# Patient Record
Sex: FEMALE | Race: WHITE | NOT HISPANIC OR LATINO | Employment: OTHER | URBAN - METROPOLITAN AREA
[De-identification: names, ages, dates, MRNs, and addresses within clinical notes are randomized per-mention and may not be internally consistent; named-entity substitution may affect disease eponyms.]

---

## 2017-04-04 ENCOUNTER — COMMUNICATION - HEALTHEAST (OUTPATIENT)
Dept: FAMILY MEDICINE | Facility: CLINIC | Age: 67
End: 2017-04-04

## 2017-04-04 DIAGNOSIS — G43.909 MIGRAINE: ICD-10-CM

## 2017-04-04 DIAGNOSIS — H53.9 VISION CHANGES: ICD-10-CM

## 2017-04-14 ENCOUNTER — RECORDS - HEALTHEAST (OUTPATIENT)
Dept: ADMINISTRATIVE | Facility: OTHER | Age: 67
End: 2017-04-14

## 2017-05-12 ENCOUNTER — RECORDS - HEALTHEAST (OUTPATIENT)
Dept: ADMINISTRATIVE | Facility: OTHER | Age: 67
End: 2017-05-12

## 2018-07-13 ENCOUNTER — RECORDS - HEALTHEAST (OUTPATIENT)
Dept: ADMINISTRATIVE | Facility: OTHER | Age: 68
End: 2018-07-13

## 2018-08-23 ENCOUNTER — OFFICE VISIT - HEALTHEAST (OUTPATIENT)
Dept: FAMILY MEDICINE | Facility: CLINIC | Age: 68
End: 2018-08-23

## 2018-08-23 DIAGNOSIS — Z12.4 SCREENING FOR MALIGNANT NEOPLASM OF CERVIX: ICD-10-CM

## 2018-08-23 DIAGNOSIS — Z13.1 SCREENING FOR DIABETES MELLITUS: ICD-10-CM

## 2018-08-23 DIAGNOSIS — K21.9 ESOPHAGEAL REFLUX: ICD-10-CM

## 2018-08-23 DIAGNOSIS — Z51.81 MEDICATION MONITORING ENCOUNTER: ICD-10-CM

## 2018-08-23 DIAGNOSIS — M94.9 DISORDER OF BONE AND CARTILAGE: ICD-10-CM

## 2018-08-23 DIAGNOSIS — Z00.00 ROUTINE ADULT HEALTH MAINTENANCE: ICD-10-CM

## 2018-08-23 DIAGNOSIS — N39.0 URINARY TRACT INFECTION: ICD-10-CM

## 2018-08-23 DIAGNOSIS — N89.8 VAGINAL DISCHARGE: ICD-10-CM

## 2018-08-23 DIAGNOSIS — J30.9 ALLERGIC RHINITIS: ICD-10-CM

## 2018-08-23 DIAGNOSIS — E78.2 MIXED HYPERLIPIDEMIA: ICD-10-CM

## 2018-08-23 DIAGNOSIS — M89.9 DISORDER OF BONE AND CARTILAGE: ICD-10-CM

## 2018-08-23 LAB
ALBUMIN UR-MCNC: NEGATIVE MG/DL
APPEARANCE UR: CLEAR
BILIRUB UR QL STRIP: NEGATIVE
CLUE CELLS: NORMAL
COLOR UR AUTO: YELLOW
GLUCOSE UR STRIP-MCNC: NEGATIVE MG/DL
HGB UR QL STRIP: ABNORMAL
KETONES UR STRIP-MCNC: NEGATIVE MG/DL
LEUKOCYTE ESTERASE UR QL STRIP: NEGATIVE
NITRATE UR QL: NEGATIVE
PH UR STRIP: 5.5 [PH] (ref 5–8)
SP GR UR STRIP: 1.01 (ref 1–1.03)
TRICHOMONAS, WET PREP: NORMAL
UROBILINOGEN UR STRIP-ACNC: ABNORMAL
YEAST, WET PREP: NORMAL

## 2018-08-23 ASSESSMENT — MIFFLIN-ST. JEOR: SCORE: 1263.72

## 2018-08-24 LAB
HPV SOURCE: NORMAL
HUMAN PAPILLOMA VIRUS 16 DNA: NEGATIVE
HUMAN PAPILLOMA VIRUS 18 DNA: NEGATIVE
HUMAN PAPILLOMA VIRUS FINAL DIAGNOSIS: NORMAL
HUMAN PAPILLOMA VIRUS OTHER HR: NEGATIVE
SPECIMEN DESCRIPTION: NORMAL

## 2018-09-06 ENCOUNTER — AMBULATORY - HEALTHEAST (OUTPATIENT)
Dept: LAB | Facility: CLINIC | Age: 68
End: 2018-09-06

## 2018-09-06 DIAGNOSIS — E78.2 MIXED HYPERLIPIDEMIA: ICD-10-CM

## 2018-09-06 DIAGNOSIS — Z13.1 SCREENING FOR DIABETES MELLITUS: ICD-10-CM

## 2018-09-06 DIAGNOSIS — K21.9 ESOPHAGEAL REFLUX: ICD-10-CM

## 2018-09-06 DIAGNOSIS — Z51.81 MEDICATION MONITORING ENCOUNTER: ICD-10-CM

## 2018-09-06 LAB
ALBUMIN SERPL-MCNC: 3.3 G/DL (ref 3.5–5)
ALP SERPL-CCNC: 60 U/L (ref 45–120)
ALT SERPL W P-5'-P-CCNC: 18 U/L (ref 0–45)
ANION GAP SERPL CALCULATED.3IONS-SCNC: 6 MMOL/L (ref 5–18)
AST SERPL W P-5'-P-CCNC: 13 U/L (ref 0–40)
BILIRUB SERPL-MCNC: 0.2 MG/DL (ref 0–1)
BUN SERPL-MCNC: 20 MG/DL (ref 8–22)
CALCIUM SERPL-MCNC: 9.2 MG/DL (ref 8.5–10.5)
CHLORIDE BLD-SCNC: 109 MMOL/L (ref 98–107)
CHOLEST SERPL-MCNC: 218 MG/DL
CO2 SERPL-SCNC: 23 MMOL/L (ref 22–31)
CREAT SERPL-MCNC: 0.74 MG/DL (ref 0.6–1.1)
ERYTHROCYTE [DISTWIDTH] IN BLOOD BY AUTOMATED COUNT: 13.6 % (ref 11–14.5)
FASTING STATUS PATIENT QL REPORTED: YES
GFR SERPL CREATININE-BSD FRML MDRD: >60 ML/MIN/1.73M2
GLUCOSE BLD-MCNC: 105 MG/DL (ref 70–125)
HBA1C MFR BLD: 5.3 % (ref 3.5–6)
HCT VFR BLD AUTO: 42.2 % (ref 35–47)
HDLC SERPL-MCNC: 41 MG/DL
HGB BLD-MCNC: 14.2 G/DL (ref 12–16)
LDLC SERPL CALC-MCNC: 147 MG/DL
MCH RBC QN AUTO: 32.9 PG (ref 27–34)
MCHC RBC AUTO-ENTMCNC: 33.5 G/DL (ref 32–36)
MCV RBC AUTO: 98 FL (ref 80–100)
PLATELET # BLD AUTO: 297 THOU/UL (ref 140–440)
PMV BLD AUTO: 6.3 FL (ref 7–10)
POTASSIUM BLD-SCNC: 4.5 MMOL/L (ref 3.5–5)
PROT SERPL-MCNC: 8.1 G/DL (ref 6–8)
RBC # BLD AUTO: 4.3 MILL/UL (ref 3.8–5.4)
SODIUM SERPL-SCNC: 138 MMOL/L (ref 136–145)
TRIGL SERPL-MCNC: 149 MG/DL
WBC: 7.4 THOU/UL (ref 4–11)

## 2018-09-18 ENCOUNTER — RECORDS - HEALTHEAST (OUTPATIENT)
Dept: ADMINISTRATIVE | Facility: OTHER | Age: 68
End: 2018-09-18

## 2018-09-18 ENCOUNTER — RECORDS - HEALTHEAST (OUTPATIENT)
Dept: BONE DENSITY | Facility: CLINIC | Age: 68
End: 2018-09-18

## 2018-09-18 DIAGNOSIS — M94.9 DISORDER OF CARTILAGE, UNSPECIFIED: ICD-10-CM

## 2018-09-18 DIAGNOSIS — M89.9 DISORDER OF BONE, UNSPECIFIED: ICD-10-CM

## 2018-09-24 ENCOUNTER — OFFICE VISIT - HEALTHEAST (OUTPATIENT)
Dept: FAMILY MEDICINE | Facility: CLINIC | Age: 68
End: 2018-09-24

## 2018-09-24 DIAGNOSIS — R10.13 ABDOMINAL PAIN, EPIGASTRIC: ICD-10-CM

## 2018-09-24 DIAGNOSIS — R05.9 COUGH: ICD-10-CM

## 2018-09-24 DIAGNOSIS — J01.90 ACUTE SINUS INFECTION: ICD-10-CM

## 2018-09-24 DIAGNOSIS — J18.9 PNEUMONIA: ICD-10-CM

## 2018-09-24 LAB
ALBUMIN SERPL-MCNC: 3.4 G/DL (ref 3.5–5)
ALP SERPL-CCNC: 64 U/L (ref 45–120)
ALT SERPL W P-5'-P-CCNC: 20 U/L (ref 0–45)
ANION GAP SERPL CALCULATED.3IONS-SCNC: 10 MMOL/L (ref 5–18)
AST SERPL W P-5'-P-CCNC: 24 U/L (ref 0–40)
BASOPHILS # BLD AUTO: 0 THOU/UL (ref 0–0.2)
BASOPHILS NFR BLD AUTO: 1 % (ref 0–2)
BILIRUB SERPL-MCNC: 0.5 MG/DL (ref 0–1)
BUN SERPL-MCNC: 23 MG/DL (ref 8–22)
CALCIUM SERPL-MCNC: 9.6 MG/DL (ref 8.5–10.5)
CHLORIDE BLD-SCNC: 104 MMOL/L (ref 98–107)
CO2 SERPL-SCNC: 24 MMOL/L (ref 22–31)
CREAT SERPL-MCNC: 0.79 MG/DL (ref 0.6–1.1)
EOSINOPHIL # BLD AUTO: 0.2 THOU/UL (ref 0–0.4)
EOSINOPHIL NFR BLD AUTO: 3 % (ref 0–6)
ERYTHROCYTE [DISTWIDTH] IN BLOOD BY AUTOMATED COUNT: 11.6 % (ref 11–14.5)
GFR SERPL CREATININE-BSD FRML MDRD: >60 ML/MIN/1.73M2
GLUCOSE BLD-MCNC: 115 MG/DL (ref 70–125)
HCT VFR BLD AUTO: 45.8 % (ref 35–47)
HGB BLD-MCNC: 15.5 G/DL (ref 12–16)
LYMPHOCYTES # BLD AUTO: 1.9 THOU/UL (ref 0.8–4.4)
LYMPHOCYTES NFR BLD AUTO: 28 % (ref 20–40)
MCH RBC QN AUTO: 34.2 PG (ref 27–34)
MCHC RBC AUTO-ENTMCNC: 33.9 G/DL (ref 32–36)
MCV RBC AUTO: 101 FL (ref 80–100)
MONOCYTES # BLD AUTO: 0.5 THOU/UL (ref 0–0.9)
MONOCYTES NFR BLD AUTO: 7 % (ref 2–10)
NEUTROPHILS # BLD AUTO: 4.1 THOU/UL (ref 2–7.7)
NEUTROPHILS NFR BLD AUTO: 61 % (ref 50–70)
PLATELET # BLD AUTO: 241 THOU/UL (ref 140–440)
PMV BLD AUTO: 6.5 FL (ref 7–10)
POTASSIUM BLD-SCNC: 4 MMOL/L (ref 3.5–5)
PROT SERPL-MCNC: 8.8 G/DL (ref 6–8)
RBC # BLD AUTO: 4.54 MILL/UL (ref 3.8–5.4)
SODIUM SERPL-SCNC: 138 MMOL/L (ref 136–145)
WBC: 6.6 THOU/UL (ref 4–11)

## 2018-09-24 ASSESSMENT — MIFFLIN-ST. JEOR: SCORE: 1243.03

## 2018-10-02 ENCOUNTER — HOSPITAL ENCOUNTER (OUTPATIENT)
Dept: CT IMAGING | Facility: CLINIC | Age: 68
Discharge: HOME | End: 2018-10-02
Attending: FAMILY MEDICINE

## 2018-10-02 ENCOUNTER — OFFICE VISIT - HEALTHEAST (OUTPATIENT)
Dept: FAMILY MEDICINE | Facility: CLINIC | Age: 68
End: 2018-10-02

## 2018-10-02 DIAGNOSIS — R05.9 COUGH: ICD-10-CM

## 2018-10-02 DIAGNOSIS — R10.12 ACUTE LUQ PAIN: ICD-10-CM

## 2018-10-02 DIAGNOSIS — B37.31 YEAST VAGINITIS: ICD-10-CM

## 2018-10-03 ENCOUNTER — COMMUNICATION - HEALTHEAST (OUTPATIENT)
Dept: FAMILY MEDICINE | Facility: CLINIC | Age: 68
End: 2018-10-03

## 2018-10-09 ENCOUNTER — OFFICE VISIT - HEALTHEAST (OUTPATIENT)
Dept: FAMILY MEDICINE | Facility: CLINIC | Age: 68
End: 2018-10-09

## 2018-10-09 DIAGNOSIS — M81.0 OSTEOPOROSIS: ICD-10-CM

## 2018-10-09 DIAGNOSIS — E27.9 LESION OF ADRENAL GLAND (H): ICD-10-CM

## 2018-10-09 DIAGNOSIS — T14.8XXA HEMATOMA OF SKIN: ICD-10-CM

## 2018-10-09 DIAGNOSIS — R10.12 ABDOMINAL PAIN, LEFT UPPER QUADRANT: ICD-10-CM

## 2018-10-09 ASSESSMENT — MIFFLIN-ST. JEOR: SCORE: 1270.24

## 2018-10-17 ENCOUNTER — AMBULATORY - HEALTHEAST (OUTPATIENT)
Dept: FAMILY MEDICINE | Facility: CLINIC | Age: 68
End: 2018-10-17

## 2018-10-17 DIAGNOSIS — E27.9 LESION OF ADRENAL GLAND (H): ICD-10-CM

## 2018-10-29 ENCOUNTER — AMBULATORY - HEALTHEAST (OUTPATIENT)
Dept: FAMILY MEDICINE | Facility: CLINIC | Age: 68
End: 2018-10-29

## 2018-10-29 DIAGNOSIS — D35.02 ADENOMA OF LEFT ADRENAL GLAND: ICD-10-CM

## 2018-10-30 ENCOUNTER — COMMUNICATION - HEALTHEAST (OUTPATIENT)
Dept: ADMINISTRATIVE | Facility: CLINIC | Age: 68
End: 2018-10-30

## 2019-03-01 ENCOUNTER — OFFICE VISIT - HEALTHEAST (OUTPATIENT)
Dept: FAMILY MEDICINE | Facility: CLINIC | Age: 69
End: 2019-03-01

## 2019-03-01 ENCOUNTER — HOSPITAL ENCOUNTER (OUTPATIENT)
Dept: CT IMAGING | Facility: CLINIC | Age: 69
Discharge: HOME | End: 2019-03-01
Attending: FAMILY MEDICINE

## 2019-03-01 DIAGNOSIS — J01.41 ACUTE RECURRENT PANSINUSITIS: ICD-10-CM

## 2019-03-01 DIAGNOSIS — R93.0 ABNORMAL CT SCAN, SINUS: ICD-10-CM

## 2019-03-01 DIAGNOSIS — K08.89 TOOTH PAIN: ICD-10-CM

## 2019-03-01 DIAGNOSIS — J34.89 CHRONIC NASAL DISCHARGE: ICD-10-CM

## 2019-03-01 DIAGNOSIS — Z72.0 TOBACCO USE: ICD-10-CM

## 2019-03-01 ASSESSMENT — MIFFLIN-ST. JEOR: SCORE: 1270.24

## 2019-03-03 ENCOUNTER — COMMUNICATION - HEALTHEAST (OUTPATIENT)
Dept: FAMILY MEDICINE | Facility: CLINIC | Age: 69
End: 2019-03-03

## 2019-03-28 ENCOUNTER — OFFICE VISIT - HEALTHEAST (OUTPATIENT)
Dept: OTOLARYNGOLOGY | Facility: CLINIC | Age: 69
End: 2019-03-28

## 2019-03-28 DIAGNOSIS — G50.1 ATYPICAL FACIAL PAIN: ICD-10-CM

## 2019-03-28 DIAGNOSIS — K21.9 LARYNGOPHARYNGEAL REFLUX (LPR): ICD-10-CM

## 2019-03-28 DIAGNOSIS — J34.89 OSTIOMEATAL COMPLEX OBSTRUCTION OF PARANASAL SINUS: ICD-10-CM

## 2019-03-28 DIAGNOSIS — J37.0 CHRONIC LARYNGITIS: ICD-10-CM

## 2019-03-28 DIAGNOSIS — J30.1 SEASONAL ALLERGIC RHINITIS DUE TO POLLEN: ICD-10-CM

## 2019-03-28 DIAGNOSIS — F17.200 SMOKER: ICD-10-CM

## 2019-04-12 ENCOUNTER — OFFICE VISIT - HEALTHEAST (OUTPATIENT)
Dept: FAMILY MEDICINE | Facility: CLINIC | Age: 69
End: 2019-04-12

## 2019-04-12 DIAGNOSIS — G43.909 MIGRAINE WITHOUT STATUS MIGRAINOSUS, NOT INTRACTABLE, UNSPECIFIED MIGRAINE TYPE: ICD-10-CM

## 2019-04-12 DIAGNOSIS — K21.9 GASTROESOPHAGEAL REFLUX DISEASE WITHOUT ESOPHAGITIS: ICD-10-CM

## 2019-04-12 DIAGNOSIS — H25.9 AGE-RELATED CATARACT OF BOTH EYES, UNSPECIFIED AGE-RELATED CATARACT TYPE: ICD-10-CM

## 2019-04-12 DIAGNOSIS — E78.2 MIXED HYPERLIPIDEMIA: ICD-10-CM

## 2019-04-12 DIAGNOSIS — Z72.0 TOBACCO USE: ICD-10-CM

## 2019-04-12 DIAGNOSIS — Z01.818 PREOP EXAMINATION: ICD-10-CM

## 2019-04-12 LAB
ERYTHROCYTE [DISTWIDTH] IN BLOOD BY AUTOMATED COUNT: 11.4 % (ref 11–14.5)
HCT VFR BLD AUTO: 47.9 % (ref 35–47)
HGB BLD-MCNC: 16.1 G/DL (ref 12–16)
MCH RBC QN AUTO: 34.3 PG (ref 27–34)
MCHC RBC AUTO-ENTMCNC: 33.6 G/DL (ref 32–36)
MCV RBC AUTO: 102 FL (ref 80–100)
PLATELET # BLD AUTO: 295 THOU/UL (ref 140–440)
PMV BLD AUTO: 6.8 FL (ref 7–10)
RBC # BLD AUTO: 4.7 MILL/UL (ref 3.8–5.4)
WBC: 6.1 THOU/UL (ref 4–11)

## 2019-07-30 ENCOUNTER — RECORDS - HEALTHEAST (OUTPATIENT)
Dept: ADMINISTRATIVE | Facility: OTHER | Age: 69
End: 2019-07-30

## 2020-03-03 ENCOUNTER — COMMUNICATION - HEALTHEAST (OUTPATIENT)
Dept: FAMILY MEDICINE | Facility: CLINIC | Age: 70
End: 2020-03-03

## 2020-03-06 ENCOUNTER — AMBULATORY - HEALTHEAST (OUTPATIENT)
Dept: FAMILY MEDICINE | Facility: CLINIC | Age: 70
End: 2020-03-06

## 2020-03-06 DIAGNOSIS — E27.9 LESION OF ADRENAL GLAND (H): ICD-10-CM

## 2020-03-06 DIAGNOSIS — D35.02 ADENOMA OF LEFT ADRENAL GLAND: ICD-10-CM

## 2020-03-27 ENCOUNTER — RECORDS - HEALTHEAST (OUTPATIENT)
Dept: ADMINISTRATIVE | Facility: OTHER | Age: 70
End: 2020-03-27

## 2020-06-22 ENCOUNTER — COMMUNICATION - HEALTHEAST (OUTPATIENT)
Dept: FAMILY MEDICINE | Facility: CLINIC | Age: 70
End: 2020-06-22

## 2020-07-02 ENCOUNTER — COMMUNICATION - HEALTHEAST (OUTPATIENT)
Dept: FAMILY MEDICINE | Facility: CLINIC | Age: 70
End: 2020-07-02

## 2020-07-02 ENCOUNTER — OFFICE VISIT - HEALTHEAST (OUTPATIENT)
Dept: FAMILY MEDICINE | Facility: CLINIC | Age: 70
End: 2020-07-02

## 2020-07-02 DIAGNOSIS — E78.2 MIXED HYPERLIPIDEMIA: ICD-10-CM

## 2020-07-02 DIAGNOSIS — G89.29 CHRONIC MIDLINE THORACIC BACK PAIN: ICD-10-CM

## 2020-07-02 DIAGNOSIS — Z12.31 VISIT FOR SCREENING MAMMOGRAM: ICD-10-CM

## 2020-07-02 DIAGNOSIS — G43.909 MIGRAINE WITHOUT STATUS MIGRAINOSUS, NOT INTRACTABLE, UNSPECIFIED MIGRAINE TYPE: ICD-10-CM

## 2020-07-02 DIAGNOSIS — Z01.818 PREOP EXAMINATION: ICD-10-CM

## 2020-07-02 DIAGNOSIS — H26.493 BILATERAL POSTERIOR CAPSULAR OPACIFICATION: ICD-10-CM

## 2020-07-02 DIAGNOSIS — K21.9 GASTROESOPHAGEAL REFLUX DISEASE WITHOUT ESOPHAGITIS: ICD-10-CM

## 2020-07-02 DIAGNOSIS — M54.6 CHRONIC MIDLINE THORACIC BACK PAIN: ICD-10-CM

## 2020-07-05 ENCOUNTER — COMMUNICATION - HEALTHEAST (OUTPATIENT)
Dept: FAMILY MEDICINE | Facility: CLINIC | Age: 70
End: 2020-07-05

## 2020-07-30 ENCOUNTER — RECORDS - HEALTHEAST (OUTPATIENT)
Dept: ADMINISTRATIVE | Facility: OTHER | Age: 70
End: 2020-07-30

## 2020-08-23 ENCOUNTER — COMMUNICATION - HEALTHEAST (OUTPATIENT)
Dept: FAMILY MEDICINE | Facility: CLINIC | Age: 70
End: 2020-08-23

## 2020-08-28 ENCOUNTER — OFFICE VISIT - HEALTHEAST (OUTPATIENT)
Dept: FAMILY MEDICINE | Facility: CLINIC | Age: 70
End: 2020-08-28

## 2020-08-28 DIAGNOSIS — E78.2 MIXED HYPERLIPIDEMIA: ICD-10-CM

## 2020-08-28 DIAGNOSIS — R21 RASH: ICD-10-CM

## 2020-08-28 LAB
ANION GAP SERPL CALCULATED.3IONS-SCNC: 7 MMOL/L (ref 5–18)
BASOPHILS # BLD AUTO: 0 THOU/UL (ref 0–0.2)
BASOPHILS NFR BLD AUTO: 1 % (ref 0–2)
BUN SERPL-MCNC: 14 MG/DL (ref 8–22)
CALCIUM SERPL-MCNC: 9.3 MG/DL (ref 8.5–10.5)
CHLORIDE BLD-SCNC: 108 MMOL/L (ref 98–107)
CHOLEST SERPL-MCNC: 212 MG/DL
CO2 SERPL-SCNC: 25 MMOL/L (ref 22–31)
CREAT SERPL-MCNC: 0.75 MG/DL (ref 0.6–1.1)
EOSINOPHIL # BLD AUTO: 0.2 THOU/UL (ref 0–0.4)
EOSINOPHIL NFR BLD AUTO: 2 % (ref 0–6)
ERYTHROCYTE [DISTWIDTH] IN BLOOD BY AUTOMATED COUNT: 12.1 % (ref 11–14.5)
FASTING STATUS PATIENT QL REPORTED: YES
GFR SERPL CREATININE-BSD FRML MDRD: >60 ML/MIN/1.73M2
GLUCOSE BLD-MCNC: 109 MG/DL (ref 70–125)
HCT VFR BLD AUTO: 43.6 % (ref 35–47)
HDLC SERPL-MCNC: 41 MG/DL
HGB BLD-MCNC: 14.6 G/DL (ref 12–16)
LDLC SERPL CALC-MCNC: 148 MG/DL
LYMPHOCYTES # BLD AUTO: 1.5 THOU/UL (ref 0.8–4.4)
LYMPHOCYTES NFR BLD AUTO: 24 % (ref 20–40)
MCH RBC QN AUTO: 33.3 PG (ref 27–34)
MCHC RBC AUTO-ENTMCNC: 33.5 G/DL (ref 32–36)
MCV RBC AUTO: 99 FL (ref 80–100)
MONOCYTES # BLD AUTO: 0.4 THOU/UL (ref 0–0.9)
MONOCYTES NFR BLD AUTO: 7 % (ref 2–10)
NEUTROPHILS # BLD AUTO: 4.2 THOU/UL (ref 2–7.7)
NEUTROPHILS NFR BLD AUTO: 67 % (ref 50–70)
PLATELET # BLD AUTO: 281 THOU/UL (ref 140–440)
PMV BLD AUTO: 6.9 FL (ref 7–10)
POTASSIUM BLD-SCNC: 4.4 MMOL/L (ref 3.5–5)
RBC # BLD AUTO: 4.39 MILL/UL (ref 3.8–5.4)
SODIUM SERPL-SCNC: 140 MMOL/L (ref 136–145)
TRIGL SERPL-MCNC: 114 MG/DL
WBC: 6.3 THOU/UL (ref 4–11)

## 2020-08-28 ASSESSMENT — MIFFLIN-ST. JEOR: SCORE: 1279.6

## 2020-08-31 LAB — B BURGDOR IGG+IGM SER QL: 0.02 INDEX VALUE

## 2020-09-10 ENCOUNTER — HOME CARE/HOSPICE - HEALTHEAST (OUTPATIENT)
Dept: HOME HEALTH SERVICES | Facility: HOME HEALTH | Age: 70
End: 2020-09-10

## 2020-09-11 ENCOUNTER — COMMUNICATION - HEALTHEAST (OUTPATIENT)
Dept: SCHEDULING | Facility: CLINIC | Age: 70
End: 2020-09-11

## 2020-09-17 ENCOUNTER — OFFICE VISIT - HEALTHEAST (OUTPATIENT)
Dept: FAMILY MEDICINE | Facility: CLINIC | Age: 70
End: 2020-09-17

## 2020-09-17 DIAGNOSIS — R11.0 NAUSEA: ICD-10-CM

## 2020-09-17 DIAGNOSIS — S22.060A COMPRESSION FRACTURE OF T7 VERTEBRA, INITIAL ENCOUNTER (H): ICD-10-CM

## 2020-09-17 DIAGNOSIS — M54.6 ACUTE BILATERAL THORACIC BACK PAIN: ICD-10-CM

## 2020-09-17 DIAGNOSIS — E78.2 MIXED HYPERLIPIDEMIA: ICD-10-CM

## 2020-09-17 DIAGNOSIS — I70.8 LEFT SUBCLAVIAN ARTERY OCCLUSION: ICD-10-CM

## 2020-09-17 ASSESSMENT — MIFFLIN-ST. JEOR: SCORE: 1252.1

## 2020-09-18 ENCOUNTER — COMMUNICATION - HEALTHEAST (OUTPATIENT)
Dept: FAMILY MEDICINE | Facility: CLINIC | Age: 70
End: 2020-09-18

## 2020-09-18 DIAGNOSIS — I70.8 LEFT SUBCLAVIAN ARTERY OCCLUSION: ICD-10-CM

## 2020-09-18 DIAGNOSIS — I77.4 CELIAC ARTERY STENOSIS (H): ICD-10-CM

## 2020-09-18 DIAGNOSIS — R09.02 HYPOXIA: ICD-10-CM

## 2020-09-18 DIAGNOSIS — Z72.0 TOBACCO USE: ICD-10-CM

## 2020-09-23 ENCOUNTER — COMMUNICATION - HEALTHEAST (OUTPATIENT)
Dept: FAMILY MEDICINE | Facility: CLINIC | Age: 70
End: 2020-09-23

## 2020-09-23 ENCOUNTER — RECORDS - HEALTHEAST (OUTPATIENT)
Dept: ADMINISTRATIVE | Facility: OTHER | Age: 70
End: 2020-09-23

## 2020-09-24 ENCOUNTER — COMMUNICATION - HEALTHEAST (OUTPATIENT)
Dept: FAMILY MEDICINE | Facility: CLINIC | Age: 70
End: 2020-09-24

## 2020-09-24 DIAGNOSIS — S22.060A COMPRESSION FRACTURE OF T7 VERTEBRA, INITIAL ENCOUNTER (H): ICD-10-CM

## 2020-09-25 ENCOUNTER — RECORDS - HEALTHEAST (OUTPATIENT)
Dept: ADMINISTRATIVE | Facility: OTHER | Age: 70
End: 2020-09-25

## 2020-09-25 ENCOUNTER — TRANSCRIBE ORDERS (OUTPATIENT)
Dept: OTHER | Age: 70
End: 2020-09-25

## 2020-09-25 DIAGNOSIS — I77.4 CELIAC ARTERY STENOSIS (H): Primary | ICD-10-CM

## 2020-09-25 DIAGNOSIS — I70.8 LEFT SUBCLAVIAN ARTERY OCCLUSION: ICD-10-CM

## 2020-09-29 ENCOUNTER — OFFICE VISIT - HEALTHEAST (OUTPATIENT)
Dept: PHYSICAL THERAPY | Facility: REHABILITATION | Age: 70
End: 2020-09-29

## 2020-09-29 DIAGNOSIS — S22.060A COMPRESSION FRACTURE OF T7 VERTEBRA, INITIAL ENCOUNTER (H): ICD-10-CM

## 2020-09-30 ENCOUNTER — COMMUNICATION - HEALTHEAST (OUTPATIENT)
Dept: SCHEDULING | Facility: CLINIC | Age: 70
End: 2020-09-30

## 2020-10-01 ENCOUNTER — TELEPHONE (OUTPATIENT)
Dept: RADIOLOGY | Facility: CLINIC | Age: 70
End: 2020-10-01

## 2020-10-01 DIAGNOSIS — R09.89 OTHER SPECIFIED SYMPTOMS AND SIGNS INVOLVING THE CIRCULATORY AND RESPIRATORY SYSTEMS: ICD-10-CM

## 2020-10-01 DIAGNOSIS — I77.1 SUBCLAVIAN ARTERY STENOSIS (H): Primary | ICD-10-CM

## 2020-10-01 NOTE — TELEPHONE ENCOUNTER
I called and spoke with Lizzie.  She is having back pain, she is inquiring about Cement vs Glue for vertebroplasty for known T7 fracture.  I offered her a consult to discuss the procedure with Dr Raymond.  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  Plan is to see pt in clinic with carotid ultrasound and visit with Dr Raymond.    GAIL Bentley, RN, BSN  Interventional Radiology Nurse Coordinator   Phone:  [phone]

## 2020-10-02 ENCOUNTER — RECORDS - HEALTHEAST (OUTPATIENT)
Dept: ADMINISTRATIVE | Facility: OTHER | Age: 70
End: 2020-10-02

## 2020-10-02 NOTE — TELEPHONE ENCOUNTER
I called and spoke with Lizzie.  She prefers to wait for disk to be mailed from [place], she has lots of pain riding in the car.  She is going today to get scan of her C1, as they found a hemangioma and is having imaging done at [facility].  I have requested the imaging.  Pt states she was very active this summer, does yoga.  She started feeling pain and increase in back spasms the week prior to labor day.  She had been doing Yoga and using a new yoga equipment.  Pt is now taking gabapentin and methocarbinol for pain, she gets rebound migraines with oxy and tylenol.  She is wearing a brace 24/7  Except to shower.  Hard to sleep because pain and the brace.  She has lots of pain with bumps, turning and car rides.  Says her pain is in the middle of her back and around the left side from back to upper ribs.  Plan is review outside imaging with Dr Raymond and get pt set for clinic in person consult vertebroplasty and vascular referral from Dr Walker.  GAIL Bentley, RN, BSN  Interventional Radiology Nurse Coordinator   Phone:  [phone]

## 2020-10-07 NOTE — TELEPHONE ENCOUNTER
DIAGNOSIS: subclavian artery stenosis, referred by    DATE RECEIVED: 10.8.20   NOTES STATUS DETAILS   OFFICE NOTE from referring provider CE 9.17.20  Dr. Anne Marie Walker  HE [place]   OFFICE NOTE from other specialist CE 9.9-9.12.20  Dr. Pa Grubbs  HE [facility]   OPERATIVE REPORT N/A    MEDICATION LIST CE    PERTINENT LABS CE    CTA (CT ANGIOGRAPHY) N/A    CT PACS 10.2.20  CT Cervical Spine    9.9.20  CT Thorax/Aortic Dissection    9.4.20  XR T-Spine    10.2.18  CT Abd/Pelvis   MRI PACS 9.23.20  MR Cervical Spine  MR Lumbar Spine   ULTRASOUND N/A      Action 10.7.20 JM   Action Taken Called [facility] and asked them to push imaging. They indicated that they would do that today.     Action 10.7.20 JM   Action Taken Resolved imaging to PACS.

## 2020-10-08 ENCOUNTER — PRE VISIT (OUTPATIENT)
Dept: VASCULAR SURGERY | Facility: CLINIC | Age: 70
End: 2020-10-08

## 2020-10-08 ENCOUNTER — OFFICE VISIT - HEALTHEAST (OUTPATIENT)
Dept: PHYSICAL THERAPY | Facility: REHABILITATION | Age: 70
End: 2020-10-08

## 2020-10-08 DIAGNOSIS — S22.060A COMPRESSION FRACTURE OF T7 VERTEBRA, INITIAL ENCOUNTER (H): ICD-10-CM

## 2020-10-09 ENCOUNTER — RECORDS - HEALTHEAST (OUTPATIENT)
Dept: ADMINISTRATIVE | Facility: OTHER | Age: 70
End: 2020-10-09

## 2020-10-09 ENCOUNTER — TELEPHONE (OUTPATIENT)
Dept: VASCULAR SURGERY | Facility: CLINIC | Age: 70
End: 2020-10-09

## 2020-10-09 ENCOUNTER — TRANSFERRED RECORDS (OUTPATIENT)
Dept: HEALTH INFORMATION MANAGEMENT | Facility: CLINIC | Age: 70
End: 2020-10-09

## 2020-10-09 DIAGNOSIS — S22.000A COMPRESSION FRACTURE OF THORACIC VERTEBRA (H): Primary | ICD-10-CM

## 2020-10-09 NOTE — TELEPHONE ENCOUNTER
I called and left a voicemail including my call back number.  I called to find out about her f/up with [facility], get her scheduled for thoracic mri to eval T7 compression fracture prior to consult for vertebroplasty with CIRA Bentley RN, BSN  Interventional Radiology Nurse Coordinator   Phone:  [phone]

## 2020-10-09 NOTE — TELEPHONE ENCOUNTER
I spoke with Valeria earlier.  She does have a MRI Thoracic with contrast scheduled soon at an outside institution to look at something seen on the CT scan on T1.  I have called her back to get the location of that imaging so we can get for review.  I had to call her back and got voicemail.  Plan is to get imaging and schedule for next steps.  She does have follow up at [facility] for her back, but stated she would want procedure to happen at the [facility] with a radiologist and not an ortho physician.  GAIL Bentley, RN, BSN  Interventional Radiology Nurse Coordinator   Phone:  [phone]

## 2020-10-13 ENCOUNTER — OFFICE VISIT - HEALTHEAST (OUTPATIENT)
Dept: PHYSICAL THERAPY | Facility: REHABILITATION | Age: 70
End: 2020-10-13

## 2020-10-13 DIAGNOSIS — S22.060A COMPRESSION FRACTURE OF T7 VERTEBRA, INITIAL ENCOUNTER (H): ICD-10-CM

## 2020-10-15 ENCOUNTER — OFFICE VISIT - HEALTHEAST (OUTPATIENT)
Dept: PHYSICAL THERAPY | Facility: REHABILITATION | Age: 70
End: 2020-10-15

## 2020-10-15 ENCOUNTER — OFFICE VISIT - HEALTHEAST (OUTPATIENT)
Dept: PULMONOLOGY | Facility: OTHER | Age: 70
End: 2020-10-15

## 2020-10-15 DIAGNOSIS — Z87.891 PERSONAL HISTORY OF TOBACCO USE, PRESENTING HAZARDS TO HEALTH: ICD-10-CM

## 2020-10-15 DIAGNOSIS — R09.89 PULMONARY AIR TRAPPING: ICD-10-CM

## 2020-10-15 DIAGNOSIS — S22.060A COMPRESSION FRACTURE OF T7 VERTEBRA, INITIAL ENCOUNTER (H): ICD-10-CM

## 2020-10-15 ASSESSMENT — MIFFLIN-ST. JEOR: SCORE: 1252.1

## 2020-10-19 ENCOUNTER — ANCILLARY PROCEDURE (OUTPATIENT)
Dept: ULTRASOUND IMAGING | Facility: CLINIC | Age: 70
End: 2020-10-19
Attending: RADIOLOGY
Payer: COMMERCIAL

## 2020-10-19 ENCOUNTER — ANCILLARY PROCEDURE (OUTPATIENT)
Dept: CT IMAGING | Facility: CLINIC | Age: 70
End: 2020-10-19
Attending: RADIOLOGY
Payer: COMMERCIAL

## 2020-10-19 DIAGNOSIS — I77.1 SUBCLAVIAN ARTERY STENOSIS (H): ICD-10-CM

## 2020-10-19 DIAGNOSIS — S22.000A COMPRESSION FRACTURE OF THORACIC VERTEBRA (H): ICD-10-CM

## 2020-10-19 DIAGNOSIS — R09.89 OTHER SPECIFIED SYMPTOMS AND SIGNS INVOLVING THE CIRCULATORY AND RESPIRATORY SYSTEMS: ICD-10-CM

## 2020-10-19 PROCEDURE — 93880 EXTRACRANIAL BILAT STUDY: CPT | Mod: GC

## 2020-10-19 PROCEDURE — 72128 CT CHEST SPINE W/O DYE: CPT | Mod: GC | Performed by: RADIOLOGY

## 2020-10-20 ENCOUNTER — OFFICE VISIT - HEALTHEAST (OUTPATIENT)
Dept: PHYSICAL THERAPY | Facility: REHABILITATION | Age: 70
End: 2020-10-20

## 2020-10-20 DIAGNOSIS — S22.060A COMPRESSION FRACTURE OF T7 VERTEBRA, INITIAL ENCOUNTER (H): ICD-10-CM

## 2020-10-21 ENCOUNTER — OFFICE VISIT (OUTPATIENT)
Dept: RADIOLOGY | Facility: CLINIC | Age: 70
End: 2020-10-21
Attending: RADIOLOGY
Payer: COMMERCIAL

## 2020-10-21 ENCOUNTER — RECORDS - HEALTHEAST (OUTPATIENT)
Dept: ADMINISTRATIVE | Facility: OTHER | Age: 70
End: 2020-10-21

## 2020-10-21 VITALS
OXYGEN SATURATION: 95 % | SYSTOLIC BLOOD PRESSURE: 158 MMHG | DIASTOLIC BLOOD PRESSURE: 76 MMHG | WEIGHT: 162.2 LBS | RESPIRATION RATE: 18 BRPM | BODY MASS INDEX: 28.74 KG/M2 | TEMPERATURE: 97.6 F | HEART RATE: 72 BPM | HEIGHT: 63 IN

## 2020-10-21 DIAGNOSIS — S22.000A COMPRESSION FRACTURE OF THORACIC VERTEBRA, CLOSED, INITIAL ENCOUNTER (H): Primary | ICD-10-CM

## 2020-10-21 PROCEDURE — 99202 OFFICE O/P NEW SF 15 MIN: CPT | Mod: 95 | Performed by: RADIOLOGY

## 2020-10-21 PROCEDURE — G0463 HOSPITAL OUTPT CLINIC VISIT: HCPCS

## 2020-10-21 RX ORDER — GABAPENTIN 300 MG/1
CAPSULE ORAL
COMMUNITY
Start: 2020-09-17 | End: 2021-08-31

## 2020-10-21 RX ORDER — METHOCARBAMOL 500 MG/1
TABLET, FILM COATED ORAL
COMMUNITY
Start: 2020-10-03 | End: 2021-08-31

## 2020-10-21 ASSESSMENT — PAIN SCALES - GENERAL: PAINLEVEL: MILD PAIN (3)

## 2020-10-21 ASSESSMENT — MIFFLIN-ST. JEOR: SCORE: 1234.86

## 2020-10-21 NOTE — LETTER
"    10/21/2020         RE: Lizzie Viera  [address]  [place] [address]        Dear Colleague,    Thank you for referring your patient, Lizzie Viera, to the [facility]. Please see a copy of my visit note below.        INTERVENTIONAL RADIOLOGY CONSULTATION    Name: Lizzie Viera  Age: 70 year old   Referring Physician: Self referred.  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 She initially had fairly diffuse back spasms which are improved, better since taking a muscle relaxant; however, the medication is very difficult for her to tolerate. She was diagnosed with a severe compression fracture at T7 which progressed over a month, diagnosed on MRI from 10/09/20 and has a TLSO brace. She has mild retropulsion on MR, no abnormal cord signal. She also has fairly severe neuroforaminal narrowing at that level but her pain is not primarily radicular. She does have intermittent pain across her left rib line. Her pain on medication is about 2/10, occasionally worse. The pain does get very severe with changing position, twisting or bending a the waist. She is unable to perform any household chores. Her brace is highly uncomfortable for her, \"digging into my abdomen\".     There is an incidental lesion in her T1 vertebral body which she was told needs to be biopsied. Also incidental left subclavian artery occlusion and severe celiac origin narrowing on recent CTA. She denies left arm pain or numbness. Denies postprandial pain or weight loss. She reports chronic upper abdominal discomfort " "for years, not relieved after cholecystectomy. She does report diarrhea, thinking it is a side effect from the Neurontin or methocarbamol.      ROS: Reviewed, negative except stated in HPI.  PAST MEDICAL HISTORY:  Reviewed in Epic/CareEverywhere.   History significant for tobacco use, chronic back pain, asthma, and prior fractures.  PAST SURGICAL HISTORY: Reviewed in Epic/CareEverywhere.   No past surgical history on file.  FAMILY HISTORY:   No family history on file.  SOCIAL HISTORY:    Social History     Tobacco Use     Smoking status: History of tobacco use.    Substance Use Topics     Alcohol use: Not on file     PROBLEM LIST:   There are no active problems to display for this patient.    MEDICATIONS:  Reviewed in Epic/CareEverywhere. She has pain medications including Gabapentin, methocarbamol and Oxycodone listed.  No current outpatient medications on file.     ALLERGIES:   Adhesive tape (rash)  Cefdinir (wheezing)  Latex (shortness of breath)  Ragweed     Physical Examination:   VITALS:   BP (!) 158/76   Pulse 72   Temp 97.6  F (36.4  C)   Resp 18   Ht 5' 3\"   Wt 162 lb 3.2 oz   SpO2 95%   BMI 28.73 kg/m    General: Alert, cooperative, pleasant appearing in no distress.  Resp: Unlabored respirations.  Abd: Nondistended  MSK: Severe pinpoint tenderness to palpation of the a midthoracic spinous process at approximately T7. Associated hypertonic paraspinal muscles.    Labs: Reviewed in Epic/CareEverywhere from 9/9/2020.   - Plt 299, normal CBC  - Normal CMP, Cr 0.75  - INR: none.    Diagnostic studies: Imaging reviewed by me.  MRI Thoracic spine 10/09/20 and CT 10/19/20. I personally reviewed the imaging and note the severe compression deformity of T7 with mild retropulsion, no significant mass effect on the ventral cord. T2 bright lesion at T1.  R    CT T spine shows osteoporotic bones, further compression of T7 with mild retropulsion.    9/9/2020 CTA CAP Dissection study: Reviewed. Chronically occluded " left subclavian artery and near complete celiac origin occlusion. SMA Is widely patent and provides collateral flow to GDA, heaptic artery, and retrograde flow to remainder of celiac artery branches.    Assessment: Lizzie Viera is a very pleasant 70 year old female with a severe osteoporotic T7 compression fracture with persistent activity limiting pain. She had significant difficulty tolerating the pmedications and brace. There may be an element of radicular pain because of neuroforaminal narrowing at this level, however I think her pain will respond to vertebral augmentation.     Plan:  - Discussed risks and benefits and Lizzie is interested in proceeding with T7 kyphoplasty.  - This is an osteoporosis-defining fracture. Recommend medical management of osteoporosis.    - Additional imaging findings were reviewed with Lizzie:   - Incidental T1 vertebral body lesion may represent a lipid-poor hemangioma although this is not definitve per further discussion with neuroradiology. We will request a neuroradiologist overread of her outside MRI, would prefer to avoid biopsy if possible.    - She appears well-collateralized in the setting of chronic left subclavian artery occlusion, asymptomatic. This would explain arm discrepancies in blood pressure readings, if present. I do have some concerns about the retrograde left vertebral artery flow noted on recent carotid US. Perhaps designated neuroimaging (e.g., MRA of the brain/neck) would help map out her posterior circulation, which I suspect is well-compensated by the [place] of perera.   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 No symptoms and there is adequate collateral flow from the SMA.     Leoncio Carranza, DO on 10/22/2020 at 12:34 PM    I was present for the entire clinic visit and agree with the assessment and plan documented by Dr. Carranza.    It was a pleaure to meet Ms. Viera in clinic today.  Thank you for involving the Interventional Radiology service in her care.    Christelle Raymond MD  Interventional Radiology   Pager [phone]        CC  Patient Care Team:  Anne Marie Walker as PCP - General (Family Practice)  SELF, REFERRED

## 2020-10-21 NOTE — PROGRESS NOTES
"    INTERVENTIONAL RADIOLOGY CONSULTATION    Name: Lizzie Viera  Age: 70 year old   Referring Physician: Self referred.  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 She initially had fairly diffuse back spasms which are improved, better since taking a muscle relaxant; however, the medication is very difficult for her to tolerate. She was diagnosed with a severe compression fracture at T7 which progressed over a month, diagnosed on MRI from 10/09/20 and has a TLSO brace. She has mild retropulsion on MR, no abnormal cord signal. She also has fairly severe neuroforaminal narrowing at that level but her pain is not primarily radicular. She does have intermittent pain across her left rib line. Her pain on medication is about 2/10, occasionally worse. The pain does get very severe with changing position, twisting or bending a the waist. She is unable to perform any household chores. Her brace is highly uncomfortable for her, \"digging into my abdomen\".     There is an incidental lesion in her T1 vertebral body which she was told needs to be biopsied. Also incidental left subclavian artery occlusion and severe celiac origin narrowing on recent CTA. She denies left arm pain or numbness. Denies postprandial pain or weight loss. She reports chronic upper abdominal discomfort for years, not relieved after cholecystectomy. She does report diarrhea, thinking it is a side effect from the Neurontin or methocarbamol.      ROS: Reviewed, negative except stated in HPI.  PAST MEDICAL HISTORY:  Reviewed in Epic/[employer].   History significant for " "tobacco use, chronic back pain, asthma, and prior fractures.  PAST SURGICAL HISTORY: Reviewed in Epic/[employer].   No past surgical history on file.  FAMILY HISTORY:   No family history on file.  SOCIAL HISTORY:    Social History     Tobacco Use     Smoking status: History of tobacco use.    Substance Use Topics     Alcohol use: Not on file     PROBLEM LIST:   There are no active problems to display for this patient.    MEDICATIONS:  Reviewed in Epic/CareEverywhere. She has pain medications including Gabapentin, methocarbamol and Oxycodone listed.  No current outpatient medications on file.     ALLERGIES:   Adhesive tape (rash)  Cefdinir (wheezing)  Latex (shortness of breath)  Ragweed     Physical Examination:   VITALS:   BP (!) 158/76   Pulse 72   Temp 97.6  F (36.4  C)   Resp 18   Ht 5' 3\"   Wt 162 lb 3.2 oz   SpO2 95%   BMI 28.73 kg/m    General: Alert, cooperative, pleasant appearing in no distress.  Resp: Unlabored respirations.  Abd: Nondistended  MSK: Severe pinpoint tenderness to palpation of the a midthoracic spinous process at approximately T7. Associated hypertonic paraspinal muscles.    Labs: Reviewed in Epic/CareEverywhere from 9/9/2020.   - Plt 299, normal CBC  - Normal CMP, Cr 0.75  - INR: none.    Diagnostic studies: Imaging reviewed by me.  MRI Thoracic spine 10/09/20 and CT 10/19/20. I personally reviewed the imaging and note the severe compression deformity of T7 with mild retropulsion, no significant mass effect on the ventral cord. T2 bright lesion at T1.  R    CT T spine shows osteoporotic bones, further compression of T7 with mild retropulsion.    9/9/2020 CTA CAP Dissection study: Reviewed. Chronically occluded left subclavian artery and near complete celiac origin occlusion. SMA Is widely patent and provides collateral flow to GDA, heaptic artery, and retrograde flow to remainder of celiac artery branches.    Assessment: Lizzie Viera is a very pleasant 70 year old female " with a severe osteoporotic T7 compression fracture with persistent activity limiting pain. She had significant difficulty tolerating the pmedications and brace. There may be an element of radicular pain because of neuroforaminal narrowing at this level, however I think her pain will respond to vertebral augmentation.     Plan:  - Discussed risks and benefits and Lizzie is interested in proceeding with T7 kyphoplasty.  - This is an osteoporosis-defining fracture. Recommend medical management of osteoporosis.    - Additional imaging findings were reviewed with Lizzie:   - Incidental T1 vertebral body lesion may represent a lipid-poor hemangioma although this is not definitve per further discussion with neuroradiology. We will request a neuroradiologist overread of her outside MRI, would prefer to avoid biopsy if possible.    - She appears well-collateralized in the setting of chronic left subclavian artery occlusion, asymptomatic. This would explain arm discrepancies in blood pressure readings, if present. I do have some concerns about the retrograde left vertebral artery flow noted on recent carotid US. Perhaps designated neuroimaging (e.g., MRA of the brain/neck) would help map out her posterior circulation, which I suspect is well-compensated by the [place] of perera.   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 No symptoms and there is adequate collateral flow from the SMA.     Leoncio Carranza, DO on 10/22/2020 at 12:34 PM    I was present for the entire clinic visit and agree with the assessment and plan documented by Dr. Carranza.    It was a pleaure to meet Ms. Viera in clinic today. Thank you for involving the Interventional Radiology service in her care.    Christelle Raymond MD  Interventional Radiology   Pager [phone]        CC  Patient Care Team:  Anne Marie Walker as PCP - General (Family Practice)  SELF, REFERRED

## 2020-10-21 NOTE — NURSING NOTE
"Oncology Rooming Note    October 21, 2020 12:10 PM   Lizzie Viera is a 70 year old female who presents for:    Chief Complaint   Patient presents with     New Patient      Vertebral augmentation for T7 compression fracture     Initial Vitals: BP (!) 158/76   Pulse 72   Temp 97.6  F (36.4  C)   Resp 18   Ht 1.6 m (5' 3\")   Wt 73.6 kg (162 lb 3.2 oz)   SpO2 95%   BMI 28.73 kg/m   Estimated body mass index is 28.73 kg/m  as calculated from the following:    Height as of this encounter: 1.6 m (5' 3\").    Weight as of this encounter: 73.6 kg (162 lb 3.2 oz). Body surface area is 1.81 meters squared.  Mild Pain (3) Comment: Data Unavailable   No LMP recorded. Patient is postmenopausal.  Allergies reviewed: Yes  Medications reviewed: Yes    Medications: Medication refills not needed today.  Pharmacy name entered into [employer]: [id number] [place], [place] - [address]    Clinical concerns: New patient today. Dr. Raymond was NOT notified.      Cristy Cardona MA            "

## 2020-10-22 ENCOUNTER — OFFICE VISIT - HEALTHEAST (OUTPATIENT)
Dept: PHYSICAL THERAPY | Facility: REHABILITATION | Age: 70
End: 2020-10-22

## 2020-10-22 ENCOUNTER — TELEPHONE (OUTPATIENT)
Dept: RADIOLOGY | Facility: CLINIC | Age: 70
End: 2020-10-22

## 2020-10-22 DIAGNOSIS — S22.060A COMPRESSION FRACTURE OF T7 VERTEBRA, INITIAL ENCOUNTER (H): ICD-10-CM

## 2020-10-22 DIAGNOSIS — M80.88XG OSTEOPOROTIC COMPRESSION FRACTURE OF SPINE, WITH DELAYED HEALING, SUBSEQUENT ENCOUNTER: ICD-10-CM

## 2020-10-22 DIAGNOSIS — M80.88XS OTHER OSTEOPOROSIS WITH CURRENT PATHOLOGICAL FRACTURE, VERTEBRA(E), SEQUELA: ICD-10-CM

## 2020-10-22 DIAGNOSIS — M81.0 OSTEOPOROSIS: Primary | ICD-10-CM

## 2020-10-22 DIAGNOSIS — S22.000A COMPRESSION FRACTURE OF THORACIC VERTEBRA, CLOSED, INITIAL ENCOUNTER (H): ICD-10-CM

## 2020-10-22 NOTE — TELEPHONE ENCOUNTER
I called and spoke with Valeria, about MRI overread, she has f/up appt with [facility] regarding her back tomorrow.  Pt is NOT getting a biopsy tomorrow, recommended is bone scan.   She will call me tomorrow with fax number.  Pt would like her vertebroplasty after 11/9/20 as she has PT until the 5th.  GAIL Bentley, RN, BSN  Interventional Radiology Nurse Coordinator   Phone:  [phone]

## 2020-10-23 ENCOUNTER — RECORDS - HEALTHEAST (OUTPATIENT)
Dept: ADMINISTRATIVE | Facility: OTHER | Age: 70
End: 2020-10-23

## 2020-10-27 ENCOUNTER — OFFICE VISIT - HEALTHEAST (OUTPATIENT)
Dept: PHYSICAL THERAPY | Facility: REHABILITATION | Age: 70
End: 2020-10-27

## 2020-10-27 DIAGNOSIS — S22.060A COMPRESSION FRACTURE OF T7 VERTEBRA, INITIAL ENCOUNTER (H): ICD-10-CM

## 2020-10-27 NOTE — TELEPHONE ENCOUNTER
I received a vm from Lizzie.  She gave me the fax number for Dr Herring [facility] [phone].  I have faxed the MRI outside read report.  Pt stated that Dr Herring had a discussion with her that having cement injected into her fracture could increase her risk for another fracture at level above and below.  She had a bone density test in 2018 and would like to repeat that prior to any procedure.  Pt inquiring on her subclavian finding.   I have sent a message for Dr Raymond and Dr Kothari to discuss.  GAIL Bentley, RN, BSN  Interventional Radiology Nurse Coordinator   Phone:  [phone]

## 2020-10-28 ENCOUNTER — COMMUNICATION - HEALTHEAST (OUTPATIENT)
Dept: FAMILY MEDICINE | Facility: CLINIC | Age: 70
End: 2020-10-28

## 2020-10-28 DIAGNOSIS — M81.0 AGE RELATED OSTEOPOROSIS, UNSPECIFIED PATHOLOGICAL FRACTURE PRESENCE: ICD-10-CM

## 2020-10-28 DIAGNOSIS — Z78.0 MENOPAUSE: ICD-10-CM

## 2020-10-29 ENCOUNTER — OFFICE VISIT - HEALTHEAST (OUTPATIENT)
Dept: PHYSICAL THERAPY | Facility: REHABILITATION | Age: 70
End: 2020-10-29

## 2020-10-29 DIAGNOSIS — S22.060A COMPRESSION FRACTURE OF T7 VERTEBRA, INITIAL ENCOUNTER (H): ICD-10-CM

## 2020-10-30 NOTE — TELEPHONE ENCOUNTER
I called and left a voicemail including my call back number.  I have spoken with Dr Raymond, pt already has a great risk for another fracture due to her osteoporosis.  She currently has a treatable fracture, and it is offered should she choose, the treatment would help with her current fracture and associated pain.   GAIL Bentley RN, BSN  Interventional Radiology Nurse Coordinator   Phone:  [phone]

## 2020-11-03 ENCOUNTER — OFFICE VISIT - HEALTHEAST (OUTPATIENT)
Dept: PHYSICAL THERAPY | Facility: REHABILITATION | Age: 70
End: 2020-11-03

## 2020-11-03 DIAGNOSIS — S22.060D COMPRESSION FRACTURE OF T7 VERTEBRA WITH ROUTINE HEALING, SUBSEQUENT ENCOUNTER: ICD-10-CM

## 2020-11-03 DIAGNOSIS — G89.29 CHRONIC MIDLINE THORACIC BACK PAIN: ICD-10-CM

## 2020-11-03 DIAGNOSIS — M54.6 CHRONIC MIDLINE THORACIC BACK PAIN: ICD-10-CM

## 2020-11-05 ENCOUNTER — OFFICE VISIT - HEALTHEAST (OUTPATIENT)
Dept: PHYSICAL THERAPY | Facility: REHABILITATION | Age: 70
End: 2020-11-05

## 2020-11-05 DIAGNOSIS — S22.060D COMPRESSION FRACTURE OF T7 VERTEBRA WITH ROUTINE HEALING, SUBSEQUENT ENCOUNTER: ICD-10-CM

## 2020-11-05 DIAGNOSIS — G89.29 CHRONIC MIDLINE THORACIC BACK PAIN: ICD-10-CM

## 2020-11-05 DIAGNOSIS — M54.6 CHRONIC MIDLINE THORACIC BACK PAIN: ICD-10-CM

## 2020-11-10 ENCOUNTER — COMMUNICATION - HEALTHEAST (OUTPATIENT)
Dept: FAMILY MEDICINE | Facility: CLINIC | Age: 70
End: 2020-11-10

## 2020-11-24 ENCOUNTER — OFFICE VISIT - HEALTHEAST (OUTPATIENT)
Dept: PHYSICAL THERAPY | Facility: REHABILITATION | Age: 70
End: 2020-11-24

## 2020-11-24 DIAGNOSIS — S22.060A COMPRESSION FRACTURE OF T7 VERTEBRA, INITIAL ENCOUNTER (H): ICD-10-CM

## 2020-11-24 DIAGNOSIS — M54.6 CHRONIC MIDLINE THORACIC BACK PAIN: ICD-10-CM

## 2020-11-24 DIAGNOSIS — S22.060D COMPRESSION FRACTURE OF T7 VERTEBRA WITH ROUTINE HEALING, SUBSEQUENT ENCOUNTER: ICD-10-CM

## 2020-11-24 DIAGNOSIS — G89.29 CHRONIC MIDLINE THORACIC BACK PAIN: ICD-10-CM

## 2020-11-27 ENCOUNTER — OFFICE VISIT - HEALTHEAST (OUTPATIENT)
Dept: PHYSICAL THERAPY | Facility: REHABILITATION | Age: 70
End: 2020-11-27

## 2020-11-27 ENCOUNTER — COMMUNICATION - HEALTHEAST (OUTPATIENT)
Dept: FAMILY MEDICINE | Facility: CLINIC | Age: 70
End: 2020-11-27

## 2020-11-27 DIAGNOSIS — S22.060A COMPRESSION FRACTURE OF T7 VERTEBRA, INITIAL ENCOUNTER (H): ICD-10-CM

## 2020-11-27 DIAGNOSIS — G89.29 CHRONIC MIDLINE THORACIC BACK PAIN: ICD-10-CM

## 2020-11-27 DIAGNOSIS — M54.6 CHRONIC MIDLINE THORACIC BACK PAIN: ICD-10-CM

## 2020-11-27 DIAGNOSIS — S22.060D COMPRESSION FRACTURE OF T7 VERTEBRA WITH ROUTINE HEALING, SUBSEQUENT ENCOUNTER: ICD-10-CM

## 2020-12-01 ENCOUNTER — RECORDS - HEALTHEAST (OUTPATIENT)
Dept: BONE DENSITY | Facility: CLINIC | Age: 70
End: 2020-12-01

## 2020-12-01 ENCOUNTER — OFFICE VISIT - HEALTHEAST (OUTPATIENT)
Dept: PHYSICAL THERAPY | Facility: REHABILITATION | Age: 70
End: 2020-12-01

## 2020-12-01 ENCOUNTER — RECORDS - HEALTHEAST (OUTPATIENT)
Dept: ADMINISTRATIVE | Facility: OTHER | Age: 70
End: 2020-12-01

## 2020-12-01 DIAGNOSIS — G89.29 CHRONIC MIDLINE THORACIC BACK PAIN: ICD-10-CM

## 2020-12-01 DIAGNOSIS — M81.0 AGE-RELATED OSTEOPOROSIS WITHOUT CURRENT PATHOLOGICAL FRACTURE: ICD-10-CM

## 2020-12-01 DIAGNOSIS — M54.6 CHRONIC MIDLINE THORACIC BACK PAIN: ICD-10-CM

## 2020-12-01 DIAGNOSIS — Z78.0 ASYMPTOMATIC MENOPAUSAL STATE: ICD-10-CM

## 2020-12-01 DIAGNOSIS — S22.060D COMPRESSION FRACTURE OF T7 VERTEBRA WITH ROUTINE HEALING, SUBSEQUENT ENCOUNTER: ICD-10-CM

## 2020-12-03 ENCOUNTER — OFFICE VISIT - HEALTHEAST (OUTPATIENT)
Dept: PHYSICAL THERAPY | Facility: REHABILITATION | Age: 70
End: 2020-12-03

## 2020-12-03 ENCOUNTER — COMMUNICATION - HEALTHEAST (OUTPATIENT)
Dept: FAMILY MEDICINE | Facility: CLINIC | Age: 70
End: 2020-12-03

## 2020-12-03 DIAGNOSIS — M54.6 CHRONIC MIDLINE THORACIC BACK PAIN: ICD-10-CM

## 2020-12-03 DIAGNOSIS — S22.060D COMPRESSION FRACTURE OF T7 VERTEBRA WITH ROUTINE HEALING, SUBSEQUENT ENCOUNTER: ICD-10-CM

## 2020-12-03 DIAGNOSIS — G89.29 CHRONIC MIDLINE THORACIC BACK PAIN: ICD-10-CM

## 2020-12-08 ENCOUNTER — OFFICE VISIT - HEALTHEAST (OUTPATIENT)
Dept: PHYSICAL THERAPY | Facility: REHABILITATION | Age: 70
End: 2020-12-08

## 2020-12-08 DIAGNOSIS — S22.060D COMPRESSION FRACTURE OF T7 VERTEBRA WITH ROUTINE HEALING, SUBSEQUENT ENCOUNTER: ICD-10-CM

## 2020-12-08 DIAGNOSIS — M54.6 CHRONIC MIDLINE THORACIC BACK PAIN: ICD-10-CM

## 2020-12-08 DIAGNOSIS — G89.29 CHRONIC MIDLINE THORACIC BACK PAIN: ICD-10-CM

## 2020-12-10 ENCOUNTER — OFFICE VISIT - HEALTHEAST (OUTPATIENT)
Dept: PHYSICAL THERAPY | Facility: REHABILITATION | Age: 70
End: 2020-12-10

## 2020-12-10 DIAGNOSIS — G89.29 CHRONIC MIDLINE THORACIC BACK PAIN: ICD-10-CM

## 2020-12-10 DIAGNOSIS — S22.060D COMPRESSION FRACTURE OF T7 VERTEBRA WITH ROUTINE HEALING, SUBSEQUENT ENCOUNTER: ICD-10-CM

## 2020-12-10 DIAGNOSIS — M54.6 CHRONIC MIDLINE THORACIC BACK PAIN: ICD-10-CM

## 2020-12-11 ENCOUNTER — RECORDS - HEALTHEAST (OUTPATIENT)
Dept: ADMINISTRATIVE | Facility: OTHER | Age: 70
End: 2020-12-11

## 2020-12-14 ENCOUNTER — OFFICE VISIT - HEALTHEAST (OUTPATIENT)
Dept: FAMILY MEDICINE | Facility: CLINIC | Age: 70
End: 2020-12-14

## 2020-12-14 DIAGNOSIS — M89.9 LESION OF BONE OF THORACIC SPINE: ICD-10-CM

## 2020-12-14 DIAGNOSIS — S22.060D COMPRESSION FRACTURE OF T7 VERTEBRA WITH ROUTINE HEALING, SUBSEQUENT ENCOUNTER: ICD-10-CM

## 2020-12-14 ASSESSMENT — MIFFLIN-ST. JEOR: SCORE: 1206.74

## 2020-12-17 ENCOUNTER — RECORDS - HEALTHEAST (OUTPATIENT)
Dept: ADMINISTRATIVE | Facility: OTHER | Age: 70
End: 2020-12-17

## 2020-12-21 ENCOUNTER — HOSPITAL ENCOUNTER (OUTPATIENT)
Dept: NUCLEAR MEDICINE | Facility: CLINIC | Age: 70
Discharge: HOME | End: 2020-12-21
Attending: FAMILY MEDICINE

## 2020-12-21 DIAGNOSIS — M89.9 LESION OF BONE OF THORACIC SPINE: ICD-10-CM

## 2020-12-21 DIAGNOSIS — S22.060D COMPRESSION FRACTURE OF T7 VERTEBRA WITH ROUTINE HEALING, SUBSEQUENT ENCOUNTER: ICD-10-CM

## 2020-12-31 ENCOUNTER — OFFICE VISIT - HEALTHEAST (OUTPATIENT)
Dept: FAMILY MEDICINE | Facility: CLINIC | Age: 70
End: 2020-12-31

## 2020-12-31 DIAGNOSIS — Z72.0 TOBACCO USE: ICD-10-CM

## 2020-12-31 DIAGNOSIS — K21.9 GASTROESOPHAGEAL REFLUX DISEASE WITHOUT ESOPHAGITIS: ICD-10-CM

## 2020-12-31 DIAGNOSIS — G43.909 MIGRAINE WITHOUT STATUS MIGRAINOSUS, NOT INTRACTABLE, UNSPECIFIED MIGRAINE TYPE: ICD-10-CM

## 2020-12-31 DIAGNOSIS — S22.060S COMPRESSION FRACTURE OF T7 VERTEBRA, SEQUELA: ICD-10-CM

## 2020-12-31 DIAGNOSIS — Z00.00 MEDICARE ANNUAL WELLNESS VISIT, SUBSEQUENT: ICD-10-CM

## 2020-12-31 DIAGNOSIS — M81.0 AGE RELATED OSTEOPOROSIS, UNSPECIFIED PATHOLOGICAL FRACTURE PRESENCE: ICD-10-CM

## 2020-12-31 DIAGNOSIS — E78.2 MIXED HYPERLIPIDEMIA: ICD-10-CM

## 2020-12-31 ASSESSMENT — MIFFLIN-ST. JEOR: SCORE: 1213.55

## 2021-01-07 ASSESSMENT — ANXIETY QUESTIONNAIRES
2. NOT BEING ABLE TO STOP OR CONTROL WORRYING: SEVERAL DAYS
4. TROUBLE RELAXING: MORE THAN HALF THE DAYS
IF YOU CHECKED OFF ANY PROBLEMS ON THIS QUESTIONNAIRE, HOW DIFFICULT HAVE THESE PROBLEMS MADE IT FOR YOU TO DO YOUR WORK, TAKE CARE OF THINGS AT HOME, OR GET ALONG WITH OTHER PEOPLE: NOT DIFFICULT AT ALL
6. BECOMING EASILY ANNOYED OR IRRITABLE: SEVERAL DAYS
1. FEELING NERVOUS, ANXIOUS, OR ON EDGE: NOT AT ALL
GAD7 TOTAL SCORE: 4
3. WORRYING TOO MUCH ABOUT DIFFERENT THINGS: NOT AT ALL
5. BEING SO RESTLESS THAT IT IS HARD TO SIT STILL: NOT AT ALL
7. FEELING AFRAID AS IF SOMETHING AWFUL MIGHT HAPPEN: NOT AT ALL

## 2021-01-07 ASSESSMENT — PATIENT HEALTH QUESTIONNAIRE - PHQ9: SUM OF ALL RESPONSES TO PHQ QUESTIONS 1-9: 9

## 2021-04-21 ENCOUNTER — COMMUNICATION - HEALTHEAST (OUTPATIENT)
Dept: FAMILY MEDICINE | Facility: CLINIC | Age: 71
End: 2021-04-21

## 2021-04-23 ENCOUNTER — OFFICE VISIT - HEALTHEAST (OUTPATIENT)
Dept: FAMILY MEDICINE | Facility: CLINIC | Age: 71
End: 2021-04-23

## 2021-04-23 DIAGNOSIS — M54.6 ACUTE MIDLINE THORACIC BACK PAIN: ICD-10-CM

## 2021-04-23 DIAGNOSIS — G89.29 CHRONIC MIDLINE THORACIC BACK PAIN: ICD-10-CM

## 2021-04-23 DIAGNOSIS — M54.6 CHRONIC MIDLINE THORACIC BACK PAIN: ICD-10-CM

## 2021-04-23 DIAGNOSIS — Z72.0 TOBACCO USE: ICD-10-CM

## 2021-04-23 DIAGNOSIS — K21.9 GASTROESOPHAGEAL REFLUX DISEASE WITHOUT ESOPHAGITIS: ICD-10-CM

## 2021-04-23 DIAGNOSIS — S22.060S COMPRESSION FRACTURE OF T7 VERTEBRA, SEQUELA: ICD-10-CM

## 2021-04-23 ASSESSMENT — MIFFLIN-ST. JEOR: SCORE: 1206.74

## 2021-04-26 ENCOUNTER — RECORDS - HEALTHEAST (OUTPATIENT)
Dept: ADMINISTRATIVE | Facility: OTHER | Age: 71
End: 2021-04-26

## 2021-04-26 ENCOUNTER — COMMUNICATION - HEALTHEAST (OUTPATIENT)
Dept: FAMILY MEDICINE | Facility: CLINIC | Age: 71
End: 2021-04-26

## 2021-04-26 DIAGNOSIS — M54.6 CHRONIC MIDLINE THORACIC BACK PAIN: ICD-10-CM

## 2021-04-26 DIAGNOSIS — M89.9 LESION OF BONE OF THORACIC SPINE: ICD-10-CM

## 2021-04-26 DIAGNOSIS — M89.8X8 MASS OF SPINE: ICD-10-CM

## 2021-04-26 DIAGNOSIS — G89.29 CHRONIC MIDLINE THORACIC BACK PAIN: ICD-10-CM

## 2021-04-26 DIAGNOSIS — S22.060S COMPRESSION FRACTURE OF T7 VERTEBRA, SEQUELA: ICD-10-CM

## 2021-04-28 ENCOUNTER — RECORDS - HEALTHEAST (OUTPATIENT)
Dept: RADIOLOGY | Facility: CLINIC | Age: 71
End: 2021-04-28

## 2021-04-28 ENCOUNTER — RECORDS - HEALTHEAST (OUTPATIENT)
Dept: ADMINISTRATIVE | Facility: OTHER | Age: 71
End: 2021-04-28

## 2021-04-29 ENCOUNTER — OFFICE VISIT - HEALTHEAST (OUTPATIENT)
Dept: NEUROSURGERY | Facility: CLINIC | Age: 71
End: 2021-04-29

## 2021-04-29 DIAGNOSIS — C79.51 MALIGNANT NEOPLASM METASTATIC TO THORACIC VERTEBRAL COLUMN WITH UNKNOWN PRIMARY SITE (H): ICD-10-CM

## 2021-04-29 DIAGNOSIS — C80.1 MALIGNANT NEOPLASM METASTATIC TO THORACIC VERTEBRAL COLUMN WITH UNKNOWN PRIMARY SITE (H): ICD-10-CM

## 2021-04-29 ASSESSMENT — MIFFLIN-ST. JEOR: SCORE: 1206.74

## 2021-05-04 ENCOUNTER — HOSPITAL ENCOUNTER (OUTPATIENT)
Dept: CT IMAGING | Facility: CLINIC | Age: 71
Discharge: HOME | End: 2021-05-04
Attending: PHYSICIAN ASSISTANT
Payer: COMMERCIAL

## 2021-05-04 DIAGNOSIS — C80.1 MALIGNANT NEOPLASM METASTATIC TO THORACIC VERTEBRAL COLUMN WITH UNKNOWN PRIMARY SITE (H): ICD-10-CM

## 2021-05-04 DIAGNOSIS — C79.51 MALIGNANT NEOPLASM METASTATIC TO THORACIC VERTEBRAL COLUMN WITH UNKNOWN PRIMARY SITE (H): ICD-10-CM

## 2021-05-05 ENCOUNTER — RECORDS - HEALTHEAST (OUTPATIENT)
Dept: FAMILY MEDICINE | Facility: CLINIC | Age: 71
End: 2021-05-05

## 2021-05-06 ENCOUNTER — OFFICE VISIT - HEALTHEAST (OUTPATIENT)
Dept: FAMILY MEDICINE | Facility: CLINIC | Age: 71
End: 2021-05-06

## 2021-05-06 DIAGNOSIS — Z01.818 PRE-OPERATIVE GENERAL PHYSICAL EXAMINATION: ICD-10-CM

## 2021-05-06 DIAGNOSIS — Z72.0 TOBACCO USE: ICD-10-CM

## 2021-05-06 DIAGNOSIS — M81.0 AGE RELATED OSTEOPOROSIS, UNSPECIFIED PATHOLOGICAL FRACTURE PRESENCE: ICD-10-CM

## 2021-05-06 DIAGNOSIS — E78.2 MIXED HYPERLIPIDEMIA: ICD-10-CM

## 2021-05-06 DIAGNOSIS — S22.060S COMPRESSION FRACTURE OF T7 VERTEBRA, SEQUELA: ICD-10-CM

## 2021-05-06 ASSESSMENT — MIFFLIN-ST. JEOR: SCORE: 1174.99

## 2021-05-14 ENCOUNTER — COMMUNICATION - HEALTHEAST (OUTPATIENT)
Dept: NEUROSURGERY | Facility: CLINIC | Age: 71
End: 2021-05-14

## 2021-05-14 DIAGNOSIS — C90.30 NEOPLASM OF UNCERTAIN BEHAVIOR OF PLASMA CELLS (H): ICD-10-CM

## 2021-05-17 ENCOUNTER — COMMUNICATION - HEALTHEAST (OUTPATIENT)
Dept: ONCOLOGY | Facility: CLINIC | Age: 71
End: 2021-05-17

## 2021-05-21 ENCOUNTER — RECORDS - HEALTHEAST (OUTPATIENT)
Dept: RADIOLOGY | Facility: CLINIC | Age: 71
End: 2021-05-21

## 2021-05-24 ENCOUNTER — RECORDS - HEALTHEAST (OUTPATIENT)
Dept: RADIOLOGY | Facility: CLINIC | Age: 71
End: 2021-05-24

## 2021-05-24 ENCOUNTER — RECORDS - HEALTHEAST (OUTPATIENT)
Dept: ADMINISTRATIVE | Facility: CLINIC | Age: 71
End: 2021-05-24

## 2021-05-25 ENCOUNTER — RECORDS - HEALTHEAST (OUTPATIENT)
Dept: ADMINISTRATIVE | Facility: OTHER | Age: 71
End: 2021-05-25

## 2021-05-25 ENCOUNTER — RECORDS - HEALTHEAST (OUTPATIENT)
Dept: ADMINISTRATIVE | Facility: CLINIC | Age: 71
End: 2021-05-25

## 2021-05-25 ENCOUNTER — COMMUNICATION - HEALTHEAST (OUTPATIENT)
Dept: ONCOLOGY | Facility: CLINIC | Age: 71
End: 2021-05-25

## 2021-05-26 ENCOUNTER — RECORDS - HEALTHEAST (OUTPATIENT)
Dept: ADMINISTRATIVE | Facility: CLINIC | Age: 71
End: 2021-05-26

## 2021-05-27 ENCOUNTER — RECORDS - HEALTHEAST (OUTPATIENT)
Dept: ADMINISTRATIVE | Facility: CLINIC | Age: 71
End: 2021-05-27

## 2021-05-27 ENCOUNTER — OFFICE VISIT - HEALTHEAST (OUTPATIENT)
Dept: ONCOLOGY | Facility: CLINIC | Age: 71
End: 2021-05-27

## 2021-05-27 VITALS
SYSTOLIC BLOOD PRESSURE: 130 MMHG | BODY MASS INDEX: 29.45 KG/M2 | HEART RATE: 73 BPM | HEIGHT: 61 IN | OXYGEN SATURATION: 98 % | DIASTOLIC BLOOD PRESSURE: 64 MMHG | WEIGHT: 156 LBS

## 2021-05-27 DIAGNOSIS — C90.30 NEOPLASM OF UNCERTAIN BEHAVIOR OF PLASMA CELLS (H): ICD-10-CM

## 2021-05-27 LAB
ALBUMIN SERPL-MCNC: 3.2 G/DL (ref 3.5–5)
ALP SERPL-CCNC: 56 U/L (ref 45–120)
ALT SERPL W P-5'-P-CCNC: <9 U/L (ref 0–45)
ANION GAP SERPL CALCULATED.3IONS-SCNC: 5 MMOL/L (ref 5–18)
AST SERPL W P-5'-P-CCNC: 13 U/L (ref 0–40)
BASOPHILS # BLD AUTO: 0 THOU/UL (ref 0–0.2)
BASOPHILS NFR BLD AUTO: 1 % (ref 0–2)
BILIRUB SERPL-MCNC: 0.5 MG/DL (ref 0–1)
BUN SERPL-MCNC: 14 MG/DL (ref 8–22)
CALCIUM SERPL-MCNC: 9.4 MG/DL (ref 8.5–10.5)
CHLORIDE BLD-SCNC: 107 MMOL/L (ref 98–107)
CO2 SERPL-SCNC: 27 MMOL/L (ref 22–31)
CREAT SERPL-MCNC: 0.71 MG/DL (ref 0.6–1.1)
EOSINOPHIL # BLD AUTO: 0.1 THOU/UL (ref 0–0.4)
EOSINOPHIL NFR BLD AUTO: 2 % (ref 0–6)
ERYTHROCYTE [DISTWIDTH] IN BLOOD BY AUTOMATED COUNT: 13.7 % (ref 11–14.5)
GFR SERPL CREATININE-BSD FRML MDRD: >60 ML/MIN/1.73M2
GLUCOSE BLD-MCNC: 93 MG/DL (ref 70–125)
HCT VFR BLD AUTO: 39.8 % (ref 35–47)
HGB BLD-MCNC: 13.6 G/DL (ref 12–16)
IGA SERPL-MCNC: 27 MG/DL (ref 65–400)
IGA SERPL-MCNC: 4280 MG/DL
IGM SERPL-MCNC: 13 MG/DL (ref 60–280)
IMM GRANULOCYTES # BLD: 0 THOU/UL
IMM GRANULOCYTES NFR BLD: 0 %
LDH SERPL L TO P-CCNC: 132 U/L (ref 125–220)
LYMPHOCYTES # BLD AUTO: 1.9 THOU/UL (ref 0.8–4.4)
LYMPHOCYTES NFR BLD AUTO: 30 % (ref 20–40)
MCH RBC QN AUTO: 33.1 PG (ref 27–34)
MCHC RBC AUTO-ENTMCNC: 34.2 G/DL (ref 32–36)
MCV RBC AUTO: 97 FL (ref 80–100)
MONOCYTES # BLD AUTO: 0.5 THOU/UL (ref 0–0.9)
MONOCYTES NFR BLD AUTO: 8 % (ref 2–10)
NEUTROPHILS # BLD AUTO: 3.8 THOU/UL (ref 2–7.7)
NEUTROPHILS NFR BLD AUTO: 60 % (ref 50–70)
PLATELET # BLD AUTO: 263 THOU/UL (ref 140–440)
PMV BLD AUTO: 8.7 FL (ref 8.5–12.5)
POTASSIUM BLD-SCNC: 3.8 MMOL/L (ref 3.5–5)
PROT SERPL-MCNC: 10.1 G/DL (ref 6–8)
RBC # BLD AUTO: 4.11 MILL/UL (ref 3.8–5.4)
SODIUM SERPL-SCNC: 139 MMOL/L (ref 136–145)
WBC: 6.4 THOU/UL (ref 4–11)

## 2021-05-27 ASSESSMENT — MIFFLIN-ST. JEOR: SCORE: 1162.81

## 2021-05-27 ASSESSMENT — PATIENT HEALTH QUESTIONNAIRE - PHQ9: SUM OF ALL RESPONSES TO PHQ QUESTIONS 1-9: 9

## 2021-05-27 NOTE — PROGRESS NOTES
Preoperative Exam    Scheduled Procedure: Left eye cataract surg, Right eye cataracts   Surgery Date:  4/23/2019 and 5/7/2019  Surgery Location: [facility], [place], fax [phone]    Surgeon:  Dr. Ch    Assessment/Plan:       1. Preop examination    2. Age-related cataract of both eyes, unspecified age-related cataract type    3. Mixed hyperlipidemia    4. Gastroesophageal reflux disease without esophagitis  - HM2(CBC w/o Differential)    5. Migraine without status migrainosus, not intractable, unspecified migraine type    6. Tobacco use      Surgical Procedure Risk: Low (reported cardiac risk generally < 1%)  Have you had prior anesthesia?: No  Have you or any family members had a previous anesthesia reaction:  No  Do you or any family members have a history of a clotting or bleeding disorder?: No  Cardiac Risk Assessment: no increased risk for major cardiac complications    Patient approved for surgery with general or local anesthesia.      Functional Status: Independent  Patient plans to recover at home with family.     Subjective:      Lizzie Viera is a 68 y.o. female who presents for a preoperative consultation. Halos and blurred vision, diff wearing contacts for last several mos.      She denies recent fever, chills, cough, URI symptoms, dyspnea, abdominal pain, nausea, vomiting or diarrhea. Saw ENT and instructed to stop smoking. Dx GERD and TMJ symptoms.     Had recent dental implant.     All other systems reviewed and are negative, other than those listed in the HPI.    Pertinent History  Do you have difficulty breathing or chest pain after walking up a flight of stairs: No  History of obstructive sleep apnea: No  Steroid use in the last 6 months: No  Frequent Aspirin/NSAID use: No  Prior Blood Transfusion: No  Prior Blood Transfusion Reaction: No  If for some reason prior to, during or after the procedure, if it is medically indicated, would you be willing to have a blood  transfusion?:  There is no transfusion refusal.    Current Outpatient Medications   Medication Sig Dispense Refill     loratadine (CLARITIN) 10 mg tablet Take 10 mg by mouth daily as needed for allergies.       SUMAtriptan (IMITREX) 50 MG tablet Take 1 tablet (50 mg total) by mouth daily as needed. 9 tablet 2     No current facility-administered medications for this visit.         Allergies   Allergen Reactions     Latex Shortness Of Breath     Ragweed Pollen Cough     Adhesive Tape-Silicones Rash       Patient Active Problem List   Diagnosis     Muscle Aches, Generalized (Myalgias)     Lower Back Pain     Mixed hyperlipidemia     Esophageal Reflux     Ankle Joint Pain     Osteoporosis     Closed Fracture Of Tibia With Fibula     Constipation     Migraine Headache     Tobacco use       Past Medical History:   Diagnosis Date     Cervical dysplasia      Chronic RUQ pain        Past Surgical History:   Procedure Laterality Date     MD CONIZATION CERVIX,KNIFE/LASER      Description: Cervical Conization By Laser;  Recorded: 09/20/2007;     MD DILATION/CURETTAGE,DIAGNOSTIC      Description: Dilation And Curettage;  Recorded: 09/20/2007;     MD LAP,CHOLECYSTECTOMY  12/27/2004     MD LIGATE FALLOPIAN TUBE      Description: Tubal Ligation;  Recorded: 09/20/2007;     MD REMOVE TONSILS/ADENOIDS,<13 Y/O      Description: Tonsillectomy With Adenoidectomy;  Recorded: 09/20/2007;       Social History     Socioeconomic History     Marital status:      Spouse name: Sandor     Number of children: 3     Years of education: Not on file     Highest education level: Not on file   Occupational History     Not on file   Social Needs     Financial resource strain: Not on file     Food insecurity:     Worry: Not on file     Inability: Not on file     Transportation needs:     Medical: Not on file     Non-medical: Not on file   Tobacco Use     Smoking status: Current Every Day Smoker     Packs/day: 0.50     Years: 45.00     Pack  years: 22.50     Smokeless tobacco: Never Used   Substance and Sexual Activity     Alcohol use: Yes     Drug use: Yes     Sexual activity: Yes     Partners: Male     Birth control/protection: Surgical       Patient Care Team:  Anne Marie Walker MD as PCP - General          Objective:     Vitals:    04/12/19 1111   BP: (!) 84/54   Pulse: 66   SpO2: 97%   Weight: 170 lb 5 oz (77.3 kg)         Physical Exam:  General: alert, pleasant, and no distress  Head: Normocephalic, without obvious abnormality, atraumatic  Eyes: conjunctivae and sclerae normal and pupils equal, round, reactive to   light and accomodation  Ears: normal TM's and external ear canals both ears  Nose: no discharge, no sinus tenderness  Throat: lips, mucosa, and tongue normal; teeth and gums normal  Neck: no adenopathy, supple, symmetrical, trachea midline and thyroid normal  Back: symmetric, ROM normal. No CVA tenderness.  Lungs: clear to auscultation bilaterally  Heart : regular rate and rhythm and no murmurs  Abdomen:  bowel sounds normal, no masses palpable and soft, non-tender  Extremities: extremities normal, atraumatic, no cyanosis or edema  Pulses: 2+ and symmetric  Skin: Skin color, texture, turgor normal. No rashes or lesions  Lymph nodes: Cervical, supraclavicular, and axillary nodes normal.  Neurologic: Grossly normal            Patient Instructions       Follow instructions from surgery center on oral intake        Labs:  Results for orders placed or performed in visit on 04/12/19   HM2(CBC w/o Differential)   Result Value Ref Range    WBC 6.1 4.0 - 11.0 thou/uL    RBC 4.70 3.80 - 5.40 mill/uL    Hemoglobin 16.1 (H) 12.0 - 16.0 g/dL    Hematocrit 47.9 (H) 35.0 - 47.0 %     (H) 80 - 100 fL    MCH 34.3 (H) 27.0 - 34.0 pg    MCHC 33.6 32.0 - 36.0 g/dL    RDW 11.4 11.0 - 14.5 %    Platelets 295 140 - 440 thou/uL    MPV 6.8 (L) 7.0 - 10.0 fL          Immunization History   Administered Date(s) Administered     DT (pediatric)  07/16/2002     Influenza W5n4-05, 01/11/2010     Influenza, inj, historic,unspecified 12/04/2007     Influenza, seasonal,quad inj 6-35 mos 11/08/2010     Pneumo Conj 13-V (2010&after) 08/23/2018     Td,adult,historic,unspecified 07/16/2002     Tdap 09/02/2008, 08/23/2018           Electronically signed by Anne Marie Walker MD 04/12/19 11:13 AM

## 2021-05-27 NOTE — PROGRESS NOTES
HPI: This patient is a 67yo F who presents for evaluation of the sinuses at the request of Dr. Walker. The patient reports being diagnosed with several sinus infections this year, the symptoms of which are nasal congestion and head pressure. There have not really been episodes of high fever, localized sinus pain, tooth pain, and pururlent drainage. She has known, longstanding TMJ. She has seasonal allergic rhinitis. She is a 1/2ppd smoker. She does not report any heartburn, but does report a chronic globus sensation and mucus feeling in her throat. Uses nasal saline. Antibiotics and allergy medications have not reduced her symptoms.    Past medical history, surgical history, social history, family history, medications, and allergies have been reviewed with the patient and are documented above.    Review of Systems: a 10-system review was performed. Pertinent positives are noted in the HPI and on a separate scanned document in the chart.    PHYSICAL EXAMINATION:  GEN: no acute distress, normocephalic  EYES: extraocular movements are intact, pupils are equal and round. Sclera clear.   EARS: auricles are normally formed. The external auditory canals are clear with minimal to no cerumen. Tympanic membranes are intact bilaterally with no signs of infection, effusion, retractions, or perforations.  NOSE: anterior nares are patent. There are no masses or lesions. The septum is non-obstructing but is off to the left slightly. Turbinates are not enlarged or boggy. Tissue inflammation c/w smoking. No percussive tenderness over sinuses.  OC/OP: clear, dentition is in fair repair. The tongue and palate are fully mobile and symmetric. No masses or lesions. Tissue inflammation c/w smoking. 1-2mm spot of superficial leukoplakia of the left RMT. No nodularity or ulceration.  HP/L (scope): NP, BOT, vallecula, epiglottis, pyriform sinuses are clear. B TVFs are mobile and without lesion. There is interarytenoid pachydermia. There  is diffuse hyperemia throughout the supraglottis and hypopharynx c/w smoking.  NECK: soft and supple. No lymphadenopathy or masses. Airway is midline. Bilateral TMJ tenderness.   NEURO: CN VII and XII symmetric. alert and oriented. No spontaneous nystagmus. Gait is normal.  PULM: breathing comfortably on room air, normal chest expansion with respiration  CARDS: no cyanosis or clubbing. Normal carotid pulses.  PSYCH: appropriate mood, prosaic speech    CT SINUS 3/19:  CONCLUSION:  1.  Mild to moderate mucosal thickening in the sinuses is progressed versus the prior brain MRI. This narrows/covers the maxillary and sphenoid drainage pathways.  2.  No air-fluid levels or aerated secretions.    MEDICAL DECISION-MAKING: This patient is a 69yo F with multiple chronic issues.  1) Chronic laryngitis from LPR. Discussed diet and lifestyle changes.   2) Atypical facial pressure from TMD, not acute bacterial sinus infections. Discussed soft diet, NSAIDS, and a bite guard. Can follow-up with a TMJ clinic.   3) Nasal congestion. She does have a slight deviation in her septum. But correcting this is not likely to make a substantial difference in her overall symptoms. She agrees and is not interested in fixing this. Most of the nasal congestion is secondary to her smoking and allergies. Continue nasal sprays.  4) OMC obstruction. She does exhibit some maxillary and anterior ethmoid sinus mucosal thickening. She does not have full obstruction and does not show signs of polyps or air-fluid levels. This is a set-up for recurrent sinus infections and it would benefit her to open this drainage system. However, tried to make it very clear that most of her symptoms are actually not secondary to this sinus finding and will not be expected to change following surgery. The goal is to open the system so that she is at lower risk of trapping contents/infection. Discussed risks and benefits of this and she will schedule at her  convenience.

## 2021-05-28 ENCOUNTER — RECORDS - HEALTHEAST (OUTPATIENT)
Dept: ADMINISTRATIVE | Facility: CLINIC | Age: 71
End: 2021-05-28

## 2021-05-28 ENCOUNTER — RECORDS - HEALTHEAST (OUTPATIENT)
Dept: ADMINISTRATIVE | Facility: OTHER | Age: 71
End: 2021-05-28

## 2021-05-28 ENCOUNTER — OFFICE VISIT - HEALTHEAST (OUTPATIENT)
Dept: NEUROSURGERY | Facility: CLINIC | Age: 71
End: 2021-05-28

## 2021-05-28 DIAGNOSIS — C90.30 NEOPLASM OF UNCERTAIN BEHAVIOR OF PLASMA CELLS (H): ICD-10-CM

## 2021-05-28 LAB
ALBUMIN PERCENT: 41.8 % (ref 51–67)
ALBUMIN SERPL ELPH-MCNC: 4 G/DL (ref 3.2–4.7)
ALPHA 1 PERCENT: 2.2 % (ref 2–4)
ALPHA 2 PERCENT: 10.1 % (ref 5–13)
ALPHA1 GLOB SERPL ELPH-MCNC: 0.2 G/DL (ref 0.1–0.3)
ALPHA2 GLOB SERPL ELPH-MCNC: 1 G/DL (ref 0.4–0.9)
B-GLOBULIN SERPL ELPH-MCNC: 0.9 G/DL (ref 0.7–1.2)
B2 MICROGLOB TUMOR MARKER SER-MCNC: 2.2 MG/L
BETA PERCENT: 9 % (ref 10–17)
GAMMA GLOB SERPL ELPH-MCNC: 3.5 G/DL (ref 0.6–1.4)
GAMMA GLOBULIN PERCENT: 36.9 % (ref 9–20)
KAPPA LC FREE SER-MCNC: 13.37 MG/DL (ref 0.33–1.94)
KAPPA LC FREE/LAMBDA FREE SER NEPH: 23.05 {RATIO} (ref 0.26–1.65)
LAMBDA LC FREE SERPL-MCNC: 0.58 MG/DL (ref 0.57–2.63)
M PROTEIN SERPL ELPH-MCNC: 3.1 G/DL
MONOCLONAL PEAK CALC RANDOM URINE: 3 MG/DL
PATH ICD:: ABNORMAL
PATH ICD:: NORMAL
PROT ELPH PNL UR ELPH: NORMAL
PROT PATTERN SERPL ELPH-IMP: ABNORMAL
PROT PATTERN SERPL ELPH-IMP: NORMAL
PROT PATTERN SERPL IFE-IMP: NORMAL
PROT SERPL-MCNC: 9.5 G/DL (ref 6–8)
REVIEWING PATHOLOGIST: ABNORMAL
REVIEWING PATHOLOGIST: NORMAL
TOTAL PROTEIN RANDOM URINE MG/DL: 57 MG/DL

## 2021-05-28 ASSESSMENT — ANXIETY QUESTIONNAIRES: GAD7 TOTAL SCORE: 4

## 2021-05-30 ENCOUNTER — RECORDS - HEALTHEAST (OUTPATIENT)
Dept: ADMINISTRATIVE | Facility: CLINIC | Age: 71
End: 2021-05-30

## 2021-05-31 ENCOUNTER — COMMUNICATION - HEALTHEAST (OUTPATIENT)
Dept: ONCOLOGY | Facility: CLINIC | Age: 71
End: 2021-05-31

## 2021-05-31 ENCOUNTER — RECORDS - HEALTHEAST (OUTPATIENT)
Dept: ADMINISTRATIVE | Facility: CLINIC | Age: 71
End: 2021-05-31

## 2021-05-31 DIAGNOSIS — R05.9 COUGH: ICD-10-CM

## 2021-05-31 DIAGNOSIS — G89.3 CANCER RELATED PAIN: ICD-10-CM

## 2021-05-31 DIAGNOSIS — M89.8X9 BONE PAIN: ICD-10-CM

## 2021-06-01 VITALS — HEIGHT: 63 IN | BODY MASS INDEX: 30.18 KG/M2 | WEIGHT: 170.31 LBS

## 2021-06-02 VITALS — WEIGHT: 170 LBS | HEIGHT: 63 IN | BODY MASS INDEX: 30.12 KG/M2

## 2021-06-02 VITALS — WEIGHT: 164 LBS | BODY MASS INDEX: 29.06 KG/M2 | HEIGHT: 63 IN

## 2021-06-02 VITALS — BODY MASS INDEX: 29.05 KG/M2 | WEIGHT: 164 LBS

## 2021-06-02 VITALS — WEIGHT: 170.31 LBS | BODY MASS INDEX: 30.17 KG/M2

## 2021-06-02 VITALS — WEIGHT: 170 LBS | BODY MASS INDEX: 30.12 KG/M2 | HEIGHT: 63 IN

## 2021-06-03 ENCOUNTER — AMBULATORY - HEALTHEAST (OUTPATIENT)
Dept: INFUSION THERAPY | Facility: CLINIC | Age: 71
End: 2021-06-03

## 2021-06-03 ENCOUNTER — AMBULATORY - HEALTHEAST (OUTPATIENT)
Dept: LAB | Facility: CLINIC | Age: 71
End: 2021-06-03

## 2021-06-03 DIAGNOSIS — C90.30 NEOPLASM OF UNCERTAIN BEHAVIOR OF PLASMA CELLS (H): ICD-10-CM

## 2021-06-03 LAB
BASOPHILS # BLD AUTO: 0 THOU/UL (ref 0–0.2)
BASOPHILS NFR BLD AUTO: 0 % (ref 0–2)
EOSINOPHIL # BLD AUTO: 0.1 THOU/UL (ref 0–0.4)
EOSINOPHIL NFR BLD AUTO: 1 % (ref 0–6)
ERYTHROCYTE [DISTWIDTH] IN BLOOD BY AUTOMATED COUNT: 13.9 % (ref 11–14.5)
HCT VFR BLD AUTO: 38.9 % (ref 35–47)
HGB BLD-MCNC: 13.4 G/DL (ref 12–16)
IMM GRANULOCYTES # BLD: 0 THOU/UL
IMM GRANULOCYTES NFR BLD: 0 %
LYMPHOCYTES # BLD AUTO: 1.6 THOU/UL (ref 0.8–4.4)
LYMPHOCYTES NFR BLD AUTO: 22 % (ref 20–40)
MCH RBC QN AUTO: 33.3 PG (ref 27–34)
MCHC RBC AUTO-ENTMCNC: 34.4 G/DL (ref 32–36)
MCV RBC AUTO: 97 FL (ref 80–100)
MONOCYTES # BLD AUTO: 0.6 THOU/UL (ref 0–0.9)
MONOCYTES NFR BLD AUTO: 8 % (ref 2–10)
NEUTROPHILS # BLD AUTO: 4.7 THOU/UL (ref 2–7.7)
NEUTROPHILS NFR BLD AUTO: 68 % (ref 50–70)
PLATELET # BLD AUTO: 266 THOU/UL (ref 140–440)
PMV BLD AUTO: 8.6 FL (ref 8.5–12.5)
RBC # BLD AUTO: 4.03 MILL/UL (ref 3.8–5.4)
WBC: 7 THOU/UL (ref 4–11)

## 2021-06-04 ENCOUNTER — AMBULATORY - HEALTHEAST (OUTPATIENT)
Dept: NEUROSURGERY | Facility: CLINIC | Age: 71
End: 2021-06-04

## 2021-06-04 VITALS
DIASTOLIC BLOOD PRESSURE: 70 MMHG | SYSTOLIC BLOOD PRESSURE: 132 MMHG | HEART RATE: 70 BPM | OXYGEN SATURATION: 96 % | HEIGHT: 63 IN | BODY MASS INDEX: 30.49 KG/M2 | WEIGHT: 172.06 LBS

## 2021-06-04 VITALS
BODY MASS INDEX: 30.36 KG/M2 | DIASTOLIC BLOOD PRESSURE: 60 MMHG | SYSTOLIC BLOOD PRESSURE: 130 MMHG | OXYGEN SATURATION: 97 % | HEART RATE: 60 BPM | WEIGHT: 171.38 LBS

## 2021-06-04 DIAGNOSIS — C90.30 NEOPLASM OF UNCERTAIN BEHAVIOR OF PLASMA CELLS (H): ICD-10-CM

## 2021-06-04 LAB
LAB AP CHARGES (HE HISTORICAL CONVERSION): ABNORMAL
PATH REPORT.COMMENTS IMP SPEC: ABNORMAL
PATH REPORT.FINAL DX SPEC: ABNORMAL
PATH REPORT.MICROSCOPIC SPEC OTHER STN: ABNORMAL
RESULT FLAG (HE HISTORICAL CONVERSION): ABNORMAL

## 2021-06-05 VITALS
RESPIRATION RATE: 18 BRPM | WEIGHT: 156 LBS | DIASTOLIC BLOOD PRESSURE: 62 MMHG | OXYGEN SATURATION: 98 % | HEIGHT: 63 IN | HEART RATE: 60 BPM | SYSTOLIC BLOOD PRESSURE: 100 MMHG | BODY MASS INDEX: 27.64 KG/M2

## 2021-06-05 VITALS
SYSTOLIC BLOOD PRESSURE: 138 MMHG | BODY MASS INDEX: 29.41 KG/M2 | HEART RATE: 75 BPM | HEIGHT: 63 IN | DIASTOLIC BLOOD PRESSURE: 86 MMHG | OXYGEN SATURATION: 94 % | WEIGHT: 166 LBS

## 2021-06-05 VITALS
HEIGHT: 63 IN | OXYGEN SATURATION: 98 % | DIASTOLIC BLOOD PRESSURE: 80 MMHG | BODY MASS INDEX: 27.91 KG/M2 | HEART RATE: 65 BPM | SYSTOLIC BLOOD PRESSURE: 138 MMHG | WEIGHT: 157.5 LBS

## 2021-06-05 VITALS
HEIGHT: 63 IN | WEIGHT: 166 LBS | BODY MASS INDEX: 29.41 KG/M2 | HEART RATE: 68 BPM | SYSTOLIC BLOOD PRESSURE: 132 MMHG | DIASTOLIC BLOOD PRESSURE: 82 MMHG | RESPIRATION RATE: 12 BRPM | OXYGEN SATURATION: 95 %

## 2021-06-05 VITALS
RESPIRATION RATE: 19 BRPM | BODY MASS INDEX: 27.64 KG/M2 | SYSTOLIC BLOOD PRESSURE: 110 MMHG | OXYGEN SATURATION: 98 % | HEIGHT: 63 IN | WEIGHT: 156 LBS | DIASTOLIC BLOOD PRESSURE: 60 MMHG | HEART RATE: 73 BPM

## 2021-06-05 VITALS
HEART RATE: 60 BPM | OXYGEN SATURATION: 97 % | SYSTOLIC BLOOD PRESSURE: 130 MMHG | DIASTOLIC BLOOD PRESSURE: 80 MMHG | WEIGHT: 156 LBS | HEIGHT: 63 IN | BODY MASS INDEX: 27.64 KG/M2

## 2021-06-08 ENCOUNTER — COMMUNICATION - HEALTHEAST (OUTPATIENT)
Dept: NEUROSURGERY | Facility: CLINIC | Age: 71
End: 2021-06-08

## 2021-06-08 DIAGNOSIS — C79.51 MALIGNANT NEOPLASM METASTATIC TO THORACIC VERTEBRAL COLUMN WITH UNKNOWN PRIMARY SITE (H): ICD-10-CM

## 2021-06-08 DIAGNOSIS — C80.1 MALIGNANT NEOPLASM METASTATIC TO THORACIC VERTEBRAL COLUMN WITH UNKNOWN PRIMARY SITE (H): ICD-10-CM

## 2021-06-08 DIAGNOSIS — C90.30 NEOPLASM OF UNCERTAIN BEHAVIOR OF PLASMA CELLS (H): ICD-10-CM

## 2021-06-09 LAB
BKR LAB AP CORE BIOPSY/ASPIRATE CLOT: ABNORMAL
LAB AP ASPIRATE SMEAR (HE HISTORICAL CONVERSION): ABNORMAL
LAB AP BONE MARROW DIFF (HE HISTORICAL CONVERSION): ABNORMAL
LAB AP CHARGES (HE HISTORICAL CONVERSION): ABNORMAL
LAB AP CYTOGENETICS REPORT,ADDENDUM (HE HISTORICAL CONVERSION): ABNORMAL
PATH REPORT.ADDENDUM SPEC: ABNORMAL
PATH REPORT.COMMENTS IMP SPEC: ABNORMAL
PATH REPORT.FINAL DX SPEC: ABNORMAL
PATH REPORT.MICROSCOPIC SPEC OTHER STN: ABNORMAL
PATH REPORT.RELEVANT HX SPEC: ABNORMAL
RESULT FLAG (HE HISTORICAL CONVERSION): ABNORMAL

## 2021-06-09 NOTE — PROGRESS NOTES
Preoperative Exam    Scheduled Procedure: Yag Capsulotomy both eyes  Surgery Date:   DOS: 7/14/2020left DOS: 7/21/2020 [facility]  Surgery Location: [facility] [place], [place], fax [phone]    Surgeon:  Dr. Valerio    Assessment/Plan:     1. Preop examination    2. Bilateral posterior capsular opacification    3. Mixed hyperlipidemia, mild    4. Gastroesophageal reflux disease without esophagitis    5. Migraine without status migrainosus, not intractable, unspecified migraine type    6. Chronic midline thoracic back pain  - cyclobenzaprine (FLEXERIL) 10 MG tablet; Take 1 tablet (10 mg total) by mouth at bedtime as needed for muscle spasms.  Dispense: 30 tablet; Refill: 2    7. Visit for screening mammogram  - Mammo Screening Bilateral; Future      Surgical Procedure Risk: Low (reported cardiac risk generally < 1%)  Have you had prior anesthesia?: Yes  Have you or any family members had a previous anesthesia reaction:  No  Do you or any family members have a history of a clotting or bleeding disorder?: No  Cardiac Risk Assessment: no increased risk for major cardiac complications    APPROVAL GIVEN to proceed with proposed procedure, without further diagnostic evaluation      Functional Status: Independent  Patient plans to recover at home with family.     Subjective:      Lizzie Viera is a 69 y.o. female who presents for a preoperative consultation for Yag capsulotomies. She is s/p bilateral cataract surgeries several years ago with IOL and she has posterior capsule opacifications that have developed and are causing blurring of her vision.      She denies recent fever, chills, cough, URI symptoms, dyspnea, abdominal pain, nausea, vomiting or diarrhea.      All other systems reviewed and are negative, other than those listed in the HPI.    Pertinent History  Do you have difficulty breathing or chest pain after walking up a flight of stairs: No  History of obstructive sleep apnea: No  Steroid use in the last  6 months: No  Frequent Aspirin/NSAID use: No  Prior Blood Transfusion: No  Prior Blood Transfusion Reaction: No  If for some reason prior to, during or after the procedure, if it is medically indicated, would you be willing to have a blood transfusion?:  There is no transfusion refusal.    Current Outpatient Medications   Medication Sig Dispense Refill     ascorbic acid, vitamin C, (VITAMIN C) 1000 MG tablet Take 1,000 mg by mouth daily.       cholecalciferol, vitamin D3, 50 mcg (2,000 unit) Tab Take by mouth.       loratadine (CLARITIN) 10 mg tablet Take 10 mg by mouth daily as needed for allergies.       MULTIVITAMIN ORAL Take by mouth.       SUMAtriptan (IMITREX) 50 MG tablet Take 1 tablet (50 mg total) by mouth daily as needed. 9 tablet 2     No current facility-administered medications for this visit.         Allergies   Allergen Reactions     Latex Shortness Of Breath     Ragweed Pollen Cough     Adhesive Tape-Silicones Rash       Patient Active Problem List   Diagnosis     Muscle Aches, Generalized (Myalgias)     Lower Back Pain     Mixed hyperlipidemia     Esophageal Reflux     Ankle Joint Pain     Osteoporosis     Closed Fracture Of Tibia With Fibula     Constipation     Migraine Headache     Tobacco use       Past Medical History:   Diagnosis Date     Cervical dysplasia      Chronic RUQ pain        Past Surgical History:   Procedure Laterality Date     [place] CONIZATION CERVIX,KNIFE/LASER      Description: Cervical Conization By Laser;  Recorded: 09/20/2007;     [place] DILATION/CURETTAGE,DIAGNOSTIC      Description: Dilation And Curettage;  Recorded: 09/20/2007;     [place] LAP,CHOLECYSTECTOMY  12/27/2004     [place] LIGATE FALLOPIAN TUBE      Description: Tubal Ligation;  Recorded: 09/20/2007;     [place] REMOVE TONSILS/ADENOIDS,<13 Y/O      Description: Tonsillectomy With Adenoidectomy;  Recorded: 09/20/2007;       Social History     Socioeconomic History     Marital status:      Spouse name: Sandor Cadet  children: 3     Years of education: Not on file     Highest education level: Not on file   Occupational History     Not on file   Social Needs     Financial resource strain: Not on file     Food insecurity     Worry: Not on file     Inability: Not on file     Transportation needs     Medical: Not on file     Non-medical: Not on file   Tobacco Use     Smoking status: Current Every Day Smoker     Packs/day: 0.50     Years: 45.00     Pack years: 22.50     Smokeless tobacco: Never Used   Substance and Sexual Activity     Alcohol use: Yes     Drug use: Yes     Sexual activity: Yes     Partners: Male     Birth control/protection: Surgical   Lifestyle     Physical activity     Days per week: Not on file     Minutes per session: Not on file     Stress: Not on file   Relationships     Social connections     Talks on phone: Not on file     Gets together: Not on file     Attends [employer] service: Not on file     Active member of club or organization: Not on file     Attends meetings of clubs or organizations: Not on file     Relationship status: Not on file     Intimate partner violence     Fear of current or ex partner: Not on file     Emotionally abused: Not on file     Physically abused: Not on file     Forced sexual activity: Not on file   Other Topics Concern     Not on file   Social History Narrative     Not on file       Patient Care Team:  Anne Marie Walker MD as PCP - General  Anne Marie Walker MD as Assigned PCP          Objective:     Vitals:    07/02/20 1458   BP: 130/60   Pulse: 60   SpO2: 97%   Weight: 171 lb 6 oz (77.7 kg)       Physical Exam:    General: alert, pleasant, and no distress  Head: Normocephalic, without obvious abnormality, atraumatic  Eyes: conjunctivae and sclerae normal and pupils equal, round, reactive to   light and accomodation  Ears: normal TM's and external ear canals both ears  Nose: no discharge, no sinus tenderness  Throat: lips, mucosa, and tongue normal; teeth and gums  normal  Neck: no adenopathy, supple, symmetrical, trachea midline and thyroid normal  Back: symmetric, ROM normal. No CVA tenderness.  Lungs: clear to auscultation bilaterally  Heart : regular rate and rhythm and no murmurs  Abdomen:  bowel sounds normal, no masses palpable and soft, non-tender  Extremities: extremities normal, atraumatic, no cyanosis or edema  Pulses: 2+ and symmetric  Skin: Skin color, texture, turgor normal. No rashes or lesions  Lymph nodes: Cervical, supraclavicular, and axillary nodes normal.  Neurologic: Grossly normal            Patient Instructions       Follow instructions from surgery center staff regarding oral intake       Hold any AM medications day of procedure.           Labs:  No labs were ordered during this visit          Immunization History   Administered Date(s) Administered     DT (pediatric) 07/16/2002     Influenza [id number], 01/11/2010     Influenza, inj, historic,unspecified 12/04/2007     Influenza, seasonal,quad inj 6-35 mos 11/08/2010     Pneumo Conj 13-V (2010&after) 08/23/2018     Td,adult,historic,unspecified 07/16/2002     Tdap 09/02/2008, 08/23/2018           Electronically signed by Anne Marie Walker MD 07/02/20 3:05 PM

## 2021-06-09 NOTE — TELEPHONE ENCOUNTER
Patient Returning Call  Reason for call:  Return call   Information relayed to patient:  No message to relay   Patient has additional questions:  Yes  If YES, what are your questions/concerns:  Caller would like a call back   Okay to leave a detailed message?: Yes

## 2021-06-09 NOTE — PATIENT INSTRUCTIONS - HE
Follow instructions from surgery center staff regarding oral intake       Hold any AM medications day of procedure.

## 2021-06-10 NOTE — PROGRESS NOTES
"OV   8/28/2020  Assessment & Plan:         1. Rash  HM1(CBC and Differential)    Lyme Antibody Cascade    HM1 (CBC with Diff)   2. Mixed hyperlipidemia  Lipid Profile    Basic Metabolic Panel         We reviewed the potential etiologies for her rash symptoms and we will check labs as noted above. We reviewed indications for re-evaluation and she will call or return to clinic with any additional problems or concerns.    Subjective:               Lizzie Viera is a 69 y.o. female who presents for evaluation of a rash/lesion involving the forearm. It started 2 weeks ago. Lesions are  flat, purple, and has gotten progressively larger.  Rash causes no discomfort. Associated symptoms: arthralgia and myalgia. Patient denies: decrease in energy level, fever and chills. She denies any known bites or other injury. She is worried about lyme disease as she has  2 sisters with chronic lyme. She has 2 dogs as well.        She also reports that her allergies are very active - ragweed in fall, mold in spring. She is working on smoking.     The following portions of the patient's history were reviewed and updated as appropriate: allergies, current medications, past family history, past medical history, past social history, past surgical history and problem list.    Review of Systems  A 12 point comprehensive review of systems was negative except as noted.      Objective:      /70 (Patient Position: Sitting, Cuff Size: Adult Regular)   Pulse 70   Ht 5' 3\" (1.6 m)   Wt 172 lb 1 oz (78 kg)   SpO2 96%   BMI 30.48 kg/m       GEN: Alert and oriented, NAD, well nourished  SKIN:  Normal skin turgor, no lesions/rashes   HEENT: NC/AT, moist mucous membranes, no rhinorrhea.    NECK: Normal.  No adenopathy or thyromegaly.  CV: Regular rate and rhythm, no murmurs.   LUNGS: Clear to auscultation bilaterally.    ABDOMEN: Soft, non-tender, non-distended, no masses   BACK: Normal  EXTREMITY: No edema, cyanosis  NEURO: Grossly " normal.            Results for orders placed or performed in visit on 08/28/20   Lyme Antibody Cascade   Result Value Ref Range    Lyme Antibody Cascade 0.02 <0.90 Index Value   Lipid Profile   Result Value Ref Range    Triglycerides 114 <=149 mg/dL    Cholesterol 212 (H) <=199 mg/dL    LDL Calculated 148 (H) <=129 mg/dL    HDL Cholesterol 41 (L) >=50 mg/dL    Patient Fasting > 8hrs? Yes    Basic Metabolic Panel   Result Value Ref Range    Sodium 140 136 - 145 mmol/L    Potassium 4.4 3.5 - 5.0 mmol/L    Chloride 108 (H) 98 - 107 mmol/L    CO2 25 22 - 31 mmol/L    Anion Gap, Calculation 7 5 - 18 mmol/L    Glucose 109 70 - 125 mg/dL    Calcium 9.3 8.5 - 10.5 mg/dL    BUN 14 8 - 22 mg/dL    Creatinine 0.75 0.60 - 1.10 mg/dL    GFR MDRD Af Amer >60 >60 mL/min/1.73m2    GFR MDRD Non Af Amer >60 >60 mL/min/1.73m2   HM1 (CBC with Diff)   Result Value Ref Range    WBC 6.3 4.0 - 11.0 thou/uL    RBC 4.39 3.80 - 5.40 mill/uL    Hemoglobin 14.6 12.0 - 16.0 g/dL    Hematocrit 43.6 35.0 - 47.0 %    MCV 99 80 - 100 fL    MCH 33.3 27.0 - 34.0 pg    MCHC 33.5 32.0 - 36.0 g/dL    RDW 12.1 11.0 - 14.5 %    Platelets 281 140 - 440 thou/uL    MPV 6.9 (L) 7.0 - 10.0 fL    Neutrophils % 67 50 - 70 %    Lymphocytes % 24 20 - 40 %    Monocytes % 7 2 - 10 %    Eosinophils % 2 0 - 6 %    Basophils % 1 0 - 2 %    Neutrophils Absolute 4.2 2.0 - 7.7 thou/uL    Lymphocytes Absolute 1.5 0.8 - 4.4 thou/uL    Monocytes Absolute 0.4 0.0 - 0.9 thou/uL    Eosinophils Absolute 0.2 0.0 - 0.4 thou/uL    Basophils Absolute 0.0 0.0 - 0.2 thou/uL

## 2021-06-11 NOTE — PATIENT INSTRUCTIONS - HE
Gabapentin - increase to 300 mg 2x daily for 3-4 days      May increase to 300 mg am, 600 mg HS for 3-4 days, then may increase to 300 mg in am, 300 mg midday, and 600 mg at HS.

## 2021-06-11 NOTE — TELEPHONE ENCOUNTER
Who is calling:  Valeria   Reason for Call:  Patient was seen in hospital on 9/9/20, was advised to schedule a follow up appointment for vascular in 4 to 6 weeks (shows on after care summary). However, there is no order or referral showing in the system for vascular. Valeria would prefer to go to  [facility] instead. Does a new order need to be issued for this patient? Please advise   Date of last appointment with primary care: 9/17/20  Okay to leave a detailed message: Yes

## 2021-06-11 NOTE — TELEPHONE ENCOUNTER
Who is calling:  Carine West  Reason for Call:  Calling on referral sent for the patient's rey artery stenosis.  In the providers notes it is mentioned that the patient  would also be referred for vertebral plasty for the back pain.  As this is something that this department also can see the patient for, would the provider want to add this to the referral already written   Date of last appointment with primary care: 9/17/20  Okay to leave a detailed message: Yes

## 2021-06-11 NOTE — TELEPHONE ENCOUNTER
Type of referral:    Vascular Therapy.    What provider or facility are you being referred to?    [facility].    Do you have an appointment scheduled?  No     What is your question?    Requesting referral be sent to the [facility] vascular.  Please resend to the appropriate location.    Okay to leave a detailed message: Yes     Please place referral, send demographics, Insurance info as well as any pertinent medical records. Notify the patient to schedule appointment.

## 2021-06-11 NOTE — TELEPHONE ENCOUNTER
The reson they wrote the order for pulm  Was she was in pain and the brace was so tight, fx her t7, but when she was in pain her b/p was 185/120 and o2 dropped into the 80's/ would like to be referred to [facility]

## 2021-06-11 NOTE — PROGRESS NOTES
Assessment & Plan:     1. Compression fracture of T7 vertebra, initial encounter (H)  methocarbamoL (ROBAXIN) 500 MG tablet    DISCONTINUED: oxyCODONE (ROXICODONE) 5 MG immediate release tablet   2. Acute bilateral thoracic back pain  gabapentin (NEURONTIN) 300 MG capsule   3. Nausea  ondansetron (ZOFRAN-ODT) 8 MG disintegrating tablet   4. Mixed hyperlipidemia     5. Left subclavian artery occlusion            Hospital records were personally reviewed. We reviewed the etiology(ies) for her back pain symptoms and options for treatment. We will continue oxycodone as needed and will try oral methocarbamol to see if that helps with any spasm. We reviewed activity as tolerated and use of heat and/or ice tid-qid for comfort. We also discussed dose titration on the gabapentin and reviewed potential side effects. She will call or return to clinic with any ongoing or worsening symptoms. We discussed follow up with ortho or spine care as she may benefit from a vertebroplasty. I also renewed her zofran prn. She will see vascular surgery as discussed in the hospital for subclavian and celiac stenosis. She will otherwise f/u in 1-2 mos for recheck, sooner if needed.           Subjective:             Lizzie Viera is a 69 y.o. female who presents for follow up of recent hospitalization for low back pain due to vertebral fracture. Symptoms have been present for 2 weeks and are persisting. She believes that she had a twisting injury that started things off. She was initially seen at [place] urgent [facility] and given a prednisone taper. She presented to  [facility] with persisting symptoms and was admitted after her x-ray showed a fracture. She has had some ongoing pain, spasms, numbness in legs, and difficulty with ambulation. She will be seeing ortho in follow up next week.   The patient has had recurrent self limited episodes of low back pain in the past. Onset was related to / precipitated by a twisting movement. The pain is  "located in the across the lower back. The pain is described as aching and burning and occurs all day.  Symptoms are exacerbated by flexion and twisting. Symptoms are improved by heat, ice and narcotic pain medications. 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 Her  is on dialysis.         The following portions of the patient's history were reviewed and updated as appropriate: allergies, current medications, past family history, past medical history, past social history, past surgical history and problem list.    Review of Systems  A 12 point comprehensive review of systems was negative except as noted.      Objective:      Vitals:    09/17/20 0958   BP: 138/86   Pulse: 75   SpO2: 94%   Weight: 166 lb (75.3 kg)   Height: 5' 3\" (1.6 m)     GEN: Alert and oriented, NAD, well nourished  SKIN:  Normal skin turgor, no lesions/rashes   HEENT: NC/AT, moist mucous membranes, no rhinorrhea.    NECK: Normal.  No adenopathy or thyromegaly.  CV: Regular rate and rhythm, no murmurs.   LUNGS: Clear to auscultation bilaterally.    ABDOMEN: Soft, non-tender, non-distended, no masses   BACK: Normal  EXTREMITY: No edema, cyanosis  NEURO: Grossly normal.              "

## 2021-06-11 NOTE — TELEPHONE ENCOUNTER
Controlled Substance Refill Request  Medication Name:   Requested Prescriptions     Pending Prescriptions Disp Refills     oxyCODONE (ROXICODONE) 5 MG immediate release tablet 40 tablet 0     Sig: Take 1-2 tablets (5-10 mg total) by mouth every 6 (six) hours as needed.     Date Last Fill: 09/17/20  Requested Pharmacy: [facility]  Submit electronically to pharmacy  Controlled Substance Agreement on file:   Encounter-Level CSA Scan Date:    There are no encounter-level csa scan date.        Last office visit:

## 2021-06-12 LAB
MISCELLANEOUS TEST DEPT. - HE HISTORICAL: NORMAL
PERFORMING LAB: NORMAL
SPECIMEN STATUS: NORMAL
SPECIMEN STATUS: NORMAL
TEST NAME: NORMAL

## 2021-06-12 NOTE — PROGRESS NOTES
[facility] Rehabilitation Daily Progress     Patient Name: Lizzie Viera  Date: 10/29/2020  Visit #: 8/10  Authorization dates:  9/29/20-12/3/20  Referral Diagnosis: Compression fracture of T7 vertebra, initial encounter (H)  Referring provider: Alex Bradford MD, Anne Marie Walker MD   Visit Diagnosis:     ICD-10-CM    1. Compression fracture of T7 vertebra, initial encounter (H)  S22.060A        Precautions / Restrictions : osteoporosis, h/o migrain headache, left subclavian artery occlusion, small airways disease       Assessment:     Response to Intervention: tolerated exercises and manual therapy well with decreased pain in thoracic spine reported post treatment.    Symptoms are consistent with:  Medical diagnosis  Patient is appropriate to continue with skilled physical therapy intervention, as indicated by initial plan of care.    Goal Status:  Pt. will demonstrate/verbalize independence in self-management of condition in : 6 weeks  Pt. will be independent with home exercise program in : 6 weeks  Pt. will have improved quality of sleep: with less pain;waking less times/night;in 6 weeks  Pt. will improve posture : and demonstrate posture with minimal to no cuing;in 6 weeks  Patient will reach / maintain arm movement: forward;overhead;behind;in 6 weeks    No data recorded  Other functional progress:           Plan / Patient Education:     Continue with initial plan of care.  Progress with home program as tolerated.       Subjective:     Pain Rating:  Resting 1  Activity:  1    Response to last treatment: felt really good  HEP- Frequency: 2-3x/day, Questions or difficulties:  none.    Patient reports:      Feeling good today.  Yesterday it was more sore after taking a shower.    Due to her osteoporosis and risk for fracture, she has decided not to pursue vertebroplasty.      Objective:       Manual therapy:  Neck    MFR layers 1-3 bilateral:  Thoracic paraspinals, upper trap, middle  "trap, rhomboids,     Treatment Today   Exercises below represent all exercise the patient has done in the clinic and HEP.  Please see today's exercise flow-sheet for specifics of today's exercise performance.     Exercises: none today  Exercise #1: standing hamstring stretch  cues for keeping hips square and posture  Comment #1: 30\" x 2 bilateral  Exercise #2: standing hip flexor stretch with chair  Comment #2: 30\" x 2 bilateral  Exercise #3: squats  cues for feet hip width apart  Comment #3: 10  Exercise #4: TA set  Comment #4: 5\" x 5, progressed to TA with marching x 5  Exercise #5: shoulder AROM- bench press, shoulder flexion  Comment #5: 10 bilateral supine with instruction for flexion standing.  Exercise #6: bridging low amplitude  Comment #6: 10  Exercise #7: prone alternating leg lift  Comment #7: 5 bilateral x 2  Exercise #8: prone alternating arm lift  Comment #8: 10 bilateral arms at sides            TREATMENT MINUTES COMMENTS   Evaluation     Self-care/ Home management     Manual therapy 15 See above   Neuromuscular Re-education     Therapeutic Activity     Therapeutic Exercises 11    Gait training     Modality__________________                Total 26    Blank areas are intentional and mean the treatment did not include these items.       Bertrand Mullen, PT  10/29/2020  "

## 2021-06-12 NOTE — PROGRESS NOTES
[facility] Rehabilitation Daily Progress     Patient Name: Lizzie Viera  Date: 10/27/2020  Visit #: 7/10  Authorization dates:  9/29/20-12/3/20  Referral Diagnosis: Compression fracture of T7 vertebra, initial encounter (H)  Referring provider: Anne Marie Walker,*, Anne Marie Walker MD   Visit Diagnosis:     ICD-10-CM    1. Compression fracture of T7 vertebra, initial encounter (H)  S22.060A        Precautions / Restrictions : osteoporosis, h/o migrain headache, left subclavian artery occlusion, small airways disease       Assessment:     Response to Intervention: tolerated new exercises and manual therapy well with decreased pain in thoracic spine reported post treatment.    Symptoms are consistent with:  Medical diagnosis  Patient is appropriate to continue with skilled physical therapy intervention, as indicated by initial plan of care.    Goal Status:  Pt. will demonstrate/verbalize independence in self-management of condition in : 6 weeks  Pt. will be independent with home exercise program in : 6 weeks  Pt. will have improved quality of sleep: with less pain;waking less times/night;in 6 weeks  Pt. will improve posture : and demonstrate posture with minimal to no cuing;in 6 weeks  Patient will reach / maintain arm movement: forward;overhead;behind;in 6 weeks    No data recorded  Other functional progress:           Plan / Patient Education:     Continue with initial plan of care.  Progress with home program as tolerated.       Subjective:     Pain Rating:  Resting 1  Activity:  1    Response to last treatment: felt really good  HEP- Frequency: 2-3x/day, Questions or difficulties:  none.    Patient reports:      Feeling good today.  Yesterday it was more sore after taking a shower.    Due to her osteoporosis and risk for fracture, she has decided not to pursue vertebroplasty.      Objective:       Manual therapy:  Neck    MFR layers 1-3 bilateral:  Thoracic paraspinals, upper trap,  "middle trap, rhomboids,     Treatment Today   Exercises below represent all exercise the patient has done in the clinic and HEP.  Please see today's exercise flow-sheet for specifics of today's exercise performance.     Exercises: none today  Exercise #1: standing hamstring stretch  cues for keeping hips square and posture  Comment #1: 30\" x 2 bilateral  Exercise #2: standing hip flexor stretch with chair  Comment #2: 30\" x 2 bilateral  Exercise #3: squats  cues for feet hip width apart  Comment #3: 10  Exercise #4: TA set  Comment #4: 10  Exercise #5: shoulder AROM- bench press, shoulder flexion  Comment #5: 10 bilateral supine with instruction for flexion standing.  Exercise #6: bridging low amplitude  Comment #6: verbal review  Exercise #7: prone alternating leg lift  Comment #7: 10 bilateral  Exercise #8: prone alternating arm lift  Comment #8: 10 bilateral arms at sides            TREATMENT MINUTES COMMENTS   Evaluation     Self-care/ Home management     Manual therapy 15 See above   Neuromuscular Re-education     Therapeutic Activity     Therapeutic Exercises 10    Gait training     Modality__________________                Total 25    Blank areas are intentional and mean the treatment did not include these items.       Bertrand Mullen, PT  10/27/2020  "

## 2021-06-12 NOTE — PROGRESS NOTES
[facility] Rehabilitation Daily Progress     Patient Name: Lizzie Viera  Date: 11/3/2020  Visit #: 9/10  Authorization dates:  9/29/20-12/3/20  Referral Diagnosis: Compression fracture of T7 vertebra, initial encounter (H)  Referring provider: Alex Bradford MD, Anne Marie Walker MD   Visit Diagnosis:     ICD-10-CM    1. Compression fracture of T7 vertebra with routine healing, subsequent encounter  S22.060D    2. Chronic midline thoracic back pain  M54.6     G89.29        Precautions / Restrictions : osteoporosis, h/o migrain headache, left subclavian artery occlusion, small airways disease       Assessment:     Response to Intervention: tolerated manual therapy well with decreased pain in thoracic spine reported post treatment.    Symptoms are consistent with:  Medical diagnosis  Patient is appropriate to continue with skilled physical therapy intervention, as indicated by initial plan of care.    Goal Status:  Pt. will demonstrate/verbalize independence in self-management of condition in : 6 weeks  Pt. will be independent with home exercise program in : 6 weeks  Pt. will have improved quality of sleep: with less pain;waking less times/night;in 6 weeks  Pt. will improve posture : and demonstrate posture with minimal to no cuing;in 6 weeks  Patient will reach / maintain arm movement: forward;overhead;behind;in 6 weeks    No data recorded  Other functional progress:           Plan / Patient Education:     Continue with initial plan of care.  Progress with home program as tolerated.       Subjective:     Pain Rating:  Resting 1  Activity:  1    Response to last treatment: felt really good  HEP- Frequency: 2-3x/day, Questions or difficulties:  none.    Patient reports:      Feeling good today.  Yesterday it was more sore after taking a shower.    Due to her osteoporosis and risk for fracture, she has decided not to pursue vertebroplasty.      Objective:       Manual therapy:  Neck    MFR  "layers 1-3 bilateral:  Thoracic paraspinals, upper trap, middle trap, rhomboids,     Treatment Today   Exercises below represent all exercise the patient has done in the clinic and HEP.  Please see today's exercise flow-sheet for specifics of today's exercise performance.     Exercises: none today  Exercise #1: standing hamstring stretch  cues for keeping hips square and posture  Comment #1: 30\" x 2 bilateral  Exercise #2: standing hip flexor stretch with chair  Comment #2: 30\" x 2 bilateral  Exercise #3: squats  cues for feet hip width apart  Comment #3: 10  Exercise #4: TA set  Comment #4: 5\" x 5, progressed to TA with marching x 5  Exercise #5: shoulder AROM- bench press, shoulder flexion  Comment #5: 10 bilateral supine with instruction for flexion standing.  Exercise #6: bridging low amplitude  Comment #6: 10  Exercise #7: prone alternating leg lift  Comment #7: 5 bilateral x 2  Exercise #8: prone alternating arm lift  Comment #8: 10 bilateral arms at sides     Recommended gentle Cervical AROM        TREATMENT MINUTES COMMENTS   Evaluation     Self-care/ Home management     Manual therapy 28 See above   Neuromuscular Re-education     Therapeutic Activity     Therapeutic Exercises 2    Gait training     Modality__________________                Total 30    Blank areas are intentional and mean the treatment did not include these items.       Bertrand Mullen, PT  11/3/2020  "

## 2021-06-12 NOTE — PROGRESS NOTES
[facility] Rehabilitation Daily Progress     Patient Name: Lizzie Viera  Date: 10/22/2020  Visit #: 6/10  Authorization dates:  9/29/20-12/3/20  Referral Diagnosis: Compression fracture of T7 vertebra, initial encounter (H)  Referring provider: Alex Bradford MD, Anne Marie Walker MD   Visit Diagnosis:     ICD-10-CM    1. Compression fracture of T7 vertebra, initial encounter (H)  S22.060A        Precautions / Restrictions : osteoporosis, h/o migrain headache, left subclavian artery occlusion, small airways disease       Assessment:     Response to Intervention: tolerated manual therapy well with decreased pain in thoracic spine reported post treatment.    Symptoms are consistent with:  Medical diagnosis  Patient is appropriate to continue with skilled physical therapy intervention, as indicated by initial plan of care.    Goal Status:  Pt. will demonstrate/verbalize independence in self-management of condition in : 6 weeks  Pt. will be independent with home exercise program in : 6 weeks  Pt. will have improved quality of sleep: with less pain;waking less times/night;in 6 weeks  Pt. will improve posture : and demonstrate posture with minimal to no cuing;in 6 weeks  Patient will reach / maintain arm movement: forward;overhead;behind;in 6 weeks    No data recorded  Other functional progress:           Plan / Patient Education:     Continue with initial plan of care.  Progress with home program as tolerated.       Subjective:     Pain Rating:  Resting 1  Activity:  1    Response to last treatment: felt really good  HEP- Frequency: 2-3x/day, Questions or difficulties:  none.    Patient reports:      Feeling pretty good today.    Yesterday was more sore from sitting and doctor's visit.    They plan to do a vertebroplasty in 3 weeks.      Objective:       Manual therapy:  Neck    MFR layers 1-3 bilateral:  Thoracic paraspinals, upper trap, middle trap, rhomboids,     Treatment Today   Exercises  "below represent all exercise the patient has done in the clinic and HEP.  Please see today's exercise flow-sheet for specifics of today's exercise performance.     Exercises: none today  Exercise #1: standing hamstring stretch  cues for keeping hips square and posture  Comment #1: 30\" x 2 bilateral  Exercise #2: standing hip flexor stretch with chair  Comment #2: 30\" x 2 bilateral  Exercise #3: squats  cues for feet hip width apart  Comment #3: 10  Exercise #4: TA set  Comment #4: 10  Exercise #5: shoulder AROM- bench press, shoulder flexion  Comment #5: 10 bilateral supine with instruction for flexion standing.  Exercise #6: bridging low amplitude  Comment #6: 10  Exercise #7: inferior shoulder stretch trial.---hold secondary to pain.            TREATMENT MINUTES COMMENTS   Evaluation     Self-care/ Home management     Manual therapy 25  See above   Neuromuscular Re-education     Therapeutic Activity     Therapeutic Exercises     Gait training     Modality__________________                Total 25    Blank areas are intentional and mean the treatment did not include these items.       Bertrand Mullen, PT  10/22/2020  "

## 2021-06-12 NOTE — PROGRESS NOTES
[facility] Rehabilitation Daily Progress     Patient Name: Lizzie Viera  Date: 10/15/2020  Visit #: 4/10  Authorization dates:  9/29/20-12/3/20  Referral Diagnosis: Compression fracture of T7 vertebra, initial encounter (H)  Referring provider: Alex Bradford MD, Anne Marie Walker MD   Visit Diagnosis:     ICD-10-CM    1. Compression fracture of T7 vertebra, initial encounter (H)  S22.060A        Precautions / Restrictions : osteoporosis, h/o migrain headache, left subclavian artery occlusion, small airways disease       Assessment:     Response to Intervention:  Tolerated new exercises well.  Improving mobility    Symptoms are consistent with:  Medical diagnosis  Patient is appropriate to continue with skilled physical therapy intervention, as indicated by initial plan of care.    Goal Status:  Pt. will demonstrate/verbalize independence in self-management of condition in : 6 weeks  Pt. will be independent with home exercise program in : 6 weeks  Pt. will have improved quality of sleep: with less pain;waking less times/night;in 6 weeks  Pt. will improve posture : and demonstrate posture with minimal to no cuing;in 6 weeks  Patient will reach / maintain arm movement: forward;overhead;behind;in 6 weeks    No data recorded  Other functional progress:           Plan / Patient Education:     Continue with initial plan of care.  Progress with home program as tolerated.       Subjective:     Pain Rating:  Resting 1  Activity:  1    Response to last treatment: felt really good  HEP- Frequency: 2-3x/day, Questions or difficulties:  none.    Patient reports:      Dull ache in back.    Rolled on her back and other side with some pain, but once on her other side she was able to sleep.    Exercises are going well.    Able to wash and dry her hair for the first time by herself.      Objective:        MFR layers 1-2: bilateral : thoracic paraspinals, lat dorsi, upper trap, rhomboids      Treatment Today  "  Exercises below represent all exercise the patient has done in the clinic and HEP.  Please see today's exercise flow-sheet for specifics of today's exercise performance.     Exercises: none today  Exercise #1: standing hamstring stretch  cues for keeping hips square and posture  Comment #1: 30\" x 2 bilateral  Exercise #2: standing hip flexor stretch with chair  Comment #2: 30\" x 2 bilateral  Exercise #3: squats  cues for feet hip width apart  Comment #3: 10  Exercise #4: TA set  Comment #4: 10  Exercise #5: shoulder AROM- bench press, shoulder flexion  Comment #5: 10 bilateral supine with instruction for flexion standing.  Exercise #6: bridging low amplitude  Comment #6: 10  Exercise #7: inferior shoulder stretch trial.---hold secondary to pain.            TREATMENT MINUTES COMMENTS   Evaluation     Self-care/ Home management     Manual therapy 25  See above   Neuromuscular Re-education     Therapeutic Activity     Therapeutic Exercises     Gait training     Modality__________________                Total 25    Blank areas are intentional and mean the treatment did not include these items.       Bertrand Mullen, PT  10/15/2020  "

## 2021-06-12 NOTE — PROGRESS NOTES
Pulmonary Clinic Outpatient Consultation  10/13/2020     Assessment and Plan:   70 year old smoker with the most pertinent medical history of migraines and thoracic fractures, presenting for evaluation of hypoxia noted during recent hospitalization. Agree w/ assessment of mosaic attenuation suggestive of air trapping on CTA chest. No pulmonary symptoms to suggest an underlying obstructive airway disease that would require treatment.       Patient will try albuterol PRN if she experiences any breathing issues in the future    Trial of cetirizine or fexofenadine for allergies    Trial of nicotine inhaler to assist w/ smoking cessation    Declines flu vaccination today    Patient should return to clinic in on an as needed for a follow up.    I appreciate the opportunity to participate in the care of Lizzie Viera.     Yaquelin Mendez MD  Pulmonary and Critical Care   ______________________________________________________________________________    CC: CT chest findings    HPI:    Lizzie Viera is a 70 y.o. smoker with history of migraines and thoracic fractures, presenting today for evaluation of hypoxia noted during recent hospitalization for back pain with findings of old small airway disease on CT chest.    Reports no current issues w/ breathing. She is being very mindful about her breathing. She currently experiences dyspnea only due to pain in her back or when she takes too deep of a breath when she is lying down (this is due to her back brace that digs into her lower ribs). Denies any pulmonary symptoms or issues before her lung fractures. No history of childhood asthma.     She was prescribed Combivent & albuterol. She has not opened her albuterol yet, but when she tried Combivent once it made her feel worse when it hit the back of her throat.     She is struggling to quite smoking. She gets welts w/ nicotine patches. 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 She has been smoking since age of 18. She has  cut down from 1 pdd (years ago) to 0.5 ppd & now to 0.25 ppd. She thinks anxiety has been driving her smoking.     Her sister has asthma. Her mother had to use inhalers. Her dad had to use inhalers as well, & this seems to have been due to an occupational exposure of some sort.    ROS:  Review of Systems - 10 point ROS reviewed and noted to be negative w/ exceptions as detailed in the HPI.    PMH:  Patient Active Problem List    Diagnosis Date Noted     Small airways disease 09/12/2020     Compression fracture of T7 vertebra, initial encounter (H) 09/09/2020     Compression fracture of T7 vertebra, sequela 09/09/2020     Left subclavian artery occlusion 09/09/2020     Tobacco use 03/04/2019     Constipation      Migraine Headache      Chronic midline thoracic back pain      Mixed hyperlipidemia      Esophageal Reflux      Osteoporosis      Closed Fracture Of Tibia With Fibula        PSH:  Past Surgical History:   Procedure Laterality Date     VA CONIZATION CERVIX,KNIFE/LASER      Description: Cervical Conization By Laser;  Recorded: 09/20/2007;     VA DILATION/CURETTAGE,DIAGNOSTIC      Description: Dilation And Curettage;  Recorded: 09/20/2007;     VA LAP,CHOLECYSTECTOMY  12/27/2004     VA LIGATE FALLOPIAN TUBE      Description: Tubal Ligation;  Recorded: 09/20/2007;     VA REMOVE TONSILS/ADENOIDS,<11 Y/O      Description: Tonsillectomy With Adenoidectomy;  Recorded: 09/20/2007;       Allergies:  Allergies   Allergen Reactions     Latex Shortness Of Breath     Cefdinir Wheezing     Ragweed Pollen Cough     Adhesive Tape-Silicones Rash       Family HX:  Family History   Problem Relation Age of Onset     Diabetes Mother      Arthritis Mother      Lung disease Father      Diabetes Maternal Uncle      Breast cancer Paternal Aunt      Heart failure Maternal Grandmother      Heart disease Maternal Grandmother      Heart failure Maternal Grandfather      Heart disease Maternal Grandfather      Heart failure Paternal  Grandmother      Heart disease Paternal Grandmother        Social Hx:  Social History     Socioeconomic History     Marital status:      Spouse name: Sandor     Number of children: 3     Years of education: Not on file     Highest education level: Not on file   Occupational History     Not on file   Social Needs     Financial resource strain: Not on file     Food insecurity     Worry: Not on file     Inability: Not on file     Transportation needs     Medical: Not on file     Non-medical: Not on file   Tobacco Use     Smoking status: Current Every Day Smoker     Packs/day: 0.50     Years: 45.00     Pack years: 22.50     Smokeless tobacco: Never Used   Substance and Sexual Activity     Alcohol use: Yes     Drug use: Yes     Sexual activity: Yes     Partners: Male     Birth control/protection: Surgical   Lifestyle     Physical activity     Days per week: Not on file     Minutes per session: Not on file     Stress: Not on file   Relationships     Social connections     Talks on phone: Not on file     Gets together: Not on file     Attends [employer] service: Not on file     Active member of club or organization: Not on file     Attends meetings of clubs or organizations: Not on file     Relationship status: Not on file     Intimate partner violence     Fear of current or ex partner: Not on file     Emotionally abused: Not on file     Physically abused: Not on file     Forced sexual activity: Not on file   Other Topics Concern     Not on file   Social History Narrative     Not on file       Current Meds:  Current Outpatient Medications   Medication Sig Dispense Refill     acetaminophen (TYLENOL) 500 MG tablet Take 2 tablets (1,000 mg total) by mouth 3 (three) times a day.  0     albuterol (PROAIR HFA;PROVENTIL HFA;VENTOLIN HFA) 90 mcg/actuation inhaler Inhale 2 puffs every 4 (four) hours as needed for wheezing or shortness of breath. 1 Inhaler 0     ascorbic acid, vitamin C, (VITAMIN C) 1000 MG tablet Take 1,000  "mg by mouth daily.       cholecalciferol, vitamin D3, 50 mcg (2,000 unit) Tab Take 2,000 Units by mouth daily.        gabapentin (NEURONTIN) 100 MG capsule Take 200 mg by mouth 2 (two) times a day. 60 capsule 0     gabapentin (NEURONTIN) 300 MG capsule Take 1 capsule (300 mg total) by mouth 2 (two) times a day. May increase to 300 mg am, 600 mg HS for 3-4 days, then may increase to 300 mg in am, 300 mg midday, and 600 mg at HS. 120 capsule 2     ipratropium-albuteroL (COMBIVENT RESPIMAT)  mcg/actuation Mist inhaler Inhale 1 puff 2 (two) times a day. 1 Inhaler 0     lidocaine 4 % patch Place 3 patches on the skin daily. Remove and discard patch with 12 hours or as directed by MD. 45 patch 0     loratadine (CLARITIN) 10 mg tablet Take 10 mg by mouth daily as needed for allergies.       methocarbamoL (ROBAXIN) 500 MG tablet Take 1-2 tablets (500-1,000 mg total) by mouth 4 (four) times a day as needed. 120 tablet 2     MULTIVITAMIN ORAL Take 1 tablet by mouth daily.        nicotine (NICODERM CQ) 14 mg/24 hr Place 1 patch on the skin daily. 28 patch 0     ondansetron (ZOFRAN-ODT) 8 MG disintegrating tablet Take 1 tablet (8 mg total) by mouth every 8 (eight) hours as needed for nausea. 30 tablet 1     oxyCODONE (ROXICODONE) 5 MG immediate release tablet Take 1-2 tablets (5-10 mg total) by mouth every 6 (six) hours as needed. 40 tablet 0     polyethylene glycol (MIRALAX) 17 gram packet Take 1 packet (17 g total) by mouth daily.  0     senna-docusate (PERICOLACE) 8.6-50 mg tablet Take 1 tablet by mouth 2 (two) times a day.  0     No current facility-administered medications for this visit.        Physical Exam:  /82   Pulse 68   Resp 12   Ht 5' 3\" (1.6 m)   Wt 166 lb (75.3 kg)   SpO2 95%   Breastfeeding No   BMI 29.41 kg/m    Gen:  woman in a back brace, alert, oriented, no distress  HEENT: masked, EOMI; no stridor  CV: RRR, no M/G/R  Resp: equal bilateral air entry, breath sounds clear " throughout; able to converse in full sentences w/ no respiratory distress  Abd: soft, nontender  Skin: no apparent rashes  Ext: no cyanosis, no clubbing, no BLE edema  Neuro: alert, nonfocal    Labs: personally reviewed in the EMR.    Imaging studies: personally reviewed and interpreted. Below are the Radiology interpretations.  #. CTA chest/abd/pelvis, 9/2020:  CT ANGIOGRAM CHEST, ABDOMEN, AND PELVIS: No aortic aneurysm, wall hematoma or dissection. Severe aortic mixed plaque. Marked narrowing and/or occlusion of the left subclavian artery off the aortic arch. Common origin of the brachiocephalic and left carotid arteries off the arch which are widely patent. Severe origin narrowing of the celiac axis with distal opacification. Perhaps mild narrowing at the origin of the main right renal artery; a patent accessory right renal artery is present. Widely patent left renal, superior mesenteric and inferior mesenteric arteries. Mild bilateral proximal internal iliac artery narrowing. No pulmonary embolism.  LUNGS AND PLEURA: Mild motion and atelectasis. Mosaic attenuation which can be seen with small airways disease and/or gas trapping. Mild bilateral airway thickening / bronchitis. No pleural effusion or pneumothorax.  MEDIASTINUM/AXILLAE: No adenopathy. Mild coronary calcifications. Small hiatus hernia.  HEPATOBILIARY: Cholecystectomy. Unremarkable liver.  PANCREAS: Normal.  SPLEEN: Normal.  ADRENAL GLANDS: Stable small adrenal adenomas.  KIDNEYS/BLADDER: Unremarkable.  BOWEL: No obstruction. Mild colonic diverticulosis.  LYMPH NODES: No abdominal or pelvic adenopathy.  MUSCULOSKELETAL: Interval mild to moderate T7 compression fracture since 09/04/2020 thoracic spine radiographs (previously mild). Small fat-containing umbilical hernia.    PFT's NONE

## 2021-06-12 NOTE — PATIENT INSTRUCTIONS - HE
Pulmonary concerns: small airway disease on CT scan    Plan:    You can try using your albuterol inhaler if/when you feel short of breath.     You can try Allegra (fexofenadine) or Zyrtec (cetirizine) for your allergies.     The best thing you can do for your lung and overall health is to quit smoking. I sending you a prescription for a nicotine inhaler to help you quit smoking.     I still recommend you reconsider getting a flu vaccine.    It is very important to use good technique when using inhalers. If you cannot remember the instructions provided in clinic, you can visit your pharmacist to request a demonstration, or you can review a video online. You should exercise common sense when looking for videos -- best and most informative inhaler videos come from health care organizations. [employer] has videos posted by [employer] & [facility] (hospital organization) that provide detailed demonstrations.    You are welcome to come back to our clinic if your breathing gets worse at any point in time.    If you have any questions or concerns, please, call our clinic at [phone].

## 2021-06-12 NOTE — PROGRESS NOTES
[facility] Rehabilitation Daily Progress     Patient Name: Lizzie Viera  Date: 10/20/2020  Visit #: 5/10  Authorization dates:  9/29/20-12/3/20  Referral Diagnosis: Compression fracture of T7 vertebra, initial encounter (H)  Referring provider: Anne Marie Walker,*, Anne Marie Walker MD   Visit Diagnosis:     ICD-10-CM    1. Compression fracture of T7 vertebra, initial encounter (H)  S22.060A        Precautions / Restrictions : osteoporosis, h/o migrain headache, left subclavian artery occlusion, small airways disease       Assessment:     Response to Intervention: tolerated manual therapy well with decreased pain and increased cervical AROM    Symptoms are consistent with:  Medical diagnosis  Patient is appropriate to continue with skilled physical therapy intervention, as indicated by initial plan of care.    Goal Status:  Pt. will demonstrate/verbalize independence in self-management of condition in : 6 weeks  Pt. will be independent with home exercise program in : 6 weeks  Pt. will have improved quality of sleep: with less pain;waking less times/night;in 6 weeks  Pt. will improve posture : and demonstrate posture with minimal to no cuing;in 6 weeks  Patient will reach / maintain arm movement: forward;overhead;behind;in 6 weeks    No data recorded  Other functional progress:           Plan / Patient Education:     Continue with initial plan of care.  Progress with home program as tolerated.       Subjective:     Pain Rating:  Resting 1  Activity:  1    Response to last treatment: felt really good  HEP- Frequency: 2-3x/day, Questions or difficulties:  none.    Patient reports:      Felt better until yesterday    Neck and upper back are sore starting yesterday.    Exercises are going well.      Objective:       Manual therapy:  Neck    MFR layers 1-3 bilateral:  Suboccipitals, cervical extensors, cervical paraspinal rotators, scalenes.    Manual therapy:  Cervical longitudinal  "mobilization    Rate/grade Target  Direction  Relative movement Location in range Patient position   2 Cervical vertebrae Superior Distraction of facet joints Head in neutral Supine            Treatment Today   Exercises below represent all exercise the patient has done in the clinic and HEP.  Please see today's exercise flow-sheet for specifics of today's exercise performance.     Exercises: none today  Exercise #1: standing hamstring stretch  cues for keeping hips square and posture  Comment #1: 30\" x 2 bilateral  Exercise #2: standing hip flexor stretch with chair  Comment #2: 30\" x 2 bilateral  Exercise #3: squats  cues for feet hip width apart  Comment #3: 10  Exercise #4: TA set  Comment #4: 10  Exercise #5: shoulder AROM- bench press, shoulder flexion  Comment #5: 10 bilateral supine with instruction for flexion standing.  Exercise #6: bridging low amplitude  Comment #6: 10  Exercise #7: inferior shoulder stretch trial.---hold secondary to pain.            TREATMENT MINUTES COMMENTS   Evaluation     Self-care/ Home management     Manual therapy 25  See above   Neuromuscular Re-education     Therapeutic Activity     Therapeutic Exercises     Gait training     Modality__________________                Total 25    Blank areas are intentional and mean the treatment did not include these items.       Bertrand Mullen, PT  10/20/2020  "

## 2021-06-12 NOTE — PROGRESS NOTES
[facility] Rehabilitation Daily Progress     Patient Name: Lizzie iVera  Date: 10/8/2020  Visit #: 2/10  Authorization dates:  9/29/20-12/3/20  Referral Diagnosis: Compression fracture of T7 vertebra, initial encounter (H)  Referring provider: Alex Bradford MD, Anne Marie Walker MD   Visit Diagnosis:     ICD-10-CM    1. Compression fracture of T7 vertebra, initial encounter (H)  S22.060A        Precautions / Restrictions : osteoporosis, h/o migrain headache, left subclavian artery occlusion, small airways disease       Assessment:     Response to Intervention:  Tolerated new exercises well.  Improving mobility    Symptoms are consistent with:  Medical diagnosis  Patient is appropriate to continue with skilled physical therapy intervention, as indicated by initial plan of care.    Goal Status:  Pt. will demonstrate/verbalize independence in self-management of condition in : 6 weeks  Pt. will be independent with home exercise program in : 6 weeks  Pt. will have improved quality of sleep: with less pain;waking less times/night;in 6 weeks  Pt. will improve posture : and demonstrate posture with minimal to no cuing;in 6 weeks  Patient will reach / maintain arm movement: forward;overhead;behind;in 6 weeks    No data recorded  Other functional progress:           Plan / Patient Education:     Continue with initial plan of care.  Progress with home program as tolerated.       Subjective:     Pain Rating:  Resting 1  Activity:  1    Response to last treatment: felt really good for a while  HEP- Frequency: 2-3x/day, Questions or difficulties:  none.    Patient reports:      Her back is all red and is itching    Exercises are going well    Has some wrap around pain when doing overhead activity with the arms      Objective:              Treatment Today   Exercises below represent all exercise the patient has done in the clinic and HEP.  Please see today's exercise flow-sheet for specifics of today's  "exercise performance.     Exercises:  Exercise #1: standing hamstring stretch  cues for keeping hips square and posture  Comment #1: 30\" x 2 bilateral  Exercise #2: standing hip flexor stretch with chair  Comment #2: 30\" x 2 bilateral  Exercise #3: squats  cues for feet hip width apart  Comment #3: 10  Exercise #4: TA set  Comment #4: 10  Exercise #5: shoulder AROM- bench press, shoulder flexion  Comment #5: 10 bilateral supine with instruction for flexion standing.  Exercise #6: bridging low amplitude  Comment #6: 10  Exercise #7: inferior shoulder stretch trial.---hold secondary to pain.            TREATMENT MINUTES COMMENTS   Evaluation     Self-care/ Home management     Manual therapy     Neuromuscular Re-education     Therapeutic Activity     Therapeutic Exercises 25 See exercise flow-sheet for details.    Gait training     Modality__________________                Total 25    Blank areas are intentional and mean the treatment did not include these items.       Bertrand Mullen, PT  10/8/2020      "

## 2021-06-13 NOTE — PROGRESS NOTES
[facility] Rehabilitation Discharge Summary  Patient Name: Lizzie Viera  Date: 1/18/2021  Referral Diagnosis: Compression fracture of T7 vertebra, initial encounter (H)  Referring provider: Alex Bradford MD  Visit Diagnosis:   1. Chronic midline thoracic back pain     2. Compression fracture of T7 vertebra with routine healing, subsequent encounter         Goals:  Pt. will demonstrate/verbalize independence in self-management of condition in : 6 weeks;Progressing toward  Pt. will be independent with home exercise program in : 6 weeks;Progressing toward  Pt. will have improved quality of sleep: with less pain;waking less times/night;in 6 weeks;Not Met  Pt. will improve posture : and demonstrate posture with minimal to no cuing;in 6 weeks;Progressing toward;Comment  Comment:: improving erect posture with standing.  Patient will reach / maintain arm movement: forward;overhead;behind;in 6 weeks;Met    No data recorded    Patient was seen for 16 visits physical therapy.    The patient attended therapy initially, but did not finish the therapy sessions prescribed.  Goals were not fully achieved. Explanation for goals not achieved: The patient discontinued therapy, did not return.    Therapy will be discontinued at this time.  Please see progress note dated 12/10/20 for patient status.      Thank you for your referral.  Bertrand Mullen, PT  1/18/2021  11:47 AM     [facility] Daily Progress     Patient Name: Lizzie Viera  Date: 12/10/2020  Visit #: 16/18  Authorization dates:  11/5/20-1/4/21  Referral Diagnosis: Compression fracture of T7 vertebra, initial encounter (H)  Referring provider: Alex Bradford MD, Anne Marie Walker MD   Visit Diagnosis:     ICD-10-CM    1. Chronic midline thoracic back pain  M54.6     G89.29    2. Compression fracture of T7 vertebra with routine healing, subsequent encounter  S22.060D        Precautions / Restrictions :  "osteoporosis, h/o migrain headache, left subclavian artery occlusion, small airways disease       Assessment:     Response to Intervention:  Decreased pain and headache post treatment.    Symptoms are consistent with:  Medical diagnosis  Patient is appropriate to continue with skilled physical therapy intervention, as indicated by initial plan of care.    Goal Status:  Pt. will demonstrate/verbalize independence in self-management of condition in : 6 weeks;Progressing toward  Pt. will be independent with home exercise program in : 6 weeks;Progressing toward  Pt. will have improved quality of sleep: with less pain;waking less times/night;in 6 weeks;Not Met  Pt. will improve posture : and demonstrate posture with minimal to no cuing;in 6 weeks;Progressing toward;Comment  Comment:: improving erect posture with standing.  Patient will reach / maintain arm movement: forward;overhead;behind;in 6 weeks;Met    No data recorded  Other functional progress:           Plan / Patient Education:     Continue with initial plan of care.  Progress with home program as tolerated.       Subjective:     Pain Rating:  Resting 2  Activity:  2    Response to last treatment: felt really good.  HEP- Frequency: 2-3x/day, Questions or difficulties:  none.    Patient reports:      Difficulty sleeping last night.  It's hard to find a comfortable position.    She woke up with increased neck pain and a knot, which got worse holding her jaw open at the dentist this morning.      Objective:         Treatment Today   Exercises below represent all exercise the patient has done in the clinic and HEP.  Please see today's exercise flow-sheet for specifics of today's exercise performance.     Exercises: none today  Exercise #1: standing hamstring stretch  cues for keeping hips square and posture  Comment #1: 30\" x 2 bilateral  Exercise #2: standing hip flexor stretch with chair  Comment #2: 30\" x 2 bilateral  Exercise #3: squats  cues for feet hip width " "apart  Comment #3: 10  Exercise #4: TA set  Comment #4: 5\" x 5, progressed to TA with marching x 5  Exercise #5: shoulder AROM- bench press, shoulder flexion  Comment #5: 10 bilateral supine with instruction for flexion standing.  Exercise #6: bridging low amplitude  Comment #6: 10  Exercise #7: prone alternating leg lift  Comment #7: 5 bilateral x 2  Exercise #8: prone alternating arm lift  Comment #8: 10 bilateral arms at sides  Exercise #9: cervical AROM each direction  Exercise #10: seated LAQ/longsit slump slider.  Comment #10: 10  Exercise #11: seated ball exercises: anterior/posterior pelvic tilt, lateral pelvic tilt, clockwise pelvic tilt, counterclockwise pelvic tilt, marching  Comment #11: 1-2 min each  Exercise #12: prone over ball with hand and feet contact:  alternating arm lift, alternating leg lift, opposite arm and leg lift  Exercise #13: leaning over ball on on table active thoracic extension  Comment #13: 10     Manual therapy:  Neck    MFR layers 1-3 bilateral:  Suboccipitals, cervical extensors, cervical paraspinal rotators, scalenes left upper trap.    Manual therapy:  Cervical longitudinal mobilization    Rate/grade Target  Direction  Relative movement Location in range Patient position   2 Cervical vertebrae Superior Distraction of facet joints Head in neutral Supine             TREATMENT MINUTES COMMENTS   Evaluation     Self-care/ Home management     Manual therapy 25    Neuromuscular Re-education     Therapeutic Activity     Therapeutic Exercises     Gait training     Modality__________________                Total 25    Blank areas are intentional and mean the treatment did not include these items.       Bertrand Mullen, PT  12/10/2020  "

## 2021-06-13 NOTE — PROGRESS NOTES
[facility] Rehabilitation Daily Progress     Patient Name: Lizzie Viera  Date: 12/3/2020  Visit #: 14/18  Authorization dates:  11/5/20-1/4/21  Referral Diagnosis: Compression fracture of T7 vertebra, initial encounter (H)  Referring provider: Alex Bradford MD, Anne Marie Walker MD   Visit Diagnosis:     ICD-10-CM    1. Chronic midline thoracic back pain  M54.6     G89.29    2. Compression fracture of T7 vertebra with routine healing, subsequent encounter  S22.060D        Precautions / Restrictions : osteoporosis, h/o migrain headache, left subclavian artery occlusion, small airways disease       Assessment:     Response to Intervention:upper back and neck feel better post manual therapy treatment today.    Symptoms are consistent with:  Medical diagnosis  Patient is appropriate to continue with skilled physical therapy intervention, as indicated by initial plan of care.    Goal Status:  Pt. will demonstrate/verbalize independence in self-management of condition in : 6 weeks;Progressing toward  Pt. will be independent with home exercise program in : 6 weeks;Progressing toward  Pt. will have improved quality of sleep: with less pain;waking less times/night;in 6 weeks;Not Met  Pt. will improve posture : and demonstrate posture with minimal to no cuing;in 6 weeks;Progressing toward;Comment  Comment:: improving erect posture with standing.  Patient will reach / maintain arm movement: forward;overhead;behind;in 6 weeks;Met    No data recorded  Other functional progress:           Plan / Patient Education:     Continue with initial plan of care.  Progress with home program as tolerated.       Subjective:     Pain Rating:  Resting 1  Activity:  1    Response to last treatment: a little sore.  HEP- Frequency: 2-3x/day, Questions or difficulties:  none.    Patient reports:      Ball exercises made the muscles more tight, but it was tolerable.    She is getting better at sleeping without the  "brace.      Objective:         Treatment Today   Exercises below represent all exercise the patient has done in the clinic and HEP.  Please see today's exercise flow-sheet for specifics of today's exercise performance.     Exercises: none today  Exercise #1: standing hamstring stretch  cues for keeping hips square and posture  Comment #1: 30\" x 2 bilateral  Exercise #2: standing hip flexor stretch with chair  Comment #2: 30\" x 2 bilateral  Exercise #3: squats  cues for feet hip width apart  Comment #3: 10  Exercise #4: TA set  Comment #4: 5\" x 5, progressed to TA with marching x 5  Exercise #5: shoulder AROM- bench press, shoulder flexion  Comment #5: 10 bilateral supine with instruction for flexion standing.  Exercise #6: bridging low amplitude  Comment #6: 10  Exercise #7: prone alternating leg lift  Comment #7: 5 bilateral x 2  Exercise #8: prone alternating arm lift  Comment #8: 10 bilateral arms at sides  Exercise #9: cervical AROM each direction  Exercise #10: seated LAQ/longsit slump slider.  Comment #10: 10  Exercise #11: seated ball exercises: anterior/posterior pelvic tilt, lateral pelvic tilt, clockwise pelvic tilt, counterclockwise pelvic tilt, marching  Comment #11: 1-2 min each  Exercise #12: prone over ball with hand and feet contact:  alternating arm lift, alternating leg lift, opposite arm and leg lift  Exercise #13: leaning over ball on on table active thoracic extension  Comment #13: 10     MFR layers 1-3 right: lat dorsi, thoracic paraspinals, rhomboids, middle trap.       TREATMENT MINUTES COMMENTS   Evaluation     Self-care/ Home management     Manual therapy 25    Neuromuscular Re-education     Therapeutic Activity     Therapeutic Exercises     Gait training     Modality__________________                Total 25    Blank areas are intentional and mean the treatment did not include these items.       Bertrand Mullen, PT  12/3/2020  "

## 2021-06-13 NOTE — PROGRESS NOTES
12/14/2020 OV  Assessment & Plan:     1. Compression fracture of T7 vertebra with routine healing, subsequent encounter  NM Bone Scan Whole Body   2. Lesion of bone of thoracic spine  NM Bone Scan Whole Body         We reviewed the likely/potential etiology(ies) for her thoracic fracture and the lesion at T1, and we will proceed with a bone scan to rule out metabolically active lesion since she is concerned about ruling out malignancy. We reviewed use of OTC analgesics as well as increased fluids and rest, and she will call or return to clinic with any ongoing or worsening symptoms. She will otherwise will f/u in  6-12 mos for routine med check/evaluation.    Subjective:     SONIA Viera is a 70 y.o. female who presents for follow up of her recent MRI results. She was being evaluated by ortho for a T7 vertebral body fracture and her scan showed an indeterminate lesion of the T1 vertebral body. She was diagnosed with osteoporosis by bone density scan on 12/1/2020, worse at the hips. The cause of osteoporosis is felt to be due to postmenopausal estrogen deficiency and tobacco use.   She is currently being treated with calcium and vitamin D supplementation.  She is not currently being treated with bisphosphonates.  Worries about SE of medications. She has had rib pain since wearing back brace.     Osteoporosis Risk Factors   Potentially modifiable:  Tobacco use: yes   Low body weight (<127 lbs): no  Estrogen deficiency     early menopause (age <45) or bilateral ovariectomy: no     prolonged premenopausal amenorrhea (>1 yr): no  Low calcium intake (lifelong): yes  Alcoholism: yes  Recurrent falls: no  Inadequate physical activity: no   High-risk Medication: no    Current calcium and Vit D intake:  Dietary sources:   Supplements: resveratrol and vit d          The following portions of the patient's history were reviewed and updated as appropriate: allergies, current medications, past family history,  "past medical history, past social history, past surgical history and problem list.     Review of Systems  A 12 point comprehensive review of systems was negative except as noted.         Objective:     /80   Pulse 60   Ht 5' 3\" (1.6 m)   Wt 156 lb (70.8 kg)   SpO2 97%   Breastfeeding No   BMI 27.63 kg/m    Physical Exam:  GEN: Alert and oriented, NAD, well nourished  SKIN:  Normal skin turgor, no lesions/rashes   HEENT: moist mucous membranes, no rhinorrhea.    NECK: Normal.  No adenopathy or thyromegaly.  CV: Regular rate and rhythm, no murmurs.   LUNGS: Clear to auscultation bilaterally.    ABDOMEN: Soft, non-tender, non-distended, no masses   EXTREMITY: No edema, cyanosis  NEURO: Grossly normal.            "

## 2021-06-13 NOTE — PROGRESS NOTES
[facility] Rehabilitation Daily Progress     Patient Name: Lizzie Viera  Date: 11/27/2020  Visit #: 12/18  Authorization dates:  11/5/20-1/4/21  Referral Diagnosis: Compression fracture of T7 vertebra, initial encounter (H)  Referring provider: Alex Bradford MD, Anne Marie Walker MD   Visit Diagnosis:     ICD-10-CM    1. Chronic midline thoracic back pain  M54.6     G89.29    2. Compression fracture of T7 vertebra with routine healing, subsequent encounter  S22.060D        Precautions / Restrictions : osteoporosis, h/o migrain headache, left subclavian artery occlusion, small airways disease       Assessment:     Response to Intervention: tolerated manual therapy well with decreased pain in thoracic spine reported post treatment.    Symptoms are consistent with:  Medical diagnosis  Patient is appropriate to continue with skilled physical therapy intervention, as indicated by initial plan of care.    Goal Status:  Pt. will demonstrate/verbalize independence in self-management of condition in : 6 weeks;Progressing toward  Pt. will be independent with home exercise program in : 6 weeks;Progressing toward  Pt. will have improved quality of sleep: with less pain;waking less times/night;in 6 weeks;Not Met  Pt. will improve posture : and demonstrate posture with minimal to no cuing;in 6 weeks;Progressing toward;Comment  Comment:: improving erect posture with standing.  Patient will reach / maintain arm movement: forward;overhead;behind;in 6 weeks;Met    No data recorded  Other functional progress:           Plan / Patient Education:     Continue with initial plan of care.  Progress with home program as tolerated.       Subjective:     Pain Rating:  Resting 1  Activity:  1    Response to last treatment: a little sore.  HEP- Frequency: 2-3x/day, Questions or difficulties:  none.    Patient reports:      Continues to feel better and better.      Objective:       Manual therapy:  MFR layers 1-3  "right: lat dorsi, thoracic paraspinals, rhomboids, middle trap.     ,     Treatment Today   Exercises below represent all exercise the patient has done in the clinic and HEP.  Please see today's exercise flow-sheet for specifics of today's exercise performance.     Exercises: none today  Exercise #1: standing hamstring stretch  cues for keeping hips square and posture  Comment #1: 30\" x 2 bilateral  Exercise #2: standing hip flexor stretch with chair  Comment #2: 30\" x 2 bilateral  Exercise #3: squats  cues for feet hip width apart  Comment #3: 10  Exercise #4: TA set  Comment #4: 5\" x 5, progressed to TA with marching x 5  Exercise #5: shoulder AROM- bench press, shoulder flexion  Comment #5: 10 bilateral supine with instruction for flexion standing.  Exercise #6: bridging low amplitude  Comment #6: 10  Exercise #7: prone alternating leg lift  Comment #7: 5 bilateral x 2  Exercise #8: prone alternating arm lift  Comment #8: 10 bilateral arms at sides  Exercise #9: cervical AROM each direction  Exercise #10: seated LAQ/longsit slump slider.  Comment #10: 10            TREATMENT MINUTES COMMENTS   Evaluation     Self-care/ Home management     Manual therapy 25 See above   Neuromuscular Re-education     Therapeutic Activity     Therapeutic Exercises     Gait training     Modality__________________                Total 25    Blank areas are intentional and mean the treatment did not include these items.       Bertrand Mullen, PT  11/27/2020  "

## 2021-06-13 NOTE — PROGRESS NOTES
[facility] Rehabilitation Daily Progress     Patient Name: Lizzie Viera  Date: 12/8/2020  Visit #: 15/18  Authorization dates:  11/5/20-1/4/21  Referral Diagnosis: Compression fracture of T7 vertebra, initial encounter (H)  Referring provider: Anne Marie Walker,*, Anne Marie Walker MD   Visit Diagnosis:     ICD-10-CM    1. Chronic midline thoracic back pain  M54.6     G89.29    2. Compression fracture of T7 vertebra with routine healing, subsequent encounter  S22.060D        Precautions / Restrictions : osteoporosis, h/o migrain headache, left subclavian artery occlusion, small airways disease       Assessment:     Response to Intervention:  Decreased pain and headache post treatment.    Symptoms are consistent with:  Medical diagnosis  Patient is appropriate to continue with skilled physical therapy intervention, as indicated by initial plan of care.    Goal Status:  Pt. will demonstrate/verbalize independence in self-management of condition in : 6 weeks;Progressing toward  Pt. will be independent with home exercise program in : 6 weeks;Progressing toward  Pt. will have improved quality of sleep: with less pain;waking less times/night;in 6 weeks;Not Met  Pt. will improve posture : and demonstrate posture with minimal to no cuing;in 6 weeks;Progressing toward;Comment  Comment:: improving erect posture with standing.  Patient will reach / maintain arm movement: forward;overhead;behind;in 6 weeks;Met    No data recorded  Other functional progress:           Plan / Patient Education:     Continue with initial plan of care.  Progress with home program as tolerated.       Subjective:     Pain Rating:  Resting 2  Activity:  2    Response to last treatment: a little sore.  HEP- Frequency: 2-3x/day, Questions or difficulties:  none.    Patient reports:       not using brace anymore at night.    She has been having increased headaches.      Objective:         Treatment Today   Exercises below  "represent all exercise the patient has done in the clinic and HEP.  Please see today's exercise flow-sheet for specifics of today's exercise performance.     Exercises: none today  Exercise #1: standing hamstring stretch  cues for keeping hips square and posture  Comment #1: 30\" x 2 bilateral  Exercise #2: standing hip flexor stretch with chair  Comment #2: 30\" x 2 bilateral  Exercise #3: squats  cues for feet hip width apart  Comment #3: 10  Exercise #4: TA set  Comment #4: 5\" x 5, progressed to TA with marching x 5  Exercise #5: shoulder AROM- bench press, shoulder flexion  Comment #5: 10 bilateral supine with instruction for flexion standing.  Exercise #6: bridging low amplitude  Comment #6: 10  Exercise #7: prone alternating leg lift  Comment #7: 5 bilateral x 2  Exercise #8: prone alternating arm lift  Comment #8: 10 bilateral arms at sides  Exercise #9: cervical AROM each direction  Exercise #10: seated LAQ/longsit slump slider.  Comment #10: 10  Exercise #11: seated ball exercises: anterior/posterior pelvic tilt, lateral pelvic tilt, clockwise pelvic tilt, counterclockwise pelvic tilt, marching  Comment #11: 1-2 min each  Exercise #12: prone over ball with hand and feet contact:  alternating arm lift, alternating leg lift, opposite arm and leg lift  Exercise #13: leaning over ball on on table active thoracic extension  Comment #13: 10     Manual therapy:  Neck    MFR layers 1-3 bilateral:  Suboccipitals, cervical extensors, cervical paraspinal rotators, scalenes.    Manual therapy:  Cervical longitudinal mobilization    Rate/grade Target  Direction  Relative movement Location in range Patient position   2 Cervical vertebrae Superior Distraction of facet joints Head in neutral Supine             TREATMENT MINUTES COMMENTS   Evaluation     Self-care/ Home management     Manual therapy 25    Neuromuscular Re-education     Therapeutic Activity     Therapeutic Exercises     Gait training   "   Modality__________________                Total 25    Blank areas are intentional and mean the treatment did not include these items.       Bertrand Mullen, PT  12/8/2020

## 2021-06-13 NOTE — PROGRESS NOTES
[facility] Rehabilitation Daily Progress     Patient Name: Lizzie Viera  Date: 12/1/2020  Visit #: 13/18  Authorization dates:  11/5/20-1/4/21  Referral Diagnosis: Compression fracture of T7 vertebra, initial encounter (H)  Referring provider: Alex Bradford MD, Anne Marie Walker MD   Visit Diagnosis:     ICD-10-CM    1. Chronic midline thoracic back pain  M54.6     G89.29    2. Compression fracture of T7 vertebra with routine healing, subsequent encounter  S22.060D        Precautions / Restrictions : osteoporosis, h/o migrain headache, left subclavian artery occlusion, small airways disease       Assessment:     Response to Intervention:tolerated new exercises well without increased pain.    Symptoms are consistent with:  Medical diagnosis  Patient is appropriate to continue with skilled physical therapy intervention, as indicated by initial plan of care.    Goal Status:  Pt. will demonstrate/verbalize independence in self-management of condition in : 6 weeks;Progressing toward  Pt. will be independent with home exercise program in : 6 weeks;Progressing toward  Pt. will have improved quality of sleep: with less pain;waking less times/night;in 6 weeks;Not Met  Pt. will improve posture : and demonstrate posture with minimal to no cuing;in 6 weeks;Progressing toward;Comment  Comment:: improving erect posture with standing.  Patient will reach / maintain arm movement: forward;overhead;behind;in 6 weeks;Met    No data recorded  Other functional progress:           Plan / Patient Education:     Continue with initial plan of care.  Progress with home program as tolerated.       Subjective:     Pain Rating:  Resting 1  Activity:  1    Response to last treatment: a little sore.  HEP- Frequency: 2-3x/day, Questions or difficulties:  none.    Patient reports:      A little sore from exercises, but about the same as last visit.      Objective:         Treatment Today   Exercises below represent  "all exercise the patient has done in the clinic and HEP.  Please see today's exercise flow-sheet for specifics of today's exercise performance.     Exercises: none today  Exercise #1: standing hamstring stretch  cues for keeping hips square and posture  Comment #1: 30\" x 2 bilateral  Exercise #2: standing hip flexor stretch with chair  Comment #2: 30\" x 2 bilateral  Exercise #3: squats  cues for feet hip width apart  Comment #3: 10  Exercise #4: TA set  Comment #4: 5\" x 5, progressed to TA with marching x 5  Exercise #5: shoulder AROM- bench press, shoulder flexion  Comment #5: 10 bilateral supine with instruction for flexion standing.  Exercise #6: bridging low amplitude  Comment #6: 10  Exercise #7: prone alternating leg lift  Comment #7: 5 bilateral x 2  Exercise #8: prone alternating arm lift  Comment #8: 10 bilateral arms at sides  Exercise #9: cervical AROM each direction  Exercise #10: seated LAQ/longsit slump slider.  Comment #10: 10  Exercise #11: seated ball exercises: anterior/posterior pelvic tilt, lateral pelvic tilt, clockwise pelvic tilt, counterclockwise pelvic tilt, marching  Comment #11: 1-2 min each  Exercise #12: prone over ball with hand and feet contact:  alternating arm lift, alternating leg lift, opposite arm and leg lift  Exercise #13: leaning over ball on on table active thoracic extension  Comment #13: 10            TREATMENT MINUTES COMMENTS   Evaluation     Self-care/ Home management     Manual therapy     Neuromuscular Re-education     Therapeutic Activity     Therapeutic Exercises 25    Gait training     Modality__________________                Total 25    Blank areas are intentional and mean the treatment did not include these items.       Bertrand Mullen, PT  12/1/2020  "

## 2021-06-13 NOTE — TELEPHONE ENCOUNTER
Prior Authorization Request  Who s requesting:  Pharmacy  Pharmacy Name and Location: [facility]  Medication Name: Methocarbamol 500mg tablet  Insurance Plan: Medicare  Insurance Member ID Number:  [id number]  CoverMyMeds Key:   Informed patient that prior authorizations can take up to 10 business days for response:     Okay to leave a detailed message:

## 2021-06-13 NOTE — PROGRESS NOTES
[facility] Rehabilitation Daily Progress     Patient Name: Lizzie Viera  Date: 11/24/2020  Visit #: 11/18  Authorization dates:  11/5/20-1/4/21  Referral Diagnosis: Compression fracture of T7 vertebra, initial encounter (H)  Referring provider: Alex Bradford MD, Anne Marie Walker MD   Visit Diagnosis:     ICD-10-CM    1. Chronic midline thoracic back pain  M54.6     G89.29    2. Compression fracture of T7 vertebra with routine healing, subsequent encounter  S22.060D    3. Compression fracture of T7 vertebra, initial encounter (H)  S22.060A        Precautions / Restrictions : osteoporosis, h/o migrain headache, left subclavian artery occlusion, small airways disease       Assessment:     Response to Intervention: tolerated manual therapy well with decreased pain in thoracic spine reported post treatment.    Symptoms are consistent with:  Medical diagnosis  Patient is appropriate to continue with skilled physical therapy intervention, as indicated by initial plan of care.    Goal Status:  Pt. will demonstrate/verbalize independence in self-management of condition in : 6 weeks;Progressing toward  Pt. will be independent with home exercise program in : 6 weeks;Progressing toward  Pt. will have improved quality of sleep: with less pain;waking less times/night;in 6 weeks;Not Met  Pt. will improve posture : and demonstrate posture with minimal to no cuing;in 6 weeks;Progressing toward;Comment  Comment:: improving erect posture with standing.  Patient will reach / maintain arm movement: forward;overhead;behind;in 6 weeks;Met    No data recorded  Other functional progress:           Plan / Patient Education:     Continue with initial plan of care.  Progress with home program as tolerated.       Subjective:     Pain Rating:  Resting 1  Activity:  1    Response to last treatment: felt really good  HEP- Frequency: 2-3x/day, Questions or difficulties:  none.    Patient reports:      She has been  "taking less pain medications.    She overdid it by going to several stores in a row and back pain and spasm increased.    The exercises are going really well.      Objective:       Manual therapy:  MFR layers 1-3 right: lat dorsi, thoracic paraspinals, rhomboids, middle trap.     ,     Treatment Today   Exercises below represent all exercise the patient has done in the clinic and HEP.  Please see today's exercise flow-sheet for specifics of today's exercise performance.     Exercises: none today  Exercise #1: standing hamstring stretch  cues for keeping hips square and posture  Comment #1: 30\" x 2 bilateral  Exercise #2: standing hip flexor stretch with chair  Comment #2: 30\" x 2 bilateral  Exercise #3: squats  cues for feet hip width apart  Comment #3: 10  Exercise #4: TA set  Comment #4: 5\" x 5, progressed to TA with marching x 5  Exercise #5: shoulder AROM- bench press, shoulder flexion  Comment #5: 10 bilateral supine with instruction for flexion standing.  Exercise #6: bridging low amplitude  Comment #6: 10  Exercise #7: prone alternating leg lift  Comment #7: 5 bilateral x 2  Exercise #8: prone alternating arm lift  Comment #8: 10 bilateral arms at sides            TREATMENT MINUTES COMMENTS   Evaluation     Self-care/ Home management     Manual therapy 25 See above   Neuromuscular Re-education     Therapeutic Activity     Therapeutic Exercises     Gait training     Modality__________________                Total 25    Blank areas are intentional and mean the treatment did not include these items.       Bertrand Mullen, PT  11/24/2020  "

## 2021-06-13 NOTE — TELEPHONE ENCOUNTER
Central PA team  [phone]  Pool: HE PA MED ([id number])          PA has been initiated.       PA form completed and faxed insurance via Cover My Meds     Key:  LAQUITA WHALEN (Key: [id number])     Medication:  methocarbamoL (ROBAXIN) 500 MG tablet    Insurance:  [facility]/KINJAL        Response will be received via fax and may take up to 5-10 business days depending on plan

## 2021-06-14 NOTE — PROGRESS NOTES
Assessment and Plan:     Patient has been advised of split billing requirements and indicates understanding: Yes     1. Medicare annual wellness visit, subsequent    2. Compression fracture of T7 vertebra, sequela    3. Age related osteoporosis, unspecified pathological fracture presence    4. Gastroesophageal reflux disease without esophagitis    5. Mixed hyperlipidemia     We reviewed dietary recommendations, including low salt and high fiber diet, and  recommendations for regular exercise/activity.  We discussed limiting saturated and trans fats in her diet and using more of the good fats such as olive oil, canola oil, flax seed and peanut oil, etc      6. Migraine without status migrainosus, not intractable, unspecified migraine type    7. Tobacco use        The patient's current medical problems were reviewed.    I have had an Advance Directives discussion with the patient.  The following health maintenance schedule was reviewed with the patient and provided in printed form in the after visit summary:   Health Maintenance   Topic Date Due     HEPATITIS C SCREENING  1950     ZOSTER VACCINES (1 of 2) 10/01/2002     Pneumococcal Vaccine: 65+ Years (2 of 2 - PPSV23) 08/23/2019     INFLUENZA VACCINE RULE BASED (1) 06/30/2021 (Originally 8/1/2020)     MEDICARE ANNUAL WELLNESS VISIT  12/31/2021     FALL RISK ASSESSMENT  12/31/2021     MAMMOGRAM  07/30/2022     ADVANCE CARE PLANNING  08/23/2023     COLORECTAL CANCER SCREENING  08/08/2024     LIPID  08/28/2025     TD 18+ HE  08/23/2028     DEXA SCAN  12/01/2035     Pneumococcal Vaccine: Pediatrics (0 to 5 Years) and At-Risk Patients (6 to 64 Years)  Aged Out     HEPATITIS B VACCINES  Aged Out        Subjective:     Chief Complaint: Lizzie Viera is an 70 y.o. female here for an Annual Wellness visit.   HPI:         She has a history of a thoracic compression fracture in September, and her pain has improved quite a bit. She stopped wearing her brace after  vertebral fracture - began with spasms, fracture. She can't wear a bra since the strap goes right over the landry. She continues to have some spasms around ribs for which she takes robaxin prn but tries to avoid it while driving. She stopped gabapentin as it wasn't bone pain. She reports some mood issues from the chronic pain. Lifting is restricted to 10 lb. She added collagen supplements.        She notes a poor appetite and has had some diarrhea after eating. She wonders if her medications may be contributing.         She has had more headaches recently, milder - not migraine.         Declines pneumovax.    Review of Systems:       Please see above.  The rest of the review of systems are negative for all systems.    Patient Care Team:  Anne Marie Walker MD as PCP - General  Anne Marie Walker MD as Assigned PCP  Yaquelin Mendez MD as Assigned Pulmonology Provider     Patient Active Problem List   Diagnosis     Chronic midline thoracic back pain     Mixed hyperlipidemia     Esophageal Reflux     Osteoporosis     Closed Fracture Of Tibia With Fibula     Constipation     Migraine Headache     Tobacco use     Compression fracture of T7 vertebra, initial encounter (H)     Compression fracture of T7 vertebra, sequela     Left subclavian artery occlusion     Small airways disease     Past Medical History:   Diagnosis Date     Cervical dysplasia      Chronic RUQ pain       Past Surgical History:   Procedure Laterality Date     LA CONIZATION CERVIX,KNIFE/LASER      Description: Cervical Conization By Laser;  Recorded: 09/20/2007;     LA DILATION/CURETTAGE,DIAGNOSTIC      Description: Dilation And Curettage;  Recorded: 09/20/2007;     LA LAP,CHOLECYSTECTOMY  12/27/2004     LA LIGATE FALLOPIAN TUBE      Description: Tubal Ligation;  Recorded: 09/20/2007;     LA REMOVE TONSILS/ADENOIDS,<11 Y/O      Description: Tonsillectomy With Adenoidectomy;  Recorded: 09/20/2007;      Family History   Problem  Relation Age of Onset     Diabetes Mother      Arthritis Mother      Lung disease Father      Diabetes Maternal Uncle      Breast cancer Paternal Aunt      Heart failure Maternal Grandmother      Heart disease Maternal Grandmother      Heart failure Maternal Grandfather      Heart disease Maternal Grandfather      Heart failure Paternal Grandmother      Heart disease Paternal Grandmother       Social History     Socioeconomic History     Marital status:      Spouse name: Sandor     Number of children: 3     Years of education: Not on file     Highest education level: Not on file   Occupational History     Not on file   Social Needs     Financial resource strain: Not on file     Food insecurity     Worry: Not on file     Inability: Not on file     Transportation needs     Medical: Not on file     Non-medical: Not on file   Tobacco Use     Smoking status: Current Every Day Smoker     Packs/day: 0.50     Years: 45.00     Pack years: 22.50     Smokeless tobacco: Never Used   Substance and Sexual Activity     Alcohol use: Yes     Drug use: Yes     Sexual activity: Yes     Partners: Male     Birth control/protection: Surgical   Lifestyle     Physical activity     Days per week: Not on file     Minutes per session: Not on file     Stress: Not on file   Relationships     Social connections     Talks on phone: Not on file     Gets together: Not on file     Attends Yazidi service: Not on file     Active member of club or organization: Not on file     Attends meetings of clubs or organizations: Not on file     Relationship status: Not on file     Intimate partner violence     Fear of current or ex partner: Not on file     Emotionally abused: Not on file     Physically abused: Not on file     Forced sexual activity: Not on file   Other Topics Concern     Not on file   Social History Narrative     Not on file      Current Outpatient Medications   Medication Sig Dispense Refill     ascorbic acid, vitamin C, (VITAMIN C)  "1000 MG tablet Take 1,000 mg by mouth daily.       cholecalciferol, vitamin D3, 50 mcg (2,000 unit) Tab Take 2,000 Units by mouth daily.        magnesium oxide 500 mg Tab Take by mouth.       methocarbamoL (ROBAXIN) 500 MG tablet Take 1-2 tablets (500-1,000 mg total) by mouth 4 (four) times a day as needed. 120 tablet 2     MULTIVITAMIN ORAL Take 1 tablet by mouth daily.        resveratrol-grape skin extract 100-10 mg Tab Take by mouth.       albuterol (PROAIR HFA;PROVENTIL HFA;VENTOLIN HFA) 90 mcg/actuation inhaler Inhale 2 puffs every 4 (four) hours as needed for wheezing or shortness of breath. 1 Inhaler 0     No current facility-administered medications for this visit.       Objective:   Vital Signs:   Visit Vitals  /80 (Patient Position: Sitting, Cuff Size: Adult Regular)   Pulse 65   Ht 5' 3\" (1.6 m)   Wt 157 lb 8 oz (71.4 kg)   SpO2 98%   BMI 27.90 kg/m           VisionScreening:  No exam data present     PHYSICAL EXAM  General: alert, pleasant, and no distress  Head: Normocephalic, without obvious abnormality, atraumatic  Eyes: conjunctivae and sclerae normal and pupils equal, round, reactive to   light and accomodation  Ears: normal TM's and external ear canals both ears  Nose: no discharge, no sinus tenderness  Throat: lips, mucosa, and tongue normal; teeth and gums normal  Neck: no adenopathy, supple, symmetrical, trachea midline and thyroid normal  Back: symmetric, ROM normal. No CVA tenderness.  Lungs: clear to auscultation bilaterally  Breasts: normal appearance, no masses or tenderness  Heart : regular rate and rhythm and no murmurs  Abdomen:  bowel sounds normal, no masses palpable and soft, non-tender  Pelvic: exam deferred   Extremities: extremities normal, atraumatic, no cyanosis or edema  Pulses: 2+ and symmetric  Skin: Skin color, texture, turgor normal. No rashes or lesions  Lymph nodes: Cervical, supraclavicular, and axillary nodes normal.  Neurologic: Grossly normal        "     Assessment Results 12/31/2020   Activities of Daily Living No help needed   Instrumental Activities of Daily Living 1 - Full function   Get Up and Go Score Less than 12 seconds   Mini Cog Total Score 5   Some recent data might be hidden     A Mini-Cog score of 0-2 suggests the possibility of dementia, score of 3-5 suggests no dementia    Identified Health Risks:     The patient was counseled and encouraged to consider modifying their diet and eating habits. She was provided with information on recommended healthy diet options.  The patient reports that she has difficulty with instrumental activities of daily living. She was provided with a list of local organizations that provide support services and advised to make a follow up appointment in 8-12 weeks to address this further.   The patient was provided with written information regarding signs of hearing loss.  The patient was provided with suggestions to help her develop a healthy emotional lifestyle.   The patient s PHQ-9 score is consistent with moderate depression.  She was provided with information regarding depression and was advised to schedule a follow up appointment in 8-12 weeks to further address this issue.  Patient's advanced directive was discussed and I am comfortable with the patient's wishes.

## 2021-06-16 PROBLEM — I70.8 LEFT SUBCLAVIAN ARTERY OCCLUSION: Status: ACTIVE | Noted: 2020-09-09

## 2021-06-16 PROBLEM — S22.060A COMPRESSION FRACTURE OF T7 VERTEBRA, INITIAL ENCOUNTER (H): Status: ACTIVE | Noted: 2020-09-09

## 2021-06-16 PROBLEM — S22.060S COMPRESSION FRACTURE OF T7 VERTEBRA, SEQUELA: Status: ACTIVE | Noted: 2020-09-09

## 2021-06-16 PROBLEM — J98.4 SMALL AIRWAYS DISEASE: Status: ACTIVE | Noted: 2020-09-12

## 2021-06-16 NOTE — PROGRESS NOTES
Assessment & Plan:     1. Acute midline thoracic back pain  MR Thoracic Spine Without Contrast   2. Compression fracture of T7 vertebra, sequela  MR Thoracic Spine Without Contrast   3. Chronic midline thoracic back pain  MR Thoracic Spine Without Contrast   4. Tobacco use     5. Gastroesophageal reflux disease without esophagitis           We reviewed the likely/potential etiology(ies) for her back symptoms and options for treatment. We will proceed with  and MRI of the thoracic spine to re-evaluate the fracture site. We reviewed activity as tolerated and use of heat and/or ice tid-qid for comfort. She will continue her same medications and will call or return to clinic with any ongoing or worsening symptoms. We discussed the role of ongoing tobacco use in spine pain and degenerative disorders and she was encouraged to quit smoking. She will let me know if she would like assistance with smoking cessation. She will otherwise f/u in 4-6 mos for routine evaluation, sooner if problems.    Addendum: MRI showed worsened height loss at T7 with extraosseus extension into the ventrolateral epidural space causing moderate to severe spinal canal stenosis. Fracture appears pathologic with the appearance of metastatic disease without visible primary source. I spoke with patient about the results and will refer to neurosurgery on an urgent basis to discuss further workup and treatment. She expressed understanding.          Subjective:             Lizzie Viera is a 70 y.o. female who presents for follow up of mid back pain. She has a history of T7 vertebral fracture which occurred last September. She reported a snapping sensation after bending forward. She was given a brace, and her pain had significantly improved, but has been worse again in the last few weeks. She notes a sensation of tightness and pressure over the back centrally. The pain wraps around to anterior ribs bilaterally. She gets some relief from  "methocarbamol, but it only lasts about 4 hours, a distinct increase from the amount she needed before. She had a bone scan in December that was unremarkable. She has also been taking gabapentin. She has had some recent nausea and diarrhea, and can only tolerate oatmeal recently.      The following portions of the patient's history were reviewed and updated as appropriate: allergies, current medications, past family history, past medical history, past social history, past surgical history and problem list.    Review of Systems  A 12 point comprehensive review of systems was negative except as noted.      Objective:      Vitals:    04/23/21 1440   BP: 110/60   Pulse: 73   Resp: 19   SpO2: 98%   Weight: 156 lb (70.8 kg)   Height: 5' 3\" (1.6 m)     GEN: Alert and oriented, NAD, well nourished  SKIN:  Normal skin turgor, no lesions/rashes   HEENT: NC/AT, moist mucous membranes, no rhinorrhea.    NECK: Normal.  No adenopathy or thyromegaly.  CV: Regular rate and rhythm, no murmurs.   LUNGS: Clear to auscultation bilaterally.    ABDOMEN: Soft, non-tender, non-distended, no masses   BACK: Normal  EXTREMITY: No edema, cyanosis  NEURO: Grossly normal.              "

## 2021-06-17 NOTE — PROGRESS NOTES
[facility]  [address]  [place] [address]  Dept: [phone]  Dept Fax: [phone]  Primary Provider: Anne Marie Walker MD  Pre-op Performing Provider: TACOS ALONSO    PREOPERATIVE EVALUATION:  Today's date: 5/6/2021    Lizzie Viera is a 70 y.o. female who presents for a preoperative evaluation.    Surgical Information:  Surgery/Procedure: Biopsy of T7  Surgery Location: [facility]  Surgeon: [place] Radiologist  Surgery Date: 5/7/21  Time of Surgery: 10am  Where patient plans to recover: At home with family  Fax number for surgical facility: [phone].    Type of Anesthesia Anticipated: to be determined    Assessment & Plan      The proposed surgical procedure is considered INTERMEDIATE risk.    Pre-operative general physical examination  Medically optimized for surgery.  She says that she does not need a Covid test for a biopsy to be performed at [place].    Compression fracture of T7 vertebra, sequela  Planned biopsy and follow-up with neurosurgery.    Tobacco use  Encouraged cessation    Age related osteoporosis, unspecified pathological fracture presence    Mixed hyperlipidemia         Risks and Recommendations:  The patient has the following additional risks and recommendations for perioperative complications:   - No identified additional risk factors other than previously addressed    Medication Instructions:  Hold supplements.  No NSAIDs.  She has been told it is okay to take her methocarbamol the morning of surgery if needed.  Opioids did not help her pain and only made her constipated.    RECOMMENDATION:  APPROVAL GIVEN to proceed with proposed procedure, without further diagnostic evaluation.    Reviewed external CT x2, external MRI x2, neurosurgery note    40 minutes spent on the date of the encounter doing chart review, history and exam, documentation and further activities per the note    Subjective      HPI related to upcoming procedure: Patient presents today for preoperative exam.  Pathologic fracture first diagnosed in early September 2020.  She has undergone wearing a brace.  Has had updated MRIs and CTs of the spine.  Most recently had CT of the chest abdomen and pelvis.  Concerns for metastases.  The most recent CT performed on 5/4/2021 shows an extension of enhancing tissue laterally both to the right and left of T7 resulting in moderate to severe canal narrowing.  No evidence of distal metastases.  No potential primary malignancy seen.  Some air trapping noted.  Smoker.  Plan is for biopsy and based on that they will come up with a plan.    Preop Questions 5/6/2021   Have you ever had a heart attack or stroke? No   Have you ever had surgery on your heart or blood vessels, such as a stent placement, a coronary artery bypass, or surgery on an artery in your head, neck, heart, or legs? No   Do you have chest pain with activity? No   Do you have a history of  heart failure? No   Do you currently have a cold, bronchitis or symptoms of other infection? No   Do you have a cough, shortness of breath, or wheezing? No   Do you or anyone in your family have previous history of blood clots? No   Do you or does anyone in your family have a serious bleeding problem such as prolonged bleeding following surgeries or cuts? No   Have you ever had problems with anemia or been told to take iron pills? YES - Used to when donating blood   Have you had any abnormal blood loss such as black, tarry or bloody stools, or abnormal vaginal bleeding? No   Have you ever had a blood transfusion? No   Are you willing to have a blood transfusion if it is medically needed before, during, or after your surgery? NO - As a matter of personal choice.   Have you or any of your relatives ever had problems with anesthesia? No   Do you have sleep apnea, excessive snoring or daytime drowsiness? No   Do you have any artifical heart valves or other  implanted medical devices like a pacemaker, defibrillator, or continuous glucose monitor? No   Do you have artificial joints? No   Are you allergic to latex? Yes - Started in the late 1980's     Health Care Directive:  Patient has a Health Care Directive on file.     Preoperative Review of :   Past prescriptions for oxycodone and gabapentin noted. Nothing since November 2020.    See problem list for active medical problems.  Problems all longstanding and stable, except as noted/documented.  See ROS for pertinent symptoms related to these conditions.      Review of Systems  CONSTITUTIONAL: NEGATIVE for fever, chills, change in weight  INTEGUMENTARY/SKIN: NEGATIVE for worrisome rashes, moles or lesions  EYES: NEGATIVE for vision changes or irritation  ENT/MOUTH: NEGATIVE for ear, mouth and throat problems  RESP: NEGATIVE for significant cough or SOB  BREAST: NEGATIVE for masses, tenderness or discharge  CV: NEGATIVE for chest pain, palpitations or peripheral edema  GI: NEGATIVE for nausea, abdominal pain, heartburn, or change in bowel habits  : NEGATIVE for frequency, dysuria, or hematuria  MUSCULOSKELETAL: Positive for thoracic back pain  NEURO: NEGATIVE for weakness, dizziness or paresthesias  ENDOCRINE: NEGATIVE for temperature intolerance, skin/hair changes  HEME: NEGATIVE for bleeding problems  PSYCHIATRIC: NEGATIVE for changes in mood or affect    Patient Active Problem List    Diagnosis Date Noted     Small airways disease 09/12/2020     Compression fracture of T7 vertebra, initial encounter (H) 09/09/2020     Compression fracture of T7 vertebra, sequela 09/09/2020     Left subclavian artery occlusion 09/09/2020     Tobacco use 03/04/2019     Constipation      Migraine Headache      Chronic midline thoracic back pain      Mixed hyperlipidemia      Esophageal Reflux      Osteoporosis      Closed Fracture Of Tibia With Fibula      Past Medical History:   Diagnosis Date     Cervical dysplasia      Chronic RUQ  "pain      Past Surgical History:   Procedure Laterality Date     VA CONIZATION CERVIX,KNIFE/LASER      Description: Cervical Conization By Laser;  Recorded: 09/20/2007;     VA DILATION/CURETTAGE,DIAGNOSTIC      Description: Dilation And Curettage;  Recorded: 09/20/2007;     VA LAP,CHOLECYSTECTOMY  12/27/2004     VA LIGATE FALLOPIAN TUBE      Description: Tubal Ligation;  Recorded: 09/20/2007;     VA REMOVE TONSILS/ADENOIDS,<13 Y/O      Description: Tonsillectomy With Adenoidectomy;  Recorded: 09/20/2007;     Current Outpatient Medications   Medication Sig Dispense Refill     ascorbic acid, vitamin C, (VITAMIN C) 1000 MG tablet Take 1,000 mg by mouth daily.       magnesium oxide 500 mg Tab Take by mouth.       methocarbamoL (ROBAXIN) 500 MG tablet Take 1-2 tablets (500-1,000 mg total) by mouth 4 (four) times a day as needed. 120 tablet 2     MULTIVITAMIN ORAL Take 1 tablet by mouth daily.        resveratrol-grape skin extract 100-10 mg Tab Take by mouth.       No current facility-administered medications for this visit.        Allergies   Allergen Reactions     Latex Shortness Of Breath     Cefdinir Wheezing     Ragweed Pollen Cough     Adhesive Tape-Silicones Rash       Social History     Tobacco Use     Smoking status: Current Every Day Smoker     Packs/day: 0.50     Years: 45.00     Pack years: 22.50     Smokeless tobacco: Never Used   Substance Use Topics     Alcohol use: Yes      Family History   Problem Relation Age of Onset     Diabetes Mother      Arthritis Mother      Lung disease Father      Diabetes Maternal Uncle      Breast cancer Paternal Aunt      Heart failure Maternal Grandmother      Heart disease Maternal Grandmother      Heart failure Maternal Grandfather      Heart disease Maternal Grandfather      Heart failure Paternal Grandmother      Heart disease Paternal Grandmother      Social History     Substance and Sexual Activity   Drug Use Yes        Objective     /64   Pulse 73   Ht 5' 1\" " (1.549 m)   Wt 156 lb (70.8 kg)   SpO2 98%   BMI 29.48 kg/m    Physical Exam    GENERAL APPEARANCE: healthy, alert and no distress     EYES: EOMI, PERRL     HENT: ear canals and TM's normal and nose and mouth without ulcers or lesions     NECK: no adenopathy, no asymmetry, masses, or scars and thyroid normal to palpation     RESP: lungs clear to auscultation - no rales, rhonchi or wheezes     CV: regular rates and rhythm, normal S1 S2, no S3 or S4 and no murmur, click or rub     ABDOMEN:  soft, nontender, no HSM or masses and bowel sounds normal     MS: extremities normal- no gross deformities noted, no evidence of inflammation in joints, FROM in all extremities.     SKIN: no suspicious lesions or rashes     NEURO: Normal strength and tone, sensory exam grossly normal, mentation intact and speech normal     PSYCH: mentation appears normal. and affect normal/bright     LYMPHATICS: No cervical adenopathy    Recent Labs   Lab Test 09/09/20  1745 08/28/20  0930   HGB 16.1* 14.6    281    140   K 4.0 4.4   CREATININE 0.75 0.75        PRE-OP Diagnostics:   Lab Results   Component Value Date    WBC 11.5 (H) 09/09/2020    HGB 16.1 (H) 09/09/2020    HCT 46.8 09/09/2020    MCV 97 09/09/2020     09/09/2020     Results for orders placed or performed during the hospital encounter of 09/09/20   Comprehensive Metabolic Panel   Result Value Ref Range    Sodium 136 136 - 145 mmol/L    Potassium 4.0 3.5 - 5.0 mmol/L    Chloride 103 98 - 107 mmol/L    CO2 24 22 - 31 mmol/L    Anion Gap, Calculation 9 5 - 18 mmol/L    Glucose 139 (H) 70 - 125 mg/dL    BUN 22 8 - 22 mg/dL    Creatinine 0.75 0.60 - 1.10 mg/dL    GFR MDRD Af Amer >60 >60 mL/min/1.73m2    GFR MDRD Non Af Amer >60 >60 mL/min/1.73m2    Bilirubin, Total 0.4 0.0 - 1.0 mg/dL    Calcium 9.7 8.5 - 10.5 mg/dL    Protein, Total 9.4 (H) 6.0 - 8.0 g/dL    Albumin 3.6 3.5 - 5.0 g/dL    Alkaline Phosphatase 64 45 - 120 U/L    AST 20 0 - 40 U/L    ALT 32 0 - 45  U/L       No EKG required, no history of coronary heart disease, significant arrhythmia, peripheral arterial disease or other structural heart disease.    REVISED CARDIAC RISK INDEX (RCRI)   The patient has the following serious cardiovascular risks for perioperative complications:   - No serious cardiac risks = 0 points    RCRI INTERPRETATION: 0 points: Class I (very low risk - 0.4% complication rate)       Signed Electronically by: Rayshawn Valenzuela CNP    Copy of this evaluation report is provided to requesting physician.

## 2021-06-17 NOTE — PATIENT INSTRUCTIONS - HE
I will get your paperwork faxed to your surgeon.    Follow your surgeon's directions regarding eating and drinking prior to surgery.     No advil, ibuprofen, aleve, naproxen, or aspirin a week before surgery. Tylenol is ok.    Your surgeon will manage your pain after your procedure.

## 2021-06-17 NOTE — PATIENT INSTRUCTIONS - HE
Patient discussed with Dr. Campbell and recommendations for to her complete CT chest abdomen pelvis for further evaluation and workup. She has also been referred to interventional radiology for biopsy of her T7 lesion. Follow up has been requested upon completion.

## 2021-06-17 NOTE — TELEPHONE ENCOUNTER
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 Final path should be available on 5/25. Will do a telephone visit with NP on 05/28/2021 at 11 AM.  Referral to oncology for a work up including marrow biopsy.  Sabrina King RN, CNRN

## 2021-06-17 NOTE — TELEPHONE ENCOUNTER
"New Patient Oncology Nurse Navigator Note     Referring provider: Dr. Campbell     Referring Clinic/Organization: [facility]     Referred to (specialty): Medical Oncology    Requested provider (if applicable): N/A     Date Referral Received: 5/17/2021     Evaluation for : Plasmacytoma vs Multiple Myeloma     Clinical History (per Nurse review of records provided):    -9/9/2020 Valeria had severe back pain pain and  presented to the emergency room.  CTA scan showed T7 compression fracture as well as severe narrowing of the left subclavian artery and celiac axis. She was admitted as pain was not successfully controlled.  Orthospine and pain team consultation was obtained.  Patient was placed in a TLSO brace.   -Outpatient MRI C and T spine were performed at [place] on 9/23/2020. She had consult with Dr. Herring at [facility] on 9/25/2021, In short, Valeria deferred vetebroplasty, see note for details.   -10/9/2020  Repeat T spine MRI was completed at [facility] for incidental T1  lesion, recommend workup for primary malignancy or may consider a 3 month interval re-revaluation.  -10/2/2021 CT C spine at [facility] also showed the T1 lytic lesion and combined with the T 7 fracture raises the possibility of malignancy. She had bone density scan at  on 12/1/2020.   -She  had follow ups with Dr. Herring on 10/23/2020 and  12/11/2020 and all imaging was reviewed.  Plan to follow up with PCP for osteoporosis workup and and will further discuss T1 lesion with PCP as well, f/u with Ortho, prn.  - She had follow up with PCP, Dr. Walker on 4/23 and repeat imaging performed to re-evaluate the back pain. Per her note, \"MRI showed worsened height loss at T7 with extraosseus extension into the ventrolateral epidural space causing moderate to severe spinal canal stenosis. Fracture appears pathologic with the appearance of metastatic disease without visible primary source. I spoke with patient about the results and " "will refer to neurosurgery on an urgent basis to discuss further workup and treatment.\".   -On 4/29, she met with Dr. Campbell. She recommended that she complete CT chest abdomen pelvis for further evaluation and workup and  referred to interventional radiology for biopsy of her T7 lesion. CT on 5/4/2021 showed, T7 vertebral compression deformity with growth/extension of enhancing tissue laterally both to the right/left and towards the central canal posteriorly, which results in moderate to severe narrowing of the central canal.  No findings to suggest metastatic disease elsewhere in the chest, abdomen, or pelvis. No potential primary malignancy identified.\"   -Bone Biopsy of T7 on 5/7/2021 showed plasmacytoma vs systemic myeloma. Final pathology should result by 5.25.2021. She was referred to oncology for further workup including BM biopsy.   -[facility] Follow-up with neurosurgery is scheduled  on 5/28/2021  -Follows with [facility] PCP,see full medical and surgical history in H&P note    Clinical Assessment / Barriers to Care (Per Nurse): none       Records Location (Care Everywhere, Media, etc.): [facility], , Media     Records Needed: Imaging for [facility] and [facility] was sent     Additional testing needed prior to consult: FISH studies from 5/7/2021 T7 biopsy is pending.    Called Valeria to follow up and schedule new medical oncology consult as ordered by Dr. Campbell,. Appointment with Dr. Baig  was scheduled on Thursday 5/27/2021, 10:30 a.m. at [facility]. Patient will arrive wearing a face covering and voiced understanding of the visitor restriction in place due to the pandemic. She understands that she  a may have one supportive person accompany her.. New patient letter/map with appointment details, health history form to complete and [facility]ON for medical records were all sent to patient at confirmed e-mail  address in Epic. Of note, Valeria did not know why she was being referred to " oncology. Referenced phone note for referring provider's office and read it to her. She verbalized understanding of why she was referred. 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 She also has My Chart and can reference the report. She has follow up with neurosurgery on 5/28 and path will be reviewed with her in full at that visit as well as at oncology consult.  Valeria has writer's contact information for future correspondence. We plan to review supportive resources after she is at clinic for oncology consult.

## 2021-06-17 NOTE — TELEPHONE ENCOUNTER
Records in Epic Chart Review / Radiology images in Nil.    Notes  5/6/2021 - Progress Notes / PreOp Physical, Rayshawn Valenzuela, CNP.  4/29/2021 - Progress Notes / Neurosurgery Consult, Anuja Gilbert PA-C.  4/23/2021 - Progress Notes / Fam Med., thoracic back pain, Dr. Anne Marie Walker.  12/14/2020 - Progress Notes / Fam Med, bone lesion.  Dr. Anne Marie Walker.  10/13/2020 - Progress Notes / Pulmonary Consult, Dr. Yaquelin Mendez.  9/12/2020 - Discharge Summary / Admission 9/9/2020. Severe back pain.     Labs  5/7/2021 - [facility] Malignant tissue studies. Also in Care Everywhere.    Imaging  10/2/2020 - CT Ext Imaging Spine / [facility] report in Media.  9/23/2020 - MR Ext Imaging Spine / [facility] report in Media.  5/4/2021 - CT Chest Abdomen Pelvis.  4/26/2021 - MR Ext Imaging Spine / [facility] report in Media.  10/9/2020 - MR Ext Imaging Spine / [facility] report in Media.    Media - [facility] & [facility] images in Nil.  5/11/2021 - Procedure Note Ext / [facility], 5/7/21 IR Biopsy Soft Tissue Neck.  4/28/2021 - Diagnostic Imaging / [facility] 10/9/20 MRI Thoracic.  4/28/2021 - Diagnostic Imaging / [facility] 4/26/21 MRI Thoracic.  12/11/2020 - Consultation Ext / [facility], Dr. Aura Herring.   10/23/2020 - Consultation Ext / [facility], Dr. Aura Herring.  10/9/2020 - MRI Imaging / [facility], MRI Thoracic Spine.  10/2/2020 - CT Imaging / [facility], CT Cervical Spine.  9/25/2020 - Consultation Ext / [facility], Dr. Aura Herring.  9/23/2020 - MRI Imaging / [facility] MRI Lumbar.    Care Everywhere:  [facility]  Documents  5/7/2021 - OP Visit / T-7 Vertebral Biopsy.    Lab Results  5/7/2021 - [facility] Malignant Tissue Studies.

## 2021-06-17 NOTE — TELEPHONE ENCOUNTER
Reason for Call:  Request for results:    Name of test or procedure: mri results from this am    Date of test of procedure: today    Location of the test or procedure: [facility]    OK to leave the result message on voice mail or with a family member? Yes    Phone number Patient can be reached at: Home number on file [phone] (home)    Additional comments:     Call taken on 4/26/2021 at 11:21 AM by Jocelin Gracia

## 2021-06-17 NOTE — PROGRESS NOTES
"Patient is being seen for a fracture. She states that she has pain in her back that wraps around to her ribs and rates the pain at 8/10 daily. She states that the medication helps take the edge off . She feels that there is a \"knot\" next to her spine.   Elena West,[facility],2:28 PM   "

## 2021-06-17 NOTE — TELEPHONE ENCOUNTER
Telephone Encounter by Kaye Stone at 12/4/2020  5:09 PM     Author: Kaye Stone Service: -- Author Type: Patient Access    Filed: 12/4/2020  5:16 PM Encounter Date: 12/3/2020 Status: Signed    : Kaye Stone (Patient Access)       PA APPROVED:    Approval start date: 11/04/20  Approval end date:  12/04/21    Pharmacy has been notified of approval and will contact patient when medication is ready for pickup.

## 2021-06-17 NOTE — TELEPHONE ENCOUNTER
[facility] [phone].  Scan done 8am 4/26/21. Results faxed over to clinic today. No result yet. Will have them fax to me directly and I'll give to Dr Walker.

## 2021-06-17 NOTE — PROGRESS NOTES
NEUROSURGERY FOLLOW UP EVALUATION:  The patient's attending neurosurgeon is Dr. Campbell     Assessment:   T7 pathological fracture     Plan:   Patient discussed with Dr. Campbell and recommendations for to her complete CT chest abdomen pelvis for further evaluation and workup. She has also been referred to interventional radiology for biopsy of her T7 lesion. Follow up has been requested upon completion. She has also been advised to wear her brace when out of bed.     HPI:   Ms. Viera is a pleasant 70 year old right handed female, who presents for further evaluation of her constant mid thoracic and low back pain. She states that she was seen in September 2020 and noted to have a [facility]7 compression fracture [facility] She denies any trauma or injury at the onset of her back. Presently she noted continued back pain that will radiate bilateral into her chest. She denies any radicular lower extremity pain, numbness, tingling, or weakness. She does state that her back is a constant throbbing tightness and has had slight relief with message. She notices when she is laying on her stomach that her legs will go numb but resolves once she rolls onto her back or stands up. She has no history of previous cancer and had a bone scan done in December that was negative. She denies any urinary incontinence or gait instability.        Exam:   Alert and oriented x3, speech 5  PERRL, EOMI, face symmetric, tongue midline, shoulder shrug equal   CN II-XII intact  Coordination: no pronator drift, finger to nose smooth and accurate bilaterally   Motor Strength:   Deltoids: 5/5 right, 5/5 left  Biceps: 5/5 right, 5/5 left   Triceps: 5/5 right, 5/5 left   Wrist extensors:5/5 right, 5/5 left  Finger flexion: 5/5 right, 5/5 left   Finger abduction:5/5 right, 5/5 left   Hand : 5/5 right, 5/5 left   Hip flexors: 5/5 right, 5/5 left   Quadriceps: 5/5 right, 5/5 left  Ankle dorsiflexion: 5/5 right, 5/5  left    Ankle plantar flexion:5/5 right, 5/5 left   Extensor hallicus longus: 5/5 right, 5/5 left   Bulk and tone: normal  Reflexes: biceps, triceps, brachioradialis, patellar and achilles present and symmetric bilaterally   No pathological reflexes  Gait: normal       Imaging:   The images were personally reviewed by myself and Dr. Campbell and noted worsening of T7 pathological fracture with retropulsion noted as well as enlargement of T1 vertebral body lesion     Anuja Gilbert PA-C  [facility]   O: [phone]

## 2021-06-20 NOTE — PROGRESS NOTES
OV   9/24/2018  Assessment:     1. Pneumonia  azithromycin (ZITHROMAX) 250 MG tablet   2. Acute sinus infection  azithromycin (ZITHROMAX) 250 MG tablet   3. Abdominal pain, epigastric  HM1(CBC and Differential)    XR Abdomen Flat and Upright    Comprehensive Metabolic Panel    HM1 (CBC with Diff)   4. Cough  HM1(CBC and Differential)    XR Chest 2 Views    HM1 (CBC with Diff)    azithromycin (ZITHROMAX) 250 MG tablet        Plan:      We reviewed the potential/likely etiology(ies) for her respiratory and abdominal symptoms and we will check labs, CXR and AXR as noted. Her CXR shows bibasilar infiltrates and the AXR showed no sign of obstruction or free air, but there was a fair amount of stool which may account for her pain. I suggested that she try a dulcolax suppository which would hopefully help clear that. We will cover with Zithromax as directed for acute maxillary sinusitis and pneumonia, and we reviewed use of OTC analgesics, decongestants, nasal steroids, and/or mucinex, as well as increased fluids, rest and humidity, etc. We reviewed indications for re-evaluation and she will call or return to clinic with any ongoing or worsening symptoms.          Subjective:             Lizzie Viera presents for evaluation of congestion and sinus pressure and abdominal pain. Symptoms began 4 days ago. Symptoms have gradually worsened since that time and she was in bed almost all weekend. Cough is described as productive of sputum and associate with pain in the epigastric area and across the upper abdomen. She also has significant sharp pain with any eating and has had very little to eat since Friday. Symptoms are associated with decrease in energy level, headache and ear pain, chills, shortness of breath and nausea. She denies hemoptysis and wheezing. She reports reclining position aggravates the cough.       Past history is significant for ongoing tobacco use.    The following portions of the patient's history  "were reviewed and updated as appropriate: allergies, current medications, past family history, past medical history, past social history, past surgical history and problem list.    Review of Systems  12 point ROS negative except as noted above.        Objective:        Vitals:    09/24/18 1333   BP: 120/60   Patient Site: Right Arm   Patient Position: Sitting   Cuff Size: Adult Regular   Pulse: 84   Temp: 97.8  F (36.6  C)   Weight: 164 lb (74.4 kg)   Height: 5' 3\" (1.6 m)      General     Alert, cooperative, no distress   Head:    Normocephalic, without obvious abnormality, atraumatic   Eyes:    PERRL, conjunctiva/corneas clear, EOM's intact   Ears:    Normal TM's and external ear canals, both ears   Nose:   Nasal mucosa normal, no drainage, and moderate bilateral maxillary and frontal sinus tenderness   Throat:   Oropharynx is clear with moist mucous membranes     Neck:   Supple, mild anterior cervical adenopathy and supple, symmetrical, trachea midline    Lungs:     rales bibasilar. No wheezes or rhonchi   Heart :    Regular rate and rhythm, no murmur, rub or gallop   Abdomen:     Soft, non-tender, no masses   Skin:   Skin color, texture, turgor normal, no rashes or lesions            Results for orders placed or performed in visit on 09/24/18   Comprehensive Metabolic Panel   Result Value Ref Range    Sodium 138 136 - 145 mmol/L    Potassium 4.0 3.5 - 5.0 mmol/L    Chloride 104 98 - 107 mmol/L    CO2 24 22 - 31 mmol/L    Anion Gap, Calculation 10 5 - 18 mmol/L    Glucose 115 70 - 125 mg/dL    BUN 23 (H) 8 - 22 mg/dL    Creatinine 0.79 0.60 - 1.10 mg/dL    GFR MDRD Af Amer >60 >60 mL/min/1.73m2    GFR MDRD Non Af Amer >60 >60 mL/min/1.73m2    Bilirubin, Total 0.5 0.0 - 1.0 mg/dL    Calcium 9.6 8.5 - 10.5 mg/dL    Protein, Total 8.8 (H) 6.0 - 8.0 g/dL    Albumin 3.4 (L) 3.5 - 5.0 g/dL    Alkaline Phosphatase 64 45 - 120 U/L    AST 24 0 - 40 U/L    ALT 20 0 - 45 U/L   HM1 (CBC with Diff)   Result Value Ref Range    " WBC 6.6 4.0 - 11.0 thou/uL    RBC 4.54 3.80 - 5.40 mill/uL    Hemoglobin 15.5 12.0 - 16.0 g/dL    Hematocrit 45.8 35.0 - 47.0 %     (H) 80 - 100 fL    MCH 34.2 (H) 27.0 - 34.0 pg    MCHC 33.9 32.0 - 36.0 g/dL    RDW 11.6 11.0 - 14.5 %    Platelets 241 140 - 440 thou/uL    MPV 6.5 (L) 7.0 - 10.0 fL    Neutrophils % 61 50 - 70 %    Lymphocytes % 28 20 - 40 %    Monocytes % 7 2 - 10 %    Eosinophils % 3 0 - 6 %    Basophils % 1 0 - 2 %    Neutrophils Absolute 4.1 2.0 - 7.7 thou/uL    Lymphocytes Absolute 1.9 0.8 - 4.4 thou/uL    Monocytes Absolute 0.5 0.0 - 0.9 thou/uL    Eosinophils Absolute 0.2 0.0 - 0.4 thou/uL    Basophils Absolute 0.0 0.0 - 0.2 thou/uL        Xr Chest 2 Views Result Date: 9/24/2018 XR CHEST 2 VIEWS 9/24/2018 2:17 PM INDICATION: Cough. COMPARISON: None.   FINDINGS: On the lateral view, subtle opacities project over the middle lobe or lingula raising concern for pneumonitis. Mild perihilar airway thickening. Remaining lungs clear. Normal heart size. Aortic atherosclerosis. Right upper quadrant surgical clips.     Xr Abdomen Flat And Upright Result Date: 9/24/2018 XR ABDOMEN FLAT AND UPRIGHT 9/24/2018 2:17 PM INDICATION: Epigastric pain. COMPARISON: None.   FINDINGS: Mild diffuse colonic stool. No bowel obstruction. No free air. Surgical clips over the right upper quadrant.

## 2021-06-20 NOTE — PROGRESS NOTES
OV  10/2/2018   Assessment:     1. Acute LUQ pain  CT Abdomen Pelvis With Oral With IV Contrast    CANCELED: CT Abdomen Pelvis Without Oral Without IV Contrast   2. Cough  HYDROcodone-acetaminophen 5-325 mg per tablet   3. Yeast vaginitis  fluconazole (DIFLUCAN) 150 MG tablet        Plan:      We reviewed the potential etiologies for her cough and LUQ symptoms and we will proceed with a CT of the abdomen and pelvis to rule out any significant pathology to explain her pain. We reviewed use of OTC analgesics, decongestants, and/or mucinex,  as well as increased fluids, rest and humidity, etc. I will send a new Rx for hydrocodone for her pain and the cough. She was also given a Rx for diflucan as needed for vaginal symptoms. She will call or return to clinic with any ongoing or worsening symptoms.           Subjective:          Lizzie Viera presents for evaluation of productive cough and LUQ pain. Symptoms began 10 days ago. Symptoms have been gradually improving since that time. Cough is described as productive of green/yellow sputum. Her abdominal pain is much worse with coughing and she reports a feeling of pressure or lump-like sensation in LUQ. We thought she might be constipated based on her imaging, but she denies any significant change with mag citrate and suppositories. Symptoms are associated with headaches and nasal congestion. She denies chills, fever, hemoptysis and shortness of breath. She reports reclining position aggravates the cough. Past history is significant for tobacco use.           The following portions of the patient's history were reviewed and updated as appropriate: allergies, current medications, past family history, past medical history, past social history, past surgical history and problem list.    Review of Systems  Pertinent items are noted in HPI.        Objective:      BP 92/64  Temp (!) 80  F (26.7  C)  Wt 164 lb (74.4 kg)  BMI 29.05 kg/m2    General     Alert,  cooperative, no distress   Head:    Normocephalic, without obvious abnormality, atraumatic   Eyes:    PERRL, conjunctiva/corneas clear, EOM's intact   Ears:    Normal TM's and external ear canals, both ears   Nose:   Nasal mucosa normal, no drainage, and slight bilateral maxillary sinus tenderness   Throat:   Oropharynx is clear with moist mucous membranes     Neck:   Supple, mild anterior cervical adenopathy and supple, symmetrical, trachea midline    Lungs:     clear to auscultation bilaterally   Heart:    Regular rate and rhythm, no murmur, rub or gallop   Abdomen:     Soft, non-tender, no masses   Skin:   Skin color, texture, turgor normal, no rashes or lesions

## 2021-06-20 NOTE — PROGRESS NOTES
Assessment & Plan:     1. Abdominal pain, left upper quadrant     2. Hematoma of skin     3. Lesion of adrenal gland (H)  MR Adrenal Glands With Without Contrast   4. Osteoporosis           Hospital records were personally reviewed. We reviewed the CT results and the likely etiology for her abdominal pain symptoms and we will proceed with an MRI of the adrenal glands to further evaluate the lesions. We also reviewed her recent DEXA scan results. We reinforced the importance of adequate calcium and vitamin D intake and weight-bearing exercise and she will repeat the DEXA scan in 1-2 yrs. She will continue symptomatic measures for the bruising and pain and I am reassured by her normal platelets, etc. We reviewed use of OTC analgesics as well as indications for urgent evaluation. We discussed increased fluids and rest, and she will call or return to clinic with any ongoing or worsening symptoms. She will otherwise f/u in 6-12 mos for routine med check, sooner with ongoing concerns.         Subjective:       Lizzie Viera is a 68 y.o. female who presents for  follow up of LUQ and epigastric abdominal pain. Onset was 3 weeks ago with cough and sharp pain across the upper abdomen with coughing and pain in abdomen with any eating. Her CXR showed lingular and RML densities and we began treatment for pneumonia. Her respiratory symptoms have gradually improved, but she continues to have some mild coughing. We sent her for an abd/pelvis CT which showed when her abdominal pain persisted, and she ultimately developed significant bruising in the LUQ. The LUQ and epigastric pain is described as pressure-like and sharp, and she reports that it is slowly improving. Her CT showed a fluid collection in the left anterolateral abdominal wall and adrenal lesions bilaterally that were indeterminate. She also had diverticuli without evidence of infection. Aggravating factors: eating and coughing. Pain is worse with bending and  "bearing down also. Alleviating factors: rest.  She does report feeling a tearing sensation the Sunday prior to the CT scan, then the bruising appeared the day after the scan.    Review of Systems  A 12 point comprehensive review of systems was negative except as noted.       Objective:     Vitals:    10/09/18 1356   BP: (!) 84/50   Patient Site: Left Arm   Patient Position: Sitting   Cuff Size: Adult Regular   Pulse: 60   Resp: 20   Temp: 97.4  F (36.3  C)   TempSrc: Oral   SpO2: 96%   Weight: 170 lb (77.1 kg)   Height: 5' 3\" (1.6 m)      Body mass index is 30.11 kg/(m^2).   Physical Exam:  GEN: Alert and oriented, NAD,  well nourished  SKIN:  Normal skin turgor, no lesions/rashes   HEENT: moist mucous membranes, no rhinorrhea.    NECK: Normal.  No adenopathy or thyromegaly.  CV: Regular rate and rhythm, no murmurs.   LUNGS: Clear to auscultation bilaterally.    ABDOMEN: Soft, non-distended, no masses. There is mod epigastric and LUQ tenderness with resolving ecchymosis noted in the LUQ.   EXTREMITY: No edema, cyanosis  NEURO: Grossly normal.          Xr Chest 2 Views Result Date: 9/24/2018 XR CHEST 2 VIEWS 9/24/2018 2:17 PM INDICATION: Cough. COMPARISON: None.   FINDINGS: On the lateral view, subtle opacities project over the middle lobe or lingula raising concern for pneumonitis. Mild perihilar airway thickening. Remaining lungs clear. Normal heart size. Aortic atherosclerosis. Right upper quadrant surgical clips.     Ct Abdomen Pelvis With Oral With Iv Contrast Result Date: 10/2/2018 CT ABDOMEN PELVIS W ORAL W IV CONTRAST 10/2/2018 4:03 PM       INDICATION: Epigastric and left upper quadrant abdominal pain. TECHNIQUE: CT abdomen and pelvis. Multiplanar reformation images (MPR). Dose reduction techniques were used. IV CONTRAST: 100 mL Omnipaque 350. COMPARISON: None.   FINDINGS: LUNG BASES: Fibroatelectasis. Air trapping. ABDOMEN: 3 indeterminate left adrenal masses largest 1.8 x 1.6 cm with Hounsfield units of " 48. Subcentimeter-sized indeterminate right adrenal gland mass with indeterminate Hounsfield units of 72. Bilateral probable renal cysts although some lesions are too small to characterize with kidneys otherwise unremarkable. Cholecystectomy. Liver, spleen and pancreas unremarkable. Diffuse atherosclerotic plaque without significant aneurysm. No adenopathy. PELVIS: Diverticulosis without diverticulitis. Normal appendix. Tiny fat-containing paraumbilical hernia without bowel obstruction. Pelvic organs otherwise unremarkable. MUSCULOSKELETAL: Nonspecific subcutaneous edema left anterolateral abdominal wall within subcutaneous fat. Small intramuscular lipoma left lateral abdominal wall. Degenerative disease.     CONCLUSION: 1.  Nonspecific subcutaneous edema left anterolateral abdominal wall within subcutaneous fat extending to the abdominal wall musculature. Incidental note of underlying lipoma. 2.  Indeterminate bilateral adrenal masses statistically more likely to be benign. Prior or follow-up dedicated adrenal MRI or CT could confirm. 3.  Diverticulosis without diverticulitis.

## 2021-06-20 NOTE — PROGRESS NOTES
Assessment & Plan:        1. Routine adult health maintenance    2. Screening for malignant neoplasm of cervix  - Gynecologic Cytology (PAP Smear)  - HPV High Risk DNA Cervical    3. Mixed hyperlipidemia     We reviewed dietary recommendations, including low salt and high fiber diet, and  recommendations for regular exercise/activity. We discussed limiting saturated and trans fats in her diet and using more of the good fats such as olive oil, canola oil, flax seed and peanut oil, etc.     - Lipid Cascade; Future    4. Esophageal reflux  - HM2(CBC w/o Differential); Future    5. Allergic rhinitis  - fluticasone (FLONASE) 50 mcg/actuation nasal spray; SHAKE WELL AND USE 1 TO 2 SPRAYS IN EACH NOSTRIL EVERY DAY  Dispense: 16 g; Refill: 12    6. Urinary tract infection  - Urinalysis Macroscopic    7. Osteopenia  - DXA Bone Density Scan; Future    8. Vaginal discharge  - Wet Prep, Vaginal    9. Screening for diabetes mellitus  - Glycosylated Hemoglobin A1c; Future    10. Medication monitoring encounter  - Comprehensive Metabolic Panel; Future       Patient Counseling:    --Nutrition: Stressed importance of moderation in sodium/caffeine intake, saturated fat and cholesterol, caloric balance, sufficient intake of fresh fruits, vegetables, calcium, and iron.    --Discussed the importance of vitamin D and calcium supplements/intake, and the risks and benefits of daily use of baby aspirin.   --Discussed symptomatic management of hot flushes, night sweats, HRT, etc   --Exercise: Stressed the importance of regular weight-bearing exercise    --Substance Abuse: limit alcohol use    --Injury prevention: Discussed safety belts, distracted driving, fire safety    --Dental health: Discussed importance of regular tooth brushing, flossing, and dental visits.    --Discussed risks/benefits of screening colonoscopy, mammography and the recommended intervals.    --Discussed DEXA followup.   --Discussed risks and benefits of regular  breast self exams and evaluation with any changes    --Discussed pap frequency and indications for stopping screening.   --Immunizations reviewed.    --Advance Directives were reviewed and she has one on file which she will review and update.    Discussed the patient's BMI with her.  The BMI is above average; BMI management plan is completed     I have had an Advance Directives discussion with the patient.  The following high BMI interventions were performed this visit: encouragement to exercise and lifestyle education regarding diet    Subjective:         Lizzie Viera is a 67 y.o. female who presents for annual exam.   The patient reports concerns as noted below.  UTI     Fasting today? No       Has the patient ever been transfused or tattooed?: no.      Do you have pain that bothers you in your daily life? yes       The patient reports that there is not domestic violence in her life.     Gynecologic History     LMP: No LMP recorded. Patient is postmenopausal.  Menopause at 55 years  Contraception: none  The patient is not sexually active.  Last Pap: 14. Results were: normal  Abnormal pap history? Yes, s/p leep x2  Last mammogram: 2018. Results were: normal  : 3  Para: 3003    Healthy Habits:   Regular Exercise: Yes  Sunscreen Use: Yes  Healthy Diet: Yes  Dental Visits Regularly: Yes  Seat Belt: Yes  Sexually active: No  Self Breast Exam Monthly:Yes  Colonoscopy: Yes  Lipid Profile: Yes  Glucose Screen: Yes  Prevention of Osteoporosis: Yes  Last Dexa: Yes  Guns at Home:  No      Immunization History   Administered Date(s) Administered     DT (pediatric) 2002     Influenza, inj, historic,unspecified 2007     Influenza, seasonal,quad inj 6-35 mos 2010     Td,adult,historic,unspecified 2002     Tdap 2008     Immunization status: due today.    The following portions of the patient's history were reviewed and updated as appropriate: allergies, current medications,  "past family history, past medical history, past social history, past surgical history and problem list.    Review of Systems  A 12 point comprehensive review of systems was negative except as noted.     having renal failure - starting transplant w/u  He's having trouble adjusting  Some sleep issues, pain and stiffness, occ nocturia  Silver sneakers 4x weekly, treadmill daily  Some pulling/crunching right ant chest with certain movements, neck crepitus too  Left knee renee/stiffness     Objective:        Vitals:    08/23/18 1318   BP: 96/60   Pulse: 60   Temp: 97.2  F (36.2  C)   TempSrc: Oral   Weight: 170 lb 5 oz (77.3 kg)   Height: 5' 2.5\" (1.588 m)      Body mass index is 30.65 kg/(m^2).  General: alert, pleasant, and no distress  Head: Normocephalic, without obvious abnormality, atraumatic  Eyes: conjunctivae and sclerae normal and pupils equal, round, reactive to   light and accomodation  Ears: normal TM's and external ear canals both ears  Nose: no discharge, no sinus tenderness  Throat: lips, mucosa, and tongue normal; teeth and gums normal  Neck: no adenopathy, supple, symmetrical, trachea midline and thyroid normal  Back: symmetric, ROM normal. No CVA tenderness.  Lungs: clear to auscultation bilaterally  Breasts: normal appearance, no masses or tenderness  Heart : regular rate and rhythm and no murmurs  Abdomen:  bowel sounds normal, no masses palpable and soft, non-tender  Pelvic: external genitalia normal, mild vaginal discharge, cervix normal in appearance,   no cervical motion tenderness  Extremities: extremities normal, atraumatic, no cyanosis or edema  Pulses: 2+ and symmetric  Skin: Skin color, texture, turgor normal. No rashes or lesions  Lymph nodes: Cervical, supraclavicular, and axillary nodes normal.  Neurologic: Grossly normal        Results for orders placed or performed in visit on 08/23/18   Wet Prep, Vaginal   Result Value Ref Range    Yeast Result No yeast seen No yeast seen    " Trichomonas No Trichomonas seen No Trichomonas seen    Clue Cells, Wet Prep No Clue cells seen No Clue cells seen   Urinalysis Macroscopic   Result Value Ref Range    Color, UA Yellow Colorless, Yellow, Straw, Light Yellow    Clarity, UA Clear Clear    Glucose, UA Negative Negative    Bilirubin, UA Negative Negative    Ketones, UA Negative Negative    Specific Gravity, UA 1.010 1.005 - 1.030    Blood, UA Small (!) Negative    pH, UA 5.5 5.0 - 8.0    Protein, UA Negative Negative mg/dL    Urobilinogen, UA 0.2 E.U./dL 0.2 E.U./dL, 1.0 E.U./dL    Nitrite, UA Negative Negative    Leukocytes, UA Negative Negative   Gynecologic Cytology (PAP Smear)   Result Value Ref Range    Case Report       Gynecologic Cytology Report                       Case: [id number]                                   Authorizing Provider:  Anne Marie Walker MD  Collected:           08/23/2018 1435              Ordering Location:     [facility]       Received:            08/23/2018 1435                                     Family Medicine/OB                                                           First Screen:          WILFRED Rodriguez                                                                            (ASCP)                                                                       Specimen:    SUREPATH PAP, SCREENING, Endocervical/cervical                                             Interpretation       Negative for squamous intraepithelial lesion or malignancy    Result Flag Normal Normal    Specimen Adequacy       Satisfactory for evaluation, endocervical/transformation zone component present    HPV Reflex? Yes regardless of result     HIGH RISK No     LMP/Menopause Date age 55     Abnormal Bleeding No     Pt Status n/a     Birth Control/Hormones None     Previous Normal/Date 5/2014     Prev Abn Date/Dx yes, years ago, s/p cone x 2     Cervical Appearance Normal    HPV High Risk DNA Cervical   Result Value Ref Range     HPV Source SurePath     HPV16 DNA Negative NEG    HPV18 DNA Negative NEG    Other HR HPV Negative NEG    Final Diagnosis SEE NOTES     Specimen Description Cervical Cells

## 2021-06-21 NOTE — LETTER
Letter by Yara Lugo RN at      Author: Yara Lugo RN Service: -- Author Type: --    Filed:  Encounter Date: 5/17/2021 Status: (Other)       Dear Valeria,    Thank you for choosing [facility] for your care.  We are committed to providing you with the highest quality and compassionate healthcare services.  The following information pertains to your first appointment with our clinic.    Date/Time of appointment:  Thursday, May 27,2021, check in at 10:30 a.m.    Due to the pandemic, we currently have a visitor restriction policy in place for the safety of our patients and staff members. You may bring one supportive person to your consult with you. We also ask that you wear a face covering when you enter our facility. We appreciate your understanding.      Name of your Physician: Lisset Baig MD    What to bring to your appointment:    Completed Patient History/Initial Nursing Assessment (this form was sent to you).    Scan back completed LEON form.    Your current insurance card(s).    Parking:    Please refer to the attached map to direct you to [facility]    The [facility] parking lot is west of the main hospital entrance. This is free parking and is located right next to the [facility] entrance.    Come in the [facility] entrance and check in at the .      We hope these instructions are helpful to you.  If you have any questions or concerns, please call us at [phone].  It is our pleasure to assist you.    Warm Regards,  Yara Lugo RN, OCN  Nurse Navigator  [phone]

## 2021-06-22 ENCOUNTER — RECORDS - HEALTHEAST (OUTPATIENT)
Dept: RADIOLOGY | Facility: CLINIC | Age: 71
End: 2021-06-22

## 2021-06-24 ENCOUNTER — RECORDS - HEALTHEAST (OUTPATIENT)
Dept: ADMINISTRATIVE | Facility: OTHER | Age: 71
End: 2021-06-24

## 2021-06-24 NOTE — PROGRESS NOTES
" OV   3/1/2019  Assessment:     1. Acute recurrent pansinusitis    2. Tooth pain    3. Chronic nasal discharge    4. Abnormal CT scan, sinus    5. Tobacco use           Plan:      We will cover with [employer] as directed for recurrent maxillary sinusitis. We reviewed use of OTC analgesics, decongestants, nasal steroids, and/or mucinex, as well as increased fluids, rest and humidity, etc. I would like to proceed with CT of the sinuses given the blood clots and her history of smoking. She will call or return to clinic with any ongoing or worsening symptoms.       Subjective:             Lizzie Viera presents for evaluation of foul rhinorrhea and post nasal drip and bloody nasal drainage/clots. Symptoms began 1 week ago. Symptoms have persisted since that time. Symptoms are associated with facial pain, frequent clearing of the throat, headaches, pressure around eyes, and tooth pain, decrease in energy level, and dysphagia with foreign body sensation in throat. She denies chills, fever, hemoptysis, shortness of breath and wheezing. Past history is significant for occasional episodes of bronchitis, tobacco use.        She has a history of Migraine with aura - relieved by excedrin if takes it right away - 2x monthly. If wakes up with headache needs imitrex, better since menopause    The following portions of the patient's history were reviewed and updated as appropriate: allergies, current medications, past family history, past medical history, past social history, past surgical history and problem list.    Review of Systems  12 point ROS negative except as noted above.   Cataract scheduled surgery in April.         Objective:        Vitals:    03/01/19 0857   BP: 120/70   Pulse: 70   Temp: 98  F (36.7  C)   SpO2: 94%   Weight: 170 lb (77.1 kg)   Height: 5' 3\" (1.6 m)      General     Alert, cooperative, no distress   Head:    Normocephalic, without obvious abnormality, atraumatic   Eyes:    PERRL, " conjunctiva/corneas clear, EOM's intact   Ears:    Normal TM's and external ear canals, both ears   Nose:   Nasal mucosa normal, no drainage, and moderate bilateral maxillary, frontal and ethmoid sinus tenderness   Throat:   Oropharynx with moist mucous membranes and mild oropharyngeal erythema    Neck:   Supple, mild anterior cervical adenopathy and supple, symmetrical, trachea midline    Lungs:     clear to auscultation bilaterally   Heart :    Regular rate and rhythm, no murmur, rub or gallop   Abdomen:     Soft, non-tender, no masses   Skin:   Skin color, texture, turgor normal, no rashes or lesions

## 2021-06-25 ENCOUNTER — AMBULATORY - HEALTHEAST (OUTPATIENT)
Dept: PHARMACY | Facility: HOSPITAL | Age: 71
End: 2021-06-25

## 2021-06-25 DIAGNOSIS — C90.00 MULTIPLE MYELOMA WITH FAILED REMISSION (H): Primary | ICD-10-CM

## 2021-06-25 DIAGNOSIS — M84.58XK PATHOLOGICAL FRACTURE OF VERTEBRAE IN NEOPLASTIC DISEASE WITH NONUNION: ICD-10-CM

## 2021-06-25 RX ORDER — HEPARIN SODIUM (PORCINE) LOCK FLUSH IV SOLN 100 UNIT/ML 100 UNIT/ML
5 SOLUTION INTRAVENOUS
Status: CANCELLED | OUTPATIENT
Start: 2021-07-12

## 2021-06-25 RX ORDER — HEPARIN SODIUM,PORCINE 10 UNIT/ML
5 VIAL (ML) INTRAVENOUS
Status: CANCELLED | OUTPATIENT
Start: 2021-07-12

## 2021-06-25 NOTE — PROGRESS NOTES
Addendum: Discussed imaging with Dr. Campblel who would be happy to see nakia for consultation regarding her fracture, consideration for fusion for continued back pain.      Left voicemail to offer appointment.

## 2021-06-25 NOTE — PROGRESS NOTES
[facility] Hematology and Oncology Consult Note    Patient: Lizzie Viera  MRN: [id number]  Date of Service: 05/27/2021        Reason for Visit    I was consulted by Dr. Campbell regarding plasma cell neoplasm.    Assessment/Plan    #.  Multiple myeloma    I discussed the extensive medical records and images and reviewed independently.  I also reviewed the pathology results of T7 vertebral body biopsy.  We discussed about working diagnosis of multiple myeloma.  I requested additional myeloma panel, unilateral bone marrow biopsy and aspiration, PET scan for to complete staging and evaluation   Follow-up with me about 7-10 days after completion of bone marrow biopsy.    #.  Back pain related to pathology T7 fracture secondary to multiple myeloma   She has central abdominal and she will try with the lowest dose and adjust as needed.  We also discussed about potential radiation but would like to hold off for now until further evaluation is completed and treatment plan is finalized.    #.  Osteoporosis  #.  Active smoker      ECOG Performance   ECOG Performance Status: 0  Distress Assessment  Distress Assessment Score: (P) 2(visit today)        Problem List    1. 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)  HM1(CBC and Differential)    Comprehensive Metabolic Panel    Immunoglobulin Free Light Chains, Serum    Immunoglobulins IgG, IgA, IgM    Electrophoresis, Protein, Serum    Immunofixation Electrophoresis, Serum    Beta-2-Microglobulin (Beta-2-M)    Immunofixation Electrophoresis, Urine    Electrophoresis, Protein, Random Urine Ccade    Lactate Dehydrogenase (LDH)    NM PET CT Whole Body    lidocaine (PF) 10 mg/mL (1 %) injection 10 mL (XYLOCAINE-MPF)    Bone Marrow Biopsy and Exam    Flow cytometry    HM1(CBC and Differential)    Chromosome Analysis, Bone Marrow    Bone Marrow Biopsy    Bone Marrow Aspiration    HYDROcodone-acetaminophen 5-325 mg per tablet 1 tablet    LORazepam tablet 1 mg (ATIVAN)            CC: Anne Marie Walker MD      ______________________________________________________________________________      Staging History    Cancer Staging  No matching staging information was found for the patient.    History    Ms. Lizzie Viera is a very pleasant 70-year-old female presented today accompanied by a friend, Marielena.    She developed compression fracture of the T7 in September 2020.  It was first evident by CTA of the chest abdomen and pelvis for evaluation of severe pain in the back and abdomen across.  She was evaluated by orthopedic, interventional radiology, neurosurgery.  Bone density scan on 12/1/2020 showed osteoporosis based on spine T score-1.3, left femoral neck T score-2.6, right femoral neck T score-2.5.  It was felt to be osteoporotic fracture.   MRI thoracic spine on 10/9/2020 showed acute to subacute T7 compression fracture and diffuse marrow edema.  T1 lesion is indeterminate.  MRI cervical spine on 9/23/2020 showed a 1 cm bony lesion in the anterior superior left T1 vertebral body.  No destructive changes.  Indeterminate for benign or malignant process.  Recommended 3-month repeat MRI.  She was initially treated with a brace, physical therapy then evaluated for vertebroplasty. Medical management of osteoporosis was also recommended.  Follow-up MRI of the thoracic spine on 4/26/2021 showed worsening T7 height loss likely pathologic and extraosseous extension into the ventral lateral epidural space causing moderate to severe spinal canal stenosis.  Equivocal T2 hyper intensity in the spinal canal at the level of T7-T8, increased size of T1 vertebral body lesion with likely additional metastasis/myeloma.  She was then subsequently IR guided bone biopsy on 5/7/202.  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  Myeloma FISH panel showed gains of chromosome 5, 9, and 15 suggesting hyperdiploid E.  Hyperdiploid he is a recurring abnormality and plasma cell  proliferative disorder.  She reported that pain remains across the lower chest, upper abdomen describing as spasm, tightness.  She was on methocarbamol and did not help well and caused her abdominal cramps.  She has tramadol which she has not tried.  She rated her pain after taking the pain medication is 7/10.  She sometimes feels shortness of breath.  She was seen by pulmonary.  She has lost about 10 pounds attributed to not feeling well and not wanting to eat.  No other bone pain.    She is .  They have 3 children.  She is retired.  She smoke about half pack a day since her teenage.  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  She said that she has donated about 5 gallons of blood.    Family history was significant for paternal aunt with breast cancer.       past History  Past Medical History:   Diagnosis Date     Cervical dysplasia      Chronic RUQ pain      Osteoporosis      Past Surgical History:   Procedure Laterality Date     CO CONIZATION CERVIX,KNIFE/LASER      Description: Cervical Conization By Laser;  Recorded: 09/20/2007;     CO DILATION/CURETTAGE,DIAGNOSTIC      Description: Dilation And Curettage;  Recorded: 09/20/2007;     CO LAP,CHOLECYSTECTOMY  12/27/2004     CO LIGATE FALLOPIAN TUBE      Description: Tubal Ligation;  Recorded: 09/20/2007;     CO REMOVE TONSILS/ADENOIDS,<13 Y/O      Description: Tonsillectomy With Adenoidectomy;  Recorded: 09/20/2007;     Family History   Problem Relation Age of Onset     Diabetes Mother      Arthritis Mother      Lung disease Father      Diabetes Maternal Uncle      Breast cancer Paternal Aunt      Heart failure Maternal Grandmother      Heart disease Maternal Grandmother      Heart failure Maternal Grandfather      Heart disease Maternal Grandfather      Heart failure Paternal Grandmother      Heart disease Paternal Grandmother      Social History      Socioeconomic History     Marital status:      Spouse name: Sandor     Number of children: 3     Years of education: None     Highest education level: None   Occupational History     None   Social Needs     Financial resource strain: None     Food insecurity     Worry: None     Inability: None     Transportation needs     Medical: None     Non-medical: None   Tobacco Use     Smoking status: Current Every Day Smoker     Packs/day: 0.50     Years: 45.00     Pack years: 22.50     Smokeless tobacco: Never Used   Substance and Sexual Activity     Alcohol use: Yes     Comment: 0-1 drinks per week     Drug use: Not Currently     Sexual activity: Not Currently     Partners: Male     Birth control/protection: Surgical   Lifestyle     Physical activity     Days per week: None     Minutes per session: None     Stress: None   Relationships     Social connections     Talks on phone: None     Gets together: None     Attends Alevism service: None     Active member of club or organization: None     Attends meetings of clubs or organizations: None     Relationship status: None     Intimate partner violence     Fear of current or ex partner: None     Emotionally abused: None     Physically abused: None     Forced sexual activity: None   Other Topics Concern     None   Social History Narrative     None     Allergies    Allergies   Allergen Reactions     Latex Shortness Of Breath     Cefdinir Wheezing     Ragweed Pollen Cough     Adhesive Tape-Silicones Rash       Review of Systems    General  General (WDL): Exceptions to WDL  Fatigue: Yes - Recent (Less than 3 months)  Fever: None  Generalized Muscle Weakness: Yes - Recent (Less than 3 months)  Weight Loss: Yes - Recent (Greater than 3 months)(10 lbs)  ENT  ENT (WDL): Exceptions to WDL  Vertigo (Dizziness): None  Changes in vision: None  Glasses or Contacts: None  Hearing loss: Yes - Chronic (Greater than 3 months)  Hearing Aids: None  Tinnitus: Yes - Chronic (Greater  than 3 months)  Pain/Pressure in ears: None  Sinus Congestion/Drainage: Yes - Chronic (Greater than 3 months)  Hoarseness: None  Sore Throat: None  Dental Problems: None  Dentures: None  Respiratory  Respiratory (WDL): All respiratory elements are within defined limits  Cardiovascular  Cardiovascular (WDL): Exceptions to WDL  Palpitations: None  Edema Limbs: None  Irregular Heart Beat: None  Chest Pain: None  Lightheadedness: Yes - Recent (Less than 3 months)  Endocrine  Endocrine (WDL): All endocrine elements are within defined limits  Gastrointestinal  Gastrointestinal (WDL): Exceptions to WDL  Difficulty Swallowing: Yes - Chronic (Greater than 3 months)  Heartburn: None  Constipation: None  Yellowish skin and/or eyes: None  Blood from rectum: None  Nausea and Vomiting: Yes - Chronic (Greater than 3 months)  Abdominal Pain: Yes - Chronic (Greater than 3 months)  Diarrhea: Yes - Recent (Less than 3 months)  Have had black or tan stools?: None  Hemorrhoids: Yes - Chronic (Greater than 3 months)  Poor Appetite: Yes- Recent (Less than 3 months)  Musculoskeletal  Musculoskeletal (WDL): Exceptions to WDL  Range of Motion Limitation: Yes - Recent (Less than 3 months)  Joint pain: Yes - Chronic (Greater than 3 months)  Back Pain: Yes - Recent (Less than 3 months)  Activity Assistance: Yes - Recent (Less than 3 months)  Difficulty to lie flat for more than 30 minutes: Yes - Recent (Less than 3 months)  Pain interfering with walking: Yes - Recent (Less than 3 months)  Muscle pain or stiffness: Yes - Recent (Less than 3 months)  Recent fall: None  Assistive device: None  Neurological  Neurological (WDL): Exceptions to WDL  History of LOC?: None  Headaches: Yes - Chronic (Greater than 3 months)  Difficulty walking: Yes - Recent (Less than 3 months)  Difficulty with speech: None  Difficulty with memory: None  Vertigo (Dizziness): None  Dominant Hand: Right  Seizures: None  Difficulty with Balance: None  Numbness and/or  "tingling: Yes - Recent (Less than 3 months)(legs & arms, positional)  Psychological/Emotional  Psychological/Emotional (WDL): Exceptions to WDL  Depression: Yes - Recent (Less than 3 months)  Insomnia: None  Panic attacks: None  Anxiety: None  Daytime sleepiness: None  Hallucinations: None  Psychosocial needs or not coping well: None  Hematological/Lymphatic  Hematological/Lymphatic (WDL): All hematological/lymphatic elements are within defined limits  Dermatological  Dermatologic (WDL): All dermatological elements are within defined limits  Genitourinary/Reproductive  Genitourinary/Reproductive (WDL): All genitourinary/reproductive elements are within defined limits  Reproductive (Females only)     Pain  Currently in Pain: Yes  Pain Frequency: Constant/continuous  Location: back  Pain Characteristics : (P) Spasm;Tingling;Other (Comment)(right side of spine, radiates to front of ribs)  Pain Intervention(s): (P) Home medication(pulling, digging,)  Response to Interventions: 7 after methocarbamol    Physical Exam    Recent Vitals 5/27/2021   Height 5' 2.205\"   Weight 149 lbs 2 oz   BSA (m2) 1.72 m2   /61   Pulse 66   Temp 98   Temp src 1   SpO2 95   Some recent data might be hidden       GENERAL: Alert and oriented to time place and person. Seated comfortably. In no distress.    HEAD: Atraumatic and normocephalic.    EYES: RADHA, EOMI.  No pallor.  No icterus.    Oral cavity: no mucosal lesion or tonsillar enlargement.    NECK: supple. JVP normal.  No thyroid enlargement.    LYMPH NODES: No palpable, cervical, axillary or inguinal lymphadenopathy.    CHEST: clear to auscultation bilaterally.  Resonant to percussion throughout bilaterally.  Symmetrical breath movements bilaterally.    CVS: S1 and S2 are heard. Regular rate and rhythm.  No murmur or gallop or rub heard.  No peripheral edema.    ABDOMEN: Soft. Not tender. Not distended.  No palpable hepatomegaly or splenomegaly.  No other mass palpable.  Bowel " sounds heard.    EXTREMITIES: Warm.    SKIN: no rash, or bruising or purpura.  Has a full head of hair.      Lab Results    Recent Results (from the past 168 hour(s))   Comprehensive Metabolic Panel   Result Value Ref Range    Sodium 139 136 - 145 mmol/L    Potassium 3.8 3.5 - 5.0 mmol/L    Chloride 107 98 - 107 mmol/L    CO2 27 22 - 31 mmol/L    Anion Gap, Calculation 5 5 - 18 mmol/L    Glucose 93 70 - 125 mg/dL    BUN 14 8 - 22 mg/dL    Creatinine 0.71 0.60 - 1.10 mg/dL    GFR MDRD Af Amer >60 >60 mL/min/1.73m2    GFR MDRD Non Af Amer >60 >60 mL/min/1.73m2    Bilirubin, Total 0.5 0.0 - 1.0 mg/dL    Calcium 9.4 8.5 - 10.5 mg/dL    Protein, Total 10.1 (H) 6.0 - 8.0 g/dL    Albumin 3.2 (L) 3.5 - 5.0 g/dL    Alkaline Phosphatase 56 45 - 120 U/L    AST 13 0 - 40 U/L    ALT <9 0 - 45 U/L   Lactate Dehydrogenase (LDH)   Result Value Ref Range    LD (LDH) 132 125 - 220 U/L   HM1 (CBC with Diff)   Result Value Ref Range    WBC 6.4 4.0 - 11.0 thou/uL    RBC 4.11 3.80 - 5.40 mill/uL    Hemoglobin 13.6 12.0 - 16.0 g/dL    Hematocrit 39.8 35.0 - 47.0 %    MCV 97 80 - 100 fL    MCH 33.1 27.0 - 34.0 pg    MCHC 34.2 32.0 - 36.0 g/dL    RDW 13.7 11.0 - 14.5 %    Platelets 263 140 - 440 thou/uL    MPV 8.7 8.5 - 12.5 fL    Neutrophils % 60 50 - 70 %    Lymphocytes % 30 20 - 40 %    Monocytes % 8 2 - 10 %    Eosinophils % 2 0 - 6 %    Basophils % 1 0 - 2 %    Immature Granulocyte % 0 <=0 %    Neutrophils Absolute 3.8 2.0 - 7.7 thou/uL    Lymphocytes Absolute 1.9 0.8 - 4.4 thou/uL    Monocytes Absolute 0.5 0.0 - 0.9 thou/uL    Eosinophils Absolute 0.1 0.0 - 0.4 thou/uL    Basophils Absolute 0.0 0.0 - 0.2 thou/uL    Immature Granulocyte Absolute 0.0 <=0.0 thou/uL       Imaging Results    Ct Chest Abdomen Pelvis Without Oral With Iv Contrast    Result Date: 5/4/2021  EXAM: CT CHEST ABDOMEN PELVIS WO ORAL W IV CONTRAST LOCATION: [facility] DATE/TIME: 5/4/2021 4:07 PM INDICATION: T7 pathologic fracture  COMPARISON: MRI thoracic spine 4/26/2021 and 10/9/2020 TECHNIQUE: CT scan of the chest, abdomen, and pelvis was performed following injection of IV contrast. Multiplanar reformats were obtained. Dose reduction techniques were used. CONTRAST: Iopamidol ([id number]-370) 100mL FINDINGS: LUNGS AND PLEURA: Lung parenchymal mosaicism. Band of cicatrization atelectasis in the basal right lower lobe laterally. Benign intrapulmonary lymph node posterior medial left lower lobe (series 4, image 181). No actionable nodules or lung consolidation.  Trachea and central airways are patent. No pleural fluid. MEDIASTINUM: Soft tissue thickening extends both laterally to the right/left and posteriorly towards the central canal at the T7 compression deformity resulting in considerable narrowing of the central canal which measures 5-6 mm AP and 8 mm transverse (series 4, image 134). No enlarged mediastinal or hilar lymph nodes. Esophagus is decompressed. Multinodular thyroid gland. Cardiac chambers are normal in size. No pericardial effusion. Main pulmonary artery is normal caliber. No pulmonary artery filling defects. No thoracic aortic aneurysm. Moderate mixed attenuation atheroma in the arch and descending thoracic aorta. CORONARY ARTERY CALCIFICATION: Mild-moderate HEPATOBILIARY: Focal fat infiltration of the fissure of the falciform ligament. No hepatic lesions. Prior cholecystectomy. Physiologic enlargement of the common bile duct which tapers distally. PANCREAS: Normal. SPLEEN: Normal. ADRENAL GLANDS: Several left adrenal nodules are present the larger of which is associated with the lateral limb is 15 x 19 mm and the smaller associated with the medial limb is 14 x 15 mm. Right adrenal gland is normal. KIDNEYS/BLADDER: No significant mass, stones, or hydronephrosis. There are simple or benign cysts. No follow up is needed. BOWEL: Nondistended stomach without wall thickening. Normal caliber small bowel. Normal appendix. Nondistended  colon. No focal colonic wall thickening. Mild sigmoid diverticulosis but no diverticulitis. No inflammatory stranding in the mesentery. No ascites. LYMPH NODES: Normal. VASCULATURE: Moderate patchy mixed attenuation atheroma in the abdominal aorta and common iliac arteries. No aneurysm. PELVIC ORGANS: Age concordant uterine atrophy. No mass in the adnexa. No pelvic free fluid. MUSCULOSKELETAL: T7 compression deformity as previously documented. Other thoracic and lumbar vertebra are maintained in height and shape. No lytic or sclerotic lesions elsewhere. No breast nodules or axillary adenopathy.     1.  T7 vertebral compression deformity with growth/extension of enhancing tissue laterally both to the right/left and towards the central canal posteriorly, which results in moderate to severe narrowing of the central canal. 2.  No findings to suggest metastatic disease elsewhere in the chest, abdomen, or pelvis. No potential primary malignancy identified. 3.  Mosaic lung attenuation most likely regional air trapping. The etiology of air trapping is not apparent.    TT: 70 minutes: time consisted of preparing to see the patient (eg. review of tests)-30minutes, obtaining an/or reviewing separately obtained history (30 minutes), ordering medication, test or procedure, referring or communicating with other healthcare professionals, or documenting clinical information in the electronic health record (10 minutes).      Signed by: Lisset Baig MD

## 2021-06-25 NOTE — PROGRESS NOTES
"Lizzie Viera is a 70 y.o. female who is being evaluated via a billable telephone visit.      The patient has been notified of following:     \"This telephone visit will be conducted via a call between you and your physician/provider. We have found that certain health care needs can be provided without the need for a physical exam.  This service lets us provide the care you need with a short phone conversation.  If a prescription is necessary we can send it directly to your pharmacy.  If lab work is needed we can place an order for that and you can then stop by our lab to have the test done at a later time.    Telephone visits are billed at different rates depending on your insurance coverage. During this emergency period, for some insurers they may be billed the same as an in-person visit.  Please reach out to your insurance provider with any questions.    If during the course of the call the physician/provider feels a telephone visit is not appropriate, you will not be charged for this service.\"    Patient has given verbal consent to a Telephone visit? Yes    What phone number would you like to be contacted at? Home number listed    Patient would like to receive their AVS by AVS Preference: Lianne.    Additional provider notes:      Neurosurgery Progress Note  05/31/21    A/P: Lizzie Viera is a 70 y.o. female with thoracic burst fracture, concern for malignancy followed by Dr. Campbell. This visit is to discuss pathology, final path reports from [facility] which were scanned into the chart. She has discussed pathology with her oncology team and labs/PET have already been ordered.     Her symptoms of the burst fracture are overall stable. She does get some numbness when she lays flat but this resolves with flexed posture. She does have back pain but medications have been prescribed by oncology as well.     No further follow up with neurosurgery is needed. Please call for questions or concerns. Records " from biopsy scanned into chart.     S: Still having a decent amount of back pain. She has been following her labs and doing research.     PMH: No interval change  PSH: No interval change      O: Telephone visit, no exam completed.   Lab Results   Component Value Date     05/27/2021     09/09/2020    K 3.8 05/27/2021    K 4.0 09/09/2020     05/27/2021     09/09/2020    CO2 27 05/27/2021    CO2 24 09/09/2020    BUN 14 05/27/2021    BUN 22 09/09/2020    CREATININE 0.71 05/27/2021    CREATININE 0.75 09/09/2020    GLU 93 05/27/2021     (H) 09/09/2020    CALCIUM 9.4 05/27/2021    CALCIUM 9.7 09/09/2020     No results for input(s): WBC, HGB, HCT, MCV, PLT in the last 72 hours.  No results for input(s): INR, PTT, FIBRINOGEN in the last 72 hours.      I personally visualized all imaging and discussed with patient. Reviewed MRI thoracic and pathology reports.     Tara Martines, CNP  [facility] Neurosurgery  O: [phone]

## 2021-06-25 NOTE — TELEPHONE ENCOUNTER
"Valeria sent writer and e-mail requesting that biopsy results be sent to her. She is on My Chart and received a notification but unable to access.  \"I have received multiple messages regarding new test results in [facility], however I am unable to see new results. Could you please send me a copy of the biopsy results so I can be informed prior to my appointment on Thursday. \"  Writer called Valeria, did not LM. Sent an e-mail response:   \"I just tried calling you but did not leave a message. Are you on My Chart through the [employer] system? Since [facility] ordered the test and it was resulted there, that is probably the best way to access the report.  Unfortunately, I do not have the ability to send any results to you. It does not appear that you have signed up for My Chart at [facility], from what I can see. The number to call to sign up for My Chart or to ask questions about My Chart issues, is [phone]. \"  Contact information for writer was also provided.      "

## 2021-06-25 NOTE — PROGRESS NOTES
12:05- Pat asleep when I entered the room. /[id number], 98.0 and sats 92%. I checked her dressing and it was dry and intact. I gave her and her  the written instructions and the verbal post care. I told them to call if problems and there is someone on call 24/7. They accepted the information. I assisted Valeria to a wheelchair due the sedative affects. I assisted her into the vehicle and again advised to call if they need help. CONRADO Quiroz, OCN, CBCN

## 2021-06-26 DIAGNOSIS — C90.00 MULTIPLE MYELOMA WITH FAILED REMISSION (H): Primary | ICD-10-CM

## 2021-06-26 NOTE — PROGRESS NOTES
Imaging was reviewed with Dr. Campbell - given her persistent back pain can have discussion on surgical intervention. Upright lateral XR ordered prior to appt. Valeria is out of town on Dr. Campbell's next clinic, will schedule for July 1    Tara Martines CNP  [facility] Neurosurgery  O: [phone]  P: [phone]

## 2021-06-29 ENCOUNTER — HOSPITAL ENCOUNTER (OUTPATIENT)
Dept: RADIOLOGY | Facility: CLINIC | Age: 71
Discharge: HOME | End: 2021-06-29
Payer: COMMERCIAL

## 2021-06-29 DIAGNOSIS — C90.30 NEOPLASM OF UNCERTAIN BEHAVIOR OF PLASMA CELLS (H): ICD-10-CM

## 2021-06-29 NOTE — PROGRESS NOTES
Progress Notes by Bertrand Mullen, PT at 11/5/2020 11:30 AM     Author: Bertrand Mullen PT Service: -- Author Type: Physical Therapist    Filed: 11/5/2020 12:00 PM Encounter Date: 11/5/2020 Status: Attested    : Bertrand Mullen PT (Physical Therapist) Cosigner: Anne Marie Walker MD at 11/11/2020  1:26 PM    Attestation signed by Anne Marie Walker MD at 11/11/2020  1:26 PM    Agree with plan.                [facility] Re-Certification Request    November 5, 2020      Patient: Lizzie Viera  MR Number: [id number]  YOB: 1950  Date of Visit: 11/5/2020      Dear Anne Marie Tijerina,*:    As you may recall, we have been seeing Lizzie Viera in Physical Therapy for Compression fracture of T7 vertebra, initial encounter (H).    For therapy to continue, Medicare and/or Medicaid requires periodic physician review of the treatment plan. Please review the summary of the patient's progress and our plan for continued therapy, and verify  that you agree therapy should continue by entering certification dates at the bottom of this note and co-signing this note.    Plan of Care  Authorization / Certification Start Date: 11/05/20  Authorization / Certification End Date: 01/04/21  Authorization / Certification Number of Visits: 8  Communication with: Referral Source  Patient Related Instruction: Nature of Condition;Posture;Precautions;Treatment plan and rationale;Next steps;Expected outcome;Self Care instruction;Basis of treatment;Body mechanics  Times per Week: 1  Number of Weeks: 8  Number of Visits: 8  Discharge Planning: to include HEP  Precautions / Restrictions : osteoporosis, h/o migrain headache, left subclavian artery occlusion, small airways disease  Therapeutic Exercise: ROM;Stretching;Strengthening  Neuromuscular Reeducation: posture;core  Manual Therapy: soft tissue mobilization;myofascial release;joint mobilization      Goal  Pt. will  demonstrate/verbalize independence in self-management of condition in : 6 weeks;Progressing toward  Pt. will be independent with home exercise program in : 6 weeks;Progressing toward  Pt. will have improved quality of sleep: with less pain;waking less times/night;in 6 weeks;Not Met  Pt. will improve posture : and demonstrate posture with minimal to no cuing;in 6 weeks;Progressing toward;Comment  Comment:: improving erect posture with standing.  Patient will reach / maintain arm movement: forward;overhead;behind;in 6 weeks;Met    No data recorded      If you have any questions or concerns, please don't hesitate to call.    Sincerely,      Bertrand Mullen, PT      Physician:    For Medicare/MA patients:    Physician recommendation:     ___ Follow therapist's recommendation        ___ Modify therapy      *Physician co-signature indicates they certify the need for these services furnished within this plan and while under their care.       [facility] Daily Progress     Patient Name: Lizzie Viera  Date: 11/5/2020  Visit #: 10/10  Authorization dates:  9/29/20-12/3/20  Referral Diagnosis: Compression fracture of T7 vertebra, initial encounter (H)  Referring provider: Anne Marie Walker,*, Anne Marie Walker MD   Visit Diagnosis:     ICD-10-CM    1. Chronic midline thoracic back pain  M54.6     G89.29    2. Compression fracture of T7 vertebra with routine healing, subsequent encounter  S22.060D        Precautions / Restrictions : osteoporosis, h/o migrain headache, left subclavian artery occlusion, small airways disease       Assessment:     Response to Intervention: tolerated manual therapy well with decreased pain in thoracic spine reported post treatment.    Symptoms are consistent with:  Medical diagnosis  Patient is appropriate to continue with skilled physical therapy intervention, as indicated by initial plan of care.    Goal Status:  Pt. will demonstrate/verbalize  "independence in self-management of condition in : 6 weeks;Progressing toward  Pt. will be independent with home exercise program in : 6 weeks;Progressing toward  Pt. will have improved quality of sleep: with less pain;waking less times/night;in 6 weeks;Not Met  Pt. will improve posture : and demonstrate posture with minimal to no cuing;in 6 weeks;Progressing toward;Comment  Comment:: improving erect posture with standing.  Patient will reach / maintain arm movement: forward;overhead;behind;in 6 weeks;Met    No data recorded  Other functional progress:           Plan / Patient Education:     Continue with initial plan of care.  Progress with home program as tolerated.       Subjective:     Pain Rating:  Resting 1  Activity:  1    Response to last treatment: felt really good  HEP- Frequency: 2-3x/day, Questions or difficulties:  none.    Patient reports:      Neck pain last night and twitching.     This is the last night of having to sleep with the brace on.    75-80% overall improvement since starting physical therapy.      Objective:       Manual therapy:  Neck    MFR layers 1-3 bilateral:  Suboccipitals, cervical extensors, cervical paraspinal rotators, scalenes.    Manual therapy:  Cervical longitudinal mobilization    Rate/grade Target  Direction  Relative movement Location in range Patient position   2 Cervical vertebrae Superior Distraction of facet joints Head in neutral Supine      ,     Treatment Today   Exercises below represent all exercise the patient has done in the clinic and HEP.  Please see today's exercise flow-sheet for specifics of today's exercise performance.     Exercises: none today  Exercise #1: standing hamstring stretch  cues for keeping hips square and posture  Comment #1: 30\" x 2 bilateral  Exercise #2: standing hip flexor stretch with chair  Comment #2: 30\" x 2 bilateral  Exercise #3: squats  cues for feet hip width apart  Comment #3: 10  Exercise #4: TA set  Comment #4: 5\" x 5, " progressed to TA with marching x 5  Exercise #5: shoulder AROM- bench press, shoulder flexion  Comment #5: 10 bilateral supine with instruction for flexion standing.  Exercise #6: bridging low amplitude  Comment #6: 10  Exercise #7: prone alternating leg lift  Comment #7: 5 bilateral x 2  Exercise #8: prone alternating arm lift  Comment #8: 10 bilateral arms at sides            TREATMENT MINUTES COMMENTS   Evaluation     Self-care/ Home management     Manual therapy 25 See above   Neuromuscular Re-education     Therapeutic Activity     Therapeutic Exercises     Gait training     Modality__________________                Total 25    Blank areas are intentional and mean the treatment did not include these items.       Bertrand Mullen, PT  11/5/2020

## 2021-06-29 NOTE — PROGRESS NOTES
Progress Notes by Bertrand Mullen PT at 9/29/2020 11:30 AM     Author: Bertrand Mullen PT Service: -- Author Type: Physical Therapist    Filed: 10/8/2020 12:29 PM Encounter Date: 9/29/2020 Status: Attested    : Bertrand Mullen PT (Physical Therapist)    Related Notes: Original Note by Bertrand Mullen PT (Physical Therapist) filed at 9/29/2020  3:23 PM    Cosigner: Anne Marie Walker MD at 10/8/2020  1:06 PM    Attestation signed by Anne Marie Walker MD at 10/8/2020  1:06 PM    Agree with plan.                    [facility] Certification Request    September 29, 2020      Patient: Lizzie Viera  MR Number: [id number]  YOB: 1950  Date of Visit: 9/29/2020      Dear Anne Marie Tijerina MD :    Thank you for this referral.   We are seeing Lizzie Viera in Physical Therapy for Compression fracture of T7 vertebra, initial encounter (H).    Medicare and/or Medicaid requires physician review and approval of the treatment plan. Please review the plan of care and verify that you agree with the therapy plan of care by co-signing this note.      Plan of Care  Authorization / Certification Start Date: 09/29/20  Authorization / Certification End Date: 12/03/20  Authorization / Certification Number of Visits: 10  Communication with: Referral Source  Patient Related Instruction: Nature of Condition;Posture;Precautions;Treatment plan and rationale;Next steps;Expected outcome;Self Care instruction;Basis of treatment;Body mechanics  Times per Week: 2-1  Number of Weeks: 8  Number of Visits: 10  Discharge Planning: to include HEP  Precautions / Restrictions : osteoporosis, h/o migrain headache, left subclavian artery occlusion, small airways disease  Therapeutic Exercise: ROM;Stretching;Strengthening  Neuromuscular Reeducation: posture;core  Manual Therapy: soft tissue mobilization;myofascial release;joint mobilization      Goals:  Pt. will  demonstrate/verbalize independence in self-management of condition in : 6 weeks  Pt. will be independent with home exercise program in : 6 weeks  Pt. will have improved quality of sleep: with less pain;waking less times/night;in 6 weeks  Pt. will improve posture : and demonstrate posture with minimal to no cuing;in 6 weeks  Patient will reach / maintain arm movement: forward;overhead;behind;in 6 weeks    No data recorded      If you have any questions or concerns, please don't hesitate to call.    Sincerely,      Bertrand Mullen, PT      Physician:    For Medicare/MA patients:      Physician recommendation:     ___ Follow therapist's recommendation        ___ Modify therapy      *Physician co-signature indicates they certify the need for these services furnished within this plan and while under their care.     [facility]   [place] Initial Evaluation    Patient Name: Lizzie Viera  Date of evaluation: 9/29/2020  Referral Diagnosis: Compression fracture of T7 vertebra, initial encounter (H)  Referring provider: Alex Bradford MD  Visit Diagnosis:     ICD-10-CM    1. Compression fracture of T7 vertebra, initial encounter (H)  S22.060A        Precautions / Restrictions : osteoporosis, h/o migrain headache, left subclavian artery occlusion, small airways disease       Assessment:      Impairments in  pain, posture, ROM, joint mobility, strength  Patient's signs and symptoms are consistent with T7 fracture.  Patient responded well to manual therapy and exercise.  Prognosis to achieve goals is  good   Pt. is appropriate for skilled PT intervention as outlined in the Plan of Care (POC).    Goals:  Pt. will demonstrate/verbalize independence in self-management of condition in : 6 weeks  Pt. will be independent with home exercise program in : 6 weeks  Pt. will have improved quality of sleep: with less pain;waking less times/night;in 6 weeks  Pt. will improve posture : and demonstrate posture  with minimal to no cuing;in 6 weeks  Patient will reach / maintain arm movement: forward;overhead;behind;in 6 weeks    No data recorded    Barriers to Learning or Achieving Goals:  No Barriers.    Patient's expectations/goals are realistic.    A shared decision making model was used.  The patient's values and choices were respected.  The following represents what was discussed and decided upon by the physical therapist and the patient.          Plan / Patient Instructions:        Plan of Care:   Authorization / Certification Start Date: 09/29/20  Authorization / Certification End Date: 12/03/20  Authorization / Certification Number of Visits: 10  Communication with: Referral Source  Patient Related Instruction: Nature of Condition;Posture;Precautions;Treatment plan and rationale;Next steps;Expected outcome;Self Care instruction;Basis of treatment;Body mechanics  Times per Week: 2-1  Number of Weeks: 8  Number of Visits: 10  Discharge Planning: to include HEP  Precautions / Restrictions : osteoporosis, h/o migrain headache, left subclavian artery occlusion, small airways disease  Therapeutic Exercise: ROM;Stretching;Strengthening  Neuromuscular Reeducation: posture;core  Manual Therapy: soft tissue mobilization;myofascial release;joint mobilization      POC and pathology of condition were reviewed with patient.  Pt. is in agreement with the Plan of Care  A Home Exercise Program (HEP) was initiated today.  Pt. was instructed in exercises by PT and patient was given a handout with detailed instructions.    Plan for next visit: review HEP, progress core strength, continue manual therapy     Subjective:           History of Present Illness:    Lizzie is a 69 y.o. female who presents to therapy today with complaints of thoracolumbar back pain from T7 fracture. Date of onset:  9/2020. Onset was sudden. Symptoms are constant and getting better. She denies history of similar symptoms. She describes their previous level of  function as not limited    Pain Ratin  Pain rating at best: 1  Pain rating at worst: 10  Pain description: aching, burning, dull, pain, sharp, shooting, soreness and tingling    Functional limitations are described as occurring with:   lifting  reaching overhead and behind back  sitting    sleeping  standing    transitional movements sit to stand and sit to supine  walking             Objective:      Note: Items left blank indicates the item was not performed or not indicated at the time of the evaluation.    Patient Outcome Measures :    Modified Oswestry Low Back Pain Disablity Questionnaire  in %: 52     Scores range from 0-100%, where a score of 0% represents minimal pain and maximal function. The minimal clinically important difference is a score reduction of 12%.    Examination  1. Compression fracture of T7 vertebra, initial encounter (H)       Precautions / Restrictions : osteoporosis, h/o migrain headache, left subclavian artery occlusion, small airways disease     Involved side: Left and Bilateral  Posture Observation:      Cervical:  Moderate forward head  Shoulder/Thoracic complex: Moderate bilateral scapular protraction   Moderately increased upper thoracic kyphosis      Lumbar ROM:    Date: 20      *Indicate scale AROM AROM AROM   Lumbar Flexion NT     Lumbar Extension NT      Right Left Right Left Right Left   Lumbar Sidebending NT NT       Lumbar Rotation NT NT       Thoracic Flexion NT     Thoracic Extension NT     Thoracic Sidebending NT NT       Thoracic Rotation NT NT         Lower Extremity Strength:    Date: 20      LE strength/5 Right Left Right Left Right Left   Hip Flexion (L1-3) 5 5       Hip Extension (L5-S1) 5 5       Hip Abduction (L4-5) 5 5       Hip Adduction (L2-3) 5 5       Hip External Rotation         Hip Internal Rotation         Knee Extension (L3-4) 5 5       Knee Flexion 5 5       Ankle Dorsiflexion (L4-5) 5 5       Great Toe Extension (L5)         Ankle Plantar  "flexion (S1)         Abdominals fair       Sensation            Reflex Testing:     Lumbar Dermatomes Right Left UE Reflexes Right Left   Iliac Crest and Groin (L1)   Biceps (C5-6)     Anterior Medial Thigh (L2)   Brachioradialis (C5-6)     Anterior Thigh, Medial Epicondyle Femur (L3)   Triceps (C7-8)     Lateral Thigh, Anterior Knee, Medial Leg/Malleolus (L4)   Hoffmanns test     Lateral Leg, Dorsal Foot (L5)   LE Reflexes     Lateral Foot (S1)   Patellar (L3-4)     Posterior Leg (S2)   Achilles (S1-2)     Other:   Babinski Response       Palpation:  Thoracolumbar paraspinals restricted and tender.    Flexibility:  Quad, hamstring, moderate    Lumbar Special Tests:      Lumbar Special Tests Right Left SI Tests Right  Left   Quadrant test   SI Compression     Straight leg raise - - SI Distraction     Crossover response - - POSH Test     Slump - - Sacral Thrust     Sit-up test  FADIR     Trunk extensor endurance test  Resisted Abduction     Prone instability test  Other:     Pubic shotgun - Other:       Repeated Motion Testing:  NT    Passive Mobility - Joint Integrity:  Not tested  Not indicated    LE Screen:  Hip PROM:  NT .  Hip Scour:  NT.    Treatment Today     Exercises:  Exercise #1: standing hamstring stretch  Comment #1: 30\" x 2 bilateral  Exercise #2: standing quad stretch with chair  Comment #2: 30\" x 2 bilateral  Exercise #3: squats  Comment #3: 10  Exercise #4: TA set  Comment #4: 10         Manual therapy  MFR layers 1-3 bilateral : thoracolumbar paraspinals    TREATMENT MINUTES COMMENTS   Evaluation 25    Self-care/ Home management     Manual therapy 10    Neuromuscular Re-education     Therapeutic Activity     Therapeutic Exercises 20    Gait training     Modality__________________                Total 55    Blank areas are intentional and mean the treatment did not include these items.     PT Evaluation Code: (Please list factors)  Patient History/Comorbidities:   Patient Active Problem List "   Diagnosis   ? Chronic midline thoracic back pain   ? Mixed hyperlipidemia   ? Esophageal Reflux   ? Osteoporosis   ? Closed Fracture Of Tibia With Fibula   ? Constipation   ? Migraine Headache   ? Tobacco use   ? Compression fracture of T7 vertebra, initial encounter (H)   ? Compression fracture of T7 vertebra, sequela   ? Left subclavian artery occlusion   ? Small airways disease      Past Medical History:   Diagnosis Date   ? Cervical dysplasia    ? Chronic RUQ pain       Examination: as above  Clinical Presentation: stable  Clinical Decision Making: low complexity    Patient History/  Comorbidities Examination  (body structures and functions, activity limitations, and/or participation restrictions) Clinical Presentation Clinical Decision Making (Complexity)   No documented Comorbidities or personal factors 1-2 Elements Stable and/or uncomplicated Low   1-2 documented comorbidities or personal factor 3 Elements Evolving clinical presentation with changing characteristics Moderate   3-4 documented comorbidities or personal factors 4 or more Unstable and unpredictable High               Bertrand Mullen, PT  9/29/2020  11:36 AM

## 2021-07-02 ENCOUNTER — AMBULATORY - HEALTHEAST (OUTPATIENT)
Dept: ONCOLOGY | Facility: CLINIC | Age: 71
End: 2021-07-02

## 2021-07-04 NOTE — ADDENDUM NOTE
Addendum Note by Tara Bower CNP at 5/28/2021 11:00 AM     Author: Tara Bower CNP Service: -- Author Type: Nurse Practitioner    Filed: 6/3/2021 12:11 PM Encounter Date: 5/28/2021 Status: Signed    : Tara Bower CNP (Nurse Practitioner)    Addended by: TARA BOWER on: 6/3/2021 12:11 PM        Modules accepted: Orders

## 2021-07-04 NOTE — ADDENDUM NOTE
Addendum Note by Anuja Gilbert PA-C at 4/29/2021  2:30 PM     Author: Anuja Gilbert PA-C Service: -- Author Type: Physician Assistant    Filed: 5/3/2021  3:29 PM Encounter Date: 4/29/2021 Status: Signed    : Anuja Gilbert PA-C (Physician Assistant)    Addended by: ANUJA GILBERT on: 5/3/2021 03:29 PM        Modules accepted: Orders

## 2021-07-04 NOTE — ADDENDUM NOTE
Addendum Note by Tara Bower CNP at 5/28/2021 11:00 AM     Author: Tara Bower CNP Service: -- Author Type: Nurse Practitioner    Filed: 6/20/2021 10:54 AM Encounter Date: 5/28/2021 Status: Signed    : Tara Bower CNP (Nurse Practitioner)    Addended by: TARA BOWER on: 6/20/2021 10:54 AM        Modules accepted: Orders

## 2021-07-05 PROBLEM — M84.58XK: Status: ACTIVE | Noted: 2021-06-23

## 2021-07-05 PROBLEM — C90.00 MULTIPLE MYELOMA NOT HAVING ACHIEVED REMISSION (H): Status: ACTIVE | Noted: 2021-06-23

## 2021-07-06 VITALS
SYSTOLIC BLOOD PRESSURE: 112 MMHG | BODY MASS INDEX: 27.44 KG/M2 | HEIGHT: 62 IN | WEIGHT: 149.1 LBS | TEMPERATURE: 98 F | DIASTOLIC BLOOD PRESSURE: 61 MMHG | OXYGEN SATURATION: 95 % | HEART RATE: 66 BPM

## 2021-07-06 VITALS — DIASTOLIC BLOOD PRESSURE: 64 MMHG | TEMPERATURE: 97.6 F | SYSTOLIC BLOOD PRESSURE: 134 MMHG | HEART RATE: 62 BPM

## 2021-07-22 ENCOUNTER — MEDICAL CORRESPONDENCE (OUTPATIENT)
Dept: NEUROSURGERY | Facility: CLINIC | Age: 71
End: 2021-07-22

## 2021-08-04 ENCOUNTER — MYC MEDICAL ADVICE (OUTPATIENT)
Dept: FAMILY MEDICINE | Facility: CLINIC | Age: 71
End: 2021-08-04

## 2021-08-04 DIAGNOSIS — C90.00 MULTIPLE MYELOMA WITH FAILED REMISSION (H): Primary | ICD-10-CM

## 2021-08-06 ENCOUNTER — TELEPHONE (OUTPATIENT)
Dept: FAMILY MEDICINE | Facility: CLINIC | Age: 71
End: 2021-08-06

## 2021-08-06 NOTE — PATIENT INSTRUCTIONS - HE
Patient Instructions by Anne Marie Walker MD at 12/31/2020  2:00 PM     Author: Anne Marie Walker MD Service: -- Author Type: Physician    Filed: 12/31/2020  2:08 PM Encounter Date: 12/31/2020 Status: Signed    : Anne Marie Walker MD (Physician)         Patient Education   Understanding USDA MyPlate  The USDA (US Department of Agriculture) has guidelines to help you make healthy food choices. These are called MyPlate. MyPlate shows the food groups that make up healthy meals using the image of a place setting. Before you eat, think about the healthiest choices for what to put onto your plate or into your cup or bowl. To learn more about building a healthy plate, visit www.choosemyplate.gov.       The Food Groups    Fruits: Any fruit or 100% fruit juice counts as part of the Fruit Group. Fruits may be fresh, canned, frozen, or dried, and may be whole, cut-up, or pureed. Make half your plate fruits and vegetables.    Vegetables: Any vegetable or 100% vegetable juice counts as a member of the Vegetable Group. Vegetables may be fresh, frozen, canned, or dried. They can be served raw or cooked and may be whole, cut-up, or mashed. Make half your plate fruits and vegetables.     Grains: All foods made from grains are part of the Grains Group. These include wheat, rice, oats, cornmeal, and barley such as bread, pasta, oatmeal, cereal, tortillas, and grits. Grains should be no more than a quarter of your plate. At least half of your grains should be whole grains.    Protein: This group includes meat, poultry, seafood, beans and peas, eggs, processed soy products (like tofu), nuts (including nut butters), and seeds. Make protein choices no more than a quarter of your plate. Meat and poultry choices should be lean or low fat.    Dairy: All fluid milk products and foods made from milk that contain calcium, like yogurt and cheese are part of the Dairy Group. (Foods that have little calcium, such as cream,  butter, and cream cheese, are not part of the group.) Most dairy choices should be low-fat or fat-free.    Oils: These are fats that are liquid at room temperature. They include canola, corn, olive, soybean, and sunflower oil. Foods that are mainly oil include mayonnaise, certain salad dressings, and soft margarines. You should have only 5 to 7 teaspoons of oils a day. You probably already get this much from the food you eat.  Use State of Ambitioner to Help Build Your Meals  The SuperTracker can help you plan and track your meals and activity. You can look up individual foods to see or compare their nutritional value. You can get guidelines for what and how much you should eat. You can compare your food choices. And you can assess personal physical activities and see ways you can improve. Go to www.YourPlace.gov/PawCliniccker/.    7154-2380 [employer]. [address] [place] [address]. All rights reserved. This information is not intended as a substitute for professional medical care. Always follow your healthcare professional's instructions.           Patient Education   Instrumental Activities of Daily Living  Your Health Risk Assessment indicates you have difficulties with instrumental activities of daily living which include laundry, housekeeping, banking, shopping, using the telephone, food preparation, transportation, or taking your own medications. Please make a follow up appointment for us to address this issue in more detail.    [employer] has resources available on the following website: https://www.healthAn[employer] (Group).org/caregivers.html     Also, here is a local agency that provides help with meals and other assistance:   [facility] Line: 714.677.7908     Patient Education   Signs of Hearing Loss  Hearing loss is a problem shared by many people. In fact, it is one of the most common health conditions, particularly as people age. Most people over age 65 have some hearing loss, and by age  80, almost everyone does. Because hearing loss usually occurs slowly over the years, you may not realize your hearing ability has gotten worse.       Have your hearing checked  Contact your [facility] care provider if you:    Have to strain to hear normal conversation.    Have to watch other peoples faces very carefully to follow what theyre saying.    Need to ask people to repeat what theyve said.    Often misunderstand what people are saying.    Turn the volume of the television or radio up so high that others complain.    Feel that people are mumbling when theyre talking to you.    Find that the effort to hear leaves you feeling tired and irritated.    Notice, when using the phone, that you hear better with 1 ear than the other.    0224-3907 [employer]. [address] [place] [address], [place] [address]. All rights reserved. This information is not intended as a substitute for professional medical care. Always follow your healthcare professional's instructions.         Patient Education   Your Health Risk Assessment indicates you feel you are not in good emotional health.    Recreation   Recreation is not limited to sports and team events. It includes any activity that provides relaxation, interest, enjoyment, and exercise. Recreation provides an outlet for physical, mental, and social energy. It can give a sense of worth and achievement. It can help you stay healthy.    Mental Exercise and Social Involvement  Mental and emotional health is as important as physical health. Keep in touch with friends and family. Stay as active as possible. Continue to learn and challenge yourself.   Things you can do to stay mentally active are:    Learn something new, like a foreign language or musical instrument.     Play SCRABBLE or do crossword puzzles. If you cannot find people to play these games with you at home, you can play them with others on your computer through the Internet.     Join a games club--anything from card  games to chess or checkers or lawn bowling.     Start a new hobby.     Go back to school.     Volunteer.     Read.     Keep up with world events.       Patient Education   Depression and Suicide in Older Adults  Nearly 2 million older Americans have some type of depression. Sadly, some of them even take their own lives. Yet depression among older adults is often ignored. Learn the warning signs. You may help spare a loved one needless pain. You may also save a life.       What Is Depression?  Depression is a mood disorder that affects the way you think and feel. The most common symptom is a feeling of deep sadness. People who are depressed also may seem tired and listless. And nothing seems to give them pleasure. Its normal to grieve or be sad sometimes. But sadness lessens or passes with time. Depression rarely goes away or improves on its own. Other symptoms of depression are:    Sleeping more or less than normal    Eating more or less than normal    Having headaches, stomachaches, or other pains that dont go away    Feeling nervous, empty, or worthless    Crying a great deal    Thinking or talking about suicide or death    Feeling confused or forgetful  What Causes It?  The causes of depression arent fully known. Certain chemicals in the brain play a role. Depression does run in families. And life stresses can also trigger depression in some people. That may be the case with older adults. They often face great burdens, such as the death of friends or a spouse. They may have failing health. And they are more likely to be alone, lonely, or poor.  How You Can Help  Often, depressed people may not want to ask for help. When they do, they may be ignored. Or, they may receive the wrong treatment. You can help by showing parents and older friends love and support. If they seem depressed, help them find the right treatment. Talk to your doctor. Or contact a local mental health center, social service agency, or hospital.  With modern treatment, no one has to suffer from depression.  Resources:    National Gunter of Mental Health  315.393.7164  www.nimh.nih.gov    National Huntly on Mental Illness  417.640.8940  www.jay.org    [facility]  647.737.4938  www.nmha.org    National Suicide Hotline  985.139.4195 (800-SUICIDE)      1453-9414 [employer]. [address] [place] [address], [place] [address]. All rights reserved. This information is not intended as a substitute for professional medical care. Always follow your healthcare professional's instructions.           Advance Directive  Patients advance directive was discussed and I am comfortable with the patients wishes.  Patient Education   Personalized Prevention Plan  You are due for the preventive services outlined below.  Your care team is available to assist you in scheduling these services.  If you have already completed any of these items, please share that information with your care team to update in your medical record.  Health Maintenance   Topic Date Due   ? HEPATITIS C SCREENING  1950   ? ZOSTER VACCINES (1 of 2) 10/01/2002   ? Pneumococcal Vaccine: 65+ Years (2 of 2 - PPSV23) 08/23/2019   ? INFLUENZA VACCINE RULE BASED (1) 06/30/2021 (Originally 8/1/2020)   ? MEDICARE ANNUAL WELLNESS VISIT  12/31/2021   ? FALL RISK ASSESSMENT  12/31/2021   ? MAMMOGRAM  07/30/2022   ? ADVANCE CARE PLANNING  08/23/2023   ? COLORECTAL CANCER SCREENING  08/08/2024   ? LIPID  08/28/2025   ? TD 18+ HE  08/23/2028   ? DEXA SCAN  12/01/2035   ? Pneumococcal Vaccine: Pediatrics (0 to 5 Years) and At-Risk Patients (6 to 64 Years)  Aged Out   ? HEPATITIS B VACCINES  Aged Out

## 2021-08-10 ENCOUNTER — TRANSCRIBE ORDERS (OUTPATIENT)
Dept: OTHER | Age: 71
End: 2021-08-10

## 2021-08-10 DIAGNOSIS — C90.00 MULTIPLE MYELOMA WITH FAILED REMISSION (H): Primary | ICD-10-CM

## 2021-08-23 NOTE — PROGRESS NOTES
RECORDS STATUS - ALL OTHER DIAGNOSIS    Multiple myeloma with failed remission (H)  RECORDS RECEIVED FROM: [facility]/ [facility]    DATE RECEIVED: 9/30/2021   NOTES STATUS DETAILS   OFFICE NOTE from referring provider Complete [facility]   Anne Marie Walker MD   OFFICE NOTE from medical oncologist Complete [facility]- [facility] Records     7/2/2021 Ambulatory     More in CE   DISCHARGE SUMMARY from hospital N/A    DISCHARGE REPORT from the ER [facility]    OPERATIVE REPORT N/A    MEDICATION LIST Complete [facility]   CLINICAL TRIAL TREATMENTS TO DATE     LABS     PATHOLOGY REPORTS Requested- [facility] ([facility])   Ph: [phone]  Fax: [phone]   [place] Tracking Number:  [id number]  Requested- [facility]  6/3/2021   [id number]  BONE MARROW, POSTERIOR ILIAC CREST, ASPIRATE, CLOT SECTION, AND BIOPSY:       -  HYPOCELLULAR MARROW INVOLVED BY MULTIPLE MYELOMA (APPROXIMATELY 60% INVOLVEMENT)       -  INCREASED IRON STORES     5/7/2021 [facility]   Path Tissue    ANYTHING RELATED TO DIAGNOSIS Complete Labs last updated on 6/12/2021   GENONOMIC TESTING     TYPE:     IMAGING (NEED IMAGES & REPORT)     CT SCANS     MRI     MAMMO     ULTRASOUND     PET       Action    Action Taken 8/23/2021 11:58AM [facility]   I updated [facility] images in PACS    8/23/2021 12:53pm [facility]   I called [facility] Phone: [phone]- they will push ALL imaging from this year to PACS    I called pt Lizzie's mobile phone-     8/23/2021 1:55pm [facility]   I resolved imaging from [facility] in PACS.

## 2021-08-26 ENCOUNTER — MYC MEDICAL ADVICE (OUTPATIENT)
Dept: NEUROSURGERY | Facility: CLINIC | Age: 71
End: 2021-08-26

## 2021-08-30 ENCOUNTER — MYC MEDICAL ADVICE (OUTPATIENT)
Dept: NEUROSURGERY | Facility: CLINIC | Age: 71
End: 2021-08-30

## 2021-08-30 ENCOUNTER — TRANSFERRED RECORDS (OUTPATIENT)
Dept: NEUROSURGERY | Facility: CLINIC | Age: 71
End: 2021-08-30

## 2021-08-31 ENCOUNTER — HOSPITAL ENCOUNTER (INPATIENT)
Facility: HOSPITAL | Age: 71
LOS: 3 days | Discharge: HOME | DRG: 542 | End: 2021-09-03
Attending: EMERGENCY MEDICINE | Admitting: HOSPITALIST
Payer: COMMERCIAL

## 2021-08-31 ENCOUNTER — APPOINTMENT (OUTPATIENT)
Dept: MRI IMAGING | Facility: HOSPITAL | Age: 71
DRG: 542 | End: 2021-08-31
Attending: EMERGENCY MEDICINE
Payer: COMMERCIAL

## 2021-08-31 ENCOUNTER — OFFICE VISIT (OUTPATIENT)
Dept: NEUROSURGERY | Facility: CLINIC | Age: 71
End: 2021-08-31
Payer: COMMERCIAL

## 2021-08-31 ENCOUNTER — HOSPITAL ENCOUNTER (OUTPATIENT)
Dept: RADIOLOGY | Facility: HOSPITAL | Age: 71
Discharge: HOME | DRG: 542 | End: 2021-08-31
Attending: NEUROLOGICAL SURGERY | Admitting: NEUROLOGICAL SURGERY
Payer: COMMERCIAL

## 2021-08-31 VITALS
HEART RATE: 52 BPM | DIASTOLIC BLOOD PRESSURE: 60 MMHG | HEIGHT: 62 IN | WEIGHT: 129 LBS | SYSTOLIC BLOOD PRESSURE: 118 MMHG | BODY MASS INDEX: 23.74 KG/M2

## 2021-08-31 DIAGNOSIS — S22.069G CLOSED FRACTURE OF SEVENTH THORACIC VERTEBRA WITH DELAYED HEALING, UNSPECIFIED FRACTURE MORPHOLOGY, SUBSEQUENT ENCOUNTER: Primary | ICD-10-CM

## 2021-08-31 DIAGNOSIS — M48.50XA PATHOLOGIC COMPRESSION FRACTURE OF SPINE, INITIAL ENCOUNTER (H): ICD-10-CM

## 2021-08-31 DIAGNOSIS — S22.060S COMPRESSION FRACTURE OF T7 VERTEBRA, SEQUELA: ICD-10-CM

## 2021-08-31 DIAGNOSIS — N30.01 ACUTE CYSTITIS WITH HEMATURIA: ICD-10-CM

## 2021-08-31 DIAGNOSIS — M84.58XK: ICD-10-CM

## 2021-08-31 DIAGNOSIS — S22.069A CLOSED T7 FRACTURE (H): ICD-10-CM

## 2021-08-31 DIAGNOSIS — M84.58XK PATHOLOGICAL FRACTURE OF VERTEBRAE IN NEOPLASTIC DISEASE WITH NONUNION: Primary | ICD-10-CM

## 2021-08-31 PROBLEM — E43 SEVERE MALNUTRITION (H): Status: ACTIVE | Noted: 2021-08-31

## 2021-08-31 PROBLEM — K59.1 FUNCTIONAL DIARRHEA: Status: ACTIVE | Noted: 2021-08-31

## 2021-08-31 PROBLEM — E87.6 HYPOKALEMIA: Status: ACTIVE | Noted: 2021-08-31

## 2021-08-31 LAB
ALBUMIN SERPL-MCNC: 2.9 G/DL (ref 3.5–5)
ALBUMIN UR-MCNC: 70 MG/DL
ALP SERPL-CCNC: 40 U/L (ref 45–120)
ALT SERPL W P-5'-P-CCNC: 23 U/L (ref 0–45)
ANION GAP SERPL CALCULATED.3IONS-SCNC: 12 MMOL/L (ref 5–18)
APPEARANCE UR: ABNORMAL
AST SERPL W P-5'-P-CCNC: 22 U/L (ref 0–40)
BACTERIA #/AREA URNS HPF: ABNORMAL /HPF
BILIRUB SERPL-MCNC: 0.5 MG/DL (ref 0–1)
BILIRUB UR QL STRIP: NEGATIVE
BUN SERPL-MCNC: 10 MG/DL (ref 8–22)
CALCIUM SERPL-MCNC: 9.6 MG/DL (ref 8.5–10.5)
CHLORIDE BLD-SCNC: 106 MMOL/L (ref 98–107)
CO2 SERPL-SCNC: 24 MMOL/L (ref 22–31)
COLOR UR AUTO: YELLOW
CREAT SERPL-MCNC: 0.77 MG/DL (ref 0.6–1.1)
ERYTHROCYTE [DISTWIDTH] IN BLOOD BY AUTOMATED COUNT: 15.9 % (ref 10–15)
ERYTHROCYTE [SEDIMENTATION RATE] IN BLOOD BY WESTERGREN METHOD: 111 MM/HR (ref 0–20)
GFR SERPL CREATININE-BSD FRML MDRD: 80 ML/MIN/1.73M2
GLUCOSE BLD-MCNC: 83 MG/DL (ref 70–125)
GLUCOSE UR STRIP-MCNC: NEGATIVE MG/DL
HCT VFR BLD AUTO: 29.3 % (ref 35–47)
HGB BLD-MCNC: 10.2 G/DL (ref 11.7–15.7)
HGB UR QL STRIP: ABNORMAL
HYALINE CASTS: 2 /LPF
KETONES UR STRIP-MCNC: 60 MG/DL
LEUKOCYTE ESTERASE UR QL STRIP: ABNORMAL
LIPASE SERPL-CCNC: <9 U/L (ref 0–52)
MCH RBC QN AUTO: 33.4 PG (ref 26.5–33)
MCHC RBC AUTO-ENTMCNC: 34.8 G/DL (ref 31.5–36.5)
MCV RBC AUTO: 96 FL (ref 78–100)
MUCOUS THREADS #/AREA URNS LPF: PRESENT /LPF
NITRATE UR QL: POSITIVE
PH UR STRIP: 6 [PH] (ref 5–7)
PLATELET # BLD AUTO: 355 10E3/UL (ref 150–450)
POTASSIUM BLD-SCNC: 2.1 MMOL/L (ref 3.5–5)
PROT SERPL-MCNC: 10 G/DL (ref 6–8)
RBC # BLD AUTO: 3.05 10E6/UL (ref 3.8–5.2)
RBC URINE: 10 /HPF
SODIUM SERPL-SCNC: 142 MMOL/L (ref 136–145)
SP GR UR STRIP: 1.02 (ref 1–1.03)
SQUAMOUS EPITHELIAL: 1 /HPF
UROBILINOGEN UR STRIP-MCNC: <2 MG/DL
WBC # BLD AUTO: 7.6 10E3/UL (ref 4–11)
WBC URINE: 13 /HPF

## 2021-08-31 PROCEDURE — 83690 ASSAY OF LIPASE: CPT | Performed by: HOSPITALIST

## 2021-08-31 PROCEDURE — 72070 X-RAY EXAM THORAC SPINE 2VWS: CPT

## 2021-08-31 PROCEDURE — 85652 RBC SED RATE AUTOMATED: CPT | Performed by: EMERGENCY MEDICINE

## 2021-08-31 PROCEDURE — 250N000011 HC RX IP 250 OP 636: Performed by: RADIOLOGY

## 2021-08-31 PROCEDURE — 85014 HEMATOCRIT: CPT | Performed by: EMERGENCY MEDICINE

## 2021-08-31 PROCEDURE — 99214 OFFICE O/P EST MOD 30 MIN: CPT | Performed by: NURSE PRACTITIONER

## 2021-08-31 PROCEDURE — 96366 THER/PROPH/DIAG IV INF ADDON: CPT

## 2021-08-31 PROCEDURE — 96365 THER/PROPH/DIAG IV INF INIT: CPT | Mod: 59

## 2021-08-31 PROCEDURE — 87086 URINE CULTURE/COLONY COUNT: CPT | Performed by: EMERGENCY MEDICINE

## 2021-08-31 PROCEDURE — 258N000002 HC RX IP 258 OP 250: Performed by: HOSPITALIST

## 2021-08-31 PROCEDURE — 250N000011 HC RX IP 250 OP 636: Performed by: EMERGENCY MEDICINE

## 2021-08-31 PROCEDURE — 36415 COLL VENOUS BLD VENIPUNCTURE: CPT | Performed by: EMERGENCY MEDICINE

## 2021-08-31 PROCEDURE — 250N000013 HC RX MED GY IP 250 OP 250 PS 637: Performed by: EMERGENCY MEDICINE

## 2021-08-31 PROCEDURE — 72157 MRI CHEST SPINE W/O & W/DYE: CPT

## 2021-08-31 PROCEDURE — 81001 URINALYSIS AUTO W/SCOPE: CPT | Performed by: EMERGENCY MEDICINE

## 2021-08-31 PROCEDURE — 255N000002 HC RX 255 OP 636: Performed by: EMERGENCY MEDICINE

## 2021-08-31 PROCEDURE — A9585 GADOBUTROL INJECTION: HCPCS | Performed by: EMERGENCY MEDICINE

## 2021-08-31 PROCEDURE — 250N000013 HC RX MED GY IP 250 OP 250 PS 637: Performed by: HOSPITALIST

## 2021-08-31 PROCEDURE — 82565 ASSAY OF CREATININE: CPT | Performed by: EMERGENCY MEDICINE

## 2021-08-31 PROCEDURE — 96375 TX/PRO/DX INJ NEW DRUG ADDON: CPT

## 2021-08-31 PROCEDURE — 99222 1ST HOSP IP/OBS MODERATE 55: CPT | Performed by: HOSPITALIST

## 2021-08-31 PROCEDURE — 250N000011 HC RX IP 250 OP 636: Performed by: HOSPITALIST

## 2021-08-31 PROCEDURE — 99285 EMERGENCY DEPT VISIT HI MDM: CPT | Mod: 25

## 2021-08-31 PROCEDURE — 96376 TX/PRO/DX INJ SAME DRUG ADON: CPT

## 2021-08-31 PROCEDURE — 120N000001 HC R&B MED SURG/OB

## 2021-08-31 RX ORDER — SODIUM CHLORIDE AND POTASSIUM CHLORIDE 150; 450 MG/100ML; MG/100ML
INJECTION, SOLUTION INTRAVENOUS CONTINUOUS
Status: DISCONTINUED | OUTPATIENT
Start: 2021-08-31 | End: 2021-08-31

## 2021-08-31 RX ORDER — GADOBUTROL 604.72 MG/ML
6 INJECTION INTRAVENOUS ONCE
Status: COMPLETED | OUTPATIENT
Start: 2021-08-31 | End: 2021-08-31

## 2021-08-31 RX ORDER — ONDANSETRON 4 MG/1
4 TABLET, ORALLY DISINTEGRATING ORAL EVERY 6 HOURS PRN
Status: DISCONTINUED | OUTPATIENT
Start: 2021-08-31 | End: 2021-09-03 | Stop reason: HOSPADM

## 2021-08-31 RX ORDER — AMOXICILLIN 250 MG
2 CAPSULE ORAL 2 TIMES DAILY PRN
Status: CANCELLED | OUTPATIENT
Start: 2021-08-31

## 2021-08-31 RX ORDER — POTASSIUM CHLORIDE 1.5 G/1.58G
40 POWDER, FOR SOLUTION ORAL ONCE
Status: COMPLETED | OUTPATIENT
Start: 2021-08-31 | End: 2021-08-31

## 2021-08-31 RX ORDER — LEVOFLOXACIN 5 MG/ML
750 INJECTION, SOLUTION INTRAVENOUS ONCE
Status: COMPLETED | OUTPATIENT
Start: 2021-08-31 | End: 2021-08-31

## 2021-08-31 RX ORDER — MORPHINE SULFATE 4 MG/ML
4 INJECTION, SOLUTION INTRAMUSCULAR; INTRAVENOUS ONCE
Status: COMPLETED | OUTPATIENT
Start: 2021-08-31 | End: 2021-08-31

## 2021-08-31 RX ORDER — CHLORAL HYDRATE 500 MG
2 CAPSULE ORAL DAILY
COMMUNITY

## 2021-08-31 RX ORDER — LEVOCARNITINE 330 MG/1
330 TABLET ORAL DAILY
COMMUNITY
End: 2021-09-07

## 2021-08-31 RX ORDER — METHOCARBAMOL 500 MG/1
500 TABLET, FILM COATED ORAL EVERY 6 HOURS PRN
Status: DISCONTINUED | OUTPATIENT
Start: 2021-08-31 | End: 2021-09-03 | Stop reason: HOSPADM

## 2021-08-31 RX ORDER — LIDOCAINE 40 MG/G
CREAM TOPICAL
Status: CANCELLED | OUTPATIENT
Start: 2021-08-31

## 2021-08-31 RX ORDER — AMOXICILLIN 250 MG
1 CAPSULE ORAL 2 TIMES DAILY PRN
Status: CANCELLED | OUTPATIENT
Start: 2021-08-31

## 2021-08-31 RX ORDER — ONDANSETRON 2 MG/ML
4 INJECTION INTRAMUSCULAR; INTRAVENOUS EVERY 6 HOURS PRN
Status: DISCONTINUED | OUTPATIENT
Start: 2021-08-31 | End: 2021-09-03 | Stop reason: HOSPADM

## 2021-08-31 RX ORDER — HYDROCODONE BITARTRATE AND ACETAMINOPHEN 5; 325 MG/1; MG/1
1 TABLET ORAL ONCE
Status: COMPLETED | OUTPATIENT
Start: 2021-08-31 | End: 2021-08-31

## 2021-08-31 RX ORDER — PHENOL 1.4 %
20 AEROSOL, SPRAY (ML) MUCOUS MEMBRANE AT BEDTIME
Status: ON HOLD | COMMUNITY
End: 2024-01-01

## 2021-08-31 RX ORDER — SULFAMETHOXAZOLE/TRIMETHOPRIM 800-160 MG
1 TABLET ORAL 2 TIMES DAILY
Status: DISCONTINUED | OUTPATIENT
Start: 2021-09-01 | End: 2021-09-03 | Stop reason: HOSPADM

## 2021-08-31 RX ORDER — PROCHLORPERAZINE 25 MG
12.5 SUPPOSITORY, RECTAL RECTAL EVERY 12 HOURS PRN
Status: DISCONTINUED | OUTPATIENT
Start: 2021-08-31 | End: 2021-09-03 | Stop reason: HOSPADM

## 2021-08-31 RX ORDER — PROCHLORPERAZINE MALEATE 5 MG
5 TABLET ORAL EVERY 6 HOURS PRN
Status: DISCONTINUED | OUTPATIENT
Start: 2021-08-31 | End: 2021-09-03 | Stop reason: HOSPADM

## 2021-08-31 RX ORDER — DEXAMETHASONE SODIUM PHOSPHATE 4 MG/ML
8 INJECTION, SOLUTION INTRA-ARTICULAR; INTRALESIONAL; INTRAMUSCULAR; INTRAVENOUS; SOFT TISSUE EVERY 8 HOURS
Status: DISCONTINUED | OUTPATIENT
Start: 2021-08-31 | End: 2021-09-02

## 2021-08-31 RX ADMIN — HYDROMORPHONE HYDROCHLORIDE 0.5 MG: 1 INJECTION, SOLUTION INTRAMUSCULAR; INTRAVENOUS; SUBCUTANEOUS at 23:10

## 2021-08-31 RX ADMIN — POTASSIUM CHLORIDE FOR ORAL SOLUTION 40 MEQ: 1.5 POWDER, FOR SOLUTION ORAL at 17:52

## 2021-08-31 RX ADMIN — MORPHINE SULFATE 4 MG: 4 INJECTION INTRAVENOUS at 20:48

## 2021-08-31 RX ADMIN — POTASSIUM CHLORIDE: 2 INJECTION, SOLUTION, CONCENTRATE INTRAVENOUS at 23:51

## 2021-08-31 RX ADMIN — HYDROMORPHONE HYDROCHLORIDE 0.5 MG: 1 INJECTION, SOLUTION INTRAMUSCULAR; INTRAVENOUS; SUBCUTANEOUS at 21:27

## 2021-08-31 RX ADMIN — POTASSIUM CHLORIDE FOR ORAL SOLUTION 40 MEQ: 1.5 POWDER, FOR SOLUTION ORAL at 21:57

## 2021-08-31 RX ADMIN — METHOCARBAMOL 500 MG: 500 TABLET, FILM COATED ORAL at 23:13

## 2021-08-31 RX ADMIN — LEVOFLOXACIN 750 MG: 5 INJECTION, SOLUTION INTRAVENOUS at 20:54

## 2021-08-31 RX ADMIN — HYDROCODONE BITARTRATE AND ACETAMINOPHEN 1 TABLET: 5; 325 TABLET ORAL at 17:52

## 2021-08-31 RX ADMIN — DEXAMETHASONE SODIUM PHOSPHATE 8 MG: 4 INJECTION, SOLUTION INTRAMUSCULAR; INTRAVENOUS at 20:54

## 2021-08-31 RX ADMIN — GADOBUTROL 6 ML: 604.72 INJECTION INTRAVENOUS at 17:16

## 2021-08-31 ASSESSMENT — MIFFLIN-ST. JEOR
SCORE: 1068.39
SCORE: 1071.72

## 2021-08-31 NOTE — ED PROVIDER NOTES
Expected Patient Referral to ED  3:22 PM    Referring Clinic/Provider:  Neurosurgery    Reason for referral/Clinical facts:  Patient with multiple myeloma.  Pathologic T7 fracture last year.  Initially braced with improvement.  Recent fall now with increasing back pain and neurologic findings.  Being sent from clinic.  Patient has declined chemo and radiation therapy    Recommendations provided:  Check MRI with thin cuts ordered by neurosurgery.    Informed caller that recommendations provided are recommendations based only on the facts provided and that they responsible to accept or reject the advice, or to seek a formal in person consultation as needed and that this ED will see/treat patient should they arrive.      Migue Mendez MD  Emergency Medicine  [facility] EMERGENCY DEPARTMENT  [facility] [facility] [id number] [phone]     Migue Mendez MD  08/31/21 1490

## 2021-08-31 NOTE — TELEPHONE ENCOUNTER
Action 08/31/2021   Action Taken Slides received from [facility] and sent to 5th floor to be processed  Ck

## 2021-08-31 NOTE — ED PROVIDER NOTES
EMERGENCY DEPARTMENT ENCOUNTER      NAME: Lizzie Viera  AGE: 70 year old female  YOB: 1950  MRN: [id number]  EVALUATION DATE & TIME: No admission date for patient encounter.    PCP: Anne Marie Walker    ED PROVIDER: Migue Mendez M.D.      Chief Complaint   Patient presents with     Back Pain     Abdominal Pain         FINAL IMPRESSION:  Pathologic fractures thoracic spine  Thoracic spinal cord compression secondary to malignancy  Weakness  Hypokalemia  Urinary tract infection      ED COURSE & MEDICAL DECISION MAKING:    Pertinent Labs & Imaging studies reviewed. (See chart for details)  70 year old female presents to the Emergency Department for evaluation of increasing upper back pain and increasing weakness.  Received a call from neurosurgery patient with a history of multiple myeloma for which she has declined treatment.  Patient had a pathologic fracture of the T7 approximately 1 year ago.  Initially treated with bracing.  More recently having increasing pain and weakness.  Referred to the emergency room for MRI of the spine.  Patient appears non toxic with stable vitals signs.  Patient with slight weakness in the right leg especially with plantarflexion.  Deep tendon reflexes diminished bilaterally in the patellar's.  Moderate midline back tenderness.    6:30 PM.  Received a call from radiology.  Patient with progression of her pathologic fracture and mass with significant impingement upon the thecal sac from T6-T8.  Call placed to neurosurgery.  Patient still in triage.  7:24 PM. Discussed case with neurosurgery PA once again. Recommendations are for consultation with radiation oncology if patient is agreeable.  7:35 PM.  I met with the patient for the initial interview and physical examination. Discussed plan for treatment and workup in the ED. patient is agreeable with plans for radiation therapy. Patient discussed her naturopathic treatment at length. However given the increasing  back pain and the risk of paralysis she is agreeable for radiation therapy.  8:15 PM patient discussed briefly with neurology. Primarily to establish understanding of the patient in case there is any sudden neurologic change for which they would be called.  8:45 PM Spoke with Dr. Rapp over the phone. Reviewed films with neurosurgery and planned for radiation therapy in the morning. Went to update the patient, who was informed of plan for steroids and radiation in the morning.  9:10 PM.  Patient still with considerable discomfort.  Intravenous Dilaudid 0.5 mg given.  Earlier dosage of morphine 4 mg intravenously with minimal effect.  9:36 PM.  Patient requesting intravenous potassium rather than oral potassium.  Recommendations were to sip the potassium slowly and use ice.  Awaiting call from admitting physician.  9:42 PM.  Patient discussed with Dr. Jeffers.  He is agreeable plan for admission.  Patient will be a full admit.  At the conclusion of the encounter I discussed the results of all of the tests and the disposition. The questions were answered and return precautions provided. The patient or family acknowledged understanding and was agreeable with the care plan.       Patient represents a critical care situation.  Approximately 45 minutes spent directly involved patient's care independent of any procedures.      PPE: Provider wore gloves, N95 mask, eye protection.     MEDICATIONS GIVEN IN THE EMERGENCY:  Medications   levofloxacin (LEVAQUIN) infusion 750 mg (has no administration in time range)   potassium chloride (KLOR-CON) Packet 40 mEq (has no administration in time range)   dexamethasone (DECADRON) injection 8 mg (has no administration in time range)   morphine (PF) injection 4 mg (has no administration in time range)   gadobutrol (GADAVIST) injection 6 mL (6 mLs Intravenous Given 8/31/21 1716)   potassium chloride (KLOR-CON) Packet 40 mEq (40 mEq Oral Given 8/31/21 1752)   HYDROcodone-acetaminophen  "(NORCO) 5-325 MG per tablet 1 tablet (1 tablet Oral Given 8/31/21 4661)       NEW PRESCRIPTIONS STARTED AT TODAY'S ER VISIT  New Prescriptions    No medications on file          =================================================================    HPI    Patient information was obtained from: patient     Use of Intrepreter: N/A        Lizzie Viera is a 70 year old female with a pertient medical history of compression fracture of T7 vertebra, multiple myeloma with failed remission, 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    About 4 months ago, patient experienced an onset of constant back pain that has worsened in severity over the past weekend. Additionally reports of sudden onset of leg weakness and sensation loss, since resolved, about 2 days ago. In the ED, patient presents for evaluation of worsening back pain. Says she opposes chemotherapy due to harmful effects of thalidomide on children, and recently has been pursuing naturopathic therapies. Says naturopathic therapies include taking tumeric and coumarin, believing in their ability to \"oxidize cells more and get rid of dead cells.\"    Additionally notes onset of diarrhea about 4 months ago. Denies antibiotics.      REVIEW OF SYSTEMS   Constitutional:  Denies fever, chills  Respiratory:  Denies productive cough or increased work of breathing  Cardiovascular:  Denies chest pain, palpitations  GI:  Denies abdominal pain, nausea, vomiting, or change in bladder habits. Positive for diarrhea.  Musculoskeletal:  Denies any new muscle/joint swelling. Positive for back pain.  Skin:  Denies rash   Neurologic:  Denies focal weakness. Positive for lower extremity weakness and sensation loss (resolved).  All systems negative except as marked.     PAST MEDICAL " HISTORY:  Past Medical History:   Diagnosis Date     Cervical dysplasia      Chronic RUQ pain      Osteoporosis        PAST SURGICAL HISTORY:  Past Surgical History:   Procedure Laterality Date     C LAP,CHOLECYSTECTOMY/EXPLORE  12/27/2004     C LIGATE FALLOPIAN TUBE      Description: Tubal Ligation;  Recorded: 09/20/2007;     CONIZATION CERVIX,KNIFE/LASER      Description: Cervical Conization By Laser;  Recorded: 09/20/2007;     HC DILATION/CURETTAGE DIAG/THER NON OB      Description: Dilation And Curettage;  Recorded: 09/20/2007;     HC REMOVE TONSILS/ADENOIDS,<11 Y/O      Description: Tonsillectomy With Adenoidectomy;  Recorded: 09/20/2007;         CURRENT MEDICATIONS:      Current Facility-Administered Medications:      dexamethasone (DECADRON) injection 8 mg, 8 mg, Intravenous, Q8H, Nohemi Rapp MD     levofloxacin (LEVAQUIN) infusion 750 mg, 750 mg, Intravenous, Once, Migue Mendez MD     morphine (PF) injection 4 mg, 4 mg, Intravenous, Once, Migue Mendez MD     potassium chloride (KLOR-CON) Packet 40 mEq, 40 mEq, Oral, Once, Migue Mendez MD    Current Outpatient Medications:      acetylcysteine (NAC) 600 MG CAPS capsule, Take 600 mg by mouth daily, Disp: , Rfl:      Coenzyme Q10 300 MG CAPS, Take 300 mg by mouth daily, Disp: , Rfl:      fish oil-omega-3 fatty acids 1000 MG capsule, Take 2 g by mouth daily, Disp: , Rfl:      levOCARNitine (CARNITOR) 330 MG tablet, Take 330 mg by mouth daily, Disp: , Rfl:      Melatonin 10 MG TABS tablet, Take 20 mg by mouth At Bedtime, Disp: , Rfl:      NALTREXONE HCL PO, Take 3 mg by mouth daily, Disp: , Rfl:      TURMERIC PO, Take 1 capsule by mouth daily, Disp: , Rfl:      UNABLE TO FIND, MEDICATION NAME: Nutrazyme - 2 capsules by mouth twice daily., Disp: , Rfl:      UNABLE TO FIND, MEDICATION NAME: Panacur C - 1 capsule daily, Disp: , Rfl:      UNABLE TO FIND, MEDICATION NAME: Pectasol - 1 scoop by mouth daily, Disp: , Rfl:      UNABLE TO FIND,  MEDICATION NAME: NK stim - 1 tablet daily, Disp: , Rfl:      UNABLE TO FIND, MEDICATION NAME:  mg daily, Disp: , Rfl:     ALLERGIES:  Allergies   Allergen Reactions     Cefdinir Shortness Of Breath     Latex Shortness Of Breath     Short Ragweed Pollen Ext Cough     Adhesive Tape Rash       FAMILY HISTORY:  Family History   Problem Relation Age of Onset     Diabetes Mother      Arthritis Mother      LUNG DISEASE Father      Diabetes Maternal Uncle      Breast Cancer Paternal Aunt      Heart Failure Maternal Grandmother      Heart Disease Maternal Grandmother      Heart Failure Maternal Grandfather      Heart Disease Maternal Grandfather      Heart Failure Paternal Grandmother      Heart Disease Paternal Grandmother        SOCIAL HISTORY:   Social History     Socioeconomic History     Marital status:      Spouse name: Not on file     Number of children: 3     Years of education: Not on file     Highest education level: Not on file   Occupational History     Not on file   Tobacco Use     Smoking status: Former Smoker     Packs/day: 0.50     Years: 45.00     Pack years: 22.50     Types: Cigarettes     Smokeless tobacco: Never Used   Substance and Sexual Activity     Alcohol use: Yes     Comment: Alcoholic Drinks/day: 0-1 drinks per week     Drug use: Not Currently     Sexual activity: Not Currently     Partners: Male     Birth control/protection: Surgical   Other Topics Concern     Not on file   Social History Narrative     Not on file     Social Determinants of Health     Financial Resource Strain:      Difficulty of Paying Living Expenses:    Food Insecurity:      Worried About Running Out of Food in the Last Year:      Ran Out of Food in the Last Year:    Transportation Needs:      Lack of Transportation (Medical):      Lack of Transportation (Non-Medical):    Physical Activity:      Days of Exercise per Week:      Minutes of Exercise per Session:    Stress:      Feeling of Stress :    Social  "Connections:      Frequency of Communication with Friends and Family:      Frequency of Social Gatherings with Friends and Family:      Attends Church Services:      Active Member of Clubs or Organizations:      Attends Club or Organization Meetings:      Marital Status:    Intimate Partner Violence:      Fear of Current or Ex-Partner:      Emotionally Abused:      Physically Abused:      Sexually Abused:        VITALS:  Patient Vitals for the past 24 hrs:   BP Temp Temp src Pulse Resp SpO2 Height Weight   08/31/21 2000 116/56 -- -- 58 -- 97 % -- --   08/31/21 1536 (!) 203/76 98.6  F (37  C) Temporal 53 18 97 % 1.575 m (5' 2\") 58.5 kg (129 lb)        PHYSICAL EXAM    Constitutional:  Awake, alert, in no apparent distress  HENT:  Normocephalic, Atraumatic. Bilateral external ears normal. Oropharynx moist. Nose normal. Neck- Normal range of motion with no guarding, No midline cervical tenderness, Supple, No stridor.   Eyes:  PERRL, EOMI with no signs of entrapment, Conjunctiva normal, No discharge.   Respiratory:  Normal breath sounds, No respiratory distress, No wheezing.    Cardiovascular:  Normal heart rate, Normal rhythm, No appreciable rubs or gallops.   GI:  Soft, No tenderness, No distension, No palpable masses  Musculoskeletal:  Intact distal pulses, No edema. Good range of motion in all major joints. No tenderness to palpation or major deformities noted.  Integument:  Warm, Dry, No erythema, No rash.   Neurologic:  Alert & oriented, Normal sensory function. Mild RLE weakness most notable in right plantar flexion.   Psychiatric:  Affect normal, Judgment normal, Mood normal.     LAB:  All pertinent labs reviewed and interpreted.  Results for orders placed or performed during the hospital encounter of 08/31/21   MR Thoracic Spine w/o & w Contrast    Impression    IMPRESSION:  1.  Pathologic compression fracture at T7 with progressive vertebral height loss and increasing epidural/paraspinal extraosseous " neoplasm compared to 04/26/2021.  2.  Epidural neoplasm results in high-grade narrowing of the thecal sac and impingement upon the mid thoracic cord extending from the lower T6 through upper T8 levels. Minimal associated cord edema.  3.  Epidural neoplasm results in moderate to high-grade neural foraminal narrowing at T6-T7 and T7-T8 bilaterally and moderate neural foraminal narrowing on the left at T8-T9.  4.  Additional osseous metastasis at T1 similar to the prior exam. No new osseous metastasis elsewhere.   Comprehensive metabolic panel   Result Value Ref Range    Sodium 142 136 - 145 mmol/L    Potassium 2.1 (LL) 3.5 - 5.0 mmol/L    Chloride 106 98 - 107 mmol/L    Carbon Dioxide (CO2) 24 22 - 31 mmol/L    Anion Gap 12 5 - 18 mmol/L    Urea Nitrogen 10 8 - 22 mg/dL    Creatinine 0.77 0.60 - 1.10 mg/dL    Calcium 9.6 8.5 - 10.5 mg/dL    Glucose 83 70 - 125 mg/dL    Alkaline Phosphatase 40 (L) 45 - 120 U/L    AST 22 0 - 40 U/L    ALT 23 0 - 45 U/L    Protein Total 10.0 (H) 6.0 - 8.0 g/dL    Albumin 2.9 (L) 3.5 - 5.0 g/dL    Bilirubin Total 0.5 0.0 - 1.0 mg/dL    GFR Estimate 80 >60 mL/min/1.73m2   Erythrocyte sedimentation rate auto   Result Value Ref Range    Erythrocyte Sedimentation Rate 111 (H) 0 - 20 mm/hr   CBC (+ platelets, no diff)   Result Value Ref Range    WBC Count 7.6 4.0 - 11.0 10e3/uL    RBC Count 3.05 (L) 3.80 - 5.20 10e6/uL    Hemoglobin 10.2 (L) 11.7 - 15.7 g/dL    Hematocrit 29.3 (L) 35.0 - 47.0 %    MCV 96 78 - 100 fL    MCH 33.4 (H) 26.5 - 33.0 pg    MCHC 34.8 31.5 - 36.5 g/dL    RDW 15.9 (H) 10.0 - 15.0 %    Platelet Count 355 150 - 450 10e3/uL   UA with Microscopic reflex to Culture    Specimen: Urine, Midstream   Result Value Ref Range    Color Urine Yellow Colorless, Straw, Light Yellow, Yellow    Appearance Urine Turbid (A) Clear    Glucose Urine Negative Negative mg/dL    Bilirubin Urine Negative Negative    Ketones Urine 60  (A) Negative mg/dL    Specific Gravity Urine 1.020 1.001 -  1.030    Blood Urine 0.03 mg/dL (A) Negative    pH Urine 6.0 5.0 - 7.0    Protein Albumin Urine 70  (A) Negative mg/dL    Urobilinogen Urine <2.0 <2.0 mg/dL    Nitrite Urine Positive (A) Negative    Leukocyte Esterase Urine 25 Mikal/uL (A) Negative    Bacteria Urine Many (A) None Seen /HPF    Mucus Urine Present (A) None Seen /LPF    RBC Urine 10 (H) <=2 /HPF    WBC Urine 13 (H) <=5 /HPF    Squamous Epithelials Urine 1 <=1 /HPF    Hyaline Casts Urine 2 <=2 /LPF       RADIOLOGY:  Reviewed all pertinent imaging. Please see official radiology report.  MR Thoracic Spine w/o & w Contrast   Final Result   Addendum 1 of 1   Findings were called to Dr. Mendez at 8/31/2021 6:32 PM by Dr. GREGORIA Calloway.      Final   IMPRESSION:   1.  Pathologic compression fracture at T7 with progressive vertebral height loss and increasing epidural/paraspinal extraosseous neoplasm compared to 04/26/2021.   2.  Epidural neoplasm results in high-grade narrowing of the thecal sac and impingement upon the mid thoracic cord extending from the lower T6 through upper T8 levels. Minimal associated cord edema.   3.  Epidural neoplasm results in moderate to high-grade neural foraminal narrowing at T6-T7 and T7-T8 bilaterally and moderate neural foraminal narrowing on the left at T8-T9.   4.  Additional osseous metastasis at T1 similar to the prior exam. No new osseous metastasis elsewhere.                I, Narendra Fletcher, am serving as a scribe to document services personally performed by Migue Mendez MD, based on my observation and the provider's statements to me. I, Migue Mendez MD attest that Narendra Fletcher is acting in a scribe capacity, has observed my performance of the services and has documented them in accordance with my direction.    Migue Mendez M.D.  Emergency Medicine  [facility] EMERGENCY DEPARTMENT     Migue Mendez MD  08/31/21 7350

## 2021-08-31 NOTE — PROGRESS NOTES
Neurosurgery progress note:    HPI:  Valeria Viera is a 70-year-old hx multiple myeloma with a T1 lesion as well as a T7 pathologic pressure fracture. Last seen by Dr Campbell on 7/1/21 at which time, she was neuro intact and was cleared to f/u in 6mos with new xr, otherwise prn. She has seen two hem-oncologists, has refused treatment with chemotherapy or radiation advised, and has been seeing a naturopath, taking vitamin C and tumeric only.  Since last visit, she contacted the office with complaints of worsening thoracic pain and a new fall 1 wk ago after standing up and her entire R leg going numb and then giving out. Since fall, she has c/o katherine leg weakness with activity/standing, requiring new use of a walker. Her back pain is severe and wraps around her chest bilaterally. She has had multiple episodes of bowel incontinence only, with stable chronic diarrhea since April. She denies further episodes of leg numbness. Here with her  today. She is no longer wearing her TLSO brace because she lost 50lbs and it no longer fits    Exam:  On physical exam-  She is seated, very uncomfortable d/t pain bebe with movement  She has 4/5 weakness of R dorsiflexion and R EHL, otherwise full strength throughout upper and lower extremities. She has up-going toes.   She has point tenderness to T7 area.  Sensation to light touch is intact throughout the upper and lower extremities.  Able to stand, limited ability to walk, shuffled gait d/t pain    Imaging:  Thoracic XR today shows worsening compression of T7 with seg kyphosis about T7 which appears to create listhesis of T6 over T8, difficult to eval retropulsion given quality of XR.      Assessment and plan:  Valeria a very pleasant 70-year-old hx multiple myeloma who deferred chemo/radiation with a pathologic fracture of T7 with retropulsion and compression of the spinal cord who returns with neuro changes x1wk. She had an episode of R leg numbness with subsequent fall, with  lingering symptoms including katherine leg weakness req new use of walker, bowel incontinence, and worsening back pain.    Plan:  1. Recommended ED for evaluation given neuro changes.   2. Stat MRI thoracic spine with and without contrast with thin cuts from T6-T8 given fracture/tumor.  (Outpatient thoracic MRI openings were explored but openings were not time-sensitive enough for acuity of problem)  3. Will likely need Refit TLSO brace  4. Will decide regarding any surgery  5. Pain control     Keara Chu FNP-C  [facility] Neurosurgery  O. [phone]

## 2021-08-31 NOTE — ED TRIAGE NOTES
The pt presents for evaluation of back pain with radiation toward her R upper abdomen. She has a hx of multiple myeloma, and has a known tumor on her spine. She is concerned that it is progressing. Pain is severe at this time.

## 2021-09-01 ENCOUNTER — APPOINTMENT (OUTPATIENT)
Dept: RADIOLOGY | Facility: HOSPITAL | Age: 71
DRG: 542 | End: 2021-09-01
Attending: NURSE PRACTITIONER
Payer: COMMERCIAL

## 2021-09-01 ENCOUNTER — APPOINTMENT (OUTPATIENT)
Dept: RADIATION ONCOLOGY | Facility: HOSPITAL | Age: 71
End: 2021-09-01
Attending: RADIOLOGY
Payer: COMMERCIAL

## 2021-09-01 ENCOUNTER — DOCUMENTATION ONLY (OUTPATIENT)
Dept: ORTHOPEDICS | Facility: CLINIC | Age: 71
End: 2021-09-01

## 2021-09-01 ENCOUNTER — LAB (OUTPATIENT)
Dept: LAB | Facility: CLINIC | Age: 71
End: 2021-09-01
Payer: COMMERCIAL

## 2021-09-01 DIAGNOSIS — G89.3 CANCER ASSOCIATED PAIN: Primary | ICD-10-CM

## 2021-09-01 DIAGNOSIS — C90.00 MULTIPLE MYELOMA NOT HAVING ACHIEVED REMISSION (H): Primary | ICD-10-CM

## 2021-09-01 DIAGNOSIS — C90.00 MYELOMA (H): Primary | ICD-10-CM

## 2021-09-01 LAB
ANION GAP SERPL CALCULATED.3IONS-SCNC: 10 MMOL/L (ref 5–18)
BUN SERPL-MCNC: 10 MG/DL (ref 8–22)
CALCIUM SERPL-MCNC: 9 MG/DL (ref 8.5–10.5)
CHLORIDE BLD-SCNC: 108 MMOL/L (ref 98–107)
CO2 SERPL-SCNC: 20 MMOL/L (ref 22–31)
CREAT SERPL-MCNC: 0.76 MG/DL (ref 0.6–1.1)
GFR SERPL CREATININE-BSD FRML MDRD: 81 ML/MIN/1.73M2
GLUCOSE BLD-MCNC: 126 MG/DL (ref 70–125)
GLUCOSE BLDC GLUCOMTR-MCNC: 165 MG/DL (ref 70–125)
MAGNESIUM SERPL-MCNC: 1.4 MG/DL (ref 1.8–2.6)
PHOSPHATE SERPL-MCNC: 3.7 MG/DL (ref 2.5–4.5)
POTASSIUM BLD-SCNC: 2.6 MMOL/L (ref 3.5–5)
POTASSIUM BLD-SCNC: 2.8 MMOL/L (ref 3.5–5)
PREALB SERPL IA-MCNC: 15 MG/DL (ref 19–38)
SARS-COV-2 RNA RESP QL NAA+PROBE: NEGATIVE
SODIUM SERPL-SCNC: 138 MMOL/L (ref 136–145)

## 2021-09-01 PROCEDURE — 84132 ASSAY OF SERUM POTASSIUM: CPT | Performed by: INTERNAL MEDICINE

## 2021-09-01 PROCEDURE — 250N000013 HC RX MED GY IP 250 OP 250 PS 637: Performed by: CLINICAL NURSE SPECIALIST

## 2021-09-01 PROCEDURE — 77295 3-D RADIOTHERAPY PLAN: CPT | Mod: 26 | Performed by: RADIOLOGY

## 2021-09-01 PROCEDURE — G0463 HOSPITAL OUTPT CLINIC VISIT: HCPCS | Mod: 25

## 2021-09-01 PROCEDURE — 88321 CONSLTJ&REPRT SLD PREP ELSWR: CPT | Performed by: PATHOLOGY

## 2021-09-01 PROCEDURE — 120N000001 HC R&B MED SURG/OB

## 2021-09-01 PROCEDURE — 250N000011 HC RX IP 250 OP 636: Performed by: HOSPITALIST

## 2021-09-01 PROCEDURE — 84134 ASSAY OF PREALBUMIN: CPT | Performed by: HOSPITALIST

## 2021-09-01 PROCEDURE — 96375 TX/PRO/DX INJ NEW DRUG ADDON: CPT

## 2021-09-01 PROCEDURE — 77431 RADIATION THERAPY MANAGEMENT: CPT | Performed by: RADIOLOGY

## 2021-09-01 PROCEDURE — 36415 COLL VENOUS BLD VENIPUNCTURE: CPT | Performed by: HOSPITALIST

## 2021-09-01 PROCEDURE — 84100 ASSAY OF PHOSPHORUS: CPT | Performed by: HOSPITALIST

## 2021-09-01 PROCEDURE — 99232 SBSQ HOSP IP/OBS MODERATE 35: CPT | Performed by: INTERNAL MEDICINE

## 2021-09-01 PROCEDURE — 77336 RADIATION PHYSICS CONSULT: CPT

## 2021-09-01 PROCEDURE — 250N000013 HC RX MED GY IP 250 OP 250 PS 637: Performed by: INTERNAL MEDICINE

## 2021-09-01 PROCEDURE — 77427 RADIATION TX MANAGEMENT X5: CPT | Performed by: RADIOLOGY

## 2021-09-01 PROCEDURE — 250N000013 HC RX MED GY IP 250 OP 250 PS 637: Performed by: HOSPITALIST

## 2021-09-01 PROCEDURE — 99223 1ST HOSP IP/OBS HIGH 75: CPT | Performed by: CLINICAL NURSE SPECIALIST

## 2021-09-01 PROCEDURE — 77334 RADIATION TREATMENT AID(S): CPT | Mod: 26 | Performed by: RADIOLOGY

## 2021-09-01 PROCEDURE — 83735 ASSAY OF MAGNESIUM: CPT | Performed by: INTERNAL MEDICINE

## 2021-09-01 PROCEDURE — 77412 RADIATION TX DELIVERY LVL 3: CPT

## 2021-09-01 PROCEDURE — 72080 X-RAY EXAM THORACOLMB 2/> VW: CPT

## 2021-09-01 PROCEDURE — 77295 3-D RADIOTHERAPY PLAN: CPT | Mod: 26

## 2021-09-01 PROCEDURE — 250N000011 HC RX IP 250 OP 636: Performed by: INTERNAL MEDICINE

## 2021-09-01 PROCEDURE — 80048 BASIC METABOLIC PNL TOTAL CA: CPT | Performed by: HOSPITALIST

## 2021-09-01 PROCEDURE — 36415 COLL VENOUS BLD VENIPUNCTURE: CPT | Performed by: INTERNAL MEDICINE

## 2021-09-01 PROCEDURE — 87635 SARS-COV-2 COVID-19 AMP PRB: CPT | Performed by: EMERGENCY MEDICINE

## 2021-09-01 PROCEDURE — 77263 THER RADIOLOGY TX PLNG CPLX: CPT | Performed by: RADIOLOGY

## 2021-09-01 PROCEDURE — 250N000011 HC RX IP 250 OP 636: Performed by: RADIOLOGY

## 2021-09-01 PROCEDURE — 77334 RADIATION TREATMENT AID(S): CPT | Mod: 26

## 2021-09-01 RX ORDER — HYDROMORPHONE HYDROCHLORIDE 2 MG/1
2-4 TABLET ORAL
Status: DISCONTINUED | OUTPATIENT
Start: 2021-09-01 | End: 2021-09-02

## 2021-09-01 RX ORDER — MAGNESIUM SULFATE 4 G/50ML
4 INJECTION INTRAVENOUS ONCE
Status: COMPLETED | OUTPATIENT
Start: 2021-09-01 | End: 2021-09-01

## 2021-09-01 RX ORDER — GABAPENTIN 300 MG/1
300 CAPSULE ORAL 3 TIMES DAILY
Status: DISCONTINUED | OUTPATIENT
Start: 2021-09-01 | End: 2021-09-01

## 2021-09-01 RX ORDER — POTASSIUM CHLORIDE 7.45 MG/ML
10 INJECTION INTRAVENOUS
Status: COMPLETED | OUTPATIENT
Start: 2021-09-01 | End: 2021-09-02

## 2021-09-01 RX ORDER — GABAPENTIN 300 MG/1
300 CAPSULE ORAL 3 TIMES DAILY
Status: DISCONTINUED | OUTPATIENT
Start: 2021-09-01 | End: 2021-09-03 | Stop reason: HOSPADM

## 2021-09-01 RX ORDER — POTASSIUM CHLORIDE 1.5 G/1.58G
40 POWDER, FOR SOLUTION ORAL EVERY 4 HOURS
Status: DISPENSED | OUTPATIENT
Start: 2021-09-01 | End: 2021-09-01

## 2021-09-01 RX ADMIN — MAGNESIUM SULFATE HEPTAHYDRATE 4 G: 80 INJECTION, SOLUTION INTRAVENOUS at 21:31

## 2021-09-01 RX ADMIN — GABAPENTIN 300 MG: 300 CAPSULE ORAL at 14:43

## 2021-09-01 RX ADMIN — OXYCODONE HYDROCHLORIDE 2.5 MG: 5 TABLET ORAL at 10:36

## 2021-09-01 RX ADMIN — ENOXAPARIN SODIUM 40 MG: 40 INJECTION SUBCUTANEOUS at 20:46

## 2021-09-01 RX ADMIN — GABAPENTIN 300 MG: 300 CAPSULE ORAL at 20:40

## 2021-09-01 RX ADMIN — POTASSIUM CHLORIDE FOR ORAL SOLUTION 40 MEQ: 1.5 POWDER, FOR SOLUTION ORAL at 10:28

## 2021-09-01 RX ADMIN — DEXAMETHASONE SODIUM PHOSPHATE 8 MG: 4 INJECTION, SOLUTION INTRAMUSCULAR; INTRAVENOUS at 07:08

## 2021-09-01 RX ADMIN — DEXAMETHASONE SODIUM PHOSPHATE 8 MG: 4 INJECTION, SOLUTION INTRAMUSCULAR; INTRAVENOUS at 14:21

## 2021-09-01 RX ADMIN — ONDANSETRON 4 MG: 2 INJECTION INTRAMUSCULAR; INTRAVENOUS at 04:13

## 2021-09-01 RX ADMIN — HYDROMORPHONE HYDROCHLORIDE 4 MG: 2 TABLET ORAL at 16:20

## 2021-09-01 RX ADMIN — POTASSIUM CHLORIDE 10 MEQ: 10 INJECTION, SOLUTION INTRAVENOUS at 22:04

## 2021-09-01 RX ADMIN — POTASSIUM CHLORIDE FOR ORAL SOLUTION 40 MEQ: 1.5 POWDER, FOR SOLUTION ORAL at 17:54

## 2021-09-01 RX ADMIN — HYDROMORPHONE HYDROCHLORIDE 0.5 MG: 1 INJECTION, SOLUTION INTRAMUSCULAR; INTRAVENOUS; SUBCUTANEOUS at 13:08

## 2021-09-01 RX ADMIN — DEXAMETHASONE SODIUM PHOSPHATE 8 MG: 4 INJECTION, SOLUTION INTRAMUSCULAR; INTRAVENOUS at 20:46

## 2021-09-01 RX ADMIN — POTASSIUM CHLORIDE 10 MEQ: 10 INJECTION, SOLUTION INTRAVENOUS at 23:40

## 2021-09-01 RX ADMIN — SULFAMETHOXAZOLE AND TRIMETHOPRIM 1 TABLET: 800; 160 TABLET ORAL at 08:29

## 2021-09-01 RX ADMIN — SULFAMETHOXAZOLE AND TRIMETHOPRIM 1 TABLET: 800; 160 TABLET ORAL at 20:41

## 2021-09-01 ASSESSMENT — ACTIVITIES OF DAILY LIVING (ADL): DEPENDENT_IADLS:: TRANSPORTATION

## 2021-09-01 ASSESSMENT — MIFFLIN-ST. JEOR: SCORE: 1072.93

## 2021-09-01 NOTE — PROGRESS NOTES
S: Lizzie Viera was seen at [facility], ER Room 7, for the evaluation and measurements for a custom TLSO.     Dx: T7 Pathogenetic Compression Fracture, T6-T9 Stenosis    O: A Custom TLSO is medically necessary and recommended by Neurosurgery due to severity and instability of spinal fracture.     A: Measurements were completed without incident. Custom [employer] TLSO with a Flex Foam liner and rigid thermoplastic external frame.      P: The TLSO will be fabricated and delivered, pending any unforeseen circumstances, by Thursday afternoon (9/2/2021).  If possible, the TLSO will be delivered earlier.    At the time of delivery, the TLSO will be fit to the patient and all the donning/doffing and care instructions will be provided to the patient. (Instructions are also included following this note.)    G: The TLSO increases medial-lateral, rotational and anterior-posterior stability of the spine.  The TLSO also applies compression to the patient s soft tissues.  Compression of the patient s soft tissues serves to unload the injured vertebrae which reduces pain and promotes healing.  The goal of this orthosis is to provide spinal stability, promote healing and reduce pain while the patient performs their ADLs.    Electronically signed by Farrukh Dawson CPO, LPO    How to Put on a TLSO Back Brace  For optimal fit brace should NOT be donned with patient sitting. Ideal fit is achieved by donning in either laying or standing position. Due to changes in belly tissue, it is difficult to achieve a proper fit when patient is sitting.  1. Apply a snug-fitting cotton t-shirt.  Loose shirts may cause wrinkles and skin irritation.  2. Patient should be supine. Palpate for waist (below ribs but above hips) so you know where to line up waist grooves of the brace.  3. Logroll patient and slide back section of brace under patient lining up waist groove of brace with patient's waist.  4. Roll patient onto their back  with the posterior section behind them. Recheck alignment of waist groove on brace with patient's waist. It may be necessary to logroll patient to the opposite side to get posterior section centered on patient, where it extends anteriorly equal amounts on patient's sides. Repeat logrolling side to side as necessary.  5. Once posterior section is positioned correctly, lay anterior section on patient. Again, line up waist groove of brace to patient's waist. Waist grooves of anterior and posterior section should match up and fit together. Anterior shell should go over the top of posterior shell. Velcro straps should line up with the chafes/D-rings, if they don't, either the anterior or the posterior sections are not in the proper place.  6. Fasten the middle straps first and tighten both sides evenly. Then fasten either top or bottom straps in the same manner. Brace should be snug so as to prevent brace from sliding up on patient. If it is too loose, the brace will slide up and cause discomfort to the patient in the chin and/or axilla area.  7. When properly fit, brace the inferior aspect should allow clearance so as not to cut into the tops of the thighs when sitting but needs to be as low on the pelvic area as possible.    Wearing Schedule  Always check with prescribing doctor for a precise wearing schedule. At times the TLSO is worn only when out of bed while sitting or standing. Other times, the doctor requires the TLSO to be worn at all times.    Cleaning and Maintenance  Rubbing alcohol may be used to clean the inside and outside of the TLSO. Spray TLSO with alcohol and wipe gently with a cloth. Be sure that TLSO is completely dry prior to application to ensure no skin issues will occur. Always wear a clean, dry, snug-fitting shirt under the brace.    Tips and Problem Solving  Brace migration is inevitable, especially when patient is going from laying to upright in bed. The mattress may push posterior section of  brace up, which will push the whole brace up. Migration may also occur when patient sits in a soft cushioned chair. Don't be afraid to readjust brace and pull it down and back to its proper position.    Avoid soft seat cushioned chairs and sit up straight. Soft seat cushions or leaning back into a chair will cause the brace to migrate upward and may place pressure underarms.      Instead of leaning back, try sitting on the front half of the seat of the chair and work your way back until you contact the backrest of the chair.  Sometimes using a pillow in the gap between the back of the chair and the brace will provide more support.    Do not lean forward over a table while eating. Bring the food up to the mouth. This will reduce any pressure on the thighs and chest.    If the TLSO starts to migrate upward under the throat/armpits, ensure that straps are snug. Straps that are loose will allow the brace to shift.

## 2021-09-01 NOTE — ED NOTES
Pt sleeping.  Radiation called for treatment.  Pt states pain in back 5/10 but tolerable.  Pt state has been having diarrhea x 5 months and has been using bedpan here in ED.  Pt alert.

## 2021-09-01 NOTE — CONSULTS
Care Management Initial Consult    General Information  Assessment completed with: Patient,    Type of CM/SW Visit: Initial Assessment    Primary Care Provider verified and updated as needed: Yes   Readmission within the last 30 days: no previous admission in last 30 days         Advance Care Planning: Advance Care Planning Reviewed: no concerns identified          Communication Assessment  Patient's communication style: spoken language (English or Bilingual)    Hearing Difficulty or Deaf: no   Wear Glasses or Blind: no    Cognitive  Cognitive/Neuro/Behavioral: WDL                      Living Environment:   People in home: child(jung), adult, grandchild(jung), spouse     Current living Arrangements: house      Able to return to prior arrangements: yes       Family/Social Support:  Care provided by: self  Provides care for: no one  Marital Status:   , Children          Description of Support System: Supportive, Involved         Current Resources:   Patient receiving home care services: No     Community Resources: Other (see comment) (outpatient PT)  Equipment currently used at home: walker, rolling  Supplies currently used at home: Other (back brace)    Employment/Financial:  Employment Status:          Financial Concerns:             Lifestyle & Psychosocial Needs:  Social Determinants of Health     Tobacco Use: Medium Risk     Smoking Tobacco Use: Former Smoker     Smokeless Tobacco Use: Never Used   Alcohol Use:      Frequency of Alcohol Consumption:      Average Number of Drinks:      Frequency of Binge Drinking:    Financial Resource Strain:      Difficulty of Paying Living Expenses:    Food Insecurity:      Worried About Running Out of Food in the Last Year:      Ran Out of Food in the Last Year:    Transportation Needs:      Lack of Transportation (Medical):      Lack of Transportation (Non-Medical):    Physical Activity:      Days of Exercise per Week:      Minutes of Exercise per Session:     Stress:      Feeling of Stress :    Social Connections:      Frequency of Communication with Friends and Family:      Frequency of Social Gatherings with Friends and Family:      Attends [employer] Services:      Active Member of Clubs or Organizations:      Attends Club or Organization Meetings:      Marital Status:    Intimate Partner Violence:      Fear of Current or Ex-Partner:      Emotionally Abused:      Physically Abused:      Sexually Abused:    Depression: Not at risk     PHQ-2 Score: 1   Housing Stability:      Unable to Pay for Housing in the Last Year:      Number of Places Lived in the Last Year:      Unstable Housing in the Last Year:        Functional Status:  Prior to admission patient needed assistance:   Dependent ADLs:: Ambulation-walker  Dependent IADLs:: Transportation                    Additional Information:  Met with patient for assessment. Patient states she lives independently in her house with spouse, 2 adult children and their kids. She ambulates with a rolling walker. She has a back brace. She hasn't been driving lately. She states her family is supportives. Patient has been attending [facility] outpatient PT. Daughter Collette is primary family contact. Family willing to transport.    Karin Graham RN

## 2021-09-01 NOTE — PROGRESS NOTES
Neurosurgery plan of care:    Reviewed patient's new upright thoracic XR in her home brace.   Segmental kyphosis about pt's fracture is a little worse at 32 degrees today rather than 31 degrees yesterday.    Recommend continuing bedrest, HOB <30 degrees and waiting to advance activity until custom TLSO brace arrives tomorrow.  Repeat XR upright in custom TLSO and will re-eval activity orders.    Keara GILBERT-C  [facility]  O. [phone]

## 2021-09-01 NOTE — LETTER
9/1/2021         RE: Lizzie Viera  [address]  [place] [address]        Dear Colleague,    Thank you for referring your patient, Lizzie Viera, to the [facility] RADIATION ONCOLOGY [place]. Please see a copy of my visit note below.    RADIATION ONCOLOGY WEEKLY TREATMENT VISIT NOTE    Assessment:       ICD-10-CM    1. Multiple myeloma not having achieved remission (H)  C90.00 Oncology/Hematology Adult Referral        Impression/Plan:   70 year old female with worsening thoracic pathologic fracture with thecal sac narrowing and foraminal stenosis in setting of multiple myeloma     1. Received single fraction 800 cGy to T6-9 today. No further radiation planned at this time. Follow up in 6 weeks.     2. Steroid taper per neurosurgery.    3. Patient wishes to change medical oncologist to provider within [place] rather than Dr. Ness, have provided oncology referral.     Radiation: Site: T6-9  Stereotactic Radiosurgery: No  Concurrent Therapy: No  Total Dose for Bone: 800  Today's Fraction/Total Fraction Bone: 1/1      Subjective:     HPI: Lizzie Viera is a 70 year old female with multiple myeloma who has declined cancer directed therapies to date. Sustained what was thought to be osteoporotic T7 compression fracture in September 2020.    Imaging at that time showed the T7 compression fracture diffuse marrow edema and an indeterminant T1 lesion.      Subsequent imaging on 4/26/2021 showed worsening of her T7 pathologic fracture with new extraosseous extension causing severe spinal canal stenosis.  Biopsy on 5/7/2020 showed a plasma cell neoplasm.  Seen by medical oncology and a bone marrow biopsy was obtained from her right iliac crest which showed involvement of multiple myeloma.  She declined radiation and chemotherapy at that time.    She did continue to have follow-up in the neurosurgical clinic.  8/31/2021 she was seen in the clinic and due to worsening pain leg  weakness and incontinence was admitted for further work-up.    SITE TREATED: T6-9  TOTAL DOSE: 800 cGy  NUMBER OF FRACTIONS: 1  DATES COMPLETED: 2021  CONCURRENT CHEMOTHERAPY: No  ADJUVANT THERAPY:TBD    She tolerated the treatment without unexpected side effects.         The following portions of the patient's history were reviewed and updated as appropriate: allergies, current medications, past family history, past medical history, past social history, past surgical history and problem list.    Assessment   Body Site:  Bone Information  Site: T6-9  Stereotactic Radiosurgery: No  Concurrent Therapy: No  Today's Dose: 800  Total Dose for Bone: 800  Today's Fraction/Total Fraction Bone:   Comfort Alteration  KPS: 60% Requires assistance, but can meet most meeds with assistance  Fatigue (ONS scale): 2: Mild Fatigue  Pain Location: back  Pain Intensity. Rate degree of pain ranging from 0 (no pain) to 10 (severe pain): 3 (after dilaudid IV)  Pain Description: Ache - Muscular type ache  Pain Intervention: 3: Opiods  Effectiveness of pain intervention: 2: Pain relieved 50%  Elimination Alteration  Diarrhea w/Colostomy: 0: None  Constipation: 0: None  Proctitis: 0: None  Urinary frequency and/or urgency: 0: Normal  Urinary Retention: 0 : Normal  Urinary Incontinence: 0: None  Dysuria: 0: None  Skin Alteration  Skin Sensation: 0: No problem  Skin Reaction: 0: None  CNS Alteration  Neuropathy - motor: 2: Mild objective weakness interfering with function, but not interfering with activites of daily living  Nutrition Alteration  Anorexia: 0: None  Nausea: 1: Able to eat  Vomitin: None  Elimination Alteration  Constipation: 0: None  Diarrhea w/Colostomy: 0: None  Bowel Incontinence: 0: None  Urinary Incontinence: 0: None                       Emotional Alteration       Comfort Alteration   KPS: 60% Requires assistance, but can meet most meeds with assistance  Fatigue (ONS scale): 2: Mild Fatigue  Pain Location:  back  Pain Intensity. Rate degree of pain ranging from 0 (no pain) to 10 (severe pain): 3 (after dilaudid IV)  Pain Description: Ache - Muscular type ache  Pain Intervention: 3: Opiods  Effectiveness of pain intervention: 2: Pain relieved 50%   Nutrition Alteration   Anorexia: 0: None  Nausea: 1: Able to eat  Vomitin: None  Skin Alteration   Skin Sensation: 0: No problem  Skin Reaction: 0: None      Objective:     Exam:     There were no vitals filed for this visit.    Wt Readings from Last 8 Encounters:   21 58.5 kg (129 lb)   21 58.5 kg (129 lb)   21 64.4 kg (142 lb)   21 64.4 kg (142 lb)   21 67.6 kg (149 lb 1.6 oz)   21 70.8 kg (156 lb)   21 70.8 kg (156 lb)   21 70.8 kg (156 lb)       General: Alert and oriented. Sitting comfortably.   Patient non acute appearing, asymptomatic. Mask on. No cough, no respiratory distress.   Lizzie has no erythema.    Treatment Summary to Date    Aria chart and setup information reviewed    I, Nohemi Rapp MD personally performed the services described in this documentation, as scribed by Justyn Pineda in my presence, and it is both accurate and complete.    Signed by: Nohemi Rapp MD, MPH       Pt here for one fraction of radiation to T-spine. Transferred from cart to tx table and back with staff assist of three. She got IV pain medication in ED prior to her treatment today and tolerated tx fine. She was seen by Dr. Rapp in the treatment room today. She is requested to f/u in 4-6 weeks with Dr. Rapp. F/u with neurosurgery for bracing, f/u scans, and to taper steroids as able. Pt does have our phone number if she has questions, discharge instructions given verbally and in writing.       Again, thank you for allowing me to participate in the care of your patient.        Sincerely,        Nohemi Rapp MD

## 2021-09-01 NOTE — PROGRESS NOTES
RADIATION ONCOLOGY WEEKLY TREATMENT VISIT NOTE    Assessment:       ICD-10-CM    1. Multiple myeloma not having achieved remission (H)  C90.00 Oncology/Hematology Adult Referral        Impression/Plan:   70 year old female with worsening thoracic pathologic fracture with thecal sac narrowing and foraminal stenosis in setting of multiple myeloma     1. Received single fraction 800 cGy to T6-9 today. No further radiation planned at this time. Follow up in 6 weeks.     2. Steroid taper per neurosurgery.    3. Patient wishes to change medical oncologist to provider within [place] rather than Dr. Ness, have provided oncology referral.     Radiation: Site: T6-9  Stereotactic Radiosurgery: No  Concurrent Therapy: No  Total Dose for Bone: 800  Today's Fraction/Total Fraction Bone: 1/1      Subjective:     HPI: Lizzie Viera is a 70 year old female with multiple myeloma who has declined cancer directed therapies to date. Sustained what was thought to be osteoporotic T7 compression fracture in September 2020.    Imaging at that time showed the T7 compression fracture diffuse marrow edema and an indeterminant T1 lesion.      Subsequent imaging on 4/26/2021 showed worsening of her T7 pathologic fracture with new extraosseous extension causing severe spinal canal stenosis.  Biopsy on 5/7/2020 showed a plasma cell neoplasm.  Seen by medical oncology and a bone marrow biopsy was obtained from her right iliac crest which showed involvement of multiple myeloma.  She declined radiation and chemotherapy at that time.    She did continue to have follow-up in the neurosurgical clinic.  8/31/2021 she was seen in the clinic and due to worsening pain leg weakness and incontinence was admitted for further work-up.    SITE TREATED: T6-9  TOTAL DOSE: 800 cGy  NUMBER OF FRACTIONS: 1  DATES COMPLETED: 9/1/2021  CONCURRENT CHEMOTHERAPY: No  ADJUVANT THERAPY:TBD    She tolerated the treatment without unexpected side effects.         The  following portions of the patient's history were reviewed and updated as appropriate: allergies, current medications, past family history, past medical history, past social history, past surgical history and problem list.    Assessment   Body Site:  Bone Information  Site: T6-9  Stereotactic Radiosurgery: No  Concurrent Therapy: No  Today's Dose: 800  Total Dose for Bone: 800  Today's Fraction/Total Fraction Bone:   Comfort Alteration  KPS: 60% Requires assistance, but can meet most meeds with assistance  Fatigue (ONS scale): 2: Mild Fatigue  Pain Location: back  Pain Intensity. Rate degree of pain ranging from 0 (no pain) to 10 (severe pain): 3 (after dilaudid IV)  Pain Description: Ache - Muscular type ache  Pain Intervention: 3: Opiods  Effectiveness of pain intervention: 2: Pain relieved 50%  Elimination Alteration  Diarrhea w/Colostomy: 0: None  Constipation: 0: None  Proctitis: 0: None  Urinary frequency and/or urgency: 0: Normal  Urinary Retention: 0 : Normal  Urinary Incontinence: 0: None  Dysuria: 0: None  Skin Alteration  Skin Sensation: 0: No problem  Skin Reaction: 0: None  CNS Alteration  Neuropathy - motor: 2: Mild objective weakness interfering with function, but not interfering with activites of daily living  Nutrition Alteration  Anorexia: 0: None  Nausea: 1: Able to eat  Vomitin: None  Elimination Alteration  Constipation: 0: None  Diarrhea w/Colostomy: 0: None  Bowel Incontinence: 0: None  Urinary Incontinence: 0: None                       Emotional Alteration       Comfort Alteration   KPS: 60% Requires assistance, but can meet most meeds with assistance  Fatigue (ONS scale): 2: Mild Fatigue  Pain Location: back  Pain Intensity. Rate degree of pain ranging from 0 (no pain) to 10 (severe pain): 3 (after dilaudid IV)  Pain Description: Ache - Muscular type ache  Pain Intervention: 3: Opiods  Effectiveness of pain intervention: 2: Pain relieved 50%   Nutrition Alteration   Anorexia: 0:  None  Nausea: 1: Able to eat  Vomitin: None  Skin Alteration   Skin Sensation: 0: No problem  Skin Reaction: 0: None      Objective:     Exam:     There were no vitals filed for this visit.    Wt Readings from Last 8 Encounters:   21 58.5 kg (129 lb)   21 58.5 kg (129 lb)   21 64.4 kg (142 lb)   21 64.4 kg (142 lb)   21 67.6 kg (149 lb 1.6 oz)   21 70.8 kg (156 lb)   21 70.8 kg (156 lb)   21 70.8 kg (156 lb)       General: Alert and oriented. Sitting comfortably.   Patient non acute appearing, asymptomatic. Mask on. No cough, no respiratory distress.   Lizzie has no erythema.    Treatment Summary to Date    Aria chart and setup information reviewed    INohemi MD personally performed the services described in this documentation, as scribed by Justyn Pineda in my presence, and it is both accurate and complete.    Signed by: Nohemi Rapp MD, MPH

## 2021-09-01 NOTE — ED NOTES
Pt states increased pain with moving in radiation.  Farrukh called from orthotics to come and fit Pts own brace.

## 2021-09-01 NOTE — ED NOTES
Pt denies any new or worsening pain, pt is lying comfortably at this time.  Pt denies any new numbness or tingling to her extremities.  Pt is aware of need for bedrest.  Pt denied any new needs at this time.

## 2021-09-01 NOTE — TREATMENT PLAN
Discussed images with Dr Campbell. Would recommend consult to radiation oncology to see if palliative radiation is an option. High risk of fusion failure with underlying multiple myeloma and bone quality. Patient has been resistive to oncology recommendations prior to this visit. Uncertain if patient would want radiation. Recommend continuing brace. Brace needs orthotist adjustment as patient has lost a lot of weight since last fitting.     OLIVA Flynn-CNP  [facility]  O: [phone]

## 2021-09-01 NOTE — PROGRESS NOTES
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  Underwent one high dose session of XRT today.  Might need another one in 6 weeks.  Awaiting TLSO brace to be fitted tomorrow afternoon then can likely discharge.  Dexamethasone, neurontin, dilaudid as needed.    Active Problems:      Multiple myeloma not having achieved remission - will follow up with oncology    Acute cystitis due to Escherichia coli - on bactrim - awaiting sensitivities    Hypokalemia - K = 2.6 mmol/L (Ref range: 3.5 - 5.0 mmol/L) on admission, will replace as needed      Functional diarrhea - long term, follow for now    Severe malnutrition - 50 pound weight loss this year, encouraged oral intake    Dyslipidemia - no meds    H/o left subclavian steal syndrome       Diet: Regular Diet Adult    DVT Prophylaxis: start Enoxaparin (Lovenox) SQ  Stover Catheter: Not present  Central Lines: None  Code Status: Full Code      Disposition Plan   Home tomorrow likely     The patient's care was discussed with the Bedside Nurse and Patient.    Rayshawn Allison MD  Hospitalist Service  [facility]  Securely message with the Vocera Web Console (learn more here)  Text page via [employer] Paging/Directory      Clinically Significant Risk Factors Present on Admission          ______________________________________________________________________    Interval History   Still with some pain but better with meds by palliative team.    Data reviewed today: I reviewed all medications, new labs and imaging results over the last 24 hours.  Physical Exam   Vital Signs: Temp: 98  F (36.7  C) Temp src: Oral BP: (!) 156/72 Pulse: 55   Resp: 20 SpO2: 97 % O2  Device: None (Room air)    Weight: 130 lbs 0 oz  General Appearance: Older F in mild distress secondary to pain  Respiratory: occas rhonchi  Cardiovascular: RRR S1S2  GI: +BS, soft, NT/ND  Neuro: Alert, generally nonfocal on motor and sensory testing       Data   Recent Labs   Lab 09/01/21 1917 09/01/21  0708 08/31/21  1611   WBC  --   --  7.6   HGB  --   --  10.2*   MCV  --   --  96   PLT  --   --  355   NA  --  138 142   POTASSIUM 2.8* 2.6* 2.1*   CHLORIDE  --  108* 106   CO2  --  20* 24   BUN  --  10 10   CR  --  0.76 0.77   ANIONGAP  --  10 12   SERAFIN  --  9.0 9.6   GLC  --  126* 83   ALBUMIN  --   --  2.9*   PROTTOTAL  --   --  10.0*   BILITOTAL  --   --  0.5   ALKPHOS  --   --  40*   ALT  --   --  23   AST  --   --  22   LIPASE  --   --  <9

## 2021-09-01 NOTE — TREATMENT PLAN
RADIATION ONCOLOGY TREATMENT PLAN   8/31/2021    Lizzie Viera is a 71 yo with multiple myeloma with progression of her known T7 pathologic fracture. To date she has declined cancer therapies.     She was seen in the neurosurgical clinic this afternoon and was sent to the ER due to worsening midthoracic back pain with bilateral radiculopathy, right leg weakness.     Films have been reviewed and discussed with neurosurgery. I will see patient in morning and discuss radiation.  If she refuses then will get neurosurgery involved. IN mean time steroids have been ordered.     Nohemi Rapp MD MPH   [phone]

## 2021-09-01 NOTE — CONSULTS
[facility] Radiation Oncology Inpatient Consult Note    Patient: Lizzie Viera  MRN: [id number]  Date of Service: 9/1/2021      Reason for Visit    I was asked to consult by Dr. Migue Mendez for consideration of radiation for worsening thoracic pathologic fracture with thecal sac narrowing and foraminal stenosis in setting of multiple myeloma     Assessment/Plan   70-year-old with multiple myeloma who has declined cancer directed therapies to date.  She is willing to get radiation today but is unclear of cancer therapies going forward.    As such we will treat her with a single high-dose fraction to levels T6 through T8/9.  Then if she changes her mind we would have at least given her a therapeutic dose in that 1 fraction. WIth the high dose single fraction there is a chance that will need a second fraction in about 6 weeks.     Steroids were commenced yesterday. That combined with pain meds, she is feeling much better.     The above plan was discussed Dr Mckinney this morning who concurs with plan.   Multiple myeloma is a radiosensitive disease and as such if there may be rapid regression of tumor that may affect her stability and with her (per patient) 50 weight loss her TLSO brace will be adjusted. Neurosurgery will follow as an outpatient.     She is willing to see medical oncology again and has an appointment with Dr Mata Ness 9/30/2021    She will be treated about 1:00 this afternoon please give dose of pain medication 30 minutes prior. Continue current dose of steroids and we will start taper depending on discharge date.     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  Imaging at that time showed the T7 compression fracture diffuse marrow edema and an indeterminant T1 lesion.      Subsequent imaging on 4/26/2021 showed worsening of her T7 pathologic fracture with new extraosseous extension causing severe spinal canal stenosis.  Biopsy on 5/7/2020 showed a plasma cell neoplasm.  Seen by medical oncology and a bone marrow biopsy was obtained from her right iliac crest which showed involvement of multiple myeloma.  She declined radiation and chemotherapy at that time.    She did continue to have follow-up in the neurosurgical clinic.  Yesterday she was seen in the clinic and due to worsening pain leg weakness and incontinence was admitted for further work-up.    review of Systems    Pertinent items are noted in HPI.    Past History    Past Medical History:   Diagnosis Date     Cervical dysplasia      Chronic RUQ pain      Osteoporosis       Family History   Problem Relation Age of Onset     Diabetes Mother      Arthritis Mother      LUNG DISEASE Father      Diabetes Maternal Uncle      Breast Cancer Paternal Aunt      Heart Failure Maternal Grandmother      Heart Disease Maternal Grandmother      Heart Failure Maternal Grandfather      Heart Disease Maternal Grandfather      Heart Failure Paternal Grandmother      Heart Disease Paternal Grandmother      Past Surgical History:   Procedure Laterality Date     C LAP,CHOLECYSTECTOMY/EXPLORE  12/27/2004     C LIGATE FALLOPIAN TUBE      Description: Tubal Ligation;  Recorded: 09/20/2007;     CONIZATION CERVIX,KNIFE/LASER      Description: Cervical Conization By Laser;  Recorded: 09/20/2007;     HC DILATION/CURETTAGE DIAG/THER NON OB      Description: Dilation And Curettage;  Recorded: 09/20/2007;     HC REMOVE TONSILS/ADENOIDS,<11 Y/O       Description: Tonsillectomy With Adenoidectomy;  Recorded: 09/20/2007;      Social History     Socioeconomic History     Marital status:      Spouse name: Not on file     Number of children: 3     Years of education: Not on file     Highest education level: Not on file   Occupational History     Not on file   Tobacco Use     Smoking status: Former Smoker     Packs/day: 0.50     Years: 45.00     Pack years: 22.50     Types: Cigarettes     Smokeless tobacco: Never Used   Substance and Sexual Activity     Alcohol use: Yes     Comment: Alcoholic Drinks/day: 0-1 drinks per week     Drug use: Not Currently     Sexual activity: Not Currently     Partners: Male     Birth control/protection: Surgical   Other Topics Concern     Not on file   Social History Narrative     Not on file     Social Determinants of Health     Financial Resource Strain:      Difficulty of Paying Living Expenses:    Food Insecurity:      Worried About Running Out of Food in the Last Year:      Ran Out of Food in the Last Year:    Transportation Needs:      Lack of Transportation (Medical):      Lack of Transportation (Non-Medical):    Physical Activity:      Days of Exercise per Week:      Minutes of Exercise per Session:    Stress:      Feeling of Stress :    Social Connections:      Frequency of Communication with Friends and Family:      Frequency of Social Gatherings with Friends and Family:      Attends Faith Services:      Active Member of Clubs or Organizations:      Attends Club or Organization Meetings:      Marital Status:    Intimate Partner Violence:      Fear of Current or Ex-Partner:      Emotionally Abused:      Physically Abused:      Sexually Abused:      She is .  They have 3 children.  She is retired.  She smoke about half pack a day since her teenage.  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  She said that she  "has donated about 5 gallons of blood.     Family history was significant for paternal aunt with breast cance  Allergies    Allergies   Allergen Reactions     Cefdinir Shortness Of Breath     Latex Shortness Of Breath     Short Ragweed Pollen Ext Cough     Adhesive Tape Rash        Physical Exam    Constitutional: /63   Pulse 51   Temp 98.6  F (37  C) (Temporal)   Resp 18   Ht 5' 2\" (1.575 m)   Wt 129 lb (58.5 kg)   SpO2 94%   BMI 23.59 kg/m       Physical exam shows Lizzie lying comfortably on a stretcher with her mask on.   Neuro: Grossly nonfocal.  She has a mild 4/5 change in her strength in the right distal lower extremity but strength is otherwise grossly intact.  Gait was not tested.     Lab Results     Results for orders placed or performed during the hospital encounter of 08/31/21   Comprehensive metabolic panel   Result Value Ref Range    Sodium 142 136 - 145 mmol/L    Potassium 2.1 (LL) 3.5 - 5.0 mmol/L    Chloride 106 98 - 107 mmol/L    Carbon Dioxide (CO2) 24 22 - 31 mmol/L    Anion Gap 12 5 - 18 mmol/L    Urea Nitrogen 10 8 - 22 mg/dL    Creatinine 0.77 0.60 - 1.10 mg/dL    Calcium 9.6 8.5 - 10.5 mg/dL    Glucose 83 70 - 125 mg/dL    Alkaline Phosphatase 40 (L) 45 - 120 U/L    AST 22 0 - 40 U/L    ALT 23 0 - 45 U/L    Protein Total 10.0 (H) 6.0 - 8.0 g/dL    Albumin 2.9 (L) 3.5 - 5.0 g/dL    Bilirubin Total 0.5 0.0 - 1.0 mg/dL    GFR Estimate 80 >60 mL/min/1.73m2     Low potassium noted and hospitalist addressing    Imaging Results    XR Thoracic Spine 2 Views    Result Date: 8/31/2021  EXAM: XR THORACIC SPINE 2 VIEWS LOCATION: [facility] DATE/TIME: 8/31/2021 1:37 PM INDICATION:  Closed T7 fracture (H) COMPARISON: 06/29/2021. TECHNIQUE: CR Thoracic Spine.     IMPRESSION: Severe T7 vertebral body compression deformity is similar to prior. Again localized accentuation of kyphosis at this level. There is mild dextroconvex curvature. The remaining visualized vertebral " bodies are unremarkable. Mild multilevel spondylosis is similar to prior. Pedicles appear symmetric. Visualized lung fields are well aerated.     MR Thoracic Spine w/o & w Contrast    Addendum Date: 8/31/2021    Findings were called to Dr. Mendez at 8/31/2021 6:32 PM by Dr. GREGORIA Calloway.    Result Date: 8/31/2021  EXAM: MR THORACIC SPINE W/O and W CONTRAST LOCATION: [facility] DATE/TIME: 8/31/2021 4:47 PM INDICATION: Pathologic fracture of T7. COMPARISON: MRI thoracic spine 04/26/2021 CONTRAST: 6ml Gadavist TECHNIQUE: Routine Thoracic Spine MRI without and with IV contrast. FINDINGS: 12 rib-bearing thoracic type vertebra. Marked pathologic compression fracture of T7 with progressive vertebral height loss compared to the previous MRI. There is now essentially vertebra plana at this level with progressive segmental kyphosis measuring 27 degrees from T6 through T8. Pathologic marrow involving the posterior T7 vertebral margin with increased extension into the adjacent ventral epidural space, progressive involvement of the pedicles/lamina, and new marrow replacement/enhancement involving the posterior inferior T6 and superior T8 vertebral margins with additional progressive paraspinal extraosseous enhancing soft tissue mass. There is now circumferential epidural neoplasm at the T7 level with severe spinal canal stenosis, increased impingement upon the thoracic cord, and minimal intramedullary T2 prolongation. The additional metastatic deposit at T1 mild diffuse disc desiccation throughout the thoracic spine, with moderate loss of disc height at T5-T6 and mild multilevel loss of disc height at multiple additional levels from T3 through T10. No focal disc herniations. Minimal thoracic facet arthropathy. No additional high-grade spinal canal stenosis. Neoplastic soft tissue results in moderate to severe bilateral neural foraminal stenosis at T6-T7, severe bilateral neural foraminal stenosis at T7-T8,  and mild to moderate left neural foraminal stenosis at T8-T9. Mild progression of paraspinal soft tissue mass centered at T7 as above. For example, maximum transverse dimension of enhancing soft tissue measuring 6.2 cm on axial images compared to 5.3 cm previously.     IMPRESSION: 1.  Pathologic compression fracture at T7 with progressive vertebral height loss and increasing epidural/paraspinal extraosseous neoplasm compared to 04/26/2021. 2.  Epidural neoplasm results in high-grade narrowing of the thecal sac and impingement upon the mid thoracic cord extending from the lower T6 through upper T8 levels. Minimal associated cord edema. 3.  Epidural neoplasm results in moderate to high-grade neural foraminal narrowing at T6-T7 and T7-T8 bilaterally and moderate neural foraminal narrowing on the left at T8-T9. 4.  Additional osseous metastasis at T1 similar to the prior exam. No new osseous metastasis elsewhere.      Pathology  Collected:  6/3/2021 11:00 AM Status:  Edited Result - FINAL   Visible to patient:  Yes (MyChart)   0 Result Notes   Ref Range & Units    Case Report   Bone Marrow                                       Case: [id number]                                    Authorizing Provider:  Lisset Baig MD      Collected:           06/03/2021 1100               Ordering Location:     [facility]   Received:            06/03/2021 1150                                      [facility]                                                               Pathologist:           Arnold Rosales MD                                                         Specimens:   A) - Iliac Crest, Right                                                                              B) - Iliac Crest, Right                                                                              C) - Iliac Crest, Right                                                                              D) - Peripheral Blood                                                                       Addendum     The purpose of this addendum is to report results of additional studies. Kappa and Lambda in situ hybridization studies demonstrate kappa light chain restri   Addendum electronically signed by Arnold Rosales MD on 6/9/2021 at 10:43 AM    Cytogenetics Report, Addendum     Please see [facility] cytogenetics report [id number].   Addendum electronically signed by Arnold Rosales MD on 6/9/2021 at 11:50 AM    Final Diagnosis   BONE MARROW, POSTERIOR ILIAC CREST, ASPIRATE, CLOT SECTION, AND BIOPSY:       -  HYPOCELLULAR MARROW INVOLVED BY MULTIPLE MYELOMA (APPROXIMATELY 60% INVOLVEMENT)       -  INCREASED IRON STORES     PERIPHERAL BLOOD:       -  NO SIGNIFICANT ABNORMALITIES     MCRS   Electronically signed by Arnold Rosales MD on 6/4/2021 at  2:00 PM       Comment     The clinical history has been reviewed. Pending kappa and lambda in situ hybridization studies (addendum). Morphology, flow cytometry ([id number]) and immunohistochemistry demonstrate significant involvement by multiple myeloma. Pending cytogenetic analysis.   Clinical Information  plasma cell disease, T7 lesion, diagnostic    Performed By:  Arnold Rosales MD    Peripheral Smear     Red blood cells are normal in number and overall normochromic and normocytic. Anisopoikilocytosis, polychromasia, and rouleaux formation are not prominent.     The white blood cell count and differential appear as reported on the CBC. Leukocytes are normal in number and appearance, consisting predominantly of segmented and band neutrophils with fewer numbers of lymphocytes and monocytes. No blasts or dysplastic changes are identified.     Platelets are normal in number and appearance.    Bone Marrow Diff     Neutrophils 37.5%; Erythroid cells 14.5%; Monocytes 1.5%; Eosinophils 0.5%; Basophils 0%; Plasma cells/Lymphocytes 43.5%; Blasts 2%.     M:E Ratio: 2-3:1    Aspirate Smear     Aspirate smears are  cellular and demonstrate numerous marrow particles. Trilineage hematopoiesis is decreased given the numerous malignant plasma cells. Iron stores are adequate.   Core Biopsy/Aspirate Clot     Bone marrow biopsy and clot sections are hypocellular at approximately 20%. Trilineage hematopoiesis is decreased as the marrow is partially effaced by sheets and an interstitial infiltrate of (+) plasma cells (estimated at 60%). No significant coexpression of CD20 on plasma cells. CD3 highlights background T-cells. An iron stain demonstrates increased iron stores with no increase in ring sideroblasts.     All controls stain appropriately.    Charges     CPT: 42982, 94709, 90180, 82895 ×2, 74871 ×3, 51221 ×2, 96139 ×2, 38922 ×2, 60491 ×2, 76467   ICD-10: C90.00    Result Flag Normal MalignantAbnormal     Comment: SPECIMEN PROCESSING:     All histology slide preparation and stains; and cytology slide preparation, staining, and cytotechnologist screening done at [facility] are performed at [facility], [address] [place] [address], [facility], with final interpretation, frozen section analysis, and cytology adequacy assessment at indicated laboratory.      Resulting Agency  [facility]         Specimen Collected: 06/03/21 11:00 AM Last Resulted: 06/09/21 11:50 AM        Order Details      View Encounter      Lab and Collection Details      Routing      Result History              Lab IDs    Specimen #   [id number]   Result Report    Bone marrow biopsy (Order #[id number]) on 6/3/21    Other Results from 6/3/2021     LAB RESULT - HIM SCAN  Final result     LAB RESULT - HIM SCAN  Final result     LAB RESULT - HIM SCAN  Final result     : Laboratory Miscellaneous Order  Final result 6/3/2021    Flow Cytometry Other  Final result 6/3/2021    CHROMOSOME ANALYSIS, BONE MARROW, DIAGNOSIS/RELAPSE  Final result 6/3/2021    CBC WITH PLATELETS AND DIFFERENTIAL  Final result 6/3/2021    External System: External ID:   EXTCOP CBM   EXTEAP CBM   Outbound Lab CBM   GICHLABEAP [id number]   GICHLABEAP [id number]   HE EAP LAB6   ATLASEAP LAB6   [id number] LAB6   PHLEBIOEAP BONE MARROW   Base CPT Code (Reference Only)    Code CPT [employer]   LAB6              Face to face time 60  minutes withspent on consultation, education, personal view of serial images, discussion with neurosurgery group, coordination of care, discussing with patient the risks of declining treatment, risks of ongoing natural therapies.    Signed by: Nohemi Rapp MD

## 2021-09-01 NOTE — ED NOTES
Pt presents for evaluation of upper/mid back pain which has gotten worse lately. One year ago she had a pathologic fracture to T7, secondary to multiple myeloma. This was treated with bracing. However, lately her pain has gotten worse. SKIN: WNL.   HEENT: Normocephalic, alert and oriented x 3.   CHEST: Symmetrical rise. No reported CP or SOB.  ABD: R sided abdominal cramping that is associated with the compression fracture. Positive for nausea.   EXT: MORROW x 4  Rest of exam unremarkable.

## 2021-09-01 NOTE — CONSULTS
NEUROSURGERY CONSULTATION NOTE    Lizzie Viera   [address]  [place] [address]  70 year old female  Admission Date/Time: 8/31/2021  7:10 PM  Primary Care Provider: Anne Marie Walker  [facility] Attending Physician: Rayshawn Allison MD    Neurosurgery was asked to see this patient by Rayshawn Allison MD for evaluation of pathologic fracture of T7 with extension of tumor.     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  Active Problems:    Chronic midline thoracic back pain    Compression fracture of T7 vertebra, sequela    Multiple myeloma not having achieved remission (H)    Acute cystitis with hematuria    Hypokalemia    Functional diarrhea    Severe malnutrition (H)       Neurosurgery Attending: The patient's clinical examination, laboratory data, and plan was discussed with Dr Campbell    CONSULTATION ASSESSMENT AND PLAN:    Valeria is a very pleasant 70-year-old hx multiple myeloma with a T1 lesion and a pathologic fracture of T7 with retropulsion and compression of the spinal cord who presented to [facility] ED on 8/31/21 with new neuro changes x1wk after a mechanical fall.     New thoracic MRI w wo contrast was done in ER overnight, which demonstrates worsening height loss and epidural/paraspinal extension of tumor at T7, high grade narrowing of thecal sac with impingement of the cord from T6-T8. T1 lesion stable. Discussed with Dr Campbell and oncology team. Given her osteoporosis and multiple myeloma, her risk of stabilization with multilevel thoracic instrumentation surgery for stabilization of fracture would be extremely high. We would not recommend this. Recommend palliative radiation to area to preserve neurologic function. Orthotics consult placed for new TLSO brace to wear when upright or out of bed. Please keep patient bedrest, HOB <30 degrees until brace arrives. We will review upright thoracic XR in brace and place orders  for activity/PT.    Plan:  1. Pain control  2. Palliative radiation per oncology team  3. TLSO brace, orthotics consult today  4. Upright thoracic XR in brace prior to activity advancement      HPI:    Valeria Viera is a 70-year-old hx multiple myeloma with a T1 lesion as well as a T7 pathologic pressure fracture. She was sent to ED for further evaluation and new thoracic MRI without contrast on 8/31/21 after being seen in the neurosurgery clinic with complaints of new neuro changes after a mechanical fall 1 wk prior. Last seen by Dr Campbell on 7/1/21 at which time, she was neuro intact and was cleared to f/u in 6mos with new xr, otherwise prn. She has seen two hem-oncologists for opinions, has refused treatment with chemotherapy or radiation advised, and has been seeing a naturopath, taking vitamin C and tumeric only. One week ago, she had an episode where she stood up and her R leg went numb and then gave out. Since fall, she has c/o katherine leg weakness with activity/standing, requiring new use of a walker. Her back pain is severe and wraps around her chest bilaterally. She has had multiple episodes of bowel incontinence only, with stable chronic diarrhea since April. She denies further episodes of leg numbness. She is no longer wearing her TLSO brace because she lost 50lbs and it no longer fits.     Today 9/1 she reports much improved back/abdomen pain compared to yesterday. She has some tingling in the bottoms of her feet. She has not had any incontinence episodes. She denies leg pain, weakness, or arm symptoms. She is amenable to radiation treatment per our brief discussion. She would appreciate and wear a new TLSO, anxious to be able to get out of bed.        Past Medical History:   Diagnosis Date     Cervical dysplasia      Chronic RUQ pain      Osteoporosis      Past Surgical History:   Procedure Laterality Date     C LAP,CHOLECYSTECTOMY/EXPLORE  12/27/2004     C LIGATE FALLOPIAN TUBE      Description: Tubal  Ligation;  Recorded: 09/20/2007;     CONIZATION CERVIX,KNIFE/LASER      Description: Cervical Conization By Laser;  Recorded: 09/20/2007;     HC DILATION/CURETTAGE DIAG/THER NON OB      Description: Dilation And Curettage;  Recorded: 09/20/2007;     HC REMOVE TONSILS/ADENOIDS,<11 Y/O      Description: Tonsillectomy With Adenoidectomy;  Recorded: 09/20/2007;       REVIEW OF SYSTEMS:   negative    MEDICATIONS:  Current Outpatient Medications   Medication Sig Dispense Refill     acetylcysteine (NAC) 600 MG CAPS capsule Take 600 mg by mouth daily       Coenzyme Q10 300 MG CAPS Take 300 mg by mouth daily       fish oil-omega-3 fatty acids 1000 MG capsule Take 2 g by mouth daily       levOCARNitine (CARNITOR) 330 MG tablet Take 330 mg by mouth daily       Melatonin 10 MG TABS tablet Take 20 mg by mouth At Bedtime       NALTREXONE HCL PO Take 3 mg by mouth daily       TURMERIC PO Take 1 capsule by mouth daily       UNABLE TO FIND MEDICATION NAME: Nutrazyme - 2 capsules by mouth twice daily.       UNABLE TO FIND MEDICATION NAME: Panacur C - 1 capsule daily       UNABLE TO FIND MEDICATION NAME: Pectasol - 1 scoop by mouth daily       UNABLE TO FIND MEDICATION NAME: NK stim - 1 tablet daily       UNABLE TO FIND MEDICATION NAME:  mg daily           ALLERGIES/SENSITIVITIES:     Allergies   Allergen Reactions     Cefdinir Shortness Of Breath     Latex Shortness Of Breath     Short Ragweed Pollen Ext Cough     Adhesive Tape Rash       PERTINENT SOCIAL HISTORY: per below  Social History     Socioeconomic History     Marital status:      Spouse name: Not on file     Number of children: 3     Years of education: Not on file     Highest education level: Not on file   Occupational History     Not on file   Tobacco Use     Smoking status: Former Smoker     Packs/day: 0.50     Years: 45.00     Pack years: 22.50     Types: Cigarettes     Smokeless tobacco: Never Used   Substance and Sexual Activity     Alcohol use: Yes      "Comment: Alcoholic Drinks/day: 0-1 drinks per week     Drug use: Not Currently     Sexual activity: Not Currently     Partners: Male     Birth control/protection: Surgical   Other Topics Concern     Not on file   Social History Narrative     Not on file     Social Determinants of Health     Financial Resource Strain:      Difficulty of Paying Living Expenses:    Food Insecurity:      Worried About Running Out of Food in the Last Year:      Ran Out of Food in the Last Year:    Transportation Needs:      Lack of Transportation (Medical):      Lack of Transportation (Non-Medical):    Physical Activity:      Days of Exercise per Week:      Minutes of Exercise per Session:    Stress:      Feeling of Stress :    Social Connections:      Frequency of Communication with Friends and Family:      Frequency of Social Gatherings with Friends and Family:      Attends Orthodoxy Services:      Active Member of Clubs or Organizations:      Attends Club or Organization Meetings:      Marital Status:    Intimate Partner Violence:      Fear of Current or Ex-Partner:      Emotionally Abused:      Physically Abused:      Sexually Abused:          FAMILY HISTORY:  Family History   Problem Relation Age of Onset     Diabetes Mother      Arthritis Mother      LUNG DISEASE Father      Diabetes Maternal Uncle      Breast Cancer Paternal Aunt      Heart Failure Maternal Grandmother      Heart Disease Maternal Grandmother      Heart Failure Maternal Grandfather      Heart Disease Maternal Grandfather      Heart Failure Paternal Grandmother      Heart Disease Paternal Grandmother         PHYSICAL EXAM:   Constitutional: /63   Pulse 51   Temp 98.6  F (37  C) (Temporal)   Resp 18   Ht 5' 2\" (1.575 m)   Wt 129 lb (58.5 kg)   SpO2 94%   BMI 23.59 kg/m       Mental Status: A & O in no acute distress. Appears much more comfortable today. Affect is appropriate.  Speech is fluent.  Recent and remote memory are intact.  Attention span and " concentration are normal.     Cranial Nerves: CN1: grossly intact per patient recall. CN2: No funduscopic exam performed. CN3,4 & 6: Pupillary light response, lateral and vertical gaze normal.  No nystagmus.  Visual fields are full to confrontation. CN5: Intact to touch CN7: No facial weakness, smile, facial symmetry intact. CN8: Intact to spoken voice. CN9&10: Gag reflex, uvula midline, palate rises with phonation. CN11: Shoulder shrug 5/5 intact bilaterally. CN12: Tongue midline and moves freely from side to side.     Motor: Normal bulk and tone all muscle groups of upper and lower extremities.    Strength:   5/5 throughout both upper and lower extremities throughout, other than 4/5 R dorsiflexion and EHL weakness.     Sensory: Sensation decreased to light touch soles of both feet, otherwise intact bilaterally to light touch of upper and lower extremities     Coordination; normal. Gait not tested     Reflexes; supinator, biceps, triceps, knee and ankle jerk intact. Positive babinski bilaterally. No hoffmans or clonus    IMAGING:  I personally reviewed all radiographic images   EXAM: MR THORACIC SPINE W/O and W CONTRAST  LOCATION: [facility]  DATE/TIME: 8/31/2021 4:47 PM     INDICATION: Pathologic fracture of T7.  COMPARISON: MRI thoracic spine 04/26/2021  CONTRAST: 6ml Gadavist  TECHNIQUE: Routine Thoracic Spine MRI without and with IV contrast.     FINDINGS:   12 rib-bearing thoracic type vertebra. Marked pathologic compression fracture of T7 with progressive vertebral height loss compared to the previous MRI. There is now essentially vertebra plana at this level with progressive segmental kyphosis measuring   27 degrees from T6 through T8. Pathologic marrow involving the posterior T7 vertebral margin with increased extension into the adjacent ventral epidural space, progressive involvement of the pedicles/lamina, and new marrow replacement/enhancement   involving the posterior inferior  T6 and superior T8 vertebral margins with additional progressive paraspinal extraosseous enhancing soft tissue mass. There is now circumferential epidural neoplasm at the T7 level with severe spinal canal stenosis,   increased impingement upon the thoracic cord, and minimal intramedullary T2 prolongation. The additional metastatic deposit at T1 mild diffuse disc desiccation throughout the thoracic spine, with moderate loss of disc height at T5-T6 and mild multilevel   loss of disc height at multiple additional levels from T3 through T10. No focal disc herniations. Minimal thoracic facet arthropathy. No additional high-grade spinal canal stenosis. Neoplastic soft tissue results in moderate to severe bilateral neural   foraminal stenosis at T6-T7, severe bilateral neural foraminal stenosis at T7-T8, and mild to moderate left neural foraminal stenosis at T8-T9.     Mild progression of paraspinal soft tissue mass centered at T7 as above. For example, maximum transverse dimension of enhancing soft tissue measuring 6.2 cm on axial images compared to 5.3 cm previously.                                                                      IMPRESSION:  1.  Pathologic compression fracture at T7 with progressive vertebral height loss and increasing epidural/paraspinal extraosseous neoplasm compared to 04/26/2021.  2.  Epidural neoplasm results in high-grade narrowing of the thecal sac and impingement upon the mid thoracic cord extending from the lower T6 through upper T8 levels. Minimal associated cord edema.  3.  Epidural neoplasm results in moderate to high-grade neural foraminal narrowing at T6-T7 and T7-T8 bilaterally and moderate neural foraminal narrowing on the left at T8-T9.  4.  Additional osseous metastasis at T1 similar to the prior exam. No new osseous metastasis elsewhere.          (critical care)  Total time in critical care I spent was 1 hour.  examining the pt, reviewing the scans, reviewing notes from chart,  discussing treatment options with risks and benefits and coordinating care.        Keara CLARKC  [facility] Neurosurgery  . [phone]

## 2021-09-01 NOTE — ED NOTES
Report to [facility]. Tray ordered.  Food to be sent to P4.  Pt to xray then to floor. Note filled out by Eleonora SEO RN

## 2021-09-01 NOTE — ED NOTES
"[facility] ED Handoff Report    ED Chief Complaint:  chief complaint    ED Diagnosis:  (M48.50XA) Pathologic compression fracture of spine, initial encounter (H)  Comment: multiple myeloma dx in June increased back pain and increased size of tumor.  Plan: radiation       PMH:    Past Medical History:   Diagnosis Date     Cervical dysplasia      Chronic RUQ pain      Osteoporosis         Code Status:  Full Code     Falls Risk: No    Current Living Situation/Residence:  home  Elimination Status:  Continent     Activity Level:  Independent with walker.    Patients Preferred Language:  English     Needed: No    Infection:  [unfilled]     Vital Signs:  BP (!) 147/66   Pulse 54   Temp 98.6  F (37  C) (Temporal)   Resp 18   Ht 1.575 m (5' 2\")   Wt 58.5 kg (129 lb)   SpO2 96%   BMI 23.59 kg/m       Cardiac Rhythm: n/a    Pain Score:  7/10    Is the Patient Confused:  No    Last Food or Drink: today 1200    Focused Assessment:  Pain well controled until going to radiation.  Position with hands over head increased pain.  Nausea with pain. Refused pain med with nausea.  Refused nausea med. Decadron given and with nausea relief.  Pt will take pain med and gabapentin. Held at this time.    Tests Performed:  MRI labs    Abnormal Results:    Abnormal Labs Reviewed   COMPREHENSIVE METABOLIC PANEL - Abnormal; Notable for the following components:       Result Value    Potassium 2.1 (*)     Alkaline Phosphatase 40 (*)     Protein Total 10.0 (*)     Albumin 2.9 (*)     All other components within normal limits   ERYTHROCYTE SEDIMENTATION RATE AUTO - Abnormal; Notable for the following components:    Erythrocyte Sedimentation Rate 111 (*)     All other components within normal limits   CBC WITH PLATELETS - Abnormal; Notable for the following components:    RBC Count 3.05 (*)     Hemoglobin 10.2 (*)     Hematocrit 29.3 (*)     MCH 33.4 (*)     RDW 15.9 (*)     All other components within normal limits "   ROUTINE UA WITH MICROSCOPIC REFLEX TO CULTURE - Abnormal; Notable for the following components:    Appearance Urine Turbid (*)     Ketones Urine 60  (*)     Blood Urine 0.03 mg/dL (*)     Protein Albumin Urine 70  (*)     Nitrite Urine Positive (*)     Leukocyte Esterase Urine 25 Mikal/uL (*)     Bacteria Urine Many (*)     Mucus Urine Present (*)     RBC Urine 10 (*)     WBC Urine 13 (*)     All other components within normal limits    Narrative:     Urine Culture ordered based on laboratory criteria   BASIC METABOLIC PANEL - Abnormal; Notable for the following components:    Potassium 2.6 (*)     Chloride 108 (*)     Carbon Dioxide (CO2) 20 (*)     Glucose 126 (*)     All other components within normal limits   PREALBUMIN - Abnormal; Notable for the following components:    Prealbumin 15 (*)     All other components within normal limits       MR Thoracic Spine w/o & w Contrast   Final Result   Addendum 1 of 1   Findings were called to Dr. Mendez at 8/31/2021 6:32 PM by Dr. GREGORIA Calloway.      Final   IMPRESSION:   1.  Pathologic compression fracture at T7 with progressive vertebral height loss and increasing epidural/paraspinal extraosseous neoplasm compared to 04/26/2021.   2.  Epidural neoplasm results in high-grade narrowing of the thecal sac and impingement upon the mid thoracic cord extending from the lower T6 through upper T8 levels. Minimal associated cord edema.   3.  Epidural neoplasm results in moderate to high-grade neural foraminal narrowing at T6-T7 and T7-T8 bilaterally and moderate neural foraminal narrowing on the left at T8-T9.   4.  Additional osseous metastasis at T1 similar to the prior exam. No new osseous metastasis elsewhere.      XR Thoracic Lumbar Standing 2 Views    (Results Pending)        Treatments Provided:  Radiation and pain meds.    Family Dynamics/Concerns: no    Family Updated On Visitor Policy: No    Plan of Care Communicated to Family: No        Valuable done    Swab done    ED  Medications:    Medications   dexamethasone (DECADRON) injection 8 mg (8 mg Intravenous Given 9/1/21 1421)   methocarbamol (ROBAXIN) tablet 500 mg (500 mg Oral Given 8/31/21 2313)   acetaminophen (TYLENOL) solution 650 mg (has no administration in time range)   HYDROmorphone (DILAUDID) injection 0.5 mg (0.5 mg Intravenous Given 9/1/21 1308)   melatonin tablet 1 mg (has no administration in time range)   ondansetron (ZOFRAN-ODT) ODT tab 4 mg ( Oral See Alternative 9/1/21 0413)     Or   ondansetron (ZOFRAN) injection 4 mg (4 mg Intravenous Given 9/1/21 0413)   prochlorperazine (COMPAZINE) injection 5 mg (has no administration in time range)     Or   prochlorperazine (COMPAZINE) tablet 5 mg (has no administration in time range)     Or   prochlorperazine (COMPAZINE) suppository 12.5 mg (has no administration in time range)   sulfamethoxazole-trimethoprim (BACTRIM DS) 800-160 MG per tablet 1 tablet (1 tablet Oral Given 9/1/21 0829)   NaCl 0.45 % 1,000 mL with potassium chloride 20 mEq/L infusion ( Intravenous Stopped 9/1/21 1320)   potassium chloride (KLOR-CON) Packet 40 mEq (40 mEq Oral Given 9/1/21 1028)   HYDROmorphone (DILAUDID) tablet 2-4 mg (has no administration in time range)   gabapentin (NEURONTIN) capsule 300 mg (has no administration in time range)   gadobutrol (GADAVIST) injection 6 mL (6 mLs Intravenous Given 8/31/21 1716)   potassium chloride (KLOR-CON) Packet 40 mEq (40 mEq Oral Given 8/31/21 1752)   HYDROcodone-acetaminophen (NORCO) 5-325 MG per tablet 1 tablet (1 tablet Oral Given 8/31/21 1752)   levofloxacin (LEVAQUIN) infusion 750 mg (0 mg Intravenous Stopped 8/31/21 2255)   potassium chloride (KLOR-CON) Packet 40 mEq (40 mEq Oral Given 8/31/21 2157)   morphine (PF) injection 4 mg (4 mg Intravenous Given 8/31/21 2048)   HYDROmorphone (DILAUDID) injection 0.5 mg (0.5 mg Intravenous Given 8/31/21 2127)        Additional Information: waiting for Farrukh from orthotics to return to place Pts own brace. Xray  to be done after.    @[facility]@ 9/1/2021 2:30 PM

## 2021-09-01 NOTE — PROGRESS NOTES
Pt in cart from ER to radiation clinic for RT planning pictures. Folder given with information on RT, RT planning and RT side effects. Further recommendations and orders per provider.

## 2021-09-01 NOTE — PROGRESS NOTES
Pt here for one fraction of radiation to T-spine. Transferred from cart to tx table and back with staff assist of three. She got IV pain medication in ED prior to her treatment today and tolerated tx fine. She was seen by Dr. Rapp in the treatment room today. She is requested to f/u in 4-6 weeks with Dr. Rapp. F/u with neurosurgery for bracing, f/u scans, and to taper steroids as able. Pt does have our phone number if she has questions, discharge instructions given verbally and in writing.

## 2021-09-01 NOTE — PROVIDER NOTIFICATION
Patient had K of 2.6 this morning. Received a dose in ED and will receive two more doses this evening. Did not have protocol in place or an order to obtain a K recheck this evening. Sent message to provider to clarify is this is something they would like follow up with tonight. Waiting for further orders.

## 2021-09-01 NOTE — PHARMACY-ADMISSION MEDICATION HISTORY
Pharmacy Note - Admission Medication History    Pertinent Provider Information: Pt gets low dose natrexone from a compounding pharmacy in [place].      ______________________________________________________________________    Prior To Admission (PTA) med list completed and updated in EMR.       PTA Med List   Medication Sig Note Last Dose     acetylcysteine (NAC) 600 MG CAPS capsule Take 600 mg by mouth daily  8/31/2021 at AM     Coenzyme Q10 300 MG CAPS Take 300 mg by mouth daily  8/31/2021 at AM     fish oil-omega-3 fatty acids 1000 MG capsule Take 2 g by mouth daily  8/31/2021 at AM     levOCARNitine (CARNITOR) 330 MG tablet Take 330 mg by mouth daily  8/31/2021 at AM     Melatonin 10 MG TABS tablet Take 20 mg by mouth At Bedtime  8/30/2021 at PM     NALTREXONE HCL PO Take 3 mg by mouth daily 8/31/2021: From compounding pharmacy, told pt they would need to supply.  8/31/2021 at AM     TURMERIC PO Take 1 capsule by mouth daily  8/31/2021 at AM     UNABLE TO FIND MEDICATION NAME: Nutrazyme - 2 capsules by mouth twice daily.  8/31/2021 at AM     UNABLE TO FIND MEDICATION NAME: Panacur C - 1 capsule daily  8/31/2021 at AM     UNABLE TO FIND MEDICATION NAME: Pectasol - 1 scoop by mouth daily       UNABLE TO FIND MEDICATION NAME: NK stim - 1 tablet daily       UNABLE TO FIND MEDICATION NAME:  mg daily         Information source(s): Patient  Method of interview communication: in-person    Summary of Changes to PTA Med List  New: all listed  Discontinued: N/A  Changed: N/A    Patient was asked about OTC/herbal products specifically.  PTA med list reflects this.    In the past week, patient estimated taking medication this percent of the time:  greater than 90%.    Allergies were reviewed, assessed, and updated with the patient.      Could supply naltrexone if ordered    The information provided in this note is only as accurate as the sources available at the time of the update(s).    Thank you for the  opportunity to participate in the care of this patient.    Magnus Pennington, [facility]  8/31/2021 8:30 PM

## 2021-09-01 NOTE — PROGRESS NOTES
Pain in back controlled with PRN Dilaudid. Has old back brace in place and has been adjusted by orthotics until brace is received tomorrow. Bedrest until fitted and new xray obtained tomorrow. Continues on enteric precautions until a sample can be obtained to confirm or deny CDIFF. Needing extra encouragement to finish doses of potassium. Did offer to switch solution to oral and crush with applesauce but patient declined. Call light within reach and able to make needs known.

## 2021-09-01 NOTE — CONSULTS
[facility]  Inpatient Palliative Care Consult      Lizzie Whalen,  1950, MRN [id number]    Admitting Dx: Pathologic compression fracture of spine, initial encounter (H) [M48.50XA]    Admission Date: 2021    PCP: Anne Marie Walker, [phone]   Code status:  Full Code       Extended Emergency Contact Information  Primary Emergency Contact: Sandor Whalen  Address: [address] [place] [address] [place]  Home Phone: [phone]  Mobile Phone: [phone]  Relation: Spouse  Secondary Emergency Contact: HERNANDO WHALEN  Mobile Phone: [phone]  Relation: Daughter            Principal Problem:    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   - Spiritual Care and Social Service needs: patient would be open to support.  -Patient open to following up with medical oncology [facility] at either [facility] or [facility].  Patient prefers [facility] is closer to home.  -Patient open to following up in outpatient palliative care clinic.  Referral has been sent to palliative care schedulers.    SYMPTOM ASSESSMENT  1.  Cancer related pain thoracic spine pain that radiates to bilateral lower extremities  -Gabapentin 300 mg po TID  -Dexamethasone 8 mg IV every 8 hours.  Patient has received 2 doses  -Single fraction radiation to T6 through T8/9 today.  -Dilaudid 0.5 mg IV every 3 hours as needed   -Dilaudid 2-4 mg 4 hours as needed  -Referral for leaf line/medical cannabis to be sent.  - Robaxin 500 mg po q6h prn spasms    2.   Diarrhea without abdominal cramping  -Culture stool for C. difficile.  This was ordered.    3.  Anorexia and weight loss.  Patient reports 50 pound weight loss in 6 months.  -Nutrition consulted.    Discussed with Dr. Allison and Dr. Rapp.     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 Jeffers to evaluate Lizzie Viera for goals of care discussion and symptom management.     Summary:  Lizzie Viera is a 70 year old female admitted with pathologic compression fracture of T6 through T8/9 spine based on CT scan.  Patient seen by radiation GI who recommends a therapeutic dose of 1 fraction of radiation to the levels T6 through T8/9 and treatment with IV steroids with subsequent taper.  Patient should also be consulted and seen by neurosurgery with ongoing follow-up with them.  Patient has been fitted with a TLS hold brace today.  Historically, 9/2020 patient noted to have osteoporotic T7 compression fracture with diffuse marrow edema and an indeterminate T1 lesion.  Further imaging in 4/26/2021 showed worsening of her T7 pathologic fracture with new extraosseous extension causing severe spinal canal stenosis.  5/7/2021 biopsy showed neoplasm and biopsy obtained of right iliac crest showed multiple myeloma.  Patient declined chemo and radiation at that time and sought naturapathic approach to treatment.  Patient did continue connection with neurosurgery and when she was seen in clinic yesterday with worsening leg pain and incontinence was sent to the hospital for further evaluation    Consultative Services: Neurosurgery, radiation oncology    Symptom management  Pain: Currently rating mid back and lower extremity pain 3/10 after receiving dose of IV Dilaudid.  Feels pain is much better controlled.  Dyspnea: none  Nausea: none  Anxiety: none    Recent changes in patient's condition: Worsening thoracic and lower extremity  pain, worsening diarrhea and some urinary and fecal incontinence.    Patient's goals of care: Goals are restorative.  Patient full code.  Patient agreeable to meeting with oncology again and undergoing recommended chemotherapy or immunotherapy treatments.  She is agreeable to radiation treatment at this time and future treatments as needed for symptom management and disease management.  Overall goal is to have dose of radiation therapy today, give refer to decline for medical cannabis and transition to oral medications for pain control so she can discharge successfully to home in the next day.  Patient open to following up with outpatient palliative care after discharge from hospital.    Advanced Care Plans/Health Care Directive: Patient's code status is currently full code. Patient does not have a HCD on file.    Surrogate Decisionmaker (if no documented/appointed agents): Patient needs to have discussions with family about who decision makers would be.  She initially believes it would be her  Sandor Viera (however he is undergoing some medical problems himself) daughter Collette Viera and her sister, Lali.    Spiritual History: discussion deferred    Patient demonstrates decision-making capacity; she does demonstrate understanding of relevant information about condition, the available test and treatment options, use reason to make a decision about these, and communicate choice about these. (ROHAN 306, 4, p. 420-4).       Medical History  Past Medical History:   Diagnosis Date     Cervical dysplasia      Chronic RUQ pain      Osteoporosis     Surgical History  She  has a past surgical history that includes REMOVE TONSILS/ADENOIDS,<15 Y/O; LIGATE FALLOPIAN TUBE; conization cervix,knife/laser; LAP,CHOLECYSTECTOMY/EXPLORE (12/27/2004); and DILATION/CURETTAGE DIAG/THER NON OB.   Social History  Reviewed, and she  reports that she has quit smoking. Her smoking use included cigarettes. She has a 22.50  pack-year smoking history. She has never used smokeless tobacco. She reports current alcohol use. She reports previous drug use.   Allergies  Allergies   Allergen Reactions     Cefdinir Shortness Of Breath     Latex Shortness Of Breath     Short Ragweed Pollen Ext Cough     Adhesive Tape Rash    Family History  Reviewed, and family history includes Arthritis in her mother; Breast Cancer in her paternal aunt; Diabetes in her maternal uncle and mother; Heart Disease in her maternal grandfather, maternal grandmother, and paternal grandmother; Heart Failure in her maternal grandfather, maternal grandmother, and paternal grandmother; LUNG DISEASE in her father.   Psychosocial Needs  Social History     Social History Narrative     Not on file     Additional psychosocial needs reviewed per nursing assessment.     Medications & Allergies   Medications:     Current Facility-Administered Medications:      acetaminophen (TYLENOL) solution 650 mg, 650 mg, Oral, Q4H PRN, Omkar Jeffers MD     dexamethasone (DECADRON) injection 8 mg, 8 mg, Intravenous, Q8H, Nohemi Rapp MD, 8 mg at 09/01/21 0708     gabapentin (NEURONTIN) capsule 300 mg, 300 mg, Oral, TID, Scarlett Flynn, CNS     HYDROmorphone (DILAUDID) injection 0.5 mg, 0.5 mg, Intravenous, Q3H PRN, Omkar Jeffers MD, 0.5 mg at 09/01/21 1308     HYDROmorphone (DILAUDID) tablet 2-4 mg, 2-4 mg, Oral, Q3H PRN, Scarlett Flynn CNS     melatonin tablet 1 mg, 1 mg, Oral, At Bedtime PRN, Omkar Jeffers MD     methocarbamol (ROBAXIN) tablet 500 mg, 500 mg, Oral, Q6H PRN, Omkar Jeffers MD, 500 mg at 08/31/21 2313     NaCl 0.45 % 1,000 mL with potassium chloride 20 mEq/L infusion, , Intravenous, Continuous, Omkar Jeffers MD, Stopped at 09/01/21 1320     ondansetron (ZOFRAN-ODT) ODT tab 4 mg, 4 mg, Oral, Q6H PRN **OR** ondansetron (ZOFRAN) injection 4 mg, 4 mg, Intravenous, Q6H PRN, Omkar Jeffers MD, 4 mg at 09/01/21  0413     potassium chloride (KLOR-CON) Packet 40 mEq, 40 mEq, Oral, Q4H, Rayshawn Allison MD, 40 mEq at 09/01/21 1028     prochlorperazine (COMPAZINE) injection 5 mg, 5 mg, Intravenous, Q6H PRN **OR** prochlorperazine (COMPAZINE) tablet 5 mg, 5 mg, Oral, Q6H PRN **OR** prochlorperazine (COMPAZINE) suppository 12.5 mg, 12.5 mg, Rectal, Q12H PRN, Omkar Jeffers MD     sulfamethoxazole-trimethoprim (BACTRIM DS) 800-160 MG per tablet 1 tablet, 1 tablet, Oral, BID, Omkar Jeffers MD, 1 tablet at 09/01/21 0829    Current Outpatient Medications:      acetylcysteine (NAC) 600 MG CAPS capsule, Take 600 mg by mouth daily, Disp: , Rfl:      Coenzyme Q10 300 MG CAPS, Take 300 mg by mouth daily, Disp: , Rfl:      fish oil-omega-3 fatty acids 1000 MG capsule, Take 2 g by mouth daily, Disp: , Rfl:      levOCARNitine (CARNITOR) 330 MG tablet, Take 330 mg by mouth daily, Disp: , Rfl:      Melatonin 10 MG TABS tablet, Take 20 mg by mouth At Bedtime, Disp: , Rfl:      NALTREXONE HCL PO, Take 3 mg by mouth daily, Disp: , Rfl:      TURMERIC PO, Take 1 capsule by mouth daily, Disp: , Rfl:      UNABLE TO FIND, MEDICATION NAME: Nutrazyme - 2 capsules by mouth twice daily., Disp: , Rfl:      UNABLE TO FIND, MEDICATION NAME: Panacur C - 1 capsule daily, Disp: , Rfl:      UNABLE TO FIND, MEDICATION NAME: Pectasol - 1 scoop by mouth daily, Disp: , Rfl:      UNABLE TO FIND, MEDICATION NAME: NK stim - 1 tablet daily, Disp: , Rfl:      UNABLE TO FIND, MEDICATION NAME:  mg daily, Disp: , Rfl:     Allergies/intolerances:    Allergies   Allergen Reactions     Cefdinir Shortness Of Breath     Latex Shortness Of Breath     Short Ragweed Pollen Ext Cough     Adhesive Tape Rash            Review of Systems:  History obtained from chart review and the patient  Fatigue: mild  Anorexia: moder 50 pound weight loss in 6 months.  Sometimes eats only once a day in an effort to minimize diarrhea.  Drowsiness: denies  Constipation: none,  diarrhea  Agitation: none  Depression: none    Palliative Performance Score:     50%- 1. Mainly sit/lie; 2. Unable to do any work, extensive disease; 3. Considerable assistance required; 4. Normal or reduced; 5. Full or confusion  Physical Exam:  Temp:  [98.6  F (37  C)] 98.6  F (37  C)  Pulse:  [48-64] 54  Resp:  [18] 18  BP: (116-203)/(56-78) 137/63  SpO2:  [92 %-98 %] 96 %  Wt Readings from Last 3 Encounters:   08/31/21 58.5 kg (129 lb)   08/31/21 58.5 kg (129 lb)   07/01/21 64.4 kg (142 lb)      General appearance: alert, appears stated age, cooperative and no distress  Head: Normocephalic, without obvious abnormality, atraumatic  Eyes: lids and lashes normal  Nose: no discharge  Throat: lips, mucosa, and tongue normal; teeth and gums normal  Lungs: clear to auscultation bilaterally  Heart: Regular rate and rhythm  Abdomen: soft, non-tender; bowel sounds normal; no masses,  no organomegaly  Extremities: moves all extremities. No cyanosis or edema noted.  Pulses: 2+ and symmetric  Neurologic: Grossly normal     Lab Results: personally reviewed.   Lab Results   Component Value Date     09/01/2021    CO2 20 09/01/2021    BUN 10 09/01/2021     Lab Results   Component Value Date    WBC 7.6 08/31/2021    HGB 10.2 08/31/2021    HCT 29.3 08/31/2021    MCV 96 08/31/2021     08/31/2021     AST   Date Value Ref Range Status   08/31/2021 22 0 - 40 U/L Final     ALT   Date Value Ref Range Status   08/31/2021 23 0 - 45 U/L Final     Alkaline Phosphatase   Date Value Ref Range Status   08/31/2021 40 (L) 45 - 120 U/L Final     Albumin   Date Value Ref Range Status   08/31/2021 2.9 (L) 3.5 - 5.0 g/dL Final       RADIOLOGY:  XR Thoracic Spine 2 Views    Result Date: 8/31/2021  EXAM: XR THORACIC SPINE 2 VIEWS LOCATION: [facility] DATE/TIME: 8/31/2021 1:37 PM INDICATION:  Closed T7 fracture (H) COMPARISON: 06/29/2021. TECHNIQUE: CR Thoracic Spine.     IMPRESSION: Severe T7 vertebral body  compression deformity is similar to prior. Again localized accentuation of kyphosis at this level. There is mild dextroconvex curvature. The remaining visualized vertebral bodies are unremarkable. Mild multilevel spondylosis is similar to prior. Pedicles appear symmetric. Visualized lung fields are well aerated.     MR Thoracic Spine w/o & w Contrast    Addendum Date: 8/31/2021    Findings were called to Dr. Mendez at 8/31/2021 6:32 PM by Dr. GREGORIA Calloway.    Result Date: 8/31/2021  EXAM: MR THORACIC SPINE W/O and W CONTRAST LOCATION: [facility] DATE/TIME: 8/31/2021 4:47 PM INDICATION: Pathologic fracture of T7. COMPARISON: MRI thoracic spine 04/26/2021 CONTRAST: 6ml Gadavist TECHNIQUE: Routine Thoracic Spine MRI without and with IV contrast. FINDINGS: 12 rib-bearing thoracic type vertebra. Marked pathologic compression fracture of T7 with progressive vertebral height loss compared to the previous MRI. There is now essentially vertebra plana at this level with progressive segmental kyphosis measuring 27 degrees from T6 through T8. Pathologic marrow involving the posterior T7 vertebral margin with increased extension into the adjacent ventral epidural space, progressive involvement of the pedicles/lamina, and new marrow replacement/enhancement involving the posterior inferior T6 and superior T8 vertebral margins with additional progressive paraspinal extraosseous enhancing soft tissue mass. There is now circumferential epidural neoplasm at the T7 level with severe spinal canal stenosis, increased impingement upon the thoracic cord, and minimal intramedullary T2 prolongation. The additional metastatic deposit at T1 mild diffuse disc desiccation throughout the thoracic spine, with moderate loss of disc height at T5-T6 and mild multilevel loss of disc height at multiple additional levels from T3 through T10. No focal disc herniations. Minimal thoracic facet arthropathy. No additional high-grade  spinal canal stenosis. Neoplastic soft tissue results in moderate to severe bilateral neural foraminal stenosis at T6-T7, severe bilateral neural foraminal stenosis at T7-T8, and mild to moderate left neural foraminal stenosis at T8-T9. Mild progression of paraspinal soft tissue mass centered at T7 as above. For example, maximum transverse dimension of enhancing soft tissue measuring 6.2 cm on axial images compared to 5.3 cm previously.     IMPRESSION: 1.  Pathologic compression fracture at T7 with progressive vertebral height loss and increasing epidural/paraspinal extraosseous neoplasm compared to 04/26/2021. 2.  Epidural neoplasm results in high-grade narrowing of the thecal sac and impingement upon the mid thoracic cord extending from the lower T6 through upper T8 levels. Minimal associated cord edema. 3.  Epidural neoplasm results in moderate to high-grade neural foraminal narrowing at T6-T7 and T7-T8 bilaterally and moderate neural foraminal narrowing on the left at T8-T9. 4.  Additional osseous metastasis at T1 similar to the prior exam. No new osseous metastasis elsewhere.      TTS: I have personally spent a total of 80 minutes today reviewing patient's medical record, consultation with the medical providers, and assessing patient and syptoms and providing emotional support and discussing goals of care with patient.    VILMA Flynn, APRN, CNS  Palliative Care  [phone]

## 2021-09-02 ENCOUNTER — DOCUMENTATION ONLY (OUTPATIENT)
Dept: ORTHOPEDICS | Facility: CLINIC | Age: 71
End: 2021-09-02

## 2021-09-02 ENCOUNTER — APPOINTMENT (OUTPATIENT)
Dept: RADIOLOGY | Facility: HOSPITAL | Age: 71
DRG: 542 | End: 2021-09-02
Attending: NURSE PRACTITIONER
Payer: COMMERCIAL

## 2021-09-02 ENCOUNTER — PATIENT OUTREACH (OUTPATIENT)
Dept: ONCOLOGY | Facility: CLINIC | Age: 71
End: 2021-09-02

## 2021-09-02 LAB
ANION GAP SERPL CALCULATED.3IONS-SCNC: 5 MMOL/L (ref 5–18)
BACTERIA UR CULT: ABNORMAL
BUN SERPL-MCNC: 12 MG/DL (ref 8–22)
CALCIUM SERPL-MCNC: 9.3 MG/DL (ref 8.5–10.5)
CHLORIDE BLD-SCNC: 111 MMOL/L (ref 98–107)
CO2 SERPL-SCNC: 23 MMOL/L (ref 22–31)
CREAT SERPL-MCNC: 0.84 MG/DL (ref 0.6–1.1)
GFR SERPL CREATININE-BSD FRML MDRD: 72 ML/MIN/1.73M2
GLUCOSE BLD-MCNC: 195 MG/DL (ref 70–125)
GLUCOSE BLDC GLUCOMTR-MCNC: 119 MG/DL (ref 70–125)
GLUCOSE BLDC GLUCOMTR-MCNC: 133 MG/DL (ref 70–125)
GLUCOSE BLDC GLUCOMTR-MCNC: 139 MG/DL (ref 70–125)
GLUCOSE BLDC GLUCOMTR-MCNC: 165 MG/DL (ref 70–125)
MAGNESIUM SERPL-MCNC: 2.3 MG/DL (ref 1.8–2.6)
MAGNESIUM SERPL-MCNC: 3.5 MG/DL (ref 1.8–2.6)
POTASSIUM BLD-SCNC: 3 MMOL/L (ref 3.5–5)
POTASSIUM BLD-SCNC: 3.2 MMOL/L (ref 3.5–5)
POTASSIUM BLD-SCNC: 3.6 MMOL/L (ref 3.5–5)
SODIUM SERPL-SCNC: 139 MMOL/L (ref 136–145)

## 2021-09-02 PROCEDURE — 250N000013 HC RX MED GY IP 250 OP 250 PS 637: Performed by: CLINICAL NURSE SPECIALIST

## 2021-09-02 PROCEDURE — 250N000011 HC RX IP 250 OP 636: Performed by: INTERNAL MEDICINE

## 2021-09-02 PROCEDURE — 250N000012 HC RX MED GY IP 250 OP 636 PS 637: Performed by: RADIOLOGY

## 2021-09-02 PROCEDURE — 120N000001 HC R&B MED SURG/OB

## 2021-09-02 PROCEDURE — 84132 ASSAY OF SERUM POTASSIUM: CPT | Performed by: INTERNAL MEDICINE

## 2021-09-02 PROCEDURE — 250N000013 HC RX MED GY IP 250 OP 250 PS 637: Performed by: INTERNAL MEDICINE

## 2021-09-02 PROCEDURE — 72070 X-RAY EXAM THORAC SPINE 2VWS: CPT

## 2021-09-02 PROCEDURE — 80048 BASIC METABOLIC PNL TOTAL CA: CPT | Performed by: INTERNAL MEDICINE

## 2021-09-02 PROCEDURE — 83735 ASSAY OF MAGNESIUM: CPT | Performed by: INTERNAL MEDICINE

## 2021-09-02 PROCEDURE — 99233 SBSQ HOSP IP/OBS HIGH 50: CPT | Performed by: CLINICAL NURSE SPECIALIST

## 2021-09-02 PROCEDURE — 99232 SBSQ HOSP IP/OBS MODERATE 35: CPT | Performed by: INTERNAL MEDICINE

## 2021-09-02 PROCEDURE — 250N000013 HC RX MED GY IP 250 OP 250 PS 637: Performed by: HOSPITALIST

## 2021-09-02 PROCEDURE — 250N000011 HC RX IP 250 OP 636: Performed by: RADIOLOGY

## 2021-09-02 PROCEDURE — 36415 COLL VENOUS BLD VENIPUNCTURE: CPT | Performed by: INTERNAL MEDICINE

## 2021-09-02 RX ORDER — HYDROMORPHONE HYDROCHLORIDE 4 MG/1
4 TABLET ORAL
Status: DISCONTINUED | OUTPATIENT
Start: 2021-09-02 | End: 2021-09-03 | Stop reason: HOSPADM

## 2021-09-02 RX ORDER — NALOXONE HYDROCHLORIDE 0.4 MG/ML
0.4 INJECTION, SOLUTION INTRAMUSCULAR; INTRAVENOUS; SUBCUTANEOUS
Status: DISCONTINUED | OUTPATIENT
Start: 2021-09-02 | End: 2021-09-03 | Stop reason: HOSPADM

## 2021-09-02 RX ORDER — DEXAMETHASONE 2 MG/1
8 TABLET ORAL 3 TIMES DAILY
Status: DISCONTINUED | OUTPATIENT
Start: 2021-09-02 | End: 2021-09-03 | Stop reason: HOSPADM

## 2021-09-02 RX ORDER — NALOXONE HYDROCHLORIDE 0.4 MG/ML
0.2 INJECTION, SOLUTION INTRAMUSCULAR; INTRAVENOUS; SUBCUTANEOUS
Status: DISCONTINUED | OUTPATIENT
Start: 2021-09-02 | End: 2021-09-03 | Stop reason: HOSPADM

## 2021-09-02 RX ORDER — MULTIVITAMIN,THERAPEUTIC
1 TABLET ORAL DAILY
Status: DISCONTINUED | OUTPATIENT
Start: 2021-09-02 | End: 2021-09-03 | Stop reason: HOSPADM

## 2021-09-02 RX ORDER — POTASSIUM CHLORIDE 1500 MG/1
40 TABLET, EXTENDED RELEASE ORAL ONCE
Status: COMPLETED | OUTPATIENT
Start: 2021-09-02 | End: 2021-09-02

## 2021-09-02 RX ORDER — POTASSIUM CHLORIDE 1500 MG/1
20 TABLET, EXTENDED RELEASE ORAL ONCE
Status: COMPLETED | OUTPATIENT
Start: 2021-09-02 | End: 2021-09-02

## 2021-09-02 RX ADMIN — SULFAMETHOXAZOLE AND TRIMETHOPRIM 1 TABLET: 800; 160 TABLET ORAL at 20:35

## 2021-09-02 RX ADMIN — DEXAMETHASONE 8 MG: 2 TABLET ORAL at 14:06

## 2021-09-02 RX ADMIN — ENOXAPARIN SODIUM 40 MG: 40 INJECTION SUBCUTANEOUS at 20:36

## 2021-09-02 RX ADMIN — METHOCARBAMOL 500 MG: 500 TABLET, FILM COATED ORAL at 17:16

## 2021-09-02 RX ADMIN — POTASSIUM CHLORIDE 10 MEQ: 10 INJECTION, SOLUTION INTRAVENOUS at 03:31

## 2021-09-02 RX ADMIN — THERA TABS 1 TABLET: TAB at 14:11

## 2021-09-02 RX ADMIN — POTASSIUM CHLORIDE 20 MEQ: 20 TABLET, EXTENDED RELEASE ORAL at 17:22

## 2021-09-02 RX ADMIN — HYDROMORPHONE HYDROCHLORIDE 4 MG: 2 TABLET ORAL at 02:48

## 2021-09-02 RX ADMIN — GABAPENTIN 300 MG: 300 CAPSULE ORAL at 08:15

## 2021-09-02 RX ADMIN — POTASSIUM CHLORIDE 10 MEQ: 10 INJECTION, SOLUTION INTRAVENOUS at 01:43

## 2021-09-02 RX ADMIN — SULFAMETHOXAZOLE AND TRIMETHOPRIM 1 TABLET: 800; 160 TABLET ORAL at 08:16

## 2021-09-02 RX ADMIN — HYDROMORPHONE HYDROCHLORIDE 4 MG: 2 TABLET ORAL at 11:15

## 2021-09-02 RX ADMIN — HYDROMORPHONE HYDROCHLORIDE 4 MG: 2 TABLET ORAL at 14:06

## 2021-09-02 RX ADMIN — GABAPENTIN 300 MG: 300 CAPSULE ORAL at 20:35

## 2021-09-02 RX ADMIN — POTASSIUM CHLORIDE 40 MEQ: 20 TABLET, EXTENDED RELEASE ORAL at 11:38

## 2021-09-02 RX ADMIN — GABAPENTIN 300 MG: 300 CAPSULE ORAL at 14:05

## 2021-09-02 RX ADMIN — DEXAMETHASONE SODIUM PHOSPHATE 8 MG: 4 INJECTION, SOLUTION INTRAMUSCULAR; INTRAVENOUS at 06:02

## 2021-09-02 RX ADMIN — DEXAMETHASONE 8 MG: 2 TABLET ORAL at 20:35

## 2021-09-02 ASSESSMENT — MIFFLIN-ST. JEOR: SCORE: 1097.42

## 2021-09-02 NOTE — PROGRESS NOTES
XR reviewed and compared to prior images. Spine alignment appears stable. Needs close monitoring of collapse, worsening kyphosis. Patient is asking to discharge. She has not had activity since admission. Recommend she have activity this evening and PT eval tomorrow and discharge then if no additional back pain. Needs steroid taper for discharge. We will see her back in 1-2 weeks with XR. Sooner if new symptoms develop.     ALFONSO Flynn  [facility] Neurosurgery  O: [phone]

## 2021-09-02 NOTE — PROGRESS NOTES
Referral and chart reviewed  OUTGOING CALL to pt's dtr Collette:  Introduced my role as nurse navigator with [facility] Hematology/Oncology dept and that we have recd the referral for dx of MM, needs to establish oncology care at [facility] - referral from from rad onc who saw her yesterday. Currently scheduled to see Dr Ness in [place], but wants appt at [facility] instead.  Pt's daughter confirms they are aware of the referral and ready to re-schedule, transferred pt to NPS line [phone] to schedule appt per scheduling instructions and provided my name and callback number if needed. Collette voiced understanding of above instructions and information and denied further questions  Elena Toth, RN, BSN, OCN  Hematology/Oncology Nurse Navigator  [facility]  [phone]

## 2021-09-02 NOTE — PROGRESS NOTES
[facility]  Inpatient Palliative Care      Consultation - Palliative Care  Lizzie Whalen,  1950, MRN [id number]    Admitting Dx: Pathologic compression fracture of spine, initial encounter (H) [M48.50XA]    Admission date: 2021    PCP: Anne Marie Walker, [phone]   Code status:  Full Code       Extended Emergency Contact Information  Primary Emergency Contact: Sandor Whalen  Address: [address] [place] [address] [place]  Home Phone: [phone]  Mobile Phone: [phone]  Relation: Spouse  Secondary Emergency Contact: HERNANDO WHALEN  Mobile Phone: [phone]  Relation: Daughter          Impression and Recommendations:  ADVANCED CARE PLANNING AND GOALS OF CARE DISCUSSION  - Code status was discussed, and the patient confirms FULL CODE.   - Spiritual Care and Social Service needs: patient would be open to support.  -Patient open to following up with medical oncology [facility] - patient prefers [facility].  Patient prefers [facility] is closer to home.  -Patient open to following up in outpatient palliative care clinic.      - Appointment scheduled for Tuesday, 2021 at 1240 and [facility] radiation oncology with palliative care.  -Discussed completion of honoring choices and/or POLST document with patient.  Discussed that this can be done in the palliative care clinic.  - Appointment scheduled with medical oncology, Dr. Blanco, 2021.     SYMPTOM ASSESSMENT  1.  Cancer related pain thoracic spine pain that radiates to bilateral lower extremities  -Continue gabapentin 300 mg po TID. we will reevaluate in outpatient palliative care clinic visit.  -Dexamethasone 8 mg by mouth every 8 hours.    Steroid taper per radiation oncology.  -Single fraction radiation to T6 through T8/9 2021.  -Dilaudid 0.5 mg IV every 3 hours as needed   -Dilaudid 4 mg 3 hours as needed  -Referral for leaf line/medical cannabis sent today.  - Robaxin 500  mg po q6h prn spasms  -Patient to wear TLSO brace when up.     2.  Diarrhea without abdominal cramping  -Culture stool for C. difficile.  Ordered and awaiting specimen.  -Currently in contact enteric precautions for rule out C. difficile     3.  Anorexia and weight loss.  Patient reports 50 pound weight loss in 6 months.  -Nutrition consulted.     Discussed with P1 RN, [place].  Palliative care will sign off and follow peripherally. Please call [phone] with questions or needs.     Summary:  Lizzie Viera is a 70 year old female admitted with pathologic compression fracture of T6 through T8/9 spine based on CT scan.  Patient seen by radiation GI who recommends a therapeutic dose of 1 fraction of radiation to the levels T6 through T8/9 and treatment with IV steroids with subsequent taper.  Patient should also be consulted and seen by neurosurgery with ongoing follow-up with them.  Patient has been fitted with a TLS hold brace today.  Historically, 9/2020 patient noted to have osteoporotic T7 compression fracture with diffuse marrow edema and an indeterminate T1 lesion.  Further imaging in 4/26/2021 showed worsening of her T7 pathologic fracture with new extraosseous extension causing severe spinal canal stenosis.  5/7/2021 biopsy showed neoplasm and biopsy obtained of right iliac crest showed multiple myeloma.  Patient declined chemo and radiation at that time and sought naturapathic approach to treatment.  Patient did continue connection with neurosurgery and when she was seen in clinic yesterday with worsening leg pain and incontinence was sent to the hospital for further evaluation.    Goals of care discussion: Goals are restorative.    Waiting to have x-ray to determine spine stability with brace on prior to discharging from hospital.  Hopes for better pain control.  Goal is to get in with medical oncology within the month.  Patient agreeable to following up with palliative care as an outpatient for  ongoing symptom management and goals of care discussion.  Patient interested in having treatment for her multiple myeloma.  She has an appointment scheduled with Dr. Blanco on 9/30/2021.  Patient does state that her , Sandor, with his multiple medical problems likely would not be able to make medical decisions for her as he is overwhelmed with his own health.  She states that her sister, Lali, one of her dear girlfriends and her daughter, Collette, would be decision-makers in discussion today.  She would like to work on getting this documented and we discussed doing so at our clinic visit 9/7/2021 and a honoring choices short form.  I also reviewed the role of the POLST with patient as a way to communicate her healthcare wishes as well.  Will discuss in outpatient clinic.    Review of Systems:   History obtained from chart review and the patient  Pain: Patient rating right rib pain as 5/10 and feels it is increased because she has brace on and is lying at 30 degrees.  Dyspnea: denies  Fatigue: moderate  Nausea: intermittent; does not feel Zofran helpful. Sea bands (wrist bands) - helpful.  Anorexia: moderate  Drowsiness: none  Constipation: none  Agitation: none  Anxiety: none  Depression: none    Palliative Performance Score:     30%- 1. Totally bed bound; 2. Unable to do any activity, extensive disease; 3. Total care; 4. Normal or reduced; 5. Full or drowsy +/- confusion Physical Exam:  Temp:  [97.9  F (36.6  C)-98.2  F (36.8  C)] 97.9  F (36.6  C)  Pulse:  [52-67] 67  Resp:  [18-20] 20  BP: (131-166)/(61-78) 134/65  SpO2:  [93 %-98 %] 97 %    General appearance: alert, appears stated age and cooperative  Head: Normocephalic, without obvious abnormality, atraumatic  Eyes: lids and lashes normal; sclera anicteric  Nose: no discharge  Throat: lips, mucosa, and tongue normal; teeth and gums normal  Neck: supple, symmetrical, trachea midline  Lungs: non-labored  Abdomen: soft, nontender,  nondistended  Extremities: moves all extremities equally  Neurologic: Grossly normal       Pertinent Labs  Lab Results: personally reviewed.   Lab Results   Component Value Date     09/01/2021    CO2 20 09/01/2021    BUN 10 09/01/2021     Lab Results   Component Value Date    WBC 7.6 08/31/2021    HGB 10.2 08/31/2021    HCT 29.3 08/31/2021    MCV 96 08/31/2021     08/31/2021     AST   Date Value Ref Range Status   08/31/2021 22 0 - 40 U/L Final     ALT   Date Value Ref Range Status   08/31/2021 23 0 - 45 U/L Final     Alkaline Phosphatase   Date Value Ref Range Status   08/31/2021 40 (L) 45 - 120 U/L Final     Albumin   Date Value Ref Range Status   08/31/2021 2.9 (L) 3.5 - 5.0 g/dL Final      Pertinent Radiology  XR Thoracic Lumbar Standing 2 Views    Result Date: 9/1/2021  EXAM: XR THORACIC LUMBAR STANDING 2 VW LOCATION: [facility] DATE/TIME: 9/1/2021 3:26 PM INDICATION: T7 compression fracture. Evaluate upright kyphosis COMPARISON: X-ray and MRI 8/31/2021 TECHNIQUE: CR thoracic and lumbar spine     IMPRESSION: 12 thoracic and 5 lumbar type vertebra are assumed. Marked anterior wedge compression deformity of T7 corresponding to findings on previous exams. Segmental kyphosis measures 32 degrees from T6 through T8 on the current radiographs compared to 31 degrees  on yesterday's radiographs. No significant change in alignment. No new fracture or progressive vertebral height loss identified. Mild thoracolumbar spondylosis.     XR Thoracic Spine 2 Views    Result Date: 8/31/2021  EXAM: XR THORACIC SPINE 2 VIEWS LOCATION: [facility] DATE/TIME: 8/31/2021 1:37 PM INDICATION:  Closed T7 fracture (H) COMPARISON: 06/29/2021. TECHNIQUE: CR Thoracic Spine.     IMPRESSION: Severe T7 vertebral body compression deformity is similar to prior. Again localized accentuation of kyphosis at this level. There is mild dextroconvex curvature. The remaining visualized  vertebral bodies are unremarkable. Mild multilevel spondylosis is similar to prior. Pedicles appear symmetric. Visualized lung fields are well aerated.     MR Thoracic Spine w/o & w Contrast    Addendum Date: 8/31/2021    Findings were called to Dr. Mendez at 8/31/2021 6:32 PM by Dr. GREGORIA Calloway.    Result Date: 8/31/2021  EXAM: MR THORACIC SPINE W/O and W CONTRAST LOCATION: [facility] DATE/TIME: 8/31/2021 4:47 PM INDICATION: Pathologic fracture of T7. COMPARISON: MRI thoracic spine 04/26/2021 CONTRAST: 6ml Gadavist TECHNIQUE: Routine Thoracic Spine MRI without and with IV contrast. FINDINGS: 12 rib-bearing thoracic type vertebra. Marked pathologic compression fracture of T7 with progressive vertebral height loss compared to the previous MRI. There is now essentially vertebra plana at this level with progressive segmental kyphosis measuring 27 degrees from T6 through T8. Pathologic marrow involving the posterior T7 vertebral margin with increased extension into the adjacent ventral epidural space, progressive involvement of the pedicles/lamina, and new marrow replacement/enhancement involving the posterior inferior T6 and superior T8 vertebral margins with additional progressive paraspinal extraosseous enhancing soft tissue mass. There is now circumferential epidural neoplasm at the T7 level with severe spinal canal stenosis, increased impingement upon the thoracic cord, and minimal intramedullary T2 prolongation. The additional metastatic deposit at T1 mild diffuse disc desiccation throughout the thoracic spine, with moderate loss of disc height at T5-T6 and mild multilevel loss of disc height at multiple additional levels from T3 through T10. No focal disc herniations. Minimal thoracic facet arthropathy. No additional high-grade spinal canal stenosis. Neoplastic soft tissue results in moderate to severe bilateral neural foraminal stenosis at T6-T7, severe bilateral neural foraminal stenosis  at T7-T8, and mild to moderate left neural foraminal stenosis at T8-T9. Mild progression of paraspinal soft tissue mass centered at T7 as above. For example, maximum transverse dimension of enhancing soft tissue measuring 6.2 cm on axial images compared to 5.3 cm previously.     IMPRESSION: 1.  Pathologic compression fracture at T7 with progressive vertebral height loss and increasing epidural/paraspinal extraosseous neoplasm compared to 04/26/2021. 2.  Epidural neoplasm results in high-grade narrowing of the thecal sac and impingement upon the mid thoracic cord extending from the lower T6 through upper T8 levels. Minimal associated cord edema. 3.  Epidural neoplasm results in moderate to high-grade neural foraminal narrowing at T6-T7 and T7-T8 bilaterally and moderate neural foraminal narrowing on the left at T8-T9. 4.  Additional osseous metastasis at T1 similar to the prior exam. No new osseous metastasis elsewhere.       TTS: I have personally spent a total of 35 minutes today reviewing patient's medical record, consultation with the medical providers, assessing patient and symptoms and providing emotional support with more than 50% of this time spent in counseling and care coordination with oncology and palliative care appointments.    VILMA Flynn, APRN, CNS  Palliative Care  [phone]

## 2021-09-02 NOTE — PLAN OF CARE
Writer took over care at 1900. Did complain of back pain. PRN Dilaudid given and back brace from home in place. Pt did state that she did get relief from the pain medication. No nausea noted. Neuro checks intact. Accuchecks completed. HS was 165. K protocol. Currently being replaced via IV. Mag was low and pt did receive a x1 dose as well. Isolation for possible cdiff. Need stool sample. Pt is continent and using the bedpan. Pt is alert and orientated. Orders for strict bedrest. Plan for tomorrow is to receive new back brace, repeat xray, and possible discharge home. Will continue to monitor.

## 2021-09-02 NOTE — PROGRESS NOTES
Care Management Follow Up    Length of Stay (days): 2    Expected Discharge Date: 09/04/2021     Concerns to be Addressed:       Patient plan of care discussed at interdisciplinary rounds: Yes    Anticipated Discharge Disposition:  Home with family, pending care progression     Anticipated Discharge Services:  OP f/u, pending care progression  Anticipated Discharge DME:  TLSO brace    Patient/family educated on Medicare website which has current facility and service quality ratings:    Education Provided on the Discharge Plan:    Patient/Family in Agreement with the Plan:      Referrals Placed by CM/SW:    Private pay costs discussed:     Additional Information:  Pt is currently on strict bedrest until she receives TLSO brace.  Brace fitting is planned for this afternoon.  Sticky note placed to physician requesting consult for PT & OT evals after receiving TLSO brace.     Naomi Smith RN

## 2021-09-02 NOTE — CONSULTS
"CLINICAL NUTRITION SERVICES - ASSESSMENT NOTE     Nutrition Prescription    RECOMMENDATIONS FOR MDs/PROVIDERS TO ORDER:  None    Malnutrition Status:    Severe    Recommendations already ordered by Registered Dietitian (RD):  Add mvi with minerals.   Strongly encouraged pt to increase her food intake at home and provided recommendation for whole food protein shake to supplement her intake.     Future/Additional Recommendations:  Add supplement if intake remains inadequate     REASON FOR ASSESSMENT  Lizzie Viera is a/an 70 year old female assessed by the dietitian for Provider Order - multiple myeloma, 50 lb weight loss x 6 months    Pt presents with increasing back pain, weakness of known spine fx with mass  Hx chronic back pain x 4 months, osteoporosis, HLD    NUTRITION HISTORY  Pt states she eats 1 meal/day d/t intake causing abdominal pain, needing to have BM and difficulty getting to the bathroom d/t back pain after eating and altered taste after a couple of bites.    Pt is on multiple  Vitamin supplements at home, some which cause her nausea she attributes to the gel coating and she plans to switch them to more liquid forms. (Turmeric, fish oil, vitamin D, Vitamin C among others)  Pt is avoiding \"glucose\" as it feeds CA and causes her abdominal pain    CURRENT NUTRITION ORDERS  Diet: Regular  Intake/Tolerance:Ate 75% of supper last night at 475 kcal, 18 g protein. Ordered 608 kcal, 18 g protein at breakfast today    Pt reports she is eating  Better than baseline here d/t getting sleep and feeling better.   Discussed in length her need to increase her intake at home to meet her nutrition needs. Pt did not seem concerned with her low calorie intake and was focused on the fact she is taking many vitamin supplements. Discussed low electrolytes on admit likely d/t inadequate po.o intake.   Discussed her vitamins (up to 10 at a time) may also be filling her up and  Keeping her from eating. Pt is hopefull " "switching to liquid form of vitamins may reduce this.     LABS  Labs reviewed  K+ 2.8, depleted but improving from 2.1 on admit  Mag 1.4, low  BG  mg/dl past 24 hours - increased on steroid    MEDICATIONS  Medications reviewed  Decadron; oral abx  Mag and Kcl replacement yesterday    ANTHROPOMETRICS  Height: 157.5 cm (5' 2\")  Most Recent Weight: 59 kg (130 lb)    IBW: 50 kg  BMI: Normal BMI  Weight History:   Wt Readings from Last 10 Encounters:   09/01/21 59 kg (130 lb)   08/31/21 58.5 kg (129 lb)   07/01/21 64.4 kg (142 lb)   06/23/21 64.4 kg (142 lb)   05/27/21 67.6 kg (149 lb 1.6 oz)   05/06/21 70.8 kg (156 lb)   04/29/21 70.8 kg (156 lb)   04/23/21 70.8 kg (156 lb)   12/31/20 71.4 kg (157 lb 8 oz)   12/14/20 70.8 kg (156 lb)   17% weight loss x 5 months. Pt reports weight of 176 lb at MD visit in April and eported 50 lb weight loss x 6 months which is inconsistent with above data, but still significant    Dosing Weight: 59 kg    ASSESSED NUTRITION NEEDS  Estimated Energy Needs: 7694-4728 kcals/day (35 - 40 kcals/kg)  Justification: Increased needs  Estimated Protein Needs: 59-71 grams protein/day (1 - 1.2 grams of pro/kg)  Justification: Maintenance  Estimated Fluid Needs: 7247-4434 mL/day (25 - 30 mL/kg)   Justification: Maintenance    PHYSICAL FINDINGS  See malnutrition section below.    GI  Diarrhea - cdiff being checked. No BM since admit  Chronic abdominal pain- potentially d/t volume of food  In her stomach putting pressure on her spine/mass    MALNUTRITION  % Intake: </= 50% for >/= 5 days (severe)  % Weight Loss: > 10% in 6 months (severe)  Subcutaneous Fat Loss: Facial region:  Mild  and Lower arm: moderate  Muscle Loss: Temporal:  mild, Facial & jaw region:  Mild , Thoracic region (clavicle, acromium bone, deltoid, trapezius, pectoral):  Mild to moderate, Upper arm (bicep, tricep):  moderate and Dorsal hand:  Moderate, shin - moderate  Fluid Accumulation/Edema: None noted  Malnutrition " Diagnosis: Severe malnutrition in the context of chronic illness    NUTRITION DIAGNOSIS  Malnutrition related to chronic illness as evidenced by 17% weight loss in 5 months, < 50% intake > 5 days      INTERVENTIONS  Implementation  Add mvi with minerals.   Strongly encouraged pt to increase her food intake at home and provided recommendation for whole food protein shake to supplement her intake.     Goals  Intake > 75% of estimated needs  Maintain weight  Regular bowels     Monitoring/Evaluation  Progress toward goals will be monitored and evaluated per protocol.

## 2021-09-02 NOTE — PROGRESS NOTES
S: Lizzie Viera was seen at [facility], Room P1 124-01, for the fit and delivery of a Custom [place] TLSO.  The patient s Nurse was present and assisted with log rolling the patient.    Dx: T7 Pathogenetic Compression Fracture, T6-T9 Stenosis    O:  The TLSO was fit to the patient.  The brace was a great fit.  The patient was impressed with the fit and comfort of the brace and is very pleased.    A: I explained the donning and doffing protocol to the patient.  I m including all the donning /doffing instructions at the end of this note and provided a printed copy to the patient.    P:  The TLSO should be worn according to the Physician s instructions.      G: The TLSO increases medial-lateral, rotational and anterior-posterior stability of the spine. The TLSO also applies compression to the patient s soft tissues. Compression of the patient s soft tissues serves to unload the injured vertebrae which reduces pain and promotes healing. The goal of this orthosis is to provide spinal stability, promote healing and reduce pain while the patient performs their ADLs.    Please contact the [facility] at [phone] if you have any questions.  Thank you, Farrukh    Electronically signed by Farrukh Dawson CPO, LPO    How to Put on a TLSO Back Brace  For optimal fit brace should NOT be donned with patient sitting. Ideal fit is achieved by donning in either the laying or standing position. Due to changes in belly tissue, it is difficult to achieve a proper fit when patient is sitting.  1. Apply a snug-fitting cotton t-shirt.  Loose shirts may cause wrinkles and skin irritation.  2. Patient should be supine. Palpate for waist (below ribs but above the pelvis) so you know where to line up waist grooves of the brace.  3. Logroll patient and slide back section of brace under patient lining up waist groove of brace with patient's waist.  4. Roll patient onto their back with the posterior  section behind them. Recheck alignment of waist groove on brace with patient's waist. It may be necessary to logroll patient to the opposite side to get posterior section centered on patient, where it extends anteriorly equal amounts on patient's sides. Repeat logrolling side to side as necessary.  5. Once posterior section is positioned correctly, lay anterior section on patient. Again, line up waist groove of brace to patient's waist. Waist grooves of anterior and posterior section should match up and fit together. Anterior shell should go over the top of posterior shell. Velcro straps should line up with the chafes/D-rings, if they don't, either the anterior or the posterior sections are not in the proper place.  6. Fasten the middle straps first and tighten both sides evenly. Then fasten either top or bottom straps in the same manner. Brace should be snug so as to prevent brace from sliding up on patient. If it is too loose, the brace will slide up and cause discomfort to the patient in the chin and/or axilla area.  7. When properly fit, brace the inferior aspect should allow clearance so as not to cut into the tops of the thighs when sitting but needs to be as low on the pelvic area as possible.    Wearing Schedule  Always check with prescribing doctor for a precise wearing schedule. At times the TLSO is worn only when out of bed while sitting or standing. Other times, the doctor requires the TLSO to be worn at all times.    Cleaning and Maintenance  Rubbing alcohol may be used to clean the inside and outside of the TLSO. Spray TLSO with alcohol and wipe gently with a cloth. Be sure that TLSO is completely dry prior to application to ensure no skin issues will occur. Always wear a clean, dry, snug-fitting shirt under the brace.    Tips and Problem Solving  Brace migration is inevitable, especially when patient is going from laying to upright in bed. The mattress may push posterior section of brace up, which  will push the whole brace up. Migration may also occur when patient sits in a soft cushioned chair. Don't be afraid to readjust brace and pull it down and back to its proper position.    Avoid soft seat cushioned chairs and sit up straight. Soft seat cushions or leaning back into a chair will cause the brace to migrate upward and may place pressure underarms.      Instead of leaning back, try sitting on the front half of the seat of the chair and work your way back until you contact the backrest of the chair.  Sometimes using pillows in the gap between the back of the chair and the brace will provide more support.    Do not lean forward over a table while eating. Bring the food up to the mouth. This will reduce any pressure on the thighs and chest.    If the TLSO starts to migrate upward under the throat/armpits, ensure that straps are snug. Straps that are loose will allow the brace to shift.

## 2021-09-02 NOTE — PLAN OF CARE
"  Problem: Adult Inpatient Plan of Care  Goal: Absence of Hospital-Acquired Illness or Injury  Outcome: Improving  Intervention: Prevent Skin Injury  Recent Flowsheet Documentation  Taken 9/2/2021 1115 by Angeles Tobin RN  Body Position: log-rolled  Taken 9/2/2021 0855 by Angeles Tobin RN  Body Position: log-rolled     Turn and reposition frequently.  HOB <30.        Problem: Adult Inpatient Plan of Care  Goal: Readiness for Transition of Care  Outcome: Improving     Pt hoping to discharge later today.  X-ray at 1500 and K recheck at 1600.  Still need stool spec to check for c-diff.  Had four diarrhea stools yesterday morning before she came and then hasn't had anything since.       Problem: Pain Acute  Goal: Acceptable Pain Control and Functional Ability  Outcome: Improving  Intervention: Develop Pain Management Plan  Recent Flowsheet Documentation  Taken 9/2/2021 1115 by Angeles Tobin RN  Pain Management Interventions:   medication (see MAR)   emotional support  Taken 9/2/2021 0855 by Angeles Tobin RN  Pain Management Interventions:   distraction   emotional support   declines     Pain relief with Dilaudid but stated after two consecutive doses it made her feel \"foogy\".  Spoke with Diane CNS and meds will be adjusted.   "

## 2021-09-02 NOTE — PLAN OF CARE
Pt slept well overnight. Did complain of back pain. PRN Dilaudid given with adequate relief. No nausea noted. No loose stools overnight. Isolation to rule out cdiff. Need stool sample. Neuros intact. Back brace from home applied. Accuchecks. IV steroids. K protocol. Was replaced throughout the night. Recheck in the am. Mag was low and replaced. Recheck in the am as well. IV SL. Pt is alert and orientated. Strict bedrest per neurosurgery. Plan is for pt to receive new brace tomorrow and repeat xray. Will continue to monitor.

## 2021-09-02 NOTE — PROGRESS NOTES
Chart check. Awaiting new TLSO fitting. Needs upright XR once brace here. Steroids should taper over 10 days. Call with questions.     ALFONSO Flynn  [facility] Neurosurgery  O: [phone]

## 2021-09-03 ENCOUNTER — APPOINTMENT (OUTPATIENT)
Dept: PHYSICAL THERAPY | Facility: HOSPITAL | Age: 71
DRG: 542 | End: 2021-09-03
Attending: NURSE PRACTITIONER
Payer: COMMERCIAL

## 2021-09-03 ENCOUNTER — TELEPHONE (OUTPATIENT)
Dept: NEUROSURGERY | Facility: CLINIC | Age: 71
End: 2021-09-03

## 2021-09-03 ENCOUNTER — APPOINTMENT (OUTPATIENT)
Dept: OCCUPATIONAL THERAPY | Facility: HOSPITAL | Age: 71
DRG: 542 | End: 2021-09-03
Attending: NURSE PRACTITIONER
Payer: COMMERCIAL

## 2021-09-03 VITALS
DIASTOLIC BLOOD PRESSURE: 63 MMHG | RESPIRATION RATE: 18 BRPM | OXYGEN SATURATION: 94 % | WEIGHT: 135.4 LBS | BODY MASS INDEX: 24.92 KG/M2 | HEIGHT: 62 IN | SYSTOLIC BLOOD PRESSURE: 138 MMHG | TEMPERATURE: 97.9 F | HEART RATE: 73 BPM

## 2021-09-03 DIAGNOSIS — C80.1 MALIGNANT NEOPLASM METASTATIC TO THORACIC VERTEBRAL COLUMN WITH UNKNOWN PRIMARY SITE (H): Primary | ICD-10-CM

## 2021-09-03 DIAGNOSIS — C79.51 MALIGNANT NEOPLASM METASTATIC TO THORACIC VERTEBRAL COLUMN WITH UNKNOWN PRIMARY SITE (H): Primary | ICD-10-CM

## 2021-09-03 LAB
PLATELET # BLD AUTO: 357 10E3/UL (ref 150–450)
POTASSIUM BLD-SCNC: 3.3 MMOL/L (ref 3.5–5)

## 2021-09-03 PROCEDURE — 36415 COLL VENOUS BLD VENIPUNCTURE: CPT | Performed by: INTERNAL MEDICINE

## 2021-09-03 PROCEDURE — 84132 ASSAY OF SERUM POTASSIUM: CPT | Performed by: INTERNAL MEDICINE

## 2021-09-03 PROCEDURE — 250N000013 HC RX MED GY IP 250 OP 250 PS 637: Performed by: INTERNAL MEDICINE

## 2021-09-03 PROCEDURE — 99239 HOSP IP/OBS DSCHRG MGMT >30: CPT | Performed by: INTERNAL MEDICINE

## 2021-09-03 PROCEDURE — 250N000012 HC RX MED GY IP 250 OP 636 PS 637: Performed by: RADIOLOGY

## 2021-09-03 PROCEDURE — 85049 AUTOMATED PLATELET COUNT: CPT | Performed by: INTERNAL MEDICINE

## 2021-09-03 PROCEDURE — 97162 PT EVAL MOD COMPLEX 30 MIN: CPT | Mod: GP | Performed by: PHYSICAL THERAPIST

## 2021-09-03 PROCEDURE — 250N000013 HC RX MED GY IP 250 OP 250 PS 637: Performed by: HOSPITALIST

## 2021-09-03 PROCEDURE — 97165 OT EVAL LOW COMPLEX 30 MIN: CPT | Mod: GO

## 2021-09-03 PROCEDURE — 97535 SELF CARE MNGMENT TRAINING: CPT | Mod: GO

## 2021-09-03 PROCEDURE — 97116 GAIT TRAINING THERAPY: CPT | Mod: GP | Performed by: PHYSICAL THERAPIST

## 2021-09-03 PROCEDURE — 97530 THERAPEUTIC ACTIVITIES: CPT | Mod: GP | Performed by: PHYSICAL THERAPIST

## 2021-09-03 PROCEDURE — 97530 THERAPEUTIC ACTIVITIES: CPT | Mod: GO

## 2021-09-03 PROCEDURE — 250N000013 HC RX MED GY IP 250 OP 250 PS 637: Performed by: CLINICAL NURSE SPECIALIST

## 2021-09-03 RX ORDER — HYDROMORPHONE HYDROCHLORIDE 4 MG/1
4 TABLET ORAL EVERY 6 HOURS PRN
Qty: 25 TABLET | Refills: 0 | Status: SHIPPED | OUTPATIENT
Start: 2021-09-03 | End: 2021-09-07

## 2021-09-03 RX ORDER — GABAPENTIN 300 MG/1
300 CAPSULE ORAL 3 TIMES DAILY
Qty: 90 CAPSULE | Refills: 0 | Status: SHIPPED | OUTPATIENT
Start: 2021-09-03 | End: 2021-10-05

## 2021-09-03 RX ORDER — DEXAMETHASONE 2 MG/1
TABLET ORAL
Qty: 44 TABLET | Refills: 0 | Status: SHIPPED | OUTPATIENT
Start: 2021-09-03 | End: 2021-09-23

## 2021-09-03 RX ORDER — POTASSIUM CHLORIDE 1500 MG/1
20 TABLET, EXTENDED RELEASE ORAL ONCE
Status: COMPLETED | OUTPATIENT
Start: 2021-09-03 | End: 2021-09-03

## 2021-09-03 RX ORDER — METHOCARBAMOL 500 MG/1
500 TABLET, FILM COATED ORAL EVERY 6 HOURS PRN
Qty: 30 TABLET | Refills: 0 | Status: SHIPPED | OUTPATIENT
Start: 2021-09-03 | End: 2021-10-05

## 2021-09-03 RX ORDER — SULFAMETHOXAZOLE/TRIMETHOPRIM 800-160 MG
1 TABLET ORAL 2 TIMES DAILY
Qty: 9 TABLET | Refills: 0 | Status: SHIPPED | OUTPATIENT
Start: 2021-09-03 | End: 2021-09-08

## 2021-09-03 RX ADMIN — GABAPENTIN 300 MG: 300 CAPSULE ORAL at 14:28

## 2021-09-03 RX ADMIN — THERA TABS 1 TABLET: TAB at 08:19

## 2021-09-03 RX ADMIN — DEXAMETHASONE 8 MG: 2 TABLET ORAL at 14:28

## 2021-09-03 RX ADMIN — SULFAMETHOXAZOLE AND TRIMETHOPRIM 1 TABLET: 800; 160 TABLET ORAL at 08:19

## 2021-09-03 RX ADMIN — POTASSIUM CHLORIDE 20 MEQ: 1500 TABLET, EXTENDED RELEASE ORAL at 11:59

## 2021-09-03 RX ADMIN — GABAPENTIN 300 MG: 300 CAPSULE ORAL at 08:19

## 2021-09-03 RX ADMIN — Medication 1 MG: at 00:17

## 2021-09-03 RX ADMIN — DEXAMETHASONE 8 MG: 2 TABLET ORAL at 08:19

## 2021-09-03 RX ADMIN — HYDROMORPHONE HYDROCHLORIDE 4 MG: 4 TABLET ORAL at 00:17

## 2021-09-03 RX ADMIN — HYDROMORPHONE HYDROCHLORIDE 4 MG: 4 TABLET ORAL at 10:05

## 2021-09-03 NOTE — PLAN OF CARE
Physical Therapy Discharge Summary    Reason for therapy discharge:    Discharged to home.    Progress towards therapy goal(s). See goals on Care Plan in [facility] electronic health record for goal details.  Goals partially met.  Barriers to achieving goals:   discharge from facility.    Therapy recommendation(s):    Continued therapy is recommended.  Rationale/Recommendations:  Recommend home PT.    Karen England, PT  9/3/2021

## 2021-09-03 NOTE — PLAN OF CARE
Pt ok discharge. Instructions explained. Written prescription for Robaxin given to patient. PCP to call patient to set up follow up appt.

## 2021-09-03 NOTE — PROGRESS NOTES
09/03/21 1030   Quick Adds   Type of Visit Initial PT Evaluation   Living Environment   People in home spouse;child(jung), adult;grandchild(jung)   Current Living Arrangements house   Home Accessibility stairs to enter home;stairs within home   Number of Stairs, Main Entrance 3   Stair Railings, Main Entrance railings safe and in good condition   Self-Care   Equipment Currently Used at Home walker, rolling   Disability/Function   Walking or Climbing Stairs Difficulty yes   Walking or Climbing Stairs ambulation difficulty, requires equipment   Mobility Management Uses walker at baseline.   Dressing/Bathing Difficulty no   Toileting issues no   Doing Errands Independently Difficulty (such as shopping) no   Fall history within last six months yes   Number of times patient has fallen within last six months 1   Change in Functional Status Since Onset of Current Illness/Injury yes   General Information   Onset of Illness/Injury or Date of Surgery 08/31/21   Referring Physician Keara Chu APRN CNP   Patient/Family Therapy Goals Statement (PT) Discharge home.   Pertinent History of Current Problem (include personal factors and/or comorbidities that impact the POC) Pt admitted with T7 compression fx, PMH multiple myeloma.   Existing Precautions/Restrictions fall;brace worn when out of bed  (TLSO when HOB > 30 degrees)   Strength   Manual Muscle Testing Quick Adds Deficits observed during functional mobility   Bed Mobility   Bed Mobility supine-sit;sit-supine   Supine-Sit [place] (Bed Mobility) supervision;verbal cues   Sit-Supine [place] (Bed Mobility) supervision;verbal cues   Impairments Contributing to Impaired Bed Mobility pain;impaired balance;decreased strength   Assistive Device (Bed Mobility) bed rails   Transfers   Transfers sit-stand transfer   Impairments Contributing to Impaired Transfers pain;decreased strength   Sit-Stand Transfer   Sit-Stand [place] (Transfers) supervision;verbal  cues   Assistive Device (Sit-Stand Transfers) walker, front-wheeled   Gait/Stairs (Locomotion)   United Level (Gait) minimum assist (75% patient effort)   Assistive Device (Gait) walker, front-wheeled   Distance in Feet (Required for LE Total Joints) 300'   Pattern (Gait) step-through   Deviations/Abnormal Patterns (Gait) wallace decreased;gait speed decreased   Comment (Gait/Stairs) Leans toward R side. Min assist to maintain balance.   Clinical Impression   Criteria for Skilled Therapeutic Intervention yes, treatment indicated   PT Diagnosis (PT) impaired functional mobility   Influenced by the following impairments decreased strength, impaired balance, orthopedic restrictions, pain   Functional limitations due to impairments difficulty with transfers, gait   Clinical Presentation Evolving/Changing   Clinical Presentation Rationale impaired functional mobility   Clinical Decision Making (Complexity) moderate complexity   Therapy Frequency (PT) Daily   Predicted Duration of Therapy Intervention (days/wks) 7 days   Planned Therapy Interventions (PT) balance training;bed mobility training;gait training;patient/family education;stair training;strengthening;stretching;transfer training   Risk & Benefits of therapy have been explained evaluation/treatment results reviewed;participants voiced agreement with care plan;participants included;patient   PT Discharge Planning    PT Discharge Recommendation (DC Rec) home with assist   PT Rationale for DC Rec Pt has support from family at home. May need A of 1 for ambulation as needed due to instability.   Total Evaluation Time   Total Evaluation Time (Minutes) 10     Karen England, PT  9/3/2021

## 2021-09-03 NOTE — PLAN OF CARE
Problem: Pain Acute  Goal: Acceptable Pain Control and Functional Ability  Outcome: Improving  Intervention: Develop Pain Management Plan  Recent Flowsheet Documentation  Taken 9/2/2021 1700 by Narda Summers, RN  Pain Management Interventions: medication (see MAR)   Patient tolerating diet, pain controlled with prn robaxin. TLSO brace on when HOB greater than 30 or OOB. Patient wanting to discharge home tonight, however neurosurg recommending to stay until tomorrow. Patient ok with plan, anticipating discharge home tomorrow.  Narda Summers, RN

## 2021-09-03 NOTE — TELEPHONE ENCOUNTER
Pathological Spine fracture T7.     XR and NP appt next Friday     OLIVA Flynn-CNP  [facility] Neurosurgery  O: [phone]

## 2021-09-03 NOTE — PROGRESS NOTES
Valeria likes her new brace. Feels it fits well. No additional back pain since off bedrest. Describes right flank pain described as muscle spasm. Spent time discussing importance of brace and red flag warning's to call our team with. She verbalizes understanding. Emphasized activity restriction. High risk for unstable spine. Is a very poor surgical candidate due to underlying bone quality and current multiple myeloma.     XR reviewed.     OK to discharge from spine perspective after PT eval this am. Dexamethasone taper written and discussed with patient.     Carole Wall, OLIVA-CNP  [facility] Neurosurgery  O: [phone]

## 2021-09-03 NOTE — DISCHARGE INSTRUCTIONS
Take OTC Pepcid or something similar to protect your stomach while on steroids and the week to follow.

## 2021-09-03 NOTE — PLAN OF CARE
Occupational Therapy Discharge Summary    Reason for therapy discharge:    Discharged to home.    Progress towards therapy goal(s). See goals on Care Plan in [facility] electronic health record for goal details.  Goals not met d/t limited OT     Therapy recommendation(s):    Recommend home w/24 hr supervision/assist as needed.

## 2021-09-03 NOTE — PLAN OF CARE
Problem: Adult Inpatient Plan of Care  Goal: Optimal Comfort and Wellbeing  Outcome: Improving     Problem: Pain Acute  Goal: Acceptable Pain Control and Functional Ability  9/3/2021 0547 by Scarlett Gomez, RN  Outcome: Improving  9/3/2021 0202 by Scarlett Gomez, RN  Outcome: Improving    Dilaudid one time given for pain, bladder scan 8 ml. No BM.

## 2021-09-03 NOTE — PROGRESS NOTES
Occupational Therapy     09/03/21 1110   Quick Adds   Type of Visit Initial Occupational Therapy Evaluation   Living Environment   Living Environment Comments see PT eval for other specificis; has tub/shower comb; shower chair & grab bars, RTS   Self-Care   Usual Activity Tolerance good   Current Activity Tolerance moderate   Equipment Currently Used at Home dressing device  (has reacher & sock aid-used last yr/not needing recently)   Activity/Exercise/Self-Care Comment pt reports independence with ADLs, trfs & mob & SBA w/bathing. Recently started using FWW.   Disability/Function   Dressing/Bathing Difficulty yes  (pt reports daughter provides SBA & assist as needed.)   Toileting issues no   Fall history within last six months yes   Number of times patient has fallen within last six months 1   Change in Functional Status Since Onset of Current Illness/Injury yes   General Information   Onset of Illness/Injury or Date of Surgery 08/31/21   Referring Physician Dov Lynn   Patient/Family Therapy Goal Statement (OT) to go home   Additional Occupational Profile Info/Pertinent History of Current Problem moderate complexity   Existing Precautions/Restrictions fall  (TLSO -per report don when HOB > 30 degrees)   General Observations and Info Admitted w/pathologic compression fx @ T7. PMH includes T1 & T7 lesions; mult. myeloma.   Cognitive Status Examination   Orientation Status orientation to person, place and time   Follows Commands follows two-step commands   Safety Deficit   (moves quickly & easily distracted-needs redirection @ times)   Cognitive Status Comments cues for safety/to slow down.    Pain Assessment   Patient Currently in Pain   (pt reports some pain R sided/rib area vs back-didn't rate)   Range of Motion Comprehensive   Comment, General Range of Motion demo functional UE ROM   Strength Comprehensive (MMT)   Comment, General Manual Muscle Testing (MMT) Assessment demo functional UE strength for ADLs    Coordination   Gross Motor Coordination WNL   Fine Motor Coordination WNL   Bed Mobility   Comment (Bed Mobility) SBA w/ bedrail   Transfers   Transfer Comments SBA basic trfs   Balance   Balance Comments rare slight unsteadiness-using FWW; safety cues   Clinical Impression   Criteria for Skilled Therapeutic Interventions Met (OT) yes   OT Diagnosis impaired ADLs   OT Problem List-Impairments impacting ADL activity tolerance impaired;balance   Assessment of Occupational Performance 1-3 Performance Deficits   Identified Performance Deficits functional mobility, dressing/brace   Planned Therapy Interventions (OT) ADL retraining;transfer training;progressive activity/exercise;home program guidelines   Clinical Decision Making Complexity (OT) moderate complexity   Therapy Frequency (OT) Daily   Predicted Duration of Therapy 5 days  (pt wants to DC home today so incr time spent/longer session.)   Risk & Benefits of therapy have been explained evaluation/treatment results reviewed;care plan/treatment goals reviewed;patient   OT Discharge Planning    OT Discharge Recommendation (DC Rec) Home with assist  (@ this time rec home w/24 hr supervision/assist as needed.)   OT Rationale for DC Rec slight unsteadiness/tends to move quickly-for safety rec 24 hr supervision.    Total Evaluation Time (Minutes)   Total Evaluation Time (Minutes) 10

## 2021-09-05 ENCOUNTER — HEALTH MAINTENANCE LETTER (OUTPATIENT)
Age: 71
End: 2021-09-05

## 2021-09-07 ENCOUNTER — TELEPHONE (OUTPATIENT)
Dept: FAMILY MEDICINE | Facility: CLINIC | Age: 71
End: 2021-09-07

## 2021-09-07 ENCOUNTER — OFFICE VISIT (OUTPATIENT)
Dept: RADIATION ONCOLOGY | Facility: HOSPITAL | Age: 71
End: 2021-09-07
Attending: CLINICAL NURSE SPECIALIST
Payer: COMMERCIAL

## 2021-09-07 DIAGNOSIS — F41.9 ANXIETY: ICD-10-CM

## 2021-09-07 DIAGNOSIS — F32.9 REACTIVE DEPRESSION: ICD-10-CM

## 2021-09-07 DIAGNOSIS — R63.4 WEIGHT LOSS: ICD-10-CM

## 2021-09-07 DIAGNOSIS — G89.3 CANCER ASSOCIATED PAIN: ICD-10-CM

## 2021-09-07 DIAGNOSIS — C90.00 MULTIPLE MYELOMA NOT HAVING ACHIEVED REMISSION (H): Primary | ICD-10-CM

## 2021-09-07 DIAGNOSIS — R63.0 ANOREXIA: ICD-10-CM

## 2021-09-07 DIAGNOSIS — M84.58XK: ICD-10-CM

## 2021-09-07 DIAGNOSIS — N30.01 ACUTE CYSTITIS WITH HEMATURIA: ICD-10-CM

## 2021-09-07 PROCEDURE — 99215 OFFICE O/P EST HI 40 MIN: CPT | Performed by: CLINICAL NURSE SPECIALIST

## 2021-09-07 PROCEDURE — G0463 HOSPITAL OUTPT CLINIC VISIT: HCPCS

## 2021-09-07 RX ORDER — FAMOTIDINE 20 MG
1000 TABLET ORAL DAILY
COMMUNITY
Start: 2021-06-24

## 2021-09-07 RX ORDER — SENNOSIDES A AND B 8.6 MG/1
1 TABLET, FILM COATED ORAL DAILY PRN
COMMUNITY
End: 2022-11-15

## 2021-09-07 RX ORDER — CALCIUM/MAGNESIUM/ZINC 333-133 MG
1 TABLET ORAL DAILY
COMMUNITY
Start: 2021-06-24

## 2021-09-07 RX ORDER — HYDROMORPHONE HYDROCHLORIDE 4 MG/1
4 TABLET ORAL EVERY 6 HOURS PRN
Qty: 25 TABLET | Refills: 0 | Status: SHIPPED | OUTPATIENT
Start: 2021-09-07 | End: 2021-09-17

## 2021-09-07 NOTE — TELEPHONE ENCOUNTER
Potassium and magnesium labs entered by Dr Lynn as future orders.   Pt can simply call and schedule a lab only appt. Patient notified via voicemail message

## 2021-09-07 NOTE — TELEPHONE ENCOUNTER
Reason for Call:  Other call back    Detailed comments: pt was in [facility] last tues til fri and is supposed to call and have you order labs for potassium and mag    She has massive radiation therapy    Phone Number Patient can be reached at: Cell number on file:    Telephone Information:   Mobile [phone]       Best Time: please call her when ordered.  She is avail wed and thurs for labs    Can we leave a detailed message on this number? YES    Call taken on 9/7/2021 at 9:43 AM by Jocelin Gracia

## 2021-09-07 NOTE — LETTER
2021         RE: Lizzie Whalen  [address] [place] [address]        Dear Colleague,    Thank you for referring your patient, Lizzie Whalen, to the [facility] RADIATION ONCOLOGY [place]. Please see a copy of my visit note below.    Pt here with two family members (approved by provider). Pt and her family members are not vaccinated for COVID-19. I reviewed with them the purpose of palliative care and answered their questions. Updated pt's med list to the best of my ability, she is on many supplements that are not in our pharmacy formulary.     [facility]  Palliative Care Progress Note        Start Time:  1250  End Time: 1340      Consultation - Palliative Care  Lizzie Whalen,  1950, MRN [id number]      PCP: Anne Marie Walker, [phone]          Extended Emergency Contact Information  Primary Emergency Contact: Sandor Whalen  Address: [address] [place] [address] [facility]  Home Phone: [phone]  Mobile Phone: [phone]  Relation: Spouse  Secondary Emergency Contact: HERNANDO WHALEN  Mobile Phone: [phone]  Relation: Daughter          Impression and Recommendations:  ADVANCED CARE PLANNING AND GOALS OF CARE DISCUSSION  - Code status was discussed and patient confirms FULL CODE.   -Patient open to following up with medical oncology [facility].  - Follow up in outpatient palliative care clinic in 6 weeks for ongoing pain and symptom management and goals of care discussion.  - Reviewed the role of the Honoring Choices document and POLST with patient. She plans to work on Honoring Choices document   We will review POLST at next clinic visit if patient interested in completing.     SYMPTOM ASSESSMENT  1.  Cancer related pain thoracic spine pain that radiates to bilateral lower extremities  - Continue Gabapentin 300 mg by mouth 3 times daily  -Dexamethasone taper per radiation oncology.  Patient has 6  "more days before completely being weaned off steroid.  -S/P Single fraction radiation to T6 through T8/9.  -Dilaudid 4 mg by mouth every 6 hours as needed.  Patient taking 3 times a day (total of 12 mg oral Dilaudid in 24 hours).  -Referral for leaf line/medical cannabis to be sent and patient working to complete the paperwork and obtain medical cannabis..  - Robaxin 500 mg po q6h prn spasms  -Patient taking  naltrexone 3 mg by mouth daily.  Had discussion with patient that this could reverse/minimize effect of opiate and she may feel more pain.  Taking this as a \"anti-inflammatory\" component of her natural/complementary treatments.     2.  Anorexia and weight loss.  Patient reports 50 pound weight loss in 6 months.  Most recent albumin 2.9.  -Nutrition consulted.  -Patient utilizing diet modifications to minimize sugar and to enhance body's own immune response to fight cancer.    3.  Cancer related fatigue  -9/5 patient experienced fall at home.  9/7 patient tripped and fell to her knee walking in from parking lot.  Patient declined further examination in ED or x-ray.  Feels fine at the moment.  -Patient utilizing rolling walker while up.  -Wearing thoracic brace for stabilization.  -Discussed ways to minimize falls like wearing closed toed tight up shoes, removing area rugs and other objects/obstacles in home.    4.  Anxiety and depression related to health/diagnosis  -Referral sent to [facility] for coping, support and processing diagnosis.         HPI    Chief Complaint: cancer related pain, fatigue    Lizzie Viera is a 70 year old female presents to the Outpatient Palliative Care Clinic today for ongoing symptom management and goals of care discussion.     [facility]/20[facility].    SUMMARY: Lizzie Viera is a 70 year old female with a past medical history of multiple myeloma with pathologic compression fracture of T6 through T8/9 spine based on " CT scan and received one fraction of radiation to the levels T6 through T8/9 on 9/1/2021. Patient fitted for and wear TLSO brace for spine stabilization. Patient was treated with IV dexamethasone initially with subsequent oral taper.  Patient followed by neurosurgery with follow-up scheduled 9/10/2021. Historically, 9/2020 patient noted to have osteoporotic T7 compression fracture with diffuse marrow edema and an indeterminate T1 lesion.  Further imaging in 4/26/2021 showed worsening of her T7 pathologic fracture with new extraosseous extension causing severe spinal canal stenosis.  5/7/2021 biopsy showed neoplasm and biopsy obtained of right iliac crest showed multiple myeloma.  Patient declined chemo and radiation at that time and sought naturapathic approach to treatment.      Symptom management  Pain: Currently rating mid back pain with right greater than left as a 6/10.  Patient feels pain at a 6 is tolerable to her as pain previously 8-10/10.  Patient satisfied with current pain regimen listed above.  She understands, as we discussed, that pain may increase as steroids continue to wean down.  She feels that radiation treatment received 9/1/2021 helped with pain control.  She is eager to have a trial of medical cannabis to help with pain, nausea, anxiety and appetite stimulation.  Pain does seem to rotate between right and left side of back and can radiate down into her legs.  Dyspnea: denies  Nausea: intermittent, none presently  Anxiety: yes, related to diagnosis/health     REVIEWED: yes    Goals of Care  Discussion: Goals are restorative.    Patient eager to meet with Dr. Blanco and neurosurgery for next steps.  Role of honoring choices and POLST reviewed with patient, her sister, Lali, and friend, Marielena who accompanied her for visit today.  Patient reports that her daughter, Collette, Lali and Marielena would be her primary decision maker should she not be able to make medical decisions for herself.  She  wishes to have further discussions with her family and support system as she completes the honoring choices document.  CODE STATUS currently full code.       Review of Systems:  Fatigue: Moderate  Anorexia: Moderate  Drowsiness: Denies presently  Constipation: Denies.  Takes senna 1 tablet daily.  Last BM 9/7/2021 in AM.  Agitation: None  Depression: Yes, related to diagnosis/health    Palliative Performance Score:     50%- 1. Mainly sit/lie; 2. Unable to do any work, extensive disease; 3. Considerable assistance required; 4. Normal or reduced; 5. Full or confusion  Physical Exam:  There were no vitals taken for this visit.  General appearance: alert, appears stated age, cooperative, fatigued and no distress  Head: Normocephalic, without obvious abnormality, atraumatic  Eyes: Lids and lashes normal.  Sclera anicteric.  EOMs intact.  Nose: no discharge  Lungs: Nonlabored respirations  Extremities: Trace dependent pedal edema noted  Skin: Skin color, texture, turgor normal. No rashes or lesions  Neurologic: Grossly normal       Pertinent Labs  Lab Results: personally reviewed.   Lab Results   Component Value Date     09/02/2021    CO2 23 09/02/2021    BUN 12 09/02/2021     Lab Results   Component Value Date    WBC 7.6 08/31/2021    HGB 10.2 08/31/2021    HCT 29.3 08/31/2021    MCV 96 08/31/2021     09/03/2021     AST   Date Value Ref Range Status   08/31/2021 22 0 - 40 U/L Final     ALT   Date Value Ref Range Status   08/31/2021 23 0 - 45 U/L Final     Alkaline Phosphatase   Date Value Ref Range Status   08/31/2021 40 (L) 45 - 120 U/L Final     Albumin   Date Value Ref Range Status   08/31/2021 2.9 (L) 3.5 - 5.0 g/dL Final      Pertinent Radiology  XR Thoracic Lumbar Standing 2 Views    Result Date: 9/1/2021  EXAM: XR THORACIC LUMBAR STANDING 2 VW LOCATION: [facility] DATE/TIME: 9/1/2021 3:26 PM INDICATION: T7 compression fracture. Evaluate upright kyphosis COMPARISON: X-ray and  MRI 8/31/2021 TECHNIQUE: CR thoracic and lumbar spine     IMPRESSION: 12 thoracic and 5 lumbar type vertebra are assumed. Marked anterior wedge compression deformity of T7 corresponding to findings on previous exams. Segmental kyphosis measures 32 degrees from T6 through T8 on the current radiographs compared to 31 degrees  on yesterday's radiographs. No significant change in alignment. No new fracture or progressive vertebral height loss identified. Mild thoracolumbar spondylosis.     XR Thoracic Spine 2 Views    Result Date: 9/2/2021  EXAM: XR THORACIC SPINE 2 VIEWS LOCATION: [facility] DATE/TIME: 9/2/2021 3:24 PM INDICATION: upright in CUSTOM TLSO BRACE on 9/2 once arrives. fracture/kyphosis, eval for stability upright COMPARISON: Thoracic spine radiograph 06/01/2021, thoracic spine MRI 08/31/2021 TECHNIQUE: CR Thoracic Spine.     IMPRESSION: Upright radiographs. Patient is wearing a brace. No significant change in the severe pathologic compression fracture of T7. Stable 34 degrees of segmental kyphosis from T6 to T8 when measured in a similar fashion. The rest of vertebral body heights are maintained. Mild intervertebral disc height loss and endplate spurring in the mid to lower thoracic spine. The visualized lungs are clear. Surgical clips project over the right upper quadrant.    XR Thoracic Spine 2 Views    Result Date: 8/31/2021  EXAM: XR THORACIC SPINE 2 VIEWS LOCATION: [facility] DATE/TIME: 8/31/2021 1:37 PM INDICATION:  Closed T7 fracture (H) COMPARISON: 06/29/2021. TECHNIQUE: CR Thoracic Spine.     IMPRESSION: Severe T7 vertebral body compression deformity is similar to prior. Again localized accentuation of kyphosis at this level. There is mild dextroconvex curvature. The remaining visualized vertebral bodies are unremarkable. Mild multilevel spondylosis is similar to prior. Pedicles appear symmetric. Visualized lung fields are well aerated.     MR  Thoracic Spine w/o & w Contrast    Addendum Date: 8/31/2021    Findings were called to Dr. Mendez at 8/31/2021 6:32 PM by Dr. GREGORIA Calloway.    Result Date: 8/31/2021  EXAM: MR THORACIC SPINE W/O and W CONTRAST LOCATION: [facility] DATE/TIME: 8/31/2021 4:47 PM INDICATION: Pathologic fracture of T7. COMPARISON: MRI thoracic spine 04/26/2021 CONTRAST: 6ml Gadavist TECHNIQUE: Routine Thoracic Spine MRI without and with IV contrast. FINDINGS: 12 rib-bearing thoracic type vertebra. Marked pathologic compression fracture of T7 with progressive vertebral height loss compared to the previous MRI. There is now essentially vertebra plana at this level with progressive segmental kyphosis measuring 27 degrees from T6 through T8. Pathologic marrow involving the posterior T7 vertebral margin with increased extension into the adjacent ventral epidural space, progressive involvement of the pedicles/lamina, and new marrow replacement/enhancement involving the posterior inferior T6 and superior T8 vertebral margins with additional progressive paraspinal extraosseous enhancing soft tissue mass. There is now circumferential epidural neoplasm at the T7 level with severe spinal canal stenosis, increased impingement upon the thoracic cord, and minimal intramedullary T2 prolongation. The additional metastatic deposit at T1 mild diffuse disc desiccation throughout the thoracic spine, with moderate loss of disc height at T5-T6 and mild multilevel loss of disc height at multiple additional levels from T3 through T10. No focal disc herniations. Minimal thoracic facet arthropathy. No additional high-grade spinal canal stenosis. Neoplastic soft tissue results in moderate to severe bilateral neural foraminal stenosis at T6-T7, severe bilateral neural foraminal stenosis at T7-T8, and mild to moderate left neural foraminal stenosis at T8-T9. Mild progression of paraspinal soft tissue mass centered at T7 as above. For example,  maximum transverse dimension of enhancing soft tissue measuring 6.2 cm on axial images compared to 5.3 cm previously.     IMPRESSION: 1.  Pathologic compression fracture at T7 with progressive vertebral height loss and increasing epidural/paraspinal extraosseous neoplasm compared to 04/26/2021. 2.  Epidural neoplasm results in high-grade narrowing of the thecal sac and impingement upon the mid thoracic cord extending from the lower T6 through upper T8 levels. Minimal associated cord edema. 3.  Epidural neoplasm results in moderate to high-grade neural foraminal narrowing at T6-T7 and T7-T8 bilaterally and moderate neural foraminal narrowing on the left at T8-T9. 4.  Additional osseous metastasis at T1 similar to the prior exam. No new osseous metastasis elsewhere.       TTS: I have personally spent a total of 50 minutes today reviewing patient's medical record, consultation with the medical providers, assessing patient and symptoms management and emotional support.    SINDHU Rose, CNS  Palliative Care  [phone]      Again, thank you for allowing me to participate in the care of your patient.        Sincerely,        Scarlett Flynn, CNS

## 2021-09-07 NOTE — PROGRESS NOTES
"[facility]  Palliative Care Progress Note        Start Time:  1250  End Time: 1340      Consultation - Palliative Care  Lizzie Whalen,  1950, MRN [id number]      PCP: Anne Marie Walker, [phone]          Extended Emergency Contact Information  Primary Emergency Contact: Sandor Whalen  Address: [address] [place] [address] [place]  Home Phone: [phone]  Mobile Phone: [phone]  Relation: Spouse  Secondary Emergency Contact: HERNANDO WHALEN  Mobile Phone: [phone]  Relation: Daughter          Impression and Recommendations:  ADVANCED CARE PLANNING AND GOALS OF CARE DISCUSSION  - Code status was discussed and patient confirms FULL CODE.   -Patient open to following up with medical oncology [facility].  - Follow up in outpatient palliative care clinic in 6 weeks for ongoing pain and symptom management and goals of care discussion.  - Reviewed the role of the [employer] document and POLST with patient. She plans to work on Key[employer] document   We will review POLST at next clinic visit if patient interested in completing.     SYMPTOM ASSESSMENT  1.  Cancer related pain thoracic spine pain that radiates to bilateral lower extremities  - Continue Gabapentin 300 mg by mouth 3 times daily  -Dexamethasone taper per radiation oncology.  Patient has 6 more days before completely being weaned off steroid.  -S/P Single fraction radiation to T6 through T8/9.  -Dilaudid 4 mg by mouth every 6 hours as needed.  Patient taking 3 times a day (total of 12 mg oral Dilaudid in 24 hours).  -Referral for leaf line/medical cannabis to be sent and patient working to complete the paperwork and obtain medical cannabis..  - Robaxin 500 mg po q6h prn spasms  -Patient taking  naltrexone 3 mg by mouth daily.  Had discussion with patient that this could reverse/minimize effect of opiate and she may feel more pain.  Taking this as a \"anti-inflammatory\" " component of her natural/complementary treatments.     2.  Anorexia and weight loss.  Patient reports 50 pound weight loss in 6 months.  Most recent albumin 2.9.  -Nutrition consulted.  -Patient utilizing diet modifications to minimize sugar and to enhance body's own immune response to fight cancer.    3.  Cancer related fatigue  -9/5 patient experienced fall at home.  9/7 patient tripped and fell to her knee walking in from parking lot.  Patient declined further examination in ED or x-ray.  Feels fine at the moment.  -Patient utilizing rolling walker while up.  -Wearing thoracic brace for stabilization.  -Discussed ways to minimize falls like wearing closed toed tight up shoes, removing area rugs and other objects/obstacles in home.    4.  Anxiety and depression related to health/diagnosis  -Referral sent to [facility] for coping, support and processing diagnosis.         HPI    Chief Complaint: cancer related pain, fatigue    Lizzie Viera is a 70 year old female presents to the Outpatient Palliative Care Clinic today for ongoing symptom management and goals of care discussion.     Last seen byPalliative Care during inpatient stay at [facility] 9/2/2021.    SUMMARY: Lizzie Viera is a 70 year old female with a past medical history of multiple myeloma with pathologic compression fracture of T6 through T8/9 spine based on CT scan and received one fraction of radiation to the levels T6 through T8/9 on 9/1/2021. Patient fitted for and wear TLSO brace for spine stabilization. Patient was treated with IV dexamethasone initially with subsequent oral taper.  Patient followed by neurosurgery with follow-up scheduled 9/10/2021. Historically, 9/2020 patient noted to have osteoporotic T7 compression fracture with diffuse marrow edema and an indeterminate T1 lesion.  Further imaging in 4/26/2021 showed worsening of her T7 pathologic fracture with new extraosseous extension causing severe spinal canal stenosis.   5/7/2021 biopsy showed neoplasm and biopsy obtained of right iliac crest showed multiple myeloma.  Patient declined chemo and radiation at that time and sought naturapathic approach to treatment.      Symptom management  Pain: Currently rating mid back pain with right greater than left as a 6/10.  Patient feels pain at a 6 is tolerable to her as pain previously 8-10/10.  Patient satisfied with current pain regimen listed above.  She understands, as we discussed, that pain may increase as steroids continue to wean down.  She feels that radiation treatment received 9/1/2021 helped with pain control.  She is eager to have a trial of medical cannabis to help with pain, nausea, anxiety and appetite stimulation.  Pain does seem to rotate between right and left side of back and can radiate down into her legs.  Dyspnea: denies  Nausea: intermittent, none presently  Anxiety: yes, related to diagnosis/health     REVIEWED: yes    Goals of Care  Discussion: Goals are restorative.    Patient eager to meet with Dr. Blanco and neurosurgery for next steps.  Role of [place] and POLST reviewed with patient, her sister, Lali, and friend, Marielena who accompanied her for visit today.  Patient reports that her daughter, Collette, Lali and Marielena would be her primary decision maker should she not be able to make medical decisions for herself.  She wishes to have further discussions with her family and support system as she completes the [place] document.  CODE STATUS currently full code.       Review of Systems:  Fatigue: Moderate  Anorexia: Moderate  Drowsiness: Denies presently  Constipation: Denies.  Takes senna 1 tablet daily.  Last BM 9/7/2021 in AM.  Agitation: None  Depression: Yes, related to diagnosis/health    Palliative Performance Score:     50%- 1. Mainly sit/lie; 2. Unable to do any work, extensive disease; 3. Considerable assistance required; 4. Normal or reduced; 5. Full or confusion  Physical Exam:  There  were no vitals taken for this visit.  General appearance: alert, appears stated age, cooperative, fatigued and no distress  Head: Normocephalic, without obvious abnormality, atraumatic  Eyes: Lids and lashes normal.  Sclera anicteric.  EOMs intact.  Nose: no discharge  Lungs: Nonlabored respirations  Extremities: Trace dependent pedal edema noted  Skin: Skin color, texture, turgor normal. No rashes or lesions  Neurologic: Grossly normal       Pertinent Labs  Lab Results: personally reviewed.   Lab Results   Component Value Date     09/02/2021    CO2 23 09/02/2021    BUN 12 09/02/2021     Lab Results   Component Value Date    WBC 7.6 08/31/2021    HGB 10.2 08/31/2021    HCT 29.3 08/31/2021    MCV 96 08/31/2021     09/03/2021     AST   Date Value Ref Range Status   08/31/2021 22 0 - 40 U/L Final     ALT   Date Value Ref Range Status   08/31/2021 23 0 - 45 U/L Final     Alkaline Phosphatase   Date Value Ref Range Status   08/31/2021 40 (L) 45 - 120 U/L Final     Albumin   Date Value Ref Range Status   08/31/2021 2.9 (L) 3.5 - 5.0 g/dL Final      Pertinent Radiology  XR Thoracic Lumbar Standing 2 Views    Result Date: 9/1/2021  EXAM: XR THORACIC LUMBAR STANDING 2 VW LOCATION: [facility] DATE/TIME: 9/1/2021 3:26 PM INDICATION: T7 compression fracture. Evaluate upright kyphosis COMPARISON: X-ray and MRI 8/31/2021 TECHNIQUE: CR thoracic and lumbar spine     IMPRESSION: 12 thoracic and 5 lumbar type vertebra are assumed. Marked anterior wedge compression deformity of T7 corresponding to findings on previous exams. Segmental kyphosis measures 32 degrees from T6 through T8 on the current radiographs compared to 31 degrees  on yesterday's radiographs. No significant change in alignment. No new fracture or progressive vertebral height loss identified. Mild thoracolumbar spondylosis.     XR Thoracic Spine 2 Views    Result Date: 9/2/2021  EXAM: XR THORACIC SPINE 2 VIEWS LOCATION: [facility] DATE/TIME: 9/2/2021 3:24 PM INDICATION: upright in CUSTOM TLSO BRACE on 9/2 once arrives. fracture/kyphosis, eval for stability upright COMPARISON: Thoracic spine radiograph 06/01/2021, thoracic spine MRI 08/31/2021 TECHNIQUE: CR Thoracic Spine.     IMPRESSION: Upright radiographs. Patient is wearing a brace. No significant change in the severe pathologic compression fracture of T7. Stable 34 degrees of segmental kyphosis from T6 to T8 when measured in a similar fashion. The rest of vertebral body heights are maintained. Mild intervertebral disc height loss and endplate spurring in the mid to lower thoracic spine. The visualized lungs are clear. Surgical clips project over the right upper quadrant.    XR Thoracic Spine 2 Views    Result Date: 8/31/2021  EXAM: XR THORACIC SPINE 2 VIEWS LOCATION: [facility] DATE/TIME: 8/31/2021 1:37 PM INDICATION:  Closed T7 fracture (H) COMPARISON: 06/29/2021. TECHNIQUE: CR Thoracic Spine.     IMPRESSION: Severe T7 vertebral body compression deformity is similar to prior. Again localized accentuation of kyphosis at this level. There is mild dextroconvex curvature. The remaining visualized vertebral bodies are unremarkable. Mild multilevel spondylosis is similar to prior. Pedicles appear symmetric. Visualized lung fields are well aerated.     MR Thoracic Spine w/o & w Contrast    Addendum Date: 8/31/2021    Findings were called to Dr. Mendez at 8/31/2021 6:32 PM by Dr. GREGORIA Calloway.    Result Date: 8/31/2021  EXAM: MR THORACIC SPINE W/O and W CONTRAST LOCATION: [facility] DATE/TIME: 8/31/2021 4:47 PM INDICATION: Pathologic fracture of T7. COMPARISON: MRI thoracic spine 04/26/2021 CONTRAST: 6ml Gadavist TECHNIQUE: Routine Thoracic Spine MRI without and with IV contrast. FINDINGS: 12 rib-bearing thoracic type vertebra. Marked pathologic compression fracture of T7 with progressive vertebral height loss compared to  the previous MRI. There is now essentially vertebra plana at this level with progressive segmental kyphosis measuring 27 degrees from T6 through T8. Pathologic marrow involving the posterior T7 vertebral margin with increased extension into the adjacent ventral epidural space, progressive involvement of the pedicles/lamina, and new marrow replacement/enhancement involving the posterior inferior T6 and superior T8 vertebral margins with additional progressive paraspinal extraosseous enhancing soft tissue mass. There is now circumferential epidural neoplasm at the T7 level with severe spinal canal stenosis, increased impingement upon the thoracic cord, and minimal intramedullary T2 prolongation. The additional metastatic deposit at T1 mild diffuse disc desiccation throughout the thoracic spine, with moderate loss of disc height at T5-T6 and mild multilevel loss of disc height at multiple additional levels from T3 through T10. No focal disc herniations. Minimal thoracic facet arthropathy. No additional high-grade spinal canal stenosis. Neoplastic soft tissue results in moderate to severe bilateral neural foraminal stenosis at T6-T7, severe bilateral neural foraminal stenosis at T7-T8, and mild to moderate left neural foraminal stenosis at T8-T9. Mild progression of paraspinal soft tissue mass centered at T7 as above. For example, maximum transverse dimension of enhancing soft tissue measuring 6.2 cm on axial images compared to 5.3 cm previously.     IMPRESSION: 1.  Pathologic compression fracture at T7 with progressive vertebral height loss and increasing epidural/paraspinal extraosseous neoplasm compared to 04/26/2021. 2.  Epidural neoplasm results in high-grade narrowing of the thecal sac and impingement upon the mid thoracic cord extending from the lower T6 through upper T8 levels. Minimal associated cord edema. 3.  Epidural neoplasm results in moderate to high-grade neural foraminal narrowing at T6-T7 and T7-T8  bilaterally and moderate neural foraminal narrowing on the left at T8-T9. 4.  Additional osseous metastasis at T1 similar to the prior exam. No new osseous metastasis elsewhere.       TTS: I have personally spent a total of 50 minutes today reviewing patient's medical record, consultation with the medical providers, assessing patient and symptoms management and emotional support.    VILMA Flynn, SINDHU, CNS  Palliative Care  [phone]

## 2021-09-07 NOTE — PROGRESS NOTES
Pt here with two family members (approved by provider). Pt and her family members are not vaccinated for COVID-19. I reviewed with them the purpose of palliative care and answered their questions. Updated pt's med list to the best of my ability, she is on many supplements that are not in our pharmacy formulary.

## 2021-09-08 ENCOUNTER — TELEPHONE (OUTPATIENT)
Dept: ONCOLOGY | Facility: HOSPITAL | Age: 71
End: 2021-09-08

## 2021-09-10 ENCOUNTER — OFFICE VISIT (OUTPATIENT)
Dept: NEUROSURGERY | Facility: CLINIC | Age: 71
End: 2021-09-10
Payer: COMMERCIAL

## 2021-09-10 ENCOUNTER — HOSPITAL ENCOUNTER (OUTPATIENT)
Dept: RADIOLOGY | Facility: HOSPITAL | Age: 71
Discharge: HOME | End: 2021-09-10
Attending: NURSE PRACTITIONER | Admitting: NURSE PRACTITIONER
Payer: COMMERCIAL

## 2021-09-10 VITALS
WEIGHT: 135 LBS | HEART RATE: 58 BPM | BODY MASS INDEX: 24.84 KG/M2 | HEIGHT: 62 IN | SYSTOLIC BLOOD PRESSURE: 156 MMHG | OXYGEN SATURATION: 94 % | DIASTOLIC BLOOD PRESSURE: 74 MMHG

## 2021-09-10 DIAGNOSIS — C80.1 MALIGNANT NEOPLASM METASTATIC TO THORACIC VERTEBRAL COLUMN WITH UNKNOWN PRIMARY SITE (H): Primary | ICD-10-CM

## 2021-09-10 DIAGNOSIS — C80.1 MALIGNANT NEOPLASM METASTATIC TO THORACIC VERTEBRAL COLUMN WITH UNKNOWN PRIMARY SITE (H): ICD-10-CM

## 2021-09-10 DIAGNOSIS — C79.51 MALIGNANT NEOPLASM METASTATIC TO THORACIC VERTEBRAL COLUMN WITH UNKNOWN PRIMARY SITE (H): ICD-10-CM

## 2021-09-10 DIAGNOSIS — C79.51 MALIGNANT NEOPLASM METASTATIC TO THORACIC VERTEBRAL COLUMN WITH UNKNOWN PRIMARY SITE (H): Primary | ICD-10-CM

## 2021-09-10 PROCEDURE — 72070 X-RAY EXAM THORAC SPINE 2VWS: CPT

## 2021-09-10 PROCEDURE — 99213 OFFICE O/P EST LOW 20 MIN: CPT | Performed by: NURSE PRACTITIONER

## 2021-09-10 ASSESSMENT — MIFFLIN-ST. JEOR: SCORE: 1095.61

## 2021-09-10 NOTE — Clinical Note
Valeria Howard is interested in chemo and radiation now, not sure if she needs sooner appointments but I just wanted to send you an FYI.     Donnell

## 2021-09-10 NOTE — PATIENT INSTRUCTIONS
Your fracture is stable on today's pictures. I would continue to wear the brace when out of bed or upright. We will see you back in four weeks with repeat x-rays but there is no plan for surgery for your back.     You have back pain related to your fracture, this is normal. I recommend follow up with oncology and radiation oncology, if you reach out they can be very supportive.

## 2021-09-10 NOTE — LETTER
9/10/2021         RE: Lizzie Viera  [address]  [place] [address]        Dear Colleague,    Thank you for referring your patient, Lizzie Viera, to the [facility]. Please see a copy of my visit note below.    Neurosurgery progress note:    HPI:  Valeria Viera is a 70-year-old hx multiple myeloma with a T1 lesion as well as a T7 pathologic pressure fracture. Last seen by Dr Campbell on 7/1/21 at which time, she was neuro intact and was cleared to f/u in 6mos with new xr, otherwise prn. She has seen two hem-oncologists, has refused treatment with chemotherapy or radiation advised, and has been seeing a naturopath, taking vitamin C and tumeric only.  Since last visit, she contacted the office with complaints of worsening thoracic pain and a new fall 1 wk ago after standing up and her entire R leg going numb and then giving out. Since fall, she has c/o katherine leg weakness with activity/standing, requiring new use of a walker. Her back pain is severe and wraps around her chest bilaterally. She has had multiple episodes of bowel incontinence only, with stable chronic diarrhea since April. She denies further episodes of leg numbness. Here with her  today. She is no longer wearing her TLSO brace because she lost 50lbs and it no longer fits    Subjective: Tells me she can walk with a walker, has had a few falls. Balance is maybe not great. This morning she woke with no pain and it's the absolute best she has felt in quite some time. She has been wearing her brace when out of bed as she is supposed to. Is more open to radiation appts.     Exam:  On physical exam-  She is seated, comfortable appearing.   She has 4/5 weakness of R dorsiflexion and R EHL, otherwise full strength throughout upper and lower extremities. She has up-going toes.   She has point tenderness to T7 area.  Sensation to light touch is intact throughout the upper and lower  extremities.  Gait testing deferred.     Imaging:  XR 9/10/2021  Stable upright appearance of fracture, stable segmental kyphosis.     Assessment and plan:  Valeria a very pleasant 70-year-old hx multiple myeloma who deferred chemo/radiation with a pathologic fracture of T7 with retropulsion, hospitalized at last visit and underwent high dose radiation, completing dexamethasone taper at this time. She is interested in chemo and radiation at this time - she does have appts but I will send a message to cancer care coordinator.     Plan:  1. XR 4 weeks  2. No plans for surgery  3. Continue brace when out of bed or upright     Tara Martines CNP  [facility] Neurosurgery  O: [phone]          Again, thank you for allowing me to participate in the care of your patient.        Sincerely,        SINDHU Melendez CNP

## 2021-09-10 NOTE — PROGRESS NOTES
Neurosurgery progress note:    HPI:  Valeria Viera is a 70-year-old hx multiple myeloma with a T1 lesion as well as a T7 pathologic pressure fracture. Last seen by Dr Campbell on 7/1/21 at which time, she was neuro intact and was cleared to f/u in 6mos with new xr, otherwise prn. She has seen two hem-oncologists, has refused treatment with chemotherapy or radiation advised, and has been seeing a naturopath, taking vitamin C and tumeric only.  Since last visit, she contacted the office with complaints of worsening thoracic pain and a new fall 1 wk ago after standing up and her entire R leg going numb and then giving out. Since fall, she has c/o katherine leg weakness with activity/standing, requiring new use of a walker. Her back pain is severe and wraps around her chest bilaterally. She has had multiple episodes of bowel incontinence only, with stable chronic diarrhea since April. She denies further episodes of leg numbness. Here with her  today. She is no longer wearing her TLSO brace because she lost 50lbs and it no longer fits    Subjective: Tells me she can walk with a walker, has had a few falls. Balance is maybe not great. This morning she woke with no pain and it's the absolute best she has felt in quite some time. She has been wearing her brace when out of bed as she is supposed to. Is more open to radiation appts.     Exam:  On physical exam-  She is seated, comfortable appearing.   She has 4/5 weakness of R dorsiflexion and R EHL, otherwise full strength throughout upper and lower extremities. She has up-going toes.   She has point tenderness to T7 area.  Sensation to light touch is intact throughout the upper and lower extremities.  Gait testing deferred.     Imaging:  XR 9/10/2021  Stable upright appearance of fracture, stable segmental kyphosis.     Assessment and plan:  Valeria a very pleasant 70-year-old hx multiple myeloma who deferred chemo/radiation with a pathologic fracture of T7 with  retropulsion, hospitalized at last visit and underwent high dose radiation, completing dexamethasone taper at this time. She is interested in chemo and radiation at this time - she does have appts but I will send a message to cancer care coordinator.     Plan:  1. XR 4 weeks  2. No plans for surgery  3. Continue brace when out of bed or upright     Tara Martines CNP  [facility] Neurosurgery  O: [phone]

## 2021-09-13 ENCOUNTER — OFFICE VISIT (OUTPATIENT)
Dept: FAMILY MEDICINE | Facility: CLINIC | Age: 71
End: 2021-09-13
Payer: COMMERCIAL

## 2021-09-13 ENCOUNTER — PATIENT OUTREACH (OUTPATIENT)
Dept: ONCOLOGY | Facility: CLINIC | Age: 71
End: 2021-09-13

## 2021-09-13 VITALS
DIASTOLIC BLOOD PRESSURE: 62 MMHG | WEIGHT: 130.25 LBS | SYSTOLIC BLOOD PRESSURE: 102 MMHG | OXYGEN SATURATION: 97 % | HEART RATE: 58 BPM | BODY MASS INDEX: 23.82 KG/M2

## 2021-09-13 DIAGNOSIS — C90.00 MULTIPLE MYELOMA NOT HAVING ACHIEVED REMISSION (H): Primary | ICD-10-CM

## 2021-09-13 DIAGNOSIS — E83.42 HYPOMAGNESEMIA: ICD-10-CM

## 2021-09-13 DIAGNOSIS — E87.6 HYPOKALEMIA: ICD-10-CM

## 2021-09-13 DIAGNOSIS — Z11.59 NEED FOR HEPATITIS C SCREENING TEST: ICD-10-CM

## 2021-09-13 DIAGNOSIS — S22.060S COMPRESSION FRACTURE OF T7 VERTEBRA, SEQUELA: ICD-10-CM

## 2021-09-13 DIAGNOSIS — Z20.822 EXPOSURE TO COVID-19 VIRUS: ICD-10-CM

## 2021-09-13 DIAGNOSIS — K21.9 GASTROESOPHAGEAL REFLUX DISEASE WITHOUT ESOPHAGITIS: ICD-10-CM

## 2021-09-13 LAB
ANION GAP SERPL CALCULATED.3IONS-SCNC: 7 MMOL/L (ref 5–18)
BUN SERPL-MCNC: 16 MG/DL (ref 8–22)
CALCIUM SERPL-MCNC: 9.2 MG/DL (ref 8.5–10.5)
CHLORIDE BLD-SCNC: 102 MMOL/L (ref 98–107)
CO2 SERPL-SCNC: 26 MMOL/L (ref 22–31)
CREAT SERPL-MCNC: 0.68 MG/DL (ref 0.6–1.1)
ERYTHROCYTE [DISTWIDTH] IN BLOOD BY AUTOMATED COUNT: 15.6 % (ref 10–15)
GFR SERPL CREATININE-BSD FRML MDRD: 90 ML/MIN/1.73M2
GLUCOSE BLD-MCNC: 78 MG/DL (ref 70–125)
HCT VFR BLD AUTO: 37.4 % (ref 35–47)
HGB BLD-MCNC: 12.3 G/DL (ref 11.7–15.7)
MAGNESIUM SERPL-MCNC: 1.9 MG/DL (ref 1.8–2.6)
MCH RBC QN AUTO: 34.4 PG (ref 26.5–33)
MCHC RBC AUTO-ENTMCNC: 32.9 G/DL (ref 31.5–36.5)
MCV RBC AUTO: 105 FL (ref 78–100)
PLATELET # BLD AUTO: 294 10E3/UL (ref 150–450)
POTASSIUM BLD-SCNC: 4.4 MMOL/L (ref 3.5–5)
RBC # BLD AUTO: 3.58 10E6/UL (ref 3.8–5.2)
SODIUM SERPL-SCNC: 135 MMOL/L (ref 136–145)
WBC # BLD AUTO: 9.6 10E3/UL (ref 4–11)

## 2021-09-13 PROCEDURE — 86769 SARS-COV-2 COVID-19 ANTIBODY: CPT | Mod: 90 | Performed by: FAMILY MEDICINE

## 2021-09-13 PROCEDURE — 99000 SPECIMEN HANDLING OFFICE-LAB: CPT | Performed by: FAMILY MEDICINE

## 2021-09-13 PROCEDURE — 86803 HEPATITIS C AB TEST: CPT | Performed by: FAMILY MEDICINE

## 2021-09-13 PROCEDURE — 80048 BASIC METABOLIC PNL TOTAL CA: CPT | Performed by: FAMILY MEDICINE

## 2021-09-13 PROCEDURE — 99214 OFFICE O/P EST MOD 30 MIN: CPT | Performed by: FAMILY MEDICINE

## 2021-09-13 PROCEDURE — 36415 COLL VENOUS BLD VENIPUNCTURE: CPT | Performed by: FAMILY MEDICINE

## 2021-09-13 PROCEDURE — 83735 ASSAY OF MAGNESIUM: CPT | Performed by: FAMILY MEDICINE

## 2021-09-13 PROCEDURE — 85027 COMPLETE CBC AUTOMATED: CPT | Performed by: FAMILY MEDICINE

## 2021-09-13 ASSESSMENT — ANXIETY QUESTIONNAIRES
GAD7 TOTAL SCORE: 3
IF YOU CHECKED OFF ANY PROBLEMS ON THIS QUESTIONNAIRE, HOW DIFFICULT HAVE THESE PROBLEMS MADE IT FOR YOU TO DO YOUR WORK, TAKE CARE OF THINGS AT HOME, OR GET ALONG WITH OTHER PEOPLE: NOT DIFFICULT AT ALL
2. NOT BEING ABLE TO STOP OR CONTROL WORRYING: NOT AT ALL
6. BECOMING EASILY ANNOYED OR IRRITABLE: SEVERAL DAYS
5. BEING SO RESTLESS THAT IT IS HARD TO SIT STILL: NOT AT ALL
7. FEELING AFRAID AS IF SOMETHING AWFUL MIGHT HAPPEN: NOT AT ALL
1. FEELING NERVOUS, ANXIOUS, OR ON EDGE: SEVERAL DAYS
3. WORRYING TOO MUCH ABOUT DIFFERENT THINGS: NOT AT ALL

## 2021-09-13 ASSESSMENT — PATIENT HEALTH QUESTIONNAIRE - PHQ9
SUM OF ALL RESPONSES TO PHQ QUESTIONS 1-9: 8
5. POOR APPETITE OR OVEREATING: SEVERAL DAYS

## 2021-09-13 NOTE — PROGRESS NOTES
Writer recd message from NS RHETT that pt is ready to pursue tx and would like to consider tx now.  Called pt and discussed all the location options and she strongly prefers [facility] or  due to transportation, declined option in [place]. Emphasized to pt importance of being seen asap as discussed in prior converstaion.  speaking with Valeria to offer sooner options at preferred clinic.   Elena Toth, RN, BSN, OCN  Hematology/Oncology Nurse Navigator  [facility]  [phone]

## 2021-09-13 NOTE — PROGRESS NOTES
Assessment & Plan:       ICD-10-CM    1. Multiple myeloma not having achieved remission (H)  C90.00    2. Compression fracture of T7 vertebra, sequela  S22.060S Magnesium   3. Gastroesophageal reflux disease without esophagitis  K21.9 CBC with platelets     CBC with platelets   4. Hypomagnesemia  E83.42 Magnesium   5. Hypokalemia  E87.6 Basic metabolic panel   6. Exposure to COVID-19 virus  Z20.822 COVID-19 Andreas RBD Joslyn & Titer Reflex   7. Need for hepatitis C screening test  Z11.59 Hepatitis C Screen Reflex to HCV RNA Quant and Genotype      Hospital records were personally reviewed. Non-fasting labs were drawn as noted, including COVID antibodies as she feels like she may have had it. Blood pressure is under goodcontrol. We reviewed her current medications. Medication: continue current medication regimen unchanged pending additional results. She will continue her regular follow up with Oncology and Radiation Oncology as scheduled. She will plan to follow up in 4-6 mos for repeat fasting labs and med check, sooner if any difficulties.        Subjective:     Fasting today? No        Lizzie Viera is a 70 year old female with multiple myeloma with spine mets who presents for follow up of recent hospitalization for increased back pain after a fall down stairs at home. She was noted to have an acute on chronic fracture of T7. She notes some fecal urgency, incontinence and diarrhea. She had a low potassium and magnesium. She notes a pain below the ribs on the right after eating. She notes pain has been getting worse and she has had more cramping. She has been seeing the pain clinic at [facility] and is on methocarbamol, gabapentin, dilaudid for pain.        She also was diagnosed with a UTI, and has been on bactrim until 9/7/21.       The following portions of the patient's history were reviewed and updated as appropriate: allergies, current medications, past family history, past medical history, past social  history, past surgical history and problem list.    Review of Systems  12 point ROS negative except as noted above        Objective:      Vitals:    09/13/21 1106   BP: 102/62   Patient Position: Sitting   Cuff Size: Adult Regular   Pulse: 58   SpO2: 97%   Weight: 59.1 kg (130 lb 4 oz)     GEN: Alert and oriented, NAD, well nourished  SKIN:  Normal skin turgor, no lesions/rashes   HEENT: NC/AT, moist mucous membranes, no rhinorrhea.    NECK: Normal.  No adenopathy or thyromegaly.  CV: Regular rate and rhythm, no murmurs.   LUNGS: Clear to auscultation bilaterally.    ABDOMEN: Soft, non-tender, non-distended, no masses   BACK: Normal  EXTREMITY: No edema, cyanosis  NEURO: Grossly normal.         Labs:  Results for orders placed or performed in visit on 09/13/21   Magnesium     Status: Normal   Result Value Ref Range    Magnesium 1.9 1.8 - 2.6 mg/dL   Hepatitis C Screen Reflex to HCV RNA Quant and Genotype     Status: Normal   Result Value Ref Range    Hepatitis C Antibody Nonreactive Nonreactive    Narrative    Assay performance characteristics have not been established for newborns, infants, and children.   Basic metabolic panel     Status: Abnormal   Result Value Ref Range    Sodium 135 (L) 136 - 145 mmol/L    Potassium 4.4 3.5 - 5.0 mmol/L    Chloride 102 98 - 107 mmol/L    Carbon Dioxide (CO2) 26 22 - 31 mmol/L    Anion Gap 7 5 - 18 mmol/L    Urea Nitrogen 16 8 - 22 mg/dL    Creatinine 0.68 0.60 - 1.10 mg/dL    Calcium 9.2 8.5 - 10.5 mg/dL    Glucose 78 70 - 125 mg/dL    GFR Estimate 90 >60 mL/min/1.73m2   CBC with platelets     Status: Abnormal   Result Value Ref Range    WBC Count 9.6 4.0 - 11.0 10e3/uL    RBC Count 3.58 (L) 3.80 - 5.20 10e6/uL    Hemoglobin 12.3 11.7 - 15.7 g/dL    Hematocrit 37.4 35.0 - 47.0 %     (H) 78 - 100 fL    MCH 34.4 (H) 26.5 - 33.0 pg    MCHC 32.9 31.5 - 36.5 g/dL    RDW 15.6 (H) 10.0 - 15.0 %    Platelet Count 294 150 - 450 10e3/uL   COVID-19 Andreas RBD Joslyn & Titer Reflex      Status: Abnormal   Result Value Ref Range    SARS-CoV-2 Andreas Ab, Interp, S Positive (A) Negative    SARS-CoV-2 Andreas Ab, Quant, S 13 (H) <0.80 U/mL    Patient's Race White     Patient's Ethnicity Not

## 2021-09-14 ENCOUNTER — TELEPHONE (OUTPATIENT)
Dept: NEUROSURGERY | Facility: CLINIC | Age: 71
End: 2021-09-14

## 2021-09-14 DIAGNOSIS — S22.069G CLOSED FRACTURE OF SEVENTH THORACIC VERTEBRA WITH DELAYED HEALING, UNSPECIFIED FRACTURE MORPHOLOGY, SUBSEQUENT ENCOUNTER: Primary | ICD-10-CM

## 2021-09-14 LAB
HCV AB SERPL QL IA: NONREACTIVE
PATH REPORT.COMMENTS IMP SPEC: NORMAL
PATH REPORT.FINAL DX SPEC: NORMAL
PATH REPORT.GROSS SPEC: NORMAL
PATH REPORT.MICROSCOPIC SPEC OTHER STN: NORMAL
PATH REPORT.RELEVANT HX SPEC: NORMAL
PATH REPORT.RELEVANT HX SPEC: NORMAL
PATH REPORT.SITE OF ORIGIN SPEC: NORMAL

## 2021-09-14 ASSESSMENT — ANXIETY QUESTIONNAIRES: GAD7 TOTAL SCORE: 3

## 2021-09-14 NOTE — TELEPHONE ENCOUNTER
Harvey, a physical therapist with [facility] called regarding this patient. Patient reached out to him regarding recent exacerbation of symptoms and he wanted to discuss further PT with our NP/RHETT. Please call Harvey at [phone].

## 2021-09-14 NOTE — TELEPHONE ENCOUNTER
Returned call to Harvey who is requesting updated PT orders in lieu of pt's recent fall (documented in office visit from 9/10/2021) as she is not tolerating PT very well right now due to pain.     Harvey would like specific weight/ROM/activity restrictions for ongoing PT faxed to [phone] when possible.     Cathy Singh RN

## 2021-09-15 LAB
SARS-COV-2 AB SERPL IA-ACNC: 13 U/ML
SARS-COV-2 AB SERPL QL IA: POSITIVE

## 2021-09-15 NOTE — TELEPHONE ENCOUNTER
Updated PT orders sent to [facility] after discussion with RHETT.     Cathy Singh RN

## 2021-09-15 NOTE — DISCHARGE SUMMARY
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  Might need another one in 6 weeks.  TLSO brace has been fitted  Dexamethasone (will need taper on discharge), neurontin, dilaudid as needed, robaxin.     Active Problems:       Multiple myeloma not having achieved remission - will follow up with oncology here at [facility], patient has declined therapy in the past preferring to pursue naturopathic approach but reportedly is more willing to consider therapy now    Acute cystitis due to Escherichia coli - on bactrim - sensitive on culture    Hypokalemia - K = 2.6 mmol/L (Ref range: 3.5 - 5.0 mmol/L) on admission, will replace as needed      Functional diarrhea - long term, follow for now    Severe malnutrition - 50 pound weight loss this year, encouraged oral intake    Dyslipidemia - no meds    H/o left subclavian steal syndrome             Follow-ups Needed After Discharge   Follow-up Appointments     Follow-up and recommended labs and tests       Neurosurgery will call to schedule follow up in 1-2 weeks. Please call if   new or worsening symptoms develop. [phone]    Call [phone] with the following symptoms:    *Temperature 101(fever) or greater or chills  *Redness, swelling or increased drainage from the wound  *Worsening pain not relieved by the pain prescription given  *Worsening or new onset of weakness, or numbness and tingling  *Loss or change in your ability to control bowel or bladder function  *Change in your ability to walk, talk, see or think  *If you did not have a bowel movement before leaving the hospital and your   bowels have not  moved within 48 hours of your discharge    !!!! IF YOU HAVE A SERIOUS OR THREATENING EMERGENCY, CALL 911 OR COME TO   THE EMERGENCY ROOM.  IF YOUR CONDITIONS ALLOWS COME TO THE HOSPITAL WHERE   YOUR SURGERY WAS PERFORMED.    QUESTIONS OR CONCERNS:   OUR OFFICE HOURS ARE FROM 9:00 A.M -4:30 P.M. MONDAY-FRIDAY.  AFTER OFFICE   HOURS, CALLS ARE ANSWERED BY THE ON-CALL PROVIDER. PLEASE CALL WITH   ROUTINE QUESTIONS DURING OFFICE HOURS. THE ON-CALL PROVIDER WILL NOT   REFILL PRESCRIPTIONS.         Follow-up and recommended labs and tests       Follow up with primary care provider, Anne Marie Walker, within 7 days   for hospital follow- up.  No follow up labs or test are needed.      Follow up with  Neurosurgery     As advised             Unresulted Labs Ordered in the Past 30 Days of this Admission     No orders found from 8/1/2021 to 9/1/2021.      These results will be followed up by none    Discharge Disposition   Discharged to home  Condition at discharge: Stable        Hospital Course       Patient is a 70-year-old female with history of multiple myeloma. She was admitted with acute on Chronic midline thoracic back pain  and found to have pathological fracture of T7 vertebrae in neoplastic disease with nonunion. Patient Underwent one high dose session of XRT 9/1/21.Pain control was done with robaxin, dilaudid and gabapentin. Neurosurgery team has been consulted. Recommendations were for TLSO brace which has been placed. Patient seems clinically stable at this time. She will be discharged on a tapering dose of dexamethasone, robaxin, dilaudid and gabapentin. UTI was treated with antibiotic. She will be discharged home and follow outpatient with Surgery team and Oncology team.      Consultations This Hospital Stay   PALLIATIVE CARE ADULT IP CONSULT  RADIATION ONCOLOGY IP CONSULT  ORTHOSIS SPINAL IP CONSULT  SOCIAL WORK IP CONSULT  NUTRITION SERVICES ADULT IP CONSULT  PHYSICAL THERAPY ADULT IP CONSULT  OCCUPATIONAL  THERAPY ADULT IP CONSULT  PHYSICAL THERAPY ADULT IP CONSULT  OCCUPATIONAL THERAPY ADULT IP CONSULT  NUTRITION SERVICES ADULT IP CONSULT    Code Status   Prior    Time Spent on this Encounter   I, Dov Lynn MD, personally saw the patient today and spent greater than 30 minutes discharging this patient.       Dov Lynn MD  [address] [address]  Phone: [phone]  Fax: [phone]  ______________________________________________________________________    Physical Exam   Vital Signs:                    Weight: 135 lbs 6.4 oz       GENERAL: The patient is not in any acute distressed. Awake and alert.  HEENT: Nonicteric sclerae, PERRLA, EOMI. Oropharynx clear. Moist mucous membranes. Conjunctivae appear well perfused.  HEART: Regular rate and rhythm without murmurs.  LUNGS: Clear to auscultation bilaterally. No wheezing or crackles.  ABDOMEN: Soft, positive bowel sounds, nontender.  SKIN: No rash, no excessive bruising, petechiae, or purpura.  EXTREMITIES : no rashes, no swelling in legs.  NEUROLOGIC: conscious and oriented, follows commands, no obvious focal deficits.  ROS: All other systems negative          Primary Care Physician   Anne Marie Walker    Discharge Orders      Potassium     Magnesium     Follow-up and recommended labs and tests     Neurosurgery will call to schedule follow up in 1-2 weeks. Please call if new or worsening symptoms develop. [phone]    Call [phone] with the following symptoms:    *Temperature 101(fever) or greater or chills  *Redness, swelling or increased drainage from the wound  *Worsening pain not relieved by the pain prescription given  *Worsening or new onset of weakness, or numbness and tingling  *Loss or change in your ability to control bowel or bladder function  *Change in your ability to walk, talk, see or think  *If you did not have a bowel movement before leaving the hospital and your bowels  have not moved within 48 hours of your discharge    !!!! IF YOU HAVE A SERIOUS OR THREATENING EMERGENCY, CALL 911 OR COME TO THE EMERGENCY ROOM.  IF YOUR CONDITIONS ALLOWS COME TO THE HOSPITAL WHERE YOUR SURGERY WAS PERFORMED.    QUESTIONS OR CONCERNS:   OUR OFFICE HOURS ARE FROM 9:00 A.M -4:30 P.M. MONDAY-FRIDAY.  AFTER OFFICE HOURS, CALLS ARE ANSWERED BY THE ON-CALL PROVIDER. PLEASE CALL WITH ROUTINE QUESTIONS DURING OFFICE HOURS. THE ON-CALL PROVIDER WILL NOT REFILL PRESCRIPTIONS.     Activity    *No lifting, pushing or pulling greater than 5-10 pound (this is about a gallon of milk).  *No repetitive bending, twisting, or jarring activities  *No overhead work  *No aerobic or strenuous activity  *No activities with increased risk of falls  *You may move about your home as tolerated  *You may walk up and down stairs as tolerated  *You may increase your activity slowly over the next 4-6 weeks    WALKING PROGRAM: As you can tolerate, walk daily-start with 5-10 minutes of continuous walking. This is in addition to the walking that you do as part of your daily activities. Increase the time that you walk by 5 minutes every couple of days. Do not exceed 30-45 minutes of continuous walking until seen in follow-up. Walking is the best exercise after surgery.  **Listen to your body, if you find that you are more painful or fatigued, you may need to proceed more slowly.    **Do not smoke or expose yourself to second hand smoke. Cigarette smoke can delay healing and cause complications.     DRIVING:  We recommend that you do not drive while taking medications for pain or muscle spasms. Always read and follow the advice on your prescription bottle. If you have questions, speak with your pharmacist.  We recommend that you do not drive while wearing a brace, as it could limit your range of motion.    WORK: If you plan to return to work before you 4-6 weeks appointment, call and discuss with one of the nurses in the neurosurgery  office.     USE OF ICE OR  HEAT:  *Icing can decrease post op swelling, thereby helping with post op pain control. Always protect your skin to prevent frostbite or burn.    *For the first 48 hours we recommend ice to the surgical area for 15-20 minutes at a time several times a day.      *Any longer than 15-20 minutes can cause damage to the tissues, including frostbite.     *Allow area to warm for at least 45 minutes or an hour between treatments.    *Ice as frequently as you wish, so long as the area is warm to touch and has normal sensation before repeating.    *After 48 hours, you may use heat or ice, which ever feels best to you.  Always remember to protect your skin, and to use no greater than 15-20 minutes at a time.     *You can make your own ice bags at home by mixing 3 parts water with 1 part rubbing alcohol in a Ziplock bag and placing in the freezer.  It will not freeze solid.      BOWEL MOVEMENT:  If you did not have a bowel movement (pooped) before you were discharged from the hospital, and do not have a bowel movement within 2 days of discharge. Please call our office and request to speak to a nurse.    Your insurance may not cover medications to treat or prevent  constipation.      There are many  over the counter medications for constipation including: Colace, Senakot, Dulcolax, and Miralax. In addition to medications eat a high fiber diet and drink plenty of water.    If you are taking narcotics, you should being taking medication daily for constipation.      If you develop loose or runny stools, stop taking the medications for constipation.      WEARING THE LUMBAR BRACE:    * Wear the brace when out of bed or sitting upright in bed.  * You should apply the brace before standing.  * You may shower seated without brace.   Sit down on shower chair, remove brace   Shower   Dry   Re-apply brace before standing.   Take care not to fall  *If your brace is not fitting call [facility]  [phone]  *Check  your incision and the skin under your brace at least daily.     Reason for your hospital stay    backache     Follow-up and recommended labs and tests     Follow up with primary care provider, Anne Marie Walker, within 7 days for hospital follow- up.  No follow up labs or test are needed.      Follow up with  Neurosurgery     As advised     Activity    Your activity upon discharge: activity as tolerated     Diet    Follow this diet upon discharge: Orders Placed This Encounter      Regular Diet Adult       Significant Results and Procedures   Most Recent 3 CBC's:Recent Labs   Lab Test 09/13/21  1207 09/03/21  0944 08/31/21  1611 06/03/21  1011 06/03/21  1011   WBC 9.6  --  7.6  --  7.0   HGB 12.3  --  10.2*  --  13.4   *  --  96  --  97    357 355   < > 266    < > = values in this interval not displayed.     Most Recent 3 BMP's:Recent Labs   Lab Test 09/13/21  1207 09/03/21  0944 09/02/21  2133 09/02/21  2111 09/02/21  1704 09/02/21  1557 09/02/21  1229 09/02/21  0909 09/01/21  1917 09/01/21  0708   *  --   --   --   --   --   --  139  --  138   POTASSIUM 4.4 3.3* 3.6  --   --    < >   < > 3.0*   < > 2.6*   CHLORIDE 102  --   --   --   --   --   --  111*  --  108*   CO2 26  --   --   --   --   --   --  23  --  20*   BUN 16  --   --   --   --   --   --  12  --  10   CR 0.68  --   --   --   --   --   --  0.84  --  0.76   ANIONGAP 7  --   --   --   --   --   --  5  --  10   SERAFIN 9.2  --   --   --   --   --   --  9.3  --  9.0   GLC 78  --   --  165* 119  --    < > 195*   < > 126*    < > = values in this interval not displayed.       Discharge Medications   Discharge Medication List as of 9/3/2021  2:20 PM      START taking these medications    Details   dexamethasone (DECADRON) 2 MG tablet 3 tabs three times a day X 2 days, 2 tabs three times a day X2 days, 2 tabs twice a day X 2 days, 1 tab twice a day X2 days, 1 tab daily X 2 days and stop, Disp-44 tablet, R-0, E-Prescribe       gabapentin (NEURONTIN) 300 MG capsule Take 1 capsule (300 mg) by mouth 3 times daily, Disp-90 capsule, R-0, E-Prescribe      methocarbamol (ROBAXIN) 500 MG tablet Take 1 tablet (500 mg) by mouth every 6 hours as needed for muscle spasms (back pain), Disp-30 tablet, R-0, Local Print      sulfamethoxazole-trimethoprim (BACTRIM DS) 800-160 MG tablet Take 1 tablet by mouth 2 times daily for 9 doses, Disp-9 tablet, R-0, E-PrescribeStart 8 pm today      HYDROmorphone (DILAUDID) 4 MG tablet Take 1 tablet (4 mg) by mouth every 6 hours as needed for moderate to severe pain, Disp-25 tablet, R-0, Local Print         CONTINUE these medications which have NOT CHANGED    Details   acetylcysteine (NAC) 600 MG CAPS capsule Take 600 mg by mouth daily, Historical      Coenzyme Q10 300 MG CAPS Take 300 mg by mouth daily, Historical      fish oil-omega-3 fatty acids 1000 MG capsule Take 2 g by mouth daily, Historical      Melatonin 10 MG TABS tablet Take 20 mg by mouth At Bedtime, Historical      NALTREXONE HCL PO Take 3 mg by mouth daily, Historical      TURMERIC PO Take 1 capsule by mouth daily, Historical      !! UNABLE TO FIND MEDICATION NAME: Nutrazyme - 2 capsules by mouth twice daily., Historical      !! UNABLE TO FIND MEDICATION NAME: Panacur C - 1 capsule daily, Historical      !! UNABLE TO FIND MEDICATION NAME: Pectasol - 1 scoop by mouth daily, Historical      !! UNABLE TO FIND MEDICATION NAME: NK stim - 1 tablet daily, Historical      !! UNABLE TO FIND MEDICATION NAME:  mg daily, Historical      levOCARNitine (CARNITOR) 330 MG tablet Take 330 mg by mouth daily, Historical       !! - Potential duplicate medications found. Please discuss with provider.        Allergies   Allergies   Allergen Reactions     Cefdinir Shortness Of Breath     Latex Shortness Of Breath     Short Ragweed Pollen Ext Cough     Adhesive Tape Rash

## 2021-09-16 ENCOUNTER — MYC MEDICAL ADVICE (OUTPATIENT)
Dept: RADIATION ONCOLOGY | Facility: HOSPITAL | Age: 71
End: 2021-09-16

## 2021-09-16 DIAGNOSIS — G89.3 CANCER ASSOCIATED PAIN: ICD-10-CM

## 2021-09-17 RX ORDER — HYDROMORPHONE HYDROCHLORIDE 4 MG/1
4 TABLET ORAL EVERY 6 HOURS PRN
Qty: 25 TABLET | Refills: 0 | Status: SHIPPED | OUTPATIENT
Start: 2021-09-17 | End: 2021-09-28

## 2021-09-20 ENCOUNTER — ONCOLOGY VISIT (OUTPATIENT)
Dept: ONCOLOGY | Facility: HOSPITAL | Age: 71
End: 2021-09-20
Attending: INTERNAL MEDICINE
Payer: COMMERCIAL

## 2021-09-20 ENCOUNTER — LAB (OUTPATIENT)
Dept: INFUSION THERAPY | Facility: HOSPITAL | Age: 71
End: 2021-09-20
Attending: INTERNAL MEDICINE
Payer: COMMERCIAL

## 2021-09-20 VITALS
HEART RATE: 68 BPM | OXYGEN SATURATION: 98 % | RESPIRATION RATE: 20 BRPM | BODY MASS INDEX: 23.05 KG/M2 | TEMPERATURE: 98.3 F | SYSTOLIC BLOOD PRESSURE: 150 MMHG | DIASTOLIC BLOOD PRESSURE: 71 MMHG | WEIGHT: 126 LBS

## 2021-09-20 DIAGNOSIS — M80.80XD LOCALIZED OSTEOPOROSIS WITH CURRENT PATHOLOGICAL FRACTURE WITH ROUTINE HEALING, SUBSEQUENT ENCOUNTER: Primary | ICD-10-CM

## 2021-09-20 DIAGNOSIS — M84.58XK PATHOLOGICAL FRACTURE OF VERTEBRAE IN NEOPLASTIC DISEASE WITH NONUNION: ICD-10-CM

## 2021-09-20 DIAGNOSIS — C90.00 MULTIPLE MYELOMA WITH FAILED REMISSION (H): ICD-10-CM

## 2021-09-20 DIAGNOSIS — C90.00 MULTIPLE MYELOMA NOT HAVING ACHIEVED REMISSION (H): ICD-10-CM

## 2021-09-20 LAB
IGA SERPL-MCNC: 19 MG/DL (ref 65–400)
IGG SERPL-MCNC: 4439 MG/DL (ref 700–1700)
IGM SERPL-MCNC: 9 MG/DL (ref 60–280)
TOTAL PROTEIN SERUM FOR ELP: 9.3 G/DL (ref 6–8)

## 2021-09-20 PROCEDURE — 86335 IMMUNFIX E-PHORSIS/URINE/CSF: CPT | Mod: 26 | Performed by: INTERNAL MEDICINE

## 2021-09-20 PROCEDURE — G0463 HOSPITAL OUTPT CLINIC VISIT: HCPCS

## 2021-09-20 PROCEDURE — 83883 ASSAY NEPHELOMETRY NOT SPEC: CPT | Performed by: INTERNAL MEDICINE

## 2021-09-20 PROCEDURE — 36415 COLL VENOUS BLD VENIPUNCTURE: CPT | Performed by: INTERNAL MEDICINE

## 2021-09-20 PROCEDURE — 84155 ASSAY OF PROTEIN SERUM: CPT | Performed by: INTERNAL MEDICINE

## 2021-09-20 PROCEDURE — 86335 IMMUNFIX E-PHORSIS/URINE/CSF: CPT | Mod: TC | Performed by: INTERNAL MEDICINE

## 2021-09-20 PROCEDURE — 82784 ASSAY IGA/IGD/IGG/IGM EACH: CPT | Performed by: INTERNAL MEDICINE

## 2021-09-20 PROCEDURE — 84165 PROTEIN E-PHORESIS SERUM: CPT | Mod: 26 | Performed by: INTERNAL MEDICINE

## 2021-09-20 PROCEDURE — 84165 PROTEIN E-PHORESIS SERUM: CPT | Mod: TC | Performed by: INTERNAL MEDICINE

## 2021-09-20 PROCEDURE — 99215 OFFICE O/P EST HI 40 MIN: CPT | Performed by: INTERNAL MEDICINE

## 2021-09-20 ASSESSMENT — PAIN SCALES - GENERAL: PAINLEVEL: MODERATE PAIN (4)

## 2021-09-20 NOTE — PROGRESS NOTES
"Oncology Rooming Note    September 20, 2021 11:14 AM   Lizzie Viera is a 70 year old female who presents for:    Chief Complaint   Patient presents with     Oncology Clinic Visit     Multiple myeloma with failed remission (H)     Initial Vitals: BP (!) 150/71   Pulse 68   Temp 98.3  F (36.8  C)   Resp 20   Wt 57.2 kg (126 lb)   SpO2 98%   BMI 23.05 kg/m   Estimated body mass index is 23.05 kg/m  as calculated from the following:    Height as of 9/10/21: 1.575 m (5' 2\").    Weight as of this encounter: 57.2 kg (126 lb). Body surface area is 1.58 meters squared.  Moderate Pain (4) Comment: Data Unavailable   No LMP recorded. Patient is postmenopausal.  Allergies reviewed: Yes  Medications reviewed: Yes    Medications: Medication refills not needed today.  Pharmacy name entered into [employer]: [id number] - [place], [place] - [address]    Clinical concerns: None       Nandini Sher             "

## 2021-09-20 NOTE — LETTER
"    9/20/2021         RE: Lizzie Viera  [address]  [place] [address]        Dear Colleague,    Thank you for referring your patient, Lizzie Viera, to the [facility] [place]. Please see a copy of my visit note below.    Oncology Rooming Note    September 20, 2021 11:14 AM   Lizzie Viera is a 70 year old female who presents for:    Chief Complaint   Patient presents with     Oncology Clinic Visit     Multiple myeloma with failed remission (H)     Initial Vitals: BP (!) 150/71   Pulse 68   Temp 98.3  F (36.8  C)   Resp 20   Wt 57.2 kg (126 lb)   SpO2 98%   BMI 23.05 kg/m   Estimated body mass index is 23.05 kg/m  as calculated from the following:    Height as of 9/10/21: 1.575 m (5' 2\").    Weight as of this encounter: 57.2 kg (126 lb). Body surface area is 1.58 meters squared.  Moderate Pain (4) Comment: Data Unavailable   No LMP recorded. Patient is postmenopausal.  Allergies reviewed: Yes  Medications reviewed: Yes    Medications: Medication refills not needed today.  Pharmacy name entered into [employer]: [id number] - [facility] [address]    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    Relevant Orders    XR Femur Left 2 Views    XR Femur Right 2 Views    XR Tibia & Fibula Left 2 Views    XR Tibia & Fibula Right 2 Views       Hematologic    Multiple myeloma with failed remission (H)    Relevant Orders    CT Head w/o Contrast "    CT Cervical Spine w/o Contrast    CT Chest Abdomen Pelvis w/o Contrast    CT LOWER EXTREMITY W/O CONTRAST    Immunoglobulins A G and M (Completed)    Kappa and lambda light chain (Completed)    Protein electrophoresis    Immunoglobulin Total Light Chains, Urine (Completed)    Protein immunofixation urine    Protein timed urine with Creat Ratio    XR Femur Left 2 Views    XR Femur Right 2 Views    XR Tibia & Fibula Left 2 Views    XR Tibia & Fibula Right 2 Views    Multiple myeloma not having achieved remission (H)    Relevant Orders    CT Head w/o Contrast    CT Cervical Spine w/o Contrast    CT Chest Abdomen Pelvis w/o Contrast    CT LOWER EXTREMITY W/O CONTRAST    Immunoglobulins A G and M (Completed)    Kappa and lambda light chain (Completed)    Protein electrophoresis    Immunoglobulin Total Light Chains, Urine (Completed)    Protein immunofixation urine    Protein timed urine with Creat Ratio    XR Femur Left 2 Views    XR Femur Right 2 Views    XR Tibia & Fibula Left 2 Views    XR Tibia & Fibula Right 2 Views          Assessment     1.  A very pleasant 70 year old woman with average risk multiple myeloma.  2.  Significant bone disease with osteoporosis and compression fractures.  3.  Normal hemoglobin, calcium, kidney function along with beta-2 microglobulin and albumin at the time of diagnosis.  4.  Positive Covid antibodies.  5.  Pain from compression fracture status post radiation therapy.    Plan    1.  We will start her treatment with Velcade, Revlimid and dexamethasone.  2.  Bisphosphonate therapy for her bones.  3.  She would be a transplant candidate.  4.  Repeat the bone survey but we will use plain CTs rather than x-rays.  5.  Continue good diet and exercise.  Increase calcium and vitamin D in her diet.  6.  Counseled the patient at length regarding overall plan of care.    Cancer Staging  No matching staging information was found for the patient.    ECOG Performance    2 - Ambulatory and  independent in all ADLs; cannot work; up > 50% of the time      History of Present Illness    Ms. Lizzie Viera is a very pleasant 70 year old woman who has been diagnosed with multiple myeloma IgG kappa type in May 2021.  She had initially presented with compression fracture of T7 vertebral body in September 2020.  She had a back pain which led to that evaluation.  Bone density confirmed that she had osteoporosis.  MRI showed diffuse marrow edema in October 2020.  In April 2021 the MRI of the thoracic spine showed worsening T7 vertebral body height loss because of likely pathological compression fracture with extraosseous extension.  She then had IR guided bone biopsy on 7 May 2021 and pathology confirmed the presence of kappa light chain restricted plasma cells.  Her cytogenetics confirmed the presence of hyperdiploid E with gains of chromosome 5, 9 and 15.    She was then seen by Dr. Baig and had a bone marrow biopsy which also confirmed 60% involvement of the marrow with plasma cells.  The bone marrow was done on 3 Jocelin 2021.  The bone marrow also confirmed the presence of hyperdiploid E.  She had a gain of chromosome 3, 5, 7, 9, 11, 15 as well as 19.  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  Her beta-2 microglobulin was actually normal at the time of her diagnosis as was her albumin at 4.0.  Monoclonal protein 3.1 g/dL.  Kappa free light chain levels of 13 mg/dL IgG level of 4280 with depressed levels of IgM and IgA.  Urine was also positive for kappa light chains as well as immunofixation of the urine was positive for IgG kappa.  Normal kidney function.  Normal hemoglobin.    As mentioned above she had bone lesions in the T7 vertebral body, T1, skull area.  Her main pain is coming from her T7 vertebral body compression fracture.    Due to the pain she has been seen by radiation oncology and has received radiation therapy.  She also got a dose of steroids for pain control.    Currently her pain is controlled with  combination of Dilaudid as well as some Tylenol.  She is wearing a brace.  She did notice that when she was on dexamethasone her pain was significantly better.    She was initially thinking of doing some natural therapies but once her symptoms got worse, she has now come back and wants treatment.    Review of systems.  No fever or night sweats.  No loss of weight.  No lumps or bumps anywhere.  No unusual headaches or eyesight issues.  No dizziness.  No bleeding from the nose.  No sores in the mouth. No problems with swallowing.  No chest pain. No shortness of breath. No cough.  No abdominal pain. No nausea or vomiting.  No diarrhea or constipation.  No blood in stool or black colored stools.  No problems passing urine.  No numbness or tingling in hands or feet.  No skin rashes.  A 14 point review of systems is otherwise negative.        Past History    Past Medical History:   Diagnosis Date     Cervical dysplasia      Chronic RUQ pain      Osteoporosis        Past Surgical History:   Procedure Laterality Date     C LAP,CHOLECYSTECTOMY/EXPLORE  12/27/2004     C LIGATE FALLOPIAN TUBE      Description: Tubal Ligation;  Recorded: 09/20/2007;     CONIZATION CERVIX,KNIFE/LASER      Description: Cervical Conization By Laser;  Recorded: 09/20/2007;     HC DILATION/CURETTAGE DIAG/THER NON OB      Description: Dilation And Curettage;  Recorded: 09/20/2007;     HC REMOVE TONSILS/ADENOIDS,<11 Y/O      Description: Tonsillectomy With Adenoidectomy;  Recorded: 09/20/2007;         Physical Exam    BP (!) 150/71   Pulse 68   Temp 98.3  F (36.8  C)   Resp 20   Wt 57.2 kg (126 lb)   SpO2 98%   BMI 23.05 kg/m          GENERAL: Alert and oriented to time place and person. Seated comfortably. In no distress.    HEAD: Atraumatic and normocephalic.    EYES: RADHA, EOMI. No pallor. No icterus.    Oral cavity: no mucosal lesion or tonsillar enlargement.    NECK: supple. JVP normal.No thyroid enlargement.    LYMPH NODES: No palpable,  cervical, axillary or inguinal lymphadenopathy.    CHEST: clear to auscultation bilaterally. Symmetrical breath movements bilaterally.  She is in a TLSO brace.    CVS: S1 and S2 are Regular rate and rhythm.  Soft systolic murmur heard. No peripheral edema.    ABDOMEN: Soft. Not tender. Not distended. No palpable hepatomegaly or splenomegaly. No other mass palpable. Bowel sounds heard.    EXTREMITIES: Warm.    SKIN: no rash, or bruising or purpura.      Lab Results    Recent Results (from the past 168 hour(s))   Immunoglobulins A G and M   Result Value Ref Range    Immunoglobulin G 4,439 (H) 700-1,700 mg/dL    Immunoglobulin A 19 (L) 65 - 400 mg/dL    Immunoglobulin M 9 (L) 60 - 280 mg/dL   Total Protein, Serum   Result Value Ref Range    Total Protein Serum for ELP 9.3 (H) 6.0 - 8.0 g/dL   Kappa and lambda light chain   Result Value Ref Range    Kappa Free Light Chains 12.57 (H) 0.33 - 1.94 mg/dL    Lambda Free Light Chains 0.34 (L) 0.57 - 2.63 mg/dL    Kappa /Lambda Ratio 36.97 (H) 0.26 - 1.65   Immunoglobulin Total Light Chains, Urine   Result Value Ref Range    Kappa Total Light Chain, U 7.11 (H) <0.9000 mg/dL    Lambda Total Light Chain, U <0.7000 <0.7000 mg/dL    Kappa/Lambda TLC Ratio, U >10.2 (H) 0.7000 - 6.20       Imaging    XR Thoracic Lumbar Standing 2 Views    Result Date: 9/1/2021  EXAM: XR THORACIC LUMBAR STANDING 2 VW LOCATION: [facility] DATE/TIME: 9/1/2021 3:26 PM INDICATION: T7 compression fracture. Evaluate upright kyphosis COMPARISON: X-ray and MRI 8/31/2021 TECHNIQUE: CR thoracic and lumbar spine     IMPRESSION: 12 thoracic and 5 lumbar type vertebra are assumed. Marked anterior wedge compression deformity of T7 corresponding to findings on previous exams. Segmental kyphosis measures 32 degrees from T6 through T8 on the current radiographs compared to 31 degrees  on yesterday's radiographs. No significant change in alignment. No new fracture or progressive vertebral  height loss identified. Mild thoracolumbar spondylosis.     XR Thoracic Spine 2 Views    Result Date: 9/10/2021  EXAM: XR THORACIC SPINE 2 VIEWS LOCATION: [facility] DATE/TIME: 9/10/2021 2:05 PM INDICATION: AP/LAT upright in brace; T7 fracture; eval kyphosis and fracture stability COMPARISON: 09/02/2021 radiographs. 08/31/2021 thoracic spine MRI.. TECHNIQUE: CR Thoracic Spine.     IMPRESSION: 12 rib-bearing thoracic vertebrae. Unchanged T7 vertebral plana. Unchanged resultant focal kyphosis measuring 27 degrees. No significant change in 3 mm dorsal subluxation of the T8 relative to T6. No additional compression fracture. No acute extraspinal abnormality.    XR Thoracic Spine 2 Views    Result Date: 9/2/2021  EXAM: XR THORACIC SPINE 2 VIEWS LOCATION: [facility] DATE/TIME: 9/2/2021 3:24 PM INDICATION: upright in CUSTOM TLSO BRACE on 9/2 once arrives. fracture/kyphosis, eval for stability upright COMPARISON: Thoracic spine radiograph 06/01/2021, thoracic spine MRI 08/31/2021 TECHNIQUE: CR Thoracic Spine.     IMPRESSION: Upright radiographs. Patient is wearing a brace. No significant change in the severe pathologic compression fracture of T7. Stable 34 degrees of segmental kyphosis from T6 to T8 when measured in a similar fashion. The rest of vertebral body heights are maintained. Mild intervertebral disc height loss and endplate spurring in the mid to lower thoracic spine. The visualized lungs are clear. Surgical clips project over the right upper quadrant.    XR Thoracic Spine 2 Views    Result Date: 8/31/2021  EXAM: XR THORACIC SPINE 2 VIEWS LOCATION: [facility] DATE/TIME: 8/31/2021 1:37 PM INDICATION:  Closed T7 fracture (H) COMPARISON: 06/29/2021. TECHNIQUE: CR Thoracic Spine.     IMPRESSION: Severe T7 vertebral body compression deformity is similar to prior. Again localized accentuation of kyphosis at this level. There is mild dextroconvex  curvature. The remaining visualized vertebral bodies are unremarkable. Mild multilevel spondylosis is similar to prior. Pedicles appear symmetric. Visualized lung fields are well aerated.     MR Thoracic Spine w/o & w Contrast    Addendum Date: 8/31/2021    Findings were called to Dr. Mendez at 8/31/2021 6:32 PM by Dr. GREGORIA Calloway.    Result Date: 8/31/2021  EXAM: MR THORACIC SPINE W/O and W CONTRAST LOCATION: [facility] DATE/TIME: 8/31/2021 4:47 PM INDICATION: Pathologic fracture of T7. COMPARISON: MRI thoracic spine 04/26/2021 CONTRAST: 6ml Gadavist TECHNIQUE: Routine Thoracic Spine MRI without and with IV contrast. FINDINGS: 12 rib-bearing thoracic type vertebra. Marked pathologic compression fracture of T7 with progressive vertebral height loss compared to the previous MRI. There is now essentially vertebra plana at this level with progressive segmental kyphosis measuring 27 degrees from T6 through T8. Pathologic marrow involving the posterior T7 vertebral margin with increased extension into the adjacent ventral epidural space, progressive involvement of the pedicles/lamina, and new marrow replacement/enhancement involving the posterior inferior T6 and superior T8 vertebral margins with additional progressive paraspinal extraosseous enhancing soft tissue mass. There is now circumferential epidural neoplasm at the T7 level with severe spinal canal stenosis, increased impingement upon the thoracic cord, and minimal intramedullary T2 prolongation. The additional metastatic deposit at T1 mild diffuse disc desiccation throughout the thoracic spine, with moderate loss of disc height at T5-T6 and mild multilevel loss of disc height at multiple additional levels from T3 through T10. No focal disc herniations. Minimal thoracic facet arthropathy. No additional high-grade spinal canal stenosis. Neoplastic soft tissue results in moderate to severe bilateral neural foraminal stenosis at T6-T7, severe  bilateral neural foraminal stenosis at T7-T8, and mild to moderate left neural foraminal stenosis at T8-T9. Mild progression of paraspinal soft tissue mass centered at T7 as above. For example, maximum transverse dimension of enhancing soft tissue measuring 6.2 cm on axial images compared to 5.3 cm previously.     IMPRESSION: 1.  Pathologic compression fracture at T7 with progressive vertebral height loss and increasing epidural/paraspinal extraosseous neoplasm compared to 04/26/2021. 2.  Epidural neoplasm results in high-grade narrowing of the thecal sac and impingement upon the mid thoracic cord extending from the lower T6 through upper T8 levels. Minimal associated cord edema. 3.  Epidural neoplasm results in moderate to high-grade neural foraminal narrowing at T6-T7 and T7-T8 bilaterally and moderate neural foraminal narrowing on the left at T8-T9. 4.  Additional osseous metastasis at T1 similar to the prior exam. No new osseous metastasis elsewhere.        Signed by: Loco Blanco MD,       This note has been dictated using voice recognition software. Any grammatical or context distortions are unintentional and inherent to the software        Again, thank you for allowing me to participate in the care of your patient.        Sincerely,        Loco Blanco MD, MD

## 2021-09-21 LAB
KAPPA LC FREE SER-MCNC: 12.57 MG/DL (ref 0.33–1.94)
KAPPA LC FREE/LAMBDA FREE SER NEPH: 36.97 {RATIO} (ref 0.26–1.65)
KAPPA LC UR-MCNC: 7.11 MG/DL
KAPPA LC/LAMBDA UR: >10.2 {RATIO} (ref 0.7–6.2)
LAMBDA LC FREE SERPL-MCNC: 0.34 MG/DL (ref 0.57–2.63)
LAMBDA LC UR-MCNC: <0.7 MG/DL

## 2021-09-21 RX ORDER — ALBUTEROL SULFATE 0.83 MG/ML
2.5 SOLUTION RESPIRATORY (INHALATION)
Status: CANCELLED | OUTPATIENT
Start: 2021-10-05

## 2021-09-21 RX ORDER — EPINEPHRINE 1 MG/ML
0.3 INJECTION, SOLUTION INTRAMUSCULAR; SUBCUTANEOUS EVERY 5 MIN PRN
Status: CANCELLED | OUTPATIENT
Start: 2021-10-26

## 2021-09-21 RX ORDER — LORAZEPAM 2 MG/ML
0.5 INJECTION INTRAMUSCULAR EVERY 4 HOURS PRN
Status: CANCELLED | OUTPATIENT
Start: 2021-10-19

## 2021-09-21 RX ORDER — MEPERIDINE HYDROCHLORIDE 25 MG/ML
25 INJECTION INTRAMUSCULAR; INTRAVENOUS; SUBCUTANEOUS EVERY 30 MIN PRN
Status: CANCELLED | OUTPATIENT
Start: 2021-10-19

## 2021-09-21 RX ORDER — ALBUTEROL SULFATE 90 UG/1
1-2 AEROSOL, METERED RESPIRATORY (INHALATION)
Status: CANCELLED
Start: 2021-11-02

## 2021-09-21 RX ORDER — METHYLPREDNISOLONE SODIUM SUCCINATE 125 MG/2ML
125 INJECTION, POWDER, LYOPHILIZED, FOR SOLUTION INTRAMUSCULAR; INTRAVENOUS
Status: CANCELLED
Start: 2021-10-05

## 2021-09-21 RX ORDER — DIPHENHYDRAMINE HYDROCHLORIDE 50 MG/ML
50 INJECTION INTRAMUSCULAR; INTRAVENOUS
Status: CANCELLED
Start: 2021-11-02

## 2021-09-21 RX ORDER — ALBUTEROL SULFATE 90 UG/1
1-2 AEROSOL, METERED RESPIRATORY (INHALATION)
Status: CANCELLED
Start: 2021-10-19

## 2021-09-21 RX ORDER — MEPERIDINE HYDROCHLORIDE 25 MG/ML
25 INJECTION INTRAMUSCULAR; INTRAVENOUS; SUBCUTANEOUS EVERY 30 MIN PRN
Status: CANCELLED | OUTPATIENT
Start: 2021-10-12

## 2021-09-21 RX ORDER — ALBUTEROL SULFATE 0.83 MG/ML
2.5 SOLUTION RESPIRATORY (INHALATION)
Status: CANCELLED | OUTPATIENT
Start: 2021-10-19

## 2021-09-21 RX ORDER — ALBUTEROL SULFATE 90 UG/1
1-2 AEROSOL, METERED RESPIRATORY (INHALATION)
Status: CANCELLED
Start: 2021-10-05

## 2021-09-21 RX ORDER — DIPHENHYDRAMINE HYDROCHLORIDE 50 MG/ML
50 INJECTION INTRAMUSCULAR; INTRAVENOUS
Status: CANCELLED
Start: 2021-09-28

## 2021-09-21 RX ORDER — NALOXONE HYDROCHLORIDE 0.4 MG/ML
0.2 INJECTION, SOLUTION INTRAMUSCULAR; INTRAVENOUS; SUBCUTANEOUS
Status: CANCELLED | OUTPATIENT
Start: 2021-10-12

## 2021-09-21 RX ORDER — ACYCLOVIR 400 MG/1
400 TABLET ORAL 2 TIMES DAILY
Qty: 60 TABLET | Refills: 4 | Status: SHIPPED | OUTPATIENT
Start: 2021-09-27 | End: 2022-02-14

## 2021-09-21 RX ORDER — ALBUTEROL SULFATE 90 UG/1
1-2 AEROSOL, METERED RESPIRATORY (INHALATION)
Status: CANCELLED
Start: 2021-10-26

## 2021-09-21 RX ORDER — EPINEPHRINE 1 MG/ML
0.3 INJECTION, SOLUTION INTRAMUSCULAR; SUBCUTANEOUS EVERY 5 MIN PRN
Status: CANCELLED | OUTPATIENT
Start: 2021-09-28

## 2021-09-21 RX ORDER — NALOXONE HYDROCHLORIDE 0.4 MG/ML
0.2 INJECTION, SOLUTION INTRAMUSCULAR; INTRAVENOUS; SUBCUTANEOUS
Status: CANCELLED | OUTPATIENT
Start: 2021-10-26

## 2021-09-21 RX ORDER — DIPHENHYDRAMINE HYDROCHLORIDE 50 MG/ML
50 INJECTION INTRAMUSCULAR; INTRAVENOUS
Status: CANCELLED
Start: 2021-10-05

## 2021-09-21 RX ORDER — EPINEPHRINE 1 MG/ML
0.3 INJECTION, SOLUTION INTRAMUSCULAR; SUBCUTANEOUS EVERY 5 MIN PRN
Status: CANCELLED | OUTPATIENT
Start: 2021-10-12

## 2021-09-21 RX ORDER — METHYLPREDNISOLONE SODIUM SUCCINATE 125 MG/2ML
125 INJECTION, POWDER, LYOPHILIZED, FOR SOLUTION INTRAMUSCULAR; INTRAVENOUS
Status: CANCELLED
Start: 2021-10-26

## 2021-09-21 RX ORDER — METHYLPREDNISOLONE SODIUM SUCCINATE 125 MG/2ML
125 INJECTION, POWDER, LYOPHILIZED, FOR SOLUTION INTRAMUSCULAR; INTRAVENOUS
Status: CANCELLED
Start: 2021-10-19

## 2021-09-21 RX ORDER — MEPERIDINE HYDROCHLORIDE 25 MG/ML
25 INJECTION INTRAMUSCULAR; INTRAVENOUS; SUBCUTANEOUS EVERY 30 MIN PRN
Status: CANCELLED | OUTPATIENT
Start: 2021-10-26

## 2021-09-21 RX ORDER — METHYLPREDNISOLONE SODIUM SUCCINATE 125 MG/2ML
125 INJECTION, POWDER, LYOPHILIZED, FOR SOLUTION INTRAMUSCULAR; INTRAVENOUS
Status: CANCELLED
Start: 2021-11-02

## 2021-09-21 RX ORDER — LORAZEPAM 2 MG/ML
0.5 INJECTION INTRAMUSCULAR EVERY 4 HOURS PRN
Status: CANCELLED | OUTPATIENT
Start: 2021-10-05

## 2021-09-21 RX ORDER — DIPHENHYDRAMINE HYDROCHLORIDE 50 MG/ML
50 INJECTION INTRAMUSCULAR; INTRAVENOUS
Status: CANCELLED
Start: 2021-10-26

## 2021-09-21 RX ORDER — NALOXONE HYDROCHLORIDE 0.4 MG/ML
0.2 INJECTION, SOLUTION INTRAMUSCULAR; INTRAVENOUS; SUBCUTANEOUS
Status: CANCELLED | OUTPATIENT
Start: 2021-10-19

## 2021-09-21 RX ORDER — NALOXONE HYDROCHLORIDE 0.4 MG/ML
0.2 INJECTION, SOLUTION INTRAMUSCULAR; INTRAVENOUS; SUBCUTANEOUS
Status: CANCELLED | OUTPATIENT
Start: 2021-11-02

## 2021-09-21 RX ORDER — ALBUTEROL SULFATE 0.83 MG/ML
2.5 SOLUTION RESPIRATORY (INHALATION)
Status: CANCELLED | OUTPATIENT
Start: 2021-10-12

## 2021-09-21 RX ORDER — MEPERIDINE HYDROCHLORIDE 25 MG/ML
25 INJECTION INTRAMUSCULAR; INTRAVENOUS; SUBCUTANEOUS EVERY 30 MIN PRN
Status: CANCELLED | OUTPATIENT
Start: 2021-11-02

## 2021-09-21 RX ORDER — METHYLPREDNISOLONE SODIUM SUCCINATE 125 MG/2ML
125 INJECTION, POWDER, LYOPHILIZED, FOR SOLUTION INTRAMUSCULAR; INTRAVENOUS
Status: CANCELLED
Start: 2021-09-28

## 2021-09-21 RX ORDER — EPINEPHRINE 1 MG/ML
0.3 INJECTION, SOLUTION INTRAMUSCULAR; SUBCUTANEOUS EVERY 5 MIN PRN
Status: CANCELLED | OUTPATIENT
Start: 2021-10-05

## 2021-09-21 RX ORDER — NALOXONE HYDROCHLORIDE 0.4 MG/ML
0.2 INJECTION, SOLUTION INTRAMUSCULAR; INTRAVENOUS; SUBCUTANEOUS
Status: CANCELLED | OUTPATIENT
Start: 2021-10-05

## 2021-09-21 RX ORDER — ALBUTEROL SULFATE 0.83 MG/ML
2.5 SOLUTION RESPIRATORY (INHALATION)
Status: CANCELLED | OUTPATIENT
Start: 2021-10-26

## 2021-09-21 RX ORDER — MEPERIDINE HYDROCHLORIDE 25 MG/ML
25 INJECTION INTRAMUSCULAR; INTRAVENOUS; SUBCUTANEOUS EVERY 30 MIN PRN
Status: CANCELLED | OUTPATIENT
Start: 2021-09-28

## 2021-09-21 RX ORDER — DIPHENHYDRAMINE HYDROCHLORIDE 50 MG/ML
50 INJECTION INTRAMUSCULAR; INTRAVENOUS
Status: CANCELLED
Start: 2021-10-12

## 2021-09-21 RX ORDER — EPINEPHRINE 1 MG/ML
0.3 INJECTION, SOLUTION INTRAMUSCULAR; SUBCUTANEOUS EVERY 5 MIN PRN
Status: CANCELLED | OUTPATIENT
Start: 2021-10-19

## 2021-09-21 RX ORDER — LORAZEPAM 2 MG/ML
0.5 INJECTION INTRAMUSCULAR EVERY 4 HOURS PRN
Status: CANCELLED | OUTPATIENT
Start: 2021-10-12

## 2021-09-21 RX ORDER — MEPERIDINE HYDROCHLORIDE 25 MG/ML
25 INJECTION INTRAMUSCULAR; INTRAVENOUS; SUBCUTANEOUS EVERY 30 MIN PRN
Status: CANCELLED | OUTPATIENT
Start: 2021-10-05

## 2021-09-21 RX ORDER — METHYLPREDNISOLONE SODIUM SUCCINATE 125 MG/2ML
125 INJECTION, POWDER, LYOPHILIZED, FOR SOLUTION INTRAMUSCULAR; INTRAVENOUS
Status: CANCELLED
Start: 2021-10-12

## 2021-09-21 RX ORDER — LORAZEPAM 2 MG/ML
0.5 INJECTION INTRAMUSCULAR EVERY 4 HOURS PRN
Status: CANCELLED | OUTPATIENT
Start: 2021-11-02

## 2021-09-21 RX ORDER — ALBUTEROL SULFATE 0.83 MG/ML
2.5 SOLUTION RESPIRATORY (INHALATION)
Status: CANCELLED | OUTPATIENT
Start: 2021-09-28

## 2021-09-21 RX ORDER — ALBUTEROL SULFATE 90 UG/1
1-2 AEROSOL, METERED RESPIRATORY (INHALATION)
Status: CANCELLED
Start: 2021-09-28

## 2021-09-21 RX ORDER — EPINEPHRINE 1 MG/ML
0.3 INJECTION, SOLUTION INTRAMUSCULAR; SUBCUTANEOUS EVERY 5 MIN PRN
Status: CANCELLED | OUTPATIENT
Start: 2021-11-02

## 2021-09-21 RX ORDER — DIPHENHYDRAMINE HYDROCHLORIDE 50 MG/ML
50 INJECTION INTRAMUSCULAR; INTRAVENOUS
Status: CANCELLED
Start: 2021-10-19

## 2021-09-21 RX ORDER — NALOXONE HYDROCHLORIDE 0.4 MG/ML
0.2 INJECTION, SOLUTION INTRAMUSCULAR; INTRAVENOUS; SUBCUTANEOUS
Status: CANCELLED | OUTPATIENT
Start: 2021-09-28

## 2021-09-21 RX ORDER — ALBUTEROL SULFATE 0.83 MG/ML
2.5 SOLUTION RESPIRATORY (INHALATION)
Status: CANCELLED | OUTPATIENT
Start: 2021-11-02

## 2021-09-21 RX ORDER — LORAZEPAM 2 MG/ML
0.5 INJECTION INTRAMUSCULAR EVERY 4 HOURS PRN
Status: CANCELLED | OUTPATIENT
Start: 2021-10-26

## 2021-09-21 RX ORDER — LORAZEPAM 2 MG/ML
0.5 INJECTION INTRAMUSCULAR EVERY 4 HOURS PRN
Status: CANCELLED | OUTPATIENT
Start: 2021-09-28

## 2021-09-21 RX ORDER — ALBUTEROL SULFATE 90 UG/1
1-2 AEROSOL, METERED RESPIRATORY (INHALATION)
Status: CANCELLED
Start: 2021-10-12

## 2021-09-21 NOTE — PROGRESS NOTES
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    Relevant Orders    XR Femur Left 2 Views    XR Femur Right 2 Views    XR Tibia & Fibula Left 2 Views    XR Tibia & Fibula Right 2 Views       Hematologic    Multiple myeloma with failed remission (H)    Relevant Orders    CT Head w/o Contrast    CT Cervical Spine w/o Contrast    CT Chest Abdomen Pelvis w/o Contrast    CT LOWER EXTREMITY W/O CONTRAST    Immunoglobulins A G and M (Completed)    Kappa and lambda light chain (Completed)    Protein electrophoresis    Immunoglobulin Total Light Chains, Urine (Completed)    Protein immunofixation urine    Protein timed urine with Creat Ratio    XR Femur Left 2 Views    XR Femur Right 2 Views    XR Tibia & Fibula Left 2 Views    XR Tibia & Fibula Right 2 Views    Multiple myeloma not having achieved remission (H)    Relevant Orders    CT Head w/o Contrast    CT Cervical Spine w/o Contrast    CT Chest Abdomen Pelvis w/o Contrast    CT LOWER EXTREMITY W/O CONTRAST    Immunoglobulins A G and M (Completed)    Kappa and lambda light chain (Completed)    Protein electrophoresis    Immunoglobulin Total Light Chains, Urine (Completed)    Protein immunofixation urine    Protein timed urine with Creat Ratio    XR Femur Left 2 Views    XR Femur Right 2 Views    XR Tibia & Fibula Left 2 Views    XR Tibia & Fibula Right 2 Views          Assessment     1.  A very pleasant 70 year old woman with average risk multiple myeloma.  2.  Significant bone disease with osteoporosis and compression fractures.  3.   Normal hemoglobin, calcium, kidney function along with beta-2 microglobulin and albumin at the time of diagnosis.  4.  Positive Covid antibodies.  5.  Pain from compression fracture status post radiation therapy.    Plan    1.  We will start her treatment with Velcade, Revlimid and dexamethasone.  2.  Bisphosphonate therapy for her bones.  3.  She would be a transplant candidate.  4.  Repeat the bone survey but we will use plain CTs rather than x-rays.  5.  Continue good diet and exercise.  Increase calcium and vitamin D in her diet.  6.  Counseled the patient at length regarding overall plan of care.    Cancer Staging  No matching staging information was found for the patient.    ECOG Performance    2 - Ambulatory and independent in all ADLs; cannot work; up > 50% of the time      History of Present Illness    Ms. Lizzie Viera is a very pleasant 70 year old woman who has been diagnosed with multiple myeloma IgG kappa type in May 2021.  She had initially presented with compression fracture of T7 vertebral body in September 2020.  She had a back pain which led to that evaluation.  Bone density confirmed that she had osteoporosis.  MRI showed diffuse marrow edema in October 2020.  In April 2021 the MRI of the thoracic spine showed worsening T7 vertebral body height loss because of likely pathological compression fracture with extraosseous extension.  She then had IR guided bone biopsy on 7 May 2021 and pathology confirmed the presence of kappa light chain restricted plasma cells.  Her cytogenetics confirmed the presence of hyperdiploid E with gains of chromosome 5, 9 and 15.    She was then seen by Dr. Baig and had a bone marrow biopsy which also confirmed 60% involvement of the marrow with plasma cells.  The bone marrow was done on 3 Jocelin 2021.  The bone marrow also confirmed the presence of hyperdiploid E.  She had a gain of chromosome 3, 5, 7, 9, 11, 15 as well as 19.  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  Her beta-2  microglobulin was actually normal at the time of her diagnosis as was her albumin at 4.0.  Monoclonal protein 3.1 g/dL.  Kappa free light chain levels of 13 mg/dL IgG level of 4280 with depressed levels of IgM and IgA.  Urine was also positive for kappa light chains as well as immunofixation of the urine was positive for IgG kappa.  Normal kidney function.  Normal hemoglobin.    As mentioned above she had bone lesions in the T7 vertebral body, T1, skull area.  Her main pain is coming from her T7 vertebral body compression fracture.    Due to the pain she has been seen by radiation oncology and has received radiation therapy.  She also got a dose of steroids for pain control.    Currently her pain is controlled with combination of Dilaudid as well as some Tylenol.  She is wearing a brace.  She did notice that when she was on dexamethasone her pain was significantly better.    She was initially thinking of doing some natural therapies but once her symptoms got worse, she has now come back and wants treatment.    Review of systems.  No fever or night sweats.  No loss of weight.  No lumps or bumps anywhere.  No unusual headaches or eyesight issues.  No dizziness.  No bleeding from the nose.  No sores in the mouth. No problems with swallowing.  No chest pain. No shortness of breath. No cough.  No abdominal pain. No nausea or vomiting.  No diarrhea or constipation.  No blood in stool or black colored stools.  No problems passing urine.  No numbness or tingling in hands or feet.  No skin rashes.  A 14 point review of systems is otherwise negative.        Past History    Past Medical History:   Diagnosis Date     Cervical dysplasia      Chronic RUQ pain      Osteoporosis        Past Surgical History:   Procedure Laterality Date     C LAP,CHOLECYSTECTOMY/EXPLORE  12/27/2004     C LIGATE FALLOPIAN TUBE      Description: Tubal Ligation;  Recorded: 09/20/2007;     CONIZATION CERVIX,KNIFE/LASER      Description: Cervical  Conization By Laser;  Recorded: 09/20/2007;     HC DILATION/CURETTAGE DIAG/THER NON OB      Description: Dilation And Curettage;  Recorded: 09/20/2007;     HC REMOVE TONSILS/ADENOIDS,<11 Y/O      Description: Tonsillectomy With Adenoidectomy;  Recorded: 09/20/2007;         Physical Exam    BP (!) 150/71   Pulse 68   Temp 98.3  F (36.8  C)   Resp 20   Wt 57.2 kg (126 lb)   SpO2 98%   BMI 23.05 kg/m          GENERAL: Alert and oriented to time place and person. Seated comfortably. In no distress.    HEAD: Atraumatic and normocephalic.    EYES: RADHA, EOMI. No pallor. No icterus.    Oral cavity: no mucosal lesion or tonsillar enlargement.    NECK: supple. JVP normal.No thyroid enlargement.    LYMPH NODES: No palpable, cervical, axillary or inguinal lymphadenopathy.    CHEST: clear to auscultation bilaterally. Symmetrical breath movements bilaterally.  She is in a TLSO brace.    CVS: S1 and S2 are Regular rate and rhythm.  Soft systolic murmur heard. No peripheral edema.    ABDOMEN: Soft. Not tender. Not distended. No palpable hepatomegaly or splenomegaly. No other mass palpable. Bowel sounds heard.    EXTREMITIES: Warm.    SKIN: no rash, or bruising or purpura.      Lab Results    Recent Results (from the past 168 hour(s))   Immunoglobulins A G and M   Result Value Ref Range    Immunoglobulin G 4,439 (H) 700-1,700 mg/dL    Immunoglobulin A 19 (L) 65 - 400 mg/dL    Immunoglobulin M 9 (L) 60 - 280 mg/dL   Total Protein, Serum   Result Value Ref Range    Total Protein Serum for ELP 9.3 (H) 6.0 - 8.0 g/dL   Kappa and lambda light chain   Result Value Ref Range    Kappa Free Light Chains 12.57 (H) 0.33 - 1.94 mg/dL    Lambda Free Light Chains 0.34 (L) 0.57 - 2.63 mg/dL    Kappa /Lambda Ratio 36.97 (H) 0.26 - 1.65   Immunoglobulin Total Light Chains, Urine   Result Value Ref Range    Kappa Total Light Chain, U 7.11 (H) <0.9000 mg/dL    Lambda Total Light Chain, U <0.7000 <0.7000 mg/dL    Kappa/Lambda TLC Ratio, U >10.2  (H) 0.7000 - 6.20       Imaging    XR Thoracic Lumbar Standing 2 Views    Result Date: 9/1/2021  EXAM: XR THORACIC LUMBAR STANDING 2 VW LOCATION: [facility] DATE/TIME: 9/1/2021 3:26 PM INDICATION: T7 compression fracture. Evaluate upright kyphosis COMPARISON: X-ray and MRI 8/31/2021 TECHNIQUE: CR thoracic and lumbar spine     IMPRESSION: 12 thoracic and 5 lumbar type vertebra are assumed. Marked anterior wedge compression deformity of T7 corresponding to findings on previous exams. Segmental kyphosis measures 32 degrees from T6 through T8 on the current radiographs compared to 31 degrees  on yesterday's radiographs. No significant change in alignment. No new fracture or progressive vertebral height loss identified. Mild thoracolumbar spondylosis.     XR Thoracic Spine 2 Views    Result Date: 9/10/2021  EXAM: XR THORACIC SPINE 2 VIEWS LOCATION: [facility] DATE/TIME: 9/10/2021 2:05 PM INDICATION: AP/LAT upright in brace; T7 fracture; eval kyphosis and fracture stability COMPARISON: 09/02/2021 radiographs. 08/31/2021 thoracic spine MRI.. TECHNIQUE: CR Thoracic Spine.     IMPRESSION: 12 rib-bearing thoracic vertebrae. Unchanged T7 vertebral plana. Unchanged resultant focal kyphosis measuring 27 degrees. No significant change in 3 mm dorsal subluxation of the T8 relative to T6. No additional compression fracture. No acute extraspinal abnormality.    XR Thoracic Spine 2 Views    Result Date: 9/2/2021  EXAM: XR THORACIC SPINE 2 VIEWS LOCATION: [facility] DATE/TIME: 9/2/2021 3:24 PM INDICATION: upright in CUSTOM TLSO BRACE on 9/2 once arrives. fracture/kyphosis, eval for stability upright COMPARISON: Thoracic spine radiograph 06/01/2021, thoracic spine MRI 08/31/2021 TECHNIQUE: CR Thoracic Spine.     IMPRESSION: Upright radiographs. Patient is wearing a brace. No significant change in the severe pathologic compression fracture of T7. Stable 34  degrees of segmental kyphosis from T6 to T8 when measured in a similar fashion. The rest of vertebral body heights are maintained. Mild intervertebral disc height loss and endplate spurring in the mid to lower thoracic spine. The visualized lungs are clear. Surgical clips project over the right upper quadrant.    XR Thoracic Spine 2 Views    Result Date: 8/31/2021  EXAM: XR THORACIC SPINE 2 VIEWS LOCATION: [facility] DATE/TIME: 8/31/2021 1:37 PM INDICATION:  Closed T7 fracture (H) COMPARISON: 06/29/2021. TECHNIQUE: CR Thoracic Spine.     IMPRESSION: Severe T7 vertebral body compression deformity is similar to prior. Again localized accentuation of kyphosis at this level. There is mild dextroconvex curvature. The remaining visualized vertebral bodies are unremarkable. Mild multilevel spondylosis is similar to prior. Pedicles appear symmetric. Visualized lung fields are well aerated.     MR Thoracic Spine w/o & w Contrast    Addendum Date: 8/31/2021    Findings were called to Dr. Mendez at 8/31/2021 6:32 PM by Dr. GREGORIA Calloway.    Result Date: 8/31/2021  EXAM: MR THORACIC SPINE W/O and W CONTRAST LOCATION: [facility] DATE/TIME: 8/31/2021 4:47 PM INDICATION: Pathologic fracture of T7. COMPARISON: MRI thoracic spine 04/26/2021 CONTRAST: 6ml Gadavist TECHNIQUE: Routine Thoracic Spine MRI without and with IV contrast. FINDINGS: 12 rib-bearing thoracic type vertebra. Marked pathologic compression fracture of T7 with progressive vertebral height loss compared to the previous MRI. There is now essentially vertebra plana at this level with progressive segmental kyphosis measuring 27 degrees from T6 through T8. Pathologic marrow involving the posterior T7 vertebral margin with increased extension into the adjacent ventral epidural space, progressive involvement of the pedicles/lamina, and new marrow replacement/enhancement involving the posterior inferior T6 and superior T8  vertebral margins with additional progressive paraspinal extraosseous enhancing soft tissue mass. There is now circumferential epidural neoplasm at the T7 level with severe spinal canal stenosis, increased impingement upon the thoracic cord, and minimal intramedullary T2 prolongation. The additional metastatic deposit at T1 mild diffuse disc desiccation throughout the thoracic spine, with moderate loss of disc height at T5-T6 and mild multilevel loss of disc height at multiple additional levels from T3 through T10. No focal disc herniations. Minimal thoracic facet arthropathy. No additional high-grade spinal canal stenosis. Neoplastic soft tissue results in moderate to severe bilateral neural foraminal stenosis at T6-T7, severe bilateral neural foraminal stenosis at T7-T8, and mild to moderate left neural foraminal stenosis at T8-T9. Mild progression of paraspinal soft tissue mass centered at T7 as above. For example, maximum transverse dimension of enhancing soft tissue measuring 6.2 cm on axial images compared to 5.3 cm previously.     IMPRESSION: 1.  Pathologic compression fracture at T7 with progressive vertebral height loss and increasing epidural/paraspinal extraosseous neoplasm compared to 04/26/2021. 2.  Epidural neoplasm results in high-grade narrowing of the thecal sac and impingement upon the mid thoracic cord extending from the lower T6 through upper T8 levels. Minimal associated cord edema. 3.  Epidural neoplasm results in moderate to high-grade neural foraminal narrowing at T6-T7 and T7-T8 bilaterally and moderate neural foraminal narrowing on the left at T8-T9. 4.  Additional osseous metastasis at T1 similar to the prior exam. No new osseous metastasis elsewhere.        Signed by: Loco Blanco MD,       This note has been dictated using voice recognition software. Any grammatical or context distortions are unintentional and inherent to the software

## 2021-09-22 ENCOUNTER — TRANSFERRED RECORDS (OUTPATIENT)
Dept: NEUROSURGERY | Facility: CLINIC | Age: 71
End: 2021-09-22

## 2021-09-22 ENCOUNTER — TELEPHONE (OUTPATIENT)
Dept: ONCOLOGY | Facility: HOSPITAL | Age: 71
End: 2021-09-22

## 2021-09-22 NOTE — TELEPHONE ENCOUNTER
Shailesh/ Assistance Approved    Medication Revlimid  Amount/ $ 11,000  [facility]  Phone # [phone]  Fax # [phone]  Effective Dates 08/23/21 through 08/22/22  Additional Information n/a  Patient notified? yes

## 2021-09-22 NOTE — TELEPHONE ENCOUNTER
Prior Authorization Approval     Authorization Effective Date: 08/23/21  Authorization Expiration Date: 09/21/24  Medication: Revlimid  Approved Dose/Quantity: 14 for 21 days  Reference #:   n/a  Insurance Company: [employer] scripts  Expected CoPay:     2735.42  CoPay Card Available:   n/a  Foundation Assistance Needed:  possibly  Which Pharmacy is filling the prescription (Not needed for infusion/clinic administered):  n/a  Pharmacy Notified:  n/a  Patient Notified:yes

## 2021-09-23 ENCOUNTER — TELEPHONE (OUTPATIENT)
Dept: ONCOLOGY | Facility: HOSPITAL | Age: 71
End: 2021-09-23

## 2021-09-23 DIAGNOSIS — C90.00 MULTIPLE MYELOMA WITH FAILED REMISSION (H): Primary | ICD-10-CM

## 2021-09-23 RX ORDER — POTASSIUM CITRATE 10 MEQ/1
5 TABLET, EXTENDED RELEASE ORAL
COMMUNITY
End: 2021-10-06

## 2021-09-23 RX ORDER — DEXAMETHASONE 4 MG/1
TABLET ORAL
Qty: 100 TABLET | Refills: 3 | Status: SHIPPED | OUTPATIENT
Start: 2021-09-23 | End: 2022-01-05

## 2021-09-23 RX ORDER — LEVOCARNITINE 330 MG/1
330 TABLET ORAL 2 TIMES DAILY
COMMUNITY
End: 2022-11-15

## 2021-09-23 RX ORDER — PHENAZOPYRIDINE HYDROCHLORIDE 95 MG/1
190 TABLET ORAL 3 TIMES DAILY
COMMUNITY
End: 2024-01-01

## 2021-09-23 RX ORDER — LENALIDOMIDE 25 MG/1
25 CAPSULE ORAL DAILY
Qty: 14 CAPSULE | Refills: 11 | Status: SHIPPED | OUTPATIENT
Start: 2021-09-23 | End: 2021-10-13

## 2021-09-23 NOTE — ORAL ONC MGMT
"Oral Chemotherapy Monitoring Program    Lab Monitoring Plan  Per treatment plan  Labs drawn outside of [place]: No  Subjective/Objective:  Lizzie Viera is a 70 year old female contacted by phone for an initial visit for oral chemotherapy education.  I talked with her and her sister about her multiple myeloma and her new treatment plan. She will be starting lenalidomide 25 mg daily along with Velcade and dexamethasone. Valeria was very pleasant and seemed positive about starting therapy. She was very thankful of our liaisons for helping her get a samanta to cover her Revlimid.     ORAL CHEMOTHERAPY 9/23/2021   Assessment Type New Teach;Initial Work up   Diagnosis Code Multiple Myeloma   Providers [facility]   Clinic Name/Location [facility]   Drug Name Revlimid (lenalidomide)   Dose 25 mg   Current Schedule Daily   Cycle Details 2 weeks on, 1 week off     Vitals:  BP:   BP Readings from Last 1 Encounters:   09/20/21 (!) 150/71     Wt Readings from Last 1 Encounters:   09/20/21 57.2 kg (126 lb)     Estimated body surface area is 1.58 meters squared as calculated from the following:    Height as of 9/10/21: 1.575 m (5' 2\").    Weight as of 9/20/21: 57.2 kg (126 lb).    Labs:  Result Component Current Result Ref Range   Sodium 135 (L) (9/13/2021) 136 - 145 mmol/L     Result Component Current Result Ref Range   Potassium 4.4 (9/13/2021) 3.5 - 5.0 mmol/L     Result Component Current Result Ref Range   Calcium 9.2 (9/13/2021) 8.5 - 10.5 mg/dL     Result Component Current Result Ref Range   Magnesium 1.9 (9/13/2021) 1.8 - 2.6 mg/dL     Result Component Current Result Ref Range   Phosphorus 3.7 (9/1/2021) 2.5 - 4.5 mg/dL     Result Component Current Result Ref Range   Albumin 2.9 (L) (8/31/2021) 3.5 - 5.0 g/dL     Result Component Current Result Ref Range   Urea Nitrogen 16 (9/13/2021) 8 - 22 mg/dL     Result Component Current Result Ref Range   Creatinine 0.68 (9/13/2021) 0.60 - 1.10 mg/dL     Result Component Current " Result Ref Range   AST 22 (8/31/2021) 0 - 40 U/L     Result Component Current Result Ref Range   ALT 23 (8/31/2021) 0 - 45 U/L     Result Component Current Result Ref Range   Bilirubin Total 0.5 (8/31/2021) 0.0 - 1.0 mg/dL     Result Component Current Result Ref Range   WBC Count 9.6 (9/13/2021) 4.0 - 11.0 10e3/uL     Result Component Current Result Ref Range   Hemoglobin 12.3 (9/13/2021) 11.7 - 15.7 g/dL     Result Component Current Result Ref Range   Platelet Count 294 (9/13/2021) 150 - 450 10e3/uL     No results found for ANC within last 30 days.     Medication Reconciliation:  Potential drug-drug interactions with many of her supplements    Assessment:  Patient is appropriate to start therapy.     Plan:  Basic chemotherapy teaching was reviewed with the patient including indication, start date of therapy, dose, administration, adverse effects, missed doses, food and drug interactions, monitoring, side effect management, office contact information, and safe handling. Written materials were provided and all questions answered.    I will send in her prescription [facility] to [facility]. She was instructed to not start her Revlimid until Dr. Blanco tells her to. We also discussed starting aspirin 81mg once daily to help prevent blood clots while on Revlimid.     We also briefly discussed the large amount of supplements she takes. I explained that since supplements are not regulated, it is difficult for us to have data on drug-drug interactions. In general, though, we recommend holding them while on chemotherapy. Specifically, any antioxidants may interact. She took in the information but did not say she would discontinue any at this time.    Information regarding lenalidomide was sent via her portal as well as mailed per request by patient.    Follow-Up:  9/25 - appt     Jeison Burns, Jocelyn  Oral Chemotherapy Pharmacist  [phone]

## 2021-09-24 LAB
ALBUMIN PERCENT: 35.8 % (ref 51–67)
ALBUMIN SERPL ELPH-MCNC: 3.3 G/DL (ref 3.2–4.7)
ALPHA 1 PERCENT: 3.2 % (ref 2–4)
ALPHA 2 PERCENT: 10 % (ref 5–13)
ALPHA1 GLOB SERPL ELPH-MCNC: 0.3 G/DL (ref 0.1–0.3)
ALPHA2 GLOB SERPL ELPH-MCNC: 0.9 G/DL (ref 0.4–0.9)
B-GLOBULIN SERPL ELPH-MCNC: 0.9 G/DL (ref 0.7–1.2)
BETA PERCENT: 9.4 % (ref 10–17)
GAMMA GLOB SERPL ELPH-MCNC: 3.9 G/DL (ref 0.6–1.4)
GAMMA GLOBULIN PERCENT: 41.6 % (ref 9–20)
MONOCLONAL PEAK: 3.3 G/DL
PATH ICD:: ABNORMAL
PATH ICD:: NORMAL
PROT ELPH PNL UR ELPH: NORMAL
PROT PATTERN SERPL ELPH-IMP: ABNORMAL
REVIEWING PATHOLOGIST: ABNORMAL
REVIEWING PATHOLOGIST: NORMAL
TOTAL PROTEIN SERUM FOR ELP (SYNCED VALUE): 9.3 G/DL

## 2021-09-26 ENCOUNTER — HOSPITAL ENCOUNTER (OUTPATIENT)
Dept: CT IMAGING | Facility: HOSPITAL | Age: 71
End: 2021-09-26
Attending: INTERNAL MEDICINE
Payer: COMMERCIAL

## 2021-09-26 ENCOUNTER — HOSPITAL ENCOUNTER (OUTPATIENT)
Dept: RADIOLOGY | Facility: HOSPITAL | Age: 71
End: 2021-09-26
Attending: INTERNAL MEDICINE
Payer: COMMERCIAL

## 2021-09-26 ENCOUNTER — MYC MEDICAL ADVICE (OUTPATIENT)
Dept: RADIATION ONCOLOGY | Facility: HOSPITAL | Age: 71
End: 2021-09-26

## 2021-09-26 DIAGNOSIS — C90.00 MULTIPLE MYELOMA WITH FAILED REMISSION (H): ICD-10-CM

## 2021-09-26 DIAGNOSIS — C90.00 MULTIPLE MYELOMA NOT HAVING ACHIEVED REMISSION (H): ICD-10-CM

## 2021-09-26 DIAGNOSIS — M84.58XK PATHOLOGICAL FRACTURE OF VERTEBRAE IN NEOPLASTIC DISEASE WITH NONUNION: ICD-10-CM

## 2021-09-26 DIAGNOSIS — M80.80XD LOCALIZED OSTEOPOROSIS WITH CURRENT PATHOLOGICAL FRACTURE WITH ROUTINE HEALING, SUBSEQUENT ENCOUNTER: ICD-10-CM

## 2021-09-26 DIAGNOSIS — G89.3 CANCER ASSOCIATED PAIN: ICD-10-CM

## 2021-09-26 PROCEDURE — 73552 X-RAY EXAM OF FEMUR 2/>: CPT | Mod: 50

## 2021-09-26 PROCEDURE — 71250 CT THORAX DX C-: CPT

## 2021-09-26 PROCEDURE — 70450 CT HEAD/BRAIN W/O DYE: CPT

## 2021-09-26 PROCEDURE — 73590 X-RAY EXAM OF LOWER LEG: CPT | Mod: LT

## 2021-09-26 PROCEDURE — 72125 CT NECK SPINE W/O DYE: CPT

## 2021-09-27 ENCOUNTER — TELEPHONE (OUTPATIENT)
Dept: ONCOLOGY | Facility: HOSPITAL | Age: 71
End: 2021-09-27

## 2021-09-27 ENCOUNTER — APPOINTMENT (OUTPATIENT)
Dept: RADIATION ONCOLOGY | Facility: HOSPITAL | Age: 71
End: 2021-09-27
Attending: RADIOLOGY
Payer: COMMERCIAL

## 2021-09-27 PROCEDURE — 77470 SPECIAL RADIATION TREATMENT: CPT | Mod: 26 | Performed by: RADIOLOGY

## 2021-09-27 PROCEDURE — 77263 THER RADIOLOGY TX PLNG CPLX: CPT | Performed by: RADIOLOGY

## 2021-09-27 NOTE — TELEPHONE ENCOUNTER
Patient called per Dr. John to check in and see how she is doing.  Per Dr. Blanco check to see if the patient is having any increase weakness in her legs.  Patient states she is not having any increase in weakness.  She is scheduled with radiation MD tomorrow and Dr. Blanco on Wednesday.  Patient will call if she notices any increase in weakness.

## 2021-09-28 ENCOUNTER — APPOINTMENT (OUTPATIENT)
Dept: RADIATION ONCOLOGY | Facility: HOSPITAL | Age: 71
End: 2021-09-28
Attending: RADIOLOGY
Payer: COMMERCIAL

## 2021-09-28 DIAGNOSIS — C90.00 MULTIPLE MYELOMA NOT HAVING ACHIEVED REMISSION (H): Primary | ICD-10-CM

## 2021-09-28 PROCEDURE — 99215 OFFICE O/P EST HI 40 MIN: CPT | Mod: 25 | Performed by: RADIOLOGY

## 2021-09-28 PROCEDURE — 77334 RADIATION TREATMENT AID(S): CPT | Mod: 26 | Performed by: RADIOLOGY

## 2021-09-28 PROCEDURE — 77290 THER RAD SIMULAJ FIELD CPLX: CPT | Performed by: RADIOLOGY

## 2021-09-28 PROCEDURE — G0463 HOSPITAL OUTPT CLINIC VISIT: HCPCS

## 2021-09-28 PROCEDURE — 77290 THER RAD SIMULAJ FIELD CPLX: CPT | Mod: 26 | Performed by: RADIOLOGY

## 2021-09-28 RX ORDER — HYDROMORPHONE HYDROCHLORIDE 4 MG/1
4 TABLET ORAL EVERY 6 HOURS PRN
Qty: 60 TABLET | Refills: 0 | Status: SHIPPED | OUTPATIENT
Start: 2021-09-28 | End: 2021-10-12

## 2021-09-28 NOTE — LETTER
9/28/2021         RE: Lizzie Viera  [address]  [place] [address]        Dear Colleague,    Thank you for referring your patient, Lizzie Viera, to the [facility] [place]. Please see a copy of my visit note below.            [facility] Radiation [facility]cology Follow Up Note    Patient: Lizzie Viera  MRN: [id number]  Date of Service: 09/28/2021    Assessment:       ICD-10-CM    1. Multiple myeloma not having achieved remission (H)  C90.00          Impression/Plan:   70 year old female with previously treated thoracic pathologic fracture with thecal sac narrowing and foraminal stenosis in setting of multiple myeloma.     Persistent pain at upper mid back as well as headaches corresponding to lesion at T1 and large lesion at left parietal skull.    1. This is a 70 year old female who received a single fraction to her T6-T9 several weeks ago for thoracic pathologic fracture. She continues to have pain at her mid back which corresponds to a known lesion within T1. Additionally, she has a large mass at her left parietal skull measuring 4.5 cm and has noticed headaches. Recommending concurrent chemoradiation therapy, 2000 cGy over 5 fractions to both T1.    2. Patient currently declining skull treatment due to not wanting to lose her hair.      3. Consider retreatment of T6-9 if pain persists. Would do 2000 x5 if chose to treat.       2.  Three common misconceptions of radiation were addressed:              1) there is no transfer of heat, so no burns in traditional sense. Skin erythema from irritation.              2) there is no radioactivity at anytime during or after completion of each treatment.Treatment is just energy passing through target.               3) generally radiation does not cause immunosuppression.     3. Common side effects to expect are fatigue and hair loss. Symptoms typically worsen throughout treatment, peak 1-2 weeks after  completion of radiation, and then typically resolve over the next several weeks. The patient voiced understanding of the information discussed and wishes to proceed forward with radiation therapy. She is scheduled to undergo CT simulation to begin radiation therapy planning     4. Covid test prior to start of radiation therapy.     5. Pain managed on Oxycodone 5mg TID and THC oil through [facility].     Intent of Therapy: Palliative  Side effects that may occur during or within weeks after Radiation Therapy      Fatigue and general weakness    Loss of hair on the face and neck    Pain in the irradiated limb    Darkening, irritation, itchiness, redness, dryness,and peeling, scabbing and ulceration of the skin on the neck and face    Mouth and throat dryness, irritation and swallowing difficulties and infection    Thickening of the saliva    Change in or loss of taste sensation    Decrease in appetite    Hoarseness and change in voice    Side effects that may occur months or years after Radiation Therapy      Development of another tumor or cancer    Thickening, telangiectasias (development of spider like blood vessels in the skin) and ulceration of the skin of the face and neck    Fibrosis (scar tissue), decreased flexibility and swelling of the neck    Brain inflammation or necrosis that may cause various neurologic symptoms    Decrease in memory and thinking abilities    Poor healing after a trauma or surgery in the irradiated area    Facial numbness, pain and weakness    Nerve damage resulting in loss of strength and sensation    Tooth and jaw decay and poor healing after dental procedures    The risks, benefits and alternatives to radiation therapy were outlined with the patient. All questions were answered and a consent was signed.      Subjective:     HPI:  Lizzie Viera is a 70 year old female with multiple myeloma who has declined cancer directed therapies to date. Sustained what was  thought to be osteoporotic T7 compression fracture in September 2020.     Imaging at that time showed the T7 compression fracture diffuse marrow edema and an indeterminant T1 lesion.       Subsequent imaging on 4/26/2021 showed worsening of her T7 pathologic fracture with new extraosseous extension causing severe spinal canal stenosis.  Biopsy on 5/7/2020 showed a plasma cell neoplasm.  Seen by medical oncology and a bone marrow biopsy was obtained from her right iliac crest which showed involvement of multiple myeloma.  She declined radiation and chemotherapy at that time.     She did continue to have follow-up in the neurosurgical clinic.  8/31/2021 she was seen in the clinic and due to worsening pain leg weakness and incontinence and was admitted for further work-up.     SITE TREATED: T6-9  TOTAL DOSE: 800 cGy  NUMBER OF FRACTIONS: 1  DATES COMPLETED: 9/1/2021  CONCURRENT CHEMOTHERAPY: No  ADJUVANT THERAPY:TBD    The patient presents for routine follow up. For her midback pain she is taking Dilaudid 4mg TID and THC oil which controls pain well. She has had a mild headache.     Past Medical History:   Diagnosis Date     Cervical dysplasia      Chronic RUQ pain      Osteoporosis      Past Surgical History:   Procedure Laterality Date     C LAP,CHOLECYSTECTOMY/EXPLORE  12/27/2004     C LIGATE FALLOPIAN TUBE      Description: Tubal Ligation;  Recorded: 09/20/2007;     CONIZATION CERVIX,KNIFE/LASER      Description: Cervical Conization By Laser;  Recorded: 09/20/2007;     HC DILATION/CURETTAGE DIAG/THER NON OB      Description: Dilation And Curettage;  Recorded: 09/20/2007;     HC REMOVE TONSILS/ADENOIDS,<11 Y/O      Description: Tonsillectomy With Adenoidectomy;  Recorded: 09/20/2007;     Current Outpatient Medications   Medication     acetylcysteine (NAC) 600 MG CAPS capsule     acyclovir (ZOVIRAX) 400 MG tablet     alpha-lipoic acid 100 MG capsule     Coenzyme Q10 300 MG CAPS     dexamethasone (DECADRON) 4 MG  tablet     fish oil-omega-3 fatty acids 1000 MG capsule     gabapentin (NEURONTIN) 300 MG capsule     HYDROmorphone (DILAUDID) 4 MG tablet     LENalidomide (REVLIMID) 25 MG CAPS capsule     levOCARNitine (CARNITOR) 330 MG tablet     Melatonin 10 MG TABS tablet     methocarbamol (ROBAXIN) 500 MG tablet     Milk Thistle-Dand-Fennel-Licor (MILK THISTLE XTRA) CAPS capsule     NALTREXONE HCL PO     phenazopyridine (AZO URINARY PAIN RELIEF) 95 MG tablet     potassium citrate (UROCIT-K) 10 MEQ (1080 MG) CR tablet     senna (SENOKOT) 8.6 MG tablet     TURMERIC PO     UNABLE TO FIND     UNABLE TO FIND     UNABLE TO FIND     UNABLE TO FIND     UNABLE TO FIND     UNABLE TO FIND     Vitamin D, Cholecalciferol, 25 MCG (1000 UT) CAPS     Vitamin Mixture (VITAMIN C) LIQD     No current facility-administered medications for this visit.     Cefdinir, Latex, Short ragweed pollen ext, and Adhesive tape  Social History     Socioeconomic History     Marital status:      Spouse name: Not on file     Number of children: 3     Years of education: Not on file     Highest education level: Not on file   Occupational History     Not on file   Tobacco Use     Smoking status: Former Smoker     Packs/day: 0.50     Years: 45.00     Pack years: 22.50     Types: Cigarettes     Smokeless tobacco: Never Used   Substance and Sexual Activity     Alcohol use: Yes     Comment: Alcoholic Drinks/day: 0-1 drinks per week     Drug use: Not Currently     Sexual activity: Not Currently     Partners: Male     Birth control/protection: Surgical   Other Topics Concern     Not on file   Social History Narrative     Not on file     Social Determinants of Health     Financial Resource Strain:      Difficulty of Paying Living Expenses:    Food Insecurity:      Worried About Running Out of Food in the Last Year:      Ran Out of Food in the Last Year:    Transportation Needs:      Lack of Transportation (Medical):      Lack of Transportation (Non-Medical):     Physical Activity:      Days of Exercise per Week:      Minutes of Exercise per Session:    Stress:      Feeling of Stress :    Social Connections:      Frequency of Communication with Friends and Family:      Frequency of Social Gatherings with Friends and Family:      Attends [employer]ologist Services:      Active Member of Clubs or Organizations:      Attends Club or Organization Meetings:      Marital Status:    Intimate Partner Violence:      Fear of Current or Ex-Partner:      Emotionally Abused:      Physically Abused:      Sexually Abused:        ROS:  Reviewed with Lizzie Viera today.      General  Constitutional  Constitutional (WDL): Exceptions to WDL  Fatigue: Fatigue not relieved by rest OR limiting instrumental ADL  EENT  Eye Disorders  Eye Disorder (WDL): All eye disorder elements are within defined limits  Ear Disorders  Ear Disorder (WDL): All ear disorder elements are within defined limits  Respiratory    Respiratory  Respiratory (WDL): All respiratory elements are within defined limits  Cardiovascular  Cardiovascular  Cardiovascular (WDL): All cardiovascular elements are within defined limits  Gastrointestinal  Gastrointestinal  Gastrointestinal (WDL): All gastrointestinal elements are within defined limits  Musculoskeletal  Musculoskeletal and Connective Tissue Disorders  Musculoskeletal & Connective (WDL): Exceptions to WDL  Arthralgia: Moderate pain OR limiting instrumental ADL  Bone Pain: Moderate pain OR limiting instrumental ADL  Generalized Muscle Weakness: Symptomatic OR perceived by patient but not evident on physical exam  Integumentary              Integumentary  Integumentary (WDL): All integumentary elements are within defined limits  Neurological  Neurosensory  Neurosensory (WDL): Exceptions to WDL  Peripheral Motor Neuropathy: Asymptomatic OR clinical or diagnostic observations only  Ataxia: Asymptomatic OR clinical or diagnostic observations only OR intervention not  indicated  Genitourinary/Reproductive  Genitourinary  Genitourinary (WDL): All genitourinary elements are within defined limits  Lymphatic   Lymph System Disorders  Lymph (WDL): All lymph elements are within defined limits  Patient Coping  Patient Coping: Accepting;Open/discussion  Pain   Pain Score: Moderate Pain (4)   AUA Assessment                                                                         Accompanied by  Accompanied By: family    Objective:        Exam:    Vitals:    09/28/21 1132   PainSc: Moderate Pain (4)   PainLoc: Mid Back       GENERAL: No acute distress. Cooperative in conversation. Mask on.   Head: Edematous area of left superior parietal skull.   RESP: Normal respiratory effort.   ABD: Soft, nontender.  NEURO: Non focal. Alert and oriented x3.   PSYCH: Within normal limits. No depression or anxiety.  SKIN: Warm dry intact.       Recent Labs: No results found for this or any previous visit (from the past 168 hour(s)).    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     IMPRESSION: No acute fracture or malalignment. Mild degenerative changes. Osteopenia. No suspicious intraosseous lytic lesion.    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     IMPRESSION: No acute fracture or malalignment. Mild degenerative changes. Osteopenia. No suspicious intraosseous lytic lesion.    XR Thoracic Lumbar Standing 2 Views    Result Date: 9/1/2021  EXAM: XR THORACIC LUMBAR STANDING 2 VW LOCATION: [facility] DATE/TIME: 9/1/2021 3:26 PM INDICATION: T7 compression fracture. Evaluate upright kyphosis COMPARISON: X-ray and MRI 8/31/2021 TECHNIQUE: CR thoracic and lumbar spine     IMPRESSION: 12 thoracic and 5 lumbar type vertebra are assumed. Marked anterior wedge compression deformity of T7 corresponding to findings on previous exams. Segmental kyphosis measures 32 degrees from T6 through T8 on the current radiographs compared to 31 degrees  on yesterday's radiographs. No significant change in alignment. No new fracture or progressive vertebral height loss identified. Mild thoracolumbar spondylosis.     XR Thoracic Spine 2 Views    Result Date: 9/10/2021  EXAM: XR THORACIC SPINE 2 VIEWS LOCATION: [facility] DATE/TIME: 9/10/2021 2:05 PM INDICATION: AP/LAT upright in brace; T7 fracture; eval kyphosis and fracture stability COMPARISON: 09/02/2021 radiographs. 08/31/2021 thoracic spine MRI.. TECHNIQUE: CR Thoracic Spine.     IMPRESSION: 12 rib-bearing thoracic vertebrae. Unchanged T7 vertebral plana. Unchanged resultant focal kyphosis measuring 27 degrees. No significant change in 3 mm dorsal subluxation of the T8 relative to T6. No additional compression fracture. No acute extraspinal abnormality.    XR Thoracic Spine 2 Views    Result Date: 9/2/2021  EXAM: XR THORACIC SPINE 2 VIEWS LOCATION: [facility] DATE/TIME: 9/2/2021 3:24 PM INDICATION: upright in CUSTOM TLSO BRACE on 9/2 once arrives. fracture/kyphosis, eval for stability upright COMPARISON: Thoracic spine radiograph 06/01/2021, thoracic spine MRI  08/31/2021 TECHNIQUE: CR Thoracic Spine.     IMPRESSION: Upright radiographs. Patient is wearing a brace. No significant change in the severe pathologic compression fracture of T7. Stable 34 degrees of segmental kyphosis from T6 to T8 when measured in a similar fashion. The rest of vertebral body heights are maintained. Mild intervertebral disc height loss and endplate spurring in the mid to lower thoracic spine. The visualized lungs are clear. Surgical clips project over the right upper quadrant.    XR Thoracic Spine 2 Views    Result Date: 8/31/2021  EXAM: XR THORACIC SPINE 2 VIEWS LOCATION: [facility] DATE/TIME: 8/31/2021 1:37 PM INDICATION:  Closed T7 fracture (H) COMPARISON: 06/29/2021. TECHNIQUE: CR Thoracic Spine.     IMPRESSION: Severe T7 vertebral body compression deformity is similar to prior. Again localized accentuation of kyphosis at this level. There is mild dextroconvex curvature. The remaining visualized vertebral bodies are unremarkable. Mild multilevel spondylosis is similar to prior. Pedicles appear symmetric. Visualized lung fields are well aerated.     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     IMPRESSION: No acute fracture or malalignment. No significant degenerative changes. Osteopenia. No suspicious intraosseous lytic lesion.    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     IMPRESSION: No acute fracture or malalignment. Minimal degenerative changes. Osteopenia. No suspicious intraosseous lytic lesion.    MR Thoracic Spine w/o & w Contrast    Addendum Date: 8/31/2021    Findings were called to Dr. Mendez at 8/31/2021 6:32 PM by Dr. GREGORIA Calloway.    Result Date: 8/31/2021  EXAM: MR THORACIC SPINE W/O and W CONTRAST LOCATION: [facility] DATE/TIME: 8/31/2021 4:47 PM INDICATION: Pathologic fracture of T7. COMPARISON: MRI thoracic spine 04/26/2021 CONTRAST: 6ml Gadavist TECHNIQUE: Routine Thoracic Spine MRI without and with IV contrast. FINDINGS: 12 rib-bearing thoracic type vertebra. Marked pathologic compression fracture of T7 with progressive vertebral height loss compared to the previous MRI. There is now essentially vertebra plana at this level with progressive segmental kyphosis measuring 27 degrees from T6 through T8. Pathologic marrow involving the posterior T7 vertebral margin with increased extension into the adjacent ventral epidural space, progressive involvement of the pedicles/lamina, and new marrow replacement/enhancement involving the posterior inferior T6 and superior T8 vertebral margins with additional progressive paraspinal extraosseous enhancing soft tissue mass. There is now circumferential epidural neoplasm at the T7 level with severe spinal canal stenosis, increased impingement upon the thoracic cord, and minimal intramedullary T2 prolongation. The additional metastatic deposit at T1 mild diffuse disc desiccation throughout the thoracic spine, with moderate loss of disc height at T5-T6 and mild multilevel loss of disc height at multiple additional levels from T3 through T10. No focal disc  herniations. Minimal thoracic facet arthropathy. No additional high-grade spinal canal stenosis. Neoplastic soft tissue results in moderate to severe bilateral neural foraminal stenosis at T6-T7, severe bilateral neural foraminal stenosis at T7-T8, and mild to moderate left neural foraminal stenosis at T8-T9. Mild progression of paraspinal soft tissue mass centered at T7 as above. For example, maximum transverse dimension of enhancing soft tissue measuring 6.2 cm on axial images compared to 5.3 cm previously.     IMPRESSION: 1.  Pathologic compression fracture at T7 with progressive vertebral height loss and increasing epidural/paraspinal extraosseous neoplasm compared to 04/26/2021. 2.  Epidural neoplasm results in high-grade narrowing of the thecal sac and impingement upon the mid thoracic cord extending from the lower T6 through upper T8 levels. Minimal associated cord edema. 3.  Epidural neoplasm results in moderate to high-grade neural foraminal narrowing at T6-T7 and T7-T8 bilaterally and moderate neural foraminal narrowing on the left at T8-T9. 4.  Additional osseous metastasis at T1 similar to the prior exam. No new osseous metastasis elsewhere.    CT Cervical Spine w/o Contrast    Result Date: 9/27/2021  EXAM: CT CERVICAL SPINE W/O CONTRAST LOCATION: [facility] DATE/TIME: 9/26/2021 10:08 AM INDICATION:  Multiple myeloma with failed remission (H), Multiple myeloma not having achieved remission (H) COMPARISON: Cervical spine MR 9/23/2020. TECHNIQUE: Routine CT Cervical Spine without IV contrast. Multiplanar reformats. Dose reduction techniques were used. FINDINGS: VERTEBRA: There has been an interval increase in size of the destructive bony lesion in the T1 vertebral body with an associated pathologic compression fracture of the T1 vertebral body. Vertebral body heights of the cervical spine remain normal. There are small (2-3 mm) hypodense foci in the right lateral mass of C2 and in  the right posterolateral aspect of the C3 vertebral body that are nonspecific. No other suspicious focal lucencies identified in any of the cervical vertebrae. Alignment of the cervical vertebrae remains normal. No fractures of the cervical spine. CANAL/FORAMINA: No canal or neural foraminal stenosis. PARASPINAL: No extraspinal abnormality.     IMPRESSION: 1.  Interval enlargement of the destructive bony lesion of the T1 vertebral body with associated mild compression fracture deformity. 2.  Focal 2-3 mm nonspecific lucent foci in the right C2 lateral mass and in the C3 vertebral body. No other suspicious bony lesions. 3.  Normal alignment of the cervical vertebrae. No spinal canal or neural foraminal stenosis. No significant degenerative changes.    CT Chest Abdomen Pelvis w/o Contrast    Result Date: 9/26/2021  EXAM: CT CHEST ABDOMEN PELVIS W/O CONTRAST LOCATION: [facility] DATE/TIME: 9/26/2021 10:09 AM INDICATION:  Multiple myeloma with failed remission. Compression fractures T7 with epidural tumor. COMPARISON: 05/04/2021 and older studies, PET/CT 05/28/2021, thoracic MR 08/31/2021, thoracic plain films 09/10/2021 TECHNIQUE: CT scan of the chest, abdomen, and pelvis was performed without IV contrast. Multiplanar reformats were obtained. Dose reduction techniques were used. CONTRAST: None. FINDINGS: LUNGS AND PLEURA: There are a few, bilateral tiny scattered pulmonary nodules which are stable. No effusions. Atelectasis in the right lateral costophrenic angle has cleared. MEDIASTINUM/AXILLAE: No adenopathy. CORONARY ARTERY CALCIFICATION: Mild. HEPATOBILIARY: Normal. PANCREAS: Normal. SPLEEN: Normal. ADRENAL GLANDS: Multiple low dense left adrenal adenomas are noted largest laterally measuring 1.7 cm. KIDNEYS/BLADDER: No change in the 2.8 cm left renal cyst. This needs no follow-up. BOWEL: Redundant colon. No mass or ascites. LYMPH NODES: Normal. VASCULATURE: Moderate arterial  calcifications. No aneurysm. PELVIC ORGANS: Normal. MUSCULOSKELETAL: Progression of disease in the spine. There is a new, 1.8 cm deposit anteriorly along the left side of T1 with some minimal collapse of the superior endplate. Lytic destruction of the T6 and T8 vertebral bodies has developed with involvement of posterior elements. These surround the vertebral plana of T7 which has progressed since the May exam. There is bulky paravertebral and epidural tumor encircling the thoracic cord at this level which is narrowed to at least a centimeter.     IMPRESSION: 1.  Progression of spinal involvement most notably at T6 and T8 as noted above with paravertebral and epidural tumor surrounding the thecal sac which is narrowed to a centimeter. Thoracic MRI can evaluate extent of cord compression/signal changes. 2.  Progression of the vertebral plana at T7. 3.  New lesion at T1 with minimal compression of the superior endplate. 4.  Critical findings discussed by the undersigned with Dr. John at 1346. He is to contact the patient today. 5.  Other noncritical findings as noted above. NOTE: ABNORMAL REPORT THE DICTATION ABOVE DESCRIBES AN ABNORMALITY FOR WHICH FOLLOW-UP IS NEEDED.     CT Head w/o Contrast    Result Date: 9/27/2021  EXAM: CT HEAD W/O CONTRAST LOCATION: [facility] DATE/TIME: 9/26/2021 10:08 AM INDICATION: Evaluate for any myeloma lesions in the bones. COMPARISON: Brain MR 5/22/2017. TECHNIQUE: Routine CT Head without IV contrast. Multiplanar reformats. Dose reduction techniques were used. FINDINGS: INTRACRANIAL CONTENTS: No intracranial hemorrhage, extraaxial collection, or mass effect.  No CT evidence of acute infarct. Mild presumed chronic small vessel ischemic changes. Mild generalized volume loss. No hydrocephalus. VISUALIZED ORBITS/SINUSES/MASTOIDS: No intraorbital abnormality. No paranasal sinus mucosal disease. No middle ear or mastoid effusion. BONES/SOFT TISSUES: There is a new  large destructive bony lesion in the high posterior aspect of the left parietal bone measuring 4.5 cm in diameter and the thickness of the calvaria with destruction of both the inner and outer tables of the skull. There  are multiple additional tiny focal lucent bony lesions in the calvaria that may represent additional myelomatous involvement.     IMPRESSION: 1.  Large destructive bony lesion in the high posterior left parietal bone measuring 4.5 cm in diameter with destruction of both the inner and outer tables of the skull. 2.  Multiple additional tiny lucent calvarial lesions that may represent additional myelomatous lesions. 3.  No CT evidence for acute intracranial process. 4.  Brain atrophy and presumed chronic microvascular ischemic changes as above.             Pathology:   Results for orders placed or performed in visit on 06/03/21 (from the past 8760 hour(s))   Bone marrow biopsy   Result Value    Case Report      Bone Marrow                                       Case: [id number]                                   Authorizing Provider:  Lisset Baig MD      Collected:           06/03/2021 1100              Ordering Location:     [facility]   Received:            06/03/2021 1150                                     [facility]                                                              Pathologist:           Arnold Rosales MD                                                        Specimens:   A) - Iliac Crest, Right                                                                             B) - Iliac Crest, Right                                                                             C) - Iliac Crest, Right                                                                             D) - Peripheral Blood                                                                      Addendum      The purpose of this addendum is to report results of additional studies. Kappa and Lambda in  situ hybridization studies demonstrate kappa light chain restri  Addendum electronically signed by Arnold Rosales MD on 6/9/2021 at 10:43 AM      Cytogenetics Report, Addendum      Please see [facility] cytogenetics report [id number].  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 Pending kappa and lambda in situ hybridization studies (addendum). Morphology, flow cytometry ([id number]) and immunohistochemistry demonstrate significant involvement by multiple myeloma. Pending cytogenetic analysis.    Clinical Information plasma cell disease, T7 lesion, diagnostic    Performed By: Arnold Rosales MD    Peripheral Smear      Red blood cells are normal in number and overall normochromic and normocytic. Anisopoikilocytosis, polychromasia, and rouleaux formation are not prominent.    The white blood cell count and differential appear as reported on the CBC. Leukocytes are normal in number and appearance, consisting predominantly of segmented and band neutrophils with fewer numbers of lymphocytes and monocytes. No blasts or dysplastic changes are identified.    Platelets are normal in number and appearance.      Bone Marrow Diff      Neutrophils 37.5%; Erythroid cells 14.5%; Monocytes 1.5%; Eosinophils 0.5%; Basophils 0%; Plasma cells/Lymphocytes 43.5%; Blasts 2%.    M:E Ratio: 2-3:1      Aspirate Smear      Aspirate smears are cellular and demonstrate numerous marrow particles. Trilineage hematopoiesis is decreased given the numerous malignant plasma cells. Iron stores are adequate.     Core Biopsy/Aspirate Clot      Bone marrow biopsy and clot sections are hypocellular at approximately 20%. Trilineage hematopoiesis is decreased as the marrow is partially effaced by sheets and an interstitial infiltrate of (+) plasma cells (estimated at 60%). No significant coexpression of CD20 on plasma cells. CD3 highlights background T-cells. An iron stain demonstrates increased iron stores with no increase in ring sideroblasts.    All controls stain appropriately.      Charges      CPT: 28489, 88267, 84976, 03982 ×2, 37041 ×3, 63011 ×2, 90583 ×2, 10341 ×2, 09667 ×2, 41287  ICD-10: C90.00      Result Flag Malignant (A)     Comment: SPECIMEN PROCESSING:    All histology slide preparation and stains; and cytology slide preparation, staining, and cytotechnologist screening done at [facility] are performed at [facility], [address] [place] [address], [facility], with final interpretation, frozen section analysis, and cytology adequacy assessment at indicated laboratory.               40 minutes spent on the date of the encounter doing chart review, review of test results, interpretation of tests, patient visit, documentation.      I, Nohemi Rapp MD personally performed the services described in this documentation, as scribed by Justyn Pineda in my presence, and it is both accurate and complete.    Signed by: Nohemi Rapp MD, MPH      Pt here with her sister for f/u with Dr. Rapp and possible simulation. She c/o moderate to severe mid-back pain (Palliative Care refilled Dilaudid today).       Again, thank you for allowing me to participate in the care of your patient.        Sincerely,        Nohemi Rapp MD

## 2021-09-28 NOTE — PROGRESS NOTES
[facility] Radiation Oncology Follow Up Note    Patient: Lizzie Viera  MRN: [id number]  Date of Service: 09/28/2021    Assessment:       ICD-10-CM    1. Multiple myeloma not having achieved remission (H)  C90.00          Impression/Plan:   70 year old female with previously treated thoracic pathologic fracture with thecal sac narrowing and foraminal stenosis in setting of multiple myeloma.     Persistent pain at upper mid back as well as headaches corresponding to lesion at T1 and large lesion at left parietal skull.    1. This is a 70 year old female who received a single fraction to her T6-T9 several weeks ago for thoracic pathologic fracture. She continues to have pain at her mid back which corresponds to a known lesion within T1. Additionally, she has a large mass at her left parietal skull measuring 4.5 cm and has noticed headaches. Recommending concurrent chemoradiation therapy, 2000 cGy over 5 fractions to both T1.    2. Patient currently declining skull treatment due to not wanting to lose her hair.      3. Consider retreatment of T6-9 if pain persists. Would do 2000 x5 if chose to treat.       2.  Three common misconceptions of radiation were addressed:              1) there is no transfer of heat, so no burns in traditional sense. Skin erythema from irritation.              2) there is no radioactivity at anytime during or after completion of each treatment.Treatment is just energy passing through target.               3) generally radiation does not cause immunosuppression.     3. Common side effects to expect are fatigue and hair loss. Symptoms typically worsen throughout treatment, peak 1-2 weeks after completion of radiation, and then typically resolve over the next several weeks. The patient voiced understanding of the information discussed and wishes to proceed forward with radiation therapy. She is scheduled to undergo CT simulation to begin radiation therapy planning     4.  Covid test prior to start of radiation therapy.     5. Pain managed on Oxycodone 5mg TID and THC oil through [facility].     Intent of Therapy: Palliative  Side effects that may occur during or within weeks after Radiation Therapy      Fatigue and general weakness    Loss of hair on the face and neck    Pain in the irradiated limb    Darkening, irritation, itchiness, redness, dryness,and peeling, scabbing and ulceration of the skin on the neck and face    Mouth and throat dryness, irritation and swallowing difficulties and infection    Thickening of the saliva    Change in or loss of taste sensation    Decrease in appetite    Hoarseness and change in voice    Side effects that may occur months or years after Radiation Therapy      Development of another tumor or cancer    Thickening, telangiectasias (development of spider like blood vessels in the skin) and ulceration of the skin of the face and neck    Fibrosis (scar tissue), decreased flexibility and swelling of the neck    Brain inflammation or necrosis that may cause various neurologic symptoms    Decrease in memory and thinking abilities    Poor healing after a trauma or surgery in the irradiated area    Facial numbness, pain and weakness    Nerve damage resulting in loss of strength and sensation    Tooth and jaw decay and poor healing after dental procedures    The risks, benefits and alternatives to radiation therapy were outlined with the patient. All questions were answered and a consent was signed.      Subjective:     HPI:  Lizzie Viera is a 70 year old female with multiple myeloma who has declined cancer directed therapies to date. Sustained what was thought to be osteoporotic T7 compression fracture in September 2020.     Imaging at that time showed the T7 compression fracture diffuse marrow edema and an indeterminant T1 lesion.       Subsequent imaging on 4/26/2021 showed worsening of her T7 pathologic fracture with new  extraosseous extension causing severe spinal canal stenosis.  Biopsy on 5/7/2020 showed a plasma cell neoplasm.  Seen by medical oncology and a bone marrow biopsy was obtained from her right iliac crest which showed involvement of multiple myeloma.  She declined radiation and chemotherapy at that time.     She did continue to have follow-up in the neurosurgical clinic.  8/31/2021 she was seen in the clinic and due to worsening pain leg weakness and incontinence and was admitted for further work-up.     SITE TREATED: T6-9  TOTAL DOSE: 800 cGy  NUMBER OF FRACTIONS: 1  DATES COMPLETED: 9/1/2021  CONCURRENT CHEMOTHERAPY: No  ADJUVANT THERAPY:TBD    The patient presents for routine follow up. For her midback pain she is taking Dilaudid 4mg TID and THC oil which controls pain well. She has had a mild headache.     Past Medical History:   Diagnosis Date     Cervical dysplasia      Chronic RUQ pain      Osteoporosis      Past Surgical History:   Procedure Laterality Date     C LAP,CHOLECYSTECTOMY/EXPLORE  12/27/2004     C LIGATE FALLOPIAN TUBE      Description: Tubal Ligation;  Recorded: 09/20/2007;     CONIZATION CERVIX,KNIFE/LASER      Description: Cervical Conization By Laser;  Recorded: 09/20/2007;     HC DILATION/CURETTAGE DIAG/THER NON OB      Description: Dilation And Curettage;  Recorded: 09/20/2007;     HC REMOVE TONSILS/ADENOIDS,<11 Y/O      Description: Tonsillectomy With Adenoidectomy;  Recorded: 09/20/2007;     Current Outpatient Medications   Medication     acetylcysteine (NAC) 600 MG CAPS capsule     acyclovir (ZOVIRAX) 400 MG tablet     alpha-lipoic acid 100 MG capsule     Coenzyme Q10 300 MG CAPS     dexamethasone (DECADRON) 4 MG tablet     fish oil-omega-3 fatty acids 1000 MG capsule     gabapentin (NEURONTIN) 300 MG capsule     HYDROmorphone (DILAUDID) 4 MG tablet     LENalidomide (REVLIMID) 25 MG CAPS capsule     levOCARNitine (CARNITOR) 330 MG tablet     Melatonin 10 MG TABS tablet     methocarbamol  (ROBAXIN) 500 MG tablet     Milk Thistle-Dand-Fennel-Licor (MILK THISTLE XTRA) CAPS capsule     NALTREXONE HCL PO     phenazopyridine (AZO URINARY PAIN RELIEF) 95 MG tablet     potassium citrate (UROCIT-K) 10 MEQ (1080 MG) CR tablet     senna (SENOKOT) 8.6 MG tablet     TURMERIC PO     UNABLE TO FIND     UNABLE TO FIND     UNABLE TO FIND     UNABLE TO FIND     UNABLE TO FIND     UNABLE TO FIND     Vitamin D, Cholecalciferol, 25 MCG (1000 UT) CAPS     Vitamin Mixture (VITAMIN C) LIQD     No current facility-administered medications for this visit.     Cefdinir, Latex, Short ragweed pollen ext, and Adhesive tape  Social History     Socioeconomic History     Marital status:      Spouse name: Not on file     Number of children: 3     Years of education: Not on file     Highest education level: Not on file   Occupational History     Not on file   Tobacco Use     Smoking status: Former Smoker     Packs/day: 0.50     Years: 45.00     Pack years: 22.50     Types: Cigarettes     Smokeless tobacco: Never Used   Substance and Sexual Activity     Alcohol use: Yes     Comment: Alcoholic Drinks/day: 0-1 drinks per week     Drug use: Not Currently     Sexual activity: Not Currently     Partners: Male     Birth control/protection: Surgical   Other Topics Concern     Not on file   Social History Narrative     Not on file     Social Determinants of Health     Financial Resource Strain:      Difficulty of Paying Living Expenses:    Food Insecurity:      Worried About Running Out of Food in the Last Year:      Ran Out of Food in the Last Year:    Transportation Needs:      Lack of Transportation (Medical):      Lack of Transportation (Non-Medical):    Physical Activity:      Days of Exercise per Week:      Minutes of Exercise per Session:    Stress:      Feeling of Stress :    Social Connections:      Frequency of Communication with Friends and Family:      Frequency of Social Gatherings with Friends and Family:      Attends  [employer] Services:      Active Member of Clubs or Organizations:      Attends Club or Organization Meetings:      Marital Status:    Intimate Partner Violence:      Fear of Current or Ex-Partner:      Emotionally Abused:      Physically Abused:      Sexually Abused:        ROS:  Reviewed with Lizzie Viera today.      General  Constitutional  Constitutional (WDL): Exceptions to WDL  Fatigue: Fatigue not relieved by rest OR limiting instrumental ADL  EENT  Eye Disorders  Eye Disorder (WDL): All eye disorder elements are within defined limits  Ear Disorders  Ear Disorder (WDL): All ear disorder elements are within defined limits  Respiratory    Respiratory  Respiratory (WDL): All respiratory elements are within defined limits  Cardiovascular  Cardiovascular  Cardiovascular (WDL): All cardiovascular elements are within defined limits  Gastrointestinal  Gastrointestinal  Gastrointestinal (WDL): All gastrointestinal elements are within defined limits  Musculoskeletal  Musculoskeletal and Connective Tissue Disorders  Musculoskeletal & Connective (WDL): Exceptions to WDL  Arthralgia: Moderate pain OR limiting instrumental ADL  Bone Pain: Moderate pain OR limiting instrumental ADL  Generalized Muscle Weakness: Symptomatic OR perceived by patient but not evident on physical exam  Integumentary              Integumentary  Integumentary (WDL): All integumentary elements are within defined limits  Neurological  Neurosensory  Neurosensory (WDL): Exceptions to WDL  Peripheral Motor Neuropathy: Asymptomatic OR clinical or diagnostic observations only  Ataxia: Asymptomatic OR clinical or diagnostic observations only OR intervention not indicated  Genitourinary/Reproductive  Genitourinary  Genitourinary (WDL): All genitourinary elements are within defined limits  Lymphatic   Lymph System Disorders  Lymph (WDL): All lymph elements are within defined limits  Patient Coping  Patient Coping:  Accepting;Open/discussion  Pain   Pain Score: Moderate Pain (4)   AUA Assessment                                                                         Accompanied by  Accompanied By: family    Objective:        Exam:    Vitals:    09/28/21 1132   PainSc: Moderate Pain (4)   PainLoc: Mid Back       GENERAL: No acute distress. Cooperative in conversation. Mask on.   Head: Edematous area of left superior parietal skull.   RESP: Normal respiratory effort.   ABD: Soft, nontender.  NEURO: Non focal. Alert and oriented x3.   PSYCH: Within normal limits. No depression or anxiety.  SKIN: Warm dry intact.       Recent Labs: No results found for this or any previous visit (from the past 168 hour(s)).    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     IMPRESSION: No acute fracture or malalignment. Mild degenerative changes. Osteopenia. No suspicious intraosseous lytic lesion.    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     IMPRESSION: No acute fracture or malalignment. Mild degenerative changes. Osteopenia. No suspicious intraosseous lytic lesion.    XR Thoracic Lumbar Standing 2  Views    Result Date: 9/1/2021  EXAM: XR THORACIC LUMBAR STANDING 2 VW LOCATION: [facility] DATE/TIME: 9/1/2021 3:26 PM INDICATION: T7 compression fracture. Evaluate upright kyphosis COMPARISON: X-ray and MRI 8/31/2021 TECHNIQUE: CR thoracic and lumbar spine     IMPRESSION: 12 thoracic and 5 lumbar type vertebra are assumed. Marked anterior wedge compression deformity of T7 corresponding to findings on previous exams. Segmental kyphosis measures 32 degrees from T6 through T8 on the current radiographs compared to 31 degrees  on yesterday's radiographs. No significant change in alignment. No new fracture or progressive vertebral height loss identified. Mild thoracolumbar spondylosis.     XR Thoracic Spine 2 Views    Result Date: 9/10/2021  EXAM: XR THORACIC SPINE 2 VIEWS LOCATION: [facility] DATE/TIME: 9/10/2021 2:05 PM INDICATION: AP/LAT upright in brace; T7 fracture; eval kyphosis and fracture stability COMPARISON: 09/02/2021 radiographs. 08/31/2021 thoracic spine MRI.. TECHNIQUE: CR Thoracic Spine.     IMPRESSION: 12 rib-bearing thoracic vertebrae. Unchanged T7 vertebral plana. Unchanged resultant focal kyphosis measuring 27 degrees. No significant change in 3 mm dorsal subluxation of the T8 relative to T6. No additional compression fracture. No acute extraspinal abnormality.    XR Thoracic Spine 2 Views    Result Date: 9/2/2021  EXAM: XR THORACIC SPINE 2 VIEWS LOCATION: [facility] DATE/TIME: 9/2/2021 3:24 PM INDICATION: upright in CUSTOM TLSO BRACE on 9/2 once arrives. fracture/kyphosis, eval for stability upright COMPARISON: Thoracic spine radiograph 06/01/2021, thoracic spine MRI 08/31/2021 TECHNIQUE: CR Thoracic Spine.     IMPRESSION: Upright radiographs. Patient is wearing a brace. No significant change in the severe pathologic compression fracture of T7. Stable 34 degrees of segmental kyphosis from T6 to T8 when measured in a similar  fashion. The rest of vertebral body heights are maintained. Mild intervertebral disc height loss and endplate spurring in the mid to lower thoracic spine. The visualized lungs are clear. Surgical clips project over the right upper quadrant.    XR Thoracic Spine 2 Views    Result Date: 8/31/2021  EXAM: XR THORACIC SPINE 2 VIEWS LOCATION: [facility] DATE/TIME: 8/31/2021 1:37 PM INDICATION:  Closed T7 fracture (H) COMPARISON: 06/29/2021. TECHNIQUE: CR Thoracic Spine.     IMPRESSION: Severe T7 vertebral body compression deformity is similar to prior. Again localized accentuation of kyphosis at this level. There is mild dextroconvex curvature. The remaining visualized vertebral bodies are unremarkable. Mild multilevel spondylosis is similar to prior. Pedicles appear symmetric. Visualized lung fields are well aerated.     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     IMPRESSION: No acute fracture or malalignment. No significant degenerative changes. Osteopenia. No suspicious intraosseous lytic lesion.    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     IMPRESSION:  No acute fracture or malalignment. Minimal degenerative changes. Osteopenia. No suspicious intraosseous lytic lesion.    MR Thoracic Spine w/o & w Contrast    Addendum Date: 8/31/2021    Findings were called to Dr. Mendez at 8/31/2021 6:32 PM by Dr. GREGORIA Calloway.    Result Date: 8/31/2021  EXAM: MR THORACIC SPINE W/O and W CONTRAST LOCATION: [facility] DATE/TIME: 8/31/2021 4:47 PM INDICATION: Pathologic fracture of T7. COMPARISON: MRI thoracic spine 04/26/2021 CONTRAST: 6ml Gadavist TECHNIQUE: Routine Thoracic Spine MRI without and with IV contrast. FINDINGS: 12 rib-bearing thoracic type vertebra. Marked pathologic compression fracture of T7 with progressive vertebral height loss compared to the previous MRI. There is now essentially vertebra plana at this level with progressive segmental kyphosis measuring 27 degrees from T6 through T8. Pathologic marrow involving the posterior T7 vertebral margin with increased extension into the adjacent ventral epidural space, progressive involvement of the pedicles/lamina, and new marrow replacement/enhancement involving the posterior inferior T6 and superior T8 vertebral margins with additional progressive paraspinal extraosseous enhancing soft tissue mass. There is now circumferential epidural neoplasm at the T7 level with severe spinal canal stenosis, increased impingement upon the thoracic cord, and minimal intramedullary T2 prolongation. The additional metastatic deposit at T1 mild diffuse disc desiccation throughout the thoracic spine, with moderate loss of disc height at T5-T6 and mild multilevel loss of disc height at multiple additional levels from T3 through T10. No focal disc herniations. Minimal thoracic facet arthropathy. No additional high-grade spinal canal stenosis. Neoplastic soft tissue results in moderate to severe bilateral neural foraminal stenosis at T6-T7, severe bilateral neural foraminal stenosis at T7-T8, and mild to moderate left  neural foraminal stenosis at T8-T9. Mild progression of paraspinal soft tissue mass centered at T7 as above. For example, maximum transverse dimension of enhancing soft tissue measuring 6.2 cm on axial images compared to 5.3 cm previously.     IMPRESSION: 1.  Pathologic compression fracture at T7 with progressive vertebral height loss and increasing epidural/paraspinal extraosseous neoplasm compared to 04/26/2021. 2.  Epidural neoplasm results in high-grade narrowing of the thecal sac and impingement upon the mid thoracic cord extending from the lower T6 through upper T8 levels. Minimal associated cord edema. 3.  Epidural neoplasm results in moderate to high-grade neural foraminal narrowing at T6-T7 and T7-T8 bilaterally and moderate neural foraminal narrowing on the left at T8-T9. 4.  Additional osseous metastasis at T1 similar to the prior exam. No new osseous metastasis elsewhere.    CT Cervical Spine w/o Contrast    Result Date: 9/27/2021  EXAM: CT CERVICAL SPINE W/O CONTRAST LOCATION: [facility] DATE/TIME: 9/26/2021 10:08 AM INDICATION:  Multiple myeloma with failed remission (H), Multiple myeloma not having achieved remission (H) COMPARISON: Cervical spine MR 9/23/2020. TECHNIQUE: Routine CT Cervical Spine without IV contrast. Multiplanar reformats. Dose reduction techniques were used. FINDINGS: VERTEBRA: There has been an interval increase in size of the destructive bony lesion in the T1 vertebral body with an associated pathologic compression fracture of the T1 vertebral body. Vertebral body heights of the cervical spine remain normal. There are small (2-3 mm) hypodense foci in the right lateral mass of C2 and in the right posterolateral aspect of the C3 vertebral body that are nonspecific. No other suspicious focal lucencies identified in any of the cervical vertebrae. Alignment of the cervical vertebrae remains normal. No fractures of the cervical spine. CANAL/FORAMINA: No canal or  neural foraminal stenosis. PARASPINAL: No extraspinal abnormality.     IMPRESSION: 1.  Interval enlargement of the destructive bony lesion of the T1 vertebral body with associated mild compression fracture deformity. 2.  Focal 2-3 mm nonspecific lucent foci in the right C2 lateral mass and in the C3 vertebral body. No other suspicious bony lesions. 3.  Normal alignment of the cervical vertebrae. No spinal canal or neural foraminal stenosis. No significant degenerative changes.    CT Chest Abdomen Pelvis w/o Contrast    Result Date: 9/26/2021  EXAM: CT CHEST ABDOMEN PELVIS W/O CONTRAST LOCATION: [facility] DATE/TIME: 9/26/2021 10:09 AM INDICATION:  Multiple myeloma with failed remission. Compression fractures T7 with epidural tumor. COMPARISON: 05/04/2021 and older studies, PET/CT 05/28/2021, thoracic MR 08/31/2021, thoracic plain films 09/10/2021 TECHNIQUE: CT scan of the chest, abdomen, and pelvis was performed without IV contrast. Multiplanar reformats were obtained. Dose reduction techniques were used. CONTRAST: None. FINDINGS: LUNGS AND PLEURA: There are a few, bilateral tiny scattered pulmonary nodules which are stable. No effusions. Atelectasis in the right lateral costophrenic angle has cleared. MEDIASTINUM/AXILLAE: No adenopathy. CORONARY ARTERY CALCIFICATION: Mild. HEPATOBILIARY: Normal. PANCREAS: Normal. SPLEEN: Normal. ADRENAL GLANDS: Multiple low dense left adrenal adenomas are noted largest laterally measuring 1.7 cm. KIDNEYS/BLADDER: No change in the 2.8 cm left renal cyst. This needs no follow-up. BOWEL: Redundant colon. No mass or ascites. LYMPH NODES: Normal. VASCULATURE: Moderate arterial calcifications. No aneurysm. PELVIC ORGANS: Normal. MUSCULOSKELETAL: Progression of disease in the spine. There is a new, 1.8 cm deposit anteriorly along the left side of T1 with some minimal collapse of the superior endplate. Lytic destruction of the T6 and T8 vertebral bodies has  developed with involvement of posterior elements. These surround the vertebral plana of T7 which has progressed since the May exam. There is bulky paravertebral and epidural tumor encircling the thoracic cord at this level which is narrowed to at least a centimeter.     IMPRESSION: 1.  Progression of spinal involvement most notably at T6 and T8 as noted above with paravertebral and epidural tumor surrounding the thecal sac which is narrowed to a centimeter. Thoracic MRI can evaluate extent of cord compression/signal changes. 2.  Progression of the vertebral plana at T7. 3.  New lesion at T1 with minimal compression of the superior endplate. 4.  Critical findings discussed by the undersigned with Dr. John at 1346. He is to contact the patient today. 5.  Other noncritical findings as noted above. NOTE: ABNORMAL REPORT THE DICTATION ABOVE DESCRIBES AN ABNORMALITY FOR WHICH FOLLOW-UP IS NEEDED.     CT Head w/o Contrast    Result Date: 9/27/2021  EXAM: CT HEAD W/O CONTRAST LOCATION: [facility] DATE/TIME: 9/26/2021 10:08 AM INDICATION: Evaluate for any myeloma lesions in the bones. COMPARISON: Brain MR 5/22/2017. TECHNIQUE: Routine CT Head without IV contrast. Multiplanar reformats. Dose reduction techniques were used. FINDINGS: INTRACRANIAL CONTENTS: No intracranial hemorrhage, extraaxial collection, or mass effect.  No CT evidence of acute infarct. Mild presumed chronic small vessel ischemic changes. Mild generalized volume loss. No hydrocephalus. VISUALIZED ORBITS/SINUSES/MASTOIDS: No intraorbital abnormality. No paranasal sinus mucosal disease. No middle ear or mastoid effusion. BONES/SOFT TISSUES: There is a new large destructive bony lesion in the high posterior aspect of the left parietal bone measuring 4.5 cm in diameter and the thickness of the calvaria with destruction of both the inner and outer tables of the skull. There  are multiple additional tiny focal lucent bony lesions in the  calvaria that may represent additional myelomatous involvement.     IMPRESSION: 1.  Large destructive bony lesion in the high posterior left parietal bone measuring 4.5 cm in diameter with destruction of both the inner and outer tables of the skull. 2.  Multiple additional tiny lucent calvarial lesions that may represent additional myelomatous lesions. 3.  No CT evidence for acute intracranial process. 4.  Brain atrophy and presumed chronic microvascular ischemic changes as above.             Pathology:   Results for orders placed or performed in visit on 06/03/21 (from the past 8760 hour(s))   Bone marrow biopsy   Result Value    Case Report      Bone Marrow                                       Case: [id number]                                   Authorizing Provider:  Lisset Baig MD      Collected:           06/03/2021 1100              Ordering Location:     [facility]   Received:            06/03/2021 1150                                     [facility]                                                              Pathologist:           Arnold Rosales MD                                                        Specimens:   A) - Iliac Crest, Right                                                                             B) - Iliac Crest, Right                                                                             C) - Iliac Crest, Right                                                                             D) - Peripheral Blood                                                                      Addendum      The purpose of this addendum is to report results of additional studies. Kappa and Lambda in situ hybridization studies demonstrate kappa light chain restri  Addendum electronically signed by Arnold Rosales MD on 6/9/2021 at 10:43 AM      Cytogenetics Report, Addendum      Please see [facility] cytogenetics report [id number].  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 Pending kappa and lambda in situ hybridization studies (addendum). Morphology, flow cytometry ([id number]) and immunohistochemistry demonstrate significant involvement by multiple myeloma. Pending cytogenetic analysis.    Clinical Information plasma cell disease, T7 lesion, diagnostic    Performed By: Arnold Rosales MD    Peripheral Smear      Red blood cells are normal in number and overall normochromic and normocytic. Anisopoikilocytosis, polychromasia, and rouleaux formation are not prominent.    The white blood cell count and differential appear as reported on the CBC. Leukocytes are normal in number and appearance, consisting predominantly of segmented and band neutrophils with fewer numbers of lymphocytes and monocytes. No blasts or dysplastic changes are identified.    Platelets are normal in number and appearance.      Bone Marrow Diff      Neutrophils 37.5%; Erythroid cells 14.5%; Monocytes 1.5%; Eosinophils 0.5%; Basophils 0%; Plasma cells/Lymphocytes 43.5%; Blasts 2%.    M:E Ratio: 2-3:1      Aspirate Smear      Aspirate smears are cellular and demonstrate numerous marrow particles. Trilineage hematopoiesis is decreased given the numerous malignant plasma cells. Iron stores are adequate.    Core Biopsy/Aspirate Clot      Bone marrow biopsy and clot sections are hypocellular at approximately 20%. Trilineage hematopoiesis is decreased as the marrow is partially effaced by sheets and an interstitial infiltrate of (+) plasma cells (estimated at 60%). No  significant coexpression of CD20 on plasma cells. CD3 highlights background T-cells. An iron stain demonstrates increased iron stores with no increase in ring sideroblasts.    All controls stain appropriately.      Charges      CPT: 52520, 61299, 75028, 05812 ×2, 71180 ×3, 69735 ×2, 10440 ×2, 99338 ×2, 17387 ×2, 55226  ICD-10: C90.00      Result Flag Malignant (A)     Comment: SPECIMEN PROCESSING:    All histology slide preparation and stains; and cytology slide preparation, staining, and cytotechnologist screening done at [facility] are performed at [facility], [address] [place] [address], [address], with final interpretation, frozen section analysis, and cytology adequacy assessment at indicated laboratory.               40 minutes spent on the date of the encounter doing chart review, review of test results, interpretation of tests, patient visit, documentation.      I, Nohemi Rapp MD personally performed the services described in this documentation, as scribed by Justyn Pineda in my presence, and it is both accurate and complete.    Signed by: Nohemi Rapp MD, MPH

## 2021-09-28 NOTE — PROGRESS NOTES
Pt here with her sister for f/u with Dr. Rapp and possible simulation. She c/o moderate to severe mid-back pain (Palliative Care refilled Dilaudid today).

## 2021-09-29 ENCOUNTER — ONCOLOGY VISIT (OUTPATIENT)
Dept: ONCOLOGY | Facility: HOSPITAL | Age: 71
End: 2021-09-29
Attending: INTERNAL MEDICINE
Payer: COMMERCIAL

## 2021-09-29 ENCOUNTER — LAB (OUTPATIENT)
Dept: INFUSION THERAPY | Facility: HOSPITAL | Age: 71
End: 2021-09-29
Attending: INTERNAL MEDICINE
Payer: COMMERCIAL

## 2021-09-29 VITALS
WEIGHT: 132 LBS | RESPIRATION RATE: 16 BRPM | TEMPERATURE: 98.3 F | BODY MASS INDEX: 24.14 KG/M2 | OXYGEN SATURATION: 97 % | SYSTOLIC BLOOD PRESSURE: 109 MMHG | HEART RATE: 75 BPM | DIASTOLIC BLOOD PRESSURE: 62 MMHG

## 2021-09-29 DIAGNOSIS — M84.58XK PATHOLOGICAL FRACTURE OF VERTEBRAE IN NEOPLASTIC DISEASE WITH NONUNION: ICD-10-CM

## 2021-09-29 DIAGNOSIS — C90.00 MULTIPLE MYELOMA WITH FAILED REMISSION (H): Primary | ICD-10-CM

## 2021-09-29 LAB
ALBUMIN SERPL-MCNC: 2.6 G/DL (ref 3.5–5)
ALP SERPL-CCNC: 54 U/L (ref 45–120)
ALT SERPL W P-5'-P-CCNC: 16 U/L (ref 0–45)
ANION GAP SERPL CALCULATED.3IONS-SCNC: 3 MMOL/L (ref 5–18)
AST SERPL W P-5'-P-CCNC: 16 U/L (ref 0–40)
BASOPHILS # BLD AUTO: 0 10E3/UL (ref 0–0.2)
BASOPHILS NFR BLD AUTO: 0 %
BILIRUB SERPL-MCNC: 0.3 MG/DL (ref 0–1)
BUN SERPL-MCNC: 10 MG/DL (ref 8–22)
CALCIUM SERPL-MCNC: 9.3 MG/DL (ref 8.5–10.5)
CHLORIDE BLD-SCNC: 108 MMOL/L (ref 98–107)
CO2 SERPL-SCNC: 27 MMOL/L (ref 22–31)
CREAT SERPL-MCNC: 0.65 MG/DL (ref 0.6–1.1)
EOSINOPHIL # BLD AUTO: 0.1 10E3/UL (ref 0–0.7)
EOSINOPHIL NFR BLD AUTO: 4 %
ERYTHROCYTE [DISTWIDTH] IN BLOOD BY AUTOMATED COUNT: 15.4 % (ref 10–15)
GFR SERPL CREATININE-BSD FRML MDRD: >90 ML/MIN/1.73M2
GLUCOSE BLD-MCNC: 87 MG/DL (ref 70–125)
HCT VFR BLD AUTO: 30.9 % (ref 35–47)
HGB BLD-MCNC: 9.8 G/DL (ref 11.7–15.7)
IMM GRANULOCYTES # BLD: 0 10E3/UL
IMM GRANULOCYTES NFR BLD: 1 %
LYMPHOCYTES # BLD AUTO: 1.3 10E3/UL (ref 0.8–5.3)
LYMPHOCYTES NFR BLD AUTO: 33 %
MCH RBC QN AUTO: 33.7 PG (ref 26.5–33)
MCHC RBC AUTO-ENTMCNC: 31.7 G/DL (ref 31.5–36.5)
MCV RBC AUTO: 106 FL (ref 78–100)
MONOCYTES # BLD AUTO: 0.4 10E3/UL (ref 0–1.3)
MONOCYTES NFR BLD AUTO: 11 %
NEUTROPHILS # BLD AUTO: 2 10E3/UL (ref 1.6–8.3)
NEUTROPHILS NFR BLD AUTO: 51 %
NRBC # BLD AUTO: 0 10E3/UL
NRBC BLD AUTO-RTO: 0 /100
PLATELET # BLD AUTO: 418 10E3/UL (ref 150–450)
POTASSIUM BLD-SCNC: 3.9 MMOL/L (ref 3.5–5)
PROT SERPL-MCNC: 8.8 G/DL (ref 6–8)
RBC # BLD AUTO: 2.91 10E6/UL (ref 3.8–5.2)
SODIUM SERPL-SCNC: 138 MMOL/L (ref 136–145)
WBC # BLD AUTO: 3.9 10E3/UL (ref 4–11)

## 2021-09-29 PROCEDURE — 250N000011 HC RX IP 250 OP 636: Performed by: INTERNAL MEDICINE

## 2021-09-29 PROCEDURE — G0463 HOSPITAL OUTPT CLINIC VISIT: HCPCS | Mod: 25

## 2021-09-29 PROCEDURE — 96401 CHEMO ANTI-NEOPL SQ/IM: CPT

## 2021-09-29 PROCEDURE — 99214 OFFICE O/P EST MOD 30 MIN: CPT | Performed by: INTERNAL MEDICINE

## 2021-09-29 PROCEDURE — 85025 COMPLETE CBC W/AUTO DIFF WBC: CPT | Performed by: INTERNAL MEDICINE

## 2021-09-29 PROCEDURE — 82040 ASSAY OF SERUM ALBUMIN: CPT | Performed by: INTERNAL MEDICINE

## 2021-09-29 PROCEDURE — 36415 COLL VENOUS BLD VENIPUNCTURE: CPT | Performed by: INTERNAL MEDICINE

## 2021-09-29 RX ORDER — METHYLPREDNISOLONE SODIUM SUCCINATE 125 MG/2ML
125 INJECTION, POWDER, LYOPHILIZED, FOR SOLUTION INTRAMUSCULAR; INTRAVENOUS
Status: DISCONTINUED | OUTPATIENT
Start: 2021-09-29 | End: 2021-09-29 | Stop reason: HOSPADM

## 2021-09-29 RX ORDER — HEPARIN SODIUM (PORCINE) LOCK FLUSH IV SOLN 100 UNIT/ML 100 UNIT/ML
5 SOLUTION INTRAVENOUS
Status: CANCELLED | OUTPATIENT
Start: 2021-11-03

## 2021-09-29 RX ORDER — ALBUTEROL SULFATE 90 UG/1
1-2 AEROSOL, METERED RESPIRATORY (INHALATION)
Status: DISCONTINUED | OUTPATIENT
Start: 2021-09-29 | End: 2021-09-29 | Stop reason: HOSPADM

## 2021-09-29 RX ORDER — MEPERIDINE HYDROCHLORIDE 25 MG/ML
25 INJECTION INTRAMUSCULAR; INTRAVENOUS; SUBCUTANEOUS EVERY 30 MIN PRN
Status: DISCONTINUED | OUTPATIENT
Start: 2021-09-29 | End: 2021-09-29 | Stop reason: HOSPADM

## 2021-09-29 RX ORDER — HEPARIN SODIUM,PORCINE 10 UNIT/ML
5 VIAL (ML) INTRAVENOUS
Status: CANCELLED | OUTPATIENT
Start: 2021-11-03

## 2021-09-29 RX ORDER — ALBUTEROL SULFATE 0.83 MG/ML
2.5 SOLUTION RESPIRATORY (INHALATION)
Status: DISCONTINUED | OUTPATIENT
Start: 2021-09-29 | End: 2021-09-29 | Stop reason: HOSPADM

## 2021-09-29 RX ORDER — HEPARIN SODIUM (PORCINE) LOCK FLUSH IV SOLN 100 UNIT/ML 100 UNIT/ML
5 SOLUTION INTRAVENOUS
Status: CANCELLED | OUTPATIENT
Start: 2021-09-29

## 2021-09-29 RX ORDER — NALOXONE HYDROCHLORIDE 0.4 MG/ML
0.2 INJECTION, SOLUTION INTRAMUSCULAR; INTRAVENOUS; SUBCUTANEOUS
Status: DISCONTINUED | OUTPATIENT
Start: 2021-09-29 | End: 2021-09-29 | Stop reason: HOSPADM

## 2021-09-29 RX ORDER — DIPHENHYDRAMINE HYDROCHLORIDE 50 MG/ML
50 INJECTION INTRAMUSCULAR; INTRAVENOUS
Status: DISCONTINUED | OUTPATIENT
Start: 2021-09-29 | End: 2021-09-29 | Stop reason: HOSPADM

## 2021-09-29 RX ORDER — EPINEPHRINE 1 MG/ML
0.3 INJECTION, SOLUTION INTRAMUSCULAR; SUBCUTANEOUS EVERY 5 MIN PRN
Status: DISCONTINUED | OUTPATIENT
Start: 2021-09-29 | End: 2021-09-29 | Stop reason: HOSPADM

## 2021-09-29 RX ORDER — HEPARIN SODIUM,PORCINE 10 UNIT/ML
5 VIAL (ML) INTRAVENOUS
Status: CANCELLED | OUTPATIENT
Start: 2021-09-29

## 2021-09-29 RX ADMIN — BORTEZOMIB 2.1 MG: 3.5 INJECTION, POWDER, LYOPHILIZED, FOR SOLUTION INTRAVENOUS; SUBCUTANEOUS at 15:11

## 2021-09-29 ASSESSMENT — PAIN SCALES - GENERAL: PAINLEVEL: MODERATE PAIN (5)

## 2021-09-29 NOTE — PROGRESS NOTES
[facility] Progress Note    Patient: Lizzie Viera  MRN: [id number]  Date of Service: Sep 29, 2021         Reason for Visit    Problem List Items Addressed This Visit        Hematologic    Multiple myeloma with failed remission (H)          Assessment     1.  A very pleasant 70 year old woman with average risk multiple myeloma is on the cytogenetics.  However clinically this is behaving somewhat more aggressively with bone lesions etc. her immunoglobulin levels have not changed significantly in the last 4 months.  2.  Significant bone disease with osteoporosis and compression fractures.  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  3.  Normal hemoglobin, calcium, kidney function along with beta-2 microglobulin and albumin at the time of diagnosis.  4.  Positive Covid antibodies.  5.  Pain from compression fracture status post radiation therapy.    Plan    1.  We will start her treatment with Velcade, Revlimid and dexamethasone.  2.  Bisphosphonate therapy for her bones.  We will start with Zometa monthly.  3.  She would be a transplant candidate.  4.  Her scalp lesion will need to be followed up.  We will discuss with Dr. Rapp to see if we can hold off on radiation for now.  If it does not heal then we may have to start some radiation therapy.  5.  Continue good diet and exercise.  Increase calcium and vitamin D in her diet.  6.  Counseled the patient at length regarding overall plan of care.    Cancer Staging  No matching staging information was found for the patient.    ECOG Performance    2 - Ambulatory and independent in all ADLs; cannot work; up > 50% of the time      History of Present Illness    Ms. Lizzie Viera is a very pleasant 70 year old woman who has been diagnosed with multiple myeloma IgG kappa type in May 2021.  She had initially presented with compression fracture of T7 vertebral body in September 2020.  She had a back pain which led to that  evaluation.  Bone density confirmed that she had osteoporosis.  MRI showed diffuse marrow edema in October 2020.  In April 2021 the MRI of the thoracic spine showed worsening T7 vertebral body height loss because of likely pathological compression fracture with extraosseous extension.  She then had IR guided bone biopsy on 7 May 2021 and pathology confirmed the presence of kappa light chain restricted plasma cells.  Her cytogenetics confirmed the presence of hyperdiploid E with gains of chromosome 5, 9 and 15.    She was then seen by Dr. Baig and had a bone marrow biopsy which also confirmed 60% involvement of the marrow with plasma cells.  The bone marrow was done on 3 Jocelin 2021.  The bone marrow also confirmed the presence of hyperdiploid E.  She had a gain of chromosome 3, 5, 7, 9, 11, 15 as well as 19.  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  Her beta-2 microglobulin was actually normal at the time of her diagnosis as was her albumin at 4.0.  Monoclonal protein 3.1 g/dL.  Kappa free light chain levels of 13 mg/dL IgG level of 4280 with depressed levels of IgM and IgA.  Urine was also positive for kappa light chains as well as immunofixation of the urine was positive for IgG kappa.  Normal kidney function.  Normal hemoglobin.    As mentioned above she had bone lesions in the T7 vertebral body, T1, skull area.  Her main pain is coming from her T7 vertebral body compression fracture.    Due to the pain she has been seen by radiation oncology and has received radiation therapy.  She also got a dose of steroids for pain control.    Currently her pain is controlled with combination of Dilaudid as well as some Tylenol.  She is wearing a brace.  She did notice that when she was on dexamethasone her pain was significantly better.    She was initially thinking of doing some natural therapies but once her symptoms got worse, she came back in was counseled about treatment.  She has been given information about Velcade Revlimid and  dexamethasone.    Review of systems.  No fever or night sweats.  No loss of weight.  No lumps or bumps anywhere.  No unusual headaches or eyesight issues.  No dizziness.  No bleeding from the nose.  No sores in the mouth. No problems with swallowing.  No chest pain. No shortness of breath. No cough.  No abdominal pain. No nausea or vomiting.  No diarrhea or constipation.  No blood in stool or black colored stools.  No problems passing urine.  No numbness or tingling in hands or feet.  No skin rashes.  A 14 point review of systems is otherwise negative.        Past History    Past Medical History:   Diagnosis Date     Cervical dysplasia      Chronic RUQ pain      Osteoporosis        Past Surgical History:   Procedure Laterality Date     C LAP,CHOLECYSTECTOMY/EXPLORE  12/27/2004     C LIGATE FALLOPIAN TUBE      Description: Tubal Ligation;  Recorded: 09/20/2007;     CONIZATION CERVIX,KNIFE/LASER      Description: Cervical Conization By Laser;  Recorded: 09/20/2007;     HC DILATION/CURETTAGE DIAG/THER NON OB      Description: Dilation And Curettage;  Recorded: 09/20/2007;     HC REMOVE TONSILS/ADENOIDS,<11 Y/O      Description: Tonsillectomy With Adenoidectomy;  Recorded: 09/20/2007;         Physical Exam    /62   Pulse 75   Temp 98.3  F (36.8  C)   Resp 16   Wt 59.9 kg (132 lb)   SpO2 97%   BMI 24.14 kg/m          GENERAL: Alert and oriented to time place and person. Seated comfortably. In no distress.    HEAD: Atraumatic and normocephalic.    EYES: RADHA, EOMI. No pallor. No icterus.    Oral cavity: no mucosal lesion or tonsillar enlargement.    NECK: supple. JVP normal.No thyroid enlargement.    LYMPH NODES: No palpable, cervical, axillary or inguinal lymphadenopathy.    CHEST: clear to auscultation bilaterally. Symmetrical breath movements bilaterally.  She is in a TLSO brace.    CVS: S1 and S2 are Regular rate and rhythm.  Soft systolic murmur heard. No peripheral edema.    ABDOMEN: Soft. Not tender.  Not distended. No palpable hepatomegaly or splenomegaly. No other mass palpable. Bowel sounds heard.    EXTREMITIES: Warm.    SKIN: no rash, or bruising or purpura.      Lab Results    Recent Results (from the past 168 hour(s))   CBC with platelets and differential   Result Value Ref Range    WBC Count 3.9 (L) 4.0 - 11.0 10e3/uL    RBC Count 2.91 (L) 3.80 - 5.20 10e6/uL    Hemoglobin 9.8 (L) 11.7 - 15.7 g/dL    Hematocrit 30.9 (L) 35.0 - 47.0 %     (H) 78 - 100 fL    MCH 33.7 (H) 26.5 - 33.0 pg    MCHC 31.7 31.5 - 36.5 g/dL    RDW 15.4 (H) 10.0 - 15.0 %    Platelet Count 418 150 - 450 10e3/uL    % Neutrophils 51 %    % Lymphocytes 33 %    % Monocytes 11 %    % Eosinophils 4 %    % Basophils 0 %    % Immature Granulocytes 1 %    NRBCs per 100 WBC 0 <1 /100    Absolute Neutrophils 2.0 1.6 - 8.3 10e3/uL    Absolute Lymphocytes 1.3 0.8 - 5.3 10e3/uL    Absolute Monocytes 0.4 0.0 - 1.3 10e3/uL    Absolute Eosinophils 0.1 0.0 - 0.7 10e3/uL    Absolute Basophils 0.0 0.0 - 0.2 10e3/uL    Absolute Immature Granulocytes 0.0 <=0.0 10e3/uL    Absolute NRBCs 0.0 10e3/uL       Imaging    XR Femur Left 2 Views    Result Date: 9/26/2021  EXAM: XR FEMUR LEFT 2 VIEW LOCATION: [facility] DATE/TIME: 9/26/2021 10:38 AM INDICATION:  Multiple myeloma with failed remission (H), Multiple myeloma not having achieved remission (H), Localized osteoporosis with current pathological fracture with routine healing, subsequent encounter, 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     IMPRESSION: No acute fracture or malalignment. Mild degenerative changes. Osteopenia. No suspicious intraosseous lytic lesion.    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     IMPRESSION: No acute fracture or malalignment. Mild degenerative changes. Osteopenia. No suspicious intraosseous lytic lesion.    XR Thoracic Lumbar Standing 2 Views    Result Date: 9/1/2021  EXAM: XR THORACIC LUMBAR STANDING 2 VW LOCATION: [facility] DATE/TIME: 9/1/2021 3:26 PM INDICATION: T7 compression fracture. Evaluate upright kyphosis COMPARISON: X-ray and MRI 8/31/2021 TECHNIQUE: CR thoracic and lumbar spine     IMPRESSION: 12 thoracic and 5 lumbar type vertebra are assumed. Marked anterior wedge compression deformity of T7 corresponding to findings on previous exams. Segmental kyphosis measures 32 degrees from T6 through T8 on the current radiographs compared to 31 degrees  on yesterday's radiographs. No significant change in alignment. No new fracture or progressive vertebral height loss identified. Mild thoracolumbar spondylosis.     XR Thoracic Spine 2 Views    Result Date: 9/10/2021  EXAM: XR THORACIC SPINE 2 VIEWS LOCATION: [facility] DATE/TIME: 9/10/2021 2:05 PM INDICATION: AP/LAT upright in brace; T7 fracture; eval kyphosis and fracture stability COMPARISON: 09/02/2021 radiographs. 08/31/2021 thoracic spine MRI.. TECHNIQUE: CR Thoracic Spine.     IMPRESSION: 12 rib-bearing thoracic vertebrae. Unchanged T7 vertebral plana. Unchanged resultant focal kyphosis measuring 27 degrees. No significant change in 3 mm dorsal subluxation of the T8 relative to T6. No additional compression fracture. No acute extraspinal abnormality.    XR Thoracic Spine 2 Views    Result Date: 9/2/2021  EXAM: XR THORACIC SPINE 2 VIEWS LOCATION: [facility] DATE/TIME: 9/2/2021 3:24 PM INDICATION: upright in CUSTOM TLSO BRACE on 9/2 once arrives. fracture/kyphosis,  eval for stability upright COMPARISON: Thoracic spine radiograph 06/01/2021, thoracic spine MRI 08/31/2021 TECHNIQUE: CR Thoracic Spine.     IMPRESSION: Upright radiographs. Patient is wearing a brace. No significant change in the severe pathologic compression fracture of T7. Stable 34 degrees of segmental kyphosis from T6 to T8 when measured in a similar fashion. The rest of vertebral body heights are maintained. Mild intervertebral disc height loss and endplate spurring in the mid to lower thoracic spine. The visualized lungs are clear. Surgical clips project over the right upper quadrant.    XR Thoracic Spine 2 Views    Result Date: 8/31/2021  EXAM: XR THORACIC SPINE 2 VIEWS LOCATION: [facility] DATE/TIME: 8/31/2021 1:37 PM INDICATION:  Closed T7 fracture (H) COMPARISON: 06/29/2021. TECHNIQUE: CR Thoracic Spine.     IMPRESSION: Severe T7 vertebral body compression deformity is similar to prior. Again localized accentuation of kyphosis at this level. There is mild dextroconvex curvature. The remaining visualized vertebral bodies are unremarkable. Mild multilevel spondylosis is similar to prior. Pedicles appear symmetric. Visualized lung fields are well aerated.     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     IMPRESSION: No acute fracture or malalignment. No significant degenerative changes. Osteopenia. No suspicious intraosseous lytic lesion.    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     IMPRESSION: No acute fracture or malalignment. Minimal degenerative changes. Osteopenia. No suspicious intraosseous lytic lesion.    MR Thoracic Spine w/o & w Contrast    Addendum Date: 8/31/2021    Findings were called to Dr. Mendez at 8/31/2021 6:32 PM by Dr. GREGORIA Calloway.    Result Date: 8/31/2021  EXAM: MR THORACIC SPINE W/O and W CONTRAST LOCATION: [facility] DATE/TIME: 8/31/2021 4:47 PM INDICATION: Pathologic fracture of T7. COMPARISON: MRI thoracic spine 04/26/2021 CONTRAST: 6ml Gadavist TECHNIQUE: Routine Thoracic Spine MRI without and with IV contrast. FINDINGS: 12 rib-bearing thoracic type vertebra. Marked pathologic compression fracture of T7 with progressive vertebral height loss compared to the previous MRI. There is now essentially vertebra plana at this level with progressive segmental kyphosis measuring 27 degrees from T6 through T8. Pathologic marrow involving the posterior T7 vertebral margin with increased extension into the adjacent ventral epidural space, progressive involvement of the pedicles/lamina, and new marrow replacement/enhancement involving the posterior inferior T6 and superior T8 vertebral margins with additional progressive paraspinal extraosseous enhancing soft tissue mass. There is now circumferential epidural neoplasm at the T7 level with severe spinal canal stenosis, increased impingement upon the thoracic cord, and minimal intramedullary T2 prolongation. The additional metastatic deposit at T1 mild diffuse disc desiccation throughout the thoracic spine, with moderate loss of disc height at T5-T6 and mild  multilevel loss of disc height at multiple additional levels from T3 through T10. No focal disc herniations. Minimal thoracic facet arthropathy. No additional high-grade spinal canal stenosis. Neoplastic soft tissue results in moderate to severe bilateral neural foraminal stenosis at T6-T7, severe bilateral neural foraminal stenosis at T7-T8, and mild to moderate left neural foraminal stenosis at T8-T9. Mild progression of paraspinal soft tissue mass centered at T7 as above. For example, maximum transverse dimension of enhancing soft tissue measuring 6.2 cm on axial images compared to 5.3 cm previously.     IMPRESSION: 1.  Pathologic compression fracture at T7 with progressive vertebral height loss and increasing epidural/paraspinal extraosseous neoplasm compared to 04/26/2021. 2.  Epidural neoplasm results in high-grade narrowing of the thecal sac and impingement upon the mid thoracic cord extending from the lower T6 through upper T8 levels. Minimal associated cord edema. 3.  Epidural neoplasm results in moderate to high-grade neural foraminal narrowing at T6-T7 and T7-T8 bilaterally and moderate neural foraminal narrowing on the left at T8-T9. 4.  Additional osseous metastasis at T1 similar to the prior exam. No new osseous metastasis elsewhere.    CT Cervical Spine w/o Contrast    Result Date: 9/27/2021  EXAM: CT CERVICAL SPINE W/O CONTRAST LOCATION: [facility] DATE/TIME: 9/26/2021 10:08 AM INDICATION:  Multiple myeloma with failed remission (H), Multiple myeloma not having achieved remission (H) COMPARISON: Cervical spine MR 9/23/2020. TECHNIQUE: Routine CT Cervical Spine without IV contrast. Multiplanar reformats. Dose reduction techniques were used. FINDINGS: VERTEBRA: There has been an interval increase in size of the destructive bony lesion in the T1 vertebral body with an associated pathologic compression fracture of the T1 vertebral body. Vertebral body heights of the cervical  spine remain normal. There are small (2-3 mm) hypodense foci in the right lateral mass of C2 and in the right posterolateral aspect of the C3 vertebral body that are nonspecific. No other suspicious focal lucencies identified in any of the cervical vertebrae. Alignment of the cervical vertebrae remains normal. No fractures of the cervical spine. CANAL/FORAMINA: No canal or neural foraminal stenosis. PARASPINAL: No extraspinal abnormality.     IMPRESSION: 1.  Interval enlargement of the destructive bony lesion of the T1 vertebral body with associated mild compression fracture deformity. 2.  Focal 2-3 mm nonspecific lucent foci in the right C2 lateral mass and in the C3 vertebral body. No other suspicious bony lesions. 3.  Normal alignment of the cervical vertebrae. No spinal canal or neural foraminal stenosis. No significant degenerative changes.    CT Chest Abdomen Pelvis w/o Contrast    Result Date: 9/26/2021  EXAM: CT CHEST ABDOMEN PELVIS W/O CONTRAST LOCATION: [facility] DATE/TIME: 9/26/2021 10:09 AM INDICATION:  Multiple myeloma with failed remission. Compression fractures T7 with epidural tumor. COMPARISON: 05/04/2021 and older studies, PET/CT 05/28/2021, thoracic MR 08/31/2021, thoracic plain films 09/10/2021 TECHNIQUE: CT scan of the chest, abdomen, and pelvis was performed without IV contrast. Multiplanar reformats were obtained. Dose reduction techniques were used. CONTRAST: None. FINDINGS: LUNGS AND PLEURA: There are a few, bilateral tiny scattered pulmonary nodules which are stable. No effusions. Atelectasis in the right lateral costophrenic angle has cleared. MEDIASTINUM/AXILLAE: No adenopathy. CORONARY ARTERY CALCIFICATION: Mild. HEPATOBILIARY: Normal. PANCREAS: Normal. SPLEEN: Normal. ADRENAL GLANDS: Multiple low dense left adrenal adenomas are noted largest laterally measuring 1.7 cm. KIDNEYS/BLADDER: No change in the 2.8 cm left renal cyst. This needs no follow-up. BOWEL:  Redundant colon. No mass or ascites. LYMPH NODES: Normal. VASCULATURE: Moderate arterial calcifications. No aneurysm. PELVIC ORGANS: Normal. MUSCULOSKELETAL: Progression of disease in the spine. There is a new, 1.8 cm deposit anteriorly along the left side of T1 with some minimal collapse of the superior endplate. Lytic destruction of the T6 and T8 vertebral bodies has developed with involvement of posterior elements. These surround the vertebral plana of T7 which has progressed since the May exam. There is bulky paravertebral and epidural tumor encircling the thoracic cord at this level which is narrowed to at least a centimeter.     IMPRESSION: 1.  Progression of spinal involvement most notably at T6 and T8 as noted above with paravertebral and epidural tumor surrounding the thecal sac which is narrowed to a centimeter. Thoracic MRI can evaluate extent of cord compression/signal changes. 2.  Progression of the vertebral plana at T7. 3.  New lesion at T1 with minimal compression of the superior endplate. 4.  Critical findings discussed by the undersigned with Dr. John at 1346. He is to contact the patient today. 5.  Other noncritical findings as noted above. NOTE: ABNORMAL REPORT THE DICTATION ABOVE DESCRIBES AN ABNORMALITY FOR WHICH FOLLOW-UP IS NEEDED.     CT Head w/o Contrast    Result Date: 9/27/2021  EXAM: CT HEAD W/O CONTRAST LOCATION: [facility] DATE/TIME: 9/26/2021 10:08 AM INDICATION: Evaluate for any myeloma lesions in the bones. COMPARISON: Brain MR 5/22/2017. TECHNIQUE: Routine CT Head without IV contrast. Multiplanar reformats. Dose reduction techniques were used. FINDINGS: INTRACRANIAL CONTENTS: No intracranial hemorrhage, extraaxial collection, or mass effect.  No CT evidence of acute infarct. Mild presumed chronic small vessel ischemic changes. Mild generalized volume loss. No hydrocephalus. VISUALIZED ORBITS/SINUSES/MASTOIDS: No intraorbital abnormality. No paranasal  sinus mucosal disease. No middle ear or mastoid effusion. BONES/SOFT TISSUES: There is a new large destructive bony lesion in the high posterior aspect of the left parietal bone measuring 4.5 cm in diameter and the thickness of the calvaria with destruction of both the inner and outer tables of the skull. There  are multiple additional tiny focal lucent bony lesions in the calvaria that may represent additional myelomatous involvement.     IMPRESSION: 1.  Large destructive bony lesion in the high posterior left parietal bone measuring 4.5 cm in diameter with destruction of both the inner and outer tables of the skull. 2.  Multiple additional tiny lucent calvarial lesions that may represent additional myelomatous lesions. 3.  No CT evidence for acute intracranial process. 4.  Brain atrophy and presumed chronic microvascular ischemic changes as above.             Signed by: Loco Blanco MD,

## 2021-09-29 NOTE — LETTER
"    9/29/2021         RE: Lizzie Viera  [address]  [place] [address]        Dear Colleague,    Thank you for referring your patient, Lizzie Viera, to the [facility] [place]. Please see a copy of my visit note below.    Oncology Rooming Note    September 29, 2021 1:32 PM   Lizzie Viera is a 70 year old female who presents for:    Chief Complaint   Patient presents with     Oncology Clinic Visit     Initial Vitals: /62   Pulse 75   Temp 98.3  F (36.8  C)   Resp 16   Wt 59.9 kg (132 lb)   SpO2 97%   BMI 24.14 kg/m   Estimated body mass index is 24.14 kg/m  as calculated from the following:    Height as of 9/10/21: 1.575 m (5' 2\").    Weight as of this encounter: 59.9 kg (132 lb). Body surface area is 1.62 meters squared.  Moderate Pain (5) Comment: Data Unavailable   No LMP recorded. Patient is postmenopausal.  Allergies reviewed: Yes  Medications reviewed: Yes    Medications: Medication refills not needed today.  Pharmacy name entered into [employer]: [id number] [place], [place] - [address] [facility] [address].        Narda Kingston RN              [facility] Hematology and Oncology Progress Note    Patient: Lizzie Viera  MRN: [id number]  Date of Service: Sep 29, 2021         Reason for Visit    Problem List Items Addressed This Visit        Hematologic    Multiple myeloma with failed remission (H)          Assessment     1.  A very pleasant 70 year old woman with average risk multiple myeloma is on the cytogenetics.  However clinically this is behaving somewhat more aggressively with bone lesions etc. her immunoglobulin levels have not changed significantly in the last 4 months.  2.  Significant bone disease with osteoporosis and compression fractures.  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  3.  Normal hemoglobin, calcium, kidney function along with beta-2 microglobulin and albumin at the time of diagnosis.  4.  Positive " Covid antibodies.  5.  Pain from compression fracture status post radiation therapy.    Plan    1.  We will start her treatment with Velcade, Revlimid and dexamethasone.  2.  Bisphosphonate therapy for her bones.  We will start with Zometa monthly.  3.  She would be a transplant candidate.  4.  Her scalp lesion will need to be followed up.  We will discuss with Dr. Rapp to see if we can hold off on radiation for now.  If it does not heal then we may have to start some radiation therapy.  5.  Continue good diet and exercise.  Increase calcium and vitamin D in her diet.  6.  Counseled the patient at length regarding overall plan of care.    Cancer Staging  No matching staging information was found for the patient.    ECOG Performance    2 - Ambulatory and independent in all ADLs; cannot work; up > 50% of the time      History of Present Illness    Ms. Lizzie Viera is a very pleasant 70 year old woman who has been diagnosed with multiple myeloma IgG kappa type in May 2021.  She had initially presented with compression fracture of T7 vertebral body in September 2020.  She had a back pain which led to that evaluation.  Bone density confirmed that she had osteoporosis.  MRI showed diffuse marrow edema in October 2020.  In April 2021 the MRI of the thoracic spine showed worsening T7 vertebral body height loss because of likely pathological compression fracture with extraosseous extension.  She then had IR guided bone biopsy on 7 May 2021 and pathology confirmed the presence of kappa light chain restricted plasma cells.  Her cytogenetics confirmed the presence of hyperdiploid E with gains of chromosome 5, 9 and 15.    She was then seen by Dr. Baig and had a bone marrow biopsy which also confirmed 60% involvement of the marrow with plasma cells.  The bone marrow was done on 3 Jocelin 2021.  The bone marrow also confirmed the presence of hyperdiploid E.  She had a gain of chromosome 3, 5, 7, 9, 11, 15 as well as 19.   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  Her beta-2 microglobulin was actually normal at the time of her diagnosis as was her albumin at 4.0.  Monoclonal protein 3.1 g/dL.  Kappa free light chain levels of 13 mg/dL IgG level of 4280 with depressed levels of IgM and IgA.  Urine was also positive for kappa light chains as well as immunofixation of the urine was positive for IgG kappa.  Normal kidney function.  Normal hemoglobin.    As mentioned above she had bone lesions in the T7 vertebral body, T1, skull area.  Her main pain is coming from her T7 vertebral body compression fracture.    Due to the pain she has been seen by radiation oncology and has received radiation therapy.  She also got a dose of steroids for pain control.    Currently her pain is controlled with combination of Dilaudid as well as some Tylenol.  She is wearing a brace.  She did notice that when she was on dexamethasone her pain was significantly better.    She was initially thinking of doing some natural therapies but once her symptoms got worse, she came back in was counseled about treatment.  She has been given information about Velcade Revlimid and dexamethasone.    Review of systems.  No fever or night sweats.  No loss of weight.  No lumps or bumps anywhere.  No unusual headaches or eyesight issues.  No dizziness.  No bleeding from the nose.  No sores in the mouth. No problems with swallowing.  No chest pain. No shortness of breath. No cough.  No abdominal pain. No nausea or vomiting.  No diarrhea or constipation.  No blood in stool or black colored stools.  No problems passing urine.  No numbness or tingling in hands or feet.  No skin rashes.  A 14 point review of systems is otherwise negative.        Past History    Past Medical History:   Diagnosis Date     Cervical dysplasia      Chronic RUQ pain      Osteoporosis        Past Surgical History:   Procedure Laterality Date     C LAP,CHOLECYSTECTOMY/EXPLORE  12/27/2004     C LIGATE FALLOPIAN TUBE       Description: Tubal Ligation;  Recorded: 09/20/2007;     CONIZATION CERVIX,KNIFE/LASER      Description: Cervical Conization By Laser;  Recorded: 09/20/2007;     HC DILATION/CURETTAGE DIAG/THER NON OB      Description: Dilation And Curettage;  Recorded: 09/20/2007;     HC REMOVE TONSILS/ADENOIDS,<13 Y/O      Description: Tonsillectomy With Adenoidectomy;  Recorded: 09/20/2007;         Physical Exam    /62   Pulse 75   Temp 98.3  F (36.8  C)   Resp 16   Wt 59.9 kg (132 lb)   SpO2 97%   BMI 24.14 kg/m          GENERAL: Alert and oriented to time place and person. Seated comfortably. In no distress.    HEAD: Atraumatic and normocephalic.    EYES: RADHA, EOMI. No pallor. No icterus.    Oral cavity: no mucosal lesion or tonsillar enlargement.    NECK: supple. JVP normal.No thyroid enlargement.    LYMPH NODES: No palpable, cervical, axillary or inguinal lymphadenopathy.    CHEST: clear to auscultation bilaterally. Symmetrical breath movements bilaterally.  She is in a TLSO brace.    CVS: S1 and S2 are Regular rate and rhythm.  Soft systolic murmur heard. No peripheral edema.    ABDOMEN: Soft. Not tender. Not distended. No palpable hepatomegaly or splenomegaly. No other mass palpable. Bowel sounds heard.    EXTREMITIES: Warm.    SKIN: no rash, or bruising or purpura.      Lab Results    Recent Results (from the past 168 hour(s))   CBC with platelets and differential   Result Value Ref Range    WBC Count 3.9 (L) 4.0 - 11.0 10e3/uL    RBC Count 2.91 (L) 3.80 - 5.20 10e6/uL    Hemoglobin 9.8 (L) 11.7 - 15.7 g/dL    Hematocrit 30.9 (L) 35.0 - 47.0 %     (H) 78 - 100 fL    MCH 33.7 (H) 26.5 - 33.0 pg    MCHC 31.7 31.5 - 36.5 g/dL    RDW 15.4 (H) 10.0 - 15.0 %    Platelet Count 418 150 - 450 10e3/uL    % Neutrophils 51 %    % Lymphocytes 33 %    % Monocytes 11 %    % Eosinophils 4 %    % Basophils 0 %    % Immature Granulocytes 1 %    NRBCs per 100 WBC 0 <1 /100    Absolute Neutrophils 2.0 1.6 - 8.3 10e3/uL     Absolute Lymphocytes 1.3 0.8 - 5.3 10e3/uL    Absolute Monocytes 0.4 0.0 - 1.3 10e3/uL    Absolute Eosinophils 0.1 0.0 - 0.7 10e3/uL    Absolute Basophils 0.0 0.0 - 0.2 10e3/uL    Absolute Immature Granulocytes 0.0 <=0.0 10e3/uL    Absolute NRBCs 0.0 10e3/uL       Imaging    XR Femur Left 2 Views    Result Date: 9/26/2021  EXAM: XR FEMUR LEFT 2 VIEW LOCATION: [facility] DATE/TIME: 9/26/2021 10:38 AM INDICATION:  Multiple myeloma with failed remission (H), Multiple myeloma not having achieved remission (H), Localized osteoporosis with current pathological fracture with routine healing, subsequent encounter, 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     IMPRESSION: No acute fracture or malalignment. Mild degenerative changes. Osteopenia. No suspicious intraosseous lytic lesion.    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     IMPRESSION: No acute fracture or malalignment. Mild degenerative changes. Osteopenia. No suspicious intraosseous lytic lesion.    XR Thoracic Lumbar Standing 2 Views    Result Date: 9/1/2021  EXAM: XR THORACIC LUMBAR STANDING 2 VW LOCATION: [facility] DATE/TIME: 9/1/2021 3:26 PM INDICATION: T7 compression fracture. Evaluate upright kyphosis COMPARISON: X-ray and MRI 8/31/2021 TECHNIQUE: CR thoracic and lumbar spine     IMPRESSION: 12 thoracic and 5 lumbar type vertebra are assumed. Marked anterior wedge compression deformity of T7 corresponding to findings on previous exams. Segmental kyphosis  measures 32 degrees from T6 through T8 on the current radiographs compared to 31 degrees  on yesterday's radiographs. No significant change in alignment. No new fracture or progressive vertebral height loss identified. Mild thoracolumbar spondylosis.     XR Thoracic Spine 2 Views    Result Date: 9/10/2021  EXAM: XR THORACIC SPINE 2 VIEWS LOCATION: [facility] DATE/TIME: 9/10/2021 2:05 PM INDICATION: AP/LAT upright in brace; T7 fracture; eval kyphosis and fracture stability COMPARISON: 09/02/2021 radiographs. 08/31/2021 thoracic spine MRI.. TECHNIQUE: CR Thoracic Spine.     IMPRESSION: 12 rib-bearing thoracic vertebrae. Unchanged T7 vertebral plana. Unchanged resultant focal kyphosis measuring 27 degrees. No significant change in 3 mm dorsal subluxation of the T8 relative to T6. No additional compression fracture. No acute extraspinal abnormality.    XR Thoracic Spine 2 Views    Result Date: 9/2/2021  EXAM: XR THORACIC SPINE 2 VIEWS LOCATION: [facility] DATE/TIME: 9/2/2021 3:24 PM INDICATION: upright in CUSTOM TLSO BRACE on 9/2 once arrives. fracture/kyphosis, eval for stability upright COMPARISON: Thoracic spine radiograph 06/01/2021, thoracic spine MRI 08/31/2021 TECHNIQUE: CR Thoracic Spine.     IMPRESSION: Upright radiographs. Patient is wearing a brace. No significant change in the severe pathologic compression fracture of T7. Stable 34 degrees of segmental kyphosis from T6 to T8 when measured in a similar fashion. The rest of vertebral body heights are maintained. Mild intervertebral disc height loss and endplate spurring in the mid to lower thoracic spine. The visualized lungs are clear. Surgical clips project over the right upper quadrant.    XR Thoracic Spine 2 Views    Result Date: 8/31/2021  EXAM: XR THORACIC SPINE 2 VIEWS LOCATION: [facility] DATE/TIME: 8/31/2021 1:37 PM INDICATION:  Closed T7 fracture (H) COMPARISON: 06/29/2021.  TECHNIQUE: CR Thoracic Spine.     IMPRESSION: Severe T7 vertebral body compression deformity is similar to prior. Again localized accentuation of kyphosis at this level. There is mild dextroconvex curvature. The remaining visualized vertebral bodies are unremarkable. Mild multilevel spondylosis is similar to prior. Pedicles appear symmetric. Visualized lung fields are well aerated.     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     IMPRESSION: No acute fracture or malalignment. No significant degenerative changes. Osteopenia. No suspicious intraosseous lytic lesion.    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     IMPRESSION: No acute fracture or malalignment. Minimal degenerative changes. Osteopenia. No suspicious intraosseous lytic lesion.    MR Thoracic Spine w/o & w Contrast    Addendum Date: 8/31/2021    Findings were called to Dr. Mendez at 8/31/2021 6:32 PM by Dr. GREGORIA Calloway.    Result Date: 8/31/2021  EXAM: MR THORACIC SPINE W/O and W CONTRAST LOCATION: [facility] DATE/TIME: 8/31/2021 4:47 PM INDICATION: Pathologic fracture of T7. COMPARISON: MRI  thoracic spine 04/26/2021 CONTRAST: 6ml Gadavist TECHNIQUE: Routine Thoracic Spine MRI without and with IV contrast. FINDINGS: 12 rib-bearing thoracic type vertebra. Marked pathologic compression fracture of T7 with progressive vertebral height loss compared to the previous MRI. There is now essentially vertebra plana at this level with progressive segmental kyphosis measuring 27 degrees from T6 through T8. Pathologic marrow involving the posterior T7 vertebral margin with increased extension into the adjacent ventral epidural space, progressive involvement of the pedicles/lamina, and new marrow replacement/enhancement involving the posterior inferior T6 and superior T8 vertebral margins with additional progressive paraspinal extraosseous enhancing soft tissue mass. There is now circumferential epidural neoplasm at the T7 level with severe spinal canal stenosis, increased impingement upon the thoracic cord, and minimal intramedullary T2 prolongation. The additional metastatic deposit at T1 mild diffuse disc desiccation throughout the thoracic spine, with moderate loss of disc height at T5-T6 and mild multilevel loss of disc height at multiple additional levels from T3 through T10. No focal disc herniations. Minimal thoracic facet arthropathy. No additional high-grade spinal canal stenosis. Neoplastic soft tissue results in moderate to severe bilateral neural foraminal stenosis at T6-T7, severe bilateral neural foraminal stenosis at T7-T8, and mild to moderate left neural foraminal stenosis at T8-T9. Mild progression of paraspinal soft tissue mass centered at T7 as above. For example, maximum transverse dimension of enhancing soft tissue measuring 6.2 cm on axial images compared to 5.3 cm previously.     IMPRESSION: 1.  Pathologic compression fracture at T7 with progressive vertebral height loss and increasing epidural/paraspinal extraosseous neoplasm compared to 04/26/2021. 2.  Epidural neoplasm results in  high-grade narrowing of the thecal sac and impingement upon the mid thoracic cord extending from the lower T6 through upper T8 levels. Minimal associated cord edema. 3.  Epidural neoplasm results in moderate to high-grade neural foraminal narrowing at T6-T7 and T7-T8 bilaterally and moderate neural foraminal narrowing on the left at T8-T9. 4.  Additional osseous metastasis at T1 similar to the prior exam. No new osseous metastasis elsewhere.    CT Cervical Spine w/o Contrast    Result Date: 9/27/2021  EXAM: CT CERVICAL SPINE W/O CONTRAST LOCATION: [facility] DATE/TIME: 9/26/2021 10:08 AM INDICATION:  Multiple myeloma with failed remission (H), Multiple myeloma not having achieved remission (H) COMPARISON: Cervical spine MR 9/23/2020. TECHNIQUE: Routine CT Cervical Spine without IV contrast. Multiplanar reformats. Dose reduction techniques were used. FINDINGS: VERTEBRA: There has been an interval increase in size of the destructive bony lesion in the T1 vertebral body with an associated pathologic compression fracture of the T1 vertebral body. Vertebral body heights of the cervical spine remain normal. There are small (2-3 mm) hypodense foci in the right lateral mass of C2 and in the right posterolateral aspect of the C3 vertebral body that are nonspecific. No other suspicious focal lucencies identified in any of the cervical vertebrae. Alignment of the cervical vertebrae remains normal. No fractures of the cervical spine. CANAL/FORAMINA: No canal or neural foraminal stenosis. PARASPINAL: No extraspinal abnormality.     IMPRESSION: 1.  Interval enlargement of the destructive bony lesion of the T1 vertebral body with associated mild compression fracture deformity. 2.  Focal 2-3 mm nonspecific lucent foci in the right C2 lateral mass and in the C3 vertebral body. No other suspicious bony lesions. 3.  Normal alignment of the cervical vertebrae. No spinal canal or neural foraminal stenosis. No  significant degenerative changes.    CT Chest Abdomen Pelvis w/o Contrast    Result Date: 9/26/2021  EXAM: CT CHEST ABDOMEN PELVIS W/O CONTRAST LOCATION: [facility] DATE/TIME: 9/26/2021 10:09 AM INDICATION:  Multiple myeloma with failed remission. Compression fractures T7 with epidural tumor. COMPARISON: 05/04/2021 and older studies, PET/CT 05/28/2021, thoracic MR 08/31/2021, thoracic plain films 09/10/2021 TECHNIQUE: CT scan of the chest, abdomen, and pelvis was performed without IV contrast. Multiplanar reformats were obtained. Dose reduction techniques were used. CONTRAST: None. FINDINGS: LUNGS AND PLEURA: There are a few, bilateral tiny scattered pulmonary nodules which are stable. No effusions. Atelectasis in the right lateral costophrenic angle has cleared. MEDIASTINUM/AXILLAE: No adenopathy. CORONARY ARTERY CALCIFICATION: Mild. HEPATOBILIARY: Normal. PANCREAS: Normal. SPLEEN: Normal. ADRENAL GLANDS: Multiple low dense left adrenal adenomas are noted largest laterally measuring 1.7 cm. KIDNEYS/BLADDER: No change in the 2.8 cm left renal cyst. This needs no follow-up. BOWEL: Redundant colon. No mass or ascites. LYMPH NODES: Normal. VASCULATURE: Moderate arterial calcifications. No aneurysm. PELVIC ORGANS: Normal. MUSCULOSKELETAL: Progression of disease in the spine. There is a new, 1.8 cm deposit anteriorly along the left side of T1 with some minimal collapse of the superior endplate. Lytic destruction of the T6 and T8 vertebral bodies has developed with involvement of posterior elements. These surround the vertebral plana of T7 which has progressed since the May exam. There is bulky paravertebral and epidural tumor encircling the thoracic cord at this level which is narrowed to at least a centimeter.     IMPRESSION: 1.  Progression of spinal involvement most notably at T6 and T8 as noted above with paravertebral and epidural tumor surrounding the thecal sac which is narrowed to a  centimeter. Thoracic MRI can evaluate extent of cord compression/signal changes. 2.  Progression of the vertebral plana at T7. 3.  New lesion at T1 with minimal compression of the superior endplate. 4.  Critical findings discussed by the undersigned with Dr. John at 1346. He is to contact the patient today. 5.  Other noncritical findings as noted above. NOTE: ABNORMAL REPORT THE DICTATION ABOVE DESCRIBES AN ABNORMALITY FOR WHICH FOLLOW-UP IS NEEDED.     CT Head w/o Contrast    Result Date: 9/27/2021  EXAM: CT HEAD W/O CONTRAST LOCATION: [facility] DATE/TIME: 9/26/2021 10:08 AM INDICATION: Evaluate for any myeloma lesions in the bones. COMPARISON: Brain MR 5/22/2017. TECHNIQUE: Routine CT Head without IV contrast. Multiplanar reformats. Dose reduction techniques were used. FINDINGS: INTRACRANIAL CONTENTS: No intracranial hemorrhage, extraaxial collection, or mass effect.  No CT evidence of acute infarct. Mild presumed chronic small vessel ischemic changes. Mild generalized volume loss. No hydrocephalus. VISUALIZED ORBITS/SINUSES/MASTOIDS: No intraorbital abnormality. No paranasal sinus mucosal disease. No middle ear or mastoid effusion. BONES/SOFT TISSUES: There is a new large destructive bony lesion in the high posterior aspect of the left parietal bone measuring 4.5 cm in diameter and the thickness of the calvaria with destruction of both the inner and outer tables of the skull. There  are multiple additional tiny focal lucent bony lesions in the calvaria that may represent additional myelomatous involvement.     IMPRESSION: 1.  Large destructive bony lesion in the high posterior left parietal bone measuring 4.5 cm in diameter with destruction of both the inner and outer tables of the skull. 2.  Multiple additional tiny lucent calvarial lesions that may represent additional myelomatous lesions. 3.  No CT evidence for acute intracranial process. 4.  Brain atrophy and presumed chronic  microvascular ischemic changes as above.             Signed by: Loco Blanco MD,         Again, thank you for allowing me to participate in the care of your patient.        Sincerely,        Loco Blanco MD, MD

## 2021-09-29 NOTE — PROGRESS NOTES
"Oncology Rooming Note    September 29, 2021 1:32 PM   Lizzie Viera is a 70 year old female who presents for:    Chief Complaint   Patient presents with     Oncology Clinic Visit     Initial Vitals: /62   Pulse 75   Temp 98.3  F (36.8  C)   Resp 16   Wt 59.9 kg (132 lb)   SpO2 97%   BMI 24.14 kg/m   Estimated body mass index is 24.14 kg/m  as calculated from the following:    Height as of 9/10/21: 1.575 m (5' 2\").    Weight as of this encounter: 59.9 kg (132 lb). Body surface area is 1.62 meters squared.  Moderate Pain (5) Comment: Data Unavailable   No LMP recorded. Patient is postmenopausal.  Allergies reviewed: Yes  Medications reviewed: Yes    Medications: Medication refills not needed today.  Pharmacy name entered into [facility]: [id number] - [facility] [address]        Narda Kingston RN            "

## 2021-09-29 NOTE — PROGRESS NOTES
Infusion Nursing Note:  Lizzie CHERELLE Viera presents today for Velcade injection.    Patient seen by provider today: Yes: Dr. Blanco   present during visit today: Not Applicable.    Note: Velcade administered subcutaneous in RLQ abdomen.  Verified patient began dexamethasone and Revlimid today as prescribed.  Zometa not approved by insurance for administration today. Patient has been scheduled for zometa on 10/6.      Intravenous Access:  No Intravenous access this visit.    Treatment Conditions:  Results reviewed, labs MET treatment parameters, ok to proceed with treatment.      Post Infusion Assessment:  Patient tolerated injection without incident.       Discharge Plan:   Patient discharged in stable condition accompanied by: friend.  Departure Mode: Ambulatory.        Sharri Friedman RN

## 2021-09-30 ENCOUNTER — APPOINTMENT (OUTPATIENT)
Dept: RADIATION ONCOLOGY | Facility: HOSPITAL | Age: 71
End: 2021-09-30
Attending: RADIOLOGY
Payer: COMMERCIAL

## 2021-09-30 ENCOUNTER — PRE VISIT (OUTPATIENT)
Dept: ONCOLOGY | Facility: CLINIC | Age: 71
End: 2021-09-30

## 2021-09-30 ENCOUNTER — TELEPHONE (OUTPATIENT)
Dept: RADIATION ONCOLOGY | Facility: HOSPITAL | Age: 71
End: 2021-09-30

## 2021-09-30 ENCOUNTER — TELEPHONE (OUTPATIENT)
Dept: ONCOLOGY | Facility: HOSPITAL | Age: 71
End: 2021-09-30

## 2021-09-30 ENCOUNTER — TELEPHONE (OUTPATIENT)
Dept: RADIATION ONCOLOGY | Facility: HOSPITAL | Age: 71
End: 2021-09-30
Payer: COMMERCIAL

## 2021-09-30 PROCEDURE — 77300 RADIATION THERAPY DOSE PLAN: CPT | Mod: 26 | Performed by: RADIOLOGY

## 2021-09-30 PROCEDURE — 77295 3-D RADIOTHERAPY PLAN: CPT | Performed by: RADIOLOGY

## 2021-09-30 PROCEDURE — 77334 RADIATION TREATMENT AID(S): CPT | Mod: 26 | Performed by: RADIOLOGY

## 2021-09-30 PROCEDURE — 77295 3-D RADIOTHERAPY PLAN: CPT | Mod: 26 | Performed by: RADIOLOGY

## 2021-09-30 NOTE — TELEPHONE ENCOUNTER
Patient calls in today after having an appointment with Dr Blanco yesterday.  She left a message on the nurse triage line stating that she has many questions about medications, treatment plan and cycle length questions.  She said that she was told some things but her schedule does not seem to reflect that.  She asked that someone give her a call back to help her with this.    I am passing this along to the collaborative team in Dr Blanco's office as she does not yet have an RN care coordinator.    Aliza Sheehan RN

## 2021-09-30 NOTE — TELEPHONE ENCOUNTER
Left message. Called Valeria and acknowledged her conversation with Dr Blanco about not wanting to treat her skull at this time because she doesn't want to lose her hair.  Told her to be careful of contact with that portion of the skull as there is no protection of the brain in that area.     ALso since not treating the skull, we will reduce the treatment to T1 to 5 treatments rather than 10.     If her pain in T6-9 still a problem, consider retreatment

## 2021-09-30 NOTE — TELEPHONE ENCOUNTER
Patient called and left a voicemail message that she received Dr. Rapp's voicemail and she wanted to clarify that it was Dr. Blanco that wanted to wait on the skull treatment she thought to see how she responded to systemic treatment.  Per Dr. Rapp's request called patient back and left her a message.  Told patient we would move forward on the spine treatment for now and reevaluate treatment to the skull after the spine treatment has completed.    Left call back number for patient to call if further questions or concerns.

## 2021-09-30 NOTE — TELEPHONE ENCOUNTER
Patient is calling to verify how she is supposed to take her Revlimid.  Per Dr. Blanco, patient is to take the Revlimid, two weeks on and one week off.  Patient verbalized understanding and had no further questions or concerns.

## 2021-09-30 NOTE — CONFIDENTIAL NOTE
Distress screening follow-up:    I contacted Valeria as a follow-up from her appointment yesterday, 9/29/2021 with Dr. Monreal.  On her distress screening, she indicated that she wanted me to contact her.  Valeria is a multiple myeloma patient.  I discussed her case with our , Michelle Greer.    I left Valeria a voicemail message explaining my role and that I would be available for ongoing support and assistance as needed.    Mary Ellen Chan MA, LP, LICSW

## 2021-10-01 ENCOUNTER — TELEPHONE (OUTPATIENT)
Dept: RADIATION ONCOLOGY | Facility: HOSPITAL | Age: 71
End: 2021-10-01

## 2021-10-01 NOTE — TELEPHONE ENCOUNTER
Called Valeria to let hr know I spoke to the neurosurgery and we will proceed with treatment to the T1. We expect that she will be uncomfortable from her pathologic fracture for 8-12 weeks and will re-eval for  second dose of radiation in 4-6 weeks.     With regard to the skull lesion, they will see her next week and for now will hold on radiation and re-eval with neurosurgery.     Valeria expressed gratitude for the call and coordination of her specialists.

## 2021-10-02 ENCOUNTER — LAB (OUTPATIENT)
Dept: FAMILY MEDICINE | Facility: CLINIC | Age: 71
End: 2021-10-02
Attending: RADIOLOGY
Payer: COMMERCIAL

## 2021-10-02 DIAGNOSIS — C90.00 MULTIPLE MYELOMA NOT HAVING ACHIEVED REMISSION (H): ICD-10-CM

## 2021-10-02 PROCEDURE — U0003 INFECTIOUS AGENT DETECTION BY NUCLEIC ACID (DNA OR RNA); SEVERE ACUTE RESPIRATORY SYNDROME CORONAVIRUS 2 (SARS-COV-2) (CORONAVIRUS DISEASE [COVID-19]), AMPLIFIED PROBE TECHNIQUE, MAKING USE OF HIGH THROUGHPUT TECHNOLOGIES AS DESCRIBED BY CMS-2020-01-R: HCPCS

## 2021-10-02 PROCEDURE — U0005 INFEC AGEN DETEC AMPLI PROBE: HCPCS

## 2021-10-03 LAB — SARS-COV-2 RNA RESP QL NAA+PROBE: NEGATIVE

## 2021-10-05 ENCOUNTER — VIRTUAL VISIT (OUTPATIENT)
Dept: ONCOLOGY | Facility: HOSPITAL | Age: 71
End: 2021-10-05
Attending: SOCIAL WORKER
Payer: COMMERCIAL

## 2021-10-05 ENCOUNTER — APPOINTMENT (OUTPATIENT)
Dept: RADIATION ONCOLOGY | Facility: HOSPITAL | Age: 71
End: 2021-10-05
Attending: RADIOLOGY
Payer: COMMERCIAL

## 2021-10-05 DIAGNOSIS — F43.23 ADJUSTMENT DISORDER WITH MIXED ANXIETY AND DEPRESSED MOOD: Primary | ICD-10-CM

## 2021-10-05 DIAGNOSIS — C90.00 MULTIPLE MYELOMA WITH FAILED REMISSION (H): ICD-10-CM

## 2021-10-05 PROCEDURE — 77370 RADIATION PHYSICS CONSULT: CPT | Performed by: RADIOLOGY

## 2021-10-05 PROCEDURE — 90837 PSYTX W PT 60 MINUTES: CPT | Mod: TEL | Performed by: SOCIAL WORKER

## 2021-10-05 NOTE — CONFIDENTIAL NOTE
Psychology Psychotherapy  Note-virtual telephone visit    Name:  Lizzie Viera  :  1950  MRN:  [id number]      Date of Service: 10/5/2021  Duration: 60 minutes (11:00-12:00)    The patient has been notified of following:      This telephone visit will be conducted via a call between you and your provider. We have found that certain health care needs can be provided without a face to face meeting.  This service lets us provide the care you need with a short phone conversation.      Telephone visits are billed at different rates depending on your insurance coverage. During this emergency period, for some insurers they may be billed the same as an in-person visit.  Please reach out to your insurance provider with any questions.     If during the course of the call the if provider feels a telephone visit is not appropriate, you will not be charged for this service.     Patient has given verbal consent to a Telephone visit? Yes    Target Symptoms:    The patient was seen in light of concerns regarding symptoms of depression and anxiety as evidenced by patient and staff report.    Participation:  The patient was able to participate and benefit from treatment as evidenced by her verbal expression of ideas and initiation of topics discussed.    Mental Status:    Mood/Affect:  normal affect  Suicidal Ideation:  absent  Homicidal Ideation:  absent  Thought process:  normal  Thought content:  Clear  Fund of Knowledge:  Sufficient  Attention/Concentration:  Normal  Language ability:  intact  Speech: normal  Memory:  recent and remote memory intact  Insight and Judgement:  good  Orientation:  self, place and time  Appearance: N/A-telephone visit  Eye Contact: N/A-telephone visit  Estimated IQ:  Average      Intervention:    Valeria was referred to me by JOSH Lopez from the palliative care clinic.  She is also followed by Dr. Blanco and Dr. Rapp.  Valeria has a history of multiple myeloma.  She states  that in September 2020, she had a fracture of T-7 and was hospitalized at [facility].  She did not have a good experience in transferring her care over to [facility] and the cancer clinic at [facility].  In April 2021, she continued to have pain in her spine and had biopsy that confirmed her diagnosis of multiple myeloma in May 2021.  She was hospitalized in September at [facility] and received radiation therapy during her hospitalization.  Her last visit with Dr. Blanco was on 9/29/2021.  He indicates that clinically her multiple myeloma is behaving more aggressively with bone lesions.  She has significant bone disease with osteoporosis and compression fractures.  Dr. Blanco started her on Velcade, Revlimid and dexamethasone.  She had her first treatment on 9/29/2021.  She also will be receiving monthly Zometa shots and has her next shot tomorrow, 10/6/2021.  She also met with Dr. Rapp on 9/28/2021.  She will start with additional radiation treatment tomorrow, 10/6/2021.  They will proceed with treatment to T1.  She also has a skull lesion that they will hold off on radiation until she is reevaluated with neurosurgery.  Her main physical complaints are pain and fatigue.  At her 9/7/2021 palliative care clinic visit, they discussed advanced directives.  She has completed her health care directive and plans to get it notarized today.  She decided to have her daughter be the primary proxy and her sister and girlfriend to be the alternates.    Valeria indicates that she has done a lot of research and has decided not to be vaccinated for COVID-19.  Her daughter had COVID-19 in May and her son and  also tested positive at that time.  She had the antibody test and found out that she also had COVID-19 in the past.  She was asymptomatic and did not realize she even had it.  She is not interested in getting the vaccine.  She is interested in doing alternative/complementary medicine to help combat her  "cancer.  She has been taking anti-inflammatory medication and has been reading books and articles about supplements that help \"kill cancer\".  She currently is on a variety of supplements and has discussed her supplements with Dr. Blanco.  Valeria indicates that she finds her medical background useful in understanding her health condition.    Valeria processed her thoughts and feelings about her cancer and her cancer treatment.  She is experiencing symptoms of anxiety and depression related to coping with her cancer and other life stressors.  She does not clinically meet diagnostic criteria for a mood disorder or an anxiety disorder.  She meets diagnostic criteria for adjustment disorder with mixed anxiety and depressed mood.  She denies suicidal ideation or thoughts of harming herself or others.    Valeria grew up in the [facility].  She is the second oldest in a family of 8 children.  She has 3 sisters and 4 brothers.  Her older brother is exactly 1 year and a day older than her.  She states that she has a very close relationship with her siblings.  Most of them live in the area.  Her sisters have been extremely helpful during this difficult time.  She has 2 of her sisters at her house today cleaning.    Valeria lives in her [place] home with her , Ramos; daughter Collette; and her 2 sons ages 4 and 8.  Valeria and Ramos have been  for 42 years and that on a blind date.  She states that Ramos has end-stage renal disease and is receiving dialysis 3 times a week.  He also had trouble aortic aneurysm repair and he has type 2 diabetes.  He retired at age 69 from being a .  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  When he had his annual DOT physical, he did not want to share his medical history with the DOT physician and decided to quit his job, on the spot that day.  This created some issues as he was the carrier of the insurance for the family.  Valeria indicates that Ramos is extremely angry and " tends to take it out on the family members.  His father  when he was 11 years old.  He was a  and  on an experimental aircraft in [place].  2 weeks ago, he became so angry and agitated towards their daughter, that she gave him an ultimatum that he needs to start seeing a therapist.  He also became extremely angry and agitated yesterday with their son and her sister, to the point that they needed to leave the house due to his verbal abuse.  Valeria has been a buffer for the family regarding his anger.  She plans to follow-up with him this week to make sure he is scheduled an appointment to see a therapist.  The staff at [facility] have already talked with him about seeing a therapist and the plan is for him to follow-up on a referral from their recommendation.    Valeria and Ramos have 3 children.  The oldest is their daughter, Sharifa, age 38.  She lives near [place] and has 2 children.  She works as a teacher.  Their second oldest is their daughter, Collette, who currently lives with them along with her for an 8-year-old sons.  She is on Social Security disability due to her depression and past suicidal ideation.  She has received a great deal of therapy including DBT.  She has been extremely helpful to Valeria and wants to be her caretaker.  Their youngest is their son, Trell peters just moved out of the home 3 weeks ago to [facility], in order to be closer to his work in the area.    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  She has worked in medical research.  She spent 18 years working at the [employer].  She also has worked at [employer] in nuclear medicine and for [place] [employer] and nuclear medicine.  She spent several years working for [employer] and [employer].  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    Valeria is interested in individual psychotherapy to help her work through the  emotional aspects of her cancer and cancer treatments.  She is experiencing symptoms of anxiety and depression related to her cancer and other life stresses.  She would like to initially meet on a weekly basis.  I have left a message for our information coordinators to get her scheduled in for a series of appointments.    Psychoterapeutic Techniques:  Cognitive-behavioral therapy, motivational interviewing and supportive psychotherapy strategies were utilized.    Necessity:    The session was necessary for the care of the patient to address symptoms of depression and anxiety related to the patient's medical condition.    Progress/ Plan:    Valeria is interested in individual psychotherapy every 1 to 2 weeks to help her cope with the emotional aspects of her cancer and cancer treatment.  I will provide her with some information about utilizing cognitive behavioral therapy strategies to help manage symptoms of pain and anxiety & depression.    Diagnosis:    1.  Adjustment disorder with mixed anxiety and depressed mood  2.  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  Grammatical and typing errors are not intentional.    Provider: Mary Ellen Chan MA, LP, LICSW    Date:  10/5/2021  Time:  3:05 PM

## 2021-10-06 ENCOUNTER — LAB (OUTPATIENT)
Dept: INFUSION THERAPY | Facility: HOSPITAL | Age: 71
End: 2021-10-06
Attending: INTERNAL MEDICINE
Payer: COMMERCIAL

## 2021-10-06 ENCOUNTER — APPOINTMENT (OUTPATIENT)
Dept: RADIATION ONCOLOGY | Facility: HOSPITAL | Age: 71
End: 2021-10-06
Attending: RADIOLOGY
Payer: COMMERCIAL

## 2021-10-06 VITALS
BODY MASS INDEX: 23.87 KG/M2 | WEIGHT: 130.5 LBS | HEART RATE: 76 BPM | OXYGEN SATURATION: 97 % | RESPIRATION RATE: 16 BRPM | SYSTOLIC BLOOD PRESSURE: 110 MMHG | DIASTOLIC BLOOD PRESSURE: 62 MMHG

## 2021-10-06 DIAGNOSIS — C90.00 MULTIPLE MYELOMA WITH FAILED REMISSION (H): Primary | ICD-10-CM

## 2021-10-06 DIAGNOSIS — M84.58XK PATHOLOGICAL FRACTURE OF VERTEBRAE IN NEOPLASTIC DISEASE WITH NONUNION: ICD-10-CM

## 2021-10-06 DIAGNOSIS — C90.00 MULTIPLE MYELOMA NOT HAVING ACHIEVED REMISSION (H): Primary | ICD-10-CM

## 2021-10-06 LAB
ALBUMIN SERPL-MCNC: 3 G/DL (ref 3.5–5)
BASOPHILS # BLD AUTO: 0 10E3/UL (ref 0–0.2)
BASOPHILS NFR BLD AUTO: 1 %
CALCIUM SERPL-MCNC: 9.6 MG/DL (ref 8.5–10.5)
CREAT SERPL-MCNC: 0.69 MG/DL (ref 0.6–1.1)
EOSINOPHIL # BLD AUTO: 0 10E3/UL (ref 0–0.7)
EOSINOPHIL NFR BLD AUTO: 0 %
ERYTHROCYTE [DISTWIDTH] IN BLOOD BY AUTOMATED COUNT: 14.6 % (ref 10–15)
GFR SERPL CREATININE-BSD FRML MDRD: 89 ML/MIN/1.73M2
HCT VFR BLD AUTO: 32.2 % (ref 35–47)
HGB BLD-MCNC: 10.5 G/DL (ref 11.7–15.7)
IMM GRANULOCYTES # BLD: 0.1 10E3/UL
IMM GRANULOCYTES NFR BLD: 1 %
LYMPHOCYTES # BLD AUTO: 0.9 10E3/UL (ref 0.8–5.3)
LYMPHOCYTES NFR BLD AUTO: 18 %
MCH RBC QN AUTO: 34.1 PG (ref 26.5–33)
MCHC RBC AUTO-ENTMCNC: 32.6 G/DL (ref 31.5–36.5)
MCV RBC AUTO: 105 FL (ref 78–100)
MONOCYTES # BLD AUTO: 0.1 10E3/UL (ref 0–1.3)
MONOCYTES NFR BLD AUTO: 2 %
NEUTROPHILS # BLD AUTO: 4.1 10E3/UL (ref 1.6–8.3)
NEUTROPHILS NFR BLD AUTO: 78 %
NRBC # BLD AUTO: 0 10E3/UL
NRBC BLD AUTO-RTO: 0 /100
PLAT MORPH BLD: NORMAL
PLATELET # BLD AUTO: 265 10E3/UL (ref 150–450)
RBC # BLD AUTO: 3.08 10E6/UL (ref 3.8–5.2)
RBC MORPH BLD: NORMAL
WBC # BLD AUTO: 5.1 10E3/UL (ref 4–11)

## 2021-10-06 PROCEDURE — 82040 ASSAY OF SERUM ALBUMIN: CPT | Performed by: INTERNAL MEDICINE

## 2021-10-06 PROCEDURE — 250N000011 HC RX IP 250 OP 636: Performed by: INTERNAL MEDICINE

## 2021-10-06 PROCEDURE — G6017 INTRAFRACTION TRACK MOTION: HCPCS | Performed by: STUDENT IN AN ORGANIZED HEALTH CARE EDUCATION/TRAINING PROGRAM

## 2021-10-06 PROCEDURE — 82310 ASSAY OF CALCIUM: CPT | Performed by: INTERNAL MEDICINE

## 2021-10-06 PROCEDURE — 36415 COLL VENOUS BLD VENIPUNCTURE: CPT | Performed by: INTERNAL MEDICINE

## 2021-10-06 PROCEDURE — 85025 COMPLETE CBC W/AUTO DIFF WBC: CPT | Performed by: INTERNAL MEDICINE

## 2021-10-06 PROCEDURE — 82565 ASSAY OF CREATININE: CPT | Performed by: INTERNAL MEDICINE

## 2021-10-06 PROCEDURE — 77280 THER RAD SIMULAJ FIELD SMPL: CPT | Mod: 26 | Performed by: STUDENT IN AN ORGANIZED HEALTH CARE EDUCATION/TRAINING PROGRAM

## 2021-10-06 PROCEDURE — 77280 THER RAD SIMULAJ FIELD SMPL: CPT | Performed by: STUDENT IN AN ORGANIZED HEALTH CARE EDUCATION/TRAINING PROGRAM

## 2021-10-06 PROCEDURE — 258N000003 HC RX IP 258 OP 636: Performed by: INTERNAL MEDICINE

## 2021-10-06 PROCEDURE — 77387 GUIDANCE FOR RADJ TX DLVR: CPT | Performed by: STUDENT IN AN ORGANIZED HEALTH CARE EDUCATION/TRAINING PROGRAM

## 2021-10-06 PROCEDURE — 96401 CHEMO ANTI-NEOPL SQ/IM: CPT

## 2021-10-06 RX ORDER — ASPIRIN 81 MG/1
81 TABLET, CHEWABLE ORAL DAILY
COMMUNITY
Start: 2021-09-10

## 2021-10-06 RX ADMIN — ZOLEDRONIC ACID 4 MG: 4 INJECTION INTRAVENOUS at 14:52

## 2021-10-06 RX ADMIN — BORTEZOMIB 2.1 MG: 3.5 INJECTION, POWDER, LYOPHILIZED, FOR SOLUTION INTRAVENOUS; SUBCUTANEOUS at 14:42

## 2021-10-06 NOTE — PROGRESS NOTES
RADIATION ONCOLOGY WEEKLY TREATMENT VISIT NOTE      Assessment / Impression     Multiple myeloma not having achieved remission (H) [C90.00]    Cancer Staging  No matching staging information was found for the patient.     Tolerating radiation therapy well.  All questions and concerns addressed.    Numbness left thumb and second digit noted in the morning, improves with time    Plan:     Continue radiation treatment as prescribed.  Radiation:   Site: T1  Stereotactic Radiosurgery: No  Total Dose for Bone: 2000  Today's Fraction/Total Fraction Bone: 1/5 and Stereotactic Radiosurgery: No    Suspect numbness is related to pressure from TLSO brace on her the left arm at night when sleeping, she will try to place a towel for further cushioning of her arm while sleeping.    Discussed skin care and she would like radiaplex in case she needs it.    Subjective:      HPI: Lizzie Viera is a 71 year old female with  Multiple myeloma not having achieved remission (H) [C90.00]    First fraction of radiation therapy to T1 performed today.  She is doing well.  She has noted numbness in the left first and second digits in the mornings which improves during the day.  She sleeps with her TLSO brace on and lies on her right side with her left arm resting on top the TLSO brace.      Spoke with Dr. Rapp on Friday regarding radiation to her skull she has appointment with neurology on Monday to discuss further prior to proceeding.    The following portions of the patient's history were reviewed and updated as appropriate: allergies, current medications, past family history, past medical history, past social history, past surgical history and problem list.    Assessment                  Body Site:  Bone Information  Site: T1  Stereotactic Radiosurgery: No  Today's Dose: 400  Total Dose for Bone: 2000  Today's Fraction/Total Fraction Bone: 1/5  Comfort Alteration  KPS: 80% Can perform normal activity with effort, some signs of  disease  Fatigue (ONS scale): 2: Mild Fatigue  Pain Location: denies  Elimination Alteration  Diarrhea W/O Colostomy: 1: Increase of less than 4 stools/day over pretreatment  Constipation: 0: None  Proctitis: 0: None  Urinary frequency and/or urgency: 0: Normal  Urinary Retention: 0 : Normal  Urinary Incontinence: 0: None  Dysuria: 0: None  Urine Color Change: 0: Normal  Fall Risk  Have you fallen since last visit?: no  Are you unsteady?: no (uses wheeled walker)  Skin Alteration  Skin Sensation: 0: No problem  Skin Reaction: 0: None  CNS Alteration  Neuropathy - motor: 2: Mild objective weakness interfering with function, but not interfering with activites of daily living  Nutrition Alteration  Anorexia: 0: None  Nausea: 0: None  Vomitin: None  Pharynx and Esphogaus: 0: No change over baseline  Elimination Alteration  Constipation: 0: None  Diarrhea W/O Colostomy: 1: Increase of less than 4 stools/day over pretreatment  Bowel Incontinence: 0: None  Urinary Incontinence: 0: None                                     Sexuality Alteration                    Emotional Alteration       Comfort Alteration   KPS: 80% Can perform normal activity with effort, some signs of disease  Fatigue (ONS scale): 2: Mild Fatigue  Pain Location: denies   Nutrition Alteration   Anorexia: 0: None  Nausea: 0: None  Vomitin: None  Weight: 59.2 kg (130 lb 8 oz)  Pharynx and Esphogaus: 0: No change over baseline  Skin Alteration   Skin Sensation: 0: No problem  Skin Reaction: 0: None  AUA Assessment                                           Accompanied by       Objective:     Exam:     Vitals:    10/06/21 1417   BP: 110/62   Pulse: 76   Resp: 16   SpO2: 97%   Weight: 59.2 kg (130 lb 8 oz)       Wt Readings from Last 8 Encounters:   10/06/21 59.2 kg (130 lb 8 oz)   21 59.9 kg (132 lb)   21 57.2 kg (126 lb)   21 59.1 kg (130 lb 4 oz)   09/10/21 61.2 kg (135 lb)   21 61.4 kg (135 lb 6.4 oz)   21  58.5 kg (129 lb)   07/01/21 64.4 kg (142 lb)       General: Alert and oriented, in no acute distress  Lizzie has no Erythema.    Treatment Summary to Date    Aria chart and setup information reviewed    Nohemi Juarez MD

## 2021-10-06 NOTE — LETTER
10/6/2021         RE: Lizzie Viera  [address]  [place] [address]        Dear Colleague,    Thank you for referring your patient, Lizzie Viera, to the [facility] RADIATION [facility]COL[facility] [place]. Please see a copy of my visit note below.      RADIATION ONCOLOGY WEEKLY TREATMENT VISIT NOTE      Assessment / Impression     Multiple myeloma not having achieved remission (H) [C90.00]    Cancer Staging  No matching staging information was found for the patient.     Tolerating radiation therapy well.  All questions and concerns addressed.    Numbness left thumb and second digit noted in the morning, improves with time    Plan:     Continue radiation treatment as prescribed.  Radiation:   Site: T1  Stereotactic Radiosurgery: No  Total Dose for Bone: 2000  Today's Fraction/Total Fraction Bone: 1/5 and Stereotactic Radiosurgery: No    Suspect numbness is related to pressure from TLSO brace on her the left arm at night when sleeping, she will try to place a towel for further cushioning of her arm while sleeping.    Discussed skin care and she would like radiaplex in case she needs it.    Subjective:      HPI: Lizzie Viera is a 71 year old female with  Multiple myeloma not having achieved remission (H) [C90.00]    First fraction of radiation therapy to T1 performed today.  She is doing well.  She has noted numbness in the left first and second digits in the mornings which improves during the day.  She sleeps with her TLSO brace on and lies on her right side with her left arm resting on top the TLSO brace.      Spoke with Dr. Rapp on Friday regarding radiation to her skull she has appointment with neurology on Monday to discuss further prior to proceeding.    The following portions of the patient's history were reviewed and updated as appropriate: allergies, current medications, past family history, past medical history, past social history, past surgical history and problem  list.    Assessment                  Body Site:  Bone Information  Site: T1  Stereotactic Radiosurgery: No  Today's Dose: 400  Total Dose for Bone: 2000  Today's Fraction/Total Fraction Bone: 1/  Comfort Alteration  KPS: 80% Can perform normal activity with effort, some signs of disease  Fatigue (ONS scale): 2: Mild Fatigue  Pain Location: denies  Elimination Alteration  Diarrhea W/O Colostomy: 1: Increase of less than 4 stools/day over pretreatment  Constipation: 0: None  Proctitis: 0: None  Urinary frequency and/or urgency: 0: Normal  Urinary Retention: 0 : Normal  Urinary Incontinence: 0: None  Dysuria: 0: None  Urine Color Change: 0: Normal  Fall Risk  Have you fallen since last visit?: no  Are you unsteady?: no (uses wheeled walker)  Skin Alteration  Skin Sensation: 0: No problem  Skin Reaction: 0: None  CNS Alteration  Neuropathy - motor: 2: Mild objective weakness interfering with function, but not interfering with activites of daily living  Nutrition Alteration  Anorexia: 0: None  Nausea: 0: None  Vomitin: None  Pharynx and Esphogaus: 0: No change over baseline  Elimination Alteration  Constipation: 0: None  Diarrhea W/O Colostomy: 1: Increase of less than 4 stools/day over pretreatment  Bowel Incontinence: 0: None  Urinary Incontinence: 0: None                                     Sexuality Alteration                    Emotional Alteration       Comfort Alteration   KPS: 80% Can perform normal activity with effort, some signs of disease  Fatigue (ONS scale): 2: Mild Fatigue  Pain Location: denies   Nutrition Alteration   Anorexia: 0: None  Nausea: 0: None  Vomitin: None  Weight: 59.2 kg (130 lb 8 oz)  Pharynx and Esphogaus: 0: No change over baseline  Skin Alteration   Skin Sensation: 0: No problem  Skin Reaction: 0: None  AUA Assessment                                           Accompanied by       Objective:     Exam:     Vitals:    10/06/21 1417   BP: 110/62   Pulse: 76   Resp: 16    SpO2: 97%   Weight: 59.2 kg (130 lb 8 oz)       Wt Readings from Last 8 Encounters:   10/06/21 59.2 kg (130 lb 8 oz)   09/29/21 59.9 kg (132 lb)   09/20/21 57.2 kg (126 lb)   09/13/21 59.1 kg (130 lb 4 oz)   09/10/21 61.2 kg (135 lb)   09/02/21 61.4 kg (135 lb 6.4 oz)   08/31/21 58.5 kg (129 lb)   07/01/21 64.4 kg (142 lb)       General: Alert and oriented, in no acute distress  Lizzie has no Erythema.    Treatment Summary to Date    Aria chart and setup information reviewed    Nohemi Juarez MD      Again, thank you for allowing me to participate in the care of your patient.        Sincerely,        Nohemi Juarez MD

## 2021-10-07 ENCOUNTER — TELEPHONE (OUTPATIENT)
Dept: ONCOLOGY | Facility: HOSPITAL | Age: 71
End: 2021-10-07

## 2021-10-07 ENCOUNTER — APPOINTMENT (OUTPATIENT)
Dept: RADIATION ONCOLOGY | Facility: HOSPITAL | Age: 71
End: 2021-10-07
Attending: RADIOLOGY
Payer: COMMERCIAL

## 2021-10-07 PROCEDURE — 77387 GUIDANCE FOR RADJ TX DLVR: CPT | Performed by: RADIOLOGY

## 2021-10-07 PROCEDURE — 77412 RADIATION TX DELIVERY LVL 3: CPT | Performed by: RADIOLOGY

## 2021-10-07 PROCEDURE — G6017 INTRAFRACTION TRACK MOTION: HCPCS | Performed by: RADIOLOGY

## 2021-10-07 NOTE — ORAL ONC MGMT
"Oral Chemotherapy Monitoring Program    Subjective/Objective:  Lizzie Viera is a 71 year old female with multiple myeloma contacted by phone for a follow-up visit for oral chemotherapy, Revlimid + dexamethasone. Valeria reports starting Cycle 1 on 9/29/2021 and has not missed any doses since starting. She confirmed appropriate dosing and schedule.    The only side effect Valeria has noticed is diarrhea. About 3 days ago, she started having 3-5 loose stools per day. She has not changed her diet or tried anything to help with the diarrhea. She is drinking about 40-50 oz of water per day.    Valeria received her first dose of Zometa yesterday. Her joints were sore overnight, but she feels better today.    ORAL CHEMOTHERAPY 9/23/2021 10/7/2021   Assessment Type New Teach;Initial Work up Initial Follow up   Diagnosis Code Multiple Myeloma Multiple Myeloma   Providers Bella Bella   Clinic Name/Location [facility]   Drug Name Revlimid (lenalidomide) Revlimid (lenalidomide)   Dose 25 mg 25 mg   Current Schedule Daily Daily   Cycle Details 2 weeks on, 1 week off 2 weeks on, 1 week off   Start Date of Last Cycle - 9/29/2021   Planned next cycle start date - 10/20/2021   Doses missed in last 2 weeks - 0   Adherence Assessment - Adherent   Adverse Effects - Diarrhea   Diarrhea - Grade 1   Pharmacist Intervention(diarrhea) - Yes   Intervention(s) - OTC recommendation   Any new drug interactions? - No   Is the dose as ordered appropriate for the patient? - Yes     Vitals:  BP:   BP Readings from Last 1 Encounters:   10/06/21 110/62     Wt Readings from Last 1 Encounters:   10/06/21 59.2 kg (130 lb 8 oz)     Estimated body surface area is 1.61 meters squared as calculated from the following:    Height as of 9/10/21: 1.575 m (5' 2\").    Weight as of 10/6/21: 59.2 kg (130 lb 8 oz).    Labs:  Result Component Current Result Ref Range   Sodium 138 (9/29/2021) 136 - 145 mmol/L     Result Component Current Result Ref Range "   Potassium 3.9 (9/29/2021) 3.5 - 5.0 mmol/L     Result Component Current Result Ref Range   Calcium 9.6 (10/6/2021) 8.5 - 10.5 mg/dL     Result Component Current Result Ref Range   Magnesium 1.9 (9/13/2021) 1.8 - 2.6 mg/dL     No results found for Phos within last 30 days.     Result Component Current Result Ref Range   Albumin 3.0 (L) (10/6/2021) 3.5 - 5.0 g/dL     Result Component Current Result Ref Range   Urea Nitrogen 10 (9/29/2021) 8 - 22 mg/dL     Result Component Current Result Ref Range   Creatinine 0.69 (10/6/2021) 0.60 - 1.10 mg/dL     Result Component Current Result Ref Range   AST 16 (9/29/2021) 0 - 40 U/L     Result Component Current Result Ref Range   ALT 16 (9/29/2021) 0 - 45 U/L     Result Component Current Result Ref Range   Bilirubin Total 0.3 (9/29/2021) 0.0 - 1.0 mg/dL     Result Component Current Result Ref Range   WBC Count 5.1 (10/6/2021) 4.0 - 11.0 10e3/uL     Result Component Current Result Ref Range   Hemoglobin 10.5 (L) (10/6/2021) 11.7 - 15.7 g/dL     Result Component Current Result Ref Range   Platelet Count 265 (10/6/2021) 150 - 450 10e3/uL     No results found for ANC within last 30 days.     Medication Reconciliation:   No changes    Assessment/Plan:  Valeria is tolerating therapy well. I recommended she try OTC loperamide to help with her diarrhea. We reviewed the dosing and administration instructions. I also recommended she increases her water intake to at least 64 oz per day. We discussed how important it is to stay hydrated especially when she has diarrhea. She agreed to trying loperamide and increasing water intake. I also reviewed the potential side effects of Zometa including joint pain. Suggested Tylenol to help symptoms going forward. Continue with current treatment plan.    Follow-Up:  10/13 - labs  10/19 - labs and appt    We will call Valeria again early November for her next monthly follow-up.    Refill Due:  10/14/21    Jeison Burns, Jocelyn  Oral Chemotherapy  Pharmacist  [phone]

## 2021-10-08 ENCOUNTER — HOSPITAL ENCOUNTER (OUTPATIENT)
Dept: RADIOLOGY | Facility: HOSPITAL | Age: 71
Discharge: HOME | End: 2021-10-08
Attending: NURSE PRACTITIONER | Admitting: NURSE PRACTITIONER
Payer: COMMERCIAL

## 2021-10-08 ENCOUNTER — APPOINTMENT (OUTPATIENT)
Dept: RADIATION ONCOLOGY | Facility: HOSPITAL | Age: 71
End: 2021-10-08
Attending: INTERNAL MEDICINE
Payer: COMMERCIAL

## 2021-10-08 DIAGNOSIS — C79.51 MALIGNANT NEOPLASM METASTATIC TO THORACIC VERTEBRAL COLUMN WITH UNKNOWN PRIMARY SITE (H): ICD-10-CM

## 2021-10-08 DIAGNOSIS — C80.1 MALIGNANT NEOPLASM METASTATIC TO THORACIC VERTEBRAL COLUMN WITH UNKNOWN PRIMARY SITE (H): ICD-10-CM

## 2021-10-08 PROCEDURE — 77412 RADIATION TX DELIVERY LVL 3: CPT | Performed by: RADIOLOGY

## 2021-10-08 PROCEDURE — 77387 GUIDANCE FOR RADJ TX DLVR: CPT | Performed by: RADIOLOGY

## 2021-10-08 PROCEDURE — G6017 INTRAFRACTION TRACK MOTION: HCPCS | Performed by: RADIOLOGY

## 2021-10-08 PROCEDURE — 72072 X-RAY EXAM THORAC SPINE 3VWS: CPT

## 2021-10-11 ENCOUNTER — APPOINTMENT (OUTPATIENT)
Dept: RADIATION ONCOLOGY | Facility: HOSPITAL | Age: 71
End: 2021-10-11
Attending: RADIOLOGY
Payer: COMMERCIAL

## 2021-10-11 ENCOUNTER — VIRTUAL VISIT (OUTPATIENT)
Dept: ONCOLOGY | Facility: CLINIC | Age: 71
End: 2021-10-11
Attending: SOCIAL WORKER
Payer: COMMERCIAL

## 2021-10-11 ENCOUNTER — OFFICE VISIT (OUTPATIENT)
Dept: NEUROSURGERY | Facility: CLINIC | Age: 71
End: 2021-10-11
Payer: COMMERCIAL

## 2021-10-11 VITALS
WEIGHT: 130 LBS | OXYGEN SATURATION: 97 % | SYSTOLIC BLOOD PRESSURE: 106 MMHG | DIASTOLIC BLOOD PRESSURE: 58 MMHG | HEART RATE: 56 BPM | BODY MASS INDEX: 23.92 KG/M2 | HEIGHT: 62 IN

## 2021-10-11 DIAGNOSIS — C80.1 MALIGNANT NEOPLASM METASTATIC TO THORACIC VERTEBRAL COLUMN WITH UNKNOWN PRIMARY SITE (H): ICD-10-CM

## 2021-10-11 DIAGNOSIS — F43.23 ADJUSTMENT DISORDER WITH MIXED ANXIETY AND DEPRESSED MOOD: Primary | ICD-10-CM

## 2021-10-11 DIAGNOSIS — C90.00 MULTIPLE MYELOMA WITH FAILED REMISSION (H): ICD-10-CM

## 2021-10-11 DIAGNOSIS — S22.069G CLOSED FRACTURE OF SEVENTH THORACIC VERTEBRA WITH DELAYED HEALING, UNSPECIFIED FRACTURE MORPHOLOGY, SUBSEQUENT ENCOUNTER: Primary | ICD-10-CM

## 2021-10-11 DIAGNOSIS — C79.51 MALIGNANT NEOPLASM METASTATIC TO THORACIC VERTEBRAL COLUMN WITH UNKNOWN PRIMARY SITE (H): ICD-10-CM

## 2021-10-11 PROCEDURE — 90837 PSYTX W PT 60 MINUTES: CPT | Mod: TEL | Performed by: SOCIAL WORKER

## 2021-10-11 PROCEDURE — 77387 GUIDANCE FOR RADJ TX DLVR: CPT | Performed by: RADIOLOGY

## 2021-10-11 PROCEDURE — G6017 INTRAFRACTION TRACK MOTION: HCPCS | Performed by: RADIOLOGY

## 2021-10-11 PROCEDURE — 99213 OFFICE O/P EST LOW 20 MIN: CPT | Performed by: NURSE PRACTITIONER

## 2021-10-11 PROCEDURE — 77412 RADIATION TX DELIVERY LVL 3: CPT | Performed by: RADIOLOGY

## 2021-10-11 ASSESSMENT — MIFFLIN-ST. JEOR: SCORE: 1067.93

## 2021-10-11 NOTE — CONFIDENTIAL NOTE
Psychology Psychotherapy  Note-virtual telephone visit    Name:  Lizzie Viera  :  1950  MRN:  [id number]      Date of Service: 10/11/2021  Duration: 60 minutes (1:00-2:00)    The patient has been notified of following:      This telephone visit will be conducted via a call between you and your provider. We have found that certain health care needs can be provided without a face to face meeting.  This service lets us provide the care you need with a short phone conversation.      Telephone visits are billed at different rates depending on your insurance coverage. During this emergency period, for some insurers they may be billed the same as an in-person visit.  Please reach out to your insurance provider with any questions.     If during the course of the call the if provider feels a telephone visit is not appropriate, you will not be charged for this service.     Patient has given verbal consent to a Telephone visit? Yes    Target Symptoms:    The patient was seen in light of concerns regarding symptoms of depression and anxiety as evidenced by patient and staff report.    Participation:  The patient was able to participate and benefit from treatment as evidenced by her verbal expression of ideas and initiation of topics discussed.    Mental Status:    Mood/Affect:  normal affect  Suicidal Ideation:  absent  Homicidal Ideation:  absent  Thought process:  normal  Thought content:  Clear  Fund of Knowledge:  Sufficient  Attention/Concentration:  Normal  Language ability:  intact  Speech: normal  Memory:  recent and remote memory intact  Insight and Judgement:  good  Orientation:  self, place and time  Appearance: N/A-telephone visit  Eye Contact: N/A-telephone visit  Estimated IQ:  Average      Intervention:    Valeria was interested in a follow-up psychotherapy session today. She agreed to meet virtually. I was able to meet her in person when she was in the clinic last week. Valeria was referred to me by  JOSH Lopez from the palliative care clinic.  She is also followed by Dr. Blanco and Dr. Rapp.  Valeria has a history of multiple myeloma.  She states that in September 2020, she had a fracture of T-7 and was hospitalized at [facility].  She did not have a good experience in transferring her care over to [facility] and the cancer clinic at [facility].  In April 2021, she continued to have pain in her spine and had biopsy that confirmed her diagnosis of multiple myeloma in May 2021.  She was hospitalized in September at [facility] and received radiation therapy during her hospitalization.  Her last visit with Dr. Blanco was on 9/29/2021.  He indicates that clinically her multiple myeloma is behaving more aggressively with bone lesions.  She has significant bone disease with osteoporosis and compression fractures.  Dr. Blanco started her on Velcade, Revlimid and dexamethasone.  She had her first treatment on 9/29/2021.  She also will be receiving monthly Zometa shots and had her most recent one on 10/6/2021.  She also met with Dr. Rapp on 9/28/2021.  She will start with additional radiation treatment on 10/6/2021 and will complete treatment on 10/12/2021..  They will proceed with treatment to T1.  She also has a skull lesion that they will hold off on radiation at this time. She met with the neurosurgeon earlier this morning and he felt that T6-7 and 8 are stable. She will meet again with Dr. Rapp in 3 weeks to decide the next steps for radiation therapy.   Her main physical complaints are pain and fatigue. She also is experiencing temperature changes and changes to her taste, which she believes is due to her chemo pill. At her 9/7/2021 palliative care clinic visit, they discussed advanced directives.  She has completed her health care directive and plans to get it notarized today.  She decided to have her daughter be the primary proxy and her sister and girlfriend to be the  "alternates.    Valeria indicates that she has done a lot of research and has decided not to be vaccinated for COVID-19.  Her daughter had COVID-19 in May and her son and  also tested positive at that time.  She had the antibody test and found out that she also had COVID-19 in the past.  She was asymptomatic and did not realize she even had it.  She is not interested in getting the vaccine.  She is interested in doing alternative/complementary medicine to help combat her cancer.  She has been taking anti-inflammatory medication and has been reading books and articles about supplements that help \"kill cancer\".  She currently is on a variety of supplements and has discussed her supplements with Dr. Blanco.  Valeria indicates that she finds her medical background useful in understanding her health condition.    Valeria processed her thoughts and feelings about her cancer and her cancer treatment.  She is experiencing symptoms of anxiety and depression related to coping with her cancer and other life stressors.  She does not clinically meet diagnostic criteria for a mood disorder or an anxiety disorder.  She meets diagnostic criteria for adjustment disorder with mixed anxiety and depressed mood.  She denies suicidal ideation or thoughts of harming herself or others.    Valeria grew up in the [facility].  She is the second oldest in a family of 8 children.  She has 3 sisters and 4 brothers.  Her older brother is exactly 1 year and a day older than her.  She states that she has a very close relationship with her siblings.  Most of them live in the area.  Her sisters have been extremely helpful during this difficult time.  She has 2 of her sisters at her house cleaning during her last visit..    Valeria lives in her [place] home with her , Ramos; daughter Collette; and her 2 sons ages 4 and 8.  Valeria and Ramos have been  for 42 years and that on a blind date.  She states that Ramos has end-stage renal disease and " is receiving dialysis 3 times a week.  He also had trouble aortic aneurysm repair and he has type 2 diabetes. He is scheduled to have a cardiac procedure at the [facility] this Friday, 10/15/2021. She most likely will be in the hospital overnight. He will be evaluated in 3 to 6 months for a kidney transplant. He has a friend who is willing to donate his kidney. Ramos retired at age 69 from being a .  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  When he had his annual DOT physical, he did not want to share his medical history with the DOT physician and decided to quit his job, on the spot that day.  This created some issues as he was the carrier of the insurance for the family.  Valeria indicates that Ramos is extremely angry and tends to take it out on the family members.  His father  when he was 11 years old.  He was a  and  on an experimental aircraft in [place].  2 weeks ago, he became so angry and agitated towards their daughter, that she gave him an ultimatum that he needs to start seeing a therapist.  He also became extremely angry and agitated yesterday with their son and her sister, to the point that they needed to leave the house due to his verbal abuse.  Valeria has been a buffer for the family regarding his anger.  She plans to follow-up with him this week to make sure he is scheduled an appointment to see a therapist.  The staff at [facility] have already talked with him about seeing a therapist and the plan is for him to follow-up on a referral from their recommendation.    Celso have 3 children.  The oldest is their daughter, Sharifa, age 38.  She lives near [place] and has 2 children.  She works as a teacher.  Their second oldest is their daughter, Collette, age 34, who currently lives with them along with her for an 8-year-old sons.  She is on Social Security disability due to her depression and past suicidal ideation.  She has received a great deal of therapy including DBT.  She  has been extremely helpful to Valeria and wants to be her caretaker.  Their youngest is their son, Trell, age 30, sumi just moved out of the home 3 weeks ago to [facility] [place], in order to be closer to his work in the area. He works 12-hour shifts starting at 4 AM in a warehouse that provides food for gas stations and service stations.    Vaelria has a medical technician degree with the subspecialties and nuclear medicine.  She has worked in medical research.  She spent 18 years working at the [employer].  She also has worked at [employer] in nuclear medicine and for [place] cardiology and nuclear medicine.  She spent several years working for [employer] and [employer].  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    Valeria was raised [employer] and went to a [employer] grade school, [employer] in [place]. She also went to [employer]. She currently describes herself as being a spiritual person and believes that mother nature is the guiding spirit of the planet. She has some  and Irish spirituality beliefs. We talked about ways for her to incorporate her spirituality into her healing process.    Valeria is interested in individual psychotherapy to help her work through the emotional aspects of her cancer and cancer treatments.  She is experiencing symptoms of anxiety and depression related to her cancer and other life stresses.  She would like to initially meet on a weekly basis.  I have left a message for our information coordinators to get her scheduled in for a series of appointments.    Psychoterapeutic Techniques:  Cognitive-behavioral therapy, motivational interviewing and supportive psychotherapy strategies were utilized.    Necessity:    The session was necessary for the care of the patient to address symptoms of depression and anxiety related to the patient's medical condition.    Progress/ Plan:    Valeria is  interested in individual psychotherapy every 1 to 2 weeks to help her cope with the emotional aspects of her cancer and cancer treatment.  I will provide her with some information about utilizing cognitive behavioral therapy strategies to help manage symptoms of pain and anxiety & depression.    Diagnosis:    1.  Adjustment disorder with mixed anxiety and depressed mood  2.  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  Other information was consistent from the note that was made on 10/5/2021 .      This note was created with the help of Dragon dictation system.  Grammatical and typing errors are not intentional.     Review of long-term goals: Treatment plan was reviewed and updated.       Provider: Mary Ellen Chan MA, LP, [facility]    Date:  10/5/2021  Time:  3:05 PM

## 2021-10-11 NOTE — PROGRESS NOTES
Neurosurgery progress note:    HPI:  Valeria Viera is a 70-year-old F hx multiple myeloma with a T1 lesion as well as a severe T7 pathologic fracture. Initially cleared of TLSO brace in July. Returned with worsening leg symptoms after a new fall. New TLSO brace fitted (previous no longer fit) and urgent radiation ordered as fracture was worse with epidural extension of tumor at T7 with cord compression. Dr Campbell patient.    Valeria has continued full treatment of her multiple myeloma with radiation and chemo. Her back pain is stable, under good control. She has stable chronic R foot weakness, and sensitivity and tingling of her R foot/shin area. She has been wearing her TLSO brace, sleeps in it as well. Notices that she has tingling in her left 4/5th digits when the brace puts pressure on her left axilla at night. Gets better within an hour of having it off. She endorses some swelling of her left parietal scalp and some headaches. She is neurologically stable on exam today. Her thoracic xrays are stable. Discussed head CT with Dr Campbell. Recommend radiation to skull lesion. No surgery. Will plan to see Valeria back in another 4-6wks with new upright thoracic xr. We will need to weigh the ongoing risk/benefit of following her fracture as we have no plan to offer surgery. Given that she is having another course of radiation to the thoracic area however, we will continue to follow it with at least one more appt. Continue maximal treatment to improve bone quality to prevent future fractures.     Plan:  1. Recommend radiation to skull lesion.  2. F/u 4-6wks with NP with upright thoracic Xrays  3. Continue TLSO brace whenever upright or OOB  4. Call sooner if symptoms change      Exam:  General: seated, comfortable-appearing, wearing TLSO brace which appears to fit well. Gait is stable, coordinated.    She has 4/5 weakness of R dorsiflexion and R EHL  Full strength throughout upper and lower extremities  otherwise.  Negative clonus  Sensation is decreased to light touch right shin/foot.  Sensation otherwise intact to light touch is intact throughout the upper and lower extremities.    Gait testing deferred.     left parietal scalp examined, no swelling or skin breakdown    Imaging:  Thoracic XR 10/8/21 personally reviewed  Stable upright appearance of severe T7 fracture, stable segmental kyphosis.     Head CT 9/26/21 personally reviewed  4.5cm left parietal destructive bony lesion with destruction of the inner and outer tables of the skull, with multiple additional tiny lucent calvarial lesions  No intracranial process    Keara Chu FNP-C  [facility] Neurosurgery  O. [phone]

## 2021-10-12 ENCOUNTER — APPOINTMENT (OUTPATIENT)
Dept: RADIATION ONCOLOGY | Facility: HOSPITAL | Age: 71
End: 2021-10-12
Attending: RADIOLOGY
Payer: COMMERCIAL

## 2021-10-12 ENCOUNTER — OFFICE VISIT (OUTPATIENT)
Dept: RADIATION ONCOLOGY | Facility: HOSPITAL | Age: 71
End: 2021-10-12
Attending: CLINICAL NURSE SPECIALIST
Payer: COMMERCIAL

## 2021-10-12 VITALS
SYSTOLIC BLOOD PRESSURE: 122 MMHG | DIASTOLIC BLOOD PRESSURE: 65 MMHG | OXYGEN SATURATION: 98 % | RESPIRATION RATE: 16 BRPM | HEART RATE: 56 BPM

## 2021-10-12 DIAGNOSIS — M84.58XK: ICD-10-CM

## 2021-10-12 DIAGNOSIS — G89.29 CHRONIC MIDLINE THORACIC BACK PAIN: ICD-10-CM

## 2021-10-12 DIAGNOSIS — Z51.5 ENCOUNTER FOR PALLIATIVE CARE: ICD-10-CM

## 2021-10-12 DIAGNOSIS — M48.50XA PATHOLOGIC COMPRESSION FRACTURE OF SPINE, INITIAL ENCOUNTER (H): Primary | ICD-10-CM

## 2021-10-12 DIAGNOSIS — M54.6 CHRONIC MIDLINE THORACIC BACK PAIN: ICD-10-CM

## 2021-10-12 DIAGNOSIS — G89.3 CANCER ASSOCIATED PAIN: ICD-10-CM

## 2021-10-12 DIAGNOSIS — R53.0 NEOPLASTIC MALIGNANT RELATED FATIGUE: ICD-10-CM

## 2021-10-12 PROCEDURE — 77387 GUIDANCE FOR RADJ TX DLVR: CPT | Performed by: STUDENT IN AN ORGANIZED HEALTH CARE EDUCATION/TRAINING PROGRAM

## 2021-10-12 PROCEDURE — G0463 HOSPITAL OUTPT CLINIC VISIT: HCPCS | Mod: 25

## 2021-10-12 PROCEDURE — 99215 OFFICE O/P EST HI 40 MIN: CPT | Performed by: CLINICAL NURSE SPECIALIST

## 2021-10-12 PROCEDURE — 77336 RADIATION PHYSICS CONSULT: CPT | Performed by: RADIOLOGY

## 2021-10-12 PROCEDURE — 77427 RADIATION TX MANAGEMENT X5: CPT | Performed by: RADIOLOGY

## 2021-10-12 PROCEDURE — 77412 RADIATION TX DELIVERY LVL 3: CPT | Performed by: STUDENT IN AN ORGANIZED HEALTH CARE EDUCATION/TRAINING PROGRAM

## 2021-10-12 PROCEDURE — G6017 INTRAFRACTION TRACK MOTION: HCPCS | Performed by: STUDENT IN AN ORGANIZED HEALTH CARE EDUCATION/TRAINING PROGRAM

## 2021-10-12 RX ORDER — METHOCARBAMOL 500 MG/1
500 TABLET, FILM COATED ORAL EVERY 6 HOURS PRN
Qty: 30 TABLET | Refills: 5 | Status: SHIPPED | OUTPATIENT
Start: 2021-10-12 | End: 2021-12-23 | Stop reason: ALTCHOICE

## 2021-10-12 RX ORDER — HYDROMORPHONE HYDROCHLORIDE 4 MG/1
4 TABLET ORAL EVERY 6 HOURS PRN
Qty: 90 TABLET | Refills: 0 | Status: SHIPPED | OUTPATIENT
Start: 2021-10-12 | End: 2022-03-15

## 2021-10-12 ASSESSMENT — PAIN SCALES - GENERAL: PAINLEVEL: MODERATE PAIN (5)

## 2021-10-12 NOTE — PROGRESS NOTES
"[facility]  Palliative Care Daily Progress Note       Recommendations & Counseling     ADVANCED CARE PLANNING AND GOALS OF CARE DISCUSSION  - Code status was discussed and patient confirms wishes to change code status to DNR/I. Order entered into computer.   -Patient open to following up with medical oncology [facility].  - Follow up in outpatient palliative care clinic in 8 weeks for ongoing pain and symptom management and goals of care discussion.  - Patient completed and notarized [employer] document. Copy being sent to HIM.  - POLST completed and signed today with patient.      SYMPTOM ASSESSMENT  1.  Cancer related pain thoracic spine pain that radiates to bilateral lower extremities  - Continue Gabapentin 300 mg by mouth 3 times daily. Patient would be interested in weaning down on Gabapentin now that she is on treatment, tumor burden reduces and also now has medical cannabis.  -Dexamethasone per chemotherapy regimen - assists with pain, nausea and appetite stimulation.  -S/P Single fraction radiation to T6 through T8/9.  - S/P T1 radiation 10/6/2021-10/12/2021.    -Dilaudid 4 mg by mouth every 6 hours as needed.  Patient taking 2 times a day (total 8 mg oral Dilaudid in 24 hours).  - Patient has met with leaf line pharmacist and started medical cannabis - pills and oil. Feels she is having good relief.  - Continue Robaxin 500 mg po q6h prn spasms - taking 3 times a day.  -Patient was taking  naltrexone 3 mg by mouth daily.  Had discussion with patient that this could reverse/minimize effect of opiate and she may feel more pain.  Taking this as a \"anti-inflammatory\" component of her natural/complementary treatments.     2.  Anorexia and weight loss.  Patient reports 50 pound weight loss in 6 months.  Most recent albumin 3.0 on 10/6/2021.  -Nutrition previously consulted.  -Patient utilizing diet modifications to minimize sugar and to enhance body's own immune response to fight " cancer.     3.  Cancer related fatigue - feels fatigue worse day 10-14 of treatment  -Patient utilizing rolling walker while up.  -Wearing thoracic brace for stabilization.  -Discussed ways to minimize falls like wearing closed toed tight up shoes, removing area rugs and other objects/obstacles in home.     4.  Anxiety and depression related to health/diagnosis  -Patient seeing Mary Ellen Rocio for coping, support and processing diagnosis.    Opioid Safety Discussion: We discussed opioid safety with them, including side effects and potential harms of opioids, taking opioids exactly as prescribed, watching for mood-altering effects of opioids and letting a provider know if they notice any, driving safety, safe opioid storage and disposal.   Comments: .       Assessments          SUMMARY: Lizzie Viera is a 70 year old female with a past medical history of multiple myeloma with pathologic compression fracture of T6 through T8/9 spine based on CT scan and received one fraction of radiation to the levels T6 through T8/9 on 9/1/2021 and T1 radiation from 10/6/2021 - 10/12/2021. Patient fitted for and wear TLSO brace for spine stabilization.   Historically, 9/2020 patient noted to have osteoporotic T7 compression fracture with diffuse marrow edema and an indeterminate T1 lesion.  Further imaging in 4/26/2021 showed worsening of her T7 pathologic fracture with new extraosseous extension causing severe spinal canal stenosis.  5/7/2021 biopsy showed neoplasm and biopsy obtained of right iliac crest showed multiple myeloma.  Patient initially declined chemo and radiation at that time and sought naturapathic approach to treatment.  Patient is currently being treated with Velcade, Revlimid and Dexamethasone.     Symptom management  Pain: Currently rating mid back pain with right greater than left as a 5-6/10.    Patient satisfied with current pain regimen listed above. Feels medical cannabis helping substantialing with  pain, nausea, anxiety and appetite stimulation. Dyspnea: denies  Nausea: intermittent, none presently  Anxiety: yes, related to diagnosis/health. Improved. Appreciates visit/support from Mary Ellen Chan.      REVIEWED: yes    Today, the patient was seen for:  Cancer related pain and fatigue, goals of care dicussion     Prognosis, Goals, or Advance Care Planning was addressed today with: No.  Mood, coping, and/or meaning in the context of serious illness were addressed today: Yes.  Summary/Comments: Patient feels appt with Mary Ellen Chan has been a wonderful compliment in her care.            Interval History:     Per patient or family/caregivers today:  Patient has reviewed her Honoring choices with family.    Confirms wishes for DO NOT RESUSCITATE DO NOT INTUBATE and wishes to have this order placed to reflect change in CODE STATUS in her electronic medical record.  Patient reports that family is one of the most important things to her.           Review of Systems:     Besides above, an additional 12 system ROS was reviewed and is unremarkable          Medications:   Medication list was reviewed.       Physical Exam:   Vital Signs: not currently breastfeeding.   GENERAL: Well groomed, calm, alert  SKIN: Warm and dry   HEENT: Normocephalic, anicteric sclera, moist mucous membranes  LUNGS: non-labored   ABDOMINAL: non distended, non tender  MUSKL: no gross joint deformities   EXTREMITIES: No edema or cyanosis, pulses 2+ and symmetrical  NEUROLOGIC: Alert and oriented to time place person and situation.  Jovial and conversant.  Moves all extremities equally.  PSYCH: Calm and appropriate             Data Reviewed:     Reviewed recent pertinent imaging:   EXAM: XR THORACIC SPINE 3 VIEWS  LOCATION: [facility]  DATE/TIME: 10/8/2021 10:37 AM     INDICATION: History of T7 fracture.  COMPARISON: Multiple prior comparison examinations, most recently radiography thoracic spine 09/10/2021.  TECHNIQUE: CR  Thoracic Spine.                                                                      IMPRESSION: No significant interval change since the comparison examination. Severe pathologic compression fracture T7 vertebral body with 34 degrees of segmental kyphosis measured from the T6 superior endplate to the T8 inferior endplate. No evidence of   additional fracture. Vertebral body heights and alignment otherwise maintained. Mild thoracic spondylosis. Normal facets.     Surgical clips right upper quadrant. Atherosclerotic vascular calcifications. Extraspinal structures otherwise unremarkable.    Reviewed recent labs:   10/6/2021 creatinine 0.69, GFR 89, calcium 9.6, WBC 5.1, Hgb 10.5 and platelets 265   Total time spent in this encounter today 45 minutes with greater than 50% of this time face-to-face with patient and her dear friend discussing symptoms and goals of care including CODE STATUS and remainder of time spent conducting chart review.

## 2021-10-12 NOTE — LETTER
"    10/12/2021         RE: Lizzie Viera  [address]  [place] [address]        Dear Colleague,    Thank you for referring your patient, Lizzie Viera, to the [facility] RADIATION ONCOLOGY [place]. Please see a copy of my visit note below.    [facility]  Palliative Care Daily Progress Note       Recommendations & Counseling     ADVANCED CARE PLANNING AND GOALS OF CARE DISCUSSION  - Code status was discussed and patient confirms wishes to change code status to DNR/I. Order entered into computer.   -Patient open to following up with medical oncology [facility].  - Follow up in outpatient palliative care clinic in 8 weeks for ongoing pain and symptom management and goals of care discussion.  - Patient completed and notarized [employer] document. Copy being sent to HIM.  - POLST completed and signed today with patient.      SYMPTOM ASSESSMENT  1.  Cancer related pain thoracic spine pain that radiates to bilateral lower extremities  - Continue Gabapentin 300 mg by mouth 3 times daily. Patient would be interested in weaning down on Gabapentin now that she is on treatment, tumor burden reduces and also now has medical cannabis.  -Dexamethasone per chemotherapy regimen - assists with pain, nausea and appetite stimulation.  -S/P Single fraction radiation to T6 through T8/9.  - S/P T1 radiation 10/6/2021-10/12/2021.    -Dilaudid 4 mg by mouth every 6 hours as needed.  Patient taking 2 times a day (total 8 mg oral Dilaudid in 24 hours).  - Patient has met with leaf line pharmacist and started medical cannabis - pills and oil. Feels she is having good relief.  - Continue Robaxin 500 mg po q6h prn spasms - taking 3 times a day.  -Patient was taking  naltrexone 3 mg by mouth daily.  Had discussion with patient that this could reverse/minimize effect of opiate and she may feel more pain.  Taking this as a \"anti-inflammatory\" component of her natural/complementary " treatments.     2.  Anorexia and weight loss.  Patient reports 50 pound weight loss in 6 months.  Most recent albumin 3.0 on 10/6/2021.  -Nutrition previously consulted.  -Patient utilizing diet modifications to minimize sugar and to enhance body's own immune response to fight cancer.     3.  Cancer related fatigue - feels fatigue worse day 10-14 of treatment  -Patient utilizing rolling walker while up.  -Wearing thoracic brace for stabilization.  -Discussed ways to minimize falls like wearing closed toed tight up shoes, removing area rugs and other objects/obstacles in home.     4.  Anxiety and depression related to health/diagnosis  -Patient seeing Mary Ellen Chan for coping, support and processing diagnosis.    Opioid Safety Discussion: We discussed opioid safety with them, including side effects and potential harms of opioids, taking opioids exactly as prescribed, watching for mood-altering effects of opioids and letting a provider know if they notice any, driving safety, safe opioid storage and disposal.   Comments: .       Assessments          SUMMARY: Lizzie Viera is a 70 year old female with a past medical history of multiple myeloma with pathologic compression fracture of T6 through T8/9 spine based on CT scan and received one fraction of radiation to the levels T6 through T8/9 on 9/1/2021 and T1 radiation from 10/6/2021 - 10/12/2021. Patient fitted for and wear TLSO brace for spine stabilization.   Historically, 9/2020 patient noted to have osteoporotic T7 compression fracture with diffuse marrow edema and an indeterminate T1 lesion.  Further imaging in 4/26/2021 showed worsening of her T7 pathologic fracture with new extraosseous extension causing severe spinal canal stenosis.  5/7/2021 biopsy showed neoplasm and biopsy obtained of right iliac crest showed multiple myeloma.  Patient initially declined chemo and radiation at that time and sought naturapathic approach to treatment.  Patient is  currently being treated with Velcade, Revlimid and Dexamethasone.     Symptom management  Pain: Currently rating mid back pain with right greater than left as a 5-6/10.    Patient satisfied with current pain regimen listed above. Feels medical cannabis helping substantialing with pain, nausea, anxiety and appetite stimulation. Dyspnea: denies  Nausea: intermittent, none presently  Anxiety: yes, related to diagnosis/health. Improved. Appreciates visit/support from Mary Ellen Chan.      REVIEWED: yes    Today, the patient was seen for:  Cancer related pain and fatigue, goals of care dicussion     Prognosis, Goals, or Advance Care Planning was addressed today with: No.  Mood, coping, and/or meaning in the context of serious illness were addressed today: Yes.  Summary/Comments: Patient feels appt with Mary Ellen Chan has been a wonderful compliment in her care.            Interval History:     Per patient or family/caregivers today:  Patient has reviewed her Honoring choices with family.    Confirms wishes for DO NOT RESUSCITATE DO NOT INTUBATE and wishes to have this order placed to reflect change in CODE STATUS in her electronic medical record.  Patient reports that family is one of the most important things to her.           Review of Systems:     Besides above, an additional 12 system ROS was reviewed and is unremarkable          Medications:   Medication list was reviewed.       Physical Exam:   Vital Signs: not currently breastfeeding.   GENERAL: Well groomed, calm, alert  SKIN: Warm and dry   HEENT: Normocephalic, anicteric sclera, moist mucous membranes  LUNGS: non-labored   ABDOMINAL: non distended, non tender  MUSKL: no gross joint deformities   EXTREMITIES: No edema or cyanosis, pulses 2+ and symmetrical  NEUROLOGIC: Alert and oriented to time place person and situation.  Jovial and conversant.  Moves all extremities equally.  PSYCH: Calm and appropriate             Data Reviewed:     Reviewed recent pertinent  imaging:   EXAM: XR THORACIC SPINE 3 VIEWS  LOCATION: [facility]  DATE/TIME: 10/8/2021 10:37 AM     INDICATION: History of T7 fracture.  COMPARISON: Multiple prior comparison examinations, most recently radiography thoracic spine 09/10/2021.  TECHNIQUE: CR Thoracic Spine.                                                                      IMPRESSION: No significant interval change since the comparison examination. Severe pathologic compression fracture T7 vertebral body with 34 degrees of segmental kyphosis measured from the T6 superior endplate to the T8 inferior endplate. No evidence of   additional fracture. Vertebral body heights and alignment otherwise maintained. Mild thoracic spondylosis. Normal facets.     Surgical clips right upper quadrant. Atherosclerotic vascular calcifications. Extraspinal structures otherwise unremarkable.    Reviewed recent labs:   10/6/2021 creatinine 0.69, GFR 89, calcium 9.6, WBC 5.1, Hgb 10.5 and platelets 265   Total time spent in this encounter today 45 minutes with greater than 50% of this time face-to-face with patient and her dear friend discussing symptoms and goals of care including CODE STATUS and remainder of time spent conducting chart review.    Pt ambulatory with walker and friend to palliative care for follow up. Further recommendations and orders per palliative care provider.      Again, thank you for allowing me to participate in the care of your patient.        Sincerely,        Scarlett Flynn, CNS

## 2021-10-12 NOTE — PROGRESS NOTES
Pt ambulatory with walker to radiation clinic for last tx. Discharge instructions given. Informed to call with any questions or concerns. Follow up appointment made prior.

## 2021-10-12 NOTE — PROGRESS NOTES
Pt ambulatory with walker and friend to palliative care for follow up. Further recommendations and orders per palliative care provider.

## 2021-10-13 ENCOUNTER — LAB (OUTPATIENT)
Dept: INFUSION THERAPY | Facility: HOSPITAL | Age: 71
End: 2021-10-13
Attending: INTERNAL MEDICINE
Payer: COMMERCIAL

## 2021-10-13 VITALS
SYSTOLIC BLOOD PRESSURE: 110 MMHG | HEART RATE: 53 BPM | DIASTOLIC BLOOD PRESSURE: 62 MMHG | OXYGEN SATURATION: 99 % | RESPIRATION RATE: 16 BRPM | TEMPERATURE: 98 F

## 2021-10-13 DIAGNOSIS — C90.00 MULTIPLE MYELOMA WITH FAILED REMISSION (H): Primary | ICD-10-CM

## 2021-10-13 LAB
BASOPHILS # BLD AUTO: 0 10E3/UL (ref 0–0.2)
BASOPHILS NFR BLD AUTO: 0 %
EOSINOPHIL # BLD AUTO: 0.1 10E3/UL (ref 0–0.7)
EOSINOPHIL NFR BLD AUTO: 2 %
ERYTHROCYTE [DISTWIDTH] IN BLOOD BY AUTOMATED COUNT: 15.1 % (ref 10–15)
HCT VFR BLD AUTO: 30.7 % (ref 35–47)
HGB BLD-MCNC: 10.2 G/DL (ref 11.7–15.7)
IGA SERPL-MCNC: 20 MG/DL (ref 65–400)
IGG SERPL-MCNC: 2736 MG/DL (ref 700–1700)
IGM SERPL-MCNC: 10 MG/DL (ref 60–280)
IMM GRANULOCYTES # BLD: 0 10E3/UL
IMM GRANULOCYTES NFR BLD: 1 %
LYMPHOCYTES # BLD AUTO: 0.7 10E3/UL (ref 0.8–5.3)
LYMPHOCYTES NFR BLD AUTO: 12 %
MCH RBC QN AUTO: 34.2 PG (ref 26.5–33)
MCHC RBC AUTO-ENTMCNC: 33.2 G/DL (ref 31.5–36.5)
MCV RBC AUTO: 103 FL (ref 78–100)
MONOCYTES # BLD AUTO: 0.3 10E3/UL (ref 0–1.3)
MONOCYTES NFR BLD AUTO: 6 %
NEUTROPHILS # BLD AUTO: 4.4 10E3/UL (ref 1.6–8.3)
NEUTROPHILS NFR BLD AUTO: 79 %
NRBC # BLD AUTO: 0 10E3/UL
NRBC BLD AUTO-RTO: 0 /100
PLATELET # BLD AUTO: 174 10E3/UL (ref 150–450)
RBC # BLD AUTO: 2.98 10E6/UL (ref 3.8–5.2)
TOTAL PROTEIN SERUM FOR ELP: 7.5 G/DL (ref 6–8)
WBC # BLD AUTO: 5.5 10E3/UL (ref 4–11)

## 2021-10-13 PROCEDURE — 96401 CHEMO ANTI-NEOPL SQ/IM: CPT

## 2021-10-13 PROCEDURE — 96360 HYDRATION IV INFUSION INIT: CPT

## 2021-10-13 PROCEDURE — 84155 ASSAY OF PROTEIN SERUM: CPT

## 2021-10-13 PROCEDURE — 85025 COMPLETE CBC W/AUTO DIFF WBC: CPT | Performed by: INTERNAL MEDICINE

## 2021-10-13 PROCEDURE — 36415 COLL VENOUS BLD VENIPUNCTURE: CPT | Performed by: INTERNAL MEDICINE

## 2021-10-13 PROCEDURE — 84165 PROTEIN E-PHORESIS SERUM: CPT | Mod: TC

## 2021-10-13 PROCEDURE — 258N000003 HC RX IP 258 OP 636: Performed by: INTERNAL MEDICINE

## 2021-10-13 PROCEDURE — 84165 PROTEIN E-PHORESIS SERUM: CPT | Mod: 26

## 2021-10-13 PROCEDURE — 83883 ASSAY NEPHELOMETRY NOT SPEC: CPT

## 2021-10-13 PROCEDURE — 250N000011 HC RX IP 250 OP 636: Performed by: INTERNAL MEDICINE

## 2021-10-13 PROCEDURE — 82784 ASSAY IGA/IGD/IGG/IGM EACH: CPT

## 2021-10-13 RX ORDER — MEPERIDINE HYDROCHLORIDE 25 MG/ML
25 INJECTION INTRAMUSCULAR; INTRAVENOUS; SUBCUTANEOUS EVERY 30 MIN PRN
Status: DISCONTINUED | OUTPATIENT
Start: 2021-10-13 | End: 2021-10-13 | Stop reason: HOSPADM

## 2021-10-13 RX ORDER — METHYLPREDNISOLONE SODIUM SUCCINATE 125 MG/2ML
125 INJECTION, POWDER, LYOPHILIZED, FOR SOLUTION INTRAMUSCULAR; INTRAVENOUS
Status: DISCONTINUED | OUTPATIENT
Start: 2021-10-13 | End: 2021-10-13 | Stop reason: HOSPADM

## 2021-10-13 RX ORDER — LENALIDOMIDE 25 MG/1
25 CAPSULE ORAL DAILY
Qty: 14 CAPSULE | Refills: 0 | Status: SHIPPED | OUTPATIENT
Start: 2021-10-13 | End: 2021-11-04

## 2021-10-13 RX ORDER — NALOXONE HYDROCHLORIDE 0.4 MG/ML
0.2 INJECTION, SOLUTION INTRAMUSCULAR; INTRAVENOUS; SUBCUTANEOUS
Status: DISCONTINUED | OUTPATIENT
Start: 2021-10-13 | End: 2021-10-13 | Stop reason: HOSPADM

## 2021-10-13 RX ORDER — DIPHENHYDRAMINE HYDROCHLORIDE 50 MG/ML
50 INJECTION INTRAMUSCULAR; INTRAVENOUS
Status: DISCONTINUED | OUTPATIENT
Start: 2021-10-13 | End: 2021-10-13 | Stop reason: HOSPADM

## 2021-10-13 RX ORDER — EPINEPHRINE 1 MG/ML
0.3 INJECTION, SOLUTION INTRAMUSCULAR; SUBCUTANEOUS EVERY 5 MIN PRN
Status: DISCONTINUED | OUTPATIENT
Start: 2021-10-13 | End: 2021-10-13 | Stop reason: HOSPADM

## 2021-10-13 RX ORDER — ALBUTEROL SULFATE 0.83 MG/ML
2.5 SOLUTION RESPIRATORY (INHALATION)
Status: DISCONTINUED | OUTPATIENT
Start: 2021-10-13 | End: 2021-10-13 | Stop reason: HOSPADM

## 2021-10-13 RX ORDER — ALBUTEROL SULFATE 90 UG/1
1-2 AEROSOL, METERED RESPIRATORY (INHALATION)
Status: DISCONTINUED | OUTPATIENT
Start: 2021-10-13 | End: 2021-10-13 | Stop reason: HOSPADM

## 2021-10-13 RX ADMIN — BORTEZOMIB 2.1 MG: 3.5 INJECTION, POWDER, LYOPHILIZED, FOR SOLUTION INTRAVENOUS; SUBCUTANEOUS at 12:10

## 2021-10-13 RX ADMIN — SODIUM CHLORIDE 500 ML: 9 INJECTION, SOLUTION INTRAVENOUS at 12:07

## 2021-10-13 NOTE — PROGRESS NOTES
Infusion Nursing Note:  Lizzie Viera presents today for Velcade.    Patient seen by provider today: No   present during visit today: Not Applicable.    Note: Patient arrived ambulatory accompanied by her friend, Marielena, for Velcade injection. She reports dizziness as she walked to the infusion. VS obtained and BP running low. IV started per CONRADO Hyde, and 500 ml NS given over 30 minutes per prn treatment plan orders. Valeria reports feeling better after fluids. VS obtained and BP improved. Encouraged Valeria to push fluids and take her antiemetic at home as needed. She reports she has ondansetron available but has not tried taking it. She also reports intermittent diarrhea that began in April. Advised to try immodium at home and to call the clinic if having more than 2 loose stools/day for further guidance.      Intravenous Access:  Peripheral IV placed.    Treatment Conditions:  Results reviewed, labs MET treatment parameters, ok to proceed with treatment.      Post Infusion Assessment:  Patient tolerated injection without incident.       Discharge Plan:   Patient discharged in stable condition accompanied by: RN.  Departure Mode: Ambulatory.      Sharri Friedman RN

## 2021-10-14 LAB
KAPPA LC FREE SER-MCNC: 4.3 MG/DL (ref 0.33–1.94)
KAPPA LC FREE/LAMBDA FREE SER NEPH: 5.51 {RATIO} (ref 0.26–1.65)
LAMBDA LC FREE SERPL-MCNC: 0.78 MG/DL (ref 0.57–2.63)

## 2021-10-19 ENCOUNTER — INFUSION THERAPY VISIT (OUTPATIENT)
Dept: INFUSION THERAPY | Facility: HOSPITAL | Age: 71
End: 2021-10-19
Attending: INTERNAL MEDICINE
Payer: COMMERCIAL

## 2021-10-19 ENCOUNTER — ONCOLOGY VISIT (OUTPATIENT)
Dept: ONCOLOGY | Facility: HOSPITAL | Age: 71
End: 2021-10-19
Attending: INTERNAL MEDICINE
Payer: COMMERCIAL

## 2021-10-19 ENCOUNTER — DOCUMENTATION ONLY (OUTPATIENT)
Dept: OTHER | Facility: CLINIC | Age: 71
End: 2021-10-19

## 2021-10-19 VITALS
HEIGHT: 62 IN | WEIGHT: 134.1 LBS | RESPIRATION RATE: 20 BRPM | SYSTOLIC BLOOD PRESSURE: 109 MMHG | OXYGEN SATURATION: 98 % | DIASTOLIC BLOOD PRESSURE: 53 MMHG | BODY MASS INDEX: 24.68 KG/M2 | TEMPERATURE: 97.9 F | HEART RATE: 55 BPM

## 2021-10-19 DIAGNOSIS — C90.00 MULTIPLE MYELOMA WITH FAILED REMISSION (H): Primary | ICD-10-CM

## 2021-10-19 LAB
ALBUMIN PERCENT: 46.7 % (ref 51–67)
ALBUMIN SERPL ELPH-MCNC: 3.5 G/DL (ref 3.2–4.7)
ALBUMIN SERPL-MCNC: 2.7 G/DL (ref 3.5–5)
ALP SERPL-CCNC: 40 U/L (ref 45–120)
ALPHA 1 PERCENT: 3.7 % (ref 2–4)
ALPHA 2 PERCENT: 11.7 % (ref 5–13)
ALPHA1 GLOB SERPL ELPH-MCNC: 0.3 G/DL (ref 0.1–0.3)
ALPHA2 GLOB SERPL ELPH-MCNC: 0.9 G/DL (ref 0.4–0.9)
ALT SERPL W P-5'-P-CCNC: 16 U/L (ref 0–45)
ANION GAP SERPL CALCULATED.3IONS-SCNC: 5 MMOL/L (ref 5–18)
AST SERPL W P-5'-P-CCNC: 13 U/L (ref 0–40)
B-GLOBULIN SERPL ELPH-MCNC: 0.9 G/DL (ref 0.7–1.2)
BASOPHILS # BLD AUTO: 0 10E3/UL (ref 0–0.2)
BASOPHILS NFR BLD AUTO: 1 %
BETA PERCENT: 11.7 % (ref 10–17)
BILIRUB SERPL-MCNC: 0.4 MG/DL (ref 0–1)
BUN SERPL-MCNC: 13 MG/DL (ref 8–22)
CALCIUM SERPL-MCNC: 8.1 MG/DL (ref 8.5–10.5)
CHLORIDE BLD-SCNC: 109 MMOL/L (ref 98–107)
CO2 SERPL-SCNC: 28 MMOL/L (ref 22–31)
CREAT SERPL-MCNC: 0.62 MG/DL (ref 0.6–1.1)
EOSINOPHIL # BLD AUTO: 0.2 10E3/UL (ref 0–0.7)
EOSINOPHIL NFR BLD AUTO: 5 %
ERYTHROCYTE [DISTWIDTH] IN BLOOD BY AUTOMATED COUNT: 15.9 % (ref 10–15)
GAMMA GLOB SERPL ELPH-MCNC: 2 G/DL (ref 0.6–1.4)
GAMMA GLOBULIN PERCENT: 26.2 % (ref 9–20)
GFR SERPL CREATININE-BSD FRML MDRD: >90 ML/MIN/1.73M2
GLUCOSE BLD-MCNC: 92 MG/DL (ref 70–125)
HCT VFR BLD AUTO: 27.5 % (ref 35–47)
HGB BLD-MCNC: 8.9 G/DL (ref 11.7–15.7)
IMM GRANULOCYTES # BLD: 0 10E3/UL
IMM GRANULOCYTES NFR BLD: 0 %
LYMPHOCYTES # BLD AUTO: 1 10E3/UL (ref 0.8–5.3)
LYMPHOCYTES NFR BLD AUTO: 21 %
MCH RBC QN AUTO: 34.1 PG (ref 26.5–33)
MCHC RBC AUTO-ENTMCNC: 32.4 G/DL (ref 31.5–36.5)
MCV RBC AUTO: 105 FL (ref 78–100)
MONOCLONAL PEAK: 1.7 G/DL
MONOCYTES # BLD AUTO: 0.9 10E3/UL (ref 0–1.3)
MONOCYTES NFR BLD AUTO: 18 %
NEUTROPHILS # BLD AUTO: 2.7 10E3/UL (ref 1.6–8.3)
NEUTROPHILS NFR BLD AUTO: 55 %
NRBC # BLD AUTO: 0 10E3/UL
NRBC BLD AUTO-RTO: 0 /100
PATH ICD:: ABNORMAL
PLATELET # BLD AUTO: 233 10E3/UL (ref 150–450)
POTASSIUM BLD-SCNC: 3.1 MMOL/L (ref 3.5–5)
PROT PATTERN SERPL ELPH-IMP: ABNORMAL
PROT SERPL-MCNC: 6.6 G/DL (ref 6–8)
RBC # BLD AUTO: 2.61 10E6/UL (ref 3.8–5.2)
REVIEWING PATHOLOGIST: ABNORMAL
SODIUM SERPL-SCNC: 142 MMOL/L (ref 136–145)
TOTAL PROTEIN SERUM FOR ELP (SYNCED VALUE): 7.5 G/DL
WBC # BLD AUTO: 4.9 10E3/UL (ref 4–11)

## 2021-10-19 PROCEDURE — 99214 OFFICE O/P EST MOD 30 MIN: CPT | Performed by: NURSE PRACTITIONER

## 2021-10-19 PROCEDURE — 250N000011 HC RX IP 250 OP 636: Performed by: INTERNAL MEDICINE

## 2021-10-19 PROCEDURE — 96401 CHEMO ANTI-NEOPL SQ/IM: CPT

## 2021-10-19 PROCEDURE — G0463 HOSPITAL OUTPT CLINIC VISIT: HCPCS | Mod: 25

## 2021-10-19 PROCEDURE — 36415 COLL VENOUS BLD VENIPUNCTURE: CPT | Performed by: INTERNAL MEDICINE

## 2021-10-19 PROCEDURE — 82040 ASSAY OF SERUM ALBUMIN: CPT | Performed by: INTERNAL MEDICINE

## 2021-10-19 PROCEDURE — 85025 COMPLETE CBC W/AUTO DIFF WBC: CPT | Performed by: INTERNAL MEDICINE

## 2021-10-19 RX ORDER — METHYLPREDNISOLONE SODIUM SUCCINATE 125 MG/2ML
125 INJECTION, POWDER, LYOPHILIZED, FOR SOLUTION INTRAMUSCULAR; INTRAVENOUS
Status: CANCELLED
Start: 2021-11-23

## 2021-10-19 RX ORDER — METHYLPREDNISOLONE SODIUM SUCCINATE 125 MG/2ML
125 INJECTION, POWDER, LYOPHILIZED, FOR SOLUTION INTRAMUSCULAR; INTRAVENOUS
Status: CANCELLED
Start: 2021-11-16

## 2021-10-19 RX ORDER — MEPERIDINE HYDROCHLORIDE 25 MG/ML
25 INJECTION INTRAMUSCULAR; INTRAVENOUS; SUBCUTANEOUS EVERY 30 MIN PRN
Status: CANCELLED | OUTPATIENT
Start: 2021-11-23

## 2021-10-19 RX ORDER — NALOXONE HYDROCHLORIDE 0.4 MG/ML
0.2 INJECTION, SOLUTION INTRAMUSCULAR; INTRAVENOUS; SUBCUTANEOUS
Status: CANCELLED | OUTPATIENT
Start: 2021-11-16

## 2021-10-19 RX ORDER — NALOXONE HYDROCHLORIDE 0.4 MG/ML
0.2 INJECTION, SOLUTION INTRAMUSCULAR; INTRAVENOUS; SUBCUTANEOUS
Status: CANCELLED | OUTPATIENT
Start: 2021-11-09

## 2021-10-19 RX ORDER — DIPHENHYDRAMINE HYDROCHLORIDE 50 MG/ML
50 INJECTION INTRAMUSCULAR; INTRAVENOUS
Status: CANCELLED
Start: 2021-11-23

## 2021-10-19 RX ORDER — ALBUTEROL SULFATE 0.83 MG/ML
2.5 SOLUTION RESPIRATORY (INHALATION)
Status: CANCELLED | OUTPATIENT
Start: 2021-11-23

## 2021-10-19 RX ORDER — LORAZEPAM 2 MG/ML
0.5 INJECTION INTRAMUSCULAR EVERY 4 HOURS PRN
Status: CANCELLED | OUTPATIENT
Start: 2021-11-16

## 2021-10-19 RX ORDER — ALBUTEROL SULFATE 90 UG/1
1-2 AEROSOL, METERED RESPIRATORY (INHALATION)
Status: CANCELLED
Start: 2021-11-09

## 2021-10-19 RX ORDER — LORAZEPAM 2 MG/ML
0.5 INJECTION INTRAMUSCULAR EVERY 4 HOURS PRN
Status: CANCELLED | OUTPATIENT
Start: 2021-11-23

## 2021-10-19 RX ORDER — DIPHENHYDRAMINE HYDROCHLORIDE 50 MG/ML
50 INJECTION INTRAMUSCULAR; INTRAVENOUS
Status: CANCELLED
Start: 2021-11-16

## 2021-10-19 RX ORDER — LORAZEPAM 2 MG/ML
0.5 INJECTION INTRAMUSCULAR EVERY 4 HOURS PRN
Status: CANCELLED | OUTPATIENT
Start: 2021-11-09

## 2021-10-19 RX ORDER — ALBUTEROL SULFATE 90 UG/1
1-2 AEROSOL, METERED RESPIRATORY (INHALATION)
Status: CANCELLED
Start: 2021-11-16

## 2021-10-19 RX ORDER — ALBUTEROL SULFATE 90 UG/1
1-2 AEROSOL, METERED RESPIRATORY (INHALATION)
Status: CANCELLED
Start: 2021-11-23

## 2021-10-19 RX ORDER — ALBUTEROL SULFATE 0.83 MG/ML
2.5 SOLUTION RESPIRATORY (INHALATION)
Status: CANCELLED | OUTPATIENT
Start: 2021-11-09

## 2021-10-19 RX ORDER — MEPERIDINE HYDROCHLORIDE 25 MG/ML
25 INJECTION INTRAMUSCULAR; INTRAVENOUS; SUBCUTANEOUS EVERY 30 MIN PRN
Status: CANCELLED | OUTPATIENT
Start: 2021-11-16

## 2021-10-19 RX ORDER — ALBUTEROL SULFATE 0.83 MG/ML
2.5 SOLUTION RESPIRATORY (INHALATION)
Status: CANCELLED | OUTPATIENT
Start: 2021-11-16

## 2021-10-19 RX ORDER — EPINEPHRINE 1 MG/ML
0.3 INJECTION, SOLUTION INTRAMUSCULAR; SUBCUTANEOUS EVERY 5 MIN PRN
Status: CANCELLED | OUTPATIENT
Start: 2021-11-09

## 2021-10-19 RX ORDER — EPINEPHRINE 1 MG/ML
0.3 INJECTION, SOLUTION INTRAMUSCULAR; SUBCUTANEOUS EVERY 5 MIN PRN
Status: CANCELLED | OUTPATIENT
Start: 2021-11-23

## 2021-10-19 RX ORDER — NALOXONE HYDROCHLORIDE 0.4 MG/ML
0.2 INJECTION, SOLUTION INTRAMUSCULAR; INTRAVENOUS; SUBCUTANEOUS
Status: CANCELLED | OUTPATIENT
Start: 2021-11-23

## 2021-10-19 RX ORDER — EPINEPHRINE 1 MG/ML
0.3 INJECTION, SOLUTION INTRAMUSCULAR; SUBCUTANEOUS EVERY 5 MIN PRN
Status: CANCELLED | OUTPATIENT
Start: 2021-11-16

## 2021-10-19 RX ORDER — MEPERIDINE HYDROCHLORIDE 25 MG/ML
25 INJECTION INTRAMUSCULAR; INTRAVENOUS; SUBCUTANEOUS EVERY 30 MIN PRN
Status: CANCELLED | OUTPATIENT
Start: 2021-11-09

## 2021-10-19 RX ORDER — DIPHENHYDRAMINE HYDROCHLORIDE 50 MG/ML
50 INJECTION INTRAMUSCULAR; INTRAVENOUS
Status: CANCELLED
Start: 2021-11-09

## 2021-10-19 RX ORDER — METHYLPREDNISOLONE SODIUM SUCCINATE 125 MG/2ML
125 INJECTION, POWDER, LYOPHILIZED, FOR SOLUTION INTRAMUSCULAR; INTRAVENOUS
Status: CANCELLED
Start: 2021-11-09

## 2021-10-19 RX ADMIN — BORTEZOMIB 2.1 MG: 3.5 INJECTION, POWDER, LYOPHILIZED, FOR SOLUTION INTRAVENOUS; SUBCUTANEOUS at 14:00

## 2021-10-19 ASSESSMENT — PAIN SCALES - GENERAL: PAINLEVEL: MODERATE PAIN (5)

## 2021-10-19 ASSESSMENT — MIFFLIN-ST. JEOR: SCORE: 1086.65

## 2021-10-19 NOTE — PROGRESS NOTES
Infusion Nursing Note:  Lizzie Viera presents today for Velcade.    Patient seen by provider today: Yes: LANE Shahid.   present during visit today: Not Applicable.    Note: Velcade administered in RLQ abdomen as ordered.      Intravenous Access:  No Intravenous access at this visit.    Treatment Conditions:  Results reviewed, labs MET treatment parameters, ok to proceed with treatment.      Post Infusion Assessment:  Patient tolerated injection without incident.       Discharge Plan:   Patient discharged in stable condition accompanied by: Marielena daniels.  Departure Mode: Ambulatory.      Sharri Friedman RN

## 2021-10-19 NOTE — LETTER
10/19/2021         RE: Lizzie Viera  [address]  [place] [address]        Dear Colleague,    Thank you for referring your patient, Lizzie Viera, to the [facility] [place]. Please see a copy of my visit note below.    [facility] Progress Note    Patient: Lizzie Viera  MRN: [id number]  Date of Service: 10/19/2021        Reason for Visit    Chief Complaint   Patient presents with     Oncology Clinic Visit     3 week return Multiple Myeloma       Assessment and Plan    Cancer Staging  No matching staging information was found for the patient.    1. Myeloma: has started on treatment with RVd. Today is cycle 2. Her light chains have responded well have gone from 12.5-4.3. Monoclonal peak has gone from 3.3 down to 1.7. She is tolerating treatment well. She will continue her treatment and will be seen for cycle 3. I did tell her we will likely refer her to transplant after 4-6 cycles depending on her response to treatment.    2. Bone lesions: has started on zometa. Will continue monthly for now. She is questioning whether this medication may make her bones more brittle and is not sure she wants to continue. She will continue for now and discussed with Dr. Monreal at her next appointment. Did tell her we generally like to give it regularly for the first 2 years and then we can back off.    3. Some mild dysphagia and Odynophagia: This is likely from her radiation that completed last week. I told her it would likely be there for a couple of weeks and that should improve. Encouraged her to drink lots of fluids with her pills and put them in applesauce or yogurt if she is having more problems with swallowing. It does not improve after couple weeks we may do a swallow study    4. Hypokalemia: Dates she does have a potassium supplement at home. I did encourage her to start taking 1 a day.    ECOG Performance    0 - Independent    Distress  Screening (within last 30 days)    1. How concerned are you about your ability to eat? : 0  2. How concerned are you about unintended weight loss or your current weight? : 0  3. How concerned are you about feeling depressed or very sad? : 0  4. How concerned are you about feeling anxious or very scared? : 0  5. Do you struggle with the loss of meaning and rambo in your life? : Not at all  6. How concerned are you about work and home life issues that may be affected by your cancer? : 2  7. How concerned are you about knowing what resources are available to help you? : 0  8. Do you currently have what you would describe as Orthodox or spiritual struggles?            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 Lizzie Viera is a very pleasant 70 year old woman who has been diagnosed with multiple myeloma IgG kappa type in May 2021.  She had initially presented with compression fracture of T7 vertebral body in  September 2020.  She had a back pain which led to that evaluation.  Bone density confirmed that she had osteoporosis.  MRI showed diffuse marrow edema in October 2020.  In April 2021 the MRI of the thoracic spine showed worsening T7 vertebral body height loss because of likely pathological compression fracture with extraosseous extension.  She then had IR guided bone biopsy on 7 May 2021 and pathology confirmed the presence of kappa light chain restricted plasma cells.  Her cytogenetics confirmed the presence of hyperdiploid E with gains of chromosome 5, 9 and 15.     She was then seen by Dr. Baig and had a bone marrow biopsy which also confirmed 60% involvement of the marrow with plasma cells.  The bone marrow was done on 3 Jocelin 2021.  The bone marrow also confirmed the presence of hyperdiploid E.  She had a gain of chromosome 3, 5, 7, 9, 11, 15 as well as 19.  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  Her beta-2 microglobulin was actually normal at the time of her diagnosis as was her albumin at 4.0.  Monoclonal protein 3.1 g/dL.  Kappa free light chain levels of 13 mg/dL IgG level of 4280 with depressed levels of IgM and IgA.  Urine was also positive for kappa light chains as well as immunofixation of the urine was positive for IgG kappa.  Normal kidney function.  Normal hemoglobin.     As mentioned above she had bone lesions in the T7 vertebral body, T1, skull area.  Her main pain is coming from her T7 vertebral body compression fracture.     Due to the pain she has been seen by radiation oncology and has received radiation therapy.  She also got a dose of steroids for pain control.     Currently her pain is controlled with combination of Dilaudid as well as some Tylenol.  She is wearing a brace.  She did notice that when she was on dexamethasone her pain was significantly better.     She was initially thinking of doing some natural therapies but once her symptoms got worse, she came back in was counseled about treatment.   "She has been given information about Velcade Revlimid and dexamethasone. He has started that treatment and has had 1 cycle. She states she is doing well with it. She is here today to do cycle 2. She did complete radiation to her T1 vertebrae last week. She says she is noticing some pain with swallowing and also feels that some of her pills are getting stuck because it is dry.    Review of Systems    Pertinent items are noted in HPI.    Past History    Past Medical History:   Diagnosis Date     Cervical dysplasia      Chronic RUQ pain      Osteoporosis        PHYSICAL EXAM  /53 (BP Location: Left arm, Patient Position: Sitting, Cuff Size: Adult Regular)   Pulse 55   Temp 97.9  F (36.6  C) (Oral)   Resp 20   Ht 1.575 m (5' 2.01\")   Wt 60.8 kg (134 lb 1.6 oz)   SpO2 98%   BMI 24.52 kg/m      GENERAL: no acute distress. Cooperative in conversation. Here with family. Mask on  RESP: Regular respiratory rate. No expiratory wheezes   MUSCULOSKELETAL: no bilateral leg swelling  NEURO: non focal. Alert and oriented x3.   PSYCH: within normal limits. No depression or anxiety.  SKIN: exposed skin is dry intact.     Lab Results    Recent Results (from the past 168 hour(s))   Immunoglobulins A G and M   Result Value Ref Range    Immunoglobulin G 2,736 (H) 700-1,700 mg/dL    Immunoglobulin A 20 (L) 65 - 400 mg/dL    Immunoglobulin M 10 (L) 60 - 280 mg/dL   Kappa and lambda light chain   Result Value Ref Range    Kappa Free Light Chains 4.30 (H) 0.33 - 1.94 mg/dL    Lambda Free Light Chains 0.78 0.57 - 2.63 mg/dL    Kappa /Lambda Ratio 5.51 (H) 0.26 - 1.65   Total Protein, Serum   Result Value Ref Range    Total Protein Serum for ELP 7.5 6.0 - 8.0 g/dL   Protein Electrophoresis, Serum   Result Value Ref Range    Albumin % 46.7 (L) 51.0 - 67.0 %    Albumin 3.5 3.2 - 4.7 g/dL    Alpha 1 % 3.7 2.0 - 4.0 %    Alpha 1 0.3 0.1 - 0.3 g/dL    Alpha 2 % 11.7 5.0 - 13.0 %    Alpha 2 0.9 0.4 - 0.9 g/dL    Beta % 11.7 10.0 - 17.0 " %    Beta Globulin 0.9 0.7 - 1.2 g/dL    Gamma Globulin % 26.2 (H) 9.0 - 20.0 %    Gamma Globulin 2.0 (H) 0.6 - 1.4 g/dL    Monoclonal Peak 1.7 g/dL    ELP Interpretation       A symmetric peak is present in the gamma globulin region of the tracing, and a clonal band is present in the corresponding region of the gel strip, indicating a monoclonal globulin. Serum immunofixation can further identify the abnormal protein.     Path ICD: C90.00     Reviewing Pathologist Malachi Gomes MD      Total Protein Serum for ELP 7.5 g/dL   CBC with platelets and differential   Result Value Ref Range    WBC Count 5.5 4.0 - 11.0 10e3/uL    RBC Count 2.98 (L) 3.80 - 5.20 10e6/uL    Hemoglobin 10.2 (L) 11.7 - 15.7 g/dL    Hematocrit 30.7 (L) 35.0 - 47.0 %     (H) 78 - 100 fL    MCH 34.2 (H) 26.5 - 33.0 pg    MCHC 33.2 31.5 - 36.5 g/dL    RDW 15.1 (H) 10.0 - 15.0 %    Platelet Count 174 150 - 450 10e3/uL    % Neutrophils 79 %    % Lymphocytes 12 %    % Monocytes 6 %    % Eosinophils 2 %    % Basophils 0 %    % Immature Granulocytes 1 %    NRBCs per 100 WBC 0 <1 /100    Absolute Neutrophils 4.4 1.6 - 8.3 10e3/uL    Absolute Lymphocytes 0.7 (L) 0.8 - 5.3 10e3/uL    Absolute Monocytes 0.3 0.0 - 1.3 10e3/uL    Absolute Eosinophils 0.1 0.0 - 0.7 10e3/uL    Absolute Basophils 0.0 0.0 - 0.2 10e3/uL    Absolute Immature Granulocytes 0.0 <=0.0 10e3/uL    Absolute NRBCs 0.0 10e3/uL   Comprehensive metabolic panel   Result Value Ref Range    Sodium 142 136 - 145 mmol/L    Potassium 3.1 (L) 3.5 - 5.0 mmol/L    Chloride 109 (H) 98 - 107 mmol/L    Carbon Dioxide (CO2) 28 22 - 31 mmol/L    Anion Gap 5 5 - 18 mmol/L    Urea Nitrogen 13 8 - 22 mg/dL    Creatinine 0.62 0.60 - 1.10 mg/dL    Calcium 8.1 (L) 8.5 - 10.5 mg/dL    Glucose 92 70 - 125 mg/dL    Alkaline Phosphatase 40 (L) 45 - 120 U/L    AST 13 0 - 40 U/L    ALT 16 0 - 45 U/L    Protein Total 6.6 6.0 - 8.0 g/dL    Albumin 2.7 (L) 3.5 - 5.0 g/dL    Bilirubin Total 0.4 0.0 - 1.0  mg/dL    GFR Estimate >90 >60 mL/min/1.73m2   CBC with platelets and differential   Result Value Ref Range    WBC Count 4.9 4.0 - 11.0 10e3/uL    RBC Count 2.61 (L) 3.80 - 5.20 10e6/uL    Hemoglobin 8.9 (L) 11.7 - 15.7 g/dL    Hematocrit 27.5 (L) 35.0 - 47.0 %     (H) 78 - 100 fL    MCH 34.1 (H) 26.5 - 33.0 pg    MCHC 32.4 31.5 - 36.5 g/dL    RDW 15.9 (H) 10.0 - 15.0 %    Platelet Count 233 150 - 450 10e3/uL    % Neutrophils 55 %    % Lymphocytes 21 %    % Monocytes 18 %    % Eosinophils 5 %    % Basophils 1 %    % Immature Granulocytes 0 %    NRBCs per 100 WBC 0 <1 /100    Absolute Neutrophils 2.7 1.6 - 8.3 10e3/uL    Absolute Lymphocytes 1.0 0.8 - 5.3 10e3/uL    Absolute Monocytes 0.9 0.0 - 1.3 10e3/uL    Absolute Eosinophils 0.2 0.0 - 0.7 10e3/uL    Absolute Basophils 0.0 0.0 - 0.2 10e3/uL    Absolute Immature Granulocytes 0.0 <=0.0 10e3/uL    Absolute NRBCs 0.0 10e3/uL   IgG  Lab Results   Component Value Date    IGG 2,736 (H) 10/13/2021    IGG 4,439 (H) 09/20/2021   kappa light chains:  Lab Results   Component Value Date    KFLCA 4.30 (H) 10/13/2021    KFLCA 12.57 (H) 09/20/2021    KFLCA 13.37 (H) 05/27/2021   Monoclonal peak  Lab Results   Component Value Date    ELPM 1.7 10/13/2021    ELPM 3.3 09/20/2021    ELPM 3.1 05/27/2021   ]  Imaging    XR Femur Left 2 Views    Result Date: 9/26/2021  EXAM: XR FEMUR LEFT 2 VIEW LOCATION: [facility] DATE/TIME: 9/26/2021 10:38 AM INDICATION:  Multiple myeloma with failed remission (H), Multiple myeloma not having achieved remission (H), Localized osteoporosis with current pathological fracture with routine healing, subsequent encounter, 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     IMPRESSION: No acute fracture or malalignment. Mild degenerative changes. Osteopenia. No suspicious intraosseous lytic lesion.    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     IMPRESSION: No acute fracture or malalignment. Mild degenerative changes. Osteopenia. No suspicious intraosseous lytic lesion.    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     IMPRESSION: No acute fracture or malalignment. No significant degenerative changes. Osteopenia. No suspicious intraosseous lytic lesion.    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     IMPRESSION: No acute fracture or malalignment. Minimal degenerative changes. Osteopenia. No suspicious intraosseous lytic lesion.    XR Thoracic Spine 3 Views    Result Date: 10/8/2021  EXAM: XR THORACIC SPINE 3 VIEWS LOCATION: [facility] DATE/TIME: 10/8/2021 10:37 AM INDICATION: History of T7 fracture. COMPARISON: Multiple  prior comparison examinations, most recently radiography thoracic spine 09/10/2021. TECHNIQUE: CR Thoracic Spine.     IMPRESSION: No significant interval change since the comparison examination. Severe pathologic compression fracture T7 vertebral body with 34 degrees of segmental kyphosis measured from the T6 superior endplate to the T8 inferior endplate. No evidence of  additional fracture. Vertebral body heights and alignment otherwise maintained. Mild thoracic spondylosis. Normal facets. Surgical clips right upper quadrant. Atherosclerotic vascular calcifications. Extraspinal structures otherwise unremarkable.    CT Cervical Spine w/o Contrast    Result Date: 9/27/2021  EXAM: CT CERVICAL SPINE W/O CONTRAST LOCATION: [facility] DATE/TIME: 9/26/2021 10:08 AM INDICATION:  Multiple myeloma with failed remission (H), Multiple myeloma not having achieved remission (H) COMPARISON: Cervical spine MR 9/23/2020. TECHNIQUE: Routine CT Cervical Spine without IV contrast. Multiplanar reformats. Dose reduction techniques were used. FINDINGS: VERTEBRA: There has been an interval increase in size of the destructive bony lesion in the T1 vertebral body with an associated pathologic compression fracture of the T1 vertebral body. Vertebral body heights of the cervical spine remain normal. There are small (2-3 mm) hypodense foci in the right lateral mass of C2 and in the right posterolateral aspect of the C3 vertebral body that are nonspecific. No other suspicious focal lucencies identified in any of the cervical vertebrae. Alignment of the cervical vertebrae remains normal. No fractures of the cervical spine. CANAL/FORAMINA: No canal or neural foraminal stenosis. PARASPINAL: No extraspinal abnormality.     IMPRESSION: 1.  Interval enlargement of the destructive bony lesion of the T1 vertebral body with associated mild compression fracture deformity. 2.  Focal 2-3 mm nonspecific lucent foci in the right C2  lateral mass and in the C3 vertebral body. No other suspicious bony lesions. 3.  Normal alignment of the cervical vertebrae. No spinal canal or neural foraminal stenosis. No significant degenerative changes.    CT Chest Abdomen Pelvis w/o Contrast    Result Date: 9/26/2021  EXAM: CT CHEST ABDOMEN PELVIS W/O CONTRAST LOCATION: [facility] DATE/TIME: 9/26/2021 10:09 AM INDICATION:  Multiple myeloma with failed remission. Compression fractures T7 with epidural tumor. COMPARISON: 05/04/2021 and older studies, PET/CT 05/28/2021, thoracic MR 08/31/2021, thoracic plain films 09/10/2021 TECHNIQUE: CT scan of the chest, abdomen, and pelvis was performed without IV contrast. Multiplanar reformats were obtained. Dose reduction techniques were used. CONTRAST: None. FINDINGS: LUNGS AND PLEURA: There are a few, bilateral tiny scattered pulmonary nodules which are stable. No effusions. Atelectasis in the right lateral costophrenic angle has cleared. MEDIASTINUM/AXILLAE: No adenopathy. CORONARY ARTERY CALCIFICATION: Mild. HEPATOBILIARY: Normal. PANCREAS: Normal. SPLEEN: Normal. ADRENAL GLANDS: Multiple low dense left adrenal adenomas are noted largest laterally measuring 1.7 cm. KIDNEYS/BLADDER: No change in the 2.8 cm left renal cyst. This needs no follow-up. BOWEL: Redundant colon. No mass or ascites. LYMPH NODES: Normal. VASCULATURE: Moderate arterial calcifications. No aneurysm. PELVIC ORGANS: Normal. MUSCULOSKELETAL: Progression of disease in the spine. There is a new, 1.8 cm deposit anteriorly along the left side of T1 with some minimal collapse of the superior endplate. Lytic destruction of the T6 and T8 vertebral bodies has developed with involvement of posterior elements. These surround the vertebral plana of T7 which has progressed since the May exam. There is bulky paravertebral and epidural tumor encircling the thoracic cord at this level which is narrowed to at least a centimeter.      IMPRESSION: 1.  Progression of spinal involvement most notably at T6 and T8 as noted above with paravertebral and epidural tumor surrounding the thecal sac which is narrowed to a centimeter. Thoracic MRI can evaluate extent of cord compression/signal changes. 2.  Progression of the vertebral plana at T7. 3.  New lesion at T1 with minimal compression of the superior endplate. 4.  Critical findings discussed by the undersigned with Dr. John at 1346. He is to contact the patient today. 5.  Other noncritical findings as noted above. NOTE: ABNORMAL REPORT THE DICTATION ABOVE DESCRIBES AN ABNORMALITY FOR WHICH FOLLOW-UP IS NEEDED.     CT Head w/o Contrast    Result Date: 9/27/2021  EXAM: CT HEAD W/O CONTRAST LOCATION: [facility] DATE/TIME: 9/26/2021 10:08 AM INDICATION: Evaluate for any myeloma lesions in the bones. COMPARISON: Brain MR 5/22/2017. TECHNIQUE: Routine CT Head without IV contrast. Multiplanar reformats. Dose reduction techniques were used. FINDINGS: INTRACRANIAL CONTENTS: No intracranial hemorrhage, extraaxial collection, or mass effect.  No CT evidence of acute infarct. Mild presumed chronic small vessel ischemic changes. Mild generalized volume loss. No hydrocephalus. VISUALIZED ORBITS/SINUSES/MASTOIDS: No intraorbital abnormality. No paranasal sinus mucosal disease. No middle ear or mastoid effusion. BONES/SOFT TISSUES: There is a new large destructive bony lesion in the high posterior aspect of the left parietal bone measuring 4.5 cm in diameter and the thickness of the calvaria with destruction of both the inner and outer tables of the skull. There  are multiple additional tiny focal lucent bony lesions in the calvaria that may represent additional myelomatous involvement.     IMPRESSION: 1.  Large destructive bony lesion in the high posterior left parietal bone measuring 4.5 cm in diameter with destruction of both the inner and outer tables of the skull. 2.  Multiple  "additional tiny lucent calvarial lesions that may represent additional myelomatous lesions. 3.  No CT evidence for acute intracranial process. 4.  Brain atrophy and presumed chronic microvascular ischemic changes as above.               Signed by: SINDHU Lundberg CNP    Oncology Rooming Note    October 19, 2021 12:54 PM   Lizzie Viera is a 71 year old female who presents for:    Chief Complaint   Patient presents with     Oncology Clinic Visit     3 week return Multiple Myeloma     Initial Vitals: /53 (BP Location: Left arm, Patient Position: Sitting, Cuff Size: Adult Regular)   Pulse 55   Temp 97.9  F (36.6  C) (Oral)   Resp 20   Ht 1.575 m (5' 2.01\")   Wt 60.8 kg (134 lb 1.6 oz)   SpO2 98%   BMI 24.52 kg/m   Estimated body mass index is 24.52 kg/m  as calculated from the following:    Height as of this encounter: 1.575 m (5' 2.01\").    Weight as of this encounter: 60.8 kg (134 lb 1.6 oz). Body surface area is 1.63 meters squared.  Moderate Pain (5) Comment: Data Unavailable   No LMP recorded. Patient is postmenopausal.  Allergies reviewed: Yes  Medications reviewed: Yes    Medications: Medication refills not needed today.  Pharmacy name entered into [employer]: [id number] [address]    Clinical concerns: 3 week return Multiple Myeloma. Patient reports Sore throat x 3 days & pills getting stuck in throat. Also has questions regarding Lab and Zometa.       Marina Wills [facility]                  Again, thank you for allowing me to participate in the care of your patient.        Sincerely,        SINDHU Lundberg CNP    "

## 2021-10-19 NOTE — PROGRESS NOTES
[facility] Hematology and Oncology Progress Note    Patient: Lizzie Viera  MRN: [id number]  Date of Service: 10/19/2021        Reason for Visit    Chief Complaint   Patient presents with     Oncology Clinic Visit     3 week return Multiple Myeloma       Assessment and Plan    Cancer Staging  No matching staging information was found for the patient.    1. Myeloma: has started on treatment with RVd. Today is cycle 2. Her light chains have responded well have gone from 12.5-4.3. Monoclonal peak has gone from 3.3 down to 1.7. She is tolerating treatment well. She will continue her treatment and will be seen for cycle 3. I did tell her we will likely refer her to transplant after 4-6 cycles depending on her response to treatment.    2. Bone lesions: has started on zometa. Will continue monthly for now. She is questioning whether this medication may make her bones more brittle and is not sure she wants to continue. She will continue for now and discussed with Dr. Monreal at her next appointment. Did tell her we generally like to give it regularly for the first 2 years and then we can back off.    3. Some mild dysphagia and Odynophagia: This is likely from her radiation that completed last week. I told her it would likely be there for a couple of weeks and that should improve. Encouraged her to drink lots of fluids with her pills and put them in applesauce or yogurt if she is having more problems with swallowing. It does not improve after couple weeks we may do a swallow study    4. Hypokalemia: Dates she does have a potassium supplement at home. I did encourage her to start taking 1 a day.    ECOG Performance    0 - Independent    Distress Screening (within last 30 days)    1. How concerned are you about your ability to eat? : 0  2. How concerned are you about unintended weight loss or your current weight? : 0  3. How concerned are you about feeling depressed or very sad? : 0  4. How concerned are you about  feeling anxious or very scared? : 0  5. Do you struggle with the loss of meaning and rambo in your life? : Not at all  6. How concerned are you about work and home life issues that may be affected by your cancer? : 2  7. How concerned are you about knowing what resources are available to help you? : 0  8. Do you currently have what you would describe as Mandaeism or spiritual struggles?            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 Lizzie Viera is a very pleasant 70 year old woman who has been diagnosed with multiple myeloma IgG kappa type in May 2021.  She had initially presented with compression fracture of T7 vertebral body in September 2020.  She had a back pain which led to that evaluation.  Bone density confirmed that she had osteoporosis.  MRI showed diffuse marrow edema in October 2020.  In April 2021 the MRI of the thoracic spine showed worsening T7 vertebral body height loss because of likely  pathological compression fracture with extraosseous extension.  She then had IR guided bone biopsy on 7 May 2021 and pathology confirmed the presence of kappa light chain restricted plasma cells.  Her cytogenetics confirmed the presence of hyperdiploid E with gains of chromosome 5, 9 and 15.     She was then seen by Dr. Baig and had a bone marrow biopsy which also confirmed 60% involvement of the marrow with plasma cells.  The bone marrow was done on 3 Jocelin 2021.  The bone marrow also confirmed the presence of hyperdiploid E.  She had a gain of chromosome 3, 5, 7, 9, 11, 15 as well as 19.  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  Her beta-2 microglobulin was actually normal at the time of her diagnosis as was her albumin at 4.0.  Monoclonal protein 3.1 g/dL.  Kappa free light chain levels of 13 mg/dL IgG level of 4280 with depressed levels of IgM and IgA.  Urine was also positive for kappa light chains as well as immunofixation of the urine was positive for IgG kappa.  Normal kidney function.  Normal hemoglobin.     As mentioned above she had bone lesions in the T7 vertebral body, T1, skull area.  Her main pain is coming from her T7 vertebral body compression fracture.     Due to the pain she has been seen by radiation oncology and has received radiation therapy.  She also got a dose of steroids for pain control.     Currently her pain is controlled with combination of Dilaudid as well as some Tylenol.  She is wearing a brace.  She did notice that when she was on dexamethasone her pain was significantly better.     She was initially thinking of doing some natural therapies but once her symptoms got worse, she came back in was counseled about treatment.  She has been given information about Velcade Revlimid and dexamethasone. He has started that treatment and has had 1 cycle. She states she is doing well with it. She is here today to do cycle 2. She did complete radiation to her T1 vertebrae last week. She says she is noticing  "some pain with swallowing and also feels that some of her pills are getting stuck because it is dry.    Review of Systems    Pertinent items are noted in HPI.    Past History    Past Medical History:   Diagnosis Date     Cervical dysplasia      Chronic RUQ pain      Osteoporosis        PHYSICAL EXAM  /53 (BP Location: Left arm, Patient Position: Sitting, Cuff Size: Adult Regular)   Pulse 55   Temp 97.9  F (36.6  C) (Oral)   Resp 20   Ht 1.575 m (5' 2.01\")   Wt 60.8 kg (134 lb 1.6 oz)   SpO2 98%   BMI 24.52 kg/m      GENERAL: no acute distress. Cooperative in conversation. Here with family. Mask on  RESP: Regular respiratory rate. No expiratory wheezes   MUSCULOSKELETAL: no bilateral leg swelling  NEURO: non focal. Alert and oriented x3.   PSYCH: within normal limits. No depression or anxiety.  SKIN: exposed skin is dry intact.     Lab Results    Recent Results (from the past 168 hour(s))   Immunoglobulins A G and M   Result Value Ref Range    Immunoglobulin G 2,736 (H) 700-1,700 mg/dL    Immunoglobulin A 20 (L) 65 - 400 mg/dL    Immunoglobulin M 10 (L) 60 - 280 mg/dL   Kappa and lambda light chain   Result Value Ref Range    Kappa Free Light Chains 4.30 (H) 0.33 - 1.94 mg/dL    Lambda Free Light Chains 0.78 0.57 - 2.63 mg/dL    Kappa /Lambda Ratio 5.51 (H) 0.26 - 1.65   Total Protein, Serum   Result Value Ref Range    Total Protein Serum for ELP 7.5 6.0 - 8.0 g/dL   Protein Electrophoresis, Serum   Result Value Ref Range    Albumin % 46.7 (L) 51.0 - 67.0 %    Albumin 3.5 3.2 - 4.7 g/dL    Alpha 1 % 3.7 2.0 - 4.0 %    Alpha 1 0.3 0.1 - 0.3 g/dL    Alpha 2 % 11.7 5.0 - 13.0 %    Alpha 2 0.9 0.4 - 0.9 g/dL    Beta % 11.7 10.0 - 17.0 %    Beta Globulin 0.9 0.7 - 1.2 g/dL    Gamma Globulin % 26.2 (H) 9.0 - 20.0 %    Gamma Globulin 2.0 (H) 0.6 - 1.4 g/dL    Monoclonal Peak 1.7 g/dL    ELP Interpretation       A symmetric peak is present in the gamma globulin region of the tracing, and a clonal band is present " in the corresponding region of the gel strip, indicating a monoclonal globulin. Serum immunofixation can further identify the abnormal protein.     Path ICD: C90.00     Reviewing Pathologist Malachi Gomes MD      Total Protein Serum for ELP 7.5 g/dL   CBC with platelets and differential   Result Value Ref Range    WBC Count 5.5 4.0 - 11.0 10e3/uL    RBC Count 2.98 (L) 3.80 - 5.20 10e6/uL    Hemoglobin 10.2 (L) 11.7 - 15.7 g/dL    Hematocrit 30.7 (L) 35.0 - 47.0 %     (H) 78 - 100 fL    MCH 34.2 (H) 26.5 - 33.0 pg    MCHC 33.2 31.5 - 36.5 g/dL    RDW 15.1 (H) 10.0 - 15.0 %    Platelet Count 174 150 - 450 10e3/uL    % Neutrophils 79 %    % Lymphocytes 12 %    % Monocytes 6 %    % Eosinophils 2 %    % Basophils 0 %    % Immature Granulocytes 1 %    NRBCs per 100 WBC 0 <1 /100    Absolute Neutrophils 4.4 1.6 - 8.3 10e3/uL    Absolute Lymphocytes 0.7 (L) 0.8 - 5.3 10e3/uL    Absolute Monocytes 0.3 0.0 - 1.3 10e3/uL    Absolute Eosinophils 0.1 0.0 - 0.7 10e3/uL    Absolute Basophils 0.0 0.0 - 0.2 10e3/uL    Absolute Immature Granulocytes 0.0 <=0.0 10e3/uL    Absolute NRBCs 0.0 10e3/uL   Comprehensive metabolic panel   Result Value Ref Range    Sodium 142 136 - 145 mmol/L    Potassium 3.1 (L) 3.5 - 5.0 mmol/L    Chloride 109 (H) 98 - 107 mmol/L    Carbon Dioxide (CO2) 28 22 - 31 mmol/L    Anion Gap 5 5 - 18 mmol/L    Urea Nitrogen 13 8 - 22 mg/dL    Creatinine 0.62 0.60 - 1.10 mg/dL    Calcium 8.1 (L) 8.5 - 10.5 mg/dL    Glucose 92 70 - 125 mg/dL    Alkaline Phosphatase 40 (L) 45 - 120 U/L    AST 13 0 - 40 U/L    ALT 16 0 - 45 U/L    Protein Total 6.6 6.0 - 8.0 g/dL    Albumin 2.7 (L) 3.5 - 5.0 g/dL    Bilirubin Total 0.4 0.0 - 1.0 mg/dL    GFR Estimate >90 >60 mL/min/1.73m2   CBC with platelets and differential   Result Value Ref Range    WBC Count 4.9 4.0 - 11.0 10e3/uL    RBC Count 2.61 (L) 3.80 - 5.20 10e6/uL    Hemoglobin 8.9 (L) 11.7 - 15.7 g/dL    Hematocrit 27.5 (L) 35.0 - 47.0 %     (H) 78 -  100 fL    MCH 34.1 (H) 26.5 - 33.0 pg    MCHC 32.4 31.5 - 36.5 g/dL    RDW 15.9 (H) 10.0 - 15.0 %    Platelet Count 233 150 - 450 10e3/uL    % Neutrophils 55 %    % Lymphocytes 21 %    % Monocytes 18 %    % Eosinophils 5 %    % Basophils 1 %    % Immature Granulocytes 0 %    NRBCs per 100 WBC 0 <1 /100    Absolute Neutrophils 2.7 1.6 - 8.3 10e3/uL    Absolute Lymphocytes 1.0 0.8 - 5.3 10e3/uL    Absolute Monocytes 0.9 0.0 - 1.3 10e3/uL    Absolute Eosinophils 0.2 0.0 - 0.7 10e3/uL    Absolute Basophils 0.0 0.0 - 0.2 10e3/uL    Absolute Immature Granulocytes 0.0 <=0.0 10e3/uL    Absolute NRBCs 0.0 10e3/uL   IgG  Lab Results   Component Value Date    IGG 2,736 (H) 10/13/2021    IGG 4,439 (H) 09/20/2021   kappa light chains:  Lab Results   Component Value Date    KFLCA 4.30 (H) 10/13/2021    KFLCA 12.57 (H) 09/20/2021    KFLCA 13.37 (H) 05/27/2021   Monoclonal peak  Lab Results   Component Value Date    ELPM 1.7 10/13/2021    ELPM 3.3 09/20/2021    ELPM 3.1 05/27/2021   ]  Imaging    XR Femur Left 2 Views    Result Date: 9/26/2021  EXAM: XR FEMUR LEFT 2 VIEW LOCATION: 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     IMPRESSION: No acute fracture or malalignment. Mild degenerative changes. Osteopenia. No suspicious intraosseous lytic lesion.    XR Femur Right 2 Views    Result Date: 9/26/2021  EXAM: XR FEMUR RIGHT 2 VIEW LOCATION: 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     IMPRESSION: No acute fracture or malalignment. Mild degenerative changes. Osteopenia. No suspicious intraosseous lytic lesion.    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     IMPRESSION: No acute fracture or malalignment. No significant degenerative changes. Osteopenia. No suspicious intraosseous lytic lesion.    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     IMPRESSION: No acute fracture or malalignment. Minimal degenerative changes. Osteopenia. No suspicious intraosseous lytic lesion.    XR Thoracic Spine 3 Views    Result Date: 10/8/2021  EXAM: XR THORACIC SPINE 3 VIEWS LOCATION: [facility] DATE/TIME: 10/8/2021 10:37 AM INDICATION: History of T7 fracture. COMPARISON: Multiple prior comparison examinations, most recently radiography thoracic spine 09/10/2021. TECHNIQUE: CR Thoracic Spine.     IMPRESSION: No significant interval change since the comparison examination. Severe pathologic compression fracture T7 vertebral body with 34 degrees of segmental kyphosis  measured from the T6 superior endplate to the T8 inferior endplate. No evidence of  additional fracture. Vertebral body heights and alignment otherwise maintained. Mild thoracic spondylosis. Normal facets. Surgical clips right upper quadrant. Atherosclerotic vascular calcifications. Extraspinal structures otherwise unremarkable.    CT Cervical Spine w/o Contrast    Result Date: 9/27/2021  EXAM: CT CERVICAL SPINE W/O CONTRAST LOCATION: [facility] DATE/TIME: 9/26/2021 10:08 AM INDICATION:  Multiple myeloma with failed remission (H), Multiple myeloma not having achieved remission (H) COMPARISON: Cervical spine MR 9/23/2020. TECHNIQUE: Routine CT Cervical Spine without IV contrast. Multiplanar reformats. Dose reduction techniques were used. FINDINGS: VERTEBRA: There has been an interval increase in size of the destructive bony lesion in the T1 vertebral body with an associated pathologic compression fracture of the T1 vertebral body. Vertebral body heights of the cervical spine remain normal. There are small (2-3 mm) hypodense foci in the right lateral mass of C2 and in the right posterolateral aspect of the C3 vertebral body that are nonspecific. No other suspicious focal lucencies identified in any of the cervical vertebrae. Alignment of the cervical vertebrae remains normal. No fractures of the cervical spine. CANAL/FORAMINA: No canal or neural foraminal stenosis. PARASPINAL: No extraspinal abnormality.     IMPRESSION: 1.  Interval enlargement of the destructive bony lesion of the T1 vertebral body with associated mild compression fracture deformity. 2.  Focal 2-3 mm nonspecific lucent foci in the right C2 lateral mass and in the C3 vertebral body. No other suspicious bony lesions. 3.  Normal alignment of the cervical vertebrae. No spinal canal or neural foraminal stenosis. No significant degenerative changes.    CT Chest Abdomen Pelvis w/o Contrast    Result Date: 9/26/2021  EXAM: CT CHEST  ABDOMEN PELVIS W/O CONTRAST LOCATION: [facility] DATE/TIME: 9/26/2021 10:09 AM INDICATION:  Multiple myeloma with failed remission. Compression fractures T7 with epidural tumor. COMPARISON: 05/04/2021 and older studies, PET/CT 05/28/2021, thoracic MR 08/31/2021, thoracic plain films 09/10/2021 TECHNIQUE: CT scan of the chest, abdomen, and pelvis was performed without IV contrast. Multiplanar reformats were obtained. Dose reduction techniques were used. CONTRAST: None. FINDINGS: LUNGS AND PLEURA: There are a few, bilateral tiny scattered pulmonary nodules which are stable. No effusions. Atelectasis in the right lateral costophrenic angle has cleared. MEDIASTINUM/AXILLAE: No adenopathy. CORONARY ARTERY CALCIFICATION: Mild. HEPATOBILIARY: Normal. PANCREAS: Normal. SPLEEN: Normal. ADRENAL GLANDS: Multiple low dense left adrenal adenomas are noted largest laterally measuring 1.7 cm. KIDNEYS/BLADDER: No change in the 2.8 cm left renal cyst. This needs no follow-up. BOWEL: Redundant colon. No mass or ascites. LYMPH NODES: Normal. VASCULATURE: Moderate arterial calcifications. No aneurysm. PELVIC ORGANS: Normal. MUSCULOSKELETAL: Progression of disease in the spine. There is a new, 1.8 cm deposit anteriorly along the left side of T1 with some minimal collapse of the superior endplate. Lytic destruction of the T6 and T8 vertebral bodies has developed with involvement of posterior elements. These surround the vertebral plana of T7 which has progressed since the May exam. There is bulky paravertebral and epidural tumor encircling the thoracic cord at this level which is narrowed to at least a centimeter.     IMPRESSION: 1.  Progression of spinal involvement most notably at T6 and T8 as noted above with paravertebral and epidural tumor surrounding the thecal sac which is narrowed to a centimeter. Thoracic MRI can evaluate extent of cord compression/signal changes. 2.  Progression of the vertebral plana  at T7. 3.  New lesion at T1 with minimal compression of the superior endplate. 4.  Critical findings discussed by the undersigned with Dr. John at 1346. He is to contact the patient today. 5.  Other noncritical findings as noted above. NOTE: ABNORMAL REPORT THE DICTATION ABOVE DESCRIBES AN ABNORMALITY FOR WHICH FOLLOW-UP IS NEEDED.     CT Head w/o Contrast    Result Date: 9/27/2021  EXAM: CT HEAD W/O CONTRAST LOCATION: [facility] DATE/TIME: 9/26/2021 10:08 AM INDICATION: Evaluate for any myeloma lesions in the bones. COMPARISON: Brain MR 5/22/2017. TECHNIQUE: Routine CT Head without IV contrast. Multiplanar reformats. Dose reduction techniques were used. FINDINGS: INTRACRANIAL CONTENTS: No intracranial hemorrhage, extraaxial collection, or mass effect.  No CT evidence of acute infarct. Mild presumed chronic small vessel ischemic changes. Mild generalized volume loss. No hydrocephalus. VISUALIZED ORBITS/SINUSES/MASTOIDS: No intraorbital abnormality. No paranasal sinus mucosal disease. No middle ear or mastoid effusion. BONES/SOFT TISSUES: There is a new large destructive bony lesion in the high posterior aspect of the left parietal bone measuring 4.5 cm in diameter and the thickness of the calvaria with destruction of both the inner and outer tables of the skull. There  are multiple additional tiny focal lucent bony lesions in the calvaria that may represent additional myelomatous involvement.     IMPRESSION: 1.  Large destructive bony lesion in the high posterior left parietal bone measuring 4.5 cm in diameter with destruction of both the inner and outer tables of the skull. 2.  Multiple additional tiny lucent calvarial lesions that may represent additional myelomatous lesions. 3.  No CT evidence for acute intracranial process. 4.  Brain atrophy and presumed chronic microvascular ischemic changes as above.               Signed by: SINDHU Lundberg CNP

## 2021-10-19 NOTE — PROGRESS NOTES
"Oncology Rooming Note    October 19, 2021 12:54 PM   Lizzie Viera is a 71 year old female who presents for:    Chief Complaint   Patient presents with     Oncology Clinic Visit     3 week return Multiple Myeloma     Initial Vitals: /53 (BP Location: Left arm, Patient Position: Sitting, Cuff Size: Adult Regular)   Pulse 55   Temp 97.9  F (36.6  C) (Oral)   Resp 20   Ht 1.575 m (5' 2.01\")   Wt 60.8 kg (134 lb 1.6 oz)   SpO2 98%   BMI 24.52 kg/m   Estimated body mass index is 24.52 kg/m  as calculated from the following:    Height as of this encounter: 1.575 m (5' 2.01\").    Weight as of this encounter: 60.8 kg (134 lb 1.6 oz). Body surface area is 1.63 meters squared.  Moderate Pain (5) Comment: Data Unavailable   No LMP recorded. Patient is postmenopausal.  Allergies reviewed: Yes  Medications reviewed: Yes    Medications: Medication refills not needed today.  Pharmacy name entered into [facility]: [id number] - [place], [place] - [address]    Clinical concerns: 3 week return Multiple Myeloma. Patient reports Sore throat x 3 days & pills getting stuck in throat. Also has questions regarding Lab and Zometa.       Marina Wills [facility]              "

## 2021-10-21 ENCOUNTER — DOCUMENTATION ONLY (OUTPATIENT)
Dept: OTHER | Facility: CLINIC | Age: 71
End: 2021-10-21

## 2021-10-25 ENCOUNTER — VIRTUAL VISIT (OUTPATIENT)
Dept: ONCOLOGY | Facility: CLINIC | Age: 71
End: 2021-10-25
Attending: SOCIAL WORKER
Payer: COMMERCIAL

## 2021-10-25 DIAGNOSIS — C90.00 MULTIPLE MYELOMA WITH FAILED REMISSION (H): ICD-10-CM

## 2021-10-25 DIAGNOSIS — F43.23 ADJUSTMENT DISORDER WITH MIXED ANXIETY AND DEPRESSED MOOD: Primary | ICD-10-CM

## 2021-10-25 PROCEDURE — 90837 PSYTX W PT 60 MINUTES: CPT | Mod: TEL | Performed by: SOCIAL WORKER

## 2021-10-25 NOTE — CONFIDENTIAL NOTE
Psychology Psychotherapy  Note-virtual telephone visit    Name:  Lizzie Viera  :  1950  MRN:  [id number]      Date of Service: 10/25/2021  Duration: 60 minutes (10:00 to 11:00 AM)    The patient has been notified of following:      This telephone visit will be conducted via a call between you and your provider. We have found that certain health care needs can be provided without a face to face meeting.  This service lets us provide the care you need with a short phone conversation.      Telephone visits are billed at different rates depending on your insurance coverage. During this emergency period, for some insurers they may be billed the same as an in-person visit.  Please reach out to your insurance provider with any questions.     If during the course of the call the if provider feels a telephone visit is not appropriate, you will not be charged for this service.     Patient has given verbal consent to a Telephone visit? Yes    Target Symptoms:    The patient was seen in light of concerns regarding symptoms of depression and anxiety as evidenced by patient and staff report.    Participation:  The patient was able to participate and benefit from treatment as evidenced by her verbal expression of ideas and initiation of topics discussed.    Mental Status:    Mood/Affect:  normal affect  Suicidal Ideation:  absent  Homicidal Ideation:  absent  Thought process:  normal  Thought content:  Clear  Fund of Knowledge:  Sufficient  Attention/Concentration:  Normal  Language ability:  intact  Speech: normal  Memory:  recent and remote memory intact  Insight and Judgement:  good  Orientation:  self, place and time  Appearance: N/A-telephone visit  Eye Contact: N/A-telephone visit  Estimated IQ:  Average      Intervention:    Valeria was interested in a follow-up psychotherapy session today. She agreed to meet virtually. I was able to meet her in person when she was in the clinic last week. Valeria was referred to  me by JOSH Lopez from the palliative care clinic.  She is also followed by Dr. Blanco and Dr. Rapp.  Valeria has a history of multiple myeloma.  She states that in September 2020, she had a fracture of T-7 and was hospitalized at [facility].  She did not have a good experience in transferring her care over to [facility] and the cancer clinic at [facility].  In April 2021, she continued to have pain in her spine and had biopsy that confirmed her diagnosis of multiple myeloma in May 2021.  She was hospitalized in September at [facility] and received radiation therapy during her hospitalization.  Her last visit with Dr. Blanco on 9/29/2021,  he indicates that clinically her multiple myeloma is behaving more aggressively with bone lesions.  She has significant bone disease with osteoporosis and compression fractures.  Dr. Blanco started her on Velcade, Revlimid and dexamethasone.  She had her first treatment on 9/29/2021.  She also will be receiving monthly Zometa shots and had her most recent one on 10/6/2021.  She also met with Dr. Rapp on 9/28/2021.  She  had additional radiation treatment on 10/6/2021 through 10/12/2021 to T1.  She also has a skull lesion that they will hold off on radiation at this time. She met with the neurosurgeon on 10/11/2021 and he felt that T6-7 and 8 are stable. She will meet again with Dr. Rapp on 11/10/2021 to decide the next steps for radiation therapy.   Her main physical complaints are pain and fatigue. She also is experiencing temperature changes and changes to her taste, which she believes is due to her chemo pill. At her 9/7/2021 palliative care clinic visit, they discussed advanced directives.  She has completed her health care directive and plans to get it notarized today.  She decided to have her daughter be the primary proxy and her sister and girlfriend to be the alternates.  At her most recent clinic visit on 10/19/2021 with Zehra Pablo,  "CNP, she shared with Valeria that she will likely be referred to the transplant team after 4-6 cycles, depending on her response to treatment.    Valeria indicates that she has done a lot of research and has decided not to be vaccinated for COVID-19.  Her daughter had COVID-19 in May and her son and  also tested positive at that time.  She had the antibody test and found out that she also had COVID-19 in the past.  She was asymptomatic and did not realize she even had it.  She is not interested in getting the vaccine.  She is interested in doing alternative/complementary medicine to help combat her cancer.  She has been taking anti-inflammatory medication and has been reading books and articles about supplements that help \"kill cancer\".  She currently is on a variety of supplements and has discussed her supplements with Dr. Blanco.  Valeria indicates that she finds her medical background useful in understanding her health condition.    Valeria processed her thoughts and feelings about her cancer and her cancer treatment.  She is experiencing symptoms of anxiety and depression related to coping with her cancer and other life stressors.  She does not clinically meet diagnostic criteria for a mood disorder or an anxiety disorder.  She meets diagnostic criteria for adjustment disorder with mixed anxiety and depressed mood.  She denies suicidal ideation or thoughts of harming herself or others.    Valeria grew up in the [facility].  She is the second oldest in a family of 8 children.  She has 3 sisters and 4 brothers.  Her older brother is exactly 1 year and a day older than her.  She states that she has a very close relationship with her siblings.  Most of them live in the area.  Her sisters have been extremely helpful during this difficult time.  She has 2 of her sisters at her house cleaning during her last visit..    Valeria lives in her [place] home with her , Ramos; daughter Collette; and her 2 sons ages 4 and " 8.  Celso have been  for 42 years and that on a blind date.  She states that Ramos has end-stage renal disease and is receiving dialysis 3 times a week.  He also had trouble aortic aneurysm repair and he has type 2 diabetes. He had a cardiac procedure at the [facility] on 10/15/2021, which went well. . He will be evaluated in 3 to 6 months for a kidney transplant. He has a friend who is willing to donate his kidney. Ramos retired at age 69 from being a .  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  When he had his annual DOT physical, he did not want to share his medical history with the DOT physician and decided to quit his job, on the spot that day.  This created some issues as he was the carrier of the insurance for the family.  Valeria indicates that Ramos is extremely angry and tends to take it out on the family members.  His father  when he was 11 years old.  He was a  and  on an experimental aircraft in [place].  2 weeks ago, he became so angry and agitated towards their daughter, that she gave him an ultimatum that he needs to start seeing a therapist.  He also became extremely angry and agitated yesterday with their son and her sister, to the point that they needed to leave the house due to his verbal abuse.  Valeria has been a buffer for the family regarding his anger.  She plans to follow-up with him this week to make sure he is scheduled an appointment to see a therapist.  The staff at [facility] have already talked with him about seeing a therapist and the plan is for him to follow-up on a referral from their recommendation.    Celso have 3 children.  The oldest is their daughter, Sharifa, age 38.  She lives near [place] and has 2 children.  She works as a teacher.  Their second oldest is their daughter, Collette, age 34, who currently lives with them along with her for an 8-year-old sons.  She is on Social Security disability due to her depression and past suicidal  ideation.  She has received a great deal of therapy including DBT.  She has been extremely helpful to Valeria and wants to be her caretaker.  Their youngest is their son, Trell, age 30, sumi just moved out of the home 3 weeks ago to [facility], in order to be closer to his work in the area. He works 12-hour shifts starting at 4 AM in a warehouse that provides food for gas stations and service stations.    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  She has worked in medical research.  She spent 18 years working at the [employer].  She also has worked at [employer] in nuclear medicine and for [place] and [employer].  She spent several years working for [employer] and [employer].  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    Valeria was raised Moravian and went to a [employer] grade school, [employer] in [place]. She also went to [employer]. She currently describes herself as being a spiritual person and believes that mother nature is the guiding spirit of the planet. She has some  and [place] spirituality beliefs. We talked about ways for her to incorporate her spirituality into her healing process.    Valeria is interested in individual psychotherapy to help her work through the emotional aspects of her cancer and cancer treatments.  She is experiencing symptoms of anxiety and depression related to her cancer and other life stresses.  She would like to initially meet on a weekly basis.  I have left a message for our information coordinators to get her scheduled in for a series of appointments.    Psychoterapeutic Techniques:  Cognitive-behavioral therapy, motivational interviewing and supportive psychotherapy strategies were utilized.    Necessity:    The session was necessary for the care of the patient to address symptoms of depression and anxiety related  to the patient's medical condition.    Progress/ Plan:    Valeria is interested in individual psychotherapy every 1 to 2 weeks to help her cope with the emotional aspects of her cancer and cancer treatment.  I will provide her with some information about utilizing cognitive behavioral therapy strategies to help manage symptoms of pain and anxiety & depression.  She has made good progress in going through the written material that I have given her.  She has started doing guided imagery exercises.  She plans to do some painting to illustrate her image for her guided imagery.  In addition, she has been doing some reading about cognitive behavioral therapy and has started to implement some of the strategies that we discussed.  She has read up on the side effects from her treatment and read that anxiety and irritability tend to be some of the side effects.  She is making a conscious effort to utilize some of the skills that we discussed to work on these issues.    We also talked about issues with her  and some strategies to help set boundaries and limits and also encouraged him to get help he needs.  She also talked about how her 8-year-old grandson was feeling anxious and worried as he is a teacher is pregnant and decided to go on leave early.  He started with a new teacher and is having a lot of anxiety with the change.  He had some thoughts of hurting himself and his mom took him to the emergency room at [facility].  His mom, Collette has a history of having suicide attempts 3-4 times in 1 year, prior to her children being born.  Valeria is concerned that some of Collette's depressive tendencies are wearing off on her grandson.  We discussed strategies that might be helpful in this area.    Valeria is interested in meeting on a regular basis.  We have future appointments scheduled for 11/1/2021 and 11/10/2021 for virtual visits.    Diagnosis:    1.  Adjustment disorder with mixed anxiety and depressed mood  2.   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  Other information was consistent from the note that was made on 10/11/2021 .      This note was created with the help of Dragon dictation system.  Grammatical and typing errors are not intentional.     Review of long-term goals: Treatment plan was reviewed and updated.       Provider: Mary Ellen Chan MA, LP, LICSW    Date:  10/5/2021  Time:  3:05 PM

## 2021-10-27 ENCOUNTER — LAB (OUTPATIENT)
Dept: INFUSION THERAPY | Facility: HOSPITAL | Age: 71
End: 2021-10-27
Attending: INTERNAL MEDICINE
Payer: COMMERCIAL

## 2021-10-27 VITALS
DIASTOLIC BLOOD PRESSURE: 54 MMHG | TEMPERATURE: 98.1 F | SYSTOLIC BLOOD PRESSURE: 97 MMHG | OXYGEN SATURATION: 98 % | HEART RATE: 58 BPM | RESPIRATION RATE: 16 BRPM

## 2021-10-27 DIAGNOSIS — C90.00 MULTIPLE MYELOMA WITH FAILED REMISSION (H): Primary | ICD-10-CM

## 2021-10-27 DIAGNOSIS — M84.58XK PATHOLOGICAL FRACTURE OF VERTEBRAE IN NEOPLASTIC DISEASE WITH NONUNION: ICD-10-CM

## 2021-10-27 LAB
BASOPHILS # BLD MANUAL: 0 10E3/UL (ref 0–0.2)
BASOPHILS NFR BLD MANUAL: 0 %
EOSINOPHIL # BLD MANUAL: 0.1 10E3/UL (ref 0–0.7)
EOSINOPHIL NFR BLD MANUAL: 2 %
ERYTHROCYTE [DISTWIDTH] IN BLOOD BY AUTOMATED COUNT: 14.9 % (ref 10–15)
HCT VFR BLD AUTO: 32 % (ref 35–47)
HGB BLD-MCNC: 9.7 G/DL (ref 11.7–15.7)
LYMPHOCYTES # BLD MANUAL: 0.7 10E3/UL (ref 0.8–5.3)
LYMPHOCYTES NFR BLD MANUAL: 14 %
MCH RBC QN AUTO: 33.1 PG (ref 26.5–33)
MCHC RBC AUTO-ENTMCNC: 30.3 G/DL (ref 31.5–36.5)
MCV RBC AUTO: 109 FL (ref 78–100)
MONOCYTES # BLD MANUAL: 0.1 10E3/UL (ref 0–1.3)
MONOCYTES NFR BLD MANUAL: 1 %
NEUTROPHILS # BLD MANUAL: 4.3 10E3/UL (ref 1.6–8.3)
NEUTROPHILS NFR BLD MANUAL: 83 %
PLAT MORPH BLD: ABNORMAL
PLATELET # BLD AUTO: 217 10E3/UL (ref 150–450)
RBC # BLD AUTO: 2.93 10E6/UL (ref 3.8–5.2)
RBC MORPH BLD: ABNORMAL
WBC # BLD AUTO: 5.2 10E3/UL (ref 4–11)

## 2021-10-27 PROCEDURE — 96401 CHEMO ANTI-NEOPL SQ/IM: CPT

## 2021-10-27 PROCEDURE — 85027 COMPLETE CBC AUTOMATED: CPT | Performed by: INTERNAL MEDICINE

## 2021-10-27 PROCEDURE — 250N000011 HC RX IP 250 OP 636: Performed by: INTERNAL MEDICINE

## 2021-10-27 PROCEDURE — 36415 COLL VENOUS BLD VENIPUNCTURE: CPT | Performed by: INTERNAL MEDICINE

## 2021-10-27 RX ADMIN — BORTEZOMIB 2.1 MG: 3.5 INJECTION, POWDER, LYOPHILIZED, FOR SOLUTION INTRAVENOUS; SUBCUTANEOUS at 14:07

## 2021-10-27 ASSESSMENT — PAIN SCALES - GENERAL: PAINLEVEL: MODERATE PAIN (4)

## 2021-10-27 NOTE — PROGRESS NOTES
Valeria came to chemo infusion this afternoon for her next dose of Velcade.  VSS.  Pt assessed.  Lab results noted and she met provision for treatment today. Valeria received her velcade as subcutaneous into her LLQ of her abdomen.  She tolerated the injection well and site was covered with a bandaid.  Valeria left clinic ambulatory.

## 2021-11-01 ENCOUNTER — VIRTUAL VISIT (OUTPATIENT)
Dept: ONCOLOGY | Facility: CLINIC | Age: 71
End: 2021-11-01
Attending: SOCIAL WORKER
Payer: COMMERCIAL

## 2021-11-01 DIAGNOSIS — C90.00 MULTIPLE MYELOMA WITH FAILED REMISSION (H): ICD-10-CM

## 2021-11-01 DIAGNOSIS — F43.23 ADJUSTMENT DISORDER WITH MIXED ANXIETY AND DEPRESSED MOOD: Primary | ICD-10-CM

## 2021-11-01 PROCEDURE — 90834 PSYTX W PT 45 MINUTES: CPT | Mod: TEL | Performed by: SOCIAL WORKER

## 2021-11-01 NOTE — CONFIDENTIAL NOTE
Psychology Psychotherapy  Note-virtual telephone visit    Name:  Lizzie Viera  :  1950  MRN:  [id number]      Date of Service: 2021  Duration: 50 minutes (11:00 to 11:50 AM)    The patient has been notified of following:      This telephone visit will be conducted via a call between you and your provider. We have found that certain health care needs can be provided without a face to face meeting.  This service lets us provide the care you need with a short phone conversation.      Telephone visits are billed at different rates depending on your insurance coverage. During this emergency period, for some insurers they may be billed the same as an in-person visit.  Please reach out to your insurance provider with any questions.     If during the course of the call the if provider feels a telephone visit is not appropriate, you will not be charged for this service.     Patient has given verbal consent to a Telephone visit? Yes    Target Symptoms:    The patient was seen in light of concerns regarding symptoms of depression and anxiety as evidenced by patient and staff report.    Participation:  The patient was able to participate and benefit from treatment as evidenced by her verbal expression of ideas and initiation of topics discussed.    Mental Status:    Mood/Affect:  normal affect  Suicidal Ideation:  absent  Homicidal Ideation:  absent  Thought process:  normal  Thought content:  Clear  Fund of Knowledge:  Sufficient  Attention/Concentration:  Normal  Language ability:  intact  Speech: normal  Memory:  recent and remote memory intact  Insight and Judgement:  good  Orientation:  self, place and time  Appearance: N/A-telephone visit  Eye Contact: N/A-telephone visit  Estimated IQ:  Average      Intervention:    Valeria was interested in a follow-up psychotherapy session today. She agreed to meet virtually. I was able to meet her in person when she was in the clinic last week. Valeria was referred to  me by JOSH Lopez from the palliative care clinic.  She is also followed by Dr. Blanco and Dr. Rapp.  Valeria has a history of multiple myeloma.  She states that in September 2020, she had a fracture of T-7 and was hospitalized at [facility].  She did not have a good experience in transferring her care over to [facility] and the cancer clinic at [facility].  In April 2021, she continued to have pain in her spine and had biopsy that confirmed her diagnosis of multiple myeloma in May 2021.  She was hospitalized in September at [facility] and received radiation therapy during her hospitalization.  Her last visit with Dr. Blanco on 9/29/2021,  he indicates that clinically her multiple myeloma is behaving more aggressively with bone lesions.  She has significant bone disease with osteoporosis and compression fractures.  Dr. Blanco started her on Velcade, Revlimid and dexamethasone.  She had her first treatment on 9/29/2021.  She also will be receiving monthly Zometa shots and had her most recent one on 10/6/2021.  She also met with Dr. Rapp on 9/28/2021.  She  had additional radiation treatment on 10/6/2021 through 10/12/2021 to T1.  She also has a skull lesion that they will hold off on radiation at this time. She met with the neurosurgeon on 10/11/2021 and he felt that T6-7 and 8 are stable. She will meet again with Dr. Rapp on 11/10/2021 to decide the next steps for radiation therapy.   Her main physical complaints are pain and fatigue. She also is experiencing temperature changes and changes to her taste, which she believes is due to her chemo pill. At her 9/7/2021 palliative care clinic visit, they discussed advanced directives.  She has completed her health care directive and plans to get it notarized today.  She decided to have her daughter be the primary proxy and her sister and girlfriend to be the alternates.  At her most recent clinic visit on 10/19/2021 with Zehra Pablo,  "CNP, she shared with Valeria that she will likely be referred to the transplant team after 4-6 cycles, depending on her response to treatment.  Valeria has upcoming visits next Wednesday where she needs with Dr. Blanco, Dr. Rapp and myself on 11/10/2021.    Valeria indicates that she has done a lot of research and has decided not to be vaccinated for COVID-19.  Her daughter had COVID-19 in May and her son and  also tested positive at that time.  She had the antibody test and found out that she also had COVID-19 in the past.  She was asymptomatic and did not realize she even had it.  She is not interested in getting the vaccine.  She is interested in doing alternative/complementary medicine to help combat her cancer.  She has been taking anti-inflammatory medication and has been reading books and articles about supplements that help \"kill cancer\".  She currently is on a variety of supplements and has discussed her supplements with Dr. Blanco.  Valeria indicates that she finds her medical background useful in understanding her health condition.    Valeria processed her thoughts and feelings about her cancer and her cancer treatment.  She is experiencing symptoms of anxiety and depression related to coping with her cancer and other life stressors.  She does not clinically meet diagnostic criteria for a mood disorder or an anxiety disorder.  She meets diagnostic criteria for adjustment disorder with mixed anxiety and depressed mood.  She denies suicidal ideation or thoughts of harming herself or others.    Valeria grew up in the [facility].  She is the second oldest in a family of 8 children.  She has 3 sisters and 4 brothers.  Her older brother is exactly 1 year and a day older than her.  She states that she has a very close relationship with her siblings.  Most of them live in the area.  Her sisters have been extremely helpful during this difficult time.  She has 2 of her sisters at her house cleaning during her last " visit..    Valeria lives in her [place] home with her , Ramos; daughter Collette; and her 2 sons ages 4 and 8.  Valeria and Ramos have been  for 42 years and that on a blind date.  She states that Ramos has end-stage renal disease and is receiving dialysis 3 times a week.  He also had trouble aortic aneurysm repair and he has type 2 diabetes. He had a cardiac procedure at the [facility] on 10/15/2021, which went well. . He will be evaluated in 3 to 6 months for a kidney transplant. He has a friend who is willing to donate his kidney. Ramos retired at age 69 from being a .  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  When he had his annual DOT physical, he did not want to share his medical history with the DOT physician and decided to quit his job, on the spot that day.  This created some issues as he was the carrier of the insurance for the family.  Valeria indicates that Ramos is extremely angry and tends to take it out on the family members.  His father  when he was 11 years old.  He was a  and  on an experimental aircraft in [place].  Several weeks ago, he became so angry and agitated towards their daughter, that she gave him an ultimatum that he needs to start seeing a therapist.  Recently, he became extremely angry and agitated  with their son and her sister, to the point that they needed to leave the house due to his verbal abuse.  Valeria has been a buffer for the family regarding his anger.  She plans to follow-up with him this week to make sure he is scheduled an appointment to see a therapist.  The staff at [facility] have already talked with him about seeing a therapist and the plan is for him to follow-up on a referral from their recommendation.  He has been refusing to make an appoint with a therapist, but agrees to meet for family therapy with his daughter.  Valeria plans to talk further about this with her daughter and see if they could have Ramos join her at one of her upcoming  therapy appointments.    Valeria and Ramos have 3 children.  The oldest is their daughter, Sharifa, age 38.  She lives near [place] and has 2 children.  She works as a teacher.  Their second oldest is their daughter, Collette, age 34, who currently lives with them along with her for an 8-year-old sons.  She is on Social Security disability due to her depression and past suicidal ideation.  She has received a great deal of therapy including DBT.  She has been extremely helpful to Valeria and wants to be her caretaker.  Their youngest is their son, Trell, age 30, l just moved out of the home 3 weeks ago to [facility], in order to be closer to his work in the area. He works 12-hour shifts starting at 4 AM in a warehouse that provides food for gas stations and service stations.    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  She has worked in medical research.  She spent 18 years working at the [employer].  She also has worked at [employer] in nuclear medicine and for [place] [employer] and nuclear medicine.  She spent several years working for [employer] and [employer].  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    Valeria was raised Gnosticism and went to a Gnosticism grade school, [employer] in [place]. She also went to [employer]. She currently describes herself as being a spiritual person and believes that mother nature is the guiding spirit of the planet. She has some  and Dutch spirituality beliefs. We talked about ways for her to incorporate her spirituality into her healing process.    Valeria is interested in individual psychotherapy to help her work through the emotional aspects of her cancer and cancer treatments.  She is experiencing symptoms of anxiety and depression related to her cancer and other life stresses.  She would like to initially meet on a weekly  basis.  I have left a message for our information coordinators to get her scheduled in for a series of appointments.    Psychoterapeutic Techniques:  Cognitive-behavioral therapy, motivational interviewing and supportive psychotherapy strategies were utilized.    Necessity:    The session was necessary for the care of the patient to address symptoms of depression and anxiety related to the patient's medical condition.    Progress/ Plan:    Valeria is interested in individual psychotherapy every 1 to 2 weeks to help her cope with the emotional aspects of her cancer and cancer treatment.  I will provide her with some information about utilizing cognitive behavioral therapy strategies to help manage symptoms of pain and anxiety & depression.  She has made good progress in going through the written material that I have given her.  She has started doing guided imagery exercises.  She plans to do some painting to illustrate her image for her guided imagery.  In addition, she has been doing some reading about cognitive behavioral therapy and has started to implement some of the strategies that we discussed.  She has read up on the side effects from her treatment and read that anxiety and irritability tend to be some of the side effects.  She is making a conscious effort to utilize some of the skills that we discussed to work on these issues.    Valeria indicates that she has been working at getting exercise over this past week.  She has been walking on the treadmill 1 mile, 3 days a week.  Her sister picks her up and takes her to her house to use the treadmill.  This is been helpful as it creates some accountability for Valeria and also gives her the opportunity to spend some quality time with her sister.  She also is working on having good nutrition, which has been difficult in these past few weeks, due to the extra sweets from the  holiday.    Valeria had a girlfriend that  last Tuesday from metastatic breast cancer.   She processed her thoughts and feelings about this loss and we discussed strategies to help her release her grief.    We also talked about issues with her  and some strategies to help set boundaries and limits and also encouraged him to get help he needs.  She also talked about how her 8-year-old grandson was feeling anxious and worried as he is a teacher is pregnant and decided to go on leave early.  He started with a new teacher and is having a lot of anxiety with the change.  He had some thoughts of hurting himself and his mom took him to the emergency room [facility].  He is doing much better this week.  His mom, Collette has a history of having suicide attempts 3-4 times in 1 year, prior to her children being born.  Valeria is concerned that some of Collette's depressive tendencies are wearing off on her grandson.  We discussed strategies that might be helpful in this area.    Valeria is interested in meeting on a regular basis.  We have future appointments scheduled for 11/1/2021 for a virtual psychotherapy visit and 11/10/2021 for an in person psychotherapy visit.    Diagnosis:    1.  Adjustment disorder with mixed anxiety and depressed mood  2.  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  Other information was consistent from the note that was made on 10/25/2021.      This note was created with the help of Dragon dictation system.  Grammatical and typing errors are not intentional.     Review of long-term goals: Treatment plan was reviewed and updated.       Provider: Mary Ellen Chan MA, LP, [facility]    Date:  10/5/2021  Time:  3:05 PM

## 2021-11-03 ENCOUNTER — INFUSION THERAPY VISIT (OUTPATIENT)
Dept: INFUSION THERAPY | Facility: HOSPITAL | Age: 71
End: 2021-11-03
Attending: INTERNAL MEDICINE
Payer: COMMERCIAL

## 2021-11-03 VITALS
HEART RATE: 56 BPM | RESPIRATION RATE: 16 BRPM | DIASTOLIC BLOOD PRESSURE: 60 MMHG | TEMPERATURE: 98.1 F | OXYGEN SATURATION: 95 % | SYSTOLIC BLOOD PRESSURE: 90 MMHG

## 2021-11-03 DIAGNOSIS — C90.00 MULTIPLE MYELOMA WITH FAILED REMISSION (H): Primary | ICD-10-CM

## 2021-11-03 DIAGNOSIS — M84.58XK PATHOLOGICAL FRACTURE OF VERTEBRAE IN NEOPLASTIC DISEASE WITH NONUNION: ICD-10-CM

## 2021-11-03 LAB
ALBUMIN SERPL-MCNC: 3.1 G/DL (ref 3.5–5)
BASOPHILS # BLD AUTO: 0 10E3/UL (ref 0–0.2)
BASOPHILS NFR BLD AUTO: 1 %
CALCIUM SERPL-MCNC: 9.2 MG/DL (ref 8.5–10.5)
CREAT SERPL-MCNC: 0.71 MG/DL (ref 0.6–1.1)
EOSINOPHIL # BLD AUTO: 0.1 10E3/UL (ref 0–0.7)
EOSINOPHIL NFR BLD AUTO: 1 %
ERYTHROCYTE [DISTWIDTH] IN BLOOD BY AUTOMATED COUNT: 14.3 % (ref 10–15)
GFR SERPL CREATININE-BSD FRML MDRD: 87 ML/MIN/1.73M2
HCT VFR BLD AUTO: 31.6 % (ref 35–47)
HGB BLD-MCNC: 10 G/DL (ref 11.7–15.7)
IGA SERPL-MCNC: 18 MG/DL (ref 65–400)
IGG SERPL-MCNC: 1609 MG/DL (ref 700–1700)
IGM SERPL-MCNC: 9 MG/DL (ref 60–280)
IMM GRANULOCYTES # BLD: 0.1 10E3/UL
IMM GRANULOCYTES NFR BLD: 1 %
LYMPHOCYTES # BLD AUTO: 0.6 10E3/UL (ref 0.8–5.3)
LYMPHOCYTES NFR BLD AUTO: 9 %
MCH RBC QN AUTO: 34 PG (ref 26.5–33)
MCHC RBC AUTO-ENTMCNC: 31.6 G/DL (ref 31.5–36.5)
MCV RBC AUTO: 108 FL (ref 78–100)
MONOCYTES # BLD AUTO: 0.1 10E3/UL (ref 0–1.3)
MONOCYTES NFR BLD AUTO: 2 %
NEUTROPHILS # BLD AUTO: 5.5 10E3/UL (ref 1.6–8.3)
NEUTROPHILS NFR BLD AUTO: 86 %
NRBC # BLD AUTO: 0 10E3/UL
NRBC BLD AUTO-RTO: 0 /100
PLATELET # BLD AUTO: 190 10E3/UL (ref 150–450)
RBC # BLD AUTO: 2.94 10E6/UL (ref 3.8–5.2)
TOTAL PROTEIN SERUM FOR ELP: 6.8 G/DL (ref 6–8)
WBC # BLD AUTO: 6.3 10E3/UL (ref 4–11)

## 2021-11-03 PROCEDURE — 82310 ASSAY OF CALCIUM: CPT | Performed by: INTERNAL MEDICINE

## 2021-11-03 PROCEDURE — 82040 ASSAY OF SERUM ALBUMIN: CPT | Performed by: INTERNAL MEDICINE

## 2021-11-03 PROCEDURE — 258N000003 HC RX IP 258 OP 636: Performed by: INTERNAL MEDICINE

## 2021-11-03 PROCEDURE — 83883 ASSAY NEPHELOMETRY NOT SPEC: CPT

## 2021-11-03 PROCEDURE — 36415 COLL VENOUS BLD VENIPUNCTURE: CPT | Performed by: INTERNAL MEDICINE

## 2021-11-03 PROCEDURE — 82565 ASSAY OF CREATININE: CPT | Performed by: INTERNAL MEDICINE

## 2021-11-03 PROCEDURE — 96365 THER/PROPH/DIAG IV INF INIT: CPT

## 2021-11-03 PROCEDURE — 82784 ASSAY IGA/IGD/IGG/IGM EACH: CPT

## 2021-11-03 PROCEDURE — 85025 COMPLETE CBC W/AUTO DIFF WBC: CPT | Performed by: INTERNAL MEDICINE

## 2021-11-03 PROCEDURE — 84165 PROTEIN E-PHORESIS SERUM: CPT | Mod: TC

## 2021-11-03 PROCEDURE — 250N000011 HC RX IP 250 OP 636: Performed by: INTERNAL MEDICINE

## 2021-11-03 PROCEDURE — 84155 ASSAY OF PROTEIN SERUM: CPT

## 2021-11-03 PROCEDURE — 96401 CHEMO ANTI-NEOPL SQ/IM: CPT

## 2021-11-03 RX ADMIN — BORTEZOMIB 2.1 MG: 3.5 INJECTION, POWDER, LYOPHILIZED, FOR SOLUTION INTRAVENOUS; SUBCUTANEOUS at 14:14

## 2021-11-03 RX ADMIN — ZOLEDRONIC ACID 4 MG: 4 INJECTION INTRAVENOUS at 14:17

## 2021-11-03 NOTE — PROGRESS NOTES
Valeria came to chemo infusion this afternoon for her next dose of Velcade as well as Zometa.  VSS.  Pt assessed.  Lab results noted and she met provision for treatment today. Peripheral IV inserted with good blood return.  Zometa infused as ordered and was well tolerated.  IV was flushed with ns then removed and site covered.  Valeria received her velcade as subcutaneous into her RLQ of her abdomen.  She tolerated the injection well and site was covered with a bandaid.  Valeria left clinic ambulatory pushing her walker.

## 2021-11-04 DIAGNOSIS — C90.00 MULTIPLE MYELOMA WITH FAILED REMISSION (H): Primary | ICD-10-CM

## 2021-11-04 RX ORDER — LENALIDOMIDE 25 MG/1
25 CAPSULE ORAL DAILY
Qty: 14 CAPSULE | Refills: 0 | Status: SHIPPED | OUTPATIENT
Start: 2021-11-04 | End: 2021-11-24

## 2021-11-05 ENCOUNTER — MEDICAL CORRESPONDENCE (OUTPATIENT)
Dept: NEUROSURGERY | Facility: CLINIC | Age: 71
End: 2021-11-05

## 2021-11-05 ENCOUNTER — TELEPHONE (OUTPATIENT)
Dept: ONCOLOGY | Facility: HOSPITAL | Age: 71
End: 2021-11-05

## 2021-11-05 LAB
KAPPA LC FREE SER-MCNC: 2.03 MG/DL (ref 0.33–1.94)
KAPPA LC FREE/LAMBDA FREE SER NEPH: 2.05 {RATIO} (ref 0.26–1.65)
LAMBDA LC FREE SERPL-MCNC: 0.99 MG/DL (ref 0.57–2.63)

## 2021-11-05 NOTE — ORAL ONC MGMT
Oral Chemotherapy Monitoring Program   Left Voicemail    Attempted to contact patient today for follow up regarding oral chemotherapy, lenalidomide, for monthly follow-up. No answer. Left voicemail for patient to call us back at [phone] when able. No medication name was left.    Jeison Burns, PharmD  Oral Chemotherapy Pharmacist  [phone]

## 2021-11-08 LAB
ALBUMIN PERCENT: 52.1 % (ref 51–67)
ALBUMIN SERPL ELPH-MCNC: 3.5 G/DL (ref 3.2–4.7)
ALPHA 1 PERCENT: 4.2 % (ref 2–4)
ALPHA 2 PERCENT: 14.1 % (ref 5–13)
ALPHA1 GLOB SERPL ELPH-MCNC: 0.3 G/DL (ref 0.1–0.3)
ALPHA2 GLOB SERPL ELPH-MCNC: 1 G/DL (ref 0.4–0.9)
B-GLOBULIN SERPL ELPH-MCNC: 0.8 G/DL (ref 0.7–1.2)
BETA PERCENT: 11.1 % (ref 10–17)
GAMMA GLOB SERPL ELPH-MCNC: 1.3 G/DL (ref 0.6–1.4)
GAMMA GLOBULIN PERCENT: 18.5 % (ref 9–20)
MONOCLONAL PEAK: 1.1 G/DL
PATH ICD:: ABNORMAL
PROT PATTERN SERPL ELPH-IMP: ABNORMAL
REVIEWING PATHOLOGIST: ABNORMAL
TOTAL PROTEIN SERUM FOR ELP (SYNCED VALUE): 6.8 G/DL

## 2021-11-08 PROCEDURE — 84165 PROTEIN E-PHORESIS SERUM: CPT | Mod: 26 | Performed by: PATHOLOGY

## 2021-11-10 ENCOUNTER — INFUSION THERAPY VISIT (OUTPATIENT)
Dept: INFUSION THERAPY | Facility: HOSPITAL | Age: 71
End: 2021-11-10
Attending: INTERNAL MEDICINE
Payer: COMMERCIAL

## 2021-11-10 ENCOUNTER — ONCOLOGY VISIT (OUTPATIENT)
Dept: ONCOLOGY | Facility: HOSPITAL | Age: 71
End: 2021-11-10
Attending: INTERNAL MEDICINE
Payer: COMMERCIAL

## 2021-11-10 ENCOUNTER — VIRTUAL VISIT (OUTPATIENT)
Dept: ONCOLOGY | Facility: HOSPITAL | Age: 71
End: 2021-11-10
Attending: SOCIAL WORKER
Payer: COMMERCIAL

## 2021-11-10 ENCOUNTER — OFFICE VISIT (OUTPATIENT)
Dept: RADIATION ONCOLOGY | Facility: HOSPITAL | Age: 71
End: 2021-11-10
Attending: RADIOLOGY
Payer: COMMERCIAL

## 2021-11-10 VITALS
OXYGEN SATURATION: 94 % | HEART RATE: 58 BPM | TEMPERATURE: 97.6 F | BODY MASS INDEX: 24.14 KG/M2 | SYSTOLIC BLOOD PRESSURE: 118 MMHG | WEIGHT: 132 LBS | DIASTOLIC BLOOD PRESSURE: 56 MMHG | RESPIRATION RATE: 12 BRPM

## 2021-11-10 DIAGNOSIS — C90.00 MULTIPLE MYELOMA WITH FAILED REMISSION (H): ICD-10-CM

## 2021-11-10 DIAGNOSIS — F43.23 ADJUSTMENT DISORDER WITH MIXED ANXIETY AND DEPRESSED MOOD: Primary | ICD-10-CM

## 2021-11-10 DIAGNOSIS — C90.00 MULTIPLE MYELOMA WITH FAILED REMISSION (H): Primary | ICD-10-CM

## 2021-11-10 DIAGNOSIS — C90.00 MULTIPLE MYELOMA NOT HAVING ACHIEVED REMISSION (H): Primary | ICD-10-CM

## 2021-11-10 LAB
ALBUMIN SERPL-MCNC: 3.3 G/DL (ref 3.5–5)
ALP SERPL-CCNC: 49 U/L (ref 45–120)
ALT SERPL W P-5'-P-CCNC: 27 U/L (ref 0–45)
ANION GAP SERPL CALCULATED.3IONS-SCNC: 6 MMOL/L (ref 5–18)
AST SERPL W P-5'-P-CCNC: 18 U/L (ref 0–40)
BASOPHILS # BLD AUTO: 0.1 10E3/UL (ref 0–0.2)
BASOPHILS NFR BLD AUTO: 1 %
BILIRUB SERPL-MCNC: 0.3 MG/DL (ref 0–1)
BUN SERPL-MCNC: 16 MG/DL (ref 8–22)
CALCIUM SERPL-MCNC: 9 MG/DL (ref 8.5–10.5)
CHLORIDE BLD-SCNC: 108 MMOL/L (ref 98–107)
CO2 SERPL-SCNC: 24 MMOL/L (ref 22–31)
CREAT SERPL-MCNC: 0.62 MG/DL (ref 0.6–1.1)
EOSINOPHIL # BLD AUTO: 0.4 10E3/UL (ref 0–0.7)
EOSINOPHIL NFR BLD AUTO: 8 %
ERYTHROCYTE [DISTWIDTH] IN BLOOD BY AUTOMATED COUNT: 13.9 % (ref 10–15)
GFR SERPL CREATININE-BSD FRML MDRD: >90 ML/MIN/1.73M2
GLUCOSE BLD-MCNC: 100 MG/DL (ref 70–125)
HCT VFR BLD AUTO: 33.4 % (ref 35–47)
HGB BLD-MCNC: 10.9 G/DL (ref 11.7–15.7)
IMM GRANULOCYTES # BLD: 0 10E3/UL
IMM GRANULOCYTES NFR BLD: 0 %
LYMPHOCYTES # BLD AUTO: 0.5 10E3/UL (ref 0.8–5.3)
LYMPHOCYTES NFR BLD AUTO: 11 %
MCH RBC QN AUTO: 34.4 PG (ref 26.5–33)
MCHC RBC AUTO-ENTMCNC: 32.6 G/DL (ref 31.5–36.5)
MCV RBC AUTO: 105 FL (ref 78–100)
MONOCYTES # BLD AUTO: 0.3 10E3/UL (ref 0–1.3)
MONOCYTES NFR BLD AUTO: 6 %
NEUTROPHILS # BLD AUTO: 3.4 10E3/UL (ref 1.6–8.3)
NEUTROPHILS NFR BLD AUTO: 74 %
NRBC # BLD AUTO: 0 10E3/UL
NRBC BLD AUTO-RTO: 0 /100
PLATELET # BLD AUTO: 261 10E3/UL (ref 150–450)
POTASSIUM BLD-SCNC: 4.4 MMOL/L (ref 3.5–5)
PROT SERPL-MCNC: 6.9 G/DL (ref 6–8)
RBC # BLD AUTO: 3.17 10E6/UL (ref 3.8–5.2)
SODIUM SERPL-SCNC: 138 MMOL/L (ref 136–145)
WBC # BLD AUTO: 4.6 10E3/UL (ref 4–11)

## 2021-11-10 PROCEDURE — 80053 COMPREHEN METABOLIC PANEL: CPT | Performed by: NURSE PRACTITIONER

## 2021-11-10 PROCEDURE — G0463 HOSPITAL OUTPT CLINIC VISIT: HCPCS | Mod: 25,27

## 2021-11-10 PROCEDURE — 90834 PSYTX W PT 45 MINUTES: CPT | Mod: TEL | Performed by: SOCIAL WORKER

## 2021-11-10 PROCEDURE — 36415 COLL VENOUS BLD VENIPUNCTURE: CPT | Performed by: NURSE PRACTITIONER

## 2021-11-10 PROCEDURE — 85025 COMPLETE CBC W/AUTO DIFF WBC: CPT | Performed by: NURSE PRACTITIONER

## 2021-11-10 PROCEDURE — 96401 CHEMO ANTI-NEOPL SQ/IM: CPT

## 2021-11-10 PROCEDURE — 99215 OFFICE O/P EST HI 40 MIN: CPT | Performed by: INTERNAL MEDICINE

## 2021-11-10 PROCEDURE — 250N000011 HC RX IP 250 OP 636: Performed by: NURSE PRACTITIONER

## 2021-11-10 PROCEDURE — G0463 HOSPITAL OUTPT CLINIC VISIT: HCPCS | Mod: 25

## 2021-11-10 RX ORDER — EPINEPHRINE 1 MG/ML
0.3 INJECTION, SOLUTION INTRAMUSCULAR; SUBCUTANEOUS EVERY 5 MIN PRN
Status: CANCELLED | OUTPATIENT
Start: 2021-12-14

## 2021-11-10 RX ORDER — LORAZEPAM 2 MG/ML
0.5 INJECTION INTRAMUSCULAR EVERY 4 HOURS PRN
Status: CANCELLED | OUTPATIENT
Start: 2021-11-30

## 2021-11-10 RX ORDER — NALOXONE HYDROCHLORIDE 0.4 MG/ML
0.2 INJECTION, SOLUTION INTRAMUSCULAR; INTRAVENOUS; SUBCUTANEOUS
Status: CANCELLED | OUTPATIENT
Start: 2021-11-30

## 2021-11-10 RX ORDER — ALBUTEROL SULFATE 90 UG/1
1-2 AEROSOL, METERED RESPIRATORY (INHALATION)
Status: CANCELLED
Start: 2021-12-07

## 2021-11-10 RX ORDER — ALBUTEROL SULFATE 90 UG/1
1-2 AEROSOL, METERED RESPIRATORY (INHALATION)
Status: CANCELLED
Start: 2021-12-14

## 2021-11-10 RX ORDER — EPINEPHRINE 1 MG/ML
0.3 INJECTION, SOLUTION INTRAMUSCULAR; SUBCUTANEOUS EVERY 5 MIN PRN
Status: CANCELLED | OUTPATIENT
Start: 2021-11-30

## 2021-11-10 RX ORDER — LORAZEPAM 2 MG/ML
0.5 INJECTION INTRAMUSCULAR EVERY 4 HOURS PRN
Status: CANCELLED | OUTPATIENT
Start: 2021-12-07

## 2021-11-10 RX ORDER — METHYLPREDNISOLONE SODIUM SUCCINATE 125 MG/2ML
125 INJECTION, POWDER, LYOPHILIZED, FOR SOLUTION INTRAMUSCULAR; INTRAVENOUS
Status: CANCELLED
Start: 2021-12-07

## 2021-11-10 RX ORDER — METHYLPREDNISOLONE SODIUM SUCCINATE 125 MG/2ML
125 INJECTION, POWDER, LYOPHILIZED, FOR SOLUTION INTRAMUSCULAR; INTRAVENOUS
Status: CANCELLED
Start: 2021-11-30

## 2021-11-10 RX ORDER — ALBUTEROL SULFATE 0.83 MG/ML
2.5 SOLUTION RESPIRATORY (INHALATION)
Status: CANCELLED | OUTPATIENT
Start: 2021-12-07

## 2021-11-10 RX ORDER — NALOXONE HYDROCHLORIDE 0.4 MG/ML
0.2 INJECTION, SOLUTION INTRAMUSCULAR; INTRAVENOUS; SUBCUTANEOUS
Status: CANCELLED | OUTPATIENT
Start: 2021-12-14

## 2021-11-10 RX ORDER — METHYLPREDNISOLONE SODIUM SUCCINATE 125 MG/2ML
125 INJECTION, POWDER, LYOPHILIZED, FOR SOLUTION INTRAMUSCULAR; INTRAVENOUS
Status: CANCELLED
Start: 2021-12-14

## 2021-11-10 RX ORDER — DIPHENHYDRAMINE HYDROCHLORIDE 50 MG/ML
50 INJECTION INTRAMUSCULAR; INTRAVENOUS
Status: CANCELLED
Start: 2021-11-30

## 2021-11-10 RX ORDER — LORAZEPAM 2 MG/ML
0.5 INJECTION INTRAMUSCULAR EVERY 4 HOURS PRN
Status: CANCELLED | OUTPATIENT
Start: 2021-12-14

## 2021-11-10 RX ORDER — DIPHENHYDRAMINE HYDROCHLORIDE 50 MG/ML
50 INJECTION INTRAMUSCULAR; INTRAVENOUS
Status: CANCELLED
Start: 2021-12-14

## 2021-11-10 RX ORDER — MEPERIDINE HYDROCHLORIDE 25 MG/ML
25 INJECTION INTRAMUSCULAR; INTRAVENOUS; SUBCUTANEOUS EVERY 30 MIN PRN
Status: CANCELLED | OUTPATIENT
Start: 2021-12-07

## 2021-11-10 RX ORDER — MEPERIDINE HYDROCHLORIDE 25 MG/ML
25 INJECTION INTRAMUSCULAR; INTRAVENOUS; SUBCUTANEOUS EVERY 30 MIN PRN
Status: CANCELLED | OUTPATIENT
Start: 2021-11-30

## 2021-11-10 RX ORDER — DEXAMETHASONE 4 MG/1
TABLET ORAL
Qty: 100 TABLET | Refills: 1 | Status: SHIPPED | OUTPATIENT
Start: 2021-11-10 | End: 2022-01-05

## 2021-11-10 RX ORDER — ALBUTEROL SULFATE 0.83 MG/ML
2.5 SOLUTION RESPIRATORY (INHALATION)
Status: CANCELLED | OUTPATIENT
Start: 2021-11-30

## 2021-11-10 RX ORDER — DIPHENHYDRAMINE HYDROCHLORIDE 50 MG/ML
50 INJECTION INTRAMUSCULAR; INTRAVENOUS
Status: CANCELLED
Start: 2021-12-07

## 2021-11-10 RX ORDER — ALBUTEROL SULFATE 90 UG/1
1-2 AEROSOL, METERED RESPIRATORY (INHALATION)
Status: CANCELLED
Start: 2021-11-30

## 2021-11-10 RX ORDER — EPINEPHRINE 1 MG/ML
0.3 INJECTION, SOLUTION INTRAMUSCULAR; SUBCUTANEOUS EVERY 5 MIN PRN
Status: CANCELLED | OUTPATIENT
Start: 2021-12-07

## 2021-11-10 RX ORDER — NALOXONE HYDROCHLORIDE 0.4 MG/ML
0.2 INJECTION, SOLUTION INTRAMUSCULAR; INTRAVENOUS; SUBCUTANEOUS
Status: CANCELLED | OUTPATIENT
Start: 2021-12-07

## 2021-11-10 RX ORDER — MEPERIDINE HYDROCHLORIDE 25 MG/ML
25 INJECTION INTRAMUSCULAR; INTRAVENOUS; SUBCUTANEOUS EVERY 30 MIN PRN
Status: CANCELLED | OUTPATIENT
Start: 2021-12-14

## 2021-11-10 RX ORDER — ALBUTEROL SULFATE 0.83 MG/ML
2.5 SOLUTION RESPIRATORY (INHALATION)
Status: CANCELLED | OUTPATIENT
Start: 2021-12-14

## 2021-11-10 RX ADMIN — BORTEZOMIB 2.1 MG: 3.5 INJECTION, POWDER, LYOPHILIZED, FOR SOLUTION INTRAVENOUS; SUBCUTANEOUS at 15:03

## 2021-11-10 ASSESSMENT — PAIN SCALES - GENERAL: PAINLEVEL: SEVERE PAIN (7)

## 2021-11-10 NOTE — CONFIDENTIAL NOTE
Psychology Psychotherapy  Note      Name:  Lizzie Viera  :  1950  MRN:  [id number]      Date of Service: 11/10/2021  Duration: 45 minutes (1:00 to 1:45 PM)      Target Symptoms:    The patient was seen in light of concerns regarding symptoms of depression and anxiety as evidenced by patient and staff report.    Participation:  The patient was able to participate and benefit from treatment as evidenced by her verbal expression of ideas and initiation of topics discussed.    Mental Status:    Mood/Affect:  normal affect  Suicidal Ideation:  absent  Homicidal Ideation:  absent  Thought process:  normal  Thought content:  Clear  Fund of Knowledge:  Sufficient  Attention/Concentration:  Normal  Language ability:  intact  Speech: normal  Memory:  recent and remote memory intact  Insight and Judgement:  good  Orientation:  self, place and time  Appearance: N/A-telephone visit  Eye Contact: N/A-telephone visit  Estimated IQ:  Average      Intervention:    Valeria was interested in a follow-up psychotherapy session today.  We were able to meet for an in person clinic visit today.  Valeria was referred to me by JOSH Lopez from the palliative care clinic.  She is also followed by Dr. Blanco and Dr. Rapp.      Valeria updated me on her health issues.  She had a very busy clinic visit day today.  She met with Dr. Blanco at 10: 15 today.  Our meeting was at 1:00 today followed by a 2:00 appointment with Dr. Rapp.  She also needs to get an injection before she leaves today.  She processed her thoughts and feelings about her health issues and we discussed strategies to help her cope.    Valeria has a history of multiple myeloma.  She states that in 2020, she had a fracture of T-7 and was hospitalized at [facility].  She did not have a good experience in transferring her care over to [facility] and the cancer clinic at [facility].  In 2021, she continued to have pain in her  spine and had biopsy that confirmed her diagnosis of multiple myeloma in May 2021.  She was hospitalized in September at [facility] and received radiation therapy during her hospitalization.  Her last visit with Dr. Blanco on 9/29/2021,  he indicates that clinically her multiple myeloma is behaving more aggressively with bone lesions.  She has significant bone disease with osteoporosis and compression fractures.  Dr. Blanco started her on Velcade, Revlimid and dexamethasone.  She had her first treatment on 9/29/2021.  She also will be receiving monthly Zometa shots.  She had additional radiation treatment on 10/6/2021 through 10/12/2021 to T1.  She also has a skull lesion that they will hold off on radiation at this time. She met with the neurosurgeon on 10/11/2021 and he felt that T6-7 and 8 are stable.   Her main physical complaints are pain and fatigue. She also is experiencing temperature changes and changes to her taste, which she believes is due to her chemo pill. At her 9/7/2021 palliative care clinic visit, they discussed advanced directives.  She has completed her health care directive.  She expressed that she wants to be a DNR/DNI and has expressed this to her care providers.  She decided to have her daughter be the primary proxy and her sister and girlfriend to be the alternates.  At her recent clinic visit on 10/19/2021 with Zehra Pablo CNP, she shared with Valeria that she will likely be referred to the transplant team after 4-6 cycles, depending on her response to treatment.      Valeria indicates that she has done a lot of research and has decided not to be vaccinated for COVID-19.  Her daughter had COVID-19 in May and her son and  also tested positive at that time.  She had the antibody test and found out that she also had COVID-19 in the past.  She was asymptomatic and did not realize she even had it.  She is not interested in getting the vaccine.  She is interested in doing  "alternative/complementary medicine to help combat her cancer.  She has been taking anti-inflammatory medication and has been reading books and articles about supplements that help \"kill cancer\".  She currently is on a variety of supplements and has discussed her supplements with Dr. Blanco.  Valeria indicates that she finds her medical background useful in understanding her health condition.    Valeria processed her thoughts and feelings about her cancer and her cancer treatment.  She is experiencing symptoms of anxiety and depression related to coping with her cancer and other life stressors.  She does not clinically meet diagnostic criteria for a mood disorder or an anxiety disorder.  She meets diagnostic criteria for adjustment disorder with mixed anxiety and depressed mood.  She denies suicidal ideation or thoughts of harming herself or others.    Valeria grew up in the [facility].  She is the second oldest in a family of 8 children.  She has 3 sisters and 4 brothers.  Her older brother is exactly 1 year and a day older than her.  She states that she has a very close relationship with her siblings.  Most of them live in the area.  Her sisters have been extremely helpful during this difficult time.  She has 2 of her sisters at her house cleaning during her last visit..    Valeria lives in her [place] home with her , Ramos; daughter Collette; and her 2 sons ages 4 and 8.  Valeria and Ramos have been  for 42 years and that on a blind date.  She states that Ramos has end-stage renal disease and is receiving dialysis 3 times a week.  He also had trouble aortic aneurysm repair and he has type 2 diabetes. He had a cardiac procedure at the [facility] on 10/15/2021, which went well. . He will be evaluated in 3 to 6 months for a kidney transplant. He has a friend who is willing to donate his kidney. Ramos retired at age 69 from being a .  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  When he had his annual " DOT physical, he did not want to share his medical history with the [facility] physician and decided to quit his job, on the spot that day.  This created some issues as he was the carrier of the insurance for the family.  Valeria indicates that Ramos is extremely angry and tends to take it out on the family members.  His father  when he was 11 years old.  He was a  and  on an experimental aircraft in [place].  Several weeks ago, he became so angry and agitated towards their daughter, that she gave him an ultimatum that he needs to start seeing a therapist.  Recently, he became extremely angry and agitated  with their son and her sister, to the point that they needed to leave the house due to his verbal abuse.  Valeria has been a buffer for the family regarding his anger.  She plans to follow-up with him this week to make sure he is scheduled an appointment to see a therapist.  The staff at [facility] have already talked with him about seeing a therapist and the plan is for him to follow-up on a referral from their recommendation.  He has been refusing to make an appoint with a therapist, but agrees to meet for family therapy with his daughter.  Valeria plans to talk further about this with her daughter and see if they could have Ramos join her at one of her upcoming therapy appointments.    Valeria and Ramos have 3 children.  The oldest is their daughter, Sharifa, age 38.  She lives near [place] and has 2 children.  She works as a teacher.  Their second oldest is their daughter, Collette, age 34, who currently lives with them along with her for an 8-year-old sons.  She is on Social Security disability due to her depression and past suicidal ideation.  She has received a great deal of therapy including DBT.  She has been extremely helpful to Valeria and wants to be her caretaker.  Their youngest is their son, Trell, age 30, l just moved out of the home 3 weeks ago to [facility], in order to be closer to his work in the  area. He works 12-hour shifts starting at 4 AM in a warehouse that provides food for gas stations and service stations.    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  She has worked in medical research.  She spent 18 years working at the [employer].  She also has worked at [employer] in nuclear medicine and for [place] cardiology and nuclear medicine.  She spent several years working for [employer].  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    Valeria was raised Nondenominational and went to a [employer] school, [employer] in [place]. She also went to [employer]. She currently describes herself as being a spiritual person and believes that mother nature is the guiding spirit of the planet. She has some  and Indonesian spirituality beliefs. We talked about ways for her to incorporate her spirituality into her healing process.    Valeria is interested in individual psychotherapy to help her work through the emotional aspects of her cancer and cancer treatments.  She is experiencing symptoms of anxiety and depression related to her cancer and other life stresses.  She would like to initially meet on a weekly basis.  I have left a message for our information coordinators to get her scheduled in for a series of appointments.    Psychoterapeutic Techniques:  Cognitive-behavioral therapy, motivational interviewing and supportive psychotherapy strategies were utilized.    Necessity:    The session was necessary for the care of the patient to address symptoms of depression and anxiety related to the patient's medical condition.    Progress/ Plan:    Valeria is interested in individual psychotherapy every 1 to 2 weeks to help her cope with the emotional aspects of her cancer and cancer treatment.  I will provide her with some information about utilizing cognitive behavioral therapy  strategies to help manage symptoms of pain and anxiety & depression.  She has made good progress in going through the written material that I have given her.  She has started doing guided imagery exercises.  She plans to do some painting to illustrate her image for her guided imagery.  In addition, she has been doing some reading about cognitive behavioral therapy and has started to implement some of the strategies that we discussed.  She has read up on the side effects from her treatment and read that anxiety and irritability tend to be some of the side effects.  She is making a conscious effort to utilize some of the skills that we discussed to work on these issues.  I was able to provide her with 2 Omkar packs from the [employer] today for her to grand sons that live with them.  I also provided her friend, who was with her all day for the appointments with support group resources from [employer].  They have an online support group for families and friends supporting individuals with cancer.    Valeria had several difficulties over the past few weeks.  They had their pipes leak at their house and found out that they were corroded.  They are getting minimal help from their insurance company, but her brother is helping to fix this issue.  They also found out that her   Ramos's mother, who lives in [place] and is 97 years old is very sick and may be coming to the end of her life.  She processed her thoughts and feelings about both of these events and issues.    Valeria indicates that she has been working at getting exercise over this past week.  She has been walking on the treadmill 1 mile, 3 days a week.  Her sister picks her up and takes her to her house to use the treadmill.  This is been helpful as it creates some accountability for Valeria and also gives her the opportunity to spend some quality time with her sister.  She also is working on having good nutrition.    Valeria had a girlfriend that   recently from metastatic breast cancer.  She processed her thoughts and feelings about this loss and we discussed strategies to help her release her grief.    We also talked about issues with her  and some strategies to help set boundaries and limits and also encouraged him to get help he needs.  She also talked about how her 8-year-old grandson was feeling anxious and worried as he is a teacher is pregnant and decided to go on leave early.  He started with a new teacher and is having a lot of anxiety with the change.  He had some thoughts of hurting himself and his mom took him to the emergency room at [facility].  He is doing much better this week.  His mom, Collette has a history of having suicide attempts 3-4 times in 1 year, prior to her children being born.  Valeria is concerned that some of Collette's depressive tendencies are wearing off on her grandson.  We discussed strategies that might be helpful in this area.    Valeria is interested in meeting on a regular basis.  She was able to schedule a follow-up visit before leaving the clinic today.    Diagnosis:    1.  Adjustment disorder with mixed anxiety and depressed mood  2.  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  Other information was consistent from the note that was made on 11/1/2021.      This note was created with the help of Dragon dictation system.  Grammatical and typing errors are not intentional.     Review of long-term goals: Treatment plan was reviewed and updated.       Provider: Mary Ellen hCan MA, LP, LICSW    Date:  10/5/2021  Time:  3:05 PM

## 2021-11-10 NOTE — PROGRESS NOTES
Pt layed in bed and injection given subcutaneous L abdomen without incident. Pt lado.c using rolling walker to lobby to meet her ride.

## 2021-11-10 NOTE — PROGRESS NOTES
"Oncology Rooming Note    November 10, 2021 10:53 AM   Lizzie Viera is a 71 year old female who presents for:    Chief Complaint   Patient presents with     Oncology Clinic Visit     Multiple myeloma with failed remission (H)     Initial Vitals: /56 (BP Location: Left arm, Patient Position: Sitting, Cuff Size: Adult Regular)   Pulse 58   Temp 97.6  F (36.4  C) (Oral)   Resp 12   Wt 59.9 kg (132 lb)   SpO2 94%   BMI 24.14 kg/m   Estimated body mass index is 24.14 kg/m  as calculated from the following:    Height as of 10/19/21: 1.575 m (5' 2.01\").    Weight as of this encounter: 59.9 kg (132 lb). Body surface area is 1.62 meters squared.  Severe Pain (7) Comment: Data Unavailable   No LMP recorded. Patient is postmenopausal.  Allergies reviewed: Yes  Medications reviewed: Yes    Medications: Medication refills not needed today.  Pharmacy name entered into [facility]: [id number] - [place], [place] - [address]    Clinical concerns:  Multiple myeloma with failed remission      Paradise Harris CMA            "

## 2021-11-10 NOTE — LETTER
"    11/10/2021         RE: Lizzie Viera  [address]  [place] [address]        Dear Colleague,    Thank you for referring your patient, Lizzie Viera, to the [facility] [place]. Please see a copy of my visit note below.    Oncology Rooming Note    November 10, 2021 10:53 AM   Lizzie Viera is a 71 year old female who presents for:    Chief Complaint   Patient presents with     Oncology Clinic Visit     Multiple myeloma with failed remission (H)     Initial Vitals: /56 (BP Location: Left arm, Patient Position: Sitting, Cuff Size: Adult Regular)   Pulse 58   Temp 97.6  F (36.4  C) (Oral)   Resp 12   Wt 59.9 kg (132 lb)   SpO2 94%   BMI 24.14 kg/m   Estimated body mass index is 24.14 kg/m  as calculated from the following:    Height as of 10/19/21: 1.575 m (5' 2.01\").    Weight as of this encounter: 59.9 kg (132 lb). Body surface area is 1.62 meters squared.  Severe Pain (7) Comment: Data Unavailable   No LMP recorded. Patient is postmenopausal.  Allergies reviewed: Yes  Medications reviewed: Yes    Medications: Medication refills not needed today.  Pharmacy name entered into [facility]: [id number] [address]    Clinical concerns:  Multiple myeloma with failed remission      Paradise Harris, [facility] Hematology and Oncology Progress Note    Patient: Lizzie Viera  MRN: [id number]  Date of Service: Nov 10, 2021         Reason for Visit    Multiple Myeloma    Assessment     1.  A very pleasant 71 year old woman with average risk multiple myeloma is on the cytogenetics.  However clinically this is behaving somewhat more aggressively with bone lesions etc. her immunoglobulin levels have not changed significantly in the last 4 months.  2.  Significant bone disease with osteoporosis and compression fractures.  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 This appears to be " smaller.  3.  Normal hemoglobin, calcium, kidney function along with beta-2 microglobulin and albumin at the time of diagnosis.  4.  Positive Covid antibodies from infection. Unvaccinated.  5.  Pain from compression fracture status post radiation therapy.    Plan    1.  Continue treatment with Velcade, Revlimid and dexamethasone. Check labs periodically.  2.  Bisphosphonate therapy for her bones.  On Zometa monthly.  3.  She would be a transplant candidate.  4.  Her scalp lesion will need to be followed up.  Discussed with Dr. Rapp and we held off on radiation for now.  If it does not heal then we may have to start some radiation therapy.  5.  Continue good diet and exercise.  Increase calcium and vitamin D in her diet.  6.  Counseled the patient at length regarding overall plan of care.    Cancer Staging  No matching staging information was found for the patient.    ECOG Performance    2 - Ambulatory and independent in all ADLs; cannot work; up > 50% of the time      History of Present Illness    Ms. Lizzie Viera is a very pleasant 71 year old woman who has been diagnosed with multiple myeloma IgG kappa type in May 2021.  She had initially presented with compression fracture of T7 vertebral body in September 2020.  She had a back pain which led to that evaluation.  Bone density confirmed that she had osteoporosis.  MRI showed diffuse marrow edema in October 2020.  In April 2021 the MRI of the thoracic spine showed worsening T7 vertebral body height loss because of likely pathological compression fracture with extraosseous extension.  She then had IR guided bone biopsy on 7 May 2021 and pathology confirmed the presence of kappa light chain restricted plasma cells.  Her cytogenetics confirmed the presence of hyperdiploid E with gains of chromosome 5, 9 and 15.    She was then seen by Dr. Baig and had a bone marrow biopsy which also confirmed 60% involvement of the marrow with plasma cells.  The bone marrow  was done on 3 Jocelin 2021.  The bone marrow also confirmed the presence of hyperdiploid E.  She had a gain of chromosome 3, 5, 7, 9, 11, 15 as well as 19.  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  Her beta-2 microglobulin was actually normal at the time of her diagnosis as was her albumin at 4.0.  Monoclonal protein 3.1 g/dL.  Kappa free light chain levels of 13 mg/dL IgG level of 4280 with depressed levels of IgM and IgA.  Urine was also positive for kappa light chains as well as immunofixation of the urine was positive for IgG kappa.  Normal kidney function.  Normal hemoglobin.    As mentioned above she had bone lesions in the T7 vertebral body, T1, skull area.  Her main pain is coming from her T7 vertebral body compression fracture.    Due to the pain she has been seen by radiation oncology and has received radiation therapy.  She also got a dose of steroids for pain control.    Currently her pain is controlled with combination of Dilaudid as well as some Tylenol.  She is wearing a brace.  She did notice that when she was on dexamethasone her pain was significantly better.    She was initially thinking of doing some natural therapies but once her symptoms got worse, she came back in was counseled about treatment.      She has started on Velcade Revlimid and dexamethasone since September 2021. Tolerating it well. Responding nicely. Back pain is still an issue.    Review of systems.  No fever or night sweats.  No loss of weight.  No lumps or bumps anywhere.  No unusual headaches or eyesight issues.  No dizziness.  No bleeding from the nose.  No sores in the mouth. No problems with swallowing.  No chest pain. No shortness of breath. No cough.  Right sided abdominal pain. Some nausea no vomiting. GERD symptoms.  No diarrhea or constipation.  No blood in stool or black colored stools.  No problems passing urine.  No numbness or tingling in hands or feet.  No skin rashes.  A 14 point review of systems is otherwise  negative.        Past History    Past Medical History:   Diagnosis Date     Cervical dysplasia      Chronic RUQ pain      Osteoporosis        Past Surgical History:   Procedure Laterality Date     C LAP,CHOLECYSTECTOMY/EXPLORE  12/27/2004     C LIGATE FALLOPIAN TUBE      Description: Tubal Ligation;  Recorded: 09/20/2007;     CONIZATION CERVIX,KNIFE/LASER      Description: Cervical Conization By Laser;  Recorded: 09/20/2007;     HC DILATION/CURETTAGE DIAG/THER NON OB      Description: Dilation And Curettage;  Recorded: 09/20/2007;     HC REMOVE TONSILS/ADENOIDS,<11 Y/O      Description: Tonsillectomy With Adenoidectomy;  Recorded: 09/20/2007;         Physical Exam    /56 (BP Location: Left arm, Patient Position: Sitting, Cuff Size: Adult Regular)   Pulse 58   Temp 97.6  F (36.4  C) (Oral)   Resp 12   Wt 59.9 kg (132 lb)   SpO2 94%   BMI 24.14 kg/m          GENERAL: Alert and oriented to time place and person. Seated comfortably. In no distress.    HEAD: Atraumatic and normocephalic.    EYES: RADHA, EOMI. No pallor. No icterus.    Oral cavity: no mucosal lesion or tonsillar enlargement.    NECK: supple. JVP normal.No thyroid enlargement.    LYMPH NODES: No palpable, cervical, axillary or inguinal lymphadenopathy.    CHEST: clear to auscultation bilaterally. Symmetrical breath movements bilaterally.  She is in a TLSO brace.    CVS: S1 and S2 are Regular rate and rhythm.  Soft systolic murmur heard. No peripheral edema.    ABDOMEN: Soft. Not tender. Not distended. No palpable hepatomegaly or splenomegaly. No other mass palpable. Bowel sounds heard.    EXTREMITIES: Warm.    SKIN: no rash, or bruising or purpura.      Lab Results    Recent Results (from the past 168 hour(s))   Calcium   Result Value Ref Range    Calcium 9.2 8.5 - 10.5 mg/dL   Creatinine   Result Value Ref Range    Creatinine 0.71 0.60 - 1.10 mg/dL    GFR Estimate 87 >60 mL/min/1.73m2   Albumin level   Result Value Ref Range    Albumin 3.1 (L)  3.5 - 5.0 g/dL   Immunoglobulins A G and M   Result Value Ref Range    Immunoglobulin G 1,609 700-1,700 mg/dL    Immunoglobulin A 18 (L) 65 - 400 mg/dL    Immunoglobulin M 9 (L) 60 - 280 mg/dL   Kappa and lambda light chain   Result Value Ref Range    Kappa Free Light Chains 2.03 (H) 0.33 - 1.94 mg/dL    Lambda Free Light Chains 0.99 0.57 - 2.63 mg/dL    Kappa /Lambda Ratio 2.05 (H) 0.26 - 1.65   Total Protein, Serum for ELP   Result Value Ref Range    Total Protein Serum for ELP 6.8 6.0 - 8.0 g/dL   Protein Electrophoresis, Serum   Result Value Ref Range    Albumin % 52.1 51.0 - 67.0 %    Albumin 3.5 3.2 - 4.7 g/dL    Alpha 1 % 4.2 (H) 2.0 - 4.0 %    Alpha 1 0.3 0.1 - 0.3 g/dL    Alpha 2 % 14.1 (H) 5.0 - 13.0 %    Alpha 2 1.0 (H) 0.4 - 0.9 g/dL    Beta % 11.1 10.0 - 17.0 %    Beta Globulin 0.8 0.7 - 1.2 g/dL    Gamma Globulin % 18.5 9.0 - 20.0 %    Gamma Globulin 1.3 0.6 - 1.4 g/dL    Monoclonal Peak 1.1 g/dL    ELP Interpretation       A symmetric peak is present in the gamma globulin region of the tracing, and a clonal band is present in the corresponding region of the gel strip, indicating a monoclonal globulin. Serum immunofixation can further identify the abnormal protein.     The alpha 2 globulin fraction is increased. Isolated increases in alpha 2 globulin are associated with a variety of chronic inflammatory conditions, particularly connective tissue inflammatory disorders, or collagen-vascular diseases.     Path ICD: C90.00     Reviewing Pathologist Malachi Gomes MD      Total Protein Serum for ELP 6.8 g/dL   CBC with platelets and differential   Result Value Ref Range    WBC Count 6.3 4.0 - 11.0 10e3/uL    RBC Count 2.94 (L) 3.80 - 5.20 10e6/uL    Hemoglobin 10.0 (L) 11.7 - 15.7 g/dL    Hematocrit 31.6 (L) 35.0 - 47.0 %     (H) 78 - 100 fL    MCH 34.0 (H) 26.5 - 33.0 pg    MCHC 31.6 31.5 - 36.5 g/dL    RDW 14.3 10.0 - 15.0 %    Platelet Count 190 150 - 450 10e3/uL    % Neutrophils 86 %    %  Lymphocytes 9 %    % Monocytes 2 %    % Eosinophils 1 %    % Basophils 1 %    % Immature Granulocytes 1 %    NRBCs per 100 WBC 0 <1 /100    Absolute Neutrophils 5.5 1.6 - 8.3 10e3/uL    Absolute Lymphocytes 0.6 (L) 0.8 - 5.3 10e3/uL    Absolute Monocytes 0.1 0.0 - 1.3 10e3/uL    Absolute Eosinophils 0.1 0.0 - 0.7 10e3/uL    Absolute Basophils 0.0 0.0 - 0.2 10e3/uL    Absolute Immature Granulocytes 0.1 (H) <=0.0 10e3/uL    Absolute NRBCs 0.0 10e3/uL   CBC with platelets and differential   Result Value Ref Range    WBC Count 4.6 4.0 - 11.0 10e3/uL    RBC Count 3.17 (L) 3.80 - 5.20 10e6/uL    Hemoglobin 10.9 (L) 11.7 - 15.7 g/dL    Hematocrit 33.4 (L) 35.0 - 47.0 %     (H) 78 - 100 fL    MCH 34.4 (H) 26.5 - 33.0 pg    MCHC 32.6 31.5 - 36.5 g/dL    RDW 13.9 10.0 - 15.0 %    Platelet Count 261 150 - 450 10e3/uL    % Neutrophils 74 %    % Lymphocytes 11 %    % Monocytes 6 %    % Eosinophils 8 %    % Basophils 1 %    % Immature Granulocytes 0 %    NRBCs per 100 WBC 0 <1 /100    Absolute Neutrophils 3.4 1.6 - 8.3 10e3/uL    Absolute Lymphocytes 0.5 (L) 0.8 - 5.3 10e3/uL    Absolute Monocytes 0.3 0.0 - 1.3 10e3/uL    Absolute Eosinophils 0.4 0.0 - 0.7 10e3/uL    Absolute Basophils 0.1 0.0 - 0.2 10e3/uL    Absolute Immature Granulocytes 0.0 <=0.0 10e3/uL    Absolute NRBCs 0.0 10e3/uL   Comprehensive metabolic panel   Result Value Ref Range    Sodium 138 136 - 145 mmol/L    Potassium 4.4 3.5 - 5.0 mmol/L    Chloride 108 (H) 98 - 107 mmol/L    Carbon Dioxide (CO2) 24 22 - 31 mmol/L    Anion Gap 6 5 - 18 mmol/L    Urea Nitrogen 16 8 - 22 mg/dL    Creatinine 0.62 0.60 - 1.10 mg/dL    Calcium 9.0 8.5 - 10.5 mg/dL    Glucose 100 70 - 125 mg/dL    Alkaline Phosphatase 49 45 - 120 U/L    AST 18 0 - 40 U/L    ALT 27 0 - 45 U/L    Protein Total 6.9 6.0 - 8.0 g/dL    Albumin 3.3 (L) 3.5 - 5.0 g/dL    Bilirubin Total 0.3 0.0 - 1.0 mg/dL    GFR Estimate >90 >60 mL/min/1.73m2       Imaging    No results found.    Signed by: Loco BRICENO  MD Bella,         Again, thank you for allowing me to participate in the care of your patient.        Sincerely,        Loco Blanco MD, MD

## 2021-11-10 NOTE — PROGRESS NOTES
Pt here with her friend for f/u with Dr. Rapp. Saw Dr. Blanco today as well as Mary Ellen, our Oncology Psychotherapist. Pt still having moderate to severe back pain on Gabapentin, Dilaudid, and cannabis.

## 2021-11-10 NOTE — PROGRESS NOTES
[facility] Hematology and Oncology Progress Note    Patient: Lizzie Viera  MRN: [id number]  Date of Service: Nov 10, 2021         Reason for Visit    Multiple Myeloma    Assessment     1.  A very pleasant 71 year old woman with average risk multiple myeloma is on the cytogenetics.  However clinically this is behaving somewhat more aggressively with bone lesions etc. her immunoglobulin levels have not changed significantly in the last 4 months.  2.  Significant bone disease with osteoporosis and compression fractures.  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 This appears to be smaller.  3.  Normal hemoglobin, calcium, kidney function along with beta-2 microglobulin and albumin at the time of diagnosis.  4.  Positive Covid antibodies from infection. Unvaccinated.  5.  Pain from compression fracture status post radiation therapy.    Plan    1.  Continue treatment with Velcade, Revlimid and dexamethasone. Check labs periodically.  2.  Bisphosphonate therapy for her bones.  On Zometa monthly.  3.  She would be a transplant candidate.  4.  Her scalp lesion will need to be followed up.  Discussed with Dr. Rapp and we held off on radiation for now.  If it does not heal then we may have to start some radiation therapy.  5.  Continue good diet and exercise.  Increase calcium and vitamin D in her diet.  6.  Counseled the patient at length regarding overall plan of care.    Cancer Staging  No matching staging information was found for the patient.    ECOG Performance    2 - Ambulatory and independent in all ADLs; cannot work; up > 50% of the time      History of Present Illness    Ms. Lizzie Viera is a very pleasant 71 year old woman who has been diagnosed with multiple myeloma IgG kappa type in May 2021.  She had initially presented with compression fracture of T7 vertebral body in September 2020.  She had a back pain which led to that evaluation.  Bone density confirmed that she had  osteoporosis.  MRI showed diffuse marrow edema in October 2020.  In April 2021 the MRI of the thoracic spine showed worsening T7 vertebral body height loss because of likely pathological compression fracture with extraosseous extension.  She then had IR guided bone biopsy on 7 May 2021 and pathology confirmed the presence of kappa light chain restricted plasma cells.  Her cytogenetics confirmed the presence of hyperdiploid E with gains of chromosome 5, 9 and 15.    She was then seen by Dr. Baig and had a bone marrow biopsy which also confirmed 60% involvement of the marrow with plasma cells.  The bone marrow was done on 3 Jocelin 2021.  The bone marrow also confirmed the presence of hyperdiploid E.  She had a gain of chromosome 3, 5, 7, 9, 11, 15 as well as 19.  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  Her beta-2 microglobulin was actually normal at the time of her diagnosis as was her albumin at 4.0.  Monoclonal protein 3.1 g/dL.  Kappa free light chain levels of 13 mg/dL IgG level of 4280 with depressed levels of IgM and IgA.  Urine was also positive for kappa light chains as well as immunofixation of the urine was positive for IgG kappa.  Normal kidney function.  Normal hemoglobin.    As mentioned above she had bone lesions in the T7 vertebral body, T1, skull area.  Her main pain is coming from her T7 vertebral body compression fracture.    Due to the pain she has been seen by radiation oncology and has received radiation therapy.  She also got a dose of steroids for pain control.    Currently her pain is controlled with combination of Dilaudid as well as some Tylenol.  She is wearing a brace.  She did notice that when she was on dexamethasone her pain was significantly better.    She was initially thinking of doing some natural therapies but once her symptoms got worse, she came back in was counseled about treatment.      She has started on Velcade Revlimid and dexamethasone since September 2021. Tolerating it well. Responding  nicely. Back pain is still an issue.    Review of systems.  No fever or night sweats.  No loss of weight.  No lumps or bumps anywhere.  No unusual headaches or eyesight issues.  No dizziness.  No bleeding from the nose.  No sores in the mouth. No problems with swallowing.  No chest pain. No shortness of breath. No cough.  Right sided abdominal pain. Some nausea no vomiting. GERD symptoms.  No diarrhea or constipation.  No blood in stool or black colored stools.  No problems passing urine.  No numbness or tingling in hands or feet.  No skin rashes.  A 14 point review of systems is otherwise negative.        Past History    Past Medical History:   Diagnosis Date     Cervical dysplasia      Chronic RUQ pain      Osteoporosis        Past Surgical History:   Procedure Laterality Date     C LAP,CHOLECYSTECTOMY/EXPLORE  12/27/2004     C LIGATE FALLOPIAN TUBE      Description: Tubal Ligation;  Recorded: 09/20/2007;     CONIZATION CERVIX,KNIFE/LASER      Description: Cervical Conization By Laser;  Recorded: 09/20/2007;     HC DILATION/CURETTAGE DIAG/THER NON OB      Description: Dilation And Curettage;  Recorded: 09/20/2007;     HC REMOVE TONSILS/ADENOIDS,<11 Y/O      Description: Tonsillectomy With Adenoidectomy;  Recorded: 09/20/2007;         Physical Exam    /56 (BP Location: Left arm, Patient Position: Sitting, Cuff Size: Adult Regular)   Pulse 58   Temp 97.6  F (36.4  C) (Oral)   Resp 12   Wt 59.9 kg (132 lb)   SpO2 94%   BMI 24.14 kg/m          GENERAL: Alert and oriented to time place and person. Seated comfortably. In no distress.    HEAD: Atraumatic and normocephalic.    EYES: RADHA, EOMI. No pallor. No icterus.    Oral cavity: no mucosal lesion or tonsillar enlargement.    NECK: supple. JVP normal.No thyroid enlargement.    LYMPH NODES: No palpable, cervical, axillary or inguinal lymphadenopathy.    CHEST: clear to auscultation bilaterally. Symmetrical breath movements bilaterally.  She is in a TLSO  brace.    CVS: S1 and S2 are Regular rate and rhythm.  Soft systolic murmur heard. No peripheral edema.    ABDOMEN: Soft. Not tender. Not distended. No palpable hepatomegaly or splenomegaly. No other mass palpable. Bowel sounds heard.    EXTREMITIES: Warm.    SKIN: no rash, or bruising or purpura.      Lab Results    Recent Results (from the past 168 hour(s))   Calcium   Result Value Ref Range    Calcium 9.2 8.5 - 10.5 mg/dL   Creatinine   Result Value Ref Range    Creatinine 0.71 0.60 - 1.10 mg/dL    GFR Estimate 87 >60 mL/min/1.73m2   Albumin level   Result Value Ref Range    Albumin 3.1 (L) 3.5 - 5.0 g/dL   Immunoglobulins A G and M   Result Value Ref Range    Immunoglobulin G 1,609 700-1,700 mg/dL    Immunoglobulin A 18 (L) 65 - 400 mg/dL    Immunoglobulin M 9 (L) 60 - 280 mg/dL   Kappa and lambda light chain   Result Value Ref Range    Kappa Free Light Chains 2.03 (H) 0.33 - 1.94 mg/dL    Lambda Free Light Chains 0.99 0.57 - 2.63 mg/dL    Kappa /Lambda Ratio 2.05 (H) 0.26 - 1.65   Total Protein, Serum for ELP   Result Value Ref Range    Total Protein Serum for ELP 6.8 6.0 - 8.0 g/dL   Protein Electrophoresis, Serum   Result Value Ref Range    Albumin % 52.1 51.0 - 67.0 %    Albumin 3.5 3.2 - 4.7 g/dL    Alpha 1 % 4.2 (H) 2.0 - 4.0 %    Alpha 1 0.3 0.1 - 0.3 g/dL    Alpha 2 % 14.1 (H) 5.0 - 13.0 %    Alpha 2 1.0 (H) 0.4 - 0.9 g/dL    Beta % 11.1 10.0 - 17.0 %    Beta Globulin 0.8 0.7 - 1.2 g/dL    Gamma Globulin % 18.5 9.0 - 20.0 %    Gamma Globulin 1.3 0.6 - 1.4 g/dL    Monoclonal Peak 1.1 g/dL    ELP Interpretation       A symmetric peak is present in the gamma globulin region of the tracing, and a clonal band is present in the corresponding region of the gel strip, indicating a monoclonal globulin. Serum immunofixation can further identify the abnormal protein.     The alpha 2 globulin fraction is increased. Isolated increases in alpha 2 globulin are associated with a variety of chronic inflammatory  conditions, particularly connective tissue inflammatory disorders, or collagen-vascular diseases.     Path ICD: C90.00     Reviewing Pathologist Malachi Gomes MD      Total Protein Serum for ELP 6.8 g/dL   CBC with platelets and differential   Result Value Ref Range    WBC Count 6.3 4.0 - 11.0 10e3/uL    RBC Count 2.94 (L) 3.80 - 5.20 10e6/uL    Hemoglobin 10.0 (L) 11.7 - 15.7 g/dL    Hematocrit 31.6 (L) 35.0 - 47.0 %     (H) 78 - 100 fL    MCH 34.0 (H) 26.5 - 33.0 pg    MCHC 31.6 31.5 - 36.5 g/dL    RDW 14.3 10.0 - 15.0 %    Platelet Count 190 150 - 450 10e3/uL    % Neutrophils 86 %    % Lymphocytes 9 %    % Monocytes 2 %    % Eosinophils 1 %    % Basophils 1 %    % Immature Granulocytes 1 %    NRBCs per 100 WBC 0 <1 /100    Absolute Neutrophils 5.5 1.6 - 8.3 10e3/uL    Absolute Lymphocytes 0.6 (L) 0.8 - 5.3 10e3/uL    Absolute Monocytes 0.1 0.0 - 1.3 10e3/uL    Absolute Eosinophils 0.1 0.0 - 0.7 10e3/uL    Absolute Basophils 0.0 0.0 - 0.2 10e3/uL    Absolute Immature Granulocytes 0.1 (H) <=0.0 10e3/uL    Absolute NRBCs 0.0 10e3/uL   CBC with platelets and differential   Result Value Ref Range    WBC Count 4.6 4.0 - 11.0 10e3/uL    RBC Count 3.17 (L) 3.80 - 5.20 10e6/uL    Hemoglobin 10.9 (L) 11.7 - 15.7 g/dL    Hematocrit 33.4 (L) 35.0 - 47.0 %     (H) 78 - 100 fL    MCH 34.4 (H) 26.5 - 33.0 pg    MCHC 32.6 31.5 - 36.5 g/dL    RDW 13.9 10.0 - 15.0 %    Platelet Count 261 150 - 450 10e3/uL    % Neutrophils 74 %    % Lymphocytes 11 %    % Monocytes 6 %    % Eosinophils 8 %    % Basophils 1 %    % Immature Granulocytes 0 %    NRBCs per 100 WBC 0 <1 /100    Absolute Neutrophils 3.4 1.6 - 8.3 10e3/uL    Absolute Lymphocytes 0.5 (L) 0.8 - 5.3 10e3/uL    Absolute Monocytes 0.3 0.0 - 1.3 10e3/uL    Absolute Eosinophils 0.4 0.0 - 0.7 10e3/uL    Absolute Basophils 0.1 0.0 - 0.2 10e3/uL    Absolute Immature Granulocytes 0.0 <=0.0 10e3/uL    Absolute NRBCs 0.0 10e3/uL   Comprehensive metabolic panel    Result Value Ref Range    Sodium 138 136 - 145 mmol/L    Potassium 4.4 3.5 - 5.0 mmol/L    Chloride 108 (H) 98 - 107 mmol/L    Carbon Dioxide (CO2) 24 22 - 31 mmol/L    Anion Gap 6 5 - 18 mmol/L    Urea Nitrogen 16 8 - 22 mg/dL    Creatinine 0.62 0.60 - 1.10 mg/dL    Calcium 9.0 8.5 - 10.5 mg/dL    Glucose 100 70 - 125 mg/dL    Alkaline Phosphatase 49 45 - 120 U/L    AST 18 0 - 40 U/L    ALT 27 0 - 45 U/L    Protein Total 6.9 6.0 - 8.0 g/dL    Albumin 3.3 (L) 3.5 - 5.0 g/dL    Bilirubin Total 0.3 0.0 - 1.0 mg/dL    GFR Estimate >90 >60 mL/min/1.73m2       Imaging    No results found.    Signed by: Loco Blanco MD,

## 2021-11-10 NOTE — LETTER
"    11/10/2021         RE: Lizzie Viera  [address]  [place] [address]        Dear Colleague,    Thank you for referring your patient, Lizzie Viera, to the [facility] [place]. Please see a copy of my visit note below.            [facility] Follow Up Note    Patient: Lizzie Viera  MRN: [id number]  Date of Service: 11/10/2021    Assessment:       ICD-10-CM    1. Multiple myeloma not having achieved remission (H)  C90.00         Impression/Plan:   71 year old female with previously treated thoracic pathologic fracture with thecal sac narrowing and foraminal stenosis in setting of multiple myeloma.      Persistent pain at upper mid back as well as headaches corresponding to lesion at T7 compression fracture and large lesion at left parietal skull. Plain films done on 10/8/2021 show no significant change in T7 compression fracture with 34 degrees of angulation.  Let her know that this degree of angulation remains severe and needs to continue with TLSO brace and f/u with neurosurgery.      Regarding pain control  taking Naltrexone BID with dilaudid as part of \"natural therapy\" prescribed by naturopathic doctor.  Educated patient that  naltrexone antagonizes the dilaudid so won't derive  benefit from dilaudid.  SHe does report  upper GI discomfort could be gastroparesis and that metoclopramide would help or nausea and abdominal discomfort are side effects of naltrexone which we discussed.  Advised strongly to stop it, however, also let her know that receptor antagonism lasts for awhile so may not feel dilaudid kick in but should not escalate and cautioned for so as naltrexone wears off she doesn't get oversedated.     F/u prn               Subjective:     HPI:  Lizzie Viera is a 71 year old female with multiple myeloma who has declined cancer directed therapies to date. Sustained what was thought to be osteoporotic T7 " compression fracture in September 2020.     Imaging at that time showed the T7 compression fracture diffuse marrow edema and an indeterminant T1 lesion.       Subsequent imaging on 4/26/2021 showed worsening of her T7 pathologic fracture with new extraosseous extension causing severe spinal canal stenosis.  Biopsy on 5/7/2020 showed a plasma cell neoplasm.  Seen by medical oncology and a bone marrow biopsy was obtained from her right iliac crest which showed involvement of multiple myeloma.  She declined radiation and chemotherapy at that time.     She did continue to have follow-up in the neurosurgical clinic.  8/31/2021 she was seen in the clinic and due to worsening pain leg weakness and incontinence and was admitted for further work-up.     SITE TREATED: T6-9  TOTAL DOSE: 800 cGy  NUMBER OF FRACTIONS: 1  DATES COMPLETED: 9/1/2021    SITE TREATED: T1  TOTAL DOSE: 2000 cGy  NUMBER OF FRACTIONS: 5  DATES COMPLETED: 10/6/2021 - 10/12/2021    The patient presents today for routine office visit, still having pain     Past Medical History:   Diagnosis Date     Cervical dysplasia      Chronic RUQ pain      Osteoporosis      Past Surgical History:   Procedure Laterality Date     C LAP,CHOLECYSTECTOMY/EXPLORE  12/27/2004     C LIGATE FALLOPIAN TUBE      Description: Tubal Ligation;  Recorded: 09/20/2007;     CONIZATION CERVIX,KNIFE/LASER      Description: Cervical Conization By Laser;  Recorded: 09/20/2007;     HC DILATION/CURETTAGE DIAG/THER NON OB      Description: Dilation And Curettage;  Recorded: 09/20/2007;     HC REMOVE TONSILS/ADENOIDS,<11 Y/O      Description: Tonsillectomy With Adenoidectomy;  Recorded: 09/20/2007;     Current Outpatient Medications   Medication     acetylcysteine (NAC) 600 MG CAPS capsule     acyclovir (ZOVIRAX) 400 MG tablet     alpha-lipoic acid 100 MG capsule     aspirin (ASA) 81 MG chewable tablet     Coenzyme Q10 300 MG CAPS     dexamethasone (DECADRON) 4 MG tablet     fish oil-omega-3  fatty acids 1000 MG capsule     gabapentin (NEURONTIN) 300 MG capsule     HYDROmorphone (DILAUDID) 4 MG tablet     LENalidomide (REVLIMID) 25 MG CAPS capsule     levOCARNitine (CARNITOR) 330 MG tablet     medical cannabis (Patient's own supply)     Melatonin 10 MG TABS tablet     methocarbamol (ROBAXIN) 500 MG tablet     Milk Thistle-Dand-Fennel-Licor (MILK THISTLE XTRA) CAPS capsule     NALTREXONE HCL PO     phenazopyridine (AZO URINARY PAIN RELIEF) 95 MG tablet     senna (SENOKOT) 8.6 MG tablet     TURMERIC PO     UNABLE TO FIND     UNABLE TO FIND     UNABLE TO FIND     UNABLE TO FIND     UNABLE TO FIND     UNABLE TO FIND     Vitamin D, Cholecalciferol, 25 MCG (1000 UT) CAPS     Vitamin Mixture (VITAMIN C) LIQD     No current facility-administered medications for this visit.     Cefdinir, Latex, Short ragweed pollen ext, and Adhesive tape  Social History     Socioeconomic History     Marital status:      Spouse name: Not on file     Number of children: 3     Years of education: Not on file     Highest education level: Not on file   Occupational History     Not on file   Tobacco Use     Smoking status: Former Smoker     Packs/day: 0.50     Years: 45.00     Pack years: 22.50     Types: Cigarettes     Smokeless tobacco: Never Used   Substance and Sexual Activity     Alcohol use: Yes     Comment: Alcoholic Drinks/day: 0-1 drinks per week     Drug use: Not Currently     Sexual activity: Not Currently     Partners: Male     Birth control/protection: Surgical   Other Topics Concern     Not on file   Social History Narrative     Not on file     Social Determinants of Health     Financial Resource Strain: Not on file   Food Insecurity: Not on file   Transportation Needs: Not on file   Physical Activity: Not on file   Stress: Not on file   Social Connections: Not on file   Intimate Partner Violence: Not on file   Housing Stability: Not on file            Objective:        Exam:  There were no vitals filed for this  visit.    GENERAL: No acute distress. Cooperative in conversation. Mask on.   RESP: Normal respiratory effort.   ABD: Soft, nontender.  NEURO: Non focal. Alert and oriented x3.   PSYCH: Within normal limits. No depression or anxiety.  SKIN: Warm dry intact.       Recent Labs:   Recent Results (from the past 168 hour(s))   Calcium   Result Value Ref Range    Calcium 9.2 8.5 - 10.5 mg/dL   Creatinine   Result Value Ref Range    Creatinine 0.71 0.60 - 1.10 mg/dL    GFR Estimate 87 >60 mL/min/1.73m2   Albumin level   Result Value Ref Range    Albumin 3.1 (L) 3.5 - 5.0 g/dL   Immunoglobulins A G and M   Result Value Ref Range    Immunoglobulin G 1,609 700-1,700 mg/dL    Immunoglobulin A 18 (L) 65 - 400 mg/dL    Immunoglobulin M 9 (L) 60 - 280 mg/dL   Kappa and lambda light chain   Result Value Ref Range    Kappa Free Light Chains 2.03 (H) 0.33 - 1.94 mg/dL    Lambda Free Light Chains 0.99 0.57 - 2.63 mg/dL    Kappa /Lambda Ratio 2.05 (H) 0.26 - 1.65   Total Protein, Serum for ELP   Result Value Ref Range    Total Protein Serum for ELP 6.8 6.0 - 8.0 g/dL   Protein Electrophoresis, Serum   Result Value Ref Range    Albumin % 52.1 51.0 - 67.0 %    Albumin 3.5 3.2 - 4.7 g/dL    Alpha 1 % 4.2 (H) 2.0 - 4.0 %    Alpha 1 0.3 0.1 - 0.3 g/dL    Alpha 2 % 14.1 (H) 5.0 - 13.0 %    Alpha 2 1.0 (H) 0.4 - 0.9 g/dL    Beta % 11.1 10.0 - 17.0 %    Beta Globulin 0.8 0.7 - 1.2 g/dL    Gamma Globulin % 18.5 9.0 - 20.0 %    Gamma Globulin 1.3 0.6 - 1.4 g/dL    Monoclonal Peak 1.1 g/dL    ELP Interpretation       A symmetric peak is present in the gamma globulin region of the tracing, and a clonal band is present in the corresponding region of the gel strip, indicating a monoclonal globulin. Serum immunofixation can further identify the abnormal protein.     The alpha 2 globulin fraction is increased. Isolated increases in alpha 2 globulin are associated with a variety of chronic inflammatory conditions, particularly connective tissue  inflammatory disorders, or collagen-vascular diseases.     Path ICD: C90.00     Reviewing Pathologist Malachi Gomes MD      Total Protein Serum for ELP 6.8 g/dL   CBC with platelets and differential   Result Value Ref Range    WBC Count 6.3 4.0 - 11.0 10e3/uL    RBC Count 2.94 (L) 3.80 - 5.20 10e6/uL    Hemoglobin 10.0 (L) 11.7 - 15.7 g/dL    Hematocrit 31.6 (L) 35.0 - 47.0 %     (H) 78 - 100 fL    MCH 34.0 (H) 26.5 - 33.0 pg    MCHC 31.6 31.5 - 36.5 g/dL    RDW 14.3 10.0 - 15.0 %    Platelet Count 190 150 - 450 10e3/uL    % Neutrophils 86 %    % Lymphocytes 9 %    % Monocytes 2 %    % Eosinophils 1 %    % Basophils 1 %    % Immature Granulocytes 1 %    NRBCs per 100 WBC 0 <1 /100    Absolute Neutrophils 5.5 1.6 - 8.3 10e3/uL    Absolute Lymphocytes 0.6 (L) 0.8 - 5.3 10e3/uL    Absolute Monocytes 0.1 0.0 - 1.3 10e3/uL    Absolute Eosinophils 0.1 0.0 - 0.7 10e3/uL    Absolute Basophils 0.0 0.0 - 0.2 10e3/uL    Absolute Immature Granulocytes 0.1 (H) <=0.0 10e3/uL    Absolute NRBCs 0.0 10e3/uL       Pathology:   Results for orders placed or performed in visit on 06/03/21 (from the past 8760 hour(s))   Bone marrow biopsy   Result Value    Case Report      Bone Marrow                                       Case: [id number]                                   Authorizing Provider:  Lisset Baig MD      Collected:           06/03/2021 1100              Ordering Location:     [facility]   Received:            06/03/2021 1150                                     [facility]                                                              Pathologist:           Arnold Rosales MD                                                        Specimens:   A) - Iliac Crest, Right                                                                             B) - Iliac Crest, Right                                                                             C) - Iliac Crest, Right                                                                              D) - Peripheral Blood                                                                      Addendum      The purpose of this addendum is to report results of additional studies. Kappa and Lambda in situ hybridization studies demonstrate kappa light chain restri  Addendum electronically signed by Arnold Rosales MD on 6/9/2021 at 10:43 AM      Cytogenetics Report, Addendum      Please see [facility] cytogenetics report [id number].  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 Pending kappa and lambda in situ hybridization studies (addendum). Morphology, flow cytometry ([id number]) and immunohistochemistry demonstrate significant involvement by multiple myeloma. Pending cytogenetic analysis.    Clinical Information plasma cell disease, T7 lesion, diagnostic    Performed By: Arnold Rosales MD    Peripheral Smear      Red blood cells are normal in number and overall normochromic and normocytic. Anisopoikilocytosis, polychromasia, and rouleaux formation are not prominent.    The white blood cell count and differential appear as reported on the CBC. Leukocytes are normal in number and appearance, consisting predominantly of segmented and band neutrophils with fewer numbers of lymphocytes and monocytes. No blasts or dysplastic changes are identified.    Platelets are normal in number and appearance.      Bone Marrow Diff      Neutrophils 37.5%; Erythroid cells 14.5%; Monocytes 1.5%; Eosinophils 0.5%; Basophils 0%; Plasma  cells/Lymphocytes 43.5%; Blasts 2%.    M:E Ratio: 2-3:1      Aspirate Smear      Aspirate smears are cellular and demonstrate numerous marrow particles. Trilineage hematopoiesis is decreased given the numerous malignant plasma cells. Iron stores are adequate.    Core Biopsy/Aspirate Clot      Bone marrow biopsy and clot sections are hypocellular at approximately 20%. Trilineage hematopoiesis is decreased as the marrow is partially effaced by sheets and an interstitial infiltrate of (+) plasma cells (estimated at 60%). No significant coexpression of CD20 on plasma cells. CD3 highlights background T-cells. An iron stain demonstrates increased iron stores with no increase in ring sideroblasts.    All controls stain appropriately.      Charges      CPT: 46292, 62525, 83781, 28662 ×2, 65031 ×3, 44656 ×2, 99630 ×2, 56966 ×2, 40047 ×2, 11363  ICD-10: C90.00      Result Flag Malignant (A)     Comment: SPECIMEN PROCESSING:    All histology slide preparation and stains; and cytology slide preparation, staining, and cytotechnologist screening done at [facility] are performed at [facility], [address] [place] [address], [id number], with final interpretation, frozen section analysis, and cytology adequacy assessment at indicated laboratory.                 I, Nohemi Rapp MD personally performed the services described in this documentation, as scribed by Justyn Pineda in my presence, and it is both accurate and complete.    Signed by: Nohemi Rapp MD, MPH      Pt here with her friend for f/u with Dr. Rapp. Saw Dr. Blanco today as well as Mary Ellen, our Oncology Psychotherapist. Pt still having moderate to severe back pain on Gabapentin, Dilaudid, and cannabis.       Again, thank you for allowing me to participate in the care of your patient.        Sincerely,        Nohemi Rapp MD

## 2021-11-10 NOTE — PROGRESS NOTES
"[facility] Radiation Oncology Follow Up Note    Patient: Lizzie Viera  MRN: [id number]  Date of Service: 11/10/2021    Assessment:       ICD-10-CM    1. Multiple myeloma not having achieved remission (H)  C90.00         Impression/Plan:   71 year old female with previously treated thoracic pathologic fracture with thecal sac narrowing and foraminal stenosis in setting of multiple myeloma.      Persistent pain at upper mid back as well as headaches corresponding to lesion at T7 compression fracture and large lesion at left parietal skull. Plain films done on 10/8/2021 show no significant change in T7 compression fracture with 34 degrees of angulation.  Let her know that this degree of angulation remains severe and needs to continue with TLSO brace and f/u with neurosurgery.      Regarding pain control  taking Naltrexone BID with dilaudid as part of \"natural therapy\" prescribed by naturopathic doctor.  Educated patient that  naltrexone antagonizes the dilaudid so won't derive  benefit from dilaudid.  SHe does report  upper GI discomfort could be gastroparesis and that metoclopramide would help or nausea and abdominal discomfort are side effects of naltrexone which we discussed.  Advised strongly to stop it, however, also let her know that receptor antagonism lasts for awhile so may not feel dilaudid kick in but should not escalate and cautioned for so as naltrexone wears off she doesn't get oversedated.     F/u prn               Subjective:     HPI:  Lizzie Viera is a 71 year old female with multiple myeloma who has declined cancer directed therapies to date. Sustained what was thought to be osteoporotic T7 compression fracture in September 2020.     Imaging at that time showed the T7 compression fracture diffuse marrow edema and an indeterminant T1 lesion.       Subsequent imaging on 4/26/2021 showed worsening of her T7 pathologic fracture with new extraosseous extension causing severe " spinal canal stenosis.  Biopsy on 5/7/2020 showed a plasma cell neoplasm.  Seen by medical oncology and a bone marrow biopsy was obtained from her right iliac crest which showed involvement of multiple myeloma.  She declined radiation and chemotherapy at that time.     She did continue to have follow-up in the neurosurgical clinic.  8/31/2021 she was seen in the clinic and due to worsening pain leg weakness and incontinence and was admitted for further work-up.     SITE TREATED: T6-9  TOTAL DOSE: 800 cGy  NUMBER OF FRACTIONS: 1  DATES COMPLETED: 9/1/2021    SITE TREATED: T1  TOTAL DOSE: 2000 cGy  NUMBER OF FRACTIONS: 5  DATES COMPLETED: 10/6/2021 - 10/12/2021    The patient presents today for routine office visit, still having pain     Past Medical History:   Diagnosis Date     Cervical dysplasia      Chronic RUQ pain      Osteoporosis      Past Surgical History:   Procedure Laterality Date     C LAP,CHOLECYSTECTOMY/EXPLORE  12/27/2004     C LIGATE FALLOPIAN TUBE      Description: Tubal Ligation;  Recorded: 09/20/2007;     CONIZATION CERVIX,KNIFE/LASER      Description: Cervical Conization By Laser;  Recorded: 09/20/2007;     HC DILATION/CURETTAGE DIAG/THER NON OB      Description: Dilation And Curettage;  Recorded: 09/20/2007;     HC REMOVE TONSILS/ADENOIDS,<11 Y/O      Description: Tonsillectomy With Adenoidectomy;  Recorded: 09/20/2007;     Current Outpatient Medications   Medication     acetylcysteine (NAC) 600 MG CAPS capsule     acyclovir (ZOVIRAX) 400 MG tablet     alpha-lipoic acid 100 MG capsule     aspirin (ASA) 81 MG chewable tablet     Coenzyme Q10 300 MG CAPS     dexamethasone (DECADRON) 4 MG tablet     fish oil-omega-3 fatty acids 1000 MG capsule     gabapentin (NEURONTIN) 300 MG capsule     HYDROmorphone (DILAUDID) 4 MG tablet     LENalidomide (REVLIMID) 25 MG CAPS capsule     levOCARNitine (CARNITOR) 330 MG tablet     medical cannabis (Patient's own supply)     Melatonin 10 MG TABS tablet      methocarbamol (ROBAXIN) 500 MG tablet     Milk Thistle-Dand-Fennel-Licor (MILK THISTLE XTRA) CAPS capsule     NALTREXONE HCL PO     phenazopyridine (AZO URINARY PAIN RELIEF) 95 MG tablet     senna (SENOKOT) 8.6 MG tablet     TURMERIC PO     UNABLE TO FIND     UNABLE TO FIND     UNABLE TO FIND     UNABLE TO FIND     UNABLE TO FIND     UNABLE TO FIND     Vitamin D, Cholecalciferol, 25 MCG (1000 UT) CAPS     Vitamin Mixture (VITAMIN C) LIQD     No current facility-administered medications for this visit.     Cefdinir, Latex, Short ragweed pollen ext, and Adhesive tape  Social History     Socioeconomic History     Marital status:      Spouse name: Not on file     Number of children: 3     Years of education: Not on file     Highest education level: Not on file   Occupational History     Not on file   Tobacco Use     Smoking status: Former Smoker     Packs/day: 0.50     Years: 45.00     Pack years: 22.50     Types: Cigarettes     Smokeless tobacco: Never Used   Substance and Sexual Activity     Alcohol use: Yes     Comment: Alcoholic Drinks/day: 0-1 drinks per week     Drug use: Not Currently     Sexual activity: Not Currently     Partners: Male     Birth control/protection: Surgical   Other Topics Concern     Not on file   Social History Narrative     Not on file     Social Determinants of Health     Financial Resource Strain: Not on file   Food Insecurity: Not on file   Transportation Needs: Not on file   Physical Activity: Not on file   Stress: Not on file   Social Connections: Not on file   Intimate Partner Violence: Not on file   Housing Stability: Not on file            Objective:        Exam:  There were no vitals filed for this visit.    GENERAL: No acute distress. Cooperative in conversation. Mask on.   RESP: Normal respiratory effort.   ABD: Soft, nontender.  NEURO: Non focal. Alert and oriented x3.   PSYCH: Within normal limits. No depression or anxiety.  SKIN: Warm dry intact.       Recent Labs:    Recent Results (from the past 168 hour(s))   Calcium   Result Value Ref Range    Calcium 9.2 8.5 - 10.5 mg/dL   Creatinine   Result Value Ref Range    Creatinine 0.71 0.60 - 1.10 mg/dL    GFR Estimate 87 >60 mL/min/1.73m2   Albumin level   Result Value Ref Range    Albumin 3.1 (L) 3.5 - 5.0 g/dL   Immunoglobulins A G and M   Result Value Ref Range    Immunoglobulin G 1,609 700-1,700 mg/dL    Immunoglobulin A 18 (L) 65 - 400 mg/dL    Immunoglobulin M 9 (L) 60 - 280 mg/dL   Kappa and lambda light chain   Result Value Ref Range    Kappa Free Light Chains 2.03 (H) 0.33 - 1.94 mg/dL    Lambda Free Light Chains 0.99 0.57 - 2.63 mg/dL    Kappa /Lambda Ratio 2.05 (H) 0.26 - 1.65   Total Protein, Serum for ELP   Result Value Ref Range    Total Protein Serum for ELP 6.8 6.0 - 8.0 g/dL   Protein Electrophoresis, Serum   Result Value Ref Range    Albumin % 52.1 51.0 - 67.0 %    Albumin 3.5 3.2 - 4.7 g/dL    Alpha 1 % 4.2 (H) 2.0 - 4.0 %    Alpha 1 0.3 0.1 - 0.3 g/dL    Alpha 2 % 14.1 (H) 5.0 - 13.0 %    Alpha 2 1.0 (H) 0.4 - 0.9 g/dL    Beta % 11.1 10.0 - 17.0 %    Beta Globulin 0.8 0.7 - 1.2 g/dL    Gamma Globulin % 18.5 9.0 - 20.0 %    Gamma Globulin 1.3 0.6 - 1.4 g/dL    Monoclonal Peak 1.1 g/dL    ELP Interpretation       A symmetric peak is present in the gamma globulin region of the tracing, and a clonal band is present in the corresponding region of the gel strip, indicating a monoclonal globulin. Serum immunofixation can further identify the abnormal protein.     The alpha 2 globulin fraction is increased. Isolated increases in alpha 2 globulin are associated with a variety of chronic inflammatory conditions, particularly connective tissue inflammatory disorders, or collagen-vascular diseases.     Path ICD: C90.00     Reviewing Pathologist Malachi Gomes MD      Total Protein Serum for ELP 6.8 g/dL   CBC with platelets and differential   Result Value Ref Range    WBC Count 6.3 4.0 - 11.0 10e3/uL    RBC Count 2.94  (L) 3.80 - 5.20 10e6/uL    Hemoglobin 10.0 (L) 11.7 - 15.7 g/dL    Hematocrit 31.6 (L) 35.0 - 47.0 %     (H) 78 - 100 fL    MCH 34.0 (H) 26.5 - 33.0 pg    MCHC 31.6 31.5 - 36.5 g/dL    RDW 14.3 10.0 - 15.0 %    Platelet Count 190 150 - 450 10e3/uL    % Neutrophils 86 %    % Lymphocytes 9 %    % Monocytes 2 %    % Eosinophils 1 %    % Basophils 1 %    % Immature Granulocytes 1 %    NRBCs per 100 WBC 0 <1 /100    Absolute Neutrophils 5.5 1.6 - 8.3 10e3/uL    Absolute Lymphocytes 0.6 (L) 0.8 - 5.3 10e3/uL    Absolute Monocytes 0.1 0.0 - 1.3 10e3/uL    Absolute Eosinophils 0.1 0.0 - 0.7 10e3/uL    Absolute Basophils 0.0 0.0 - 0.2 10e3/uL    Absolute Immature Granulocytes 0.1 (H) <=0.0 10e3/uL    Absolute NRBCs 0.0 10e3/uL       Pathology:   Results for orders placed or performed in visit on 06/03/21 (from the past 8760 hour(s))   Bone marrow biopsy   Result Value    Case Report      Bone Marrow                                       Case: [id number]                                   Authorizing Provider:  Lisset Baig MD      Collected:           06/03/2021 1100              [facility]   Received:            06/03/2021 1150                                     [facility]                                                              Pathologist:           Arnold Rosales MD                                                        Specimens:   A) - Iliac Crest, Right                                                                             B) - Iliac Crest, Right                                                                             C) - Iliac Crest, Right                                                                             D) - Peripheral Blood                                                                      Addendum      The purpose of this addendum is to report results of additional studies. Kappa and Lambda in situ hybridization studies demonstrate  kappa light chain restri  Addendum electronically signed by Arnold Rosales MD on 6/9/2021 at 10:43 AM      Cytogenetics Report, Addendum      Please see [facility] cytogenetics report [id number].  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 Pending kappa and lambda in situ hybridization studies (addendum). Morphology, flow cytometry ([id number]) and immunohistochemistry demonstrate significant involvement by multiple myeloma. Pending cytogenetic analysis.    Clinical Information plasma cell disease, T7 lesion, diagnostic    Performed By: Arnold Rosales MD    Peripheral Smear      Red blood cells are normal in number and overall normochromic and normocytic. Anisopoikilocytosis, polychromasia, and rouleaux formation are not prominent.    The white blood cell count and differential appear as reported on the CBC. Leukocytes are normal in number and appearance, consisting predominantly of segmented and band neutrophils with fewer numbers of lymphocytes and monocytes. No blasts or dysplastic changes are identified.    Platelets are normal in number and appearance.      Bone Marrow Diff      Neutrophils 37.5%; Erythroid cells 14.5%; Monocytes 1.5%; Eosinophils 0.5%; Basophils 0%; Plasma cells/Lymphocytes 43.5%; Blasts 2%.    M:E Ratio: 2-3:1      Aspirate Smear      Aspirate smears are cellular and demonstrate numerous marrow particles. Trilineage hematopoiesis is decreased given the numerous malignant plasma cells. Iron stores are adequate.    Core Biopsy/Aspirate Clot      Bone  marrow biopsy and clot sections are hypocellular at approximately 20%. Trilineage hematopoiesis is decreased as the marrow is partially effaced by sheets and an interstitial infiltrate of (+) plasma cells (estimated at 60%). No significant coexpression of CD20 on plasma cells. CD3 highlights background T-cells. An iron stain demonstrates increased iron stores with no increase in ring sideroblasts.    All controls stain appropriately.      Charges      CPT: 99519, 34067, 25190, 13692 ×2, 13759 ×3, 01086 ×2, 14132 ×2, 28577 ×2, 15911 ×2, 18710  ICD-10: C90.00      Result Flag Malignant (A)     Comment: SPECIMEN PROCESSING:    All histology slide preparation and stains; and cytology slide preparation, staining, and cytotechnologist screening done at [facility] are performed at [facility], [address] [place] [address], [id number], with final interpretation, frozen section analysis, and cytology adequacy assessment at indicated laboratory.                 I, Nohemi Rapp MD personally performed the services described in this documentation, as scribed by Justyn Pineda in my presence, and it is both accurate and complete.    Signed by: Nohemi Rapp MD, MPH

## 2021-11-17 ENCOUNTER — HOSPITAL ENCOUNTER (OUTPATIENT)
Dept: RADIOLOGY | Facility: HOSPITAL | Age: 71
End: 2021-11-17
Attending: NURSE PRACTITIONER
Payer: COMMERCIAL

## 2021-11-17 ENCOUNTER — INFUSION THERAPY VISIT (OUTPATIENT)
Dept: INFUSION THERAPY | Facility: HOSPITAL | Age: 71
End: 2021-11-17
Attending: INTERNAL MEDICINE
Payer: COMMERCIAL

## 2021-11-17 VITALS
OXYGEN SATURATION: 98 % | DIASTOLIC BLOOD PRESSURE: 53 MMHG | RESPIRATION RATE: 18 BRPM | TEMPERATURE: 97.5 F | HEART RATE: 61 BPM | SYSTOLIC BLOOD PRESSURE: 89 MMHG

## 2021-11-17 DIAGNOSIS — C79.51 MALIGNANT NEOPLASM METASTATIC TO THORACIC VERTEBRAL COLUMN WITH UNKNOWN PRIMARY SITE (H): ICD-10-CM

## 2021-11-17 DIAGNOSIS — C90.00 MULTIPLE MYELOMA WITH FAILED REMISSION (H): Primary | ICD-10-CM

## 2021-11-17 DIAGNOSIS — S22.069G CLOSED FRACTURE OF SEVENTH THORACIC VERTEBRA WITH DELAYED HEALING, UNSPECIFIED FRACTURE MORPHOLOGY, SUBSEQUENT ENCOUNTER: ICD-10-CM

## 2021-11-17 DIAGNOSIS — C80.1 MALIGNANT NEOPLASM METASTATIC TO THORACIC VERTEBRAL COLUMN WITH UNKNOWN PRIMARY SITE (H): ICD-10-CM

## 2021-11-17 LAB
BASOPHILS # BLD AUTO: 0.1 10E3/UL (ref 0–0.2)
BASOPHILS NFR BLD AUTO: 1 %
EOSINOPHIL # BLD AUTO: 0.5 10E3/UL (ref 0–0.7)
EOSINOPHIL NFR BLD AUTO: 9 %
ERYTHROCYTE [DISTWIDTH] IN BLOOD BY AUTOMATED COUNT: 14.1 % (ref 10–15)
HCT VFR BLD AUTO: 36.5 % (ref 35–47)
HGB BLD-MCNC: 11.6 G/DL (ref 11.7–15.7)
IMM GRANULOCYTES # BLD: 0.1 10E3/UL
IMM GRANULOCYTES NFR BLD: 1 %
LYMPHOCYTES # BLD AUTO: 0.6 10E3/UL (ref 0.8–5.3)
LYMPHOCYTES NFR BLD AUTO: 11 %
MCH RBC QN AUTO: 33.9 PG (ref 26.5–33)
MCHC RBC AUTO-ENTMCNC: 31.8 G/DL (ref 31.5–36.5)
MCV RBC AUTO: 107 FL (ref 78–100)
MONOCYTES # BLD AUTO: 0.2 10E3/UL (ref 0–1.3)
MONOCYTES NFR BLD AUTO: 3 %
NEUTROPHILS # BLD AUTO: 3.8 10E3/UL (ref 1.6–8.3)
NEUTROPHILS NFR BLD AUTO: 75 %
NRBC # BLD AUTO: 0 10E3/UL
NRBC BLD AUTO-RTO: 0 /100
PLATELET # BLD AUTO: 259 10E3/UL (ref 150–450)
RBC # BLD AUTO: 3.42 10E6/UL (ref 3.8–5.2)
WBC # BLD AUTO: 5.1 10E3/UL (ref 4–11)

## 2021-11-17 PROCEDURE — 85025 COMPLETE CBC W/AUTO DIFF WBC: CPT | Performed by: NURSE PRACTITIONER

## 2021-11-17 PROCEDURE — 250N000011 HC RX IP 250 OP 636: Performed by: NURSE PRACTITIONER

## 2021-11-17 PROCEDURE — 72072 X-RAY EXAM THORAC SPINE 3VWS: CPT

## 2021-11-17 PROCEDURE — 96401 CHEMO ANTI-NEOPL SQ/IM: CPT

## 2021-11-17 PROCEDURE — 36415 COLL VENOUS BLD VENIPUNCTURE: CPT | Performed by: NURSE PRACTITIONER

## 2021-11-17 RX ORDER — EPINEPHRINE 1 MG/ML
0.3 INJECTION, SOLUTION INTRAMUSCULAR; SUBCUTANEOUS EVERY 5 MIN PRN
Status: DISCONTINUED | OUTPATIENT
Start: 2021-11-17 | End: 2021-11-17 | Stop reason: HOSPADM

## 2021-11-17 RX ORDER — ALBUTEROL SULFATE 90 UG/1
1-2 AEROSOL, METERED RESPIRATORY (INHALATION)
Status: DISCONTINUED | OUTPATIENT
Start: 2021-11-17 | End: 2021-11-17 | Stop reason: HOSPADM

## 2021-11-17 RX ORDER — DIPHENHYDRAMINE HYDROCHLORIDE 50 MG/ML
50 INJECTION INTRAMUSCULAR; INTRAVENOUS
Status: DISCONTINUED | OUTPATIENT
Start: 2021-11-17 | End: 2021-11-17 | Stop reason: HOSPADM

## 2021-11-17 RX ORDER — METHYLPREDNISOLONE SODIUM SUCCINATE 125 MG/2ML
125 INJECTION, POWDER, LYOPHILIZED, FOR SOLUTION INTRAMUSCULAR; INTRAVENOUS
Status: DISCONTINUED | OUTPATIENT
Start: 2021-11-17 | End: 2021-11-17 | Stop reason: HOSPADM

## 2021-11-17 RX ORDER — NALOXONE HYDROCHLORIDE 0.4 MG/ML
0.2 INJECTION, SOLUTION INTRAMUSCULAR; INTRAVENOUS; SUBCUTANEOUS
Status: DISCONTINUED | OUTPATIENT
Start: 2021-11-17 | End: 2021-11-17 | Stop reason: HOSPADM

## 2021-11-17 RX ORDER — MEPERIDINE HYDROCHLORIDE 25 MG/ML
25 INJECTION INTRAMUSCULAR; INTRAVENOUS; SUBCUTANEOUS EVERY 30 MIN PRN
Status: DISCONTINUED | OUTPATIENT
Start: 2021-11-17 | End: 2021-11-17 | Stop reason: HOSPADM

## 2021-11-17 RX ORDER — ALBUTEROL SULFATE 0.83 MG/ML
2.5 SOLUTION RESPIRATORY (INHALATION)
Status: DISCONTINUED | OUTPATIENT
Start: 2021-11-17 | End: 2021-11-17 | Stop reason: HOSPADM

## 2021-11-17 RX ADMIN — BORTEZOMIB 2.1 MG: 3.5 INJECTION, POWDER, LYOPHILIZED, FOR SOLUTION INTRAVENOUS; SUBCUTANEOUS at 14:17

## 2021-11-17 ASSESSMENT — PAIN SCALES - GENERAL: PAINLEVEL: MODERATE PAIN (5)

## 2021-11-17 NOTE — PROGRESS NOTES
Lizzie Viera, 71 year old, female arrived ambulatory with walker to clinic at 1352 for Cycle #3 Day #8 of her chemotherapy regimen. Labs reviewed. Patient assessed and vital signs stable Administered Velcade subcutaneous (right lower abdomen) per provider order. She tolerated injection well and site covered with band-aid. Lizzie Viera verbalized understanding of plan of care and return to clinic. Discharged to [facility] with walker at 1424 alert and stable.

## 2021-11-18 ENCOUNTER — OFFICE VISIT (OUTPATIENT)
Dept: NEUROSURGERY | Facility: CLINIC | Age: 71
End: 2021-11-18
Payer: COMMERCIAL

## 2021-11-18 ENCOUNTER — TELEPHONE (OUTPATIENT)
Dept: NUTRITION | Facility: HOSPITAL | Age: 71
End: 2021-11-18

## 2021-11-18 VITALS
DIASTOLIC BLOOD PRESSURE: 43 MMHG | SYSTOLIC BLOOD PRESSURE: 89 MMHG | OXYGEN SATURATION: 97 % | RESPIRATION RATE: 16 BRPM | HEART RATE: 54 BPM

## 2021-11-18 DIAGNOSIS — C79.51 MALIGNANT NEOPLASM METASTATIC TO THORACIC VERTEBRAL COLUMN WITH UNKNOWN PRIMARY SITE (H): Primary | ICD-10-CM

## 2021-11-18 DIAGNOSIS — C80.1 MALIGNANT NEOPLASM METASTATIC TO THORACIC VERTEBRAL COLUMN WITH UNKNOWN PRIMARY SITE (H): Primary | ICD-10-CM

## 2021-11-18 PROCEDURE — 99213 OFFICE O/P EST LOW 20 MIN: CPT | Performed by: NURSE PRACTITIONER

## 2021-11-18 NOTE — PROGRESS NOTES
Oncology Distress Screening    Called Valeria in regards to positive oncology nutrition distress screen:    1. How concerned are you about your ability to eat? : 6    Valeria reports she has experienced taste changes with her chemo medication. She notes that she takes her chemo medication 14 days, then takes a break for 7 days, then repeats. She experiences taste changes during the second week that she takes the medicine. She notes weight has been stable since August.    Discussed tips to cope with taste changes and nutrition handouts sent to Valeria via email.    Carine Estevez, MS, RD, LD

## 2021-11-18 NOTE — PROGRESS NOTES
Neurosurgery progress note:    HPI:  Valeria Viera is a 70-year-old F hx multiple myeloma with a T1 lesion as well as a severe T7 pathologic fracture. Initially cleared of TLSO brace in July. Returned with worsening leg symptoms after a new fall. New TLSO brace fitted (previous no longer fit) and urgent radiation ordered as fracture was worse with epidural extension of tumor at T7 with cord compression. Dr Campbell patient.    Back pain stable, she has been wearing a brace at all times. Neuro stable, imaging stable. Discharge from clinic. Valeria can trial not wearing the brace and see how her pain is, she can wear the brace as needed for comfort.     Imaging and plan to discontinue brace discussed with Dr. Campbell who is in agreement.       Exam:  General: seated, comfortable-appearing, wearing TLSO brace which appears to fit well. Gait is stable.    She has 4/5 weakness of R dorsiflexion and R EHL  Full strength throughout upper and lower extremities otherwise.  No clonus  Sensation is decreased to light touch right shin/foot.  Sensation otherwise intact to light touch is intact throughout the upper and lower extremities.      Imaging:  Thoracic XR 11/18/2021 personally reviewed  Stable upright appearance of severe T7 fracture, stable segmental kyphosis.       Tara Martines CNP  [facility] Neurosurgery  O: [phone]  P: [phone]

## 2021-11-18 NOTE — PATIENT INSTRUCTIONS
I think you can wear your brace only as needed for pain control/support. No further follow up for neurosurgery is needed. If you develop worsening pain, numbness, tingling, weakness you can let us known.

## 2021-11-24 ENCOUNTER — LAB (OUTPATIENT)
Dept: INFUSION THERAPY | Facility: HOSPITAL | Age: 71
End: 2021-11-24
Attending: INTERNAL MEDICINE
Payer: COMMERCIAL

## 2021-11-24 VITALS
RESPIRATION RATE: 16 BRPM | TEMPERATURE: 97.9 F | HEART RATE: 55 BPM | SYSTOLIC BLOOD PRESSURE: 85 MMHG | OXYGEN SATURATION: 95 % | DIASTOLIC BLOOD PRESSURE: 46 MMHG

## 2021-11-24 DIAGNOSIS — C90.00 MULTIPLE MYELOMA WITH FAILED REMISSION (H): Primary | ICD-10-CM

## 2021-11-24 LAB
BASOPHILS # BLD AUTO: 0 10E3/UL (ref 0–0.2)
BASOPHILS NFR BLD AUTO: 1 %
EOSINOPHIL # BLD AUTO: 0.1 10E3/UL (ref 0–0.7)
EOSINOPHIL NFR BLD AUTO: 1 %
ERYTHROCYTE [DISTWIDTH] IN BLOOD BY AUTOMATED COUNT: 14.1 % (ref 10–15)
HCT VFR BLD AUTO: 36 % (ref 35–47)
HGB BLD-MCNC: 11.5 G/DL (ref 11.7–15.7)
IGA SERPL-MCNC: 16 MG/DL (ref 65–400)
IGG SERPL-MCNC: 1032 MG/DL (ref 700–1700)
IGM SERPL-MCNC: 9 MG/DL (ref 60–280)
IMM GRANULOCYTES # BLD: 0 10E3/UL
IMM GRANULOCYTES NFR BLD: 1 %
LYMPHOCYTES # BLD AUTO: 0.4 10E3/UL (ref 0.8–5.3)
LYMPHOCYTES NFR BLD AUTO: 9 %
MCH RBC QN AUTO: 33.1 PG (ref 26.5–33)
MCHC RBC AUTO-ENTMCNC: 31.9 G/DL (ref 31.5–36.5)
MCV RBC AUTO: 104 FL (ref 78–100)
MONOCYTES # BLD AUTO: 0.1 10E3/UL (ref 0–1.3)
MONOCYTES NFR BLD AUTO: 3 %
NEUTROPHILS # BLD AUTO: 3.8 10E3/UL (ref 1.6–8.3)
NEUTROPHILS NFR BLD AUTO: 85 %
NRBC # BLD AUTO: 0 10E3/UL
NRBC BLD AUTO-RTO: 0 /100
PLATELET # BLD AUTO: 187 10E3/UL (ref 150–450)
RBC # BLD AUTO: 3.47 10E6/UL (ref 3.8–5.2)
TOTAL PROTEIN SERUM FOR ELP: 6 G/DL (ref 6–8)
WBC # BLD AUTO: 4.4 10E3/UL (ref 4–11)

## 2021-11-24 PROCEDURE — 84155 ASSAY OF PROTEIN SERUM: CPT

## 2021-11-24 PROCEDURE — 96401 CHEMO ANTI-NEOPL SQ/IM: CPT

## 2021-11-24 PROCEDURE — 36415 COLL VENOUS BLD VENIPUNCTURE: CPT | Performed by: NURSE PRACTITIONER

## 2021-11-24 PROCEDURE — 83883 ASSAY NEPHELOMETRY NOT SPEC: CPT

## 2021-11-24 PROCEDURE — 82784 ASSAY IGA/IGD/IGG/IGM EACH: CPT

## 2021-11-24 PROCEDURE — 85025 COMPLETE CBC W/AUTO DIFF WBC: CPT | Performed by: NURSE PRACTITIONER

## 2021-11-24 PROCEDURE — 84165 PROTEIN E-PHORESIS SERUM: CPT | Mod: 26 | Performed by: PATHOLOGY

## 2021-11-24 PROCEDURE — 250N000011 HC RX IP 250 OP 636: Mod: JW | Performed by: NURSE PRACTITIONER

## 2021-11-24 PROCEDURE — 84165 PROTEIN E-PHORESIS SERUM: CPT | Mod: TC

## 2021-11-24 RX ORDER — METHYLPREDNISOLONE SODIUM SUCCINATE 125 MG/2ML
125 INJECTION, POWDER, LYOPHILIZED, FOR SOLUTION INTRAMUSCULAR; INTRAVENOUS
Status: DISCONTINUED | OUTPATIENT
Start: 2021-11-24 | End: 2021-11-24 | Stop reason: HOSPADM

## 2021-11-24 RX ORDER — NALOXONE HYDROCHLORIDE 0.4 MG/ML
0.2 INJECTION, SOLUTION INTRAMUSCULAR; INTRAVENOUS; SUBCUTANEOUS
Status: DISCONTINUED | OUTPATIENT
Start: 2021-11-24 | End: 2021-11-24 | Stop reason: HOSPADM

## 2021-11-24 RX ORDER — DIPHENHYDRAMINE HYDROCHLORIDE 50 MG/ML
50 INJECTION INTRAMUSCULAR; INTRAVENOUS
Status: DISCONTINUED | OUTPATIENT
Start: 2021-11-24 | End: 2021-11-24 | Stop reason: HOSPADM

## 2021-11-24 RX ORDER — MEPERIDINE HYDROCHLORIDE 25 MG/ML
25 INJECTION INTRAMUSCULAR; INTRAVENOUS; SUBCUTANEOUS EVERY 30 MIN PRN
Status: DISCONTINUED | OUTPATIENT
Start: 2021-11-24 | End: 2021-11-24 | Stop reason: HOSPADM

## 2021-11-24 RX ORDER — ALBUTEROL SULFATE 0.83 MG/ML
2.5 SOLUTION RESPIRATORY (INHALATION)
Status: DISCONTINUED | OUTPATIENT
Start: 2021-11-24 | End: 2021-11-24 | Stop reason: HOSPADM

## 2021-11-24 RX ORDER — EPINEPHRINE 1 MG/ML
0.3 INJECTION, SOLUTION INTRAMUSCULAR; SUBCUTANEOUS EVERY 5 MIN PRN
Status: DISCONTINUED | OUTPATIENT
Start: 2021-11-24 | End: 2021-11-24 | Stop reason: HOSPADM

## 2021-11-24 RX ORDER — LENALIDOMIDE 25 MG/1
25 CAPSULE ORAL DAILY
Qty: 14 CAPSULE | Refills: 0 | Status: SHIPPED | OUTPATIENT
Start: 2021-11-24 | End: 2022-01-05

## 2021-11-24 RX ORDER — ALBUTEROL SULFATE 90 UG/1
1-2 AEROSOL, METERED RESPIRATORY (INHALATION)
Status: DISCONTINUED | OUTPATIENT
Start: 2021-11-24 | End: 2021-11-24 | Stop reason: HOSPADM

## 2021-11-24 RX ADMIN — BORTEZOMIB 2.1 MG: 3.5 INJECTION, POWDER, LYOPHILIZED, FOR SOLUTION INTRAVENOUS; SUBCUTANEOUS at 14:41

## 2021-11-24 NOTE — PROGRESS NOTES
"Pt ambulates to infusion center w/walker for labs and treatment.  Lab results noted.  Pt c/o a \"rash\" across her chest, abdomen and back that is \"itchy\".  No rash was appreciated on exam today with pt stating that she has \"CBD oil\" on right now.  Pt advised to try PO diphenhydramine at night to help with itching and continue CBD oil, if that has been helping.  Pt informed writer that she will be flying next week.  SARAHI Pablo CNP notified of this.  Risks of flying during treatment (blood clots, infection due to low immunity, COVID-19 as pt is unvaccinated) discussed with pt who verbalized understanding.  Velcade injection given as ordered without difficulty. Pt left clinic stable to [facility] w/walker.  Plan RTC as scheduled.  "

## 2021-11-26 ENCOUNTER — TELEPHONE (OUTPATIENT)
Dept: ONCOLOGY | Facility: HOSPITAL | Age: 71
End: 2021-11-26
Payer: COMMERCIAL

## 2021-11-26 NOTE — ORAL ONC MGMT
Oral Chemotherapy Monitoring Program    Subjective/Objective:  Lizzie Viera is a 71 year old female contacted by phone for a follow-up visit for oral chemotherapy.  Valeria was on her 7 day off period of lenalidomide. She continues to take 25 mg daily and hasn't missed any doses. She tends to have cyclical side effects, with most side effects occurring at the end of her 2 weeks of lenalidomide. These usually improve over 1-2 days while she is in her off week. She has kept a journal and has noticed side effects tend to be the same with every cycle and predictable.    Swelling in feet: elevating her feet reduces the swelling to normal, no signs or symptoms of blood clot. Has been going on since starting treatment.  Headaches: used to get migraines previously, helps for her to go into a dark room which 'decreases the throb' but they can linger on and off. No tylenol use. This happens at the end of each cycle but improves on the off days. She has experienced ringing in her ears prior to treatment but seems 'louder' at the end of the cycle, and may be related to headaches? She stated this does dissipate after about 24 hours following last lenalidomide dose.  Nausea: mild nausea, goes away once no longer taking lenalidomide as well. Does drink sudeep tea which helps. Has not needed antinausea meds to control.  Taste changes: States everything tastes horrible. She did speak with a nutritionist and one of the recommendations, baking soda and water and swish before eating, has helped her improve the taste of food for the first few bites.   Chest and back peeling: likely due to her brace that she wears 18 to 20 hours a day. She received clearance to start wearing less at night. States CBD oil/wax seems to help. Still some dry spots but otherwise stays moist. Did recommend alternate emollient to help with this.     Valeria has started walking on treadmill for about a mile over the last 4-5 weeks which I commended her for-  "she states this has helped improve some of her side effects as well. She also relays that neurosurg cleared her to go to PT to increase 5 lb restriction.     ORAL CHEMOTHERAPY 9/23/2021 10/7/2021 10/13/2021 11/4/2021 11/24/2021 11/26/2021   Assessment Type New Teach;Initial Work up Initial Follow up Refill Refill Refill Monthly Follow up   Diagnosis Code Multiple Myeloma Multiple Myeloma Multiple Myeloma Multiple Myeloma Multiple Myeloma Multiple Myeloma   Providers Bella Bella Bella Bella Bella Bella   Clinic Name/Location [facility]   Drug Name Revlimid (lenalidomide) Revlimid (lenalidomide) Revlimid (lenalidomide) Revlimid (lenalidomide) Revlimid (lenalidomide) Revlimid (lenalidomide)   Dose 25 mg 25 mg 25 mg 25 mg 25 mg 25 mg   Current Schedule Daily Daily Daily Daily Daily Daily   Cycle Details 2 weeks on, 1 week off 2 weeks on, 1 week off 2 weeks on, 1 week off 2 weeks on, 1 week off 2 weeks on, 1 week off 2 weeks on, 1 week off   Start Date of Last Cycle - 9/29/2021 - - - 11/10/2021   Planned next cycle start date - 10/20/2021 - - - 12/1/2021   Doses missed in last 2 weeks - 0 - - - 0   Adherence Assessment - Adherent - - - Adherent   Adverse Effects - Diarrhea - - - Other (See Note for Details)   Diarrhea - Grade 1 - - - -   Pharmacist Intervention(diarrhea) - Yes - - - -   Intervention(s) - OTC recommendation - - - -   Any new drug interactions? - No - - - No   Is the dose as ordered appropriate for the patient? - Yes - - - Yes       Last PHQ-2 Score on record: No flowsheet data found.    Vitals:  BP:   BP Readings from Last 1 Encounters:   11/24/21 (!) 85/46     Wt Readings from Last 1 Encounters:   11/10/21 59.9 kg (132 lb)     Estimated body surface area is 1.62 meters squared as calculated from the following:    Height as of 10/19/21: 1.575 m (5' 2.01\").    Weight as of 11/10/21: 59.9 kg (132 lb).    Labs:  _  Result Component Current " Result Ref Range   Sodium 138 (11/10/2021) 136 - 145 mmol/L     _  Result Component Current Result Ref Range   Potassium 4.4 (11/10/2021) 3.5 - 5.0 mmol/L     _  Result Component Current Result Ref Range   Calcium 9.0 (11/10/2021) 8.5 - 10.5 mg/dL     No results found for Mag within last 30 days.     No results found for Phos within last 30 days.     _  Result Component Current Result Ref Range   Albumin 3.3 (L) (11/10/2021) 3.5 - 5.0 g/dL     _  Result Component Current Result Ref Range   Urea Nitrogen 16 (11/10/2021) 8 - 22 mg/dL     _  Result Component Current Result Ref Range   Creatinine 0.62 (11/10/2021) 0.60 - 1.10 mg/dL     _  Result Component Current Result Ref Range   AST 18 (11/10/2021) 0 - 40 U/L     _  Result Component Current Result Ref Range   ALT 27 (11/10/2021) 0 - 45 U/L     _  Result Component Current Result Ref Range   Bilirubin Total 0.3 (11/10/2021) 0.0 - 1.0 mg/dL     _  Result Component Current Result Ref Range   WBC Count 4.4 (11/24/2021) 4.0 - 11.0 10e3/uL     _  Result Component Current Result Ref Range   Hemoglobin 11.5 (L) (11/24/2021) 11.7 - 15.7 g/dL     _  Result Component Current Result Ref Range   Platelet Count 187 (11/24/2021) 150 - 450 10e3/uL     _  Result Component Current Result Ref Range   Absolute Neutrophils 4.3 (10/27/2021) 1.6 - 8.3 10e3/uL     Assessment/Plan:  Valeria is tolerating lenalidomide/dexamethasone but with some side effects, especially affecting her at the end of her 2 weeks while on lenalidomide. She is making improvements with her activity level which has helped. She will continue as planned.    Follow-Up:  Will review 12/1 appt and plan to call during next cycle as well. Valeria was okay with this plan.    Refill Due:  Script sent in already    Minnie Agrawal, PharmD, BCOP  Oral Chemotherapy Pharmacist  [phone]

## 2021-11-29 DIAGNOSIS — M84.58XK: ICD-10-CM

## 2021-11-29 LAB
ALBUMIN PERCENT: 60.4 % (ref 51–67)
ALBUMIN SERPL ELPH-MCNC: 3.6 G/DL (ref 3.2–4.7)
ALPHA 1 PERCENT: 3.2 % (ref 2–4)
ALPHA 2 PERCENT: 12 % (ref 5–13)
ALPHA1 GLOB SERPL ELPH-MCNC: 0.2 G/DL (ref 0.1–0.3)
ALPHA2 GLOB SERPL ELPH-MCNC: 0.7 G/DL (ref 0.4–0.9)
B-GLOBULIN SERPL ELPH-MCNC: 0.7 G/DL (ref 0.7–1.2)
BETA PERCENT: 11.2 % (ref 10–17)
GAMMA GLOB SERPL ELPH-MCNC: 0.8 G/DL (ref 0.6–1.4)
GAMMA GLOBULIN PERCENT: 13.2 % (ref 9–20)
KAPPA LC FREE SER-MCNC: 1.55 MG/DL (ref 0.33–1.94)
KAPPA LC FREE/LAMBDA FREE SER NEPH: 1.87 {RATIO} (ref 0.26–1.65)
LAMBDA LC FREE SERPL-MCNC: 0.83 MG/DL (ref 0.57–2.63)
MONOCLONAL PEAK: 0.6 G/DL
PATH ICD:: NORMAL
PROT PATTERN SERPL ELPH-IMP: NORMAL
REVIEWING PATHOLOGIST: NORMAL
TOTAL PROTEIN SERUM FOR ELP (SYNCED VALUE): 6 G/DL

## 2021-11-30 RX ORDER — GABAPENTIN 300 MG/1
CAPSULE ORAL
Qty: 90 CAPSULE | Refills: 0 | Status: SHIPPED | OUTPATIENT
Start: 2021-11-30 | End: 2022-01-04

## 2021-11-30 NOTE — TELEPHONE ENCOUNTER
Routing refill request to provider for review/approval because:  Drug not on the [facility] refill protocol     Last Written Prescription Date:  10/05/21  Last Fill Quantity: 90,  # refills: 1   Last office visit provider:  09/13/21     Requested Prescriptions   Pending Prescriptions Disp Refills     gabapentin (NEURONTIN) 300 MG capsule [Pharmacy Med Name: Gabapentin Oral Capsule 300 MG] 90 capsule 0     Sig: TAKE ONE CAPSULE BY MOUTH THREE TIMES DAILY       There is no refill protocol information for this order          Rosie Wilhelm RN 11/30/21 3:25 PM

## 2021-12-01 ENCOUNTER — LAB (OUTPATIENT)
Dept: INFUSION THERAPY | Facility: HOSPITAL | Age: 71
End: 2021-12-01
Attending: INTERNAL MEDICINE
Payer: COMMERCIAL

## 2021-12-01 ENCOUNTER — ONCOLOGY VISIT (OUTPATIENT)
Dept: ONCOLOGY | Facility: HOSPITAL | Age: 71
End: 2021-12-01
Attending: INTERNAL MEDICINE
Payer: COMMERCIAL

## 2021-12-01 VITALS
WEIGHT: 136 LBS | SYSTOLIC BLOOD PRESSURE: 166 MMHG | BODY MASS INDEX: 24.87 KG/M2 | RESPIRATION RATE: 16 BRPM | HEART RATE: 58 BPM | OXYGEN SATURATION: 94 % | DIASTOLIC BLOOD PRESSURE: 68 MMHG | TEMPERATURE: 98.4 F

## 2021-12-01 DIAGNOSIS — C90.00 MULTIPLE MYELOMA WITH FAILED REMISSION (H): Primary | ICD-10-CM

## 2021-12-01 LAB
ALBUMIN SERPL-MCNC: 3.5 G/DL (ref 3.5–5)
ALP SERPL-CCNC: 43 U/L (ref 45–120)
ALT SERPL W P-5'-P-CCNC: 20 U/L (ref 0–45)
ANION GAP SERPL CALCULATED.3IONS-SCNC: 10 MMOL/L (ref 5–18)
AST SERPL W P-5'-P-CCNC: 15 U/L (ref 0–40)
BASOPHILS # BLD AUTO: 0.1 10E3/UL (ref 0–0.2)
BASOPHILS NFR BLD AUTO: 1 %
BILIRUB SERPL-MCNC: 0.3 MG/DL (ref 0–1)
BUN SERPL-MCNC: 13 MG/DL (ref 8–22)
CALCIUM SERPL-MCNC: 9.2 MG/DL (ref 8.5–10.5)
CHLORIDE BLD-SCNC: 107 MMOL/L (ref 98–107)
CO2 SERPL-SCNC: 25 MMOL/L (ref 22–31)
CREAT SERPL-MCNC: 0.68 MG/DL (ref 0.6–1.1)
EOSINOPHIL # BLD AUTO: 0 10E3/UL (ref 0–0.7)
EOSINOPHIL NFR BLD AUTO: 1 %
ERYTHROCYTE [DISTWIDTH] IN BLOOD BY AUTOMATED COUNT: 14.6 % (ref 10–15)
GFR SERPL CREATININE-BSD FRML MDRD: 90 ML/MIN/1.73M2
GLUCOSE BLD-MCNC: 104 MG/DL (ref 70–125)
HCT VFR BLD AUTO: 37.7 % (ref 35–47)
HGB BLD-MCNC: 12.1 G/DL (ref 11.7–15.7)
IMM GRANULOCYTES # BLD: 0 10E3/UL
IMM GRANULOCYTES NFR BLD: 1 %
LYMPHOCYTES # BLD AUTO: 0.6 10E3/UL (ref 0.8–5.3)
LYMPHOCYTES NFR BLD AUTO: 10 %
MCH RBC QN AUTO: 33.2 PG (ref 26.5–33)
MCHC RBC AUTO-ENTMCNC: 32.1 G/DL (ref 31.5–36.5)
MCV RBC AUTO: 104 FL (ref 78–100)
MONOCYTES # BLD AUTO: 0.1 10E3/UL (ref 0–1.3)
MONOCYTES NFR BLD AUTO: 2 %
NEUTROPHILS # BLD AUTO: 5.1 10E3/UL (ref 1.6–8.3)
NEUTROPHILS NFR BLD AUTO: 85 %
NRBC # BLD AUTO: 0 10E3/UL
NRBC BLD AUTO-RTO: 0 /100
PLATELET # BLD AUTO: 261 10E3/UL (ref 150–450)
POTASSIUM BLD-SCNC: 4.3 MMOL/L (ref 3.5–5)
PROT SERPL-MCNC: 6.8 G/DL (ref 6–8)
RBC # BLD AUTO: 3.64 10E6/UL (ref 3.8–5.2)
SODIUM SERPL-SCNC: 142 MMOL/L (ref 136–145)
WBC # BLD AUTO: 5.8 10E3/UL (ref 4–11)

## 2021-12-01 PROCEDURE — 96401 CHEMO ANTI-NEOPL SQ/IM: CPT

## 2021-12-01 PROCEDURE — 250N000011 HC RX IP 250 OP 636: Mod: JW | Performed by: INTERNAL MEDICINE

## 2021-12-01 PROCEDURE — 36415 COLL VENOUS BLD VENIPUNCTURE: CPT | Performed by: INTERNAL MEDICINE

## 2021-12-01 PROCEDURE — 99214 OFFICE O/P EST MOD 30 MIN: CPT | Performed by: NURSE PRACTITIONER

## 2021-12-01 PROCEDURE — 82040 ASSAY OF SERUM ALBUMIN: CPT | Performed by: INTERNAL MEDICINE

## 2021-12-01 PROCEDURE — G0463 HOSPITAL OUTPT CLINIC VISIT: HCPCS | Mod: 25

## 2021-12-01 PROCEDURE — 85025 COMPLETE CBC W/AUTO DIFF WBC: CPT | Performed by: INTERNAL MEDICINE

## 2021-12-01 RX ORDER — NALOXONE HYDROCHLORIDE 0.4 MG/ML
0.2 INJECTION, SOLUTION INTRAMUSCULAR; INTRAVENOUS; SUBCUTANEOUS
Status: CANCELLED | OUTPATIENT
Start: 2022-01-04

## 2021-12-01 RX ORDER — MEPERIDINE HYDROCHLORIDE 25 MG/ML
25 INJECTION INTRAMUSCULAR; INTRAVENOUS; SUBCUTANEOUS EVERY 30 MIN PRN
Status: CANCELLED | OUTPATIENT
Start: 2022-01-04

## 2021-12-01 RX ORDER — ALBUTEROL SULFATE 90 UG/1
1-2 AEROSOL, METERED RESPIRATORY (INHALATION)
Status: CANCELLED
Start: 2021-12-21

## 2021-12-01 RX ORDER — EPINEPHRINE 1 MG/ML
0.3 INJECTION, SOLUTION INTRAMUSCULAR; SUBCUTANEOUS EVERY 5 MIN PRN
Status: CANCELLED | OUTPATIENT
Start: 2021-12-28

## 2021-12-01 RX ORDER — DIPHENHYDRAMINE HYDROCHLORIDE 50 MG/ML
50 INJECTION INTRAMUSCULAR; INTRAVENOUS
Status: CANCELLED
Start: 2021-12-28

## 2021-12-01 RX ORDER — NALOXONE HYDROCHLORIDE 0.4 MG/ML
0.2 INJECTION, SOLUTION INTRAMUSCULAR; INTRAVENOUS; SUBCUTANEOUS
Status: CANCELLED | OUTPATIENT
Start: 2021-12-21

## 2021-12-01 RX ORDER — ALBUTEROL SULFATE 0.83 MG/ML
2.5 SOLUTION RESPIRATORY (INHALATION)
Status: CANCELLED | OUTPATIENT
Start: 2022-01-04

## 2021-12-01 RX ORDER — NALOXONE HYDROCHLORIDE 0.4 MG/ML
0.2 INJECTION, SOLUTION INTRAMUSCULAR; INTRAVENOUS; SUBCUTANEOUS
Status: CANCELLED | OUTPATIENT
Start: 2021-12-28

## 2021-12-01 RX ORDER — DIPHENHYDRAMINE HYDROCHLORIDE 50 MG/ML
50 INJECTION INTRAMUSCULAR; INTRAVENOUS
Status: CANCELLED
Start: 2021-12-21

## 2021-12-01 RX ORDER — METHYLPREDNISOLONE SODIUM SUCCINATE 125 MG/2ML
125 INJECTION, POWDER, LYOPHILIZED, FOR SOLUTION INTRAMUSCULAR; INTRAVENOUS
Status: CANCELLED
Start: 2021-12-28

## 2021-12-01 RX ORDER — EPINEPHRINE 1 MG/ML
0.3 INJECTION, SOLUTION INTRAMUSCULAR; SUBCUTANEOUS EVERY 5 MIN PRN
Status: CANCELLED | OUTPATIENT
Start: 2021-12-21

## 2021-12-01 RX ORDER — METHYLPREDNISOLONE SODIUM SUCCINATE 125 MG/2ML
125 INJECTION, POWDER, LYOPHILIZED, FOR SOLUTION INTRAMUSCULAR; INTRAVENOUS
Status: CANCELLED
Start: 2021-12-21

## 2021-12-01 RX ORDER — LORAZEPAM 2 MG/ML
0.5 INJECTION INTRAMUSCULAR EVERY 4 HOURS PRN
Status: CANCELLED | OUTPATIENT
Start: 2021-12-28

## 2021-12-01 RX ORDER — ALBUTEROL SULFATE 0.83 MG/ML
2.5 SOLUTION RESPIRATORY (INHALATION)
Status: CANCELLED | OUTPATIENT
Start: 2021-12-28

## 2021-12-01 RX ORDER — EPINEPHRINE 1 MG/ML
0.3 INJECTION, SOLUTION INTRAMUSCULAR; SUBCUTANEOUS EVERY 5 MIN PRN
Status: CANCELLED | OUTPATIENT
Start: 2022-01-04

## 2021-12-01 RX ORDER — MEPERIDINE HYDROCHLORIDE 25 MG/ML
25 INJECTION INTRAMUSCULAR; INTRAVENOUS; SUBCUTANEOUS EVERY 30 MIN PRN
Status: CANCELLED | OUTPATIENT
Start: 2021-12-28

## 2021-12-01 RX ORDER — MEPERIDINE HYDROCHLORIDE 25 MG/ML
25 INJECTION INTRAMUSCULAR; INTRAVENOUS; SUBCUTANEOUS EVERY 30 MIN PRN
Status: CANCELLED | OUTPATIENT
Start: 2021-12-21

## 2021-12-01 RX ORDER — ALBUTEROL SULFATE 90 UG/1
1-2 AEROSOL, METERED RESPIRATORY (INHALATION)
Status: CANCELLED
Start: 2022-01-04

## 2021-12-01 RX ORDER — ALBUTEROL SULFATE 90 UG/1
1-2 AEROSOL, METERED RESPIRATORY (INHALATION)
Status: CANCELLED
Start: 2021-12-28

## 2021-12-01 RX ORDER — LORAZEPAM 2 MG/ML
0.5 INJECTION INTRAMUSCULAR EVERY 4 HOURS PRN
Status: CANCELLED | OUTPATIENT
Start: 2022-01-04

## 2021-12-01 RX ORDER — LORAZEPAM 2 MG/ML
0.5 INJECTION INTRAMUSCULAR EVERY 4 HOURS PRN
Status: CANCELLED | OUTPATIENT
Start: 2021-12-21

## 2021-12-01 RX ORDER — METHYLPREDNISOLONE SODIUM SUCCINATE 125 MG/2ML
125 INJECTION, POWDER, LYOPHILIZED, FOR SOLUTION INTRAMUSCULAR; INTRAVENOUS
Status: CANCELLED
Start: 2022-01-04

## 2021-12-01 RX ORDER — DIPHENHYDRAMINE HYDROCHLORIDE 50 MG/ML
50 INJECTION INTRAMUSCULAR; INTRAVENOUS
Status: CANCELLED
Start: 2022-01-04

## 2021-12-01 RX ORDER — ALBUTEROL SULFATE 0.83 MG/ML
2.5 SOLUTION RESPIRATORY (INHALATION)
Status: CANCELLED | OUTPATIENT
Start: 2021-12-21

## 2021-12-01 RX ADMIN — BORTEZOMIB 2.1 MG: 3.5 INJECTION, POWDER, LYOPHILIZED, FOR SOLUTION INTRAVENOUS; SUBCUTANEOUS at 15:37

## 2021-12-01 ASSESSMENT — PAIN SCALES - GENERAL: PAINLEVEL: MODERATE PAIN (4)

## 2021-12-01 NOTE — LETTER
"    12/1/2021         RE: Lizzie Viera  [address]  [place] [address]        Dear Colleague,    Thank you for referring your patient, Lizzie Viera, to the [facility] [place]. Please see a copy of my visit note below.    Oncology Rooming Note    December 1, 2021 2:39 PM   Lizzie Viera is a 71 year old female who presents for:    Chief Complaint   Patient presents with     Oncology Clinic Visit     Multiple myeloma with failed remission (H)     Initial Vitals: BP (!) 166/68   Pulse 58   Temp 98.4  F (36.9  C)   Resp 16   Wt 61.7 kg (136 lb)   SpO2 94%   BMI 24.87 kg/m   Estimated body mass index is 24.87 kg/m  as calculated from the following:    Height as of 10/19/21: 1.575 m (5' 2.01\").    Weight as of this encounter: 61.7 kg (136 lb). Body surface area is 1.64 meters squared.  Moderate Pain (4) Comment: Data Unavailable   No LMP recorded. Patient is postmenopausal.  Allergies reviewed: Yes  Medications reviewed: Yes    Medications: Medication refills not needed today.  Pharmacy name entered into [employer]: [id number] [address] [facility] [address]    Clinical concerns: None       Nandini Sher MA              [facility] Hematology and Oncology Progress Note    Patient: Lizzie Viera  MRN: [id number]  Date of Service: Dec 1, 2021          Reason for Visit    Chief Complaint   Patient presents with     Oncology Clinic Visit     Multiple myeloma with failed remission (H)       Assessment and Plan    Cancer Staging  No matching staging information was found for the patient.    1. Myeloma: has started on treatment with RVd.  She is getting Velcade weekly, Revlimid 2 weeks on, 1 week off and Dex weekly.  Today is cycle 4.  Her light chains and IgG levels have now normalized.  Her hemoglobin has normalized.  Her monoclonal peak has gone from 3.3 down to 0.6.   She is tolerating treatment fairly well. She will " continue her treatment and will be seen for cycle 5.  Since she is having such a great response, I will refer her to BMT to be evaluated.    2. Bone lesions: has started on zometa. Will continue monthly for now. She is questioning whether this medication may make her bones more brittle and is not sure she wants to continue. She will continue for now and discussed with Dr. Monreal at her next appointment. Did tell her we generally like to give it regularly for the first 2 years and then we can back off.    3.  Scalp lesion: Patient states that this has continued to improve.  We have held off on radiation for now.  Continue to monitor.    ECOG Performance    0 - Independent    Distress Screening (within last 30 days)    1. How concerned are you about your ability to eat? : 0  2. How concerned are you about unintended weight loss or your current weight? : 0  3. How concerned are you about feeling depressed or very sad? : 0  4. How concerned are you about feeling anxious or very scared? : 0  5. Do you struggle with the loss of meaning and rambo in your life? : Not at all  6. How concerned are you about work and home life issues that may be affected by your cancer? : 2  7. How concerned are you about knowing what resources are available to help you? : 0  8. Do you currently have what you would describe as Confucianist or spiritual struggles?            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 Lizzie Viera is a very pleasant 70 year old woman who has been diagnosed with multiple myeloma IgG kappa type in May 2021.  She had initially presented with compression fracture of T7 vertebral body in September 2020.  She had a back pain which led to that evaluation.  Bone density confirmed that she had osteoporosis.  MRI showed diffuse marrow edema in October 2020.  In April 2021 the MRI of the thoracic spine showed worsening T7 vertebral body height loss because of likely pathological compression fracture with extraosseous extension.  She then had IR guided bone biopsy on 7 May 2021 and pathology confirmed the presence of kappa light chain restricted plasma cells.  Her cytogenetics confirmed the presence of hyperdiploid E with gains of chromosome 5, 9 and 15.     She was then seen by Dr. Baig and had a bone marrow biopsy which also confirmed 60% involvement of the marrow with plasma cells.  The bone marrow was done on 3 Jocelin 2021.  The bone marrow also confirmed the presence of hyperdiploid E.  She had a gain of chromosome 3, 5, 7, 9, 11, 15 as well as 19.  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  Her beta-2 microglobulin was actually normal at the time of her diagnosis as was her albumin at 4.0.  Monoclonal protein 3.1 g/dL.  Kappa free light chain levels of 13 mg/dL IgG level of 4280 with depressed levels of IgM and IgA.  Urine was also positive for kappa light chains as well as immunofixation of the urine was positive for IgG kappa.  Normal kidney  function.  Normal hemoglobin.     As mentioned above she had bone lesions in the T7 vertebral body, T1, skull area.  Her main pain is coming from her T7 vertebral body compression fracture.     Due to the pain she has been seen by radiation oncology and has received radiation therapy.  She also got a dose of steroids for pain control.     Currently her pain is controlled with combination of Dilaudid as well as some Tylenol.  She is wearing a brace.  She did notice that when she was on dexamethasone her pain was significantly better.     She was initially thinking of doing some natural therapies but once her symptoms got worse, she came back in was counseled about treatment.  She has been given information about Velcade Revlimid and dexamethasone. He has started that treatment and has had 3 cycles.  She states she is doing okay.  She says she is noticing more side effects especially fatigue and mild nausea.  She states she will be happy when she does not have to be on the treatment anymore.  She denies any new bone or back pain.  She feels that the scalp lesion continues to improve.    Review of Systems    Pertinent items are noted in HPI.    Past History    Past Medical History:   Diagnosis Date     Cervical dysplasia      Chronic RUQ pain      Osteoporosis        PHYSICAL EXAM  BP (!) 166/68   Pulse 58   Temp 98.4  F (36.9  C)   Resp 16   Wt 61.7 kg (136 lb)   SpO2 94%   BMI 24.87 kg/m      GENERAL: no acute distress. Cooperative in conversation. Here with family. Mask on  RESP: Regular respiratory rate. No expiratory wheezes   MUSCULOSKELETAL: no bilateral leg swelling  NEURO: non focal. Alert and oriented x3.   PSYCH: within normal limits. No depression or anxiety.  SKIN: exposed skin is dry intact.     Lab Results    Recent Results (from the past 168 hour(s))   Comprehensive metabolic panel   Result Value Ref Range    Sodium 142 136 - 145 mmol/L    Potassium 4.3 3.5 - 5.0 mmol/L    Chloride 107 98 - 107  mmol/L    Carbon Dioxide (CO2) 25 22 - 31 mmol/L    Anion Gap 10 5 - 18 mmol/L    Urea Nitrogen 13 8 - 22 mg/dL    Creatinine 0.68 0.60 - 1.10 mg/dL    Calcium 9.2 8.5 - 10.5 mg/dL    Glucose 104 70 - 125 mg/dL    Alkaline Phosphatase 43 (L) 45 - 120 U/L    AST 15 0 - 40 U/L    ALT 20 0 - 45 U/L    Protein Total 6.8 6.0 - 8.0 g/dL    Albumin 3.5 3.5 - 5.0 g/dL    Bilirubin Total 0.3 0.0 - 1.0 mg/dL    GFR Estimate 90 >60 mL/min/1.73m2   CBC with platelets and differential   Result Value Ref Range    WBC Count 5.8 4.0 - 11.0 10e3/uL    RBC Count 3.64 (L) 3.80 - 5.20 10e6/uL    Hemoglobin 12.1 11.7 - 15.7 g/dL    Hematocrit 37.7 35.0 - 47.0 %     (H) 78 - 100 fL    MCH 33.2 (H) 26.5 - 33.0 pg    MCHC 32.1 31.5 - 36.5 g/dL    RDW 14.6 10.0 - 15.0 %    Platelet Count 261 150 - 450 10e3/uL    % Neutrophils 85 %    % Lymphocytes 10 %    % Monocytes 2 %    % Eosinophils 1 %    % Basophils 1 %    % Immature Granulocytes 1 %    NRBCs per 100 WBC 0 <1 /100    Absolute Neutrophils 5.1 1.6 - 8.3 10e3/uL    Absolute Lymphocytes 0.6 (L) 0.8 - 5.3 10e3/uL    Absolute Monocytes 0.1 0.0 - 1.3 10e3/uL    Absolute Eosinophils 0.0 0.0 - 0.7 10e3/uL    Absolute Basophils 0.1 0.0 - 0.2 10e3/uL    Absolute Immature Granulocytes 0.0 <=0.4 10e3/uL    Absolute NRBCs 0.0 10e3/uL   IgG  Lab Results   Component Value Date    IGG 1,032 11/24/2021    IGG 1,609 11/03/2021    IGG 2,736 (H) 10/13/2021    IGG 4,439 (H) 09/20/2021   kappa light chains:  Lab Results   Component Value Date    KFLCA 1.55 11/24/2021    KFLCA 2.03 (H) 11/03/2021    KFLCA 4.30 (H) 10/13/2021    KFLCA 12.57 (H) 09/20/2021    KFLCA 13.37 (H) 05/27/2021   Monoclonal peak  Lab Results   Component Value Date    ELPM 0.6 11/24/2021    ELPM 1.1 11/03/2021    ELPM 1.7 10/13/2021    ELPM 3.3 09/20/2021    ELPM 3.1 05/27/2021   ]  Imaging    XR Thoracic Spine 3 Views    Result Date: 11/17/2021  EXAM: XR THORACIC SPINE 3 VIEWS LOCATION: [facility]  DATE/TIME: 11/17/2021 11:30 AM INDICATION: Follow-up thoracic compression fracture COMPARISON: X-ray 10/08/2021 TECHNIQUE: CR Thoracic Spine.     IMPRESSION: Some overpenetration on the frontal view degrades evaluation of the mid-upper thoracic spine. Chronic marked compression deformity at T7 and mild superior endplate depression at T8 without significant change compared to previous radiographs. 29 degrees segmental kyphosis centered at this level similar to the previous exam. No pathologic marrow replacement at this level corresponds to subtle lucency of the remaining T7 and posterior T8 vertebral levels and is better visualized on previous MRI  exams. No definite new acute fracture. Mild thoracic spondylosis.        Signed by: SINDHU Lundberg CNP      Again, thank you for allowing me to participate in the care of your patient.        Sincerely,        SINDHU Lundberg CNP

## 2021-12-01 NOTE — PROGRESS NOTES
[facility] Hematology and Oncology Progress Note    Patient: Lizzie Viera  MRN: [id number]  Date of Service: Dec 1, 2021          Reason for Visit    Chief Complaint   Patient presents with     Oncology Clinic Visit     Multiple myeloma with failed remission (H)       Assessment and Plan    Cancer Staging  No matching staging information was found for the patient.    1. Myeloma: has started on treatment with RVd.  She is getting Velcade weekly, Revlimid 2 weeks on, 1 week off and Dex weekly.  Today is cycle 4.  Her light chains and IgG levels have now normalized.  Her hemoglobin has normalized.  Her monoclonal peak has gone from 3.3 down to 0.6.   She is tolerating treatment fairly well. She will continue her treatment and will be seen for cycle 5.  Since she is having such a great response, I will refer her to BMT to be evaluated.    2. Bone lesions: has started on zometa. Will continue monthly for now. She is questioning whether this medication may make her bones more brittle and is not sure she wants to continue. She will continue for now and discussed with Dr. Monreal at her next appointment. Did tell her we generally like to give it regularly for the first 2 years and then we can back off.    3.  Scalp lesion: Patient states that this has continued to improve.  We have held off on radiation for now.  Continue to monitor.    ECOG Performance    0 - Independent    Distress Screening (within last 30 days)    1. How concerned are you about your ability to eat? : 0  2. How concerned are you about unintended weight loss or your current weight? : 0  3. How concerned are you about feeling depressed or very sad? : 0  4. How concerned are you about feeling anxious or very scared? : 0  5. Do you struggle with the loss of meaning and rambo in your life? : Not at all  6. How concerned are you about work and home life issues that may be affected by your cancer? : 2  7. How concerned are you about knowing what  resources are available to help you? : 0  8. Do you currently have what you would describe as Confucianism or spiritual struggles?            : Not at all       Pain  Pain Score: Moderate Pain (4)  Pain Loc: Low Back    Problem List    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 Lizzie Viera is a very pleasant 70 year old woman who has been diagnosed with multiple myeloma IgG kappa type in May 2021.  She had initially presented with compression fracture of T7 vertebral body in September 2020.  She had a back pain which led to that evaluation.  Bone density confirmed that she had osteoporosis.  MRI showed diffuse marrow edema in October 2020.  In April 2021 the MRI of the thoracic spine showed worsening T7 vertebral body height loss because of likely pathological compression fracture with extraosseous extension.  She then had IR guided bone biopsy on 7 May 2021 and pathology confirmed the presence of kappa light chain restricted plasma cells.  Her cytogenetics confirmed the presence of hyperdiploid E with gains  of chromosome 5, 9 and 15.     She was then seen by Dr. Baig and had a bone marrow biopsy which also confirmed 60% involvement of the marrow with plasma cells.  The bone marrow was done on 3 Jocelin 2021.  The bone marrow also confirmed the presence of hyperdiploid E.  She had a gain of chromosome 3, 5, 7, 9, 11, 15 as well as 19.  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  Her beta-2 microglobulin was actually normal at the time of her diagnosis as was her albumin at 4.0.  Monoclonal protein 3.1 g/dL.  Kappa free light chain levels of 13 mg/dL IgG level of 4280 with depressed levels of IgM and IgA.  Urine was also positive for kappa light chains as well as immunofixation of the urine was positive for IgG kappa.  Normal kidney function.  Normal hemoglobin.     As mentioned above she had bone lesions in the T7 vertebral body, T1, skull area.  Her main pain is coming from her T7 vertebral body compression fracture.     Due to the pain she has been seen by radiation oncology and has received radiation therapy.  She also got a dose of steroids for pain control.     Currently her pain is controlled with combination of Dilaudid as well as some Tylenol.  She is wearing a brace.  She did notice that when she was on dexamethasone her pain was significantly better.     She was initially thinking of doing some natural therapies but once her symptoms got worse, she came back in was counseled about treatment.  She has been given information about Velcade Revlimid and dexamethasone. He has started that treatment and has had 3 cycles.  She states she is doing okay.  She says she is noticing more side effects especially fatigue and mild nausea.  She states she will be happy when she does not have to be on the treatment anymore.  She denies any new bone or back pain.  She feels that the scalp lesion continues to improve.    Review of Systems    Pertinent items are noted in HPI.    Past History    Past Medical History:   Diagnosis Date     Cervical  dysplasia      Chronic RUQ pain      Osteoporosis        PHYSICAL EXAM  BP (!) 166/68   Pulse 58   Temp 98.4  F (36.9  C)   Resp 16   Wt 61.7 kg (136 lb)   SpO2 94%   BMI 24.87 kg/m      GENERAL: no acute distress. Cooperative in conversation. Here with family. Mask on  RESP: Regular respiratory rate. No expiratory wheezes   MUSCULOSKELETAL: no bilateral leg swelling  NEURO: non focal. Alert and oriented x3.   PSYCH: within normal limits. No depression or anxiety.  SKIN: exposed skin is dry intact.     Lab Results    Recent Results (from the past 168 hour(s))   Comprehensive metabolic panel   Result Value Ref Range    Sodium 142 136 - 145 mmol/L    Potassium 4.3 3.5 - 5.0 mmol/L    Chloride 107 98 - 107 mmol/L    Carbon Dioxide (CO2) 25 22 - 31 mmol/L    Anion Gap 10 5 - 18 mmol/L    Urea Nitrogen 13 8 - 22 mg/dL    Creatinine 0.68 0.60 - 1.10 mg/dL    Calcium 9.2 8.5 - 10.5 mg/dL    Glucose 104 70 - 125 mg/dL    Alkaline Phosphatase 43 (L) 45 - 120 U/L    AST 15 0 - 40 U/L    ALT 20 0 - 45 U/L    Protein Total 6.8 6.0 - 8.0 g/dL    Albumin 3.5 3.5 - 5.0 g/dL    Bilirubin Total 0.3 0.0 - 1.0 mg/dL    GFR Estimate 90 >60 mL/min/1.73m2   CBC with platelets and differential   Result Value Ref Range    WBC Count 5.8 4.0 - 11.0 10e3/uL    RBC Count 3.64 (L) 3.80 - 5.20 10e6/uL    Hemoglobin 12.1 11.7 - 15.7 g/dL    Hematocrit 37.7 35.0 - 47.0 %     (H) 78 - 100 fL    MCH 33.2 (H) 26.5 - 33.0 pg    MCHC 32.1 31.5 - 36.5 g/dL    RDW 14.6 10.0 - 15.0 %    Platelet Count 261 150 - 450 10e3/uL    % Neutrophils 85 %    % Lymphocytes 10 %    % Monocytes 2 %    % Eosinophils 1 %    % Basophils 1 %    % Immature Granulocytes 1 %    NRBCs per 100 WBC 0 <1 /100    Absolute Neutrophils 5.1 1.6 - 8.3 10e3/uL    Absolute Lymphocytes 0.6 (L) 0.8 - 5.3 10e3/uL    Absolute Monocytes 0.1 0.0 - 1.3 10e3/uL    Absolute Eosinophils 0.0 0.0 - 0.7 10e3/uL    Absolute Basophils 0.1 0.0 - 0.2 10e3/uL    Absolute Immature  Granulocytes 0.0 <=0.4 10e3/uL    Absolute NRBCs 0.0 10e3/uL   IgG  Lab Results   Component Value Date    IGG 1,032 11/24/2021    IGG 1,609 11/03/2021    IGG 2,736 (H) 10/13/2021    IGG 4,439 (H) 09/20/2021   kappa light chains:  Lab Results   Component Value Date    KFLCA 1.55 11/24/2021    KFLCA 2.03 (H) 11/03/2021    KFLCA 4.30 (H) 10/13/2021    KFLCA 12.57 (H) 09/20/2021    KFLCA 13.37 (H) 05/27/2021   Monoclonal peak  Lab Results   Component Value Date    ELPM 0.6 11/24/2021    ELPM 1.1 11/03/2021    ELPM 1.7 10/13/2021    ELPM 3.3 09/20/2021    ELPM 3.1 05/27/2021   ]  Imaging    XR Thoracic Spine 3 Views    Result Date: 11/17/2021  EXAM: XR THORACIC SPINE 3 VIEWS LOCATION: [facility] DATE/TIME: 11/17/2021 11:30 AM INDICATION: Follow-up thoracic compression fracture COMPARISON: X-ray 10/08/2021 TECHNIQUE: CR Thoracic Spine.     IMPRESSION: Some overpenetration on the frontal view degrades evaluation of the mid-upper thoracic spine. Chronic marked compression deformity at T7 and mild superior endplate depression at T8 without significant change compared to previous radiographs. 29 degrees segmental kyphosis centered at this level similar to the previous exam. No pathologic marrow replacement at this level corresponds to subtle lucency of the remaining T7 and posterior T8 vertebral levels and is better visualized on previous MRI  exams. No definite new acute fracture. Mild thoracic spondylosis.        Signed by: SINDHU Lundberg CNP

## 2021-12-01 NOTE — PROGRESS NOTES
"Oncology Rooming Note    December 1, 2021 2:39 PM   Lizzie Viera is a 71 year old female who presents for:    Chief Complaint   Patient presents with     Oncology Clinic Visit     Multiple myeloma with failed remission (H)     Initial Vitals: BP (!) 166/68   Pulse 58   Temp 98.4  F (36.9  C)   Resp 16   Wt 61.7 kg (136 lb)   SpO2 94%   BMI 24.87 kg/m   Estimated body mass index is 24.87 kg/m  as calculated from the following:    Height as of 10/19/21: 1.575 m (5' 2.01\").    Weight as of this encounter: 61.7 kg (136 lb). Body surface area is 1.64 meters squared.  Moderate Pain (4) Comment: Data Unavailable   No LMP recorded. Patient is postmenopausal.  Allergies reviewed: Yes  Medications reviewed: Yes    Medications: Medication refills not needed today.  Pharmacy name entered into [facility]: [id number] - [place], [place] - [address]    Clinical concerns: None       Nandini Sher MA            "

## 2021-12-02 ENCOUNTER — TELEPHONE (OUTPATIENT)
Dept: TRANSPLANT | Facility: CLINIC | Age: 71
End: 2021-12-02
Payer: COMMERCIAL

## 2021-12-03 DIAGNOSIS — C90.00 MULTIPLE MYELOMA NOT HAVING ACHIEVED REMISSION (H): Primary | ICD-10-CM

## 2021-12-08 ENCOUNTER — ONCOLOGY VISIT (OUTPATIENT)
Dept: ONCOLOGY | Facility: HOSPITAL | Age: 71
End: 2021-12-08
Attending: SOCIAL WORKER
Payer: COMMERCIAL

## 2021-12-08 ENCOUNTER — LAB (OUTPATIENT)
Dept: INFUSION THERAPY | Facility: HOSPITAL | Age: 71
End: 2021-12-08
Attending: INTERNAL MEDICINE
Payer: COMMERCIAL

## 2021-12-08 VITALS
DIASTOLIC BLOOD PRESSURE: 60 MMHG | RESPIRATION RATE: 16 BRPM | OXYGEN SATURATION: 94 % | HEART RATE: 56 BPM | SYSTOLIC BLOOD PRESSURE: 133 MMHG

## 2021-12-08 DIAGNOSIS — C90.00 MULTIPLE MYELOMA WITH FAILED REMISSION (H): ICD-10-CM

## 2021-12-08 DIAGNOSIS — C90.00 MULTIPLE MYELOMA WITH FAILED REMISSION (H): Primary | ICD-10-CM

## 2021-12-08 DIAGNOSIS — F43.23 ADJUSTMENT DISORDER WITH MIXED ANXIETY AND DEPRESSED MOOD: Primary | ICD-10-CM

## 2021-12-08 DIAGNOSIS — M84.58XK PATHOLOGICAL FRACTURE OF VERTEBRAE IN NEOPLASTIC DISEASE WITH NONUNION: ICD-10-CM

## 2021-12-08 LAB
BASOPHILS # BLD AUTO: 0.1 10E3/UL (ref 0–0.2)
BASOPHILS NFR BLD AUTO: 1 %
EOSINOPHIL # BLD AUTO: 0.3 10E3/UL (ref 0–0.7)
EOSINOPHIL NFR BLD AUTO: 6 %
ERYTHROCYTE [DISTWIDTH] IN BLOOD BY AUTOMATED COUNT: 15.3 % (ref 10–15)
HCT VFR BLD AUTO: 38 % (ref 35–47)
HGB BLD-MCNC: 11.8 G/DL (ref 11.7–15.7)
IMM GRANULOCYTES # BLD: 0 10E3/UL
IMM GRANULOCYTES NFR BLD: 1 %
LYMPHOCYTES # BLD AUTO: 0.6 10E3/UL (ref 0.8–5.3)
LYMPHOCYTES NFR BLD AUTO: 12 %
MCH RBC QN AUTO: 32.1 PG (ref 26.5–33)
MCHC RBC AUTO-ENTMCNC: 31.1 G/DL (ref 31.5–36.5)
MCV RBC AUTO: 103 FL (ref 78–100)
MONOCYTES # BLD AUTO: 0.1 10E3/UL (ref 0–1.3)
MONOCYTES NFR BLD AUTO: 2 %
NEUTROPHILS # BLD AUTO: 3.6 10E3/UL (ref 1.6–8.3)
NEUTROPHILS NFR BLD AUTO: 78 %
NRBC # BLD AUTO: 0 10E3/UL
NRBC BLD AUTO-RTO: 0 /100
PLATELET # BLD AUTO: 272 10E3/UL (ref 150–450)
RBC # BLD AUTO: 3.68 10E6/UL (ref 3.8–5.2)
WBC # BLD AUTO: 4.6 10E3/UL (ref 4–11)

## 2021-12-08 PROCEDURE — 96401 CHEMO ANTI-NEOPL SQ/IM: CPT

## 2021-12-08 PROCEDURE — 96365 THER/PROPH/DIAG IV INF INIT: CPT

## 2021-12-08 PROCEDURE — 85025 COMPLETE CBC W/AUTO DIFF WBC: CPT | Performed by: INTERNAL MEDICINE

## 2021-12-08 PROCEDURE — 258N000003 HC RX IP 258 OP 636: Performed by: INTERNAL MEDICINE

## 2021-12-08 PROCEDURE — 36415 COLL VENOUS BLD VENIPUNCTURE: CPT | Performed by: INTERNAL MEDICINE

## 2021-12-08 PROCEDURE — 96367 TX/PROPH/DG ADDL SEQ IV INF: CPT

## 2021-12-08 PROCEDURE — 90837 PSYTX W PT 60 MINUTES: CPT | Performed by: SOCIAL WORKER

## 2021-12-08 PROCEDURE — 250N000011 HC RX IP 250 OP 636: Performed by: INTERNAL MEDICINE

## 2021-12-08 RX ORDER — METHYLPREDNISOLONE SODIUM SUCCINATE 125 MG/2ML
125 INJECTION, POWDER, LYOPHILIZED, FOR SOLUTION INTRAMUSCULAR; INTRAVENOUS
Status: DISCONTINUED | OUTPATIENT
Start: 2021-12-08 | End: 2021-12-08 | Stop reason: HOSPADM

## 2021-12-08 RX ORDER — ALBUTEROL SULFATE 90 UG/1
1-2 AEROSOL, METERED RESPIRATORY (INHALATION)
Status: DISCONTINUED | OUTPATIENT
Start: 2021-12-08 | End: 2021-12-08 | Stop reason: HOSPADM

## 2021-12-08 RX ORDER — DIPHENHYDRAMINE HYDROCHLORIDE 50 MG/ML
50 INJECTION INTRAMUSCULAR; INTRAVENOUS
Status: DISCONTINUED | OUTPATIENT
Start: 2021-12-08 | End: 2021-12-08 | Stop reason: HOSPADM

## 2021-12-08 RX ORDER — MEPERIDINE HYDROCHLORIDE 25 MG/ML
25 INJECTION INTRAMUSCULAR; INTRAVENOUS; SUBCUTANEOUS EVERY 30 MIN PRN
Status: DISCONTINUED | OUTPATIENT
Start: 2021-12-08 | End: 2021-12-08 | Stop reason: HOSPADM

## 2021-12-08 RX ORDER — ALBUTEROL SULFATE 0.83 MG/ML
2.5 SOLUTION RESPIRATORY (INHALATION)
Status: DISCONTINUED | OUTPATIENT
Start: 2021-12-08 | End: 2021-12-08 | Stop reason: HOSPADM

## 2021-12-08 RX ORDER — NALOXONE HYDROCHLORIDE 0.4 MG/ML
0.2 INJECTION, SOLUTION INTRAMUSCULAR; INTRAVENOUS; SUBCUTANEOUS
Status: DISCONTINUED | OUTPATIENT
Start: 2021-12-08 | End: 2021-12-08 | Stop reason: HOSPADM

## 2021-12-08 RX ORDER — EPINEPHRINE 1 MG/ML
0.3 INJECTION, SOLUTION INTRAMUSCULAR; SUBCUTANEOUS EVERY 5 MIN PRN
Status: DISCONTINUED | OUTPATIENT
Start: 2021-12-08 | End: 2021-12-08 | Stop reason: HOSPADM

## 2021-12-08 RX ADMIN — ZOLEDRONIC ACID 4 MG: 4 INJECTION INTRAVENOUS at 14:26

## 2021-12-08 RX ADMIN — BORTEZOMIB 2.1 MG: 3.5 INJECTION, POWDER, LYOPHILIZED, FOR SOLUTION INTRAVENOUS; SUBCUTANEOUS at 14:27

## 2021-12-08 NOTE — CONFIDENTIAL NOTE
Psychology Psychotherapy  Note      Name:  Lizzie Viera  :  1950  MRN:  [id number]      Date of Service: 2021  Duration: 60 minutes (1:00 to 2:00 PM)      Target Symptoms:    The patient was seen in light of concerns regarding symptoms of depression and anxiety as evidenced by patient and staff report.    Participation:  The patient was able to participate and benefit from treatment as evidenced by her verbal expression of ideas and initiation of topics discussed.    Mental Status:    Mood/Affect:  normal affect  Suicidal Ideation:  absent  Homicidal Ideation:  absent  Thought process:  normal  Thought content:  Clear  Fund of Knowledge:  Sufficient  Attention/Concentration:  Normal  Language ability:  intact  Speech: normal  Memory:  recent and remote memory intact  Insight and Judgement:  good  Orientation:  self, place and time  Appearance: N/A-telephone visit  Eye Contact: N/A-telephone visit  Estimated IQ:  Average      Intervention:    Valeria was interested in a follow-up psychotherapy session today.  We were able to meet for an in person clinic visit today.  Valeria was referred to me by JOSH Lopez from the palliative care clinic.  She is also followed by Dr. Blanco and Dr. Rapp.      Valeria updated me on her health issues.  She had a clinic visit with Zehra Pablo CNP on 2021.  Since she is having a great response to her treatment, she has been referred to BMT for an evaluation.  She also is scheduled for chemotherapy following our visit today.  Valeria processed her thoughts and feelings about her health issues and we discussed strategies to help her cope.    Valeria has a history of multiple myeloma.  She states that in 2020, she had a fracture of T-7 and was hospitalized at [facility].  She did not have a good experience in transferring her care over to [facility] and the cancer clinic at [facility].  In 2021, she continued to have pain in  her spine and had biopsy that confirmed her diagnosis of multiple myeloma in May 2021.  She was hospitalized in September at [facility] and received radiation therapy during her hospitalization.  Her last visit with Dr. Blanco on 9/29/2021,  he indicates that clinically her multiple myeloma is behaving more aggressively with bone lesions.  She has significant bone disease with osteoporosis and compression fractures.  Dr. Blanco started her on Velcade, Revlimid and dexamethasone.  She had her first treatment on 9/29/2021.  She also will be receiving monthly Zometa shots.  She had additional radiation treatment on 10/6/2021 through 10/12/2021 to T1.  She also has a skull lesion that they will hold off on radiation at this time. She met with the neurosurgeon on 10/11/2021 and he felt that T6-7 and 8 are stable.   Her main physical complaints are pain and fatigue. She also is experiencing temperature changes and changes to her taste, which she believes is due to her chemo pill. At her 9/7/2021 palliative care clinic visit, they discussed advanced directives.  She has completed her health care directive.  She expressed that she wants to be a DNR/DNI and has expressed this to her care providers.  She decided to have her daughter be the primary proxy and her sister and girlfriend to be the alternates.  At her recent clinic visit on 10/19/2021 with Zehra Pablo CNP, she shared with Valeria that she will likely be referred to the transplant team after 4-6 cycles, depending on her response to treatment.      Valeria indicates that she has done a lot of research and has decided not to be vaccinated for COVID-19.  Her daughter had COVID-19 in May and her son and  also tested positive at that time.  She had the antibody test and found out that she also had COVID-19 in the past.  She was asymptomatic and did not realize she even had it.  She is not interested in getting the vaccine.  She is interested in doing  "alternative/complementary medicine to help combat her cancer.  She has been taking anti-inflammatory medication and has been reading books and articles about supplements that help \"kill cancer\".  She currently is on a variety of supplements and has discussed her supplements with Dr. Blanco.  Valeria indicates that she finds her medical background useful in understanding her health condition.    Valeria processed her thoughts and feelings about her cancer and her cancer treatment.  She is experiencing symptoms of anxiety and depression related to coping with her cancer and other life stressors.  She does not clinically meet diagnostic criteria for a mood disorder or an anxiety disorder.  She meets diagnostic criteria for adjustment disorder with mixed anxiety and depressed mood.  She denies suicidal ideation or thoughts of harming herself or others.    Valeria grew up in the [place].  She is the second oldest in a family of 8 children.  She has 3 sisters and 4 brothers.  Her older brother is exactly 1 year and a day older than her.  She states that she has a very close relationship with her siblings.  Most of them live in the area.  Her sisters have been extremely helpful during this difficult time.  She has 2 of her sisters at her house cleaning during her last visit..    Valeria lives in her [place] home with her , Ramos; daughter Collette; and her 2 sons ages 4 and 8.  Valeria and Ramos have been  for 42 years and that on a blind date.  She states that Ramos has end-stage renal disease and is receiving dialysis 3 times a week.  He also had trouble aortic aneurysm repair and he has type 2 diabetes. He had a cardiac procedure at the [facility] on 10/15/2021, which went well. . He will be evaluated in 3 to 6 months for a kidney transplant. He has a friend who is willing to donate his kidney. Ramos retired at age 69 from being a .  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  When he had his annual " DOT physical, he did not want to share his medical history with the [facility] physician and decided to quit his job, on the spot that day.  This created some issues as he was the carrier of the insurance for the family.  Valeria indicates that Ramos is extremely angry and tends to take it out on the family members.  His father  when he was 11 years old.  He was a  and  on an experimental aircraft in [place].  Several weeks ago, he became so angry and agitated towards their daughter, that she gave him an ultimatum that he needs to start seeing a therapist.  Recently, he became extremely angry and agitated  with their son and her sister, to the point that they needed to leave the house due to his verbal abuse.  Valeria has been a buffer for the family regarding his anger.  She plans to follow-up with him this week to make sure he is scheduled an appointment to see a therapist.  The staff at [facility] have already talked with him about seeing a therapist and the plan is for him to follow-up on a referral from their recommendation.  He has been refusing to make an appoint with a therapist, but agrees to meet for family therapy with his daughter.  Valeria plans to talk further about this with her daughter and see if they could have Ramos join her at one of her upcoming therapy appointments.    Valeria and Ramos have 3 children.  The oldest is their daughter, Sharifa, age 38.  She lives near [place] and has 2 children.  She works as a teacher.  Their second oldest is their daughter, Collette, age 34, who currently lives with them along with her for an 8-year-old sons.  She is on Social Security disability due to her depression and past suicidal ideation.  She has received a great deal of therapy including DBT.  She has been extremely helpful to Valeria and wants to be her caretaker.  Their youngest is their son, Trell, age 30, l just moved out of the home 3 weeks ago to [facility], in order to be closer to his work in the  area. He works 12-hour shifts starting at 4 AM in a warehouse that provides food for gas stations and service stations.    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  She has worked in medical research.  She spent 18 years working at the [employer].  She also has worked at [employer] in nuclear medicine and for [place] cardiology and nuclear medicine.  She spent several years working for [email or web address] and [employer].  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    Valeria was raised [employer] and went to a [employer] school, [employer] in [place]. She also went to [employer]. She currently describes herself as being a spiritual person and believes that mother nature is the guiding spirit of the planet. She has some  and Chinese spirituality beliefs. We talked about ways for her to incorporate her spirituality into her healing process.    Valeria is interested in individual psychotherapy to help her work through the emotional aspects of her cancer and cancer treatments.  She is experiencing symptoms of anxiety and depression related to her cancer and other life stresses.  She would like to initially meet on a weekly basis.  I have left a message for our information coordinators to get her scheduled in for a series of appointments.    Psychoterapeutic Techniques:  Cognitive-behavioral therapy, motivational interviewing and supportive psychotherapy strategies were utilized.    Necessity:    The session was necessary for the care of the patient to address symptoms of depression and anxiety related to the patient's medical condition.    Progress/ Plan:    Valeria indicated that her mother-in-law recently .  She was 92 years old and had been living independently.  She went with her  to [place] from 12/3/2021 until yesterday, 2021.  Her  was very resistant about  going to the  and they needed to make arrangements for him to have dialysis while he was there.  It ended up to be very healing time for him and it was important for them to be there.    Valeria spent a great deal of our session discussing issues with her  in the relationship.  She is struggling with his issues of anger that is projected towards her.  We discussed this dynamic at length and talked about strategies for setting up boundaries for her own self protection.    Valeria is interested in individual psychotherapy every 1 to 2 weeks to help her cope with the emotional aspects of her cancer and cancer treatment.  I will provide her with some information about utilizing cognitive behavioral therapy strategies to help manage symptoms of pain and anxiety & depression.  She has made good progress in going through the written material that I have given her.  She has started doing guided imagery exercises.  She plans to do some painting to illustrate her image for her guided imagery.  In addition, she has been doing some reading about cognitive behavioral therapy and has started to implement some of the strategies that we discussed.  She has read up on the side effects from her treatment and read that anxiety and irritability tend to be some of the side effects.  She is making a conscious effort to utilize some of the skills that we discussed to work on these issues.  I was able to provide her with 2 Omkar packs from the [employer] today for her to grand sons that live with them.  I also provided her friend, who was with her all day for the appointments with support group resources from [employer].  They have an online support group for families and friends supporting individuals with cancer.    Valeria had several difficulties over the past month.  They had their pipes leak at their house and found out that they were corroded.  They are getting minimal help from their insurance company, but her brother  is helping to fix this issue.      Valeria indicates that she has been working at getting exercise over this past week.  She has been walking on the treadmill 1 mile, 3 days a week.  Her sister picks her up and takes her to her house to use the treadmill.  This is been helpful as it creates some accountability for Valeria and also gives her the opportunity to spend some quality time with her sister.  She also is working on having good nutrition.    Valeria had a girlfriend that  recently from metastatic breast cancer.  She processed her thoughts and feelings about this loss and we discussed strategies to help her release her grief.    We also talked about issues with her  and some strategies to help set boundaries and limits and also encouraged him to get help he needs.  She also talked about how her 8-year-old grandson was feeling anxious and worried as he is a teacher is pregnant and decided to go on leave early.  He started with a new teacher and is having a lot of anxiety with the change.  He had some thoughts of hurting himself and his mom took him [facility] emergency room [facility].  He is doing much better this week.  His mom, Collette has a history of having suicide attempts 3-4 times in 1 year, prior to her children being born.  Valeria is concerned that some of Collette's depressive tendencies are wearing off on her grandson.  We discussed strategies that might be helpful in this area.    Valeria is interested in meeting on a regular basis.  She was able to schedule a follow-up visit before leaving the clinic today.    Diagnosis:    1.  Adjustment disorder with mixed anxiety and depressed mood  2.  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  Other information was consistent from the note that was made on 11/10/2021.      This note was created with the help of Dragon dictation system.  Grammatical and typing errors are not intentional.     Review of long-term goals: Treatment plan was reviewed and updated.       Provider: Mary Ellen Chan MA, LP, LICSW    Date:  10/5/2021  Time:  3:05 PM

## 2021-12-08 NOTE — LETTER
12/8/2021         RE: Lizzie Viera  [address]  [place] [address]        Dear Colleague,    Thank you for referring your patient, Lizzie Viera, to the [facility]. Please see a copy of my visit note below.    No notes on file    Again, thank you for allowing me to participate in the care of your patient.        Sincerely,        JAI AMADO LP, LICSW

## 2021-12-08 NOTE — PROGRESS NOTES
Pt arrives to infusion center using walker.  Pt voices no new concerns today.  Lab results noted.  Peripheral IV started w/excellent blood return noted.  Zometa infused as ordered without difficulty.  Peripheral IV flushed w/NS et dc'd.  Velcade injection given as ordered.  Pt left clinic stable to keenan.  Plan RTC as scheduled.

## 2021-12-15 ENCOUNTER — INFUSION THERAPY VISIT (OUTPATIENT)
Dept: INFUSION THERAPY | Facility: HOSPITAL | Age: 71
End: 2021-12-15
Attending: INTERNAL MEDICINE
Payer: COMMERCIAL

## 2021-12-15 VITALS
SYSTOLIC BLOOD PRESSURE: 121 MMHG | OXYGEN SATURATION: 95 % | HEART RATE: 55 BPM | TEMPERATURE: 97.7 F | RESPIRATION RATE: 16 BRPM | DIASTOLIC BLOOD PRESSURE: 48 MMHG

## 2021-12-15 DIAGNOSIS — C90.00 MULTIPLE MYELOMA WITH FAILED REMISSION (H): Primary | ICD-10-CM

## 2021-12-15 LAB
BASOPHILS # BLD AUTO: 0 10E3/UL (ref 0–0.2)
BASOPHILS NFR BLD AUTO: 1 %
EOSINOPHIL # BLD AUTO: 0.1 10E3/UL (ref 0–0.7)
EOSINOPHIL NFR BLD AUTO: 1 %
ERYTHROCYTE [DISTWIDTH] IN BLOOD BY AUTOMATED COUNT: 14.7 % (ref 10–15)
HCT VFR BLD AUTO: 41.2 % (ref 35–47)
HGB BLD-MCNC: 13 G/DL (ref 11.7–15.7)
IGA SERPL-MCNC: 16 MG/DL (ref 65–400)
IGG SERPL-MCNC: 797 MG/DL (ref 700–1700)
IGM SERPL-MCNC: 10 MG/DL (ref 60–280)
IMM GRANULOCYTES # BLD: 0 10E3/UL
IMM GRANULOCYTES NFR BLD: 1 %
LYMPHOCYTES # BLD AUTO: 0.5 10E3/UL (ref 0.8–5.3)
LYMPHOCYTES NFR BLD AUTO: 7 %
MCH RBC QN AUTO: 32.6 PG (ref 26.5–33)
MCHC RBC AUTO-ENTMCNC: 31.6 G/DL (ref 31.5–36.5)
MCV RBC AUTO: 103 FL (ref 78–100)
MONOCYTES # BLD AUTO: 0.1 10E3/UL (ref 0–1.3)
MONOCYTES NFR BLD AUTO: 2 %
NEUTROPHILS # BLD AUTO: 5.6 10E3/UL (ref 1.6–8.3)
NEUTROPHILS NFR BLD AUTO: 88 %
NRBC # BLD AUTO: 0 10E3/UL
NRBC BLD AUTO-RTO: 0 /100
PLATELET # BLD AUTO: 162 10E3/UL (ref 150–450)
RBC # BLD AUTO: 3.99 10E6/UL (ref 3.8–5.2)
TOTAL PROTEIN SERUM FOR ELP: 6.2 G/DL (ref 6–8)
WBC # BLD AUTO: 6.2 10E3/UL (ref 4–11)

## 2021-12-15 PROCEDURE — 84165 PROTEIN E-PHORESIS SERUM: CPT | Mod: TC

## 2021-12-15 PROCEDURE — 84165 PROTEIN E-PHORESIS SERUM: CPT | Mod: 26 | Performed by: PATHOLOGY

## 2021-12-15 PROCEDURE — 36415 COLL VENOUS BLD VENIPUNCTURE: CPT

## 2021-12-15 PROCEDURE — 85025 COMPLETE CBC W/AUTO DIFF WBC: CPT | Performed by: INTERNAL MEDICINE

## 2021-12-15 PROCEDURE — 83883 ASSAY NEPHELOMETRY NOT SPEC: CPT

## 2021-12-15 PROCEDURE — 250N000011 HC RX IP 250 OP 636: Performed by: INTERNAL MEDICINE

## 2021-12-15 PROCEDURE — 84155 ASSAY OF PROTEIN SERUM: CPT

## 2021-12-15 PROCEDURE — 82784 ASSAY IGA/IGD/IGG/IGM EACH: CPT

## 2021-12-15 PROCEDURE — 96401 CHEMO ANTI-NEOPL SQ/IM: CPT

## 2021-12-15 RX ORDER — MEPERIDINE HYDROCHLORIDE 25 MG/ML
25 INJECTION INTRAMUSCULAR; INTRAVENOUS; SUBCUTANEOUS EVERY 30 MIN PRN
Status: DISCONTINUED | OUTPATIENT
Start: 2021-12-15 | End: 2021-12-15 | Stop reason: HOSPADM

## 2021-12-15 RX ORDER — DIPHENHYDRAMINE HYDROCHLORIDE 50 MG/ML
50 INJECTION INTRAMUSCULAR; INTRAVENOUS
Status: DISCONTINUED | OUTPATIENT
Start: 2021-12-15 | End: 2021-12-15 | Stop reason: HOSPADM

## 2021-12-15 RX ORDER — ALBUTEROL SULFATE 90 UG/1
1-2 AEROSOL, METERED RESPIRATORY (INHALATION)
Status: DISCONTINUED | OUTPATIENT
Start: 2021-12-15 | End: 2021-12-15 | Stop reason: HOSPADM

## 2021-12-15 RX ORDER — ALBUTEROL SULFATE 0.83 MG/ML
2.5 SOLUTION RESPIRATORY (INHALATION)
Status: DISCONTINUED | OUTPATIENT
Start: 2021-12-15 | End: 2021-12-15 | Stop reason: HOSPADM

## 2021-12-15 RX ORDER — NALOXONE HYDROCHLORIDE 0.4 MG/ML
0.2 INJECTION, SOLUTION INTRAMUSCULAR; INTRAVENOUS; SUBCUTANEOUS
Status: DISCONTINUED | OUTPATIENT
Start: 2021-12-15 | End: 2021-12-15 | Stop reason: HOSPADM

## 2021-12-15 RX ORDER — METHYLPREDNISOLONE SODIUM SUCCINATE 125 MG/2ML
125 INJECTION, POWDER, LYOPHILIZED, FOR SOLUTION INTRAMUSCULAR; INTRAVENOUS
Status: DISCONTINUED | OUTPATIENT
Start: 2021-12-15 | End: 2021-12-15 | Stop reason: HOSPADM

## 2021-12-15 RX ORDER — EPINEPHRINE 1 MG/ML
0.3 INJECTION, SOLUTION INTRAMUSCULAR; SUBCUTANEOUS EVERY 5 MIN PRN
Status: DISCONTINUED | OUTPATIENT
Start: 2021-12-15 | End: 2021-12-15 | Stop reason: HOSPADM

## 2021-12-15 RX ADMIN — BORTEZOMIB 2.1 MG: 3.5 INJECTION, POWDER, LYOPHILIZED, FOR SOLUTION INTRAVENOUS; SUBCUTANEOUS at 15:01

## 2021-12-15 NOTE — PROGRESS NOTES
"Pt arrives to infusion center using her walker for labs and treatment.  Available lab results noted. Pt states that she took her last Revlamid yesterday.  Pt c/o having intermittent swelling of her LE while on the Revlimid, but is \"better\" today.  She also c/o fatigue, poor taste, nausea while on Revlimid, all of which improve as she is on her \"off\" week.  Pt will bring up these concerns with Dr. Blanco at her appointment next week before starting her day 1 Revlimid.  Velcade injection given LLQ without difficulty.  Pt left clinic stable to keenan.  Plan RTC as scheduled.  "

## 2021-12-16 ENCOUNTER — TELEPHONE (OUTPATIENT)
Dept: ONCOLOGY | Facility: HOSPITAL | Age: 71
End: 2021-12-16
Payer: COMMERCIAL

## 2021-12-16 DIAGNOSIS — C90.00 MULTIPLE MYELOMA WITH FAILED REMISSION (H): Primary | ICD-10-CM

## 2021-12-16 RX ORDER — LENALIDOMIDE 25 MG/1
25 CAPSULE ORAL DAILY
Qty: 14 CAPSULE | Refills: 0 | Status: SHIPPED | OUTPATIENT
Start: 2021-12-16 | End: 2022-01-05

## 2021-12-16 NOTE — ORAL ONC MGMT
Oral Chemotherapy Monitoring Program   Left Voicemail    Attempted to contact patient today for follow up regarding oral chemotherapy, lenalidomide, for monthly followup. No answer. Left voicemail for patient to call us back at [phone] when able. No medication name was left.    Minnie Agrawal, PharmD, [facility]  Oral Chemotherapy Pharmacist  [phone]

## 2021-12-17 ENCOUNTER — TELEPHONE (OUTPATIENT)
Dept: ONCOLOGY | Facility: HOSPITAL | Age: 71
End: 2021-12-17
Payer: COMMERCIAL

## 2021-12-17 LAB
ALBUMIN PERCENT: 62.3 % (ref 51–67)
ALBUMIN SERPL ELPH-MCNC: 3.9 G/DL (ref 3.2–4.7)
ALPHA 1 PERCENT: 3.4 % (ref 2–4)
ALPHA 2 PERCENT: 12.3 % (ref 5–13)
ALPHA1 GLOB SERPL ELPH-MCNC: 0.2 G/DL (ref 0.1–0.3)
ALPHA2 GLOB SERPL ELPH-MCNC: 0.8 G/DL (ref 0.4–0.9)
B-GLOBULIN SERPL ELPH-MCNC: 0.7 G/DL (ref 0.7–1.2)
BETA PERCENT: 11.6 % (ref 10–17)
GAMMA GLOB SERPL ELPH-MCNC: 0.6 G/DL (ref 0.6–1.4)
GAMMA GLOBULIN PERCENT: 10.4 % (ref 9–20)
KAPPA LC FREE SER-MCNC: 1.26 MG/DL (ref 0.33–1.94)
KAPPA LC FREE/LAMBDA FREE SER NEPH: 1.33 {RATIO} (ref 0.26–1.65)
LAMBDA LC FREE SERPL-MCNC: 0.95 MG/DL (ref 0.57–2.63)
MONOCLONAL PEAK: 0.4 G/DL
PATH ICD:: NORMAL
PROT PATTERN SERPL ELPH-IMP: NORMAL
REVIEWING PATHOLOGIST: NORMAL
TOTAL PROTEIN SERUM FOR ELP (SYNCED VALUE): 6.2 G/DL

## 2021-12-17 NOTE — ORAL ONC MGMT
Oral Chemotherapy Monitoring Program    Subjective/Objective:  Lizzie Viera is a 71 year old female contacted by phone for a follow-up visit for oral chemotherapy.  Valeria states she is feeling more side effects with each cycle, but is able to tolerate and happy to hear her cancer is responding. Most notably she has the following side effects:  Lower leg edema- present even when she wakes up and does wear support hose  Dysgeusia- metallic/salty taste of food, is able to eat enough, just doesn't enjoy it. Primarily eats bland food.  Nausea- mild, varies day by day, uses sudeep tea which helps control    ORAL CHEMOTHERAPY 10/13/2021 11/4/2021 11/24/2021 11/26/2021 12/16/2021 12/16/2021 12/17/2021   Assessment Type Refill Refill Refill Monthly Follow up Refill Monthly Follow up Monthly Follow up   Diagnosis Code Multiple Myeloma Multiple Myeloma Multiple Myeloma Multiple Myeloma Multiple Myeloma Multiple Myeloma Multiple Myeloma   Providers Bella Bella Bella Bella Bella Bella Bella   Clinic Name/Location [facility]   Drug Name Revlimid (lenalidomide) Revlimid (lenalidomide) Revlimid (lenalidomide) Revlimid (lenalidomide) Revlimid (lenalidomide) Revlimid (lenalidomide) Revlimid (lenalidomide)   Dose 25 mg 25 mg 25 mg 25 mg 25 mg 25 mg 25 mg   Current Schedule Daily Daily Daily Daily Daily Daily Daily   Cycle Details 2 weeks on, 1 week off 2 weeks on, 1 week off 2 weeks on, 1 week off 2 weeks on, 1 week off 2 weeks on, 1 week off 2 weeks on, 1 week off 2 weeks on, 1 week off   Start Date of Last Cycle - - - 11/10/2021 - 12/1/2021 12/1/2021   Planned next cycle start date - - - 12/1/2021 - - 12/22/2021   Doses missed in last 2 weeks - - - 0 - - 0   Adherence Assessment - - - Adherent - - Adherent   Adverse Effects - - - Other (See Note for Details) - - Other (See Note for Details)   Diarrhea - - - - - - -   Pharmacist  "Intervention(diarrhea) - - - - - - -   Intervention(s) - - - - - - -   Any new drug interactions? - - - No - - No   Is the dose as ordered appropriate for the patient? - - - Yes - - Yes       Last PHQ-2 Score on record: No flowsheet data found.    Vitals:  BP:   BP Readings from Last 1 Encounters:   12/15/21 121/48     Wt Readings from Last 1 Encounters:   12/01/21 61.7 kg (136 lb)     Estimated body surface area is 1.64 meters squared as calculated from the following:    Height as of 10/19/21: 1.575 m (5' 2.01\").    Weight as of 12/1/21: 61.7 kg (136 lb).    Labs:  _  Result Component Current Result Ref Range   Sodium 142 (12/1/2021) 136 - 145 mmol/L     _  Result Component Current Result Ref Range   Potassium 4.3 (12/1/2021) 3.5 - 5.0 mmol/L     _  Result Component Current Result Ref Range   Calcium 9.2 (12/1/2021) 8.5 - 10.5 mg/dL     No results found for Mag within last 30 days.     No results found for Phos within last 30 days.     _  Result Component Current Result Ref Range   Albumin 3.5 (12/1/2021) 3.5 - 5.0 g/dL     _  Result Component Current Result Ref Range   Urea Nitrogen 13 (12/1/2021) 8 - 22 mg/dL     _  Result Component Current Result Ref Range   Creatinine 0.68 (12/1/2021) 0.60 - 1.10 mg/dL     _  Result Component Current Result Ref Range   AST 15 (12/1/2021) 0 - 40 U/L     _  Result Component Current Result Ref Range   ALT 20 (12/1/2021) 0 - 45 U/L     _  Result Component Current Result Ref Range   Bilirubin Total 0.3 (12/1/2021) 0.0 - 1.0 mg/dL     _  Result Component Current Result Ref Range   WBC Count 6.2 (12/15/2021) 4.0 - 11.0 10e3/uL     _  Result Component Current Result Ref Range   Hemoglobin 13.0 (12/15/2021) 11.7 - 15.7 g/dL     _  Result Component Current Result Ref Range   Platelet Count 162 (12/15/2021) 150 - 450 10e3/uL     No results found for ANC within last 30 days.         Assessment/Plan:  Valeria is tolerating therapy with manageable side effects. Continue use of compression socks " and elevating legs when at rest and continue non-pharmacologic management of nausea. She does note she is feeling better now that she's been off the lenalidomide for 3 days.  Continue as prescribed.    Follow-Up:  Will plan to review her appt with Dr. Blanco on 12/22 and then plan to call again a couple of weeks later. Valeria agrees to the call and wishes us Happy Holidays.    Bertrand Tom, PharmD, [facility]  Clinical Oncology Pharmacist  December 17, 2021

## 2021-12-22 ENCOUNTER — MYC MEDICAL ADVICE (OUTPATIENT)
Dept: NEUROSURGERY | Facility: CLINIC | Age: 71
End: 2021-12-22

## 2021-12-22 ENCOUNTER — LAB (OUTPATIENT)
Dept: INFUSION THERAPY | Facility: HOSPITAL | Age: 71
End: 2021-12-22
Attending: INTERNAL MEDICINE
Payer: COMMERCIAL

## 2021-12-22 ENCOUNTER — ONCOLOGY VISIT (OUTPATIENT)
Dept: ONCOLOGY | Facility: HOSPITAL | Age: 71
End: 2021-12-22
Attending: INTERNAL MEDICINE
Payer: COMMERCIAL

## 2021-12-22 VITALS
HEART RATE: 62 BPM | OXYGEN SATURATION: 97 % | TEMPERATURE: 98.1 F | WEIGHT: 139.2 LBS | SYSTOLIC BLOOD PRESSURE: 142 MMHG | BODY MASS INDEX: 25.45 KG/M2 | RESPIRATION RATE: 18 BRPM | DIASTOLIC BLOOD PRESSURE: 64 MMHG

## 2021-12-22 DIAGNOSIS — M54.6 CHRONIC MIDLINE THORACIC BACK PAIN: ICD-10-CM

## 2021-12-22 DIAGNOSIS — C90.00 MULTIPLE MYELOMA WITH FAILED REMISSION (H): Primary | ICD-10-CM

## 2021-12-22 DIAGNOSIS — S22.069A CLOSED T7 FRACTURE (H): Primary | ICD-10-CM

## 2021-12-22 DIAGNOSIS — G89.29 CHRONIC MIDLINE THORACIC BACK PAIN: ICD-10-CM

## 2021-12-22 LAB
ALBUMIN SERPL-MCNC: 3.7 G/DL (ref 3.5–5)
ALP SERPL-CCNC: 47 U/L (ref 45–120)
ALT SERPL W P-5'-P-CCNC: 62 U/L (ref 0–45)
ANION GAP SERPL CALCULATED.3IONS-SCNC: 11 MMOL/L (ref 5–18)
AST SERPL W P-5'-P-CCNC: 61 U/L (ref 0–40)
BASOPHILS # BLD AUTO: 0.1 10E3/UL (ref 0–0.2)
BASOPHILS NFR BLD AUTO: 1 %
BILIRUB SERPL-MCNC: 0.3 MG/DL (ref 0–1)
BUN SERPL-MCNC: 17 MG/DL (ref 8–22)
CALCIUM SERPL-MCNC: 9.3 MG/DL (ref 8.5–10.5)
CHLORIDE BLD-SCNC: 105 MMOL/L (ref 98–107)
CO2 SERPL-SCNC: 24 MMOL/L (ref 22–31)
CREAT SERPL-MCNC: 0.74 MG/DL (ref 0.6–1.1)
EOSINOPHIL # BLD AUTO: 0 10E3/UL (ref 0–0.7)
EOSINOPHIL NFR BLD AUTO: 1 %
ERYTHROCYTE [DISTWIDTH] IN BLOOD BY AUTOMATED COUNT: 15.3 % (ref 10–15)
GFR SERPL CREATININE-BSD FRML MDRD: 87 ML/MIN/1.73M2
GLUCOSE BLD-MCNC: 111 MG/DL (ref 70–125)
HCT VFR BLD AUTO: 41.5 % (ref 35–47)
HGB BLD-MCNC: 13.2 G/DL (ref 11.7–15.7)
IMM GRANULOCYTES # BLD: 0 10E3/UL
IMM GRANULOCYTES NFR BLD: 0 %
LYMPHOCYTES # BLD AUTO: 0.5 10E3/UL (ref 0.8–5.3)
LYMPHOCYTES NFR BLD AUTO: 9 %
MCH RBC QN AUTO: 33.2 PG (ref 26.5–33)
MCHC RBC AUTO-ENTMCNC: 31.8 G/DL (ref 31.5–36.5)
MCV RBC AUTO: 104 FL (ref 78–100)
MONOCYTES # BLD AUTO: 0.1 10E3/UL (ref 0–1.3)
MONOCYTES NFR BLD AUTO: 1 %
NEUTROPHILS # BLD AUTO: 4.4 10E3/UL (ref 1.6–8.3)
NEUTROPHILS NFR BLD AUTO: 88 %
NRBC # BLD AUTO: 0 10E3/UL
NRBC BLD AUTO-RTO: 0 /100
PLATELET # BLD AUTO: 225 10E3/UL (ref 150–450)
POTASSIUM BLD-SCNC: 3.9 MMOL/L (ref 3.5–5)
PROT SERPL-MCNC: 6.9 G/DL (ref 6–8)
RBC # BLD AUTO: 3.98 10E6/UL (ref 3.8–5.2)
SODIUM SERPL-SCNC: 140 MMOL/L (ref 136–145)
WBC # BLD AUTO: 5 10E3/UL (ref 4–11)

## 2021-12-22 PROCEDURE — 82040 ASSAY OF SERUM ALBUMIN: CPT | Performed by: NURSE PRACTITIONER

## 2021-12-22 PROCEDURE — 36415 COLL VENOUS BLD VENIPUNCTURE: CPT | Performed by: NURSE PRACTITIONER

## 2021-12-22 PROCEDURE — G0463 HOSPITAL OUTPT CLINIC VISIT: HCPCS | Mod: 25

## 2021-12-22 PROCEDURE — 250N000011 HC RX IP 250 OP 636: Performed by: NURSE PRACTITIONER

## 2021-12-22 PROCEDURE — 96401 CHEMO ANTI-NEOPL SQ/IM: CPT

## 2021-12-22 PROCEDURE — 99215 OFFICE O/P EST HI 40 MIN: CPT | Performed by: INTERNAL MEDICINE

## 2021-12-22 PROCEDURE — 85025 COMPLETE CBC W/AUTO DIFF WBC: CPT | Performed by: NURSE PRACTITIONER

## 2021-12-22 PROCEDURE — 82374 ASSAY BLOOD CARBON DIOXIDE: CPT | Performed by: NURSE PRACTITIONER

## 2021-12-22 RX ORDER — DIPHENHYDRAMINE HYDROCHLORIDE 50 MG/ML
50 INJECTION INTRAMUSCULAR; INTRAVENOUS
Status: CANCELLED
Start: 2022-01-25

## 2021-12-22 RX ORDER — METHYLPREDNISOLONE SODIUM SUCCINATE 125 MG/2ML
125 INJECTION, POWDER, LYOPHILIZED, FOR SOLUTION INTRAMUSCULAR; INTRAVENOUS
Status: CANCELLED
Start: 2022-01-18

## 2021-12-22 RX ORDER — DEXAMETHASONE 4 MG/1
TABLET ORAL
Qty: 100 TABLET | Refills: 1 | Status: SHIPPED | OUTPATIENT
Start: 2021-12-22 | End: 2022-04-29

## 2021-12-22 RX ORDER — DIPHENHYDRAMINE HYDROCHLORIDE 50 MG/ML
50 INJECTION INTRAMUSCULAR; INTRAVENOUS
Status: DISCONTINUED | OUTPATIENT
Start: 2021-12-22 | End: 2021-12-22 | Stop reason: HOSPADM

## 2021-12-22 RX ORDER — METHYLPREDNISOLONE SODIUM SUCCINATE 125 MG/2ML
125 INJECTION, POWDER, LYOPHILIZED, FOR SOLUTION INTRAMUSCULAR; INTRAVENOUS
Status: CANCELLED
Start: 2022-01-11

## 2021-12-22 RX ORDER — LORAZEPAM 2 MG/ML
0.5 INJECTION INTRAMUSCULAR EVERY 4 HOURS PRN
Status: CANCELLED | OUTPATIENT
Start: 2022-01-18

## 2021-12-22 RX ORDER — MEPERIDINE HYDROCHLORIDE 25 MG/ML
25 INJECTION INTRAMUSCULAR; INTRAVENOUS; SUBCUTANEOUS EVERY 30 MIN PRN
Status: CANCELLED | OUTPATIENT
Start: 2022-01-18

## 2021-12-22 RX ORDER — EPINEPHRINE 1 MG/ML
0.3 INJECTION, SOLUTION INTRAMUSCULAR; SUBCUTANEOUS EVERY 5 MIN PRN
Status: CANCELLED | OUTPATIENT
Start: 2022-01-18

## 2021-12-22 RX ORDER — ALBUTEROL SULFATE 90 UG/1
1-2 AEROSOL, METERED RESPIRATORY (INHALATION)
Status: CANCELLED
Start: 2022-01-25

## 2021-12-22 RX ORDER — NALOXONE HYDROCHLORIDE 0.4 MG/ML
0.2 INJECTION, SOLUTION INTRAMUSCULAR; INTRAVENOUS; SUBCUTANEOUS
Status: CANCELLED | OUTPATIENT
Start: 2022-01-11

## 2021-12-22 RX ORDER — EPINEPHRINE 1 MG/ML
0.3 INJECTION, SOLUTION INTRAMUSCULAR; SUBCUTANEOUS EVERY 5 MIN PRN
Status: DISCONTINUED | OUTPATIENT
Start: 2021-12-22 | End: 2021-12-22 | Stop reason: HOSPADM

## 2021-12-22 RX ORDER — LORAZEPAM 2 MG/ML
0.5 INJECTION INTRAMUSCULAR EVERY 4 HOURS PRN
Status: CANCELLED | OUTPATIENT
Start: 2022-01-11

## 2021-12-22 RX ORDER — LORAZEPAM 2 MG/ML
0.5 INJECTION INTRAMUSCULAR EVERY 4 HOURS PRN
Status: CANCELLED | OUTPATIENT
Start: 2022-01-25

## 2021-12-22 RX ORDER — ALBUTEROL SULFATE 90 UG/1
1-2 AEROSOL, METERED RESPIRATORY (INHALATION)
Status: CANCELLED
Start: 2022-01-18

## 2021-12-22 RX ORDER — NALOXONE HYDROCHLORIDE 0.4 MG/ML
0.2 INJECTION, SOLUTION INTRAMUSCULAR; INTRAVENOUS; SUBCUTANEOUS
Status: CANCELLED | OUTPATIENT
Start: 2022-01-25

## 2021-12-22 RX ORDER — DIPHENHYDRAMINE HYDROCHLORIDE 50 MG/ML
50 INJECTION INTRAMUSCULAR; INTRAVENOUS
Status: CANCELLED
Start: 2022-01-11

## 2021-12-22 RX ORDER — ALBUTEROL SULFATE 90 UG/1
1-2 AEROSOL, METERED RESPIRATORY (INHALATION)
Status: CANCELLED
Start: 2022-01-11

## 2021-12-22 RX ORDER — EPINEPHRINE 1 MG/ML
0.3 INJECTION, SOLUTION INTRAMUSCULAR; SUBCUTANEOUS EVERY 5 MIN PRN
Status: CANCELLED | OUTPATIENT
Start: 2022-01-25

## 2021-12-22 RX ORDER — MEPERIDINE HYDROCHLORIDE 25 MG/ML
25 INJECTION INTRAMUSCULAR; INTRAVENOUS; SUBCUTANEOUS EVERY 30 MIN PRN
Status: DISCONTINUED | OUTPATIENT
Start: 2021-12-22 | End: 2021-12-22 | Stop reason: HOSPADM

## 2021-12-22 RX ORDER — METHYLPREDNISOLONE SODIUM SUCCINATE 125 MG/2ML
125 INJECTION, POWDER, LYOPHILIZED, FOR SOLUTION INTRAMUSCULAR; INTRAVENOUS
Status: CANCELLED
Start: 2022-01-25

## 2021-12-22 RX ORDER — ALBUTEROL SULFATE 90 UG/1
1-2 AEROSOL, METERED RESPIRATORY (INHALATION)
Status: DISCONTINUED | OUTPATIENT
Start: 2021-12-22 | End: 2021-12-22 | Stop reason: HOSPADM

## 2021-12-22 RX ORDER — EPINEPHRINE 1 MG/ML
0.3 INJECTION, SOLUTION INTRAMUSCULAR; SUBCUTANEOUS EVERY 5 MIN PRN
Status: CANCELLED | OUTPATIENT
Start: 2022-01-11

## 2021-12-22 RX ORDER — ALBUTEROL SULFATE 0.83 MG/ML
2.5 SOLUTION RESPIRATORY (INHALATION)
Status: DISCONTINUED | OUTPATIENT
Start: 2021-12-22 | End: 2021-12-22 | Stop reason: HOSPADM

## 2021-12-22 RX ORDER — MEPERIDINE HYDROCHLORIDE 25 MG/ML
25 INJECTION INTRAMUSCULAR; INTRAVENOUS; SUBCUTANEOUS EVERY 30 MIN PRN
Status: CANCELLED | OUTPATIENT
Start: 2022-01-11

## 2021-12-22 RX ORDER — ALBUTEROL SULFATE 0.83 MG/ML
2.5 SOLUTION RESPIRATORY (INHALATION)
Status: CANCELLED | OUTPATIENT
Start: 2022-01-11

## 2021-12-22 RX ORDER — METHYLPREDNISOLONE SODIUM SUCCINATE 125 MG/2ML
125 INJECTION, POWDER, LYOPHILIZED, FOR SOLUTION INTRAMUSCULAR; INTRAVENOUS
Status: DISCONTINUED | OUTPATIENT
Start: 2021-12-22 | End: 2021-12-22 | Stop reason: HOSPADM

## 2021-12-22 RX ORDER — MEPERIDINE HYDROCHLORIDE 25 MG/ML
25 INJECTION INTRAMUSCULAR; INTRAVENOUS; SUBCUTANEOUS EVERY 30 MIN PRN
Status: CANCELLED | OUTPATIENT
Start: 2022-01-25

## 2021-12-22 RX ORDER — ALBUTEROL SULFATE 0.83 MG/ML
2.5 SOLUTION RESPIRATORY (INHALATION)
Status: CANCELLED | OUTPATIENT
Start: 2022-01-18

## 2021-12-22 RX ORDER — NALOXONE HYDROCHLORIDE 0.4 MG/ML
0.2 INJECTION, SOLUTION INTRAMUSCULAR; INTRAVENOUS; SUBCUTANEOUS
Status: CANCELLED | OUTPATIENT
Start: 2022-01-18

## 2021-12-22 RX ORDER — DIPHENHYDRAMINE HYDROCHLORIDE 50 MG/ML
50 INJECTION INTRAMUSCULAR; INTRAVENOUS
Status: CANCELLED
Start: 2022-01-18

## 2021-12-22 RX ORDER — ALBUTEROL SULFATE 0.83 MG/ML
2.5 SOLUTION RESPIRATORY (INHALATION)
Status: CANCELLED | OUTPATIENT
Start: 2022-01-25

## 2021-12-22 RX ORDER — NALOXONE HYDROCHLORIDE 0.4 MG/ML
0.2 INJECTION, SOLUTION INTRAMUSCULAR; INTRAVENOUS; SUBCUTANEOUS
Status: DISCONTINUED | OUTPATIENT
Start: 2021-12-22 | End: 2021-12-22 | Stop reason: HOSPADM

## 2021-12-22 RX ADMIN — BORTEZOMIB 2.1 MG: 3.5 INJECTION, POWDER, LYOPHILIZED, FOR SOLUTION INTRAVENOUS; SUBCUTANEOUS at 15:15

## 2021-12-22 ASSESSMENT — PAIN SCALES - GENERAL: PAINLEVEL: MODERATE PAIN (4)

## 2021-12-22 NOTE — PROGRESS NOTES
"Oncology Rooming Note    December 22, 2021 2:18 PM   Lizzie Viera is a 71 year old female who presents for:    Chief Complaint   Patient presents with     Oncology Clinic Visit     Initial Vitals: BP (!) 142/64   Pulse 62   Temp 98.1  F (36.7  C)   Resp 18   Wt 63.1 kg (139 lb 3.2 oz)   SpO2 97%   BMI 25.45 kg/m   Estimated body mass index is 25.45 kg/m  as calculated from the following:    Height as of 10/19/21: 1.575 m (5' 2.01\").    Weight as of this encounter: 63.1 kg (139 lb 3.2 oz). Body surface area is 1.66 meters squared.  Moderate Pain (4) Comment: Data Unavailable   No LMP recorded. Patient is postmenopausal.  Allergies reviewed: Yes  Medications reviewed: Yes    Medications: Medication refills not needed today.  Pharmacy name entered into [facility]: [id number] - [place], [place] - [address]    Clinical concerns: None       Viviane Chu LPN            "

## 2021-12-22 NOTE — PROGRESS NOTES
Lizzie Viera, 71 year old, female arrived ambulatory with walker to clinic after lab and provider visit for Cycle #5 Day #1 of her chemotherapy regimen. Labs reviewed. Administered Velcade subcutaneous (right lower abdomen) per provider order. She tolerated injection well and site covered with band-aid. Lizzie Viera verbalized understanding of plan of care and return to clinic. Discharged to [facility] with walker at 1518 alert and stable.

## 2021-12-22 NOTE — LETTER
"    12/22/2021         RE: Lizzie Viera  [address]  [place] [address]        Dear Colleague,    Thank you for referring your patient, Lizzie Viera, to the [facility] [place]. Please see a copy of my visit note below.    Oncology Rooming Note    December 22, 2021 2:18 PM   Lizzie Viera is a 71 year old female who presents for:    Chief Complaint   Patient presents with     Oncology Clinic Visit     Initial Vitals: BP (!) 142/64   Pulse 62   Temp 98.1  F (36.7  C)   Resp 18   Wt 63.1 kg (139 lb 3.2 oz)   SpO2 97%   BMI 25.45 kg/m   Estimated body mass index is 25.45 kg/m  as calculated from the following:    Height as of 10/19/21: 1.575 m (5' 2.01\").    Weight as of this encounter: 63.1 kg (139 lb 3.2 oz). Body surface area is 1.66 meters squared.  Moderate Pain (4) Comment: Data Unavailable   No LMP recorded. Patient is postmenopausal.  Allergies reviewed: Yes  Medications reviewed: Yes    Medications: Medication refills not needed today.  Pharmacy name entered into [employer]: [id number] [address] [facility] [address]    Clinical concerns: None       Viviane Chu LPN              [facility] Hematology and Oncology Progress Note    Patient: Lizzie Viera  MRN: [id number]  Date of Service: Dec 22, 2021         Reason for Visit    Multiple Myeloma    Assessment     1.  A very pleasant 71 year old woman with average risk multiple myeloma is on the cytogenetics.  However clinically was behaving somewhat more aggressively.  Started on VRD treatment.  Responding quite well.  2.  Significant bone disease with osteoporosis and compression fractures.  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 This appears to be smaller.  3.  Normal hemoglobin, calcium, kidney function along with beta-2 microglobulin and albumin at the time of diagnosis.  4.  Positive Covid antibodies from infection. Unvaccinated.  5.  Pain from " compression fracture status post radiation therapy.  Slowly improving.    Plan    1.  Continue treatment with Velcade, Revlimid and dexamethasone. Check labs periodically.  2.  Bisphosphonate therapy for her bones.  On Zometa monthly.  3.  She is a transplant candidate. Visiting with the transplant clinic next month.  4.  Her scalp lesion will need to be followed up.  We will do a scan to look at it.  5.  Continue good diet and exercise.  Increase calcium and vitamin D in her diet.  6.  Counseled the patient at length regarding overall plan of care.    Cancer Staging  No matching staging information was found for the patient.    ECOG Performance    2 - Ambulatory and independent in all ADLs; cannot work; up > 50% of the time      History of Present Illness    Ms. Lizzie Viera is a very pleasant 71 year old woman who has been diagnosed with multiple myeloma IgG kappa type in May 2021.  She had initially presented with compression fracture of T7 vertebral body in September 2020.  She had a back pain which led to that evaluation.  Bone density confirmed that she had osteoporosis.  MRI showed diffuse marrow edema in October 2020.  In April 2021 the MRI of the thoracic spine showed worsening T7 vertebral body height loss because of likely pathological compression fracture with extraosseous extension.  She then had IR guided bone biopsy on 7 May 2021 and pathology confirmed the presence of kappa light chain restricted plasma cells.  Her cytogenetics confirmed the presence of hyperdiploid E with gains of chromosome 5, 9 and 15.    She was then seen by Dr. Baig and had a bone marrow biopsy which also confirmed 60% involvement of the marrow with plasma cells.  The bone marrow was done on 3 Jocelin 2021.  The bone marrow also confirmed the presence of hyperdiploid E.  She had a gain of chromosome 3, 5, 7, 9, 11, 15 as well as 19.  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  Her beta-2 microglobulin was actually normal at the time of her  diagnosis as was her albumin at 4.0.  Monoclonal protein 3.1 g/dL.  Kappa free light chain levels of 13 mg/dL IgG level of 4280 with depressed levels of IgM and IgA.  Urine was also positive for kappa light chains as well as immunofixation of the urine was positive for IgG kappa.  Normal kidney function.  Normal hemoglobin.    As mentioned above she had bone lesions in the T7 vertebral body, T1, skull area.  Her main pain is coming from her T7 vertebral body compression fracture.    Due to the pain she has been seen by radiation oncology and has received radiation therapy.  She also got a dose of steroids for pain control.    Currently her pain is controlled with combination of Dilaudid as well as some Tylenol.  She is wearing a brace.  She did notice that when she was on dexamethasone her pain was significantly better.    She was initially thinking of doing some natural therapies but once her symptoms got worse, she came back in was counseled about treatment.      She has started on Velcade Revlimid and dexamethasone since September 2021. Tolerating it well. Responding nicely.  Immunoglobulins have almost normalized completely.  Back pain is still an issue getting better.    Review of systems.  Still occasional abdominal discomfort.  Some degree of mild anxiety.  Back pain as mentioned above  A 14 point review of systems is otherwise negative.        Past History    Past Medical History:   Diagnosis Date     Cervical dysplasia      Chronic RUQ pain      Osteoporosis        Past Surgical History:   Procedure Laterality Date     C LAP,CHOLECYSTECTOMY/EXPLORE  12/27/2004     C LIGATE FALLOPIAN TUBE      Description: Tubal Ligation;  Recorded: 09/20/2007;     CONIZATION CERVIX,KNIFE/LASER      Description: Cervical Conization By Laser;  Recorded: 09/20/2007;     HC DILATION/CURETTAGE DIAG/THER NON OB      Description: Dilation And Curettage;  Recorded: 09/20/2007;     HC REMOVE TONSILS/ADENOIDS,<11 Y/O       Description: Tonsillectomy With Adenoidectomy;  Recorded: 09/20/2007;         Physical Exam    BP (!) 142/64   Pulse 62   Temp 98.1  F (36.7  C)   Resp 18   Wt 63.1 kg (139 lb 3.2 oz)   SpO2 97%   BMI 25.45 kg/m          GENERAL: Alert and oriented to time place and person. Seated comfortably. In no distress.    HEAD: Atraumatic and normocephalic. No palpable lesion on the scalp.    EYES: RADHA, EOMI. No pallor. No icterus.    Oral cavity: no mucosal lesion or tonsillar enlargement.    NECK: supple. JVP normal.No thyroid enlargement.    LYMPH NODES: No palpable, cervical, axillary or inguinal lymphadenopathy.    CHEST: clear to auscultation bilaterally. Symmetrical breath movements bilaterally.  She is in a TLSO brace.    CVS: S1 and S2 are Regular rate and rhythm.  Soft systolic murmur heard. No peripheral edema.    ABDOMEN: Soft. Not tender. Not distended. No palpable hepatomegaly or splenomegaly. No other mass palpable. Bowel sounds heard.    EXTREMITIES: Warm.    SKIN: no rash, or bruising or purpura.      Lab Results    No results found for this or any previous visit (from the past 168 hour(s)).    Imaging    No results found.    Signed by: Loco Blanco MD,         Again, thank you for allowing me to participate in the care of your patient.        Sincerely,        Loco Blanco MD, MD

## 2021-12-22 NOTE — PROGRESS NOTES
[facility] Hematology and Oncology Progress Note    Patient: Lizzie Viera  MRN: [id number]  Date of Service: Dec 22, 2021         Reason for Visit    Multiple Myeloma    Assessment     1.  A very pleasant 71 year old woman with average risk multiple myeloma is on the cytogenetics.  However clinically was behaving somewhat more aggressively.  Started on VRD treatment.  Responding quite well.  2.  Significant bone disease with osteoporosis and compression fractures.  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 This appears to be smaller.  3.  Normal hemoglobin, calcium, kidney function along with beta-2 microglobulin and albumin at the time of diagnosis.  4.  Positive Covid antibodies from infection. Unvaccinated.  5.  Pain from compression fracture status post radiation therapy.  Slowly improving.    Plan    1.  Continue treatment with Velcade, Revlimid and dexamethasone. Check labs periodically.  2.  Bisphosphonate therapy for her bones.  On Zometa monthly.  3.  She is a transplant candidate. Visiting with the transplant clinic next month.  4.  Her scalp lesion will need to be followed up.  We will do a scan to look at it.  5.  Continue good diet and exercise.  Increase calcium and vitamin D in her diet.  6.  Counseled the patient at length regarding overall plan of care.    Cancer Staging  No matching staging information was found for the patient.    ECOG Performance    2 - Ambulatory and independent in all ADLs; cannot work; up > 50% of the time      History of Present Illness    Ms. Lizzie Viera is a very pleasant 71 year old woman who has been diagnosed with multiple myeloma IgG kappa type in May 2021.  She had initially presented with compression fracture of T7 vertebral body in September 2020.  She had a back pain which led to that evaluation.  Bone density confirmed that she had osteoporosis.  MRI showed diffuse marrow edema in October 2020.  In April 2021 the MRI of the thoracic  spine showed worsening T7 vertebral body height loss because of likely pathological compression fracture with extraosseous extension.  She then had IR guided bone biopsy on 7 May 2021 and pathology confirmed the presence of kappa light chain restricted plasma cells.  Her cytogenetics confirmed the presence of hyperdiploid E with gains of chromosome 5, 9 and 15.    She was then seen by Dr. Baig and had a bone marrow biopsy which also confirmed 60% involvement of the marrow with plasma cells.  The bone marrow was done on 3 Jocelin 2021.  The bone marrow also confirmed the presence of hyperdiploid E.  She had a gain of chromosome 3, 5, 7, 9, 11, 15 as well as 19.  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  Her beta-2 microglobulin was actually normal at the time of her diagnosis as was her albumin at 4.0.  Monoclonal protein 3.1 g/dL.  Kappa free light chain levels of 13 mg/dL IgG level of 4280 with depressed levels of IgM and IgA.  Urine was also positive for kappa light chains as well as immunofixation of the urine was positive for IgG kappa.  Normal kidney function.  Normal hemoglobin.    As mentioned above she had bone lesions in the T7 vertebral body, T1, skull area.  Her main pain is coming from her T7 vertebral body compression fracture.    Due to the pain she has been seen by radiation oncology and has received radiation therapy.  She also got a dose of steroids for pain control.    Currently her pain is controlled with combination of Dilaudid as well as some Tylenol.  She is wearing a brace.  She did notice that when she was on dexamethasone her pain was significantly better.    She was initially thinking of doing some natural therapies but once her symptoms got worse, she came back in was counseled about treatment.      She has started on Velcade Revlimid and dexamethasone since September 2021. Tolerating it well. Responding nicely.  Immunoglobulins have almost normalized completely.  Back pain is still an issue getting  better.    Review of systems.  Still occasional abdominal discomfort.  Some degree of mild anxiety.  Back pain as mentioned above  A 14 point review of systems is otherwise negative.        Past History    Past Medical History:   Diagnosis Date     Cervical dysplasia      Chronic RUQ pain      Osteoporosis        Past Surgical History:   Procedure Laterality Date     C LAP,CHOLECYSTECTOMY/EXPLORE  12/27/2004     C LIGATE FALLOPIAN TUBE      Description: Tubal Ligation;  Recorded: 09/20/2007;     CONIZATION CERVIX,KNIFE/LASER      Description: Cervical Conization By Laser;  Recorded: 09/20/2007;     HC DILATION/CURETTAGE DIAG/THER NON OB      Description: Dilation And Curettage;  Recorded: 09/20/2007;     HC REMOVE TONSILS/ADENOIDS,<13 Y/O      Description: Tonsillectomy With Adenoidectomy;  Recorded: 09/20/2007;         Physical Exam    BP (!) 142/64   Pulse 62   Temp 98.1  F (36.7  C)   Resp 18   Wt 63.1 kg (139 lb 3.2 oz)   SpO2 97%   BMI 25.45 kg/m          GENERAL: Alert and oriented to time place and person. Seated comfortably. In no distress.    HEAD: Atraumatic and normocephalic. No palpable lesion on the scalp.    EYES: RADHA, EOMI. No pallor. No icterus.    Oral cavity: no mucosal lesion or tonsillar enlargement.    NECK: supple. JVP normal.No thyroid enlargement.    LYMPH NODES: No palpable, cervical, axillary or inguinal lymphadenopathy.    CHEST: clear to auscultation bilaterally. Symmetrical breath movements bilaterally.  She is in a TLSO brace.    CVS: S1 and S2 are Regular rate and rhythm.  Soft systolic murmur heard. No peripheral edema.    ABDOMEN: Soft. Not tender. Not distended. No palpable hepatomegaly or splenomegaly. No other mass palpable. Bowel sounds heard.    EXTREMITIES: Warm.    SKIN: no rash, or bruising or purpura.      Lab Results    No results found for this or any previous visit (from the past 168 hour(s)).    Imaging    No results found.    Signed by: Loco Blanco MD,

## 2021-12-23 DIAGNOSIS — M48.50XA PATHOLOGIC COMPRESSION FRACTURE OF SPINE, INITIAL ENCOUNTER (H): ICD-10-CM

## 2021-12-23 DIAGNOSIS — G89.3 CANCER ASSOCIATED PAIN: ICD-10-CM

## 2021-12-23 DIAGNOSIS — C90.00 MULTIPLE MYELOMA NOT HAVING ACHIEVED REMISSION (H): Primary | ICD-10-CM

## 2021-12-23 RX ORDER — TIZANIDINE HYDROCHLORIDE 4 MG/1
4 CAPSULE, GELATIN COATED ORAL 3 TIMES DAILY PRN
Qty: 60 CAPSULE | Refills: 0 | Status: SHIPPED | OUTPATIENT
Start: 2021-12-23 | End: 2022-02-23 | Stop reason: ALTCHOICE

## 2021-12-29 ENCOUNTER — INFUSION THERAPY VISIT (OUTPATIENT)
Dept: INFUSION THERAPY | Facility: HOSPITAL | Age: 71
End: 2021-12-29
Attending: INTERNAL MEDICINE
Payer: COMMERCIAL

## 2021-12-29 ENCOUNTER — HOSPITAL ENCOUNTER (OUTPATIENT)
Dept: CT IMAGING | Facility: HOSPITAL | Age: 71
End: 2021-12-29
Attending: INTERNAL MEDICINE
Payer: COMMERCIAL

## 2021-12-29 VITALS
SYSTOLIC BLOOD PRESSURE: 80 MMHG | DIASTOLIC BLOOD PRESSURE: 53 MMHG | TEMPERATURE: 98.6 F | OXYGEN SATURATION: 94 % | RESPIRATION RATE: 16 BRPM | HEART RATE: 60 BPM

## 2021-12-29 DIAGNOSIS — C90.00 MULTIPLE MYELOMA WITH FAILED REMISSION (H): ICD-10-CM

## 2021-12-29 DIAGNOSIS — C90.00 MULTIPLE MYELOMA WITH FAILED REMISSION (H): Primary | ICD-10-CM

## 2021-12-29 DIAGNOSIS — M84.58XK PATHOLOGICAL FRACTURE OF VERTEBRAE IN NEOPLASTIC DISEASE WITH NONUNION: ICD-10-CM

## 2021-12-29 LAB
ALBUMIN SERPL-MCNC: 3.5 G/DL (ref 3.5–5)
BASOPHILS # BLD AUTO: 0.1 10E3/UL (ref 0–0.2)
BASOPHILS NFR BLD AUTO: 1 %
CALCIUM SERPL-MCNC: 9.1 MG/DL (ref 8.5–10.5)
CREAT SERPL-MCNC: 0.7 MG/DL (ref 0.6–1.1)
EOSINOPHIL # BLD AUTO: 0.1 10E3/UL (ref 0–0.7)
EOSINOPHIL NFR BLD AUTO: 2 %
ERYTHROCYTE [DISTWIDTH] IN BLOOD BY AUTOMATED COUNT: 16 % (ref 10–15)
GFR SERPL CREATININE-BSD FRML MDRD: >90 ML/MIN/1.73M2
HCT VFR BLD AUTO: 41.5 % (ref 35–47)
HGB BLD-MCNC: 13.4 G/DL (ref 11.7–15.7)
HOLD SPECIMEN: NORMAL
IGA SERPL-MCNC: 15 MG/DL (ref 65–400)
IGG SERPL-MCNC: 782 MG/DL (ref 700–1700)
IGM SERPL-MCNC: 8 MG/DL (ref 60–280)
IMM GRANULOCYTES # BLD: 0 10E3/UL
IMM GRANULOCYTES NFR BLD: 1 %
LYMPHOCYTES # BLD AUTO: 0.6 10E3/UL (ref 0.8–5.3)
LYMPHOCYTES NFR BLD AUTO: 10 %
MCH RBC QN AUTO: 33.3 PG (ref 26.5–33)
MCHC RBC AUTO-ENTMCNC: 32.3 G/DL (ref 31.5–36.5)
MCV RBC AUTO: 103 FL (ref 78–100)
MONOCYTES # BLD AUTO: 0.1 10E3/UL (ref 0–1.3)
MONOCYTES NFR BLD AUTO: 1 %
NEUTROPHILS # BLD AUTO: 4.9 10E3/UL (ref 1.6–8.3)
NEUTROPHILS NFR BLD AUTO: 85 %
NRBC # BLD AUTO: 0 10E3/UL
NRBC BLD AUTO-RTO: 0 /100
PLATELET # BLD AUTO: 268 10E3/UL (ref 150–450)
RBC # BLD AUTO: 4.03 10E6/UL (ref 3.8–5.2)
TOTAL PROTEIN SERUM FOR ELP: 6.3 G/DL (ref 6–8)
WBC # BLD AUTO: 5.7 10E3/UL (ref 4–11)

## 2021-12-29 PROCEDURE — 85004 AUTOMATED DIFF WBC COUNT: CPT | Performed by: NURSE PRACTITIONER

## 2021-12-29 PROCEDURE — 36415 COLL VENOUS BLD VENIPUNCTURE: CPT | Performed by: INTERNAL MEDICINE

## 2021-12-29 PROCEDURE — 36415 COLL VENOUS BLD VENIPUNCTURE: CPT

## 2021-12-29 PROCEDURE — 70450 CT HEAD/BRAIN W/O DYE: CPT

## 2021-12-29 PROCEDURE — 84165 PROTEIN E-PHORESIS SERUM: CPT | Mod: TC

## 2021-12-29 PROCEDURE — 82310 ASSAY OF CALCIUM: CPT | Performed by: INTERNAL MEDICINE

## 2021-12-29 PROCEDURE — 82565 ASSAY OF CREATININE: CPT | Performed by: INTERNAL MEDICINE

## 2021-12-29 PROCEDURE — 84165 PROTEIN E-PHORESIS SERUM: CPT | Mod: 26 | Performed by: PATHOLOGY

## 2021-12-29 PROCEDURE — 82040 ASSAY OF SERUM ALBUMIN: CPT | Performed by: INTERNAL MEDICINE

## 2021-12-29 PROCEDURE — 83883 ASSAY NEPHELOMETRY NOT SPEC: CPT

## 2021-12-29 PROCEDURE — 258N000003 HC RX IP 258 OP 636: Performed by: INTERNAL MEDICINE

## 2021-12-29 PROCEDURE — 86334 IMMUNOFIX E-PHORESIS SERUM: CPT | Mod: 26 | Performed by: PATHOLOGY

## 2021-12-29 PROCEDURE — 86334 IMMUNOFIX E-PHORESIS SERUM: CPT

## 2021-12-29 PROCEDURE — 250N000011 HC RX IP 250 OP 636: Performed by: NURSE PRACTITIONER

## 2021-12-29 PROCEDURE — 84155 ASSAY OF PROTEIN SERUM: CPT

## 2021-12-29 PROCEDURE — 96401 CHEMO ANTI-NEOPL SQ/IM: CPT

## 2021-12-29 PROCEDURE — 82784 ASSAY IGA/IGD/IGG/IGM EACH: CPT

## 2021-12-29 RX ORDER — HEPARIN SODIUM (PORCINE) LOCK FLUSH IV SOLN 100 UNIT/ML 100 UNIT/ML
5 SOLUTION INTRAVENOUS
Status: DISCONTINUED | OUTPATIENT
Start: 2021-12-29 | End: 2021-12-29 | Stop reason: HOSPADM

## 2021-12-29 RX ORDER — HEPARIN SODIUM,PORCINE 10 UNIT/ML
5 VIAL (ML) INTRAVENOUS
Status: CANCELLED | OUTPATIENT
Start: 2022-04-27

## 2021-12-29 RX ORDER — HEPARIN SODIUM,PORCINE 10 UNIT/ML
5 VIAL (ML) INTRAVENOUS
Status: DISCONTINUED | OUTPATIENT
Start: 2021-12-29 | End: 2021-12-29 | Stop reason: HOSPADM

## 2021-12-29 RX ORDER — HEPARIN SODIUM (PORCINE) LOCK FLUSH IV SOLN 100 UNIT/ML 100 UNIT/ML
5 SOLUTION INTRAVENOUS
Status: CANCELLED | OUTPATIENT
Start: 2022-03-30

## 2021-12-29 RX ORDER — HEPARIN SODIUM (PORCINE) LOCK FLUSH IV SOLN 100 UNIT/ML 100 UNIT/ML
5 SOLUTION INTRAVENOUS
Status: CANCELLED | OUTPATIENT
Start: 2022-04-27

## 2021-12-29 RX ORDER — HEPARIN SODIUM (PORCINE) LOCK FLUSH IV SOLN 100 UNIT/ML 100 UNIT/ML
5 SOLUTION INTRAVENOUS
Status: CANCELLED | OUTPATIENT
Start: 2022-06-22

## 2021-12-29 RX ORDER — HEPARIN SODIUM,PORCINE 10 UNIT/ML
5 VIAL (ML) INTRAVENOUS
Status: CANCELLED | OUTPATIENT
Start: 2022-06-22

## 2021-12-29 RX ORDER — HEPARIN SODIUM,PORCINE 10 UNIT/ML
5 VIAL (ML) INTRAVENOUS
Status: CANCELLED | OUTPATIENT
Start: 2022-03-30

## 2021-12-29 RX ORDER — HEPARIN SODIUM,PORCINE 10 UNIT/ML
5 VIAL (ML) INTRAVENOUS
Status: CANCELLED | OUTPATIENT
Start: 2022-05-25

## 2021-12-29 RX ORDER — HEPARIN SODIUM (PORCINE) LOCK FLUSH IV SOLN 100 UNIT/ML 100 UNIT/ML
5 SOLUTION INTRAVENOUS
Status: CANCELLED | OUTPATIENT
Start: 2022-05-25

## 2021-12-29 RX ADMIN — BORTEZOMIB 2.1 MG: 3.5 INJECTION, POWDER, LYOPHILIZED, FOR SOLUTION INTRAVENOUS; SUBCUTANEOUS at 14:52

## 2021-12-29 RX ADMIN — SODIUM CHLORIDE 250 ML: 9 INJECTION, SOLUTION INTRAVENOUS at 14:31

## 2021-12-29 ASSESSMENT — PAIN SCALES - GENERAL: PAINLEVEL: MILD PAIN (3)

## 2021-12-29 NOTE — PROGRESS NOTES
Infusion Nursing Note:  Lizzie Viera presents today for Velcade.    Patient seen by provider today: No   present during visit today: Not Applicable.    Note: Patient arrives to injection area via ambulatory with wheeled walker.  Patient is alert and oriented x 3. Patient VSS.       Intravenous Access:  Peripheral IV placed.    Treatment Conditions:  Results reviewed, labs MET treatment parameters, ok to proceed with treatment.      Post Infusion Assessment:  Patient tolerated injection without incident.  Site patent and intact, free from redness, edema or discomfort.  No evidence of extravasations.  Access discontinued per protocol.         Discharge Plan:   Discharge instructions reviewed with: Patient.  Patient and/or family verbalized understanding of discharge instructions and all questions answered.  AVS to patient via [employer].  Patient will return 1-5-22 for next appointment.   Patient discharged in stable condition accompanied by: self.  Departure Mode: Ambulatory with wheeled walker.      RALF ACOSTA RN

## 2021-12-30 LAB
KAPPA LC FREE SER-MCNC: 1.06 MG/DL (ref 0.33–1.94)
KAPPA LC FREE/LAMBDA FREE SER NEPH: 1.25 {RATIO} (ref 0.26–1.65)
LAMBDA LC FREE SERPL-MCNC: 0.85 MG/DL (ref 0.57–2.63)

## 2021-12-31 ENCOUNTER — TELEPHONE (OUTPATIENT)
Dept: TRANSPLANT | Facility: CLINIC | Age: 71
End: 2021-12-31
Payer: COMMERCIAL

## 2021-12-31 NOTE — TELEPHONE ENCOUNTER
Called pt. to confirm new appointment. Patient was unavailable. Provided our office number in case the patient is hospitalized or needs to reschedule, or has any questions or concerns.

## 2022-01-02 NOTE — PROGRESS NOTES
[facility]MT [facility]     Referring Provider: Loco Blanco   CC: MM for auto  HPI: 70 yo F, initially p/w comprerssion Fx of T7 in 9/2020, after several months Dx was established with the following details:    IgG kappa MM, M spike 3.1, kappa 13.37, lambda 0.58, IgG not measured at Dx, Dx: 5/7/21 T7: plasma cell myeloma. PET 5/28/21: thoracic spine involvement, calvarial lesion, Bx 6/3/21 (marrow 60% involved; gain of chromosome 3, 5, 7, 9, 11, 15 as well as 19; deletion of 20). B2MG 5/27: 2.2, Alb 3.2. Urine + kappa and IFX of urine pos for IgG kappa. ISS: Stage II.  8/2021: progression of T6-T8 dz and cord compression  9/1/21 T7 vertebral body biopsy: myeloma, gains of chromosomes 5, 9 and 15.     RT x1 dose.   VRD and Zometa since 9/2021. She is at the end of week 2 her cycle 5.   12/29/21: kappa 1.06, lambda 0.85, M spike (12/15) 0.4, IgG 782.   PMH: covid, OP, cervical dysplasia, TL, cholecystectomy   FHx: DM in mother  SHx: ex-smoker, social EtoH  Allergies: Cefdinir (wheezing)  ROS: Pos for back pain, otherwise negative on a 10-system review.     Current Outpatient Medications   Medication     acetylcysteine (NAC) 600 MG CAPS capsule     acyclovir (ZOVIRAX) 400 MG tablet     alpha-lipoic acid 100 MG capsule     aspirin (ASA) 81 MG chewable tablet     Coenzyme Q10 300 MG CAPS     dexamethasone (DECADRON) 4 MG tablet     fish oil-omega-3 fatty acids 1000 MG capsule     gabapentin (NEURONTIN) 300 MG capsule     LENalidomide (REVLIMID) 25 MG CAPS capsule     levOCARNitine (CARNITOR) 330 MG tablet     medical cannabis (Patient's own supply)     Melatonin 10 MG TABS tablet     Milk Thistle-Dand-Fennel-Licor (MILK THISTLE XTRA) CAPS capsule     NALTREXONE HCL PO     phenazopyridine (AZO URINARY PAIN RELIEF) 95 MG tablet     tiZANidine (ZANAFLEX) 4 MG capsule     TURMERIC PO     UNABLE TO FIND     UNABLE TO FIND     UNABLE TO FIND     UNABLE TO FIND     UNABLE TO FIND     UNABLE TO FIND     Vitamin D,  Cholecalciferol, 25 MCG (1000 UT) CAPS     Vitamin Mixture (VITAMIN C) LIQD     dexamethasone (DECADRON) 4 MG tablet     dexamethasone (DECADRON) 4 MG tablet     HYDROmorphone (DILAUDID) 4 MG tablet     LENalidomide (REVLIMID) 25 MG CAPS capsule     senna (SENOKOT) 8.6 MG tablet     No current facility-administered medications for this visit.     Ph/E:   Vitals: BP (!) 147/62 (BP Location: Right arm, Patient Position: Sitting)   Pulse 55   Temp 97.6  F (36.4  C) (Oral)   Resp 16   Wt 61.6 kg (135 lb 14.4 oz)   SpO2 95%   BMI 24.85 kg/m    BMI= Body mass index is 24.85 kg/m .  ECOG PS: 1  General: NAD; H&N: no jaundice or mucosal lesions; Lungs clear; Heart RRR; Abdomen; Soft, No organomegaly; Extremities: No edema; Skin: No rash; Neuro: Nonfocal; Mood/Affect: appropriate; Lymph: no LAD    Assessment and Plan:  1. MM: Responding well to chemo. S/p 5 cycles and responding. Time to re-assess marrow and if AZ/CR, proceed to auto Jennifer. She wants a sedation marrow and she wants it done here. We can do this in work up. Goal will be to prolong remission but not cure. SEs include hair loss, n/v/d, infection, bleeding, organ toxicity, and death. Collection of stem cells will be using G-CSF or chemotherapy depending on the depth of response. M spike down from 3.1 to 0.4 but not checked 12/29. >50% but <90% reduction. Kappa has normalized. She believes her oncologist recommended that we only collect the cells now and do the transplant later when the risk of inpatient covid is smaller. I will confirm this with the oncologist and she will discuss this with our coordinator tomorrow.

## 2022-01-03 ENCOUNTER — OFFICE VISIT (OUTPATIENT)
Dept: TRANSPLANT | Facility: CLINIC | Age: 72
End: 2022-01-03
Attending: INTERNAL MEDICINE
Payer: COMMERCIAL

## 2022-01-03 VITALS
TEMPERATURE: 97.6 F | WEIGHT: 135.9 LBS | RESPIRATION RATE: 16 BRPM | BODY MASS INDEX: 24.85 KG/M2 | OXYGEN SATURATION: 95 % | DIASTOLIC BLOOD PRESSURE: 62 MMHG | SYSTOLIC BLOOD PRESSURE: 147 MMHG | HEART RATE: 55 BPM

## 2022-01-03 DIAGNOSIS — C90.00 MULTIPLE MYELOMA NOT HAVING ACHIEVED REMISSION (H): Primary | ICD-10-CM

## 2022-01-03 PROCEDURE — G0463 HOSPITAL OUTPT CLINIC VISIT: HCPCS

## 2022-01-03 PROCEDURE — 99205 OFFICE O/P NEW HI 60 MIN: CPT

## 2022-01-03 ASSESSMENT — PAIN SCALES - GENERAL: PAINLEVEL: MILD PAIN (2)

## 2022-01-03 NOTE — LETTER
1/3/2022         RE: Lizzie Viera  [address]  [place] [address]        Dear Colleague,    Thank you for referring your patient, Lizzie Viera, to the [facility]OD [facility] MARROW [facility]ANS[facility] [place]. Please see a copy of my visit note below.    [facility]     Referring Provider: Loco Blanco   CC: MM for auto  HPI: 70 yo F, initially p/w comprerssion Fx of T7 in 9/2020, after several months Dx was established with the following details:    IgG kappa MM, M spike 3.1, kappa 13.37, lambda 0.58, IgG not measured at Dx, Dx: 5/7/21 T7: plasma cell myeloma. PET 5/28/21: thoracic spine involvement, calvarial lesion, Bx 6/3/21 (marrow 60% involved; gain of chromosome 3, 5, 7, 9, 11, 15 as well as 19; deletion of 20). B2MG 5/27: 2.2, Alb 3.2. Urine + kappa and IFX of urine pos for IgG kappa. ISS: Stage II.  8/2021: progression of T6-T8 dz and cord compression  9/1/21 T7 vertebral body biopsy: myeloma, gains of chromosomes 5, 9 and 15.     RT x1 dose.   VRD and Zometa since 9/2021. She is at the end of week 2 her cycle 5.   12/29/21: kappa 1.06, lambda 0.85, M spike (12/15) 0.4, IgG 782.   PMH: covid, OP, cervical dysplasia, TL, cholecystectomy   FHx: DM in mother  SHx: ex-smoker, social EtoH  Allergies: Cefdinir (wheezing)  ROS: Pos for back pain, otherwise negative on a 10-system review.     Current Outpatient Medications   Medication     acetylcysteine (NAC) 600 MG CAPS capsule     acyclovir (ZOVIRAX) 400 MG tablet     alpha-lipoic acid 100 MG capsule     aspirin (ASA) 81 MG chewable tablet     Coenzyme Q10 300 MG CAPS     dexamethasone (DECADRON) 4 MG tablet     fish oil-omega-3 fatty acids 1000 MG capsule     gabapentin (NEURONTIN) 300 MG capsule     LENalidomide (REVLIMID) 25 MG CAPS capsule     levOCARNitine (CARNITOR) 330 MG tablet     medical cannabis (Patient's own supply)     Melatonin 10 MG TABS tablet     Milk Thistle-Dand-Fennel-Licor  (MILK THISTLE XTRA) CAPS capsule     NALTREXONE HCL PO     phenazopyridine (AZO URINARY PAIN RELIEF) 95 MG tablet     tiZANidine (ZANAFLEX) 4 MG capsule     TURMERIC PO     UNABLE TO FIND     UNABLE TO FIND     UNABLE TO FIND     UNABLE TO FIND     UNABLE TO FIND     UNABLE TO FIND     Vitamin D, Cholecalciferol, 25 MCG (1000 UT) CAPS     Vitamin Mixture (VITAMIN C) LIQD     dexamethasone (DECADRON) 4 MG tablet     dexamethasone (DECADRON) 4 MG tablet     HYDROmorphone (DILAUDID) 4 MG tablet     LENalidomide (REVLIMID) 25 MG CAPS capsule     senna (SENOKOT) 8.6 MG tablet     No current facility-administered medications for this visit.     Ph/E:   Vitals: BP (!) 147/62 (BP Location: Right arm, Patient Position: Sitting)   Pulse 55   Temp 97.6  F (36.4  C) (Oral)   Resp 16   Wt 61.6 kg (135 lb 14.4 oz)   SpO2 95%   BMI 24.85 kg/m    BMI= Body mass index is 24.85 kg/m .  ECOG PS: 1  General: NAD; H&N: no jaundice or mucosal lesions; Lungs clear; Heart RRR; Abdomen; Soft, No organomegaly; Extremities: No edema; Skin: No rash; Neuro: Nonfocal; Mood/Affect: appropriate; Lymph: no LAD    Assessment and Plan:  1. MM: Responding well to chemo. S/p 5 cycles and responding. Time to re-assess marrow and if CO/CR, proceed to auto Jennifer. She wants a sedation marrow and she wants it done here. We can do this in work up. Goal will be to prolong remission but not cure. SEs include hair loss, n/v/d, infection, bleeding, organ toxicity, and death. Collection of stem cells will be using G-CSF or chemotherapy depending on the depth of response. M spike down from 3.1 to 0.4 but not checked 12/29. >50% but <90% reduction. Kappa has normalized. She believes her oncologist recommended that we only collect the cells now and do the transplant later when the risk of inpatient covid is smaller. I will confirm this with the oncologist and she will discuss this with our coordinator tomorrow.       BMT DOM

## 2022-01-03 NOTE — NURSING NOTE
"Oncology Rooming Note    January 3, 2022 12:16 PM   Lizzie Viera is a 71 year old female who presents for:    Chief Complaint   Patient presents with     RECHECK     provider visit r/t MM     Initial Vitals: BP (!) 147/62 (BP Location: Right arm, Patient Position: Sitting)   Pulse 55   Temp 97.6  F (36.4  C) (Oral)   Resp 16   Wt 61.6 kg (135 lb 14.4 oz)   SpO2 95%   BMI 24.85 kg/m   Estimated body mass index is 24.85 kg/m  as calculated from the following:    Height as of 10/19/21: 1.575 m (5' 2.01\").    Weight as of this encounter: 61.6 kg (135 lb 14.4 oz). Body surface area is 1.64 meters squared.  Mild Pain (2) Comment: Data Unavailable   No LMP recorded. Patient is postmenopausal.  Allergies reviewed: Yes  Medications reviewed: Yes    Medications: MEDICATION REFILLS NEEDED TODAY. Provider was notified.   Gabapentin.     Pharmacy name entered into [facility]: [id number] [address] [address]    Clinical concerns: VSS. No clinical concerns.        Laurie Brenner RN              "

## 2022-01-04 ENCOUNTER — VIRTUAL VISIT (OUTPATIENT)
Dept: TRANSPLANT | Facility: CLINIC | Age: 72
End: 2022-01-04
Attending: INTERNAL MEDICINE
Payer: COMMERCIAL

## 2022-01-04 ENCOUNTER — TELEPHONE (OUTPATIENT)
Dept: ONCOLOGY | Facility: HOSPITAL | Age: 72
End: 2022-01-04
Payer: COMMERCIAL

## 2022-01-04 DIAGNOSIS — C90.00 MULTIPLE MYELOMA NOT HAVING ACHIEVED REMISSION (H): Primary | ICD-10-CM

## 2022-01-04 DIAGNOSIS — Z71.9 VISIT FOR COUNSELING: Primary | ICD-10-CM

## 2022-01-04 NOTE — LETTER
Date:January 7, 2022      Provider requested that no letter be sent. Do not send.       [facility]

## 2022-01-04 NOTE — LETTER
"    2022         RE: Lizzie Viera  [address]  [place] [address]        Dear Colleague,    Thank you for referring your patient, Lizzie Viera, to the [facility] BLOOD AND MARROW TRANSPLANT PROGRAM [place]. Please see a copy of my visit note below.    BMT Clinical Social Work New Transplant Evaluation    Information for this evaluation on 2022 was collected via Pt report, consultation with medical team, and medical chart review.     Present:  Patient: Lizzie \"Valeria\" Aylin   Clinical  (CSW): NOEMI Albrecht, [facility]    Medical Team:   Nurse Coordinator: Olga Owens RN  BMT Physician: Luis Wilson MD    Diagnosis: Multiple Myeloma (MM)  Diagnosis Date: 2021      Presenting Information:   Pt is a 71 year old female diagnosed with MM who presents to [facility] for consultation regarding an autologous stem cell transplant. Pt was alone for today's telephone visit.      Contact Information:  Pt Phone: [phone]  Pt Email: [email or web address]  Pt's daughter Collette Phone: [phone]    Special Needs:  No needs identified at this time. Valeria lives in [place] and will not need to relocate.    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  Ramos is also retired as a .    Finances/Insurance:  Valeria shared she is currently getting a samanta for 1 drug although she is not sure who supplies the samanta. She did note no additional financial or insurance concerns identified at this time.     CSW provided information regarding the insurance " authorization process and the role of the BMT financial case-mangers. CSW provided contact info for the BMT financial case-managers and referred pt to them for future insurance questions.     Caregiver:  CSW discussed with Pt the caregiver role and expectation at length. Pt is agreeable to having a full time caregiver for a minimum of 30 days until cleared by the BMT physician. Pt's identified caregivers are her  and daughter Collette. Caregiver education and information provided. No caregiver concerns identified.     Healthcare Directive:  Yes. Pt has completed HCD scanned into the EMR.     Resources Provided:  BMT Information Book   BMT Resources Packet:   Healthcare Directive:   Tour of Unit NO   Transplant unit description and information provided.     Identified Concerns:   No concerns identified at this time.     Summary:   Pt presents to [facility] for consultation regarding an autologous stem cell transplant. Pt/family asked good questions regarding psychosocial factors related to BMT; all of which were answered. Pt presented as friendly and open.      Plan:   CSW provided contact information and encouraged Pt to contact CSW with questions, concerns, resources and for support. CSW will continue to follow Pt to provide supportive counseling and assistance with resources as needed.      NOEMI Albrecht, CARMEN  [facility]  Phone [phone]  Pager [phone]            Again, thank you for allowing me to participate in the care of your patient.        Sincerely,        CARMEN Austin

## 2022-01-04 NOTE — LETTER
1/4/2022         RE: Lizzie Viera  [address]  [place] [address]        Dear Colleague,    Thank you for referring your patient, Lizzie Viera, to the [facility] BLOOD AND MARROW TRANSPLANT PROGRAM [place]. Please see a copy of my visit note below.    Spoke with Valeria following new transplant visit with Dr. Wilson. Reviewed plan of care per NT conversation for Stem Cell Transplant. Explained role of the Nurse Coordinator throughout the BMT process as well as general time line and expectations for transplant. Discussed necessity of caregiver and program's proximity requirements. All questions were answered.     Plan: Autologous Transplant, pending completion of present chemo cycle and agreement from RMD    Contact information provided for :  yes    HLA typing drawn: no    PRA typing drawn:  no    CMV-IgG and ABO-Rh drawn or in record:  no    Contact information provided for :  no    Financial Release for URD search obtained:  no    2820 Consent Signed: no    EOC Reason updated: yes              Again, thank you for allowing me to participate in the care of your patient.      Sincerely,    BMT Nurse Coordinator

## 2022-01-04 NOTE — TELEPHONE ENCOUNTER
Call placed to patient today to discuss an update that we have for her.  Patient had seen Dr Blanco in the past who told her to hold off on transplant as she is not vaccinated against the COVID-19 virus.  He thought that this put her at more risk of getting Covid being that there were so many patients in the hospital who have the COVID-19 virus.  Dr. Wilson from the bone marrow transplant team called and spoke with Zehra Pablo CNP in the absence of Dr Blanco.  Zehra Pablo CNP then spoke with Dr Blanco who states that if the transplant team is comfortable with her coming into their facility to get a transplant then Dr Blanco is on board with this.  Call placed to patient to convey this information to her.  She verbalized understanding and was appreciative.  This note will be routed over to Dr. Wilson so he is aware that we spoke with the patient.  Patient states that she is meeting with the nurses and social workers and actually has already done that today via telephone.  They are sending her out some information and then she said they will go from there.  She will call us if she needs anything further.    Patient is scheduled with our office on 1/12/2022 for her next appointment treatment.  We will ask the bone marrow transplant team to cancel any further appointments with us when she no longer needs them.    Aliza Sheehan RN

## 2022-01-04 NOTE — PROGRESS NOTES
"BMT Clinical Social Work New Transplant Evaluation    Information for this evaluation on 2022 was collected via Pt report, consultation with medical team, and medical chart review.     Present:  Patient: Lizzie \"Valeria\" Viera   Clinical  (CSW): NOEMI Albrecht, [facility]    Medical Team:   Nurse Coordinator: Olga Owens RN  BMT Physician: Luis Wilson MD    Diagnosis: Multiple Myeloma (MM)  Diagnosis Date: 2021      Presenting Information:   Pt is a 71 year old female diagnosed with MM who presents to [facility] for consultation regarding an autologous stem cell transplant. Pt was alone for today's telephone visit.      Contact Information:  Pt Phone: [phone]  Pt Email: [email or web address]  Pt's daughter Collette Phone: [phone]    Special Needs:  No needs identified at this time. Valeria lives in [place] and will not need to relocate.    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  Ramos is also retired as a .    Finances/Insurance:  Valeria shared she is currently getting a samanta for 1 drug although she is not sure who supplies the samanta. She did note no additional financial or insurance concerns identified at this time.     CSW provided information regarding the insurance authorization process and the role of the BMT financial case-mangers. CSW provided contact info for the BMT financial case-managers and referred pt to them for future insurance questions.     Caregiver:  CSW discussed with Pt the caregiver role and expectation at length. Pt is agreeable to having a " full time caregiver for a minimum of 30 days until cleared by the BMT physician. Pt's identified caregivers are her  and daughter Collette. Caregiver education and information provided. No caregiver concerns identified.     Healthcare Directive:  Yes. Pt has completed HCD scanned into the EMR.     Resources Provided:  BMT Information Book   BMT Resources Packet:   Healthcare Directive:   Tour of Unit NO   Transplant unit description and information provided.     Identified Concerns:   No concerns identified at this time.     Summary:   Pt presents to [facility] for consultation regarding an autologous stem cell transplant. Pt/family asked good questions regarding psychosocial factors related to BMT; all of which were answered. Pt presented as friendly and open.      Plan:   CSW provided contact information and encouraged Pt to contact CSW with questions, concerns, resources and for support. CSW will continue to follow Pt to provide supportive counseling and assistance with resources as needed.      NOEMI Albrecht, [facility]  Phone [phone]  Pager [phone]

## 2022-01-05 ENCOUNTER — LAB (OUTPATIENT)
Dept: INFUSION THERAPY | Facility: HOSPITAL | Age: 72
End: 2022-01-05
Attending: INTERNAL MEDICINE
Payer: COMMERCIAL

## 2022-01-05 ENCOUNTER — VIRTUAL VISIT (OUTPATIENT)
Dept: ONCOLOGY | Facility: HOSPITAL | Age: 72
End: 2022-01-05
Attending: SOCIAL WORKER
Payer: COMMERCIAL

## 2022-01-05 VITALS
RESPIRATION RATE: 18 BRPM | DIASTOLIC BLOOD PRESSURE: 55 MMHG | HEART RATE: 55 BPM | OXYGEN SATURATION: 95 % | SYSTOLIC BLOOD PRESSURE: 83 MMHG | TEMPERATURE: 97.6 F

## 2022-01-05 DIAGNOSIS — C90.00 MULTIPLE MYELOMA WITH FAILED REMISSION (H): Primary | ICD-10-CM

## 2022-01-05 DIAGNOSIS — C90.00 MULTIPLE MYELOMA WITH FAILED REMISSION (H): ICD-10-CM

## 2022-01-05 DIAGNOSIS — F43.23 ADJUSTMENT DISORDER WITH MIXED ANXIETY AND DEPRESSED MOOD: Primary | ICD-10-CM

## 2022-01-05 DIAGNOSIS — M84.58XK PATHOLOGICAL FRACTURE OF VERTEBRAE IN NEOPLASTIC DISEASE WITH NONUNION: ICD-10-CM

## 2022-01-05 LAB
BASOPHILS # BLD AUTO: 0 10E3/UL (ref 0–0.2)
BASOPHILS NFR BLD AUTO: 1 %
EOSINOPHIL # BLD AUTO: 0 10E3/UL (ref 0–0.7)
EOSINOPHIL NFR BLD AUTO: 0 %
ERYTHROCYTE [DISTWIDTH] IN BLOOD BY AUTOMATED COUNT: 15.7 % (ref 10–15)
HCT VFR BLD AUTO: 43.6 % (ref 35–47)
HGB BLD-MCNC: 14.1 G/DL (ref 11.7–15.7)
IMM GRANULOCYTES # BLD: 0 10E3/UL
IMM GRANULOCYTES NFR BLD: 1 %
LYMPHOCYTES # BLD AUTO: 0.7 10E3/UL (ref 0.8–5.3)
LYMPHOCYTES NFR BLD AUTO: 12 %
MCH RBC QN AUTO: 32.9 PG (ref 26.5–33)
MCHC RBC AUTO-ENTMCNC: 32.3 G/DL (ref 31.5–36.5)
MCV RBC AUTO: 102 FL (ref 78–100)
MONOCYTES # BLD AUTO: 0.2 10E3/UL (ref 0–1.3)
MONOCYTES NFR BLD AUTO: 3 %
NEUTROPHILS # BLD AUTO: 4.9 10E3/UL (ref 1.6–8.3)
NEUTROPHILS NFR BLD AUTO: 83 %
NRBC # BLD AUTO: 0 10E3/UL
NRBC BLD AUTO-RTO: 0 /100
PLATELET # BLD AUTO: 192 10E3/UL (ref 150–450)
RBC # BLD AUTO: 4.28 10E6/UL (ref 3.8–5.2)
WBC # BLD AUTO: 5.8 10E3/UL (ref 4–11)

## 2022-01-05 PROCEDURE — 258N000003 HC RX IP 258 OP 636: Performed by: INTERNAL MEDICINE

## 2022-01-05 PROCEDURE — 85025 COMPLETE CBC W/AUTO DIFF WBC: CPT | Performed by: NURSE PRACTITIONER

## 2022-01-05 PROCEDURE — 96365 THER/PROPH/DIAG IV INF INIT: CPT

## 2022-01-05 PROCEDURE — 90837 PSYTX W PT 60 MINUTES: CPT | Mod: TEL | Performed by: SOCIAL WORKER

## 2022-01-05 PROCEDURE — 250N000011 HC RX IP 250 OP 636: Performed by: INTERNAL MEDICINE

## 2022-01-05 PROCEDURE — 96401 CHEMO ANTI-NEOPL SQ/IM: CPT

## 2022-01-05 PROCEDURE — 36415 COLL VENOUS BLD VENIPUNCTURE: CPT | Performed by: NURSE PRACTITIONER

## 2022-01-05 PROCEDURE — 250N000011 HC RX IP 250 OP 636: Performed by: NURSE PRACTITIONER

## 2022-01-05 RX ORDER — LENALIDOMIDE 25 MG/1
25 CAPSULE ORAL DAILY
Qty: 14 CAPSULE | Refills: 0 | Status: SHIPPED | OUTPATIENT
Start: 2022-01-12 | End: 2022-01-27

## 2022-01-05 RX ADMIN — SODIUM CHLORIDE 250 ML: 9 INJECTION, SOLUTION INTRAVENOUS at 14:41

## 2022-01-05 RX ADMIN — ZOLEDRONIC ACID 4 MG: 4 INJECTION INTRAVENOUS at 14:42

## 2022-01-05 RX ADMIN — BORTEZOMIB 2.1 MG: 3.5 INJECTION, POWDER, LYOPHILIZED, FOR SOLUTION INTRAVENOUS; SUBCUTANEOUS at 14:44

## 2022-01-05 NOTE — PROGRESS NOTES
Spoke with Valeria following new transplant visit with Dr. Wilson. Reviewed plan of care per NT conversation for Stem Cell Transplant. Explained role of the Nurse Coordinator throughout the BMT process as well as general time line and expectations for transplant. Discussed necessity of caregiver and program's proximity requirements. All questions were answered.     Plan: Autologous Transplant, pending completion of present chemo cycle and agreement from RMD    Contact information provided for :  yes    HLA typing drawn: no    PRA typing drawn:  no    CMV-IgG and ABO-Rh drawn or in record:  no    Contact information provided for :  no    Financial Release for URD search obtained:  no    [id number] Consent Signed: no    EOC Reason updated: yes

## 2022-01-05 NOTE — CONFIDENTIAL NOTE
Controlled Substance Refill Request  Medication:   Requested Prescriptions     Pending Prescriptions Disp Refills     diclofenac (CATAFLAM) 50 MG tablet 60 tablet 0     Sig: Take 1 tablet (50 mg total) by mouth 3 (three) times a day.     oxyCODONE (ROXICODONE) 10 mg immediate release tablet 120 tablet 0     Sig: Take 1 tablet by mouth every 4 hours as needed for chronic pain. Max of 4 per day     Date Last Fill: 9/30/19  Pharmacy: [facility]   Submit electronically to pharmacy  Controlled Substance Agreement on File:   Encounter-Level CSA Scan Date - 04/21/2017:    Scan on 4/28/2017  8:34 AM by Martha Ordaz: [facility] NARCOTIC AGREEMENT       Last office visit: Last office visit pertaining to requested medication was 8/1/19.      Provider Refill Request  Medication:  diclofenac  RN can NOT refill this medication per RN refill protocol because med is not covered by policy/route to provider   Psychology Psychotherapy  Note      Name:  Lizzie Viera  :  1950  MRN:  [id number]      Date of Service: 2022  Duration: 60 minutes (9:00 to 10:00 AM)      Target Symptoms:    The patient was seen in light of concerns regarding symptoms of depression and anxiety as evidenced by patient and staff report.    Participation:  The patient was able to participate and benefit from treatment as evidenced by her verbal expression of ideas and initiation of topics discussed.    Mental Status:    Mood/Affect: Worried and concerned  Suicidal Ideation:  absent  Homicidal Ideation:  absent  Thought process:  normal  Thought content:  Clear  Fund of Knowledge:  Sufficient  Attention/Concentration:  Normal  Language ability:  intact  Speech: normal  Memory:  recent and remote memory intact  Insight and Judgement:  good  Orientation:  self, place and time  Appearance: N/A-telephone visit  Eye Contact: N/A-telephone visit  Estimated IQ:  Average      Intervention:    Valeria was interested in a follow-up psychotherapy session today.  We were able to meet for an in person clinic visit today.  Valeria was referred to me by JOSH Lopez from the palliative care clinic.  She is also followed by Dr. Blanco and Dr. Rapp.      Valeria updated me on her health issues.  She had her last clinic visit with Dr. Blanco on 2021.  She also met with the bone marrow transplant team yesterday, 2022.  The recommendation is for her to have a bone marrow transplant as soon as it can be arranged.  She found out that she will still need to continue chemotherapy after her transplant.  She has been giving the bone marrow transplant some thoughtful consideration and wants to further weigh out this option.  She also would like to meet with the integrative health physician and will try to schedule an appointment as soon as possible.  Throughout our session, the main focus was for her to process her thoughts and feelings about  getting a bone marrow transplant and weighing out the options that best fit for her in her life.  She also is looking forward to getting the written information from the bone marrow transplant team in the mail so she can further study and learned about this treatment.  Valeria processed her thoughts and feelings about her health issues and we discussed strategies to help her cope.    Valeria has a history of multiple myeloma.  She states that in September 2020, she had a fracture of T-7 and was hospitalized at [facility].  She did not have a good experience in transferring her care over to [facility] and the cancer clinic at [facility].  In April 2021, she continued to have pain in her spine and had biopsy that confirmed her diagnosis of multiple myeloma in May 2021.  She was hospitalized in September at [facility] and received radiation therapy during her hospitalization.  Her last visit with Dr. Blanco on 9/29/2021,  he indicates that clinically her multiple myeloma is behaving more aggressively with bone lesions.  She has significant bone disease with osteoporosis and compression fractures.  Dr. Blanco started her on Velcade, Revlimid and dexamethasone.  She had her first treatment on 9/29/2021.  She also has been receiving monthly Zometa shots.  She had additional radiation treatment on 10/6/2021 through 10/12/2021 to T1.  She also has a skull lesion that they will hold off on radiation at this time. She met with the neurosurgeon on 10/11/2021 and he felt that T6-7 and 8 are stable.   Her main physical complaints are pain and fatigue. She also is experiencing temperature changes and changes to her taste, which she believes is due to her chemo pill. At her 9/7/2021 palliative care clinic visit, they discussed advanced directives.  She has completed her health care directive.  She expressed that she wants to be a DNR/DNI and has expressed this to her care providers.  She decided to have her daughter be  "the primary proxy and her sister and girlfriend to be the alternates.      Valeria indicates that she has done a lot of research and has decided not to be vaccinated for COVID-19.  Her daughter had COVID-19 in May and her son and  also tested positive at that time.  She had the antibody test and found out that she also had COVID-19 in the past.  She was asymptomatic and did not realize she even had it.  She is not interested in getting the vaccine.  She is interested in doing alternative/complementary medicine to help combat her cancer.  She has been taking anti-inflammatory medication and has been reading books and articles about supplements that help \"kill cancer\".  She currently is on a variety of supplements and has discussed her supplements with Dr. Blanco.  Valeria indicates that she finds her medical background useful in understanding her health condition.    Valeria processed her thoughts and feelings about her cancer and her cancer treatment.  She is experiencing symptoms of anxiety and depression related to coping with her cancer and other life stressors.  She does not clinically meet diagnostic criteria for a mood disorder or an anxiety disorder.  She meets diagnostic criteria for adjustment disorder with mixed anxiety and depressed mood.  She denies suicidal ideation or thoughts of harming herself or others.    Valeria grew up in the [place].  She is the second oldest in a family of 8 children.  She has 3 sisters and 4 brothers.  Her older brother is exactly 1 year and a day older than her.  She states that she has a very close relationship with her siblings.  Most of them live in the area.  Her sisters have been extremely helpful during this difficult time.  She has 2 of her sisters at her house cleaning during her last visit..    Valeria lives in her [place] home with her , Ramos; daughter Collette; and her 2 sons ages 4 and 8.  Valeria and Ramos have been  for 42 years and that on a blind " date.  She states that Ramos has end-stage renal disease and is receiving dialysis 3 times a week.  He also had trouble aortic aneurysm repair and he has type 2 diabetes. He had a cardiac procedure at the [facility] on 10/15/2021, which went well. . He will be evaluated in 3 to 6 months for a kidney transplant. He has a friend who is willing to donate his kidney. Ramos retired at age 69 from being a .  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  When he had his annual DOT physical, he did not want to share his medical history with the DOT physician and decided to quit his job, on the spot that day.  This created some issues as he was the carrier of the insurance for the family.  Valeria indicates that Ramos is extremely angry and tends to take it out on the family members.  His father  when he was 11 years old.  He was a  and  on an experimental aircraft in [place].  He recently became so angry and agitated towards their daughter, that she gave him an ultimatum that he needs to start seeing a therapist.  Recently, he became extremely angry and agitated  with their son and her sister, to the point that they needed to leave the house due to his verbal abuse.  Valeria has been a buffer for the family regarding his anger.  She has been strongly encouraging him to make an appointment with a therapist.  The staff at [facility] also have talked with him about seeing a therapist and had a referral recommendation for him.  He has been refusing to make an appointment with a therapist.  At one point, he agreed to meet for family therapy with his daughter.  He currently refuses to receive any psychological help and his behavior continues to be extremely angry and agitated.  Due to his behavior, their daughter has decided that she needs to start looking for another housing situation.  She works with a therapist who is helping her get on public housing.  This may take a number of months.  We talked about how his behavior  affects Valeria emotionally.  We also talked about a plan for respite if she needs a break from the home situation.  She has come to the conclusion that he does not have insight about his behavior and how it affects others.  She is starting to do some recommended psychoeducational reading that may be helpful for her in this area.    Valeria and Ramos have 3 children.  The oldest is their daughter, Sharifa, age 38.  She lives near [place] and has 2 children.  She works as a teacher.  Their second oldest is their daughter, Collette, age 34, who currently lives with them along with her for an 8-year-old sons.  She is on Social Security disability due to her depression and past suicidal ideation.  She has received a great deal of therapy including DBT.  She has been extremely helpful to Valeria and wants to be her caretaker.  Their youngest is their son, Trell, age 30, l just moved out of the home 3 weeks ago to [facility], in order to be closer to his work in the area. He works 12-hour shifts starting at 4 AM in a warehouse that provides food for gas stations and service stations.    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  She has worked in medical research.  She spent 18 years working at the [employer].  She also has worked at [employer] in nuclear medicine and for [place] [employer] and nuclear medicine.  She spent several years working for [employer] and [employer].  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    Valeria was raised Judaism and went to a Judaism grade school, [employer] in [place]. She also went to [employer]. She currently describes herself as being a spiritual person and believes that mother nature is the guiding spirit of the planet. She has some  and [place] spirituality beliefs. We talked about ways for her to incorporate her spirituality into  her healing process.    Valeria is interested in individual psychotherapy to help her work through the emotional aspects of her cancer and cancer treatments.  She is experiencing symptoms of anxiety and depression related to her cancer and other life stresses.  She would like to initially meet on a weekly basis.  I have left a message for our information coordinators to get her scheduled in for a series of appointments.    Psychoterapeutic Techniques:  Cognitive-behavioral therapy, motivational interviewing and supportive psychotherapy strategies were utilized.    Necessity:    The session was necessary for the care of the patient to address symptoms of depression and anxiety related to the patient's medical condition.    Progress/ Plan:    Valeria processed her thoughts and feelings about her recent clinic visits including her visit with the bone marrow transplant team.  She is considering this is an option and knows that they would like to proceed as soon as possible.  She was discouraged to learn that she will still need to have chemotherapy after the bone marrow transplant.  She processed her thoughts and feelings about this at length.  We discussed problem-solving strategies that might be helpful for her to utilize in making her decision moving forward.  She also plans to meet with the [facility] health physician in the near future.    Valeria indicated that her mother-in-law recently .  She was 92 years old and had been living independently.  She went with her  to [place] from 12/3/2021 until  2021.  Her  was very resistant about going to the  and they needed to make arrangements for him to have dialysis while he was there.  It ended up to be very healing time for him and it was important for them to be there.    Valeria continue to discuss issues with her  and their relationship.  She is struggling with his issues of anger that is projected towards her.  We discussed this dynamic at  length and talked about strategies for setting up boundaries for her own self protection.    Valeria is interested in individual psychotherapy every 1 to 2 weeks to help her cope with the emotional aspects of her cancer and cancer treatment.  I will provide her with some information about utilizing cognitive behavioral therapy strategies to help manage symptoms of pain and anxiety & depression.  She has made good progress in going through the written material that I have given her.  She has started doing guided imagery exercises.  She plans to do some painting to illustrate her image for her guided imagery.  In addition, she has been doing some reading about cognitive behavioral therapy and has started to implement some of the strategies that we discussed.  She has read up on the side effects from her treatment and read that anxiety and irritability tend to be some of the side effects.  She is making a conscious effort to utilize some of the skills that we discussed to work on these issues.  I was able to provide her with 2 Omkar packs from the [employer] today for her to grand sons that live with them.  I also provided her friend, who was with her all day for the appointments with support group resources from [employer].  They have an online support group for families and friends supporting individuals with cancer.    Valeria had several difficulties over the past few months.  They had their pipes leak at their house and found out that they were corroded.  They are getting minimal help from their insurance company, but her brother is helping to fix this issue.      Valeria indicates that she has been working on a plan to get exercise. She has been walking on the treadmill 1 mile, 3 days a week.  Her sister picks her up and takes her to her house to use the treadmill.  This is been helpful as it creates some accountability for Valeria and also gives her the opportunity to spend some quality time with her sister.  She  also is considering getting a treadmill for their home as her current treadmill is no longer working.  She also is working on having good nutrition.    Valeria had a girlfriend that  recently from metastatic breast cancer.  She processed her thoughts and feelings about this loss and we discussed strategies to help her release her grief.    We also talked about issues with her  and some strategies to help set boundaries and limits and also encouraged him to get help he needs.  She also talked about how her 8-year-old grandson was feeling anxious and worried as he is a teacher is pregnant and decided to go on leave early.  He started with a new teacher and is having a lot of anxiety with the change.  He had some thoughts of hurting himself and his mom took him to the emergency room at [facility].  He is doing much better this week.  His mom, Collette has a history of having suicide attempts 3-4 times in 1 year, prior to her children being born.  Valeria is concerned that some of Collette's depressive tendencies are wearing off on her grandson.  We discussed strategies that might be helpful in this area.    Valeria is interested in meeting on a regular basis.  She would like to meet again for a virtual appointment on 2022.  I will ask our information coordinators to help schedule this appointment.    Diagnosis:    1.  Adjustment disorder with mixed anxiety and depressed mood  2.  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  Other information was consistent from the note that was made on  12/8/2021.      This note was created with the help of Dragon dictation system.  Grammatical and typing errors are not intentional.     Review of long-term goals: Treatment plan was reviewed and updated.       Provider: Mary Ellen Chan MA, LP, LICSW    Date:  10/5/2021  Time:  3:05 PM

## 2022-01-06 DIAGNOSIS — Z01.818 EXAMINATION PRIOR TO CHEMOTHERAPY: ICD-10-CM

## 2022-01-06 DIAGNOSIS — D70.4 CYCLIC NEUTROPENIA (H): ICD-10-CM

## 2022-01-06 DIAGNOSIS — Z86.2 PERSONAL HISTORY OF DISEASES OF BLOOD AND BLOOD-FORMING ORGANS: ICD-10-CM

## 2022-01-06 DIAGNOSIS — Z11.59 ENCOUNTER FOR SCREENING FOR OTHER VIRAL DISEASES: ICD-10-CM

## 2022-01-06 DIAGNOSIS — C90.01 MULTIPLE MYELOMA IN REMISSION (H): ICD-10-CM

## 2022-01-11 ENCOUNTER — VIRTUAL VISIT (OUTPATIENT)
Dept: ONCOLOGY | Facility: HOSPITAL | Age: 72
End: 2022-01-11
Attending: SOCIAL WORKER
Payer: COMMERCIAL

## 2022-01-11 DIAGNOSIS — C90.00 MULTIPLE MYELOMA WITH FAILED REMISSION (H): ICD-10-CM

## 2022-01-11 DIAGNOSIS — F43.23 ADJUSTMENT DISORDER WITH MIXED ANXIETY AND DEPRESSED MOOD: Primary | ICD-10-CM

## 2022-01-11 PROCEDURE — 90837 PSYTX W PT 60 MINUTES: CPT | Mod: TEL | Performed by: SOCIAL WORKER

## 2022-01-11 NOTE — CONFIDENTIAL NOTE
Psychology Psychotherapy  Note-virtual visit      Name:  Lizzie Viera  :  1950  MRN:  [id number]      Date of Service: 2022  Duration: 60 minutes (11:00 to 12:00)    The patient has been notified of following:      This telephone visit will be conducted via a call between you and your provider. We have found that certain health care needs can be provided without a face to face meeting.  This service lets us provide the care you need with a short phone conversation.      Telephone visits are billed at different rates depending on your insurance coverage. During this emergency period, for some insurers they may be billed the same as an in-person visit.  Please reach out to your insurance provider with any questions.     If during the course of the call the if provider feels a telephone visit is not appropriate, you will not be charged for this service.     Patient has given verbal consent to a Telephone visit? Yes        Target Symptoms:    The patient was seen in light of concerns regarding symptoms of depression and anxiety as evidenced by patient and staff report.    Participation:  The patient was able to participate and benefit from treatment as evidenced by her verbal expression of ideas and initiation of topics discussed.    Mental Status:    Mood/Affect: Worried and concerned  Suicidal Ideation:  absent  Homicidal Ideation:  absent  Thought process:  normal  Thought content:  Clear  Fund of Knowledge:  Sufficient  Attention/Concentration:  Normal  Language ability:  intact  Speech: normal  Memory:  recent and remote memory intact  Insight and Judgement:  good  Orientation:  self, place and time  Appearance: N/A-telephone visit  Eye Contact: N/A-telephone visit  Estimated IQ:  Average      Intervention:    Valeria was interested in a follow-up psychotherapy session today.  We were able to meet for an in person clinic visit today.  Valeria was referred to me by JOSH Lopez from the  palliative care clinic.  She is also followed by Dr. Blanco and Dr. Rapp.      Valeria updated me on her health issues.  She had her last clinic visit with Dr. Blanco on 12/22/2021.  She also met with the bone marrow transplant team on 1/4/2022.  The recommendation is for her to have a bone marrow transplant as soon as it can be arranged.  She found out that she will still need to continue chemotherapy after her transplant.  She has been giving the bone marrow transplant some thoughtful consideration and wants to further weigh out this option.  She has done some research and feels that it would be better if she has the transplant when there is minimal residual disease.  She also would like to meet with the [facility] physician and has not been appointment with a physician in [place] in 2-20 22.   Throughout our session, the main focus was for her to process her thoughts and feelings about getting a bone marrow transplant and weighing out the options that best fit for her in her life.  She has been reviewing the written information that was sent to her this week.  She also is doing some further research on her own. Valeria processed her thoughts and feelings about her health issues and we discussed strategies to help her cope.    Valeria has a history of multiple myeloma.  She states that in September 2020, she had a fracture of T-7 and was hospitalized at [facility].  She did not have a good experience in transferring her care over to [facility] and the cancer clinic at [facility].  In April 2021, she continued to have pain in her spine and had biopsy that confirmed her diagnosis of multiple myeloma in May 2021.  She was hospitalized in September at [facility] and received radiation therapy during her hospitalization.  Her last visit with Dr. Blanco on 9/29/2021,  he indicates that clinically her multiple myeloma is behaving more aggressively with bone lesions.  She has significant bone disease  "with osteoporosis and compression fractures.  Dr. Blanco started her on Velcade, Revlimid and dexamethasone.  She had her first treatment on 9/29/2021.  She also has been receiving monthly Zometa shots.  She had additional radiation treatment on 10/6/2021 through 10/12/2021 to T1.  She also has a skull lesion that they will hold off on radiation at this time. She met with the neurosurgeon on 10/11/2021 and he felt that T6-7 and 8 are stable.   Her main physical complaints are pain and fatigue. She also is experiencing temperature changes and changes to her taste, which she believes is due to her chemo pill. At her 9/7/2021 palliative care clinic visit, they discussed advanced directives.  She has completed her health care directive.  She expressed that she wants to be a DNR/DNI and has expressed this to her care providers.  She decided to have her daughter be the primary proxy and her sister and girlfriend to be the alternates.      Valeria indicates that she has done a lot of research and has decided not to be vaccinated for COVID-19.  Her daughter had COVID-19 in May and her son and  also tested positive at that time.  She had the antibody test and found out that she also had COVID-19 in the past.  She was asymptomatic and did not realize she even had it.  She is not interested in getting the vaccine.  She is interested in doing alternative/complementary medicine to help combat her cancer.  She has been taking anti-inflammatory medication and has been reading books and articles about supplements that help \"kill cancer\".  She currently is on a variety of supplements and has discussed her supplements with Dr. Blanco.  Valeria indicates that she finds her medical background useful in understanding her health condition.    Valeria processed her thoughts and feelings about her cancer and her cancer treatment.  She is experiencing symptoms of anxiety and depression related to coping with her cancer and other life " stressors.  She does not clinically meet diagnostic criteria for a mood disorder or an anxiety disorder.  She meets diagnostic criteria for adjustment disorder with mixed anxiety and depressed mood.  She denies suicidal ideation or thoughts of harming herself or others.    Valeria grew up in the [facility].  She is the second oldest in a family of 8 children.  She has 3 sisters and 4 brothers.  Her older brother is exactly 1 year and a day older than her.  She states that she has a very close relationship with her siblings.  Most of them live in the area.  Her sisters have been extremely helpful during this difficult time.  She has 2 of her sisters at her house cleaning during her last visit..    Valeria lives in her [place] home with her , Ramos; daughter Collette; and her 2 sons ages 4 and 8.  Valeria and Ramos have been  for 42 years and that on a blind date.  She states that Ramos has end-stage renal disease and is receiving dialysis 3 times a week.  He also had trouble aortic aneurysm repair and he has type 2 diabetes. He had a cardiac procedure at the [facility] on 10/15/2021, which went well. . He will be evaluated in 3 to 6 months for a kidney transplant. He has a friend who is willing to donate his kidney. Ramos retired at age 69 from being a .  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  When he had his annual DOT physical, he did not want to share his medical history with the DOT physician and decided to quit his job, on the spot that day.  This created some issues as he was the carrier of the insurance for the family.  Valeria indicates that Ramos is extremely angry and tends to take it out on the family members.  His father  when he was 11 years old.  He was a  and  on an experimental aircraft in [place].  He recently became so angry and agitated towards their daughter, that she gave him an ultimatum that he needs to start seeing a therapist.  Recently, he became extremely  angry and agitated  with their son and her sister, to the point that they needed to leave the house due to his verbal abuse.  Valeria has been a buffer for the family regarding his anger.  She has been strongly encouraging him to make an appointment with a therapist.  The staff at [facility] also have talked with him about seeing a therapist and had a referral recommendation for him.  He has been refusing to make an appointment with a therapist.  At one point, he agreed to meet for family therapy with his daughter.  He currently refuses to receive any psychological help and his behavior continues to be extremely angry and agitated.  Due to his behavior, their daughter has decided that she needs to start looking for another housing situation.  She works with a therapist who is helping her get on public housing.  This may take a number of months.  We talked about how his behavior affects Valeria emotionally.  We also talked about a plan for respite if she needs a break from the home situation.  She has come to the conclusion that he does not have insight about his behavior and how it affects others.  She is starting to do some recommended psychoeducational reading that may be helpful for her in this area.    Valeria and Ramos have 3 children.  The oldest is their daughter, Sharifa, age 38.  She lives near [place] and has 2 children.  She works as a teacher.  Their second oldest is their daughter, Collette, age 34, who currently lives with them along with her for an 8-year-old sons.  She is on Social Security disability due to her depression and past suicidal ideation.  She has received a great deal of therapy including DBT.  She has been extremely helpful to Valeria and wants to be her caretaker.  Their youngest is their son, Trell, age 30, l just moved out of the home 3 weeks ago to [facility], in order to be closer to his work in the area. He works 12-hour shifts starting at 4 AM in a warehouse that provides food for  gas stations and service stations.    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  She has worked in medical research.  She spent 18 years working at the [employer].  She also has worked at [employer] in nuclear medicine and for [place] cardiology and nuclear medicine.  She spent several years working for [employer] and [employer].  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    Valeria was raised Protestant and went to a Protestant grade school, [employer] in [place]. She also went to [employer]. She currently describes herself as being a spiritual person and believes that mother nature is the guiding spirit of the planet. She has some  and Haitian spirituality beliefs. We talked about ways for her to incorporate her spirituality into her healing process.    Valeria is interested in individual psychotherapy to help her work through the emotional aspects of her cancer and cancer treatments.  She is experiencing symptoms of anxiety and depression related to her cancer and other life stresses.  She would like to initially meet on a weekly basis.  I have left a message for our information coordinators to get her scheduled in for a series of appointments.    Psychoterapeutic Techniques:  Cognitive-behavioral therapy, motivational interviewing and supportive psychotherapy strategies were utilized.    Necessity:    The session was necessary for the care of the patient to address symptoms of depression and anxiety related to the patient's medical condition.    Progress/ Plan:    Valeria continue to  process her thoughts and feelings about her recent clinic visits including her visit with the bone marrow transplant team.  She is considering this is an option and knows that they would like to proceed as soon as possible.  She was discouraged to learn that she will still need to have chemotherapy  after the bone marrow transplant.  She is concerned about the timing of getting this done now, versus waiting until there is minimal residual disease.  She plans to meet with an integrative medicine physician on 2022.  She also plans to discuss this further at her oncology visit tomorrow and her primary care physician visit on Thursday, 2022.    We discussed some psychoeducational reading material that may be helpful for Valeria.  She started reading a book that I recommended to help her cope and deal with the issues she is having with her .  In addition, she is interested in some of the books that I recommended regarding spirituality and complementary medicine.    Valeria indicated that her mother-in-law recently .  She was 92 years old and had been living independently.  She went with her  to [place] from 12/3/2021 until  2021.  Her  was very resistant about going to the  and they needed to make arrangements for him to have dialysis while he was there.  It ended up to be very healing time for him and it was important for them to be there.    Valeria continue to discuss issues with her  and their relationship.  She is struggling with his issues of anger that is projected towards her.  We discussed this dynamic at length and talked about strategies for setting up boundaries for her own self protection.    Valeria is interested in individual psychotherapy every 1 to 2 weeks to help her cope with the emotional aspects of her cancer and cancer treatment.  I will provide her with some information about utilizing cognitive behavioral therapy strategies to help manage symptoms of pain and anxiety & depression.  She has made good progress in going through the written material that I have given her.  She has started doing guided imagery exercises.  She plans to do some painting to illustrate her image for her guided imagery.  In addition, she has been doing some reading about  cognitive behavioral therapy and has started to implement some of the strategies that we discussed.  She has read up on the side effects from her treatment and read that anxiety and irritability tend to be some of the side effects.  She is making a conscious effort to utilize some of the skills that we discussed to work on these issues.  I was able to provide her with 2 Omkar packs from the [employer] today for her to grand sons that live with them.  I also provided her friend, who was with her all day for the appointments with support group resources from [employer].  They have an online support group for families and friends supporting individuals with cancer.    Valeria had several difficulties over the past few months.  They had their pipes leak at their house and found out that they were corroded.  They are getting minimal help from their insurance company, but her brother is helping to fix this issue.      Valeria indicates that she has been working on a plan to get exercise. She has been walking on the treadmill 1 mile, 3 days a week.  Her sister picks her up and takes her to her house to use the treadmill.  This is been helpful as it creates some accountability for Valeria and also gives her the opportunity to spend some quality time with her sister.  She also is considering getting a treadmill for their home as her current treadmill is no longer working.  She also is working on having good nutrition.    Valeria had a girlfriend that  recently from metastatic breast cancer.  She processed her thoughts and feelings about this loss and we discussed strategies to help her release her grief.    We also talked about issues with her  and some strategies to help set boundaries and limits and also encouraged him to get help he needs.  She also talked about how her 8-year-old grandson was feeling anxious and worried as he is a teacher is pregnant and decided to go on leave early.  He started with a new teacher  and is having a lot of anxiety with the change.  He had some thoughts of hurting himself and his mom took him to the emergency room at [facility].  He is doing much better this week.  His mom, Collette has a history of having suicide attempts 3-4 times in 1 year, prior to her children being born.  Valeria is concerned that some of Collette's depressive tendencies are wearing off on her grandson.  We discussed strategies that might be helpful in this area.    Valeria is interested in meeting on a regular basis.  She would like to meet again for a virtual appointment in 1 to 2 weeks.  I will ask our information coordinators to help schedule this appointment.    Diagnosis:    1.  Adjustment disorder with mixed anxiety and depressed mood  2.  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  Other information was consistent from the note that was made on 1/5/2022.      This note was created with the help of Dragon dictation system.  Grammatical  and typing errors are not intentional.     Review of long-term goals: Treatment plan was reviewed and updated.       Provider: Mary Ellen Chan MA, LP, LICSW    Date:  10/5/2021  Time:  3:05 PM

## 2022-01-12 ENCOUNTER — INFUSION THERAPY VISIT (OUTPATIENT)
Dept: INFUSION THERAPY | Facility: HOSPITAL | Age: 72
End: 2022-01-12
Attending: INTERNAL MEDICINE
Payer: COMMERCIAL

## 2022-01-12 ENCOUNTER — ONCOLOGY VISIT (OUTPATIENT)
Dept: ONCOLOGY | Facility: HOSPITAL | Age: 72
End: 2022-01-12
Attending: INTERNAL MEDICINE
Payer: COMMERCIAL

## 2022-01-12 VITALS
DIASTOLIC BLOOD PRESSURE: 72 MMHG | HEART RATE: 60 BPM | RESPIRATION RATE: 18 BRPM | TEMPERATURE: 97.7 F | OXYGEN SATURATION: 96 % | BODY MASS INDEX: 24.26 KG/M2 | WEIGHT: 132.7 LBS | SYSTOLIC BLOOD PRESSURE: 159 MMHG

## 2022-01-12 DIAGNOSIS — C90.00 MULTIPLE MYELOMA WITH FAILED REMISSION (H): Primary | ICD-10-CM

## 2022-01-12 DIAGNOSIS — M84.58XK PATHOLOGICAL FRACTURE OF VERTEBRAE IN NEOPLASTIC DISEASE WITH NONUNION: ICD-10-CM

## 2022-01-12 LAB
ALBUMIN SERPL-MCNC: 3.6 G/DL (ref 3.5–5)
ALP SERPL-CCNC: 43 U/L (ref 45–120)
ALT SERPL W P-5'-P-CCNC: 34 U/L (ref 0–45)
ANION GAP SERPL CALCULATED.3IONS-SCNC: 12 MMOL/L (ref 5–18)
AST SERPL W P-5'-P-CCNC: 27 U/L (ref 0–40)
BASOPHILS # BLD AUTO: 0 10E3/UL (ref 0–0.2)
BASOPHILS NFR BLD AUTO: 1 %
BILIRUB SERPL-MCNC: 0.2 MG/DL (ref 0–1)
BUN SERPL-MCNC: 15 MG/DL (ref 8–22)
CALCIUM SERPL-MCNC: 9.4 MG/DL (ref 8.5–10.5)
CHLORIDE BLD-SCNC: 102 MMOL/L (ref 98–107)
CO2 SERPL-SCNC: 23 MMOL/L (ref 22–31)
CREAT SERPL-MCNC: 0.79 MG/DL (ref 0.6–1.1)
EOSINOPHIL # BLD AUTO: 0 10E3/UL (ref 0–0.7)
EOSINOPHIL NFR BLD AUTO: 0 %
ERYTHROCYTE [DISTWIDTH] IN BLOOD BY AUTOMATED COUNT: 15.7 % (ref 10–15)
GFR SERPL CREATININE-BSD FRML MDRD: 81 ML/MIN/1.73M2
GLUCOSE BLD-MCNC: 152 MG/DL (ref 70–125)
HCT VFR BLD AUTO: 43.7 % (ref 35–47)
HGB BLD-MCNC: 14.1 G/DL (ref 11.7–15.7)
IMM GRANULOCYTES # BLD: 0 10E3/UL
IMM GRANULOCYTES NFR BLD: 0 %
LYMPHOCYTES # BLD AUTO: 0.4 10E3/UL (ref 0.8–5.3)
LYMPHOCYTES NFR BLD AUTO: 18 %
MCH RBC QN AUTO: 32.6 PG (ref 26.5–33)
MCHC RBC AUTO-ENTMCNC: 32.3 G/DL (ref 31.5–36.5)
MCV RBC AUTO: 101 FL (ref 78–100)
MONOCYTES # BLD AUTO: 0.2 10E3/UL (ref 0–1.3)
MONOCYTES NFR BLD AUTO: 8 %
NEUTROPHILS # BLD AUTO: 1.7 10E3/UL (ref 1.6–8.3)
NEUTROPHILS NFR BLD AUTO: 73 %
NRBC # BLD AUTO: 0 10E3/UL
NRBC BLD AUTO-RTO: 0 /100
PLATELET # BLD AUTO: 188 10E3/UL (ref 150–450)
POTASSIUM BLD-SCNC: 3.6 MMOL/L (ref 3.5–5)
PROT SERPL-MCNC: 7 G/DL (ref 6–8)
RBC # BLD AUTO: 4.33 10E6/UL (ref 3.8–5.2)
SODIUM SERPL-SCNC: 137 MMOL/L (ref 136–145)
WBC # BLD AUTO: 2.4 10E3/UL (ref 4–11)

## 2022-01-12 PROCEDURE — G0463 HOSPITAL OUTPT CLINIC VISIT: HCPCS | Mod: 25

## 2022-01-12 PROCEDURE — 80053 COMPREHEN METABOLIC PANEL: CPT | Performed by: INTERNAL MEDICINE

## 2022-01-12 PROCEDURE — 36415 COLL VENOUS BLD VENIPUNCTURE: CPT | Performed by: INTERNAL MEDICINE

## 2022-01-12 PROCEDURE — 250N000011 HC RX IP 250 OP 636: Performed by: INTERNAL MEDICINE

## 2022-01-12 PROCEDURE — 96401 CHEMO ANTI-NEOPL SQ/IM: CPT

## 2022-01-12 PROCEDURE — 85025 COMPLETE CBC W/AUTO DIFF WBC: CPT | Performed by: INTERNAL MEDICINE

## 2022-01-12 PROCEDURE — 99214 OFFICE O/P EST MOD 30 MIN: CPT | Performed by: NURSE PRACTITIONER

## 2022-01-12 RX ORDER — DIPHENHYDRAMINE HYDROCHLORIDE 50 MG/ML
50 INJECTION INTRAMUSCULAR; INTRAVENOUS
Status: CANCELLED
Start: 2022-02-15

## 2022-01-12 RX ORDER — METHYLPREDNISOLONE SODIUM SUCCINATE 125 MG/2ML
125 INJECTION, POWDER, LYOPHILIZED, FOR SOLUTION INTRAMUSCULAR; INTRAVENOUS
Status: CANCELLED
Start: 2022-02-15

## 2022-01-12 RX ORDER — ALBUTEROL SULFATE 0.83 MG/ML
2.5 SOLUTION RESPIRATORY (INHALATION)
Status: CANCELLED | OUTPATIENT
Start: 2022-02-08

## 2022-01-12 RX ORDER — ALBUTEROL SULFATE 90 UG/1
1-2 AEROSOL, METERED RESPIRATORY (INHALATION)
Status: CANCELLED
Start: 2022-02-08

## 2022-01-12 RX ORDER — LORAZEPAM 2 MG/ML
0.5 INJECTION INTRAMUSCULAR EVERY 4 HOURS PRN
Status: CANCELLED | OUTPATIENT
Start: 2022-02-08

## 2022-01-12 RX ORDER — EPINEPHRINE 1 MG/ML
0.3 INJECTION, SOLUTION INTRAMUSCULAR; SUBCUTANEOUS EVERY 5 MIN PRN
Status: CANCELLED | OUTPATIENT
Start: 2022-02-01

## 2022-01-12 RX ORDER — MEPERIDINE HYDROCHLORIDE 25 MG/ML
25 INJECTION INTRAMUSCULAR; INTRAVENOUS; SUBCUTANEOUS EVERY 30 MIN PRN
Status: CANCELLED | OUTPATIENT
Start: 2022-02-01

## 2022-01-12 RX ORDER — HEPARIN SODIUM,PORCINE 10 UNIT/ML
5 VIAL (ML) INTRAVENOUS
Status: CANCELLED | OUTPATIENT
Start: 2022-02-02

## 2022-01-12 RX ORDER — LORAZEPAM 2 MG/ML
0.5 INJECTION INTRAMUSCULAR EVERY 4 HOURS PRN
Status: CANCELLED | OUTPATIENT
Start: 2022-02-01

## 2022-01-12 RX ORDER — METHYLPREDNISOLONE SODIUM SUCCINATE 125 MG/2ML
125 INJECTION, POWDER, LYOPHILIZED, FOR SOLUTION INTRAMUSCULAR; INTRAVENOUS
Status: CANCELLED
Start: 2022-02-01

## 2022-01-12 RX ORDER — METHYLPREDNISOLONE SODIUM SUCCINATE 125 MG/2ML
125 INJECTION, POWDER, LYOPHILIZED, FOR SOLUTION INTRAMUSCULAR; INTRAVENOUS
Status: CANCELLED
Start: 2022-02-08

## 2022-01-12 RX ORDER — MEPERIDINE HYDROCHLORIDE 25 MG/ML
25 INJECTION INTRAMUSCULAR; INTRAVENOUS; SUBCUTANEOUS EVERY 30 MIN PRN
Status: CANCELLED | OUTPATIENT
Start: 2022-02-15

## 2022-01-12 RX ORDER — NALOXONE HYDROCHLORIDE 0.4 MG/ML
0.2 INJECTION, SOLUTION INTRAMUSCULAR; INTRAVENOUS; SUBCUTANEOUS
Status: CANCELLED | OUTPATIENT
Start: 2022-02-15

## 2022-01-12 RX ORDER — DIPHENHYDRAMINE HYDROCHLORIDE 50 MG/ML
50 INJECTION INTRAMUSCULAR; INTRAVENOUS
Status: CANCELLED
Start: 2022-02-08

## 2022-01-12 RX ORDER — EPINEPHRINE 1 MG/ML
0.3 INJECTION, SOLUTION INTRAMUSCULAR; SUBCUTANEOUS EVERY 5 MIN PRN
Status: CANCELLED | OUTPATIENT
Start: 2022-02-15

## 2022-01-12 RX ORDER — MEPERIDINE HYDROCHLORIDE 25 MG/ML
25 INJECTION INTRAMUSCULAR; INTRAVENOUS; SUBCUTANEOUS EVERY 30 MIN PRN
Status: CANCELLED | OUTPATIENT
Start: 2022-02-08

## 2022-01-12 RX ORDER — ALBUTEROL SULFATE 90 UG/1
1-2 AEROSOL, METERED RESPIRATORY (INHALATION)
Status: CANCELLED
Start: 2022-02-15

## 2022-01-12 RX ORDER — NALOXONE HYDROCHLORIDE 0.4 MG/ML
0.2 INJECTION, SOLUTION INTRAMUSCULAR; INTRAVENOUS; SUBCUTANEOUS
Status: CANCELLED | OUTPATIENT
Start: 2022-02-01

## 2022-01-12 RX ORDER — HEPARIN SODIUM (PORCINE) LOCK FLUSH IV SOLN 100 UNIT/ML 100 UNIT/ML
5 SOLUTION INTRAVENOUS
Status: CANCELLED | OUTPATIENT
Start: 2022-02-02

## 2022-01-12 RX ORDER — ALBUTEROL SULFATE 0.83 MG/ML
2.5 SOLUTION RESPIRATORY (INHALATION)
Status: CANCELLED | OUTPATIENT
Start: 2022-02-01

## 2022-01-12 RX ORDER — NALOXONE HYDROCHLORIDE 0.4 MG/ML
0.2 INJECTION, SOLUTION INTRAMUSCULAR; INTRAVENOUS; SUBCUTANEOUS
Status: CANCELLED | OUTPATIENT
Start: 2022-02-08

## 2022-01-12 RX ORDER — DIPHENHYDRAMINE HYDROCHLORIDE 50 MG/ML
50 INJECTION INTRAMUSCULAR; INTRAVENOUS
Status: CANCELLED
Start: 2022-02-01

## 2022-01-12 RX ORDER — ALBUTEROL SULFATE 0.83 MG/ML
2.5 SOLUTION RESPIRATORY (INHALATION)
Status: CANCELLED | OUTPATIENT
Start: 2022-02-15

## 2022-01-12 RX ORDER — ALBUTEROL SULFATE 90 UG/1
1-2 AEROSOL, METERED RESPIRATORY (INHALATION)
Status: CANCELLED
Start: 2022-02-01

## 2022-01-12 RX ORDER — EPINEPHRINE 1 MG/ML
0.3 INJECTION, SOLUTION INTRAMUSCULAR; SUBCUTANEOUS EVERY 5 MIN PRN
Status: CANCELLED | OUTPATIENT
Start: 2022-02-08

## 2022-01-12 RX ORDER — LORAZEPAM 2 MG/ML
0.5 INJECTION INTRAMUSCULAR EVERY 4 HOURS PRN
Status: CANCELLED | OUTPATIENT
Start: 2022-02-15

## 2022-01-12 RX ADMIN — BORTEZOMIB 2.1 MG: 3.5 INJECTION, POWDER, LYOPHILIZED, FOR SOLUTION INTRAVENOUS; SUBCUTANEOUS at 14:42

## 2022-01-12 ASSESSMENT — PAIN SCALES - GENERAL: PAINLEVEL: MODERATE PAIN (4)

## 2022-01-12 NOTE — PROGRESS NOTES
"Oncology Rooming Note    January 12, 2022 1:15 PM   Lizzie Viera is a 71 year old female who presents for:    Chief Complaint   Patient presents with     Oncology Clinic Visit     Initial Vitals: BP (!) 159/72   Pulse 60   Temp 97.7  F (36.5  C)   Resp 18   Wt 60.2 kg (132 lb 11.2 oz)   SpO2 96%   BMI 24.26 kg/m   Estimated body mass index is 24.26 kg/m  as calculated from the following:    Height as of 10/19/21: 1.575 m (5' 2.01\").    Weight as of this encounter: 60.2 kg (132 lb 11.2 oz). Body surface area is 1.62 meters squared.  Moderate Pain (4) Comment: Data Unavailable   No LMP recorded. Patient is postmenopausal.  Allergies reviewed: Yes  Medications reviewed: Yes    Medications: Medication refills not needed today.  Pharmacy name entered into [facility]: [id number] - [place], [place] - [address]    Clinical concerns: None       Viviane Chu LPN            "

## 2022-01-12 NOTE — PROGRESS NOTES
Pt here for injection after seeing KE. Injection given L abdomen without incident; pt jorge ambulatory to lobby to meet her friend and is aware of treatment plan.

## 2022-01-12 NOTE — PROGRESS NOTES
[facility] Hematology and Oncology Progress Note    Patient: Lizzie Viera  MRN: [id number]  Date of Service: Jan 12, 2022          Reason for Visit    Chief Complaint   Patient presents with     Oncology Clinic Visit       Assessment and Plan    Cancer Staging  No matching staging information was found for the patient.    1. Myeloma: has started on treatment with RVd.  She is getting Velcade weekly, Revlimid 2 weeks on, 1 week off and Dex weekly.  Today is cycle 6.  Her light chains and IgG levels have now normalized.  Her hemoglobin has normalized.  Her monoclonal peak has gone from 3.3 down to 0.4.   She is tolerating treatment fairly well.  She did meet with the transplant team.  She has thought about it and she is a little reluctant to do that at this time.  She prefers her monoclonal peak to be at 0 before she does it so we will continue her current regimen.  We will refer her back to transplant team when she feels ready.  We will see Dr. Blanco in 3 weeks.    2. Bone lesions: has started on zometa. Will continue monthly for now.     3.  Scalp lesion: Patient states that this has continued to improve.  We have held off on radiation for now.  Continue to monitor.    ECOG Performance    0 - Independent    Distress Screening (within last 30 days)    1. How concerned are you about your ability to eat? : 0  2. How concerned are you about unintended weight loss or your current weight? : 0  3. How concerned are you about feeling depressed or very sad? : 0  4. How concerned are you about feeling anxious or very scared? : 0  5. Do you struggle with the loss of meaning and rambo in your life? : Not at all  6. How concerned are you about work and home life issues that may be affected by your cancer? : 2  7. How concerned are you about knowing what resources are available to help you? : 0  8. Do you currently have what you would describe as Caodaism or spiritual struggles?            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 Lizzie Viera is a very pleasant 70 year old woman who has been diagnosed with multiple myeloma IgG kappa type in May 2021.  She had initially presented with compression fracture of T7 vertebral body in September 2020.  She had a back pain which led to that evaluation.  Bone density confirmed that she had osteoporosis.  MRI showed diffuse marrow edema in October 2020.  In April 2021 the MRI of the thoracic spine showed worsening T7 vertebral body height loss because of likely pathological compression fracture with extraosseous extension.  She then had IR guided bone biopsy on 7 May 2021 and pathology confirmed the presence of kappa light chain restricted plasma cells.  Her cytogenetics confirmed the presence of hyperdiploid E with gains of chromosome 5, 9 and 15.     She was then seen by Dr. Baig and had a bone marrow biopsy which also confirmed 60% involvement of the marrow with plasma cells.  The bone marrow  was done on 3 Jocelin 2021.  The bone marrow also confirmed the presence of hyperdiploid E.  She had a gain of chromosome 3, 5, 7, 9, 11, 15 as well as 19.  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  Her beta-2 microglobulin was actually normal at the time of her diagnosis as was her albumin at 4.0.  Monoclonal protein 3.1 g/dL.  Kappa free light chain levels of 13 mg/dL IgG level of 4280 with depressed levels of IgM and IgA.  Urine was also positive for kappa light chains as well as immunofixation of the urine was positive for IgG kappa.  Normal kidney function.  Normal hemoglobin.     As mentioned above she had bone lesions in the T7 vertebral body, T1, skull area.  Her main pain is coming from her T7 vertebral body compression fracture.     Due to the pain she has been seen by radiation oncology and has received radiation therapy.  She also got a dose of steroids for pain control.     Currently her pain is controlled with combination of Dilaudid as well as some Tylenol.  She is wearing a brace.  She did notice that when she was on dexamethasone her pain was significantly better.     She was initially thinking of doing some natural therapies but once her symptoms got worse, she came back in was counseled about treatment.  She has been given information about Velcade Revlimid and dexamethasone.  has started that treatment.  She states that she is tolerating this quite well.  Her myeloma has significantly improved.  She did meet with the transplant doctor last week and she is a little reluctant to do that.  She states she would prefer to be in complete remission before she does something like that.  She is also worried about being in the hospital with COVID being so prevalent at this time.  For right now she like to continue the current regimen.  She is tolerating it well.  Mild fatigue.  No new bone or back pain.  She does continues her back brace.    Review of Systems    Pertinent items are noted in HPI.    Past History    Past  Medical History:   Diagnosis Date     Cervical dysplasia      Chronic RUQ pain      Osteoporosis        PHYSICAL EXAM  BP (!) 159/72   Pulse 60   Temp 97.7  F (36.5  C)   Resp 18   Wt 60.2 kg (132 lb 11.2 oz)   SpO2 96%   BMI 24.26 kg/m      GENERAL: no acute distress. Cooperative in conversation. Here with family. Mask on  RESP: Regular respiratory rate. No expiratory wheezes   MUSCULOSKELETAL: no bilateral leg swelling  NEURO: non focal. Alert and oriented x3.   PSYCH: within normal limits. No depression or anxiety.  SKIN: exposed skin is dry intact.     Lab Results    Recent Results (from the past 168 hour(s))   Comprehensive metabolic panel   Result Value Ref Range    Sodium 137 136 - 145 mmol/L    Potassium 3.6 3.5 - 5.0 mmol/L    Chloride 102 98 - 107 mmol/L    Carbon Dioxide (CO2) 23 22 - 31 mmol/L    Anion Gap 12 5 - 18 mmol/L    Urea Nitrogen 15 8 - 22 mg/dL    Creatinine 0.79 0.60 - 1.10 mg/dL    Calcium 9.4 8.5 - 10.5 mg/dL    Glucose 152 (H) 70 - 125 mg/dL    Alkaline Phosphatase 43 (L) 45 - 120 U/L    AST 27 0 - 40 U/L    ALT 34 0 - 45 U/L    Protein Total 7.0 6.0 - 8.0 g/dL    Albumin 3.6 3.5 - 5.0 g/dL    Bilirubin Total 0.2 0.0 - 1.0 mg/dL    GFR Estimate 81 >60 mL/min/1.73m2   CBC with platelets and differential   Result Value Ref Range    WBC Count 2.4 (L) 4.0 - 11.0 10e3/uL    RBC Count 4.33 3.80 - 5.20 10e6/uL    Hemoglobin 14.1 11.7 - 15.7 g/dL    Hematocrit 43.7 35.0 - 47.0 %     (H) 78 - 100 fL    MCH 32.6 26.5 - 33.0 pg    MCHC 32.3 31.5 - 36.5 g/dL    RDW 15.7 (H) 10.0 - 15.0 %    Platelet Count 188 150 - 450 10e3/uL    % Neutrophils 73 %    % Lymphocytes 18 %    % Monocytes 8 %    % Eosinophils 0 %    % Basophils 1 %    % Immature Granulocytes 0 %    NRBCs per 100 WBC 0 <1 /100    Absolute Neutrophils 1.7 1.6 - 8.3 10e3/uL    Absolute Lymphocytes 0.4 (L) 0.8 - 5.3 10e3/uL    Absolute Monocytes 0.2 0.0 - 1.3 10e3/uL    Absolute Eosinophils 0.0 0.0 - 0.7 10e3/uL    Absolute  Basophils 0.0 0.0 - 0.2 10e3/uL    Absolute Immature Granulocytes 0.0 <=0.4 10e3/uL    Absolute NRBCs 0.0 10e3/uL   IgG  Lab Results   Component Value Date     12/29/2021     12/15/2021    IGG 1,032 11/24/2021    IGG 1,609 11/03/2021    IGG 2,736 (H) 10/13/2021    IGG 4,439 (H) 09/20/2021   kappa light chains:  Lab Results   Component Value Date    KFLCA 1.06 12/29/2021    KFLCA 1.26 12/15/2021    KFLCA 1.55 11/24/2021    KFLCA 2.03 (H) 11/03/2021    KFLCA 4.30 (H) 10/13/2021    KFLCA 12.57 (H) 09/20/2021    KFLCA 13.37 (H) 05/27/2021   Monoclonal peak  Lab Results   Component Value Date    ELPM 0.4 12/29/2021    ELPM 0.4 12/15/2021    ELPM 0.6 11/24/2021    ELPM 1.1 11/03/2021    ELPM 1.7 10/13/2021    ELPM 3.3 09/20/2021    ELPM 3.1 05/27/2021   ]  Imaging    CT Head w/o Contrast    Result Date: 12/30/2021  EXAM: CT HEAD W/O CONTRAST LOCATION: [facility] DATE/TIME: 12/29/2021 3:14 PM INDICATION:  Multiple myeloma with failed remission (H) COMPARISON: CT head 09/26/2021 TECHNIQUE: Routine CT Head without IV contrast. Multiplanar reformats. Dose reduction techniques were used. FINDINGS: INTRACRANIAL CONTENTS: No intracranial hemorrhage, extraaxial collection, or mass effect.  No CT evidence of acute infarct. Mild presumed chronic small vessel ischemic changes. Mild generalized volume loss. No hydrocephalus. VISUALIZED ORBITS/SINUSES/MASTOIDS: Prior bilateral cataract surgery. Visualized portions of the orbits are otherwise unremarkable. No paranasal sinus mucosal disease. No middle ear or mastoid effusion. BONES/SOFT TISSUES: Dominant lucent lesion within the left parietal bone superiorly measuring 4.5-5 cm in maximum diameter is similar to the prior exam. Some evidence for new bone formation at the periphery of this lesion compared to the prior exam suggesting healing changes. There is persistent involvement of both inner and outer tables. Some heterogeneous marrow attenuation  elsewhere without new or progressive osseous destruction.     IMPRESSION: 1.  Dominant lucent lesion within the left parietal calvarium is similar in overall size compared to 09/26/2021, but demonstrates some evidence of new bone formation at the periphery, potentially some interval healing change. 2.  No new or progressive osseous lesions identified. 3.  No evidence for acute intracranial process. Mild senescent changes as seen previously.        Signed by: SINDHU Lundberg CNP

## 2022-01-12 NOTE — LETTER
"    1/12/2022         RE: Lizzie Viera  [address]  [place] [address]        Dear Colleague,    Thank you for referring your patient, Lizzie Viera, to the [facility] [place]. Please see a copy of my visit note below.    Oncology Rooming Note    January 12, 2022 1:15 PM   Lizzie Viera is a 71 year old female who presents for:    Chief Complaint   Patient presents with     Oncology Clinic Visit     Initial Vitals: BP (!) 159/72   Pulse 60   Temp 97.7  F (36.5  C)   Resp 18   Wt 60.2 kg (132 lb 11.2 oz)   SpO2 96%   BMI 24.26 kg/m   Estimated body mass index is 24.26 kg/m  as calculated from the following:    Height as of 10/19/21: 1.575 m (5' 2.01\").    Weight as of this encounter: 60.2 kg (132 lb 11.2 oz). Body surface area is 1.62 meters squared.  Moderate Pain (4) Comment: Data Unavailable   No LMP recorded. Patient is postmenopausal.  Allergies reviewed: Yes  Medications reviewed: Yes    Medications: Medication refills not needed today.  Pharmacy name entered into [employer]: [id number] [address].    Clinical concerns: None       Viviane Chu LPN              [facility] Hematology and Oncology Progress Note    Patient: Lizzie Viera  MRN: [id number]  Date of Service: Jan 12, 2022          Reason for Visit    Chief Complaint   Patient presents with     Oncology Clinic Visit       Assessment and Plan    Cancer Staging  No matching staging information was found for the patient.    1. Myeloma: has started on treatment with RVd.  She is getting Velcade weekly, Revlimid 2 weeks on, 1 week off and Dex weekly.  Today is cycle 6.  Her light chains and IgG levels have now normalized.  Her hemoglobin has normalized.  Her monoclonal peak has gone from 3.3 down to 0.4.   She is tolerating treatment fairly well.  She did meet with the transplant team.  She has thought about it and she is a little " reluctant to do that at this time.  She prefers her monoclonal peak to be at 0 before she does it so we will continue her current regimen.  We will refer her back to transplant team when she feels ready.  We will see Dr. Blanco in 3 weeks.    2. Bone lesions: has started on zometa. Will continue monthly for now.     3.  Scalp lesion: Patient states that this has continued to improve.  We have held off on radiation for now.  Continue to monitor.    ECOG Performance    0 - Independent    Distress Screening (within last 30 days)    1. How concerned are you about your ability to eat? : 0  2. How concerned are you about unintended weight loss or your current weight? : 0  3. How concerned are you about feeling depressed or very sad? : 0  4. How concerned are you about feeling anxious or very scared? : 0  5. Do you struggle with the loss of meaning and rambo in your life? : Not at all  6. How concerned are you about work and home life issues that may be affected by your cancer? : 2  7. How concerned are you about knowing what resources are available to help you? : 0  8. Do you currently have what you would describe as Orthodox or spiritual struggles?            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 Lizzie Viera is a very pleasant 70 year old woman who has been diagnosed with multiple myeloma IgG kappa type in May 2021.  She had initially presented with compression fracture of T7 vertebral body in September 2020.  She had a back pain which led to that evaluation.  Bone density confirmed that she had osteoporosis.  MRI showed diffuse marrow edema in October 2020.  In April 2021 the MRI of the thoracic spine showed worsening T7 vertebral body height loss because of likely pathological compression fracture with extraosseous extension.  She then had IR guided bone biopsy on 7 May 2021 and pathology confirmed the presence of kappa light chain restricted plasma cells.  Her cytogenetics confirmed the presence of hyperdiploid E with gains of chromosome 5, 9 and 15.     She was then seen by Dr. Baig and had a bone marrow biopsy which also confirmed 60% involvement of the marrow with plasma cells.  The bone marrow was done on 3 Jocelin 2021.  The bone marrow also confirmed the presence of hyperdiploid E.  She had a gain of chromosome 3, 5, 7, 9, 11, 15 as well as 19.  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  Her beta-2 microglobulin was actually normal at the time of her diagnosis as was her albumin at 4.0.  Monoclonal protein 3.1 g/dL.  Kappa free light chain levels of 13 mg/dL IgG level of 4280 with depressed levels of IgM and IgA.  Urine was also positive for kappa light chains as well as immunofixation of the urine was positive for IgG kappa.  Normal kidney function.  Normal hemoglobin.     As mentioned above she had bone lesions in the T7 vertebral body, T1, skull area.  Her main pain is coming from her T7 vertebral body compression fracture.     Due to the pain  she has been seen by radiation oncology and has received radiation therapy.  She also got a dose of steroids for pain control.     Currently her pain is controlled with combination of Dilaudid as well as some Tylenol.  She is wearing a brace.  She did notice that when she was on dexamethasone her pain was significantly better.     She was initially thinking of doing some natural therapies but once her symptoms got worse, she came back in was counseled about treatment.  She has been given information about Velcade Revlimid and dexamethasone.  has started that treatment.  She states that she is tolerating this quite well.  Her myeloma has significantly improved.  She did meet with the transplant doctor last week and she is a little reluctant to do that.  She states she would prefer to be in complete remission before she does something like that.  She is also worried about being in the hospital with COVID being so prevalent at this time.  For right now she like to continue the current regimen.  She is tolerating it well.  Mild fatigue.  No new bone or back pain.  She does continues her back brace.    Review of Systems    Pertinent items are noted in HPI.    Past History    Past Medical History:   Diagnosis Date     Cervical dysplasia      Chronic RUQ pain      Osteoporosis        PHYSICAL EXAM  BP (!) 159/72   Pulse 60   Temp 97.7  F (36.5  C)   Resp 18   Wt 60.2 kg (132 lb 11.2 oz)   SpO2 96%   BMI 24.26 kg/m      GENERAL: no acute distress. Cooperative in conversation. Here with family. Mask on  RESP: Regular respiratory rate. No expiratory wheezes   MUSCULOSKELETAL: no bilateral leg swelling  NEURO: non focal. Alert and oriented x3.   PSYCH: within normal limits. No depression or anxiety.  SKIN: exposed skin is dry intact.     Lab Results    Recent Results (from the past 168 hour(s))   Comprehensive metabolic panel   Result Value Ref Range    Sodium 137 136 - 145 mmol/L    Potassium 3.6 3.5 - 5.0 mmol/L     Chloride 102 98 - 107 mmol/L    Carbon Dioxide (CO2) 23 22 - 31 mmol/L    Anion Gap 12 5 - 18 mmol/L    Urea Nitrogen 15 8 - 22 mg/dL    Creatinine 0.79 0.60 - 1.10 mg/dL    Calcium 9.4 8.5 - 10.5 mg/dL    Glucose 152 (H) 70 - 125 mg/dL    Alkaline Phosphatase 43 (L) 45 - 120 U/L    AST 27 0 - 40 U/L    ALT 34 0 - 45 U/L    Protein Total 7.0 6.0 - 8.0 g/dL    Albumin 3.6 3.5 - 5.0 g/dL    Bilirubin Total 0.2 0.0 - 1.0 mg/dL    GFR Estimate 81 >60 mL/min/1.73m2   CBC with platelets and differential   Result Value Ref Range    WBC Count 2.4 (L) 4.0 - 11.0 10e3/uL    RBC Count 4.33 3.80 - 5.20 10e6/uL    Hemoglobin 14.1 11.7 - 15.7 g/dL    Hematocrit 43.7 35.0 - 47.0 %     (H) 78 - 100 fL    MCH 32.6 26.5 - 33.0 pg    MCHC 32.3 31.5 - 36.5 g/dL    RDW 15.7 (H) 10.0 - 15.0 %    Platelet Count 188 150 - 450 10e3/uL    % Neutrophils 73 %    % Lymphocytes 18 %    % Monocytes 8 %    % Eosinophils 0 %    % Basophils 1 %    % Immature Granulocytes 0 %    NRBCs per 100 WBC 0 <1 /100    Absolute Neutrophils 1.7 1.6 - 8.3 10e3/uL    Absolute Lymphocytes 0.4 (L) 0.8 - 5.3 10e3/uL    Absolute Monocytes 0.2 0.0 - 1.3 10e3/uL    Absolute Eosinophils 0.0 0.0 - 0.7 10e3/uL    Absolute Basophils 0.0 0.0 - 0.2 10e3/uL    Absolute Immature Granulocytes 0.0 <=0.4 10e3/uL    Absolute NRBCs 0.0 10e3/uL   IgG  Lab Results   Component Value Date     12/29/2021     12/15/2021    IGG 1,032 11/24/2021    IGG 1,609 11/03/2021    IGG 2,736 (H) 10/13/2021    IGG 4,439 (H) 09/20/2021   kappa light chains:  Lab Results   Component Value Date    KFLCA 1.06 12/29/2021    KFLCA 1.26 12/15/2021    KFLCA 1.55 11/24/2021    KFLCA 2.03 (H) 11/03/2021    KFLCA 4.30 (H) 10/13/2021    KFLCA 12.57 (H) 09/20/2021    KFLCA 13.37 (H) 05/27/2021   Monoclonal peak  Lab Results   Component Value Date    ELPM 0.4 12/29/2021    ELPM 0.4 12/15/2021    ELPM 0.6 11/24/2021    ELPM 1.1 11/03/2021    ELPM 1.7 10/13/2021    ELPM 3.3 09/20/2021    ELPM  3.1 05/27/2021   ]  Imaging    CT Head w/o Contrast    Result Date: 12/30/2021  EXAM: CT HEAD W/O CONTRAST LOCATION: [facility] DATE/TIME: 12/29/2021 3:14 PM INDICATION:  Multiple myeloma with failed remission (H) COMPARISON: CT head 09/26/2021 TECHNIQUE: Routine CT Head without IV contrast. Multiplanar reformats. Dose reduction techniques were used. FINDINGS: INTRACRANIAL CONTENTS: No intracranial hemorrhage, extraaxial collection, or mass effect.  No CT evidence of acute infarct. Mild presumed chronic small vessel ischemic changes. Mild generalized volume loss. No hydrocephalus. VISUALIZED ORBITS/SINUSES/MASTOIDS: Prior bilateral cataract surgery. Visualized portions of the orbits are otherwise unremarkable. No paranasal sinus mucosal disease. No middle ear or mastoid effusion. BONES/SOFT TISSUES: Dominant lucent lesion within the left parietal bone superiorly measuring 4.5-5 cm in maximum diameter is similar to the prior exam. Some evidence for new bone formation at the periphery of this lesion compared to the prior exam suggesting healing changes. There is persistent involvement of both inner and outer tables. Some heterogeneous marrow attenuation elsewhere without new or progressive osseous destruction.     IMPRESSION: 1.  Dominant lucent lesion within the left parietal calvarium is similar in overall size compared to 09/26/2021, but demonstrates some evidence of new bone formation at the periphery, potentially some interval healing change. 2.  No new or progressive osseous lesions identified. 3.  No evidence for acute intracranial process. Mild senescent changes as seen previously.        Signed by: SINDHU Lundberg CNP      Again, thank you for allowing me to participate in the care of your patient.        Sincerely,        SINDHU Lundberg CNP

## 2022-01-13 ENCOUNTER — OFFICE VISIT (OUTPATIENT)
Dept: PHYSICAL MEDICINE AND REHAB | Facility: CLINIC | Age: 72
End: 2022-01-13
Attending: NURSE PRACTITIONER
Payer: COMMERCIAL

## 2022-01-13 VITALS
BODY MASS INDEX: 23.77 KG/M2 | WEIGHT: 130 LBS | HEART RATE: 62 BPM | DIASTOLIC BLOOD PRESSURE: 63 MMHG | SYSTOLIC BLOOD PRESSURE: 140 MMHG

## 2022-01-13 DIAGNOSIS — M25.562 CHRONIC PAIN OF LEFT KNEE: ICD-10-CM

## 2022-01-13 DIAGNOSIS — C90.00 MULTIPLE MYELOMA, REMISSION STATUS UNSPECIFIED (H): ICD-10-CM

## 2022-01-13 DIAGNOSIS — G89.29 CHRONIC MIDLINE THORACIC BACK PAIN: Primary | ICD-10-CM

## 2022-01-13 DIAGNOSIS — G89.29 CHRONIC PAIN OF LEFT KNEE: ICD-10-CM

## 2022-01-13 DIAGNOSIS — R29.898 LEG WEAKNESS, BILATERAL: ICD-10-CM

## 2022-01-13 DIAGNOSIS — M54.50 LUMBAR SPINE PAIN: ICD-10-CM

## 2022-01-13 DIAGNOSIS — M54.6 CHRONIC MIDLINE THORACIC BACK PAIN: Primary | ICD-10-CM

## 2022-01-13 DIAGNOSIS — M48.04 THORACIC SPINAL STENOSIS: ICD-10-CM

## 2022-01-13 PROCEDURE — 99204 OFFICE O/P NEW MOD 45 MIN: CPT | Performed by: PHYSICAL MEDICINE & REHABILITATION

## 2022-01-13 ASSESSMENT — PAIN SCALES - GENERAL: PAINLEVEL: MILD PAIN (3)

## 2022-01-13 NOTE — PROGRESS NOTES
Assessment/Plan:      Lizzie was seen today for back pain.    Diagnoses and all orders for this visit:    Chronic midline thoracic back pain  -     Spine Referral  -     Physical Therapy Referral; Future    Thoracic spinal stenosis  -     Physical Therapy Referral; Future    Lumbar spine pain  -     Physical Therapy Referral; Future  -     MR Lumbar Spine w/o Contrast; Future    Leg weakness, bilateral  -     Physical Therapy Referral; Future  -     MR Lumbar Spine w/o Contrast; Future    Chronic pain of left knee  -     Physical Therapy Referral; Future    Multiple myeloma, remission status unspecified (H)  -     Physical Therapy Referral; Future         Assessment: Pleasant 71 year old female with a history of hyperlipidemia, anxiety, depression, irritable bowel, neuropathy, osteoporosis and multiple myeloma with a T7 fracture and lesion  resulting in spinal stenosis:    1.  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  No signs of thoracic myelopathy today/neurologically stable.    2.  Chronic lumbar spine pain intermittently.  She also has accompanying bilateral extensor houses longus weakness.  The weakness in her legs may be related to a neuropathy or may be related to L5 radiculopathy.    3.  Chronic left knee pain with Baker's cyst.  Suspect osteoarthritis.    4.  Bilateral foot paresthesias consistent with neuropathy.      Discussion:    1.  We discussed the diagnosis and treatment options.  She has significant balance issues and pain related to the T7 fracture and some chronic low back pain.  We discussed the options of therapy.  She wants to avoid medications if possible once some help with pain.  She has a TLSO that she wears for comfort as well.    2.  Recommend physical therapy for lumbar strengthening stabilization along with balance and general core strengthening.  She would like this done at  [facility] and an order is placed.    3.  We will obtain MRI of the lumbar spine to evaluate for any spinal stenosis or foraminal stenosis affecting the L5 nerves which would  affect the strength in the great toes.    4.  Continue to wear TLSO as needed for comfort.    5.  I recommend considering a trial of duloxetine 20 mg daily.  She is not interested in any medications today.    6.  Follow-up with me in 1 month.    It was our pleasure caring for your patient today, if there any questions or concerns please do not hesitate to contact us.      Subjective:   Patient ID: Lizzie Viera is a 71 year old female.    History of Present Illness:Patient presents at the request of Tara Martines and Dr. Connor Campbell for an evaluation of back pain in the mid thoracic spine along with bilateral leg paresthesias.  Patient's story begins in 2020.  She began having severe pain presented to the emergency department and subsequently found to have a T7 fracture placed in a TLSO and eventually after having further issues in February 2021 was found to have multiple myeloma and has been treated and oncology.  Had radiation as well as chemotherapy.    Over time she has had persistent pain in the thoracic region in the mid thoracic spine at the bra line better with wearing a bra and TLSO.  She has some radiation around the ribs at times.  Pain is worse with activity as well as activity without wearing the brace.  The brace is quite helpful along with THC.  She rates her pain a 10/10 at worst 3/10 today 0/10 at best.    She was found to have spinal stenosis in the thoracic spine at the site of the fracture related to tumor and had radiation and chemo.  Since the chemotherapy she also has had numbness and tingling in the feet to the ankles a little bit worse on the right up to the mid tibia.  She has not had any recent physical therapy or injections. She also has had some low back pain as well.    Imaging: Plain films of the  thoracic spine along with prior MRI of the thoracic spine August 31, 2021 was reviewed.  Pathologic compression fracture T7 with vertebral neoplasm epidural space compressing the spinal cord T6-T8.  No other spinal stenosis.  There is a lesion at T1 as well.    Thoracic x-rays from November 17 were personally reviewed as well showing a chronic compression deformity at T7 superior endplate deformity T8 without change from prior imaging.       Review of Systems: Complains of weight loss, headache, hoarseness, ringing in the ears, difficulty swallowing, change in vision, feet swelling, abdominal pain, diarrhea, constipation, nausea, vomiting, reflux, joint pain, muscle pain, muscle fatigue, sciatica, skin itching, poor balance, dizziness, easy bruising, insomnia excessive tiredness and anxiety.  Denies fever, eye pain, chest pain, shortness of breath, bowel or bladder incontinence.Remainder of 12 point review systems negative unless listed above.    Past Medical History:   Diagnosis Date     Cervical dysplasia      Chronic RUQ pain      Depressive disorder      Multiple myeloma (H)      Osteoporosis        Family History   Problem Relation Age of Onset     Diabetes Mother      Arthritis Mother      LUNG DISEASE Father      Diabetes Maternal Uncle      Breast Cancer Paternal Aunt      Heart Failure Maternal Grandmother      Heart Disease Maternal Grandmother      Heart Failure Maternal Grandfather      Heart Disease Maternal Grandfather      Heart Failure Paternal Grandmother      Heart Disease Paternal Grandmother          Social History     Socioeconomic History     Marital status:      Spouse name: None     Number of children: 3     Years of education: None     Highest education level: None   Occupational History     None   Tobacco Use     Smoking status: Former Smoker     Packs/day: 0.50     Years: 45.00     Pack years: 22.50     Types: Cigarettes     Smokeless tobacco: Never Used   Substance and Sexual  Activity     Alcohol use: Yes     Comment: Alcoholic Drinks/day: 0-1 drinks per week     Drug use: Not Currently     Sexual activity: Not Currently     Partners: Male     Birth control/protection: Surgical   Other Topics Concern     Parent/sibling w/ CABG, MI or angioplasty before 65F 55M? Not Asked   Social History Narrative     None     Social Determinants of Health     Financial Resource Strain: Not on file   Food Insecurity: Not on file   Transportation Needs: Not on file   Physical Activity: Not on file   Stress: Not on file   Social Connections: Not on file   Intimate Partner Violence: Not on file   Housing Stability: Not on file     Social history: Retired.  No tobacco.  Does drink alcohol.    The following portions of the patient's history were reviewed and updated as appropriate: allergies, current medications, past family history, past medical history, past social history, past surgical history and problem list.    Oswestry (FREDDIE) Questionnaire    OSWESTRY DISABILITY INDEX 1/13/2022   Count 10   Sum 19   Oswestry Score (%) 38   Some recent data might be hidden       Neck Disability Index:  No flowsheet data found.       WHO 5: 10              Objective:   Physical Exam:    BP (!) 140/63 (BP Location: Right arm, Patient Position: Sitting, Cuff Size: Adult Regular)   Pulse 62   Wt 130 lb (59 kg)   BMI 23.77 kg/m    Body mass index is 23.77 kg/m .      General:  Well-appearing female in no acute distress.  Pleasant, cooperative, and interactive throughout the examination and interview.  CV: No lower extremity edema on inspection or paltation.  Lymphatics: No cervical lymphadenopathy palpated. Eyes: sclera clear. Skin: No rashes or lesions seen over the head/neck, hairline, arms, legs, trunk.  Respirations unlabored.  MSK: Gait is nonantalgic.  Able to heel-toe walk without difficulty.  Negative Romberg.  Spine: normal AP curves of the C, T, and L spine.  General reduced range of motion of the thoracic and  lumbar spine flexion extension .  Palpation: Tenderness to palpation over T6-8 spinous process.  Extremities: Full range of motion of the elbows, and wrists with no effusions or tenderness to palpation.   Full range of motion of the hips, knees, and ankles with no effusions.  There is tenderness behind the left knee with Baker's cyst..    No hypermobility of the upper or lower extremities.  Neurologic exam: Mental status: Patient is alert and oriented with normal affect.  Attention, knowledge, memory, and language are intact.  Normal coordination throughout the examination.  Reflexes are 1+ and symmetric biceps, triceps, brachioradialis, 1+patellar, and 0 Achilles with down-going toes and Negative Mellisa's.  Sensation is  slightly decreased/tingly to light touch in the lower extremities to the ankles.  intact to light touch throughout the remainder of upper and lower extremities bilaterally.  Manual muscle testing reveals 5 out of 5 in the hip flexors, knee flexors/extensors, ankle plantar flexors, ankle  dorsiflexors, and 5 -/5 bilateral EHL.  Upper extremities: Grossly normal strength . Normal muscle bulk and tone in the arms and legs.    Negative seated  straight leg raise bilaterally.

## 2022-01-13 NOTE — PATIENT INSTRUCTIONS
1. A physical therapy order was provided for you today.   It will be very important for you to do your physical therapy exercises on a regular basis to decrease your pain and prevent future pain flares.    2. An MRI was ordered for you today.  You will be contacted by scheduling within 3 days.    If you are not contacted, please call Radiology at [phone].       3. Consider Duloxetine to help with your nerve pain

## 2022-01-13 NOTE — LETTER
1/13/2022         RE: Lizzie Viera  [address]  [place] [address]        Dear Colleague,    Thank you for referring your patient, Lizzie Viera, to the [facility] [place]. Please see a copy of my visit note below.    Assessment/Plan:      Lizzie was seen today for back pain.    Diagnoses and all orders for this visit:    Chronic midline thoracic back pain  -     Spine Referral  -     Physical Therapy Referral; Future    Thoracic spinal stenosis  -     Physical Therapy Referral; Future    Lumbar spine pain  -     Physical Therapy Referral; Future  -     MR Lumbar Spine w/o Contrast; Future    Leg weakness, bilateral  -     Physical Therapy Referral; Future  -     MR Lumbar Spine w/o Contrast; Future    Chronic pain of left knee  -     Physical Therapy Referral; Future    Multiple myeloma, remission status unspecified (H)  -     Physical Therapy Referral; Future         Assessment: Hossein 71 year old female with a history of hyperlipidemia, anxiety, depression, irritable bowel, neuropathy, osteoporosis and multiple myeloma with a T7 fracture and lesion  resulting in spinal stenosis:    1.  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  No signs of thoracic myelopathy today/neurologically stable.    2.  Chronic lumbar spine pain intermittently.  She also has accompanying bilateral extensor houses longus weakness.  The weakness in her legs may be related to a neuropathy or may be related to L5 radiculopathy.    3.  Chronic left knee pain with Baker's cyst.  Suspect osteoarthritis.    4.  Bilateral foot paresthesias consistent with neuropathy.      Discussion:    1.  We discussed the diagnosis and treatment options.  She has significant balance issues and pain related to the T7 fracture and some chronic low back pain.  We discussed the options of therapy.  She wants  to avoid medications if possible once some help with pain.  She has a TLSO that she wears for comfort as well.    2.  Recommend physical therapy for lumbar strengthening stabilization along with balance and general core strengthening.  She would like this done at [facility] and an order is placed.    3.  We will obtain MRI of the lumbar spine to evaluate for any spinal stenosis or foraminal stenosis affecting the L5 nerves which would  affect the strength in the great toes.    4.  Continue to wear TLSO as needed for comfort.    5.  I recommend considering a trial of duloxetine 20 mg daily.  She is not interested in any medications today.    6.  Follow-up with me in 1 month.    It was our pleasure caring for your patient today, if there any questions or concerns please do not hesitate to contact us.      Subjective:   Patient ID: Lizzie Viera is a 71 year old female.    History of Present Illness:Patient presents at the request of Tara Martines and Dr. Connor Campbell for an evaluation of back pain in the mid thoracic spine along with bilateral leg paresthesias.  Patient's story begins in 2020.  She began having severe pain presented to the emergency department and subsequently found to have a T7 fracture placed in a TLSO and eventually after having further issues in February 2021 was found to have multiple myeloma and has been treated and oncology.  Had radiation as well as chemotherapy.    Over time she has had persistent pain in the thoracic region in the mid thoracic spine at the bra line better with wearing a bra and TLSO.  She has some radiation around the ribs at times.  Pain is worse with activity as well as activity without wearing the brace.  The brace is quite helpful along with THC.  She rates her pain a 10/10 at worst 3/10 today 0/10 at best.    She was found to have spinal stenosis in the thoracic spine at the site of the fracture related to tumor and had radiation and chemo.  Since the  chemotherapy she also has had numbness and tingling in the feet to the ankles a little bit worse on the right up to the mid tibia.  She has not had any recent physical therapy or injections. She also has had some low back pain as well.    Imaging: Plain films of the thoracic spine along with prior MRI of the thoracic spine August 31, 2021 was reviewed.  Pathologic compression fracture T7 with vertebral neoplasm epidural space compressing the spinal cord T6-T8.  No other spinal stenosis.  There is a lesion at T1 as well.    Thoracic x-rays from November 17 were personally reviewed as well showing a chronic compression deformity at T7 superior endplate deformity T8 without change from prior imaging.       Review of Systems: Complains of weight loss, headache, hoarseness, ringing in the ears, difficulty swallowing, change in vision, feet swelling, abdominal pain, diarrhea, constipation, nausea, vomiting, reflux, joint pain, muscle pain, muscle fatigue, sciatica, skin itching, poor balance, dizziness, easy bruising, insomnia excessive tiredness and anxiety.  Denies fever, eye pain, chest pain, shortness of breath, bowel or bladder incontinence.Remainder of 12 point review systems negative unless listed above.    Past Medical History:   Diagnosis Date     Cervical dysplasia      Chronic RUQ pain      Depressive disorder      Multiple myeloma (H)      Osteoporosis        Family History   Problem Relation Age of Onset     Diabetes Mother      Arthritis Mother      LUNG DISEASE Father      Diabetes Maternal Uncle      Breast Cancer Paternal Aunt      Heart Failure Maternal Grandmother      Heart Disease Maternal Grandmother      Heart Failure Maternal Grandfather      Heart Disease Maternal Grandfather      Heart Failure Paternal Grandmother      Heart Disease Paternal Grandmother          Social History     Socioeconomic History     Marital status:      Spouse name: None     Number of children: 3     Years of  education: None     Highest education level: None   Occupational History     None   Tobacco Use     Smoking status: Former Smoker     Packs/day: 0.50     Years: 45.00     Pack years: 22.50     Types: Cigarettes     Smokeless tobacco: Never Used   Substance and Sexual Activity     Alcohol use: Yes     Comment: Alcoholic Drinks/day: 0-1 drinks per week     Drug use: Not Currently     Sexual activity: Not Currently     Partners: Male     Birth control/protection: Surgical   Other Topics Concern     Parent/sibling w/ CABG, MI or angioplasty before 65F 55M? Not Asked   Social History Narrative     None     Social Determinants of Health     Financial Resource Strain: Not on file   Food Insecurity: Not on file   Transportation Needs: Not on file   Physical Activity: Not on file   Stress: Not on file   Social Connections: Not on file   Intimate Partner Violence: Not on file   Housing Stability: Not on file     Social history: Retired.  No tobacco.  Does drink alcohol.    The following portions of the patient's history were reviewed and updated as appropriate: allergies, current medications, past family history, past medical history, past social history, past surgical history and problem list.    Oswestry (FREDDIE) Questionnaire    OSWESTRY DISABILITY INDEX 1/13/2022   Count 10   Sum 19   Oswestry Score (%) 38   Some recent data might be hidden       Neck Disability Index:  No flowsheet data found.       WHO 5: 10              Objective:   Physical Exam:    BP (!) 140/63 (BP Location: Right arm, Patient Position: Sitting, Cuff Size: Adult Regular)   Pulse 62   Wt 130 lb (59 kg)   BMI 23.77 kg/m    Body mass index is 23.77 kg/m .      General:  Well-appearing female in no acute distress.  Pleasant, cooperative, and interactive throughout the examination and interview.  CV: No lower extremity edema on inspection or paltation.  Lymphatics: No cervical lymphadenopathy palpated. Eyes: sclera clear. Skin: No rashes or lesions seen  over the head/neck, hairline, arms, legs, trunk.  Respirations unlabored.  MSK: Gait is nonantalgic.  Able to heel-toe walk without difficulty.  Negative Romberg.  Spine: normal AP curves of the C, T, and L spine.  General reduced range of motion of the thoracic and lumbar spine flexion extension .  Palpation: Tenderness to palpation over T6-8 spinous process.  Extremities: Full range of motion of the elbows, and wrists with no effusions or tenderness to palpation.   Full range of motion of the hips, knees, and ankles with no effusions.  There is tenderness behind the left knee with Baker's cyst..    No hypermobility of the upper or lower extremities.  Neurologic exam: Mental status: Patient is alert and oriented with normal affect.  Attention, knowledge, memory, and language are intact.  Normal coordination throughout the examination.  Reflexes are 1+ and symmetric biceps, triceps, brachioradialis, 1+patellar, and 0 Achilles with down-going toes and Negative Mellisa's.  Sensation is  slightly decreased/tingly to light touch in the lower extremities to the ankles.  intact to light touch throughout the remainder of upper and lower extremities bilaterally.  Manual muscle testing reveals 5 out of 5 in the hip flexors, knee flexors/extensors, ankle plantar flexors, ankle  dorsiflexors, and 5 -/5 bilateral EHL.  Upper extremities: Grossly normal strength . Normal muscle bulk and tone in the arms and legs.    Negative seated  straight leg raise bilaterally.            Again, thank you for allowing me to participate in the care of your patient.        Sincerely,        Wyatt Pascual, DO

## 2022-01-17 ENCOUNTER — VIRTUAL VISIT (OUTPATIENT)
Dept: ONCOLOGY | Facility: CLINIC | Age: 72
End: 2022-01-17
Attending: INTERNAL MEDICINE
Payer: COMMERCIAL

## 2022-01-17 DIAGNOSIS — C90.00 MULTIPLE MYELOMA WITH FAILED REMISSION (H): ICD-10-CM

## 2022-01-17 DIAGNOSIS — F43.23 ADJUSTMENT DISORDER WITH MIXED ANXIETY AND DEPRESSED MOOD: Primary | ICD-10-CM

## 2022-01-17 PROCEDURE — 90837 PSYTX W PT 60 MINUTES: CPT | Mod: TEL | Performed by: SOCIAL WORKER

## 2022-01-18 NOTE — CONFIDENTIAL NOTE
Psychology Psychotherapy  Note-virtual visit      Name:  Lizzie Viera  :  1950  MRN:  [id number]      Date of Service: 2022  Duration: 60 minutes (2:00 to 3:00)    The patient has been notified of following:      This telephone visit will be conducted via a call between you and your provider. We have found that certain health care needs can be provided without a face to face meeting.  This service lets us provide the care you need with a short phone conversation.      Telephone visits are billed at different rates depending on your insurance coverage. During this emergency period, for some insurers they may be billed the same as an in-person visit.  Please reach out to your insurance provider with any questions.     If during the course of the call the if provider feels a telephone visit is not appropriate, you will not be charged for this service.     Patient has given verbal consent to a Telephone visit? Yes        Target Symptoms:    The patient was seen in light of concerns regarding symptoms of depression and anxiety as evidenced by patient and staff report.    Participation:  The patient was able to participate and benefit from treatment as evidenced by her verbal expression of ideas and initiation of topics discussed.    Mental Status:    Mood/Affect: Worried and concerned  Suicidal Ideation:  absent  Homicidal Ideation:  absent  Thought process:  normal  Thought content:  Clear  Fund of Knowledge:  Sufficient  Attention/Concentration:  Normal  Language ability:  intact  Speech: normal  Memory:  recent and remote memory intact  Insight and Judgement:  good  Orientation:  self, place and time  Appearance: N/A-telephone visit  Eye Contact: N/A-telephone visit  Estimated IQ:  Average      Intervention:    Valeria was interested in a follow-up psychotherapy session today.  We had a virtual appointment today.  Valeria was referred to me by JOSH Lopez from the palliative care clinic.   She is also followed by Dr. Blanco and Dr. Rapp.      Valeria updated me on her health issues.  She had her last clinic visit with Zehra Pablo CNP on 1/12/2022.  She shared her thoughts about being reluctant to proceed with the transplant at this time.  She prefers her monoclonal clinic to be at 0 before she proceeds and plans to continue her current treatment regiment.  She will be referred back to the transplant team when she feels ready.  Valeria also met with Dr. Pascual, from physical medicine rehab.  He is recommending physical therapy.  He also offered some medication, which she wants to wait on at this time.  She is interested in learning exercises that she can do at home.  Valeria indicated that she felt sick this weekend.  She had flulike symptoms including feeling lightheaded and weak.  She states that her  also had this for few days prior to her episode.  She is feeling better today.    Valeria met with the bone marrow transplant team on 1/4/2022.  The recommendation is for her to have a bone marrow transplant as soon as it can be arranged.  She found out that she will still need to continue chemotherapy after her transplant.  She has done some research and feels that it would be better if she has the transplant when there is minimal residual disease.  She also would like to meet with the [facility] physician and has not been appointment with a physician in [place] in 2-20- 22.  She has done extensive thinking and reviewing the written material from the transplant team and feels very comfortable with her decision to wait to proceed with transplant at this time. Valeria processed her thoughts and feelings about her health issues and we discussed strategies to help her cope.    Valeria has a history of multiple myeloma.  She states that in September 2020, she had a fracture of T-7 and was hospitalized at [facility].  She did not have a good experience in transferring her care over to [facility]  [facility] and the cancer clinic at [facility].  In April 2021, she continued to have pain in her spine and had biopsy that confirmed her diagnosis of multiple myeloma in May 2021.  She was hospitalized in September at [facility] and received radiation therapy during her hospitalization.  Her last visit with Dr. Blanco on 9/29/2021,  he indicates that clinically her multiple myeloma is behaving more aggressively with bone lesions.  She has significant bone disease with osteoporosis and compression fractures.  Dr. Blanco started her on Velcade, Revlimid and dexamethasone.  She had her first treatment on 9/29/2021.  She also has been receiving monthly Zometa shots.  She had additional radiation treatment on 10/6/2021 through 10/12/2021 to T1.  She also has a skull lesion that they will hold off on radiation at this time. She met with the neurosurgeon on 10/11/2021 and he felt that T6-7 and 8 are stable.   Her main physical complaints are pain and fatigue. She also is experiencing temperature changes and changes to her taste, which she believes is due to her chemo pill. At her 9/7/2021 palliative care clinic visit, they discussed advanced directives.  She has completed her health care directive.  She expressed that she wants to be a DNR/DNI and has expressed this to her care providers.  She decided to have her daughter be the primary proxy and her sister and girlfriend to be the alternates.      Valeria indicates that she has done a lot of research and has decided not to be vaccinated for COVID-19.  Her daughter had COVID-19 in May and her son and  also tested positive at that time.  She had the antibody test and found out that she also had COVID-19 in the past.  She was asymptomatic and did not realize she even had it.  She is not interested in getting the vaccine.  She is interested in doing alternative/complementary medicine to help combat her cancer.  She has been taking anti-inflammatory medication and  "has been reading books and articles about supplements that help \"kill cancer\".  She currently is on a variety of supplements and has discussed her supplements with Dr. Blanco.  Valeria indicates that she finds her medical background useful in understanding her health condition.    Valeria processed her thoughts and feelings about her cancer and her cancer treatment.  She is experiencing symptoms of anxiety and depression related to coping with her cancer and other life stressors.  She does not clinically meet diagnostic criteria for a mood disorder or an anxiety disorder.  She meets diagnostic criteria for adjustment disorder with mixed anxiety and depressed mood.  She denies suicidal ideation or thoughts of harming herself or others.    Valeria grew up in the [place].  She is the second oldest in a family of 8 children.  She has 3 sisters and 4 brothers.  Her older brother is exactly 1 year and a day older than her.  She states that she has a very close relationship with her siblings.  Most of them live in the area.  Her sisters have been extremely helpful during this difficult time.  She has 2 of her sisters at her house cleaning during her last visit..    Valeria lives in her [place] home with her , Ramos; daughter Collette; and her 2 sons ages 4 and 8.  Valeria and Ramos have been  for 42 years and that on a blind date.  She states that Ramos has end-stage renal disease and is receiving dialysis 3 times a week.  He also had trouble aortic aneurysm repair and he has type 2 diabetes. He had a cardiac procedure at the [facility] on 10/15/2021, which went well. . He will be evaluated in 3 to 6 months for a kidney transplant. He has a friend who is willing to donate his kidney. Ramos retired at age 69 from being a .  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  When he had his annual DOT physical, he did not want to share his medical history with the DOT physician and decided to quit his job, on the " spot that day.  This created some issues as he was the carrier of the insurance for the family.  Valeria indicates that Ramos is extremely angry and tends to take it out on the family members.  His father  when he was 11 years old.  He was a  and  on an experimental aircraft in [place].  He recently became so angry and agitated towards their daughter, that she gave him an ultimatum that he needs to start seeing a therapist.  Recently, he became extremely angry and agitated  with their son and her sister, to the point that they needed to leave the house due to his verbal abuse.  Valeria has been a buffer for the family regarding his anger.  She has been strongly encouraging him to make an appointment with a therapist.  The staff at [facility] also have talked with him about seeing a therapist and had a referral recommendation for him.  He has been refusing to make an appointment with a therapist.  At one point, he agreed to meet for family therapy with his daughter.  He currently refuses to receive any psychological help and his behavior continues to be extremely angry and agitated.  Due to his behavior, their daughter has decided that she needs to start looking for another housing situation.  She works with a therapist who is helping her get on public housing.  This may take a number of months.  We talked about how his behavior affects Valeria emotionally.  We also talked about a plan for respite if she needs a break from the home situation.  She has come to the conclusion that he does not have insight about his behavior and how it affects others.  She is starting to do some recommended psychoeducational reading that may be helpful for her in this area.    Celso have 3 children.  The oldest is their daughter, Sharifa, age 38.  She lives near [place] and has 2 children.  She works as a teacher.  Their second oldest is their daughter, Collette, age 34, who currently lives with them along with her for an  8-year-old sons.  She is on Social Security disability due to her depression and past suicidal ideation.  She has received a great deal of therapy including DBT.  She has been extremely helpful to Valeria and wants to be her caretaker.  Their youngest is their son, Trell, age 30, l just moved out of the home 3 weeks ago to [facility], in order to be closer to his work in the area. He works 12-hour shifts starting at 4 AM in a warehouse that provides food for gas stations and service stations.    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  She has worked in medical research.  She spent 18 years working at the [employer].  She also has worked at [employer] in nuclear medicine and for [place] [employer] and nuclear medicine.  She spent several years working for [employer].  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    Valeria was raised Christian and went to a Christian grade school, [employer] in [place]. She also went to [employer]. She currently describes herself as being a spiritual person and believes that mother nature is the guiding spirit of the planet. She has some  and Malaysian spirituality beliefs. We talked about ways for her to incorporate her spirituality into her healing process.    Valeria is interested in individual psychotherapy to help her work through the emotional aspects of her cancer and cancer treatments.  She is experiencing symptoms of anxiety and depression related to her cancer and other life stresses.  She would like to initially meet on a weekly basis.  I have left a message for our information coordinators to get her scheduled in for a series of appointments.    Psychoterapeutic Techniques:  Cognitive-behavioral therapy, motivational interviewing and supportive psychotherapy strategies were utilized.    Necessity:    The session  was necessary for the care of the patient to address symptoms of depression and anxiety related to the patient's medical condition.    Progress/ Plan:    Valeria continue to  process her thoughts and feelings about her recent clinic visits.  She has made the decision that she wants to hold off on proceeding with the transplant at this time.  She was discouraged to learn that she will still need to have chemotherapy after the bone marrow transplant.  She is concerned about the timing of getting this done now, versus waiting until there is minimal residual disease.  She plans to meet with an integrative medicine physician on 2022.     We discussed some psychoeducational reading material that may be helpful for Valeria.  She started reading a book that I recommended to help her cope and deal with the issues she is having with her .  In addition, she is interested in some of the books that I recommended regarding spirituality and complementary medicine.    Valeria indicated that her mother-in-law recently .  She was 92 years old and had been living independently.  She went with her  to [place] from 12/3/2021 until  2021.  Her  was very resistant about going to the  and they needed to make arrangements for him to have dialysis while he was there.  It ended up to be very healing time for him and it was important for them to be there.    Valeria continue to discuss issues with her  and their relationship.  She is struggling with his issues of anger that is projected towards her.  We discussed this dynamic at length and talked about strategies for setting up boundaries for her own self protection.    Valeria is interested in individual psychotherapy every 1 to 2 weeks to help her cope with the emotional aspects of her cancer and cancer treatment.  I will provide her with some information about utilizing cognitive behavioral therapy strategies to help manage symptoms of pain and anxiety &  depression.  She has made good progress in going through the written material that I have given her.  She has started doing guided imagery exercises.  She plans to do some painting to illustrate her image for her guided imagery.  In addition, she has been doing some reading about cognitive behavioral therapy and has started to implement some of the strategies that we discussed.  She has read up on the side effects from her treatment and read that anxiety and irritability tend to be some of the side effects.  She is making a conscious effort to utilize some of the skills that we discussed to work on these issues.  I was able to provide her with 2 Omkar packs from the [employer] today for her to grand sons that live with them.  I also provided her friend, who was with her all day for the appointments with support group resources from [employer].  They have an online support group for families and friends supporting individuals with cancer.    Valeria had several difficulties over the past few months.  They had their pipes leak at their house and found out that they were corroded.  They are getting minimal help from their insurance company, but her brother is helping to fix this issue.      Valeria indicates that she has been working on a plan to get exercise. She has been walking on the treadmill 1 mile, 3 days a week.  Her sister picks her up and takes her to her house to use the treadmill.  This is been helpful as it creates some accountability for Valeria and also gives her the opportunity to spend some quality time with her sister.  She also is considering getting a treadmill for their home as her current treadmill is no longer working.  She also is working on having good nutrition.    Valeria had a girlfriend that  recently from metastatic breast cancer.  She processed her thoughts and feelings about this loss and we discussed strategies to help her release her grief.    We also talked about issues with her   and some strategies to help set boundaries and limits and also encouraged him to get help he needs.  She also talked about how her 8-year-old grandson was feeling anxious and worried as he is a teacher is pregnant and decided to go on leave early.  He started with a new teacher and is having a lot of anxiety with the change.  He had some thoughts of hurting himself and his mom took him to the emergency room at [facility].  He is doing much better this week.  His mom, Collette has a history of having suicide attempts 3-4 times in 1 year, prior to her children being born.  Valeria is concerned that some of Collette's depressive tendencies are wearing off on her grandson.  We discussed strategies that might be helpful in this area.    Valeria is interested in meeting on a regular basis.  She would like to meet again for a virtual appointment in 1 to 2 weeks.  She is scheduled for appointments through 2/28/2022.    Diagnosis:    1.  Adjustment disorder with mixed anxiety and depressed mood  2.  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  Other information was consistent from the note that was made on 1/11/2022.      This note was created with the help of Dragon dictation system.  Grammatical and typing errors are not intentional.     Review of long-term goals: Treatment plan was reviewed and updated.       Provider: Mary Ellen Chan MA, LP, LICSW    Date:  10/5/2021  Time:  3:05 PM

## 2022-01-19 ENCOUNTER — INFUSION THERAPY VISIT (OUTPATIENT)
Dept: INFUSION THERAPY | Facility: HOSPITAL | Age: 72
End: 2022-01-19
Attending: INTERNAL MEDICINE
Payer: COMMERCIAL

## 2022-01-19 ENCOUNTER — TELEPHONE (OUTPATIENT)
Dept: ONCOLOGY | Facility: HOSPITAL | Age: 72
End: 2022-01-19
Payer: COMMERCIAL

## 2022-01-19 VITALS
DIASTOLIC BLOOD PRESSURE: 54 MMHG | OXYGEN SATURATION: 95 % | HEART RATE: 64 BPM | RESPIRATION RATE: 16 BRPM | TEMPERATURE: 98.3 F | SYSTOLIC BLOOD PRESSURE: 80 MMHG

## 2022-01-19 DIAGNOSIS — C90.00 MULTIPLE MYELOMA WITH FAILED REMISSION (H): Primary | ICD-10-CM

## 2022-01-19 LAB
BASOPHILS # BLD AUTO: 0 10E3/UL (ref 0–0.2)
BASOPHILS NFR BLD AUTO: 0 %
EOSINOPHIL # BLD AUTO: 0 10E3/UL (ref 0–0.7)
EOSINOPHIL NFR BLD AUTO: 1 %
ERYTHROCYTE [DISTWIDTH] IN BLOOD BY AUTOMATED COUNT: 15.5 % (ref 10–15)
HCT VFR BLD AUTO: 44.5 % (ref 35–47)
HGB BLD-MCNC: 14.7 G/DL (ref 11.7–15.7)
IMM GRANULOCYTES # BLD: 0 10E3/UL
IMM GRANULOCYTES NFR BLD: 1 %
LYMPHOCYTES # BLD AUTO: 0.7 10E3/UL (ref 0.8–5.3)
LYMPHOCYTES NFR BLD AUTO: 14 %
MCH RBC QN AUTO: 32.4 PG (ref 26.5–33)
MCHC RBC AUTO-ENTMCNC: 33 G/DL (ref 31.5–36.5)
MCV RBC AUTO: 98 FL (ref 78–100)
MONOCYTES # BLD AUTO: 0 10E3/UL (ref 0–1.3)
MONOCYTES NFR BLD AUTO: 1 %
NEUTROPHILS # BLD AUTO: 4 10E3/UL (ref 1.6–8.3)
NEUTROPHILS NFR BLD AUTO: 83 %
NRBC # BLD AUTO: 0 10E3/UL
NRBC BLD AUTO-RTO: 0 /100
PLATELET # BLD AUTO: 232 10E3/UL (ref 150–450)
RBC # BLD AUTO: 4.54 10E6/UL (ref 3.8–5.2)
WBC # BLD AUTO: 4.7 10E3/UL (ref 4–11)

## 2022-01-19 PROCEDURE — 36415 COLL VENOUS BLD VENIPUNCTURE: CPT | Performed by: INTERNAL MEDICINE

## 2022-01-19 PROCEDURE — 85025 COMPLETE CBC W/AUTO DIFF WBC: CPT | Performed by: INTERNAL MEDICINE

## 2022-01-19 PROCEDURE — 250N000011 HC RX IP 250 OP 636: Performed by: INTERNAL MEDICINE

## 2022-01-19 PROCEDURE — 96401 CHEMO ANTI-NEOPL SQ/IM: CPT

## 2022-01-19 RX ADMIN — BORTEZOMIB 2.1 MG: 3.5 INJECTION, POWDER, LYOPHILIZED, FOR SOLUTION INTRAVENOUS; SUBCUTANEOUS at 14:52

## 2022-01-19 ASSESSMENT — PAIN SCALES - GENERAL: PAINLEVEL: EXTREME PAIN (8)

## 2022-01-19 NOTE — PROGRESS NOTES
Infusion Nursing Note:  Lizzie Viera presents today for cycle 6 day 8 treatment with Velcade.    Patient seen by provider today: No   present during visit today: Not Applicable.    Note: Valeria received velcade as ordered.  BP low today but pt states she is often hypotensive.  IV fluids offered but pt refuses.  Pt encouraged to increase oral hydration.       Intravenous Access:  No Intravenous access at this visit.    Treatment Conditions:  Results reviewed, labs MET treatment parameters, ok to proceed with treatment.      Post Infusion Assessment:  Patient tolerated injection without incident into her left lower abdomen.       Discharge Plan:   Patient discharged in stable condition accompanied by: self.      Diane Mckeon RN

## 2022-01-19 NOTE — ORAL ONC MGMT
Oral Chemotherapy Monitoring Program    Subjective/Objective:  Lizzie Viera is a 71 year old female with multiple myeloma contacted by phone for a monthly follow-up visit for oral chemotherapy. Valeria continues to take Revlimid 1 capsule daily for 2 weeks on, 1 week off with no missed doses.     Valeria states that she feels worse with every cycle. She continues to have more side effects present but still finds them tolerable at this time.  Lower leg edema- present even when she wakes up, wears support hose, elevates her legs at rest   Dysgeusia and decreased appetite- metallic/salty taste of food, no desire to eat, is eating less than she used to   Nausea- mild, varies day by day, uses sudeep tea which helps control. Does not want to use medications.  Fatigue - tired all the time. Does not require naps but needs to rest frequently.  Back spasm - followed by a pain provider, had to switch from methocarbamol to tizanidine due to insurance coverage and they have gotten more frequent and more painful    ORAL CHEMOTHERAPY 11/24/2021 11/26/2021 12/16/2021 12/16/2021 12/17/2021 1/5/2022 1/19/2022   Assessment Type Refill Monthly Follow up Refill Monthly Follow up Monthly Follow up Refill Monthly Follow up   Diagnosis Code Multiple Myeloma Multiple Myeloma Multiple Myeloma Multiple Myeloma Multiple Myeloma Multiple Myeloma Multiple Myeloma   Providers Bella Bella Bella Bella Bella Bella Bella   Clinic Name/Location [facility]   Drug Name Revlimid (lenalidomide) Revlimid (lenalidomide) Revlimid (lenalidomide) Revlimid (lenalidomide) Revlimid (lenalidomide) Revlimid (lenalidomide) Revlimid (lenalidomide)   Dose 25 mg 25 mg 25 mg 25 mg 25 mg 25 mg 25 mg   Current Schedule Daily Daily Daily Daily Daily Daily Daily   Cycle Details 2 weeks on, 1 week off 2 weeks on, 1 week off 2 weeks on, 1 week off 2 weeks on, 1 week off 2 weeks on, 1 week off 2  "weeks on, 1 week off 2 weeks on, 1 week off   Start Date of Last Cycle - 11/10/2021 - 12/1/2021 12/1/2021 - 1/12/2022   Planned next cycle start date - 12/1/2021 - - 12/22/2021 - 2/2/2022   Doses missed in last 2 weeks - 0 - - 0 - 0   Adherence Assessment - Adherent - - Adherent - Adherent   Adverse Effects - Other (See Note for Details) - - Other (See Note for Details) - Edema;Decreased Appetite/Weight Loss   Diarrhea - - - - - - -   Pharmacist Intervention(diarrhea) - - - - - - -   Intervention(s) - - - - - - -   Decrease Appetite/Weight Loss - - - - - - Grade 1   Pharmacist Intervention(decreased appetite/weight loss) - - - - - - Yes   Intervention(s) - - - - - - OTC recommendation   Any new drug interactions? - No - - No - No   Is the dose as ordered appropriate for the patient? - Yes - - Yes - Yes     Vitals:  BP:   BP Readings from Last 1 Encounters:   01/13/22 (!) 140/63     Wt Readings from Last 1 Encounters:   01/13/22 59 kg (130 lb)     Estimated body surface area is 1.61 meters squared as calculated from the following:    Height as of 10/19/21: 1.575 m (5' 2.01\").    Weight as of 1/13/22: 59 kg (130 lb).    Labs:  _  Result Component Current Result Ref Range   Sodium 137 (1/12/2022) 136 - 145 mmol/L     _  Result Component Current Result Ref Range   Potassium 3.6 (1/12/2022) 3.5 - 5.0 mmol/L     _  Result Component Current Result Ref Range   Calcium 9.4 (1/12/2022) 8.5 - 10.5 mg/dL     No results found for Mag within last 30 days.     No results found for Phos within last 30 days.     _  Result Component Current Result Ref Range   Albumin 3.6 (1/12/2022) 3.5 - 5.0 g/dL     _  Result Component Current Result Ref Range   Urea Nitrogen 15 (1/12/2022) 8 - 22 mg/dL     _  Result Component Current Result Ref Range   Creatinine 0.79 (1/12/2022) 0.60 - 1.10 mg/dL     _  Result Component Current Result Ref Range   AST 27 (1/12/2022) 0 - 40 U/L     _  Result Component Current Result Ref Range   ALT 34 (1/12/2022) 0 " - 45 U/L     _  Result Component Current Result Ref Range   Bilirubin Total 0.2 (1/12/2022) 0.0 - 1.0 mg/dL     _  Result Component Current Result Ref Range   WBC Count 2.4 (L) (1/12/2022) 4.0 - 11.0 10e3/uL     _  Result Component Current Result Ref Range   Hemoglobin 14.1 (1/12/2022) 11.7 - 15.7 g/dL     _  Result Component Current Result Ref Range   Platelet Count 188 (1/12/2022) 150 - 450 10e3/uL     No results found for ANC within last 30 days.     Medication Reconciliation:  Deletions: methocarbamol  Additions: tizanidine    There were no significant drug interactions identified upon review of medication list with chemotherapy agents.    Assessment/Plan:  Lower leg edema- continue to elevate as much as possible  Dysgeusia and decreased appetite- supplement with Ensure protein shakes to ensure adequate calories and protein  Nausea- reviewed non-pharmacologic strategies  Fatigue - rest as needed, try to maintain adequate nutrition and fluid intake to help  Back spasm - contact pain provider     Continue with current treatment plan. She knows to contact us or the clinic if any of her side effects get worse.     Follow-Up:  1/26 - labs  2/2 - labs and appt    We will call Valeria about 2 weeks after her appt for her next monthly follow-up.    Refill Due:  1/26/22    Jeison Burns, PharmD  Oral Chemotherapy Pharmacist  [phone]

## 2022-01-21 ENCOUNTER — MYC MEDICAL ADVICE (OUTPATIENT)
Dept: PHYSICAL MEDICINE AND REHAB | Facility: CLINIC | Age: 72
End: 2022-01-21
Payer: COMMERCIAL

## 2022-01-24 ENCOUNTER — MEDICAL CORRESPONDENCE (OUTPATIENT)
Dept: TRANSPLANT | Facility: CLINIC | Age: 72
End: 2022-01-24
Payer: COMMERCIAL

## 2022-01-25 DIAGNOSIS — G89.3 CANCER ASSOCIATED PAIN: Primary | ICD-10-CM

## 2022-01-25 RX ORDER — METHOCARBAMOL 500 MG/1
500 TABLET, FILM COATED ORAL 4 TIMES DAILY PRN
Qty: 90 TABLET | Refills: 0 | Status: SHIPPED | OUTPATIENT
Start: 2022-01-25 | End: 2022-03-28

## 2022-01-25 NOTE — TELEPHONE ENCOUNTER
Patient contacted palliative care indicating that methocarbamol no longer covered on formulary.  Formulary alternative, tizanidine 4 mg by mouth three times a day prescribed and tried.  Patient had profound increased pain while on tizanidine and suboptimal pain control.  Pain went from 3-4/10 up to an 8/10 transitioning from methocarbamol to tizanidine.  Tizanidine is an indication of treatment failure for this patient as an effective in controlling pain.  Prescription for methocarbamol ordered and electronically sent to pharmacy.  Have contacted palliative care RN to assist with any prior authorization with patient's insurance that may need to be filed.

## 2022-01-26 ENCOUNTER — LAB (OUTPATIENT)
Dept: INFUSION THERAPY | Facility: HOSPITAL | Age: 72
End: 2022-01-26
Attending: INTERNAL MEDICINE
Payer: COMMERCIAL

## 2022-01-26 ENCOUNTER — TELEPHONE (OUTPATIENT)
Dept: ONCOLOGY | Facility: HOSPITAL | Age: 72
End: 2022-01-26
Payer: COMMERCIAL

## 2022-01-26 VITALS
SYSTOLIC BLOOD PRESSURE: 83 MMHG | DIASTOLIC BLOOD PRESSURE: 51 MMHG | TEMPERATURE: 97.6 F | RESPIRATION RATE: 16 BRPM | OXYGEN SATURATION: 95 % | HEART RATE: 56 BPM

## 2022-01-26 DIAGNOSIS — C90.00 MULTIPLE MYELOMA WITH FAILED REMISSION (H): Primary | ICD-10-CM

## 2022-01-26 LAB
BASOPHILS # BLD AUTO: 0 10E3/UL (ref 0–0.2)
BASOPHILS NFR BLD AUTO: 1 %
EOSINOPHIL # BLD AUTO: 0.1 10E3/UL (ref 0–0.7)
EOSINOPHIL NFR BLD AUTO: 1 %
ERYTHROCYTE [DISTWIDTH] IN BLOOD BY AUTOMATED COUNT: 15.5 % (ref 10–15)
HCT VFR BLD AUTO: 39.8 % (ref 35–47)
HGB BLD-MCNC: 13.2 G/DL (ref 11.7–15.7)
IGA SERPL-MCNC: 41 MG/DL (ref 65–400)
IGG SERPL-MCNC: 624 MG/DL (ref 700–1700)
IGM SERPL-MCNC: 16 MG/DL (ref 60–280)
IMM GRANULOCYTES # BLD: 0 10E3/UL
IMM GRANULOCYTES NFR BLD: 1 %
LYMPHOCYTES # BLD AUTO: 0.6 10E3/UL (ref 0.8–5.3)
LYMPHOCYTES NFR BLD AUTO: 11 %
MCH RBC QN AUTO: 32.5 PG (ref 26.5–33)
MCHC RBC AUTO-ENTMCNC: 33.2 G/DL (ref 31.5–36.5)
MCV RBC AUTO: 98 FL (ref 78–100)
MONOCYTES # BLD AUTO: 0.3 10E3/UL (ref 0–1.3)
MONOCYTES NFR BLD AUTO: 5 %
NEUTROPHILS # BLD AUTO: 4.8 10E3/UL (ref 1.6–8.3)
NEUTROPHILS NFR BLD AUTO: 81 %
NRBC # BLD AUTO: 0 10E3/UL
NRBC BLD AUTO-RTO: 0 /100
PLATELET # BLD AUTO: 151 10E3/UL (ref 150–450)
RBC # BLD AUTO: 4.06 10E6/UL (ref 3.8–5.2)
TOTAL PROTEIN SERUM FOR ELP: 5.7 G/DL (ref 6–8)
WBC # BLD AUTO: 5.8 10E3/UL (ref 4–11)

## 2022-01-26 PROCEDURE — 96401 CHEMO ANTI-NEOPL SQ/IM: CPT

## 2022-01-26 PROCEDURE — 84155 ASSAY OF PROTEIN SERUM: CPT

## 2022-01-26 PROCEDURE — 36415 COLL VENOUS BLD VENIPUNCTURE: CPT

## 2022-01-26 PROCEDURE — 85025 COMPLETE CBC W/AUTO DIFF WBC: CPT | Performed by: INTERNAL MEDICINE

## 2022-01-26 PROCEDURE — 84165 PROTEIN E-PHORESIS SERUM: CPT | Mod: TC

## 2022-01-26 PROCEDURE — 250N000011 HC RX IP 250 OP 636: Performed by: INTERNAL MEDICINE

## 2022-01-26 PROCEDURE — 82784 ASSAY IGA/IGD/IGG/IGM EACH: CPT

## 2022-01-26 PROCEDURE — 83521 IG LIGHT CHAINS FREE EACH: CPT | Mod: 59

## 2022-01-26 PROCEDURE — 84165 PROTEIN E-PHORESIS SERUM: CPT | Mod: 26 | Performed by: PATHOLOGY

## 2022-01-26 RX ADMIN — BORTEZOMIB 2.1 MG: 3.5 INJECTION, POWDER, LYOPHILIZED, FOR SOLUTION INTRAVENOUS; SUBCUTANEOUS at 15:08

## 2022-01-26 NOTE — PROGRESS NOTES
Pt here for injection which was given in abdomen R side without incident. Pt is holding off on zometa until she has a root canal. Pt d.c ambulatory to lobby alone and is aware of treatment plan.

## 2022-01-26 NOTE — TELEPHONE ENCOUNTER
Patient calls in today stating that her daughter has tested positive for Covid.  She does live with her daughter but is trying to stay as far away as she can.  Patient tells me that she had some minor symptoms last week but those lasted a short time and she has not had anything since.  She has not taken a Covid test.  She has an appointment for lab and injection today.  She is wondering if she can still come in.  I did let her know that this is fine for her to come in since she is not symptomatic.  She should wear a mask per our policy and I have let the infusion nurse know.  Patient verbalized understanding.    Aliza Sheehan RN

## 2022-01-27 ENCOUNTER — TRANSFERRED RECORDS (OUTPATIENT)
Dept: HEALTH INFORMATION MANAGEMENT | Facility: CLINIC | Age: 72
End: 2022-01-27
Payer: COMMERCIAL

## 2022-01-27 DIAGNOSIS — C90.00 MULTIPLE MYELOMA WITH FAILED REMISSION (H): Primary | ICD-10-CM

## 2022-01-27 LAB
ALBUMIN PERCENT: 62.3 % (ref 51–67)
ALBUMIN SERPL ELPH-MCNC: 3.6 G/DL (ref 3.2–4.7)
ALPHA 1 PERCENT: 4 % (ref 2–4)
ALPHA 2 PERCENT: 14.1 % (ref 5–13)
ALPHA1 GLOB SERPL ELPH-MCNC: 0.2 G/DL (ref 0.1–0.3)
ALPHA2 GLOB SERPL ELPH-MCNC: 0.8 G/DL (ref 0.4–0.9)
B-GLOBULIN SERPL ELPH-MCNC: 0.6 G/DL (ref 0.7–1.2)
BETA PERCENT: 11.4 % (ref 10–17)
GAMMA GLOB SERPL ELPH-MCNC: 0.5 G/DL (ref 0.6–1.4)
GAMMA GLOBULIN PERCENT: 8.2 % (ref 9–20)
KAPPA LC FREE SER-MCNC: 1.31 MG/DL (ref 0.33–1.94)
KAPPA LC FREE/LAMBDA FREE SER NEPH: 1.14 {RATIO} (ref 0.26–1.65)
LAMBDA LC FREE SERPL-MCNC: 1.15 MG/DL (ref 0.57–2.63)
MONOCLONAL PEAK: 0.2 G/DL
PATH ICD:: ABNORMAL
PROT PATTERN SERPL ELPH-IMP: ABNORMAL
REVIEWING PATHOLOGIST: ABNORMAL
TOTAL PROTEIN SERUM FOR ELP (SYNCED VALUE): 5.7 G/DL

## 2022-01-27 RX ORDER — LENALIDOMIDE 25 MG/1
25 CAPSULE ORAL DAILY
Qty: 14 CAPSULE | Refills: 0 | Status: SHIPPED | OUTPATIENT
Start: 2022-02-02 | End: 2022-02-17

## 2022-02-01 ENCOUNTER — TELEPHONE (OUTPATIENT)
Dept: ONCOLOGY | Facility: HOSPITAL | Age: 72
End: 2022-02-01
Payer: COMMERCIAL

## 2022-02-01 NOTE — TELEPHONE ENCOUNTER
----- Message from Mary Ellen Chan LP, [facility] sent at 1/31/2022  4:48 PM CST -----  Valeria to schedule him with me for next Monday, 2/7/2021.  It states that it is an in person session.  Would you please contact her and make sure she knows that this is a telephone visit.  Thank you!    Mary Ellen

## 2022-02-02 ENCOUNTER — HOSPITAL ENCOUNTER (OUTPATIENT)
Dept: MRI IMAGING | Facility: HOSPITAL | Age: 72
End: 2022-02-02
Attending: PHYSICAL MEDICINE & REHABILITATION
Payer: COMMERCIAL

## 2022-02-02 ENCOUNTER — ONCOLOGY VISIT (OUTPATIENT)
Dept: ONCOLOGY | Facility: HOSPITAL | Age: 72
End: 2022-02-02
Attending: INTERNAL MEDICINE
Payer: COMMERCIAL

## 2022-02-02 ENCOUNTER — INFUSION THERAPY VISIT (OUTPATIENT)
Dept: INFUSION THERAPY | Facility: HOSPITAL | Age: 72
End: 2022-02-02
Attending: INTERNAL MEDICINE
Payer: COMMERCIAL

## 2022-02-02 VITALS
SYSTOLIC BLOOD PRESSURE: 108 MMHG | WEIGHT: 130.4 LBS | BODY MASS INDEX: 23.84 KG/M2 | OXYGEN SATURATION: 96 % | HEART RATE: 70 BPM | DIASTOLIC BLOOD PRESSURE: 65 MMHG | TEMPERATURE: 97.8 F | RESPIRATION RATE: 16 BRPM

## 2022-02-02 DIAGNOSIS — C90.00 MULTIPLE MYELOMA NOT HAVING ACHIEVED REMISSION (H): Primary | ICD-10-CM

## 2022-02-02 DIAGNOSIS — R29.898 LEG WEAKNESS, BILATERAL: ICD-10-CM

## 2022-02-02 DIAGNOSIS — M54.50 LUMBAR SPINE PAIN: ICD-10-CM

## 2022-02-02 DIAGNOSIS — C90.00 MULTIPLE MYELOMA WITH FAILED REMISSION (H): Primary | ICD-10-CM

## 2022-02-02 DIAGNOSIS — M84.58XK PATHOLOGICAL FRACTURE OF VERTEBRAE IN NEOPLASTIC DISEASE WITH NONUNION: ICD-10-CM

## 2022-02-02 LAB
ALBUMIN SERPL-MCNC: 3.2 G/DL (ref 3.5–5)
ALP SERPL-CCNC: 68 U/L (ref 45–120)
ALT SERPL W P-5'-P-CCNC: 57 U/L (ref 0–45)
ANION GAP SERPL CALCULATED.3IONS-SCNC: 8 MMOL/L (ref 5–18)
AST SERPL W P-5'-P-CCNC: 44 U/L (ref 0–40)
BASOPHILS # BLD AUTO: 0 10E3/UL (ref 0–0.2)
BASOPHILS NFR BLD AUTO: 1 %
BILIRUB SERPL-MCNC: 0.2 MG/DL (ref 0–1)
BUN SERPL-MCNC: 16 MG/DL (ref 8–22)
CALCIUM SERPL-MCNC: 9.2 MG/DL (ref 8.5–10.5)
CHLORIDE BLD-SCNC: 107 MMOL/L (ref 98–107)
CO2 SERPL-SCNC: 24 MMOL/L (ref 22–31)
CREAT SERPL-MCNC: 0.7 MG/DL (ref 0.6–1.1)
EOSINOPHIL # BLD AUTO: 0 10E3/UL (ref 0–0.7)
EOSINOPHIL NFR BLD AUTO: 0 %
ERYTHROCYTE [DISTWIDTH] IN BLOOD BY AUTOMATED COUNT: 16.3 % (ref 10–15)
GFR SERPL CREATININE-BSD FRML MDRD: >90 ML/MIN/1.73M2
GLUCOSE BLD-MCNC: 147 MG/DL (ref 70–125)
HCT VFR BLD AUTO: 39.9 % (ref 35–47)
HGB BLD-MCNC: 12.9 G/DL (ref 11.7–15.7)
IMM GRANULOCYTES # BLD: 0 10E3/UL
IMM GRANULOCYTES NFR BLD: 1 %
LYMPHOCYTES # BLD AUTO: 0.7 10E3/UL (ref 0.8–5.3)
LYMPHOCYTES NFR BLD AUTO: 12 %
MCH RBC QN AUTO: 32.4 PG (ref 26.5–33)
MCHC RBC AUTO-ENTMCNC: 32.3 G/DL (ref 31.5–36.5)
MCV RBC AUTO: 100 FL (ref 78–100)
MONOCYTES # BLD AUTO: 0.1 10E3/UL (ref 0–1.3)
MONOCYTES NFR BLD AUTO: 1 %
NEUTROPHILS # BLD AUTO: 4.9 10E3/UL (ref 1.6–8.3)
NEUTROPHILS NFR BLD AUTO: 85 %
NRBC # BLD AUTO: 0 10E3/UL
NRBC BLD AUTO-RTO: 0 /100
PLATELET # BLD AUTO: 213 10E3/UL (ref 150–450)
POTASSIUM BLD-SCNC: 4.1 MMOL/L (ref 3.5–5)
PROT SERPL-MCNC: 6.2 G/DL (ref 6–8)
RBC # BLD AUTO: 3.98 10E6/UL (ref 3.8–5.2)
SODIUM SERPL-SCNC: 139 MMOL/L (ref 136–145)
WBC # BLD AUTO: 5.7 10E3/UL (ref 4–11)

## 2022-02-02 PROCEDURE — 99214 OFFICE O/P EST MOD 30 MIN: CPT | Performed by: NURSE PRACTITIONER

## 2022-02-02 PROCEDURE — 96401 CHEMO ANTI-NEOPL SQ/IM: CPT

## 2022-02-02 PROCEDURE — 36415 COLL VENOUS BLD VENIPUNCTURE: CPT | Performed by: NURSE PRACTITIONER

## 2022-02-02 PROCEDURE — 72148 MRI LUMBAR SPINE W/O DYE: CPT

## 2022-02-02 PROCEDURE — 250N000011 HC RX IP 250 OP 636: Performed by: NURSE PRACTITIONER

## 2022-02-02 PROCEDURE — 80053 COMPREHEN METABOLIC PANEL: CPT | Performed by: NURSE PRACTITIONER

## 2022-02-02 PROCEDURE — 85025 COMPLETE CBC W/AUTO DIFF WBC: CPT | Performed by: NURSE PRACTITIONER

## 2022-02-02 PROCEDURE — G0463 HOSPITAL OUTPT CLINIC VISIT: HCPCS | Mod: 25

## 2022-02-02 RX ADMIN — BORTEZOMIB 2.1 MG: 3.5 INJECTION, POWDER, LYOPHILIZED, FOR SOLUTION INTRAVENOUS; SUBCUTANEOUS at 14:51

## 2022-02-02 ASSESSMENT — PAIN SCALES - GENERAL: PAINLEVEL: MODERATE PAIN (4)

## 2022-02-02 NOTE — PROGRESS NOTES
[facility] Hematology and Oncology Progress Note    Patient: Lizzie Viera  MRN: [id number]  Date of Service: Feb 2, 2022          Reason for Visit    Chief Complaint   Patient presents with     Oncology Clinic Visit       Assessment and Plan    Cancer Staging  No matching staging information was found for the patient.    1. Myeloma: has started on treatment with RVd.  She is getting Velcade weekly, Revlimid 2 weeks on, 1 week off and Dex weekly.  Today is cycle 7.  Her light chains and IgG levels have now normalized.  Her hemoglobin has normalized.  Her monoclonal peak has gone from 3.3 down to 0.2.   She is tolerating treatment fairly well.  She did meet with the transplant team.  She has thought about it and she is a little reluctant to do that at this time.  She prefers her monoclonal peak to be at 0 before she does it so we will continue her current regimen.  She also doesn't want to do it when the weather is bad. We will refer her back to transplant team when she feels ready.  We will see Dr. Blanco in 3 weeks.    2. Bone lesions: has started on zometa.  Is been getting monthly.  Her last dose was January 5.  We are going to hold that for now.  She states she needs to get some dental work done.  Urged her to let us know when her dental work is done    3.  Scalp lesion: Patient states that this has continued to improve.  We have held off on radiation for now.  Continue to monitor.    Offered Evusheld to patient.  She declines.  She says she thinks she was probably exposed to Covid a couple of weeks ago so think she has some natural immunity and states that she does not want it at this time.  I did tell her she is at high risk of getting Covid and complications due to being on treatment and being unvaccinated.    ECOG Performance    0 - Independent    Distress Screening (within last 30 days)    1. How concerned are you about your ability to eat? : 0  2. How concerned are you about unintended weight  loss or your current weight? : 0  3. How concerned are you about feeling depressed or very sad? : 0  4. How concerned are you about feeling anxious or very scared? : 0  5. Do you struggle with the loss of meaning and rambo in your life? : Not at all  6. How concerned are you about work and home life issues that may be affected by your cancer? : 2  7. How concerned are you about knowing what resources are available to help you? : 0  8. Do you currently have what you would describe as Shinto or spiritual struggles?            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 Lizzie Viera is a very pleasant 70 year old woman who has been diagnosed with multiple myeloma IgG kappa type in May 2021.  She had initially presented with compression fracture of T7 vertebral body in September 2020.  She had a back pain which led to that evaluation.  Bone density confirmed that she had osteoporosis.  MRI showed diffuse marrow edema in October 2020.  In  April 2021 the MRI of the thoracic spine showed worsening T7 vertebral body height loss because of likely pathological compression fracture with extraosseous extension.  She then had IR guided bone biopsy on 7 May 2021 and pathology confirmed the presence of kappa light chain restricted plasma cells.  Her cytogenetics confirmed the presence of hyperdiploid E with gains of chromosome 5, 9 and 15.     She was then seen by Dr. Baig and had a bone marrow biopsy which also confirmed 60% involvement of the marrow with plasma cells.  The bone marrow was done on 3 Jocelin 2021.  The bone marrow also confirmed the presence of hyperdiploid E.  She had a gain of chromosome 3, 5, 7, 9, 11, 15 as well as 19.  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  Her beta-2 microglobulin was actually normal at the time of her diagnosis as was her albumin at 4.0.  Monoclonal protein 3.1 g/dL.  Kappa free light chain levels of 13 mg/dL IgG level of 4280 with depressed levels of IgM and IgA.  Urine was also positive for kappa light chains as well as immunofixation of the urine was positive for IgG kappa.  Normal kidney function.  Normal hemoglobin.     As mentioned above she had bone lesions in the T7 vertebral body, T1, skull area.  Her main pain is coming from her T7 vertebral body compression fracture.     Due to the pain she has been seen by radiation oncology and has received radiation therapy.  She also got a dose of steroids for pain control.     Currently her pain is controlled with combination of Dilaudid as well as some Tylenol.  She is wearing a brace.  She did notice that when she was on dexamethasone her pain was significantly better.     She was initially thinking of doing some natural therapies but once her symptoms got worse, she came back in was counseled about treatment.  She has been given information about Velcade Revlimid and dexamethasone.  has started that treatment.  She states that she is tolerating this quite well.  Her myeloma has  significantly improved.  She did meet with the transplant doctor and she is a little reluctant to do that.  She states she would prefer to be in complete remission before she does something like that.  She is also worried about being in the hospital with COVID being so prevalent at this time.  For right now she like to continue the current regimen.  She is tolerating it well.  Mild fatigue.  No new bone or back pain.  She does continues her back brace.  She is meeting with physical therapy and is back on the muscle relaxant that seem to work the best and is happy about that.  No other new issues. she thinks she was exposed to Covid a couple of weeks ago.  She said she really had minimal side effects but never got tested.    Review of Systems    Pertinent items are noted in HPI.    Past History    Past Medical History:   Diagnosis Date     Cervical dysplasia      Chronic RUQ pain      Depressive disorder      Multiple myeloma (H)      Osteoporosis        PHYSICAL EXAM  /65   Pulse 70   Temp 97.8  F (36.6  C)   Resp 16   Wt 59.1 kg (130 lb 6.4 oz)   SpO2 96%   BMI 23.84 kg/m      GENERAL: no acute distress. Cooperative in conversation. Here with family. Mask on  RESP: Regular respiratory rate. No expiratory wheezes   MUSCULOSKELETAL: no bilateral leg swelling  NEURO: non focal. Alert and oriented x3.   PSYCH: within normal limits. No depression or anxiety.  SKIN: exposed skin is dry intact.     Lab Results    Recent Results (from the past 168 hour(s))   CBC with platelets and differential   Result Value Ref Range    WBC Count 5.7 4.0 - 11.0 10e3/uL    RBC Count 3.98 3.80 - 5.20 10e6/uL    Hemoglobin 12.9 11.7 - 15.7 g/dL    Hematocrit 39.9 35.0 - 47.0 %     78 - 100 fL    MCH 32.4 26.5 - 33.0 pg    MCHC 32.3 31.5 - 36.5 g/dL    RDW 16.3 (H) 10.0 - 15.0 %    Platelet Count 213 150 - 450 10e3/uL    % Neutrophils 85 %    % Lymphocytes 12 %    % Monocytes 1 %    % Eosinophils 0 %    % Basophils 1 %    %  Immature Granulocytes 1 %    NRBCs per 100 WBC 0 <1 /100    Absolute Neutrophils 4.9 1.6 - 8.3 10e3/uL    Absolute Lymphocytes 0.7 (L) 0.8 - 5.3 10e3/uL    Absolute Monocytes 0.1 0.0 - 1.3 10e3/uL    Absolute Eosinophils 0.0 0.0 - 0.7 10e3/uL    Absolute Basophils 0.0 0.0 - 0.2 10e3/uL    Absolute Immature Granulocytes 0.0 <=0.4 10e3/uL    Absolute NRBCs 0.0 10e3/uL   IgG  Lab Results   Component Value Date     (L) 01/26/2022     12/29/2021     12/15/2021    IGG 1,032 11/24/2021    IGG 1,609 11/03/2021    IGG 2,736 (H) 10/13/2021    IGG 4,439 (H) 09/20/2021   kappa light chains:  Lab Results   Component Value Date    KFLCA 1.31 01/26/2022    KFLCA 1.06 12/29/2021    KFLCA 1.26 12/15/2021    KFLCA 1.55 11/24/2021    KFLCA 2.03 (H) 11/03/2021    KFLCA 4.30 (H) 10/13/2021    KFLCA 12.57 (H) 09/20/2021    KFLCA 13.37 (H) 05/27/2021   Monoclonal peak  Lab Results   Component Value Date    ELPM 0.2 01/26/2022    ELPM 0.4 12/29/2021    ELPM 0.4 12/15/2021    ELPM 0.6 11/24/2021    ELPM 1.1 11/03/2021    ELPM 1.7 10/13/2021    ELPM 3.3 09/20/2021    ELPM 3.1 05/27/2021   ]  Imaging    No results found.      Signed by: SINDHU Lundberg CNP

## 2022-02-02 NOTE — PROGRESS NOTES
Pt arrived ambulatory to clinic for Cycle # 7 Day # 1 of her chemotherapy regimen.  Administered subcutaneous Velcade into LLQ of ABD per MD order.  Pt tolerated procedure well, no s/s of bleeding or bruising at site.  Pt verbalized understanding of plan of care and return to clinic.

## 2022-02-02 NOTE — PROGRESS NOTES
"Oncology Rooming Note    February 2, 2022 1:54 PM   Lizzie Viera is a 71 year old female who presents for:    Chief Complaint   Patient presents with     Oncology Clinic Visit     Initial Vitals: /65   Pulse 70   Temp 97.8  F (36.6  C)   Resp 16   Wt 59.1 kg (130 lb 6.4 oz)   SpO2 96%   BMI 23.84 kg/m   Estimated body mass index is 23.84 kg/m  as calculated from the following:    Height as of 10/19/21: 1.575 m (5' 2.01\").    Weight as of this encounter: 59.1 kg (130 lb 6.4 oz). Body surface area is 1.61 meters squared.  Moderate Pain (4) Comment: Data Unavailable   No LMP recorded. Patient is postmenopausal.  Allergies reviewed: Yes  Medications reviewed: Yes    Medications: Medication refills not needed today.  Pharmacy name entered into [employer]: [id number] - [place], [place] - [address]    Clinical concerns: None       Viviane Chu LPN            "

## 2022-02-02 NOTE — LETTER
"    2/2/2022         RE: Lizzie Viera  [address]  [place] [address]        Dear Colleague,    Thank you for referring your patient, Lizzie Viera, to the [facility] [place]. Please see a copy of my visit note below.    Oncology Rooming Note    February 2, 2022 1:54 PM   Lizzie Virea is a 71 year old female who presents for:    Chief Complaint   Patient presents with     Oncology Clinic Visit     Initial Vitals: /65   Pulse 70   Temp 97.8  F (36.6  C)   Resp 16   Wt 59.1 kg (130 lb 6.4 oz)   SpO2 96%   BMI 23.84 kg/m   Estimated body mass index is 23.84 kg/m  as calculated from the following:    Height as of 10/19/21: 1.575 m (5' 2.01\").    Weight as of this encounter: 59.1 kg (130 lb 6.4 oz). Body surface area is 1.61 meters squared.  Moderate Pain (4) Comment: Data Unavailable   No LMP recorded. Patient is postmenopausal.  Allergies reviewed: Yes  Medications reviewed: Yes    Medications: Medication refills not needed today.  Pharmacy name entered into [employer]: [id number] - [facility] [place] [address].    Clinical concerns: None       Viviane Chu LPN              [facility] Hematology and Oncology Progress Note    Patient: Lizzie Viera  MRN: [id number]  Date of Service: Feb 2, 2022          Reason for Visit    Chief Complaint   Patient presents with     Oncology Clinic Visit       Assessment and Plan    Cancer Staging  No matching staging information was found for the patient.    1. Myeloma: has started on treatment with RVd.  She is getting Velcade weekly, Revlimid 2 weeks on, 1 week off and Dex weekly.  Today is cycle 7.  Her light chains and IgG levels have now normalized.  Her hemoglobin has normalized.  Her monoclonal peak has gone from 3.3 down to 0.2.   She is tolerating treatment fairly well.  She did meet with the transplant team.  She has thought about it and she is a little reluctant to do " that at this time.  She prefers her monoclonal peak to be at 0 before she does it so we will continue her current regimen.  She also doesn't want to do it when the weather is bad. We will refer her back to transplant team when she feels ready.  We will see Dr. Blanco in 3 weeks.    2. Bone lesions: has started on zometa.  Is been getting monthly.  Her last dose was January 5.  We are going to hold that for now.  She states she needs to get some dental work done.  Urged her to let us know when her dental work is done    3.  Scalp lesion: Patient states that this has continued to improve.  We have held off on radiation for now.  Continue to monitor.    Offered Evusheld to patient.  She declines.  She says she thinks she was probably exposed to Covid a couple of weeks ago so think she has some natural immunity and states that she does not want it at this time.  I did tell her she is at high risk of getting Covid and complications due to being on treatment and being unvaccinated.    ECOG Performance    0 - Independent    Distress Screening (within last 30 days)    1. How concerned are you about your ability to eat? : 0  2. How concerned are you about unintended weight loss or your current weight? : 0  3. How concerned are you about feeling depressed or very sad? : 0  4. How concerned are you about feeling anxious or very scared? : 0  5. Do you struggle with the loss of meaning and rambo in your life? : Not at all  6. How concerned are you about work and home life issues that may be affected by your cancer? : 2  7. How concerned are you about knowing what resources are available to help you? : 0  8. Do you currently have what you would describe as Restorationist or spiritual struggles?            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 Lizzie Viera is a very pleasant 70 year old woman who has been diagnosed with multiple myeloma IgG kappa type in May 2021.  She had initially presented with compression fracture of T7 vertebral body in September 2020.  She had a back pain which led to that evaluation.  Bone density confirmed that she had osteoporosis.  MRI showed diffuse marrow edema in October 2020.  In April 2021 the MRI of the thoracic spine showed worsening T7 vertebral body height loss because of likely pathological compression fracture with extraosseous extension.  She then had IR guided bone biopsy on 7 May 2021 and pathology confirmed the presence of kappa light chain restricted plasma cells.  Her cytogenetics confirmed the presence of hyperdiploid E with gains of chromosome 5, 9 and 15.     She was then seen by Dr. Baig and had a bone marrow biopsy which also confirmed 60% involvement of the marrow with plasma cells.  The bone marrow was done on 3 Jocelin 2021.  The bone marrow also confirmed the presence of hyperdiploid E.  She had a gain of chromosome 3, 5, 7, 9, 11, 15 as well as 19.  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  Her beta-2 microglobulin was  actually normal at the time of her diagnosis as was her albumin at 4.0.  Monoclonal protein 3.1 g/dL.  Kappa free light chain levels of 13 mg/dL IgG level of 4280 with depressed levels of IgM and IgA.  Urine was also positive for kappa light chains as well as immunofixation of the urine was positive for IgG kappa.  Normal kidney function.  Normal hemoglobin.     As mentioned above she had bone lesions in the T7 vertebral body, T1, skull area.  Her main pain is coming from her T7 vertebral body compression fracture.     Due to the pain she has been seen by radiation oncology and has received radiation therapy.  She also got a dose of steroids for pain control.     Currently her pain is controlled with combination of Dilaudid as well as some Tylenol.  She is wearing a brace.  She did notice that when she was on dexamethasone her pain was significantly better.     She was initially thinking of doing some natural therapies but once her symptoms got worse, she came back in was counseled about treatment.  She has been given information about Velcade Revlimid and dexamethasone.  has started that treatment.  She states that she is tolerating this quite well.  Her myeloma has significantly improved.  She did meet with the transplant doctor and she is a little reluctant to do that.  She states she would prefer to be in complete remission before she does something like that.  She is also worried about being in the hospital with COVID being so prevalent at this time.  For right now she like to continue the current regimen.  She is tolerating it well.  Mild fatigue.  No new bone or back pain.  She does continues her back brace.  She is meeting with physical therapy and is back on the muscle relaxant that seem to work the best and is happy about that.  No other new issues. she thinks she was exposed to Covid a couple of weeks ago.  She said she really had minimal side effects but never got tested.    Review of  Systems    Pertinent items are noted in HPI.    Past History    Past Medical History:   Diagnosis Date     Cervical dysplasia      Chronic RUQ pain      Depressive disorder      Multiple myeloma (H)      Osteoporosis        PHYSICAL EXAM  /65   Pulse 70   Temp 97.8  F (36.6  C)   Resp 16   Wt 59.1 kg (130 lb 6.4 oz)   SpO2 96%   BMI 23.84 kg/m      GENERAL: no acute distress. Cooperative in conversation. Here with family. Mask on  RESP: Regular respiratory rate. No expiratory wheezes   MUSCULOSKELETAL: no bilateral leg swelling  NEURO: non focal. Alert and oriented x3.   PSYCH: within normal limits. No depression or anxiety.  SKIN: exposed skin is dry intact.     Lab Results    Recent Results (from the past 168 hour(s))   CBC with platelets and differential   Result Value Ref Range    WBC Count 5.7 4.0 - 11.0 10e3/uL    RBC Count 3.98 3.80 - 5.20 10e6/uL    Hemoglobin 12.9 11.7 - 15.7 g/dL    Hematocrit 39.9 35.0 - 47.0 %     78 - 100 fL    MCH 32.4 26.5 - 33.0 pg    MCHC 32.3 31.5 - 36.5 g/dL    RDW 16.3 (H) 10.0 - 15.0 %    Platelet Count 213 150 - 450 10e3/uL    % Neutrophils 85 %    % Lymphocytes 12 %    % Monocytes 1 %    % Eosinophils 0 %    % Basophils 1 %    % Immature Granulocytes 1 %    NRBCs per 100 WBC 0 <1 /100    Absolute Neutrophils 4.9 1.6 - 8.3 10e3/uL    Absolute Lymphocytes 0.7 (L) 0.8 - 5.3 10e3/uL    Absolute Monocytes 0.1 0.0 - 1.3 10e3/uL    Absolute Eosinophils 0.0 0.0 - 0.7 10e3/uL    Absolute Basophils 0.0 0.0 - 0.2 10e3/uL    Absolute Immature Granulocytes 0.0 <=0.4 10e3/uL    Absolute NRBCs 0.0 10e3/uL   IgG  Lab Results   Component Value Date     (L) 01/26/2022     12/29/2021     12/15/2021    IGG 1,032 11/24/2021    IGG 1,609 11/03/2021    IGG 2,736 (H) 10/13/2021    IGG 4,439 (H) 09/20/2021   kappa light chains:  Lab Results   Component Value Date    KFLCA 1.31 01/26/2022    KFLCA 1.06 12/29/2021    KFLCA 1.26 12/15/2021    KFLCA 1.55 11/24/2021     KFLCA 2.03 (H) 11/03/2021    KFLCA 4.30 (H) 10/13/2021    KFLCA 12.57 (H) 09/20/2021    KFLCA 13.37 (H) 05/27/2021   Monoclonal peak  Lab Results   Component Value Date    ELPM 0.2 01/26/2022    ELPM 0.4 12/29/2021    ELPM 0.4 12/15/2021    ELPM 0.6 11/24/2021    ELPM 1.1 11/03/2021    ELPM 1.7 10/13/2021    ELPM 3.3 09/20/2021    ELPM 3.1 05/27/2021   ]  Imaging    No results found.      Signed by: SINDHU Lundberg CNP      Again, thank you for allowing me to participate in the care of your patient.        Sincerely,        SINDHU Lundberg CNP

## 2022-02-07 ENCOUNTER — VIRTUAL VISIT (OUTPATIENT)
Dept: ONCOLOGY | Facility: CLINIC | Age: 72
End: 2022-02-07
Attending: INTERNAL MEDICINE
Payer: COMMERCIAL

## 2022-02-07 DIAGNOSIS — C90.00 MULTIPLE MYELOMA NOT HAVING ACHIEVED REMISSION (H): ICD-10-CM

## 2022-02-07 DIAGNOSIS — F43.23 ADJUSTMENT DISORDER WITH MIXED ANXIETY AND DEPRESSED MOOD: Primary | ICD-10-CM

## 2022-02-07 PROCEDURE — 90834 PSYTX W PT 45 MINUTES: CPT | Mod: TEL | Performed by: SOCIAL WORKER

## 2022-02-07 NOTE — CONFIDENTIAL NOTE
Psychology Psychotherapy  Note-virtual visit      Name:  Lizzie Viera  :  1950  MRN:  [id number]      Date of Service: 2022  Duration: 50 minutes (11:00 to 11:50 AM)    The patient has been notified of following:      This telephone visit will be conducted via a call between you and your provider. We have found that certain health care needs can be provided without a face to face meeting.  This service lets us provide the care you need with a short phone conversation.      Telephone visits are billed at different rates depending on your insurance coverage. During this emergency period, for some insurers they may be billed the same as an in-person visit.  Please reach out to your insurance provider with any questions.     If during the course of the call the if provider feels a telephone visit is not appropriate, you will not be charged for this service.     Patient has given verbal consent to a Telephone visit? Yes        Target Symptoms:    The patient was seen in light of concerns regarding symptoms of depression and anxiety as evidenced by patient and staff report.    Participation:  The patient was able to participate and benefit from treatment as evidenced by her verbal expression of ideas and initiation of topics discussed.    Mental Status:    Mood/Affect: Worried and concerned  Suicidal Ideation:  absent  Homicidal Ideation:  absent  Thought process:  normal  Thought content:  Clear  Fund of Knowledge:  Sufficient  Attention/Concentration:  Normal  Language ability:  intact  Speech: normal  Memory:  recent and remote memory intact  Insight and Judgement:  good  Orientation:  self, place and time  Appearance: N/A-telephone visit  Eye Contact: N/A-telephone visit  Estimated IQ:  Average      Intervention:    Valeria was interested in a follow-up psychotherapy session today.  We had a virtual appointment today.  Valeria was referred to me by JOSH Lopez from the palliative care clinic.   She is also followed by Dr. Blanco and Dr. Rapp.      Valeria updated me on her health issues.  She had her last clinic visit with Zehra Pablo CNP on 2/2/2022.  She shared her thoughts about being reluctant to proceed with the transplant at this time.  She prefers her monoclonal clinic to be at 0 before she proceeds and plans to continue her current treatment regiment.  She will be referred back to the transplant team when she feels ready.  Valeria also met with Dr. Pascual, from physical medicine rehab.  He is recommending physical therapy.  He also offered some medication, which she wants to wait on at this time.  She is interested in learning exercises that she can do at home.  Valeria indicated that she felt sick this weekend.  She had flulike symptoms including feeling lightheaded and weak.  She states that her  also had this for few days prior to her episode.  She is feeling better today.    Valeria met with the bone marrow transplant team on 1/4/2022.  The recommendation is for her to have a bone marrow transplant as soon as it can be arranged.  She found out that she will still need to continue chemotherapy after her transplant.  She has done some research and feels that it would be better if she has the transplant when there is minimal residual disease.  She also would like to meet with the [facility] physician and has an appointment with a physician in [place] tomorrow, 2/8/2022..  She has done extensive thinking and reviewing the written material from the transplant team and feels very comfortable with her decision to wait to proceed with transplant at this time. Valeria processed her thoughts and feelings about her health issues and we discussed strategies to help her cope.    Valeria has a history of multiple myeloma.  She states that in September 2020, she had a fracture of T-7 and was hospitalized at [facility].  She did not have a good experience in transferring her care over to [facility]  [facility] and the cancer clinic at [facility].  In April 2021, she continued to have pain in her spine and had biopsy that confirmed her diagnosis of multiple myeloma in May 2021.  She was hospitalized in September at [facility] and received radiation therapy during her hospitalization.  Her last visit with Dr. Blanco on 9/29/2021,  he indicates that clinically her multiple myeloma is behaving more aggressively with bone lesions.  She has significant bone disease with osteoporosis and compression fractures.  Dr. Blanco started her on Velcade, Revlimid and dexamethasone.  She had her first treatment on 9/29/2021.  She also has been receiving monthly Zometa shots.  She had additional radiation treatment on 10/6/2021 through 10/12/2021 to T1.  She also has a skull lesion that they will hold off on radiation at this time. She met with the neurosurgeon on 10/11/2021 and he felt that T6-7 and 8 are stable.   Her main physical complaints are pain and fatigue. She also is experiencing temperature changes and changes to her taste, which she believes is due to her chemo pill. At her 9/7/2021 palliative care clinic visit, they discussed advanced directives.  She has completed her health care directive.  She expressed that she wants to be a DNR/DNI and has expressed this to her care providers.  She decided to have her daughter be the primary proxy and her sister and girlfriend to be the alternates.      Valeria indicates that she has done a lot of research and has decided not to be vaccinated for COVID-19.  The daughter that lives with them tested positive for COVID-19 last week.  Valeria indicated that she had minimal symptoms, but is feeling fine now.  She never took a test to see if she actually had COVID-19.  Her other daughter had COVID-19 in May and her son and  also tested positive at that time.  She had the antibody test and found out that she also had COVID-19 in the past.  She was asymptomatic and did not  "realize she even had it.  She is not interested in getting the vaccine.  She is interested in doing alternative/complementary medicine to help combat her cancer.  She has been taking anti-inflammatory medication and has been reading books and articles about supplements that help \"kill cancer\".  She currently is on a variety of supplements and has discussed her supplements with Dr. Blanco.  Valeria indicates that she finds her medical background useful in understanding her health condition.    Valeria processed her thoughts and feelings about her cancer and her cancer treatment.  She is experiencing symptoms of anxiety and depression related to coping with her cancer and other life stressors.  She does not clinically meet diagnostic criteria for a mood disorder or an anxiety disorder.  She meets diagnostic criteria for adjustment disorder with mixed anxiety and depressed mood.  She denies suicidal ideation or thoughts of harming herself or others.    Valeria grew up in the [facility].  She is the second oldest in a family of 8 children.  She has 3 sisters and 4 brothers.  Her older brother is exactly 1 year and a day older than her.  She states that she has a very close relationship with her siblings.  Most of them live in the area.  Her sisters have been extremely helpful during this difficult time.  She has 2 of her sisters at her house cleaning during her last visit..    Valeria lives in her [place] home with her , Ramos; daughter Collette; and her 2 sons ages 4 and 8.  Valeria and Ramos have been  for 42 years and that on a blind date.  She states that Ramos has end-stage renal disease and is receiving dialysis 3 times a week.  He also had trouble aortic aneurysm repair and he has type 2 diabetes. He had a cardiac procedure at the [facility] on 10/15/2021, which went well. . He will be evaluated in 3 to 6 months for a kidney transplant. He has a friend who is willing to donate his kidney. Ramos retired at age 69 " from being a .  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  When he had his annual DOT physical, he did not want to share his medical history with the DOT physician and decided to quit his job, on the spot that day.  This created some issues as he was the carrier of the insurance for the family.  Valeria indicates that Ramos is extremely angry and tends to take it out on the family members.  His father  when he was 11 years old.  He was a  and  on an experimental aircraft in [place].  He recently became so angry and agitated towards their daughter, that she gave him an ultimatum that he needs to start seeing a therapist.  Recently, he became extremely angry and agitated  with their son and her sister, to the point that they needed to leave the house due to his verbal abuse.  Valeria has been a buffer for the family regarding his anger.  She has been strongly encouraging him to make an appointment with a therapist.  The staff at [facility] also have talked with him about seeing a therapist and had a referral recommendation for him.  He has been refusing to make an appointment with a therapist.  At one point, he agreed to meet for family therapy with his daughter.  He currently refuses to receive any psychological help and his behavior continues to be extremely angry and agitated.  Due to his behavior, their daughter has decided that she needs to start looking for another housing situation.  She works with a therapist who is helping her get on public housing.  This may take a number of months.  We talked about how his behavior affects Valeria emotionally.  We also talked about a plan for respite if she needs a break from the home situation.  She has come to the conclusion that he does not have insight about his behavior and how it affects others.  She is starting to do some recommended psychoeducational reading that may be helpful for her in this area.    Celso have 3 children.  The oldest is their  daughter, Sharifa, age 38.  She lives near [place] and has 2 children.  She works as a teacher.  Their second oldest is their daughter, Collette, age 34, who currently lives with them along with her for an 8-year-old sons.  She is on Social Security disability due to her depression and past suicidal ideation.  She has received a great deal of therapy including DBT.  She has been extremely helpful to Valeria and wants to be her caretaker.  Their youngest is their son, Trell, age 30, l just moved out of the home 3 weeks ago to [facility], in order to be closer to his work in the area. He works 12-hour shifts starting at 4 AM in a warehouse that provides food for gas stations and service stations.    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  She has worked in medical research.  She spent 18 years working at the [employer].  She also has worked at [employer] in nuclear medicine and for [place] [facility] and nuclear medicine.  She spent several years working for [employer] and [employer].  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    Valeria was raised Synagogue and went to a Synagogue grade school, [employer] in [place]. She also went to [employer]. She currently describes herself as being a spiritual person and believes that mother nature is the guiding spirit of the planet. She has some  and [place]ian spirituality beliefs. We talked about ways for her to incorporate her spirituality into her healing process.    Valeria is interested in individual psychotherapy to help her work through the emotional aspects of her cancer and cancer treatments.  She is experiencing symptoms of anxiety and depression related to her cancer and other life stresses.  She would like to initially meet on a weekly basis.  I have left a message for our information coordinators to get her  scheduled in for a series of appointments.    Psychoterapeutic Techniques:  Cognitive-behavioral therapy, motivational interviewing and supportive psychotherapy strategies were utilized.    Necessity:    The session was necessary for the care of the patient to address symptoms of depression and anxiety related to the patient's medical condition.    Progress/ Plan:    Valeria continue to  process her thoughts and feelings about her recent clinic visits.  She has made the decision that she wants to hold off on proceeding with the transplant at this time.  She was discouraged to learn that she will still need to have chemotherapy after the bone marrow transplant.  She is concerned about the timing of getting this done now, versus waiting until there is minimal residual disease.  She plans to meet with an integrative medicine physician on 2022.     We discussed some psychoeducational reading material that may be helpful for Valeria.  She started reading a book that I recommended to help her cope and deal with the issues she is having with her .  In addition, she is interested in some of the books that I recommended regarding spirituality and complementary medicine.    Valeria indicated that her mother-in-law recently .  She was 92 years old and had been living independently.  She went with her  to [place] from 12/3/2021 until  2021.  Her  was very resistant about going to the  and they needed to make arrangements for him to have dialysis while he was there.  It ended up to be very healing time for him and it was important for them to be there.    Valeria continue to discuss issues with her  and their relationship.  She is struggling with his issues of anger that is projected towards her.  We discussed this dynamic at length and talked about strategies for setting up boundaries for her own self protection.  She has been implementing some strategies that have been helpful in this area  and has helped alleviate some of the stress for her about the situation.    Valeria is interested in individual psychotherapy every 1 to 2 weeks to help her cope with the emotional aspects of her cancer and cancer treatment.  I will provide her with some information about utilizing cognitive behavioral therapy strategies to help manage symptoms of pain and anxiety & depression.  She has made good progress in going through the written material that I have given her.  She has started doing guided imagery exercises.  She plans to do some painting to illustrate her image for her guided imagery.  In addition, she has been doing some reading about cognitive behavioral therapy and has started to implement some of the strategies that we discussed.  She has read up on the side effects from her treatment and read that anxiety and irritability tend to be some of the side effects.  She is making a conscious effort to utilize some of the skills that we discussed to work on these issues.  I was able to provide her with 2 Omkar packs from the [employer] today for her to grand sons that live with them.  I also provided her friend, who was with her all day for the appointments with support group resources from [employer].  They have an online support group for families and friends supporting individuals with cancer.    Valeria had several difficulties over the past few months.  They had their pipes leak at their house and found out that they were corroded.  They are getting minimal help from their insurance company, but her brother is helping to fix this issue.      Valeria indicates that she has been working on a plan to get exercise. She has been walking on the treadmill 1 mile, 3 days a week.  Her sister picks her up and takes her to her house to use the treadmill.  This is been helpful as it creates some accountability for Valeria and also gives her the opportunity to spend some quality time with her sister.  She also is considering  getting a treadmill for their home as her current treadmill is no longer working.  She also is working on having good nutrition.    Valeria had a girlfriend that  recently from metastatic breast cancer.  She processed her thoughts and feelings about this loss and we discussed strategies to help her release her grief.    We also talked about issues with her  and some strategies to help set boundaries and limits and also encouraged him to get help he needs.  She also talked about how her 8-year-old grandson was feeling anxious and worried as he is a teacher is pregnant and decided to go on leave early.  He started with a new teacher and is having a lot of anxiety with the change.  He had some thoughts of hurting himself and his mom took him to the emergency room at [facility].  He is doing much better this week.  His mom, Collette has a history of having suicide attempts 3-4 times in 1 year, prior to her children being born.  Valeria is concerned that some of Collette's depressive tendencies are wearing off on her grandson.  We discussed strategies that might be helpful in this area.    Valeria is interested in meeting on a regular basis.  She would like to meet again for a virtual appointment in 1 to 2 weeks.  She is scheduled for appointments through 2022..    Diagnosis:    1.  Adjustment disorder with mixed anxiety and depressed mood  2.  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  Other information was consistent from the note that was made on 1/17/2022.      This note was created with the help of Dragon dictation system.  Grammatical and typing errors are not intentional.     Review of long-term goals: Treatment plan was reviewed and updated.       Provider: Mary Ellen Chan MA, LP, LICSW    Date:  10/5/2021  Time:  3:05 PM

## 2022-02-09 ENCOUNTER — INFUSION THERAPY VISIT (OUTPATIENT)
Dept: INFUSION THERAPY | Facility: HOSPITAL | Age: 72
End: 2022-02-09
Attending: INTERNAL MEDICINE
Payer: COMMERCIAL

## 2022-02-09 VITALS
HEART RATE: 58 BPM | TEMPERATURE: 97.6 F | DIASTOLIC BLOOD PRESSURE: 53 MMHG | SYSTOLIC BLOOD PRESSURE: 118 MMHG | OXYGEN SATURATION: 96 % | RESPIRATION RATE: 18 BRPM

## 2022-02-09 DIAGNOSIS — C90.00 MULTIPLE MYELOMA WITH FAILED REMISSION (H): Primary | ICD-10-CM

## 2022-02-09 LAB
BASOPHILS # BLD AUTO: 0 10E3/UL (ref 0–0.2)
BASOPHILS NFR BLD AUTO: 1 %
EOSINOPHIL # BLD AUTO: 0.1 10E3/UL (ref 0–0.7)
EOSINOPHIL NFR BLD AUTO: 2 %
ERYTHROCYTE [DISTWIDTH] IN BLOOD BY AUTOMATED COUNT: 17 % (ref 10–15)
HCT VFR BLD AUTO: 42.1 % (ref 35–47)
HGB BLD-MCNC: 13.8 G/DL (ref 11.7–15.7)
HOLD SPECIMEN: NORMAL
IMM GRANULOCYTES # BLD: 0.1 10E3/UL
IMM GRANULOCYTES NFR BLD: 1 %
LYMPHOCYTES # BLD AUTO: 0.6 10E3/UL (ref 0.8–5.3)
LYMPHOCYTES NFR BLD AUTO: 12 %
MCH RBC QN AUTO: 32.9 PG (ref 26.5–33)
MCHC RBC AUTO-ENTMCNC: 32.8 G/DL (ref 31.5–36.5)
MCV RBC AUTO: 100 FL (ref 78–100)
MONOCYTES # BLD AUTO: 0 10E3/UL (ref 0–1.3)
MONOCYTES NFR BLD AUTO: 1 %
NEUTROPHILS # BLD AUTO: 4.3 10E3/UL (ref 1.6–8.3)
NEUTROPHILS NFR BLD AUTO: 83 %
NRBC # BLD AUTO: 0 10E3/UL
NRBC BLD AUTO-RTO: 0 /100
PLATELET # BLD AUTO: 241 10E3/UL (ref 150–450)
RBC # BLD AUTO: 4.2 10E6/UL (ref 3.8–5.2)
WBC # BLD AUTO: 5.1 10E3/UL (ref 4–11)

## 2022-02-09 PROCEDURE — 250N000011 HC RX IP 250 OP 636: Performed by: NURSE PRACTITIONER

## 2022-02-09 PROCEDURE — 36415 COLL VENOUS BLD VENIPUNCTURE: CPT | Performed by: NURSE PRACTITIONER

## 2022-02-09 PROCEDURE — 96401 CHEMO ANTI-NEOPL SQ/IM: CPT

## 2022-02-09 PROCEDURE — 85025 COMPLETE CBC W/AUTO DIFF WBC: CPT | Performed by: NURSE PRACTITIONER

## 2022-02-09 RX ORDER — NALOXONE HYDROCHLORIDE 0.4 MG/ML
0.2 INJECTION, SOLUTION INTRAMUSCULAR; INTRAVENOUS; SUBCUTANEOUS
Status: DISCONTINUED | OUTPATIENT
Start: 2022-02-09 | End: 2022-02-09 | Stop reason: HOSPADM

## 2022-02-09 RX ORDER — DIPHENHYDRAMINE HYDROCHLORIDE 50 MG/ML
50 INJECTION INTRAMUSCULAR; INTRAVENOUS
Status: DISCONTINUED | OUTPATIENT
Start: 2022-02-09 | End: 2022-02-09 | Stop reason: HOSPADM

## 2022-02-09 RX ORDER — EPINEPHRINE 1 MG/ML
0.3 INJECTION, SOLUTION INTRAMUSCULAR; SUBCUTANEOUS EVERY 5 MIN PRN
Status: DISCONTINUED | OUTPATIENT
Start: 2022-02-09 | End: 2022-02-09 | Stop reason: HOSPADM

## 2022-02-09 RX ORDER — MEPERIDINE HYDROCHLORIDE 25 MG/ML
25 INJECTION INTRAMUSCULAR; INTRAVENOUS; SUBCUTANEOUS EVERY 30 MIN PRN
Status: DISCONTINUED | OUTPATIENT
Start: 2022-02-09 | End: 2022-02-09 | Stop reason: HOSPADM

## 2022-02-09 RX ORDER — METHYLPREDNISOLONE SODIUM SUCCINATE 125 MG/2ML
125 INJECTION, POWDER, LYOPHILIZED, FOR SOLUTION INTRAMUSCULAR; INTRAVENOUS
Status: DISCONTINUED | OUTPATIENT
Start: 2022-02-09 | End: 2022-02-09 | Stop reason: HOSPADM

## 2022-02-09 RX ORDER — ALBUTEROL SULFATE 0.83 MG/ML
2.5 SOLUTION RESPIRATORY (INHALATION)
Status: DISCONTINUED | OUTPATIENT
Start: 2022-02-09 | End: 2022-02-09 | Stop reason: HOSPADM

## 2022-02-09 RX ORDER — ALBUTEROL SULFATE 90 UG/1
1-2 AEROSOL, METERED RESPIRATORY (INHALATION)
Status: DISCONTINUED | OUTPATIENT
Start: 2022-02-09 | End: 2022-02-09 | Stop reason: HOSPADM

## 2022-02-09 RX ADMIN — BORTEZOMIB 2.1 MG: 3.5 INJECTION, POWDER, LYOPHILIZED, FOR SOLUTION INTRAVENOUS; SUBCUTANEOUS at 14:46

## 2022-02-09 ASSESSMENT — PAIN SCALES - GENERAL: PAINLEVEL: MODERATE PAIN (5)

## 2022-02-09 NOTE — PROGRESS NOTES
Infusion Nursing Note:  Lizzie Viera presents today for C#7 D#8 for treatment regimen.    Patient seen by provider today: No   present during visit today: Not Applicable.    Note: Patient assessed and vital signs stable. Administered Velcade subcutaneous (right lower abdomen) per provider order.      Intravenous Access:  Labs drawn peripherally.    Treatment Conditions:  Lab Results   Component Value Date    HGB 13.8 02/09/2022    WBC 5.1 02/09/2022    ANEU 4.3 10/27/2021    ANEUTAUTO 4.3 02/09/2022     02/09/2022      Results reviewed, labs MET treatment parameters, ok to proceed with treatment.      Post Infusion Assessment:  Patient tolerated injection without incident.       Discharge Plan:   Patient discharged in stable condition accompanied by: self.  Departure Mode: Ambulatory.      JOS SWAIN RN

## 2022-02-10 ENCOUNTER — DOCUMENTATION ONLY (OUTPATIENT)
Dept: ONCOLOGY | Facility: CLINIC | Age: 72
End: 2022-02-10

## 2022-02-10 ENCOUNTER — OFFICE VISIT (OUTPATIENT)
Dept: PHYSICAL MEDICINE AND REHAB | Facility: CLINIC | Age: 72
End: 2022-02-10
Payer: COMMERCIAL

## 2022-02-10 VITALS — OXYGEN SATURATION: 97 % | HEART RATE: 60 BPM | SYSTOLIC BLOOD PRESSURE: 157 MMHG | DIASTOLIC BLOOD PRESSURE: 65 MMHG

## 2022-02-10 DIAGNOSIS — M54.50 LUMBAR SPINE PAIN: ICD-10-CM

## 2022-02-10 DIAGNOSIS — M79.18 MYOFASCIAL PAIN: ICD-10-CM

## 2022-02-10 DIAGNOSIS — C90.00 MULTIPLE MYELOMA, REMISSION STATUS UNSPECIFIED (H): ICD-10-CM

## 2022-02-10 DIAGNOSIS — M48.04 THORACIC SPINAL STENOSIS: Primary | ICD-10-CM

## 2022-02-10 DIAGNOSIS — R29.898 LEG WEAKNESS, BILATERAL: ICD-10-CM

## 2022-02-10 PROCEDURE — 99214 OFFICE O/P EST MOD 30 MIN: CPT | Performed by: PHYSICAL MEDICINE & REHABILITATION

## 2022-02-10 RX ORDER — GABAPENTIN 300 MG/1
600 CAPSULE ORAL 3 TIMES DAILY
Qty: 180 CAPSULE | Refills: 3 | Status: SHIPPED | OUTPATIENT
Start: 2022-02-10 | End: 2022-07-01

## 2022-02-10 ASSESSMENT — PAIN SCALES - GENERAL: PAINLEVEL: SEVERE PAIN (6)

## 2022-02-10 NOTE — LETTER
2/10/2022         RE: Lizzie Viera  [address]  [place] [address]        Dear Colleague,    Thank you for referring your patient, Lizzie Viera, to the [facility] [place]. Please see a copy of my visit note below.    Assessment/Plan:      Lizzie was seen today for referral and neck pain.    Diagnoses and all orders for this visit:    Thoracic spinal stenosis  -     MR Thoracic Spine w/o Contrast; Future  -     gabapentin (NEURONTIN) 300 MG capsule; Take 2 capsules (600 mg) by mouth 3 times daily    Multiple myeloma, remission status unspecified (H)  -     MR Thoracic Spine w/o Contrast; Future    Leg weakness, bilateral    Lumbar spine pain    Myofascial pain  -     gabapentin (NEURONTIN) 300 MG capsule; Take 2 capsules (600 mg) by mouth 3 times daily         Assessment: Hossein 71 year old female  with a history of hyperlipidemia, anxiety, depression, irritable bowel, neuropathy, osteoporosis and multiple myeloma with a T7 fracture and lesion  resulting in spinal stenosis:     1.  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  No signs of thoracic myelopathy neurologically stable.  She does have some weakness in the lower extremities and the extensor houses longus/great toe extension which appears stable.  Symptoms are stable and paresthesias may be slightly improving with physical therapy.     2.  Chronic lumbar spine pain intermittently.    Symptoms are stable with physical therapy.  Only some mild degenerative changes on MRI.     3.  Bilateral foot paresthesias consistent with neuropathy.  Stable with PT.    4.  She has cervical spine and upper thoracic spine myofascial pain.    Discussion:    1.  We discussed the diagnosis and treatment options were discussed monitoring the thoracic myelopathy with serial MRIs along with her course of physical therapy.  I  discussed the MRI lumbar spine shows no compressive lesions in the central canal or foramen only some mild degenerative change.  We discussed potential for continuing therapy, monitoring her myelopathy with MRIs, medication changes.    2. Continue plan of physical therapy and with home exercises.    3.  Recommend MRI of her lumbar spine to be done in approximately 6weeks to monitor the thoracic myelopathy given the lower extremity weakness.    4.  Increase gabapentin slowly to 600 mg 3 times daily for neuropathy symptoms pain and myofascial pain.  New prescription provided,  appropriate only been prescribed gabapentin from PCP.    5.  Follow-up with me in 2 months.    30 minutes were spent on the date of the encounter performing chart review, patient visit and documentation in addition to any procedure.     It was our pleasure caring for your patient today, if there any questions or concerns please do not hesitate to contact us.      Subjective:   Patient ID: Lizzie Viera is a 71 year old female.    History of Present Illness: Patient presents for follow-up of thoracic spine pain lumbar spine pain lower extremity paresthesias cervical spine pain.  Her symptoms are generally improved overall her back pain is improved with physical therapy but still has thoracic pain and back pain with twisting or being up and around.  Has a brace that she wears for thoracic spine but this causes issues with her low back when she wears it for too long and really has not worn it that much.  She does get some muscle spasms with doing her physical therapy exercises throughout her back is better with rest.  Her pain is an 8/10 at worst 6/10 today 3/10 at best.  She also has numbness and tingling in both feet and poor balance all of this is improving with some physical therapy balance exercises.  She is on gabapentin 300 mg 3 times daily no side effects and no improvement of symptoms.  She does have benefit with massage from  physical therapy or some manual techniques and wondering if it is okay for her to do that.    Imaging: MRI lumbar spine shows mild facet arthropathy L3-4 mild disc height loss L4-5 with mild facet arthropathy normal disc height with normal facets L5-S1 with no significant central stenosis or foraminal stenosis at any level.  Imaging report personally reviewed.  I reviewed MRI thoracic spine from August 31    As well as a CT scan of the thoracic spine from September.  Shows a T7-8 spinal stenosis related to multiple myeloma lesion.    Review of Systems: Pertinent positives: Numbness and tingling in the feet.  She gets headaches.  Pain worse at night difficulty swallowing.  Denies bowel or bladder incontinence, falls, fevers or unintentional weight loss         Past Medical History:   Diagnosis Date     Cervical dysplasia      Chronic RUQ pain      Depressive disorder      Multiple myeloma (H)      Osteoporosis        The following portions of the patient's history were reviewed and updated as appropriate: allergies, current medications, past family history, past medical history, past social history, past surgical history and problem list.           Objective:   Physical Exam:    BP (!) 157/65 (BP Location: Right arm, Patient Position: Sitting)   Pulse 60   SpO2 97%   There is no height or weight on file to calculate BMI.      General: Alert and oriented with normal affect. Attention, knowledge, memory, and language are intact. No acute distress.   Eyes: Sclerae are clear.  Respirations: Unlabored. CV: No lower extremity edema.      Gait:  Nonantalgic    Sensation is intact to light touch throughout the  lower extremities.  Reflexes are  1+ patellar and 0 Achilles with downgoing toes.    Manual muscle testing reveals:  Right /Left out of 5     5/5 knee flexors  5/5 knee extensors  5/5 ankle plantar flexors  5/5 ankle dorsiflexors  4+ /5-   [facility]        Again, thank you for allowing me to participate in the care of  your patient.        Sincerely,        Wyatt Pascual, DO

## 2022-02-10 NOTE — PROGRESS NOTES
Assessment/Plan:      Lizzie was seen today for referral and neck pain.    Diagnoses and all orders for this visit:    Thoracic spinal stenosis  -     MR Thoracic Spine w/o Contrast; Future  -     gabapentin (NEURONTIN) 300 MG capsule; Take 2 capsules (600 mg) by mouth 3 times daily    Multiple myeloma, remission status unspecified (H)  -     MR Thoracic Spine w/o Contrast; Future    Leg weakness, bilateral    Lumbar spine pain    Myofascial pain  -     gabapentin (NEURONTIN) 300 MG capsule; Take 2 capsules (600 mg) by mouth 3 times daily         Assessment: Pleasant 71 year old female  with a history of hyperlipidemia, anxiety, depression, irritable bowel, neuropathy, osteoporosis and multiple myeloma with a T7 fracture and lesion  resulting in spinal stenosis:     1.  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  No signs of thoracic myelopathy neurologically stable.  She does have some weakness in the lower extremities and the extensor houses longus/great toe extension which appears stable.  Symptoms are stable and paresthesias may be slightly improving with physical therapy.     2.  Chronic lumbar spine pain intermittently.    Symptoms are stable with physical therapy.  Only some mild degenerative changes on MRI.     3.  Bilateral foot paresthesias consistent with neuropathy.  Stable with PT.    4.  She has cervical spine and upper thoracic spine myofascial pain.    Discussion:    1.  We discussed the diagnosis and treatment options were discussed monitoring the thoracic myelopathy with serial MRIs along with her course of physical therapy.  I discussed the MRI lumbar spine shows no compressive lesions in the central canal or foramen only some mild degenerative change.  We discussed potential for continuing therapy, monitoring her myelopathy with MRIs, medication changes.    2. Continue plan of physical therapy  and with home exercises.    3.  Recommend MRI of her lumbar spine to be done in approximately 6weeks to monitor the thoracic myelopathy given the lower extremity weakness.    4.  Increase gabapentin slowly to 600 mg 3 times daily for neuropathy symptoms pain and myofascial pain.  New prescription provided,  appropriate only been prescribed gabapentin from PCP.    5.  Follow-up with me in 2 months.    30 minutes were spent on the date of the encounter performing chart review, patient visit and documentation in addition to any procedure.     It was our pleasure caring for your patient today, if there any questions or concerns please do not hesitate to contact us.      Subjective:   Patient ID: Lizzie Viera is a 71 year old female.    History of Present Illness: Patient presents for follow-up of thoracic spine pain lumbar spine pain lower extremity paresthesias cervical spine pain.  Her symptoms are generally improved overall her back pain is improved with physical therapy but still has thoracic pain and back pain with twisting or being up and around.  Has a brace that she wears for thoracic spine but this causes issues with her low back when she wears it for too long and really has not worn it that much.  She does get some muscle spasms with doing her physical therapy exercises throughout her back is better with rest.  Her pain is an 8/10 at worst 6/10 today 3/10 at best.  She also has numbness and tingling in both feet and poor balance all of this is improving with some physical therapy balance exercises.  She is on gabapentin 300 mg 3 times daily no side effects and no improvement of symptoms.  She does have benefit with massage from physical therapy or some manual techniques and wondering if it is okay for her to do that.    Imaging: MRI lumbar spine shows mild facet arthropathy L3-4 mild disc height loss L4-5 with mild facet arthropathy normal disc height with normal facets L5-S1 with no significant  central stenosis or foraminal stenosis at any level.  Imaging report personally reviewed.  I reviewed MRI thoracic spine from August 31    As well as a CT scan of the thoracic spine from September.  Shows a T7-8 spinal stenosis related to multiple myeloma lesion.    Review of Systems: Pertinent positives: Numbness and tingling in the feet.  She gets headaches.  Pain worse at night difficulty swallowing.  Denies bowel or bladder incontinence, falls, fevers or unintentional weight loss         Past Medical History:   Diagnosis Date     Cervical dysplasia      Chronic RUQ pain      Depressive disorder      Multiple myeloma (H)      Osteoporosis        The following portions of the patient's history were reviewed and updated as appropriate: allergies, current medications, past family history, past medical history, past social history, past surgical history and problem list.           Objective:   Physical Exam:    BP (!) 157/65 (BP Location: Right arm, Patient Position: Sitting)   Pulse 60   SpO2 97%   There is no height or weight on file to calculate BMI.      General: Alert and oriented with normal affect. Attention, knowledge, memory, and language are intact. No acute distress.   Eyes: Sclerae are clear.  Respirations: Unlabored. CV: No lower extremity edema.      Gait:  Nonantalgic    Sensation is intact to light touch throughout the  lower extremities.  Reflexes are  1+ patellar and 0 Achilles with downgoing toes.    Manual muscle testing reveals:  Right /Left out of 5     5/5 knee flexors  5/5 knee extensors  5/5 ankle plantar flexors  5/5 ankle dorsiflexors  4+ /5-   [facility]

## 2022-02-10 NOTE — PATIENT INSTRUCTIONS
1. An MRI was ordered for you today.  You will be contacted by scheduling within 3 days.    If you are not contacted, please call Radiology at [phone].   Please schedule this for mid-march to mid April.    2. Increase gabapentin to 600mg three times daily    Prescribed Gabapentin today, 300 mg tablets, to be titrated up to 3 tablets 3 times a day as tolerated for your nerve pain. Please follow Gabapentin dosing chart below. START AT DAY 10.    Gabapentin 300mg Dosing Chart    DATE  MORNING AFTERNOON BEDTIME    Day 1 0 0 1    Day 2 0 0 1    Day 3 0 0 1    Day 4 1 0 1    Day 5 1 0 1    Day 6 1 0 1    Day 7 1 1 1    Day 8 1 1 1    Day 9 1 1 1    Day 10 1 1 2    Day 11 1 1 2    Day 12 1 1 2    Day 13 2 1 2    Day 14 2 1 2    Day 15 2 1 2    Day 16 2 2 2    Day 17 2 2 2    Day 18 2 2 2    Day 19        Day 20       Day 21       Day 22       Day 23       Day 24       Day 25       Day 26       Day 27        Continue medication, taking 2 capsules three times daily    Please call if you have any questions regarding how to take your medication  Clinic Phone # [phone]      3. Continue with physical therapy

## 2022-02-10 NOTE — NURSING NOTE
Performed chart review to determine [facility] availability.       Patient declines to participate.     See RHETT note from 2.2.22.    Mervat Joiner RN

## 2022-02-14 ENCOUNTER — VIRTUAL VISIT (OUTPATIENT)
Dept: ONCOLOGY | Facility: CLINIC | Age: 72
End: 2022-02-14
Attending: INTERNAL MEDICINE
Payer: COMMERCIAL

## 2022-02-14 DIAGNOSIS — F43.23 ADJUSTMENT DISORDER WITH MIXED ANXIETY AND DEPRESSED MOOD: Primary | ICD-10-CM

## 2022-02-14 DIAGNOSIS — C90.00 MULTIPLE MYELOMA NOT HAVING ACHIEVED REMISSION (H): ICD-10-CM

## 2022-02-14 PROCEDURE — 90837 PSYTX W PT 60 MINUTES: CPT | Mod: TEL | Performed by: SOCIAL WORKER

## 2022-02-14 NOTE — CONFIDENTIAL NOTE
Psychology Psychotherapy  Note-virtual visit      Name:  Lizzie Viera  :  1950  MRN:  [id number]      Date of Service: 2022  Duration: 60 minutes (11:00 to 12:00)    The patient has been notified of following:      This telephone visit will be conducted via a call between you and your provider. We have found that certain health care needs can be provided without a face to face meeting.  This service lets us provide the care you need with a short phone conversation.      Telephone visits are billed at different rates depending on your insurance coverage. During this emergency period, for some insurers they may be billed the same as an in-person visit.  Please reach out to your insurance provider with any questions.     If during the course of the call the if provider feels a telephone visit is not appropriate, you will not be charged for this service.     Patient has given verbal consent to a Telephone visit? Yes        Target Symptoms:    The patient was seen in light of concerns regarding symptoms of depression and anxiety as evidenced by patient and staff report.    Participation:  The patient was able to participate and benefit from treatment as evidenced by her verbal expression of ideas and initiation of topics discussed.    Mental Status:    Mood/Affect: Worried and concerned  Suicidal Ideation:  absent  Homicidal Ideation:  absent  Thought process:  normal  Thought content:  Clear  Fund of Knowledge:  Sufficient  Attention/Concentration:  Normal  Language ability:  intact  Speech: normal  Memory:  recent and remote memory intact  Insight and Judgement:  good  Orientation:  self, place and time  Appearance: N/A-telephone visit  Eye Contact: N/A-telephone visit  Estimated IQ:  Average      Intervention:    Valeria was interested in a follow-up psychotherapy session today.  We had a virtual appointment today.  Valeria was referred to me by JOSH Lopez from the palliative care clinic.   She is also followed by Dr. Blanco and Dr. Rapp.      Valeria updated me on her health issues.  She had her recent clinic visit with Zehra Pablo CNP on 2/2/2022.  She shared her thoughts about being reluctant to proceed with the transplant at this time.  She prefers her monoclonal clinic to be at 0 before she proceeds and plans to continue her current treatment regiment.  She will be referred back to the transplant team when she feels ready.  Valeria also met with Dr. Pascual, from physical medicine rehab.  He is recommending physical therapy.  He also offered some medication, which she wants to wait on at this time.  She is interested in learning exercises that she can do at home.  Valeria indicated that she felt sick this weekend.  She had flulike symptoms including feeling lightheaded and weak.  She states that her  also had this for few days prior to her episode.  She is feeling better today.    Valeria met with the bone marrow transplant team on 1/4/2022.  The recommendation is for her to have a bone marrow transplant as soon as it can be arranged.  She found out that she will still need to continue chemotherapy after her transplant.  She has done some research and feels that it would be better if she has the transplant when there is minimal residual disease.  She met with the [facility] physiciain in [place] on 2/8/2022..  She has done extensive thinking and reviewing the written material from the transplant team and feels very comfortable with her decision to wait to proceed with transplant at this time.  She also was able to get some good advice from her appointment with the integrative [facility] physician.  He is doing some further testing and recommending some supplements.  Valeria also indicated that her gabapentin dose has increased.  She now is taking gabapentin 3 times a day, 600 mg.  Valeria processed her thoughts and feelings about her health issues and we discussed strategies to help her  nydia Shaw has a history of multiple myeloma.  She states that in September 2020, she had a fracture of T-7 and was hospitalized at [facility].  She did not have a good experience in transferring her care over to [facility] and the cancer clinic at [facility].  In April 2021, she continued to have pain in her spine and had biopsy that confirmed her diagnosis of multiple myeloma in May 2021.  She was hospitalized in September at [facility] and received radiation therapy during her hospitalization.  Her last visit with Dr. Blanco on 9/29/2021,  he indicates that clinically her multiple myeloma is behaving more aggressively with bone lesions.  She has significant bone disease with osteoporosis and compression fractures.  Dr. Blanco started her on Velcade, Revlimid and dexamethasone.  She had her first treatment on 9/29/2021.  She also has been receiving monthly Zometa shots.  She had additional radiation treatment on 10/6/2021 through 10/12/2021 to T1.  She also has a skull lesion that they will hold off on radiation at this time. She met with the neurosurgeon on 10/11/2021 and he felt that T6-7 and 8 are stable.   Her main physical complaints are pain and fatigue. She also is experiencing temperature changes and changes to her taste, which she believes is due to her chemo pill. At her 9/7/2021 palliative care clinic visit, they discussed advanced directives.  She has completed her health care directive.  She expressed that she wants to be a DNR/DNI and has expressed this to her care providers.  She decided to have her daughter be the primary proxy and her sister and girlfriend to be the alternates.      Valeria indicates that she has done a lot of research and has decided not to be vaccinated for COVID-19.  The daughter that lives with them tested positive for COVID-19 last week.  Valeria indicated that she had minimal symptoms, but is feeling fine now.  She never took a test to see if she actually had  "COVID-19.  Her other daughter had COVID-19 in May and her son and  also tested positive at that time.  She had the antibody test and found out that she also had COVID-19 in the past.  She was asymptomatic and did not realize she even had it.  She is not interested in getting the vaccine.  She is interested in doing alternative/complementary medicine to help combat her cancer.  She has been taking anti-inflammatory medication and has been reading books and articles about supplements that help \"kill cancer\".  She currently is on a variety of supplements and has discussed her supplements with Dr. Blanco.  Valeria indicates that she finds her medical background useful in understanding her health condition.    Valeria processed her thoughts and feelings about her cancer and her cancer treatment.  She is experiencing symptoms of anxiety and depression related to coping with her cancer and other life stressors.  She does not clinically meet diagnostic criteria for a mood disorder or an anxiety disorder.  She meets diagnostic criteria for adjustment disorder with mixed anxiety and depressed mood.  She denies suicidal ideation or thoughts of harming herself or others.    Valeria grew up in the [place].  She is the second oldest in a family of 8 children.  She has 3 sisters and 4 brothers.  Her older brother is exactly 1 year and a day older than her.  She states that she has a very close relationship with her siblings.  Most of them live in the area.  Her sisters have been extremely helpful during this difficult time.  She has 2 of her sisters at her house cleaning during her last visit..    Valeria lives in her [place] home with her , Ramos; daughter Collette; and her 2 sons ages 4 and 8.  Valeria and Ramos have been  for 42 years and that on a blind date.  She states that Ramos has end-stage renal disease and is receiving dialysis 3 times a week.  He also had trouble aortic aneurysm repair and he has type 2 " diabetes. He had a cardiac procedure at the [facility] on 10/15/2021, which went well.  He was contacted by the [facility] on Friday, 2022 about being on the transplant list.  He has a friend who is willing to donate his kidney. Ramos retired at age 69 from being a .  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  When he had his annual DOT physical, he did not want to share his medical history with the DOT physician and decided to quit his job, on the spot that day.  This created some issues as he was the carrier of the insurance for the family.  Valeria indicates that Ramos is extremely angry and tends to take it out on the family members.  His father  when he was 11 years old.  He was a  and  on an experimental aircraft in [place].  He recently became so angry and agitated towards their daughter, that she gave him an ultimatum that he needs to start seeing a therapist.  Recently, he became extremely angry and agitated  with their son and her sister, to the point that they needed to leave the house due to his verbal abuse.  Valeria has been a buffer for the family regarding his anger.  She has been strongly encouraging him to make an appointment with a therapist.  The staff at [facility] also have talked with him about seeing a therapist and had a referral recommendation for him.  He has been refusing to make an appointment with a therapist.  At one point, he agreed to meet for family therapy with his daughter.  He currently refuses to receive any psychological help and his behavior continues to be extremely angry and agitated.  Due to his behavior, their daughter has decided that she needs to start looking for another housing situation.  She works with a therapist who is helping her get on public housing.  This may take a number of months.  We talked about how his behavior affects Valeria emotionally.  We also talked about a plan for respite if she needs a break from the home situation.  She has come to the  conclusion that he does not have insight about his behavior and how it affects others.  She is starting to do some recommended psychoeducational reading that may be helpful for her in this area.    Valeria and Ramos have 3 children.  The oldest is their daughter, Sharifa, age 38.  She lives near [place] and has 2 children.  She works as a teacher.  Their second oldest is their daughter, Collette, age 34, who currently lives with them along with her for an 8-year-old sons.  She is on Social Security disability due to her depression and past suicidal ideation.  She has received a great deal of therapy including DBT.  She has been extremely helpful to Valeria and wants to be her caretaker.  Their youngest is their son, Trell, age 30, l just moved out of the home 3 weeks ago to [facility], in order to be closer to his work in the area. He works 12-hour shifts starting at 4 AM in a warehouse that provides food for gas stations and service stations.    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  She has worked in medical research.  She spent 18 years working at the [employer].  She also has worked at [employer] in nuclear medicine and for [place] cardiology and nuclear medicine.  She spent several years working for [employer].  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    Valeria was raised Christian and went to a Christian grade school, [employer]'s in [place]. She also went to [employer]. She currently describes herself as being a spiritual person and believes that mother nature is the guiding spirit of the planet. She has some  and [place] spirituality beliefs. We talked about ways for her to incorporate her spirituality into her healing process.    Valeria is interested in individual psychotherapy to help her work through the emotional aspects of her cancer and  cancer treatments.  She is experiencing symptoms of anxiety and depression related to her cancer and other life stresses.  She would like to initially meet on a weekly basis.  I have left a message for our information coordinators to get her scheduled in for a series of appointments.    Psychoterapeutic Techniques:  Cognitive-behavioral therapy, motivational interviewing and supportive psychotherapy strategies were utilized.    Necessity:    The session was necessary for the care of the patient to address symptoms of depression and anxiety related to the patient's medical condition.    Progress/ Plan:    Valeria continue to  process her thoughts and feelings about her recent clinic visits.  She has made the decision that she wants to hold off on proceeding with the transplant at this time.  She was discouraged to learn that she will still need to have chemotherapy after the bone marrow transplant.  She is concerned about the timing of getting this done now, versus waiting until there is minimal residual disease.  She plans to meet with an integrative medicine physician on 2022.     We discussed some psychoeducational reading material that may be helpful for Valeria.  She started reading a book that I recommended to help her cope and deal with the issues she is having with her .  In addition, she is interested in some of the books that I recommended regarding spirituality and complementary medicine.    Valeria indicated that her mother-in-law recently .  She was 92 years old and had been living independently.  She went with her  to [place] from 12/3/2021 until  2021.  Her  was very resistant about going to the  and they needed to make arrangements for him to have dialysis while he was there.  It ended up to be very healing time for him and it was important for them to be there.    Valeria continue to discuss issues with her  and their relationship.  She is struggling with his  issues of anger that is projected towards her.  We discussed this dynamic at length and talked about strategies for setting up boundaries for her own self protection.  She has been implementing some strategies that have been helpful in this area and has helped alleviate some of the stress for her about the situation.    Valeria is interested in individual psychotherapy every 1 to 2 weeks to help her cope with the emotional aspects of her cancer and cancer treatment.  I will provide her with some information about utilizing cognitive behavioral therapy strategies to help manage symptoms of pain and anxiety & depression.  She has made good progress in going through the written material that I have given her.  She has started doing guided imagery exercises.  She plans to do some painting to illustrate her image for her guided imagery.  In addition, she has been doing some reading about cognitive behavioral therapy and has started to implement some of the strategies that we discussed.  She has read up on the side effects from her treatment and read that anxiety and irritability tend to be some of the side effects.  She is making a conscious effort to utilize some of the skills that we discussed to work on these issues.  I was able to provide her with 2 Omkar packs from the [employer] today for her to grand sons that live with them.  I also provided her friend, who was with her all day for the appointments with support group resources from [employer].  They have an online support group for families and friends supporting individuals with cancer.    Valeria had several difficulties over the past few months.  They had their pipes leak at their house and found out that they were corroded.  They are getting minimal help from their insurance company, but her brother is helping to fix this issue.      Valeria indicates that she has been working on a plan to get exercise. She has been walking on the treadmill 1 mile, 3 days a  week.  Her sister picks her up and takes her to her house to use the treadmill.  This is been helpful as it creates some accountability for Valeria and also gives her the opportunity to spend some quality time with her sister.  She also is considering getting a treadmill for their home as her current treadmill is no longer working.  She also is working on having good nutrition.    Valeria had a girlfriend that  recently from metastatic breast cancer.  She processed her thoughts and feelings about this loss and we discussed strategies to help her release her grief.    We also talked about issues with her  and some strategies to help set boundaries and limits and also encouraged him to get help he needs.  She also talked about how her 8-year-old grandson was feeling anxious and worried as he is a teacher is pregnant and decided to go on leave early.  He started with a new teacher and is having a lot of anxiety with the change.  He had some thoughts of hurting himself and his mom took him to the emergency room at [facility].  He is doing much better this week.  His mom, Collette has a history of having suicide attempts 3-4 times in 1 year, prior to her children being born.  Valeria is concerned that some of Collette's depressive tendencies are wearing off on her grandson.  We discussed strategies that might be helpful in this area.    Valeria is interested in meeting on a regular basis.  She would like to meet again for a virtual appointment in 1 to 2 weeks.  She is scheduled for appointments through 2022..    Diagnosis:    1.  Adjustment disorder with mixed anxiety and depressed mood  2.  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  Other information was consistent from the note that was made on 2/7/2022.      This note was created with the help of Dragon dictation system.  Grammatical and typing errors are not intentional.     Review of long-term goals: Treatment plan was reviewed and updated.       Provider: Mary Ellen Chan MA, LP, LICSW    Date:  10/5/2021  Time:  3:05 PM

## 2022-02-15 DIAGNOSIS — C90.00 MULTIPLE MYELOMA WITH FAILED REMISSION (H): Primary | ICD-10-CM

## 2022-02-15 RX ORDER — MEPERIDINE HYDROCHLORIDE 25 MG/ML
25 INJECTION INTRAMUSCULAR; INTRAVENOUS; SUBCUTANEOUS EVERY 30 MIN PRN
Status: CANCELLED | OUTPATIENT
Start: 2022-03-08

## 2022-02-15 RX ORDER — LORAZEPAM 2 MG/ML
0.5 INJECTION INTRAMUSCULAR EVERY 4 HOURS PRN
Status: CANCELLED | OUTPATIENT
Start: 2022-03-01

## 2022-02-15 RX ORDER — MEPERIDINE HYDROCHLORIDE 25 MG/ML
25 INJECTION INTRAMUSCULAR; INTRAVENOUS; SUBCUTANEOUS EVERY 30 MIN PRN
Status: CANCELLED | OUTPATIENT
Start: 2022-03-01

## 2022-02-15 RX ORDER — METHYLPREDNISOLONE SODIUM SUCCINATE 125 MG/2ML
125 INJECTION, POWDER, LYOPHILIZED, FOR SOLUTION INTRAMUSCULAR; INTRAVENOUS
Status: CANCELLED
Start: 2022-03-08

## 2022-02-15 RX ORDER — DIPHENHYDRAMINE HYDROCHLORIDE 50 MG/ML
50 INJECTION INTRAMUSCULAR; INTRAVENOUS
Status: CANCELLED
Start: 2022-03-08

## 2022-02-15 RX ORDER — NALOXONE HYDROCHLORIDE 0.4 MG/ML
0.2 INJECTION, SOLUTION INTRAMUSCULAR; INTRAVENOUS; SUBCUTANEOUS
Status: CANCELLED | OUTPATIENT
Start: 2022-02-22

## 2022-02-15 RX ORDER — EPINEPHRINE 1 MG/ML
0.3 INJECTION, SOLUTION INTRAMUSCULAR; SUBCUTANEOUS EVERY 5 MIN PRN
Status: CANCELLED | OUTPATIENT
Start: 2022-03-08

## 2022-02-15 RX ORDER — ALBUTEROL SULFATE 90 UG/1
1-2 AEROSOL, METERED RESPIRATORY (INHALATION)
Status: CANCELLED
Start: 2022-02-22

## 2022-02-15 RX ORDER — METHYLPREDNISOLONE SODIUM SUCCINATE 125 MG/2ML
125 INJECTION, POWDER, LYOPHILIZED, FOR SOLUTION INTRAMUSCULAR; INTRAVENOUS
Status: CANCELLED
Start: 2022-02-22

## 2022-02-15 RX ORDER — MEPERIDINE HYDROCHLORIDE 25 MG/ML
25 INJECTION INTRAMUSCULAR; INTRAVENOUS; SUBCUTANEOUS EVERY 30 MIN PRN
Status: CANCELLED | OUTPATIENT
Start: 2022-02-22

## 2022-02-15 RX ORDER — EPINEPHRINE 1 MG/ML
0.3 INJECTION, SOLUTION INTRAMUSCULAR; SUBCUTANEOUS EVERY 5 MIN PRN
Status: CANCELLED | OUTPATIENT
Start: 2022-02-22

## 2022-02-15 RX ORDER — DIPHENHYDRAMINE HYDROCHLORIDE 50 MG/ML
50 INJECTION INTRAMUSCULAR; INTRAVENOUS
Status: CANCELLED
Start: 2022-02-22

## 2022-02-15 RX ORDER — ALBUTEROL SULFATE 0.83 MG/ML
2.5 SOLUTION RESPIRATORY (INHALATION)
Status: CANCELLED | OUTPATIENT
Start: 2022-03-01

## 2022-02-15 RX ORDER — LORAZEPAM 2 MG/ML
0.5 INJECTION INTRAMUSCULAR EVERY 4 HOURS PRN
Status: CANCELLED | OUTPATIENT
Start: 2022-02-22

## 2022-02-15 RX ORDER — NALOXONE HYDROCHLORIDE 0.4 MG/ML
0.2 INJECTION, SOLUTION INTRAMUSCULAR; INTRAVENOUS; SUBCUTANEOUS
Status: CANCELLED | OUTPATIENT
Start: 2022-03-01

## 2022-02-15 RX ORDER — ALBUTEROL SULFATE 90 UG/1
1-2 AEROSOL, METERED RESPIRATORY (INHALATION)
Status: CANCELLED
Start: 2022-03-01

## 2022-02-15 RX ORDER — LORAZEPAM 2 MG/ML
0.5 INJECTION INTRAMUSCULAR EVERY 4 HOURS PRN
Status: CANCELLED | OUTPATIENT
Start: 2022-03-08

## 2022-02-15 RX ORDER — NALOXONE HYDROCHLORIDE 0.4 MG/ML
0.2 INJECTION, SOLUTION INTRAMUSCULAR; INTRAVENOUS; SUBCUTANEOUS
Status: CANCELLED | OUTPATIENT
Start: 2022-03-08

## 2022-02-15 RX ORDER — METHYLPREDNISOLONE SODIUM SUCCINATE 125 MG/2ML
125 INJECTION, POWDER, LYOPHILIZED, FOR SOLUTION INTRAMUSCULAR; INTRAVENOUS
Status: CANCELLED
Start: 2022-03-01

## 2022-02-15 RX ORDER — ALBUTEROL SULFATE 0.83 MG/ML
2.5 SOLUTION RESPIRATORY (INHALATION)
Status: CANCELLED | OUTPATIENT
Start: 2022-03-08

## 2022-02-15 RX ORDER — ALBUTEROL SULFATE 90 UG/1
1-2 AEROSOL, METERED RESPIRATORY (INHALATION)
Status: CANCELLED
Start: 2022-03-08

## 2022-02-15 RX ORDER — EPINEPHRINE 1 MG/ML
0.3 INJECTION, SOLUTION INTRAMUSCULAR; SUBCUTANEOUS EVERY 5 MIN PRN
Status: CANCELLED | OUTPATIENT
Start: 2022-03-01

## 2022-02-15 RX ORDER — ALBUTEROL SULFATE 0.83 MG/ML
2.5 SOLUTION RESPIRATORY (INHALATION)
Status: CANCELLED | OUTPATIENT
Start: 2022-02-22

## 2022-02-15 RX ORDER — DIPHENHYDRAMINE HYDROCHLORIDE 50 MG/ML
50 INJECTION INTRAMUSCULAR; INTRAVENOUS
Status: CANCELLED
Start: 2022-03-01

## 2022-02-16 ENCOUNTER — LAB (OUTPATIENT)
Dept: INFUSION THERAPY | Facility: HOSPITAL | Age: 72
End: 2022-02-16
Attending: INTERNAL MEDICINE
Payer: COMMERCIAL

## 2022-02-16 VITALS
HEART RATE: 61 BPM | OXYGEN SATURATION: 97 % | DIASTOLIC BLOOD PRESSURE: 55 MMHG | TEMPERATURE: 98.1 F | RESPIRATION RATE: 16 BRPM | SYSTOLIC BLOOD PRESSURE: 117 MMHG

## 2022-02-16 DIAGNOSIS — C90.00 MULTIPLE MYELOMA WITH FAILED REMISSION (H): Primary | ICD-10-CM

## 2022-02-16 LAB
BASOPHILS # BLD AUTO: 0.1 10E3/UL (ref 0–0.2)
BASOPHILS NFR BLD AUTO: 1 %
EOSINOPHIL # BLD AUTO: 0.4 10E3/UL (ref 0–0.7)
EOSINOPHIL NFR BLD AUTO: 7 %
ERYTHROCYTE [DISTWIDTH] IN BLOOD BY AUTOMATED COUNT: 17.2 % (ref 10–15)
HCT VFR BLD AUTO: 42.1 % (ref 35–47)
HGB BLD-MCNC: 13.4 G/DL (ref 11.7–15.7)
IMM GRANULOCYTES # BLD: 0 10E3/UL
IMM GRANULOCYTES NFR BLD: 1 %
LYMPHOCYTES # BLD AUTO: 1.1 10E3/UL (ref 0.8–5.3)
LYMPHOCYTES NFR BLD AUTO: 19 %
MCH RBC QN AUTO: 32.4 PG (ref 26.5–33)
MCHC RBC AUTO-ENTMCNC: 31.8 G/DL (ref 31.5–36.5)
MCV RBC AUTO: 102 FL (ref 78–100)
MONOCYTES # BLD AUTO: 0.5 10E3/UL (ref 0–1.3)
MONOCYTES NFR BLD AUTO: 9 %
NEUTROPHILS # BLD AUTO: 3.6 10E3/UL (ref 1.6–8.3)
NEUTROPHILS NFR BLD AUTO: 63 %
NRBC # BLD AUTO: 0 10E3/UL
NRBC BLD AUTO-RTO: 0 /100
PLATELET # BLD AUTO: 152 10E3/UL (ref 150–450)
RBC # BLD AUTO: 4.14 10E6/UL (ref 3.8–5.2)
TOTAL PROTEIN SERUM FOR ELP: 5.8 G/DL (ref 6–8)
WBC # BLD AUTO: 5.6 10E3/UL (ref 4–11)

## 2022-02-16 PROCEDURE — 84165 PROTEIN E-PHORESIS SERUM: CPT | Mod: TC

## 2022-02-16 PROCEDURE — 84155 ASSAY OF PROTEIN SERUM: CPT

## 2022-02-16 PROCEDURE — 83521 IG LIGHT CHAINS FREE EACH: CPT | Mod: 59

## 2022-02-16 PROCEDURE — 85025 COMPLETE CBC W/AUTO DIFF WBC: CPT | Performed by: NURSE PRACTITIONER

## 2022-02-16 PROCEDURE — 82784 ASSAY IGA/IGD/IGG/IGM EACH: CPT

## 2022-02-16 PROCEDURE — 84165 PROTEIN E-PHORESIS SERUM: CPT | Mod: 26 | Performed by: PATHOLOGY

## 2022-02-16 PROCEDURE — 250N000011 HC RX IP 250 OP 636: Performed by: NURSE PRACTITIONER

## 2022-02-16 PROCEDURE — 96401 CHEMO ANTI-NEOPL SQ/IM: CPT

## 2022-02-16 PROCEDURE — 36415 COLL VENOUS BLD VENIPUNCTURE: CPT | Performed by: NURSE PRACTITIONER

## 2022-02-16 RX ADMIN — BORTEZOMIB 2.1 MG: 3.5 INJECTION, POWDER, LYOPHILIZED, FOR SOLUTION INTRAVENOUS; SUBCUTANEOUS at 15:34

## 2022-02-16 NOTE — PROGRESS NOTES
Pt arrived ambulatory to clinic for Cycle # 7 Day # 15 of her chemotherapy regimen.  Labs were reviewed, pt met parameters for treatment.  Administered subcutaneous Velcade into LLQ of ABD per MD order.  Pt tolerated procedure well, no s/s of bleeding or bruising at site.  Pt verbalized understanding of plan of care and return to clinic.

## 2022-02-17 ENCOUNTER — TELEPHONE (OUTPATIENT)
Dept: ONCOLOGY | Facility: HOSPITAL | Age: 72
End: 2022-02-17
Payer: COMMERCIAL

## 2022-02-17 DIAGNOSIS — C90.00 MULTIPLE MYELOMA WITH FAILED REMISSION (H): Primary | ICD-10-CM

## 2022-02-17 LAB
IGA SERPL-MCNC: 31 MG/DL (ref 65–400)
IGG SERPL-MCNC: 539 MG/DL (ref 700–1700)
IGM SERPL-MCNC: 12 MG/DL (ref 60–280)
KAPPA LC FREE SER-MCNC: 1.03 MG/DL (ref 0.33–1.94)
KAPPA LC FREE/LAMBDA FREE SER NEPH: 1.16 {RATIO} (ref 0.26–1.65)
LAMBDA LC FREE SERPL-MCNC: 0.89 MG/DL (ref 0.57–2.63)

## 2022-02-17 RX ORDER — LENALIDOMIDE 25 MG/1
25 CAPSULE ORAL DAILY
Qty: 14 CAPSULE | Refills: 0 | Status: SHIPPED | OUTPATIENT
Start: 2022-02-23 | End: 2022-03-10

## 2022-02-17 NOTE — ORAL ONC MGMT
Oral Chemotherapy Monitoring Program   Left Voicemail    Attempted to contact patient today for follow up regarding oral chemotherapy, lenalidomide, for monthly follow-up. No answer. Left voicemail for patient to call us back at [phone] when able. No medication name was left.    Minnie Agrawal, PharmD, [facility]  Oral Chemotherapy Pharmacist  [phone]

## 2022-02-18 LAB
ALBUMIN PERCENT: 65.5 % (ref 51–67)
ALBUMIN SERPL ELPH-MCNC: 3.8 G/DL (ref 3.2–4.7)
ALPHA 1 PERCENT: 3.5 % (ref 2–4)
ALPHA 2 PERCENT: 12.5 % (ref 5–13)
ALPHA1 GLOB SERPL ELPH-MCNC: 0.2 G/DL (ref 0.1–0.3)
ALPHA2 GLOB SERPL ELPH-MCNC: 0.7 G/DL (ref 0.4–0.9)
B-GLOBULIN SERPL ELPH-MCNC: 0.6 G/DL (ref 0.7–1.2)
BETA PERCENT: 10.5 % (ref 10–17)
GAMMA GLOB SERPL ELPH-MCNC: 0.5 G/DL (ref 0.6–1.4)
GAMMA GLOBULIN PERCENT: 8 % (ref 9–20)
MONOCLONAL PEAK: 0.3 G/DL
PATH ICD:: ABNORMAL
PROT PATTERN SERPL ELPH-IMP: ABNORMAL
REVIEWING PATHOLOGIST: ABNORMAL
TOTAL PROTEIN SERUM FOR ELP (SYNCED VALUE): 5.8 G/DL

## 2022-02-20 ENCOUNTER — HEALTH MAINTENANCE LETTER (OUTPATIENT)
Age: 72
End: 2022-02-20

## 2022-02-21 ENCOUNTER — VIRTUAL VISIT (OUTPATIENT)
Dept: ONCOLOGY | Facility: CLINIC | Age: 72
End: 2022-02-21
Attending: INTERNAL MEDICINE
Payer: COMMERCIAL

## 2022-02-21 DIAGNOSIS — F43.23 ADJUSTMENT DISORDER WITH MIXED ANXIETY AND DEPRESSED MOOD: Primary | ICD-10-CM

## 2022-02-21 DIAGNOSIS — C90.00 MULTIPLE MYELOMA WITH FAILED REMISSION (H): ICD-10-CM

## 2022-02-21 PROCEDURE — 90834 PSYTX W PT 45 MINUTES: CPT | Mod: TEL | Performed by: SOCIAL WORKER

## 2022-02-21 NOTE — CONFIDENTIAL NOTE
Psychology Psychotherapy  Note-virtual visit      Name:  Lizzie Viera  :  1950  MRN:  [id number]      Date of Service: 2022  Duration: 50 minutes (11:00 to 11:50 AM)    The patient has been notified of following:      This telephone visit will be conducted via a call between you and your provider. We have found that certain health care needs can be provided without a face to face meeting.  This service lets us provide the care you need with a short phone conversation.      Telephone visits are billed at different rates depending on your insurance coverage. During this emergency period, for some insurers they may be billed the same as an in-person visit.  Please reach out to your insurance provider with any questions.     If during the course of the call the if provider feels a telephone visit is not appropriate, you will not be charged for this service.     Patient has given verbal consent to a Telephone visit? Yes        Target Symptoms:    The patient was seen in light of concerns regarding symptoms of depression and anxiety as evidenced by patient and staff report.    Participation:  The patient was able to participate and benefit from treatment as evidenced by her verbal expression of ideas and initiation of topics discussed.    Mental Status:    Mood/Affect: Worried and concerned  Suicidal Ideation:  absent  Homicidal Ideation:  absent  Thought process:  normal  Thought content:  Clear  Fund of Knowledge:  Sufficient  Attention/Concentration:  Normal  Language ability:  intact  Speech: normal  Memory:  recent and remote memory intact  Insight and Judgement:  good  Orientation:  self, place and time  Appearance: N/A-telephone visit  Eye Contact: N/A-telephone visit  Estimated IQ:  Average      Intervention:    Valeria was interested in a follow-up psychotherapy session today.  We had a virtual appointment today.  Valeria was referred to me by JOSH Lopez from the palliative care  clinic.  She is also followed by Dr. Blanco and Dr. Rapp.      Valeria updated me on her health issues.  She states that she has been sick all weekend with a cold, that she believes that she caught from her .  She had some problems with breathing, but they are better today.  She canceled her physical therapy appointment for today.  She has a follow-up with Dr. Blanco on Wednesday, 02-.  She then will have physical therapy on 2/24/2022.  She is concerned that her myeloma protein lab work is going up.  She plans to discuss this further after her appointment on Wednesday. She had her recent clinic visit with Zehra Pablo CNP on 2/2/2022.  She shared her thoughts about being reluctant to proceed with the transplant at this time.  She prefers her monoclonal clinic to be at 0 before she proceeds and plans to continue her current treatment regiment.  She will be referred back to the transplant team when she feels ready.  Valeria also met with Dr. Pascual, from physical medicine rehab.  He is recommending physical therapy.  He also offered some medication, which she wants to wait on at this time.  She is interested in learning exercises that she can do at home.  She ordered a new treadmill that will be delivered on 2/22/2022.      Valeria met with the bone marrow transplant team on 1/4/2022.  The recommendation is for her to have a bone marrow transplant as soon as it can be arranged.  She found out that she will still need to continue chemotherapy after her transplant.  She has done some research and feels that it would be better if she has the transplant when there is minimal residual disease.  She met with the [employer] physiciain in [place] on 2/8/2022..  She has done extensive thinking and reviewing the written material from the transplant team and feels very comfortable with her decision to wait to proceed with transplant at this time.  She also was able to get some good advice from her  appointment with the integrative health physician.  He is doing some further testing and recommending some supplements.  Valeria also indicated that her gabapentin dose has increased.  She now is taking gabapentin 3 times a day, 600 mg.  Valeria processed her thoughts and feelings about her health issues and we discussed strategies to help her cope.    Valeria has a history of multiple myeloma.  She states that in September 2020, she had a fracture of T-7 and was hospitalized at [facility].  She did not have a good experience in transferring her care over to [facility] and the cancer clinic at [facility].  In April 2021, she continued to have pain in her spine and had biopsy that confirmed her diagnosis of multiple myeloma in May 2021.  She was hospitalized in September at [facility] and received radiation therapy during her hospitalization.  Her last visit with Dr. Blanco on 9/29/2021,  he indicates that clinically her multiple myeloma is behaving more aggressively with bone lesions.  She has significant bone disease with osteoporosis and compression fractures.  Dr. Blanco started her on Velcade, Revlimid and dexamethasone.  She had her first treatment on 9/29/2021.  She also has been receiving monthly Zometa shots.  She had additional radiation treatment on 10/6/2021 through 10/12/2021 to T1.  She also has a skull lesion that they will hold off on radiation at this time. She met with the neurosurgeon on 10/11/2021 and he felt that T6-7 and 8 are stable.   Her main physical complaints are pain and fatigue. She also is experiencing temperature changes and changes to her taste, which she believes is due to her chemo pill. At her 9/7/2021 palliative care clinic visit, they discussed advanced directives.  She has completed her health care directive.  She expressed that she wants to be a DNR/DNI and has expressed this to her care providers.  She decided to have her daughter be the primary proxy and her sister  "and girlfriend to be the alternates.      Valeria indicates that she has done a lot of research and has decided not to be vaccinated for COVID-19.  The daughter that lives with them tested positive for COVID-19 last week.  Valeria indicated that she had minimal symptoms, but is feeling fine now.  She never took a test to see if she actually had COVID-19.  Her other daughter had COVID-19 in May and her son and  also tested positive at that time.  She had the antibody test and found out that she also had COVID-19 in the past.  She was asymptomatic and did not realize she even had it.  She is not interested in getting the vaccine.  She is interested in doing alternative/complementary medicine to help combat her cancer.  She has been taking anti-inflammatory medication and has been reading books and articles about supplements that help \"kill cancer\".  She currently is on a variety of supplements and has discussed her supplements with Dr. Blanco.  Valeria indicates that she finds her medical background useful in understanding her health condition.    Valeria processed her thoughts and feelings about her cancer and her cancer treatment.  She is experiencing symptoms of anxiety and depression related to coping with her cancer and other life stressors.  She does not clinically meet diagnostic criteria for a mood disorder or an anxiety disorder.  She meets diagnostic criteria for adjustment disorder with mixed anxiety and depressed mood.  She denies suicidal ideation or thoughts of harming herself or others.    Valeria [place] [facility].  She is the second oldest in a family of 8 children.  She has 3 sisters and 4 brothers.  Her older brother is exactly 1 year and a day older than her.  She states that she has a very close relationship with her siblings.  Most of them live in the area.  Her sisters have been extremely helpful during this difficult time.  She has 2 of her sisters at her house cleaning during her last " visit..    Valeria lives in her [place] home with her , Ramos; daughter Collette; and her 2 sons ages 4 and 8.  Valeria and Ramos have been  for 42 years and that on a blind date.  She states that Ramos has end-stage renal disease and is receiving dialysis 3 times a week.  He also had trouble aortic aneurysm repair and he has type 2 diabetes. He had a cardiac procedure at the [facility] on 10/15/2021, which went well.  He was contacted by the [facility] on 2022 about being on the transplant list.  He has a friend who is willing to donate his kidney. Ramos retired at age 69 from being a .  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  When he had his annual DOT physical, he did not want to share his medical history with the DOT physician and decided to quit his job, on the spot that day.  This created some issues as he was the carrier of the insurance for the family.  Valeria indicates that Ramos is extremely angry and tends to take it out on the family members.  His father  when he was 11 years old.  He was a  and  on an experimental aircraft in [place].  He recently became so angry and agitated towards their daughter, that she gave him an ultimatum that he needs to start seeing a therapist.  Recently, he became extremely angry and agitated  with their son and her sister, to the point that they needed to leave the house due to his verbal abuse.  Valeria has been a buffer for the family regarding his anger.  She has been strongly encouraging him to make an appointment with a therapist.  The staff at [facility] also have talked with him about seeing a therapist and had a referral recommendation for him.  He has been refusing to make an appointment with a therapist.  At one point, he agreed to meet for family therapy with his daughter.  He currently refuses to receive any psychological help and his behavior continues to be extremely angry and agitated.  Due to his behavior, their daughter has decided  that she needs to start looking for another housing situation.  She works with a therapist who is helping her get on public housing.  This may take a number of months.  We talked about how his behavior affects Valeria emotionally.  We also talked about a plan for respite if she needs a break from the home situation.  She has come to the conclusion that he does not have insight about his behavior and how it affects others.  She is starting to do some recommended psychoeducational reading that may be helpful for her in this area.    Valeria and Ramos have 3 children.  The oldest is their daughter, Sharifa, age 38.  She lives near [place] and has 2 children.  She works as a teacher.  Their second oldest is their daughter, Collette, age 34, who currently lives with them along with her for an 8-year-old sons.  She is on Social Security disability due to her depression and past suicidal ideation.  She has received a great deal of therapy including DBT.  She has been extremely helpful to Valeria and wants to be her caretaker.  Their youngest is their son, Trell, age 30, l just moved out of the home 3 weeks ago to [facility], in order to be closer to his work in the area. He works 12-hour shifts starting at 4 AM in a warehouse that provides food for gas stations and service stations.    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  She has worked in medical research.  She spent 18 years working at the [employer].  She also has worked at [employer] in nuclear medicine and for [place] cardiology and nuclear medicine.  She spent several years working for [employer] and [employer].  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    Valeria was raised Baptist and went to a Baptist grade school, [employer] in [place]. She also went to [employer]. She currently describes herself as being a spiritual  person and believes that mother nature is the guiding spirit of the planet. She has some  and Indian spirituality beliefs. We talked about ways for her to incorporate her spirituality into her healing process.    Valeria is interested in individual psychotherapy to help her work through the emotional aspects of her cancer and cancer treatments.  She is experiencing symptoms of anxiety and depression related to her cancer and other life stresses.  She would like to initially meet on a weekly basis.  I have left a message for our information coordinators to get her scheduled in for a series of appointments.    Psychoterapeutic Techniques:  Cognitive-behavioral therapy, motivational interviewing and supportive psychotherapy strategies were utilized.    Necessity:    The session was necessary for the care of the patient to address symptoms of depression and anxiety related to the patient's medical condition.    Progress/ Plan:    Valeria continue to  process her thoughts and feelings about her recent clinic visits.  She has made the decision that she wants to hold off on proceeding with the transplant at this time.  She was discouraged to learn that she will still need to have chemotherapy after the bone marrow transplant.  She is concerned about the timing of getting this done now, versus waiting until there is minimal residual disease.  She met with an integrative medicine physician on 2022.     We discussed some psychoeducational reading material that may be helpful for Valeria.  She started reading a book that I recommended to help her cope and deal with the issues she is having with her .  In addition, she is interested in some of the books that I recommended regarding spirituality and complementary medicine.    Valeria indicated that her mother-in-law recently .  She was 92 years old and had been living independently.  She went with her  to [place] from 12/3/2021 until  2021.  Her   was very resistant about going to the  and they needed to make arrangements for him to have dialysis while he was there.  It ended up to be very healing time for him and it was important for them to be there.    Valeria continue to discuss issues with her  and their relationship.  She is struggling with his issues of anger that is projected towards her.  We discussed this dynamic at length and talked about strategies for setting up boundaries for her own self protection.  She has been implementing some strategies that have been helpful in this area and has helped alleviate some of the stress for her about the situation.    Valeria is interested in individual psychotherapy every 1 to 2 weeks to help her cope with the emotional aspects of her cancer and cancer treatment.  I will provide her with some information about utilizing cognitive behavioral therapy strategies to help manage symptoms of pain and anxiety & depression.  She has made good progress in going through the written material that I have given her.  She has started doing guided imagery exercises.  She plans to do some painting to illustrate her image for her guided imagery.  In addition, she has been doing some reading about cognitive behavioral therapy and has started to implement some of the strategies that we discussed.  She has read up on the side effects from her treatment and read that anxiety and irritability tend to be some of the side effects.  She is making a conscious effort to utilize some of the skills that we discussed to work on these issues.  I was able to provide her with 2 [employer] packs from the [employer] today for her to grand sons that live with them.  I also provided her friend, who was with her all day for the appointments with support group resources from [employer].  They have an online support group for families and friends supporting individuals with cancer.    Valeria had several difficulties over the past  few months.  They had their pipes leak at their house and found out that they were corroded.  They are getting minimal help from their insurance company, but her brother is helping to fix this issue.      Valeria indicates that she has been working on a plan to get exercise. She has been walking on the treadmill 1 mile, 3 days a week.  Her sister picks her up and takes her to her house to use the treadmill.  This is been helpful as it creates some accountability for Valeria and also gives her the opportunity to spend some quality time with her sister.  She also is considering getting a treadmill for their home as her current treadmill is no longer working.  She also is working on having good nutrition.    Valeria had a girlfriend that  recently from metastatic breast cancer.  She processed her thoughts and feelings about this loss and we discussed strategies to help her release her grief.    We also talked about issues with her  and some strategies to help set boundaries and limits and also encouraged him to get help he needs.  She also talked about how her 8-year-old grandson was feeling anxious and worried as he is a teacher is pregnant and decided to go on leave early.  He started with a new teacher and is having a lot of anxiety with the change.  He had some thoughts of hurting himself and his mom took him to [facility] emergency room at [facility].  He is doing much better this week.  His mom, Collette has a history of having suicide attempts 3-4 times in 1 year, prior to her children being born.  Valeria is concerned that some of Collette's depressive tendencies are wearing off on her grandson.  We discussed strategies that might be helpful in this area.    Valeria is interested in meeting on a regular basis.  She would like to meet again for a virtual appointment in 1 to 2 weeks.  Our next psychotherapy session is scheduled for 2022.    Treatment plan:  Valeria would benefit from individual interactive  psychotherapy for 50 to 60-minute sessions every 1 to 2 weeks.  Our treatment plan will be evaluated by 5/21/2022.    Target symptoms/issue: Dealing with the emotional aspects of living with cancer.    Goal: To understand and accept the role of the emotional, psychological and behavioral factors in dealing with cancer and focusing on strategies to deal with these issues.    Treatment strategies:  1.  Identify and express feelings connected to  her medical condition of multiple myeloma.    2.  Learn strategies to deal with symptoms of depression, anxiety and fear related to her multiple myeloma.    Diagnosis:    1.  Adjustment disorder with mixed anxiety and depressed mood  2.  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  Other information was consistent from the note that was made on 02-.      This note was created with the help of Dragon dictation system.  Grammatical and typing errors are not intentional.     Review of long-term  goals: Treatment plan was reviewed and updated.       Provider: Mary Ellen Chan MA, LP, LICSW    Date:  10/5/2021  Time:  3:05 PM

## 2022-02-23 ENCOUNTER — LAB (OUTPATIENT)
Dept: INFUSION THERAPY | Facility: HOSPITAL | Age: 72
End: 2022-02-23
Attending: INTERNAL MEDICINE
Payer: COMMERCIAL

## 2022-02-23 ENCOUNTER — ONCOLOGY VISIT (OUTPATIENT)
Dept: ONCOLOGY | Facility: HOSPITAL | Age: 72
End: 2022-02-23
Attending: INTERNAL MEDICINE
Payer: COMMERCIAL

## 2022-02-23 VITALS
SYSTOLIC BLOOD PRESSURE: 141 MMHG | WEIGHT: 128 LBS | HEART RATE: 67 BPM | TEMPERATURE: 98.1 F | DIASTOLIC BLOOD PRESSURE: 65 MMHG | BODY MASS INDEX: 23.41 KG/M2 | RESPIRATION RATE: 18 BRPM | OXYGEN SATURATION: 92 %

## 2022-02-23 DIAGNOSIS — C90.00 MULTIPLE MYELOMA WITH FAILED REMISSION (H): Primary | ICD-10-CM

## 2022-02-23 DIAGNOSIS — M84.58XK PATHOLOGICAL FRACTURE OF VERTEBRAE IN NEOPLASTIC DISEASE WITH NONUNION: ICD-10-CM

## 2022-02-23 LAB
ALBUMIN SERPL-MCNC: 3.4 G/DL (ref 3.5–5)
ALP SERPL-CCNC: 47 U/L (ref 45–120)
ALT SERPL W P-5'-P-CCNC: 33 U/L (ref 0–45)
ANION GAP SERPL CALCULATED.3IONS-SCNC: 11 MMOL/L (ref 5–18)
AST SERPL W P-5'-P-CCNC: 19 U/L (ref 0–40)
BASOPHILS # BLD AUTO: 0 10E3/UL (ref 0–0.2)
BASOPHILS NFR BLD AUTO: 1 %
BILIRUB SERPL-MCNC: 0.3 MG/DL (ref 0–1)
BUN SERPL-MCNC: 22 MG/DL (ref 8–22)
CALCIUM SERPL-MCNC: 8.9 MG/DL (ref 8.5–10.5)
CHLORIDE BLD-SCNC: 106 MMOL/L (ref 98–107)
CO2 SERPL-SCNC: 24 MMOL/L (ref 22–31)
CREAT SERPL-MCNC: 0.7 MG/DL (ref 0.6–1.1)
EOSINOPHIL # BLD AUTO: 0 10E3/UL (ref 0–0.7)
EOSINOPHIL NFR BLD AUTO: 0 %
ERYTHROCYTE [DISTWIDTH] IN BLOOD BY AUTOMATED COUNT: 17.2 % (ref 10–15)
GFR SERPL CREATININE-BSD FRML MDRD: >90 ML/MIN/1.73M2
GLUCOSE BLD-MCNC: 119 MG/DL (ref 70–125)
HCT VFR BLD AUTO: 42.6 % (ref 35–47)
HGB BLD-MCNC: 13.8 G/DL (ref 11.7–15.7)
IMM GRANULOCYTES # BLD: 0 10E3/UL
IMM GRANULOCYTES NFR BLD: 0 %
LYMPHOCYTES # BLD AUTO: 0.6 10E3/UL (ref 0.8–5.3)
LYMPHOCYTES NFR BLD AUTO: 13 %
MCH RBC QN AUTO: 32.4 PG (ref 26.5–33)
MCHC RBC AUTO-ENTMCNC: 32.4 G/DL (ref 31.5–36.5)
MCV RBC AUTO: 100 FL (ref 78–100)
MONOCYTES # BLD AUTO: 0.1 10E3/UL (ref 0–1.3)
MONOCYTES NFR BLD AUTO: 2 %
NEUTROPHILS # BLD AUTO: 3.8 10E3/UL (ref 1.6–8.3)
NEUTROPHILS NFR BLD AUTO: 84 %
NRBC # BLD AUTO: 0 10E3/UL
NRBC BLD AUTO-RTO: 0 /100
PLATELET # BLD AUTO: 189 10E3/UL (ref 150–450)
POTASSIUM BLD-SCNC: 4.3 MMOL/L (ref 3.5–5)
PROT SERPL-MCNC: 6.4 G/DL (ref 6–8)
RBC # BLD AUTO: 4.26 10E6/UL (ref 3.8–5.2)
SODIUM SERPL-SCNC: 141 MMOL/L (ref 136–145)
WBC # BLD AUTO: 4.5 10E3/UL (ref 4–11)

## 2022-02-23 PROCEDURE — 80053 COMPREHEN METABOLIC PANEL: CPT | Performed by: INTERNAL MEDICINE

## 2022-02-23 PROCEDURE — 99215 OFFICE O/P EST HI 40 MIN: CPT | Performed by: INTERNAL MEDICINE

## 2022-02-23 PROCEDURE — G0463 HOSPITAL OUTPT CLINIC VISIT: HCPCS | Mod: 25

## 2022-02-23 PROCEDURE — 96401 CHEMO ANTI-NEOPL SQ/IM: CPT

## 2022-02-23 PROCEDURE — 250N000011 HC RX IP 250 OP 636: Performed by: INTERNAL MEDICINE

## 2022-02-23 PROCEDURE — 82040 ASSAY OF SERUM ALBUMIN: CPT | Performed by: INTERNAL MEDICINE

## 2022-02-23 PROCEDURE — 36415 COLL VENOUS BLD VENIPUNCTURE: CPT | Performed by: INTERNAL MEDICINE

## 2022-02-23 PROCEDURE — 85014 HEMATOCRIT: CPT | Performed by: INTERNAL MEDICINE

## 2022-02-23 RX ORDER — METHYLPREDNISOLONE SODIUM SUCCINATE 125 MG/2ML
125 INJECTION, POWDER, LYOPHILIZED, FOR SOLUTION INTRAMUSCULAR; INTRAVENOUS
Status: DISCONTINUED | OUTPATIENT
Start: 2022-02-23 | End: 2022-02-23 | Stop reason: HOSPADM

## 2022-02-23 RX ORDER — NALOXONE HYDROCHLORIDE 0.4 MG/ML
0.2 INJECTION, SOLUTION INTRAMUSCULAR; INTRAVENOUS; SUBCUTANEOUS
Status: DISCONTINUED | OUTPATIENT
Start: 2022-02-23 | End: 2022-02-23 | Stop reason: HOSPADM

## 2022-02-23 RX ORDER — EPINEPHRINE 1 MG/ML
0.3 INJECTION, SOLUTION INTRAMUSCULAR; SUBCUTANEOUS EVERY 5 MIN PRN
Status: DISCONTINUED | OUTPATIENT
Start: 2022-02-23 | End: 2022-02-23 | Stop reason: HOSPADM

## 2022-02-23 RX ORDER — EPINEPHRINE 1 MG/ML
0.3 INJECTION, SOLUTION INTRAMUSCULAR; SUBCUTANEOUS EVERY 5 MIN PRN
Status: CANCELLED | OUTPATIENT
Start: 2022-03-15

## 2022-02-23 RX ORDER — MEPERIDINE HYDROCHLORIDE 25 MG/ML
25 INJECTION INTRAMUSCULAR; INTRAVENOUS; SUBCUTANEOUS EVERY 30 MIN PRN
Status: CANCELLED | OUTPATIENT
Start: 2022-03-15

## 2022-02-23 RX ORDER — ALBUTEROL SULFATE 0.83 MG/ML
2.5 SOLUTION RESPIRATORY (INHALATION)
Status: DISCONTINUED | OUTPATIENT
Start: 2022-02-23 | End: 2022-02-23 | Stop reason: HOSPADM

## 2022-02-23 RX ORDER — LORAZEPAM 2 MG/ML
0.5 INJECTION INTRAMUSCULAR EVERY 4 HOURS PRN
Status: CANCELLED | OUTPATIENT
Start: 2022-03-15

## 2022-02-23 RX ORDER — DIPHENHYDRAMINE HYDROCHLORIDE 50 MG/ML
50 INJECTION INTRAMUSCULAR; INTRAVENOUS
Status: DISCONTINUED | OUTPATIENT
Start: 2022-02-23 | End: 2022-02-23 | Stop reason: HOSPADM

## 2022-02-23 RX ORDER — ALBUTEROL SULFATE 0.83 MG/ML
2.5 SOLUTION RESPIRATORY (INHALATION)
Status: CANCELLED | OUTPATIENT
Start: 2022-03-15

## 2022-02-23 RX ORDER — METHYLPREDNISOLONE SODIUM SUCCINATE 125 MG/2ML
125 INJECTION, POWDER, LYOPHILIZED, FOR SOLUTION INTRAMUSCULAR; INTRAVENOUS
Status: CANCELLED
Start: 2022-03-15

## 2022-02-23 RX ORDER — ALBUTEROL SULFATE 90 UG/1
1-2 AEROSOL, METERED RESPIRATORY (INHALATION)
Status: CANCELLED
Start: 2022-03-15

## 2022-02-23 RX ORDER — ALBUTEROL SULFATE 90 UG/1
1-2 AEROSOL, METERED RESPIRATORY (INHALATION)
Status: DISCONTINUED | OUTPATIENT
Start: 2022-02-23 | End: 2022-02-23 | Stop reason: HOSPADM

## 2022-02-23 RX ORDER — MEPERIDINE HYDROCHLORIDE 25 MG/ML
25 INJECTION INTRAMUSCULAR; INTRAVENOUS; SUBCUTANEOUS EVERY 30 MIN PRN
Status: DISCONTINUED | OUTPATIENT
Start: 2022-02-23 | End: 2022-02-23 | Stop reason: HOSPADM

## 2022-02-23 RX ORDER — DIPHENHYDRAMINE HYDROCHLORIDE 50 MG/ML
50 INJECTION INTRAMUSCULAR; INTRAVENOUS
Status: CANCELLED
Start: 2022-03-15

## 2022-02-23 RX ORDER — NALOXONE HYDROCHLORIDE 0.4 MG/ML
0.2 INJECTION, SOLUTION INTRAMUSCULAR; INTRAVENOUS; SUBCUTANEOUS
Status: CANCELLED | OUTPATIENT
Start: 2022-03-15

## 2022-02-23 RX ADMIN — BORTEZOMIB 2.1 MG: 3.5 INJECTION, POWDER, LYOPHILIZED, FOR SOLUTION INTRAVENOUS; SUBCUTANEOUS at 15:35

## 2022-02-23 ASSESSMENT — PAIN SCALES - GENERAL: PAINLEVEL: MILD PAIN (3)

## 2022-02-23 NOTE — LETTER
"    2/23/2022         RE: Lizzie Viera  [address]  [place] [address]        Dear Colleague,    Thank you for referring your patient, Lizzie Viera, to the [facility]. Please see a copy of my visit note below.    Oncology Rooming Note    February 23, 2022 2:39 PM   Lizzie Viera is a 71 year old female who presents for:    Chief Complaint   Patient presents with     Oncology Clinic Visit     Initial Vitals: BP (!) 141/65   Pulse 67   Temp 98.1  F (36.7  C)   Resp 18   Wt 58.1 kg (128 lb)   SpO2 92%   BMI 23.41 kg/m   Estimated body mass index is 23.41 kg/m  as calculated from the following:    Height as of 10/19/21: 1.575 m (5' 2.01\").    Weight as of this encounter: 58.1 kg (128 lb). Body surface area is 1.59 meters squared.  Mild Pain (3) Comment: Data Unavailable   No LMP recorded. Patient is postmenopausal.  Allergies reviewed: Yes  Medications reviewed: Yes    Medications: Medication refills not needed today.  Pharmacy name entered into [employer]: [id number] [address] [facility] [address].    Clinical concerns: Lab results, Side effects, SOB    Narda Kingston RN              [facility] Hematology and Oncology Progress Note    Patient: Lizzie Viera  MRN: [id number]  Date of Service: Feb 23, 2022         Reason for Visit    Multiple Myeloma    Assessment     1.  A very pleasant 71 year old woman with average risk multiple myeloma is on the cytogenetics.  However clinically was behaving somewhat more aggressively.  Started on VRD treatment.  Responding quite well.  2.  Significant bone disease with osteoporosis and compression fractures.  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 This appears to be smaller.  3.  Normal hemoglobin, calcium, kidney function along with beta-2 microglobulin and albumin at the time of diagnosis.  4.  Positive Covid antibodies from infection. Unvaccinated. Declined " Joy.  5.  Pain from compression fracture status post radiation therapy.  Slowly improving.    Plan    1.  Continue treatment with Velcade, Revlimid and dexamethasone. Check labs periodically.  We are going to do immunofixation to confirm the presence of any monoclonal proteins.  2.  Bisphosphonate therapy for her bones.  On Zometa monthly.  We are going to check a scalp radiograph to check that.  3.  She is a transplant candidate.  Has been seen by the transplant clinic.  Currently wants to be on hold.  4.  Her scalp lesion will need to be followed up.  We will do a scan to look at it.  5.  Continue good diet and exercise.  Increase calcium and vitamin D in her diet.  6.  Counseled the patient at length regarding overall plan of care.    Cancer Staging  No matching staging information was found for the patient.    ECOG Performance    2 - Ambulatory and independent in all ADLs; cannot work; up > 50% of the time      History of Present Illness    Ms. Lizzie Viera is a very pleasant 71 year old woman who has been diagnosed with multiple myeloma IgG kappa type in May 2021.  She had initially presented with compression fracture of T7 vertebral body in September 2020.  She had a back pain which led to that evaluation.  Bone density confirmed that she had osteoporosis.  MRI showed diffuse marrow edema in October 2020.  In April 2021 the MRI of the thoracic spine showed worsening T7 vertebral body height loss because of likely pathological compression fracture with extraosseous extension.  She then had IR guided bone biopsy on 7 May 2021 and pathology confirmed the presence of kappa light chain restricted plasma cells.  Her cytogenetics confirmed the presence of hyperdiploid E with gains of chromosome 5, 9 and 15.    She was then seen by Dr. Baig and had a bone marrow biopsy which also confirmed 60% involvement of the marrow with plasma cells.  The bone marrow was done on 3 Jocelin 2021.  The bone marrow also  confirmed the presence of hyperdiploid E.  She had a gain of chromosome 3, 5, 7, 9, 11, 15 as well as 19.  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  Her beta-2 microglobulin was actually normal at the time of her diagnosis as was her albumin at 4.0.  Monoclonal protein 3.1 g/dL.  Kappa free light chain levels of 13 mg/dL IgG level of 4280 with depressed levels of IgM and IgA.  Urine was also positive for kappa light chains as well as immunofixation of the urine was positive for IgG kappa.  Normal kidney function.  Normal hemoglobin.    As mentioned above she had bone lesions in the T7 vertebral body, T1, skull area.  Her main pain is coming from her T7 vertebral body compression fracture.    Due to the pain she has been seen by radiation oncology and has received radiation therapy.  She also got a dose of steroids for pain control.    She is wearing a soft brace.  She did notice that when she was on dexamethasone her pain was significantly better.    She was initially thinking of doing some natural therapies but once her symptoms got worse, she came back in was counseled about treatment.      She has started on Velcade Revlimid and dexamethasone since September 2021. Tolerating it well. Responding nicely.  Immunoglobulins have  normalized completely.  Back pain is still an issue getting better.  The back pain gets some spasms.  Not taking any more Dilaudid.    Review of systems.  Still occasional abdominal discomfort.  Some degree of mild anxiety.  Back pain as mentioned above  A 14 point review of systems is otherwise negative.        Past History    Past Medical History:   Diagnosis Date     Cervical dysplasia      Chronic RUQ pain      Depressive disorder      Multiple myeloma (H)      Osteoporosis        Past Surgical History:   Procedure Laterality Date     CONIZATION CERVIX,KNIFE/LASER      Description: Cervical Conization By Laser;  Recorded: 09/20/2007;      DILATION/CURETTAGE DIAG/THER NON OB      Description: Dilation  And Curettage;  Recorded: 09/20/2007;      REMOVE TONSILS/ADENOIDS,<11 Y/O      Description: Tonsillectomy With Adenoidectomy;  Recorded: 09/20/2007;     [facility] LAP,CHOLECYSTECTOMY/EXPLORE  12/27/2004     [facility] LIGATE FALLOPIAN TUBE      Description: Tubal Ligation;  Recorded: 09/20/2007;         Physical Exam    BP (!) 141/65   Pulse 67   Temp 98.1  F (36.7  C)   Resp 18   Wt 58.1 kg (128 lb)   SpO2 92%   BMI 23.41 kg/m      GENERAL: No acute distress. Cooperative in conversation.   HEENT:  Pupils are equal, round and reactive. Oral mucosa is clean and intact. No ulcerations or mucositis noted. No bleeding noted.  RESP:Chest symmetric lungs are clear bilaterally per auscultation. Regular respiratory rate. No wheezes or rhonchi.  CV: Normal S1 S2 Regular, rate and rhythm. No murmurs.    ABD: Nondistended, soft, nontender. Positive bowel sounds. No organomegaly.   EXTREMITIES: No lower extremity edema.   NEURO: Non- focal. Alert and oriented x3.  Cranial nerves appear intact.  PSYCH: Within normal limits. No depression or anxiety.  SKIN: Warm dry intact.      Lab Results    Recent Results (from the past 168 hour(s))   Comprehensive metabolic panel   Result Value Ref Range    Sodium 141 136 - 145 mmol/L    Potassium 4.3 3.5 - 5.0 mmol/L    Chloride 106 98 - 107 mmol/L    Carbon Dioxide (CO2) 24 22 - 31 mmol/L    Anion Gap 11 5 - 18 mmol/L    Urea Nitrogen 22 8 - 22 mg/dL    Creatinine 0.70 0.60 - 1.10 mg/dL    Calcium 8.9 8.5 - 10.5 mg/dL    Glucose 119 70 - 125 mg/dL    Alkaline Phosphatase 47 45 - 120 U/L    AST 19 0 - 40 U/L    ALT 33 0 - 45 U/L    Protein Total 6.4 6.0 - 8.0 g/dL    Albumin 3.4 (L) 3.5 - 5.0 g/dL    Bilirubin Total 0.3 0.0 - 1.0 mg/dL    GFR Estimate >90 >60 mL/min/1.73m2   CBC with platelets and differential   Result Value Ref Range    WBC Count 4.5 4.0 - 11.0 10e3/uL    RBC Count 4.26 3.80 - 5.20 10e6/uL    Hemoglobin 13.8 11.7 - 15.7 g/dL    Hematocrit 42.6 35.0 - 47.0 %     78 - 100  fL    MCH 32.4 26.5 - 33.0 pg    MCHC 32.4 31.5 - 36.5 g/dL    RDW 17.2 (H) 10.0 - 15.0 %    Platelet Count 189 150 - 450 10e3/uL    % Neutrophils 84 %    % Lymphocytes 13 %    % Monocytes 2 %    % Eosinophils 0 %    % Basophils 1 %    % Immature Granulocytes 0 %    NRBCs per 100 WBC 0 <1 /100    Absolute Neutrophils 3.8 1.6 - 8.3 10e3/uL    Absolute Lymphocytes 0.6 (L) 0.8 - 5.3 10e3/uL    Absolute Monocytes 0.1 0.0 - 1.3 10e3/uL    Absolute Eosinophils 0.0 0.0 - 0.7 10e3/uL    Absolute Basophils 0.0 0.0 - 0.2 10e3/uL    Absolute Immature Granulocytes 0.0 <=0.4 10e3/uL    Absolute NRBCs 0.0 10e3/uL       Imaging    MR Lumbar Spine w/o Contrast    Result Date: 2/2/2022  EXAM: MR LUMBAR SPINE W/O CONTRAST LOCATION: [facility] DATE/TIME: 2/2/2022 11:35 AM INDICATION: Low back pain, > 6 wks COMPARISON: None. TECHNIQUE: Routine Lumbar Spine MRI without IV contrast. FINDINGS: Nomenclature is based on 5 lumbar type vertebral bodies. Normal vertebral body heights, alignment and marrow signal. Normal distal spinal cord and cauda equina with conus medullaris at mid L1. No extraspinal abnormality. Nodular left adrenal gland, indeterminate. Cysts both kidneys. T12-L1: Normal disc height and signal. No herniation. Normal facets. No spinal canal or neural foraminal stenosis. L1-L2: Normal disc height and signal. No herniation. Normal facets. No spinal canal or neural foraminal stenosis. L2-L3: Disc dehydration. Normal disc height. Mild facet arthropathy. No significant canal or foraminal narrowing. L3-L4: Indication. Mild facet arthropathy. No significant canal or foraminal narrowing. L4-L5: Disc dehydration. Normal disc height. Mild diffuse disc bulge. Mild facet arthropathy. Mild left lateral recess narrowing. No significant canal narrowing no significant foraminal narrowing. L5-S1: Normal disc height and signal. No herniation. Normal facets. No spinal canal or neural foraminal stenosis.      IMPRESSION: 1.  Normal distal cord. 2.  Mild degenerative changes without significant canal or foraminal narrowing at any level.      Signed by: Loco Blanco MD,         Again, thank you for allowing me to participate in the care of your patient.        Sincerely,        Loco Blanco MD, MD

## 2022-02-23 NOTE — PROGRESS NOTES
Pt ambulates to infusion center for treatment following MD visit.  Pt was seen by Dr. Blanco today and orders were approved.  Velcade injection given as ordered without difficulty.  Pt left clinic stable to lobby.  Plan RTC as scheduled.

## 2022-02-23 NOTE — PROGRESS NOTES
[facility] Hematology and Oncology Progress Note    Patient: Lizzie Viera  MRN: [id number]  Date of Service: Feb 23, 2022         Reason for Visit    Multiple Myeloma    Assessment     1.  A very pleasant 71 year old woman with average risk multiple myeloma is on the cytogenetics.  However clinically was behaving somewhat more aggressively.  Started on VRD treatment.  Responding quite well.  2.  Significant bone disease with osteoporosis and compression fractures.  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 This appears to be smaller.  3.  Normal hemoglobin, calcium, kidney function along with beta-2 microglobulin and albumin at the time of diagnosis.  4.  Positive Covid antibodies from infection. Unvaccinated. Declined Evus[place].  5.  Pain from compression fracture status post radiation therapy.  Slowly improving.    Plan    1.  Continue treatment with Velcade, Revlimid and dexamethasone. Check labs periodically.  We are going to do immunofixation to confirm the presence of any monoclonal proteins.  2.  Bisphosphonate therapy for her bones.  On Zometa monthly.  We are going to check a scalp radiograph to check that.  3.  She is a transplant candidate.  Has been seen by the transplant clinic.  Currently wants to be on hold.  4.  Her scalp lesion will need to be followed up.  We will do a scan to look at it.  5.  Continue good diet and exercise.  Increase calcium and vitamin D in her diet.  6.  Counseled the patient at length regarding overall plan of care.    Cancer Staging  No matching staging information was found for the patient.    ECOG Performance    2 - Ambulatory and independent in all ADLs; cannot work; up > 50% of the time      History of Present Illness    Ms. Lizzie Viera is a very pleasant 71 year old woman who has been diagnosed with multiple myeloma IgG kappa type in May 2021.  She had initially presented with compression fracture of T7 vertebral body in September 2020.   She had a back pain which led to that evaluation.  Bone density confirmed that she had osteoporosis.  MRI showed diffuse marrow edema in October 2020.  In April 2021 the MRI of the thoracic spine showed worsening T7 vertebral body height loss because of likely pathological compression fracture with extraosseous extension.  She then had IR guided bone biopsy on 7 May 2021 and pathology confirmed the presence of kappa light chain restricted plasma cells.  Her cytogenetics confirmed the presence of hyperdiploid E with gains of chromosome 5, 9 and 15.    She was then seen by Dr. Baig and had a bone marrow biopsy which also confirmed 60% involvement of the marrow with plasma cells.  The bone marrow was done on 3 Jocelin 2021.  The bone marrow also confirmed the presence of hyperdiploid E.  She had a gain of chromosome 3, 5, 7, 9, 11, 15 as well as 19.  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  Her beta-2 microglobulin was actually normal at the time of her diagnosis as was her albumin at 4.0.  Monoclonal protein 3.1 g/dL.  Kappa free light chain levels of 13 mg/dL IgG level of 4280 with depressed levels of IgM and IgA.  Urine was also positive for kappa light chains as well as immunofixation of the urine was positive for IgG kappa.  Normal kidney function.  Normal hemoglobin.    As mentioned above she had bone lesions in the T7 vertebral body, T1, skull area.  Her main pain is coming from her T7 vertebral body compression fracture.    Due to the pain she has been seen by radiation oncology and has received radiation therapy.  She also got a dose of steroids for pain control.    She is wearing a soft brace.  She did notice that when she was on dexamethasone her pain was significantly better.    She was initially thinking of doing some natural therapies but once her symptoms got worse, she came back in was counseled about treatment.      She has started on Velcade Revlimid and dexamethasone since September 2021. Tolerating it well.  Responding nicely.  Immunoglobulins have  normalized completely.  Back pain is still an issue getting better.  The back pain gets some spasms.  Not taking any more Dilaudid.    Review of systems.  Still occasional abdominal discomfort.  Some degree of mild anxiety.  Back pain as mentioned above  A 14 point review of systems is otherwise negative.        Past History    Past Medical History:   Diagnosis Date     Cervical dysplasia      Chronic RUQ pain      Depressive disorder      Multiple myeloma (H)      Osteoporosis        Past Surgical History:   Procedure Laterality Date     CONIZATION CERVIX,KNIFE/LASER      Description: Cervical Conization By Laser;  Recorded: 09/20/2007;     [facility] DILATION/CURETTAGE DIAG/THER NON OB      Description: Dilation And Curettage;  Recorded: 09/20/2007;     [facility] REMOVE TONSILS/ADENOIDS,<11 Y/O      Description: Tonsillectomy With Adenoidectomy;  Recorded: 09/20/2007;     [facility] LAP,CHOLECYSTECTOMY/EXPLORE  12/27/2004     [facility] LIGATE FALLOPIAN TUBE      Description: Tubal Ligation;  Recorded: 09/20/2007;         Physical Exam    BP (!) 141/65   Pulse 67   Temp 98.1  F (36.7  C)   Resp 18   Wt 58.1 kg (128 lb)   SpO2 92%   BMI 23.41 kg/m      GENERAL: No acute distress. Cooperative in conversation.   HEENT:  Pupils are equal, round and reactive. Oral mucosa is clean and intact. No ulcerations or mucositis noted. No bleeding noted.  RESP:Chest symmetric lungs are clear bilaterally per auscultation. Regular respiratory rate. No wheezes or rhonchi.  CV: Normal S1 S2 Regular, rate and rhythm. No murmurs.    ABD: Nondistended, soft, nontender. Positive bowel sounds. No organomegaly.   EXTREMITIES: No lower extremity edema.   NEURO: Non- focal. Alert and oriented x3.  Cranial nerves appear intact.  PSYCH: Within normal limits. No depression or anxiety.  SKIN: Warm dry intact.      Lab Results    Recent Results (from the past 168 hour(s))   Comprehensive metabolic panel   Result Value Ref  Range    Sodium 141 136 - 145 mmol/L    Potassium 4.3 3.5 - 5.0 mmol/L    Chloride 106 98 - 107 mmol/L    Carbon Dioxide (CO2) 24 22 - 31 mmol/L    Anion Gap 11 5 - 18 mmol/L    Urea Nitrogen 22 8 - 22 mg/dL    Creatinine 0.70 0.60 - 1.10 mg/dL    Calcium 8.9 8.5 - 10.5 mg/dL    Glucose 119 70 - 125 mg/dL    Alkaline Phosphatase 47 45 - 120 U/L    AST 19 0 - 40 U/L    ALT 33 0 - 45 U/L    Protein Total 6.4 6.0 - 8.0 g/dL    Albumin 3.4 (L) 3.5 - 5.0 g/dL    Bilirubin Total 0.3 0.0 - 1.0 mg/dL    GFR Estimate >90 >60 mL/min/1.73m2   CBC with platelets and differential   Result Value Ref Range    WBC Count 4.5 4.0 - 11.0 10e3/uL    RBC Count 4.26 3.80 - 5.20 10e6/uL    Hemoglobin 13.8 11.7 - 15.7 g/dL    Hematocrit 42.6 35.0 - 47.0 %     78 - 100 fL    MCH 32.4 26.5 - 33.0 pg    MCHC 32.4 31.5 - 36.5 g/dL    RDW 17.2 (H) 10.0 - 15.0 %    Platelet Count 189 150 - 450 10e3/uL    % Neutrophils 84 %    % Lymphocytes 13 %    % Monocytes 2 %    % Eosinophils 0 %    % Basophils 1 %    % Immature Granulocytes 0 %    NRBCs per 100 WBC 0 <1 /100    Absolute Neutrophils 3.8 1.6 - 8.3 10e3/uL    Absolute Lymphocytes 0.6 (L) 0.8 - 5.3 10e3/uL    Absolute Monocytes 0.1 0.0 - 1.3 10e3/uL    Absolute Eosinophils 0.0 0.0 - 0.7 10e3/uL    Absolute Basophils 0.0 0.0 - 0.2 10e3/uL    Absolute Immature Granulocytes 0.0 <=0.4 10e3/uL    Absolute NRBCs 0.0 10e3/uL       Imaging    MR Lumbar Spine w/o Contrast    Result Date: 2/2/2022  EXAM: MR LUMBAR SPINE W/O CONTRAST LOCATION: [facility] DATE/TIME: 2/2/2022 11:35 AM INDICATION: Low back pain, > 6 wks COMPARISON: None. TECHNIQUE: Routine Lumbar Spine MRI without IV contrast. FINDINGS: Nomenclature is based on 5 lumbar type vertebral bodies. Normal vertebral body heights, alignment and marrow signal. Normal distal spinal cord and cauda equina with conus medullaris at mid L1. No extraspinal abnormality. Nodular left adrenal gland, indeterminate. Cysts both  kidneys. T12-L1: Normal disc height and signal. No herniation. Normal facets. No spinal canal or neural foraminal stenosis. L1-L2: Normal disc height and signal. No herniation. Normal facets. No spinal canal or neural foraminal stenosis. L2-L3: Disc dehydration. Normal disc height. Mild facet arthropathy. No significant canal or foraminal narrowing. L3-L4: Indication. Mild facet arthropathy. No significant canal or foraminal narrowing. L4-L5: Disc dehydration. Normal disc height. Mild diffuse disc bulge. Mild facet arthropathy. Mild left lateral recess narrowing. No significant canal narrowing no significant foraminal narrowing. L5-S1: Normal disc height and signal. No herniation. Normal facets. No spinal canal or neural foraminal stenosis.     IMPRESSION: 1.  Normal distal cord. 2.  Mild degenerative changes without significant canal or foraminal narrowing at any level.      Signed by: Loco Blanco MD,

## 2022-02-23 NOTE — PROGRESS NOTES
"Oncology Rooming Note    February 23, 2022 2:39 PM   Lizzie Viera is a 71 year old female who presents for:    Chief Complaint   Patient presents with     Oncology Clinic Visit     Initial Vitals: BP (!) 141/65   Pulse 67   Temp 98.1  F (36.7  C)   Resp 18   Wt 58.1 kg (128 lb)   SpO2 92%   BMI 23.41 kg/m   Estimated body mass index is 23.41 kg/m  as calculated from the following:    Height as of 10/19/21: 1.575 m (5' 2.01\").    Weight as of this encounter: 58.1 kg (128 lb). Body surface area is 1.59 meters squared.  Mild Pain (3) Comment: Data Unavailable   No LMP recorded. Patient is postmenopausal.  Allergies reviewed: Yes  Medications reviewed: Yes    Medications: Medication refills not needed today.  Pharmacy name entered into [facility]: [id number] - [facility] [address] [facility] [address]    Clinical concerns: Lab results, Side effects, SOB    Narda Kingston RN            "

## 2022-02-28 ENCOUNTER — VIRTUAL VISIT (OUTPATIENT)
Dept: ONCOLOGY | Facility: CLINIC | Age: 72
End: 2022-02-28
Attending: INTERNAL MEDICINE
Payer: COMMERCIAL

## 2022-02-28 ENCOUNTER — TRANSFERRED RECORDS (OUTPATIENT)
Dept: HEALTH INFORMATION MANAGEMENT | Facility: CLINIC | Age: 72
End: 2022-02-28

## 2022-02-28 DIAGNOSIS — C90.00 MULTIPLE MYELOMA WITH FAILED REMISSION (H): ICD-10-CM

## 2022-02-28 DIAGNOSIS — F43.23 ADJUSTMENT DISORDER WITH MIXED ANXIETY AND DEPRESSED MOOD: Primary | ICD-10-CM

## 2022-02-28 PROCEDURE — 90834 PSYTX W PT 45 MINUTES: CPT | Mod: TEL | Performed by: SOCIAL WORKER

## 2022-02-28 NOTE — CONFIDENTIAL NOTE
"    [facility] Oncology- Psychotherapy                                    Progress Note    Patient Name: Lizzie Viera  Date: 2/28/2022.         Service Type: Individual      Session Start Time: 11:00  session End Time: 11:50 AM     Session Length: 50    The patient has been notified of following:      This telephone visit will be conducted via a call between you and your provider. We have found that certain health care needs can be provided without a face to face meeting.  This service lets us provide the care you need with a short phone conversation.      Telephone visits are billed at different rates depending on your insurance coverage. During this emergency period, for some insurers they may be billed the same as an in-person visit.  Please reach out to your insurance provider with any questions.     If during the course of the call the if provider feels a telephone visit is not appropriate, you will not be charged for this service.     Patient has given verbal consent to a Telephone visit? Yes      Attendees: Client    Service Modality:  Phone Visit:      Provider verified identity through the following two step process.  Patient provided:  Patient is known previously to provider    The patient has been notified of the following:      \"We have found that certain health care needs can be provided without the need for a face to face visit.  This service lets us provide the care you need with a phone conversation.       I will have full access to your [facility] medical record during this entire phone call.   I will be taking notes for your medical record.      Since this is like an office visit, we will bill your insurance company for this service.       There are potential benefits and risks of telephone visits (e.g. limits to patient confidentiality) that differ from in-person visits.?Confidentiality still applies for telephone services, and nobody will record the visit.  It is important " "to be in a quiet, private space that is free of distractions (including cell phone or other devices) during the visit.??      If during the course of the call I believe a telephone visit is not appropriate, you will not be charged for this service\"     Consent has been obtained for this service by care team member: Yes     DATA  Interactive Complexity: No  Crisis: No  Valeria was interested in a follow-up psychotherapy session today.  We had a virtual appointment today.  Valeria was referred to me by JOSH Lopez from the palliative care clinic.  She is also followed by Dr. Blanco and Dr. Rapp.       Valeria updated me on her health issues.  She states that she has been sick for over a week with a cold, that she believes that she caught from her .  She had some problems with breathing, but they are better today. She had a follow-up with Dr. Blanco on Wednesday, 02-.  She is continuing with physical therapy.  She is concerned that her myeloma protein lab work is going up.   She shared that she has made a decision that she does not want to proceed with a bone marrow transplant at this time.  She had been considering this as an option, but after doing her own research and talking with her physicians, she has decided not to follow through on this plan.  She has been following through with physical therapy and just got a new treadmill for their home and has been working out on a daily basis.     Valeria met with the bone marrow transplant team on 1/4/2022.  The recommendation is for her to have a bone marrow transplant as soon as it can be arranged.  She found out that she will still need to continue chemotherapy after her transplant.  She initially did some research and feels that it would be better if she has the transplant when there is minimal residual disease.  She met with the [facility] physiciain in [place] on 2/8/2022..  She has done extensive thinking and reviewing the written material " from the transplant team and feels very comfortable with her decision to wait to proceed with transplant at this time.  She also was able to get some good advice from her appointment with the integrative health physician.  He is doing some further testing and recommending some supplements.  Valeria also indicated that her gabapentin dose has increased.  She now is taking gabapentin 3 times a day, 600 mg.  Valeria processed her thoughts and feelings about her health issues and we discussed strategies to help her cope.     Valeria has a history of multiple myeloma.  She states that in September 2020, she had a fracture of T-7 and was hospitalized at [facility].  She did not have a good experience in transferring her care over to [facility] and the cancer clinic at [facility].  In April 2021, she continued to have pain in her spine and had biopsy that confirmed her diagnosis of multiple myeloma in May 2021.  She was hospitalized in September at [facility] and received radiation therapy during her hospitalization.  Her last visit with Dr. Blanco on 9/29/2021,  he indicates that clinically her multiple myeloma is behaving more aggressively with bone lesions.  She has significant bone disease with osteoporosis and compression fractures.  Dr. Blanco started her on Velcade, Revlimid and dexamethasone.  She had her first treatment on 9/29/2021.  She also has been receiving monthly Zometa shots.  She had additional radiation treatment on 10/6/2021 through 10/12/2021 to T1.  She also has a skull lesion that they will hold off on radiation at this time. She met with the neurosurgeon on 10/11/2021 and he felt that T6-7 and 8 are stable.   Her main physical complaints are pain and fatigue. She also is experiencing temperature changes and changes to her taste, which she believes is due to her chemo pill. At her 9/7/2021 palliative care clinic visit, they discussed advanced directives.  She has completed her health care  "directive.  She expressed that she wants to be a DNR/DNI and has expressed this to her care providers.  She decided to have her daughter be the primary proxy and her sister and girlfriend to be the alternates.       Valeria indicates that she has done a lot of research and has decided not to be vaccinated for COVID-19.  The daughter that lives with them tested positive for COVID-19 last week.  Valeria indicated that she had minimal symptoms, but is feeling fine now.  She never took a test to see if she actually had COVID-19.  Her other daughter had COVID-19 in May and her son and  also tested positive at that time.  She had the antibody test and found out that she also had COVID-19 in the past.  She was asymptomatic and did not realize she even had it.  She is not interested in getting the vaccine.  She is interested in doing alternative/complementary medicine to help combat her cancer.  She has been taking anti-inflammatory medication and has been reading books and articles about supplements that help \"kill cancer\".  She currently is on a variety of supplements and has discussed her supplements with Dr. Blanco.  Valeria indicates that she finds her medical background useful in understanding her health condition.     Valeria processed her thoughts and feelings about her cancer and her cancer treatment.  She is experiencing symptoms of anxiety and depression related to coping with her cancer and other life stressors.  She does not clinically meet diagnostic criteria for a mood disorder or an anxiety disorder.  She meets diagnostic criteria for adjustment disorder with mixed anxiety and depressed mood.  She denies suicidal ideation or thoughts of harming herself or others.     Valeria grew up in the [place].  She is the second oldest in a family of 8 children.  She has 3 sisters and 4 brothers.  Her older brother is exactly 1 year and a day older than her.  She states that she has a very close relationship with her " siblings.  Most of them live in the area.  Her sisters have been extremely helpful during this difficult time.  She has 2 of her sisters at her house cleaning during her last visit..     Valeria lives in her [place] home with her , Ramos; daughter Collette; and her 2 sons ages 4 and 8.  Valeria and Ramos have been  for 42 years and that on a blind date.  She states that Ramos has end-stage renal disease and is receiving dialysis 3 times a week.  He also had trouble aortic aneurysm repair and he has type 2 diabetes. He had a cardiac procedure at the [facility] on 10/15/2021, which went well.  He was contacted by the [facility] on 2022 about being on the transplant list.  He has a friend who is willing to donate his kidney. Ramos retired at age 69 from being a .  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  When he had his annual DOT physical, he did not want to share his medical history with the DOT physician and decided to quit his job, on the spot that day.  This created some issues as he was the carrier of the insurance for the family.  Valeria indicates that Ramos is extremely angry and tends to take it out on the family members.  His father  when he was 11 years old.  He was a  and  on an experimental aircraft in [place].  He recently became so angry and agitated towards their daughter, that she gave him an ultimatum that he needs to start seeing a therapist.  Recently, he became extremely angry and agitated  with their son and her sister, to the point that they needed to leave the house due to his verbal abuse.  Valeria has been a buffer for the family regarding his anger.  She has been strongly encouraging him to make an appointment with a therapist.  The staff at [facility] also have talked with him about seeing a therapist and had a referral recommendation for him.  He has been refusing to make an appointment with a therapist.  At one point, he agreed to meet for family therapy with his  daughter.  He currently refuses to receive any psychological help and his behavior continues to be extremely angry and agitated.  Due to his behavior, their daughter has decided that she needs to start looking for another housing situation.  She works with a therapist who is helping her get on public housing.  This may take a number of months.  We talked about how his behavior affects Valeria emotionally.  We also talked about a plan for respite if she needs a break from the home situation.  She has come to the conclusion that he does not have insight about his behavior and how it affects others.  She is starting to do some recommended psychoeducational reading that may be helpful for her in this area.     Valeira and Ramos have 3 children.  The oldest is their daughter, Sharifa, age 38.  She lives near [place] and has 2 children.  She works as a teacher.  Their second oldest is their daughter, Collette, age 34, who currently lives with them along with her for an 8-year-old sons.  She is on Social Security disability due to her depression and past suicidal ideation.  She has received a great deal of therapy including DBT.  She has been extremely helpful to Valeria and wants to be her caretaker.  Their youngest is their son, Trell, age 30, l just moved out of the home 3 weeks ago to [facility], in order to be closer to his work in the area. He works 12-hour shifts starting at 4 AM in a warehouse that provides food for gas stations and service stations.     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  She has worked in medical research.  She spent 18 years working at the [employer].  She also has worked at [employer] in nuclear medicine and for [place] [facility] and nuclear medicine.  She spent several years working for [employer] and [employer]  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     Valeria was  raised Mormonism and went to a Mormonism grade school, [employer] in [place]. She also went to [employer]. She currently describes herself as being a spiritual person and believes that mother nature is the guiding spirit of the planet. She has some  and Norwegian spirituality beliefs. We talked about ways for her to incorporate her spirituality into her healing process.    We discussed some psychoeducational reading material that may be helpful for Valeria.  She started reading a book that I recommended to help her cope and deal with the issues she is having with her .  In addition, she is interested in some of the books that I recommended regarding spirituality and complementary medicine.    Valeria continue to discuss issues with her  and their relationship.  She is struggling with his issues of anger that is projected towards her.  We discussed this dynamic at length and talked about strategies for setting up boundaries for her own self protection.  She has been implementing some strategies that have been helpful in this area and has helped alleviate some of the stress for her about the situation.     Valeria is interested in individual psychotherapy to help her work through the emotional aspects of her cancer and cancer treatments.  She is experiencing symptoms of anxiety and depression related to her cancer and other life stresses.  She would like to initially meet on a weekly basis.  I have left a message for our information coordinators to get her scheduled in for a series of appointments.      Progress Since Last Session (Related to Symptoms / Goals / Homework):   Symptoms: Improving    Homework: Partially completed      Episode of Care Goals: Satisfactory progress - ACTION (Actively working towards change); Intervened by reinforcing change plan / affirming steps taken     Current / Ongoing Stressors and Concerns:        Treatment Objective(s) Addressed in This  Session:   To deal with the emotional aspects of dealing with multiple myeloma.     Intervention:   CBT: To learn strategies to deal with symptoms of anxiety and depression related to her illness.          ASSESSMENT: Current Emotional / Mental Status (status of significant symptoms):   Risk status (Self / Other harm or suicidal ideation)   Patient denies current fears or concerns for personal safety.   Patient denies current or recent suicidal ideation or behaviors.   Patient denies current or recent homicidal ideation or behaviors.   Patient denies current or recent self injurious behavior or ideation.   Patient denies other safety concerns.   Patient reports there has been no change in risk factors since their last session.     Patient reports there has been no change in protective factors since their last session.     Recommended that patient call 911 or go to the local ED should there be a change in any of these risk factors.     Appearance:   N/A-telephone visit   Eye Contact:   N/A-telephone visit   Psychomotor Behavior: N/A-telephone visit   Attitude:   Cooperative  Interested Friendly Pleasant   Orientation:   All   Speech    Rate / Production: Normal/ Responsive Normal     Volume:  Normal    Mood:    Normal   Affect:    Appropriate    Thought Content:  Clear    Thought Form:  Coherent  Logical    Insight:    Good      Medication Review:   No current psychiatric medications prescribed     Medication Compliance:   Valeria is compliant with her medications as prescribed by her primary care physician and oncologist.     Changes in Health Issues:   None reported     Chemical Use Review:   Substance Use: Chemical use reviewed, no active concerns identified      Tobacco Use: No current tobacco use.      Diagnosis:  1.  Adjustment disorder with mixed anxiety and depressed mood  2.  Multiple myeloma not having achieved remission    Collateral Reports Completed:   Not Applicable    PLAN: (Patient Tasks / Therapist  Tasks / Other)  1.  Valeria would benefit from individual psychotherapy for 50-minute sessions every 1 to 2 weeks to help her deal with the emotional aspects of her multiple myeloma.  She is working at implementing cognitive behavioral therapy strategies to help her better manage her symptoms of anxiety and depression related to coping with her illness.    2.  Valeria is working at incorporating body mind and spirit techniques to help her with relaxation and better manage her symptoms of anxiety and depression related to her illness.    3.  Araceli is working on communication and conflict resolution strategies with her .      JAI AMADO LP, Northern Maine Medical CenterSW                                                         ______________________________________________________________________    Individual Treatment Plan    Patient's Name: Lizzie Viera  YOB: 1950    Date of Creation: 2/28/2022  Date Treatment Plan Last Reviewed/Revised: 2/21/2022    DSM5 Diagnoses: Adjustment disorder with mixed anxiety and depressed mood  Psychosocial / Contextual Factors: Treatment for multiple myeloma and coping with the emotional aspects of dealing with her illness.    Referral / Collaboration:  Referral to another professional/service is not indicated at this time..    Anticipated number of session for this episode of care: 10  Anticipation frequency of session: Every 1 to 2 weeks  Anticipated Duration of each session: 38-52 minutes  Treatment plan will be reviewed in 90 days or when goals have been changed.       MeasurableTreatment Goal(s) related to diagnosis / functional impairment(s)  Goal 1: Patient will work on dealing with the emotional aspects of coping with her multiple myeloma     Objective #A (Patient Action)    Patient will identify stressors that contribute to feelings of anxiety and depression related to her illness..  Status: Continued - Date(s): 2/28/2022    Intervention(s)  Therapist will teach emotional  recognition/identification. Of feelings of anxiety and depression related to her multiple myeloma..    Objective #B  Patient will participate in Progress of relaxation strategies and mindfulness strategies activities to improve mood.  Status: Continued - Date(s): 2/28/2022    Intervention(s)  Therapist will teach about cognitive behavioral strategies to better manage symptoms of anxiety and depression.  Therapist will also teach about mindfulness strategies and provided recommended reading material..    Objective #C  Patient will participate in Communication and boundary setting activities to improve mood.  Status: Continued - Date(s): 2/28/2022    Intervention(s)  Therapist will provide psychoeducation on communication and conflict resolution strategies.  Psychoeducational reading material recommended.      Patient has reviewed and agreed to the above plan.    Note: I have reevaluated the above information with Valeria and appropriate changes were made and additional information was added.  Other information was consistent from the note that was made on 02-.      This note was created with the help of Dragon dictation system.  Grammatical and typing errors are not intentional.     Review of long-term goals: Treatment plan was reviewed and updated.     JAI AMADO LP, LICSW  February 28, 2022

## 2022-03-02 ENCOUNTER — LAB (OUTPATIENT)
Dept: INFUSION THERAPY | Facility: HOSPITAL | Age: 72
End: 2022-03-02
Attending: INTERNAL MEDICINE
Payer: COMMERCIAL

## 2022-03-02 VITALS
RESPIRATION RATE: 16 BRPM | SYSTOLIC BLOOD PRESSURE: 113 MMHG | DIASTOLIC BLOOD PRESSURE: 51 MMHG | OXYGEN SATURATION: 94 % | TEMPERATURE: 97.7 F | HEART RATE: 62 BPM

## 2022-03-02 DIAGNOSIS — C90.00 MULTIPLE MYELOMA WITH FAILED REMISSION (H): Primary | ICD-10-CM

## 2022-03-02 LAB
BASOPHILS # BLD MANUAL: 0.1 10E3/UL (ref 0–0.2)
BASOPHILS NFR BLD MANUAL: 1 %
ELLIPTOCYTES BLD QL SMEAR: SLIGHT
EOSINOPHIL # BLD MANUAL: 0.1 10E3/UL (ref 0–0.7)
EOSINOPHIL NFR BLD MANUAL: 1 %
ERYTHROCYTE [DISTWIDTH] IN BLOOD BY AUTOMATED COUNT: 17.3 % (ref 10–15)
HCT VFR BLD AUTO: 43.3 % (ref 35–47)
HGB BLD-MCNC: 14.2 G/DL (ref 11.7–15.7)
LYMPHOCYTES # BLD MANUAL: 0.6 10E3/UL (ref 0.8–5.3)
LYMPHOCYTES NFR BLD MANUAL: 10 %
MCH RBC QN AUTO: 33.3 PG (ref 26.5–33)
MCHC RBC AUTO-ENTMCNC: 32.8 G/DL (ref 31.5–36.5)
MCV RBC AUTO: 101 FL (ref 78–100)
MONOCYTES # BLD MANUAL: 0.1 10E3/UL (ref 0–1.3)
MONOCYTES NFR BLD MANUAL: 1 %
NEUTROPHILS # BLD MANUAL: 5 10E3/UL (ref 1.6–8.3)
NEUTROPHILS NFR BLD MANUAL: 87 %
PLAT MORPH BLD: ABNORMAL
PLATELET # BLD AUTO: 257 10E3/UL (ref 150–450)
RBC # BLD AUTO: 4.27 10E6/UL (ref 3.8–5.2)
RBC MORPH BLD: ABNORMAL
VARIANT LYMPHS BLD QL SMEAR: PRESENT
WBC # BLD AUTO: 5.7 10E3/UL (ref 4–11)

## 2022-03-02 PROCEDURE — 36415 COLL VENOUS BLD VENIPUNCTURE: CPT | Performed by: INTERNAL MEDICINE

## 2022-03-02 PROCEDURE — 85027 COMPLETE CBC AUTOMATED: CPT | Performed by: INTERNAL MEDICINE

## 2022-03-02 PROCEDURE — 250N000011 HC RX IP 250 OP 636: Performed by: INTERNAL MEDICINE

## 2022-03-02 PROCEDURE — 96401 CHEMO ANTI-NEOPL SQ/IM: CPT

## 2022-03-02 RX ADMIN — BORTEZOMIB 2.1 MG: 3.5 INJECTION, POWDER, LYOPHILIZED, FOR SOLUTION INTRAVENOUS; SUBCUTANEOUS at 15:01

## 2022-03-02 NOTE — PROGRESS NOTES
Infusion Nursing Note:  Lizzie Viera presents today for cycle 8 day 8 treatment with Velcade.     Patient seen by provider today: No   present during visit today: Not Applicable.    Note: VSS  Pt assessed.  She is well educated on her plan of care and on the drug.      Intravenous Access:  No IV access needed.    Treatment Conditions:  Results reviewed, labs MET treatment parameters, ok to proceed with treatment.      Post Infusion Assessment:  Patient tolerated injection without incident.       Discharge Plan:   Patient discharged in stable condition accompanied by: self.      Diane Mckeon RN

## 2022-03-05 ENCOUNTER — MYC MEDICAL ADVICE (OUTPATIENT)
Dept: PHYSICAL MEDICINE AND REHAB | Facility: CLINIC | Age: 72
End: 2022-03-05
Payer: COMMERCIAL

## 2022-03-09 ENCOUNTER — HOSPITAL ENCOUNTER (OUTPATIENT)
Dept: CT IMAGING | Facility: HOSPITAL | Age: 72
End: 2022-03-09
Attending: INTERNAL MEDICINE
Payer: COMMERCIAL

## 2022-03-09 ENCOUNTER — HOSPITAL ENCOUNTER (OUTPATIENT)
Dept: MRI IMAGING | Facility: HOSPITAL | Age: 72
End: 2022-03-09
Attending: PHYSICAL MEDICINE & REHABILITATION
Payer: COMMERCIAL

## 2022-03-09 ENCOUNTER — INFUSION THERAPY VISIT (OUTPATIENT)
Dept: INFUSION THERAPY | Facility: HOSPITAL | Age: 72
End: 2022-03-09
Attending: INTERNAL MEDICINE
Payer: COMMERCIAL

## 2022-03-09 DIAGNOSIS — M84.58XK PATHOLOGICAL FRACTURE OF VERTEBRAE IN NEOPLASTIC DISEASE WITH NONUNION: ICD-10-CM

## 2022-03-09 DIAGNOSIS — C90.00 MULTIPLE MYELOMA, REMISSION STATUS UNSPECIFIED (H): ICD-10-CM

## 2022-03-09 DIAGNOSIS — C90.00 MULTIPLE MYELOMA WITH FAILED REMISSION (H): Primary | ICD-10-CM

## 2022-03-09 DIAGNOSIS — M48.04 THORACIC SPINAL STENOSIS: ICD-10-CM

## 2022-03-09 DIAGNOSIS — C90.00 MULTIPLE MYELOMA WITH FAILED REMISSION (H): ICD-10-CM

## 2022-03-09 LAB
BASOPHILS # BLD AUTO: 0 10E3/UL (ref 0–0.2)
BASOPHILS NFR BLD AUTO: 1 %
EOSINOPHIL # BLD AUTO: 0 10E3/UL (ref 0–0.7)
EOSINOPHIL NFR BLD AUTO: 0 %
ERYTHROCYTE [DISTWIDTH] IN BLOOD BY AUTOMATED COUNT: 16.6 % (ref 10–15)
HCT VFR BLD AUTO: 40.3 % (ref 35–47)
HGB BLD-MCNC: 13.2 G/DL (ref 11.7–15.7)
IMM GRANULOCYTES # BLD: 0 10E3/UL
IMM GRANULOCYTES NFR BLD: 1 %
LYMPHOCYTES # BLD AUTO: 0.5 10E3/UL (ref 0.8–5.3)
LYMPHOCYTES NFR BLD AUTO: 11 %
MCH RBC QN AUTO: 32.8 PG (ref 26.5–33)
MCHC RBC AUTO-ENTMCNC: 32.8 G/DL (ref 31.5–36.5)
MCV RBC AUTO: 100 FL (ref 78–100)
MONOCYTES # BLD AUTO: 0.1 10E3/UL (ref 0–1.3)
MONOCYTES NFR BLD AUTO: 1 %
NEUTROPHILS # BLD AUTO: 4.1 10E3/UL (ref 1.6–8.3)
NEUTROPHILS NFR BLD AUTO: 86 %
NRBC # BLD AUTO: 0 10E3/UL
NRBC BLD AUTO-RTO: 0 /100
PLATELET # BLD AUTO: 141 10E3/UL (ref 150–450)
RBC # BLD AUTO: 4.03 10E6/UL (ref 3.8–5.2)
WBC # BLD AUTO: 4.7 10E3/UL (ref 4–11)

## 2022-03-09 PROCEDURE — 255N000002 HC RX 255 OP 636: Performed by: PHYSICAL MEDICINE & REHABILITATION

## 2022-03-09 PROCEDURE — A9585 GADOBUTROL INJECTION: HCPCS | Performed by: PHYSICAL MEDICINE & REHABILITATION

## 2022-03-09 PROCEDURE — 36415 COLL VENOUS BLD VENIPUNCTURE: CPT

## 2022-03-09 PROCEDURE — 72157 MRI CHEST SPINE W/O & W/DYE: CPT

## 2022-03-09 PROCEDURE — 250N000011 HC RX IP 250 OP 636: Performed by: INTERNAL MEDICINE

## 2022-03-09 PROCEDURE — 85004 AUTOMATED DIFF WBC COUNT: CPT | Performed by: INTERNAL MEDICINE

## 2022-03-09 PROCEDURE — 96401 CHEMO ANTI-NEOPL SQ/IM: CPT

## 2022-03-09 PROCEDURE — 86334 IMMUNOFIX E-PHORESIS SERUM: CPT | Mod: 26 | Performed by: PATHOLOGY

## 2022-03-09 PROCEDURE — 86334 IMMUNOFIX E-PHORESIS SERUM: CPT

## 2022-03-09 PROCEDURE — 70450 CT HEAD/BRAIN W/O DYE: CPT

## 2022-03-09 RX ORDER — GADOBUTROL 604.72 MG/ML
6 INJECTION INTRAVENOUS ONCE
Status: COMPLETED | OUTPATIENT
Start: 2022-03-09 | End: 2022-03-09

## 2022-03-09 RX ADMIN — BORTEZOMIB 2.1 MG: 3.5 INJECTION, POWDER, LYOPHILIZED, FOR SOLUTION INTRAVENOUS; SUBCUTANEOUS at 14:55

## 2022-03-09 RX ADMIN — GADOBUTROL 6 ML: 604.72 INJECTION INTRAVENOUS at 12:57

## 2022-03-09 NOTE — PROGRESS NOTES
Pt here for injection which was given R abdomen without incident. Pt denies new complaint and will see NP next week. Pt jorge ambulatory to lobby to meet her friend.

## 2022-03-10 DIAGNOSIS — C90.00 MULTIPLE MYELOMA WITH FAILED REMISSION (H): Primary | ICD-10-CM

## 2022-03-10 RX ORDER — LENALIDOMIDE 25 MG/1
25 CAPSULE ORAL DAILY
Qty: 14 CAPSULE | Refills: 0 | Status: SHIPPED | OUTPATIENT
Start: 2022-03-16 | End: 2022-03-31

## 2022-03-11 LAB
PATH ICD:: NORMAL
PROT PATTERN SERPL IFE-IMP: NORMAL
REVIEWING PATHOLOGIST: NORMAL

## 2022-03-15 ENCOUNTER — OFFICE VISIT (OUTPATIENT)
Dept: RADIATION ONCOLOGY | Facility: HOSPITAL | Age: 72
End: 2022-03-15
Attending: CLINICAL NURSE SPECIALIST
Payer: COMMERCIAL

## 2022-03-15 VITALS
SYSTOLIC BLOOD PRESSURE: 146 MMHG | OXYGEN SATURATION: 97 % | BODY MASS INDEX: 23.08 KG/M2 | RESPIRATION RATE: 16 BRPM | DIASTOLIC BLOOD PRESSURE: 69 MMHG | WEIGHT: 126.2 LBS | TEMPERATURE: 97.9 F | HEART RATE: 64 BPM

## 2022-03-15 DIAGNOSIS — G89.3 CANCER ASSOCIATED PAIN: ICD-10-CM

## 2022-03-15 DIAGNOSIS — Z51.5 ENCOUNTER FOR PALLIATIVE CARE: ICD-10-CM

## 2022-03-15 DIAGNOSIS — F41.9 ANXIETY: ICD-10-CM

## 2022-03-15 DIAGNOSIS — C90.00 MULTIPLE MYELOMA NOT HAVING ACHIEVED REMISSION (H): Primary | ICD-10-CM

## 2022-03-15 PROCEDURE — 99215 OFFICE O/P EST HI 40 MIN: CPT | Performed by: CLINICAL NURSE SPECIALIST

## 2022-03-15 PROCEDURE — G0463 HOSPITAL OUTPT CLINIC VISIT: HCPCS

## 2022-03-15 RX ORDER — HYDROMORPHONE HYDROCHLORIDE 4 MG/1
2-4 TABLET ORAL EVERY 4 HOURS PRN
Qty: 60 TABLET | Refills: 0 | Status: SHIPPED | OUTPATIENT
Start: 2022-03-15 | End: 2022-04-28

## 2022-03-15 NOTE — NURSING NOTE
"[facility]  Palliative Care Daily Progress Note      Code status: DNR/DNI     Impressions, Recommendations & Counseling     SYMPTOM ASSESSMENT:  1.  Cancer related pain thoracic spine pain that radiates to bilateral lower extremities  - Continue Gabapentin 600 mg by mouth 3 times daily. This was recently increased by Dr. Pascual. Tolerating well.   - Dexamethasone per chemotherapy regimen - assists with pain, nausea and appetite stimulation.  - S/P Single fraction radiation to T6 through T8/9.  - S/P T1 radiation 10/6/2021-10/12/2021.    - Changed Dilaudid to 2-4 mg by mouth every 4 hours as needed.  Patient not taking often, though reports that pain has increased due to recent stressors.  - Patient has met with leaf line pharmacist and started medical cannabis - pills and oil. Feels she is having good relief. Continue.  - Continue Robaxin 500 mg po q6h prn spasms - taking 3 times a day.  - Continue working with PT. Having good pain results and she is feeling stronger.  - Patient was taking  naltrexone 3 mg by mouth daily.  Had discussion with patient that this could reverse/minimize effect of opiate and she may feel more pain.  Taking this as a \"anti-inflammatory\" component of her natural/complementary treatments.    2. Cancer related fatigue - feels fatigue worse day 10-14 of treatment  - Currently working with PT for pain. Valeria states she is physically feeling much stronger with the exercises they are providing her with.  - Patient utilizing rolling walker while up.  - Discussed ways to minimize falls like wearing closed toed tight up shoes, removing area rugs and other objects/obstacles in home    3. Anxiety and depression related to health/diagnosis and current social stressors  - Patient seeing Mary Ellen Chan for coping, support and processing diagnosis.  - Patient seeing an energy healer    ADVANCED CARE PLANNING/GOALS OF CARE DISCUSSION:  - Code status: DNR/DNI  - Honoring Choices and POLST " "in chart.  - Follow-up in palliative care clinic in 3 months for ongoing pain management and decisional support.       HPI          SUMMARY: Lizzie Viera is a 71 year old female with a past medical history of multiple myeloma with pathologic compression fracture of T6 through T8/9 spine based on CT scan and received one fraction of radiation to the levels T6 through T8/9 on 9/1/2021 and T1 radiation from 10/6/2021 - 10/12/2021. Patient fitted for and wear TLSO brace for spine stabilization.   Historically, 9/2020 patient noted to have osteoporotic T7 compression fracture with diffuse marrow edema and an indeterminate T1 lesion.  Further imaging in 4/26/2021 showed worsening of her T7 pathologic fracture with new extraosseous extension causing severe spinal canal stenosis.  5/7/2021 biopsy showed neoplasm and biopsy obtained of right iliac crest showed multiple myeloma.  Patient initially declined chemo and radiation at that time and sought naturapathic approach to treatment.  Patient is currently being treated with Velcade, Revlimid and Dexamethasone.    Today, the patient was seen for:  Continued support and pain management    Prognosis, Goals, or Advance Care Planning was addressed today with: Yes.  Mood, coping, and/or meaning in the context of serious illness were addressed today: Yes.  Summary/Comments: Valeria states that she has had increased stress this week as her  is [facility]. She is currently caring for her grandchildren and maintaining all of her appointments with oncology and therapies. She is working with Mary Ellen Chan and an  and feels these strategies help her, \"maintain a positive attitude.\" She has good support from her friend, Marielena, and participates in a Multiple Myeloma support group.     Discussed current treatments and decision to hold off on a bone marrow transplant at this time. Valeria states that she wants to wait until her blood " proteins decrease and worries that she will not be as well supported as she would like through the bone marrow transplant process, as it would be difficult for her to get to daily appointments at [facility] after the transplant. She is open to discussing this as an option in the future should her health status change.             Interval History:     Chart review/discussion with unit or clinical team members:   Patient's friend, Marielena, was with her at this appointment.     Key Palliative Symptoms:  # Pain severity the last 12 hours: moderate  # Dyspnea severity the last 12 hours: none  # Nausea severity the last 12 hours: none  # Anxiety severity the last 12 hours: moderate           Review of Systems:     Besides above, an additional system ROS was reviewed and is unremarkable          Medications:     Noted meds: MAR and PDMP reviewed           Physical Exam:   Temp:  [97.9  F (36.6  C)] 97.9  F (36.6  C)  Pulse:  [64] 64  Resp:  [16] 16  BP: (146)/(69) 146/69  SpO2:  [97 %] 97 %    BP (!) 146/69   Pulse 64   Temp 97.9  F (36.6  C)   Resp 16   Wt 57.2 kg (126 lb 3.2 oz)   SpO2 97%   BMI 23.08 kg/m    General appearance: alert, appears stated age and cooperative  Head: Normocephalic, without obvious abnormality, atraumatic  Eyes: Normal lids and lashes, no scleral icteris  Lungs: clear to auscultation bilaterally and Breathing non-labored. No wheeze or cough.  Skin: Skin color, texture, turgor normal. No rashes or lesions  Neurologic: Grossly normal             Data Reviewed:     Pertinent Labs  Lab Results: personally reviewed.   not applicable  AST   Date Value Ref Range Status   02/23/2022 19 0 - 40 U/L Final     ALT   Date Value Ref Range Status   02/23/2022 33 0 - 45 U/L Final     Alkaline Phosphatase   Date Value Ref Range Status   02/23/2022 47 45 - 120 U/L Final     Albumin   Date Value Ref Range Status   02/23/2022 3.4 (L) 3.5 - 5.0 g/dL Final         Radiology Results  MR Thoracic Spine w/o & w  Contrast    Result Date: 3/10/2022  EXAM: MR THORACIC SPINE W/O and W CONTRAST LOCATION: [facility] DATE/TIME: 3/9/2022 12:16 PM INDICATION: 71 year old?woman who has been diagnosed with multiple myeloma IgG kappa type. COMPARISON: Thoracic spine MRI 08/31/2021. CONTRAST: Gadavist 6 mL. TECHNIQUE: Routine Thoracic Spine MRI without and with IV contrast. FINDINGS: Mild dextrocurvature of the thoracic spine. Normal sagittal alignment. Compared to the prior thoracic spine MRI, there has been interval positive response to treatment. Interval significant decrease in the previously noted abnormal marrow signal and enhancement involving the T6-T7 vertebral bodies and bilateral pedicles. The associated prevertebral soft tissue mass is almost completely resolved. Similarly the associated epidural soft tissue lesions involving both the ventral and dorsal epidural space at T6-T7 and previously causing high-grade spinal stenosis have resolved. The spinal canal is widely patent at that level on today's exam. There is a new T1 hypointense and enhancing lesion at T1 compared to the previous thoracic spine MRI. There are suggestion of subtle enhancement involving T2 and T3 vertebral bodies on the postcontrast images although no convincing abnormal signal noted on the sagittal T1-weighted images. Small Schmorl's nodes from T10 to L1. No convincing abnormal cord signal on today's images. There is suggestion of subtle increased T2 signal within the cord at T6-T7 on the sagittal T2-weighted images but this is not confirmed on the other sequences and  is likely artifactual. Spinal canal is widely patent throughout the entire thoracic spine. Moderate bilateral neural foraminal narrowing at T6-T7 and T7-T8, actually improved compared to the prior thoracic spine MRI. Mild symmetric atrophy of the posterior paraspinal musculature. Normal diameter of the descending aorta.     IMPRESSION: 1.  Compared to previous  thoracic spine MRI 08/31/2021, interval positive response to treatment. Interval significant decrease in the previously noted abnormal marrow signal and enhancement involving the T6-T7 vertebral bodies and bilateral pedicles. The  associated prevertebral soft tissue mass is almost completely resolved. Similarly the associated epidural soft tissue lesions involving both the ventral and dorsal epidural space at T6-T7 and previously causing high-grade spinal stenosis have resolved. 2.  There is a new T1 hypointense and enhancing lesion at T1 compared to the previous thoracic spine MRI. This is suspicious for new infiltrative marrow lesion. 3.  There are suggestion of subtle enhancement involving T2 and T3 vertebral bodies on the postcontrast images although no convincing abnormal signal noted on the sagittal T1-weighted images. These are favored to be artifactual. 4.  No high-grade spinal canal narrowing. 5.  Moderate bilateral neural foraminal narrowing at T6-T7 and T7-T8, actually improved compared to the prior thoracic spine MRI.    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 Multiplanar reformats. Dose reduction techniques were used. FINDINGS: INTRACRANIAL CONTENTS: No intracranial hemorrhage, extraaxial collection, or mass effect.  No CT evidence of acute infarct. Unchanged mild presumed chronic small vessel ischemic changes. Unchanged mild generalized volume loss. No hydrocephalus. VISUALIZED ORBITS/SINUSES/MASTOIDS: No intraorbital abnormality. No paranasal sinus mucosal disease. No middle ear or mastoid effusion. BONES/SOFT TISSUES: Unchanged size of the dominant left parietal bone lesion. However, there  is increased mineralization/ossification along the margins of the lesion.     IMPRESSION: 1.  When compared to 12/29/2021, there has been interval increased mineralization/ossification along the margins of the dominant left parietal bone lesion suggestive of ongoing healing change. 2.  No acute intracranial abnormality.     TTS: I have personally spent a total of 40 minutes today in above encounter, reviewing patient's medical record, assessing patient and symptoms and providing emotional support with more than 50% of this time spent face to face with patient.    VILMA Flynn, SINDHU, CNS  Palliative Care  [phone]

## 2022-03-15 NOTE — PATIENT INSTRUCTIONS
Pain control:  - Continue methocarbamol, gabapentin and medical cannabis as prescribed.  - Change Dilaudid 2-4 mg (half to one tab) every 4 hours as needed or pain.

## 2022-03-15 NOTE — LETTER
"    3/15/2022         RE: Lizzie Viera  [address]  [place] [address]        Dear Colleague,    Thank you for referring your patient, Lizzie Viera, to the [facility] RADIATION ONCOLOGY [place]. Please see a copy of my visit note below.    Oncology Rooming Note: Palliative Care follow up    March 15, 2022 11:01 AM   Lizzie Viera is a 71 year old female who presents for:    Chief Complaint   Patient presents with     Oncology Clinic Visit     Palliative care f/u     Initial Vitals: BP (!) 146/69   Pulse 64   Temp 97.9  F (36.6  C)   Resp 16   Wt 57.2 kg (126 lb 3.2 oz)   SpO2 97%   BMI 23.08 kg/m   Estimated body mass index is 23.08 kg/m  as calculated from the following:    Height as of 10/19/21: 1.575 m (5' 2.01\").    Weight as of this encounter: 57.2 kg (126 lb 3.2 oz). Body surface area is 1.58 meters squared.  Mild Pain (3) Comment: Data Unavailable   No LMP recorded. Patient is postmenopausal.  Allergies reviewed: Yes  Medications reviewed: Yes    Medications: Medication refills not needed today.  Pharmacy name entered into [facility]: [id number] [address]    Clinical concerns: Pt c/o pain mid back and up, sometimes in her neck, and in her left knee. Pain is tolerable at current regimen, but does wake up in morning with a lot of pain. Has dilaudid but not taking currently. Bowels okay.  Belle MORENO was notified.      Narda Singh RN                  Again, thank you for allowing me to participate in the care of your patient.        Sincerely,        JOSH Aguirre    "

## 2022-03-15 NOTE — PROGRESS NOTES
"Oncology Rooming Note: Palliative Care follow up    March 15, 2022 11:01 AM   Lizzie Viera is a 71 year old female who presents for:    Chief Complaint   Patient presents with     Oncology Clinic Visit     Palliative care f/u     Initial Vitals: BP (!) 146/69   Pulse 64   Temp 97.9  F (36.6  C)   Resp 16   Wt 57.2 kg (126 lb 3.2 oz)   SpO2 97%   BMI 23.08 kg/m   Estimated body mass index is 23.08 kg/m  as calculated from the following:    Height as of 10/19/21: 1.575 m (5' 2.01\").    Weight as of this encounter: 57.2 kg (126 lb 3.2 oz). Body surface area is 1.58 meters squared.  Mild Pain (3) Comment: Data Unavailable   No LMP recorded. Patient is postmenopausal.  Allergies reviewed: Yes  Medications reviewed: Yes    Medications: Medication refills not needed today.  Pharmacy name entered into [employer]: [id number] [address]    Clinical concerns: Pt c/o pain mid back and up, sometimes in her neck, and in her left knee. Pain is tolerable at current regimen, but does wake up in morning with a lot of pain. Has dilaudid but not taking currently. Bowels okay.  Blele MORENO was notified.      Narda Singh RN              "

## 2022-03-16 ENCOUNTER — LAB (OUTPATIENT)
Dept: INFUSION THERAPY | Facility: HOSPITAL | Age: 72
End: 2022-03-16
Attending: INTERNAL MEDICINE
Payer: COMMERCIAL

## 2022-03-16 DIAGNOSIS — C90.00 MULTIPLE MYELOMA WITH FAILED REMISSION (H): Primary | ICD-10-CM

## 2022-03-16 LAB
ALBUMIN SERPL-MCNC: 3.3 G/DL (ref 3.5–5)
ALP SERPL-CCNC: 39 U/L (ref 45–120)
ALT SERPL W P-5'-P-CCNC: 42 U/L (ref 0–45)
ANION GAP SERPL CALCULATED.3IONS-SCNC: 8 MMOL/L (ref 5–18)
AST SERPL W P-5'-P-CCNC: 44 U/L (ref 0–40)
BASOPHILS # BLD AUTO: 0 10E3/UL (ref 0–0.2)
BASOPHILS NFR BLD AUTO: 1 %
BILIRUB SERPL-MCNC: 0.2 MG/DL (ref 0–1)
BUN SERPL-MCNC: 14 MG/DL (ref 8–22)
CALCIUM SERPL-MCNC: 8.8 MG/DL (ref 8.5–10.5)
CHLORIDE BLD-SCNC: 107 MMOL/L (ref 98–107)
CO2 SERPL-SCNC: 26 MMOL/L (ref 22–31)
CREAT SERPL-MCNC: 0.7 MG/DL (ref 0.6–1.1)
EOSINOPHIL # BLD AUTO: 0 10E3/UL (ref 0–0.7)
EOSINOPHIL NFR BLD AUTO: 0 %
ERYTHROCYTE [DISTWIDTH] IN BLOOD BY AUTOMATED COUNT: 17.2 % (ref 10–15)
GFR SERPL CREATININE-BSD FRML MDRD: >90 ML/MIN/1.73M2
GLUCOSE BLD-MCNC: 106 MG/DL (ref 70–125)
HCT VFR BLD AUTO: 41.9 % (ref 35–47)
HGB BLD-MCNC: 13.6 G/DL (ref 11.7–15.7)
IMM GRANULOCYTES # BLD: 0 10E3/UL
IMM GRANULOCYTES NFR BLD: 1 %
LYMPHOCYTES # BLD AUTO: 0.5 10E3/UL (ref 0.8–5.3)
LYMPHOCYTES NFR BLD AUTO: 14 %
MCH RBC QN AUTO: 33.5 PG (ref 26.5–33)
MCHC RBC AUTO-ENTMCNC: 32.5 G/DL (ref 31.5–36.5)
MCV RBC AUTO: 103 FL (ref 78–100)
MONOCYTES # BLD AUTO: 0.1 10E3/UL (ref 0–1.3)
MONOCYTES NFR BLD AUTO: 2 %
NEUTROPHILS # BLD AUTO: 3.3 10E3/UL (ref 1.6–8.3)
NEUTROPHILS NFR BLD AUTO: 82 %
NRBC # BLD AUTO: 0 10E3/UL
NRBC BLD AUTO-RTO: 0 /100
PLATELET # BLD AUTO: 215 10E3/UL (ref 150–450)
POTASSIUM BLD-SCNC: 4.3 MMOL/L (ref 3.5–5)
PROT SERPL-MCNC: 6 G/DL (ref 6–8)
RBC # BLD AUTO: 4.06 10E6/UL (ref 3.8–5.2)
SODIUM SERPL-SCNC: 141 MMOL/L (ref 136–145)
WBC # BLD AUTO: 3.9 10E3/UL (ref 4–11)

## 2022-03-16 PROCEDURE — 82040 ASSAY OF SERUM ALBUMIN: CPT | Performed by: INTERNAL MEDICINE

## 2022-03-16 PROCEDURE — 96401 CHEMO ANTI-NEOPL SQ/IM: CPT

## 2022-03-16 PROCEDURE — 250N000011 HC RX IP 250 OP 636: Performed by: INTERNAL MEDICINE

## 2022-03-16 PROCEDURE — 85025 COMPLETE CBC W/AUTO DIFF WBC: CPT | Performed by: INTERNAL MEDICINE

## 2022-03-16 PROCEDURE — 80053 COMPREHEN METABOLIC PANEL: CPT | Performed by: INTERNAL MEDICINE

## 2022-03-16 PROCEDURE — 36415 COLL VENOUS BLD VENIPUNCTURE: CPT | Performed by: INTERNAL MEDICINE

## 2022-03-16 RX ADMIN — BORTEZOMIB 2.1 MG: 3.5 INJECTION, POWDER, LYOPHILIZED, FOR SOLUTION INTRAVENOUS; SUBCUTANEOUS at 14:41

## 2022-03-16 NOTE — PROGRESS NOTES
Pt here for injection after lab draw. No problem with injection in L abdomen. Zometa on hold until after her dental work, which has not been scheduled. Pt denies new complaint and is aware of treatment plan.

## 2022-03-21 ENCOUNTER — VIRTUAL VISIT (OUTPATIENT)
Dept: ONCOLOGY | Facility: CLINIC | Age: 72
End: 2022-03-21
Attending: SOCIAL WORKER
Payer: COMMERCIAL

## 2022-03-21 DIAGNOSIS — F43.23 ADJUSTMENT DISORDER WITH MIXED ANXIETY AND DEPRESSED MOOD: Primary | ICD-10-CM

## 2022-03-21 DIAGNOSIS — C90.00 MULTIPLE MYELOMA WITH FAILED REMISSION (H): ICD-10-CM

## 2022-03-21 PROCEDURE — 90791 PSYCH DIAGNOSTIC EVALUATION: CPT | Mod: TEL | Performed by: SOCIAL WORKER
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
## 2022-03-23 ENCOUNTER — LAB (OUTPATIENT)
Dept: INFUSION THERAPY | Facility: HOSPITAL | Age: 72
End: 2022-03-23
Attending: INTERNAL MEDICINE
Payer: COMMERCIAL

## 2022-03-23 ENCOUNTER — ONCOLOGY VISIT (OUTPATIENT)
Dept: ONCOLOGY | Facility: HOSPITAL | Age: 72
End: 2022-03-23
Attending: INTERNAL MEDICINE
Payer: COMMERCIAL

## 2022-03-23 VITALS
DIASTOLIC BLOOD PRESSURE: 55 MMHG | TEMPERATURE: 98.3 F | SYSTOLIC BLOOD PRESSURE: 124 MMHG | WEIGHT: 128 LBS | OXYGEN SATURATION: 96 % | BODY MASS INDEX: 23.41 KG/M2 | RESPIRATION RATE: 16 BRPM | HEART RATE: 62 BPM

## 2022-03-23 DIAGNOSIS — C90.00 MULTIPLE MYELOMA WITH FAILED REMISSION (H): Primary | ICD-10-CM

## 2022-03-23 LAB
BASOPHILS # BLD AUTO: 0 10E3/UL (ref 0–0.2)
BASOPHILS NFR BLD AUTO: 1 %
EOSINOPHIL # BLD AUTO: 0.1 10E3/UL (ref 0–0.7)
EOSINOPHIL NFR BLD AUTO: 1 %
ERYTHROCYTE [DISTWIDTH] IN BLOOD BY AUTOMATED COUNT: 17.2 % (ref 10–15)
HCT VFR BLD AUTO: 40.5 % (ref 35–47)
HGB BLD-MCNC: 13 G/DL (ref 11.7–15.7)
IMM GRANULOCYTES # BLD: 0.1 10E3/UL
IMM GRANULOCYTES NFR BLD: 1 %
LYMPHOCYTES # BLD AUTO: 0.6 10E3/UL (ref 0.8–5.3)
LYMPHOCYTES NFR BLD AUTO: 11 %
MCH RBC QN AUTO: 32.9 PG (ref 26.5–33)
MCHC RBC AUTO-ENTMCNC: 32.1 G/DL (ref 31.5–36.5)
MCV RBC AUTO: 103 FL (ref 78–100)
MONOCYTES # BLD AUTO: 0.1 10E3/UL (ref 0–1.3)
MONOCYTES NFR BLD AUTO: 2 %
NEUTROPHILS # BLD AUTO: 4.3 10E3/UL (ref 1.6–8.3)
NEUTROPHILS NFR BLD AUTO: 84 %
NRBC # BLD AUTO: 0 10E3/UL
NRBC BLD AUTO-RTO: 0 /100
PLATELET # BLD AUTO: 227 10E3/UL (ref 150–450)
RBC # BLD AUTO: 3.95 10E6/UL (ref 3.8–5.2)
WBC # BLD AUTO: 5 10E3/UL (ref 4–11)

## 2022-03-23 PROCEDURE — 99214 OFFICE O/P EST MOD 30 MIN: CPT | Performed by: NURSE PRACTITIONER

## 2022-03-23 PROCEDURE — 85025 COMPLETE CBC W/AUTO DIFF WBC: CPT | Performed by: INTERNAL MEDICINE

## 2022-03-23 PROCEDURE — 250N000011 HC RX IP 250 OP 636: Performed by: NURSE PRACTITIONER

## 2022-03-23 PROCEDURE — G0463 HOSPITAL OUTPT CLINIC VISIT: HCPCS | Mod: 25

## 2022-03-23 PROCEDURE — 96401 CHEMO ANTI-NEOPL SQ/IM: CPT

## 2022-03-23 PROCEDURE — 36415 COLL VENOUS BLD VENIPUNCTURE: CPT | Performed by: INTERNAL MEDICINE

## 2022-03-23 RX ORDER — ALBUTEROL SULFATE 90 UG/1
1-2 AEROSOL, METERED RESPIRATORY (INHALATION)
Status: CANCELLED
Start: 2022-04-05

## 2022-03-23 RX ORDER — MEPERIDINE HYDROCHLORIDE 25 MG/ML
25 INJECTION INTRAMUSCULAR; INTRAVENOUS; SUBCUTANEOUS EVERY 30 MIN PRN
Status: CANCELLED | OUTPATIENT
Start: 2022-03-29

## 2022-03-23 RX ORDER — DIPHENHYDRAMINE HYDROCHLORIDE 50 MG/ML
50 INJECTION INTRAMUSCULAR; INTRAVENOUS
Status: CANCELLED
Start: 2022-03-23

## 2022-03-23 RX ORDER — LORAZEPAM 2 MG/ML
0.5 INJECTION INTRAMUSCULAR EVERY 4 HOURS PRN
Status: CANCELLED | OUTPATIENT
Start: 2022-04-12

## 2022-03-23 RX ORDER — DIPHENHYDRAMINE HYDROCHLORIDE 50 MG/ML
50 INJECTION INTRAMUSCULAR; INTRAVENOUS
Status: CANCELLED
Start: 2022-04-12

## 2022-03-23 RX ORDER — NALOXONE HYDROCHLORIDE 0.4 MG/ML
0.2 INJECTION, SOLUTION INTRAMUSCULAR; INTRAVENOUS; SUBCUTANEOUS
Status: CANCELLED | OUTPATIENT
Start: 2022-04-19

## 2022-03-23 RX ORDER — ALBUTEROL SULFATE 90 UG/1
1-2 AEROSOL, METERED RESPIRATORY (INHALATION)
Status: CANCELLED
Start: 2022-04-19

## 2022-03-23 RX ORDER — ALBUTEROL SULFATE 90 UG/1
1-2 AEROSOL, METERED RESPIRATORY (INHALATION)
Status: CANCELLED
Start: 2022-04-12

## 2022-03-23 RX ORDER — EPINEPHRINE 1 MG/ML
0.3 INJECTION, SOLUTION INTRAMUSCULAR; SUBCUTANEOUS EVERY 5 MIN PRN
Status: CANCELLED | OUTPATIENT
Start: 2022-04-12

## 2022-03-23 RX ORDER — MEPERIDINE HYDROCHLORIDE 25 MG/ML
25 INJECTION INTRAMUSCULAR; INTRAVENOUS; SUBCUTANEOUS EVERY 30 MIN PRN
Status: CANCELLED | OUTPATIENT
Start: 2022-03-23

## 2022-03-23 RX ORDER — ALBUTEROL SULFATE 0.83 MG/ML
2.5 SOLUTION RESPIRATORY (INHALATION)
Status: CANCELLED | OUTPATIENT
Start: 2022-04-19

## 2022-03-23 RX ORDER — METHYLPREDNISOLONE SODIUM SUCCINATE 125 MG/2ML
125 INJECTION, POWDER, LYOPHILIZED, FOR SOLUTION INTRAMUSCULAR; INTRAVENOUS
Status: CANCELLED
Start: 2022-04-12

## 2022-03-23 RX ORDER — EPINEPHRINE 1 MG/ML
0.3 INJECTION, SOLUTION INTRAMUSCULAR; SUBCUTANEOUS EVERY 5 MIN PRN
Status: CANCELLED | OUTPATIENT
Start: 2022-04-05

## 2022-03-23 RX ORDER — ALBUTEROL SULFATE 0.83 MG/ML
2.5 SOLUTION RESPIRATORY (INHALATION)
Status: CANCELLED | OUTPATIENT
Start: 2022-03-29

## 2022-03-23 RX ORDER — DIPHENHYDRAMINE HYDROCHLORIDE 50 MG/ML
50 INJECTION INTRAMUSCULAR; INTRAVENOUS
Status: CANCELLED
Start: 2022-04-19

## 2022-03-23 RX ORDER — NALOXONE HYDROCHLORIDE 0.4 MG/ML
0.2 INJECTION, SOLUTION INTRAMUSCULAR; INTRAVENOUS; SUBCUTANEOUS
Status: CANCELLED | OUTPATIENT
Start: 2022-03-23

## 2022-03-23 RX ORDER — NALOXONE HYDROCHLORIDE 0.4 MG/ML
0.2 INJECTION, SOLUTION INTRAMUSCULAR; INTRAVENOUS; SUBCUTANEOUS
Status: CANCELLED | OUTPATIENT
Start: 2022-04-12

## 2022-03-23 RX ORDER — LORAZEPAM 2 MG/ML
0.5 INJECTION INTRAMUSCULAR EVERY 4 HOURS PRN
Status: CANCELLED | OUTPATIENT
Start: 2022-04-05

## 2022-03-23 RX ORDER — DIPHENHYDRAMINE HYDROCHLORIDE 50 MG/ML
50 INJECTION INTRAMUSCULAR; INTRAVENOUS
Status: CANCELLED
Start: 2022-04-05

## 2022-03-23 RX ORDER — EPINEPHRINE 1 MG/ML
0.3 INJECTION, SOLUTION INTRAMUSCULAR; SUBCUTANEOUS EVERY 5 MIN PRN
Status: CANCELLED | OUTPATIENT
Start: 2022-03-23

## 2022-03-23 RX ORDER — MEPERIDINE HYDROCHLORIDE 25 MG/ML
25 INJECTION INTRAMUSCULAR; INTRAVENOUS; SUBCUTANEOUS EVERY 30 MIN PRN
Status: CANCELLED | OUTPATIENT
Start: 2022-04-05

## 2022-03-23 RX ORDER — LORAZEPAM 2 MG/ML
0.5 INJECTION INTRAMUSCULAR EVERY 4 HOURS PRN
Status: CANCELLED | OUTPATIENT
Start: 2022-03-29

## 2022-03-23 RX ORDER — METHYLPREDNISOLONE SODIUM SUCCINATE 125 MG/2ML
125 INJECTION, POWDER, LYOPHILIZED, FOR SOLUTION INTRAMUSCULAR; INTRAVENOUS
Status: CANCELLED
Start: 2022-03-23

## 2022-03-23 RX ORDER — ALBUTEROL SULFATE 90 UG/1
1-2 AEROSOL, METERED RESPIRATORY (INHALATION)
Status: CANCELLED
Start: 2022-03-29

## 2022-03-23 RX ORDER — LORAZEPAM 2 MG/ML
0.5 INJECTION INTRAMUSCULAR EVERY 4 HOURS PRN
Status: CANCELLED | OUTPATIENT
Start: 2022-04-19

## 2022-03-23 RX ORDER — METHYLPREDNISOLONE SODIUM SUCCINATE 125 MG/2ML
125 INJECTION, POWDER, LYOPHILIZED, FOR SOLUTION INTRAMUSCULAR; INTRAVENOUS
Status: CANCELLED
Start: 2022-04-05

## 2022-03-23 RX ORDER — MEPERIDINE HYDROCHLORIDE 25 MG/ML
25 INJECTION INTRAMUSCULAR; INTRAVENOUS; SUBCUTANEOUS EVERY 30 MIN PRN
Status: CANCELLED | OUTPATIENT
Start: 2022-04-19

## 2022-03-23 RX ORDER — MEPERIDINE HYDROCHLORIDE 25 MG/ML
25 INJECTION INTRAMUSCULAR; INTRAVENOUS; SUBCUTANEOUS EVERY 30 MIN PRN
Status: CANCELLED | OUTPATIENT
Start: 2022-04-12

## 2022-03-23 RX ORDER — ALBUTEROL SULFATE 0.83 MG/ML
2.5 SOLUTION RESPIRATORY (INHALATION)
Status: CANCELLED | OUTPATIENT
Start: 2022-04-05

## 2022-03-23 RX ORDER — DIPHENHYDRAMINE HYDROCHLORIDE 50 MG/ML
50 INJECTION INTRAMUSCULAR; INTRAVENOUS
Status: CANCELLED
Start: 2022-03-29

## 2022-03-23 RX ORDER — METHYLPREDNISOLONE SODIUM SUCCINATE 125 MG/2ML
125 INJECTION, POWDER, LYOPHILIZED, FOR SOLUTION INTRAMUSCULAR; INTRAVENOUS
Status: CANCELLED
Start: 2022-04-19

## 2022-03-23 RX ORDER — NALOXONE HYDROCHLORIDE 0.4 MG/ML
0.2 INJECTION, SOLUTION INTRAMUSCULAR; INTRAVENOUS; SUBCUTANEOUS
Status: CANCELLED | OUTPATIENT
Start: 2022-03-29

## 2022-03-23 RX ORDER — EPINEPHRINE 1 MG/ML
0.3 INJECTION, SOLUTION INTRAMUSCULAR; SUBCUTANEOUS EVERY 5 MIN PRN
Status: CANCELLED | OUTPATIENT
Start: 2022-04-19

## 2022-03-23 RX ORDER — ALBUTEROL SULFATE 90 UG/1
1-2 AEROSOL, METERED RESPIRATORY (INHALATION)
Status: CANCELLED
Start: 2022-03-23

## 2022-03-23 RX ORDER — ALBUTEROL SULFATE 0.83 MG/ML
2.5 SOLUTION RESPIRATORY (INHALATION)
Status: CANCELLED | OUTPATIENT
Start: 2022-04-12

## 2022-03-23 RX ORDER — METHYLPREDNISOLONE SODIUM SUCCINATE 125 MG/2ML
125 INJECTION, POWDER, LYOPHILIZED, FOR SOLUTION INTRAMUSCULAR; INTRAVENOUS
Status: CANCELLED
Start: 2022-03-29

## 2022-03-23 RX ORDER — ALBUTEROL SULFATE 0.83 MG/ML
2.5 SOLUTION RESPIRATORY (INHALATION)
Status: CANCELLED | OUTPATIENT
Start: 2022-03-23

## 2022-03-23 RX ORDER — NALOXONE HYDROCHLORIDE 0.4 MG/ML
0.2 INJECTION, SOLUTION INTRAMUSCULAR; INTRAVENOUS; SUBCUTANEOUS
Status: CANCELLED | OUTPATIENT
Start: 2022-04-05

## 2022-03-23 RX ORDER — LORAZEPAM 2 MG/ML
0.5 INJECTION INTRAMUSCULAR EVERY 4 HOURS PRN
Status: CANCELLED | OUTPATIENT
Start: 2022-03-23

## 2022-03-23 RX ORDER — EPINEPHRINE 1 MG/ML
0.3 INJECTION, SOLUTION INTRAMUSCULAR; SUBCUTANEOUS EVERY 5 MIN PRN
Status: CANCELLED | OUTPATIENT
Start: 2022-03-29

## 2022-03-23 RX ADMIN — BORTEZOMIB 2.1 MG: 3.5 INJECTION, POWDER, LYOPHILIZED, FOR SOLUTION INTRAVENOUS; SUBCUTANEOUS at 14:57

## 2022-03-23 ASSESSMENT — PAIN SCALES - GENERAL: PAINLEVEL: MODERATE PAIN (4)

## 2022-03-23 NOTE — PROGRESS NOTES
[facility] Hematology and Oncology Progress Note    Patient: Lizzie Viera  MRN: [id number]  Date of Service: Mar 23, 2022          Reason for Visit    Chief Complaint   Patient presents with     Oncology Clinic Visit       Assessment and Plan    Cancer Staging  No matching staging information was found for the patient.    1. Myeloma: has started on treatment with RVd.  She is getting Velcade weekly, Revlimid 2 weeks on, 1 week off and Dex weekly.  Today is cycle 9.  Her light chains and IgG levels have now normalized.  Her hemoglobin has normalized.  Her monoclonal peak has gone from 3.3 down to 0.2-0.3.   She is tolerating treatment fairly well.  She did meet with the transplant team.  She has thought about it and she is a little reluctant to do that at this time.  She prefers her monoclonal peak to be at 0 before she does it so we will continue her current regimen.  She also doesn't want to do it when the weather is bad. We will refer her back to transplant team when she feels ready.  We will see Dr. Blanco in 3-4 weeks.    2. Bone lesions: has started on zometa.  Is been getting monthly.  Her last dose was January 5.  We are going to hold that for now.  She states she needs to get some dental work done.  Urged her to let us know when her dental work is done    3.  Scalp lesion: Patient states that this has continued to improve.  We have held off on radiation for now.  Continue to monitor.  Her CT scan shows improved disease     4.  Back pain: She says is probably related to try to do more in physical therapy and pushing herself.  She also was with her  in the hospital for couple of days and was not comfortable in a chair.  She is meeting with the spine center after having an MRI.  The MRI show some improvement in disease but potentially may be some new disease.  It would be very odd for her to have new lesions since her numbers have looked significantly better since her diagnosis.  She will  follow with the spine center    ECOG Performance    0 - Independent    Distress Screening (within last 30 days)    1. How concerned are you about your ability to eat? : 0  2. How concerned are you about unintended weight loss or your current weight? : 0  3. How concerned are you about feeling depressed or very sad? : 0  4. How concerned are you about feeling anxious or very scared? : 0  5. Do you struggle with the loss of meaning and rambo in your life? : Not at all  6. How concerned are you about work and home life issues that may be affected by your cancer? : 2  7. How concerned are you about knowing what resources are available to help you? : 0  8. Do you currently have what you would describe as Presybeterian or spiritual struggles?            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 Lizzie Viera is a very pleasant 70 year old woman who has been diagnosed with multiple myeloma IgG kappa type in May 2021.  She had initially presented  with compression fracture of T7 vertebral body in September 2020.  She had a back pain which led to that evaluation.  Bone density confirmed that she had osteoporosis.  MRI showed diffuse marrow edema in October 2020.  In April 2021 the MRI of the thoracic spine showed worsening T7 vertebral body height loss because of likely pathological compression fracture with extraosseous extension.  She then had IR guided bone biopsy on 7 May 2021 and pathology confirmed the presence of kappa light chain restricted plasma cells.  Her cytogenetics confirmed the presence of hyperdiploid E with gains of chromosome 5, 9 and 15.     She was then seen by Dr. Baig and had a bone marrow biopsy which also confirmed 60% involvement of the marrow with plasma cells.  The bone marrow was done on 3 Jocelin 2021.  The bone marrow also confirmed the presence of hyperdiploid E.  She had a gain of chromosome 3, 5, 7, 9, 11, 15 as well as 19.  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  Her beta-2 microglobulin was actually normal at the time of her diagnosis as was her albumin at 4.0.  Monoclonal protein 3.1 g/dL.  Kappa free light chain levels of 13 mg/dL IgG level of 4280 with depressed levels of IgM and IgA.  Urine was also positive for kappa light chains as well as immunofixation of the urine was positive for IgG kappa.  Normal kidney function.  Normal hemoglobin.     As mentioned above she had bone lesions in the T7 vertebral body, T1, skull area.  Her main pain is coming from her T7 vertebral body compression fracture.     Due to the pain she has been seen by radiation oncology and has received radiation therapy.  She also got a dose of steroids for pain control.     Currently her pain is controlled with combination of Dilaudid as well as some Tylenol.  She is wearing a brace.  She did notice that when she was on dexamethasone her pain was significantly better.     She was initially thinking of doing some natural therapies but once her symptoms got worse,  she came back in was counseled about treatment.  She has been given information about Velcade Revlimid and dexamethasone.  has started that treatment.  She states that she is tolerating this quite well.  Her myeloma has significantly improved.  She did meet with the transplant doctor and she is a little reluctant to do that.  She states she would prefer to be in complete remission before she does something like that.  She was also worried about being in the hospital with COVID being so prevalent at that time.  For right now she like to continue the current regimen.  She is tolerating it well.  Mild fatigue.  She does feel that her back pain is gotten a little bit worse.  She is meeting with the pain team as well as the spine center.  No new issues besides that.  No fevers or infectious complaints.    Review of Systems    Pertinent items are noted in HPI.    Past History    Past Medical History:   Diagnosis Date     Cervical dysplasia      Chronic RUQ pain      Depressive disorder      Multiple myeloma (H)      Osteoporosis        PHYSICAL EXAM  /55   Pulse 62   Temp 98.3  F (36.8  C)   Resp 16   Wt 58.1 kg (128 lb)   SpO2 96%   BMI 23.41 kg/m      GENERAL: no acute distress. Cooperative in conversation. Here with sister. Mask on  RESP: Regular respiratory rate. No expiratory wheezes   MUSCULOSKELETAL: no bilateral leg swelling  NEURO: non focal. Alert and oriented x3.   PSYCH: within normal limits. No depression or anxiety.  SKIN: exposed skin is dry intact.     Lab Results    Recent Results (from the past 168 hour(s))   CBC with platelets and differential   Result Value Ref Range    WBC Count 5.0 4.0 - 11.0 10e3/uL    RBC Count 3.95 3.80 - 5.20 10e6/uL    Hemoglobin 13.0 11.7 - 15.7 g/dL    Hematocrit 40.5 35.0 - 47.0 %     (H) 78 - 100 fL    MCH 32.9 26.5 - 33.0 pg    MCHC 32.1 31.5 - 36.5 g/dL    RDW 17.2 (H) 10.0 - 15.0 %    Platelet Count 227 150 - 450 10e3/uL    % Neutrophils 84 %    %  Lymphocytes 11 %    % Monocytes 2 %    % Eosinophils 1 %    % Basophils 1 %    % Immature Granulocytes 1 %    NRBCs per 100 WBC 0 <1 /100    Absolute Neutrophils 4.3 1.6 - 8.3 10e3/uL    Absolute Lymphocytes 0.6 (L) 0.8 - 5.3 10e3/uL    Absolute Monocytes 0.1 0.0 - 1.3 10e3/uL    Absolute Eosinophils 0.1 0.0 - 0.7 10e3/uL    Absolute Basophils 0.0 0.0 - 0.2 10e3/uL    Absolute Immature Granulocytes 0.1 <=0.4 10e3/uL    Absolute NRBCs 0.0 10e3/uL   IgG  Lab Results   Component Value Date     (L) 02/16/2022     (L) 01/26/2022     12/29/2021     12/15/2021    IGG 1,032 11/24/2021    IGG 1,609 11/03/2021    IGG 2,736 (H) 10/13/2021    IGG 4,439 (H) 09/20/2021   kappa light chains:  Lab Results   Component Value Date    KFLCA 1.03 02/16/2022    KFLCA 1.31 01/26/2022    KFLCA 1.06 12/29/2021    KFLCA 1.26 12/15/2021    KFLCA 1.55 11/24/2021    KFLCA 2.03 (H) 11/03/2021    KFLCA 4.30 (H) 10/13/2021    KFLCA 12.57 (H) 09/20/2021    KFLCA 13.37 (H) 05/27/2021   Monoclonal peak  Lab Results   Component Value Date    ELPM 0.3 02/16/2022    ELPM 0.2 01/26/2022    ELPM 0.4 12/29/2021    ELPM 0.4 12/15/2021    ELPM 0.6 11/24/2021    ELPM 1.1 11/03/2021    ELPM 1.7 10/13/2021    ELPM 3.3 09/20/2021    ELPM 3.1 05/27/2021   ]  Imaging    MR Thoracic Spine w/o & w Contrast    Result Date: 3/10/2022  EXAM: MR THORACIC SPINE W/O and W CONTRAST LOCATION: [facility] DATE/TIME: 3/9/2022 12:16 PM INDICATION: 71 year old?woman who has been diagnosed with multiple myeloma IgG kappa type. COMPARISON: Thoracic spine MRI 08/31/2021. CONTRAST: Gadavist 6 mL. TECHNIQUE: Routine Thoracic Spine MRI without and with IV contrast. FINDINGS: Mild dextrocurvature of the thoracic spine. Normal sagittal alignment. Compared to the prior thoracic spine MRI, there has been interval positive response to treatment. Interval significant decrease in the previously noted abnormal marrow signal and enhancement  involving the T6-T7 vertebral bodies and bilateral pedicles. The associated prevertebral soft tissue mass is almost completely resolved. Similarly the associated epidural soft tissue lesions involving both the ventral and dorsal epidural space at T6-T7 and previously causing high-grade spinal stenosis have resolved. The spinal canal is widely patent at that level on today's exam. There is a new T1 hypointense and enhancing lesion at T1 compared to the previous thoracic spine MRI. There are suggestion of subtle enhancement involving T2 and T3 vertebral bodies on the postcontrast images although no convincing abnormal signal noted on the sagittal T1-weighted images. Small Schmorl's nodes from T10 to L1. No convincing abnormal cord signal on today's images. There is suggestion of subtle increased T2 signal within the cord at T6-T7 on the sagittal T2-weighted images but this is not confirmed on the other sequences and  is likely artifactual. Spinal canal is widely patent throughout the entire thoracic spine. Moderate bilateral neural foraminal narrowing at T6-T7 and T7-T8, actually improved compared to the prior thoracic spine MRI. Mild symmetric atrophy of the posterior paraspinal musculature. Normal diameter of the descending aorta.     IMPRESSION: 1.  Compared to previous thoracic spine MRI 08/31/2021, interval positive response to treatment. Interval significant decrease in the previously noted abnormal marrow signal and enhancement involving the T6-T7 vertebral bodies and bilateral pedicles. The  associated prevertebral soft tissue mass is almost completely resolved. Similarly the associated epidural soft tissue lesions involving both the ventral and dorsal epidural space at T6-T7 and previously causing high-grade spinal stenosis have resolved. 2.  There is a new T1 hypointense and enhancing lesion at T1 compared to the previous thoracic spine MRI. This is suspicious for new infiltrative marrow lesion. 3.  There  are suggestion of subtle enhancement involving T2 and T3 vertebral bodies on the postcontrast images although no convincing abnormal signal noted on the sagittal T1-weighted images. These are favored to be artifactual. 4.  No high-grade spinal canal narrowing. 5.  Moderate bilateral neural foraminal narrowing at T6-T7 and T7-T8, actually improved compared to the prior thoracic spine MRI.    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 Multiplanar reformats. Dose reduction techniques were used. FINDINGS: INTRACRANIAL CONTENTS: No intracranial hemorrhage, extraaxial collection, or mass effect.  No CT evidence of acute infarct. Unchanged mild presumed chronic small vessel ischemic changes. Unchanged mild generalized volume loss. No hydrocephalus. VISUALIZED ORBITS/SINUSES/MASTOIDS: No intraorbital abnormality. No paranasal sinus mucosal disease. No middle ear or mastoid effusion. BONES/SOFT TISSUES: Unchanged size of the dominant left parietal bone lesion. However, there is increased mineralization/ossification along the margins of the lesion.     IMPRESSION: 1.  When compared to 12/29/2021, there has been interval increased mineralization/ossification along the margins of the dominant left parietal bone lesion suggestive of ongoing healing change. 2.  No acute intracranial abnormality.        Signed by: SINDHU Lundberg CNP

## 2022-03-23 NOTE — PROGRESS NOTES
Pt here for injection after seeing NP. velcade given subcutaneous  R abdomen without incident. Pt orvillec ambulatory to lobby to meet her ride. Pt aware of treatment plan.

## 2022-03-23 NOTE — PROGRESS NOTES
"Oncology Rooming Note    March 23, 2022 2:14 PM   Lizzie Viera is a 71 year old female who presents for:    Chief Complaint   Patient presents with     Oncology Clinic Visit     Initial Vitals: /55   Pulse 62   Temp 98.3  F (36.8  C)   Resp 16   Wt 58.1 kg (128 lb)   SpO2 96%   BMI 23.41 kg/m   Estimated body mass index is 23.41 kg/m  as calculated from the following:    Height as of 10/19/21: 1.575 m (5' 2.01\").    Weight as of this encounter: 58.1 kg (128 lb). Body surface area is 1.59 meters squared.  Moderate Pain (4) Comment: Data Unavailable   No LMP recorded. Patient is postmenopausal.  Allergies reviewed: Yes  Medications reviewed: Yes    Medications: Medication refills not needed today.  Pharmacy name entered into [facility]: [id number] - [place], [place] - [address]    Clinical concerns: Lab works      Narda Kingston RN            "

## 2022-03-24 ENCOUNTER — DOCUMENTATION ONLY (OUTPATIENT)
Dept: ONCOLOGY | Facility: HOSPITAL | Age: 72
End: 2022-03-24
Payer: COMMERCIAL
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
## 2022-03-28 ENCOUNTER — PATIENT OUTREACH (OUTPATIENT)
Dept: PALLIATIVE CARE | Facility: CLINIC | Age: 72
End: 2022-03-28
Payer: COMMERCIAL

## 2022-03-28 DIAGNOSIS — G89.3 CANCER ASSOCIATED PAIN: ICD-10-CM

## 2022-03-28 NOTE — PROGRESS NOTES
Received request from patient requesting refill of robaxin.     Last refill: 1/25/2022  Last office visit: 3/15/2022    Will route request to MD for review.

## 2022-03-29 ENCOUNTER — TRANSFERRED RECORDS (OUTPATIENT)
Dept: HEALTH INFORMATION MANAGEMENT | Facility: CLINIC | Age: 72
End: 2022-03-29
Payer: COMMERCIAL

## 2022-03-29 RX ORDER — METHOCARBAMOL 500 MG/1
500 TABLET, FILM COATED ORAL 4 TIMES DAILY PRN
Qty: 90 TABLET | Refills: 0 | Status: SHIPPED | OUTPATIENT
Start: 2022-03-29 | End: 2022-04-28

## 2022-03-30 ENCOUNTER — INFUSION THERAPY VISIT (OUTPATIENT)
Dept: INFUSION THERAPY | Facility: HOSPITAL | Age: 72
End: 2022-03-30
Attending: INTERNAL MEDICINE
Payer: COMMERCIAL

## 2022-03-30 VITALS
SYSTOLIC BLOOD PRESSURE: 108 MMHG | TEMPERATURE: 98 F | RESPIRATION RATE: 18 BRPM | DIASTOLIC BLOOD PRESSURE: 53 MMHG | HEART RATE: 60 BPM | OXYGEN SATURATION: 94 %

## 2022-03-30 DIAGNOSIS — C90.00 MULTIPLE MYELOMA WITH FAILED REMISSION (H): Primary | ICD-10-CM

## 2022-03-30 DIAGNOSIS — M84.58XK PATHOLOGICAL FRACTURE OF VERTEBRAE IN NEOPLASTIC DISEASE WITH NONUNION: ICD-10-CM

## 2022-03-30 LAB
BASOPHILS # BLD MANUAL: 0 10E3/UL (ref 0–0.2)
BASOPHILS NFR BLD MANUAL: 0 %
EOSINOPHIL # BLD MANUAL: 0.1 10E3/UL (ref 0–0.7)
EOSINOPHIL NFR BLD MANUAL: 1 %
ERYTHROCYTE [DISTWIDTH] IN BLOOD BY AUTOMATED COUNT: 16.8 % (ref 10–15)
HCT VFR BLD AUTO: 42.7 % (ref 35–47)
HGB BLD-MCNC: 13.9 G/DL (ref 11.7–15.7)
IGA SERPL-MCNC: 30 MG/DL (ref 65–400)
IGG SERPL-MCNC: 452 MG/DL (ref 700–1700)
IGM SERPL-MCNC: 9 MG/DL (ref 60–280)
LYMPHOCYTES # BLD MANUAL: 0.5 10E3/UL (ref 0.8–5.3)
LYMPHOCYTES NFR BLD MANUAL: 8 %
MCH RBC QN AUTO: 33.5 PG (ref 26.5–33)
MCHC RBC AUTO-ENTMCNC: 32.6 G/DL (ref 31.5–36.5)
MCV RBC AUTO: 103 FL (ref 78–100)
MONOCYTES # BLD MANUAL: 0 10E3/UL (ref 0–1.3)
MONOCYTES NFR BLD MANUAL: 0 %
NEUTROPHILS # BLD MANUAL: 6.1 10E3/UL (ref 1.6–8.3)
NEUTROPHILS NFR BLD MANUAL: 91 %
PLAT MORPH BLD: ABNORMAL
PLATELET # BLD AUTO: 163 10E3/UL (ref 150–450)
RBC # BLD AUTO: 4.15 10E6/UL (ref 3.8–5.2)
RBC MORPH BLD: ABNORMAL
TOTAL PROTEIN SERUM FOR ELP: 6 G/DL (ref 6–8)
WBC # BLD AUTO: 6.7 10E3/UL (ref 4–11)

## 2022-03-30 PROCEDURE — 86334 IMMUNOFIX E-PHORESIS SERUM: CPT

## 2022-03-30 PROCEDURE — 83521 IG LIGHT CHAINS FREE EACH: CPT

## 2022-03-30 PROCEDURE — 84165 PROTEIN E-PHORESIS SERUM: CPT | Mod: TC

## 2022-03-30 PROCEDURE — 36415 COLL VENOUS BLD VENIPUNCTURE: CPT

## 2022-03-30 PROCEDURE — 85027 COMPLETE CBC AUTOMATED: CPT | Performed by: NURSE PRACTITIONER

## 2022-03-30 PROCEDURE — 84155 ASSAY OF PROTEIN SERUM: CPT

## 2022-03-30 PROCEDURE — 86334 IMMUNOFIX E-PHORESIS SERUM: CPT | Mod: 26 | Performed by: PATHOLOGY

## 2022-03-30 PROCEDURE — 250N000011 HC RX IP 250 OP 636: Performed by: NURSE PRACTITIONER

## 2022-03-30 PROCEDURE — 96401 CHEMO ANTI-NEOPL SQ/IM: CPT

## 2022-03-30 PROCEDURE — 84165 PROTEIN E-PHORESIS SERUM: CPT | Mod: 26 | Performed by: PATHOLOGY

## 2022-03-30 PROCEDURE — 82784 ASSAY IGA/IGD/IGG/IGM EACH: CPT

## 2022-03-30 RX ORDER — ALBUTEROL SULFATE 90 UG/1
1-2 AEROSOL, METERED RESPIRATORY (INHALATION)
Status: DISCONTINUED | OUTPATIENT
Start: 2022-03-30 | End: 2022-03-30 | Stop reason: HOSPADM

## 2022-03-30 RX ORDER — ALBUTEROL SULFATE 0.83 MG/ML
2.5 SOLUTION RESPIRATORY (INHALATION)
Status: DISCONTINUED | OUTPATIENT
Start: 2022-03-30 | End: 2022-03-30 | Stop reason: HOSPADM

## 2022-03-30 RX ORDER — DIPHENHYDRAMINE HYDROCHLORIDE 50 MG/ML
50 INJECTION INTRAMUSCULAR; INTRAVENOUS
Status: DISCONTINUED | OUTPATIENT
Start: 2022-03-30 | End: 2022-03-30 | Stop reason: HOSPADM

## 2022-03-30 RX ORDER — METHYLPREDNISOLONE SODIUM SUCCINATE 125 MG/2ML
125 INJECTION, POWDER, LYOPHILIZED, FOR SOLUTION INTRAMUSCULAR; INTRAVENOUS
Status: DISCONTINUED | OUTPATIENT
Start: 2022-03-30 | End: 2022-03-30 | Stop reason: HOSPADM

## 2022-03-30 RX ORDER — MEPERIDINE HYDROCHLORIDE 25 MG/ML
25 INJECTION INTRAMUSCULAR; INTRAVENOUS; SUBCUTANEOUS EVERY 30 MIN PRN
Status: DISCONTINUED | OUTPATIENT
Start: 2022-03-30 | End: 2022-03-30 | Stop reason: HOSPADM

## 2022-03-30 RX ORDER — NALOXONE HYDROCHLORIDE 0.4 MG/ML
0.2 INJECTION, SOLUTION INTRAMUSCULAR; INTRAVENOUS; SUBCUTANEOUS
Status: DISCONTINUED | OUTPATIENT
Start: 2022-03-30 | End: 2022-03-30 | Stop reason: HOSPADM

## 2022-03-30 RX ORDER — EPINEPHRINE 1 MG/ML
0.3 INJECTION, SOLUTION INTRAMUSCULAR; SUBCUTANEOUS EVERY 5 MIN PRN
Status: DISCONTINUED | OUTPATIENT
Start: 2022-03-30 | End: 2022-03-30 | Stop reason: HOSPADM

## 2022-03-30 RX ADMIN — BORTEZOMIB 2.1 MG: 3.5 INJECTION, POWDER, LYOPHILIZED, FOR SOLUTION INTRAVENOUS; SUBCUTANEOUS at 13:57

## 2022-03-30 ASSESSMENT — PAIN SCALES - GENERAL: PAINLEVEL: MILD PAIN (3)

## 2022-03-30 NOTE — PROGRESS NOTES
Infusion Nursing Note:  Lizzie Viera presents today for C#9 D#15 of Velcade.    Patient seen by provider today: No   present during visit today: Not Applicable.    Note: Patient assessed and vital signs stable. Administered Velcade subcutaneous (right lower abdomen) per provider order.       Intravenous Access:  Labs drawn peripherally.    Treatment Conditions:  Lab Results   Component Value Date    HGB 13.9 03/30/2022    WBC 6.7 03/30/2022    ANEU 6.1 03/30/2022    ANEUTAUTO 4.3 03/23/2022     03/30/2022      Results reviewed, labs MET treatment parameters, ok to proceed with treatment.      Post Infusion Assessment:  Patient tolerated injection without incident.       Discharge Plan:   Patient discharged in stable condition accompanied by: self.  Departure Mode: Ambulatory.      JOS SWAIN RN

## 2022-03-31 ENCOUNTER — TELEPHONE (OUTPATIENT)
Dept: ONCOLOGY | Facility: OTHER | Age: 72
End: 2022-03-31

## 2022-03-31 DIAGNOSIS — C90.00 MULTIPLE MYELOMA WITH FAILED REMISSION (H): Primary | ICD-10-CM

## 2022-03-31 LAB
KAPPA LC FREE SER-MCNC: 0.98 MG/DL (ref 0.33–1.94)
KAPPA LC FREE/LAMBDA FREE SER NEPH: 0.96 {RATIO} (ref 0.26–1.65)
LAMBDA LC FREE SERPL-MCNC: 1.02 MG/DL (ref 0.57–2.63)

## 2022-03-31 RX ORDER — LENALIDOMIDE 25 MG/1
25 CAPSULE ORAL DAILY
Qty: 14 CAPSULE | Refills: 0 | Status: SHIPPED | OUTPATIENT
Start: 2022-04-06 | End: 2022-05-12

## 2022-04-04 LAB
ALBUMIN PERCENT: 64.9 % (ref 51–67)
ALBUMIN SERPL ELPH-MCNC: 3.9 G/DL (ref 3.2–4.7)
ALPHA 1 PERCENT: 3.7 % (ref 2–4)
ALPHA 2 PERCENT: 13.3 % (ref 5–13)
ALPHA1 GLOB SERPL ELPH-MCNC: 0.2 G/DL (ref 0.1–0.3)
ALPHA2 GLOB SERPL ELPH-MCNC: 0.8 G/DL (ref 0.4–0.9)
B-GLOBULIN SERPL ELPH-MCNC: 0.7 G/DL (ref 0.7–1.2)
BETA PERCENT: 10.9 % (ref 10–17)
GAMMA GLOB SERPL ELPH-MCNC: 0.4 G/DL (ref 0.6–1.4)
GAMMA GLOBULIN PERCENT: 7.2 % (ref 9–20)
MONOCLONAL PEAK: 0.2 G/DL
PATH ICD:: ABNORMAL
PATH ICD:: NORMAL
PROT PATTERN SERPL ELPH-IMP: ABNORMAL
PROT PATTERN SERPL IFE-IMP: NORMAL
REVIEWING PATHOLOGIST: ABNORMAL
REVIEWING PATHOLOGIST: NORMAL
TOTAL PROTEIN SERUM FOR ELP (SYNCED VALUE): 6 G/DL

## 2022-04-05 NOTE — CONFIDENTIAL NOTE
[facility] Oncology Psychotherapy, [facility]                                   Progress Note    Patient Name: Lizzie Viera  Date: 4/6/2022         Service Type: Individual      Session Start Time: 10:00  session End Time: 10:50 AM     Session Length: 50    Session #: 14    Attendees: Client    Service Modality:  In-person    DATA  Interactive Complexity: No  Crisis: No        Progress Since Last Session (Related to Symptoms / Goals / Homework):   Symptoms: Improving Some of her stress related to her , his health and his mood have improved.  This has helped alleviate additional stress in her life.  She also met with her integrative health physician yesterday and feels hopeful as she is having further testing .  She is feeling much more encouraged about her situation.    Homework: Partially completed      Episode of Care Goals: Satisfactory progress - ACTION (Actively working towards change); Intervened by reinforcing change plan / affirming steps taken     Current / Ongoing Stressors and Concerns:   Valeria is currently going through treatment for multiple myeloma.  Her  is receiving dialysis 3 times a week and is on the transplant list for a new kidney.  He has had a hospitalization and an ER visit recently due to having issues with bleeding.  They still have not found out the source of his bleeding.  At our last session, he had just gotten back from the hospital and besides being sick and requiring more assistance, he was exceptionally angry and agitated.  She feels that his behavior has greatly improved since our last session.  This has greatly helped alleviate some of the stress.     Treatment Objective(s) Addressed in This Session:   To cope with the emotional aspects of dealing with her multiple myeloma and current cancer treatment.  Also to help her cope with her current life stressors     Intervention:   CBT: To learn strategies to deal with symptoms of anxiety and depression  related to her illness    Assessments completed prior to visit:  The following assessments were completed by patient for this visit:  PHQ9: 5  PHQ-9 SCORE 1/7/2021 9/13/2021   PHQ-9 Total Score 9 8     GAD7: 7  BORA-7 SCORE 1/7/2021 9/13/2021   Total Score 4 3     PROMIS 10-Global Health: 33   ASSESSMENT: Current Emotional / Mental Status (status of significant symptoms):   Risk status (Self / Other harm or suicidal ideation)   Patient denies current fears or concerns for personal safety.   Patient denies current or recent suicidal ideation or behaviors.   Patient denies current or recent homicidal ideation or behaviors.   Patient denies current or recent self injurious behavior or ideation.   Patient denies other safety concerns.   Patient reports there has been no change in risk factors since their last session.     Patient reports there has been no change in protective factors since their last session.     Recommended that patient call 911 or go to the local ED should there be a change in any of these risk factors.     Appearance:   Appropriate    Eye Contact:   Good    Psychomotor Behavior: Normal    Attitude:   Cooperative  Interested Pleasant   Orientation:   Person Place Time Situation   Speech    Rate / Production: Normal/ Responsive Normal     Volume:  Normal    Mood:    Normal   Affect:    Appropriate    Thought Content:  Clear    Thought Form:  Coherent  Logical    Insight:    Good      Medication Review:   No current psychiatric medications prescribed     Medication Compliance:   Yes     Changes in Health Issues:   None reported     Chemical Use Review:   Substance Use: Chemical use reviewed, no active concerns identified      Tobacco Use: No change in amount of tobacco use since last session.  No current change interventions at this time    Diagnosis:  1.  Adjustment disorder with mixed anxiety and depressed mood  2.  Multiple myeloma not having achieved remission    Collateral Reports Completed:   Not  Applicable    PLAN: (Patient Tasks / Therapist Tasks / Other)    1.  Valeria would benefit from individual psychotherapy for 50-minute sessions every 1 to 2 weeks to help her deal with the emotional aspects of her multiple myeloma.  She is working at implementing cognitive behavioral therapy strategies to help her better manage her symptoms of anxiety and depression related to coping with her illness.     2.  Valeria is working at incorporating body mind and spirit techniques to help her with relaxation and better manage her symptoms of anxiety and depression related to her illness.     3.  Valeria is working on communication and conflict resolution strategies with her .      JAI AMADO LP, LICSW                                                         ______________________________________________________________________       Individual Treatment Plan     Patient's Name: Lizzie Viera                   YOB: 1950     Date of Creation: 2/28/2022  Date Treatment Plan Last Reviewed/Revised: 2/21/2022     DSM5 Diagnoses: Adjustment disorder with mixed anxiety and depressed mood  Psychosocial / Contextual Factors: Treatment for multiple myeloma and coping with the emotional aspects of dealing with her illness.     Referral / Collaboration:  Referral to another professional/service is not indicated at this time..     Anticipated number of session for this episode of care: 10  Anticipation frequency of session: Every 1 to 2 weeks  Anticipated Duration of each session: 38-52 minutes  Treatment plan will be reviewed in 90 days or when goals have been changed.         MeasurableTreatment Goal(s) related to diagnosis / functional impairment(s)  Goal 1: Patient will work on dealing with the emotional aspects of coping with her multiple myeloma     Objective #A (Patient Action)                          Patient will identify stressors that contribute to feelings of anxiety and depression related to her  illness..  Status: Continued - Date(s): 2/28/2022     Intervention(s)  Therapist will teach emotional recognition/identification. Of feelings of anxiety and depression related to her multiple myeloma..     Objective #B  Patient will participate in Progress of relaxation strategies and mindfulness strategies activities to improve mood.  Status: Continued - Date(s): 2/28/2022     Intervention(s)  Therapist will teach about cognitive behavioral strategies to better manage symptoms of anxiety and depression.  Therapist will also teach about mindfulness strategies and provided recommended reading material..     Objective #C  Patient will participate in Communication and boundary setting activities to improve mood.  Status: Continued - Date(s): 2/28/2022     Intervention(s)  Therapist will provide psychoeducation on communication and conflict resolution strategies.  Psychoeducational reading material recommended.        Patient has  agreed to the above plan.      JAI AMADO LP, LICSW  April 6, 2022

## 2022-04-06 ENCOUNTER — ONCOLOGY VISIT (OUTPATIENT)
Dept: ONCOLOGY | Facility: HOSPITAL | Age: 72
End: 2022-04-06
Attending: SOCIAL WORKER
Payer: COMMERCIAL

## 2022-04-06 ENCOUNTER — INFUSION THERAPY VISIT (OUTPATIENT)
Dept: INFUSION THERAPY | Facility: HOSPITAL | Age: 72
End: 2022-04-06
Attending: INTERNAL MEDICINE
Payer: COMMERCIAL

## 2022-04-06 VITALS
OXYGEN SATURATION: 96 % | HEART RATE: 64 BPM | DIASTOLIC BLOOD PRESSURE: 59 MMHG | SYSTOLIC BLOOD PRESSURE: 125 MMHG | RESPIRATION RATE: 16 BRPM | TEMPERATURE: 98 F

## 2022-04-06 DIAGNOSIS — F43.23 ADJUSTMENT DISORDER WITH MIXED ANXIETY AND DEPRESSED MOOD: Primary | ICD-10-CM

## 2022-04-06 DIAGNOSIS — C90.00 MULTIPLE MYELOMA WITH FAILED REMISSION (H): Primary | ICD-10-CM

## 2022-04-06 DIAGNOSIS — C90.00 MULTIPLE MYELOMA WITH FAILED REMISSION (H): ICD-10-CM

## 2022-04-06 LAB
ALBUMIN SERPL-MCNC: 3.5 G/DL (ref 3.5–5)
ALP SERPL-CCNC: 39 U/L (ref 45–120)
ALT SERPL W P-5'-P-CCNC: 45 U/L (ref 0–45)
ANION GAP SERPL CALCULATED.3IONS-SCNC: 10 MMOL/L (ref 5–18)
AST SERPL W P-5'-P-CCNC: 30 U/L (ref 0–40)
BASOPHILS # BLD AUTO: 0.1 10E3/UL (ref 0–0.2)
BASOPHILS NFR BLD AUTO: 1 %
BILIRUB SERPL-MCNC: 0.4 MG/DL (ref 0–1)
BUN SERPL-MCNC: 16 MG/DL (ref 8–22)
CALCIUM SERPL-MCNC: 9.3 MG/DL (ref 8.5–10.5)
CHLORIDE BLD-SCNC: 107 MMOL/L (ref 98–107)
CO2 SERPL-SCNC: 24 MMOL/L (ref 22–31)
CREAT SERPL-MCNC: 0.67 MG/DL (ref 0.6–1.1)
EOSINOPHIL # BLD AUTO: 0.1 10E3/UL (ref 0–0.7)
EOSINOPHIL NFR BLD AUTO: 2 %
ERYTHROCYTE [DISTWIDTH] IN BLOOD BY AUTOMATED COUNT: 16.8 % (ref 10–15)
GFR SERPL CREATININE-BSD FRML MDRD: >90 ML/MIN/1.73M2
GLUCOSE BLD-MCNC: 94 MG/DL (ref 70–125)
HCT VFR BLD AUTO: 43.6 % (ref 35–47)
HGB BLD-MCNC: 14.2 G/DL (ref 11.7–15.7)
IMM GRANULOCYTES # BLD: 0 10E3/UL
IMM GRANULOCYTES NFR BLD: 1 %
LYMPHOCYTES # BLD AUTO: 0.5 10E3/UL (ref 0.8–5.3)
LYMPHOCYTES NFR BLD AUTO: 10 %
MCH RBC QN AUTO: 33.5 PG (ref 26.5–33)
MCHC RBC AUTO-ENTMCNC: 32.6 G/DL (ref 31.5–36.5)
MCV RBC AUTO: 103 FL (ref 78–100)
MONOCYTES # BLD AUTO: 0.3 10E3/UL (ref 0–1.3)
MONOCYTES NFR BLD AUTO: 5 %
NEUTROPHILS # BLD AUTO: 4.2 10E3/UL (ref 1.6–8.3)
NEUTROPHILS NFR BLD AUTO: 81 %
NRBC # BLD AUTO: 0 10E3/UL
NRBC BLD AUTO-RTO: 0 /100
PLATELET # BLD AUTO: 209 10E3/UL (ref 150–450)
POTASSIUM BLD-SCNC: 4.3 MMOL/L (ref 3.5–5)
PROT SERPL-MCNC: 6.5 G/DL (ref 6–8)
RBC # BLD AUTO: 4.24 10E6/UL (ref 3.8–5.2)
SODIUM SERPL-SCNC: 141 MMOL/L (ref 136–145)
WBC # BLD AUTO: 5.2 10E3/UL (ref 4–11)

## 2022-04-06 PROCEDURE — 90834 PSYTX W PT 45 MINUTES: CPT | Performed by: SOCIAL WORKER

## 2022-04-06 PROCEDURE — 250N000011 HC RX IP 250 OP 636: Performed by: NURSE PRACTITIONER

## 2022-04-06 PROCEDURE — 36415 COLL VENOUS BLD VENIPUNCTURE: CPT | Performed by: NURSE PRACTITIONER

## 2022-04-06 PROCEDURE — 85025 COMPLETE CBC W/AUTO DIFF WBC: CPT | Performed by: NURSE PRACTITIONER

## 2022-04-06 PROCEDURE — 96401 CHEMO ANTI-NEOPL SQ/IM: CPT

## 2022-04-06 PROCEDURE — 80053 COMPREHEN METABOLIC PANEL: CPT | Performed by: NURSE PRACTITIONER

## 2022-04-06 RX ORDER — ALBUTEROL SULFATE 90 UG/1
1-2 AEROSOL, METERED RESPIRATORY (INHALATION)
Status: DISCONTINUED | OUTPATIENT
Start: 2022-04-06 | End: 2022-04-06 | Stop reason: HOSPADM

## 2022-04-06 RX ORDER — METHYLPREDNISOLONE SODIUM SUCCINATE 125 MG/2ML
125 INJECTION, POWDER, LYOPHILIZED, FOR SOLUTION INTRAMUSCULAR; INTRAVENOUS
Status: DISCONTINUED | OUTPATIENT
Start: 2022-04-06 | End: 2022-04-06 | Stop reason: HOSPADM

## 2022-04-06 RX ORDER — MEPERIDINE HYDROCHLORIDE 25 MG/ML
25 INJECTION INTRAMUSCULAR; INTRAVENOUS; SUBCUTANEOUS EVERY 30 MIN PRN
Status: DISCONTINUED | OUTPATIENT
Start: 2022-04-06 | End: 2022-04-06 | Stop reason: HOSPADM

## 2022-04-06 RX ORDER — EPINEPHRINE 1 MG/ML
0.3 INJECTION, SOLUTION INTRAMUSCULAR; SUBCUTANEOUS EVERY 5 MIN PRN
Status: DISCONTINUED | OUTPATIENT
Start: 2022-04-06 | End: 2022-04-06 | Stop reason: HOSPADM

## 2022-04-06 RX ORDER — DIPHENHYDRAMINE HYDROCHLORIDE 50 MG/ML
50 INJECTION INTRAMUSCULAR; INTRAVENOUS
Status: DISCONTINUED | OUTPATIENT
Start: 2022-04-06 | End: 2022-04-06 | Stop reason: HOSPADM

## 2022-04-06 RX ORDER — NALOXONE HYDROCHLORIDE 0.4 MG/ML
0.2 INJECTION, SOLUTION INTRAMUSCULAR; INTRAVENOUS; SUBCUTANEOUS
Status: DISCONTINUED | OUTPATIENT
Start: 2022-04-06 | End: 2022-04-06 | Stop reason: HOSPADM

## 2022-04-06 RX ORDER — ALBUTEROL SULFATE 0.83 MG/ML
2.5 SOLUTION RESPIRATORY (INHALATION)
Status: DISCONTINUED | OUTPATIENT
Start: 2022-04-06 | End: 2022-04-06 | Stop reason: HOSPADM

## 2022-04-06 RX ADMIN — BORTEZOMIB 2.1 MG: 3.5 INJECTION, POWDER, LYOPHILIZED, FOR SOLUTION INTRAVENOUS; SUBCUTANEOUS at 11:57

## 2022-04-06 NOTE — PROGRESS NOTES
Infusion Nursing Note:  Lizzie Viera presents today for treatment.    Patient seen by provider today: No   present during visit today: Not Applicable.    Note: Velcade injection given subcutaneous as ordered in left abdomen.  Pt took dexamethasone at home prior to appointment.      Intravenous Access:  Labs drawn without difficulty.    Treatment Conditions:  Results reviewed, labs MET treatment parameters, ok to proceed with treatment.      Post Infusion Assessment:  Patient tolerated injection without incident.       Discharge Plan:   Patient discharged in stable condition accompanied by: self.  Departure Mode: Ambulatory.      Belle Muñoz RN

## 2022-04-06 NOTE — LETTER
4/6/2022         RE: Lizzie Viera  [address]  [place] [address]        Dear Colleague,    Thank you for referring your patient, Lizzie Viera, to the [facility]. Please see a copy of my visit note below.        .      Again, thank you for allowing me to participate in the care of your patient.        Sincerely,        JAI AMADO LP, LICSW

## 2022-04-12 ENCOUNTER — HOSPITAL ENCOUNTER (INPATIENT)
Facility: HOSPITAL | Age: 72
LOS: 8 days | Discharge: HOME | DRG: 193 | End: 2022-04-20
Attending: EMERGENCY MEDICINE | Admitting: INTERNAL MEDICINE
Payer: COMMERCIAL

## 2022-04-12 ENCOUNTER — APPOINTMENT (OUTPATIENT)
Dept: CT IMAGING | Facility: HOSPITAL | Age: 72
DRG: 193 | End: 2022-04-12
Attending: EMERGENCY MEDICINE
Payer: COMMERCIAL

## 2022-04-12 ENCOUNTER — TELEPHONE (OUTPATIENT)
Dept: ONCOLOGY | Facility: HOSPITAL | Age: 72
End: 2022-04-12
Payer: COMMERCIAL

## 2022-04-12 DIAGNOSIS — J18.9 PNEUMONIA OF RIGHT LOWER LOBE DUE TO INFECTIOUS ORGANISM: ICD-10-CM

## 2022-04-12 DIAGNOSIS — I10 ESSENTIAL HYPERTENSION, BENIGN: ICD-10-CM

## 2022-04-12 DIAGNOSIS — R09.02 HYPOXIA: ICD-10-CM

## 2022-04-12 DIAGNOSIS — J44.1 COPD EXACERBATION (H): Primary | ICD-10-CM

## 2022-04-12 LAB
ANION GAP SERPL CALCULATED.3IONS-SCNC: 13 MMOL/L (ref 5–18)
APTT PPP: 30 SECONDS (ref 22–38)
ATRIAL RATE - MUSE: 82 BPM
BASE EXCESS BLDV CALC-SCNC: 2.8 MMOL/L
BASOPHILS # BLD AUTO: 0 10E3/UL (ref 0–0.2)
BASOPHILS NFR BLD AUTO: 1 %
BNP SERPL-MCNC: 112 PG/ML (ref 0–117)
BUN SERPL-MCNC: 21 MG/DL (ref 8–22)
C REACTIVE PROTEIN LHE: 14.9 MG/DL (ref 0–0.8)
CALCIUM SERPL-MCNC: 9.2 MG/DL (ref 8.5–10.5)
CHLORIDE BLD-SCNC: 99 MMOL/L (ref 98–107)
CO2 SERPL-SCNC: 24 MMOL/L (ref 22–31)
CREAT SERPL-MCNC: 0.76 MG/DL (ref 0.6–1.1)
DIASTOLIC BLOOD PRESSURE - MUSE: NORMAL MMHG
EOSINOPHIL # BLD AUTO: 0 10E3/UL (ref 0–0.7)
EOSINOPHIL NFR BLD AUTO: 0 %
ERYTHROCYTE [DISTWIDTH] IN BLOOD BY AUTOMATED COUNT: 16.1 % (ref 10–15)
FLUAV RNA SPEC QL NAA+PROBE: NEGATIVE
FLUBV RNA RESP QL NAA+PROBE: NEGATIVE
GFR SERPL CREATININE-BSD FRML MDRD: 84 ML/MIN/1.73M2
GLUCOSE BLD-MCNC: 120 MG/DL (ref 70–125)
HCO3 BLDV-SCNC: 25 MMOL/L (ref 24–30)
HCT VFR BLD AUTO: 43.7 % (ref 35–47)
HGB BLD-MCNC: 14.8 G/DL (ref 11.7–15.7)
IMM GRANULOCYTES # BLD: 0 10E3/UL
IMM GRANULOCYTES NFR BLD: 0 %
INR PPP: 1.11 (ref 0.85–1.15)
INTERPRETATION ECG - MUSE: NORMAL
LACTATE SERPL-SCNC: 1.6 MMOL/L (ref 0.7–2)
LYMPHOCYTES # BLD AUTO: 0.4 10E3/UL (ref 0.8–5.3)
LYMPHOCYTES NFR BLD AUTO: 5 %
MAGNESIUM SERPL-MCNC: 1.9 MG/DL (ref 1.8–2.6)
MCH RBC QN AUTO: 33.7 PG (ref 26.5–33)
MCHC RBC AUTO-ENTMCNC: 33.9 G/DL (ref 31.5–36.5)
MCV RBC AUTO: 100 FL (ref 78–100)
MONOCYTES # BLD AUTO: 0.6 10E3/UL (ref 0–1.3)
MONOCYTES NFR BLD AUTO: 7 %
NEUTROPHILS # BLD AUTO: 6.8 10E3/UL (ref 1.6–8.3)
NEUTROPHILS NFR BLD AUTO: 87 %
NRBC # BLD AUTO: 0 10E3/UL
NRBC BLD AUTO-RTO: 0 /100
OXYHGB MFR BLDV: 39.2 % (ref 70–75)
P AXIS - MUSE: 78 DEGREES
PCO2 BLDV: 50 MM HG (ref 35–50)
PH BLDV: 7.36 [PH] (ref 7.35–7.45)
PLATELET # BLD AUTO: 184 10E3/UL (ref 150–450)
PO2 BLDV: 24 MM HG (ref 25–47)
POTASSIUM BLD-SCNC: 3.8 MMOL/L (ref 3.5–5)
PR INTERVAL - MUSE: 176 MS
QRS DURATION - MUSE: 98 MS
QT - MUSE: 430 MS
QTC - MUSE: 502 MS
R AXIS - MUSE: 84 DEGREES
RBC # BLD AUTO: 4.39 10E6/UL (ref 3.8–5.2)
SAO2 % BLDV: 40.2 % (ref 70–75)
SARS-COV-2 RNA RESP QL NAA+PROBE: NEGATIVE
SODIUM SERPL-SCNC: 136 MMOL/L (ref 136–145)
SYSTOLIC BLOOD PRESSURE - MUSE: NORMAL MMHG
T AXIS - MUSE: 49 DEGREES
TROPONIN I SERPL-MCNC: 0.07 NG/ML (ref 0–0.29)
VENTRICULAR RATE- MUSE: 82 BPM
WBC # BLD AUTO: 7.7 10E3/UL (ref 4–11)

## 2022-04-12 PROCEDURE — 86140 C-REACTIVE PROTEIN: CPT | Performed by: EMERGENCY MEDICINE

## 2022-04-12 PROCEDURE — 96361 HYDRATE IV INFUSION ADD-ON: CPT

## 2022-04-12 PROCEDURE — 85610 PROTHROMBIN TIME: CPT | Performed by: EMERGENCY MEDICINE

## 2022-04-12 PROCEDURE — 250N000013 HC RX MED GY IP 250 OP 250 PS 637: Performed by: EMERGENCY MEDICINE

## 2022-04-12 PROCEDURE — 250N000011 HC RX IP 250 OP 636: Performed by: EMERGENCY MEDICINE

## 2022-04-12 PROCEDURE — 85730 THROMBOPLASTIN TIME PARTIAL: CPT | Performed by: EMERGENCY MEDICINE

## 2022-04-12 PROCEDURE — 87040 BLOOD CULTURE FOR BACTERIA: CPT | Performed by: EMERGENCY MEDICINE

## 2022-04-12 PROCEDURE — 82805 BLOOD GASES W/O2 SATURATION: CPT | Performed by: EMERGENCY MEDICINE

## 2022-04-12 PROCEDURE — 87636 SARSCOV2 & INF A&B AMP PRB: CPT | Performed by: EMERGENCY MEDICINE

## 2022-04-12 PROCEDURE — 94640 AIRWAY INHALATION TREATMENT: CPT

## 2022-04-12 PROCEDURE — 120N000001 HC R&B MED SURG/OB

## 2022-04-12 PROCEDURE — C9803 HOPD COVID-19 SPEC COLLECT: HCPCS

## 2022-04-12 PROCEDURE — 96367 TX/PROPH/DG ADDL SEQ IV INF: CPT

## 2022-04-12 PROCEDURE — 99223 1ST HOSP IP/OBS HIGH 75: CPT | Performed by: INTERNAL MEDICINE

## 2022-04-12 PROCEDURE — 84484 ASSAY OF TROPONIN QUANT: CPT | Performed by: EMERGENCY MEDICINE

## 2022-04-12 PROCEDURE — 250N000009 HC RX 250: Performed by: INTERNAL MEDICINE

## 2022-04-12 PROCEDURE — 258N000003 HC RX IP 258 OP 636: Performed by: EMERGENCY MEDICINE

## 2022-04-12 PROCEDURE — 85025 COMPLETE CBC W/AUTO DIFF WBC: CPT | Performed by: EMERGENCY MEDICINE

## 2022-04-12 PROCEDURE — 83605 ASSAY OF LACTIC ACID: CPT | Performed by: EMERGENCY MEDICINE

## 2022-04-12 PROCEDURE — 96365 THER/PROPH/DIAG IV INF INIT: CPT

## 2022-04-12 PROCEDURE — 83735 ASSAY OF MAGNESIUM: CPT | Performed by: EMERGENCY MEDICINE

## 2022-04-12 PROCEDURE — 999N000157 HC STATISTIC RCP TIME EA 10 MIN

## 2022-04-12 PROCEDURE — 250N000009 HC RX 250: Performed by: EMERGENCY MEDICINE

## 2022-04-12 PROCEDURE — 71275 CT ANGIOGRAPHY CHEST: CPT

## 2022-04-12 PROCEDURE — 80048 BASIC METABOLIC PNL TOTAL CA: CPT | Performed by: EMERGENCY MEDICINE

## 2022-04-12 PROCEDURE — 93005 ELECTROCARDIOGRAM TRACING: CPT | Performed by: EMERGENCY MEDICINE

## 2022-04-12 PROCEDURE — 250N000011 HC RX IP 250 OP 636: Performed by: INTERNAL MEDICINE

## 2022-04-12 PROCEDURE — 36415 COLL VENOUS BLD VENIPUNCTURE: CPT | Performed by: EMERGENCY MEDICINE

## 2022-04-12 PROCEDURE — 83880 ASSAY OF NATRIURETIC PEPTIDE: CPT | Performed by: EMERGENCY MEDICINE

## 2022-04-12 PROCEDURE — 250N000013 HC RX MED GY IP 250 OP 250 PS 637: Performed by: INTERNAL MEDICINE

## 2022-04-12 PROCEDURE — 99285 EMERGENCY DEPT VISIT HI MDM: CPT | Mod: 25

## 2022-04-12 RX ORDER — ONDANSETRON 4 MG/1
4 TABLET, ORALLY DISINTEGRATING ORAL EVERY 6 HOURS PRN
Status: DISCONTINUED | OUTPATIENT
Start: 2022-04-12 | End: 2022-04-20 | Stop reason: HOSPADM

## 2022-04-12 RX ORDER — ALBUTEROL SULFATE 0.83 MG/ML
2.5 SOLUTION RESPIRATORY (INHALATION)
Status: DISCONTINUED | OUTPATIENT
Start: 2022-04-12 | End: 2022-04-12

## 2022-04-12 RX ORDER — PIPERACILLIN SODIUM, TAZOBACTAM SODIUM 3; .375 G/15ML; G/15ML
3.38 INJECTION, POWDER, LYOPHILIZED, FOR SOLUTION INTRAVENOUS EVERY 8 HOURS
Status: DISCONTINUED | OUTPATIENT
Start: 2022-04-13 | End: 2022-04-20 | Stop reason: HOSPADM

## 2022-04-12 RX ORDER — GABAPENTIN 300 MG/1
600 CAPSULE ORAL 3 TIMES DAILY
Status: DISCONTINUED | OUTPATIENT
Start: 2022-04-12 | End: 2022-04-20 | Stop reason: HOSPADM

## 2022-04-12 RX ORDER — HYDROMORPHONE HYDROCHLORIDE 2 MG/1
2-4 TABLET ORAL EVERY 4 HOURS PRN
Status: DISCONTINUED | OUTPATIENT
Start: 2022-04-12 | End: 2022-04-20 | Stop reason: HOSPADM

## 2022-04-12 RX ORDER — ACETAMINOPHEN 325 MG/1
650 TABLET ORAL EVERY 6 HOURS PRN
Status: DISCONTINUED | OUTPATIENT
Start: 2022-04-12 | End: 2022-04-20 | Stop reason: HOSPADM

## 2022-04-12 RX ORDER — ASCORBIC ACID 250 MG
125 TABLET ORAL DAILY
Status: DISCONTINUED | OUTPATIENT
Start: 2022-04-13 | End: 2022-04-18

## 2022-04-12 RX ORDER — ALBUTEROL SULFATE 90 UG/1
2 AEROSOL, METERED RESPIRATORY (INHALATION) EVERY 6 HOURS PRN
Status: DISCONTINUED | OUTPATIENT
Start: 2022-04-12 | End: 2022-04-20 | Stop reason: HOSPADM

## 2022-04-12 RX ORDER — ONDANSETRON 2 MG/ML
4 INJECTION INTRAMUSCULAR; INTRAVENOUS EVERY 6 HOURS PRN
Status: DISCONTINUED | OUTPATIENT
Start: 2022-04-12 | End: 2022-04-20 | Stop reason: HOSPADM

## 2022-04-12 RX ORDER — ASPIRIN 81 MG/1
81 TABLET, CHEWABLE ORAL DAILY
Status: DISCONTINUED | OUTPATIENT
Start: 2022-04-12 | End: 2022-04-20 | Stop reason: HOSPADM

## 2022-04-12 RX ORDER — ALBUTEROL SULFATE 90 UG/1
2 AEROSOL, METERED RESPIRATORY (INHALATION) EVERY 6 HOURS PRN
Status: DISCONTINUED | OUTPATIENT
Start: 2022-04-12 | End: 2022-04-12

## 2022-04-12 RX ORDER — ACETAMINOPHEN 650 MG/1
650 SUPPOSITORY RECTAL EVERY 6 HOURS PRN
Status: DISCONTINUED | OUTPATIENT
Start: 2022-04-12 | End: 2022-04-20 | Stop reason: HOSPADM

## 2022-04-12 RX ORDER — LIDOCAINE 40 MG/G
CREAM TOPICAL
Status: DISCONTINUED | OUTPATIENT
Start: 2022-04-12 | End: 2022-04-20 | Stop reason: HOSPADM

## 2022-04-12 RX ORDER — PIPERACILLIN SODIUM, TAZOBACTAM SODIUM 3; .375 G/15ML; G/15ML
3.38 INJECTION, POWDER, LYOPHILIZED, FOR SOLUTION INTRAVENOUS ONCE
Status: COMPLETED | OUTPATIENT
Start: 2022-04-12 | End: 2022-04-12

## 2022-04-12 RX ORDER — SODIUM CHLORIDE 9 MG/ML
INJECTION, SOLUTION INTRAVENOUS CONTINUOUS
Status: DISCONTINUED | OUTPATIENT
Start: 2022-04-12 | End: 2022-04-14

## 2022-04-12 RX ORDER — AMOXICILLIN 250 MG
1 CAPSULE ORAL 2 TIMES DAILY
Status: DISCONTINUED | OUTPATIENT
Start: 2022-04-12 | End: 2022-04-19

## 2022-04-12 RX ORDER — ASCORBIC ACID 100 MG
1 TABLET,CHEWABLE ORAL DAILY
COMMUNITY
End: 2024-01-01

## 2022-04-12 RX ORDER — VITAMIN A 10000 UNIT
1 TABLET ORAL DAILY
COMMUNITY
End: 2024-01-01

## 2022-04-12 RX ORDER — LEVOCARNITINE 330 MG/1
330 TABLET ORAL 2 TIMES DAILY
Status: DISCONTINUED | OUTPATIENT
Start: 2022-04-12 | End: 2022-04-20 | Stop reason: HOSPADM

## 2022-04-12 RX ORDER — IPRATROPIUM BROMIDE AND ALBUTEROL SULFATE 2.5; .5 MG/3ML; MG/3ML
3 SOLUTION RESPIRATORY (INHALATION)
Status: DISCONTINUED | OUTPATIENT
Start: 2022-04-12 | End: 2022-04-18

## 2022-04-12 RX ORDER — PHENAZOPYRIDINE HYDROCHLORIDE 100 MG/1
190 TABLET, FILM COATED ORAL 3 TIMES DAILY
Status: DISCONTINUED | OUTPATIENT
Start: 2022-04-12 | End: 2022-04-18

## 2022-04-12 RX ORDER — AMOXICILLIN 250 MG
2 CAPSULE ORAL 2 TIMES DAILY
Status: DISCONTINUED | OUTPATIENT
Start: 2022-04-12 | End: 2022-04-19

## 2022-04-12 RX ORDER — VANCOMYCIN HYDROCHLORIDE 1 G/200ML
1000 INJECTION, SOLUTION INTRAVENOUS ONCE
Status: COMPLETED | OUTPATIENT
Start: 2022-04-12 | End: 2022-04-12

## 2022-04-12 RX ORDER — METHOCARBAMOL 500 MG/1
500 TABLET, FILM COATED ORAL 4 TIMES DAILY PRN
Status: DISCONTINUED | OUTPATIENT
Start: 2022-04-12 | End: 2022-04-20 | Stop reason: HOSPADM

## 2022-04-12 RX ORDER — IOPAMIDOL 755 MG/ML
100 INJECTION, SOLUTION INTRAVASCULAR ONCE
Status: COMPLETED | OUTPATIENT
Start: 2022-04-12 | End: 2022-04-12

## 2022-04-12 RX ORDER — ACYCLOVIR 400 MG/1
400 TABLET ORAL 2 TIMES DAILY
Status: DISCONTINUED | OUTPATIENT
Start: 2022-04-12 | End: 2022-04-20 | Stop reason: HOSPADM

## 2022-04-12 RX ADMIN — Medication 20 MG: at 23:11

## 2022-04-12 RX ADMIN — ALBUTEROL SULFATE 2.5 MG: 2.5 SOLUTION RESPIRATORY (INHALATION) at 15:48

## 2022-04-12 RX ADMIN — ACYCLOVIR 400 MG: 400 TABLET ORAL at 23:08

## 2022-04-12 RX ADMIN — ENOXAPARIN SODIUM 40 MG: 40 INJECTION SUBCUTANEOUS at 23:08

## 2022-04-12 RX ADMIN — PIPERACILLIN AND TAZOBACTAM 3.38 G: 3; .375 INJECTION, POWDER, LYOPHILIZED, FOR SOLUTION INTRAVENOUS at 18:57

## 2022-04-12 RX ADMIN — GABAPENTIN 600 MG: 300 CAPSULE ORAL at 23:19

## 2022-04-12 RX ADMIN — IPRATROPIUM BROMIDE AND ALBUTEROL SULFATE 3 ML: 2.5; .5 SOLUTION RESPIRATORY (INHALATION) at 22:59

## 2022-04-12 RX ADMIN — SODIUM CHLORIDE: 9 INJECTION, SOLUTION INTRAVENOUS at 18:06

## 2022-04-12 RX ADMIN — IOPAMIDOL 100 ML: 755 INJECTION, SOLUTION INTRAVENOUS at 17:55

## 2022-04-12 RX ADMIN — VANCOMYCIN HYDROCHLORIDE 1000 MG: 1 INJECTION, SOLUTION INTRAVENOUS at 19:26

## 2022-04-12 RX ADMIN — ASPIRIN 81 MG CHEWABLE TABLET 81 MG: 81 TABLET CHEWABLE at 23:08

## 2022-04-12 RX ADMIN — ALBUTEROL SULFATE 2 PUFF: 90 AEROSOL, METERED RESPIRATORY (INHALATION) at 15:19

## 2022-04-12 RX ADMIN — SODIUM CHLORIDE 1000 ML: 9 INJECTION, SOLUTION INTRAVENOUS at 16:39

## 2022-04-12 ASSESSMENT — ACTIVITIES OF DAILY LIVING (ADL)
ADLS_ACUITY_SCORE: 12

## 2022-04-12 NOTE — PHARMACY-ADMISSION MEDICATION HISTORY
Pharmacy Note - Admission Medication History    Pertinent Provider Information: None     ______________________________________________________________________    Prior To Admission (PTA) med list completed and updated in EMR.       PTA Med List   Medication Sig Note Last Dose     acyclovir (ZOVIRAX) 400 MG tablet Take 1 tablet (400 mg) by mouth 2 times daily Viral Prophylaxis.  4/11/2022 at Unknown time     alpha-lipoic acid 100 MG capsule Take 300 mg by mouth daily  4/11/2022 at Unknown time     Ascorbic Acid (VITAMIN C) 100 MG CHEW Take 1 tablet by mouth daily  4/11/2022 at Unknown time     aspirin (ASA) 81 MG chewable tablet Take 81 mg by mouth daily  4/11/2022 at Unknown time     Coenzyme Q10 300 MG CAPS Take 300 mg by mouth daily  4/11/2022 at Unknown time     dexamethasone (DECADRON) 4 MG tablet Take 10 tablets (40 mg) by mouth on Days 1, 8, and 15.       fish oil-omega-3 fatty acids 1000 MG capsule Take 2 g by mouth daily  4/11/2022 at Unknown time     gabapentin (NEURONTIN) 300 MG capsule Take 2 capsules (600 mg) by mouth 3 times daily  4/11/2022 at Unknown time     HYDROmorphone (DILAUDID) 4 MG tablet Take 0.5-1 tablets (2-4 mg) by mouth every 4 hours as needed for moderate to severe pain       LENalidomide (REVLIMID) 25 MG CAPS capsule Take 1 capsule (25 mg) by mouth daily Take on Days 1 through 14, then off for 7 days. 4/12/2022: Today is day 7 of 14 days on therapy. 4/11/2022 at Unknown time     levOCARNitine (CARNITOR) 330 MG tablet Take 330 mg by mouth 2 times daily  4/11/2022 at Unknown time     medical cannabis (Patient's own supply) See Admin Instructions (The purpose of this order is to document that the patient reports taking medical cannabis.  This is not a prescription, and is not used to certify that the patient has a qualifying medical condition.)     Oil formulation  Past Month at Unknown time     Melatonin 10 MG TABS tablet Take 20 mg by mouth At Bedtime  4/11/2022 at Unknown time      methocarbamol (ROBAXIN) 500 MG tablet Take 1 tablet (500 mg) by mouth 4 times daily as needed for muscle spasms (Patient taking differently: Take 500 mg by mouth 3 times daily)  4/11/2022 at Unknown time     Milk Thistle-Dand-Fennel-Licor (MILK THISTLE XTRA) CAPS capsule Take 1 capsule by mouth daily  4/11/2022 at Unknown time     NALTREXONE HCL PO Take 3 mg by mouth daily  4/11/2022 at Unknown time     phenazopyridine (AZO URINARY PAIN RELIEF) 95 MG tablet Take 190 mg by mouth 3 times daily  4/11/2022 at Unknown time     senna (SENOKOT) 8.6 MG tablet Take 1 tablet by mouth daily as needed       TURMERIC PO Take 1 capsule by mouth daily  4/11/2022 at Unknown time     UNABLE TO FIND 1 capsule daily MEDICATION NAME: Serrapeptase  4/11/2022 at Unknown time     UNABLE TO FIND 1 capsule daily MEDICATION NAME: Lireina García Mushroom  4/11/2022 at Unknown time     UNABLE TO FIND 1 capsule daily MEDICATION NAME: DIM (diinolylmethane)  4/11/2022 at Unknown time     UNABLE TO FIND MEDICATION NAME: Panacur C - 1 capsule daily  4/11/2022 at Unknown time     Vitamin A 3 MG (68762 UT) TABS Take 1 tablet by mouth daily  4/11/2022 at Unknown time     Vitamin D, Cholecalciferol, 25 MCG (1000 UT) CAPS Take 1,000 Units by mouth daily Liquid, not capsule  4/11/2022 at Unknown time       Information source(s): [facility]  Method of interview communication: phone    Summary of Changes to PTA Med List  New: Vitamin A  Discontinued: None  Changed: Methocarbamol PRN to TID    Patient was asked about OTC/herbal products specifically.  PTA med list reflects this.    In the past week, patient estimated taking medication this percent of the time:  greater than 90%.    Allergies were reviewed, assessed, and updated with the patient.      Patient does not use any multi-dose medications prior to admission.    The information provided in this note is only as accurate as the sources available at the time of the  update(s).    Thank you for the opportunity to participate in the care of this patient.    Julisa Li, [facility]  4/12/2022 4:46 PM

## 2022-04-12 NOTE — ED PROVIDER NOTES
EMERGENCY DEPARTMENT ENCOUNTER      NAME: Lizzie Viera  AGE: 71 year old female  YOB: 1950  MRN: [id number]  EVALUATION DATE & TIME: 4/12/2022  3:12 PM    PCP: Anne Marie Walker    ED PROVIDER: Linus Shaw M.D.      Chief Complaint   Patient presents with     Shortness of Breath         FINAL IMPRESSION:  No diagnosis found.      ED COURSE & MEDICAL DECISION MAKING:    Pertinent Labs & Imaging studies reviewed. (See chart for details)  71 year old female presents to the Emergency Department for evaluation of shortness of breath. Patient appears ill, she is hypoxic on room air, low-grade fever of 100.1  F, no tachycardia or hypotension.  Coarse, wet crackles bilaterally on auscultation of the lungs.  Patient denies any new chest pain, pleurisy, no ripping or tearing chest discomfort back or shoulders.  Concern for pneumonia, Covid, CHF, considered less likely ACS or PE, nothing to suggest dissection or esophageal rupture, GI bleed.  Concern for sepsis.  We will obtain screening labs, CT imaging studies of the chest.  Patient was placed on supplemental oxygen and given albuterol nebulizer treatment.    Reassessment: Labs showed no acute concerning findings, did note elevated CRP.  No elevated white blood cell count and venous pH was 7.36.  No elevated lactic acid, tachycardia, no elevated white blood cell count which was seen nothing to suggest SIRS, though I do appreciate the patient is on chemotherapy.  Covid and influenza screen negative.  CT imaging reported no pulmonary embolus, did report severe bronchitis with right lower lobe pneumonia.  Patient does have allergies to cefdinir.  Feel the patient would benefit from admission for IV antibiotics and I did initiate Zosyn and vancomycin.  Blood cultures pending at this time though again no significant tachycardia or hypoxia, no hypotension.  No signs of endorgan dysfunction, normal lactic acid.  Discussed findings and need for admission  with the patient who is in agreement.  Discussed care plan with admitting service.    At the conclusion of the encounter I discussed the results of all of the tests and the disposition. The questions were answered and return precautions provided. The patient or family acknowledged understanding and was agreeable with the care plan.     3:32 PM I met with the patient, obtained history, performed an initial exam, and discussed options and plan for diagnostics and treatment here in the ED.   6:40 PM Upon repeat exam, patient has improved. Discussed findings and plan for admission.   6:53 PM Spoke with the hospitalist, Dr. Riojas.      MEDICATIONS GIVEN IN THE EMERGENCY:  Medications   albuterol (PROVENTIL HFA/VENTOLIN HFA) inhaler (2 puffs Inhalation Given 4/12/22 3779)       NEW PRESCRIPTIONS STARTED AT TODAY'S ER VISIT  New Prescriptions    No medications on file            =================================================================    HPI    Patient information was obtained from: patient     Use of Intrepreter: N/A       Lizzie Viera is a 71 year old female who presents for shortness of breath.     Yesterday, patient started feeling a cough, sinus congestion, ear congestion, rhinorrhea, and right lung pain. Today, she started feeling congestion in her lungs and shortness of breath with a subjective fever. She denies being on O2 at home and is able to ambulate normally at baseline.     Patient endorses a history of multiple myeloma and is undergoing chemotherapy treatment. Last chemotherapy treatment was 4/6/2022 (6 days ago). Denies history of lung disorders. Has not gotten vaccinated against COVID-19. Denies COVID-19 contacts. Patient endorses social history of being a former smoker as of 7/2021. Denies alcohol use. Does report medical marijuana use. Patient denies chest pain, bloody or black stool, dysuria, abdominal pain, vomiting, or any other complaints at this time.       REVIEW OF SYSTEMS    Constitutional:  Positive for fever. Denies chills  HENT: Positive for sinus congestion, ear congestion, and rhinorrhea.   Respiratory:  Positive for cough, shortness of breath, right lung pain, and lung congestion.   Cardiovascular:  Denies chest pain, palpitations  GI:  Denies abdominal pain, nausea, vomiting, or change in bowel or bladder habits   Musculoskeletal:  Denies any new muscle/joint swelling  Skin:  Denies rash   Neurologic:  Denies focal weakness  All systems negative except as marked.     PAST MEDICAL HISTORY:  Past Medical History:   Diagnosis Date     Cervical dysplasia      Chronic RUQ pain      Depressive disorder      Multiple myeloma (H)      Osteoporosis        PAST SURGICAL HISTORY:  Past Surgical History:   Procedure Laterality Date     CONIZATION CERVIX,KNIFE/LASER      Description: Cervical Conization By Laser;  Recorded: 09/20/2007;     HC DILATION/CURETTAGE DIAG/THER NON OB      Description: Dilation And Curettage;  Recorded: 09/20/2007;     HC REMOVE TONSILS/ADENOIDS,<13 Y/O      Description: Tonsillectomy With Adenoidectomy;  Recorded: 09/20/2007;     C LAP,CHOLECYSTECTOMY/EXPLORE  12/27/2004     [facility] LIGATE FALLOPIAN TUBE      Description: Tubal Ligation;  Recorded: 09/20/2007;         CURRENT MEDICATIONS:    Prior to Admission medications    Medication Sig Start Date End Date Taking? Authorizing Provider   acetylcysteine (NAC) 600 MG CAPS capsule Take 600 mg by mouth daily    Unknown, Entered By History   acyclovir (ZOVIRAX) 400 MG tablet Take 1 tablet (400 mg) by mouth 2 times daily Viral Prophylaxis. 2/14/22   Zehra Pablo APRN CNP   alpha-lipoic acid 100 MG capsule Take 300 mg by mouth daily 6/24/21   Reported, Patient   aspirin (ASA) 81 MG chewable tablet  9/10/21   Reported, Patient   Coenzyme Q10 300 MG CAPS Take 300 mg by mouth daily    Unknown, Entered By History   dexamethasone (DECADRON) 4 MG tablet Take 10 tablets (40 mg) by mouth on Days 1, 8, and 15. 12/22/21    Zehra Pablo APRN CNP   fish oil-omega-3 fatty acids 1000 MG capsule Take 2 g by mouth daily    Unknown, Entered By History   gabapentin (NEURONTIN) 300 MG capsule Take 2 capsules (600 mg) by mouth 3 times daily 2/10/22   Wyatt Pascual DO   HYDROmorphone (DILAUDID) 4 MG tablet Take 0.5-1 tablets (2-4 mg) by mouth every 4 hours as needed for moderate to severe pain 3/15/22   Scarlett Flynn CNS   LENalidomide (REVLIMID) 25 MG CAPS capsule Take 1 capsule (25 mg) by mouth daily Take on Days 1 through 14, then off for 7 days. 4/6/22   Zehra Pablo APRN CNP   levOCARNitine (CARNITOR) 330 MG tablet Take 330 mg by mouth 2 times daily    Unknown, Entered By History   medical cannabis (Patient's own supply) See Admin Instructions (The purpose of this order is to document that the patient reports taking medical cannabis.  This is not a prescription, and is not used to certify that the patient has a qualifying medical condition.)    Reported, Patient   Melatonin 10 MG TABS tablet Take 20 mg by mouth At Bedtime    Unknown, Entered By History   methocarbamol (ROBAXIN) 500 MG tablet Take 1 tablet (500 mg) by mouth 4 times daily as needed for muscle spasms 3/29/22   Scarlett Flynn, CNS   Milk Thistle-Dand-Fennel-Licor (MILK THISTLE XTRA) CAPS capsule Take 1 capsule by mouth daily 6/24/21   Reported, Patient   NALTREXONE HCL PO Take 3 mg by mouth daily    Unknown, Entered By History   phenazopyridine (AZO URINARY PAIN RELIEF) 95 MG tablet Take 190 mg by mouth 3 times daily    Unknown, Entered By History   senna (SENOKOT) 8.6 MG tablet Take 1 tablet by mouth daily   Patient not taking: Reported on 3/15/2022    Reported, Patient   TURMERIC PO Take 1 capsule by mouth daily    Unknown, Entered By History   UNABLE TO FIND 1 capsule daily MEDICATION NAME: Mucosagen    Unknown, Entered By History   UNABLE TO FIND 1 capsule daily MEDICATION NAME: Serrapeptase    Unknown, Entered By History   UNABLE TO  FIND 1 capsule daily MEDICATION NAME: Lions García Mushroom    Unknown, Entered By History   UNABLE TO FIND 1 capsule daily MEDICATION NAME: DIM (diinolylmethane)    Unknown, Entered By History   UNABLE TO FIND MEDICATION NAME: Panacur C - 1 capsule daily    Unknown, Entered By History   UNABLE TO FIND MEDICATION NAME:  mg daily    Unknown, Entered By History   Vitamin D, Cholecalciferol, 25 MCG (1000 UT) CAPS Take 1,000 Units by mouth daily Liquid, not capsule 6/24/21   Reported, Patient   Vitamin Mixture (VITAMIN C) LIQD     Reported, Patient        ALLERGIES:  Allergies   Allergen Reactions     Cefdinir Shortness Of Breath     Latex Shortness Of Breath     Short Ragweed Pollen Ext Cough     Adhesive Tape Rash       FAMILY HISTORY:  Family History   Problem Relation Age of Onset     Diabetes Mother      Arthritis Mother      LUNG DISEASE Father      Diabetes Maternal Uncle      Breast Cancer Paternal Aunt      Heart Failure Maternal Grandmother      Heart Disease Maternal Grandmother      Heart Failure Maternal Grandfather      Heart Disease Maternal Grandfather      Heart Failure Paternal Grandmother      Heart Disease Paternal Grandmother        SOCIAL HISTORY:   Social History     Socioeconomic History     Marital status:      Number of children: 3   Tobacco Use     Smoking status: Former Smoker     Packs/day: 0.50     Years: 45.00     Pack years: 22.50     Types: Cigarettes     Smokeless tobacco: Never Used   Substance and Sexual Activity     Alcohol use: Yes     Comment: Alcoholic Drinks/day: 0-1 drinks per week     Drug use: Not Currently     Sexual activity: Not Currently     Partners: Male     Birth control/protection: Surgical       VITALS:  Patient Vitals for the past 24 hrs:   BP Temp Temp src Pulse Resp SpO2   04/12/22 1453 -- 100.1  F (37.8  C) Tympanic -- -- --   04/12/22 1451 99/65 -- -- 80 22 (!) 75 %        PHYSICAL EXAM    Constitutional:  Awake, alert, in no apparent distress  HENT:   Normocephalic, Atraumatic. Bilateral external ears normal. Oropharynx moist. Nose normal. Neck- Normal range of motion with no guarding, No midline cervical tenderness, Supple, No stridor.   Eyes:  PERRL, EOMI with no signs of entrapment, Conjunctiva normal, No discharge.   Respiratory:  Coarse, wet crackles bilaterally. No wheezing.    Cardiovascular:  Normal heart rate, Normal rhythm, No appreciable rubs or gallops.   GI:  Soft, No tenderness, No distension, No palpable masses  Musculoskeletal:  Intact distal pulses, No edema. Good range of motion in all major joints. No tenderness to palpation or major deformities noted.  Integument:  Warm, Dry, No erythema, No rash.   Neurologic:  Alert & oriented, Normal motor function, Normal sensory function, No focal deficits noted.   Psychiatric:  Affect normal, Judgment normal, Mood normal.     LAB:  All pertinent labs reviewed and interpreted.       RADIOLOGY:  Chest XR,  PA & LAT    (Results Pending)          EKG:    Sinus rhythm, no specific ST acute ischemic changes, did note prolonged QTC, no other concerning dysrhythmias, when compared to ECG of September 9, 2020, no specific ST acute ischemic changes.  I have independently reviewed and interpreted the EKG(s) documented above.      I, Clara Lizama, am serving as a scribe to document services personally performed by Linus Shaw MD, based on my observation and the provider's statements to me. I, Linus Shaw MD attest that Clara Lizama is acting in a scribe capacity, has observed my performance of the services and has documented them in accordance with my direction.    Linus Shaw M.D.  Emergency Medicine  [facility] EMERGENCY DEPARTMENT  [address] [place] [id number]  [phone]  Dept: [phone]     Linus Shaw MD  04/12/22 1277

## 2022-04-12 NOTE — PHARMACY-VANCOMYCIN DOSING SERVICE
Pharmacy Vancomycin Initial Note  Date of Service 2022  Patient's  1950  71 year old, female    Indication: [facility] Pneumonia    Current estimated CrCl = Estimated Creatinine Clearance: 63.1 mL/min (based on SCr of 0.76 mg/dL).    Creatinine for last 3 days  2022:  3:35 PM Creatinine 0.76 mg/dL    Recent Vancomycin Level(s) for last 3 days  No results found for requested labs within last 72 hours.      Vancomycin IV Administrations (past 72 hours)      No vancomycin orders with administrations in past 72 hours.                Nephrotoxins and other renal medications (From now, onward)    Start     Dose/Rate Route Frequency Ordered Stop    22 1900  piperacillin-tazobactam (ZOSYN) 3.375 g vial to attach to  mL bag         3.375 g  over 30 Minutes Intravenous ONCE 22 1840      22 1900  vancomycin (VANCOCIN) 1000 mg in dextrose 5% 200 mL PREMIX         1,000 mg  200 mL/hr over 1 Hours Intravenous ONCE 22 1844            Contrast Orders - past 72 hours (72h ago, onward)            None              Plan:  1. Start vancomycin  1000 mg IV once.   2. Please consult pharmacy if further inpatient dosing is needed    Sapphire Tinajero, [facility]

## 2022-04-12 NOTE — TELEPHONE ENCOUNTER
Patient sister, Tanesha, calls in today with concerns about Valeria.  Bruises listed in patient's chart as an emergency contact however she is not authorized to give information to.  I did let her know this so when I called her back I was able to speak with Araceli very briefly and get verbal consent that I talk with her sister.  Patient could hardly speak as she had a very wet, productive cough.  Patient's daughter is also in the home at this time.  I asked how long this has been going on and she states that it started yesterday morning.  It very rapidly got progressively worse.  She has a cough, a rattle in her lungs and is struggling to breathe.  She states that she gave her a nebulizer but that does not really seem to have helped.  I asked about any fevers and they have not taken her temperature but states that she has been feeling very hot and then very cold.  They assume she has had a temperature.  Due to the drastic change in patient's condition, I did recommend that they bring her into the emergency room to be evaluated.  Patient is due for treatment tomorrow however I let them know that I was concerned about waiting a full 24 hours before she is checked out.  She is not scheduled to see a provider tomorrow.  I did let them know that if she has an infection, it would be better to get her on antibiotics sooner rather than later.  They state they will be bringing her into the emergency room.    Aliza Sheehan RN

## 2022-04-12 NOTE — ED PROVIDER NOTES
ED Provider In Triage Note  [facility]  Encounter Date: Apr 12, 2022    Chief Complaint   Patient presents with     Shortness of Breath       Brief HPI:   Lizzie Viera is a 71 year old female, history of multiple myeloma (sees Dr. Blanco), presenting to the Emergency Department with a chief complaint of SOB since yesterday. O2 sats 75% on RA on presentation. Reports some substernal chest pain with breathing. Has had cough with fever since yesterday; she has not been vaccinated for COVID.     Brief Physical Exam:  BP (!) 86/58   Pulse 85   Temp 100.1  F (37.8  C) (Tympanic)   Resp 26   Wt 57.2 kg (126 lb)   SpO2 90%   BMI 23.04 kg/m    General: Ill-appearing  HEENT: Atraumatic  Resp: Coarse breath sounds on the right, somewhat diminished breath sounds on the left; faint, scattered expiratory wheezes  Cardiac: RRR  Abdomen: soft, non-tender  Neuro: Alert, oriented, answers questions appropriately; cranial nerves grossly intact, no focal motor deficits  Psych: Behavior appropriate      Plan Initiated in Triage:  Orders Placed This Encounter     CT Chest Pulmonary Embolism w Contrast     Basic metabolic panel     Troponin I (now)     B-Type Natriuretic Peptide ([facility] Only)     INR     PTT     Symptomatic; Yes; 4/10/2022 Influenza A/B & SARS-CoV2 (COVID-19) Virus PCR Multiplex     Lactic acid whole blood     CRP inflammation     Blood gas venous     CBC with platelets and differential     Magnesium     DISCONTD: albuterol (PROVENTIL HFA/VENTOLIN HFA) inhaler     albuterol (PROVENTIL) neb solution 2.5 mg       PIT Dispo:   Room asap    Keara Edgar MD on 4/12/2022 at 4:30 PM    Patient was evaluated by the Physician in Triage due to a limitation of available rooms in the Emergency Department. A plan of care was discussed based on the information obtained on the initial evaluation and patient was consuled to return back to the Emergency Department lobby after this initial  evalutaiton until results were obtained or a room became available in the Emergency Department. Patient was counseled not to leave prior to receiving the results of their workup.        Keara Edgar MD  04/12/22 1160

## 2022-04-13 ENCOUNTER — APPOINTMENT (OUTPATIENT)
Dept: CARDIOLOGY | Facility: HOSPITAL | Age: 72
DRG: 193 | End: 2022-04-13
Attending: INTERNAL MEDICINE
Payer: COMMERCIAL

## 2022-04-13 LAB
ALBUMIN SERPL-MCNC: 2.5 G/DL (ref 3.5–5)
ALP SERPL-CCNC: 30 U/L (ref 45–120)
ALT SERPL W P-5'-P-CCNC: 34 U/L (ref 0–45)
ANION GAP SERPL CALCULATED.3IONS-SCNC: 7 MMOL/L (ref 5–18)
AST SERPL W P-5'-P-CCNC: 51 U/L (ref 0–40)
BASE EXCESS BLDA CALC-SCNC: -0.1 MMOL/L
BILIRUB SERPL-MCNC: 0.5 MG/DL (ref 0–1)
BUN SERPL-MCNC: 15 MG/DL (ref 8–22)
CALCIUM SERPL-MCNC: 7.8 MG/DL (ref 8.5–10.5)
CHLORIDE BLD-SCNC: 107 MMOL/L (ref 98–107)
CO2 SERPL-SCNC: 24 MMOL/L (ref 22–31)
COHGB MFR BLD: 95.7 % (ref 95–96)
CREAT SERPL-MCNC: 0.64 MG/DL (ref 0.6–1.1)
ERYTHROCYTE [DISTWIDTH] IN BLOOD BY AUTOMATED COUNT: 16 % (ref 10–15)
FLOW: 40 LPM
GFR SERPL CREATININE-BSD FRML MDRD: >90 ML/MIN/1.73M2
GLUCOSE BLD-MCNC: 117 MG/DL (ref 70–125)
HCO3 BLD-SCNC: 24 MMOL/L (ref 23–29)
HCT VFR BLD AUTO: 36.3 % (ref 35–47)
HGB BLD-MCNC: 12 G/DL (ref 11.7–15.7)
HOLD SPECIMEN: NORMAL
LACTATE SERPL-SCNC: 0.9 MMOL/L (ref 0.7–2)
LVEF ECHO: NORMAL
MCH RBC QN AUTO: 33.3 PG (ref 26.5–33)
MCHC RBC AUTO-ENTMCNC: 33.1 G/DL (ref 31.5–36.5)
MCV RBC AUTO: 101 FL (ref 78–100)
O2/TOTAL GAS SETTING VFR VENT: 70 %
OXYHGB MFR BLD: 95.6 % (ref 95–96)
PCO2 BLD: 52 MM HG (ref 35–45)
PH BLD: 7.31 [PH] (ref 7.37–7.44)
PLATELET # BLD AUTO: 143 10E3/UL (ref 150–450)
PO2 BLD: 110 MM HG (ref 75–85)
POTASSIUM BLD-SCNC: 3.4 MMOL/L (ref 3.5–5)
POTASSIUM BLD-SCNC: 3.6 MMOL/L (ref 3.5–5)
POTASSIUM BLD-SCNC: 3.9 MMOL/L (ref 3.5–5)
PROCALCITONIN SERPL-MCNC: 0.36 NG/ML (ref 0–0.49)
PROT SERPL-MCNC: 5.2 G/DL (ref 6–8)
RBC # BLD AUTO: 3.6 10E6/UL (ref 3.8–5.2)
SODIUM SERPL-SCNC: 138 MMOL/L (ref 136–145)
TEMPERATURE: 37 DEGREES C
VENTILATION MODE: ABNORMAL
WBC # BLD AUTO: 5.7 10E3/UL (ref 4–11)

## 2022-04-13 PROCEDURE — 250N000013 HC RX MED GY IP 250 OP 250 PS 637: Performed by: INTERNAL MEDICINE

## 2022-04-13 PROCEDURE — 250N000011 HC RX IP 250 OP 636: Performed by: INTERNAL MEDICINE

## 2022-04-13 PROCEDURE — 999N000215 HC STATISTIC HFNC ADULT NON-CPAP

## 2022-04-13 PROCEDURE — 82805 BLOOD GASES W/O2 SATURATION: CPT | Performed by: INTERNAL MEDICINE

## 2022-04-13 PROCEDURE — 99232 SBSQ HOSP IP/OBS MODERATE 35: CPT | Performed by: INTERNAL MEDICINE

## 2022-04-13 PROCEDURE — 272N000270 HC CIRCUIT, METANEB

## 2022-04-13 PROCEDURE — 258N000003 HC RX IP 258 OP 636: Performed by: INTERNAL MEDICINE

## 2022-04-13 PROCEDURE — 36415 COLL VENOUS BLD VENIPUNCTURE: CPT | Performed by: INTERNAL MEDICINE

## 2022-04-13 PROCEDURE — 999N000078 HC STATISTIC INTRAPULMONARY PERCUSSIVE VENT

## 2022-04-13 PROCEDURE — 258N000003 HC RX IP 258 OP 636: Performed by: EMERGENCY MEDICINE

## 2022-04-13 PROCEDURE — 93306 TTE W/DOPPLER COMPLETE: CPT | Mod: 26 | Performed by: INTERNAL MEDICINE

## 2022-04-13 PROCEDURE — 80053 COMPREHEN METABOLIC PANEL: CPT | Performed by: INTERNAL MEDICINE

## 2022-04-13 PROCEDURE — 85027 COMPLETE CBC AUTOMATED: CPT | Performed by: INTERNAL MEDICINE

## 2022-04-13 PROCEDURE — 250N000009 HC RX 250: Performed by: INTERNAL MEDICINE

## 2022-04-13 PROCEDURE — 999N000157 HC STATISTIC RCP TIME EA 10 MIN

## 2022-04-13 PROCEDURE — 36600 WITHDRAWAL OF ARTERIAL BLOOD: CPT

## 2022-04-13 PROCEDURE — 84145 PROCALCITONIN (PCT): CPT | Performed by: INTERNAL MEDICINE

## 2022-04-13 PROCEDURE — 120N000001 HC R&B MED SURG/OB

## 2022-04-13 PROCEDURE — 93306 TTE W/DOPPLER COMPLETE: CPT

## 2022-04-13 PROCEDURE — 84132 ASSAY OF SERUM POTASSIUM: CPT | Performed by: INTERNAL MEDICINE

## 2022-04-13 PROCEDURE — 94640 AIRWAY INHALATION TREATMENT: CPT | Mod: 76

## 2022-04-13 PROCEDURE — 99221 1ST HOSP IP/OBS SF/LOW 40: CPT | Performed by: INTERNAL MEDICINE

## 2022-04-13 PROCEDURE — 99222 1ST HOSP IP/OBS MODERATE 55: CPT | Performed by: NURSE PRACTITIONER

## 2022-04-13 PROCEDURE — 83605 ASSAY OF LACTIC ACID: CPT | Performed by: INTERNAL MEDICINE

## 2022-04-13 RX ORDER — ALBUTEROL SULFATE 0.83 MG/ML
2.5 SOLUTION RESPIRATORY (INHALATION) EVERY 4 HOURS PRN
Status: DISCONTINUED | OUTPATIENT
Start: 2022-04-13 | End: 2022-04-20 | Stop reason: HOSPADM

## 2022-04-13 RX ORDER — POTASSIUM CHLORIDE 1500 MG/1
20 TABLET, EXTENDED RELEASE ORAL ONCE
Status: COMPLETED | OUTPATIENT
Start: 2022-04-13 | End: 2022-04-13

## 2022-04-13 RX ADMIN — POTASSIUM CHLORIDE 20 MEQ: 1500 TABLET, EXTENDED RELEASE ORAL at 09:19

## 2022-04-13 RX ADMIN — PHENAZOPYRIDINE HYDROCHLORIDE 200 MG: 100 TABLET ORAL at 00:35

## 2022-04-13 RX ADMIN — IPRATROPIUM BROMIDE AND ALBUTEROL SULFATE 3 ML: 2.5; .5 SOLUTION RESPIRATORY (INHALATION) at 21:07

## 2022-04-13 RX ADMIN — ACYCLOVIR 400 MG: 400 TABLET ORAL at 09:19

## 2022-04-13 RX ADMIN — IPRATROPIUM BROMIDE AND ALBUTEROL SULFATE 3 ML: 2.5; .5 SOLUTION RESPIRATORY (INHALATION) at 07:18

## 2022-04-13 RX ADMIN — PIPERACILLIN AND TAZOBACTAM 3.38 G: 3; .375 INJECTION, POWDER, LYOPHILIZED, FOR SOLUTION INTRAVENOUS at 18:30

## 2022-04-13 RX ADMIN — IPRATROPIUM BROMIDE AND ALBUTEROL SULFATE 3 ML: 2.5; .5 SOLUTION RESPIRATORY (INHALATION) at 16:00

## 2022-04-13 RX ADMIN — HYDROMORPHONE HYDROCHLORIDE 4 MG: 2 TABLET ORAL at 21:17

## 2022-04-13 RX ADMIN — AZITHROMYCIN MONOHYDRATE 500 MG: 500 INJECTION, POWDER, LYOPHILIZED, FOR SOLUTION INTRAVENOUS at 11:48

## 2022-04-13 RX ADMIN — IPRATROPIUM BROMIDE AND ALBUTEROL SULFATE 3 ML: 2.5; .5 SOLUTION RESPIRATORY (INHALATION) at 11:45

## 2022-04-13 RX ADMIN — VANCOMYCIN HYDROCHLORIDE 750 MG: 1 INJECTION, POWDER, LYOPHILIZED, FOR SOLUTION INTRAVENOUS at 08:41

## 2022-04-13 RX ADMIN — GABAPENTIN 600 MG: 300 CAPSULE ORAL at 09:19

## 2022-04-13 RX ADMIN — Medication 20 MG: at 21:01

## 2022-04-13 RX ADMIN — ACYCLOVIR 400 MG: 400 TABLET ORAL at 21:00

## 2022-04-13 RX ADMIN — GABAPENTIN 600 MG: 300 CAPSULE ORAL at 13:52

## 2022-04-13 RX ADMIN — SODIUM CHLORIDE: 9 INJECTION, SOLUTION INTRAVENOUS at 00:42

## 2022-04-13 RX ADMIN — PIPERACILLIN AND TAZOBACTAM 3.38 G: 3; .375 INJECTION, POWDER, LYOPHILIZED, FOR SOLUTION INTRAVENOUS at 08:41

## 2022-04-13 RX ADMIN — PHENAZOPYRIDINE HYDROCHLORIDE 200 MG: 100 TABLET ORAL at 09:19

## 2022-04-13 RX ADMIN — SODIUM CHLORIDE: 9 INJECTION, SOLUTION INTRAVENOUS at 21:30

## 2022-04-13 RX ADMIN — VANCOMYCIN HYDROCHLORIDE 750 MG: 1 INJECTION, POWDER, LYOPHILIZED, FOR SOLUTION INTRAVENOUS at 21:16

## 2022-04-13 RX ADMIN — Medication 125 MG: at 09:20

## 2022-04-13 RX ADMIN — GABAPENTIN 600 MG: 300 CAPSULE ORAL at 20:55

## 2022-04-13 RX ADMIN — PHENAZOPYRIDINE HYDROCHLORIDE 200 MG: 100 TABLET ORAL at 20:58

## 2022-04-13 RX ADMIN — ENOXAPARIN SODIUM 40 MG: 40 INJECTION SUBCUTANEOUS at 21:01

## 2022-04-13 RX ADMIN — PIPERACILLIN AND TAZOBACTAM 3.38 G: 3; .375 INJECTION, POWDER, LYOPHILIZED, FOR SOLUTION INTRAVENOUS at 00:42

## 2022-04-13 ASSESSMENT — ACTIVITIES OF DAILY LIVING (ADL)
ADLS_ACUITY_SCORE: 8
ADLS_ACUITY_SCORE: 8
ADLS_ACUITY_SCORE: 6
ADLS_ACUITY_SCORE: 6
ADLS_ACUITY_SCORE: 8
ADLS_ACUITY_SCORE: 6
ADLS_ACUITY_SCORE: 8
ADLS_ACUITY_SCORE: 10
ADLS_ACUITY_SCORE: 8
ADLS_ACUITY_SCORE: 10
ADLS_ACUITY_SCORE: 6
ADLS_ACUITY_SCORE: 8
ADLS_ACUITY_SCORE: 10
ADLS_ACUITY_SCORE: 10
ADLS_ACUITY_SCORE: 6
ADLS_ACUITY_SCORE: 8
ADLS_ACUITY_SCORE: 10
ADLS_ACUITY_SCORE: 10
ADLS_ACUITY_SCORE: 6
ADLS_ACUITY_SCORE: 10

## 2022-04-13 NOTE — PROVIDER NOTIFICATION
"Paged house officer at this time for hypotension and increased O2 needs. O2 sats currently 89-91% on 15L oxymask. RT asked to come to bedside and likely initiate high flow. Patient does not feel short of breath, lightheaded, or dizzy. States she feels \"sleepy\"; appears drowsy.  "

## 2022-04-13 NOTE — PLAN OF CARE
Problem: Gas Exchange Impaired  Goal: Optimal Gas Exchange  Outcome: Ongoing, Progressing  Intervention: Optimize Oxygenation and Ventilation  Recent Flowsheet Documentation  Taken 4/13/2022 0519 by Demetra Castanon RN  Head of Bed (HOB) Positioning: HOB at 30-45 degrees  Taken 4/13/2022 0030 by Demetra Castanon RN  Head of Bed (HOB) Positioning: HOB at 30-45 degrees   Problem: Pain Acute  Goal: Acceptable Pain Control and Functional Ability  Intervention: Prevent or Manage Pain  Recent Flowsheet Documentation  Taken 4/13/2022 0519 by Demetra Castanon RN  Medication Review/Management: medications reviewed  Taken 4/13/2022 0030 by Demetra Castanon RN  Medication Review/Management: medications reviewed     Goal Outcome Evaluation: Pt is on HFNC at 50L, FIO2 55. Attempting to tritiate FIO2 down, but pt become dyspneic. Need sputum sample still. BP improved, however left arm BP are lower value than left leg and right arm (consistently). Notified house officer of these finding. No new orders at this time. Denies pain. Sleeping between cares. [facility]. Sepsis trigger. Lactic acid 0.9.

## 2022-04-13 NOTE — PROGRESS NOTES
RESPIRATORY CARE NOTE     Patient Name: Lizzie Viera  Today's Date: 4/13/2022       Pt is currently on 40L/50%HFNC and stable respiratory wise. Pt did complain about flow of being so high and had hard time of breathing against it. Hence, flow had been re-adjusted with pt's comfort. Blood gas has been drawn per order and results indicated : 7.31, 52, 110, 24, 96% on 40L/70%, hence FIO2 has been adjusted down to 50%. RT will continue to monitor closely and assess as needed.     [place] RRT

## 2022-04-13 NOTE — PROGRESS NOTES
Increased work of breathing and desaturations are not noted; supplemental oxygen titrated to 60%. Pt is remains on high flow 50 lpm/ 60% FIO2; sats mid 90s. RT monitoring.    Vasquez Lazo, RT

## 2022-04-13 NOTE — ED NOTES
[facility] ED Handoff Report    ED Chief Complaint: SOB    ED Diagnosis:  (J18.9) Pneumonia of right lower lobe due to infectious organism  Comment: .  Plan: IV ABX    (R09.02) Hypoxia  Comment: 75% O2 sat on RA during triage  Plan: supplemental oxygen       PMH:    Past Medical History:   Diagnosis Date     Cervical dysplasia      Chronic RUQ pain      Depressive disorder      Multiple myeloma (H)      Osteoporosis         Code Status:  Full Code     Falls Risk: Yes Band: Applied    Current Living Situation/Residence: lives in a house     Elimination Status: Continent: Yes     Activity Level: SBA w/ walker    Patients Preferred Language:  English     Needed: No    Vital Signs:  BP 97/55   Pulse 80   Temp 100.1  F (37.8  C) (Tympanic)   Resp 25   Wt 57.2 kg (126 lb)   SpO2 90%   BMI 23.04 kg/m       Cardiac Rhythm: NSR    Pain Score: 0/10    Is the Patient Confused:  No    Last Food or Drink: 04/12/22    Focused Assessment:  AAOx4, 91% on 10 - 15L oxymask, dyspnea on exertion, anxiety when SOB, standby assist with walker, bedside commode, cough, left lung coarse breath sounds, PIV right AC,  ml/hr    Tests Performed: Done: Labs and Imaging    Treatments Provided:  .    Family Dynamics/Concerns: No    Family Updated On Visitor Policy: Yes    Plan of Care Communicated to Family: Yes    Who Was Updated about Plan of Care: daughter    Belongings Checklist Done and Signed by Patient: Yes    Medications sent with patient: yes    Covid: symptomatic, negative    Additional Information: .    RN: Veronique Bond  . 4/12/2022 9:54 PM

## 2022-04-13 NOTE — H&P
[facility]    History and Physical - Hospitalist Service      Assessment and Plan  Active Problems:    Hypoxia    Pneumonia of right lower lobe due to infectious organism    71 year old female with a past medical history of multiple myeloma and is undergoing chemotherapy treatment who presented to the ER for the evaluation of shortness of breath with fever.  She was admitted with,    Acute respiratory failure with hypoxia  - Probably secondary to pneumonia  - CT chest is negative for PE but shows severe bronchitis/bronchiolitis/and mild right lower lobe pneumonia.  - VBG is unremarkable  - Improving with oxygen, currently requires 10 L  - Continue duo nebs  - Continue to monitor on telemetry    Community-acquired pneumonia  - Positive sick contact as patient has been has pneumonia and on antibiotic  - Negative Covid test  - Unremarkable lactic acid  - Patient is immunocompromised because of multiple myeloma and chemotherapy  -Broad-spectrum antibiotic with vancomycin and Zosyn  - Follow-up on blood culture  - Check sputum culture    History of multiple myeloma  - S/P chemotherapy, last chemo was 4/6/2022  - Hold chemotherapy because of pneumonia  - Oncology consult appreciate input    Hypotension  - Probably secondary to volume depletion versus sepsis  - Continue IV fluid support  - Broad-spectrum antibiotics  - Check echo    Chronic pain syndrome  - Secondary to cancer  - Resume home medications    Weakness and deconditioning  - PT/OT evaluation when medically stable      COVID STATUS: Negative date: 4/12/22    VTE prophylaxis:  Enoxaparin (Lovenox) SQ  DIET: None      Disposition/Barriers to discharge: IV antibiotics, hypoxia  Code Status:Full Code    HPI:   Lizzie Viera is a 71 year old female with a past medical history of multiple myeloma and is undergoing chemotherapy treatment who presented to the ER for the evaluation of shortness of breath with fever.   Basically yesterday  patient started feeling a cough, sinus congestion, ear congestion, rhinorrhea, and right lung pain. Today, she started feeling congestion in her lungs and shortness of breath with a subjective fever. She denies being on O2 at home and is able to ambulate normally at baseline.  Of note that the patient last chemotherapy treatment was 4/6/2022 (6 days ago).  She had Covid infection in the past x2 but has not gotten vaccinated against COVID-19.  Patient denies COVID-19 contacts. Patient has social history of being a former smoker as of 7/2021.  Positive sick contact her  has pneumonia and now is currently on antibiotic.  Patient denies chest pain, bloody or black stool, dysuria, abdominal pain, vomiting, or any other complaints at this time.        Past Medical History:   Diagnosis Date     Cervical dysplasia      Chronic RUQ pain      Depressive disorder      Multiple myeloma (H)      Osteoporosis      Past Surgical History:   Procedure Laterality Date     CONIZATION CERVIX,KNIFE/LASER      Description: Cervical Conization By Laser;  Recorded: 09/20/2007;     HC DILATION/CURETTAGE DIAG/THER NON OB      Description: Dilation And Curettage;  Recorded: 09/20/2007;     HC REMOVE TONSILS/ADENOIDS,<11 Y/O      Description: Tonsillectomy With Adenoidectomy;  Recorded: 09/20/2007;     ZC LAP,CHOLECYSTECTOMY/EXPLORE  12/27/2004     [facility] LIGATE FALLOPIAN TUBE      Description: Tubal Ligation;  Recorded: 09/20/2007;     Family History   Problem Relation Age of Onset     Diabetes Mother      Arthritis Mother      LUNG DISEASE Father      Diabetes Maternal Uncle      Breast Cancer Paternal Aunt      Heart Failure Maternal Grandmother      Heart Disease Maternal Grandmother      Heart Failure Maternal Grandfather      Heart Disease Maternal Grandfather      Heart Failure Paternal Grandmother      Heart Disease Paternal Grandmother      Social History     Socioeconomic History     Marital status:      Spouse name: Not  on file     Number of children: 3     Years of education: Not on file     Highest education level: Not on file   Occupational History     Not on file   Tobacco Use     Smoking status: Former Smoker     Packs/day: 0.50     Years: 45.00     Pack years: 22.50     Types: Cigarettes     Smokeless tobacco: Never Used   Substance and Sexual Activity     Alcohol use: Yes     Comment: Alcoholic Drinks/day: 0-1 drinks per week     Drug use: Not Currently     Sexual activity: Not Currently     Partners: Male     Birth control/protection: Surgical   Other Topics Concern     Parent/sibling w/ CABG, MI or angioplasty before 65F 55M? Not Asked   Social History Narrative     Not on file     Social Determinants of Health     Financial Resource Strain: Not on file   Food Insecurity: Not on file   Transportation Needs: Not on file   Physical Activity: Not on file   Stress: Not on file   Social Connections: Not on file   Intimate Partner Violence: Not on file   Housing Stability: Not on file     Allergies   Allergen Reactions     Cefdinir Shortness Of Breath     Latex Shortness Of Breath     Short Ragweed Pollen Ext Cough     Adhesive Tape Rash       PRIOR TO ADMISSION MEDICATIONS   (Not in a hospital admission)       REVIEW OF SYSTEMS:  12 point reviewed pertinent negatives and positives in HPI all others negative     PHYSICAL EXAM  B/P:94/53 T:100.1 P:79 R: 36     Head:    Normocephalic, without obvious abnormality, atraumatic   Eyes:    PERRL, conjunctiva/corneas clear, EOM's intact,both eyes    Ears:    Normal external ear canals no drainage or erythema bilat.   Nose:   Nares normal by gross inspection,  mucosa normal, no drainage or sinus tenderness   Throat:   Lips, mucosa, and tongue normal; teeth and gums normal   Neck:   Supple, symmetrical, trachea midline, no adenopathy;        thyroid:  No enlargement/tenderness/nodules   Back:     Symmetric, no curvature, ROM normal, no CVA tenderness   Lungs:    Decreased breath sounds on  lung base with scattered rhonchi.  Expiratory wheeze but no rales.   Chest wall:    No tenderness or deformity   Heart:    Regular rate and rhythm, S1 and S2 normal, I/VI systolic murmur, no rubs, no JVD, no edema   Abdomen:     Soft, non-tender, bowel sounds active all four quadrants,     no masses, no hepatosplenomegaly   Musculoskeletal:   Extremities are warm and non-tender, atraumatic, no joint swelling or tenderness   Pulses:   2+ and symmetric all extremities   Skin:   Skin color, texture, turgor normal, no rashes or lesions on exposed areas, please see nursing assessment for full skin assessment   Neurologic:  Alert, awake and oriented x3.  No focal deficit.         PERTINENT LABS and RADIOLOGY   Recent Labs   Lab 04/12/22  1535 04/06/22  1115   WBC 7.7 5.2   HGB 14.8 14.2    103*    209   INR 1.11  --     141   POTASSIUM 3.8 4.3   CHLORIDE 99 107   CO2 24 24   BUN 21 16   CR 0.76 0.67   ANIONGAP 13 10   SERAFIN 9.2 9.3    94   ALBUMIN  --  3.5   PROTTOTAL  --  6.5   BILITOTAL  --  0.4   ALKPHOS  --  39*   ALT  --  45   AST  --  30     Recent Results (from the past 24 hour(s))   CT Chest Pulmonary Embolism w Contrast    Narrative    EXAM: CT CHEST PULMONARY EMBOLISM W CONTRAST  LOCATION: [facility]  DATE/TIME: 4/12/2022 5:46 PM    INDICATION: Shortness of breath, hypoxia  COMPARISON: 09/26/2021  TECHNIQUE: CT chest pulmonary angiogram during arterial phase injection of IV contrast. Multiplanar reformats and MIP reconstructions were performed. Dose reduction techniques were used.   CONTRAST: isovue 370  100ml    FINDINGS:  ANGIOGRAM CHEST: No evidence for pulmonary embolism. Pulmonary arteries normal in caliber. Thoracic aorta normal in caliber. No aortic dissection or other acute abnormality.    HEART: Cardiac chambers within normal limits. No pericardial effusion. Mild coronary artery calcification.    MEDIASTINUM: No adenopathy or mass.    LUNGS AND PLEURA:  Moderate bronchial wall thickening, mucous plugging, and scattered tree-in-bud densities peripherally. There are areas of developing atelectasis within the lingula and right middle lobe. Small focus of consolidation within the right   lower lobe laterally. No pleural effusion or pneumothorax.    LIMITED UPPER ABDOMEN: Stable left adrenal nodules. Right adrenal gland unremarkable.    MUSCULOSKELETAL: Numerous tiny lytic bone lesions within multiple vertebral bodies compatible with multiple myeloma. Stable severe vertebral compression deformity at T7, and moderate at T8. Sclerosis has developed along the lesions in the adjacent T6 and   T8 vertebral bodies. Stable lesion within the T1 vertebral body with mild associated loss of vertebral body height.        Impression    IMPRESSION:  1.  No evidence for pulmonary embolism.  2.  Severe bronchitis/bronchiolitis and mild right lower lobe pneumonia.     EKG: Sinus rhythm nonspecific T wave changes.    Discussed with patient, family and nursing staff     Claude Riojas MD  [facility] Internal Medicine Hospitalist  [phone]

## 2022-04-13 NOTE — CONSULTS
Care Management Initial Consult    General Information  Assessment completed with: Patient, Other (sister), pt and sister Cat  Type of CM/SW Visit: Initial Assessment    Primary Care Provider verified and updated as needed: Yes   Readmission within the last 30 days: no previous admission in last 30 days   Return Category: Progression of disease  Reason for Consult: discharge planning  Advance Care Planning: Advance Care Planning Reviewed: no concerns identified     General Information Comments: lives w/spouse and dtr, independent and would like to go home    Communication Assessment  Patient's communication style: spoken language (English or Bilingual)             Cognitive  Cognitive/Neuro/Behavioral:                        Living Environment:   People in home: spouse, child(jung), adult, grandchild(jnug)     Current living Arrangements: house      Able to return to prior arrangements: yes       Family/Social Support:  Care provided by: self, spouse/significant other, friend, child(jung), other (see comments) (sister)  Provides care for: no one  Marital Status:   , Children, Sibling(s)          Description of Support System: Supportive, Involved    Support Assessment: Adequate social supports, Adequate family and caregiver support    Current Resources:   Patient receiving home care services: No     Community Resources: DME  Equipment currently used at home: walker, rolling  Supplies currently used at home: None    Employment/Financial:  Employment Status:          Financial Concerns: No concerns identified   Referral to Financial Worker: No       Lifestyle & Psychosocial Needs:  Social Determinants of Health     Tobacco Use: Medium Risk     Smoking Tobacco Use: Former Smoker     Smokeless Tobacco Use: Never Used   Alcohol Use: Not on file   Financial Resource Strain: Not on file   Food Insecurity: Not on file   Transportation Needs: Not on file   Physical Activity: Not on file   Stress: Not on file    Social Connections: Not on file   Intimate Partner Violence: Not on file   Depression: Not at risk     PHQ-2 Score: 2   Housing Stability: Not on file       Functional Status:  Prior to admission patient needed assistance:   Dependent ADLs:: Ambulation-walker  Dependent IADLs:: Transportation, Shopping, Cleaning, Cooking, Laundry  Assesssment of Functional Status: Not at baseline with ADL Functioning, Not at baseline with mobility, Not at  functional baseline    Mental Health Status:  Mental Health Status: No Current Concerns       Chemical Dependency Status:                Values/Beliefs:  Spiritual, Cultural Beliefs, Mosque Practices, Values that affect care:                 Additional Information:  TRISTAN assessed, lives w/ and independent at baseline, uses walker. Currently getting Chemo Tx, appt is Wednesday. Sister Cat is present and will transport at discharge. Good family support at home.      Primitivo Ashley RN

## 2022-04-13 NOTE — CONSULTS
[facility] Pulmonary/Critical Care Consult Team Note    Lizzie Viera,  1950, MRN [id number]  Admitting Dx: Hypoxia [R09.02]  Pneumonia of right lower lobe due to infectious organism [J18.9]  Date / Time of Admission:  2022  3:12 PM    Recent Events:  Intake/Output last 3 shifts:  I/O last 3 completed shifts:  In: 842 [I.V.:842]  Out: -   Called to see pt due to increased FiO2 needs  She is comfortable laying flat in bed  She has a productive cough  She is feeling a little better then earlier today, the nebulizer helped    Assessment/Plan: Lizzie Viera is a 71 year old female with PMHx of Multiple myeloma: Has been on treatment with Revlimid Velcade and dexamethasone, heavy Hx of tobacco use, chronic pain due to CA, who presents with shortness of breath and cough with PNA and likely COPD exacerbation causing acute resp failure with hypoxia.    PULM/ID: Severe bronchitis/bronchiolitis and mild right lower lobe pneumonia causing acute resp failure with hypoxia  - FiO2 (%): 50 %  Resp: 22  - agree with Abx, Azithromycin, Vanc and Zosyn  - metaneb for pulmonary hygiene     Acute COPD exacerbation  - nebs q6hrs  - start prednisone 40mg x 3 days    Hx of tobacco use and likely COPD  - should at some point have PFTs and might benefit from inhalers    Critical Care Time excluding procedures and family discussions greater than: 45 Minutes    Risk Factors Present on Admission:  Clinically Significant Risk Factors Present on Admission             # Hypoalbuminemia: Albumin = 2.5 g/dL (Ref range: 3.5 - 5.0 g/dL) on admission, will monitor as appropriate    # Platelet Defect: home medication list includes an antiplatelet medication          Princess Carr DO  Pulmonary and Critical Care Attending  pgr [phone]    Allergies   Allergen Reactions     Cefdinir Shortness Of Breath     Latex Shortness Of Breath     Short Ragweed Pollen Ext Cough     Adhesive Tape Rash       Meds: See MAR    Physical  "Exam:  /63 (BP Location: Left leg, Patient Position: Supine)   Pulse 79   Temp 98.7  F (37.1  C) (Oral)   Resp 22   Ht 1.575 m (5' 2\")   Wt 54.8 kg (120 lb 12.8 oz)   SpO2 90%   BMI 22.09 kg/m    Intake/Output this shift:  No intake/output data recorded.  GEN: laying in bed, NAD  HEENT: HFNC in place  CVS: regular rhythm, no murmurs  RESP: Coarse rhonchi  ABD: Soft, No abdominal pain with palpation, no guarding, no rigidity  EXT: Warm, well perfused, no edema  NEURO:  Moving all extremities, nonfocal  PSYCH: pleasant     Pertinent Labs: Latest lab results in EHR personally reviewed.   CMP  Recent Labs   Lab 04/13/22  0827 04/13/22  0604 04/12/22  1535   NA  --  138 136   POTASSIUM 3.6 3.4* 3.8   CHLORIDE  --  107 99   CO2  --  24 24   ANIONGAP  --  7 13   GLC  --  117 120   BUN  --  15 21   CR  --  0.64 0.76   GFRESTIMATED  --  >90 84   SERAFIN  --  7.8* 9.2   MAG  --   --  1.9   PROTTOTAL  --  5.2*  --    ALBUMIN  --  2.5*  --    BILITOTAL  --  0.5  --    ALKPHOS  --  30*  --    AST  --  51*  --    ALT  --  34  --      CBC  Recent Labs   Lab 04/13/22  0604 04/12/22  1535   WBC 5.7 7.7   RBC 3.60* 4.39   HGB 12.0 14.8   HCT 36.3 43.7   * 100   MCH 33.3* 33.7*   MCHC 33.1 33.9   RDW 16.0* 16.1*   * 184     INR  Recent Labs   Lab 04/12/22  1535   INR 1.11     Arterial Blood Gas  Recent Labs   Lab 04/13/22  1019   PH 7.31*   PCO2 52*   PO2 110*   HCO3 24   O2PER 70       Cultures: personally reviewed.     Imaging: personally reviewed.   Results for orders placed or performed during the hospital encounter of 04/12/22   CT Chest Pulmonary Embolism w Contrast    Narrative    EXAM: CT CHEST PULMONARY EMBOLISM W CONTRAST  LOCATION: [facility]  DATE/TIME: 4/12/2022 5:46 PM    INDICATION: Shortness of breath, hypoxia  COMPARISON: 09/26/2021  TECHNIQUE: CT chest pulmonary angiogram during arterial phase injection of IV contrast. Multiplanar reformats and MIP reconstructions were " performed. Dose reduction techniques were used.   CONTRAST: isovue 370  100ml    FINDINGS:  ANGIOGRAM CHEST: No evidence for pulmonary embolism. Pulmonary arteries normal in caliber. Thoracic aorta normal in caliber. No aortic dissection or other acute abnormality.    HEART: Cardiac chambers within normal limits. No pericardial effusion. Mild coronary artery calcification.    MEDIASTINUM: No adenopathy or mass.    LUNGS AND PLEURA: Moderate bronchial wall thickening, mucous plugging, and scattered tree-in-bud densities peripherally. There are areas of developing atelectasis within the lingula and right middle lobe. Small focus of consolidation within the right   lower lobe laterally. No pleural effusion or pneumothorax.    LIMITED UPPER ABDOMEN: Stable left adrenal nodules. Right adrenal gland unremarkable.    MUSCULOSKELETAL: Numerous tiny lytic bone lesions within multiple vertebral bodies compatible with multiple myeloma. Stable severe vertebral compression deformity at T7, and moderate at T8. Sclerosis has developed along the lesions in the adjacent T6 and   T8 vertebral bodies. Stable lesion within the T1 vertebral body with mild associated loss of vertebral body height.        Impression    IMPRESSION:  1.  No evidence for pulmonary embolism.  2.  Severe bronchitis/bronchiolitis and mild right lower lobe pneumonia.   Echocardiogram Complete     Value    LVEF  60-65%    [facility]    [id number]  [id number]  [id number]  [id number]^[place] [address]     Name: LAQUITA WHALEN  MRN: [id number]  : 1950  Study Date: 2022 08:02 AM  Age: 71 yrs  Gender: Female  Patient Location: [facility]  Reason For Study: Dyspnea  Ordering Physician: GIUSEPPE MCLAIN  Performed By: AVERY     BSA: 1.5 m2  Height: 62 in  Weight: 120 lb  HR: 59  ______________________________________________________________________________  Procedure  Complete Portable Echo  Adult.  ______________________________________________________________________________  Interpretation Summary     The left ventricle is normal in size.  There is borderline concentric left ventricular hypertrophy.  Left ventricular systolic function is normal.  The visual ejection fraction is 60-65%.  No regional wall motion abnormalities noted.  No significant valve abnormality  ______________________________________________________________________________  I      WMSI = 1.00     % Normal = 100     X - Cannot   0 -                      (2) - Mildly 2 -          Segments  Size  Interpret    Hyperkinetic 1 - Normal  Hypokinetic  Hypokinetic  1-2     small                                                     7 -          3-5      moderate  3 - Akinetic 4 -          5 -         6 - Akinetic Dyskinetic   6-14    large               Dyskinetic   Aneurysmal  w/scar       w/scar       15-16   diffuse     Left Ventricle  The left ventricle is normal in size. There is borderline concentric left  ventricular hypertrophy. Left ventricular systolic function is normal.  Diastolic Doppler findings (E/E' ratio and/or other parameters) suggest left  ventricular filling pressures are indeterminate. Grade I or early diastolic  dysfunction. The visual ejection fraction is 60-65%. No regional wall motion  abnormalities noted.     Right Ventricle  Normal right ventricle size and systolic function. TAPSE is normal, which is  consistent with normal right ventricular systolic function.     Atria  Normal left atrial size. Right atrial size is normal.     Mitral Valve  Mitral valve leaflets appear normal. There is no evidence of mitral stenosis  or clinically significant mitral regurgitation. There is trace to mild mitral  regurgitation.     Tricuspid Valve  There is trace to mild tricuspid regurgitation.     Aortic Valve  The aortic valve is not well visualized. No hemodynamically significant  valvular aortic stenosis.     Pulmonic  Valve  The pulmonic valve is not well visualized.     Vessels  The aorta root is normal. IVC diameter and respiratory changes fall into an  intermediate range suggesting an RA pressure of 8 mmHg.     Pericardium  No significant pericardial effusion.     ______________________________________________________________________________  MMode/2D Measurements & Calculations  IVSd: 1.2 cm  LVIDd: 3.7 cm  LVIDs: 2.7 cm  LVPWd: 1.0 cm  FS: 28.8 %  LV mass(C)d: 131.1 grams  LV mass(C)dI: 85.2 grams/m2  Ao root diam: 2.9 cm  LA dimension: 2.8 cm     asc Aorta Diam: 2.5 cm  LA/Ao: 0.96  LVOT diam: 1.9 cm  LVOT area: 2.7 cm2  LA Volume Indexed (AL/bp): 25.5 ml/m2  RWT: 0.56     Doppler Measurements & Calculations  MV E max sohan: 88.6 cm/sec  MV A max sohan: 88.6 cm/sec  MV E/A: 1.00  MV dec slope: 473.2 cm/sec2  MV dec time: 0.19 sec  Ao V2 max: 154.7 cm/sec  Ao max PG: 10.0 mmHg  Ao V2 mean: 107.9 cm/sec  Ao mean P.2 mmHg  Ao V2 VTI: 33.1 cm  SAÚL(I,D): 2.3 cm2  SAÚL(V,D): 2.2 cm2  LV V1 max P.5 mmHg  LV V1 max: 127.9 cm/sec  LV V1 VTI: 28.5 cm     SV(LVOT): 77.6 ml  SI(LVOT): 50.4 ml/m2  PA acc time: 0.11 sec  AV Sohan Ratio (DI): 0.83  SAÚL Index (cm2/m2): 1.5  E/E' av.5  Lateral E/e': 13.4  Medial E/e': 13.6     ______________________________________________________________________________  Report approved by: Natalia Anne 2022 09:11 AM             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

## 2022-04-13 NOTE — PROVIDER NOTIFICATION
PROVIDER NOTIFICATION    Reason for communication: status update  Team member name: Sergei  Team member role: attending/consult  Method of Communication: page  Response: Dr. Braswell called to report current vital signs, decreased O2 needs, and to question need for ICU monitoring. He asked that I speak with intensivist.   Response time: 10 min      Dr. Carr assessed at bedside- pt will stay on m/s tele floor at this time. Nebs/antibiotics adjusted. Pt verbalizes understanding of plan of care.

## 2022-04-13 NOTE — PROGRESS NOTES
"PT is currently on HFNC 40L 50% with an SpO2 of 96%.  Breathing pattern slightly increased, dyspnea on exertion Breath sounds coarse Cough type congested , non-productive Sputum Type unknown  Duoneb nebulizer given as scheduled. MetaNeb x1 given over 10 minutes via mouthpiece. PT tolerated treatments well.  RT will continue to wean patient and follow.      BP (!) 144/65 (BP Location: Left leg)   Pulse 70   Temp 98.7  F (37.1  C) (Oral)   Resp 18   Ht 1.575 m (5' 2\")   Wt 54.8 kg (120 lb 12.8 oz)   SpO2 96%   BMI 22.09 kg/m      Juan Bey, RT  4/13/2022      "

## 2022-04-13 NOTE — PROGRESS NOTES
[facility] Inpatient Progress Note    Patient: Lizzie Viera  MRN: [id number]  Date of Service: April 13, 2022         Reason for Visit    Patient with a history of multiple myeloma, currently on treatment with Velcade dexamethasone and Revlimid  Admitted with hypoxia shortness of breath and pneumonia    Assessment and Plan    Cancer Staging  No matching staging information was found for the patient.    1.  Multiple myeloma: Has been on treatment with Revlimid Velcade and dexamethasone.  She is been getting the Velcade weekly, dexamethasone weekly and Revlimid 2 weeks on 1 week off.  She was due for her treatment today.  She is in the middle of her ninth cycle of this.  Her myeloma has responded very nicely and is currently almost in remission.  She is in the hospital we will hold all of her treatment.  We will initiate when she is out of the hospital and feeling better.    2.  Pneumonia with hypoxia and shortness of breath: This is being managed by the hospitalist and respiratory team.  She is getting nebulizers, antibiotics and oxygen.  She states that clinically she feels much better than she did yesterday.  She still needing a lot of oxygen.      ECOG Performance Status: 1      ______________________________________________________________________________    History of Present Illness    Ms. Lizzie Viera is a pleasant 71-year-old woman with multiple myeloma.  She has been coming to our clinic since September 2021.  She was started on treatment with Velcade, Revlimid and dexamethasone at that time.  She is been on treatment since then and has been tolerating it quite well.  Her multiple myeloma has responded quite nicely and she is in near remission.  He has been seen by the transplant team and that is currently on hold per patient's request.  Patient called our clinic yesterday with symptoms of worsening breathing issues and coughing.  She says her  was sick  "for about 3 weeks with it about a month ago.  She says it rapidly got worse so she came to the ER yesterday.  Patient was hypoxic, had low-grade fever of 100.1, she had coarse wet crackles bilaterally.  Was concern for sepsis.  Her labs did not show any acute findings.  CT scan did not show any PE but it did report severe bronchitis with lower pneumonia.  She was felt to be too sick to go home so she was admitted for IV antibiotics.  Patient states that she does feel better today than yesterday.  She still feels very tired and short of breath.  She is still on oxygen but she is starting to be on less oxygen than yesterday.  She denies any fevers and chills today.  She does feel warm.  She denies any new bone or back pain.  She does have chronic back pain from her myeloma.  She denies any nausea or vomiting.  She feels weak.    Review of Systems    ROS: As above in the history, otherwise the remainder of the 10point ROS is negative and not contributory to current reason for visit.       PHYSICAL EXAM:  /63 (BP Location: Left leg, Patient Position: Supine)   Pulse 79   Temp 98.7  F (37.1  C) (Oral)   Resp 22   Ht 1.575 m (5' 2\")   Wt 54.8 kg (120 lb 12.8 oz)   SpO2 90%   BMI 22.09 kg/m    GENERAL: no acute distress. Cooperative in conversation.  Resting in her hospital bed.  Respiratory therapist present.  On oxygen  HEENT: pupils are equal, round and reactive. Oromucosa is clean and intact. No ulcerations or mucositis noted. No bleeding noted.  RESP: lungs have crackles bilaterally per auscultation. Regular respiratory rate is fast  CV: Regular, rate and rhythm.   ABD: soft, nontender.   MUSCULOSKELETAL: No lower extremity swelling.   NEURO: non focal. Alert and oriented x3.   PSYCH: within normal limits. No depression or anxiety.  SKIN: warm dry intact   LYMPH: no cervical, supraclavicular lymphadenopathy          Lab Results    Admission on 04/12/2022   Component Date Value Ref Range Status     " Ventricular Rate 04/12/2022 82  BPM Final     Atrial Rate 04/12/2022 82  BPM Final     FL Interval 04/12/2022 176  ms Final     QRS Duration 04/12/2022 98  ms Final     QT 04/12/2022 430  ms Final     QTc 04/12/2022 502  ms Final     P Axis 04/12/2022 78  degrees Final     R AXIS 04/12/2022 84  degrees Final     T Axis 04/12/2022 49  degrees Final     Interpretation ECG 04/12/2022    Final                    Value: Poor data quality, interpretation may be adversely affected  Sinus rhythm with Premature atrial complexes  Possible Anterior infarct , age undetermined  Abnormal ECG  When compared with ECG of 09-SEP-2020 17:45,  Premature atrial complexes are now Present  Vent. rate has increased BY  27 BPM  QT has lengthened  Confirmed by SEE ED PROVIDER NOTE FOR, ECG INTERPRETATION ([id number]),  EMMA JHAVERI ([id number]) on 4/12/2022 10:28:53 PM       Sodium 04/12/2022 136  136 - 145 mmol/L Final     Potassium 04/12/2022 3.8  3.5 - 5.0 mmol/L Final     Chloride 04/12/2022 99  98 - 107 mmol/L Final     Carbon Dioxide (CO2) 04/12/2022 24  22 - 31 mmol/L Final     Anion Gap 04/12/2022 13  5 - 18 mmol/L Final     Urea Nitrogen 04/12/2022 21  8 - 22 mg/dL Final     Creatinine 04/12/2022 0.76  0.60 - 1.10 mg/dL Final     Calcium 04/12/2022 9.2  8.5 - 10.5 mg/dL Final     Glucose 04/12/2022 120  70 - 125 mg/dL Final     GFR Estimate 04/12/2022 84  >60 mL/min/1.73m2 Final    Effective December 21, 2021 eGFRcr in adults is calculated using the 2021 CKD-EPI creatinine equation which includes age and gender (Michele et al., NEJM, DOI: 10.1056/HOJNka2358054)     Troponin I 04/12/2022 0.07  0.00 - 0.29 ng/mL Final     BNP 04/12/2022 112  0 - 117 pg/mL Final     INR 04/12/2022 1.11  0.85 - 1.15 Final     aPTT 04/12/2022 30  22 - 38 Seconds Final     Influenza A PCR 04/12/2022 Negative  Negative Final     Influenza B PCR 04/12/2022 Negative  Negative Final     SARS CoV2 PCR 04/12/2022 Negative  Negative Final    NEGATIVE: SARS-CoV-2  (COVID-19) RNA not detected, presumed negative.     Lactic Acid 04/12/2022 1.6  0.7 - 2.0 mmol/L Final     Culture 04/12/2022 No growth after 12 hours   Preliminary     Culture 04/12/2022 No growth after 12 hours   Preliminary     CRP 04/12/2022 14.9 (A) 0.0 - 0.8 mg/dL Final     pH Venous 04/12/2022 7.36  7.35 - 7.45 Final     pCO2 Venous 04/12/2022 50  35 - 50 mm Hg Final     pO2 Venous 04/12/2022 24 (A) 25 - 47 mm Hg Final     Bicarbonate Venous 04/12/2022 25  24 - 30 mmol/L Final     Base Excess/Deficit (+/-) 04/12/2022 2.8    mmol/L Final     Oxyhemoglobin Venous 04/12/2022 39.2 (A) 70.0 - 75.0 % Final     O2 Sat, Venous 04/12/2022 40.2 (A) 70.0 - 75.0 % Final     WBC Count 04/12/2022 7.7  4.0 - 11.0 10e3/uL Final     RBC Count 04/12/2022 4.39  3.80 - 5.20 10e6/uL Final     Hemoglobin 04/12/2022 14.8  11.7 - 15.7 g/dL Final     Hematocrit 04/12/2022 43.7  35.0 - 47.0 % Final     MCV 04/12/2022 100  78 - 100 fL Final     MCH 04/12/2022 33.7 (A) 26.5 - 33.0 pg Final     MCHC 04/12/2022 33.9  31.5 - 36.5 g/dL Final     RDW 04/12/2022 16.1 (A) 10.0 - 15.0 % Final     Platelet Count 04/12/2022 184  150 - 450 10e3/uL Final     % Neutrophils 04/12/2022 87  % Final     % Lymphocytes 04/12/2022 5  % Final     % Monocytes 04/12/2022 7  % Final     % Eosinophils 04/12/2022 0  % Final     % Basophils 04/12/2022 1  % Final     % Immature Granulocytes 04/12/2022 0  % Final     NRBCs per 100 WBC 04/12/2022 0  <1 /100 Final     Absolute Neutrophils 04/12/2022 6.8  1.6 - 8.3 10e3/uL Final     Absolute Lymphocytes 04/12/2022 0.4 (A) 0.8 - 5.3 10e3/uL Final     Absolute Monocytes 04/12/2022 0.6  0.0 - 1.3 10e3/uL Final     Absolute Eosinophils 04/12/2022 0.0  0.0 - 0.7 10e3/uL Final     Absolute Basophils 04/12/2022 0.0  0.0 - 0.2 10e3/uL Final     Absolute Immature Granulocytes 04/12/2022 0.0  <=0.4 10e3/uL Final     Absolute NRBCs 04/12/2022 0.0  10e3/uL Final     Magnesium 04/12/2022 1.9  1.8 - 2.6 mg/dL Final     LVEF   04/13/2022 60-65%   Final     Sodium 04/13/2022 138  136 - 145 mmol/L Final     Potassium 04/13/2022 3.4 (A) 3.5 - 5.0 mmol/L Final     Chloride 04/13/2022 107  98 - 107 mmol/L Final     Carbon Dioxide (CO2) 04/13/2022 24  22 - 31 mmol/L Final     Anion Gap 04/13/2022 7  5 - 18 mmol/L Final     Urea Nitrogen 04/13/2022 15  8 - 22 mg/dL Final     Creatinine 04/13/2022 0.64  0.60 - 1.10 mg/dL Final     Calcium 04/13/2022 7.8 (A) 8.5 - 10.5 mg/dL Final     Glucose 04/13/2022 117  70 - 125 mg/dL Final     Alkaline Phosphatase 04/13/2022 30 (A) 45 - 120 U/L Final     AST 04/13/2022 51 (A) 0 - 40 U/L Final     ALT 04/13/2022 34  0 - 45 U/L Final     Protein Total 04/13/2022 5.2 (A) 6.0 - 8.0 g/dL Final     Albumin 04/13/2022 2.5 (A) 3.5 - 5.0 g/dL Final     Bilirubin Total 04/13/2022 0.5  0.0 - 1.0 mg/dL Final     GFR Estimate 04/13/2022 >90  >60 mL/min/1.73m2 Final    Effective December 21, 2021 eGFRcr in adults is calculated using the 2021 CKD-EPI creatinine equation which includes age and gender (Michele et al., [facility], DOI: 10.1056/YBKKcq5629330)     WBC Count 04/13/2022 5.7  4.0 - 11.0 10e3/uL Final     RBC Count 04/13/2022 3.60 (A) 3.80 - 5.20 10e6/uL Final     Hemoglobin 04/13/2022 12.0  11.7 - 15.7 g/dL Final     Hematocrit 04/13/2022 36.3  35.0 - 47.0 % Final     MCV 04/13/2022 101 (A) 78 - 100 fL Final     MCH 04/13/2022 33.3 (A) 26.5 - 33.0 pg Final     MCHC 04/13/2022 33.1  31.5 - 36.5 g/dL Final     RDW 04/13/2022 16.0 (A) 10.0 - 15.0 % Final     Platelet Count 04/13/2022 143 (A) 150 - 450 10e3/uL Final     Procalcitonin 04/13/2022 0.36  0.00 - 0.49 ng/mL Final    Comment: Normal Procalcitonin in healthy populations: <=0.07  ng/ml     Procalcitonin Interpretation Guidelines:   Diagnosis of systemic bacterial infection/sepsis/septic shock:       <0.5 ng/mL:           Low risk of sepsis; localized bacterial infection possible       >=0.5 to <=2.0 ng/mL: Sepsis possible. Interpret in context of the specific clinical  background and condition of the patient. Retest PCL within 6-24 hours.       >2.0 ng.mL:           High risk of sepsis and/or septic shock.         Antibiotic therapy may be discontinued if the current PCL is <=0.5 ng/mL or the Change in PCL (using highest PCL this admission and current PCL) is >80%. Do PCL testing on patients being treated with antibiotics for sepsis every 1-2 days.     Diagnosis of lower respiratory tract infection(LRTI) and decision making on antibiotic therapy:      <0.1 ng/ml:            Bacterial infection very unlikely. Antibiotic therapy strongly discouraged.      0.1 to 0.25 ng/mL :    Bacterial infection unlikely.                            Antibiotic therapy discouraged.      0.26 to 0.5 ng/ml:     Bacterial infection likely. Antibiotic therapy encouraged.      >0.5 ng/mL:            Bacterial infection very likely. Antibiotic therapy strongly encouraged.                   If antibiotics are not started for suspected LRTI, repeat PCL within 6-24 hours if symptoms persist or worsen.       Antibiotic therapy for LRTI may be discontinued if the PCL is <=0.25 ng/ml or the Change in PCL is >80%       Do PCL testing on patients being treated with antibiotics for LRTI every 1-2 days.     Decisions regarding antibiotic therapy should not be based solely on PCL concentrations. PCL results should be used in conjunction with the laboratory findings and clinical signs and be interpreted in the context of the patient's clinical situation.     Lactic Acid 04/13/2022 0.9  0.7 - 2.0 mmol/L Final     Potassium 04/13/2022 3.6  3.5 - 5.0 mmol/L Final     Hold Specimen 04/13/2022 [facility]   Final     pH Arterial 04/13/2022 7.31 (A) 7.37 - 7.44 Final     pCO2 Arterial 04/13/2022 52 (A) 35 - 45 mm Hg Final     pO2 Arterial 04/13/2022 110 (A) 75 - 85 mm Hg Final     Bicarbonate Arterial 04/13/2022 24  23 - 29 mmol/L Final     O2 Sat, Arterial 04/13/2022 95.7  95.0 - 96.0 % Final     Oxyhemoglobin 04/13/2022 95.6   95.0 - 96.0 % Final     Base Excess/Deficit (+/-) 2022 -0.1    mmol/L Final     Ventilation Mode 2022 High Flow Nasal Cannula   Final     FIO2 2022 70   Final     Flow 2022 40.0  LPM Final     Sample Stabilized Temperature 2022 37.0  degrees C Final     ]    Imaging    Echocardiogram Complete    Result Date: 2022  [id number]^CHEMO^GIUSEPPE^[place] [address]  Name: LAQUITA WHALEN MRN: [id number] : 1950 Study Date: 2022 08:02 AM Age: 71 yrs Gender: Female Patient Location: [facility] Reason For Study: Dyspnea Ordering Physician: GIUSEPPE MCLAIN Performed By: AVERY  BSA: 1.5 m2 Height: 62 in Weight: 120 lb HR: 59 ______________________________________________________________________________ Procedure Complete Portable Echo Adult. ______________________________________________________________________________ Interpretation Summary  The left ventricle is normal in size. There is borderline concentric left ventricular hypertrophy. Left ventricular systolic function is normal. The visual ejection fraction is 60-65%. No regional wall motion abnormalities noted. No significant valve abnormality ______________________________________________________________________________ I      WMSI = 1.00     % Normal = 100  X - Cannot   0 -                      (2) - Mildly 2 -          Segments  Size Interpret    Hyperkinetic 1 - Normal  Hypokinetic  Hypokinetic  1-2     small                                                    7 -          3-5    moderate 3 - Akinetic 4 -          5 -         6 - Akinetic Dyskinetic   6-14    large              Dyskinetic   Aneurysmal  w/scar       w/scar       15-16   diffuse  Left Ventricle The left ventricle is normal in size. There is borderline concentric left ventricular hypertrophy. Left ventricular systolic function is normal. Diastolic Doppler findings (E/E' ratio and/or other  parameters) suggest left ventricular filling pressures are indeterminate. Grade I or early diastolic dysfunction. The visual ejection fraction is 60-65%. No regional wall motion abnormalities noted.  Right Ventricle Normal right ventricle size and systolic function. TAPSE is normal, which is consistent with normal right ventricular systolic function.  Atria Normal left atrial size. Right atrial size is normal.  Mitral Valve Mitral valve leaflets appear normal. There is no evidence of mitral stenosis or clinically significant mitral regurgitation. There is trace to mild mitral regurgitation.  Tricuspid Valve There is trace to mild tricuspid regurgitation.  Aortic Valve The aortic valve is not well visualized. No hemodynamically significant valvular aortic stenosis.  Pulmonic Valve The pulmonic valve is not well visualized.  Vessels The aorta root is normal. IVC diameter and respiratory changes fall into an intermediate range suggesting an RA pressure of 8 mmHg.  Pericardium No significant pericardial effusion.  ______________________________________________________________________________ MMode/2D Measurements & Calculations IVSd: 1.2 cm LVIDd: 3.7 cm LVIDs: 2.7 cm LVPWd: 1.0 cm FS: 28.8 % LV mass(C)d: 131.1 grams LV mass(C)dI: 85.2 grams/m2 Ao root diam: 2.9 cm LA dimension: 2.8 cm  asc Aorta Diam: 2.5 cm LA/Ao: 0.96 LVOT diam: 1.9 cm LVOT area: 2.7 cm2 LA Volume Indexed (AL/bp): 25.5 ml/m2 RWT: 0.56  Doppler Measurements & Calculations MV E max sohan: 88.6 cm/sec MV A max sohan: 88.6 cm/sec MV E/A: 1.00 MV dec slope: 473.2 cm/sec2 MV dec time: 0.19 sec Ao V2 max: 154.7 cm/sec Ao max PG: 10.0 mmHg Ao V2 mean: 107.9 cm/sec Ao mean P.2 mmHg Ao V2 VTI: 33.1 cm SAÚL(I,D): 2.3 cm2 SAÚL(V,D): 2.2 cm2 LV V1 max P.5 mmHg LV V1 max: 127.9 cm/sec LV V1 VTI: 28.5 cm  SV(LVOT): 77.6 ml SI(LVOT): 50.4 ml/m2 PA acc time: 0.11 sec AV Sohan Ratio (DI): 0.83 SAÚL Index (cm2/m2): 1.5 E/E' av.5 Lateral E/e': 13.4 Medial E/e': 13.6   ______________________________________________________________________________ Report approved by: Natalia Anne 04/13/2022 09:11 AM       CT Chest Pulmonary Embolism w Contrast    Result Date: 4/12/2022  EXAM: CT CHEST PULMONARY EMBOLISM W CONTRAST LOCATION: [facility] DATE/TIME: 4/12/2022 5:46 PM INDICATION: Shortness of breath, hypoxia COMPARISON: 09/26/2021 TECHNIQUE: CT chest pulmonary angiogram during arterial phase injection of IV contrast. Multiplanar reformats and MIP reconstructions were performed. Dose reduction techniques were used. CONTRAST: isovue 370  100ml FINDINGS: ANGIOGRAM CHEST: No evidence for pulmonary embolism. Pulmonary arteries normal in caliber. Thoracic aorta normal in caliber. No aortic dissection or other acute abnormality. HEART: Cardiac chambers within normal limits. No pericardial effusion. Mild coronary artery calcification. MEDIASTINUM: No adenopathy or mass. LUNGS AND PLEURA: Moderate bronchial wall thickening, mucous plugging, and scattered tree-in-bud densities peripherally. There are areas of developing atelectasis within the lingula and right middle lobe. Small focus of consolidation within the right lower lobe laterally. No pleural effusion or pneumothorax. LIMITED UPPER ABDOMEN: Stable left adrenal nodules. Right adrenal gland unremarkable. MUSCULOSKELETAL: Numerous tiny lytic bone lesions within multiple vertebral bodies compatible with multiple myeloma. Stable severe vertebral compression deformity at T7, and moderate at T8. Sclerosis has developed along the lesions in the adjacent T6 and  T8 vertebral bodies. Stable lesion within the T1 vertebral body with mild associated loss of vertebral body height.     IMPRESSION: 1.  No evidence for pulmonary embolism. 2.  Severe bronchitis/bronchiolitis and mild right lower lobe pneumonia.       Signed by: SINDHU Lundberg CNP

## 2022-04-13 NOTE — PLAN OF CARE
Goal Outcome Evaluation:    Plan of Care Reviewed With: patient     Overall Patient Progress: improving    Outcome Evaluation: see prev note for respitatory status update. Currently on 40L/50 % maintaining saturations >90%.

## 2022-04-13 NOTE — PROGRESS NOTES
Progress Note        Assessment/Plan     71 year old female with a past medical history of multiple myeloma and is undergoing chemotherapy treatment who presented to the ER for the evaluation of shortness of breath with fever.  She was admitted with,     Acute respiratory failure with hypoxia  - secondary to pneumonia  - CT chest is negative for PE but shows severe bronchitis/bronchiolitis/and mild right lower lobe pneumonia.  Worsening o2 needs this am - needing 50 L/min   Intensivist consulted, started on steroids/nebs  for copd exacerbation,  Monitor closely on floors  - icu transfer if worsening       CAP   - Negative Covid test  - Unremarkable lactic acid  - Patient is immunocompromised because of multiple myeloma and chemotherapy  -Broad-spectrum antibiotic with vancomycin and Zosyn/zithromax  - Follow-up on blood culture  - Check sputum culture     History of multiple myeloma  - last chemo was 4/6/2022  - Hold chemotherapy because of pneumonia  - Oncology consulted     Hypotension - better   - secondary to volume depletion versus sepsis  - Continue IV fluid support  - Broad-spectrum antibiotics , blood cx ngtd   - echo with lvef 60-65%      Chronic pain syndrome  - Secondary to cancer  - Resume home medications     Weakness and deconditioning  - PT/OT evaluation when medically stable     COVID STATUS: Negative date: 4/12/22     VTE prophylaxis:  Enoxaparin (Lovenox) SQ      Subjective  Has increasing sob this am , also with chills but no fever , no cp     Objective  Vital signs in last 24 hours  Temp:  [98.3  F (36.8  C)-100.1  F (37.8  C)] 98.7  F (37.1  C)  Pulse:  [63-91] 79  Resp:  [18-39] 22  BP: ()/(49-67) 138/63  FiO2 (%):  [50 %-80 %] 50 %  SpO2:  [75 %-97 %] 90 %    Input and Output in 24 hrs     Intake/Output Summary (Last 24 hours) at 4/13/2022 1422  Last data filed at 4/13/2022 0645  Gross per 24 hour   Intake 842 ml   Output --   Net 842 ml       GEN: Alert and oriented. Not in acute  distress  HEENT: Atraumatic    Pupils- round and reactive to light bilaterally   Neck- supple, no JVP elevation, no lymphadenopathy or thyromegaly   Sclera- anicteric   Mucous membrane- moist and pink  CHEST: reduced at the base   HEART: S1S2 regular. No murmurs, rubs or gallops  ABDOMEN: Soft. Non-tender, non-distended. No organomegaly. No guarding or rigidity. Bowel sounds- active  Extremities: No pedal oedema  CNS: No focal neurological deficit. No involuntary movements  SKIN: No skin rash, no cyanosis or clubbing      Pertinent Labs   Reviewed         EKG Results reviewed       Advanced Care Planning      Amrit Braswell MD MFedericoD

## 2022-04-13 NOTE — PHARMACY-VANCOMYCIN DOSING SERVICE
"Pharmacy Vancomycin Initial Note  Date of Service 2022  Patient's  1950  71 year old, female    Indication: Sepsis    Current estimated CrCl = Estimated Creatinine Clearance: 63.1 mL/min (based on SCr of 0.76 mg/dL).    Creatinine for last 3 days  2022:  3:35 PM Creatinine 0.76 mg/dL    Recent Vancomycin Level(s) for last 3 days  No results found for requested labs within last 72 hours.      Vancomycin IV Administrations (past 72 hours)                   vancomycin (VANCOCIN) 1000 mg in dextrose 5% 200 mL PREMIX (mg) 1,000 mg New Bag 22 1926                Nephrotoxins and other renal medications (From now, onward)    Start     Dose/Rate Route Frequency Ordered Stop    22 0800  vancomycin (VANCOCIN) 750 mg in sodium chloride 0.9 % 250 mL intermittent infusion         750 mg  over 60-90 Minutes Intravenous EVERY 12 HOURS 22 0100  piperacillin-tazobactam (ZOSYN) 3.375 g vial to attach to  mL bag        Note to Pharmacy: For [facility], [facility] and [facility]: For Zosyn-naive patients, use the \"Zosyn initial dose + extended infusion\" order panel.    3.375 g  over 240 Minutes Intravenous EVERY 8 HOURS 22 220  acyclovir (ZOVIRAX) tablet 400 mg         400 mg Oral 2 TIMES DAILY 22            Contrast Orders - past 72 hours (72h ago, onward)            None          [employer] Prediction of Planned Initial Vancomycin Regimen  Loading dose: 1000 mg at 19:26 2022.  Regimen: 750 mg IV every 12 hours.  Start time: 08:00 on 2022  Exposure target: AUC24 (range)400-600 mg/L.hr   AUC24,ss: 541 mg/L.hr  Probability of AUC24 > 400: 81 %  Ctrough,ss: 17.6 mg/L  Probability of Ctrough,ss > 20: 38 %  Probability of nephrotoxicity (Lodise PRAVEEN ): 13 %        Plan:  1. Start vancomycin  750 mg IV q12h.   2. Vancomycin monitoring method: AUC  3. Vancomycin therapeutic monitoring goal: 400-600 mg*h/L  4. Pharmacy will check vancomycin levels " as appropriate in 1-3 Days.      Sapphire Tinajero, [facility]

## 2022-04-13 NOTE — SIGNIFICANT EVENT
Full note to follow     Significant Event Note    Time of event: 10:06 AM April 13, 2022    Description of event:  Increasing o2 needs with hypotension  Case d/w intensivist , formal consult placed   They will see and provide recs     Plan:  above    Discussed with: icu team    Amrit Braswell MD

## 2022-04-13 NOTE — PROGRESS NOTES
Respiratory Care Note     Placed on oygen high flow nasal canula 40 lpm and 80% per provider plan. Patient tolerating well. Will continue to monitor.

## 2022-04-14 ENCOUNTER — APPOINTMENT (OUTPATIENT)
Dept: OCCUPATIONAL THERAPY | Facility: HOSPITAL | Age: 72
DRG: 193 | End: 2022-04-14
Attending: INTERNAL MEDICINE
Payer: COMMERCIAL

## 2022-04-14 LAB
ANION GAP SERPL CALCULATED.3IONS-SCNC: 12 MMOL/L (ref 5–18)
BASOPHILS # BLD MANUAL: 0.1 10E3/UL (ref 0–0.2)
BASOPHILS NFR BLD MANUAL: 1 %
BUN SERPL-MCNC: 16 MG/DL (ref 8–22)
BURR CELLS BLD QL SMEAR: SLIGHT
CALCIUM SERPL-MCNC: 7.4 MG/DL (ref 8.5–10.5)
CHLORIDE BLD-SCNC: 112 MMOL/L (ref 98–107)
CO2 SERPL-SCNC: 17 MMOL/L (ref 22–31)
CREAT SERPL-MCNC: 0.55 MG/DL (ref 0.6–1.1)
EOSINOPHIL # BLD MANUAL: 0.1 10E3/UL (ref 0–0.7)
EOSINOPHIL NFR BLD MANUAL: 1 %
ERYTHROCYTE [DISTWIDTH] IN BLOOD BY AUTOMATED COUNT: 16.3 % (ref 10–15)
GFR SERPL CREATININE-BSD FRML MDRD: >90 ML/MIN/1.73M2
GLUCOSE BLD-MCNC: 50 MG/DL (ref 70–125)
GLUCOSE BLDC GLUCOMTR-MCNC: 169 MG/DL (ref 70–99)
GLUCOSE BLDC GLUCOMTR-MCNC: 182 MG/DL (ref 70–99)
GLUCOSE BLDC GLUCOMTR-MCNC: 46 MG/DL (ref 70–99)
GLUCOSE BLDC GLUCOMTR-MCNC: 47 MG/DL (ref 70–99)
GLUCOSE BLDC GLUCOMTR-MCNC: 53 MG/DL (ref 70–99)
GLUCOSE BLDC GLUCOMTR-MCNC: 77 MG/DL (ref 70–99)
GLUCOSE BLDC GLUCOMTR-MCNC: 81 MG/DL (ref 70–99)
HCT VFR BLD AUTO: 35.3 % (ref 35–47)
HGB BLD-MCNC: 11.1 G/DL (ref 11.7–15.7)
LYMPHOCYTES # BLD MANUAL: 0.5 10E3/UL (ref 0.8–5.3)
LYMPHOCYTES NFR BLD MANUAL: 9 %
MCH RBC QN AUTO: 33.2 PG (ref 26.5–33)
MCHC RBC AUTO-ENTMCNC: 31.4 G/DL (ref 31.5–36.5)
MCV RBC AUTO: 106 FL (ref 78–100)
MONOCYTES # BLD MANUAL: 0.4 10E3/UL (ref 0–1.3)
MONOCYTES NFR BLD MANUAL: 7 %
NEUTROPHILS # BLD MANUAL: 4.6 10E3/UL (ref 1.6–8.3)
NEUTROPHILS NFR BLD MANUAL: 82 %
PLAT MORPH BLD: ABNORMAL
PLATELET # BLD AUTO: 150 10E3/UL (ref 150–450)
POTASSIUM BLD-SCNC: 3.8 MMOL/L (ref 3.5–5)
RBC # BLD AUTO: 3.34 10E6/UL (ref 3.8–5.2)
RBC MORPH BLD: ABNORMAL
SODIUM SERPL-SCNC: 141 MMOL/L (ref 136–145)
VANCOMYCIN SERPL-MCNC: 9.6 MG/L
WBC # BLD AUTO: 5.6 10E3/UL (ref 4–11)

## 2022-04-14 PROCEDURE — 250N000011 HC RX IP 250 OP 636: Performed by: INTERNAL MEDICINE

## 2022-04-14 PROCEDURE — 250N000009 HC RX 250: Performed by: INTERNAL MEDICINE

## 2022-04-14 PROCEDURE — 99232 SBSQ HOSP IP/OBS MODERATE 35: CPT | Performed by: INTERNAL MEDICINE

## 2022-04-14 PROCEDURE — 999N000215 HC STATISTIC HFNC ADULT NON-CPAP

## 2022-04-14 PROCEDURE — 36415 COLL VENOUS BLD VENIPUNCTURE: CPT | Performed by: INTERNAL MEDICINE

## 2022-04-14 PROCEDURE — 94660 CPAP INITIATION&MGMT: CPT

## 2022-04-14 PROCEDURE — 94799 UNLISTED PULMONARY SVC/PX: CPT

## 2022-04-14 PROCEDURE — 999N000157 HC STATISTIC RCP TIME EA 10 MIN

## 2022-04-14 PROCEDURE — 258N000003 HC RX IP 258 OP 636: Performed by: INTERNAL MEDICINE

## 2022-04-14 PROCEDURE — 80202 ASSAY OF VANCOMYCIN: CPT | Performed by: INTERNAL MEDICINE

## 2022-04-14 PROCEDURE — 258N000001 HC RX 258: Performed by: INTERNAL MEDICINE

## 2022-04-14 PROCEDURE — 258N000003 HC RX IP 258 OP 636: Performed by: EMERGENCY MEDICINE

## 2022-04-14 PROCEDURE — 94640 AIRWAY INHALATION TREATMENT: CPT | Mod: 76

## 2022-04-14 PROCEDURE — 999N000078 HC STATISTIC INTRAPULMONARY PERCUSSIVE VENT

## 2022-04-14 PROCEDURE — 120N000001 HC R&B MED SURG/OB

## 2022-04-14 PROCEDURE — 97166 OT EVAL MOD COMPLEX 45 MIN: CPT | Mod: GO

## 2022-04-14 PROCEDURE — 80048 BASIC METABOLIC PNL TOTAL CA: CPT | Performed by: INTERNAL MEDICINE

## 2022-04-14 PROCEDURE — 250N000013 HC RX MED GY IP 250 OP 250 PS 637: Performed by: INTERNAL MEDICINE

## 2022-04-14 PROCEDURE — 99233 SBSQ HOSP IP/OBS HIGH 50: CPT | Performed by: NURSE PRACTITIONER

## 2022-04-14 PROCEDURE — 97535 SELF CARE MNGMENT TRAINING: CPT | Mod: GO

## 2022-04-14 PROCEDURE — 250N000012 HC RX MED GY IP 250 OP 636 PS 637: Performed by: NURSE PRACTITIONER

## 2022-04-14 PROCEDURE — 85027 COMPLETE CBC AUTOMATED: CPT | Performed by: INTERNAL MEDICINE

## 2022-04-14 RX ORDER — PREDNISONE 20 MG/1
40 TABLET ORAL DAILY
Status: COMPLETED | OUTPATIENT
Start: 2022-04-14 | End: 2022-04-18

## 2022-04-14 RX ORDER — PREDNISONE 20 MG/1
40 TABLET ORAL DAILY
Status: DISCONTINUED | OUTPATIENT
Start: 2022-04-14 | End: 2022-04-14

## 2022-04-14 RX ADMIN — ACYCLOVIR 400 MG: 400 TABLET ORAL at 20:43

## 2022-04-14 RX ADMIN — VASOPRESSIN: 20 INJECTION, SOLUTION INTRAVENOUS at 10:35

## 2022-04-14 RX ADMIN — VANCOMYCIN HYDROCHLORIDE 750 MG: 1 INJECTION, POWDER, LYOPHILIZED, FOR SOLUTION INTRAVENOUS at 07:42

## 2022-04-14 RX ADMIN — GABAPENTIN 600 MG: 300 CAPSULE ORAL at 07:42

## 2022-04-14 RX ADMIN — IPRATROPIUM BROMIDE AND ALBUTEROL SULFATE 3 ML: 2.5; .5 SOLUTION RESPIRATORY (INHALATION) at 20:08

## 2022-04-14 RX ADMIN — ASPIRIN 81 MG CHEWABLE TABLET 81 MG: 81 TABLET CHEWABLE at 07:42

## 2022-04-14 RX ADMIN — VANCOMYCIN HYDROCHLORIDE 750 MG: 1 INJECTION, POWDER, LYOPHILIZED, FOR SOLUTION INTRAVENOUS at 21:03

## 2022-04-14 RX ADMIN — METHOCARBAMOL 500 MG: 500 TABLET, FILM COATED ORAL at 11:14

## 2022-04-14 RX ADMIN — HYDROMORPHONE HYDROCHLORIDE 2 MG: 2 TABLET ORAL at 15:25

## 2022-04-14 RX ADMIN — Medication 20 MG: at 21:00

## 2022-04-14 RX ADMIN — ENOXAPARIN SODIUM 40 MG: 40 INJECTION SUBCUTANEOUS at 21:02

## 2022-04-14 RX ADMIN — SODIUM CHLORIDE: 9 INJECTION, SOLUTION INTRAVENOUS at 06:53

## 2022-04-14 RX ADMIN — PIPERACILLIN AND TAZOBACTAM 3.38 G: 3; .375 INJECTION, POWDER, LYOPHILIZED, FOR SOLUTION INTRAVENOUS at 17:43

## 2022-04-14 RX ADMIN — GABAPENTIN 600 MG: 300 CAPSULE ORAL at 20:58

## 2022-04-14 RX ADMIN — PHENAZOPYRIDINE HYDROCHLORIDE 200 MG: 100 TABLET ORAL at 07:42

## 2022-04-14 RX ADMIN — PHENAZOPYRIDINE HYDROCHLORIDE 200 MG: 100 TABLET ORAL at 20:41

## 2022-04-14 RX ADMIN — AZITHROMYCIN MONOHYDRATE 500 MG: 500 INJECTION, POWDER, LYOPHILIZED, FOR SOLUTION INTRAVENOUS at 11:14

## 2022-04-14 RX ADMIN — GABAPENTIN 600 MG: 300 CAPSULE ORAL at 14:37

## 2022-04-14 RX ADMIN — IPRATROPIUM BROMIDE AND ALBUTEROL SULFATE 3 ML: 2.5; .5 SOLUTION RESPIRATORY (INHALATION) at 11:28

## 2022-04-14 RX ADMIN — PIPERACILLIN AND TAZOBACTAM 3.38 G: 3; .375 INJECTION, POWDER, LYOPHILIZED, FOR SOLUTION INTRAVENOUS at 08:20

## 2022-04-14 RX ADMIN — PIPERACILLIN AND TAZOBACTAM 3.38 G: 3; .375 INJECTION, POWDER, LYOPHILIZED, FOR SOLUTION INTRAVENOUS at 00:58

## 2022-04-14 RX ADMIN — PHENAZOPYRIDINE HYDROCHLORIDE 200 MG: 100 TABLET ORAL at 14:37

## 2022-04-14 RX ADMIN — ALBUTEROL SULFATE 2.5 MG: 2.5 SOLUTION RESPIRATORY (INHALATION) at 05:27

## 2022-04-14 RX ADMIN — PREDNISONE 40 MG: 20 TABLET ORAL at 12:22

## 2022-04-14 RX ADMIN — IPRATROPIUM BROMIDE AND ALBUTEROL SULFATE 3 ML: 2.5; .5 SOLUTION RESPIRATORY (INHALATION) at 07:01

## 2022-04-14 RX ADMIN — METHOCARBAMOL 500 MG: 500 TABLET, FILM COATED ORAL at 20:59

## 2022-04-14 RX ADMIN — IPRATROPIUM BROMIDE AND ALBUTEROL SULFATE 3 ML: 2.5; .5 SOLUTION RESPIRATORY (INHALATION) at 16:33

## 2022-04-14 RX ADMIN — ACYCLOVIR 400 MG: 400 TABLET ORAL at 07:42

## 2022-04-14 ASSESSMENT — ACTIVITIES OF DAILY LIVING (ADL)
ADLS_ACUITY_SCORE: 10
PREVIOUS_RESPONSIBILITIES: MEAL PREP;HOUSEKEEPING;LAUNDRY;MEDICATION MANAGEMENT;FINANCES
ADLS_ACUITY_SCORE: 10

## 2022-04-14 NOTE — CONFIDENTIAL NOTE
Valeria was scheduled to meet with me today for an individual psychotherapy visit.  She currently is hospitalized in room 229 at [facility].  We were able to meet on the phone.  She states that she is doing much better and currently is off her oxygen.  She hopes to be discharged home by Wednesday, 4/20/2022.    Valeria expressed some concerns that her DNR/DNI status are not properly documented on her chart and she has completed the paperwork for advanced directive, which has been scanned in her chart.  She also requested to meet with JOSH Lopez from palliative care.  I sent a message out to Belle.    Valeria will continue to meet with me every 2 weeks and our next visit is scheduled for 5/2/2022.    Mary Ellen Chan MA, LP, LICSW

## 2022-04-14 NOTE — PLAN OF CARE
Problem: Pain Acute  Goal: Acceptable Pain Control and Functional Ability  Outcome: Ongoing, Progressing  Intervention: Prevent or Manage Pain  Recent Flowsheet Documentation  Taken 4/14/2022 0508 by Demetra Castanon, CONRADO  Medication Review/Management: medications reviewed  Taken 4/14/2022 0043 by Demetra Castanon, CONRADO  Medication Review/Management: medications reviewed  Taken 4/13/2022 2117 by Demetra Castanon, CONRADO  Medication Review/Management: medications reviewed    Problem: Gas Exchange Impaired  Goal: Optimal Gas Exchange  4/14/2022 0513 by Demetra Castanon, CONRADO  Outcome: Ongoing, Progressing  4/14/2022 0046 by Demetra Castanon RN  Outcome: Ongoing, Progressing    Goal Outcome Evaluation: Pt woke up around 500 feeling SOB. On high flow nasal canula. FiO2 turned up to 58 with 40L. RT notified and Neb was given. Stating 95%. Pt c/o pain on lower back. Rate 4/10. Refused any medication at the moment. Voided 400ml. Sputum Culture pending.

## 2022-04-14 NOTE — PHARMACY-VANCOMYCIN DOSING SERVICE
"Pharmacy Vancomycin Note  Date of Service 2022  Patient's  1950   71 year old, female    Indication: Healthcare-Associated Pneumonia  Day of Therapy: 3  Current vancomycin regimen:  1000 mg IV q12h  Current vancomycin monitoring method: AUC  Current vancomycin therapeutic monitoring goal: 400-600 mg*h/L    InsightRX Prediction of Current Vancomycin Regimen  Loading dose: N/A  Regimen: 1000 mg IV every 12 hours.  Start time: 09:16 on 2022  Exposure target: AUC24 (range)400-600 mg/L.hr   AUC24,ss: 498 mg/L.hr  Probability of AUC24 > 400: 85 %  Ctrough,ss: 14.0 mg/L  Probability of Ctrough,ss > 20: 14 %  Probability of nephrotoxicity (Lodise PRAVEEN ): 9 %    Current estimated CrCl = Estimated Creatinine Clearance: 71.8 mL/min (based on SCr of 0.64 mg/dL).    Creatinine for last 3 days  2022:  3:35 PM Creatinine 0.76 mg/dL  2022:  6:04 AM Creatinine 0.64 mg/dL    Recent Vancomycin Levels (past 3 days)  2022:  6:40 AM Vancomycin 9.6 mg/L    Vancomycin IV Administrations (past 72 hours)                   vancomycin (VANCOCIN) 750 mg in sodium chloride 0.9 % 250 mL intermittent infusion (mg) 750 mg New Bag 22     750 mg New Bag  0841    vancomycin (VANCOCIN) 1000 mg in dextrose 5% 200 mL PREMIX (mg) 1,000 mg New Bag 22 1926                Nephrotoxins and other renal medications (From now, onward)    Start     Dose/Rate Route Frequency Ordered Stop    22 0800  vancomycin (VANCOCIN) 750 mg in sodium chloride 0.9 % 250 mL intermittent infusion         750 mg  over 60-90 Minutes Intravenous EVERY 12 HOURS 22 0100  piperacillin-tazobactam (ZOSYN) 3.375 g vial to attach to  mL bag        Note to Pharmacy: For [facility], [facility] and [facility]: For Zosyn-naive patients, use the \"Zosyn initial dose + extended infusion\" order panel.    3.375 g  over 240 Minutes Intravenous EVERY 8 HOURS 22 2200  acyclovir (ZOVIRAX) tablet 400 mg   "       400 mg Oral 2 TIMES DAILY 04/12/22 1930               Contrast Orders - past 72 hours (72h ago, onward)            None          Interpretation of levels and current regimen:  Vancomycin level is reflective of -600    Has serum creatinine changed greater than 50% in last 72 hours: No    Urine output:  unable to determine    Renal Function: Stable    InsightRX Prediction of Planned New Vancomycin Regimen  Loading dose: N/A  Regimen: 1000 mg IV every 12 hours.  Start time: 09:16 on 04/14/2022  Exposure target: AUC24 (range)400-600 mg/L.hr   AUC24,ss: 498 mg/L.hr  Probability of AUC24 > 400: 85 %  Ctrough,ss: 14.0 mg/L  Probability of Ctrough,ss > 20: 14 %  Probability of nephrotoxicity (Lodise PRAVEEN 2009): 9 %    Plan:  1. Continue Current Dose  2. Vancomycin monitoring method: AUC  3. Vancomycin therapeutic monitoring goal: 400-600 mg*h/L  4. Pharmacy will check vancomycin levels as appropriate in 3-5 Days.  5. Serum creatinine levels will be ordered a minimum of twice weekly.    Roberta Jeffries, [facility]

## 2022-04-14 NOTE — PROGRESS NOTES
Pt. Seen for scheduled Duonebs and MetaNeb therapies. Pt. Performs and tolerates well. Pt. Remains on High Flow Nasal Cannula via AirVo Unit. Settings: 40 LPM. FiO2 of 30% Lung sounds have coarse crackles through out. Cough is strong loose and non-productive. RT to follow.

## 2022-04-14 NOTE — PROGRESS NOTES
Patient is seen for a neb treatment and bronchial hygiene. Shallow breathing and productive noted. Duoneb given. BS coarse pre/post treatment. Pt tolerated Metaneb. Deep breath and coughing encouraged. RT following.    Vasquez Lazo, RT

## 2022-04-14 NOTE — PROGRESS NOTES
Occupational therapy     04/14/22 1050   Quick Adds   Type of Visit Initial Occupational Therapy Evaluation   Living Environment   People in Home spouse;child(jung), adult;grandchild(jung)   Current Living Arrangements house   Home Accessibility stairs to enter home   Number of Stairs, Main Entrance 3;4   Stair Railings, Main Entrance railings safe and in good condition   Transportation Anticipated family or friend will provide  (Pt does drive normally but hasnt since she started chemo this past fall)   Living Environment Comments Pt has W/I shower w/ shower chair/GB   Self-Care   Regular Exercise No   Equipment Currently Used at Home walker, rolling  (4ww)   Fall history within last six months yes   Number of times patient has fallen within last six months 1   Activity/Exercise/Self-Care Comment Pt normally is Ind. w/ all self care tasks   Instrumental Activities of Daily Living (IADL)   Previous Responsibilities meal prep;housekeeping;laundry;medication management;finances  (Daughter lives w/ pt and can assist)   General Information   Onset of Illness/Injury or Date of Surgery 04/12/22   Referring Physician Amrit Braswell MD   Patient/Family Therapy Goal Statement (OT) goal is to get home once medically stable pt open to TCU  pending progress   Additional Occupational Profile Info/Pertinent History of Current Problem Pt presenting w/ Pneumonia of R. lower Lobe Pmhx multiple melanoma- chemo  4/6 Presenting w/ Shortness of breath/fever- possible COPD exacerbation w/ pneumonia- acute respiratory failure w/ hypoxia.   Existing Precautions/Restrictions oxygen therapy device and L/min   Cognitive Status Examination   Orientation Status orientation to person, place and time   Range of Motion Comprehensive   General Range of Motion no range of motion deficits identified   Strength Comprehensive (MMT)   General Manual Muscle Testing (MMT) Assessment no strength deficits identified   Transfers   Transfers sit-stand  transfer;toilet transfer   Sit-Stand Transfer   Sit-Stand [place] (Transfers) contact guard;verbal cues;supervision   Assistive Device (Sit-Stand Transfers) walker, standard   Toilet Transfer   Type (Toilet Transfer) stand-sit;sit-stand   [place] Level (Toilet Transfer) contact guard;verbal cues;supervision   Assistive Device (Toilet Transfer) commode chair;walker, standard   Clinical Impression   Criteria for Skilled Therapeutic Interventions Met (OT) Yes, treatment indicated   OT Diagnosis Decreased endurance/act. tolerance   OT Problem List-Impairments impacting ADL problems related to;activity tolerance impaired;balance;strength   Assessment of Occupational Performance 3-5 Performance Deficits   Identified Performance Deficits endurance/act. tolerance, dressing/toileting, endurance   Planned Therapy Interventions (OT) ADL retraining;IADL retraining;balance training;transfer training   Clinical Decision Making Complexity (OT) moderate complexity   Risk & Benefits of therapy have been explained evaluation/treatment results reviewed;patient   OT Discharge Planning   OT Discharge Recommendation (DC Rec) Transitional Care Facility;home with assist   OT Rationale for DC Rec Pt SBA-CGA assistx1 trnf/mobility/self cares- pt is on HFNC 40L 30FiO2%- pt not on O2 at home- pending progress w/ mobility/endurance pt may benefit from TCU to work on improving overall strength/endurance/ADL ind.   Total Evaluation Time (Minutes)   Total Evaluation Time (Minutes) 4   OT Goals   Therapy Frequency (OT) Daily   OT Predicted Duration/Target Date for Goal Attainment 04/19/22   OT Goals Hygiene/Grooming;Transfers;Toilet Transfer/Toileting;Aerobic Activity   OT: Hygiene/Grooming modified independent;while standing   OT: Transfer Modified independent;with assistive device   OT: Toilet Transfer/Toileting Modified independent;using adaptive equipment   OT: Perform aerobic activity with stable cardiovascular response continuous  activity;10 minutes

## 2022-04-14 NOTE — PROGRESS NOTES
Progress Note        Assessment/Plan     71 year old female with a past medical history of multiple myeloma and is undergoing chemotherapy treatment who presented to the ER for the evaluation of shortness of breath with fever.  She was admitted with,     Acute respiratory failure with hypoxia  - secondary to pneumonia  - CT chest is negative for PE but shows severe bronchitis/bronchiolitis/and mild right lower lobe pneumonia.  improving o2 needs this am - remains on high flow o2  Intensivist consulted, started on steroids/nebs  for copd exacerbation,  Monitor closely on floors  - icu transfer if worsening       CAP   - Negative Covid test  - Unremarkable lactic acid  - Patient is immunocompromised because of multiple myeloma and chemotherapy  -Broad-spectrum antibiotic with vancomycin and Zosyn/zithromax  - Follow-up on blood culture  - fup sputum culture     History of multiple myeloma  - last chemo was 4/6/2022  - Hold chemotherapy because of pneumonia  - Oncology consulted     Hypotension - better   - secondary to volume depletion versus sepsis  - Continue IV fluid support  - Broad-spectrum antibiotics , blood cx ngtd   - echo with lvef 60-65%      Chronic pain syndrome  - Secondary to cancer  - Resume home medications     Weakness and deconditioning  - PT/OT evaluation when medically stable     COVID STATUS: Negative date: 4/12/22     VTE prophylaxis:  Enoxaparin (Lovenox) SQ      Subjective  Less so today and no new issues overnight, no cp     Objective  Vital signs in last 24 hours  Temp:  [98  F (36.7  C)-98.8  F (37.1  C)] 98  F (36.7  C)  Pulse:  [64-79] 65  Resp:  [16-22] 20  BP: (139-189)/(64-91) 189/91  FiO2 (%):  [40 %-55 %] 40 %  SpO2:  [85 %-97 %] 93 %    Input and Output in 24 hrs     Intake/Output Summary (Last 24 hours) at 4/13/2022 1422  Last data filed at 4/13/2022 0645  Gross per 24 hour   Intake 842 ml   Output --   Net 842 ml       GEN: Alert and oriented. Not in acute distress  HEENT:  Atraumatic    Pupils- round and reactive to light bilaterally   Neck- supple, no JVP elevation, no lymphadenopathy or thyromegaly   Sclera- anicteric   Mucous membrane- moist and pink  CHEST: reduced at the base   HEART: S1S2 regular. No murmurs, rubs or gallops  ABDOMEN: Soft. Non-tender, non-distended. No organomegaly. No guarding or rigidity. Bowel sounds- active  Extremities: No pedal oedema  CNS: No focal neurological deficit. No involuntary movements  SKIN: No skin rash, no cyanosis or clubbing      Pertinent Labs   Reviewed         EKG Results reviewed       Advanced Care Planning      Amrit Braswell MD MFedericoD

## 2022-04-14 NOTE — PLAN OF CARE
Problem: Plan of Care - These are the overarching goals to be used throughout the patient stay.    Goal: Optimal Comfort and Wellbeing  Intervention: Monitor Pain and Promote Comfort  Recent Flowsheet Documentation  Taken 4/13/2022 2117 by Demetra Castanon RN  Pain Management Interventions:    medication (see MAR)    rest   Problem: Pain Acute  Goal: Acceptable Pain Control and Functional Ability  Outcome: Ongoing, Progressing  Intervention: Develop Pain Management Plan  Recent Flowsheet Documentation  Taken 4/13/2022 2117 by Demetra Castanon, CONRADO  Pain Management Interventions:    medication (see MAR)    rest  Intervention: Prevent or Manage Pain  Recent Flowsheet Documentation  Taken 4/14/2022 0043 by Demetra Castanon, CONRADO  Medication Review/Management: medications reviewed  Taken 4/13/2022 2117 by Demetra Castanon RN  Medication Review/Management: medications reviewed     Goal Outcome Evaluation: Pt rate pain 8/10. Pain was manage with IV dilaudid and tolerated. On high flow nasal canula, 40L, 50 FiO2. Stating 94%.

## 2022-04-14 NOTE — PLAN OF CARE
Problem: Gas Exchange Impaired  Goal: Optimal Gas Exchange  Intervention: Optimize Oxygenation and Ventilation  Flowsheets  Taken 4/14/2022 1450  Airway/Ventilation Management:   calming measures promoted   pulmonary hygiene promoted  Head of Bed (HOB) Positioning: HOB at 30-45 degrees  Taken 4/14/2022 1245  Head of Bed (HOB) Positioning: HOB at 30-45 degrees  Taken 4/14/2022 0845  Head of Bed (HOB) Positioning: HOB at 30-45 degrees   Goal Outcome Evaluation:    Plan of Care Reviewed With: patient, sibling     Overall Patient Progress: improving    Outcome Evaluation: weaning O2 as able- still on HFNC 40L/30%. maintaining >90%. Pt defered PT today- was able to sit in recliner for a couple hours and now prefers to use BSC. continues on IV abx as ordered.

## 2022-04-14 NOTE — PROGRESS NOTES
Pulmonary Progress Note:    Lizzie Viera is a 71 year old female with PMHx of Multiple myeloma: Has been on treatment with Revlimid, Velcade, and dexamethasone, heavy Hx of tobacco use, chronic pain due to cancer, who presented with shortness of breath and cough with PNA and likely COPD exacerbation with pneumonia, causing acute resp failure with hypoxia.    Plan/Assessment:  Pulmonary: Severe bronchitis and R lower lobe pneumonia    Agree with continuation of Azithromycin, Vancomycin, and Zosyn until we have culture results given her immunosuppression on chemotherapy.      Would encourage patient or RT to assist in getting sputum cultures    Continue scheduled duonebs and prn albuterol nebs. Patient has extensive history of smoking, likely COPD, but no PFTs for confirmed diagnosis    Continue aggressive pulmonary toilet, IS    PT and OT as tolerated    Prednisone 40 mg daily for 5 days-starting today    Recommend follow up with pulmonary for formal PFTs    Chemotherapy on hold    Would consider holding IVF when patient tolerating PO and having stable blood sugars       Physical Exam:  General: Alert and oriented and up in chair.  Lungs:  Coarse throughout, diminished in bases.   Cardiac:  S1 and S2.  Apical Pulse Regular.  GI:  Hypoactive bowel sounds.  Abdomen soft and non-tender.  Extremities: No edema noted.  Pulses:  BUE, +2.  Neuro:  Grossly intact.         CBC RESULTS: Recent Labs   Lab Test 04/14/22  0640   WBC 5.6   RBC 3.34*   HGB 11.1*   HCT 35.3   *   MCH 33.2*   MCHC 31.4*   RDW 16.3*        Last Comprehensive Metabolic Panel:  Sodium   Date Value Ref Range Status   04/14/2022 141 136 - 145 mmol/L Final     Potassium   Date Value Ref Range Status   04/14/2022 3.8 3.5 - 5.0 mmol/L Final     Chloride   Date Value Ref Range Status   04/14/2022 112 (H) 98 - 107 mmol/L Final     Carbon Dioxide (CO2)   Date Value Ref Range Status   04/14/2022 17 (L) 22 - 31 mmol/L Final     Anion Gap    Date Value Ref Range Status   04/14/2022 12 5 - 18 mmol/L Final     Glucose   Date Value Ref Range Status   04/14/2022 50 (LL) 70 - 125 mg/dL Final     GLUCOSE BY METER POCT   Date Value Ref Range Status   04/14/2022 81 70 - 99 mg/dL Final     Urea Nitrogen   Date Value Ref Range Status   04/14/2022 16 8 - 22 mg/dL Final     Creatinine   Date Value Ref Range Status   04/14/2022 0.55 (L) 0.60 - 1.10 mg/dL Final     GFR Estimate   Date Value Ref Range Status   04/14/2022 >90 >60 mL/min/1.73m2 Final     Comment:     Effective December 21, 2021 eGFRcr in adults is calculated using the 2021 CKD-EPI creatinine equation which includes age and gender (Michele et al., NE, DOI: 10.1056/THMOkf3742372)   05/27/2021 >60 >60 mL/min/1.73m2 Final     Calcium   Date Value Ref Range Status   04/14/2022 7.4 (L) 8.5 - 10.5 mg/dL Final     Imaging:    EXAM: CT CHEST PULMONARY EMBOLISM W CONTRAST  LOCATION: [facility]  DATE/TIME: 4/12/2022 5:46 PM     INDICATION: Shortness of breath, hypoxia  COMPARISON: 09/26/2021  TECHNIQUE: CT chest pulmonary angiogram during arterial phase injection of IV contrast. Multiplanar reformats and MIP reconstructions were performed. Dose reduction techniques were used.   CONTRAST: isovue 370  100ml     FINDINGS:  ANGIOGRAM CHEST: No evidence for pulmonary embolism. Pulmonary arteries normal in caliber. Thoracic aorta normal in caliber. No aortic dissection or other acute abnormality.     HEART: Cardiac chambers within normal limits. No pericardial effusion. Mild coronary artery calcification.     MEDIASTINUM: No adenopathy or mass.     LUNGS AND PLEURA: Moderate bronchial wall thickening, mucous plugging, and scattered tree-in-bud densities peripherally. There are areas of developing atelectasis within the lingula and right middle lobe. Small focus of consolidation within the right   lower lobe laterally. No pleural effusion or pneumothorax.     LIMITED UPPER ABDOMEN: Stable left  adrenal nodules. Right adrenal gland unremarkable.     MUSCULOSKELETAL: Numerous tiny lytic bone lesions within multiple vertebral bodies compatible with multiple myeloma. Stable severe vertebral compression deformity at T7, and moderate at T8. Sclerosis has developed along the lesions in the adjacent T6 and   T8 vertebral bodies. Stable lesion within the T1 vertebral body with mild associated loss of vertebral body height.                                                                         IMPRESSION:  1.  No evidence for pulmonary embolism.  2.  Severe bronchitis/bronchiolitis and mild right lower lobe pneumonia.        SINDHU Luna, CNP  Pulmonary Critical Care  Pager: [phone]

## 2022-04-15 ENCOUNTER — APPOINTMENT (OUTPATIENT)
Dept: PHYSICAL THERAPY | Facility: HOSPITAL | Age: 72
DRG: 193 | End: 2022-04-15
Payer: COMMERCIAL

## 2022-04-15 LAB
ANION GAP SERPL CALCULATED.3IONS-SCNC: 7 MMOL/L (ref 5–18)
BASOPHILS # BLD AUTO: 0 10E3/UL (ref 0–0.2)
BASOPHILS NFR BLD AUTO: 0 %
BUN SERPL-MCNC: 9 MG/DL (ref 8–22)
CALCIUM SERPL-MCNC: 7.3 MG/DL (ref 8.5–10.5)
CHLORIDE BLD-SCNC: 110 MMOL/L (ref 98–107)
CO2 SERPL-SCNC: 24 MMOL/L (ref 22–31)
CREAT SERPL-MCNC: 0.57 MG/DL (ref 0.6–1.1)
EOSINOPHIL # BLD AUTO: 0 10E3/UL (ref 0–0.7)
EOSINOPHIL NFR BLD AUTO: 0 %
ERYTHROCYTE [DISTWIDTH] IN BLOOD BY AUTOMATED COUNT: 15.7 % (ref 10–15)
GFR SERPL CREATININE-BSD FRML MDRD: >90 ML/MIN/1.73M2
GLUCOSE BLD-MCNC: 174 MG/DL (ref 70–125)
GLUCOSE BLDC GLUCOMTR-MCNC: 200 MG/DL (ref 70–99)
GLUCOSE BLDC GLUCOMTR-MCNC: 206 MG/DL (ref 70–99)
HCT VFR BLD AUTO: 31.8 % (ref 35–47)
HGB BLD-MCNC: 10.6 G/DL (ref 11.7–15.7)
IMM GRANULOCYTES # BLD: 0 10E3/UL
IMM GRANULOCYTES NFR BLD: 0 %
LYMPHOCYTES # BLD AUTO: 1.1 10E3/UL (ref 0.8–5.3)
LYMPHOCYTES NFR BLD AUTO: 16 %
MCH RBC QN AUTO: 32.5 PG (ref 26.5–33)
MCHC RBC AUTO-ENTMCNC: 33.3 G/DL (ref 31.5–36.5)
MCV RBC AUTO: 98 FL (ref 78–100)
MONOCYTES # BLD AUTO: 0.6 10E3/UL (ref 0–1.3)
MONOCYTES NFR BLD AUTO: 10 %
NEUTROPHILS # BLD AUTO: 5 10E3/UL (ref 1.6–8.3)
NEUTROPHILS NFR BLD AUTO: 74 %
NRBC # BLD AUTO: 0 10E3/UL
NRBC BLD AUTO-RTO: 0 /100
PLATELET # BLD AUTO: 182 10E3/UL (ref 150–450)
POTASSIUM BLD-SCNC: 2.9 MMOL/L (ref 3.5–5)
POTASSIUM BLD-SCNC: 3.3 MMOL/L (ref 3.5–5)
POTASSIUM BLD-SCNC: 3.7 MMOL/L (ref 3.5–5)
RBC # BLD AUTO: 3.26 10E6/UL (ref 3.8–5.2)
SODIUM SERPL-SCNC: 141 MMOL/L (ref 136–145)
WBC # BLD AUTO: 6.8 10E3/UL (ref 4–11)

## 2022-04-15 PROCEDURE — 250N000009 HC RX 250: Performed by: INTERNAL MEDICINE

## 2022-04-15 PROCEDURE — 97110 THERAPEUTIC EXERCISES: CPT | Mod: GP

## 2022-04-15 PROCEDURE — 85025 COMPLETE CBC W/AUTO DIFF WBC: CPT | Performed by: INTERNAL MEDICINE

## 2022-04-15 PROCEDURE — 120N000001 HC R&B MED SURG/OB

## 2022-04-15 PROCEDURE — 97162 PT EVAL MOD COMPLEX 30 MIN: CPT | Mod: GP

## 2022-04-15 PROCEDURE — 94640 AIRWAY INHALATION TREATMENT: CPT | Mod: 76

## 2022-04-15 PROCEDURE — 94640 AIRWAY INHALATION TREATMENT: CPT

## 2022-04-15 PROCEDURE — 99232 SBSQ HOSP IP/OBS MODERATE 35: CPT | Performed by: NURSE PRACTITIONER

## 2022-04-15 PROCEDURE — 999N000215 HC STATISTIC HFNC ADULT NON-CPAP

## 2022-04-15 PROCEDURE — 250N000013 HC RX MED GY IP 250 OP 250 PS 637: Performed by: INTERNAL MEDICINE

## 2022-04-15 PROCEDURE — 80048 BASIC METABOLIC PNL TOTAL CA: CPT | Performed by: INTERNAL MEDICINE

## 2022-04-15 PROCEDURE — 999N000157 HC STATISTIC RCP TIME EA 10 MIN

## 2022-04-15 PROCEDURE — 250N000011 HC RX IP 250 OP 636: Performed by: INTERNAL MEDICINE

## 2022-04-15 PROCEDURE — 999N000078 HC STATISTIC INTRAPULMONARY PERCUSSIVE VENT

## 2022-04-15 PROCEDURE — 97530 THERAPEUTIC ACTIVITIES: CPT | Mod: GP

## 2022-04-15 PROCEDURE — 36415 COLL VENOUS BLD VENIPUNCTURE: CPT | Performed by: INTERNAL MEDICINE

## 2022-04-15 PROCEDURE — 258N000003 HC RX IP 258 OP 636: Performed by: INTERNAL MEDICINE

## 2022-04-15 PROCEDURE — 94799 UNLISTED PULMONARY SVC/PX: CPT

## 2022-04-15 PROCEDURE — 99232 SBSQ HOSP IP/OBS MODERATE 35: CPT | Performed by: INTERNAL MEDICINE

## 2022-04-15 PROCEDURE — 250N000012 HC RX MED GY IP 250 OP 636 PS 637: Performed by: NURSE PRACTITIONER

## 2022-04-15 PROCEDURE — 84132 ASSAY OF SERUM POTASSIUM: CPT | Performed by: INTERNAL MEDICINE

## 2022-04-15 RX ORDER — HYDRALAZINE HYDROCHLORIDE 20 MG/ML
10 INJECTION INTRAMUSCULAR; INTRAVENOUS EVERY 6 HOURS PRN
Status: DISCONTINUED | OUTPATIENT
Start: 2022-04-15 | End: 2022-04-20 | Stop reason: HOSPADM

## 2022-04-15 RX ORDER — POTASSIUM CHLORIDE 7.45 MG/ML
10 INJECTION INTRAVENOUS
Status: COMPLETED | OUTPATIENT
Start: 2022-04-15 | End: 2022-04-15

## 2022-04-15 RX ORDER — POTASSIUM CHLORIDE 1500 MG/1
40 TABLET, EXTENDED RELEASE ORAL ONCE
Status: COMPLETED | OUTPATIENT
Start: 2022-04-15 | End: 2022-04-15

## 2022-04-15 RX ADMIN — IPRATROPIUM BROMIDE AND ALBUTEROL SULFATE 3 ML: 2.5; .5 SOLUTION RESPIRATORY (INHALATION) at 12:10

## 2022-04-15 RX ADMIN — IPRATROPIUM BROMIDE AND ALBUTEROL SULFATE 3 ML: 2.5; .5 SOLUTION RESPIRATORY (INHALATION) at 08:14

## 2022-04-15 RX ADMIN — ACYCLOVIR 400 MG: 400 TABLET ORAL at 08:05

## 2022-04-15 RX ADMIN — PHENAZOPYRIDINE HYDROCHLORIDE 200 MG: 100 TABLET ORAL at 08:05

## 2022-04-15 RX ADMIN — VANCOMYCIN HYDROCHLORIDE 750 MG: 1 INJECTION, POWDER, LYOPHILIZED, FOR SOLUTION INTRAVENOUS at 08:12

## 2022-04-15 RX ADMIN — IPRATROPIUM BROMIDE AND ALBUTEROL SULFATE 3 ML: 2.5; .5 SOLUTION RESPIRATORY (INHALATION) at 20:13

## 2022-04-15 RX ADMIN — Medication 125 MG: at 08:05

## 2022-04-15 RX ADMIN — Medication 20 MG: at 22:55

## 2022-04-15 RX ADMIN — HYDROMORPHONE HYDROCHLORIDE 2 MG: 2 TABLET ORAL at 22:56

## 2022-04-15 RX ADMIN — ENOXAPARIN SODIUM 40 MG: 40 INJECTION SUBCUTANEOUS at 22:57

## 2022-04-15 RX ADMIN — AZITHROMYCIN MONOHYDRATE 500 MG: 500 INJECTION, POWDER, LYOPHILIZED, FOR SOLUTION INTRAVENOUS at 12:52

## 2022-04-15 RX ADMIN — VANCOMYCIN HYDROCHLORIDE 750 MG: 1 INJECTION, POWDER, LYOPHILIZED, FOR SOLUTION INTRAVENOUS at 20:07

## 2022-04-15 RX ADMIN — ASPIRIN 81 MG CHEWABLE TABLET 81 MG: 81 TABLET CHEWABLE at 08:05

## 2022-04-15 RX ADMIN — PIPERACILLIN AND TAZOBACTAM 3.38 G: 3; .375 INJECTION, POWDER, LYOPHILIZED, FOR SOLUTION INTRAVENOUS at 17:45

## 2022-04-15 RX ADMIN — HYDRALAZINE HYDROCHLORIDE 10 MG: 20 INJECTION INTRAMUSCULAR; INTRAVENOUS at 17:51

## 2022-04-15 RX ADMIN — METHOCARBAMOL 500 MG: 500 TABLET, FILM COATED ORAL at 08:12

## 2022-04-15 RX ADMIN — PREDNISONE 40 MG: 20 TABLET ORAL at 08:05

## 2022-04-15 RX ADMIN — GABAPENTIN 600 MG: 300 CAPSULE ORAL at 08:05

## 2022-04-15 RX ADMIN — PIPERACILLIN AND TAZOBACTAM 3.38 G: 3; .375 INJECTION, POWDER, LYOPHILIZED, FOR SOLUTION INTRAVENOUS at 00:36

## 2022-04-15 RX ADMIN — HYDROMORPHONE HYDROCHLORIDE 2 MG: 2 TABLET ORAL at 05:18

## 2022-04-15 RX ADMIN — ACYCLOVIR 400 MG: 400 TABLET ORAL at 20:07

## 2022-04-15 RX ADMIN — METHOCARBAMOL 500 MG: 500 TABLET, FILM COATED ORAL at 20:29

## 2022-04-15 RX ADMIN — PIPERACILLIN AND TAZOBACTAM 3.38 G: 3; .375 INJECTION, POWDER, LYOPHILIZED, FOR SOLUTION INTRAVENOUS at 08:05

## 2022-04-15 RX ADMIN — POTASSIUM CHLORIDE 40 MEQ: 1500 TABLET, EXTENDED RELEASE ORAL at 08:05

## 2022-04-15 RX ADMIN — GABAPENTIN 600 MG: 300 CAPSULE ORAL at 14:46

## 2022-04-15 RX ADMIN — POTASSIUM CHLORIDE 10 MEQ: 7.46 INJECTION, SOLUTION INTRAVENOUS at 15:56

## 2022-04-15 RX ADMIN — GABAPENTIN 600 MG: 300 CAPSULE ORAL at 20:07

## 2022-04-15 RX ADMIN — POTASSIUM CHLORIDE 10 MEQ: 7.46 INJECTION, SOLUTION INTRAVENOUS at 14:46

## 2022-04-15 RX ADMIN — IPRATROPIUM BROMIDE AND ALBUTEROL SULFATE 3 ML: 2.5; .5 SOLUTION RESPIRATORY (INHALATION) at 16:11

## 2022-04-15 ASSESSMENT — ACTIVITIES OF DAILY LIVING (ADL)
ADLS_ACUITY_SCORE: 10
ADLS_ACUITY_SCORE: 10
ADLS_ACUITY_SCORE: 12
ADLS_ACUITY_SCORE: 10
ADLS_ACUITY_SCORE: 12
ADLS_ACUITY_SCORE: 10
ADLS_ACUITY_SCORE: 12
ADLS_ACUITY_SCORE: 10
ADLS_ACUITY_SCORE: 12
ADLS_ACUITY_SCORE: 10

## 2022-04-15 NOTE — PROGRESS NOTES
Pulmonary Progress Note:     Lizzie Viera is a 71 year old female with PMHx of Multiple myeloma: Has been on treatment with Revlimid, Velcade, and dexamethasone, heavy Hx of tobacco use, chronic pain due to cancer, who presented with shortness of breath and cough with PNA and likely COPD exacerbation with pneumonia, causing acute resp failure with hypoxia.     Plan/Assessment:  Pulmonary: Severe bronchitis and R lower lobe pneumonia    Agree with continuation of Azithromycin, Vancomycin, and Zosyn until we have culture results given her immunosuppression on chemotherapy.      Would encourage patient or RT to assist in getting sputum cultures    Continue scheduled duonebs and prn albuterol nebs. Patient has extensive history of smoking, likely COPD, but no PFTs for confirmed diagnosis    Continue aggressive pulmonary toilet, IS    PT and OT as tolerated    Prednisone 40 mg daily for 5 days-starting today    Recommend follow up with pulmonary for formal PFTs    Chemotherapy on hold    IVF was stopped, blood sugars have stabilized.     Looks to have been sating in the mid 90's on 30 LPM, 30% oxygen.  I would trial her on nasal cannula or a simple face mask today.         Physical Exam:  General: Alert and oriented and up in chair.  Lungs:  Coarse throughout, diminished in bases.   Cardiac:  S1 and S2.  Apical Pulse Regular.  GI:  Hypoactive bowel sounds.  Abdomen soft and non-tender.  Extremities: No edema noted.  Pulses:  BUE, +2.  Neuro:  Grossly intact.     Lab Results   Component Value Date    WBC 6.8 04/15/2022     Lab Results   Component Value Date    RBC 3.26 04/15/2022     Lab Results   Component Value Date    HGB 10.6 04/15/2022     Lab Results   Component Value Date    HCT 31.8 04/15/2022     No components found for: MCT  Lab Results   Component Value Date    MCV 98 04/15/2022     Lab Results   Component Value Date    MCH 32.5 04/15/2022     Lab Results   Component Value Date    MCHC 33.3 04/15/2022      Lab Results   Component Value Date    RDW 15.7 04/15/2022     Lab Results   Component Value Date     04/15/2022     Last Comprehensive Metabolic Panel:  Sodium   Date Value Ref Range Status   04/15/2022 141 136 - 145 mmol/L Final     Potassium   Date Value Ref Range Status   04/15/2022 2.9 (L) 3.5 - 5.0 mmol/L Final     Chloride   Date Value Ref Range Status   04/15/2022 110 (H) 98 - 107 mmol/L Final     Carbon Dioxide (CO2)   Date Value Ref Range Status   04/15/2022 24 22 - 31 mmol/L Final     Anion Gap   Date Value Ref Range Status   04/15/2022 7 5 - 18 mmol/L Final     Glucose   Date Value Ref Range Status   04/15/2022 174 (H) 70 - 125 mg/dL Final     GLUCOSE BY METER POCT   Date Value Ref Range Status   04/15/2022 200 (H) 70 - 99 mg/dL Final     Urea Nitrogen   Date Value Ref Range Status   04/15/2022 9 8 - 22 mg/dL Final     Creatinine   Date Value Ref Range Status   04/15/2022 0.57 (L) 0.60 - 1.10 mg/dL Final     GFR Estimate   Date Value Ref Range Status   04/15/2022 >90 >60 mL/min/1.73m2 Final     Comment:     Effective December 21, 2021 eGFRcr in adults is calculated using the 2021 CKD-EPI creatinine equation which includes age and gender (Michele et al., [place], DOI: 10.1056/TUZHft7274108)   05/27/2021 >60 >60 mL/min/1.73m2 Final     Calcium   Date Value Ref Range Status   04/15/2022 7.3 (L) 8.5 - 10.5 mg/dL Final     Imaging:     EXAM: CT CHEST PULMONARY EMBOLISM W CONTRAST  LOCATION: [facility]  DATE/TIME: 4/12/2022 5:46 PM     INDICATION: Shortness of breath, hypoxia  COMPARISON: 09/26/2021  TECHNIQUE: CT chest pulmonary angiogram during arterial phase injection of IV contrast. Multiplanar reformats and MIP reconstructions were performed. Dose reduction techniques were used.   CONTRAST: isovue 370  100ml     FINDINGS:  ANGIOGRAM CHEST: No evidence for pulmonary embolism. Pulmonary arteries normal in caliber. Thoracic aorta normal in caliber. No aortic dissection or other  acute abnormality.     HEART: Cardiac chambers within normal limits. No pericardial effusion. Mild coronary artery calcification.     MEDIASTINUM: No adenopathy or mass.     LUNGS AND PLEURA: Moderate bronchial wall thickening, mucous plugging, and scattered tree-in-bud densities peripherally. There are areas of developing atelectasis within the lingula and right middle lobe. Small focus of consolidation within the right   lower lobe laterally. No pleural effusion or pneumothorax.     LIMITED UPPER ABDOMEN: Stable left adrenal nodules. Right adrenal gland unremarkable.     MUSCULOSKELETAL: Numerous tiny lytic bone lesions within multiple vertebral bodies compatible with multiple myeloma. Stable severe vertebral compression deformity at T7, and moderate at T8. Sclerosis has developed along the lesions in the adjacent T6 and   T8 vertebral bodies. Stable lesion within the T1 vertebral body with mild associated loss of vertebral body height.                                                                         IMPRESSION:  1.  No evidence for pulmonary embolism.  2.  Severe bronchitis/bronchiolitis and mild right lower lobe pneumonia.      At this point we will sign off on Ms. Viera.  Suggestions as above.  Please contact our team if there is anything else we can do to help care for Ms. Viera.  Thank you for the opportunity to participate in her care.      SINDHU Luna, CNP  Pulmonary Critical Care  Pager: [phone]

## 2022-04-15 NOTE — PLAN OF CARE
Problem: Gas Exchange Impaired  Goal: Optimal Gas Exchange  Outcome: Ongoing, Progressing  Intervention: Optimize Oxygenation and Ventilation  Recent Flowsheet Documentation  Taken 4/15/2022 0000 by Melva Menon, RN  Head of Bed (HOB) Positioning: HOB at 20-30 degrees   Goal Outcome Evaluation:    Sats mid 90's  Encouraged cough

## 2022-04-15 NOTE — PROGRESS NOTES
04/15/22 1000   Quick Adds   Type of Visit Initial PT Evaluation   Living Environment   People in Home spouse;child(jung), adult  (adult daughter and kids.)   Current Living Arrangements house  (2 level home.  Pt can stay on one level.)   Home Accessibility stairs to enter home   Number of Stairs, Main Entrance 4   Stair Railings, Main Entrance railings on both sides of stairs   Transportation Anticipated family or friend will provide   Self-Care   Usual Activity Tolerance good   Current Activity Tolerance moderate   Equipment Currently Used at Home walker, rolling  (has a 4WW)   Fall history within last six months yes   Number of times patient has fallen within last six months 1   Activity/Exercise/Self-Care Comment Pt is indep with mobility and able to get out of bed and transfers.  Was going to outpatient PT 2x//week for T7 fx does get chemo.   General Information   Onset of Illness/Injury or Date of Surgery 04/12/22   Referring Physician Dr Amrit Braswell   Patient/Family Therapy Goals Statement (PT) to go home.   Pertinent History of Current Problem (include personal factors and/or comorbidities that impact the POC) Pt was admitted with hypoxia and pneumonia. Hx of t7 fx.  No longer needs TLSO per the pt.   Existing Precautions/Restrictions   (PT did review spine body mechanics with the pt.)   Weight-Bearing Status - LLE weight-bearing as tolerated   Weight-Bearing Status - RLE weight-bearing as tolerated   Cognition   Orientation Status (Cognition) oriented x 4   Pain Assessment   Patient Currently in Pain   (back pain 3//10.)   Range of Motion (ROM)   Range of Motion ROM is WNL  (LEs)   Strength (Manual Muscle Testing)   Strength (Manual Muscle Testing) strength is WNL  (LEs)   Bed Mobility   Comment, (Bed Mobility) Supine <>sit indep.  Pt sat at the EOB for a few minutes pre standing. O2 SATs 93% with HR63  (Pt is on 30 L O2)   Transfers   Comment, (Transfers) Sit<>stand with 4WW with SBA.  Sit<>stzand  without AD   Gait/Stairs (Locomotion)   [place] Level (Gait) minimum assist (75% patient effort)   Assistive Device (Gait) walker, 4-wheeled   Distance in Feet (Required for LE Total Joints) 3' forward and backward x 3 reps with her 4WW. A with IV and O2.   Pattern (Gait) step-through;swing-through   Deviations/Abnormal Patterns (Gait) gait speed decreased   Balance   Balance Comments CGA with 4WW.   Sensory Examination   Sensory Perception Comments Pt could feel llighttouch but does have neuropathy in her feet.   Clinical Impression   Criteria for Skilled Therapeutic Intervention Yes, treatment indicated   PT Diagnosis (PT) impaired functional mobility   Influenced by the following impairments transfers, gait, steps   Functional limitations due to impairments SOB, dec activity adore.   Clinical Presentation (PT Evaluation Complexity) Evolving/Changing   Clinical Presentation Rationale Pt presents mecially dignosed.   Clinical Decision Making (Complexity) moderate complexity   Planned Therapy Interventions (PT) gait training;home exercise program;stair training;strengthening;transfer training   Anticipated Equipment Needs at Discharge (PT) walker, rolling  (Pt has her 4WW here.)   Risk & Benefits of therapy have been explained evaluation/treatment results reviewed;care plan/treatment goals reviewed;risks/benefits reviewed;patient;participants voiced agreement with care plan   PT Discharge Planning   PT Discharge Recommendation (DC Rec) Transitional Care Facility;home with home care physical therapy   PT Rationale for DC Rec At this point, she does get SOB mobilitiy and ex.  Pt wants to go home. She might need TCU before that pending progress with O2 and mobility   PT Brief overview of current status O2 30L with O2 SATs 93 to 95% and HR 63 to 69 with activity   Plan of Care Review   Plan of Care Reviewed With patient   Total Evaluation Time   Total Evaluation Time (Minutes) 15   Physical Therapy Goals   PT  Frequency Daily   PT Predicted Duration/Target Date for Goal Attainment 04/22/22   PT Goals Transfers;Gait;Stairs   PT: Transfers Modified independent;Sit to/from stand;Bed to/from chair;Assistive device   PT: Gait Rolling walker;150 feet  (4WW with O2 SATS stable.)   PT: Stairs Supervision/stand-by assist;4 stairs  (with bilat rails.)

## 2022-04-15 NOTE — PLAN OF CARE
A&Ox4. Pain ranging from mild to severe.  Received Dilaudid 2mg PO at 1525 - effective.  Robaxin 500 mg PO at 2059 - effective.  Dyspnea and exhaustion with exertion and after nebulizer, but after each neb treatment reports feeling better.  BP trending high - 1534 177/75 and 2001 169/72.    Problem: Pain Acute  Goal: Acceptable Pain Control and Functional Ability  Outcome: Ongoing, Progressing  Intervention: Develop Pain Management Plan  Recent Flowsheet Documentation  Taken 4/14/2022 2222 by Gillian Shannon RN  Pain Management Interventions: emotional support  Taken 4/14/2022 2055 by Gillian Shannon RN  Pain Management Interventions: medication (see MAR)  Taken 4/14/2022 1651 by Gillian Shannon RN  Pain Management Interventions: emotional support  Taken 4/14/2022 1525 by Gillian Shannon RN  Pain Management Interventions:   medication (see MAR)   emotional support   relaxation techniques promoted   repositioned  Intervention: Prevent or Manage Pain  Recent Flowsheet Documentation  Taken 4/14/2022 1655 by Gillian Shannon RN  Medication Review/Management: medications reviewed     Problem: Gas Exchange Impaired  Goal: Optimal Gas Exchange  Outcome: Ongoing, Progressing   Goal Outcome Evaluation:    Plan of Care Reviewed With: patient

## 2022-04-15 NOTE — PLAN OF CARE
Problem: Gas Exchange Impaired  Goal: Optimal Gas Exchange  Outcome: Ongoing, Progressing   Pt remains on HFNC, tolerating well, RT weaning pt off. Pt has a congested, nonproductive cough. Still need a sputum sample, pt has been unable to produce anything for sample so far.     Problem: Plan of Care - These are the overarching goals to be used throughout the patient stay.    Goal: Plan of Care Review/Shift Note  Outcome: Ongoing, Progressing   Updated pt on plan of care. Answered any questions/concerns pt has.     Pt's sister brought her in lunch, pt ate well, states appetite is improving.     MERLYN ALVAREZ RN

## 2022-04-15 NOTE — PROGRESS NOTES
Progress Note        Assessment/Plan     71 year old female with a past medical history of multiple myeloma and is undergoing chemotherapy treatment who presented to the ER for the evaluation of shortness of breath with fever.  She was admitted with,     Acute respiratory failure with hypoxia  - secondary to pneumonia/aecopd   - CT chest is negative for PE but shows severe bronchitis/bronchiolitis/and mild right lower lobe pneumonia.  improving o2 needs this am - o2 flow down to 30 L [ from 50]   Intensivist consulted, started on steroids/nebs  for copd exacerbation,  Monitor closely on floors  - icu transfer if worsening     resp acidosis   Likely due to copd   Now on nebs/steroids   pulm following       CAP   - Negative Covid test  - Unremarkable lactic acid  - Patient is immunocompromised because of multiple myeloma and chemotherapy  -Broad-spectrum antibiotic with vancomycin and Zosyn/zithromax   blood culture ngtd   - fup sputum culture     History of multiple myeloma  - last chemo was 4/6/2022  - Hold chemotherapy because of pneumonia  - Oncology consulted     Hypotension - resolved   - secondary to volume depletion versus sepsis  - Continue IV fluid support  - Broad-spectrum antibiotics , blood cx ngtd   - echo with lvef 60-65%      Chronic pain syndrome  - Secondary to cancer  - Resume home medications     Weakness and deconditioning  - PT/OT evaluation when medically stable    On K replacement protocol      COVID STATUS: Negative date: 4/12/22     VTE prophylaxis:  Enoxaparin (Lovenox) SQ      Subjective  Feels better with no new issues overnight,   No fever or chest pain   Less sob     Objective  Vital signs in last 24 hours  Temp:  [97.8  F (36.6  C)-98.7  F (37.1  C)] 98.6  F (37  C)  Pulse:  [56-68] 56  Resp:  [18-22] 22  BP: (158-177)/(72-75) 158/72  FiO2 (%):  [30 %] 30 %  SpO2:  [92 %-96 %] 96 %    Input and Output in 24 hrs     Intake/Output Summary (Last 24 hours) at 4/13/2022 1422  Last data filed  at 4/13/2022 0645  Gross per 24 hour   Intake 842 ml   Output --   Net 842 ml       GEN: Alert and oriented. Not in acute distress  HEENT: Atraumatic    Pupils- round and reactive to light bilaterally   Neck- supple, no JVP elevation, no lymphadenopathy or thyromegaly   Sclera- anicteric   Mucous membrane- moist and pink  CHEST: better breath sounds   HEART: S1S2 regular. No murmurs, rubs or gallops  ABDOMEN: Soft. Non-tender, non-distended. No organomegaly. No guarding or rigidity. Bowel sounds- active  Extremities: No pedal oedema  CNS: No focal neurological deficit. No involuntary movements  SKIN: No skin rash, no cyanosis or clubbing      Pertinent Labs   Reviewed  EKG Results reviewed       Advanced Care Planning      Amrit Braswell MD M.D

## 2022-04-15 NOTE — PROGRESS NOTES
RESPIRATORY CARE NOTE   Patient is on HFNC at 30L/30%, tried to wean but sats decreased. BS decreased with coarse crackles, gave Duoned with Metaneb treatment x3, BS post treatment slight improveent, patient perceives moderate improvement, patient tolerated treatment well.     Mai Escobar, RT

## 2022-04-15 NOTE — PROGRESS NOTES
"Nebs given with Metaneb as scheduled. Pt remains on HFNC - was able to wean flow to 30lpm and Fio2 is currently 30%. Pt states she feels better, coughing secretions up .    Continue to monitor and assess     BP (!) 171/72 (BP Location: Right leg)   Pulse 62   Temp 98.4  F (36.9  C) (Oral)   Resp 21   Ht 1.575 m (5' 2\")   Wt 54.8 kg (120 lb 12.8 oz)   SpO2 95%   BMI 22.09 kg/m      "

## 2022-04-16 ENCOUNTER — APPOINTMENT (OUTPATIENT)
Dept: OCCUPATIONAL THERAPY | Facility: HOSPITAL | Age: 72
DRG: 193 | End: 2022-04-16
Payer: COMMERCIAL

## 2022-04-16 LAB
ANION GAP SERPL CALCULATED.3IONS-SCNC: 5 MMOL/L (ref 5–18)
BASOPHILS # BLD AUTO: 0 10E3/UL (ref 0–0.2)
BASOPHILS NFR BLD AUTO: 1 %
BUN SERPL-MCNC: 6 MG/DL (ref 8–22)
CALCIUM SERPL-MCNC: 7.5 MG/DL (ref 8.5–10.5)
CHLORIDE BLD-SCNC: 113 MMOL/L (ref 98–107)
CO2 SERPL-SCNC: 25 MMOL/L (ref 22–31)
CREAT SERPL-MCNC: 0.54 MG/DL (ref 0.6–1.1)
EOSINOPHIL # BLD AUTO: 0.2 10E3/UL (ref 0–0.7)
EOSINOPHIL NFR BLD AUTO: 3 %
ERYTHROCYTE [DISTWIDTH] IN BLOOD BY AUTOMATED COUNT: 15.7 % (ref 10–15)
GFR SERPL CREATININE-BSD FRML MDRD: >90 ML/MIN/1.73M2
GLUCOSE BLD-MCNC: 95 MG/DL (ref 70–125)
HCT VFR BLD AUTO: 31.6 % (ref 35–47)
HGB BLD-MCNC: 10.8 G/DL (ref 11.7–15.7)
IMM GRANULOCYTES # BLD: 0 10E3/UL
IMM GRANULOCYTES NFR BLD: 0 %
LYMPHOCYTES # BLD AUTO: 1.5 10E3/UL (ref 0.8–5.3)
LYMPHOCYTES NFR BLD AUTO: 22 %
MCH RBC QN AUTO: 33.2 PG (ref 26.5–33)
MCHC RBC AUTO-ENTMCNC: 34.2 G/DL (ref 31.5–36.5)
MCV RBC AUTO: 97 FL (ref 78–100)
MONOCYTES # BLD AUTO: 0.8 10E3/UL (ref 0–1.3)
MONOCYTES NFR BLD AUTO: 11 %
NEUTROPHILS # BLD AUTO: 4.3 10E3/UL (ref 1.6–8.3)
NEUTROPHILS NFR BLD AUTO: 63 %
NRBC # BLD AUTO: 0 10E3/UL
NRBC BLD AUTO-RTO: 0 /100
PLAT MORPH BLD: NORMAL
PLATELET # BLD AUTO: 191 10E3/UL (ref 150–450)
POTASSIUM BLD-SCNC: 3.2 MMOL/L (ref 3.5–5)
POTASSIUM BLD-SCNC: 3.8 MMOL/L (ref 3.5–5)
RBC # BLD AUTO: 3.25 10E6/UL (ref 3.8–5.2)
RBC MORPH BLD: NORMAL
SODIUM SERPL-SCNC: 143 MMOL/L (ref 136–145)
WBC # BLD AUTO: 6.9 10E3/UL (ref 4–11)

## 2022-04-16 PROCEDURE — 250N000011 HC RX IP 250 OP 636: Performed by: INTERNAL MEDICINE

## 2022-04-16 PROCEDURE — 999N000215 HC STATISTIC HFNC ADULT NON-CPAP

## 2022-04-16 PROCEDURE — 250N000013 HC RX MED GY IP 250 OP 250 PS 637: Performed by: INTERNAL MEDICINE

## 2022-04-16 PROCEDURE — 94660 CPAP INITIATION&MGMT: CPT

## 2022-04-16 PROCEDURE — 97110 THERAPEUTIC EXERCISES: CPT | Mod: GO

## 2022-04-16 PROCEDURE — 94799 UNLISTED PULMONARY SVC/PX: CPT

## 2022-04-16 PROCEDURE — 94640 AIRWAY INHALATION TREATMENT: CPT | Mod: 76

## 2022-04-16 PROCEDURE — 258N000003 HC RX IP 258 OP 636: Performed by: INTERNAL MEDICINE

## 2022-04-16 PROCEDURE — 250N000009 HC RX 250: Performed by: INTERNAL MEDICINE

## 2022-04-16 PROCEDURE — 999N000157 HC STATISTIC RCP TIME EA 10 MIN

## 2022-04-16 PROCEDURE — 99232 SBSQ HOSP IP/OBS MODERATE 35: CPT | Performed by: INTERNAL MEDICINE

## 2022-04-16 PROCEDURE — 85025 COMPLETE CBC W/AUTO DIFF WBC: CPT | Performed by: INTERNAL MEDICINE

## 2022-04-16 PROCEDURE — 250N000012 HC RX MED GY IP 250 OP 636 PS 637: Performed by: NURSE PRACTITIONER

## 2022-04-16 PROCEDURE — 120N000001 HC R&B MED SURG/OB

## 2022-04-16 PROCEDURE — 80048 BASIC METABOLIC PNL TOTAL CA: CPT | Performed by: INTERNAL MEDICINE

## 2022-04-16 PROCEDURE — 84132 ASSAY OF SERUM POTASSIUM: CPT | Performed by: INTERNAL MEDICINE

## 2022-04-16 PROCEDURE — 272N000202 HC AEROBIKA WITH MANOMETER

## 2022-04-16 PROCEDURE — 36415 COLL VENOUS BLD VENIPUNCTURE: CPT | Performed by: INTERNAL MEDICINE

## 2022-04-16 PROCEDURE — 999N000078 HC STATISTIC INTRAPULMONARY PERCUSSIVE VENT

## 2022-04-16 PROCEDURE — 94640 AIRWAY INHALATION TREATMENT: CPT

## 2022-04-16 RX ORDER — POTASSIUM CHLORIDE 1500 MG/1
20 TABLET, EXTENDED RELEASE ORAL ONCE
Status: COMPLETED | OUTPATIENT
Start: 2022-04-16 | End: 2022-04-16

## 2022-04-16 RX ORDER — VANCOMYCIN HYDROCHLORIDE 1 G/200ML
1000 INJECTION, SOLUTION INTRAVENOUS EVERY 12 HOURS
Status: DISCONTINUED | OUTPATIENT
Start: 2022-04-16 | End: 2022-04-18

## 2022-04-16 RX ADMIN — POTASSIUM CHLORIDE 20 MEQ: 1500 TABLET, EXTENDED RELEASE ORAL at 12:52

## 2022-04-16 RX ADMIN — GABAPENTIN 600 MG: 300 CAPSULE ORAL at 14:54

## 2022-04-16 RX ADMIN — HYDRALAZINE HYDROCHLORIDE 10 MG: 20 INJECTION INTRAMUSCULAR; INTRAVENOUS at 16:04

## 2022-04-16 RX ADMIN — ENOXAPARIN SODIUM 40 MG: 40 INJECTION SUBCUTANEOUS at 21:36

## 2022-04-16 RX ADMIN — VANCOMYCIN HYDROCHLORIDE 750 MG: 1 INJECTION, POWDER, LYOPHILIZED, FOR SOLUTION INTRAVENOUS at 09:42

## 2022-04-16 RX ADMIN — PREDNISONE 40 MG: 20 TABLET ORAL at 09:37

## 2022-04-16 RX ADMIN — GABAPENTIN 600 MG: 300 CAPSULE ORAL at 21:35

## 2022-04-16 RX ADMIN — IPRATROPIUM BROMIDE AND ALBUTEROL SULFATE 3 ML: 2.5; .5 SOLUTION RESPIRATORY (INHALATION) at 08:13

## 2022-04-16 RX ADMIN — METHOCARBAMOL 500 MG: 500 TABLET, FILM COATED ORAL at 10:01

## 2022-04-16 RX ADMIN — PIPERACILLIN AND TAZOBACTAM 3.38 G: 3; .375 INJECTION, POWDER, LYOPHILIZED, FOR SOLUTION INTRAVENOUS at 00:15

## 2022-04-16 RX ADMIN — IPRATROPIUM BROMIDE AND ALBUTEROL SULFATE 3 ML: 2.5; .5 SOLUTION RESPIRATORY (INHALATION) at 20:09

## 2022-04-16 RX ADMIN — ASPIRIN 81 MG CHEWABLE TABLET 81 MG: 81 TABLET CHEWABLE at 09:41

## 2022-04-16 RX ADMIN — SALINE NASAL SPRAY 1 SPRAY: 1.5 SOLUTION NASAL at 09:34

## 2022-04-16 RX ADMIN — METHOCARBAMOL 500 MG: 500 TABLET, FILM COATED ORAL at 21:46

## 2022-04-16 RX ADMIN — IPRATROPIUM BROMIDE AND ALBUTEROL SULFATE 3 ML: 2.5; .5 SOLUTION RESPIRATORY (INHALATION) at 16:04

## 2022-04-16 RX ADMIN — HYDROMORPHONE HYDROCHLORIDE 4 MG: 2 TABLET ORAL at 12:53

## 2022-04-16 RX ADMIN — ACYCLOVIR 400 MG: 400 TABLET ORAL at 09:36

## 2022-04-16 RX ADMIN — VANCOMYCIN HYDROCHLORIDE 1000 MG: 1 INJECTION, SOLUTION INTRAVENOUS at 21:34

## 2022-04-16 RX ADMIN — GABAPENTIN 600 MG: 300 CAPSULE ORAL at 09:41

## 2022-04-16 RX ADMIN — AZITHROMYCIN MONOHYDRATE 500 MG: 500 INJECTION, POWDER, LYOPHILIZED, FOR SOLUTION INTRAVENOUS at 14:54

## 2022-04-16 RX ADMIN — ACYCLOVIR 400 MG: 400 TABLET ORAL at 21:34

## 2022-04-16 RX ADMIN — Medication 125 MG: at 09:37

## 2022-04-16 RX ADMIN — IPRATROPIUM BROMIDE AND ALBUTEROL SULFATE 3 ML: 2.5; .5 SOLUTION RESPIRATORY (INHALATION) at 11:41

## 2022-04-16 RX ADMIN — PIPERACILLIN AND TAZOBACTAM 3.38 G: 3; .375 INJECTION, POWDER, LYOPHILIZED, FOR SOLUTION INTRAVENOUS at 10:31

## 2022-04-16 RX ADMIN — PIPERACILLIN AND TAZOBACTAM 3.38 G: 3; .375 INJECTION, POWDER, LYOPHILIZED, FOR SOLUTION INTRAVENOUS at 18:07

## 2022-04-16 ASSESSMENT — ACTIVITIES OF DAILY LIVING (ADL)
ADLS_ACUITY_SCORE: 12
ADLS_ACUITY_SCORE: 13
ADLS_ACUITY_SCORE: 12
ADLS_ACUITY_SCORE: 12
ADLS_ACUITY_SCORE: 13
ADLS_ACUITY_SCORE: 11
ADLS_ACUITY_SCORE: 12
ADLS_ACUITY_SCORE: 13
ADLS_ACUITY_SCORE: 12
ADLS_ACUITY_SCORE: 11
ADLS_ACUITY_SCORE: 12
ADLS_ACUITY_SCORE: 13
ADLS_ACUITY_SCORE: 12
ADLS_ACUITY_SCORE: 13
ADLS_ACUITY_SCORE: 12
ADLS_ACUITY_SCORE: 13
ADLS_ACUITY_SCORE: 13

## 2022-04-16 NOTE — PROGRESS NOTES
[facility]    Medicine Progress Note - Hospitalist Service    Date of Admission:  4/12/2022    Assessment & Plan          71 year old female with a medical history of multiple myeloma on chemotherapy who presented to the ER for shortness of breath with fever.       Acute respiratory failure with hypoxia: due to community acquired pneumonia and COPD exacerbation. CT chest is negative for PE but shows severe bronchitis/bronchiolitis/and mild right lower lobe pneumonia.  - Hypoxia improved. Currently on 30L HFNC. Continue current support and taper as tolerated.   - Nebs prn     Community acquired pneumonia, COPD exacerbation: Negative Covid test. Patient is immunocompromised because of multiple myeloma and chemotherapy. Blood culture has no growth.  - Continue vancomycin and Zosyn/zithromax   - Follow up sputum culture result  - Continue prednisone 40 mg daily for 5 days     Multiple myeloma: Has been on treatment with Revlimid Velcade and dexamethasone. Last chemo was 4/6/2022. Per Oncology, her myeloma has responded very nicely and is currently almost in remission.   - Oncology consulted and input appreciated: Hold chemotherapy while inpatient. Initiate treatment after patient is discharged.      Hypotension: secondary to volume depletion versus sepsis. Resolved after IVF.      Chronic pain syndrome: Secondary to cancer. Resume home medications       Diet: Regular Diet Adult  Room Service    DVT Prophylaxis: Enoxaparin (Lovenox) SQ  Stover Catheter: Not present  Central Lines: None  Cardiac Monitoring: ACTIVE order. Indication: QTc prolonging medication (48 hours)  Code Status: Full Code      Disposition Plan   Expected Discharge: 04/20/2022     Anticipated discharge location:  Awaiting care coordination huddle  Delays: hypoxia       The patient's care was discussed with the Bedside Nurse, Care Coordinator/ and Patient.    Mireille Elliott MD  Hospitalist Service  [facility]  [facility]  Securely message with the [employer] Web Console (learn more here)  Text page via [facility] Paging/Directory       ______________________________________________________________________    Interval History   Patient reports that she feels better overall. Her SOB improved. She has some productive cough. No chest pain.     Data reviewed today: I reviewed all medications, new labs and imaging results over the last 24 hours.    Physical Exam   Vital Signs: Temp: 98.1  F (36.7  C) Temp src: Oral BP: (!) 171/80 (RN notified) Pulse: 66   Resp: 16 SpO2: 100 % O2 Device: High Flow Nasal Cannula (HFNC) Oxygen Delivery: 30 LPM  Weight: 120 lbs 12.8 oz    General appearance: not in acute distress  HEENT: PERRL, EOMI  Lungs: Coarse breath sounds in bilateral lung fields  Cardiovascular: Regular rate and rhythm, normal S1-S2  Abdomen: Soft, non tender, no distension  Musculoskeletal: No joint swelling  Skin: No rash and no edema  Neurology: AAO ×3.  Cranial nerves II - XII normal.  Normal muscle strength in all four extremities.    Data   Recent Labs   Lab 04/16/22  0655 04/15/22  1807 04/15/22  1212 04/15/22  0641 04/15/22  0407 04/14/22  0836 04/14/22  0640 04/13/22  0827 04/13/22  0604 04/12/22  1535   WBC 6.9  --   --  6.8  --   --  5.6  --  5.7 7.7   HGB 10.8*  --   --  10.6*  --   --  11.1*  --  12.0 14.8   MCV 97  --   --  98  --   --  106*  --  101* 100     --   --  182  --   --  150  --  143* 184   INR  --   --   --   --   --   --   --   --   --  1.11     --   --  141  --   --  141  --  138 136   POTASSIUM 3.2* 3.7 3.3* 2.9*  --   --  3.8   < > 3.4* 3.8   CHLORIDE 113*  --   --  110*  --   --  112*  --  107 99   CO2 25  --   --  24  --   --  17*  --  24 24   BUN 6*  --   --  9  --   --  16  --  15 21   CR 0.54*  --   --  0.57*  --   --  0.55*  --  0.64 0.76   ANIONGAP 5  --   --  7  --   --  12  --  7 13   SERAFIN 7.5*  --   --  7.3*  --   --  7.4*  --  7.8* 9.2   GLC 95  --   --  174* 200*   < > 50*   --  117 120   ALBUMIN  --   --   --   --   --   --   --   --  2.5*  --    PROTTOTAL  --   --   --   --   --   --   --   --  5.2*  --    BILITOTAL  --   --   --   --   --   --   --   --  0.5  --    ALKPHOS  --   --   --   --   --   --   --   --  30*  --    ALT  --   --   --   --   --   --   --   --  34  --    AST  --   --   --   --   --   --   --   --  51*  --     < > = values in this interval not displayed.

## 2022-04-16 NOTE — PLAN OF CARE
Problem: Fall Injury Risk  Goal: Absence of Fall and Fall-Related Injury  Outcome: Ongoing, Progressing   Goal Outcome Evaluation:      Encouraged use of call light  Problem: Gas Exchange Impaired  Goal: Optimal Gas Exchange  Intervention: Optimize Oxygenation and Ventilation  Recent Flowsheet Documentation  Taken 4/16/2022 0030 by Melva Menon, RN  Head of Bed (HOB) Positioning: HOB at 20 degrees     Pt. Comfortable without signs of SOB  Problem: Plan of Care - These are the overarching goals to be used throughout the patient stay.    Goal: Optimal Comfort and Wellbeing  Outcome: Ongoing, Progressing      Pt. Denies pain/SOB

## 2022-04-16 NOTE — PLAN OF CARE
Problem: Adjustment to Illness COPD (Chronic Obstructive Pulmonary Disease)  Goal: Optimal Chronic Illness Coping  Outcome: Ongoing, Progressing     Problem: Functional Ability Impaired COPD (Chronic Obstructive Pulmonary Disease)  Goal: Optimal Level of Functional [place]  Outcome: Ongoing, Progressing     Problem: Respiratory Compromise COPD (Chronic Obstructive Pulmonary Disease)  Goal: Effective Oxygenation and Ventilation  Outcome: Ongoing, Progressing   Goal Outcome Evaluation:

## 2022-04-16 NOTE — PLAN OF CARE
Goal Outcome Evaluation:  Complains of mid back pain. Took robaxin at 2030 but wants to hold off on taking dilaudid for now. Rating pain 6/10. States pain is worse with coughing spells.  Appetite fair.  On IV antibiotics-vancomycin, zosyn and azithromycin.  K replacement recheck was 3.7. No further replacement needed tonight. Next recheck in am.  Frequent congested cough but nonproductive so unable to obtain sputum sample.  Elevated Bps 189/84-hydralazine given at 1755. NA reported even higher pressure at bedtime of 194/85. Nurse rechecked at 2215 and now is 160/72. Will continue to monitor. PRN hydralazine prn every 6hrs if SBP >160.  O2 sats remain 93-95% on 30L at 30% via High flow NC. Patient does report SOB with exertion and even some with eating this shift.  Up to the BSC with assist of 1.

## 2022-04-16 NOTE — PROGRESS NOTES
Patient received Duoneb with Metaneb x 3. BS diminished throughout before and after with increased aeration. Patient tolerated well with no adverse reaction. Patient also instructed on Aerobika Flutter Valve. Patient is independent on IS and Flutter Valve. Continue therapy as ordered. Patient remains on HFNC 30lpm/30% FiO2. Attempt to wean as tolerated.

## 2022-04-16 NOTE — PROGRESS NOTES
Pt remains on HFNC 30 lpm 30%, sats 95% RR 20 HR 69. Breath sounds diminished coarse. Neb given x 1 with meta neb. Rt continue to monitor.

## 2022-04-16 NOTE — PHARMACY-VANCOMYCIN DOSING SERVICE
"Pharmacy Vancomycin Note  Date of Service 2022  Patient's  1950   71 year old, female    Indication: Sepsis  Day of Therapy: 4  Current vancomycin regimen:  750 mg IV q12h  Current vancomycin monitoring method: AUC  Current vancomycin therapeutic monitoring goal: 400-600 mg*h/L    InsightRX Prediction of Current Vancomycin Regimen  Loading dose: 1000 mg  Regimen: 750 mg IV every 12 hours.  Exposure target: AUC24 (range)400-600 mg/L.hr   AUC24,ss: 369 mg/L.hr  Probability of AUC24 > 400: 36 %  Ctrough,ss: 10.6 mg/L  Probability of Ctrough,ss > 20: 3 %  Probability of nephrotoxicity (Lodise PRAVEEN ): 6 %      Current estimated CrCl = Estimated Creatinine Clearance: 85.1 mL/min (A) (based on SCr of 0.54 mg/dL (L)).    Creatinine for last 3 days  2022:  6:40 AM Creatinine 0.55 mg/dL  4/15/2022:  6:41 AM Creatinine 0.57 mg/dL  2022:  6:55 AM Creatinine 0.54 mg/dL    Recent Vancomycin Levels (past 3 days)  2022:  6:40 AM Vancomycin 9.6 mg/L    Vancomycin IV Administrations (past 72 hours)                   vancomycin (VANCOCIN) 750 mg in sodium chloride 0.9 % 250 mL intermittent infusion (mg) 750 mg New Bag 22 0942     750 mg New Bag 04/15/22 2007     750 mg New Bag  0812     750 mg New Bag 22     750 mg New Bag  0742     750 mg New Bag 22 2116                Nephrotoxins and other renal medications (From now, onward)    Start     Dose/Rate Route Frequency Ordered Stop    22  vancomycin (VANCOCIN) 1000 mg in dextrose 5% 200 mL PREMIX         1,000 mg  200 mL/hr over 1 Hours Intravenous EVERY 12 HOURS 22 1228      22 0100  piperacillin-tazobactam (ZOSYN) 3.375 g vial to attach to  mL bag        Note to Pharmacy: For [facility], [facility] and [facility]: For Zosyn-naive patients, use the \"Zosyn initial dose + extended infusion\" order panel.    3.375 g  over 240 Minutes Intravenous EVERY 8 HOURS 22 1933      22 2200  acyclovir (ZOVIRAX) tablet 400 " mg         400 mg Oral 2 TIMES DAILY 04/12/22 1930               Contrast Orders - past 72 hours (72h ago, onward)    None          Interpretation of levels and current regimen:  Vancomycin level is reflective of -600    Has serum creatinine changed greater than 50% in last 72 hours: No    Urine output:  good urine output    Renal Function: Stable    InsightRX Prediction of Planned New Vancomycin Regimen  Loading dose: N/A  Regimen: 1000 mg IV every 12 hours.  Start time: 21:42 on 04/16/2022  Exposure target: AUC24 (range)400-600 mg/L.hr   AUC24,ss: 489 mg/L.hr  Probability of AUC24 > 400: 82 %  Ctrough,ss: 14.2 mg/L  Probability of Ctrough,ss > 20: 15 %  Probability of nephrotoxicity (Lodise PRAVEEN 2009): 9 %      Note: Dose has been 750 mg q 12hr for past few days. Insights information was entered as 1000 mg q12 hr. Based on this information, will increase dose to 1000 mg q12 hr and check level 4/17.    Plan:  1. Change dose to 1000 mg q12hr (see statement above).  2. Vancomycin monitoring method: AUC  3. Vancomycin therapeutic monitoring goal: 400-600 mg*h/L  4. Pharmacy will check vancomycin levels as appropriate in 1-3 Days.  5. Serum creatinine levels will be ordered a minimum of twice weekly.    Harvey Anguiano RPH

## 2022-04-17 LAB
BACTERIA BLD CULT: NO GROWTH
BACTERIA BLD CULT: NO GROWTH
MRSA DNA SPEC QL NAA+PROBE: NEGATIVE
POTASSIUM BLD-SCNC: 3.3 MMOL/L (ref 3.5–5)
POTASSIUM BLD-SCNC: 3.6 MMOL/L (ref 3.5–5)
SA TARGET DNA: NEGATIVE
VANCOMYCIN SERPL-MCNC: 16.8 MG/L

## 2022-04-17 PROCEDURE — 999N000157 HC STATISTIC RCP TIME EA 10 MIN

## 2022-04-17 PROCEDURE — 250N000012 HC RX MED GY IP 250 OP 636 PS 637: Performed by: NURSE PRACTITIONER

## 2022-04-17 PROCEDURE — 94640 AIRWAY INHALATION TREATMENT: CPT | Mod: 76

## 2022-04-17 PROCEDURE — 250N000011 HC RX IP 250 OP 636: Performed by: INTERNAL MEDICINE

## 2022-04-17 PROCEDURE — 250N000013 HC RX MED GY IP 250 OP 250 PS 637: Performed by: INTERNAL MEDICINE

## 2022-04-17 PROCEDURE — 87641 MR-STAPH DNA AMP PROBE: CPT | Performed by: INTERNAL MEDICINE

## 2022-04-17 PROCEDURE — 99232 SBSQ HOSP IP/OBS MODERATE 35: CPT | Performed by: INTERNAL MEDICINE

## 2022-04-17 PROCEDURE — 94660 CPAP INITIATION&MGMT: CPT

## 2022-04-17 PROCEDURE — 94640 AIRWAY INHALATION TREATMENT: CPT

## 2022-04-17 PROCEDURE — 84132 ASSAY OF SERUM POTASSIUM: CPT | Performed by: INTERNAL MEDICINE

## 2022-04-17 PROCEDURE — 250N000009 HC RX 250: Performed by: INTERNAL MEDICINE

## 2022-04-17 PROCEDURE — 94799 UNLISTED PULMONARY SVC/PX: CPT

## 2022-04-17 PROCEDURE — 36415 COLL VENOUS BLD VENIPUNCTURE: CPT | Performed by: INTERNAL MEDICINE

## 2022-04-17 PROCEDURE — 80202 ASSAY OF VANCOMYCIN: CPT | Performed by: INTERNAL MEDICINE

## 2022-04-17 PROCEDURE — 258N000003 HC RX IP 258 OP 636: Performed by: INTERNAL MEDICINE

## 2022-04-17 PROCEDURE — 999N000215 HC STATISTIC HFNC ADULT NON-CPAP

## 2022-04-17 PROCEDURE — 120N000001 HC R&B MED SURG/OB

## 2022-04-17 RX ORDER — AMLODIPINE BESYLATE 5 MG/1
5 TABLET ORAL DAILY
Status: DISCONTINUED | OUTPATIENT
Start: 2022-04-17 | End: 2022-04-19

## 2022-04-17 RX ORDER — IBUPROFEN 400 MG/1
400 TABLET, FILM COATED ORAL EVERY 6 HOURS PRN
Status: DISCONTINUED | OUTPATIENT
Start: 2022-04-17 | End: 2022-04-20 | Stop reason: HOSPADM

## 2022-04-17 RX ORDER — POTASSIUM CHLORIDE 1500 MG/1
20 TABLET, EXTENDED RELEASE ORAL ONCE
Status: COMPLETED | OUTPATIENT
Start: 2022-04-17 | End: 2022-04-17

## 2022-04-17 RX ADMIN — Medication 125 MG: at 09:19

## 2022-04-17 RX ADMIN — IPRATROPIUM BROMIDE AND ALBUTEROL SULFATE 3 ML: 2.5; .5 SOLUTION RESPIRATORY (INHALATION) at 20:15

## 2022-04-17 RX ADMIN — IBUPROFEN 400 MG: 400 TABLET, FILM COATED ORAL at 10:18

## 2022-04-17 RX ADMIN — AMLODIPINE BESYLATE 5 MG: 5 TABLET ORAL at 16:42

## 2022-04-17 RX ADMIN — HYDROMORPHONE HYDROCHLORIDE 4 MG: 2 TABLET ORAL at 00:54

## 2022-04-17 RX ADMIN — METHOCARBAMOL 500 MG: 500 TABLET, FILM COATED ORAL at 18:19

## 2022-04-17 RX ADMIN — PREDNISONE 40 MG: 20 TABLET ORAL at 09:18

## 2022-04-17 RX ADMIN — Medication 20 MG: at 22:05

## 2022-04-17 RX ADMIN — PIPERACILLIN AND TAZOBACTAM 3.38 G: 3; .375 INJECTION, POWDER, LYOPHILIZED, FOR SOLUTION INTRAVENOUS at 00:49

## 2022-04-17 RX ADMIN — AZITHROMYCIN MONOHYDRATE 500 MG: 500 INJECTION, POWDER, LYOPHILIZED, FOR SOLUTION INTRAVENOUS at 14:25

## 2022-04-17 RX ADMIN — IPRATROPIUM BROMIDE AND ALBUTEROL SULFATE 3 ML: 2.5; .5 SOLUTION RESPIRATORY (INHALATION) at 08:09

## 2022-04-17 RX ADMIN — HYDRALAZINE HYDROCHLORIDE 10 MG: 20 INJECTION INTRAMUSCULAR; INTRAVENOUS at 19:47

## 2022-04-17 RX ADMIN — ACYCLOVIR 400 MG: 400 TABLET ORAL at 09:22

## 2022-04-17 RX ADMIN — HYDRALAZINE HYDROCHLORIDE 10 MG: 20 INJECTION INTRAMUSCULAR; INTRAVENOUS at 04:18

## 2022-04-17 RX ADMIN — ASPIRIN 81 MG CHEWABLE TABLET 81 MG: 81 TABLET CHEWABLE at 09:17

## 2022-04-17 RX ADMIN — PIPERACILLIN AND TAZOBACTAM 3.38 G: 3; .375 INJECTION, POWDER, LYOPHILIZED, FOR SOLUTION INTRAVENOUS at 10:18

## 2022-04-17 RX ADMIN — POTASSIUM CHLORIDE 20 MEQ: 1500 TABLET, EXTENDED RELEASE ORAL at 11:06

## 2022-04-17 RX ADMIN — ACETAMINOPHEN 650 MG: 325 TABLET ORAL at 09:16

## 2022-04-17 RX ADMIN — Medication 20 MG: at 00:54

## 2022-04-17 RX ADMIN — GABAPENTIN 600 MG: 300 CAPSULE ORAL at 09:17

## 2022-04-17 RX ADMIN — GABAPENTIN 600 MG: 300 CAPSULE ORAL at 14:25

## 2022-04-17 RX ADMIN — IPRATROPIUM BROMIDE AND ALBUTEROL SULFATE 3 ML: 2.5; .5 SOLUTION RESPIRATORY (INHALATION) at 11:26

## 2022-04-17 RX ADMIN — GABAPENTIN 600 MG: 300 CAPSULE ORAL at 20:01

## 2022-04-17 RX ADMIN — PIPERACILLIN AND TAZOBACTAM 3.38 G: 3; .375 INJECTION, POWDER, LYOPHILIZED, FOR SOLUTION INTRAVENOUS at 18:14

## 2022-04-17 RX ADMIN — VANCOMYCIN HYDROCHLORIDE 1000 MG: 1 INJECTION, SOLUTION INTRAVENOUS at 20:00

## 2022-04-17 RX ADMIN — ACYCLOVIR 400 MG: 400 TABLET ORAL at 20:00

## 2022-04-17 RX ADMIN — PHENAZOPYRIDINE HYDROCHLORIDE 200 MG: 100 TABLET ORAL at 20:01

## 2022-04-17 RX ADMIN — VANCOMYCIN HYDROCHLORIDE 1000 MG: 1 INJECTION, SOLUTION INTRAVENOUS at 09:27

## 2022-04-17 RX ADMIN — HYDROMORPHONE HYDROCHLORIDE 4 MG: 2 TABLET ORAL at 22:04

## 2022-04-17 RX ADMIN — ENOXAPARIN SODIUM 40 MG: 40 INJECTION SUBCUTANEOUS at 22:05

## 2022-04-17 RX ADMIN — IPRATROPIUM BROMIDE AND ALBUTEROL SULFATE 3 ML: 2.5; .5 SOLUTION RESPIRATORY (INHALATION) at 15:43

## 2022-04-17 RX ADMIN — LEVOCARNITINE 330 MG: 330 TABLET ORAL at 20:01

## 2022-04-17 RX ADMIN — HYDRALAZINE HYDROCHLORIDE 10 MG: 20 INJECTION INTRAMUSCULAR; INTRAVENOUS at 13:24

## 2022-04-17 ASSESSMENT — ACTIVITIES OF DAILY LIVING (ADL)
ADLS_ACUITY_SCORE: 10
ADLS_ACUITY_SCORE: 11
ADLS_ACUITY_SCORE: 10
ADLS_ACUITY_SCORE: 11
ADLS_ACUITY_SCORE: 10
ADLS_ACUITY_SCORE: 10
ADLS_ACUITY_SCORE: 11
ADLS_ACUITY_SCORE: 10
ADLS_ACUITY_SCORE: 11
ADLS_ACUITY_SCORE: 10
ADLS_ACUITY_SCORE: 10
ADLS_ACUITY_SCORE: 11
ADLS_ACUITY_SCORE: 10
ADLS_ACUITY_SCORE: 11

## 2022-04-17 NOTE — PHARMACY-VANCOMYCIN DOSING SERVICE
"Pharmacy Vancomycin Note  Date of Service 2022  Patient's  1950   71 year old, female    Indication: Sepsis  Day of Therapy: 6  Current vancomycin regimen:  1000 mg IV q12h  Current vancomycin monitoring method: AUC  Current vancomycin therapeutic monitoring goal: 400-600 mg*h/L    InsightRX Prediction of Current Vancomycin Regimen  Loading dose: N/A  Regimen: 1000 mg IV every 12 hours.  Start time: 09:34 on 2022  Exposure target: AUC24 (range)400-600 mg/L.hr   AUC24,ss: 535 mg/L.hr  Probability of AUC24 > 400: 97 %  Ctrough,ss: 16.0 mg/L  Probability of Ctrough,ss > 20: 15 %  Probability of nephrotoxicity (Lodise PRAVEEN ): 11 %      Current estimated CrCl = Estimated Creatinine Clearance: 85.1 mL/min (A) (based on SCr of 0.54 mg/dL (L)).    Creatinine for last 3 days  4/15/2022:  6:41 AM Creatinine 0.57 mg/dL  2022:  6:55 AM Creatinine 0.54 mg/dL    Recent Vancomycin Levels (past 3 days)  2022:  6:18 AM Vancomycin 16.8 mg/L    Vancomycin IV Administrations (past 72 hours)                   vancomycin (VANCOCIN) 1000 mg in dextrose 5% 200 mL PREMIX (mg) 1,000 mg New Bag 22    vancomycin (VANCOCIN) 750 mg in sodium chloride 0.9 % 250 mL intermittent infusion (mg) 750 mg New Bag 22 0942     750 mg New Bag 04/15/22 2007     750 mg New Bag  08     750 mg New Bag 22                Nephrotoxins and other renal medications (From now, onward)    Start     Dose/Rate Route Frequency Ordered Stop    22  vancomycin (VANCOCIN) 1000 mg in dextrose 5% 200 mL PREMIX         1,000 mg  200 mL/hr over 1 Hours Intravenous EVERY 12 HOURS 22 1228      22 0100  piperacillin-tazobactam (ZOSYN) 3.375 g vial to attach to  mL bag        Note to Pharmacy: For [facility], [facility] and [facility]: For Zosyn-naive patients, use the \"Zosyn initial dose + extended infusion\" order panel.    3.375 g  over 240 Minutes Intravenous EVERY 8 HOURS 22 1933      22  " acyclovir (ZOVIRAX) tablet 400 mg         400 mg Oral 2 TIMES DAILY 04/12/22 1930               Contrast Orders - past 72 hours (72h ago, onward)    None          Interpretation of levels and current regimen:  Vancomycin level is reflective of -600    Has serum creatinine changed greater than 50% in last 72 hours: No    Urine output:  good urine output    Renal Function: Stable    Plan:  1. Continue Current Dose  2. Vancomycin monitoring method: AUC  3. Vancomycin therapeutic monitoring goal: 400-600 mg*h/L  4. Pharmacy will check vancomycin levels as appropriate in 3-5 Days.  5. Serum creatinine levels will be ordered daily for the first week of therapy and at least twice weekly for subsequent weeks.    Taylor Rangel, [facility]

## 2022-04-17 NOTE — PLAN OF CARE
Goal Outcome Evaluation:    Problem: Plan of Care - These are the overarching goals to be used throughout the patient stay.    Goal: Optimal Comfort and Wellbeing  Outcome: Ongoing, Progressing  Intervention: Monitor Pain and Promote Comfort  Patient c/o chronic back pain. PRN Methocarbamol and Dilaudid effective for pain relief. Patient is up with SBA in room. Minimal SOB noted with activity.

## 2022-04-17 NOTE — PLAN OF CARE
Goal Outcome Evaluation:           Pt switched to nasal cannula today, walked canela with minimal shortness of breath.  Pt reports her neuropathy improves with ambulation.  Pt reported headache, ibuprofen and tylenol given, pain down to a 4.

## 2022-04-17 NOTE — PROGRESS NOTES
RESPIRATORY CARE NOTE     Patient Name: Lizzie Viera  Today's Date: 4/16/2022       Pt continues to receive QID Duo with Metaneb QID. BS are course with insp/exp wheezes before, improved post neb slightly course. Pt is on 30L/30% oxygen via HFNC, SpO2 is 93%. Pt independent on flutter valve and IS.  Pt also perceives improvement.  RT encouraged deep breathing and coughing techniques .  RT will continue to monitor and assess.

## 2022-04-17 NOTE — PROGRESS NOTES
[facility]    Medicine Progress Note - Hospitalist Service    Date of Admission:  4/12/2022    Assessment & Plan          71 year old female with a medical history of multiple myeloma on chemotherapy who presented to the ER for shortness of breath with fever.       Acute respiratory failure with hypoxia: due to community acquired pneumonia and COPD exacerbation. CT chest is negative for PE but shows severe bronchitis/bronchiolitis/and mild right lower lobe pneumonia.  - Hypoxia improved. Tapered down from 30L HFNC to 2 LPM nasal cannula oxygen today. Continue current support and taper as tolerated.   - Nebs prn     Community acquired pneumonia right lower lobe, COPD exacerbation: Negative Covid test. Patient is immunocompromised because of multiple myeloma and chemotherapy. Blood culture has no growth.  - Continue vancomycin and Zosyn. Follow up MRSA screening result to deescalate antibiotics. Completed zithromax for 5 days (4/13-4/17).  - Continue prednisone 40 mg daily for 5 days     Multiple myeloma: Has been on treatment with Revlimid Velcade and dexamethasone. Last chemo was 4/6/2022. Per Oncology, her myeloma has responded very nicely and is currently almost in remission.   - Oncology consulted and input appreciated: Hold chemotherapy while inpatient. Initiate treatment after patient is discharged.   - Continue PTA acyclovir     Essential hypertension:  no prior history of hypertension. She had hypotension on admission secondary to volume depletion and sepsis. Now BP has become elevated for the past several days.  - Start amlodipine 5 mg daily. Hydralazine prn.     Chronic pain syndrome: Secondary to cancer. Resume home medications       Diet: Regular Diet Adult  Room Service    DVT Prophylaxis: Enoxaparin (Lovenox) SQ  Stover Catheter: Not present  Central Lines: None  Cardiac Monitoring: ACTIVE order. Indication: QTc prolonging medication (48 hours)  Code Status: Full Code       Disposition Plan   Expected Discharge: 04/20/2022     Anticipated discharge location:  Awaiting care coordination huddle  Delays: hypoxia       The patient's care was discussed with the Bedside Nurse, Care Coordinator/ and Patient.    Mireille Elliott MD  Hospitalist Service  [facility]  Securely message with the Vocera Web Console (learn more here)  Text page via [employer] Paging/Directory       ______________________________________________________________________    Interval History   Patient got tapered down from high flow oxygen to nasal cannula oxygen today. She reports that she feels a lot better today. She feels that her breathing has significant improvement and her chest is not as tight. Her cough has become less productive. She is excited that she will walk with PT outside her room in the hallway this afternoon.     Data reviewed today: I reviewed all medications, new labs and imaging results over the last 24 hours.    Physical Exam   Vital Signs: Temp: 97.8  F (36.6  C) Temp src: Oral BP: (!) 173/74 Pulse: 66   Resp: 18 SpO2: 96 % O2 Device: Nasal cannula Oxygen Delivery: 2 LPM  Weight: 120 lbs 12.8 oz    General appearance: not in acute distress  HEENT: PERRL, EOMI  Lungs: Coarse breath sounds in bilateral lung fields  Cardiovascular: Regular rate and rhythm, normal S1-S2  Abdomen: Soft, non tender, no distension  Musculoskeletal: No joint swelling  Skin: No rash and no edema  Neurology: AAO ×3.  Cranial nerves II - XII normal.  Normal muscle strength in all four extremities.    Data   Recent Labs   Lab 04/17/22  0618 04/16/22  1700 04/16/22  0655 04/15/22  1212 04/15/22  0641 04/15/22  0407 04/14/22  0836 04/14/22  0640 04/13/22  0827 04/13/22  0604 04/12/22  1535   WBC  --   --  6.9  --  6.8  --   --  5.6  --  5.7 7.7   HGB  --   --  10.8*  --  10.6*  --   --  11.1*  --  12.0 14.8   MCV  --   --  97  --  98  --   --  106*  --  101* 100   PLT  --   --  191  --  182  --    --  150  --  143* 184   INR  --   --   --   --   --   --   --   --   --   --  1.11   NA  --   --  143  --  141  --   --  141  --  138 136   POTASSIUM 3.3* 3.8 3.2*   < > 2.9*  --   --  3.8   < > 3.4* 3.8   CHLORIDE  --   --  113*  --  110*  --   --  112*  --  107 99   CO2  --   --  25  --  24  --   --  17*  --  24 24   BUN  --   --  6*  --  9  --   --  16  --  15 21   CR  --   --  0.54*  --  0.57*  --   --  0.55*  --  0.64 0.76   ANIONGAP  --   --  5  --  7  --   --  12  --  7 13   SERAFIN  --   --  7.5*  --  7.3*  --   --  7.4*  --  7.8* 9.2   GLC  --   --  95  --  174* 200*   < > 50*  --  117 120   ALBUMIN  --   --   --   --   --   --   --   --   --  2.5*  --    PROTTOTAL  --   --   --   --   --   --   --   --   --  5.2*  --    BILITOTAL  --   --   --   --   --   --   --   --   --  0.5  --    ALKPHOS  --   --   --   --   --   --   --   --   --  30*  --    ALT  --   --   --   --   --   --   --   --   --  34  --    AST  --   --   --   --   --   --   --   --   --  51*  --     < > = values in this interval not displayed.

## 2022-04-17 NOTE — PROGRESS NOTES
"RESPIRATORY CARE NOTE  Patient is on room air, BS diminished; rhonchi, gave Duoneb treatment x3 with metaneb, BS post treatment no change, patient perceives moderate improvement, patient tolerated treatment well.    HFNC on standby with setting of 30L 30%.   Encouraged to work on flutter valve and IS. Cough is strong and non-productive. Skin free of redness.         BP (!) 173/74 (BP Location: Right arm)   Pulse 66   Temp 97.8  F (36.6  C) (Oral)   Resp 18   Ht 1.575 m (5' 2\")   Wt 54.8 kg (120 lb 12.8 oz)   SpO2 97%   BMI 22.09 kg/m           Facundo Almazan RT    "

## 2022-04-18 ENCOUNTER — VIRTUAL VISIT (OUTPATIENT)
Dept: ONCOLOGY | Facility: CLINIC | Age: 72
End: 2022-04-18
Attending: SOCIAL WORKER
Payer: COMMERCIAL

## 2022-04-18 ENCOUNTER — APPOINTMENT (OUTPATIENT)
Dept: OCCUPATIONAL THERAPY | Facility: HOSPITAL | Age: 72
DRG: 193 | End: 2022-04-18
Payer: COMMERCIAL

## 2022-04-18 ENCOUNTER — APPOINTMENT (OUTPATIENT)
Dept: PHYSICAL THERAPY | Facility: HOSPITAL | Age: 72
DRG: 193 | End: 2022-04-18
Payer: COMMERCIAL

## 2022-04-18 ENCOUNTER — HOME INFUSION (PRE-WILLOW HOME INFUSION) (OUTPATIENT)
Dept: PHARMACY | Facility: CLINIC | Age: 72
End: 2022-04-18
Payer: COMMERCIAL

## 2022-04-18 DIAGNOSIS — F43.23 ADJUSTMENT DISORDER WITH MIXED ANXIETY AND DEPRESSED MOOD: Primary | ICD-10-CM

## 2022-04-18 DIAGNOSIS — C90.00 MULTIPLE MYELOMA WITH FAILED REMISSION (H): ICD-10-CM

## 2022-04-18 LAB
CREAT SERPL-MCNC: 0.85 MG/DL (ref 0.6–1.1)
GFR SERPL CREATININE-BSD FRML MDRD: 74 ML/MIN/1.73M2
LACTATE SERPL-SCNC: 1.8 MMOL/L (ref 0.7–2)
MAGNESIUM SERPL-MCNC: 1.7 MG/DL (ref 1.8–2.6)
PLATELET # BLD AUTO: 256 10E3/UL (ref 150–450)
POTASSIUM BLD-SCNC: 3.2 MMOL/L (ref 3.5–5)
POTASSIUM BLD-SCNC: 3.3 MMOL/L (ref 3.5–5)
POTASSIUM BLD-SCNC: 3.8 MMOL/L (ref 3.5–5)

## 2022-04-18 PROCEDURE — 250N000011 HC RX IP 250 OP 636: Performed by: INTERNAL MEDICINE

## 2022-04-18 PROCEDURE — 85049 AUTOMATED PLATELET COUNT: CPT | Performed by: INTERNAL MEDICINE

## 2022-04-18 PROCEDURE — 94640 AIRWAY INHALATION TREATMENT: CPT | Mod: 76

## 2022-04-18 PROCEDURE — 999N000157 HC STATISTIC RCP TIME EA 10 MIN

## 2022-04-18 PROCEDURE — 82565 ASSAY OF CREATININE: CPT | Performed by: INTERNAL MEDICINE

## 2022-04-18 PROCEDURE — 99233 SBSQ HOSP IP/OBS HIGH 50: CPT | Performed by: INTERNAL MEDICINE

## 2022-04-18 PROCEDURE — 36415 COLL VENOUS BLD VENIPUNCTURE: CPT | Performed by: INTERNAL MEDICINE

## 2022-04-18 PROCEDURE — 84132 ASSAY OF SERUM POTASSIUM: CPT | Performed by: INTERNAL MEDICINE

## 2022-04-18 PROCEDURE — 250N000013 HC RX MED GY IP 250 OP 250 PS 637: Performed by: INTERNAL MEDICINE

## 2022-04-18 PROCEDURE — 250N000012 HC RX MED GY IP 250 OP 636 PS 637: Performed by: NURSE PRACTITIONER

## 2022-04-18 PROCEDURE — 83605 ASSAY OF LACTIC ACID: CPT | Performed by: INTERNAL MEDICINE

## 2022-04-18 PROCEDURE — 83735 ASSAY OF MAGNESIUM: CPT | Performed by: INTERNAL MEDICINE

## 2022-04-18 PROCEDURE — 250N000009 HC RX 250: Performed by: INTERNAL MEDICINE

## 2022-04-18 PROCEDURE — 94640 AIRWAY INHALATION TREATMENT: CPT

## 2022-04-18 PROCEDURE — 97116 GAIT TRAINING THERAPY: CPT | Mod: GP

## 2022-04-18 PROCEDURE — 94799 UNLISTED PULMONARY SVC/PX: CPT

## 2022-04-18 PROCEDURE — 120N000001 HC R&B MED SURG/OB

## 2022-04-18 RX ORDER — IPRATROPIUM BROMIDE AND ALBUTEROL SULFATE 2.5; .5 MG/3ML; MG/3ML
3 SOLUTION RESPIRATORY (INHALATION) 3 TIMES DAILY
Status: DISCONTINUED | OUTPATIENT
Start: 2022-04-18 | End: 2022-04-20 | Stop reason: HOSPADM

## 2022-04-18 RX ORDER — IPRATROPIUM BROMIDE AND ALBUTEROL SULFATE 2.5; .5 MG/3ML; MG/3ML
3 SOLUTION RESPIRATORY (INHALATION) 3 TIMES DAILY
Status: DISCONTINUED | OUTPATIENT
Start: 2022-04-18 | End: 2022-04-18

## 2022-04-18 RX ORDER — POTASSIUM CHLORIDE 1500 MG/1
20 TABLET, EXTENDED RELEASE ORAL ONCE
Status: COMPLETED | OUTPATIENT
Start: 2022-04-18 | End: 2022-04-18

## 2022-04-18 RX ORDER — NALOXONE HYDROCHLORIDE 0.4 MG/ML
0.2 INJECTION, SOLUTION INTRAMUSCULAR; INTRAVENOUS; SUBCUTANEOUS
Status: DISCONTINUED | OUTPATIENT
Start: 2022-04-18 | End: 2022-04-20 | Stop reason: HOSPADM

## 2022-04-18 RX ORDER — NALOXONE HYDROCHLORIDE 0.4 MG/ML
0.4 INJECTION, SOLUTION INTRAMUSCULAR; INTRAVENOUS; SUBCUTANEOUS
Status: DISCONTINUED | OUTPATIENT
Start: 2022-04-18 | End: 2022-04-20 | Stop reason: HOSPADM

## 2022-04-18 RX ORDER — MULTIVIT WITH MINERALS/LUTEIN
250 TABLET ORAL DAILY
Status: DISCONTINUED | OUTPATIENT
Start: 2022-04-19 | End: 2022-04-20 | Stop reason: HOSPADM

## 2022-04-18 RX ORDER — MAGNESIUM SULFATE HEPTAHYDRATE 40 MG/ML
2 INJECTION, SOLUTION INTRAVENOUS ONCE
Status: COMPLETED | OUTPATIENT
Start: 2022-04-18 | End: 2022-04-18

## 2022-04-18 RX ORDER — POTASSIUM CHLORIDE 750 MG/1
10 TABLET, EXTENDED RELEASE ORAL ONCE
Status: COMPLETED | OUTPATIENT
Start: 2022-04-18 | End: 2022-04-18

## 2022-04-18 RX ORDER — FUROSEMIDE 10 MG/ML
20 INJECTION INTRAMUSCULAR; INTRAVENOUS ONCE
Status: COMPLETED | OUTPATIENT
Start: 2022-04-18 | End: 2022-04-18

## 2022-04-18 RX ORDER — POTASSIUM CHLORIDE 1.5 G/1.58G
20 POWDER, FOR SOLUTION ORAL ONCE
Status: DISCONTINUED | OUTPATIENT
Start: 2022-04-18 | End: 2022-04-19

## 2022-04-18 RX ADMIN — HYDRALAZINE HYDROCHLORIDE 10 MG: 20 INJECTION INTRAMUSCULAR; INTRAVENOUS at 02:00

## 2022-04-18 RX ADMIN — POTASSIUM CHLORIDE 20 MEQ: 1500 TABLET, EXTENDED RELEASE ORAL at 14:26

## 2022-04-18 RX ADMIN — HYDRALAZINE HYDROCHLORIDE 10 MG: 20 INJECTION INTRAMUSCULAR; INTRAVENOUS at 12:11

## 2022-04-18 RX ADMIN — PIPERACILLIN AND TAZOBACTAM 3.38 G: 3; .375 INJECTION, POWDER, LYOPHILIZED, FOR SOLUTION INTRAVENOUS at 10:15

## 2022-04-18 RX ADMIN — ACYCLOVIR 400 MG: 400 TABLET ORAL at 08:26

## 2022-04-18 RX ADMIN — HYDROMORPHONE HYDROCHLORIDE 4 MG: 2 TABLET ORAL at 01:54

## 2022-04-18 RX ADMIN — AMLODIPINE BESYLATE 5 MG: 5 TABLET ORAL at 08:27

## 2022-04-18 RX ADMIN — POTASSIUM CHLORIDE 20 MEQ: 1500 TABLET, EXTENDED RELEASE ORAL at 08:54

## 2022-04-18 RX ADMIN — ENOXAPARIN SODIUM 40 MG: 40 INJECTION SUBCUTANEOUS at 22:18

## 2022-04-18 RX ADMIN — GABAPENTIN 600 MG: 300 CAPSULE ORAL at 14:12

## 2022-04-18 RX ADMIN — ACETAMINOPHEN 650 MG: 325 TABLET ORAL at 01:54

## 2022-04-18 RX ADMIN — POTASSIUM CHLORIDE 20 MEQ: 1500 TABLET, EXTENDED RELEASE ORAL at 16:56

## 2022-04-18 RX ADMIN — Medication 20 MG: at 22:18

## 2022-04-18 RX ADMIN — ACYCLOVIR 400 MG: 400 TABLET ORAL at 20:31

## 2022-04-18 RX ADMIN — MAGNESIUM SULFATE IN WATER 2 G: 40 INJECTION, SOLUTION INTRAVENOUS at 14:12

## 2022-04-18 RX ADMIN — ASPIRIN 81 MG CHEWABLE TABLET 81 MG: 81 TABLET CHEWABLE at 08:26

## 2022-04-18 RX ADMIN — VANCOMYCIN HYDROCHLORIDE 1000 MG: 1 INJECTION, SOLUTION INTRAVENOUS at 08:33

## 2022-04-18 RX ADMIN — PIPERACILLIN AND TAZOBACTAM 3.38 G: 3; .375 INJECTION, POWDER, LYOPHILIZED, FOR SOLUTION INTRAVENOUS at 16:56

## 2022-04-18 RX ADMIN — Medication 125 MG: at 08:27

## 2022-04-18 RX ADMIN — PHENAZOPYRIDINE HYDROCHLORIDE 200 MG: 100 TABLET ORAL at 08:27

## 2022-04-18 RX ADMIN — FUROSEMIDE 20 MG: 10 INJECTION, SOLUTION INTRAMUSCULAR; INTRAVENOUS at 14:12

## 2022-04-18 RX ADMIN — METHOCARBAMOL 500 MG: 500 TABLET, FILM COATED ORAL at 08:54

## 2022-04-18 RX ADMIN — PIPERACILLIN AND TAZOBACTAM 3.38 G: 3; .375 INJECTION, POWDER, LYOPHILIZED, FOR SOLUTION INTRAVENOUS at 01:54

## 2022-04-18 RX ADMIN — IPRATROPIUM BROMIDE AND ALBUTEROL SULFATE 3 ML: 2.5; .5 SOLUTION RESPIRATORY (INHALATION) at 07:55

## 2022-04-18 RX ADMIN — LEVOCARNITINE 330 MG: 330 TABLET ORAL at 20:32

## 2022-04-18 RX ADMIN — GABAPENTIN 600 MG: 300 CAPSULE ORAL at 08:26

## 2022-04-18 RX ADMIN — PREDNISONE 40 MG: 20 TABLET ORAL at 08:26

## 2022-04-18 RX ADMIN — IPRATROPIUM BROMIDE AND ALBUTEROL SULFATE 3 ML: 2.5; .5 SOLUTION RESPIRATORY (INHALATION) at 12:17

## 2022-04-18 RX ADMIN — METHOCARBAMOL 500 MG: 500 TABLET, FILM COATED ORAL at 20:31

## 2022-04-18 RX ADMIN — GABAPENTIN 600 MG: 300 CAPSULE ORAL at 20:30

## 2022-04-18 RX ADMIN — IPRATROPIUM BROMIDE AND ALBUTEROL SULFATE 3 ML: .5; 3 SOLUTION RESPIRATORY (INHALATION) at 20:12

## 2022-04-18 RX ADMIN — POTASSIUM CHLORIDE 10 MEQ: 750 TABLET, EXTENDED RELEASE ORAL at 22:19

## 2022-04-18 RX ADMIN — HYDRALAZINE HYDROCHLORIDE 10 MG: 20 INJECTION INTRAMUSCULAR; INTRAVENOUS at 20:28

## 2022-04-18 ASSESSMENT — ACTIVITIES OF DAILY LIVING (ADL)
ADLS_ACUITY_SCORE: 8

## 2022-04-18 NOTE — PROGRESS NOTES
Therapy: IV ABX   Insurance:[employer] Medicare     Pt has a Medicare replacement plan (which follows Medicare guidelines), which does not cover IV ABX in the home. (Pt would have coverage for short term TCU or IC). Below is what pt would be responsible for if pt wanted to go w [facility]      Pt would have to self-pay for the drug dispensed & per-mo (daily)  If not homebound, nursing would also be self-pay ($90.00 per visit)    Cost for Zosyn 3.375GM Q8 & Vancomycin 1GM Q12 is $32.33 per day     Please contact Intake with any questions, [phone] or In Basket pool, [facility] ([phone]).

## 2022-04-18 NOTE — PROGRESS NOTES
RESPIRATORY CARE NOTE     Patient Name: Lizzie Viera  Today's Date: 4/18/2022       Pt continues to receive QID Duo with QID metaneb. . BS are decreased clear. Pt is on room air, SpO2 is 93-95%.  HFNC remains on stand-by in room.   Post treatment there is increased aeration. Pt also perceives improvement.  RT encouraged deep breathing and coughing techniques .  RT will continue to monitor and assess.

## 2022-04-18 NOTE — PLAN OF CARE
Goal Outcome Evaluation:    Problem: Pain Acute  Goal: Acceptable Pain Control and Functional Ability  Outcome: Ongoing, Progressing  Intervention: Prevent or Manage Pain  Recent Flowsheet Documentation  Taken 4/18/2022 0848 by Pati Rodriguez RN  Medication Review/Management: medications reviewed  Rating back pain a 2/10 this morning; PRN methocarbamol given this morning. Seen by acupuncture this afternoon, which was helpful.    Continues on IV zosyn.   Mg replacement  1x dose of IV lasix given  K protocol, PO replacement given, recheck this evening at 1830  Doing well on room air this shift

## 2022-04-18 NOTE — PROVIDER NOTIFICATION
Notified MD at 1606 PM regarding Pt code status doesn't match her advance directive paperwork scanned in chart. Please review..      Spoke with: Text page sent to Dr. MACK.         Comments: This was noted on chart review at beginning of shift.  A note from psychologist at [facility] stating pt is concerned about code status not matching ACP documents.    - This writer verified ACP documents.       ADDENDUM: Writer spoke with Dr. MACK in person. Both this writer and Dr. MACK approached pt and discussed her code status. Pt indicated she did NOT want compressions, but was ok with intubation.. New orders pending.

## 2022-04-18 NOTE — PROGRESS NOTES
[facility] Inpatient Progress Note    Patient: Lizzie Viera  MRN: [id number]  Date of Service: April 18, 2022         Reason for Visit    Patient with a history of multiple myeloma, currently on treatment with Velcade dexamethasone and Revlimid  Admitted with hypoxia shortness of breath and pneumonia    Assessment and Plan    Cancer Staging  No matching staging information was found for the patient.    1.  Multiple myeloma.  She has been on treatment with Revlimid and Velcade along with dexamethasone.  She actually has had excellent response.  Her last dose of treatment was on 6 April 2022.  She has 9 cycles of it.  Near complete remission on blood and urine testing.  2.  Pneumonia for which she is in the hospital.  On IV antibiotic.  3.  History of COPD and smoking.  On steroid.  Seems to doing better.  4.  Slight fluid overload.  Getting Lasix.  5.  Possible discharge in a day or 2.  6.  Will resume treatment next week when she is discharged.    ECOG Performance Status: 1      ______________________________________________________________________________    History of Present Illness    Ms. Lizzie Viera is a pleasant 71-year-old woman with multiple myeloma.  She has been coming to our clinic since September 2021.  She was started on treatment with Velcade, Revlimid and dexamethasone at that time.  She is been on treatment since then and has been tolerating it quite well.  Her multiple myeloma has responded quite nicely and she is in near remission.  He has been seen by the transplant team and that is currently on hold per patient's request.  Patient called our clinic yesterday with symptoms of worsening breathing issues and coughing.  She says her  was sick for about 3 weeks with it about a month ago.  She says it rapidly got worse so she came to the ER yesterday.  Patient was hypoxic, had low-grade fever of 100.1, she had coarse wet crackles bilaterally.  Was  "concern for sepsis.  Her labs did not show any acute findings.  CT scan did not show any PE but it did report severe bronchitis with lower pneumonia.  She was felt to be too sick to go home so she was admitted for IV antibiotics.  Patient states that she is significantly improved since coming into the hospital.  She said over the weekend she really focused on doing her incentive spirometer and breathing exercises and she has now gotten off her oxygen.  Her oxygen levels are staying above 90%.  She is able to walk and get up and move around without dropping her sats.  She is feeling really happy about that and is hoping to get to go home in the next day or 2.  She denies any fevers.  Denies much coughing.  Denies any new bone or back pain.    Review of Systems    ROS: As above in the history, otherwise the remainder of the 10point ROS is negative and not contributory to current reason for visit.       PHYSICAL EXAM:  BP (!) 147/65 (BP Location: Right arm)   Pulse 74   Temp 97.5  F (36.4  C) (Oral)   Resp 18   Ht 1.575 m (5' 2\")   Wt 54.8 kg (120 lb 12.8 oz)   SpO2 94%   BMI 22.09 kg/m      GENERAL: no acute distress. Cooperative in conversation.   HEENT: pupils are equal, round and reactive. Oral mucosa is moist and intact.  RESP:Chest symmetric. Regular respiratory rate. No stridor.  ABD: Nondistended, soft.  EXTREMITIES: No lower extremity edema.   NEURO: non focal. Alert and oriented x3.   PSYCH: within normal limits. No depression or anxiety.  SKIN: warm dry intact     Lab Results    Admission on 04/12/2022   Component Date Value Ref Range Status     Ventricular Rate 04/12/2022 82  BPM Final     Atrial Rate 04/12/2022 82  BPM Final     HI Interval 04/12/2022 176  ms Final     QRS Duration 04/12/2022 98  ms Final     QT 04/12/2022 430  ms Final     QTc 04/12/2022 502  ms Final     P Axis 04/12/2022 78  degrees Final     R AXIS 04/12/2022 84  degrees Final     T Axis 04/12/2022 49  degrees Final     " Interpretation ECG 04/12/2022    Final                    Value: Poor data quality, interpretation may be adversely affected  Sinus rhythm with Premature atrial complexes  Possible Anterior infarct , age undetermined  Abnormal ECG  When compared with ECG of 09-SEP-2020 17:45,  Premature atrial complexes are now Present  Vent. rate has increased BY  27 BPM  QT has lengthened  Confirmed by SEE ED PROVIDER NOTE FOR, ECG INTERPRETATION ([id number]),  EMMA JHAVERI ([id number]) on 4/12/2022 10:28:53 PM       Sodium 04/12/2022 136  136 - 145 mmol/L Final     Potassium 04/12/2022 3.8  3.5 - 5.0 mmol/L Final     Chloride 04/12/2022 99  98 - 107 mmol/L Final     Carbon Dioxide (CO2) 04/12/2022 24  22 - 31 mmol/L Final     Anion Gap 04/12/2022 13  5 - 18 mmol/L Final     Urea Nitrogen 04/12/2022 21  8 - 22 mg/dL Final     Creatinine 04/12/2022 0.76  0.60 - 1.10 mg/dL Final     Calcium 04/12/2022 9.2  8.5 - 10.5 mg/dL Final     Glucose 04/12/2022 120  70 - 125 mg/dL Final     GFR Estimate 04/12/2022 84  >60 mL/min/1.73m2 Final    Effective December 21, 2021 eGFRcr in adults is calculated using the 2021 CKD-EPI creatinine equation which includes age and gender (Michele et al., [facility] DOI: 10.1056/DETEwa9626770)     Troponin I 04/12/2022 0.07  0.00 - 0.29 ng/mL Final     BNP 04/12/2022 112  0 - 117 pg/mL Final     INR 04/12/2022 1.11  0.85 - 1.15 Final     aPTT 04/12/2022 30  22 - 38 Seconds Final     Influenza A PCR 04/12/2022 Negative  Negative Final     Influenza B PCR 04/12/2022 Negative  Negative Final     SARS CoV2 PCR 04/12/2022 Negative  Negative Final    NEGATIVE: SARS-CoV-2 (COVID-19) RNA not detected, presumed negative.     Lactic Acid 04/12/2022 1.6  0.7 - 2.0 mmol/L Final     Culture 04/12/2022 No growth after 12 hours   Preliminary     Culture 04/12/2022 No growth after 12 hours   Preliminary     CRP 04/12/2022 14.9 (A) 0.0 - 0.8 mg/dL Final     pH Venous 04/12/2022 7.36  7.35 - 7.45 Final     pCO2 Venous 04/12/2022 50   35 - 50 mm Hg Final     pO2 Venous 04/12/2022 24 (A) 25 - 47 mm Hg Final     Bicarbonate Venous 04/12/2022 25  24 - 30 mmol/L Final     Base Excess/Deficit (+/-) 04/12/2022 2.8    mmol/L Final     Oxyhemoglobin Venous 04/12/2022 39.2 (A) 70.0 - 75.0 % Final     O2 Sat, Venous 04/12/2022 40.2 (A) 70.0 - 75.0 % Final     WBC Count 04/12/2022 7.7  4.0 - 11.0 10e3/uL Final     RBC Count 04/12/2022 4.39  3.80 - 5.20 10e6/uL Final     Hemoglobin 04/12/2022 14.8  11.7 - 15.7 g/dL Final     Hematocrit 04/12/2022 43.7  35.0 - 47.0 % Final     MCV 04/12/2022 100  78 - 100 fL Final     MCH 04/12/2022 33.7 (A) 26.5 - 33.0 pg Final     MCHC 04/12/2022 33.9  31.5 - 36.5 g/dL Final     RDW 04/12/2022 16.1 (A) 10.0 - 15.0 % Final     Platelet Count 04/12/2022 184  150 - 450 10e3/uL Final     % Neutrophils 04/12/2022 87  % Final     % Lymphocytes 04/12/2022 5  % Final     % Monocytes 04/12/2022 7  % Final     % Eosinophils 04/12/2022 0  % Final     % Basophils 04/12/2022 1  % Final     % Immature Granulocytes 04/12/2022 0  % Final     NRBCs per 100 WBC 04/12/2022 0  <1 /100 Final     Absolute Neutrophils 04/12/2022 6.8  1.6 - 8.3 10e3/uL Final     Absolute Lymphocytes 04/12/2022 0.4 (A) 0.8 - 5.3 10e3/uL Final     Absolute Monocytes 04/12/2022 0.6  0.0 - 1.3 10e3/uL Final     Absolute Eosinophils 04/12/2022 0.0  0.0 - 0.7 10e3/uL Final     Absolute Basophils 04/12/2022 0.0  0.0 - 0.2 10e3/uL Final     Absolute Immature Granulocytes 04/12/2022 0.0  <=0.4 10e3/uL Final     Absolute NRBCs 04/12/2022 0.0  10e3/uL Final     Magnesium 04/12/2022 1.9  1.8 - 2.6 mg/dL Final     LVEF  04/13/2022 60-65%   Final     Sodium 04/13/2022 138  136 - 145 mmol/L Final     Potassium 04/13/2022 3.4 (A) 3.5 - 5.0 mmol/L Final     Chloride 04/13/2022 107  98 - 107 mmol/L Final     Carbon Dioxide (CO2) 04/13/2022 24  22 - 31 mmol/L Final     Anion Gap 04/13/2022 7  5 - 18 mmol/L Final     Urea Nitrogen 04/13/2022 15  8 - 22 mg/dL Final     Creatinine  04/13/2022 0.64  0.60 - 1.10 mg/dL Final     Calcium 04/13/2022 7.8 (A) 8.5 - 10.5 mg/dL Final     Glucose 04/13/2022 117  70 - 125 mg/dL Final     Alkaline Phosphatase 04/13/2022 30 (A) 45 - 120 U/L Final     AST 04/13/2022 51 (A) 0 - 40 U/L Final     ALT 04/13/2022 34  0 - 45 U/L Final     Protein Total 04/13/2022 5.2 (A) 6.0 - 8.0 g/dL Final     Albumin 04/13/2022 2.5 (A) 3.5 - 5.0 g/dL Final     Bilirubin Total 04/13/2022 0.5  0.0 - 1.0 mg/dL Final     GFR Estimate 04/13/2022 >90  >60 mL/min/1.73m2 Final    Effective December 21, 2021 eGFRcr in adults is calculated using the 2021 CKD-EPI creatinine equation which includes age and gender (Michele et al., [place], DOI: 10.1056/UWSBtc1373750)     WBC Count 04/13/2022 5.7  4.0 - 11.0 10e3/uL Final     RBC Count 04/13/2022 3.60 (A) 3.80 - 5.20 10e6/uL Final     Hemoglobin 04/13/2022 12.0  11.7 - 15.7 g/dL Final     Hematocrit 04/13/2022 36.3  35.0 - 47.0 % Final     MCV 04/13/2022 101 (A) 78 - 100 fL Final     MCH 04/13/2022 33.3 (A) 26.5 - 33.0 pg Final     MCHC 04/13/2022 33.1  31.5 - 36.5 g/dL Final     RDW 04/13/2022 16.0 (A) 10.0 - 15.0 % Final     Platelet Count 04/13/2022 143 (A) 150 - 450 10e3/uL Final     Procalcitonin 04/13/2022 0.36  0.00 - 0.49 ng/mL Final    Comment: Normal Procalcitonin in healthy populations: <=0.07  ng/ml     Procalcitonin Interpretation Guidelines:   Diagnosis of systemic bacterial infection/sepsis/septic shock:       <0.5 ng/mL:           Low risk of sepsis; localized bacterial infection possible       >=0.5 to <=2.0 ng/mL: Sepsis possible. Interpret in context of the specific clinical background and condition of the patient. Retest PCL within 6-24 hours.       >2.0 ng.mL:           High risk of sepsis and/or septic shock.         Antibiotic therapy may be discontinued if the current PCL is <=0.5 ng/mL or the Change in PCL (using highest PCL this admission and current PCL) is >80%. Do PCL testing on patients being treated with  antibiotics for sepsis every 1-2 days.     Diagnosis of lower respiratory tract infection(LRTI) and decision making on antibiotic therapy:      <0.1 ng/ml:            Bacterial infection very unlikely. Antibiotic therapy strongly discouraged.      0.1 to 0.25 ng/mL :    Bacterial infection unlikely.                            Antibiotic therapy discouraged.      0.26 to 0.5 ng/ml:     Bacterial infection likely. Antibiotic therapy encouraged.      >0.5 ng/mL:            Bacterial infection very likely. Antibiotic therapy strongly encouraged.                   If antibiotics are not started for suspected LRTI, repeat PCL within 6-24 hours if symptoms persist or worsen.       Antibiotic therapy for LRTI may be discontinued if the PCL is <=0.25 ng/ml or the Change in PCL is >80%       Do PCL testing on patients being treated with antibiotics for LRTI every 1-2 days.     Decisions regarding antibiotic therapy should not be based solely on PCL concentrations. PCL results should be used in conjunction with the laboratory findings and clinical signs and be interpreted in the context of the patient's clinical situation.     Lactic Acid 04/13/2022 0.9  0.7 - 2.0 mmol/L Final     Potassium 04/13/2022 3.6  3.5 - 5.0 mmol/L Final     Hold Specimen 04/13/2022 JI   Final     pH Arterial 04/13/2022 7.31 (A) 7.37 - 7.44 Final     pCO2 Arterial 04/13/2022 52 (A) 35 - 45 mm Hg Final     pO2 Arterial 04/13/2022 110 (A) 75 - 85 mm Hg Final     Bicarbonate Arterial 04/13/2022 24  23 - 29 mmol/L Final     O2 Sat, Arterial 04/13/2022 95.7  95.0 - 96.0 % Final     Oxyhemoglobin 04/13/2022 95.6  95.0 - 96.0 % Final     Base Excess/Deficit (+/-) 04/13/2022 -0.1    mmol/L Final     Ventilation Mode 04/13/2022 High Flow Nasal Cannula   Final     FIO2 04/13/2022 70   Final     Flow 04/13/2022 40.0  LPM Final     Sample Stabilized Temperature 04/13/2022 37.0  degrees C Final     ]    Imaging    Echocardiogram Complete    Result Date:  2022  [id number]^CHEMO^GIUSEPPE^[place] [address]  Name: LAQUITA WHALEN MRN: [id number] : 1950 Study Date: 2022 08:02 AM Age: 71 yrs Gender: Female Patient [facility] Reason For Study: Dyspnea Ordering Physician: GIUSEPPE MCLAIN Performed By: AVERY  BSA: 1.5 m2 Height: 62 in Weight: 120 lb HR: 59 ______________________________________________________________________________ Procedure Complete Portable Echo Adult. ______________________________________________________________________________ Interpretation Summary  The left ventricle is normal in size. There is borderline concentric left ventricular hypertrophy. Left ventricular systolic function is normal. The visual ejection fraction is 60-65%. No regional wall motion abnormalities noted. No significant valve abnormality ______________________________________________________________________________ I      WMSI = 1.00     % Normal = 100  X - Cannot   0 -                      (2) - Mildly 2 -          Segments  Size Interpret    Hyperkinetic 1 - Normal  Hypokinetic  Hypokinetic  1-2     small                                                    7 -          3-5    moderate 3 - Akinetic 4 -          5 -         6 - Akinetic Dyskinetic   6-14    large              Dyskinetic   Aneurysmal  w/scar       w/scar       15-16   diffuse  Left Ventricle The left ventricle is normal in size. There is borderline concentric left ventricular hypertrophy. Left ventricular systolic function is normal. Diastolic Doppler findings (E/E' ratio and/or other parameters) suggest left ventricular filling pressures are indeterminate. Grade I or early diastolic dysfunction. The visual ejection fraction is 60-65%. No regional wall motion abnormalities noted.  Right Ventricle Normal right ventricle size and systolic function. TAPSE is normal, which is consistent with normal right ventricular systolic  function.  Atria Normal left atrial size. Right atrial size is normal.  Mitral Valve Mitral valve leaflets appear normal. There is no evidence of mitral stenosis or clinically significant mitral regurgitation. There is trace to mild mitral regurgitation.  Tricuspid Valve There is trace to mild tricuspid regurgitation.  Aortic Valve The aortic valve is not well visualized. No hemodynamically significant valvular aortic stenosis.  Pulmonic Valve The pulmonic valve is not well visualized.  Vessels The aorta root is normal. IVC diameter and respiratory changes fall into an intermediate range suggesting an RA pressure of 8 mmHg.  Pericardium No significant pericardial effusion.  ______________________________________________________________________________ MMode/2D Measurements & Calculations IVSd: 1.2 cm LVIDd: 3.7 cm LVIDs: 2.7 cm LVPWd: 1.0 cm FS: 28.8 % LV mass(C)d: 131.1 grams LV mass(C)dI: 85.2 grams/m2 Ao root diam: 2.9 cm LA dimension: 2.8 cm  asc Aorta Diam: 2.5 cm LA/Ao: 0.96 LVOT diam: 1.9 cm LVOT area: 2.7 cm2 LA Volume Indexed (AL/bp): 25.5 ml/m2 RWT: 0.56  Doppler Measurements & Calculations MV E max sohan: 88.6 cm/sec MV A max sohan: 88.6 cm/sec MV E/A: 1.00 MV dec slope: 473.2 cm/sec2 MV dec time: 0.19 sec Ao V2 max: 154.7 cm/sec Ao max PG: 10.0 mmHg Ao V2 mean: 107.9 cm/sec Ao mean P.2 mmHg Ao V2 VTI: 33.1 cm SAÚL(I,D): 2.3 cm2 SAÚL(V,D): 2.2 cm2 LV V1 max P.5 mmHg LV V1 max: 127.9 cm/sec LV V1 VTI: 28.5 cm  SV(LVOT): 77.6 ml SI(LVOT): 50.4 ml/m2 PA acc time: 0.11 sec AV Sohan Ratio (DI): 0.83 SAÚL Index (cm2/m2): 1.5 E/E' av.5 Lateral E/e': 13.4 Medial E/e': 13.6  ______________________________________________________________________________ Report approved by: Natalia Anne 2022 09:11 AM       CT Chest Pulmonary Embolism w Contrast    Result Date: 2022  EXAM: CT CHEST PULMONARY EMBOLISM W CONTRAST LOCATION: [facility] DATE/TIME: 2022 5:46 PM INDICATION:  Shortness of breath, hypoxia COMPARISON: 09/26/2021 TECHNIQUE: CT chest pulmonary angiogram during arterial phase injection of IV contrast. Multiplanar reformats and MIP reconstructions were performed. Dose reduction techniques were used. CONTRAST: isovue 370  100ml FINDINGS: ANGIOGRAM CHEST: No evidence for pulmonary embolism. Pulmonary arteries normal in caliber. Thoracic aorta normal in caliber. No aortic dissection or other acute abnormality. HEART: Cardiac chambers within normal limits. No pericardial effusion. Mild coronary artery calcification. MEDIASTINUM: No adenopathy or mass. LUNGS AND PLEURA: Moderate bronchial wall thickening, mucous plugging, and scattered tree-in-bud densities peripherally. There are areas of developing atelectasis within the lingula and right middle lobe. Small focus of consolidation within the right lower lobe laterally. No pleural effusion or pneumothorax. LIMITED UPPER ABDOMEN: Stable left adrenal nodules. Right adrenal gland unremarkable. MUSCULOSKELETAL: Numerous tiny lytic bone lesions within multiple vertebral bodies compatible with multiple myeloma. Stable severe vertebral compression deformity at T7, and moderate at T8. Sclerosis has developed along the lesions in the adjacent T6 and  T8 vertebral bodies. Stable lesion within the T1 vertebral body with mild associated loss of vertebral body height.     IMPRESSION: 1.  No evidence for pulmonary embolism. 2.  Severe bronchitis/bronchiolitis and mild right lower lobe pneumonia.       Signed by: Loco Blanco MD, MD

## 2022-04-18 NOTE — PROGRESS NOTES
[facility]    Referral received  from  ELISEO Philip, for IV antibiotics.    Benefits verified. Pt does not have coverage for IV antibiotics under her [employer] Medicare plan.  She will be self-pay. Cost for Zosyn and Vancomycin is 32.33 per day for both drugs and supplies.      Nursing is only covered if patient is homebound.  IF not, the cost will be $90/visit if [facility] bills for it.    Writer will speak with pt to review benefits, home infusion and to offer choice of agency/home infusion provider.    Thank you for the referral.    Kitty Pastrana, RN, BSN  [facility] Liaison  [phone] (Mon through Fri, 8:00 am-5:00 pm)  [phone] (Office)

## 2022-04-18 NOTE — PROGRESS NOTES
Daily Progress Note        CODE STATUS:  Full Code    04/18/22  Assessment/Plan:  71 year old female with medical medical history of multiple myeloma on chemotherapy who presented to the ED on 4/12 for evaluation of shortness of breath, CT imaging reported severe bronchitis/bronchiolitis and admitted for further management.     Acute respiratory failure with hypoxia: due to community acquired pneumonia and COPD exacerbation.   --CT chest is negative for PE but shows severe bronchitis/bronchiolitis/and mild right lower lobe pneumonia.  --Patient did require high flow nasal cannula, now improved, transition to nasal cannula and able to wean off oxygen today.  --Completed steroid course.  --Continue DuoNeb, incentive spirometry, flutter valves.  --Appreciated pulmonary input, recommended follow-up with pulmonologist for formal PFTs.    CAP, right lower lobe:   CT chest reported severe bronchitis/bronchiolitis;  --Negative Covid 19 test.   --Patient is immunocompromised because of multiple myeloma and chemotherapy.   --BC no growth.  Patient unable to provide sputum samples.  --On 4/13, procalcitonin normal.   --MRSA nasal swab negative.   --Not sure why respiratory panel PCR nasal swab not done previously.  --Completed 5 days course of azithromycin on 4/17  --Since admission on 4/12 on IV Vanco and Zosyn, given MRSA negative, discontinue vancomycin on 4/18.  Continue IV Zosyn    Electrolyte imbalance;  -- Hypokalemia and hypomagnesemia.  Replace potassium per protocol.  Replace magnesium as ordered.    Multiple myeloma:   --Has been on treatment with Revlimid Velcade and dexamethasone. Last chemo was 4/6/2022. Per Oncology, her myeloma has responded very nicely and is currently almost in remission.   --Oncology consulted and input appreciated: Hold chemotherapy while inpatient. Initiate treatment after patient is discharged.   --Continue PTA acyclovir     Elevated blood pressure without diagnosis of hypertension;  She  had hypotension on admission secondary to volume depletion and sepsis. Now BP has become elevated for the past several days.  - Started amlodipine 5 mg daily.  We will also give some Lasix.  As needed hydralazine.     Chronic pain syndrome:   --Secondary to cancer. Resume home medications    DVT prophylaxis; subcu Lovenox    Disposition; anticipate home  Barrier to discharge; IV antibiotics, clinical progress, electrolyte imbalance     LOS: 6 days     Subjective:  Interval History: Patient is new to me.  Patient seen and examined. Notes, labs, imaging reports personally reviewed.  Patient reported breathing continued to improve.  However, still complaining of intermittent dry cough.  Denied feeling fever or chills.  Denied abdomen pain, nausea, vomiting.  Complaining of some pedal edema.  Discussed with nursing staffs.  Patient declined me to call family members.  Addendum;  Discussed with respite therapist, reported breathing continued to improve and requesting to gradually taper off schedule neb and MetaNeb.    Review of Systems:   As mentioned in subjective.    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     Pathologic compression fracture of spine, initial encounter (H)     Acute cystitis with hematuria     Hypokalemia     Functional diarrhea     Severe malnutrition (H)     Hypoxia     Pneumonia of  right lower lobe due to infectious organism       Scheduled Meds:    acyclovir  400 mg Oral BID     amLODIPine  5 mg Oral Daily     aspirin  81 mg Oral Daily     enoxaparin ANTICOAGULANT  40 mg Subcutaneous Q24H     gabapentin  600 mg Oral TID     ipratropium - albuterol 0.5 mg/2.5 mg/3 mL  3 mL Nebulization 4x daily     levOCARNitine  330 mg Oral BID     melatonin  20 mg Oral At Bedtime     piperacillin-tazobactam  3.375 g Intravenous Q8H     potassium chloride  20 mEq Oral or Feeding Tube Once     senna-docusate  1 tablet Oral BID    Or     senna-docusate  2 tablet Oral BID     sodium chloride (PF)  3 mL Intracatheter Q8H     [START ON 4/19/2022] ascorbic acid  250 mg Oral Daily     Continuous Infusions:  PRN Meds:.acetaminophen **OR** acetaminophen, albuterol, albuterol, hydrALAZINE, HYDROmorphone, ibuprofen, lidocaine 4%, lidocaine (buffered or not buffered), melatonin, methocarbamol, naloxone **OR** naloxone **OR** naloxone **OR** naloxone, ondansetron **OR** ondansetron, sodium chloride, sodium chloride (PF)    Objective:  Vital signs in last 24 hours:  Temp:  [97.4  F (36.3  C)-98.2  F (36.8  C)] 97.8  F (36.6  C)  Pulse:  [] 69  Resp:  [18-22] 18  BP: (148-182)/(67-84) 148/67  FiO2 (%):  [21 %] 21 %  SpO2:  [93 %-98 %] 94 %      Intake/Output Summary (Last 24 hours) at 4/18/2022 1057  Last data filed at 4/18/2022 0900  Gross per 24 hour   Intake 1355 ml   Output --   Net 1355 ml       Physical Exam:  General: Not in obvious distress.  HEENT: Normocephalic, supple  Chest: Decreased but clear to auscultation bilateral anteriorly, bilateral occasional rhonchi, unlabored breathing  Heart: S1S2 normal, regular  Abdomen: Soft. NT, ND. Bowel sounds- active.  Extremities: Trace legs swelling  Neuro: alert and awake, grossly non-focal      Lab Results:(I have personally reviewed the results)    Recent Results (from the past 24 hour(s))   Potassium    Collection Time: 04/17/22  3:36 PM   Result Value Ref Range     Potassium 3.6 3.5 - 5.0 mmol/L   Creatinine    Collection Time: 04/18/22  6:13 AM   Result Value Ref Range    Creatinine 0.85 0.60 - 1.10 mg/dL    GFR Estimate 74 >60 mL/min/1.73m2   Potassium    Collection Time: 04/18/22  6:13 AM   Result Value Ref Range    Potassium 3.2 (L) 3.5 - 5.0 mmol/L   Magnesium    Collection Time: 04/18/22  6:13 AM   Result Value Ref Range    Magnesium 1.7 (L) 1.8 - 2.6 mg/dL   Platelet count    Collection Time: 04/18/22  6:14 AM   Result Value Ref Range    Platelet Count 256 150 - 450 10e3/uL   Lactic Acid STAT    Collection Time: 04/18/22  8:27 AM   Result Value Ref Range    Lactic Acid 1.8 0.7 - 2.0 mmol/L         Serum Glucose range:   Recent Labs   Lab 04/16/22  0655 04/15/22  0641 04/15/22  0407 04/15/22  0104   GLC 95 174* 200* 206*     ABG:   Recent Labs   Lab 04/13/22  1019   PH 7.31*   PCO2 52*   PO2 110*   HCO3 24   O2PER 70     CBC:   Recent Labs   Lab 04/18/22  0614 04/16/22  0655 04/15/22  0641 04/14/22  0640   WBC  --  6.9 6.8 5.6   HGB  --  10.8* 10.6* 11.1*   HCT  --  31.6* 31.8* 35.3   MCV  --  97 98 106*    191 182 150   NEUTROPHIL  --  63 74 82   LYMPH  --  22 16 9   MONOCYTE  --  11 10 7   EOSINOPHIL  --  3 0 1     Chemistry:   Recent Labs   Lab 04/18/22  0613 04/17/22  1536 04/17/22  0618 04/16/22  1700 04/16/22  0655 04/15/22  1212 04/15/22  0641 04/14/22  0640 04/13/22  0827 04/13/22  0604 04/12/22  1535   NA  --   --   --   --  143  --  141 141  --  138 136   POTASSIUM 3.2* 3.6 3.3*   < > 3.2*   < > 2.9* 3.8   < > 3.4* 3.8   CHLORIDE  --   --   --   --  113*  --  110* 112*  --  107 99   CO2  --   --   --   --  25  --  24 17*  --  24 24   BUN  --   --   --   --  6*  --  9 16  --  15 21   CR 0.85  --   --   --  0.54*  --  0.57* 0.55*  --  0.64 0.76   GFRESTIMATED 74  --   --   --  >90  --  >90 >90  --  >90 84   SERAFIN  --   --   --   --  7.5*  --  7.3* 7.4*  --  7.8* 9.2   MAG 1.7*  --   --   --   --   --   --   --   --   --  1.9   PROTTOTAL  --   --   --   --    --   --   --   --   --  5.2*  --    ALBUMIN  --   --   --   --   --   --   --   --   --  2.5*  --    AST  --   --   --   --   --   --   --   --   --  51*  --    ALT  --   --   --   --   --   --   --   --   --  34  --    ALKPHOS  --   --   --   --   --   --   --   --   --  30*  --    BILITOTAL  --   --   --   --   --   --   --   --   --  0.5  --     < > = values in this interval not displayed.     Coags:  Recent Labs   Lab 22  1535   INR 1.11   PTT 30     Cardiac Markers:  Recent Labs   Lab 22  1535   TROPONINI 0.07        Echocardiogram Complete    Result Date: 2022  [id number]^CHEMO^GIUSEPPE[place] [address]  Name: LAQUITA WHALEN MRN: [id number] : 1950 Study Date: 2022 08:02 AM Age: 71 yrs Gender: Female Patient Location: [facility] Reason For Study: Dyspnea Ordering Physician: GIUSEPPE MCLAIN Performed By: AVERY  BSA: 1.5 m2 Height: 62 in Weight: 120 lb HR: 59 ______________________________________________________________________________ Procedure Complete Portable Echo Adult. ______________________________________________________________________________ Interpretation Summary  The left ventricle is normal in size. There is borderline concentric left ventricular hypertrophy. Left ventricular systolic function is normal. The visual ejection fraction is 60-65%. No regional wall motion abnormalities noted. No significant valve abnormality ______________________________________________________________________________ I      WMSI = 1.00     % Normal = 100  X - Cannot   0 -                      (2) - Mildly 2 -          Segments  Size Interpret    Hyperkinetic 1 - Normal  Hypokinetic  Hypokinetic  1-2     small                                                    7 -          3-5    moderate 3 - Akinetic 4 -          5 -         6 - Akinetic Dyskinetic   6-14    large              Dyskinetic   Aneurysmal  w/scar       w/scar        15-16   diffuse  Left Ventricle The left ventricle is normal in size. There is borderline concentric left ventricular hypertrophy. Left ventricular systolic function is normal. Diastolic Doppler findings (E/E' ratio and/or other parameters) suggest left ventricular filling pressures are indeterminate. Grade I or early diastolic dysfunction. The visual ejection fraction is 60-65%. No regional wall motion abnormalities noted.  Right Ventricle Normal right ventricle size and systolic function. TAPSE is normal, which is consistent with normal right ventricular systolic function.  Atria Normal left atrial size. Right atrial size is normal.  Mitral Valve Mitral valve leaflets appear normal. There is no evidence of mitral stenosis or clinically significant mitral regurgitation. There is trace to mild mitral regurgitation.  Tricuspid Valve There is trace to mild tricuspid regurgitation.  Aortic Valve The aortic valve is not well visualized. No hemodynamically significant valvular aortic stenosis.  Pulmonic Valve The pulmonic valve is not well visualized.  Vessels The aorta root is normal. IVC diameter and respiratory changes fall into an intermediate range suggesting an RA pressure of 8 mmHg.  Pericardium No significant pericardial effusion.  ______________________________________________________________________________ MMode/2D Measurements & Calculations IVSd: 1.2 cm LVIDd: 3.7 cm LVIDs: 2.7 cm LVPWd: 1.0 cm FS: 28.8 % LV mass(C)d: 131.1 grams LV mass(C)dI: 85.2 grams/m2 Ao root diam: 2.9 cm LA dimension: 2.8 cm  asc Aorta Diam: 2.5 cm LA/Ao: 0.96 LVOT diam: 1.9 cm LVOT area: 2.7 cm2 LA Volume Indexed (AL/bp): 25.5 ml/m2 RWT: 0.56  Doppler Measurements & Calculations MV E max tien: 88.6 cm/sec MV A max tien: 88.6 cm/sec MV E/A: 1.00 MV dec slope: 473.2 cm/sec2 MV dec time: 0.19 sec Ao V2 max: 154.7 cm/sec Ao max PG: 10.0 mmHg Ao V2 mean: 107.9 cm/sec Ao mean P.2 mmHg Ao V2 VTI: 33.1 cm SAÚL(I,D): 2.3 cm2 SAÚL(V,D): 2.2  cm2 LV V1 max P.5 mmHg LV V1 max: 127.9 cm/sec LV V1 VTI: 28.5 cm  SV(LVOT): 77.6 ml SI(LVOT): 50.4 ml/m2 PA acc time: 0.11 sec AV Sohan Ratio (DI): 0.83 SAÚL Index (cm2/m2): 1.5 E/E' av.5 Lateral E/e': 13.4 Medial E/e': 13.6  ______________________________________________________________________________ Report approved by: Natalia Anne 2022 09:11 AM       CT Chest Pulmonary Embolism w Contrast    Result Date: 2022  EXAM: CT CHEST PULMONARY EMBOLISM W CONTRAST LOCATION: [facility] DATE/TIME: 2022 5:46 PM INDICATION: Shortness of breath, hypoxia COMPARISON: 2021 TECHNIQUE: CT chest pulmonary angiogram during arterial phase injection of IV contrast. Multiplanar reformats and MIP reconstructions were performed. Dose reduction techniques were used. CONTRAST: isovue 370  100ml FINDINGS: ANGIOGRAM CHEST: No evidence for pulmonary embolism. Pulmonary arteries normal in caliber. Thoracic aorta normal in caliber. No aortic dissection or other acute abnormality. HEART: Cardiac chambers within normal limits. No pericardial effusion. Mild coronary artery calcification. MEDIASTINUM: No adenopathy or mass. LUNGS AND PLEURA: Moderate bronchial wall thickening, mucous plugging, and scattered tree-in-bud densities peripherally. There are areas of developing atelectasis within the lingula and right middle lobe. Small focus of consolidation within the right lower lobe laterally. No pleural effusion or pneumothorax. LIMITED UPPER ABDOMEN: Stable left adrenal nodules. Right adrenal gland unremarkable. MUSCULOSKELETAL: Numerous tiny lytic bone lesions within multiple vertebral bodies compatible with multiple myeloma. Stable severe vertebral compression deformity at T7, and moderate at T8. Sclerosis has developed along the lesions in the adjacent T6 and  T8 vertebral bodies. Stable lesion within the T1 vertebral body with mild associated loss of vertebral body height.      IMPRESSION: 1.  No evidence for pulmonary embolism. 2.  Severe bronchitis/bronchiolitis and mild right lower lobe pneumonia.      Latest radiology report personally reviewed.      The total time spent is 38 minutes, >50% time spent in coordination of care data gathering, charting, record review, discussion of test/medications/plan of care as mentioned above and disposition plan with patient/family. D/w consultants, nursing staffs and /CM.    Note created using dragon voice recognition software so sounds alike errors may have escaped editing.    04/18/2022   MATILDE ANDREWS MD  HOSPITALIST, [facility]  PAGER NO. [phone]

## 2022-04-18 NOTE — PLAN OF CARE
Goal Outcome Evaluation:    Plan of Care Reviewed With: patient     Overall Patient Progress: improving    Outcome Evaluation: Pt on RA and tolerating very well.    Problem: Plan of Care - These are the overarching goals to be used throughout the patient stay.    Goal: Plan of Care Review/Shift Note  Description: The Plan of Care Review/Shift note should be completed every shift.  The Outcome Evaluation is a brief statement about your assessment that the patient is improving, declining, or no change.  This information will be displayed automatically on your shift note.  4/18/2022 0027 by Valerie Humphrey RN  Outcome: Ongoing, Progressing  Flowsheets (Taken 4/18/2022 0027)  Plan of Care Reviewed With: patient  Outcome Evaluation: Pt on RA and tolerating very well.  4/18/2022 0026 by Valerie Humphrey RN  Outcome: Ongoing, Progressing  Flowsheets (Taken 4/18/2022 0026)  Plan of Care Reviewed With: patient  Outcome Evaluation: Pt on RA and tolerating very well.  Overall Patient Progress: improving     Problem: Hypertension Acute  Goal: Blood Pressure Within Desired Range  Outcome: Ongoing, Not Progressing  Intervention: Normalize Blood Pressure  Recent Flowsheet Documentation  Taken 4/17/2022 2158 by Valerie Humphrey, RN  Medication Review/Management: high-risk medications identified     B/P: 179/76, T: 97.4, P: 68, R: 20    Pt BP's have been in the 170-180's systolic all evening. Pt denied signs or symptoms of hypertension. PRN medication administered with poor results.  Will continue to monitor overnight.    Pt A&O.    C/O back pain related to her primary cancer. PRN pain medication administered with good results.    IV antibiotics running per orders.

## 2022-04-18 NOTE — PROGRESS NOTES
B/P: 180/72, T: 98.2, P: 64, R: 20    BP has been in the 170-180's systolic most of evening. She denied any signs or symptoms of hypertension.    PRN Hydralazine administered with no results. Currently administered PRN oral pain medication and will recheck.

## 2022-04-18 NOTE — PLAN OF CARE
Goal Outcome Evaluation:    Plan of Care Reviewed With: patient     Overall Patient Progress: improving    Outcome Evaluation: Pt on RA and tolerating very well.    B/P: 179/76, T: 97.4, P: 62, R: 18    Pt continued to be hypertensive. Denying signs or symptoms. Administered another dose of PRN IV hypertensive. Will monitor.

## 2022-04-18 NOTE — CONSULTS
"ACUPUNCTURIST TREATMENT NOTE    Name: Lizzie Viera  :  1950  MRN:  [id number]    Acupuncture Treatment  Patient Type: Medical  Intervention Reason: Pain  Pain Location: mid back to upper back  Pre-session Pain Ratin  Post-session Pain Ratin  Patient complaint:: Patient complains of muscle spasms and pain in mid back muscles which radiate into upper back  Initial insertions: Baihui, Fd53-Ne36, Weigunikixiashu. Left: Gb34, St36, Ub62, Gb21, Si11, battlefield. Right: Si3, Ki3, Sp6  Number of needles inserted: 22  Number of needles removed: 22  Treatment Observations: Patient fell asleep and relaxed well during treatment         \"Risks and benefits of acupuncture were discussed with patient. Consent for treatment was given. We thank you for the referral.\"     Sera Farmer L.Ac.    Date:  2022  Time:  1:34 PM    "

## 2022-04-19 ENCOUNTER — APPOINTMENT (OUTPATIENT)
Dept: OCCUPATIONAL THERAPY | Facility: HOSPITAL | Age: 72
DRG: 193 | End: 2022-04-19
Payer: COMMERCIAL

## 2022-04-19 ENCOUNTER — APPOINTMENT (OUTPATIENT)
Dept: ULTRASOUND IMAGING | Facility: HOSPITAL | Age: 72
DRG: 193 | End: 2022-04-19
Attending: STUDENT IN AN ORGANIZED HEALTH CARE EDUCATION/TRAINING PROGRAM
Payer: COMMERCIAL

## 2022-04-19 LAB
ALBUMIN UR-MCNC: NEGATIVE MG/DL
APPEARANCE UR: CLEAR
BILIRUB UR QL STRIP: NEGATIVE
COLOR UR AUTO: YELLOW
GLUCOSE UR STRIP-MCNC: NEGATIVE MG/DL
HGB BLD-MCNC: 11.1 G/DL (ref 11.7–15.7)
HGB UR QL STRIP: ABNORMAL
KETONES UR STRIP-MCNC: NEGATIVE MG/DL
LEUKOCYTE ESTERASE UR QL STRIP: NEGATIVE
MAGNESIUM SERPL-MCNC: 1.9 MG/DL (ref 1.8–2.6)
MUCOUS THREADS #/AREA URNS LPF: PRESENT /LPF
NITRATE UR QL: NEGATIVE
PH UR STRIP: 5 [PH] (ref 5–7)
POTASSIUM BLD-SCNC: 3.5 MMOL/L (ref 3.5–5)
RBC URINE: 34 /HPF
SP GR UR STRIP: 1.01 (ref 1–1.03)
SQUAMOUS EPITHELIAL: 1 /HPF
UROBILINOGEN UR STRIP-MCNC: <2 MG/DL
WBC URINE: 5 /HPF

## 2022-04-19 PROCEDURE — 250N000009 HC RX 250: Performed by: INTERNAL MEDICINE

## 2022-04-19 PROCEDURE — 250N000013 HC RX MED GY IP 250 OP 250 PS 637: Performed by: INTERNAL MEDICINE

## 2022-04-19 PROCEDURE — 250N000011 HC RX IP 250 OP 636: Performed by: INTERNAL MEDICINE

## 2022-04-19 PROCEDURE — 36415 COLL VENOUS BLD VENIPUNCTURE: CPT | Performed by: STUDENT IN AN ORGANIZED HEALTH CARE EDUCATION/TRAINING PROGRAM

## 2022-04-19 PROCEDURE — 76856 US EXAM PELVIC COMPLETE: CPT

## 2022-04-19 PROCEDURE — 97535 SELF CARE MNGMENT TRAINING: CPT | Mod: GO

## 2022-04-19 PROCEDURE — 120N000001 HC R&B MED SURG/OB

## 2022-04-19 PROCEDURE — 999N000157 HC STATISTIC RCP TIME EA 10 MIN

## 2022-04-19 PROCEDURE — G0463 HOSPITAL OUTPT CLINIC VISIT: HCPCS

## 2022-04-19 PROCEDURE — 94799 UNLISTED PULMONARY SVC/PX: CPT

## 2022-04-19 PROCEDURE — 999N000127 HC STATISTIC PERIPHERAL IV START W US GUIDANCE

## 2022-04-19 PROCEDURE — 99232 SBSQ HOSP IP/OBS MODERATE 35: CPT | Performed by: INTERNAL MEDICINE

## 2022-04-19 PROCEDURE — 94660 CPAP INITIATION&MGMT: CPT

## 2022-04-19 PROCEDURE — 83735 ASSAY OF MAGNESIUM: CPT | Performed by: INTERNAL MEDICINE

## 2022-04-19 PROCEDURE — 85018 HEMOGLOBIN: CPT | Performed by: STUDENT IN AN ORGANIZED HEALTH CARE EDUCATION/TRAINING PROGRAM

## 2022-04-19 PROCEDURE — 81003 URINALYSIS AUTO W/O SCOPE: CPT | Performed by: STUDENT IN AN ORGANIZED HEALTH CARE EDUCATION/TRAINING PROGRAM

## 2022-04-19 PROCEDURE — 94640 AIRWAY INHALATION TREATMENT: CPT | Mod: 76

## 2022-04-19 PROCEDURE — 94640 AIRWAY INHALATION TREATMENT: CPT

## 2022-04-19 PROCEDURE — 84132 ASSAY OF SERUM POTASSIUM: CPT | Performed by: INTERNAL MEDICINE

## 2022-04-19 RX ORDER — POTASSIUM CHLORIDE 750 MG/1
10 TABLET, EXTENDED RELEASE ORAL ONCE
Status: COMPLETED | OUTPATIENT
Start: 2022-04-19 | End: 2022-04-19

## 2022-04-19 RX ORDER — POTASSIUM CHLORIDE 1500 MG/1
20 TABLET, EXTENDED RELEASE ORAL ONCE
Status: DISCONTINUED | OUTPATIENT
Start: 2022-04-19 | End: 2022-04-19

## 2022-04-19 RX ORDER — AMOXICILLIN 250 MG
1 CAPSULE ORAL 2 TIMES DAILY PRN
Status: DISCONTINUED | OUTPATIENT
Start: 2022-04-19 | End: 2022-04-20 | Stop reason: HOSPADM

## 2022-04-19 RX ORDER — GUAIFENESIN 600 MG/1
600 TABLET, EXTENDED RELEASE ORAL 2 TIMES DAILY
Status: DISCONTINUED | OUTPATIENT
Start: 2022-04-19 | End: 2022-04-20 | Stop reason: HOSPADM

## 2022-04-19 RX ORDER — FUROSEMIDE 10 MG/ML
20 INJECTION INTRAMUSCULAR; INTRAVENOUS ONCE
Status: COMPLETED | OUTPATIENT
Start: 2022-04-19 | End: 2022-04-19

## 2022-04-19 RX ORDER — AMLODIPINE BESYLATE 5 MG/1
5 TABLET ORAL ONCE
Status: COMPLETED | OUTPATIENT
Start: 2022-04-19 | End: 2022-04-19

## 2022-04-19 RX ORDER — AMLODIPINE BESYLATE 5 MG/1
10 TABLET ORAL DAILY
Status: DISCONTINUED | OUTPATIENT
Start: 2022-04-20 | End: 2022-04-20 | Stop reason: HOSPADM

## 2022-04-19 RX ADMIN — GABAPENTIN 600 MG: 300 CAPSULE ORAL at 22:34

## 2022-04-19 RX ADMIN — GUAIFENESIN 600 MG: 600 TABLET ORAL at 21:13

## 2022-04-19 RX ADMIN — ACYCLOVIR 400 MG: 400 TABLET ORAL at 08:40

## 2022-04-19 RX ADMIN — PIPERACILLIN AND TAZOBACTAM 3.38 G: 3; .375 INJECTION, POWDER, LYOPHILIZED, FOR SOLUTION INTRAVENOUS at 18:07

## 2022-04-19 RX ADMIN — METHOCARBAMOL 500 MG: 500 TABLET, FILM COATED ORAL at 17:50

## 2022-04-19 RX ADMIN — PIPERACILLIN AND TAZOBACTAM 3.38 G: 3; .375 INJECTION, POWDER, LYOPHILIZED, FOR SOLUTION INTRAVENOUS at 08:55

## 2022-04-19 RX ADMIN — METHOCARBAMOL 500 MG: 500 TABLET, FILM COATED ORAL at 22:34

## 2022-04-19 RX ADMIN — FUROSEMIDE 20 MG: 10 INJECTION, SOLUTION INTRAMUSCULAR; INTRAVENOUS at 08:48

## 2022-04-19 RX ADMIN — HYDRALAZINE HYDROCHLORIDE 10 MG: 20 INJECTION INTRAMUSCULAR; INTRAVENOUS at 06:29

## 2022-04-19 RX ADMIN — PIPERACILLIN AND TAZOBACTAM 3.38 G: 3; .375 INJECTION, POWDER, LYOPHILIZED, FOR SOLUTION INTRAVENOUS at 01:01

## 2022-04-19 RX ADMIN — METHOCARBAMOL 500 MG: 500 TABLET, FILM COATED ORAL at 08:47

## 2022-04-19 RX ADMIN — AMLODIPINE BESYLATE 5 MG: 5 TABLET ORAL at 12:20

## 2022-04-19 RX ADMIN — ACYCLOVIR 400 MG: 400 TABLET ORAL at 21:15

## 2022-04-19 RX ADMIN — AMLODIPINE BESYLATE 5 MG: 5 TABLET ORAL at 08:40

## 2022-04-19 RX ADMIN — GUAIFENESIN 600 MG: 600 TABLET ORAL at 17:50

## 2022-04-19 RX ADMIN — POTASSIUM CHLORIDE 10 MEQ: 750 TABLET, EXTENDED RELEASE ORAL at 12:21

## 2022-04-19 RX ADMIN — IPRATROPIUM BROMIDE AND ALBUTEROL SULFATE 3 ML: .5; 3 SOLUTION RESPIRATORY (INHALATION) at 19:38

## 2022-04-19 RX ADMIN — IPRATROPIUM BROMIDE AND ALBUTEROL SULFATE 3 ML: .5; 3 SOLUTION RESPIRATORY (INHALATION) at 13:50

## 2022-04-19 RX ADMIN — Medication 20 MG: at 22:35

## 2022-04-19 RX ADMIN — GABAPENTIN 600 MG: 300 CAPSULE ORAL at 08:38

## 2022-04-19 RX ADMIN — Medication 250 MG: at 08:39

## 2022-04-19 RX ADMIN — GABAPENTIN 600 MG: 300 CAPSULE ORAL at 17:50

## 2022-04-19 RX ADMIN — POTASSIUM CHLORIDE 10 MEQ: 750 TABLET, EXTENDED RELEASE ORAL at 08:47

## 2022-04-19 RX ADMIN — HYDROMORPHONE HYDROCHLORIDE 4 MG: 2 TABLET ORAL at 01:08

## 2022-04-19 RX ADMIN — IPRATROPIUM BROMIDE AND ALBUTEROL SULFATE 3 ML: .5; 3 SOLUTION RESPIRATORY (INHALATION) at 07:59

## 2022-04-19 ASSESSMENT — ACTIVITIES OF DAILY LIVING (ADL)
ADLS_ACUITY_SCORE: 8

## 2022-04-19 NOTE — PROGRESS NOTES
"CLINICAL NUTRITION SERVICES  -  ASSESSMENT NOTE      RECOMMENDATIONS FOR MD/PROVIDER TO ORDER:   None     Recommendations Ordered by Registered Dietitian (RD):   Offered protein shake (TellWise) patient declined     Future/Additional Recommendations:   Encourage protein intake  Revisit protein containing supplements pending PO intake     Malnutrition:   Moderate in the context of chronic illness         REASON FOR ASSESSMENT  Lizzie Viera is a 71 year old female seen by Registered Dietitian for [facility]    Patient presents with acute respiratory failure, PNA, COPD, multiple myeloma, HTN    NUTRITION HISTORY  - Information obtained from patient and chart  - Patient is on a organic food diet at home  - Typical food/fluid intake is \"fine\" per patient, stated she has gained weight since being in the hospital as she is not used to eating the provided kinds of foods  - Supplements Termeric, milk thistle, lions pam, zinc selenium, probiotic, digestive enzyme, bererine.     NKFA    CURRENT NUTRITION ORDERS  Diet Order:     Regular       Current Intake/Tolerance:  Patient is eating 100% of meals per nursing records and receiving on average 1682kcals and 52g protein.       NUTRITION FOCUSED PHYSICAL ASSESSMENT FOR DIAGNOSING MALNUTRITION)  Yes           Observed:    Muscle wasting (refer to documentation in Malnutrition section) and Subcutaneous fat loss (refer to documentation in Malnutrition section)    Obtained from Chart/Interdisciplinary Team:  Edema 2+, bruised arm/shin, poor skin turgor, dry skin    ANTHROPOMETRICS  Height: 5' 2\"  Weight: 120 lbs 12.8 oz  Body mass index is 22.09 kg/m .  Weight Status:  Normal BMI  IBW: 50.1kg  % IBW: 109%  Weight History:   Wt Readings from Last 30 Encounters:   04/12/22 54.8 kg (120 lb 12.8 oz)   03/23/22 58.1 kg (128 lb)   03/15/22 57.2 kg (126 lb 3.2 oz)                4.2% wt loss in 1 month   02/23/22 58.1 kg (128 lb)   02/02/22 59.1 kg (130 lb 6.4 oz)   01/13/22 59 kg (130 " lb)                              7.1% wt loss in 3 months   01/12/22 60.2 kg (132 lb 11.2 oz)   01/03/22 61.6 kg (135 lb 14.4 oz)   12/22/21 63.1 kg (139 lb 3.2 oz)   12/01/21 61.7 kg (136 lb)   11/10/21 59.9 kg (132 lb)   10/19/21 60.8 kg (134 lb 1.6 oz)                9.9% wt loss in 6 months   10/11/21 59 kg (130 lb)   10/06/21 59.2 kg (130 lb 8 oz)   09/29/21 59.9 kg (132 lb)   09/20/21 57.2 kg (126 lb)   09/13/21 59.1 kg (130 lb 4 oz)   09/10/21 61.2 kg (135 lb)   09/02/21 61.4 kg (135 lb 6.4 oz)   08/31/21 58.5 kg (129 lb)   07/01/21 64.4 kg (142 lb)   06/23/21 64.4 kg (142 lb)   05/27/21 67.6 kg (149 lb 1.6 oz)   05/06/21 70.8 kg (156 lb)   04/29/21 70.8 kg (156 lb)   04/23/21 70.8 kg (156 lb)                             22.6% wt loss in 1 year   12/31/20 71.4 kg (157 lb 8 oz)   12/14/20 70.8 kg (156 lb)   10/21/20 73.6 kg (162 lb 3.2 oz)   10/15/20 75.3 kg (166 lb)     Net fluid up 3.8L (8.4lbs) since admit per I/Os    LABS  Labs reviewed: hgb 11.1    MEDICATIONS  Medications reviewed: zovirax, lasix, carnitor, zosyn, klor-con m, ascorbic acid       ASSESSED NUTRITION NEEDS PER APPROVED PRACTICE GUIDELINES:    Dosing Weight 54.8 kg (120 lb 12.8 oz)    Estimated Energy Needs: 7882-3090 kcals ([place] St Jeor and stress factor 1.2-1.4)  Justification: maintenance and COPD, multiple myeloma  Estimated Protein Needs: 66-82 grams protein (1.2-1.5 g pro/Kg)  Justification: maintenance, Repletion and preservation of lean body mass  Estimated Fluid Needs: 0384-0555  mL (25-30 mL/kg)  Justification: maintenance    MALNUTRITION:  % Weight Loss:  > 20% in 1 year (severe malnutrition)  % Intake:  No decreased intake noted  Subcutaneous Fat Loss:  Upper arm region moderate depletion  Muscle Loss:  Temporal region moderate depletion, Clavicle bone region moderate depletion, Acromion bone region moderate depletion, Dorsal hand region mild depletion, Patellar region moderate depletion   Fluid Retention:  Moderate  2+    Malnutrition Diagnosis: Moderate malnutrition  In Context of:  Chronic illness or disease    NUTRITION DIAGNOSIS:  Malnutrition related to COPD and multiple myeloma as evidenced by 22.6% unintentional weight lossin 1 year, moderate subcutaneous fat loss, moderate muscle loss, and moderate fluid accumulation      NUTRITION INTERVENTIONS  Recommendations / Nutrition Prescription  Offered nutrition supplements, patient declined  .      Implementation  Nutrition education: Per Provider order if indicated   Composition of Meals and Snacks  .      Nutrition Goals  Meet estimated nutrition needs  Maintain weight      MONITORING AND EVALUATION:  Progress towards goals will be monitored and evaluated per protocol and Practice Guidelines, Diet Order, Food intake, Weight, Biochemical data and Nutrition-focused physical findings

## 2022-04-19 NOTE — PROGRESS NOTES
"Pt called writer to room around 22:30. Pt reporting vaginal bleeding when she went to the bathroom. Stated large amount of bleeding with clots. Pt had already flushed toilet.  - New brief with familia-pad given and educated pt to call nurse when up to the bathroom again. Hat placed in the toilet to help with monitoring.   - Pt reports vaginal area is \"sore\" but denies cramping.   - On daily dose of 81 mg aspirin and daily 40 enoxaparin subcutaneous. Enoxaparin dose already given tonight before reports of bleeding.   - Handed off to oncoming shift for further assessment.   Silvia Smith RN-BC    "

## 2022-04-19 NOTE — PROGRESS NOTES
Daily Progress Note        CODE STATUS:  No CPR- Pre-arrest intubation OK    04/18/22  Assessment/Plan:  71 year old female with medical medical history of multiple myeloma on chemotherapy who presented to the ED on 4/12 for evaluation of shortness of breath, CT imaging reported severe bronchitis/bronchiolitis and admitted for further management.     Acute respiratory failure with hypoxia: Improved.  Due to community acquired pneumonia and COPD exacerbation.   --CT chest is negative for PE but shows severe bronchitis/bronchiolitis/and mild right lower lobe pneumonia.  --Patient did require high flow nasal cannula, now improved, transition to nasal cannula and able to wean off oxygen today.  --Completed steroid course.  -- Titrate DuoNeb and MetaNeb.    --Continue incentive spirometry, flutter valves.  --Appreciated pulmonary input, recommended follow-up with pulmonologist as an outpatient for formal PFTs.    CAP, right lower lobe:   CT chest reported severe bronchitis/bronchiolitis;  --Negative Covid 19 test.   --Patient is immunocompromised because of multiple myeloma and chemotherapy.   --BC no growth.  Patient unable to provide sputum samples.  --On 4/13, procalcitonin normal.   --Not sure why respiratory panel PCR nasal swab not done previously.  --Completed 5 days course of azithromycin on 4/17.  --Since admission on 4/12 on IV Vanco and Zosyn, given MRSA negative, discontinued vancomycin on 4/18.  Continue IV Zosyn    Electrolyte imbalance;  -- Replace per protocol.    Multiple myeloma:   --Has been on treatment with Revlimid Velcade and dexamethasone. Last chemo was 4/6/2022. Per Oncology, her myeloma has responded very nicely and is currently almost in remission.   --Oncology consulted and input appreciated: Hold chemotherapy while inpatient. Initiate treatment after patient is discharged.   --Continue PTA acyclovir     Elevated blood pressure without diagnosis of hypertension;  She had hypotension on  admission secondary to volume depletion and sepsis. Now BP has become elevated for the past several days.  - Started amlodipine 5 mg daily, increased to 10 mg daily on 4/19..  Getting intermittent IV Lasix as patient received volume resuscitation.  As needed hydralazine.  Monitor vitals closely     Chronic pain syndrome:   --Secondary to cancer. Resume home medications    Vaginal bleeding on 4/18 night;  -- Patient reports some crampy lower abdominal pain.  Reported menopause almost 20 years ago.  OB/GYN consulted    DVT prophylaxis; subcu Lovenox, on hold due to vaginal bleeding    Disposition; anticipate home  Barrier to discharge; IV antibiotics, consult pending     LOS: 6 days     Subjective:  Interval History: Patient seen and examined. Notes, labs, imaging reports personally reviewed.  Overnight events reviewed.  Patient reported having vaginal bleeding associated with lower abdominal intermittent crampy pain.  Reported had menopause decades ago.  Patient reported breathing continued to improve.  Reported still having cough.  Denied fever or chills.  Denied chest pain.  Discussed with nursing staffs.    Review of Systems:   As mentioned in subjective.    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     Pathologic compression fracture of spine, initial encounter (H)     Acute cystitis with hematuria      Hypokalemia     Functional diarrhea     Severe malnutrition (H)     Hypoxia     Pneumonia of right lower lobe due to infectious organism       Scheduled Meds:    acyclovir  400 mg Oral BID     amLODIPine  5 mg Oral Daily     aspirin  81 mg Oral Daily     enoxaparin ANTICOAGULANT  40 mg Subcutaneous Q24H     furosemide  20 mg Intravenous Once     gabapentin  600 mg Oral TID     ipratropium - albuterol 0.5 mg/2.5 mg/3 mL  3 mL Nebulization TID     levOCARNitine  330 mg Oral BID     melatonin  20 mg Oral At Bedtime     piperacillin-tazobactam  3.375 g Intravenous Q8H     potassium chloride  10 mEq Oral Once     potassium chloride  20 mEq Oral Once     senna-docusate  1 tablet Oral BID    Or     senna-docusate  2 tablet Oral BID     sodium chloride (PF)  3 mL Intracatheter Q8H     ascorbic acid  250 mg Oral Daily     Continuous Infusions:  PRN Meds:.acetaminophen **OR** acetaminophen, albuterol, albuterol, hydrALAZINE, HYDROmorphone, ibuprofen, lidocaine 4%, lidocaine (buffered or not buffered), melatonin, methocarbamol, naloxone **OR** naloxone **OR** naloxone **OR** naloxone, ondansetron **OR** ondansetron, sodium chloride, sodium chloride (PF)    Objective:  Vital signs in last 24 hours:  Temp:  [97.5  F (36.4  C)-98.5  F (36.9  C)] 98  F (36.7  C)  Pulse:  [59-78] 78  Resp:  [16-20] 16  BP: (146-198)/(65-93) 151/68  FiO2 (%):  [21 %] 21 %  SpO2:  [92 %-95 %] 92 %      Intake/Output Summary (Last 24 hours) at 4/18/2022 1057  Last data filed at 4/18/2022 0900  Gross per 24 hour   Intake 1355 ml   Output --   Net 1355 ml       Physical Exam:  General: Not in obvious distress.  HEENT: Normocephalic, supple  Chest: Clear to auscultation bilateral anteriorly, bilateral occasional rhonchi, unlabored breathing  Heart: S1S2 normal, regular  Abdomen: Soft.  No significant tenderness appreciated. Bowel sounds- active.  Extremities: Trace legs swelling  Neuro: alert and awake, grossly non-focal  Genitourinary;  deferred    Lab Results:(I have personally reviewed the results)    Recent Results (from the past 24 hour(s))   Potassium    Collection Time: 04/18/22  1:27 PM   Result Value Ref Range    Potassium 3.3 (L) 3.5 - 5.0 mmol/L   Potassium    Collection Time: 04/18/22  9:02 PM   Result Value Ref Range    Potassium 3.8 3.5 - 5.0 mmol/L   Magnesium    Collection Time: 04/19/22  6:24 AM   Result Value Ref Range    Magnesium 1.9 1.8 - 2.6 mg/dL   Potassium    Collection Time: 04/19/22  6:24 AM   Result Value Ref Range    Potassium 3.5 3.5 - 5.0 mmol/L   Hemoglobin    Collection Time: 04/19/22  6:24 AM   Result Value Ref Range    Hemoglobin 11.1 (L) 11.7 - 15.7 g/dL   UA reflex to Microscopic and Culture    Collection Time: 04/19/22  6:35 AM    Specimen: Urine, Clean Catch   Result Value Ref Range    Color Urine Yellow Colorless, Straw, Light Yellow, Yellow    Appearance Urine Clear Clear    Glucose Urine Negative Negative mg/dL    Bilirubin Urine Negative Negative    Ketones Urine Negative Negative mg/dL    Specific Gravity Urine 1.010 1.001 - 1.030    Blood Urine 0.5 mg/dL (A) Negative    pH Urine 5.0 5.0 - 7.0    Protein Albumin Urine Negative Negative mg/dL    Urobilinogen Urine <2.0 <2.0 mg/dL    Nitrite Urine Negative Negative    Leukocyte Esterase Urine Negative Negative    Mucus Urine Present (A) None Seen /LPF    RBC Urine 34 (H) <=2 /HPF    WBC Urine 5 <=5 /HPF    Squamous Epithelials Urine 1 <=1 /HPF         Serum Glucose range:   Recent Labs   Lab 04/16/22  0655 04/15/22  0641 04/15/22  0407 04/15/22  0104   GLC 95 174* 200* 206*     ABG:   Recent Labs   Lab 04/13/22  1019   PH 7.31*   PCO2 52*   PO2 110*   HCO3 24   O2PER 70     CBC:   Recent Labs   Lab 04/19/22  0624 04/18/22  0614 04/16/22  0655 04/15/22  0641 04/14/22  0640   WBC  --   --  6.9 6.8 5.6   HGB 11.1*  --  10.8* 10.6* 11.1*   HCT  --   --  31.6* 31.8* 35.3   MCV  --   --  97 98 106*   PLT  --  256 191 182 150   NEUTROPHIL  --   --  63 74 82   LYMPH   --   --  22 16 9   MONOCYTE  --   --  11 10 7   EOSINOPHIL  --   --  3 0 1     Chemistry:   Recent Labs   Lab 22  0624 22  2102 22  1327 22  0613 22  1700 22  0655 04/15/22  1212 04/15/22  0641 22  0640 22  0827 22  0604 22  1535   NA  --   --   --   --   --  143  --  141 141  --  138 136   POTASSIUM 3.5 3.8 3.3* 3.2*   < > 3.2*   < > 2.9* 3.8   < > 3.4* 3.8   CHLORIDE  --   --   --   --   --  113*  --  110* 112*  --  107 99   CO2  --   --   --   --   --  25  --  24 17*  --  24 24   BUN  --   --   --   --   --  6*  --  9 16  --  15 21   CR  --   --   --  0.85  --  0.54*  --  0.57* 0.55*  --  0.64 0.76   GFRESTIMATED  --   --   --  74  --  >90  --  >90 >90  --  >90 84   SERAFIN  --   --   --   --   --  7.5*  --  7.3* 7.4*  --  7.8* 9.2   MAG 1.9  --   --  1.7*  --   --   --   --   --   --   --  1.9   PROTTOTAL  --   --   --   --   --   --   --   --   --   --  5.2*  --    ALBUMIN  --   --   --   --   --   --   --   --   --   --  2.5*  --    AST  --   --   --   --   --   --   --   --   --   --  51*  --    ALT  --   --   --   --   --   --   --   --   --   --  34  --    ALKPHOS  --   --   --   --   --   --   --   --   --   --  30*  --    BILITOTAL  --   --   --   --   --   --   --   --   --   --  0.5  --     < > = values in this interval not displayed.     Coags:  Recent Labs   Lab 22  1535   INR 1.11   PTT 30     Cardiac Markers:  Recent Labs   Lab 22  1535   TROPONINI 0.07        Echocardiogram Complete    Result Date: 2022  [id number]^CHEMO^GIUSEPPE^[place] [address]  Name: LAQUITA WHALEN MRN: [id number] : 1950 Study Date: 2022 08:02 AM Age: 71 yrs Gender: Female Patient Location: [facility] Reason For Study: Dyspnea Ordering Physician: GIUSEPPE MCLAIN Performed By: AVERY  BSA: 1.5 m2 Height: 62 in Weight: 120 lb HR: 59  ______________________________________________________________________________ Procedure Complete Portable Echo Adult. ______________________________________________________________________________ Interpretation Summary  The left ventricle is normal in size. There is borderline concentric left ventricular hypertrophy. Left ventricular systolic function is normal. The visual ejection fraction is 60-65%. No regional wall motion abnormalities noted. No significant valve abnormality ______________________________________________________________________________ I      WMSI = 1.00     % Normal = 100  X - Cannot   0 -                      (2) - Mildly 2 -          Segments  Size Interpret    Hyperkinetic 1 - Normal  Hypokinetic  Hypokinetic  1-2     small                                                    7 -          3-5    moderate 3 - Akinetic 4 -          5 -         6 - Akinetic Dyskinetic   6-14    large              Dyskinetic   Aneurysmal  w/scar       w/scar       15-16   diffuse  Left Ventricle The left ventricle is normal in size. There is borderline concentric left ventricular hypertrophy. Left ventricular systolic function is normal. Diastolic Doppler findings (E/E' ratio and/or other parameters) suggest left ventricular filling pressures are indeterminate. Grade I or early diastolic dysfunction. The visual ejection fraction is 60-65%. No regional wall motion abnormalities noted.  Right Ventricle Normal right ventricle size and systolic function. TAPSE is normal, which is consistent with normal right ventricular systolic function.  Atria Normal left atrial size. Right atrial size is normal.  Mitral Valve Mitral valve leaflets appear normal. There is no evidence of mitral stenosis or clinically significant mitral regurgitation. There is trace to mild mitral regurgitation.  Tricuspid Valve There is trace to mild tricuspid regurgitation.  Aortic Valve The aortic valve is not well visualized. No hemodynamically  significant valvular aortic stenosis.  Pulmonic Valve The pulmonic valve is not well visualized.  Vessels The aorta root is normal. IVC diameter and respiratory changes fall into an intermediate range suggesting an RA pressure of 8 mmHg.  Pericardium No significant pericardial effusion.  ______________________________________________________________________________ MMode/2D Measurements & Calculations IVSd: 1.2 cm LVIDd: 3.7 cm LVIDs: 2.7 cm LVPWd: 1.0 cm FS: 28.8 % LV mass(C)d: 131.1 grams LV mass(C)dI: 85.2 grams/m2 Ao root diam: 2.9 cm LA dimension: 2.8 cm  asc Aorta Diam: 2.5 cm LA/Ao: 0.96 LVOT diam: 1.9 cm LVOT area: 2.7 cm2 LA Volume Indexed (AL/bp): 25.5 ml/m2 RWT: 0.56  Doppler Measurements & Calculations MV E max sohan: 88.6 cm/sec MV A max sohan: 88.6 cm/sec MV E/A: 1.00 MV dec slope: 473.2 cm/sec2 MV dec time: 0.19 sec Ao V2 max: 154.7 cm/sec Ao max PG: 10.0 mmHg Ao V2 mean: 107.9 cm/sec Ao mean P.2 mmHg Ao V2 VTI: 33.1 cm SAÚL(I,D): 2.3 cm2 SAÚL(V,D): 2.2 cm2 LV V1 max P.5 mmHg LV V1 max: 127.9 cm/sec LV V1 VTI: 28.5 cm  SV(LVOT): 77.6 ml SI(LVOT): 50.4 ml/m2 PA acc time: 0.11 sec AV Sohan Ratio (DI): 0.83 SAÚL Index (cm2/m2): 1.5 E/E' av.5 Lateral E/e': 13.4 Medial E/e': 13.6  ______________________________________________________________________________ Report approved by: Natalia Anne 2022 09:11 AM       CT Chest Pulmonary Embolism w Contrast    Result Date: 2022  EXAM: CT CHEST PULMONARY EMBOLISM W CONTRAST LOCATION: [facility] DATE/TIME: 2022 5:46 PM INDICATION: Shortness of breath, hypoxia COMPARISON: 2021 TECHNIQUE: CT chest pulmonary angiogram during arterial phase injection of IV contrast. Multiplanar reformats and MIP reconstructions were performed. Dose reduction techniques were used. CONTRAST: isovue 370  100ml FINDINGS: ANGIOGRAM CHEST: No evidence for pulmonary embolism. Pulmonary arteries normal in caliber. Thoracic aorta normal in  caliber. No aortic dissection or other acute abnormality. HEART: Cardiac chambers within normal limits. No pericardial effusion. Mild coronary artery calcification. MEDIASTINUM: No adenopathy or mass. LUNGS AND PLEURA: Moderate bronchial wall thickening, mucous plugging, and scattered tree-in-bud densities peripherally. There are areas of developing atelectasis within the lingula and right middle lobe. Small focus of consolidation within the right lower lobe laterally. No pleural effusion or pneumothorax. LIMITED UPPER ABDOMEN: Stable left adrenal nodules. Right adrenal gland unremarkable. MUSCULOSKELETAL: Numerous tiny lytic bone lesions within multiple vertebral bodies compatible with multiple myeloma. Stable severe vertebral compression deformity at T7, and moderate at T8. Sclerosis has developed along the lesions in the adjacent T6 and  T8 vertebral bodies. Stable lesion within the T1 vertebral body with mild associated loss of vertebral body height.     IMPRESSION: 1.  No evidence for pulmonary embolism. 2.  Severe bronchitis/bronchiolitis and mild right lower lobe pneumonia.      Latest radiology report personally reviewed.    Note created using dragon voice recognition software so sounds alike errors may have escaped editing.    04/19/2022   MATILDE ANDREWS MD  HOSPITALIST, [facility]  PAGER NO. [phone]

## 2022-04-19 NOTE — PLAN OF CARE
Occupational Therapy Discharge Summary    Reason for therapy discharge:    All goals and outcomes met, no further needs identified.    Progress towards therapy goal(s). See goals on Care Plan in [facility] electronic health record for goal details.  Goals met    Therapy recommendation(s):    Recommend with home with assist as needed.  Pt is SBA with basic ADLs and mobility.

## 2022-04-19 NOTE — PLAN OF CARE
"Problem: Plan of Care - These are the overarching goals to be used throughout the patient stay.    Goal: Plan of Care Review/Shift Note  Description: The Plan of Care Review/Shift note should be completed every shift.  The Outcome Evaluation is a brief statement about your assessment that the patient is improving, declining, or no change.  This information will be displayed automatically on your shift note.  Outcome: Ongoing, Progressing  Flowsheets (Taken 4/19/2022 0448)  Plan of Care Reviewed With: patient     Problem: Plan of Care - These are the overarching goals to be used throughout the patient stay.    Goal: Optimal Comfort and Wellbeing  Outcome: Ongoing, Progressing  Intervention: Monitor Pain and Promote Comfort  Recent Flowsheet Documentation  Taken 4/19/2022 0108 by Aleks Jauregui, RN  Pain Management Interventions: medication (see MAR)  Intervention: Provide Person-Centered Care  Recent Flowsheet Documentation  Taken 4/19/2022 0015 by Aleks Jauregui RN  Trust Relationship/Rapport:   care explained   questions answered   Goal Outcome Evaluation:    Plan of Care Reviewed With: patient        Pt is alert & oriented x 4. Pt complained of chronic back pain - prn dilaudid given. Pt also complained of dysuria / hematuria.    Per evening nurse - pt reported blood and clots in her urine and vaginal pain, pt described vagina feeling \"raw\". Notified Resident.     Urine appeared сергей yellow in hat on overnight shift.     Hemoglobin and UA ordered.          "

## 2022-04-19 NOTE — PROGRESS NOTES
"PT is currently on room air with an SpO2 of 92%.  Breathing pattern regular Breath sounds clear, diminished Cough type congested Sputum Type unknown  Duoneb nebulizer given as scheduled x2, MetaNeb given x1 for 10 minutes.  PT using IS and flutter on her own.  PT tolerated treatments well.  RT will continue to follow.      /62 (BP Location: Right arm, Patient Position: Chair, Cuff Size: Adult Regular)   Pulse 71   Temp 98  F (36.7  C) (Oral)   Resp 16   Ht 1.575 m (5' 2\")   Wt 54.8 kg (120 lb 12.8 oz)   SpO2 92%   BMI 22.09 kg/m      Juan Bey, RT  4/19/2022      "

## 2022-04-19 NOTE — PROGRESS NOTES
Phone call received from pt's sister, Tanesha. Cat expressed concerns with pt returning home. Cat wanted to hear from hospitalist if hospitalist feels she is ok to return home. CM stated that a referral for home care is an option. Cat was agreeable to this. CM attempted to speak with pt about home care, but was unable to.     CM updated hospitalist and will send HC referrals when orders are available.         MARITZA Chao  4/19/2022  3:17 PM        CM followed up with CAT who stated she visited pt today and feels completely confident that pt is able to return home.     MARITZA Chao  4/19/2022  3:33 PM

## 2022-04-19 NOTE — PLAN OF CARE
"NURSING NOTE  0700 - 1500  Goal Outcome Evaluation:    Problem: Plan of Care - These are the overarching goals to be used throughout the patient stay.    Goal: Patient-Specific Goal (Individualized)  Description: You can add care plan individualizations to a care plan. Examples of Individualization might be:  \"Parent requests to be called daily at 9am for status\", \"I have a hard time hearing out of my right ear\", or \"Do not touch me to wake me up as it startles me\".  Outcome: Ongoing, Progressing      - Chronic back pain reported. Robaxin given - helpful.  - Reports of vaginal cramping discomfort. Only scant light bleeding on pad insert  noted. Urine is clear. Transvaginal ultrasound today @ 1630. Seen by OBGYN.  - Afebrile, VSS. IV abx given. Strong congested cough present. Actively using her    IS and flutter valve independently.          "

## 2022-04-19 NOTE — SIGNIFICANT EVENT
"Significant Event Note    Time of event: 4:51 AM April 19, 2022    Description of event:  Pt complaining of blood and \"clot\" in the urine.   Has associated dysuria. Has hx of E coli UTI.     Adm with AHRF 2/2 to pneumonia. Currently on Zosyn.      BP (!) 176/77 (BP Location: Right arm, Patient Position: Chair, Cuff Size: Adult Regular)   Pulse 78   Temp 98.5  F (36.9  C) (Oral)   Resp 18   Ht 1.575 m (5' 2\")   Wt 54.8 kg (120 lb 12.8 oz)   SpO2 94%   BMI 22.09 kg/m      A:  Dysuria / hematuria     P:  UA with reflex   Add serum hemoglobin given clots in blood   Continue zosyn, no indication for change currently     Discussed with: bedside nurse    Antoni Wang, DO     "

## 2022-04-19 NOTE — CONSULTS
GYN CONSULTATION - VAGINAL BLEEDING     NAME: Lizzie Viera   : 1950   MRN: [id number]      [facility]    ADMISSION DATE: 2022    PCP:  Anne Marie Walker     CHIEF COMPLAINT: vaginal bleeding    HPI: I have been requested by Dr MACK to evaluate Lizzie Viera for vaginal bleeding. Patient is a 71 year old Para 3 who is currently admitted to hospitalist service due to acute respiratory failure/hypoxia secondary to pneumonia.  She also has multiple myeloma.  OB/GYN consulted due to episode of PMB last night.    Patient reports that she stopped having periods about 15 years ago.  She has not had any bleeding since menopause.  She has had 3 children, all delivered vaginally.  She has had 3 D&C's in the past related to bleeding, none recent.  She reports she has had cervical ablations in the past.  She does not follow with OB/GYN, follows with her PCP for routine health maintenance.    She reports episode of bleeding last night which is slowing down.  She takes baby ASA at home.  She is planning to discharge to home tomorrow.    . No LMP recorded. Patient is postmenopausal.     PAST MEDICAL HISTORY:  Past Medical History:   Diagnosis Date     Cervical dysplasia      Chronic RUQ pain      Depressive disorder      Multiple myeloma (H)      Osteoporosis        PAST SURGICAL HISTORY:  Past Surgical History:   Procedure Laterality Date     CONIZATION CERVIX,KNIFE/LASER      Description: Cervical Conization By Laser;  Recorded: 2007;     HC DILATION/CURETTAGE DIAG/THER NON OB      Description: Dilation And Curettage;  Recorded: 2007;     HC REMOVE TONSILS/ADENOIDS,<11 Y/O      Description: Tonsillectomy With Adenoidectomy;  Recorded: 2007;     ZC LAP,CHOLECYSTECTOMY/EXPLORE  2004     Z LIGATE FALLOPIAN TUBE      Description: Tubal Ligation;  Recorded: 2007;       SOCIAL HISTORY:  Social History     Socioeconomic History     Marital status:      Spouse  name: Not on file     Number of children: 3     Years of education: Not on file     Highest education level: Not on file   Occupational History     Not on file   Tobacco Use     Smoking status: Former Smoker     Packs/day: 0.50     Years: 45.00     Pack years: 22.50     Types: Cigarettes     Smokeless tobacco: Never Used   Substance and Sexual Activity     Alcohol use: Yes     Comment: Alcoholic Drinks/day: 0-1 drinks per week     Drug use: Not Currently     Sexual activity: Not Currently     Partners: Male     Birth control/protection: Surgical   Other Topics Concern     Parent/sibling w/ CABG, MI or angioplasty before 65F 55M? Not Asked   Social History Narrative     Not on file     Social Determinants of Health     Financial Resource Strain: Not on file   Food Insecurity: Not on file   Transportation Needs: Not on file   Physical Activity: Not on file   Stress: Not on file   Social Connections: Not on file   Intimate Partner Violence: Not on file   Housing Stability: Not on file       MEDICATIONS:  Current Facility-Administered Medications   Medication     acetaminophen (TYLENOL) tablet 650 mg    Or     acetaminophen (TYLENOL) Suppository 650 mg     acyclovir (ZOVIRAX) tablet 400 mg     albuterol (PROVENTIL HFA/VENTOLIN HFA) inhaler     albuterol (PROVENTIL) neb solution 2.5 mg     [START ON 4/20/2022] amLODIPine (NORVASC) tablet 10 mg     [Held by provider] aspirin (ASA) chewable tablet 81 mg     [Held by provider] enoxaparin ANTICOAGULANT (LOVENOX) injection 40 mg     gabapentin (NEURONTIN) capsule 600 mg     hydrALAZINE (APRESOLINE) injection 10 mg     HYDROmorphone (DILAUDID) tablet 2-4 mg     ibuprofen (ADVIL/MOTRIN) tablet 400 mg     ipratropium - albuterol 0.5 mg/2.5 mg/3 mL (DUONEB) neb solution 3 mL     levOCARNitine (CARNITOR) tablet 330 mg     lidocaine (LMX4) cream     lidocaine 1 % 0.1-1 mL     melatonin tablet 1 mg     Melatonin tablet 20 mg     methocarbamol (ROBAXIN) tablet 500 mg     naloxone  "(NARCAN) injection 0.2 mg    Or     naloxone (NARCAN) injection 0.4 mg    Or     naloxone (NARCAN) injection 0.2 mg    Or     naloxone (NARCAN) injection 0.4 mg     ondansetron (ZOFRAN-ODT) ODT tab 4 mg    Or     ondansetron (ZOFRAN) injection 4 mg     piperacillin-tazobactam (ZOSYN) 3.375 g vial to attach to  mL bag     senna-docusate (SENOKOT-S/PERICOLACE) 8.6-50 MG per tablet 1 tablet     sodium chloride (OCEAN) 0.65 % nasal spray 1 spray     sodium chloride (PF) 0.9% PF flush 3 mL     sodium chloride (PF) 0.9% PF flush 3 mL     vitamin C (ASCORBIC ACID) tablet 250 mg       ALLERGIES:  Allergies   Allergen Reactions     Cefdinir Shortness Of Breath     Latex Shortness Of Breath     Short Ragweed Pollen Ext Cough     Adhesive Tape Rash       REVIEW OF SYSTEMS    Negative except what is stated in the HPI    PHYSICAL EXAM:  /62 (BP Location: Right arm, Patient Position: Chair, Cuff Size: Adult Regular)   Pulse 71   Temp 98  F (36.7  C) (Oral)   Resp 16   Ht 1.575 m (5' 2\")   Wt 54.8 kg (120 lb 12.8 oz)   SpO2 92%   BMI 22.09 kg/m     General Appearance: Alert, appropriate appearance for age. No acute distress,   HEENT : Grossly normal  Gastrointestinal: soft  Pelvic Exam Female: normal external genitalia, scat spot of blood on current pad  Musculoskeletal Exam: Back IS non-tender, no cva tenderness, moving all four extremities  Skin: no rash or abnormalities,   Neurologic: Normal gait and speech, no tremor  Psychiatric: Alert and oriented, appropriate affect.    LABS  Lab Results   Component Value Date    HGB 11.1 (L) 04/19/2022     [unfilled]  Lab Results: personally reviewed.     IMAGING:      IMPRESSION:  71 year old year old female with PMB      RECOMMENDATIONS:  -Discussed recommendation for pelvic US as first step in evaluation.  Will evaluate endometrial lining on US.  If endometrial lining >5 mm on US then would recommend sampling, this could be done after discharge.  If endometrial lining " is < 5 mm then could monitor symptoms with outpatient follow-up  -Pelvic US planned later this afternoon.  I will plan to follow-up result with patient later this evening with recommendations for outpatient follow-up    Thank you for allowing us to participate in the care of this patient.  Please contact us with any questions/concerns.    Sharri Arreguin MD       CC: Anne Marie Walker    Addendum:  Pelvic US complete, report pending.  Reviewed images, overall appears normal with endometrial lining 3 mm.  Recommended outpatient follow-up, discussed with endometrial lining <5 mm would not necessarily need endometrial sampling.  Recommended outpatient OV in about 2 weeks to confirm continued cessation of bleeding.  I gave her my card with contact information for [facility]  MD Tala

## 2022-04-19 NOTE — PLAN OF CARE
Problem: Plan of Care - These are the overarching goals to be used throughout the patient stay.    Goal: Optimal Comfort and Wellbeing  Outcome: Ongoing, Progressing  Intervention: Monitor Pain and Promote Comfort  Recent Flowsheet Documentation  Taken 4/18/2022 2129 by Ginette Smith RN  Pain Management Interventions: medication offered but refused  Taken 4/18/2022 2030 by Ginette Smith, RN  Pain Management Interventions: medication offered but refused  Intervention: Provide Person-Centered Care  Recent Flowsheet Documentation  Taken 4/18/2022 1530 by Ginette Smith RN  Trust Relationship/Rapport: care explained     Problem: Pain Acute  Goal: Acceptable Pain Control and Functional Ability  Outcome: Ongoing, Progressing  Intervention: Develop Pain Management Plan  Recent Flowsheet Documentation  Taken 4/18/2022 2129 by Ginette Smith RN  Pain Management Interventions: medication offered but refused  Taken 4/18/2022 2030 by Ginette Smith RN  Pain Management Interventions: medication offered but refused  Intervention: Prevent or Manage Pain  Recent Flowsheet Documentation  Taken 4/18/2022 1530 by Ginette Smith RN  Medication Review/Management: medications reviewed     Problem: Fall Injury Risk  Goal: Absence of Fall and Fall-Related Injury  Outcome: Ongoing, Progressing  Intervention: Identify and Manage Contributors  Recent Flowsheet Documentation  Taken 4/18/2022 1530 by Ginette Smith RN  Medication Review/Management: medications reviewed  Intervention: Promote Injury-Free Environment  Recent Flowsheet Documentation  Taken 4/18/2022 1530 by Ginette Smith RN  Safety Promotion/Fall Prevention:    assistive device/personal items within reach    clutter free environment maintained    nonskid shoes/slippers when out of bed    patient and family education     Problem: Gas Exchange  Impaired  Goal: Optimal Gas Exchange  Outcome: Ongoing, Progressing     Problem: Hypertension Acute  Goal: Blood Pressure Within Desired Range  Outcome: Ongoing, Progressing  Intervention: Normalize Blood Pressure  Recent Flowsheet Documentation  Taken 4/18/2022 1530 by Ginette Smith RN  Medication Review/Management: medications reviewed   Goal Outcome Evaluation:      Patient experiences chronic mid back pain rated 7-8/10. Medication offered but patient reports preferring to take pain medication before bed. Patient is steady on feet and ambulating in room when not connected to IV pumps. Lung sounds clear. Patient reports using IS frequently independently. BP at 2000 185/77, recheck 198/93, PRN hydralazine given, recheck 148/67. K protocol completed, recheck in morning.

## 2022-04-19 NOTE — PROGRESS NOTES
RESPIRATORY CARE NOTE     Patient Name: Lizzie Viera  Today's Date: 4/18/2022       Pt continues to receive TID Duo with BID metaneb. BS are clear decreased. Pt is on room air, SpO2 is 95%. Post treatment there is increased aeration. Pt also perceives improvement.  RT encouraged deep breathing and coughing techniques .  RT will continue to monitor and assess.

## 2022-04-19 NOTE — TREATMENT PLAN
RCAT Treatment Plan    Patient Score: 9  Patient Acuity: 4    Clinical Indication for Therapy: history of mucous producing disease and atelectasis    Therapy Ordered: MetaNeb BID, Duoneb TID, Flutter and IS PRN     Assessment Summary: PT currently on room air, breath sounds clear, diminished. Non-productive cough.  Using flutter and IS on her own. Will continue current therapies.  RT will re-evaluate RCAT in x72 hours.    Juan Bey, RT  4/19/2022

## 2022-04-19 NOTE — PROGRESS NOTES
Received page regarding request for OB/GYN consult due to vaginal bleeding/cramping.  Ordered pelvic US to assess PMB, will plan to see patient after US with full consult to follow.    mD Tala

## 2022-04-20 VITALS
HEIGHT: 62 IN | OXYGEN SATURATION: 92 % | DIASTOLIC BLOOD PRESSURE: 63 MMHG | SYSTOLIC BLOOD PRESSURE: 140 MMHG | RESPIRATION RATE: 20 BRPM | TEMPERATURE: 97.8 F | HEART RATE: 70 BPM | WEIGHT: 120.8 LBS | BODY MASS INDEX: 22.23 KG/M2

## 2022-04-20 LAB — POTASSIUM BLD-SCNC: 3.6 MMOL/L (ref 3.5–5)

## 2022-04-20 PROCEDURE — 250N000009 HC RX 250: Performed by: INTERNAL MEDICINE

## 2022-04-20 PROCEDURE — 999N000157 HC STATISTIC RCP TIME EA 10 MIN

## 2022-04-20 PROCEDURE — 250N000013 HC RX MED GY IP 250 OP 250 PS 637: Performed by: INTERNAL MEDICINE

## 2022-04-20 PROCEDURE — 36415 COLL VENOUS BLD VENIPUNCTURE: CPT | Performed by: INTERNAL MEDICINE

## 2022-04-20 PROCEDURE — 84132 ASSAY OF SERUM POTASSIUM: CPT | Performed by: INTERNAL MEDICINE

## 2022-04-20 PROCEDURE — 250N000011 HC RX IP 250 OP 636: Performed by: INTERNAL MEDICINE

## 2022-04-20 PROCEDURE — 94640 AIRWAY INHALATION TREATMENT: CPT

## 2022-04-20 PROCEDURE — 94799 UNLISTED PULMONARY SVC/PX: CPT

## 2022-04-20 PROCEDURE — 99239 HOSP IP/OBS DSCHRG MGMT >30: CPT | Performed by: INTERNAL MEDICINE

## 2022-04-20 RX ORDER — ALBUTEROL SULFATE 90 UG/1
2 AEROSOL, METERED RESPIRATORY (INHALATION) EVERY 6 HOURS PRN
Qty: 18 G | Refills: 1 | Status: SHIPPED | OUTPATIENT
Start: 2022-04-20

## 2022-04-20 RX ORDER — AMLODIPINE BESYLATE 10 MG/1
10 TABLET ORAL DAILY
Qty: 60 TABLET | Refills: 0 | Status: SHIPPED | OUTPATIENT
Start: 2022-04-20 | End: 2022-06-08

## 2022-04-20 RX ORDER — ALBUTEROL SULFATE 0.83 MG/ML
2.5 SOLUTION RESPIRATORY (INHALATION) EVERY 4 HOURS PRN
Qty: 90 ML | Refills: 0 | Status: SHIPPED | OUTPATIENT
Start: 2022-04-20

## 2022-04-20 RX ORDER — GUAIFENESIN 600 MG/1
600 TABLET, EXTENDED RELEASE ORAL 2 TIMES DAILY
Qty: 10 TABLET | Refills: 0 | Status: SHIPPED | OUTPATIENT
Start: 2022-04-20 | End: 2022-07-18

## 2022-04-20 RX ADMIN — Medication 250 MG: at 10:01

## 2022-04-20 RX ADMIN — GUAIFENESIN 600 MG: 600 TABLET ORAL at 10:00

## 2022-04-20 RX ADMIN — PIPERACILLIN AND TAZOBACTAM 3.38 G: 3; .375 INJECTION, POWDER, LYOPHILIZED, FOR SOLUTION INTRAVENOUS at 01:27

## 2022-04-20 RX ADMIN — HYDROMORPHONE HYDROCHLORIDE 2 MG: 2 TABLET ORAL at 10:24

## 2022-04-20 RX ADMIN — PIPERACILLIN AND TAZOBACTAM 3.38 G: 3; .375 INJECTION, POWDER, LYOPHILIZED, FOR SOLUTION INTRAVENOUS at 10:10

## 2022-04-20 RX ADMIN — AMLODIPINE BESYLATE 10 MG: 5 TABLET ORAL at 10:02

## 2022-04-20 RX ADMIN — LEVOCARNITINE 330 MG: 330 TABLET ORAL at 09:59

## 2022-04-20 RX ADMIN — IPRATROPIUM BROMIDE AND ALBUTEROL SULFATE 3 ML: .5; 3 SOLUTION RESPIRATORY (INHALATION) at 07:47

## 2022-04-20 RX ADMIN — HYDRALAZINE HYDROCHLORIDE 10 MG: 20 INJECTION INTRAMUSCULAR; INTRAVENOUS at 03:24

## 2022-04-20 RX ADMIN — HYDROMORPHONE HYDROCHLORIDE 4 MG: 2 TABLET ORAL at 01:26

## 2022-04-20 RX ADMIN — GABAPENTIN 600 MG: 300 CAPSULE ORAL at 10:02

## 2022-04-20 RX ADMIN — ACYCLOVIR 400 MG: 400 TABLET ORAL at 10:01

## 2022-04-20 ASSESSMENT — ACTIVITIES OF DAILY LIVING (ADL)
ADLS_ACUITY_SCORE: 8

## 2022-04-20 NOTE — PLAN OF CARE
Physical Therapy Discharge Summary    Reason for therapy discharge:    Discharged to home with home therapy.    Progress towards therapy goal(s). See goals on Care Plan in [facility] electronic health record for goal details.  Goals partially met.  Barriers to achieving goals:   Pt made good progress towards the goals.   .    Therapy recommendation(s):    Continued therapy is recommended.  Rationale/Recommendations:  to improve mobility and strength. .

## 2022-04-20 NOTE — PROGRESS NOTES
Pt is on room air sating 95% HR 72 RR 18. Breath sounds clear diminished. Neb given x 1 with meta neb inline. Rt continue to monitor.

## 2022-04-20 NOTE — PLAN OF CARE
Problem: Plan of Care - These are the overarching goals to be used throughout the patient stay.    Goal: Plan of Care Review/Shift Note  Description: The Plan of Care Review/Shift note should be completed every shift.  The Outcome Evaluation is a brief statement about your assessment that the patient is improving, declining, or no change.  This information will be displayed automatically on your shift note.  Outcome: Met   Goal Outcome Evaluation:  Pt on room air and breathing without difficulty.  LS clear throughout.  Pain under good control per pt.  Reviewed discharge orders with pt who verbalized understanding of discharge orders.  Pt left via wheelchair with NA and sister Cat picked her up to bring her home.

## 2022-04-20 NOTE — DISCHARGE SUMMARY
[facility] MEDICINE  DISCHARGE SUMMARY     Primary Care Physician: Anne Marie Walker  Admission Date: 4/12/2022   Discharge Provider: Jermain MACK MD Discharge Date: 4/20/2022   Diet:   Active Diet and Nourishment Order   Procedures          Regular Diet Adult           Code Status: No CPR- Pre-arrest intubation OK   Activity: DCACTIVITY: Activity as tolerated        Condition at Discharge: Good     REASON FOR PRESENTATION(See Admission Note for Details)   Shortness of breath    PRINCIPAL & ACTIVE DISCHARGE DIAGNOSES     Principal Problem:    Pneumonia of right lower lobe due to infectious organism  Active Problems:    Hypoxia  COPD exacerbation.  Acute respiratory failure with hypoxia  Essential hypertension  Cancer related pain    PENDING LABS     Unresulted Labs Ordered in the Past 30 Days of this Admission     Date and Time Order Name Status Description    4/20/2022  7:26 AM Potassium In process             PROCEDURES ( this hospitalization only)          RECOMMENDATIONS TO OUTPATIENT PROVIDER FOR F/U VISIT     Follow-up Appointments     Follow-up and recommended labs and tests       Follow up with primary care provider, Anne Marie Walker, within 7 days to   evaluate medication change, for hospital follow- up, and medication   refills.  Referral to pulmonary for pulmonary function test for COPD   evaluation..  The following labs/tests are recommended: CBC, CMP,   magnesium..           Follow-up with oncologist as recommended    DISPOSITION     Home with prior to admission outpatient therapy    SUMMARY OF HOSPITAL COURSE:      71 year old female with medical medical history of multiple myeloma on chemotherapy who presented to the ED on 4/12 for evaluation of shortness of breath, CT imaging reported severe bronchitis/bronchiolitis and admitted for further management.     Acute respiratory failure with hypoxia: Improved.  Due to community acquired pneumonia and COPD exacerbation.    --CT chest is negative for PE but shows severe bronchitis/bronchiolitis/and mild right lower lobe pneumonia.  --Patient did require high flow nasal cannula, now improved, transition to nasal cannula and able to wean off oxygen today.  --Completed steroid course.  -- Continue inhaler and nebulizer as recommended.   Patient reported having nebulizer machine at home.  --Continue incentive spirometry, flutter valves.  --Appreciated pulmonary input, recommended follow-up with pulmonologist as an outpatient for formal PFTs.  Requested PCP to send referral to pulmonary as an outpatient.     CAP, right lower lobe:   CT chest reported severe bronchitis/bronchiolitis;  --Negative Covid 19 test.   --Patient is immunocompromised because of multiple myeloma and chemotherapy.   --BC no growth.  Patient unable to provide sputum samples.  --On 4/13, procalcitonin normal.   --Completed 5 days course of azithromycin on 4/17.  --Since admission on 4/12 on IV Vanco and Zosyn, given MRSA negative, discontinued vancomycin on 4/18.  IV Zosyn changed to Augmentin to complete 10 days course of antibiotics.     Multiple myeloma:   --Has been on treatment with Revlimid Velcade and dexamethasone. Last chemo was 4/6/2022. Per Oncology, her myeloma has responded very nicely and is currently almost in remission.   --Oncology consulted and input appreciated: Hold chemotherapy while inpatient. Initiate treatment after patient is discharged.   --Continue PTA acyclovir     Elevated blood pressure without diagnosis of hypertension;  She had hypotension on admission secondary to volume depletion and sepsis. Now BP has become elevated for the past several days.  - Started amlodipine 5 mg daily, increased to 10 mg daily on 4/19.  Follow-up with PCP to check blood pressure and adjust medications accordingly.     Chronic pain syndrome:   --Secondary to cancer. Resume home medications     Vaginal bleeding on 4/18 night; no recurrent  -- Appreciate OB/GYN  input.  Follow-up with them as an outpatient  --Transvaginal ultrasound unremarkable     Physical deconditioning due to medical illness;  --Continue to improve.  Appreciated PT OT input, recommended home with assist.    Discharge Medications with Med changes:     Current Discharge Medication List      START taking these medications    Details   albuterol (PROAIR HFA/PROVENTIL HFA/VENTOLIN HFA) 108 (90 Base) MCG/ACT inhaler Inhale 2 puffs into the lungs every 6 hours as needed for wheezing or shortness of breath / dyspnea  Qty: 18 g, Refills: 1    Comments: Pharmacy may dispense brand covered by insurance (Proair, or proventil or ventolin or generic albuterol inhaler)  Associated Diagnoses: COPD exacerbation (H)      albuterol (PROVENTIL) (2.5 MG/3ML) 0.083% neb solution Take 1 vial (2.5 mg) by nebulization every 4 hours as needed for wheezing or shortness of breath / dyspnea  Qty: 90 mL, Refills: 0    Associated Diagnoses: COPD exacerbation (H)      amLODIPine (NORVASC) 10 MG tablet Take 1 tablet (10 mg) by mouth daily  Qty: 60 tablet, Refills: 0    Associated Diagnoses: Essential hypertension, benign      amoxicillin-clavulanate (AUGMENTIN) 875-125 MG tablet Take 1 tablet by mouth 2 times daily for 3 days To complete 10 days course of antibiotics  Qty: 6 tablet, Refills: 0    Associated Diagnoses: Pneumonia of right lower lobe due to infectious organism      guaiFENesin (MUCINEX) 600 MG 12 hr tablet Take 1 tablet (600 mg) by mouth 2 times daily  Qty: 10 tablet, Refills: 0    Associated Diagnoses: COPD exacerbation (H)         CONTINUE these medications which have NOT CHANGED    Details   acyclovir (ZOVIRAX) 400 MG tablet Take 1 tablet (400 mg) by mouth 2 times daily Viral Prophylaxis.  Qty: 180 tablet, Refills: 1    Associated Diagnoses: Multiple myeloma with failed remission (H)      alpha-lipoic acid 100 MG capsule Take 300 mg by mouth daily      Ascorbic Acid (VITAMIN C) 100 MG CHEW Take 1 tablet by mouth daily       aspirin (ASA) 81 MG chewable tablet Take 81 mg by mouth daily      Coenzyme Q10 300 MG CAPS Take 300 mg by mouth daily      dexamethasone (DECADRON) 4 MG tablet Take 10 tablets (40 mg) by mouth on Days 1, 8, and 15.  Qty: 100 tablet, Refills: 1    Associated Diagnoses: Multiple myeloma with failed remission (H)      fish oil-omega-3 fatty acids 1000 MG capsule Take 2 g by mouth daily      gabapentin (NEURONTIN) 300 MG capsule Take 2 capsules (600 mg) by mouth 3 times daily  Qty: 180 capsule, Refills: 3    Associated Diagnoses: Thoracic spinal stenosis; Myofascial pain      HYDROmorphone (DILAUDID) 4 MG tablet Take 0.5-1 tablets (2-4 mg) by mouth every 4 hours as needed for moderate to severe pain  Qty: 60 tablet, Refills: 0    Associated Diagnoses: Cancer associated pain      LENalidomide (REVLIMID) 25 MG CAPS capsule Take 1 capsule (25 mg) by mouth daily Take on Days 1 through 14, then off for 7 days.  Qty: 14 capsule, Refills: 0    [id number] samanta on file, JOHN [facility]  Associated Diagnoses: Multiple myeloma with failed remission (H)      levOCARNitine (CARNITOR) 330 MG tablet Take 330 mg by mouth 2 times daily      medical cannabis (Patient's own supply) See Admin Instructions (The purpose of this order is to document that the patient reports taking medical cannabis.  This is not a prescription, and is not used to certify that the patient has a qualifying medical condition.)     Oil formulation      Melatonin 10 MG TABS tablet Take 20 mg by mouth At Bedtime      methocarbamol (ROBAXIN) 500 MG tablet Take 1 tablet (500 mg) by mouth 4 times daily as needed for muscle spasms  Qty: 90 tablet, Refills: 0    Associated Diagnoses: Cancer associated pain      Milk Thistle-Dand-Fennel-Licor (MILK THISTLE XTRA) CAPS capsule Take 1 capsule by mouth daily      NALTREXONE HCL PO Take 3 mg by mouth daily      phenazopyridine (AZO URINARY PAIN RELIEF) 95 MG tablet Take 190 mg by mouth 3 times daily      senna  (SENOKOT) 8.6 MG tablet Take 1 tablet by mouth daily as needed      TURMERIC PO Take 1 capsule by mouth daily      !! UNABLE TO FIND 1 capsule daily MEDICATION NAME: Serrapeptase      !! UNABLE TO FIND 1 capsule daily MEDICATION NAME: Edita Mariano Mushroom      !! UNABLE TO FIND 1 capsule daily MEDICATION NAME: DIM (diinolylmethane)      !! UNABLE TO FIND MEDICATION NAME: Panacur C - 1 capsule daily      Vitamin A 3 MG (40241 UT) TABS Take 1 tablet by mouth daily      Vitamin D, Cholecalciferol, 25 MCG (1000 UT) CAPS Take 1,000 Units by mouth daily Liquid, not capsule      !! UNABLE TO FIND 1 capsule daily MEDICATION NAME: Mucosagen       !! - Potential duplicate medications found. Please discuss with provider.                Rationale for medication changes:      Refer to hospital course        Consults       PHARMACY TO DOSE VANCO  SOCIAL WORK IP CONSULT  PHARMACY TO DOSE VANCO  PHYSICAL THERAPY ADULT IP CONSULT  OCCUPATIONAL THERAPY ADULT IP CONSULT  HEMATOLOGY & ONCOLOGY IP CONSULT  INTENSIVIST IP CONSULT  INTEGRATIVE THERAPIES CONSULT  ACUPUNCTURE IP CONSULT  UROLOGY IP CONSULT  OB GYN IP CONSULT    Immunizations given this encounter     Most Recent Immunizations   Administered Date(s) Administered     DT (PEDS <7y) 07/16/2002     Flu, Unspecified 12/04/2007     Influenza (H1N1) 01/11/2010     Influenza Vaccine, 6+MO IM (QUADRIVALENT W/PRESERVATIVES) 11/08/2010     Pneumo Conj 13-V (2010&after) 08/23/2018     Td,adult,historic,unspecified 07/16/2002     Tdap (Adacel,Boostrix) 08/23/2018           Anticoagulation Information      Recent INR results: No results for input(s): INR in the last 168 hours.  Warfarin doses (if applicable) or name of other anticoagulant:       SIGNIFICANT IMAGING FINDINGS     Results for orders placed or performed during the hospital encounter of 04/12/22   CT Chest Pulmonary Embolism w Contrast    Impression    IMPRESSION:  1.  No evidence for pulmonary embolism.  2.  Severe  bronchitis/bronchiolitis and mild right lower lobe pneumonia.   US Pelvic Complete with Transvaginal    Impression    IMPRESSION:  1.  No significant endometrial thickening.    2.  Bilateral ovaries not visualized. No adnexal masses identified.         Echocardiogram Complete   Result Value Ref Range    LVEF  60-65%        SIGNIFICANT LABORATORY FINDINGS     Most Recent 3 CBC's:Recent Labs   Lab Test 04/19/22  0624 04/18/22  0614 04/16/22  0655 04/15/22  0641 04/14/22  0640   WBC  --   --  6.9 6.8 5.6   HGB 11.1*  --  10.8* 10.6* 11.1*   MCV  --   --  97 98 106*   PLT  --  256 191 182 150     Most Recent 3 BMP's:Recent Labs   Lab Test 04/20/22  0738 04/19/22  0624 04/18/22  2102 04/18/22  1327 04/18/22  0613 04/16/22  1700 04/16/22  0655 04/15/22  1212 04/15/22  0641 04/15/22  0407 04/14/22  0836 04/14/22  0640   NA  --   --   --   --   --   --  143  --  141  --   --  141   POTASSIUM 3.6 3.5 3.8   < > 3.2*   < > 3.2*   < > 2.9*  --   --  3.8   CHLORIDE  --   --   --   --   --   --  113*  --  110*  --   --  112*   CO2  --   --   --   --   --   --  25  --  24  --   --  17*   BUN  --   --   --   --   --   --  6*  --  9  --   --  16   CR  --   --   --   --  0.85  --  0.54*  --  0.57*  --   --  0.55*   ANIONGAP  --   --   --   --   --   --  5  --  7  --   --  12   SERAFIN  --   --   --   --   --   --  7.5*  --  7.3*  --   --  7.4*   GLC  --   --   --   --   --   --  95  --  174* 200*   < > 50*    < > = values in this interval not displayed.       Discharge Orders        Reason for your hospital stay    Patient admitted for COPD exacerbation and pneumonia, improved with antibiotics and breathing treatment.  Patient advised to follow-up with PCP in a week.     Follow-up and recommended labs and tests     Follow up with primary care provider, Anne Marie Walker, within 7 days to evaluate medication change, for hospital follow- up, and medication refills.  Referral to pulmonary for pulmonary function test for COPD evaluation..   The following labs/tests are recommended: CBC, CMP, magnesium..     Activity    Your activity upon discharge: activity as tolerated     Discharge Instructions    Patient advised to do albuterol nebulizer 2 times a day for next 3 to 4 days after that as needed as instructed.     Diet    Follow this diet upon discharge: Orders Placed This Encounter         Regular Diet Adult       Examination   Physical Exam   Temp:  [98  F (36.7  C)-98.6  F (37  C)] 98.6  F (37  C)  Pulse:  [63-74] 71  Resp:  [16-18] 18  BP: (127-182)/(62-72) 143/64  SpO2:  [92 %-95 %] 92 %  Wt Readings from Last 1 Encounters:   04/12/22 54.8 kg (120 lb 12.8 oz)     Patient seen and examined.  Notes, labs, imaging report personally reviewed.  Patient reported breathing continued to improve.  Intermittent cough.  Denied fever or chills.  Denied chest pain.  Denied abdomen pain, nausea, vomiting.  Reported no recurrence of vaginal bleeding.  Reported leg swelling improved.  Patient eager to go home.  Patient reported doing outpatient therapy and she is going to continue that.  Discussed with nursing staffs.  Discussed with care manager/.  Patient declined me to call any family members.    General: Not in obvious distress.  HEENT: Normocephalic, supple neck  Chest: Clear to auscultation bilateral anteriorly, occasional rhonchi, unlabored breathing  Heart: S1S2 normal, regular  Abdomen: Soft. NT, ND. Bowel sounds- active.  Extremities: No legs swelling  Neuro: alert and awake, grossly non-focal      Please see EMR for more detailed significant labs, imaging, consultant notes etc.    IJermain MD, personally saw the patient today and spent greater than 30 minutes discharging this patient.    Jermain MACK MD  [facility]    CC:Anne Marie Walker

## 2022-04-20 NOTE — PLAN OF CARE
Problem: Hypertension Acute  Goal: Blood Pressure Within Desired Range  Outcome: Ongoing, Progressing  Intervention: Normalize Blood Pressure  Recent Flowsheet Documentation  Taken 4/20/2022 0130 by Anne Marie Vincent RN  Medication Review/Management: medications reviewed     Problem: Pain Acute  Goal: Acceptable Pain Control and Functional Ability  Outcome: Ongoing, Progressing  Intervention: Prevent or Manage Pain  Recent Flowsheet Documentation  Taken 4/20/2022 0130 by Anne Marie Vincent, RN  Medication Review/Management: medications reviewed   Goal Outcome Evaluation:        Having increased BP @ 0315  182/72. Dose of hydroxyzine given. Recheck 143/64. Feeling more edematous in ankles and feet. Right foot/ankle with mild edema. Right foot/ankle with trace edema. Dilaudid @ bedtime.  Slept well between cares.

## 2022-04-20 NOTE — PLAN OF CARE
Problem: Plan of Care - These are the overarching goals to be used throughout the patient stay.    Goal: Optimal Comfort and Wellbeing  Outcome: Ongoing, Progressing  Intervention: Provide Person-Centered Care  Recent Flowsheet Documentation  Taken 4/19/2022 1700 by Ginette Smith, RN  Trust Relationship/Rapport:    care explained    empathic listening provided     Problem: Pain Acute  Goal: Acceptable Pain Control and Functional Ability  Outcome: Ongoing, Progressing  Intervention: Prevent or Manage Pain  Recent Flowsheet Documentation  Taken 4/19/2022 1700 by Ginette Smith, RN  Medication Review/Management: medications reviewed     Problem: Gas Exchange Impaired  Goal: Optimal Gas Exchange  Outcome: Ongoing, Progressing     Problem: Hypertension Acute  Goal: Blood Pressure Within Desired Range  Outcome: Ongoing, Progressing  Intervention: Normalize Blood Pressure  Recent Flowsheet Documentation  Taken 4/19/2022 1700 by Ginette Smith, RN  Medication Review/Management: medications reviewed   Goal Outcome Evaluation:      Patient experiences chronic pain in back. Patient ambulates independently in room, is steady on feet. Uses IS independently and frequently. Patient lung sounds clear in all lobes. Patient remained on room air. Bps 134/64 and 142/66 . Patient had transvaginal US today, OBGYN following. Patient noting increased bilateral swelling in legs up to hips, Trace edema assessed in legs, mild-moderate edema in ankles and feet.

## 2022-04-20 NOTE — PROGRESS NOTES
Care Management Discharge Note    Discharge Date: 04/20/2022       Discharge Disposition: Home with family assist    Discharge Services:  NA- Pt goes to outpatient chemotherapy and outpatient PT    Discharge DME:      Discharge Transportation: family or friend will provide    Private pay costs discussed: Not applicable    Education Provided on the Discharge Plan:  yes  Persons Notified of Discharge Plans: Pt, MD,   Patient/Family in Agreement with the Plan: yes    Handoff Referral Completed: Yes    Additional Information:  Pt is medically ready to discharge        NOEMI Ivan

## 2022-04-21 ENCOUNTER — PATIENT OUTREACH (OUTPATIENT)
Dept: CARE COORDINATION | Facility: CLINIC | Age: 72
End: 2022-04-21
Payer: COMMERCIAL

## 2022-04-21 DIAGNOSIS — Z71.89 OTHER SPECIFIED COUNSELING: ICD-10-CM

## 2022-04-21 NOTE — PROGRESS NOTES
Clinic Care Coordination Contact    Background: Care Coordination referral placed from [facility] discharge report for reason of patient meeting criteria for a TCM outreach call by [facility] team.    Assessment: Upon chart review, [facility] Team member will cancel/close the referral for TCM outreach due to reason below:    Patient is followed by BMT Team. Will close order to avoid duplication of outreach.    Plan: Care Coordination referral for TCM outreach canceled.    Stefani Farias MA  [facility]

## 2022-04-22 ENCOUNTER — TELEPHONE (OUTPATIENT)
Dept: ONCOLOGY | Facility: HOSPITAL | Age: 72
End: 2022-04-22
Payer: COMMERCIAL

## 2022-04-22 DIAGNOSIS — C90.00 MULTIPLE MYELOMA WITH FAILED REMISSION (H): Primary | ICD-10-CM

## 2022-04-22 RX ORDER — NALOXONE HYDROCHLORIDE 0.4 MG/ML
0.2 INJECTION, SOLUTION INTRAMUSCULAR; INTRAVENOUS; SUBCUTANEOUS
Status: CANCELLED | OUTPATIENT
Start: 2022-05-03

## 2022-04-22 RX ORDER — NALOXONE HYDROCHLORIDE 0.4 MG/ML
0.2 INJECTION, SOLUTION INTRAMUSCULAR; INTRAVENOUS; SUBCUTANEOUS
Status: CANCELLED | OUTPATIENT
Start: 2022-04-26

## 2022-04-22 RX ORDER — MEPERIDINE HYDROCHLORIDE 25 MG/ML
25 INJECTION INTRAMUSCULAR; INTRAVENOUS; SUBCUTANEOUS EVERY 30 MIN PRN
Status: CANCELLED | OUTPATIENT
Start: 2022-04-26

## 2022-04-22 RX ORDER — METHYLPREDNISOLONE SODIUM SUCCINATE 125 MG/2ML
125 INJECTION, POWDER, LYOPHILIZED, FOR SOLUTION INTRAMUSCULAR; INTRAVENOUS
Status: CANCELLED
Start: 2022-05-10

## 2022-04-22 RX ORDER — ALBUTEROL SULFATE 90 UG/1
1-2 AEROSOL, METERED RESPIRATORY (INHALATION)
Status: CANCELLED
Start: 2022-05-03

## 2022-04-22 RX ORDER — LORAZEPAM 2 MG/ML
0.5 INJECTION INTRAMUSCULAR EVERY 4 HOURS PRN
Status: CANCELLED | OUTPATIENT
Start: 2022-05-10

## 2022-04-22 RX ORDER — DIPHENHYDRAMINE HYDROCHLORIDE 50 MG/ML
50 INJECTION INTRAMUSCULAR; INTRAVENOUS
Status: CANCELLED
Start: 2022-05-03

## 2022-04-22 RX ORDER — ALBUTEROL SULFATE 90 UG/1
1-2 AEROSOL, METERED RESPIRATORY (INHALATION)
Status: CANCELLED
Start: 2022-04-26

## 2022-04-22 RX ORDER — ALBUTEROL SULFATE 0.83 MG/ML
2.5 SOLUTION RESPIRATORY (INHALATION)
Status: CANCELLED | OUTPATIENT
Start: 2022-04-26

## 2022-04-22 RX ORDER — EPINEPHRINE 1 MG/ML
0.3 INJECTION, SOLUTION INTRAMUSCULAR; SUBCUTANEOUS EVERY 5 MIN PRN
Status: CANCELLED | OUTPATIENT
Start: 2022-05-10

## 2022-04-22 RX ORDER — ALBUTEROL SULFATE 0.83 MG/ML
2.5 SOLUTION RESPIRATORY (INHALATION)
Status: CANCELLED | OUTPATIENT
Start: 2022-05-10

## 2022-04-22 RX ORDER — METHYLPREDNISOLONE SODIUM SUCCINATE 125 MG/2ML
125 INJECTION, POWDER, LYOPHILIZED, FOR SOLUTION INTRAMUSCULAR; INTRAVENOUS
Status: CANCELLED
Start: 2022-04-26

## 2022-04-22 RX ORDER — LORAZEPAM 2 MG/ML
0.5 INJECTION INTRAMUSCULAR EVERY 4 HOURS PRN
Status: CANCELLED | OUTPATIENT
Start: 2022-05-03

## 2022-04-22 RX ORDER — EPINEPHRINE 1 MG/ML
0.3 INJECTION, SOLUTION INTRAMUSCULAR; SUBCUTANEOUS EVERY 5 MIN PRN
Status: CANCELLED | OUTPATIENT
Start: 2022-05-03

## 2022-04-22 RX ORDER — EPINEPHRINE 1 MG/ML
0.3 INJECTION, SOLUTION INTRAMUSCULAR; SUBCUTANEOUS EVERY 5 MIN PRN
Status: CANCELLED | OUTPATIENT
Start: 2022-04-26

## 2022-04-22 RX ORDER — DIPHENHYDRAMINE HYDROCHLORIDE 50 MG/ML
50 INJECTION INTRAMUSCULAR; INTRAVENOUS
Status: CANCELLED
Start: 2022-04-26

## 2022-04-22 RX ORDER — NALOXONE HYDROCHLORIDE 0.4 MG/ML
0.2 INJECTION, SOLUTION INTRAMUSCULAR; INTRAVENOUS; SUBCUTANEOUS
Status: CANCELLED | OUTPATIENT
Start: 2022-05-10

## 2022-04-22 RX ORDER — METHYLPREDNISOLONE SODIUM SUCCINATE 125 MG/2ML
125 INJECTION, POWDER, LYOPHILIZED, FOR SOLUTION INTRAMUSCULAR; INTRAVENOUS
Status: CANCELLED
Start: 2022-05-03

## 2022-04-22 RX ORDER — DIPHENHYDRAMINE HYDROCHLORIDE 50 MG/ML
50 INJECTION INTRAMUSCULAR; INTRAVENOUS
Status: CANCELLED
Start: 2022-05-10

## 2022-04-22 RX ORDER — MEPERIDINE HYDROCHLORIDE 25 MG/ML
25 INJECTION INTRAMUSCULAR; INTRAVENOUS; SUBCUTANEOUS EVERY 30 MIN PRN
Status: CANCELLED | OUTPATIENT
Start: 2022-05-03

## 2022-04-22 RX ORDER — LORAZEPAM 2 MG/ML
0.5 INJECTION INTRAMUSCULAR EVERY 4 HOURS PRN
Status: CANCELLED | OUTPATIENT
Start: 2022-04-26

## 2022-04-22 RX ORDER — ALBUTEROL SULFATE 90 UG/1
1-2 AEROSOL, METERED RESPIRATORY (INHALATION)
Status: CANCELLED
Start: 2022-05-10

## 2022-04-22 RX ORDER — MEPERIDINE HYDROCHLORIDE 25 MG/ML
25 INJECTION INTRAMUSCULAR; INTRAVENOUS; SUBCUTANEOUS EVERY 30 MIN PRN
Status: CANCELLED | OUTPATIENT
Start: 2022-05-10

## 2022-04-22 RX ORDER — ALBUTEROL SULFATE 0.83 MG/ML
2.5 SOLUTION RESPIRATORY (INHALATION)
Status: CANCELLED | OUTPATIENT
Start: 2022-05-03

## 2022-04-22 NOTE — ORAL ONC MGMT
Oral Chemotherapy Monitoring Program    Subjective/Objective:  Lizzie Viera is a 71 year old female with multiple myeloma contacted by phone for a follow-up visit for oral chemotherapy. Valeria was admitted 4/12/22-4/20/22 for pneumonia and COPD exacerbation. She states she is feeling much better now. During her hospitalization, her lenalidomide was held. She will continue to hold until her appt on 4/27/22. She has 8 capsules of lenalidomide left at home from her previous cycle.       Follow-Up:  We will review her appt on 4/27 and release a refill of lenalidomide if she restarts at that time.       Jeison Burns, PharmD  Oral Chemotherapy Pharmacist  [phone]

## 2022-04-27 ENCOUNTER — INFUSION THERAPY VISIT (OUTPATIENT)
Dept: INFUSION THERAPY | Facility: HOSPITAL | Age: 72
End: 2022-04-27
Attending: INTERNAL MEDICINE
Payer: COMMERCIAL

## 2022-04-27 ENCOUNTER — PATIENT OUTREACH (OUTPATIENT)
Dept: ONCOLOGY | Facility: HOSPITAL | Age: 72
End: 2022-04-27

## 2022-04-27 ENCOUNTER — ONCOLOGY VISIT (OUTPATIENT)
Dept: ONCOLOGY | Facility: HOSPITAL | Age: 72
End: 2022-04-27
Attending: INTERNAL MEDICINE
Payer: COMMERCIAL

## 2022-04-27 VITALS
HEART RATE: 64 BPM | OXYGEN SATURATION: 98 % | TEMPERATURE: 97.7 F | DIASTOLIC BLOOD PRESSURE: 66 MMHG | WEIGHT: 128.7 LBS | HEIGHT: 61 IN | RESPIRATION RATE: 24 BRPM | BODY MASS INDEX: 24.3 KG/M2 | SYSTOLIC BLOOD PRESSURE: 145 MMHG

## 2022-04-27 DIAGNOSIS — C90.00 MULTIPLE MYELOMA WITH FAILED REMISSION (H): Primary | ICD-10-CM

## 2022-04-27 DIAGNOSIS — M84.58XK PATHOLOGICAL FRACTURE OF VERTEBRAE IN NEOPLASTIC DISEASE WITH NONUNION: ICD-10-CM

## 2022-04-27 DIAGNOSIS — M80.80XD LOCALIZED OSTEOPOROSIS WITH CURRENT PATHOLOGICAL FRACTURE WITH ROUTINE HEALING, SUBSEQUENT ENCOUNTER: ICD-10-CM

## 2022-04-27 DIAGNOSIS — J43.1 PANLOBULAR EMPHYSEMA (H): ICD-10-CM

## 2022-04-27 LAB
ALBUMIN SERPL-MCNC: 3.2 G/DL (ref 3.5–5)
ALP SERPL-CCNC: 39 U/L (ref 45–120)
ALT SERPL W P-5'-P-CCNC: 21 U/L (ref 0–45)
ANION GAP SERPL CALCULATED.3IONS-SCNC: 11 MMOL/L (ref 5–18)
AST SERPL W P-5'-P-CCNC: 12 U/L (ref 0–40)
BASOPHILS # BLD AUTO: 0.1 10E3/UL (ref 0–0.2)
BASOPHILS NFR BLD AUTO: 1 %
BILIRUB SERPL-MCNC: 0.3 MG/DL (ref 0–1)
BUN SERPL-MCNC: 20 MG/DL (ref 8–22)
CALCIUM SERPL-MCNC: 9 MG/DL (ref 8.5–10.5)
CHLORIDE BLD-SCNC: 106 MMOL/L (ref 98–107)
CO2 SERPL-SCNC: 29 MMOL/L (ref 22–31)
CREAT SERPL-MCNC: 1.03 MG/DL (ref 0.6–1.1)
EOSINOPHIL # BLD AUTO: 0.3 10E3/UL (ref 0–0.7)
EOSINOPHIL NFR BLD AUTO: 4 %
ERYTHROCYTE [DISTWIDTH] IN BLOOD BY AUTOMATED COUNT: 15.6 % (ref 10–15)
GFR SERPL CREATININE-BSD FRML MDRD: 59 ML/MIN/1.73M2
GLUCOSE BLD-MCNC: 135 MG/DL (ref 70–125)
HCT VFR BLD AUTO: 36.5 % (ref 35–47)
HGB BLD-MCNC: 12.1 G/DL (ref 11.7–15.7)
IMM GRANULOCYTES # BLD: 0 10E3/UL
IMM GRANULOCYTES NFR BLD: 0 %
LYMPHOCYTES # BLD AUTO: 1.2 10E3/UL (ref 0.8–5.3)
LYMPHOCYTES NFR BLD AUTO: 18 %
MCH RBC QN AUTO: 33.3 PG (ref 26.5–33)
MCHC RBC AUTO-ENTMCNC: 33.2 G/DL (ref 31.5–36.5)
MCV RBC AUTO: 101 FL (ref 78–100)
MONOCYTES # BLD AUTO: 0.5 10E3/UL (ref 0–1.3)
MONOCYTES NFR BLD AUTO: 8 %
NEUTROPHILS # BLD AUTO: 4.5 10E3/UL (ref 1.6–8.3)
NEUTROPHILS NFR BLD AUTO: 69 %
NRBC # BLD AUTO: 0 10E3/UL
NRBC BLD AUTO-RTO: 0 /100
PLATELET # BLD AUTO: 286 10E3/UL (ref 150–450)
POTASSIUM BLD-SCNC: 3.5 MMOL/L (ref 3.5–5)
PROT SERPL-MCNC: 6.4 G/DL (ref 6–8)
RBC # BLD AUTO: 3.63 10E6/UL (ref 3.8–5.2)
SODIUM SERPL-SCNC: 146 MMOL/L (ref 136–145)
TOTAL PROTEIN SERUM FOR ELP: 6.1 G/DL (ref 6–8)
WBC # BLD AUTO: 6.5 10E3/UL (ref 4–11)

## 2022-04-27 PROCEDURE — 96401 CHEMO ANTI-NEOPL SQ/IM: CPT

## 2022-04-27 PROCEDURE — 84155 ASSAY OF PROTEIN SERUM: CPT

## 2022-04-27 PROCEDURE — 84165 PROTEIN E-PHORESIS SERUM: CPT | Mod: TC

## 2022-04-27 PROCEDURE — 99214 OFFICE O/P EST MOD 30 MIN: CPT | Performed by: INTERNAL MEDICINE

## 2022-04-27 PROCEDURE — G0463 HOSPITAL OUTPT CLINIC VISIT: HCPCS | Mod: 25

## 2022-04-27 PROCEDURE — 85025 COMPLETE CBC W/AUTO DIFF WBC: CPT | Performed by: NURSE PRACTITIONER

## 2022-04-27 PROCEDURE — 83521 IG LIGHT CHAINS FREE EACH: CPT | Mod: 59

## 2022-04-27 PROCEDURE — 84165 PROTEIN E-PHORESIS SERUM: CPT | Mod: 26 | Performed by: PATHOLOGY

## 2022-04-27 PROCEDURE — 80053 COMPREHEN METABOLIC PANEL: CPT | Performed by: NURSE PRACTITIONER

## 2022-04-27 PROCEDURE — 250N000011 HC RX IP 250 OP 636: Mod: JW | Performed by: NURSE PRACTITIONER

## 2022-04-27 PROCEDURE — 36415 COLL VENOUS BLD VENIPUNCTURE: CPT | Performed by: NURSE PRACTITIONER

## 2022-04-27 RX ORDER — EPINEPHRINE 1 MG/ML
0.3 INJECTION, SOLUTION INTRAMUSCULAR; SUBCUTANEOUS EVERY 5 MIN PRN
Status: CANCELLED | OUTPATIENT
Start: 2022-05-31

## 2022-04-27 RX ORDER — MEPERIDINE HYDROCHLORIDE 25 MG/ML
25 INJECTION INTRAMUSCULAR; INTRAVENOUS; SUBCUTANEOUS EVERY 30 MIN PRN
Status: CANCELLED | OUTPATIENT
Start: 2022-05-17

## 2022-04-27 RX ORDER — LORAZEPAM 2 MG/ML
0.5 INJECTION INTRAMUSCULAR EVERY 4 HOURS PRN
Status: CANCELLED | OUTPATIENT
Start: 2022-06-07

## 2022-04-27 RX ORDER — DIPHENHYDRAMINE HYDROCHLORIDE 50 MG/ML
50 INJECTION INTRAMUSCULAR; INTRAVENOUS
Status: CANCELLED
Start: 2022-06-14

## 2022-04-27 RX ORDER — EPINEPHRINE 1 MG/ML
0.3 INJECTION, SOLUTION INTRAMUSCULAR; SUBCUTANEOUS EVERY 5 MIN PRN
Status: CANCELLED | OUTPATIENT
Start: 2022-06-14

## 2022-04-27 RX ORDER — EPINEPHRINE 1 MG/ML
0.3 INJECTION, SOLUTION INTRAMUSCULAR; SUBCUTANEOUS EVERY 5 MIN PRN
Status: CANCELLED | OUTPATIENT
Start: 2022-06-21

## 2022-04-27 RX ORDER — LORAZEPAM 2 MG/ML
0.5 INJECTION INTRAMUSCULAR EVERY 4 HOURS PRN
Status: CANCELLED | OUTPATIENT
Start: 2022-06-21

## 2022-04-27 RX ORDER — ALBUTEROL SULFATE 90 UG/1
1-2 AEROSOL, METERED RESPIRATORY (INHALATION)
Status: CANCELLED
Start: 2022-05-24

## 2022-04-27 RX ORDER — EPINEPHRINE 1 MG/ML
0.3 INJECTION, SOLUTION INTRAMUSCULAR; SUBCUTANEOUS EVERY 5 MIN PRN
Status: CANCELLED | OUTPATIENT
Start: 2022-05-24

## 2022-04-27 RX ORDER — ALBUTEROL SULFATE 0.83 MG/ML
2.5 SOLUTION RESPIRATORY (INHALATION)
Status: CANCELLED | OUTPATIENT
Start: 2022-05-17

## 2022-04-27 RX ORDER — NALOXONE HYDROCHLORIDE 0.4 MG/ML
0.2 INJECTION, SOLUTION INTRAMUSCULAR; INTRAVENOUS; SUBCUTANEOUS
Status: CANCELLED | OUTPATIENT
Start: 2022-06-21

## 2022-04-27 RX ORDER — METHYLPREDNISOLONE SODIUM SUCCINATE 125 MG/2ML
125 INJECTION, POWDER, LYOPHILIZED, FOR SOLUTION INTRAMUSCULAR; INTRAVENOUS
Status: CANCELLED
Start: 2022-05-24

## 2022-04-27 RX ORDER — METHYLPREDNISOLONE SODIUM SUCCINATE 125 MG/2ML
125 INJECTION, POWDER, LYOPHILIZED, FOR SOLUTION INTRAMUSCULAR; INTRAVENOUS
Status: CANCELLED
Start: 2022-05-31

## 2022-04-27 RX ORDER — LORAZEPAM 2 MG/ML
0.5 INJECTION INTRAMUSCULAR EVERY 4 HOURS PRN
Status: CANCELLED | OUTPATIENT
Start: 2022-05-24

## 2022-04-27 RX ORDER — EPINEPHRINE 1 MG/ML
0.3 INJECTION, SOLUTION INTRAMUSCULAR; SUBCUTANEOUS EVERY 5 MIN PRN
Status: CANCELLED | OUTPATIENT
Start: 2022-05-17

## 2022-04-27 RX ORDER — MEPERIDINE HYDROCHLORIDE 25 MG/ML
25 INJECTION INTRAMUSCULAR; INTRAVENOUS; SUBCUTANEOUS EVERY 30 MIN PRN
Status: CANCELLED | OUTPATIENT
Start: 2022-06-14

## 2022-04-27 RX ORDER — METHYLPREDNISOLONE SODIUM SUCCINATE 125 MG/2ML
125 INJECTION, POWDER, LYOPHILIZED, FOR SOLUTION INTRAMUSCULAR; INTRAVENOUS
Status: CANCELLED
Start: 2022-06-21

## 2022-04-27 RX ORDER — METHYLPREDNISOLONE SODIUM SUCCINATE 125 MG/2ML
125 INJECTION, POWDER, LYOPHILIZED, FOR SOLUTION INTRAMUSCULAR; INTRAVENOUS
Status: CANCELLED
Start: 2022-06-14

## 2022-04-27 RX ORDER — ALBUTEROL SULFATE 90 UG/1
1-2 AEROSOL, METERED RESPIRATORY (INHALATION)
Status: CANCELLED
Start: 2022-06-14

## 2022-04-27 RX ORDER — METHYLPREDNISOLONE SODIUM SUCCINATE 125 MG/2ML
125 INJECTION, POWDER, LYOPHILIZED, FOR SOLUTION INTRAMUSCULAR; INTRAVENOUS
Status: CANCELLED
Start: 2022-06-07

## 2022-04-27 RX ORDER — MEPERIDINE HYDROCHLORIDE 25 MG/ML
25 INJECTION INTRAMUSCULAR; INTRAVENOUS; SUBCUTANEOUS EVERY 30 MIN PRN
Status: CANCELLED | OUTPATIENT
Start: 2022-05-31

## 2022-04-27 RX ORDER — MEPERIDINE HYDROCHLORIDE 25 MG/ML
25 INJECTION INTRAMUSCULAR; INTRAVENOUS; SUBCUTANEOUS EVERY 30 MIN PRN
Status: CANCELLED | OUTPATIENT
Start: 2022-06-21

## 2022-04-27 RX ORDER — LORAZEPAM 2 MG/ML
0.5 INJECTION INTRAMUSCULAR EVERY 4 HOURS PRN
Status: CANCELLED | OUTPATIENT
Start: 2022-05-17

## 2022-04-27 RX ORDER — MEPERIDINE HYDROCHLORIDE 25 MG/ML
25 INJECTION INTRAMUSCULAR; INTRAVENOUS; SUBCUTANEOUS EVERY 30 MIN PRN
Status: CANCELLED | OUTPATIENT
Start: 2022-06-07

## 2022-04-27 RX ORDER — METHYLPREDNISOLONE SODIUM SUCCINATE 125 MG/2ML
125 INJECTION, POWDER, LYOPHILIZED, FOR SOLUTION INTRAMUSCULAR; INTRAVENOUS
Status: CANCELLED
Start: 2022-05-17

## 2022-04-27 RX ORDER — MEPERIDINE HYDROCHLORIDE 25 MG/ML
25 INJECTION INTRAMUSCULAR; INTRAVENOUS; SUBCUTANEOUS EVERY 30 MIN PRN
Status: CANCELLED | OUTPATIENT
Start: 2022-05-24

## 2022-04-27 RX ORDER — DIPHENHYDRAMINE HYDROCHLORIDE 50 MG/ML
50 INJECTION INTRAMUSCULAR; INTRAVENOUS
Status: CANCELLED
Start: 2022-06-07

## 2022-04-27 RX ORDER — ALBUTEROL SULFATE 0.83 MG/ML
2.5 SOLUTION RESPIRATORY (INHALATION)
Status: CANCELLED | OUTPATIENT
Start: 2022-06-21

## 2022-04-27 RX ORDER — ALBUTEROL SULFATE 0.83 MG/ML
2.5 SOLUTION RESPIRATORY (INHALATION)
Status: CANCELLED | OUTPATIENT
Start: 2022-05-24

## 2022-04-27 RX ORDER — DIPHENHYDRAMINE HYDROCHLORIDE 50 MG/ML
50 INJECTION INTRAMUSCULAR; INTRAVENOUS
Status: CANCELLED
Start: 2022-05-31

## 2022-04-27 RX ORDER — DIPHENHYDRAMINE HYDROCHLORIDE 50 MG/ML
50 INJECTION INTRAMUSCULAR; INTRAVENOUS
Status: CANCELLED
Start: 2022-05-24

## 2022-04-27 RX ORDER — ALBUTEROL SULFATE 0.83 MG/ML
2.5 SOLUTION RESPIRATORY (INHALATION)
Status: CANCELLED | OUTPATIENT
Start: 2022-06-07

## 2022-04-27 RX ORDER — NALOXONE HYDROCHLORIDE 0.4 MG/ML
0.2 INJECTION, SOLUTION INTRAMUSCULAR; INTRAVENOUS; SUBCUTANEOUS
Status: CANCELLED | OUTPATIENT
Start: 2022-05-31

## 2022-04-27 RX ORDER — NALOXONE HYDROCHLORIDE 0.4 MG/ML
0.2 INJECTION, SOLUTION INTRAMUSCULAR; INTRAVENOUS; SUBCUTANEOUS
Status: CANCELLED | OUTPATIENT
Start: 2022-06-07

## 2022-04-27 RX ORDER — LORAZEPAM 2 MG/ML
0.5 INJECTION INTRAMUSCULAR EVERY 4 HOURS PRN
Status: CANCELLED | OUTPATIENT
Start: 2022-06-14

## 2022-04-27 RX ORDER — ALBUTEROL SULFATE 0.83 MG/ML
2.5 SOLUTION RESPIRATORY (INHALATION)
Status: CANCELLED | OUTPATIENT
Start: 2022-06-14

## 2022-04-27 RX ORDER — ALBUTEROL SULFATE 90 UG/1
1-2 AEROSOL, METERED RESPIRATORY (INHALATION)
Status: CANCELLED
Start: 2022-06-21

## 2022-04-27 RX ORDER — NALOXONE HYDROCHLORIDE 0.4 MG/ML
0.2 INJECTION, SOLUTION INTRAMUSCULAR; INTRAVENOUS; SUBCUTANEOUS
Status: CANCELLED | OUTPATIENT
Start: 2022-06-14

## 2022-04-27 RX ORDER — LORAZEPAM 2 MG/ML
0.5 INJECTION INTRAMUSCULAR EVERY 4 HOURS PRN
Status: CANCELLED | OUTPATIENT
Start: 2022-05-31

## 2022-04-27 RX ORDER — EPINEPHRINE 1 MG/ML
0.3 INJECTION, SOLUTION INTRAMUSCULAR; SUBCUTANEOUS EVERY 5 MIN PRN
Status: CANCELLED | OUTPATIENT
Start: 2022-06-07

## 2022-04-27 RX ORDER — ALBUTEROL SULFATE 0.83 MG/ML
2.5 SOLUTION RESPIRATORY (INHALATION)
Status: CANCELLED | OUTPATIENT
Start: 2022-05-31

## 2022-04-27 RX ORDER — NALOXONE HYDROCHLORIDE 0.4 MG/ML
0.2 INJECTION, SOLUTION INTRAMUSCULAR; INTRAVENOUS; SUBCUTANEOUS
Status: CANCELLED | OUTPATIENT
Start: 2022-05-24

## 2022-04-27 RX ORDER — NALOXONE HYDROCHLORIDE 0.4 MG/ML
0.2 INJECTION, SOLUTION INTRAMUSCULAR; INTRAVENOUS; SUBCUTANEOUS
Status: CANCELLED | OUTPATIENT
Start: 2022-05-17

## 2022-04-27 RX ORDER — ALBUTEROL SULFATE 90 UG/1
1-2 AEROSOL, METERED RESPIRATORY (INHALATION)
Status: CANCELLED
Start: 2022-06-07

## 2022-04-27 RX ORDER — DIPHENHYDRAMINE HYDROCHLORIDE 50 MG/ML
50 INJECTION INTRAMUSCULAR; INTRAVENOUS
Status: CANCELLED
Start: 2022-06-21

## 2022-04-27 RX ORDER — ALBUTEROL SULFATE 90 UG/1
1-2 AEROSOL, METERED RESPIRATORY (INHALATION)
Status: CANCELLED
Start: 2022-05-31

## 2022-04-27 RX ORDER — ALBUTEROL SULFATE 90 UG/1
1-2 AEROSOL, METERED RESPIRATORY (INHALATION)
Status: CANCELLED
Start: 2022-05-17

## 2022-04-27 RX ORDER — DIPHENHYDRAMINE HYDROCHLORIDE 50 MG/ML
50 INJECTION INTRAMUSCULAR; INTRAVENOUS
Status: CANCELLED
Start: 2022-05-17

## 2022-04-27 RX ADMIN — BORTEZOMIB 2.1 MG: 3.5 INJECTION, POWDER, LYOPHILIZED, FOR SOLUTION INTRAVENOUS; SUBCUTANEOUS at 15:29

## 2022-04-27 ASSESSMENT — PAIN SCALES - GENERAL: PAINLEVEL: MODERATE PAIN (4)

## 2022-04-27 NOTE — PROGRESS NOTES
Infusion Nursing Note:  Lizzie Viera presents today for Velcade.    Patient seen by provider today: Yes: Dr. Blanco.   present during visit today: Not Applicable.    Note: Writer verified Valeria is taking dexamethasone and Revlimid as ordered. Velcade administered subcutaneous as ordered in RLQ abd. NO bleeding or swelling noted. 2x2 gauze and paper tape placed over injection site.    Intravenous Access:  No Intravenous access this visit.    Treatment Conditions:  Lab Results   Component Value Date    HGB 12.1 04/27/2022    WBC 6.5 04/27/2022    ANEU 4.6 04/14/2022    ANEUTAUTO 4.5 04/27/2022     04/27/2022      Results reviewed, labs MET treatment parameters, ok to proceed with treatment.    Post Infusion Assessment:  Patient tolerated injection without incident.     Discharge Plan:   Patient will return May 4th for next appointment.   Patient discharged in stable condition accompanied by: self.  Departure Mode: Ambulatory.      Sharri Friedman RN

## 2022-04-27 NOTE — LETTER
"    4/27/2022         RE: Lizzie Viera  [address]  [place] [address]        Dear Colleague,    Thank you for referring your patient, Lizzie Viera, to the [facility] [place]. Please see a copy of my visit note below.    Oncology Rooming Note    April 27, 2022 2:25 PM   Lizzie Viera is a 71 year old female who presents for:    Chief Complaint   Patient presents with     Oncology Clinic Visit     5 week return Multiple myeloma with failed remission      Initial Vitals: BP (!) 145/66 (BP Location: Left arm, Patient Position: Sitting, Cuff Size: Adult Regular)   Pulse 64   Temp 97.7  F (36.5  C) (Oral)   Resp 24   Ht 1.549 m (5' 1\")   Wt 58.4 kg (128 lb 11.2 oz)   SpO2 98%   BMI 24.32 kg/m   Estimated body mass index is 24.32 kg/m  as calculated from the following:    Height as of this encounter: 1.549 m (5' 1\").    Weight as of this encounter: 58.4 kg (128 lb 11.2 oz). Body surface area is 1.59 meters squared.  Moderate Pain (4) Comment: Data Unavailable   No LMP recorded. Patient is postmenopausal.  Allergies reviewed: Yes  Medications reviewed: Yes    Medications: Medication refills not needed today.  Pharmacy name entered into [employer]: [id number] [address]    Clinical concerns: 5 week return Multiple myeloma with failed remission.   Follow up from Hospital pneumonia  Bilateral foot swelling started in the hospital.   Extreme fatigue hits her every other day.         Marina Wills [facility] Hematology and Oncology Progress Note    Patient: Lizzie Viera  MRN: [id number]  Date of Service: Apr 27, 2022         Reason for Visit    Multiple Myeloma    Assessment     1.  A very pleasant 71 year old woman with average risk multiple myeloma is on the cytogenetics.  However clinically was behaving somewhat more aggressively.  Started on VRD treatment.  Responding quite well.  2.  " Significant bone disease with osteoporosis and compression fractures.  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 This appears to be significantly improved on the CT scan that she had last month.  3.  Normal hemoglobin, calcium, kidney function along with beta-2 microglobulin and albumin at the time of diagnosis.  4.  Positive Covid antibodies from infection. Unvaccinated. Declined Evusheld.  5.  Pain from compression fracture status post radiation therapy.  Significantly improved.      Plan    1.  This time we will continue treatment Velcade Revlimid and dexamethasone.  We will check her labs periodically.  We also talked about referral to [facility] for BMT.  Discussed with her the rationale of the treatment.  She is not too keen on at this time.  I think based on her response I think we can wait and continue the current therapy.  2.  Continue with Zometa monthly.  Her scalp radiograph shows significant improvement in the bone regrowth on the site of her myeloma lesion on her scalp.  3.  Continue with valacyclovir and other prophylaxis.  4.  Continue good diet and exercise.  Follow-up in 6 weeks or so.      Cancer Staging  No matching staging information was found for the patient.    ECOG Performance    2 - Ambulatory and independent in all ADLs; cannot work; up > 50% of the time      History of Present Illness    Ms. Lizzie Viera is a very pleasant 71 year old woman who has been diagnosed with multiple myeloma IgG kappa type in May 2021.  She had initially presented with compression fracture of T7 vertebral body in September 2020.  She had a back pain which led to that evaluation.  Bone density confirmed that she had osteoporosis.  MRI showed diffuse marrow edema in October 2020.  In April 2021 the MRI of the thoracic spine showed worsening T7 vertebral body height loss because of likely pathological compression fracture with extraosseous extension.  She then had IR guided bone biopsy on  7 May 2021 and pathology confirmed the presence of kappa light chain restricted plasma cells.  Her cytogenetics confirmed the presence of hyperdiploid E with gains of chromosome 5, 9 and 15.    She was then seen by Dr. Baig and had a bone marrow biopsy which also confirmed 60% involvement of the marrow with plasma cells.  The bone marrow was done on 3 Jocelin 2021.  The bone marrow also confirmed the presence of hyperdiploid E.  She had a gain of chromosome 3, 5, 7, 9, 11, 15 as well as 19.  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  Her beta-2 microglobulin was actually normal at the time of her diagnosis as was her albumin at 4.0.  Monoclonal protein 3.1 g/dL.  Kappa free light chain levels of 13 mg/dL IgG level of 4280 with depressed levels of IgM and IgA.  Urine was also positive for kappa light chains as well as immunofixation of the urine was positive for IgG kappa.  Normal kidney function.  Normal hemoglobin.    As mentioned above she had bone lesions in the T7 vertebral body, T1, skull area.  Her main pain is coming from her T7 vertebral body compression fracture.    Due to the pain she has been seen by radiation oncology and has received radiation therapy.  She also got a dose of steroids for pain control.    She is wearing a soft brace.  She did notice that when she was on dexamethasone her pain was significantly better.    She was initially thinking of doing some natural therapies but once her symptoms got worse, she came back in was counseled about treatment.      She has started on Velcade Revlimid and dexamethasone since September 2021. Tolerating it well. Responding nicely.  Immunoglobulins have  normalized completely.  Back pain is still an issue getting better.  The back pain gets some spasms.  Not taking any more Dilaudid.  He also has not used her brace.  Her immunoglobin levels have almost normalized in fact the IgG levels have gone below normal.  Immunofixation still shows a very faint IgG kappa monoclonal  "gammopathy.    She was recently hospitalized with COPD exacerbation/pneumonia.  Was given some steroids and antibiotic.  She was slightly hypoxic initially but then got better after that.  Discharged and now feeling a lot better.    Review of systems.  A 14 point review of systems was obtained.  Positive findings noted in the history.  Rest of the review of system is otherwise negative.     Past History    Past Medical History:   Diagnosis Date     Cervical dysplasia      Chronic RUQ pain      Depressive disorder      Multiple myeloma (H)      Osteoporosis        Past Surgical History:   Procedure Laterality Date     CONIZATION CERVIX,KNIFE/LASER      Description: Cervical Conization By Laser;  Recorded: 09/20/2007;     HC DILATION/CURETTAGE DIAG/THER NON OB      Description: Dilation And Curettage;  Recorded: 09/20/2007;     HC REMOVE TONSILS/ADENOIDS,<11 Y/O      Description: Tonsillectomy With Adenoidectomy;  Recorded: 09/20/2007;     [facility] LAP,CHOLECYSTECTOMY/EXPLORE  12/27/2004     [facility] LIGATE FALLOPIAN TUBE      Description: Tubal Ligation;  Recorded: 09/20/2007;         Physical Exam    BP (!) 145/66 (BP Location: Left arm, Patient Position: Sitting, Cuff Size: Adult Regular)   Pulse 64   Temp 97.7  F (36.5  C) (Oral)   Resp 24   Ht 1.549 m (5' 1\")   Wt 58.4 kg (128 lb 11.2 oz)   SpO2 98%   BMI 24.32 kg/m      GENERAL: No acute distress. Cooperative in conversation.   HEENT:  Pupils are equal, round and reactive. Oral mucosa is clean and intact. No ulcerations or mucositis noted. No bleeding noted.  RESP:Chest symmetric lungs are clear bilaterally per auscultation. Regular respiratory rate. No wheezes or rhonchi.  CV: Normal S1 S2 Regular, rate and rhythm. No murmurs.    ABD: Nondistended, soft, nontender. Positive bowel sounds. No organomegaly.   EXTREMITIES: No lower extremity edema.   NEURO: Non- focal. Alert and oriented x3.  Cranial nerves appear intact.  PSYCH: Within normal limits. No depression or " anxiety.  SKIN: Warm dry intact.     Lab Results    Recent Results (from the past 168 hour(s))   Comprehensive metabolic panel   Result Value Ref Range    Sodium 146 (H) 136 - 145 mmol/L    Potassium 3.5 3.5 - 5.0 mmol/L    Chloride 106 98 - 107 mmol/L    Carbon Dioxide (CO2) 29 22 - 31 mmol/L    Anion Gap 11 5 - 18 mmol/L    Urea Nitrogen 20 8 - 22 mg/dL    Creatinine 1.03 0.60 - 1.10 mg/dL    Calcium 9.0 8.5 - 10.5 mg/dL    Glucose 135 (H) 70 - 125 mg/dL    Alkaline Phosphatase 39 (L) 45 - 120 U/L    AST 12 0 - 40 U/L    ALT 21 0 - 45 U/L    Protein Total 6.4 6.0 - 8.0 g/dL    Albumin 3.2 (L) 3.5 - 5.0 g/dL    Bilirubin Total 0.3 0.0 - 1.0 mg/dL    GFR Estimate 59 (L) >60 mL/min/1.73m2   Kappa and lambda light chain   Result Value Ref Range    Kappa Free Light Chains 1.76 0.33 - 1.94 mg/dL    Lambda Free Light Chains 1.66 0.57 - 2.63 mg/dL    Kappa /Lambda Ratio 1.06 0.26 - 1.65   Total Protein, Serum for ELP   Result Value Ref Range    Total Protein Serum for ELP 6.1 6.0 - 8.0 g/dL   CBC with platelets and differential   Result Value Ref Range    WBC Count 6.5 4.0 - 11.0 10e3/uL    RBC Count 3.63 (L) 3.80 - 5.20 10e6/uL    Hemoglobin 12.1 11.7 - 15.7 g/dL    Hematocrit 36.5 35.0 - 47.0 %     (H) 78 - 100 fL    MCH 33.3 (H) 26.5 - 33.0 pg    MCHC 33.2 31.5 - 36.5 g/dL    RDW 15.6 (H) 10.0 - 15.0 %    Platelet Count 286 150 - 450 10e3/uL    % Neutrophils 69 %    % Lymphocytes 18 %    % Monocytes 8 %    % Eosinophils 4 %    % Basophils 1 %    % Immature Granulocytes 0 %    NRBCs per 100 WBC 0 <1 /100    Absolute Neutrophils 4.5 1.6 - 8.3 10e3/uL    Absolute Lymphocytes 1.2 0.8 - 5.3 10e3/uL    Absolute Monocytes 0.5 0.0 - 1.3 10e3/uL    Absolute Eosinophils 0.3 0.0 - 0.7 10e3/uL    Absolute Basophils 0.1 0.0 - 0.2 10e3/uL    Absolute Immature Granulocytes 0.0 <=0.4 10e3/uL    Absolute NRBCs 0.0 10e3/uL       Imaging    Echocardiogram Complete    Result Date: 4/13/2022  [id number]  [id number]^CHEMO^GIUSEPPE^[place] [address]  Name: LAQUITA WHALEN MRN: [id number] : 1950 Study Date: 2022 08:02 AM Age: 71 yrs Gender: Female Patient Location: [facility] Reason For Study: Dyspnea Ordering Physician: GIUSEPPE MCLAIN Performed By: AVERY  BSA: 1.5 m2 Height: 62 in Weight: 120 lb HR: 59 ______________________________________________________________________________ Procedure Complete Portable Echo Adult. ______________________________________________________________________________ Interpretation Summary  The left ventricle is normal in size. There is borderline concentric left ventricular hypertrophy. Left ventricular systolic function is normal. The visual ejection fraction is 60-65%. No regional wall motion abnormalities noted. No significant valve abnormality ______________________________________________________________________________ I      WMSI = 1.00     % Normal = 100  X - Cannot   0 -                      (2) - Mildly 2 -          Segments  Size Interpret    Hyperkinetic 1 - Normal  Hypokinetic  Hypokinetic  1-2     small                                                    7 -          3-5    moderate 3 - Akinetic 4 -          5 -         6 - Akinetic Dyskinetic   6-14    large              Dyskinetic   Aneurysmal  w/scar       w/scar       15-16   diffuse  Left Ventricle The left ventricle is normal in size. There is borderline concentric left ventricular hypertrophy. Left ventricular systolic function is normal. Diastolic Doppler findings (E/E' ratio and/or other parameters) suggest left ventricular filling pressures are indeterminate. Grade I or early diastolic dysfunction. The visual ejection fraction is 60-65%. No regional wall motion abnormalities noted.  Right Ventricle Normal right ventricle size and systolic function. TAPSE is normal, which is consistent with normal right ventricular systolic function.  Atria Normal left atrial size.  Right atrial size is normal.  Mitral Valve Mitral valve leaflets appear normal. There is no evidence of mitral stenosis or clinically significant mitral regurgitation. There is trace to mild mitral regurgitation.  Tricuspid Valve There is trace to mild tricuspid regurgitation.  Aortic Valve The aortic valve is not well visualized. No hemodynamically significant valvular aortic stenosis.  Pulmonic Valve The pulmonic valve is not well visualized.  Vessels The aorta root is normal. IVC diameter and respiratory changes fall into an intermediate range suggesting an RA pressure of 8 mmHg.  Pericardium No significant pericardial effusion.  ______________________________________________________________________________ MMode/2D Measurements & Calculations IVSd: 1.2 cm LVIDd: 3.7 cm LVIDs: 2.7 cm LVPWd: 1.0 cm FS: 28.8 % LV mass(C)d: 131.1 grams LV mass(C)dI: 85.2 grams/m2 Ao root diam: 2.9 cm LA dimension: 2.8 cm  asc Aorta Diam: 2.5 cm LA/Ao: 0.96 LVOT diam: 1.9 cm LVOT area: 2.7 cm2 LA Volume Indexed (AL/bp): 25.5 ml/m2 RWT: 0.56  Doppler Measurements & Calculations MV E max sohan: 88.6 cm/sec MV A max sohan: 88.6 cm/sec MV E/A: 1.00 MV dec slope: 473.2 cm/sec2 MV dec time: 0.19 sec Ao V2 max: 154.7 cm/sec Ao max PG: 10.0 mmHg Ao V2 mean: 107.9 cm/sec Ao mean P.2 mmHg Ao V2 VTI: 33.1 cm SAÚL(I,D): 2.3 cm2 SAÚL(V,D): 2.2 cm2 LV V1 max P.5 mmHg LV V1 max: 127.9 cm/sec LV V1 VTI: 28.5 cm  SV(LVOT): 77.6 ml SI(LVOT): 50.4 ml/m2 PA acc time: 0.11 sec AV Sohan Ratio (DI): 0.83 SAÚL Index (cm2/m2): 1.5 E/E' av.5 Lateral E/e': 13.4 Medial E/e': 13.6  ______________________________________________________________________________ Report approved by: Natalia Anne 2022 09:11 AM       US Pelvic Complete with Transvaginal    Result Date: 2022  EXAM: US PELVIC TRANSABDOMINAL AND TRANSVAGINAL LOCATION: [facility] DATE/TIME: 2022 4:53 PM INDICATION: Postmenopausal bleeding and cramping  pelvic pain. COMPARISON: None. TECHNIQUE: Transabdominal scans were performed. Endovaginal ultrasound was performed to better visualize the adnexa. FINDINGS: UTERUS: 4.5 x 3.5 x 2.6 cm. Normal in size and position with no masses. ENDOMETRIUM: 3 mm. Normal smooth endometrium. RIGHT OVARY: Not visualized, likely secondary to overlying bowel gas. No adnexal mass. LEFT OVARY: Not visualized, likely secondary to overlying bowel gas. No adnexal mass. No significant free fluid.     IMPRESSION: 1.  No significant endometrial thickening. 2.  Bilateral ovaries not visualized. No adnexal masses identified.     CT Chest Pulmonary Embolism w Contrast    Result Date: 4/12/2022  EXAM: CT CHEST PULMONARY EMBOLISM W CONTRAST LOCATION: [facility] DATE/TIME: 4/12/2022 5:46 PM INDICATION: Shortness of breath, hypoxia COMPARISON: 09/26/2021 TECHNIQUE: CT chest pulmonary angiogram during arterial phase injection of IV contrast. Multiplanar reformats and MIP reconstructions were performed. Dose reduction techniques were used. CONTRAST: isovue 370  100ml FINDINGS: ANGIOGRAM CHEST: No evidence for pulmonary embolism. Pulmonary arteries normal in caliber. Thoracic aorta normal in caliber. No aortic dissection or other acute abnormality. HEART: Cardiac chambers within normal limits. No pericardial effusion. Mild coronary artery calcification. MEDIASTINUM: No adenopathy or mass. LUNGS AND PLEURA: Moderate bronchial wall thickening, mucous plugging, and scattered tree-in-bud densities peripherally. There are areas of developing atelectasis within the lingula and right middle lobe. Small focus of consolidation within the right lower lobe laterally. No pleural effusion or pneumothorax. LIMITED UPPER ABDOMEN: Stable left adrenal nodules. Right adrenal gland unremarkable. MUSCULOSKELETAL: Numerous tiny lytic bone lesions within multiple vertebral bodies compatible with multiple myeloma. Stable severe vertebral compression  deformity at T7, and moderate at T8. Sclerosis has developed along the lesions in the adjacent T6 and  T8 vertebral bodies. Stable lesion within the T1 vertebral body with mild associated loss of vertebral body height.     IMPRESSION: 1.  No evidence for pulmonary embolism. 2.  Severe bronchitis/bronchiolitis and mild right lower lobe pneumonia.    CT scan was personally reviewed with the patient.    Signed by: Loco Blanco MD,         Again, thank you for allowing me to participate in the care of your patient.        Sincerely,        Loco Blanco MD, MD

## 2022-04-27 NOTE — PROGRESS NOTES
"Oncology Rooming Note    April 27, 2022 2:25 PM   Lizzie Viera is a 71 year old female who presents for:    Chief Complaint   Patient presents with     Oncology Clinic Visit     5 week return Multiple myeloma with failed remission      Initial Vitals: BP (!) 145/66 (BP Location: Left arm, Patient Position: Sitting, Cuff Size: Adult Regular)   Pulse 64   Temp 97.7  F (36.5  C) (Oral)   Resp 24   Ht 1.549 m (5' 1\")   Wt 58.4 kg (128 lb 11.2 oz)   SpO2 98%   BMI 24.32 kg/m   Estimated body mass index is 24.32 kg/m  as calculated from the following:    Height as of this encounter: 1.549 m (5' 1\").    Weight as of this encounter: 58.4 kg (128 lb 11.2 oz). Body surface area is 1.59 meters squared.  Moderate Pain (4) Comment: Data Unavailable   No LMP recorded. Patient is postmenopausal.  Allergies reviewed: Yes  Medications reviewed: Yes    Medications: Medication refills not needed today.  Pharmacy name entered into [facility]: [id number] - [place], [place] - [address]    Clinical concerns: 5 week return Multiple myeloma with failed remission.   Follow up from Hospital pneumonia  Bilateral foot swelling started in the hospital.   Extreme fatigue hits her every other day.         Marina Wills [facility]              "

## 2022-04-28 ENCOUNTER — TRANSFERRED RECORDS (OUTPATIENT)
Dept: HEALTH INFORMATION MANAGEMENT | Facility: CLINIC | Age: 72
End: 2022-04-28
Payer: COMMERCIAL

## 2022-04-28 DIAGNOSIS — G89.3 CANCER ASSOCIATED PAIN: ICD-10-CM

## 2022-04-28 LAB
KAPPA LC FREE SER-MCNC: 1.76 MG/DL (ref 0.33–1.94)
KAPPA LC FREE/LAMBDA FREE SER NEPH: 1.06 {RATIO} (ref 0.26–1.65)
LAMBDA LC FREE SERPL-MCNC: 1.66 MG/DL (ref 0.57–2.63)

## 2022-04-28 RX ORDER — HYDROMORPHONE HYDROCHLORIDE 4 MG/1
2-4 TABLET ORAL EVERY 4 HOURS PRN
Qty: 60 TABLET | Refills: 0 | Status: SHIPPED | OUTPATIENT
Start: 2022-04-28 | End: 2024-01-01

## 2022-04-28 RX ORDER — METHOCARBAMOL 500 MG/1
500 TABLET, FILM COATED ORAL 3 TIMES DAILY PRN
Qty: 90 TABLET | Refills: 0 | Status: SHIPPED | OUTPATIENT
Start: 2022-04-28 | End: 2022-05-03

## 2022-04-28 NOTE — TELEPHONE ENCOUNTER
Received [employer] message from patient requesting refill of hydromorphone.     Last refill: 3/15/22  Last office visit: 3/15/22  Scheduled for follow up pending, msg sent to scheduling.     Will route request to APRN for review.     Reviewed [place]  Report.

## 2022-04-28 NOTE — TELEPHONE ENCOUNTER
Received fax from pharmacy requesting refill of robaxin.     Last office visit: 3/15/22  Scheduled for follow up pending, msg sent to scheduling.    Will route request to APRN for review.

## 2022-04-28 NOTE — PROGRESS NOTES
[facility] Hematology and Oncology Progress Note    Patient: Lizzie Viera  MRN: [id number]  Date of Service: Apr 27, 2022         Reason for Visit    Multiple Myeloma    Assessment     1.  A very pleasant 71 year old woman with average risk multiple myeloma is on the cytogenetics.  However clinically was behaving somewhat more aggressively.  Started on VRD treatment.  Responding quite well.  2.  Significant bone disease with osteoporosis and compression fractures.  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 This appears to be significantly improved on the CT scan that she had last month.  3.  Normal hemoglobin, calcium, kidney function along with beta-2 microglobulin and albumin at the time of diagnosis.  4.  Positive Covid antibodies from infection. Unvaccinated. Declined Evusheld.  5.  Pain from compression fracture status post radiation therapy.  Significantly improved.      Plan    1.  This time we will continue treatment Velcade Revlimid and dexamethasone.  We will check her labs periodically.  We also talked about referral to [facility] for BMT.  Discussed with her the rationale of the treatment.  She is not too keen on at this time.  I think based on her response I think we can wait and continue the current therapy.  2.  Continue with Zometa monthly.  Her scalp radiograph shows significant improvement in the bone regrowth on the site of her myeloma lesion on her scalp.  3.  Continue with valacyclovir and other prophylaxis.  4.  Continue good diet and exercise.  Follow-up in 6 weeks or so.      Cancer Staging  No matching staging information was found for the patient.    ECOG Performance    2 - Ambulatory and independent in all ADLs; cannot work; up > 50% of the time      History of Present Illness    Ms. Lizzie Viera is a very pleasant 71 year old woman who has been diagnosed with multiple myeloma IgG kappa type in May 2021.  She had initially presented with  compression fracture of T7 vertebral body in September 2020.  She had a back pain which led to that evaluation.  Bone density confirmed that she had osteoporosis.  MRI showed diffuse marrow edema in October 2020.  In April 2021 the MRI of the thoracic spine showed worsening T7 vertebral body height loss because of likely pathological compression fracture with extraosseous extension.  She then had IR guided bone biopsy on 7 May 2021 and pathology confirmed the presence of kappa light chain restricted plasma cells.  Her cytogenetics confirmed the presence of hyperdiploid E with gains of chromosome 5, 9 and 15.    She was then seen by Dr. Baig and had a bone marrow biopsy which also confirmed 60% involvement of the marrow with plasma cells.  The bone marrow was done on 3 Jocelin 2021.  The bone marrow also confirmed the presence of hyperdiploid E.  She had a gain of chromosome 3, 5, 7, 9, 11, 15 as well as 19.  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  Her beta-2 microglobulin was actually normal at the time of her diagnosis as was her albumin at 4.0.  Monoclonal protein 3.1 g/dL.  Kappa free light chain levels of 13 mg/dL IgG level of 4280 with depressed levels of IgM and IgA.  Urine was also positive for kappa light chains as well as immunofixation of the urine was positive for IgG kappa.  Normal kidney function.  Normal hemoglobin.    As mentioned above she had bone lesions in the T7 vertebral body, T1, skull area.  Her main pain is coming from her T7 vertebral body compression fracture.    Due to the pain she has been seen by radiation oncology and has received radiation therapy.  She also got a dose of steroids for pain control.    She is wearing a soft brace.  She did notice that when she was on dexamethasone her pain was significantly better.    She was initially thinking of doing some natural therapies but once her symptoms got worse, she came back in was counseled about treatment.      She has started on Velcade Revlimid and  "dexamethasone since September 2021. Tolerating it well. Responding nicely.  Immunoglobulins have  normalized completely.  Back pain is still an issue getting better.  The back pain gets some spasms.  Not taking any more Dilaudid.  He also has not used her brace.  Her immunoglobin levels have almost normalized in fact the IgG levels have gone below normal.  Immunofixation still shows a very faint IgG kappa monoclonal gammopathy.    She was recently hospitalized with COPD exacerbation/pneumonia.  Was given some steroids and antibiotic.  She was slightly hypoxic initially but then got better after that.  Discharged and now feeling a lot better.    Review of systems.  A 14 point review of systems was obtained.  Positive findings noted in the history.  Rest of the review of system is otherwise negative.     Past History    Past Medical History:   Diagnosis Date     Cervical dysplasia      Chronic RUQ pain      Depressive disorder      Multiple myeloma (H)      Osteoporosis        Past Surgical History:   Procedure Laterality Date     CONIZATION CERVIX,KNIFE/LASER      Description: Cervical Conization By Laser;  Recorded: 09/20/2007;     HC DILATION/CURETTAGE DIAG/THER NON OB      Description: Dilation And Curettage;  Recorded: 09/20/2007;     HC REMOVE TONSILS/ADENOIDS,<11 Y/O      Description: Tonsillectomy With Adenoidectomy;  Recorded: 09/20/2007;     ZC LAP,CHOLECYSTECTOMY/EXPLORE  12/27/2004     Z LIGATE FALLOPIAN TUBE      Description: Tubal Ligation;  Recorded: 09/20/2007;         Physical Exam    BP (!) 145/66 (BP Location: Left arm, Patient Position: Sitting, Cuff Size: Adult Regular)   Pulse 64   Temp 97.7  F (36.5  C) (Oral)   Resp 24   Ht 1.549 m (5' 1\")   Wt 58.4 kg (128 lb 11.2 oz)   SpO2 98%   BMI 24.32 kg/m      GENERAL: No acute distress. Cooperative in conversation.   HEENT:  Pupils are equal, round and reactive. Oral mucosa is clean and intact. No ulcerations or mucositis noted. No bleeding " noted.  RESP:Chest symmetric lungs are clear bilaterally per auscultation. Regular respiratory rate. No wheezes or rhonchi.  CV: Normal S1 S2 Regular, rate and rhythm. No murmurs.    ABD: Nondistended, soft, nontender. Positive bowel sounds. No organomegaly.   EXTREMITIES: No lower extremity edema.   NEURO: Non- focal. Alert and oriented x3.  Cranial nerves appear intact.  PSYCH: Within normal limits. No depression or anxiety.  SKIN: Warm dry intact.     Lab Results    Recent Results (from the past 168 hour(s))   Comprehensive metabolic panel   Result Value Ref Range    Sodium 146 (H) 136 - 145 mmol/L    Potassium 3.5 3.5 - 5.0 mmol/L    Chloride 106 98 - 107 mmol/L    Carbon Dioxide (CO2) 29 22 - 31 mmol/L    Anion Gap 11 5 - 18 mmol/L    Urea Nitrogen 20 8 - 22 mg/dL    Creatinine 1.03 0.60 - 1.10 mg/dL    Calcium 9.0 8.5 - 10.5 mg/dL    Glucose 135 (H) 70 - 125 mg/dL    Alkaline Phosphatase 39 (L) 45 - 120 U/L    AST 12 0 - 40 U/L    ALT 21 0 - 45 U/L    Protein Total 6.4 6.0 - 8.0 g/dL    Albumin 3.2 (L) 3.5 - 5.0 g/dL    Bilirubin Total 0.3 0.0 - 1.0 mg/dL    GFR Estimate 59 (L) >60 mL/min/1.73m2   Kappa and lambda light chain   Result Value Ref Range    Kappa Free Light Chains 1.76 0.33 - 1.94 mg/dL    Lambda Free Light Chains 1.66 0.57 - 2.63 mg/dL    Kappa /Lambda Ratio 1.06 0.26 - 1.65   Total Protein, Serum for ELP   Result Value Ref Range    Total Protein Serum for ELP 6.1 6.0 - 8.0 g/dL   CBC with platelets and differential   Result Value Ref Range    WBC Count 6.5 4.0 - 11.0 10e3/uL    RBC Count 3.63 (L) 3.80 - 5.20 10e6/uL    Hemoglobin 12.1 11.7 - 15.7 g/dL    Hematocrit 36.5 35.0 - 47.0 %     (H) 78 - 100 fL    MCH 33.3 (H) 26.5 - 33.0 pg    MCHC 33.2 31.5 - 36.5 g/dL    RDW 15.6 (H) 10.0 - 15.0 %    Platelet Count 286 150 - 450 10e3/uL    % Neutrophils 69 %    % Lymphocytes 18 %    % Monocytes 8 %    % Eosinophils 4 %    % Basophils 1 %    % Immature Granulocytes 0 %    NRBCs per 100 WBC 0 <1  /100    Absolute Neutrophils 4.5 1.6 - 8.3 10e3/uL    Absolute Lymphocytes 1.2 0.8 - 5.3 10e3/uL    Absolute Monocytes 0.5 0.0 - 1.3 10e3/uL    Absolute Eosinophils 0.3 0.0 - 0.7 10e3/uL    Absolute Basophils 0.1 0.0 - 0.2 10e3/uL    Absolute Immature Granulocytes 0.0 <=0.4 10e3/uL    Absolute NRBCs 0.0 10e3/uL       Imaging    Echocardiogram Complete    Result Date: 2022  [id number]^CHEMO^GIUSEPPE^[place] [address]  Name: LAQUITA WHALEN MRN: [id number] : 1950 Study Date: 2022 08:02 AM Age: 71 yrs Gender: Female Patient Location: [facility] Reason For Study: Dyspnea Ordering Physician: GIUSEPPE MCLAIN Performed By:   BSA: 1.5 m2 Height: 62 in Weight: 120 lb HR: 59 ______________________________________________________________________________ Procedure Complete Portable Echo Adult. ______________________________________________________________________________ Interpretation Summary  The left ventricle is normal in size. There is borderline concentric left ventricular hypertrophy. Left ventricular systolic function is normal. The visual ejection fraction is 60-65%. No regional wall motion abnormalities noted. No significant valve abnormality ______________________________________________________________________________ I      WMSI = 1.00     % Normal = 100  X - Cannot   0 -                      (2) - Mildly 2 -          Segments  Size Interpret    Hyperkinetic 1 - Normal  Hypokinetic  Hypokinetic  1-2     small                                                    7 -          3-5    moderate 3 - Akinetic 4 -          5 -         6 - Akinetic Dyskinetic   6-14    large              Dyskinetic   Aneurysmal  w/scar       w/scar       15-16   diffuse  Left Ventricle The left ventricle is normal in size. There is borderline concentric left ventricular hypertrophy. Left ventricular systolic function is normal. Diastolic Doppler findings (E/E'  ratio and/or other parameters) suggest left ventricular filling pressures are indeterminate. Grade I or early diastolic dysfunction. The visual ejection fraction is 60-65%. No regional wall motion abnormalities noted.  Right Ventricle Normal right ventricle size and systolic function. TAPSE is normal, which is consistent with normal right ventricular systolic function.  Atria Normal left atrial size. Right atrial size is normal.  Mitral Valve Mitral valve leaflets appear normal. There is no evidence of mitral stenosis or clinically significant mitral regurgitation. There is trace to mild mitral regurgitation.  Tricuspid Valve There is trace to mild tricuspid regurgitation.  Aortic Valve The aortic valve is not well visualized. No hemodynamically significant valvular aortic stenosis.  Pulmonic Valve The pulmonic valve is not well visualized.  Vessels The aorta root is normal. IVC diameter and respiratory changes fall into an intermediate range suggesting an RA pressure of 8 mmHg.  Pericardium No significant pericardial effusion.  ______________________________________________________________________________ MMode/2D Measurements & Calculations IVSd: 1.2 cm LVIDd: 3.7 cm LVIDs: 2.7 cm LVPWd: 1.0 cm FS: 28.8 % LV mass(C)d: 131.1 grams LV mass(C)dI: 85.2 grams/m2 Ao root diam: 2.9 cm LA dimension: 2.8 cm  asc Aorta Diam: 2.5 cm LA/Ao: 0.96 LVOT diam: 1.9 cm LVOT area: 2.7 cm2 LA Volume Indexed (AL/bp): 25.5 ml/m2 RWT: 0.56  Doppler Measurements & Calculations MV E max sohan: 88.6 cm/sec MV A max sohan: 88.6 cm/sec MV E/A: 1.00 MV dec slope: 473.2 cm/sec2 MV dec time: 0.19 sec Ao V2 max: 154.7 cm/sec Ao max PG: 10.0 mmHg Ao V2 mean: 107.9 cm/sec Ao mean P.2 mmHg Ao V2 VTI: 33.1 cm SAÚL(I,D): 2.3 cm2 SAÚL(V,D): 2.2 cm2 LV V1 max P.5 mmHg LV V1 max: 127.9 cm/sec LV V1 VTI: 28.5 cm  SV(LVOT): 77.6 ml SI(LVOT): 50.4 ml/m2 PA acc time: 0.11 sec AV Sohan Ratio (DI): 0.83 SAÚL Index (cm2/m2): 1.5 E/E' av.5 Lateral E/e':  13.4 [facility] E/e': 13.6  ______________________________________________________________________________ Report approved by: Natalia Anne 04/13/2022 09:11 AM       US Pelvic Complete with Transvaginal    Result Date: 4/19/2022  EXAM: US PELVIC TRANSABDOMINAL AND TRANSVAGINAL LOCATION: [facility] DATE/TIME: 4/19/2022 4:53 PM INDICATION: Postmenopausal bleeding and cramping pelvic pain. COMPARISON: None. TECHNIQUE: Transabdominal scans were performed. Endovaginal ultrasound was performed to better visualize the adnexa. FINDINGS: UTERUS: 4.5 x 3.5 x 2.6 cm. Normal in size and position with no masses. ENDOMETRIUM: 3 mm. Normal smooth endometrium. RIGHT OVARY: Not visualized, likely secondary to overlying bowel gas. No adnexal mass. LEFT OVARY: Not visualized, likely secondary to overlying bowel gas. No adnexal mass. No significant free fluid.     IMPRESSION: 1.  No significant endometrial thickening. 2.  Bilateral ovaries not visualized. No adnexal masses identified.     CT Chest Pulmonary Embolism w Contrast    Result Date: 4/12/2022  EXAM: CT CHEST PULMONARY EMBOLISM W CONTRAST LOCATION: [facility] DATE/TIME: 4/12/2022 5:46 PM INDICATION: Shortness of breath, hypoxia COMPARISON: 09/26/2021 TECHNIQUE: CT chest pulmonary angiogram during arterial phase injection of IV contrast. Multiplanar reformats and MIP reconstructions were performed. Dose reduction techniques were used. CONTRAST: isovue 370  100ml FINDINGS: ANGIOGRAM CHEST: No evidence for pulmonary embolism. Pulmonary arteries normal in caliber. Thoracic aorta normal in caliber. No aortic dissection or other acute abnormality. HEART: Cardiac chambers within normal limits. No pericardial effusion. Mild coronary artery calcification. MEDIASTINUM: No adenopathy or mass. LUNGS AND PLEURA: Moderate bronchial wall thickening, mucous plugging, and scattered tree-in-bud densities peripherally. There are areas of  developing atelectasis within the lingula and right middle lobe. Small focus of consolidation within the right lower lobe laterally. No pleural effusion or pneumothorax. LIMITED UPPER ABDOMEN: Stable left adrenal nodules. Right adrenal gland unremarkable. MUSCULOSKELETAL: Numerous tiny lytic bone lesions within multiple vertebral bodies compatible with multiple myeloma. Stable severe vertebral compression deformity at T7, and moderate at T8. Sclerosis has developed along the lesions in the adjacent T6 and  T8 vertebral bodies. Stable lesion within the T1 vertebral body with mild associated loss of vertebral body height.     IMPRESSION: 1.  No evidence for pulmonary embolism. 2.  Severe bronchitis/bronchiolitis and mild right lower lobe pneumonia.    CT scan was personally reviewed with the patient.    Signed by: Loco Blanco MD,

## 2022-04-28 NOTE — CONFIDENTIAL NOTE
" [facility] Oncology Psychotherapy, [facility]                                  Progress Note    Patient Name: Lizzie Viera  Date: 5/2/2022         Service Type: Individual      Session Start Time: 11:00  session End Time: 11:50 AM     Session Length: 50    Attendees: Client    Service Modality:  Phone Visit:      Provider verified identity through the following two step process.  Patient provided:  Patient is known previously to provider    The patient has been notified of the following:      \"We have found that certain health care needs can be provided without the need for a face to face visit.  This service lets us provide the care you need with a phone conversation.       I will have full access to your [facility] medical record during this entire phone call.   I will be taking notes for your medical record.      Since this is like an office visit, we will bill your insurance company for this service.       There are potential benefits and risks of telephone visits (e.g. limits to patient confidentiality) that differ from in-person visits.?Confidentiality still applies for telephone services, and nobody will record the visit.  It is important to be in a quiet, private space that is free of distractions (including cell phone or other devices) during the visit.??      If during the course of the call I believe a telephone visit is not appropriate, you will not be charged for this service\"     Consent has been obtained for this service by care team member: Yes     DATA  Interactive Complexity: No  Crisis: No        Progress Since Last Session (Related to Symptoms / Goals / Homework):   Symptoms: Improving Some of her stress related to her , his health and his mood have improved.  This has helped alleviate additional stress in her life.  She also met with her integrative health physician yesterday and feels hopeful as she is having further testing .  She is feeling much more encouraged " about her situation.    Homework: Partially completed      Episode of Care Goals: Satisfactory progress - ACTION (Actively working towards change); Intervened by reinforcing change plan / affirming steps taken     Current / Ongoing Stressors and Concerns:   Valeria was recently hospitalized from 4/12 to 4/28/2022, for 8 days for pneumonia, hypoxia and COPD exacerbation.  Valeria is currently going through treatment for multiple myeloma.  Her  is receiving dialysis 3 times a week and is on the transplant list for a new kidney.  She had a hospitalization and an ER visit recently due to having issues with bleeding.  They still have not found out the source of his bleeding.  At our last session, he had just gotten back from the hospital and besides being sick and requiring more assistance, he was exceptionally angry and agitated.  She feels that his behavior has greatly improved since our last session.  This has greatly helped alleviate some of the stress.   They are having family birthdays in this month of May.  Her daughter, Collette's birthday is on 5/12/2022.  Her grandson, Maximiliano who lives with them will have a birthday on 5/16/2022 and his brother, Dayday will have a birthday on 5/18/2022.  Valeria talked about her thoughts and plans about the upcoming birthdays.   Valeria also is planning to meet with a realtor on Thursday, of this week to discuss the possibility of moving.  They are considering several locations and would like to move somewhere where they would not have some any exposures to chemicals, like they do in their current location in [place].       Treatment Objective(s) Addressed in This Session:   To cope with the emotional aspects of dealing with her multiple myeloma and current cancer treatment.  Also to help her cope with her current life stressors     Intervention:   CBT: To learn strategies to deal with symptoms of anxiety and depression related to her illness    Assessments completed prior to  visit:  The following assessments were completed by patient for this visit:  PHQ9: 5  PHQ-9 SCORE 1/7/2021 9/13/2021   PHQ-9 Total Score 9 8     GAD7: 7  BORA-7 SCORE 1/7/2021 9/13/2021   Total Score 4 3     PROMIS 10-Global Health: 33   ASSESSMENT: Current Emotional / Mental Status (status of significant symptoms):   Risk status (Self / Other harm or suicidal ideation)   Patient denies current fears or concerns for personal safety.   Patient denies current or recent suicidal ideation or behaviors.   Patient denies current or recent homicidal ideation or behaviors.   Patient denies current or recent self injurious behavior or ideation.   Patient denies other safety concerns.   Patient reports there has been no change in risk factors since their last session.     Patient reports there has been no change in protective factors since their last session.     Recommended that patient call 911 or go to the local ED should there be a change in any of these risk factors.     Appearance:   Appropriate    Eye Contact:   Good    Psychomotor Behavior: Normal    Attitude:   Cooperative  Interested Pleasant   Orientation:   Person Place Time Situation   Speech    Rate / Production: Normal/ Responsive Normal     Volume:  Normal    Mood:    Normal   Affect:    Appropriate    Thought Content:  Clear    Thought Form:  Coherent  Logical    Insight:    Good      Medication Review:   No current psychiatric medications prescribed     Medication Compliance:   Yes     Changes in Health Issues:   None reported     Chemical Use Review:   Substance Use: Chemical use reviewed, no active concerns identified      Tobacco Use: No change in amount of tobacco use since last session.  No current change interventions at this time    Diagnosis:  1.  Adjustment disorder with mixed anxiety and depressed mood  2.  Multiple myeloma not having achieved remission    Collateral Reports Completed:   Not Applicable    PLAN: (Patient Tasks / Therapist Tasks /  Other)    1.  Valeria would benefit from individual psychotherapy for 50-minute sessions every 1 to 2 weeks to help her deal with the emotional aspects of her multiple myeloma.  She is working at implementing cognitive behavioral therapy strategies to help her better manage her symptoms of anxiety and depression related to coping with her illness.     2.  Valeria is working at incorporating body mind and spirit techniques to help her with relaxation and better manage her symptoms of anxiety and depression related to her illness.     3.  Valeria is working on communication and conflict resolution strategies with her .      JAI AMADO LP, [facility] HospitalSW                                                         ______________________________________________________________________       Individual Treatment Plan     Patient's Name: Lizzie Viera                   YOB: 1950     Date of Creation: 2/28/2022  Date Treatment Plan Last Reviewed/Revised: 2/21/2022     DSM5 Diagnoses: Adjustment disorder with mixed anxiety and depressed mood  Psychosocial / Contextual Factors: Treatment for multiple myeloma and coping with the emotional aspects of dealing with her illness.     Referral / Collaboration:  Referral to another professional/service is not indicated at this time..     Anticipated number of session for this episode of care: 10  Anticipation frequency of session: Every 1 to 2 weeks  Anticipated Duration of each session: 38-52 minutes  Treatment plan will be reviewed in 90 days or when goals have been changed.         MeasurableTreatment Goal(s) related to diagnosis / functional impairment(s)  Goal 1: Patient will work on dealing with the emotional aspects of coping with her multiple myeloma     Objective #A (Patient Action)                          Patient will identify stressors that contribute to feelings of anxiety and depression related to her illness..  Status: Continued - Date(s):  2/28/2022     Intervention(s)  Therapist will teach emotional recognition/identification. Of feelings of anxiety and depression related to her multiple myeloma..     Objective #B  Patient will participate in Progress of relaxation strategies and mindfulness strategies activities to improve mood.  Status: Continued - Date(s): 2/28/2022     Intervention(s)  Therapist will teach about cognitive behavioral strategies to better manage symptoms of anxiety and depression.  Therapist will also teach about mindfulness strategies and provided recommended reading material..     Objective #C  Patient will participate in Communication and boundary setting activities to improve mood.  Status: Continued - Date(s): 2/28/2022     Intervention(s)  Therapist will provide psychoeducation on communication and conflict resolution strategies.  Psychoeducational reading material recommended.        Patient has  agreed to the above plan.      JAI AMADO LP, LICSW  May 2, 2022

## 2022-04-29 ENCOUNTER — OFFICE VISIT (OUTPATIENT)
Dept: PHYSICAL MEDICINE AND REHAB | Facility: CLINIC | Age: 72
End: 2022-04-29
Payer: COMMERCIAL

## 2022-04-29 VITALS
HEART RATE: 71 BPM | SYSTOLIC BLOOD PRESSURE: 149 MMHG | WEIGHT: 128 LBS | DIASTOLIC BLOOD PRESSURE: 70 MMHG | BODY MASS INDEX: 24.19 KG/M2

## 2022-04-29 DIAGNOSIS — R29.898 LEG WEAKNESS, BILATERAL: ICD-10-CM

## 2022-04-29 DIAGNOSIS — M48.04 THORACIC SPINAL STENOSIS: Primary | ICD-10-CM

## 2022-04-29 DIAGNOSIS — C90.00 MULTIPLE MYELOMA, REMISSION STATUS UNSPECIFIED (H): ICD-10-CM

## 2022-04-29 DIAGNOSIS — S22.060D CLOSED WEDGE COMPRESSION FRACTURE OF T7 VERTEBRA WITH ROUTINE HEALING, SUBSEQUENT ENCOUNTER: ICD-10-CM

## 2022-04-29 DIAGNOSIS — C90.00 MULTIPLE MYELOMA WITH FAILED REMISSION (H): Primary | ICD-10-CM

## 2022-04-29 PROCEDURE — 99214 OFFICE O/P EST MOD 30 MIN: CPT | Performed by: PHYSICAL MEDICINE & REHABILITATION

## 2022-04-29 RX ORDER — DEXAMETHASONE 4 MG/1
TABLET ORAL
Qty: 100 TABLET | Refills: 1 | Status: SHIPPED | OUTPATIENT
Start: 2022-04-29 | End: 2022-06-23

## 2022-04-29 ASSESSMENT — PAIN SCALES - GENERAL: PAINLEVEL: MODERATE PAIN (4)

## 2022-04-29 NOTE — PROGRESS NOTES
Assessment/Plan:      Lizzie was seen today for back pain.    Diagnoses and all orders for this visit:    Thoracic spinal stenosis  -     MR Thoracic Spine w/o Contrast; Future    Multiple myeloma, remission status unspecified (H)  -     MR Thoracic Spine w/o Contrast; Future    Closed wedge compression fracture of T7 vertebra with routine healing, subsequent encounter  -     MR Thoracic Spine w/o Contrast; Future    Leg weakness, bilateral  -     MR Thoracic Spine w/o Contrast; Future         Assessment: Pleasant 71 year old female with a history of hyperlipidemia, anxiety, depression, irritable bowel, neuropathy, osteoporosis and multiple myeloma with a T7 fracture and lesion  resulting in spinal stenosis:     1.  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  No signs of thoracic myelopathy neurologically stable.    Symptoms are stable and paresthesias may be slightly improving with physical therapy.  Overall doing well with gabapentin 600 mg 3 times daily.  Still has weakness in the right greater than left lower extremities but appears relatively stable.     2.  Chronic lumbar spine pain intermittently.    Symptoms are stable with physical therapy.  Only some mild degenerative changes on MRI.     3.  Bilateral foot paresthesias consistent with neuropathy.  Stable with PT.      4.  She has cervical spine and upper thoracic spine myofascial pain.      Discussion:    1. *Discussed the diagnosis and treatment options.  She has several questions today regarding restrictions and she is on 20 pound lift limit and I would recommend continuing with that indefinitely given her multiple myeloma lesions in the spine and the prior compression fracture of T7.  She needs to be reasonable with activity but I do not see any reason to restrict her walking other than to have her be careful not to fall.    2.  Continue gabapentin  600 mg 3 times daily.    3.  CT scan lesion at T1 appears stable the CT chest.  I would like to recheck the MRI in about 4 months and we will order the MRI of the thoracic spine to be released in August and she can follow-up with me after that.    4.  Follow-up with me after MRI in August.      It was our pleasure caring for your patient today, if there any questions or concerns please do not hesitate to contact us.    Over 30 minutes were spent on the date of the encounter performing chart review, patient visit and documentation in addition to any procedure.    Subjective:   Patient ID: Lizzie Viera is a 71 year old female.    History of Present Illness: Patient presents today for follow-up evaluation of lower extremity weakness thoracic myelopathy thoracic and lumbar pain with multiple myeloma.  Symptoms have improved overall since last visit.  She feels that her legs are becoming stronger and her thoracic spine pain is improving.  She still has pain to the thoracic spine with prolonged activities such as housework sitting for too long and also pain across the lumbosacral junction.  Better with rest and medication.  Taking gabapentin 600 mg 3 times daily without side effects.  Also on Dilaudid medical THC and methocarbamol.  She has questions regarding activities such as lift limit and hiking or walking as they are planning going on a trip.  She is receiving  Chemotherapy through oncology along with radiation they are aware of the new T1 segment lesion.  Was recently hospitalized for pneumonia but doing well.    Imaging: MRI thoracic spine images personally reviewed for medical decision-making purposes.  Significant decreased marrow signal at T6-7 vertebral bodies and pedicles.  No further high-grade spinal stenosis.  New T1 hypointense lesion T1 suspicious for marrow lesion.  Subtle enhancement of the T2 and T3 vertebral bodies postcontrast imaging.  Moderate bilateral foraminal stenosis T6-7 T7-8.  Focal  kyphosis at those levels.    Review of Systems: Pertinent positives: Numbness tingling weakness mid tibias distally.  She has headaches swallowing issues.  Pertinent negatives:    No bowel or bladder incontinence.  No urinary retention.  No fevers, unintentional weight loss, balance changes, frequent falling  or coordination difficulties.  All others reviewed are negative.    Past Medical History:   Diagnosis Date     Cervical dysplasia      Chronic RUQ pain      Depressive disorder      Multiple myeloma (H)      Osteoporosis        The following portions of the patient's history were reviewed and updated as appropriate: allergies, current medications, past family history, past medical history, past social history, past surgical history and problem list.           Objective:   Physical Exam:    BP (!) 149/70   Pulse 71   Wt 128 lb (58.1 kg)   BMI 24.19 kg/m    Body mass index is 24.19 kg/m .      General: Alert and oriented with normal affect. Attention, knowledge, memory, and language are intact. No acute distress.   Eyes: Sclerae are clear.  Respirations: Unlabored. CV: No lower extremity edema.  Skin: No rashes seen.    Gait:  Nonantalgic    Sensation is intact to light touch throughout the upper and lower extremities.  Reflexes are 2+ and symmetric in the biceps triceps and brachioradialis with negative Hoffmans. 2+ patellar and Achilles with downgoing toes.    Manual muscle testing reveals:  Right /Left out of 5     5/5 hip flexors  5/5 knee flexors  5/5 knee extensors  5/5 ankle plantar flexors   4+/5- ankle dorsiflexors  3/4  EHL and ankle evertors

## 2022-04-29 NOTE — LETTER
4/29/2022         RE: Lizzie Viera  [address]  [place] [address]        Dear Colleague,    Thank you for referring your patient, Lizzie Viera, to the [facility]. Please see a copy of my visit note below.    Assessment/Plan:      Lizzie was seen today for back pain.    Diagnoses and all orders for this visit:    Thoracic spinal stenosis  -     MR Thoracic Spine w/o Contrast; Future    Multiple myeloma, remission status unspecified (H)  -     MR Thoracic Spine w/o Contrast; Future    Closed wedge compression fracture of T7 vertebra with routine healing, subsequent encounter  -     MR Thoracic Spine w/o Contrast; Future    Leg weakness, bilateral  -     MR Thoracic Spine w/o Contrast; Future         Assessment: Hossein 71 year old female with a history of hyperlipidemia, anxiety, depression, irritable bowel, neuropathy, osteoporosis and multiple myeloma with a T7 fracture and lesion  resulting in spinal stenosis:     1.  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  No signs of thoracic myelopathy neurologically stable.    Symptoms are stable and paresthesias may be slightly improving with physical therapy.  Overall doing well with gabapentin 600 mg 3 times daily.  Still has weakness in the right greater than left lower extremities but appears relatively stable.     2.  Chronic lumbar spine pain intermittently.    Symptoms are stable with physical therapy.  Only some mild degenerative changes on MRI.     3.  Bilateral foot paresthesias consistent with neuropathy.  Stable with PT.      4.  She has cervical spine and upper thoracic spine myofascial pain.      Discussion:    1. *Discussed the diagnosis and treatment options.  She has several questions today regarding restrictions and she is on 20 pound lift limit and I would recommend continuing with that  indefinitely given her multiple myeloma lesions in the spine and the prior compression fracture of T7.  She needs to be reasonable with activity but I do not see any reason to restrict her walking other than to have her be careful not to fall.    2.  Continue gabapentin 600 mg 3 times daily.    3.  CT scan lesion at T1 appears stable the CT chest.  I would like to recheck the MRI in about 4 months and we will order the MRI of the thoracic spine to be released in August and she can follow-up with me after that.    4.  Follow-up with me after MRI in August.      It was our pleasure caring for your patient today, if there any questions or concerns please do not hesitate to contact us.    Over 30 minutes were spent on the date of the encounter performing chart review, patient visit and documentation in addition to any procedure.    Subjective:   Patient ID: Lizzie Viera is a 71 year old female.    History of Present Illness: Patient presents today for follow-up evaluation of lower extremity weakness thoracic myelopathy thoracic and lumbar pain with multiple myeloma.  Symptoms have improved overall since last visit.  She feels that her legs are becoming stronger and her thoracic spine pain is improving.  She still has pain to the thoracic spine with prolonged activities such as housework sitting for too long and also pain across the lumbosacral junction.  Better with rest and medication.  Taking gabapentin 600 mg 3 times daily without side effects.  Also on Dilaudid medical THC and methocarbamol.  She has questions regarding activities such as lift limit and hiking or walking as they are planning going on a trip.  She is receiving  Chemotherapy through oncology along with radiation they are aware of the new T1 segment lesion.  Was recently hospitalized for pneumonia but doing well.    Imaging: MRI thoracic spine images personally reviewed for medical decision-making purposes.  Significant decreased marrow signal  at T6-7 vertebral bodies and pedicles.  No further high-grade spinal stenosis.  New T1 hypointense lesion T1 suspicious for marrow lesion.  Subtle enhancement of the T2 and T3 vertebral bodies postcontrast imaging.  Moderate bilateral foraminal stenosis T6-7 T7-8.  Focal kyphosis at those levels.    Review of Systems: Pertinent positives: Numbness tingling weakness mid tibias distally.  She has headaches swallowing issues.  Pertinent negatives:    No bowel or bladder incontinence.  No urinary retention.  No fevers, unintentional weight loss, balance changes, frequent falling  or coordination difficulties.  All others reviewed are negative.    Past Medical History:   Diagnosis Date     Cervical dysplasia      Chronic RUQ pain      Depressive disorder      Multiple myeloma (H)      Osteoporosis        The following portions of the patient's history were reviewed and updated as appropriate: allergies, current medications, past family history, past medical history, past social history, past surgical history and problem list.           Objective:   Physical Exam:    BP (!) 149/70   Pulse 71   Wt 128 lb (58.1 kg)   BMI 24.19 kg/m    Body mass index is 24.19 kg/m .      General: Alert and oriented with normal affect. Attention, knowledge, memory, and language are intact. No acute distress.   Eyes: Sclerae are clear.  Respirations: Unlabored. CV: No lower extremity edema.  Skin: No rashes seen.    Gait:  Nonantalgic    Sensation is intact to light touch throughout the upper and lower extremities.  Reflexes are 2+ and symmetric in the biceps triceps and brachioradialis with negative Hoffmans. 2+ patellar and Achilles with downgoing toes.    Manual muscle testing reveals:  Right /Left out of 5     5/5 hip flexors  5/5 knee flexors  5/5 knee extensors  5/5 ankle plantar flexors   4+/5- ankle dorsiflexors  3/4  EHL and ankle evertors      Again, thank you for allowing me to participate in the care of your patient.         Sincerely,        Wyatt Pascual, DO

## 2022-04-29 NOTE — PATIENT INSTRUCTIONS
An MRI for August was ordered for you today.  You will be contacted by scheduling within 3 days.    If you are not contacted, please call Radiology at [phone].    2. Continue with physical therapy home exercises and with activity modifications.     3. Continue gabapentin

## 2022-05-02 ENCOUNTER — VIRTUAL VISIT (OUTPATIENT)
Dept: ONCOLOGY | Facility: CLINIC | Age: 72
End: 2022-05-02
Attending: SOCIAL WORKER
Payer: COMMERCIAL

## 2022-05-02 ENCOUNTER — TELEPHONE (OUTPATIENT)
Dept: ONCOLOGY | Facility: OTHER | Age: 72
End: 2022-05-02

## 2022-05-02 DIAGNOSIS — C90.00 MULTIPLE MYELOMA WITH FAILED REMISSION (H): ICD-10-CM

## 2022-05-02 DIAGNOSIS — F43.23 ADJUSTMENT DISORDER WITH MIXED ANXIETY AND DEPRESSED MOOD: Primary | ICD-10-CM

## 2022-05-02 DIAGNOSIS — C90.00 MULTIPLE MYELOMA WITH FAILED REMISSION (H): Primary | ICD-10-CM

## 2022-05-02 PROCEDURE — 90834 PSYTX W PT 45 MINUTES: CPT | Mod: 95 | Performed by: SOCIAL WORKER

## 2022-05-02 RX ORDER — LENALIDOMIDE 25 MG/1
25 CAPSULE ORAL DAILY
Qty: 6 CAPSULE | Refills: 0 | Status: SHIPPED | OUTPATIENT
Start: 2022-05-02 | End: 2022-05-12

## 2022-05-03 ENCOUNTER — OFFICE VISIT (OUTPATIENT)
Dept: FAMILY MEDICINE | Facility: CLINIC | Age: 72
End: 2022-05-03
Payer: COMMERCIAL

## 2022-05-03 VITALS
BODY MASS INDEX: 23.62 KG/M2 | WEIGHT: 125 LBS | DIASTOLIC BLOOD PRESSURE: 60 MMHG | SYSTOLIC BLOOD PRESSURE: 140 MMHG | RESPIRATION RATE: 14 BRPM | TEMPERATURE: 98.1 F | HEART RATE: 63 BPM | OXYGEN SATURATION: 97 %

## 2022-05-03 DIAGNOSIS — Z87.891 FORMER SMOKER: ICD-10-CM

## 2022-05-03 DIAGNOSIS — M84.58XK PATHOLOGICAL FRACTURE OF VERTEBRAE IN NEOPLASTIC DISEASE WITH NONUNION: ICD-10-CM

## 2022-05-03 DIAGNOSIS — C90.00 MULTIPLE MYELOMA WITH FAILED REMISSION (H): ICD-10-CM

## 2022-05-03 DIAGNOSIS — G89.3 CANCER ASSOCIATED PAIN: ICD-10-CM

## 2022-05-03 DIAGNOSIS — J18.9 PNEUMONIA OF RIGHT LOWER LOBE DUE TO INFECTIOUS ORGANISM: Primary | ICD-10-CM

## 2022-05-03 PROBLEM — N30.01 ACUTE CYSTITIS WITH HEMATURIA: Status: RESOLVED | Noted: 2021-08-31 | Resolved: 2022-05-03

## 2022-05-03 PROBLEM — M48.50XA PATHOLOGIC COMPRESSION FRACTURE OF SPINE, INITIAL ENCOUNTER (H): Status: RESOLVED | Noted: 2021-08-31 | Resolved: 2022-05-03

## 2022-05-03 PROBLEM — E43 SEVERE MALNUTRITION (H): Status: RESOLVED | Noted: 2021-08-31 | Resolved: 2022-05-03

## 2022-05-03 PROBLEM — S22.060A COMPRESSION FRACTURE OF T7 VERTEBRA, INITIAL ENCOUNTER (H): Status: RESOLVED | Noted: 2020-09-09 | Resolved: 2022-05-03

## 2022-05-03 PROCEDURE — 99214 OFFICE O/P EST MOD 30 MIN: CPT | Performed by: FAMILY MEDICINE

## 2022-05-03 RX ORDER — METHOCARBAMOL 500 MG/1
500 TABLET, FILM COATED ORAL 4 TIMES DAILY PRN
Qty: 90 TABLET | Refills: 0 | Status: SHIPPED | OUTPATIENT
Start: 2022-05-03 | End: 2022-06-07

## 2022-05-03 NOTE — PROGRESS NOTES
Assessment & Plan:         ICD-10-CM    1. Pneumonia of right lower lobe due to infectious organism  J18.9    2. Multiple myeloma with failed remission (H)  C90.00    3. 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 Former smoker  Z87.891         Hospital records were personally reviewed. We reviewed indications for re-evaluation. She will continue her same medications at this time. Blood pressure is under adequate control. She will continue to monitor the BP and follow up if it continues to be elevated. We reviewed dietary recommendations, including low salt and high fiber diet, and recommendations for regular exercise/activity. She will plan to follow up in 3-4 mos for repeat fasting labs and med check, sooner if any difficulties.        Subjective:     Fasting today? No  Hypertension & Hyperlipidemia      Lizzie Viera is a 71 year old female with metastatic multiple myeloma here for follow-up of recent hospitalization for pneumonia and bronchiolitis. She had been exposed to her  and daughter who had similar symptoms. She notes that use of the incentive spirometer really seemed to help her progress. Energy is slowly improving. Cough is mostly resolved. She has had COVID twice in the past, last time in September of 2021.       She has a history of elevated blood pressure and they added amlodipine while she was in the hospital. Compliance with treatment has been good.  Associated signs and symptoms: dyspnea, fatigue. Denies chest pain, near-syncope, lower extremity edema. The patient reports no regular exercise program. Weight trend: has been stable. She plans to move away from the refinery as she wonders if it contributed to her cancer.       She has been working with [facility] on back pain. She notes some increased muscle spasms with activity.       The following portions of the patient's history were reviewed and updated as appropriate: allergies,  current medications, past family history, past medical history, past social history, past surgical history and problem list.    Review of Systems  12 point ROS negative except as noted above        Objective:      Vitals:    05/03/22 1459   BP: (!) 140/60   BP Location: Right arm   Patient Position: Sitting   Pulse: 63   Resp: 14   Temp: 98.1  F (36.7  C)   TempSrc: Oral   SpO2: 97%   Weight: 56.7 kg (125 lb)     GEN: Alert and oriented, NAD, well nourished  SKIN:  Normal skin turgor, no lesions/rashes   HEENT: NC/AT, moist mucous membranes, no rhinorrhea.    NECK: Normal.  No adenopathy or thyromegaly.  CV: Regular rate and rhythm, no murmurs.   LUNGS: Clear to auscultation bilaterally.    ABDOMEN: Soft, non-tender, non-distended, no masses   BACK: Normal  EXTREMITY: No edema, cyanosis  NEURO: Grossly normal.         Labs:  No results found for any visits on 05/03/22.

## 2022-05-04 ENCOUNTER — INFUSION THERAPY VISIT (OUTPATIENT)
Dept: INFUSION THERAPY | Facility: HOSPITAL | Age: 72
End: 2022-05-04
Attending: INTERNAL MEDICINE
Payer: COMMERCIAL

## 2022-05-04 VITALS
HEART RATE: 64 BPM | RESPIRATION RATE: 16 BRPM | OXYGEN SATURATION: 98 % | SYSTOLIC BLOOD PRESSURE: 113 MMHG | DIASTOLIC BLOOD PRESSURE: 50 MMHG | TEMPERATURE: 97.8 F

## 2022-05-04 DIAGNOSIS — C90.00 MULTIPLE MYELOMA WITH FAILED REMISSION (H): Primary | ICD-10-CM

## 2022-05-04 LAB
ALBUMIN PERCENT: 60.8 % (ref 51–67)
ALBUMIN SERPL ELPH-MCNC: 3.7 G/DL (ref 3.2–4.7)
ALPHA 1 PERCENT: 3.8 % (ref 2–4)
ALPHA 2 PERCENT: 15.1 % (ref 5–13)
ALPHA1 GLOB SERPL ELPH-MCNC: 0.2 G/DL (ref 0.1–0.3)
ALPHA2 GLOB SERPL ELPH-MCNC: 0.9 G/DL (ref 0.4–0.9)
B-GLOBULIN SERPL ELPH-MCNC: 0.8 G/DL (ref 0.7–1.2)
BASOPHILS # BLD AUTO: 0 10E3/UL (ref 0–0.2)
BASOPHILS NFR BLD AUTO: 0 %
BETA PERCENT: 12.3 % (ref 10–17)
EOSINOPHIL # BLD AUTO: 0.1 10E3/UL (ref 0–0.7)
EOSINOPHIL NFR BLD AUTO: 1 %
ERYTHROCYTE [DISTWIDTH] IN BLOOD BY AUTOMATED COUNT: 16.4 % (ref 10–15)
GAMMA GLOB SERPL ELPH-MCNC: 0.5 G/DL (ref 0.6–1.4)
GAMMA GLOBULIN PERCENT: 8 % (ref 9–20)
HCT VFR BLD AUTO: 39.7 % (ref 35–47)
HGB BLD-MCNC: 13.1 G/DL (ref 11.7–15.7)
IMM GRANULOCYTES # BLD: 0.1 10E3/UL
IMM GRANULOCYTES NFR BLD: 1 %
LYMPHOCYTES # BLD AUTO: 0.6 10E3/UL (ref 0.8–5.3)
LYMPHOCYTES NFR BLD AUTO: 9 %
MCH RBC QN AUTO: 33.5 PG (ref 26.5–33)
MCHC RBC AUTO-ENTMCNC: 33 G/DL (ref 31.5–36.5)
MCV RBC AUTO: 102 FL (ref 78–100)
MONOCLONAL PEAK: 0.2 G/DL
MONOCYTES # BLD AUTO: 0.1 10E3/UL (ref 0–1.3)
MONOCYTES NFR BLD AUTO: 1 %
NEUTROPHILS # BLD AUTO: 6.2 10E3/UL (ref 1.6–8.3)
NEUTROPHILS NFR BLD AUTO: 88 %
NRBC # BLD AUTO: 0 10E3/UL
NRBC BLD AUTO-RTO: 0 /100
PATH ICD:: ABNORMAL
PLATELET # BLD AUTO: 232 10E3/UL (ref 150–450)
PROT PATTERN SERPL ELPH-IMP: ABNORMAL
RBC # BLD AUTO: 3.91 10E6/UL (ref 3.8–5.2)
REVIEWING PATHOLOGIST: ABNORMAL
TOTAL PROTEIN SERUM FOR ELP (SYNCED VALUE): 6.1 G/DL
WBC # BLD AUTO: 7 10E3/UL (ref 4–11)

## 2022-05-04 PROCEDURE — 96401 CHEMO ANTI-NEOPL SQ/IM: CPT

## 2022-05-04 PROCEDURE — 250N000011 HC RX IP 250 OP 636: Performed by: NURSE PRACTITIONER

## 2022-05-04 PROCEDURE — 85025 COMPLETE CBC W/AUTO DIFF WBC: CPT | Performed by: NURSE PRACTITIONER

## 2022-05-04 PROCEDURE — 36415 COLL VENOUS BLD VENIPUNCTURE: CPT | Performed by: NURSE PRACTITIONER

## 2022-05-04 RX ORDER — EPINEPHRINE 1 MG/ML
0.3 INJECTION, SOLUTION INTRAMUSCULAR; SUBCUTANEOUS EVERY 5 MIN PRN
Status: DISCONTINUED | OUTPATIENT
Start: 2022-05-04 | End: 2022-05-04 | Stop reason: HOSPADM

## 2022-05-04 RX ORDER — ALBUTEROL SULFATE 90 UG/1
1-2 AEROSOL, METERED RESPIRATORY (INHALATION)
Status: DISCONTINUED | OUTPATIENT
Start: 2022-05-04 | End: 2022-05-04 | Stop reason: HOSPADM

## 2022-05-04 RX ORDER — DIPHENHYDRAMINE HYDROCHLORIDE 50 MG/ML
50 INJECTION INTRAMUSCULAR; INTRAVENOUS
Status: DISCONTINUED | OUTPATIENT
Start: 2022-05-04 | End: 2022-05-04 | Stop reason: HOSPADM

## 2022-05-04 RX ORDER — MEPERIDINE HYDROCHLORIDE 25 MG/ML
25 INJECTION INTRAMUSCULAR; INTRAVENOUS; SUBCUTANEOUS EVERY 30 MIN PRN
Status: DISCONTINUED | OUTPATIENT
Start: 2022-05-04 | End: 2022-05-04 | Stop reason: HOSPADM

## 2022-05-04 RX ORDER — ALBUTEROL SULFATE 0.83 MG/ML
2.5 SOLUTION RESPIRATORY (INHALATION)
Status: DISCONTINUED | OUTPATIENT
Start: 2022-05-04 | End: 2022-05-04 | Stop reason: HOSPADM

## 2022-05-04 RX ORDER — METHYLPREDNISOLONE SODIUM SUCCINATE 125 MG/2ML
125 INJECTION, POWDER, LYOPHILIZED, FOR SOLUTION INTRAMUSCULAR; INTRAVENOUS
Status: DISCONTINUED | OUTPATIENT
Start: 2022-05-04 | End: 2022-05-04 | Stop reason: HOSPADM

## 2022-05-04 RX ORDER — NALOXONE HYDROCHLORIDE 0.4 MG/ML
0.2 INJECTION, SOLUTION INTRAMUSCULAR; INTRAVENOUS; SUBCUTANEOUS
Status: DISCONTINUED | OUTPATIENT
Start: 2022-05-04 | End: 2022-05-04 | Stop reason: HOSPADM

## 2022-05-04 RX ADMIN — BORTEZOMIB 2.1 MG: 3.5 INJECTION, POWDER, LYOPHILIZED, FOR SOLUTION INTRAVENOUS; SUBCUTANEOUS at 14:35

## 2022-05-04 NOTE — PROGRESS NOTES
Infusion Nursing Note:  Lizzie Viera presents today for velcade.    Patient seen by provider today: No   present during visit today: Not Applicable.    Note: N/A.      Intravenous Access:  Labs drawn without difficulty.    Treatment Conditions:  Results reviewed, labs MET treatment parameters, ok to proceed with treatment.      Post Infusion Assessment:  Patient tolerated injection without incident.       Discharge Plan:   Patient discharged in stable condition accompanied by: self.  Departure Mode: Ambulatory.      Belle Muñoz RN

## 2022-05-09 ENCOUNTER — TELEPHONE (OUTPATIENT)
Dept: ONCOLOGY | Facility: OTHER | Age: 72
End: 2022-05-09

## 2022-05-09 NOTE — TELEPHONE ENCOUNTER
[facility] Shailesh approved 5/9/22 to 5/9/23  RXbin:[id number];PCN:[id number];Group:[facility];ID:[id number]

## 2022-05-11 ENCOUNTER — INFUSION THERAPY VISIT (OUTPATIENT)
Dept: INFUSION THERAPY | Facility: HOSPITAL | Age: 72
End: 2022-05-11
Attending: INTERNAL MEDICINE
Payer: COMMERCIAL

## 2022-05-11 VITALS
RESPIRATION RATE: 18 BRPM | DIASTOLIC BLOOD PRESSURE: 62 MMHG | TEMPERATURE: 98.1 F | SYSTOLIC BLOOD PRESSURE: 122 MMHG | HEART RATE: 65 BPM | OXYGEN SATURATION: 99 %

## 2022-05-11 DIAGNOSIS — C90.00 MULTIPLE MYELOMA WITH FAILED REMISSION (H): Primary | ICD-10-CM

## 2022-05-11 LAB
BASOPHILS # BLD AUTO: 0 10E3/UL (ref 0–0.2)
BASOPHILS NFR BLD AUTO: 0 %
EOSINOPHIL # BLD AUTO: 0.1 10E3/UL (ref 0–0.7)
EOSINOPHIL NFR BLD AUTO: 1 %
ERYTHROCYTE [DISTWIDTH] IN BLOOD BY AUTOMATED COUNT: 16.4 % (ref 10–15)
HCT VFR BLD AUTO: 39.9 % (ref 35–47)
HGB BLD-MCNC: 13.1 G/DL (ref 11.7–15.7)
IGA SERPL-MCNC: 48 MG/DL (ref 65–400)
IGG SERPL-MCNC: 558 MG/DL (ref 700–1700)
IGM SERPL-MCNC: 25 MG/DL (ref 60–280)
IMM GRANULOCYTES # BLD: 0.1 10E3/UL
IMM GRANULOCYTES NFR BLD: 1 %
LYMPHOCYTES # BLD AUTO: 0.6 10E3/UL (ref 0.8–5.3)
LYMPHOCYTES NFR BLD AUTO: 6 %
MCH RBC QN AUTO: 33.9 PG (ref 26.5–33)
MCHC RBC AUTO-ENTMCNC: 32.8 G/DL (ref 31.5–36.5)
MCV RBC AUTO: 103 FL (ref 78–100)
MONOCYTES # BLD AUTO: 0.1 10E3/UL (ref 0–1.3)
MONOCYTES NFR BLD AUTO: 1 %
NEUTROPHILS # BLD AUTO: 9.3 10E3/UL (ref 1.6–8.3)
NEUTROPHILS NFR BLD AUTO: 91 %
NRBC # BLD AUTO: 0 10E3/UL
NRBC BLD AUTO-RTO: 0 /100
PLATELET # BLD AUTO: 295 10E3/UL (ref 150–450)
RBC # BLD AUTO: 3.86 10E6/UL (ref 3.8–5.2)
TOTAL PROTEIN SERUM FOR ELP: 6.2 G/DL (ref 6–8)
WBC # BLD AUTO: 10.1 10E3/UL (ref 4–11)

## 2022-05-11 PROCEDURE — 83521 IG LIGHT CHAINS FREE EACH: CPT

## 2022-05-11 PROCEDURE — 82784 ASSAY IGA/IGD/IGG/IGM EACH: CPT

## 2022-05-11 PROCEDURE — 84165 PROTEIN E-PHORESIS SERUM: CPT | Mod: 26 | Performed by: PATHOLOGY

## 2022-05-11 PROCEDURE — 86334 IMMUNOFIX E-PHORESIS SERUM: CPT | Mod: 26 | Performed by: PATHOLOGY

## 2022-05-11 PROCEDURE — 36415 COLL VENOUS BLD VENIPUNCTURE: CPT | Performed by: NURSE PRACTITIONER

## 2022-05-11 PROCEDURE — 86334 IMMUNOFIX E-PHORESIS SERUM: CPT

## 2022-05-11 PROCEDURE — 96401 CHEMO ANTI-NEOPL SQ/IM: CPT

## 2022-05-11 PROCEDURE — 84165 PROTEIN E-PHORESIS SERUM: CPT | Mod: TC

## 2022-05-11 PROCEDURE — 250N000011 HC RX IP 250 OP 636: Performed by: NURSE PRACTITIONER

## 2022-05-11 PROCEDURE — 85014 HEMATOCRIT: CPT | Performed by: NURSE PRACTITIONER

## 2022-05-11 PROCEDURE — 84155 ASSAY OF PROTEIN SERUM: CPT | Mod: 91

## 2022-05-11 RX ORDER — NALOXONE HYDROCHLORIDE 0.4 MG/ML
0.2 INJECTION, SOLUTION INTRAMUSCULAR; INTRAVENOUS; SUBCUTANEOUS
Status: DISCONTINUED | OUTPATIENT
Start: 2022-05-11 | End: 2022-05-11 | Stop reason: HOSPADM

## 2022-05-11 RX ORDER — ALBUTEROL SULFATE 90 UG/1
1-2 AEROSOL, METERED RESPIRATORY (INHALATION)
Status: DISCONTINUED | OUTPATIENT
Start: 2022-05-11 | End: 2022-05-11 | Stop reason: HOSPADM

## 2022-05-11 RX ORDER — MEPERIDINE HYDROCHLORIDE 25 MG/ML
25 INJECTION INTRAMUSCULAR; INTRAVENOUS; SUBCUTANEOUS EVERY 30 MIN PRN
Status: DISCONTINUED | OUTPATIENT
Start: 2022-05-11 | End: 2022-05-11 | Stop reason: HOSPADM

## 2022-05-11 RX ORDER — METHYLPREDNISOLONE SODIUM SUCCINATE 125 MG/2ML
125 INJECTION, POWDER, LYOPHILIZED, FOR SOLUTION INTRAMUSCULAR; INTRAVENOUS
Status: DISCONTINUED | OUTPATIENT
Start: 2022-05-11 | End: 2022-05-11 | Stop reason: HOSPADM

## 2022-05-11 RX ORDER — DIPHENHYDRAMINE HYDROCHLORIDE 50 MG/ML
50 INJECTION INTRAMUSCULAR; INTRAVENOUS
Status: DISCONTINUED | OUTPATIENT
Start: 2022-05-11 | End: 2022-05-11 | Stop reason: HOSPADM

## 2022-05-11 RX ORDER — ALBUTEROL SULFATE 0.83 MG/ML
2.5 SOLUTION RESPIRATORY (INHALATION)
Status: DISCONTINUED | OUTPATIENT
Start: 2022-05-11 | End: 2022-05-11 | Stop reason: HOSPADM

## 2022-05-11 RX ORDER — EPINEPHRINE 1 MG/ML
0.3 INJECTION, SOLUTION INTRAMUSCULAR; SUBCUTANEOUS EVERY 5 MIN PRN
Status: DISCONTINUED | OUTPATIENT
Start: 2022-05-11 | End: 2022-05-11 | Stop reason: HOSPADM

## 2022-05-11 RX ADMIN — BORTEZOMIB 2.1 MG: 3.5 INJECTION, POWDER, LYOPHILIZED, FOR SOLUTION INTRAVENOUS; SUBCUTANEOUS at 14:18

## 2022-05-11 ASSESSMENT — PAIN SCALES - GENERAL: PAINLEVEL: MODERATE PAIN (4)

## 2022-05-11 NOTE — PROGRESS NOTES
Infusion Nursing Note:  Lizzie Viera presents today for C#11 D#15 of treatment regimen.    Patient seen by provider today: No   present during visit today: Not Applicable.    Note: Patient assessed and vital signs stable. Administered Velcade subcutaneous (left lower abdomen) per provider order.       Intravenous Access:  Labs drawn peripherally.    Treatment Conditions:  Lab Results   Component Value Date    HGB 13.1 05/11/2022    WBC 10.1 05/11/2022    ANEU 4.6 04/14/2022    ANEUTAUTO 9.3 (H) 05/11/2022     05/11/2022      Results reviewed, labs MET treatment parameters, ok to proceed with treatment.      Post Infusion Assessment:  Patient tolerated injection without incident.       Discharge Plan:   Patient discharged in stable condition accompanied by: self.  Departure Mode: Ambulatory.      JOS SWAIN RN

## 2022-05-12 ENCOUNTER — TELEPHONE (OUTPATIENT)
Dept: ONCOLOGY | Facility: HOSPITAL | Age: 72
End: 2022-05-12
Payer: COMMERCIAL

## 2022-05-12 DIAGNOSIS — C90.00 MULTIPLE MYELOMA WITH FAILED REMISSION (H): Primary | ICD-10-CM

## 2022-05-12 LAB
KAPPA LC FREE SER-MCNC: 1.37 MG/DL (ref 0.33–1.94)
KAPPA LC FREE/LAMBDA FREE SER NEPH: 1.28 {RATIO} (ref 0.26–1.65)
LAMBDA LC FREE SERPL-MCNC: 1.07 MG/DL (ref 0.57–2.63)

## 2022-05-12 RX ORDER — LENALIDOMIDE 25 MG/1
25 CAPSULE ORAL DAILY
Qty: 14 CAPSULE | Refills: 0 | Status: SHIPPED | OUTPATIENT
Start: 2022-05-18 | End: 2022-06-02

## 2022-05-12 NOTE — ORAL ONC MGMT
Oral Chemotherapy Monitoring Program    Subjective/Objective:  Lizzie Viera is a 71 year old female contacted by phone for a follow-up visit for oral chemotherapy.  Valeria confirms she takes lenalidomide 25 mg daily for 2 weeks on, 1 week off and is due to start 5/18 with next cycle. She continues dexamethasone 40 mg on Wednesdays. She does have swelling in her feet and ankles, likely culprit being the amlodipine. She keeps her feet elevated and has compression socks at home to use. She feels like her eyes are not focusing as well and blurry. Eye doctor said there was nothing wrong on exam last month. It seems that some days are better than others. She has been using dilaudid for her back pain, but is going to PT and feels much stronger which is great to hear.     ORAL CHEMOTHERAPY 1/19/2022 1/27/2022 2/17/2022 2/17/2022 3/10/2022 3/31/2022 5/12/2022   Assessment Type Monthly Follow up Refill Refill Left Voicemail Refill Refill Monthly Follow up   Diagnosis Code Multiple Myeloma Multiple Myeloma Multiple Myeloma Multiple Myeloma Multiple Myeloma Multiple Myeloma Multiple Myeloma   Providers Bella Blanco Bella Bella Bella Bella Dr. Bella   Clinic Name/Location [facility]   Drug Name Revlimid (lenalidomide) Revlimid (lenalidomide) Revlimid (lenalidomide) Revlimid (lenalidomide) Revlimid (lenalidomide) Revlimid (lenalidomide) Revlimid (lenalidomide)   Dose 25 mg 25 mg 25 mg 25 mg 25 mg 25 mg 25 mg   Current Schedule Daily Daily Daily Daily Daily Daily Daily   Cycle Details 2 weeks on, 1 week off 2 weeks on, 1 week off 2 weeks on, 1 week off 2 weeks on, 1 week off 2 weeks on, 1 week off 2 weeks on, 1 week off 2 weeks on, 1 week off   Start Date of Last Cycle 1/12/2022 - - - - - 4/27/2022   Planned next cycle start date 2/2/2022 - - - - - 5/18/2022   Doses missed in last 2 weeks 0 - - - - - 0   Adherence Assessment Adherent - - - - -  "Adherent   Adverse Effects Edema;Decreased Appetite/Weight Loss;Nausea;Fatigue - - - - - -   Nausea Grade 1 - - - - - -   Pharmacist Intervention(nausea) No - - - - - -   Diarrhea - - - - - - -   Pharmacist Intervention(diarrhea) - - - - - - -   Intervention(s) - - - - - - -   Decrease Appetite/Weight Loss Grade 1 - - - - - -   Pharmacist Intervention(decreased appetite/weight loss) Yes - - - - - -   Intervention(s) OTC recommendation - - - - - -   Fatigue Grade 1 - - - - - -   Pharmacist Intervention(fatigue) No - - - - - -   Any new drug interactions? No - - - - - -   Is the dose as ordered appropriate for the patient? Yes - - - - - -       Last PHQ-2 Score on record:   PHQ-2 ( 1999 [employer]) 5/3/2022 4/28/2022   Q1: Little interest or pleasure in doing things 1 1   Q2: Feeling down, depressed or hopeless 0 0   PHQ-2 Score 1 1   Q1: Little interest or pleasure in doing things Several days -   Q2: Feeling down, depressed or hopeless Not at all -   PHQ-2 Score 1 -       Vitals:  BP:   BP Readings from Last 1 Encounters:   05/11/22 122/62     Wt Readings from Last 1 Encounters:   05/03/22 56.7 kg (125 lb)     Estimated body surface area is 1.56 meters squared as calculated from the following:    Height as of 4/27/22: 1.549 m (5' 1\").    Weight as of 5/3/22: 56.7 kg (125 lb).    Labs:  _  Result Component Current Result Ref Range   Sodium 146 (H) (4/27/2022) 136 - 145 mmol/L     _  Result Component Current Result Ref Range   Potassium 3.5 (4/27/2022) 3.5 - 5.0 mmol/L     _  Result Component Current Result Ref Range   Calcium 9.0 (4/27/2022) 8.5 - 10.5 mg/dL     _  Result Component Current Result Ref Range   Magnesium 1.9 (4/19/2022) 1.8 - 2.6 mg/dL     No results found for Phos within last 30 days.     _  Result Component Current Result Ref Range   Albumin 3.2 (L) (4/27/2022) 3.5 - 5.0 g/dL     _  Result Component Current Result Ref Range   Urea Nitrogen 20 (4/27/2022) 8 - 22 mg/dL     _  Result Component Current Result " Ref Range   Creatinine 1.03 (4/27/2022) 0.60 - 1.10 mg/dL     _  Result Component Current Result Ref Range   AST 12 (4/27/2022) 0 - 40 U/L     _  Result Component Current Result Ref Range   ALT 21 (4/27/2022) 0 - 45 U/L     _  Result Component Current Result Ref Range   Bilirubin Total 0.3 (4/27/2022) 0.0 - 1.0 mg/dL     _  Result Component Current Result Ref Range   WBC Count 10.1 (5/11/2022) 4.0 - 11.0 10e3/uL     _  Result Component Current Result Ref Range   Hemoglobin 13.1 (5/11/2022) 11.7 - 15.7 g/dL     _  Result Component Current Result Ref Range   Platelet Count 295 (5/11/2022) 150 - 450 10e3/uL     _  Result Component Current Result Ref Range   Absolute Neutrophils 4.6 (4/14/2022) 1.6 - 8.3 10e3/uL     _  Result Component Current Result Ref Range   Absolute Neutrophils 9.3 (H) (5/11/2022) 1.6 - 8.3 10e3/uL      Assessment/Plan:  Valeria is tolerating lenalidomide and will start next cycle 5/18. Recommended to monitor if blurry vision correlates with dexamethasone at all. Dr. Blanco wanted to keep her lenalidomide at 25 mg (Scr increased with estimated CrCl 42, will trend).     Follow-Up:  Will review 5/18 labs and then 6/8 appt. Plan for next phone call in June and then quarterly after that if reasonable.     Refill Due:  Releasing refill today    Minnie Agrawal, PharmD, BCOP  Oral Chemotherapy Pharmacist  [phone]

## 2022-05-13 LAB
ALBUMIN PERCENT: 65.8 % (ref 51–67)
ALBUMIN SERPL ELPH-MCNC: 4.1 G/DL (ref 3.2–4.7)
ALPHA 1 PERCENT: 3.3 % (ref 2–4)
ALPHA 2 PERCENT: 13.4 % (ref 5–13)
ALPHA1 GLOB SERPL ELPH-MCNC: 0.2 G/DL (ref 0.1–0.3)
ALPHA2 GLOB SERPL ELPH-MCNC: 0.8 G/DL (ref 0.4–0.9)
B-GLOBULIN SERPL ELPH-MCNC: 0.7 G/DL (ref 0.7–1.2)
BETA PERCENT: 10.5 % (ref 10–17)
GAMMA GLOB SERPL ELPH-MCNC: 0.4 G/DL (ref 0.6–1.4)
GAMMA GLOBULIN PERCENT: 7 % (ref 9–20)
PATH ICD:: ABNORMAL
PATH ICD:: NORMAL
PROT PATTERN SERPL ELPH-IMP: ABNORMAL
PROT PATTERN SERPL IFE-IMP: NORMAL
REVIEWING PATHOLOGIST: ABNORMAL
REVIEWING PATHOLOGIST: NORMAL
TOTAL PROTEIN SERUM FOR ELP (SYNCED VALUE): 6.2 G/DL

## 2022-05-18 ENCOUNTER — LAB (OUTPATIENT)
Dept: INFUSION THERAPY | Facility: HOSPITAL | Age: 72
End: 2022-05-18
Attending: INTERNAL MEDICINE
Payer: COMMERCIAL

## 2022-05-18 VITALS
OXYGEN SATURATION: 97 % | SYSTOLIC BLOOD PRESSURE: 124 MMHG | DIASTOLIC BLOOD PRESSURE: 56 MMHG | TEMPERATURE: 98.4 F | RESPIRATION RATE: 16 BRPM | HEART RATE: 68 BPM

## 2022-05-18 DIAGNOSIS — C90.00 MULTIPLE MYELOMA WITH FAILED REMISSION (H): Primary | ICD-10-CM

## 2022-05-18 LAB
ALBUMIN SERPL-MCNC: 3.2 G/DL (ref 3.5–5)
ALP SERPL-CCNC: 33 U/L (ref 45–120)
ALT SERPL W P-5'-P-CCNC: 20 U/L (ref 0–45)
ANION GAP SERPL CALCULATED.3IONS-SCNC: 5 MMOL/L (ref 5–18)
AST SERPL W P-5'-P-CCNC: 18 U/L (ref 0–40)
BASOPHILS # BLD AUTO: 0 10E3/UL (ref 0–0.2)
BASOPHILS NFR BLD AUTO: 1 %
BILIRUB SERPL-MCNC: 0.3 MG/DL (ref 0–1)
BUN SERPL-MCNC: 18 MG/DL (ref 8–22)
CALCIUM SERPL-MCNC: 9.1 MG/DL (ref 8.5–10.5)
CHLORIDE BLD-SCNC: 108 MMOL/L (ref 98–107)
CO2 SERPL-SCNC: 28 MMOL/L (ref 22–31)
CREAT SERPL-MCNC: 0.75 MG/DL (ref 0.6–1.1)
EOSINOPHIL # BLD AUTO: 0 10E3/UL (ref 0–0.7)
EOSINOPHIL NFR BLD AUTO: 0 %
ERYTHROCYTE [DISTWIDTH] IN BLOOD BY AUTOMATED COUNT: 16.2 % (ref 10–15)
GFR SERPL CREATININE-BSD FRML MDRD: 86 ML/MIN/1.73M2
GLUCOSE BLD-MCNC: 141 MG/DL (ref 70–125)
HCT VFR BLD AUTO: 39 % (ref 35–47)
HGB BLD-MCNC: 12.9 G/DL (ref 11.7–15.7)
IMM GRANULOCYTES # BLD: 0 10E3/UL
IMM GRANULOCYTES NFR BLD: 0 %
LYMPHOCYTES # BLD AUTO: 0.4 10E3/UL (ref 0.8–5.3)
LYMPHOCYTES NFR BLD AUTO: 9 %
MCH RBC QN AUTO: 34 PG (ref 26.5–33)
MCHC RBC AUTO-ENTMCNC: 33.1 G/DL (ref 31.5–36.5)
MCV RBC AUTO: 103 FL (ref 78–100)
MONOCYTES # BLD AUTO: 0.1 10E3/UL (ref 0–1.3)
MONOCYTES NFR BLD AUTO: 1 %
NEUTROPHILS # BLD AUTO: 4 10E3/UL (ref 1.6–8.3)
NEUTROPHILS NFR BLD AUTO: 89 %
NRBC # BLD AUTO: 0 10E3/UL
NRBC BLD AUTO-RTO: 0 /100
PLATELET # BLD AUTO: 203 10E3/UL (ref 150–450)
POTASSIUM BLD-SCNC: 4.3 MMOL/L (ref 3.5–5)
PROT SERPL-MCNC: 6.5 G/DL (ref 6–8)
RBC # BLD AUTO: 3.79 10E6/UL (ref 3.8–5.2)
SODIUM SERPL-SCNC: 141 MMOL/L (ref 136–145)
WBC # BLD AUTO: 4.5 10E3/UL (ref 4–11)

## 2022-05-18 PROCEDURE — 36415 COLL VENOUS BLD VENIPUNCTURE: CPT | Performed by: INTERNAL MEDICINE

## 2022-05-18 PROCEDURE — 85025 COMPLETE CBC W/AUTO DIFF WBC: CPT | Performed by: INTERNAL MEDICINE

## 2022-05-18 PROCEDURE — 96401 CHEMO ANTI-NEOPL SQ/IM: CPT

## 2022-05-18 PROCEDURE — 80053 COMPREHEN METABOLIC PANEL: CPT | Performed by: INTERNAL MEDICINE

## 2022-05-18 PROCEDURE — 250N000011 HC RX IP 250 OP 636: Mod: JW | Performed by: INTERNAL MEDICINE

## 2022-05-18 RX ADMIN — BORTEZOMIB 2.1 MG: 3.5 INJECTION, POWDER, LYOPHILIZED, FOR SOLUTION INTRAVENOUS; SUBCUTANEOUS at 14:44

## 2022-05-18 NOTE — PROGRESS NOTES
Pt here for labs and injection. Injection given R abdomen without incident. Pt denies new complaint and d.c ambulatory to lobby alone and is aware of treatment plan.

## 2022-05-19 ENCOUNTER — PSYCHE (OUTPATIENT)
Dept: ONCOLOGY | Facility: CLINIC | Age: 72
End: 2022-05-19
Attending: SOCIAL WORKER
Payer: COMMERCIAL

## 2022-05-19 DIAGNOSIS — C90.00 MULTIPLE MYELOMA WITH FAILED REMISSION (H): ICD-10-CM

## 2022-05-19 DIAGNOSIS — F43.23 ADJUSTMENT DISORDER WITH MIXED ANXIETY AND DEPRESSED MOOD: Primary | ICD-10-CM

## 2022-05-19 PROCEDURE — 90834 PSYTX W PT 45 MINUTES: CPT | Performed by: SOCIAL WORKER

## 2022-05-19 NOTE — CONFIDENTIAL NOTE
[facility] Oncology- Psychotherapy (Provider Oversight- Dr. Jack Edward)                                     Progress Note    Patient Name: Lizzie Viera                      Date:   5/19/2022                                           Service Type: Individual                            Session Start Time:  11:00               session End Time:    11:50 AM                Session Length:        50     Attendees:     Client     Service Modality:   In person         DATA  Interactive Complexity: No  Crisis: No                               Progress Since Last Session (Related to Symptoms / Goals / Homework):              Symptoms:  Valeria is struggling with some issues related to her , his health and his behavior.  She is concerned that he may have C. difficile and the plan is for him to get this tested at his GI appointment next week.  We also discussed other strategies to help her cope with his behavior and creating boundaries and limit setting strategies.   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  She is in the process of looking for some new homes and processed her thoughts and feelings about these issues.   Valeria is looking forward to spending the weekend with her daughter in [place].  She will be leaving with her son and grandson tomorrow and will be staying till Sunday.  She is looking forward to some quality time with family.   Valeria continues to meet with her [facility] physician and is in the process of getting some further testing.  She is feeling much more encouraged about her situation.     Homework: Partially completed                            Episode of Care Goals: Satisfactory progress - ACTION (Actively working towards change); Intervened by reinforcing change plan / affirming steps taken                 Current / Ongoing Stressors and  Concerns:              Valeria was recently hospitalized from 4/12 to 4/28/2022, for 8 days for pneumonia, hypoxia and COPD exacerbation.  Valeria is currently going through treatment for multiple myeloma.  Her  is receiving dialysis 3 times a week and is on the transplant list for a new kidney.  He had a hospitalization and an ER visit recently due to having issues with bleeding.  They still have not found out the source of his bleeding.    He has been incontinent of stool at least once every 2 weeks.  They plan to have him checked for C. difficile at his GI appointment next week.              They are having family birthdays in this month of May.  Her daughter, Collette's birthday is on 5/12/2022.  Her grandson, Maximiliano who lives with them will have a birthday on 5/16/2022 and his brother, Dayday will have a birthday on 5/18/2022.  Valeria talked about the birthday events..              Valeria has met with a realtor and is in the process of looking at properties to move.    They are considering several locations and would like to move somewhere where they would not have some any exposures to chemicals, like they do in their current location in [place].                    Treatment Objective(s) Addressed in This Session:          To cope with the emotional aspects of dealing with her multiple myeloma and current cancer treatment.  Also to help her cope with her current life stressors                 Intervention:              CBT: To learn strategies to deal with symptoms of anxiety and depression related to her illness     Assessments completed prior to visit:  The following assessments were completed by patient for this visit:  PHQ9: 8 indicating mild depression  PHQ-9 SCORE 1/7/2021 9/13/2021   PHQ-9 Total Score 9 8      GAD7: 13 indicating moderate anxiety  BORA-7 SCORE 1/7/2021 9/13/2021   Total Score 4 3      PROMIS 10-Global Health: 30             ASSESSMENT: Current Emotional / Mental Status (status of  significant symptoms):              Risk status (Self / Other harm or suicidal ideation)              Patient denies current fears or concerns for personal safety.              Patient denies current or recent suicidal ideation or behaviors.              Patient denies current or recent homicidal ideation or behaviors.              Patient denies current or recent self injurious behavior or ideation.              Patient denies other safety concerns.              Patient reports there has been no change in risk factors since their last session.                Patient reports there has been no change in protective factors since their last session.                Recommended that patient call 911 or go to the local ED should there be a change in any of these risk factors.                 Appearance:                            Appropriate               Eye Contact:                           Good               Psychomotor Behavior:          Normal               Attitude:                                   Cooperative  Interested Pleasant              Orientation:                             Person Place Time Situation              Speech                          Rate / Production:       Normal/ Responsive Normal                           Volume:                       Normal               Mood:                                      Normal              Affect:                                      Appropriate               Thought Content:                    Clear               Thought Form:                        Coherent  Logical               Insight:                                     Good                  Medication Review:              No current psychiatric medications prescribed                 Medication Compliance:              Yes                 Changes in Health Issues:              None reported                 Chemical Use Review:              Substance Use: Chemical use reviewed, no active concerns  identified                  Tobacco Use: No change in amount of tobacco use since last session.  No current change interventions at this time     Diagnosis:  1.  Adjustment disorder with mixed anxiety and depressed mood  2.  Multiple myeloma not having achieved remission     Collateral Reports Completed:              Not Applicable     PLAN: (Patient Tasks / Therapist Tasks / Other)     1.  Valeria would benefit from individual psychotherapy for 50-minute sessions every 1 to 2 weeks to help her deal with the emotional aspects of her multiple myeloma.  She is working at implementing cognitive behavioral therapy strategies to help her better manage her symptoms of anxiety and depression related to coping with her illness.     2.  Valeria is working at incorporating body mind and spirit techniques to help her with relaxation and better manage her symptoms of anxiety and depression related to her illness.     3.  Valeria is working on communication and conflict resolution strategies with her .        JAI AMADO LP, [facility]                                                           ______________________________________________________________________        Individual Treatment Plan     Patient's Name: Lizzie Viera                   YOB: 1950     Date of Creation: 2/28/2022  Date Treatment Plan Last Reviewed/Revised: 8/19/2022     DSM5 Diagnoses: Adjustment disorder with mixed anxiety and depressed mood  Psychosocial / Contextual Factors: Treatment for multiple myeloma and coping with the emotional aspects of dealing with her illness.     Referral / Collaboration:  Referral to another professional/service is not indicated at this time..     Anticipated number of session for this episode of care: 10  Anticipation frequency of session: Every 1 to 2 weeks  Anticipated Duration of each session: 38-52 minutes  Treatment plan will be reviewed in 90 days or when goals have been changed.          MeasurableTreatment Goal(s) related to diagnosis / functional impairment(s)  Goal 1: Patient will work on dealing with the emotional aspects of coping with her multiple myeloma     Objective #A (Patient Action)                          Patient will identify stressors that contribute to feelings of anxiety and depression related to her illness of multiple myeloma..  Status: Continued - Date(s): 8/19/2022     Intervention(s)  Therapist will teach emotional recognition/identification. Of feelings of anxiety and depression related to her multiple myeloma..     Objective #B  Patient will participate in Progress of relaxation strategies and mindfulness strategies activities to improve mood.  Status: Continued - Date(s): 8/19/2022     Intervention(s)  Therapist will teach about cognitive behavioral strategies to better manage symptoms of anxiety and depression.  Therapist will also teach about mindfulness strategies and provided recommended reading material..     Objective #C  Patient will participate in Communication and boundary setting activities to improve mood.  Status: Continued - Date(s): 8/19/2022     Intervention(s)  Therapist will provide psychoeducation on communication and conflict resolution strategies.  Psychoeducational reading material recommended.        Patient has  agreed to the above plan.        JAI AMADO LP, LICSW             May 19, 2022

## 2022-05-25 ENCOUNTER — LAB (OUTPATIENT)
Dept: INFUSION THERAPY | Facility: HOSPITAL | Age: 72
End: 2022-05-25
Attending: INTERNAL MEDICINE
Payer: COMMERCIAL

## 2022-05-25 VITALS
SYSTOLIC BLOOD PRESSURE: 121 MMHG | TEMPERATURE: 97.9 F | RESPIRATION RATE: 16 BRPM | OXYGEN SATURATION: 96 % | DIASTOLIC BLOOD PRESSURE: 51 MMHG | HEART RATE: 62 BPM

## 2022-05-25 DIAGNOSIS — C90.00 MULTIPLE MYELOMA WITH FAILED REMISSION (H): Primary | ICD-10-CM

## 2022-05-25 DIAGNOSIS — M84.58XK PATHOLOGICAL FRACTURE OF VERTEBRAE IN NEOPLASTIC DISEASE WITH NONUNION: ICD-10-CM

## 2022-05-25 LAB
BASOPHILS # BLD AUTO: 0 10E3/UL (ref 0–0.2)
BASOPHILS NFR BLD AUTO: 1 %
EOSINOPHIL # BLD AUTO: 0.1 10E3/UL (ref 0–0.7)
EOSINOPHIL NFR BLD AUTO: 1 %
ERYTHROCYTE [DISTWIDTH] IN BLOOD BY AUTOMATED COUNT: 16.7 % (ref 10–15)
HCT VFR BLD AUTO: 38.9 % (ref 35–47)
HGB BLD-MCNC: 12.7 G/DL (ref 11.7–15.7)
IMM GRANULOCYTES # BLD: 0.1 10E3/UL
IMM GRANULOCYTES NFR BLD: 1 %
LYMPHOCYTES # BLD AUTO: 0.6 10E3/UL (ref 0.8–5.3)
LYMPHOCYTES NFR BLD AUTO: 10 %
MCH RBC QN AUTO: 33.7 PG (ref 26.5–33)
MCHC RBC AUTO-ENTMCNC: 32.6 G/DL (ref 31.5–36.5)
MCV RBC AUTO: 103 FL (ref 78–100)
MONOCYTES # BLD AUTO: 0.1 10E3/UL (ref 0–1.3)
MONOCYTES NFR BLD AUTO: 2 %
NEUTROPHILS # BLD AUTO: 5.3 10E3/UL (ref 1.6–8.3)
NEUTROPHILS NFR BLD AUTO: 85 %
NRBC # BLD AUTO: 0 10E3/UL
NRBC BLD AUTO-RTO: 0 /100
PLATELET # BLD AUTO: 217 10E3/UL (ref 150–450)
RBC # BLD AUTO: 3.77 10E6/UL (ref 3.8–5.2)
WBC # BLD AUTO: 6.1 10E3/UL (ref 4–11)

## 2022-05-25 PROCEDURE — 250N000011 HC RX IP 250 OP 636: Performed by: INTERNAL MEDICINE

## 2022-05-25 PROCEDURE — 96401 CHEMO ANTI-NEOPL SQ/IM: CPT

## 2022-05-25 PROCEDURE — 36415 COLL VENOUS BLD VENIPUNCTURE: CPT | Performed by: INTERNAL MEDICINE

## 2022-05-25 PROCEDURE — 85025 COMPLETE CBC W/AUTO DIFF WBC: CPT | Performed by: INTERNAL MEDICINE

## 2022-05-25 RX ADMIN — BORTEZOMIB 2.1 MG: 3.5 INJECTION, POWDER, LYOPHILIZED, FOR SOLUTION INTRAVENOUS; SUBCUTANEOUS at 14:21

## 2022-05-25 ASSESSMENT — PAIN SCALES - GENERAL: PAINLEVEL: SEVERE PAIN (6)

## 2022-05-25 NOTE — PROGRESS NOTES
Infusion Nursing Note:  Lizzie Viera presents today for cycle 12 day 8 treatment with Bortezomib.    Patient seen by provider today: No   present during visit today: Not Applicable.    Note: She received treatment as ordered given subcutaneous into her LLQ of her abdomen.      Intravenous Access:  n/a.    Treatment Conditions:  Results reviewed, labs MET treatment parameters, ok to proceed with treatment.      Post Infusion Assessment:  Patient tolerated injection without incident.       Discharge Plan:   Patient discharged in stable condition accompanied by: sister.      Diane Mckeon RN

## 2022-05-31 ENCOUNTER — TRANSFERRED RECORDS (OUTPATIENT)
Dept: HEALTH INFORMATION MANAGEMENT | Facility: CLINIC | Age: 72
End: 2022-05-31
Payer: COMMERCIAL

## 2022-06-01 ENCOUNTER — INFUSION THERAPY VISIT (OUTPATIENT)
Dept: INFUSION THERAPY | Facility: HOSPITAL | Age: 72
End: 2022-06-01
Attending: INTERNAL MEDICINE
Payer: COMMERCIAL

## 2022-06-01 VITALS
SYSTOLIC BLOOD PRESSURE: 89 MMHG | RESPIRATION RATE: 16 BRPM | HEART RATE: 98 BPM | OXYGEN SATURATION: 95 % | DIASTOLIC BLOOD PRESSURE: 53 MMHG

## 2022-06-01 DIAGNOSIS — C90.00 MULTIPLE MYELOMA WITH FAILED REMISSION (H): Primary | ICD-10-CM

## 2022-06-01 LAB
BASOPHILS # BLD AUTO: 0 10E3/UL (ref 0–0.2)
BASOPHILS NFR BLD AUTO: 1 %
EOSINOPHIL # BLD AUTO: 0.1 10E3/UL (ref 0–0.7)
EOSINOPHIL NFR BLD AUTO: 1 %
ERYTHROCYTE [DISTWIDTH] IN BLOOD BY AUTOMATED COUNT: 16.3 % (ref 10–15)
HCT VFR BLD AUTO: 41.2 % (ref 35–47)
HGB BLD-MCNC: 13.5 G/DL (ref 11.7–15.7)
IGA SERPL-MCNC: 55 MG/DL (ref 65–400)
IGG SERPL-MCNC: 439 MG/DL (ref 700–1700)
IGM SERPL-MCNC: 17 MG/DL (ref 60–280)
IMM GRANULOCYTES # BLD: 0 10E3/UL
IMM GRANULOCYTES NFR BLD: 0 %
LYMPHOCYTES # BLD AUTO: 0.6 10E3/UL (ref 0.8–5.3)
LYMPHOCYTES NFR BLD AUTO: 8 %
MCH RBC QN AUTO: 33.7 PG (ref 26.5–33)
MCHC RBC AUTO-ENTMCNC: 32.8 G/DL (ref 31.5–36.5)
MCV RBC AUTO: 103 FL (ref 78–100)
MONOCYTES # BLD AUTO: 0.2 10E3/UL (ref 0–1.3)
MONOCYTES NFR BLD AUTO: 2 %
NEUTROPHILS # BLD AUTO: 6.2 10E3/UL (ref 1.6–8.3)
NEUTROPHILS NFR BLD AUTO: 88 %
NRBC # BLD AUTO: 0 10E3/UL
NRBC BLD AUTO-RTO: 0 /100
PLATELET # BLD AUTO: 203 10E3/UL (ref 150–450)
RBC # BLD AUTO: 4.01 10E6/UL (ref 3.8–5.2)
TOTAL PROTEIN SERUM FOR ELP: 5.8 G/DL (ref 6–8)
WBC # BLD AUTO: 7 10E3/UL (ref 4–11)

## 2022-06-01 PROCEDURE — 86334 IMMUNOFIX E-PHORESIS SERUM: CPT | Performed by: PATHOLOGY

## 2022-06-01 PROCEDURE — 36415 COLL VENOUS BLD VENIPUNCTURE: CPT

## 2022-06-01 PROCEDURE — 82784 ASSAY IGA/IGD/IGG/IGM EACH: CPT

## 2022-06-01 PROCEDURE — 83521 IG LIGHT CHAINS FREE EACH: CPT

## 2022-06-01 PROCEDURE — 250N000011 HC RX IP 250 OP 636: Performed by: INTERNAL MEDICINE

## 2022-06-01 PROCEDURE — 84165 PROTEIN E-PHORESIS SERUM: CPT | Mod: 26 | Performed by: PATHOLOGY

## 2022-06-01 PROCEDURE — 84155 ASSAY OF PROTEIN SERUM: CPT

## 2022-06-01 PROCEDURE — 96401 CHEMO ANTI-NEOPL SQ/IM: CPT

## 2022-06-01 PROCEDURE — 86334 IMMUNOFIX E-PHORESIS SERUM: CPT

## 2022-06-01 PROCEDURE — 84165 PROTEIN E-PHORESIS SERUM: CPT | Mod: TC

## 2022-06-01 PROCEDURE — 85004 AUTOMATED DIFF WBC COUNT: CPT | Performed by: INTERNAL MEDICINE

## 2022-06-01 RX ADMIN — BORTEZOMIB 2.1 MG: 3.5 INJECTION, POWDER, LYOPHILIZED, FOR SOLUTION INTRAVENOUS; SUBCUTANEOUS at 13:52

## 2022-06-01 ASSESSMENT — PAIN SCALES - GENERAL: PAINLEVEL: SEVERE PAIN (7)

## 2022-06-01 NOTE — PROGRESS NOTES
Infusion Nursing Note:  Lizzie Viera presents today for D15C12.    Patient seen by provider today: No   present during visit today: Not Applicable.    Note: N/A.      Intravenous Access:  No Intravenous access/labs at this visit.    Treatment Conditions:  Lab Results   Component Value Date    HGB 13.5 06/01/2022    WBC 7.0 06/01/2022    ANEU 4.6 04/14/2022    ANEUTAUTO 6.2 06/01/2022     06/01/2022      Results reviewed, labs MET treatment parameters, ok to proceed with treatment.      Post Infusion Assessment:  Patient tolerated injection without incident. Injection given in RLQ of abd.      Discharge Plan:   Patient discharged in stable condition accompanied by: self.  Departure Mode: Ambulatory.      Narda Kingston RN

## 2022-06-02 ENCOUNTER — TELEPHONE (OUTPATIENT)
Dept: ONCOLOGY | Facility: HOSPITAL | Age: 72
End: 2022-06-02
Payer: COMMERCIAL

## 2022-06-02 DIAGNOSIS — C90.00 MULTIPLE MYELOMA WITH FAILED REMISSION (H): Primary | ICD-10-CM

## 2022-06-02 LAB
KAPPA LC FREE SER-MCNC: 1.84 MG/DL (ref 0.33–1.94)
KAPPA LC FREE/LAMBDA FREE SER NEPH: 1.74 {RATIO} (ref 0.26–1.65)
LAMBDA LC FREE SERPL-MCNC: 1.06 MG/DL (ref 0.57–2.63)

## 2022-06-02 RX ORDER — LENALIDOMIDE 25 MG/1
25 CAPSULE ORAL DAILY
Qty: 14 CAPSULE | Refills: 0 | Status: SHIPPED | OUTPATIENT
Start: 2022-06-08 | End: 2022-06-23

## 2022-06-03 LAB
ALBUMIN PERCENT: 66.2 % (ref 51–67)
ALBUMIN SERPL ELPH-MCNC: 3.8 G/DL (ref 3.2–4.7)
ALPHA 1 PERCENT: 3.5 % (ref 2–4)
ALPHA 2 PERCENT: 13.1 % (ref 5–13)
ALPHA1 GLOB SERPL ELPH-MCNC: 0.2 G/DL (ref 0.1–0.3)
ALPHA2 GLOB SERPL ELPH-MCNC: 0.8 G/DL (ref 0.4–0.9)
B-GLOBULIN SERPL ELPH-MCNC: 0.6 G/DL (ref 0.7–1.2)
BETA PERCENT: 11 % (ref 10–17)
GAMMA GLOB SERPL ELPH-MCNC: 0.4 G/DL (ref 0.6–1.4)
GAMMA GLOBULIN PERCENT: 6.2 % (ref 9–20)
MONOCLONAL PEAK: 0.1 G/DL
PATH ICD:: ABNORMAL
PATH ICD:: NORMAL
PROT PATTERN SERPL ELPH-IMP: ABNORMAL
PROT PATTERN SERPL IFE-IMP: NORMAL
REVIEWING PATHOLOGIST: ABNORMAL
REVIEWING PATHOLOGIST: NORMAL
TOTAL PROTEIN SERUM FOR ELP (SYNCED VALUE): 5.8 G/DL

## 2022-06-07 ENCOUNTER — VIRTUAL VISIT (OUTPATIENT)
Dept: RADIATION ONCOLOGY | Facility: HOSPITAL | Age: 72
End: 2022-06-07
Attending: CLINICAL NURSE SPECIALIST
Payer: COMMERCIAL

## 2022-06-07 DIAGNOSIS — R53.0 NEOPLASTIC MALIGNANT RELATED FATIGUE: ICD-10-CM

## 2022-06-07 DIAGNOSIS — Z51.5 ENCOUNTER FOR PALLIATIVE CARE: ICD-10-CM

## 2022-06-07 DIAGNOSIS — G89.3 CANCER ASSOCIATED PAIN: Primary | ICD-10-CM

## 2022-06-07 DIAGNOSIS — F41.9 ANXIETY: ICD-10-CM

## 2022-06-07 PROCEDURE — 99214 OFFICE O/P EST MOD 30 MIN: CPT | Mod: 95 | Performed by: CLINICAL NURSE SPECIALIST

## 2022-06-07 RX ORDER — METHOCARBAMOL 500 MG/1
500 TABLET, FILM COATED ORAL 4 TIMES DAILY PRN
Qty: 90 TABLET | Refills: 0 | Status: SHIPPED | OUTPATIENT
Start: 2022-06-07 | End: 2022-07-12

## 2022-06-07 NOTE — PROGRESS NOTES
Valeria is a 71 year old who is being evaluated via a billable video visit.      How would you like to obtain your AVS? [employer]  If the video visit is dropped, the invitation should be resent by: Text to cell phone: [phone]  Will anyone else be joining your video visit? No      Video Start Time: 10:05 AM  Video-Visit Details    Nursing Notes:  Called patient to initiate her video visit today for palliative care.  Patient continues to feel fatigued but feels it improved along with her balance since working with physical therapy.  Video visit done with Belle Flynn NP.  Return to clinic for follow up as directed by provider.    Type of service:  Video Visit    Video End Time:10:20 AM    Originating Location (pt. Location): Home    Distant Location (provider location):  [facility]ATION [facility]COL[facility] [place]     Platform used for Video Visit: Other: My Chart

## 2022-06-07 NOTE — CONFIDENTIAL NOTE
"[facility]  Palliative Care Daily Progress Note     \"This video visit will be conducted via a call between you and your physician/provider. We have found that certain health care needs can be provided without the need for an in-person physical exam.  This service lets us provide the care you need with a video conversation.  If a prescription is necessary, we can send it directly to your pharmacy.  If lab work is needed, we can place an order for that and you can then stop by our lab to have the test done at a later time.     Video visits are billed at different rates depending on your insurance coverage. Please reach out to your insurance provider with any questions.     If during the course of the call the physician/provider feels a video visit is not appropriate, you will not be charged for this service.\"     Patient has given verbal consent to a Video visit? yes     Video Start - End Time:  1040 - 0563     Code status: DNR/I     Impressions, Recommendations & Counseling     1.  Cancer related pain thoracic spine pain that radiates to bilateral lower extremities  Comment: Controlled.  Currently rates pain 4/10 and low back.  - Continue Gabapentin 600 mg by mouth 3 times daily. Tolerating well.   - Dexamethasone per chemotherapy regimen - assists with pain, nausea and appetite stimulation.  - S/P Single fraction radiation to T6 through T8/9.  - S/P T1 radiation 10/6/2021-10/12/2021.    - Changed Dilaudid to 2-4 mg by mouth every 4 hours as needed.    - Continue medical cannabis per department of Health. - pills and oil. Feels she is having good relief.  - Continue Robaxin 500 mg po q6h prn spasms - taking 3 times a day. Refilled today.  - Continue working with PT. Having good pain results and she is feeling stronger.  - Patient was taking  naltrexone 3 mg by mouth daily.  Had discussion with patient that this could reverse/minimize effect of opiate and she may feel more pain.  Taking this as a " "\"anti-inflammatory\" component of her natural/complementary treatments.     2. Cancer related fatigue -fluctuates.  Feels fatigued at days end  - Currently working with PT for pain. Valeria states she is physically feeling much stronger.  - Patient utilizing rolling walker while up.     3. Anxiety and depression related to health/diagnosis and current social stressors  - Patient seeing Mary Ellen Chan for coping, support and processing diagnosis.  - Patient seeing an energy healer     ADVANCED CARE PLANNING/GOALS OF CARE DISCUSSION:  - Code status: DNR/DNI  - Honoring Choices and POLST in chart.  - Follow-up in palliative care clinic in 3 months for ongoing pain management and decisional support.   -Call [phone] with questions or refill needs.      HPI          Lizzie Viera is a 71 year old female with a past medical history of multiple myeloma with pathologic compression fracture of T6 through T8/9 spine based on CT scan and received one fraction of radiation to the levels T6 through T8/9 on 9/1/2021 and T1 radiation from 10/6/2021 - 10/12/2021. Patient fitted for and wear TLSO brace for spine stabilization.   Historically, 9/2020 patient noted to have osteoporotic T7 compression fracture with diffuse marrow edema and an indeterminate T1 lesion.  Further imaging in 4/26/2021 showed worsening of her T7 pathologic fracture with new extraosseous extension causing severe spinal canal stenosis.  5/7/2021 biopsy showed neoplasm and biopsy obtained of right iliac crest showed multiple myeloma.  Patient initially declined chemo and radiation at that time and sought naturapathic approach to treatment.  Current treatment - Velcade, Revlimid and Dexamethasone.    Today, the patient was seen for:  Emotional support and pain management    Prognosis, Goals, or Advance Care Planning was addressed today with: Yes.  Mood, coping, and/or meaning in the context of serious illness were addressed today: Yes.  Summary/Comments: " Continue all current cares and treatments.  Patient appreciative of support.  Encouraged with her over all improvement in physical strength.  Very motivated to remain strong and increase independence with driving, getting to appointments in the store.            Interval History:     Patient continues on treatment for her cancer.  Continues to work with physical therapy for strengthening.  Feels that overall she is doing well and slowly gaining more independence.  Is driving and getting out to appointments.    Key Palliative Symptoms:  # Pain severity the last 12 hours: low  # Dyspnea severity the last 12 hours: none  # Nausea severity the last 12 hours: none  # Anxiety severity the last 12 hours: none  Fatigue: Moderate.  Working with physical therapy.  Starting to do more stairs.  Pain: Patient reports taking less medication especially on days that she is driving.  Feels overall current pain regimen working.  Constipation: Denies    Patient is on opioids: assessed and bowels ok/no needed changes to plan of care today.           Review of Systems:     Besides above, an additional  system ROS was reviewed and is unremarkable          Medications:     I have reviewed this patient's medication profile and PDMP.           Physical Exam:        There were no vitals taken for this visit.  General appearance: alert, appears stated age, cooperative and no distress  Head: Normocephalic, without obvious abnormality, atraumatic  Eyes: Lids and lashes normal.  Sclera anicteric  Nose: no discharge  Throat: Lips without lesions.  Lungs: Nonlabored  Skin: Visible skin on face and neck without lesions or rash  Neurologic: Alert.  Oriented x4.             Data Reviewed:     Pertinent Labs  Lab Results: personally reviewed.   Lab Results   Component Value Date     05/18/2022    CO2 28 05/18/2022    BUN 18 05/18/2022     Lab Results   Component Value Date    WBC 7.0 06/01/2022    HGB 13.5 06/01/2022    HCT 41.2 06/01/2022      06/01/2022     06/01/2022     AST   Date Value Ref Range Status   05/18/2022 18 0 - 40 U/L Final     ALT   Date Value Ref Range Status   05/18/2022 20 0 - 45 U/L Final     Alkaline Phosphatase   Date Value Ref Range Status   05/18/2022 33 (L) 45 - 120 U/L Final     Albumin   Date Value Ref Range Status   05/18/2022 3.2 (L) 3.5 - 5.0 g/dL Final         Radiology Results  No results found.       TTS: I have personally spent a total of 30 minutes today in above encounter reviewing patient's medical record, assessing patient and symptoms and providing emotional support with more than 50% of this time spent in video visit with patient as documented above.    VILMA Flynn, APRN, CNS  Palliative Care  [phone]

## 2022-06-08 ENCOUNTER — ONCOLOGY VISIT (OUTPATIENT)
Dept: ONCOLOGY | Facility: HOSPITAL | Age: 72
End: 2022-06-08
Attending: INTERNAL MEDICINE
Payer: COMMERCIAL

## 2022-06-08 ENCOUNTER — INFUSION THERAPY VISIT (OUTPATIENT)
Dept: INFUSION THERAPY | Facility: HOSPITAL | Age: 72
End: 2022-06-08
Attending: INTERNAL MEDICINE
Payer: COMMERCIAL

## 2022-06-08 VITALS
WEIGHT: 130.5 LBS | DIASTOLIC BLOOD PRESSURE: 63 MMHG | OXYGEN SATURATION: 97 % | TEMPERATURE: 98.1 F | RESPIRATION RATE: 18 BRPM | SYSTOLIC BLOOD PRESSURE: 137 MMHG | BODY MASS INDEX: 24.66 KG/M2 | HEART RATE: 70 BPM

## 2022-06-08 DIAGNOSIS — C90.00 MULTIPLE MYELOMA WITH FAILED REMISSION (H): Primary | ICD-10-CM

## 2022-06-08 DIAGNOSIS — C90.00 MULTIPLE MYELOMA WITH FAILED REMISSION (H): ICD-10-CM

## 2022-06-08 DIAGNOSIS — I10 ESSENTIAL HYPERTENSION, BENIGN: Primary | ICD-10-CM

## 2022-06-08 LAB
ALBUMIN SERPL-MCNC: 3.5 G/DL (ref 3.5–5)
ALP SERPL-CCNC: 41 U/L (ref 45–120)
ALT SERPL W P-5'-P-CCNC: 40 U/L (ref 0–45)
ANION GAP SERPL CALCULATED.3IONS-SCNC: 9 MMOL/L (ref 5–18)
AST SERPL W P-5'-P-CCNC: 46 U/L (ref 0–40)
BASOPHILS # BLD AUTO: 0.1 10E3/UL (ref 0–0.2)
BASOPHILS NFR BLD AUTO: 1 %
BILIRUB SERPL-MCNC: 0.3 MG/DL (ref 0–1)
BUN SERPL-MCNC: 22 MG/DL (ref 8–22)
CALCIUM SERPL-MCNC: 9.4 MG/DL (ref 8.5–10.5)
CHLORIDE BLD-SCNC: 107 MMOL/L (ref 98–107)
CO2 SERPL-SCNC: 26 MMOL/L (ref 22–31)
CREAT SERPL-MCNC: 0.73 MG/DL (ref 0.6–1.1)
EOSINOPHIL # BLD AUTO: 0 10E3/UL (ref 0–0.7)
EOSINOPHIL NFR BLD AUTO: 0 %
ERYTHROCYTE [DISTWIDTH] IN BLOOD BY AUTOMATED COUNT: 16.5 % (ref 10–15)
GFR SERPL CREATININE-BSD FRML MDRD: 89 ML/MIN/1.73M2
GLUCOSE BLD-MCNC: 142 MG/DL (ref 70–125)
HCT VFR BLD AUTO: 41.1 % (ref 35–47)
HGB BLD-MCNC: 13.5 G/DL (ref 11.7–15.7)
IMM GRANULOCYTES # BLD: 0 10E3/UL
IMM GRANULOCYTES NFR BLD: 1 %
LYMPHOCYTES # BLD AUTO: 0.6 10E3/UL (ref 0.8–5.3)
LYMPHOCYTES NFR BLD AUTO: 9 %
MCH RBC QN AUTO: 34.5 PG (ref 26.5–33)
MCHC RBC AUTO-ENTMCNC: 32.8 G/DL (ref 31.5–36.5)
MCV RBC AUTO: 105 FL (ref 78–100)
MONOCYTES # BLD AUTO: 0.1 10E3/UL (ref 0–1.3)
MONOCYTES NFR BLD AUTO: 1 %
NEUTROPHILS # BLD AUTO: 5.4 10E3/UL (ref 1.6–8.3)
NEUTROPHILS NFR BLD AUTO: 88 %
NRBC # BLD AUTO: 0 10E3/UL
NRBC BLD AUTO-RTO: 0 /100
PLATELET # BLD AUTO: 215 10E3/UL (ref 150–450)
POTASSIUM BLD-SCNC: 3.8 MMOL/L (ref 3.5–5)
PROT SERPL-MCNC: 6.6 G/DL (ref 6–8)
RBC # BLD AUTO: 3.91 10E6/UL (ref 3.8–5.2)
SODIUM SERPL-SCNC: 142 MMOL/L (ref 136–145)
WBC # BLD AUTO: 6 10E3/UL (ref 4–11)

## 2022-06-08 PROCEDURE — 36415 COLL VENOUS BLD VENIPUNCTURE: CPT | Performed by: INTERNAL MEDICINE

## 2022-06-08 PROCEDURE — 250N000011 HC RX IP 250 OP 636: Performed by: INTERNAL MEDICINE

## 2022-06-08 PROCEDURE — 84155 ASSAY OF PROTEIN SERUM: CPT | Performed by: INTERNAL MEDICINE

## 2022-06-08 PROCEDURE — 96401 CHEMO ANTI-NEOPL SQ/IM: CPT

## 2022-06-08 PROCEDURE — 85025 COMPLETE CBC W/AUTO DIFF WBC: CPT | Performed by: INTERNAL MEDICINE

## 2022-06-08 PROCEDURE — G0463 HOSPITAL OUTPT CLINIC VISIT: HCPCS | Mod: 25

## 2022-06-08 PROCEDURE — 99214 OFFICE O/P EST MOD 30 MIN: CPT | Performed by: NURSE PRACTITIONER

## 2022-06-08 RX ORDER — NALOXONE HYDROCHLORIDE 0.4 MG/ML
0.2 INJECTION, SOLUTION INTRAMUSCULAR; INTRAVENOUS; SUBCUTANEOUS
Status: CANCELLED | OUTPATIENT
Start: 2022-08-02

## 2022-06-08 RX ORDER — EPINEPHRINE 1 MG/ML
0.3 INJECTION, SOLUTION INTRAMUSCULAR; SUBCUTANEOUS EVERY 5 MIN PRN
Status: CANCELLED | OUTPATIENT
Start: 2022-07-12

## 2022-06-08 RX ORDER — DIPHENHYDRAMINE HYDROCHLORIDE 50 MG/ML
50 INJECTION INTRAMUSCULAR; INTRAVENOUS
Status: CANCELLED
Start: 2022-07-05

## 2022-06-08 RX ORDER — ALBUTEROL SULFATE 0.83 MG/ML
2.5 SOLUTION RESPIRATORY (INHALATION)
Status: CANCELLED | OUTPATIENT
Start: 2022-07-19

## 2022-06-08 RX ORDER — ALBUTEROL SULFATE 90 UG/1
1-2 AEROSOL, METERED RESPIRATORY (INHALATION)
Status: CANCELLED
Start: 2022-07-05

## 2022-06-08 RX ORDER — NALOXONE HYDROCHLORIDE 0.4 MG/ML
0.2 INJECTION, SOLUTION INTRAMUSCULAR; INTRAVENOUS; SUBCUTANEOUS
Status: CANCELLED | OUTPATIENT
Start: 2022-07-12

## 2022-06-08 RX ORDER — EPINEPHRINE 1 MG/ML
0.3 INJECTION, SOLUTION INTRAMUSCULAR; SUBCUTANEOUS EVERY 5 MIN PRN
Status: CANCELLED | OUTPATIENT
Start: 2022-07-05

## 2022-06-08 RX ORDER — DEXAMETHASONE 4 MG/1
TABLET ORAL
Qty: 100 TABLET | Refills: 1 | Status: SHIPPED | OUTPATIENT
Start: 2022-06-08 | End: 2022-06-23

## 2022-06-08 RX ORDER — NALOXONE HYDROCHLORIDE 0.4 MG/ML
0.2 INJECTION, SOLUTION INTRAMUSCULAR; INTRAVENOUS; SUBCUTANEOUS
Status: CANCELLED | OUTPATIENT
Start: 2022-06-28

## 2022-06-08 RX ORDER — MEPERIDINE HYDROCHLORIDE 25 MG/ML
25 INJECTION INTRAMUSCULAR; INTRAVENOUS; SUBCUTANEOUS EVERY 30 MIN PRN
Status: CANCELLED | OUTPATIENT
Start: 2022-06-28

## 2022-06-08 RX ORDER — LORAZEPAM 2 MG/ML
0.5 INJECTION INTRAMUSCULAR EVERY 4 HOURS PRN
Status: CANCELLED | OUTPATIENT
Start: 2022-07-19

## 2022-06-08 RX ORDER — AMLODIPINE BESYLATE 10 MG/1
10 TABLET ORAL DAILY
Qty: 90 TABLET | Refills: 0 | Status: SHIPPED | OUTPATIENT
Start: 2022-06-08 | End: 2022-06-08

## 2022-06-08 RX ORDER — ALBUTEROL SULFATE 90 UG/1
1-2 AEROSOL, METERED RESPIRATORY (INHALATION)
Status: CANCELLED
Start: 2022-07-12

## 2022-06-08 RX ORDER — LORAZEPAM 2 MG/ML
0.5 INJECTION INTRAMUSCULAR EVERY 4 HOURS PRN
Status: CANCELLED | OUTPATIENT
Start: 2022-06-28

## 2022-06-08 RX ORDER — DIPHENHYDRAMINE HYDROCHLORIDE 50 MG/ML
50 INJECTION INTRAMUSCULAR; INTRAVENOUS
Status: CANCELLED
Start: 2022-07-19

## 2022-06-08 RX ORDER — ALBUTEROL SULFATE 0.83 MG/ML
2.5 SOLUTION RESPIRATORY (INHALATION)
Status: CANCELLED | OUTPATIENT
Start: 2022-08-02

## 2022-06-08 RX ORDER — METHYLPREDNISOLONE SODIUM SUCCINATE 125 MG/2ML
125 INJECTION, POWDER, LYOPHILIZED, FOR SOLUTION INTRAMUSCULAR; INTRAVENOUS
Status: CANCELLED
Start: 2022-07-12

## 2022-06-08 RX ORDER — DIPHENHYDRAMINE HYDROCHLORIDE 50 MG/ML
50 INJECTION INTRAMUSCULAR; INTRAVENOUS
Status: CANCELLED
Start: 2022-08-02

## 2022-06-08 RX ORDER — EPINEPHRINE 1 MG/ML
0.3 INJECTION, SOLUTION INTRAMUSCULAR; SUBCUTANEOUS EVERY 5 MIN PRN
Status: CANCELLED | OUTPATIENT
Start: 2022-07-19

## 2022-06-08 RX ORDER — EPINEPHRINE 1 MG/ML
0.3 INJECTION, SOLUTION INTRAMUSCULAR; SUBCUTANEOUS EVERY 5 MIN PRN
Status: CANCELLED | OUTPATIENT
Start: 2022-08-02

## 2022-06-08 RX ORDER — LORAZEPAM 2 MG/ML
0.5 INJECTION INTRAMUSCULAR EVERY 4 HOURS PRN
Status: CANCELLED | OUTPATIENT
Start: 2022-07-12

## 2022-06-08 RX ORDER — LORAZEPAM 2 MG/ML
0.5 INJECTION INTRAMUSCULAR EVERY 4 HOURS PRN
Status: CANCELLED | OUTPATIENT
Start: 2022-07-26

## 2022-06-08 RX ORDER — METHYLPREDNISOLONE SODIUM SUCCINATE 125 MG/2ML
125 INJECTION, POWDER, LYOPHILIZED, FOR SOLUTION INTRAMUSCULAR; INTRAVENOUS
Status: CANCELLED
Start: 2022-07-19

## 2022-06-08 RX ORDER — MEPERIDINE HYDROCHLORIDE 25 MG/ML
25 INJECTION INTRAMUSCULAR; INTRAVENOUS; SUBCUTANEOUS EVERY 30 MIN PRN
Status: CANCELLED | OUTPATIENT
Start: 2022-07-19

## 2022-06-08 RX ORDER — EPINEPHRINE 1 MG/ML
0.3 INJECTION, SOLUTION INTRAMUSCULAR; SUBCUTANEOUS EVERY 5 MIN PRN
Status: CANCELLED | OUTPATIENT
Start: 2022-07-26

## 2022-06-08 RX ORDER — MEPERIDINE HYDROCHLORIDE 25 MG/ML
25 INJECTION INTRAMUSCULAR; INTRAVENOUS; SUBCUTANEOUS EVERY 30 MIN PRN
Status: CANCELLED | OUTPATIENT
Start: 2022-07-26

## 2022-06-08 RX ORDER — AMLODIPINE BESYLATE 5 MG/1
5 TABLET ORAL DAILY
Qty: 30 TABLET | Refills: 2 | Status: SHIPPED | OUTPATIENT
Start: 2022-06-08 | End: 2022-07-19

## 2022-06-08 RX ORDER — DIPHENHYDRAMINE HYDROCHLORIDE 50 MG/ML
50 INJECTION INTRAMUSCULAR; INTRAVENOUS
Status: CANCELLED
Start: 2022-06-28

## 2022-06-08 RX ORDER — DIPHENHYDRAMINE HYDROCHLORIDE 50 MG/ML
50 INJECTION INTRAMUSCULAR; INTRAVENOUS
Status: CANCELLED
Start: 2022-07-12

## 2022-06-08 RX ORDER — LORAZEPAM 2 MG/ML
0.5 INJECTION INTRAMUSCULAR EVERY 4 HOURS PRN
Status: CANCELLED | OUTPATIENT
Start: 2022-08-02

## 2022-06-08 RX ORDER — EPINEPHRINE 1 MG/ML
0.3 INJECTION, SOLUTION INTRAMUSCULAR; SUBCUTANEOUS EVERY 5 MIN PRN
Status: CANCELLED | OUTPATIENT
Start: 2022-06-28

## 2022-06-08 RX ORDER — ALBUTEROL SULFATE 90 UG/1
1-2 AEROSOL, METERED RESPIRATORY (INHALATION)
Status: CANCELLED
Start: 2022-07-26

## 2022-06-08 RX ORDER — MEPERIDINE HYDROCHLORIDE 25 MG/ML
25 INJECTION INTRAMUSCULAR; INTRAVENOUS; SUBCUTANEOUS EVERY 30 MIN PRN
Status: CANCELLED | OUTPATIENT
Start: 2022-07-12

## 2022-06-08 RX ORDER — MEPERIDINE HYDROCHLORIDE 25 MG/ML
25 INJECTION INTRAMUSCULAR; INTRAVENOUS; SUBCUTANEOUS EVERY 30 MIN PRN
Status: CANCELLED | OUTPATIENT
Start: 2022-07-05

## 2022-06-08 RX ORDER — METHYLPREDNISOLONE SODIUM SUCCINATE 125 MG/2ML
125 INJECTION, POWDER, LYOPHILIZED, FOR SOLUTION INTRAMUSCULAR; INTRAVENOUS
Status: CANCELLED
Start: 2022-08-02

## 2022-06-08 RX ORDER — METHYLPREDNISOLONE SODIUM SUCCINATE 125 MG/2ML
125 INJECTION, POWDER, LYOPHILIZED, FOR SOLUTION INTRAMUSCULAR; INTRAVENOUS
Status: CANCELLED
Start: 2022-06-28

## 2022-06-08 RX ORDER — NALOXONE HYDROCHLORIDE 0.4 MG/ML
0.2 INJECTION, SOLUTION INTRAMUSCULAR; INTRAVENOUS; SUBCUTANEOUS
Status: CANCELLED | OUTPATIENT
Start: 2022-07-19

## 2022-06-08 RX ORDER — ALBUTEROL SULFATE 0.83 MG/ML
2.5 SOLUTION RESPIRATORY (INHALATION)
Status: CANCELLED | OUTPATIENT
Start: 2022-07-26

## 2022-06-08 RX ORDER — ALBUTEROL SULFATE 0.83 MG/ML
2.5 SOLUTION RESPIRATORY (INHALATION)
Status: CANCELLED | OUTPATIENT
Start: 2022-07-05

## 2022-06-08 RX ORDER — METHYLPREDNISOLONE SODIUM SUCCINATE 125 MG/2ML
125 INJECTION, POWDER, LYOPHILIZED, FOR SOLUTION INTRAMUSCULAR; INTRAVENOUS
Status: CANCELLED
Start: 2022-07-05

## 2022-06-08 RX ORDER — ALBUTEROL SULFATE 90 UG/1
1-2 AEROSOL, METERED RESPIRATORY (INHALATION)
Status: CANCELLED
Start: 2022-07-19

## 2022-06-08 RX ORDER — NALOXONE HYDROCHLORIDE 0.4 MG/ML
0.2 INJECTION, SOLUTION INTRAMUSCULAR; INTRAVENOUS; SUBCUTANEOUS
Status: CANCELLED | OUTPATIENT
Start: 2022-07-26

## 2022-06-08 RX ORDER — DIPHENHYDRAMINE HYDROCHLORIDE 50 MG/ML
50 INJECTION INTRAMUSCULAR; INTRAVENOUS
Status: CANCELLED
Start: 2022-07-26

## 2022-06-08 RX ORDER — NALOXONE HYDROCHLORIDE 0.4 MG/ML
0.2 INJECTION, SOLUTION INTRAMUSCULAR; INTRAVENOUS; SUBCUTANEOUS
Status: CANCELLED | OUTPATIENT
Start: 2022-07-05

## 2022-06-08 RX ORDER — LORAZEPAM 2 MG/ML
0.5 INJECTION INTRAMUSCULAR EVERY 4 HOURS PRN
Status: CANCELLED | OUTPATIENT
Start: 2022-07-05

## 2022-06-08 RX ORDER — ALBUTEROL SULFATE 90 UG/1
1-2 AEROSOL, METERED RESPIRATORY (INHALATION)
Status: CANCELLED
Start: 2022-06-28

## 2022-06-08 RX ORDER — MEPERIDINE HYDROCHLORIDE 25 MG/ML
25 INJECTION INTRAMUSCULAR; INTRAVENOUS; SUBCUTANEOUS EVERY 30 MIN PRN
Status: CANCELLED | OUTPATIENT
Start: 2022-08-02

## 2022-06-08 RX ORDER — ALBUTEROL SULFATE 0.83 MG/ML
2.5 SOLUTION RESPIRATORY (INHALATION)
Status: CANCELLED | OUTPATIENT
Start: 2022-07-12

## 2022-06-08 RX ORDER — ALBUTEROL SULFATE 0.83 MG/ML
2.5 SOLUTION RESPIRATORY (INHALATION)
Status: CANCELLED | OUTPATIENT
Start: 2022-06-28

## 2022-06-08 RX ORDER — ALBUTEROL SULFATE 90 UG/1
1-2 AEROSOL, METERED RESPIRATORY (INHALATION)
Status: CANCELLED
Start: 2022-08-02

## 2022-06-08 RX ORDER — METHYLPREDNISOLONE SODIUM SUCCINATE 125 MG/2ML
125 INJECTION, POWDER, LYOPHILIZED, FOR SOLUTION INTRAMUSCULAR; INTRAVENOUS
Status: CANCELLED
Start: 2022-07-26

## 2022-06-08 RX ADMIN — BORTEZOMIB 2.1 MG: 3.5 INJECTION, POWDER, LYOPHILIZED, FOR SOLUTION INTRAVENOUS; SUBCUTANEOUS at 14:39

## 2022-06-08 ASSESSMENT — PAIN SCALES - GENERAL: PAINLEVEL: SEVERE PAIN (6)

## 2022-06-08 NOTE — PROGRESS NOTES
"Oncology Rooming Note    June 8, 2022 1:49 PM   Lizzie Viera is a 71 year old female who presents for:    Chief Complaint   Patient presents with     Oncology Clinic Visit     Multiple myeloma with failed remission (H)       Initial Vitals: /63 (Patient Position: Right side)   Pulse 70   Temp 98.1  F (36.7  C)   Resp 18   Wt 59.2 kg (130 lb 8 oz)   SpO2 97%   BMI 24.66 kg/m   Estimated body mass index is 24.66 kg/m  as calculated from the following:    Height as of 4/27/22: 1.549 m (5' 1\").    Weight as of this encounter: 59.2 kg (130 lb 8 oz). Body surface area is 1.6 meters squared.  Severe Pain (6) Comment: Data Unavailable   No LMP recorded. Patient is postmenopausal.  Allergies reviewed: Yes  Medications reviewed: Yes    Medications: MEDICATION REFILLS NEEDED TODAY. Provider was notified.  Pharmacy name entered into [facility]: [id number] [address]    Clinical concerns: feet are swelling everyday, at the end of the day seeing black spots       Viviane Chu LPN            "

## 2022-06-08 NOTE — LETTER
"    6/8/2022         RE: Lizzie Viera  [address]  [place] [address]        Dear Colleague,    Thank you for referring your patient, Lizzie Viera, to the [facility] [place]. Please see a copy of my visit note below.    Oncology Rooming Note    June 8, 2022 1:49 PM   Lizzie Viera is a 71 year old female who presents for:    Chief Complaint   Patient presents with     Oncology Clinic Visit     Multiple myeloma with failed remission (H)       Initial Vitals: /63 (Patient Position: Right side)   Pulse 70   Temp 98.1  F (36.7  C)   Resp 18   Wt 59.2 kg (130 lb 8 oz)   SpO2 97%   BMI 24.66 kg/m   Estimated body mass index is 24.66 kg/m  as calculated from the following:    Height as of 4/27/22: 1.549 m (5' 1\").    Weight as of this encounter: 59.2 kg (130 lb 8 oz). Body surface area is 1.6 meters squared.  Severe Pain (6) Comment: Data Unavailable   No LMP recorded. Patient is postmenopausal.  Allergies reviewed: Yes  Medications reviewed: Yes    Medications: MEDICATION REFILLS NEEDED TODAY. Provider was notified.  Pharmacy name entered into [facility]: [id number] [address]    Clinical concerns: feet are swelling everyday, at the end of the day seeing black spots       Viviane Chu LPN              [facility] Hematology and Oncology Progress Note    Patient: Lizzie Viera  MRN: [id number]  Date of Service: Jun 8, 2022          Reason for Visit    Chief Complaint   Patient presents with     Oncology Clinic Visit     Multiple myeloma with failed remission (H)         Assessment and Plan    Cancer Staging  No matching staging information was found for the patient.    1. Myeloma: has started on treatment with RVd.  She is getting Velcade weekly, Revlimid 2 weeks on, 1 week off and Dex weekly.  Today is cycle 9.  Her light chains and IgG levels have now normalized.  Her hemoglobin has normalized.  " Her monoclonal peak has gone from 3.3 down to 0.2-0.3.   She is tolerating treatment fairly well.  She did meet with the transplant team.  She has thought about it and she is a little reluctant to do that at this time.  She prefers her monoclonal peak to be at 0 before she does it so we will continue her current regimen.  Currently 0.1. We will refer her back to transplant team when she feels ready.  We will see Dr. Blanco or myself in 6 weeks.    2. Bone lesions: has started on zometa.  Is been getting monthly.  Her last dose was January 5.  We are going to hold that for now.  She states she needs to get some dental work done.  Urged her to let us know when her dental work is done    3.  Scalp lesion: Patient states that this has continued to improve.  We have held off on radiation for now.  Continue to monitor.  Her CT scan shows improved disease     4.  Hypertension: Patient was started on amlodipine when she was in the hospital.  She initially started at 5 mg and then they went up to 10 mg.  Patient is having some swelling in her legs and her blood pressure is normal today.  Patient is requesting to go on a lower dose so we will try 5 mg daily.  She will be coming every week and we will be monitoring her blood pressure.  If her blood pressure stays stable we can even try to go down on the dose.  If it goes up then we may have to go back up to 10 mg.    5.  Vision changes: She did recently meet with her eye doctor I did tell patient that if the changes continue to get worse she may want to meet with them again.  Since it is a new finding.  It could be from the dexamethasone.    ECOG Performance    0 - Independent    Distress Screening (within last 30 days)    1. How concerned are you about your ability to eat? : 0  2. How concerned are you about unintended weight loss or your current weight? : 0  3. How concerned are you about feeling depressed or very sad? : 0  4. How concerned are you about feeling anxious or  very scared? : 0  5. Do you struggle with the loss of meaning and rambo in your life? : Not at all  6. How concerned are you about work and home life issues that may be affected by your cancer? : 2  7. How concerned are you about knowing what resources are available to help you? : 0  8. Do you currently have what you would describe as Mosque or spiritual struggles?            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 Lizzie Viera is a very pleasant 70 year old woman who has been diagnosed with multiple myeloma IgG kappa type in May 2021.  She had initially presented with compression fracture of T7 vertebral body in September 2020.  She had a back pain which led to that evaluation.  Bone density confirmed that she had osteoporosis.  MRI showed diffuse marrow edema in October 2020.  In April 2021 the MRI of the thoracic spine showed worsening T7 vertebral body height loss because of likely pathological compression fracture with extraosseous extension.  She then had IR guided bone biopsy on 7 May 2021 and pathology confirmed the presence of kappa light chain restricted plasma cells.  Her cytogenetics confirmed the presence of hyperdiploid E with gains of chromosome 5, 9 and 15.     She  was then seen by Dr. Baig and had a bone marrow biopsy which also confirmed 60% involvement of the marrow with plasma cells.  The bone marrow was done on 3 Jocelin 2021.  The bone marrow also confirmed the presence of hyperdiploid E.  She had a gain of chromosome 3, 5, 7, 9, 11, 15 as well as 19.  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  Her beta-2 microglobulin was actually normal at the time of her diagnosis as was her albumin at 4.0.  Monoclonal protein 3.1 g/dL.  Kappa free light chain levels of 13 mg/dL IgG level of 4280 with depressed levels of IgM and IgA.  Urine was also positive for kappa light chains as well as immunofixation of the urine was positive for IgG kappa.  Normal kidney function.  Normal hemoglobin.     As mentioned above she had bone lesions in the T7 vertebral body, T1, skull area.  Her main pain is coming from her T7 vertebral body compression fracture.     Due to the pain she has been seen by radiation oncology and has received radiation therapy.  She also got a dose of steroids for pain control.     Currently her pain is controlled with combination of Dilaudid as well as some Tylenol.  She is wearing a brace.  She did notice that when she was on dexamethasone her pain was significantly better.     She was initially thinking of doing some natural therapies but once her symptoms got worse, she came back in was counseled about treatment.  She has been given information about Velcade Revlimid and dexamethasone.  has started that treatment.  She states that she is tolerating this quite well.  Her myeloma has significantly improved.  She did meet with the transplant doctor and she is a little reluctant to do that.  She states she would prefer to be in complete remission before she does something like that.    Patient had been doing fine and then in April she did get admitted for pneumonia.  She did have a little bit of a prolonged recovery but has been doing pretty well since then.  She is been back on treatment  after being held for just a couple of weeks.  She is currently here today to start her 13th cycle.  Her only complaint today is that she does have some swelling in her legs and some blurry vision by the end of the day.  Sometimes she even has little black spots in her vision but it is generally when she is very tired.  She has met with her eye doctor not too long ago and he thought everything was okay but this symptom has been new since then.    Review of Systems    Pertinent items are noted in HPI.    Past History    Past Medical History:   Diagnosis Date     Cervical dysplasia      Chronic RUQ pain      Depressive disorder      Multiple myeloma (H)      Osteoporosis        PHYSICAL EXAM  /63 (Patient Position: Right side)   Pulse 70   Temp 98.1  F (36.7  C)   Resp 18   Wt 59.2 kg (130 lb 8 oz)   SpO2 97%   BMI 24.66 kg/m      GENERAL: no acute distress. Cooperative in conversation. Here with sister. Mask on  RESP: Regular respiratory rate. No expiratory wheezes   MUSCULOSKELETAL: no bilateral leg swelling  NEURO: non focal. Alert and oriented x3.   PSYCH: within normal limits. No depression or anxiety.  SKIN: exposed skin is dry intact.     Lab Results    Recent Results (from the past 168 hour(s))   Comprehensive metabolic panel   Result Value Ref Range    Sodium 142 136 - 145 mmol/L    Potassium 3.8 3.5 - 5.0 mmol/L    Chloride 107 98 - 107 mmol/L    Carbon Dioxide (CO2) 26 22 - 31 mmol/L    Anion Gap 9 5 - 18 mmol/L    Urea Nitrogen 22 8 - 22 mg/dL    Creatinine 0.73 0.60 - 1.10 mg/dL    Calcium 9.4 8.5 - 10.5 mg/dL    Glucose 142 (H) 70 - 125 mg/dL    Alkaline Phosphatase 41 (L) 45 - 120 U/L    AST 46 (H) 0 - 40 U/L    ALT 40 0 - 45 U/L    Protein Total 6.6 6.0 - 8.0 g/dL    Albumin 3.5 3.5 - 5.0 g/dL    Bilirubin Total 0.3 0.0 - 1.0 mg/dL    GFR Estimate 89 >60 mL/min/1.73m2   CBC with platelets and differential   Result Value Ref Range    WBC Count 6.0 4.0 - 11.0 10e3/uL    RBC Count 3.91 3.80 -  5.20 10e6/uL    Hemoglobin 13.5 11.7 - 15.7 g/dL    Hematocrit 41.1 35.0 - 47.0 %     (H) 78 - 100 fL    MCH 34.5 (H) 26.5 - 33.0 pg    MCHC 32.8 31.5 - 36.5 g/dL    RDW 16.5 (H) 10.0 - 15.0 %    Platelet Count 215 150 - 450 10e3/uL    % Neutrophils 88 %    % Lymphocytes 9 %    % Monocytes 1 %    % Eosinophils 0 %    % Basophils 1 %    % Immature Granulocytes 1 %    NRBCs per 100 WBC 0 <1 /100    Absolute Neutrophils 5.4 1.6 - 8.3 10e3/uL    Absolute Lymphocytes 0.6 (L) 0.8 - 5.3 10e3/uL    Absolute Monocytes 0.1 0.0 - 1.3 10e3/uL    Absolute Eosinophils 0.0 0.0 - 0.7 10e3/uL    Absolute Basophils 0.1 0.0 - 0.2 10e3/uL    Absolute Immature Granulocytes 0.0 <=0.4 10e3/uL    Absolute NRBCs 0.0 10e3/uL   IgG  Lab Results   Component Value Date     (L) 06/01/2022     (L) 05/11/2022     (L) 03/30/2022     (L) 02/16/2022     (L) 01/26/2022     12/29/2021     12/15/2021    IGG 1,032 11/24/2021    IGG 1,609 11/03/2021    IGG 2,736 (H) 10/13/2021   kappa light chains:  Lab Results   Component Value Date    KFLCA 1.84 06/01/2022    KFLCA 1.37 05/11/2022    KFLCA 1.76 04/27/2022    KFLCA 0.98 03/30/2022    KFLCA 1.03 02/16/2022    KFLCA 1.31 01/26/2022    KFLCA 1.06 12/29/2021    KFLCA 1.26 12/15/2021    KFLCA 1.55 11/24/2021    KFLCA 2.03 (H) 11/03/2021   Monoclonal peak  Lab Results   Component Value Date    ELPM 0.1 06/01/2022    ELPM 0.2 04/27/2022    ELPM 0.2 03/30/2022    ELPM 0.3 02/16/2022    ELPM 0.2 01/26/2022    ELPM 0.4 12/29/2021    ELPM 0.4 12/15/2021    ELPM 0.6 11/24/2021    ELPM 1.1 11/03/2021    ELPM 1.7 10/13/2021   ]  Imaging    No results found.      Signed by: SINDHU Lundberg CNP      Again, thank you for allowing me to participate in the care of your patient.        Sincerely,        SINDHU Lundberg CNP

## 2022-06-08 NOTE — PROGRESS NOTES
Infusion Nursing Note:  Lizzie Viera presents today for her next dose of Bortezomib.    Patient seen by provider today: Yes: Zehra Pablo CNP   present during visit today: Not Applicable.    Note: She is well educated on her treatment plan and on the drug.  Bortezomib was given subcutaneous into her left lower abdomen..      Intravenous Access:  n/a.    Treatment Conditions:  Results reviewed, labs MET treatment parameters, ok to proceed with treatment.      Post Infusion Assessment:  Patient tolerated injection without incident.       Discharge Plan:   Patient discharged in stable condition accompanied by: sister.      Diane Mckeon RN

## 2022-06-08 NOTE — PROGRESS NOTES
[facility] Hematology and Oncology Progress Note    Patient: Lizzie Viera  MRN: [id number]  Date of Service: Jun 8, 2022          Reason for Visit    Chief Complaint   Patient presents with     Oncology Clinic Visit     Multiple myeloma with failed remission (H)         Assessment and Plan    Cancer Staging  No matching staging information was found for the patient.    1. Myeloma: has started on treatment with RVd.  She is getting Velcade weekly, Revlimid 2 weeks on, 1 week off and Dex weekly.  Today is cycle 9.  Her light chains and IgG levels have now normalized.  Her hemoglobin has normalized.  Her monoclonal peak has gone from 3.3 down to 0.2-0.3.   She is tolerating treatment fairly well.  She did meet with the transplant team.  She has thought about it and she is a little reluctant to do that at this time.  She prefers her monoclonal peak to be at 0 before she does it so we will continue her current regimen.  Currently 0.1. We will refer her back to transplant team when she feels ready.  We will see Dr. Blanco or myself in 6 weeks.    2. Bone lesions: has started on zometa.  Is been getting monthly.  Her last dose was January 5.  We are going to hold that for now.  She states she needs to get some dental work done.  Urged her to let us know when her dental work is done    3.  Scalp lesion: Patient states that this has continued to improve.  We have held off on radiation for now.  Continue to monitor.  Her CT scan shows improved disease     4.  Hypertension: Patient was started on amlodipine when she was in the hospital.  She initially started at 5 mg and then they went up to 10 mg.  Patient is having some swelling in her legs and her blood pressure is normal today.  Patient is requesting to go on a lower dose so we will try 5 mg daily.  She will be coming every week and we will be monitoring her blood pressure.  If her blood pressure stays stable we can even try to go down on the dose.  If it  goes up then we may have to go back up to 10 mg.    5.  Vision changes: She did recently meet with her eye doctor I did tell patient that if the changes continue to get worse she may want to meet with them again.  Since it is a new finding.  It could be from the dexamethasone.    ECOG Performance    0 - Independent    Distress Screening (within last 30 days)    1. How concerned are you about your ability to eat? : 0  2. How concerned are you about unintended weight loss or your current weight? : 0  3. How concerned are you about feeling depressed or very sad? : 0  4. How concerned are you about feeling anxious or very scared? : 0  5. Do you struggle with the loss of meaning and rambo in your life? : Not at all  6. How concerned are you about work and home life issues that may be affected by your cancer? : 2  7. How concerned are you about knowing what resources are available to help you? : 0  8. Do you currently have what you would describe as Islam or spiritual struggles?            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 Lizzie Viera is a very pleasant 70 year old woman who has been diagnosed with multiple myeloma IgG kappa type in May 2021.  She had initially presented with  compression fracture of T7 vertebral body in September 2020.  She had a back pain which led to that evaluation.  Bone density confirmed that she had osteoporosis.  MRI showed diffuse marrow edema in October 2020.  In April 2021 the MRI of the thoracic spine showed worsening T7 vertebral body height loss because of likely pathological compression fracture with extraosseous extension.  She then had IR guided bone biopsy on 7 May 2021 and pathology confirmed the presence of kappa light chain restricted plasma cells.  Her cytogenetics confirmed the presence of hyperdiploid E with gains of chromosome 5, 9 and 15.     She was then seen by Dr. Baig and had a bone marrow biopsy which also confirmed 60% involvement of the marrow with plasma cells.  The bone marrow was done on 3 Jocelin 2021.  The bone marrow also confirmed the presence of hyperdiploid E.  She had a gain of chromosome 3, 5, 7, 9, 11, 15 as well as 19.  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  Her beta-2 microglobulin was actually normal at the time of her diagnosis as was her albumin at 4.0.  Monoclonal protein 3.1 g/dL.  Kappa free light chain levels of 13 mg/dL IgG level of 4280 with depressed levels of IgM and IgA.  Urine was also positive for kappa light chains as well as immunofixation of the urine was positive for IgG kappa.  Normal kidney function.  Normal hemoglobin.     As mentioned above she had bone lesions in the T7 vertebral body, T1, skull area.  Her main pain is coming from her T7 vertebral body compression fracture.     Due to the pain she has been seen by radiation oncology and has received radiation therapy.  She also got a dose of steroids for pain control.     Currently her pain is controlled with combination of Dilaudid as well as some Tylenol.  She is wearing a brace.  She did notice that when she was on dexamethasone her pain was significantly better.     She was initially thinking of doing some natural therapies but once her symptoms got worse, she  came back in was counseled about treatment.  She has been given information about Velcade Revlimid and dexamethasone.  has started that treatment.  She states that she is tolerating this quite well.  Her myeloma has significantly improved.  She did meet with the transplant doctor and she is a little reluctant to do that.  She states she would prefer to be in complete remission before she does something like that.    Patient had been doing fine and then in April she did get admitted for pneumonia.  She did have a little bit of a prolonged recovery but has been doing pretty well since then.  She is been back on treatment after being held for just a couple of weeks.  She is currently here today to start her 13th cycle.  Her only complaint today is that she does have some swelling in her legs and some blurry vision by the end of the day.  Sometimes she even has little black spots in her vision but it is generally when she is very tired.  She has met with her eye doctor not too long ago and he thought everything was okay but this symptom has been new since then.    Review of Systems    Pertinent items are noted in HPI.    Past History    Past Medical History:   Diagnosis Date     Cervical dysplasia      Chronic RUQ pain      Depressive disorder      Multiple myeloma (H)      Osteoporosis        PHYSICAL EXAM  /63 (Patient Position: Right side)   Pulse 70   Temp 98.1  F (36.7  C)   Resp 18   Wt 59.2 kg (130 lb 8 oz)   SpO2 97%   BMI 24.66 kg/m      GENERAL: no acute distress. Cooperative in conversation. Here with sister. Mask on  RESP: Regular respiratory rate. No expiratory wheezes   MUSCULOSKELETAL: no bilateral leg swelling  NEURO: non focal. Alert and oriented x3.   PSYCH: within normal limits. No depression or anxiety.  SKIN: exposed skin is dry intact.     Lab Results    Recent Results (from the past 168 hour(s))   Comprehensive metabolic panel   Result Value Ref Range    Sodium 142 136 - 145 mmol/L     Potassium 3.8 3.5 - 5.0 mmol/L    Chloride 107 98 - 107 mmol/L    Carbon Dioxide (CO2) 26 22 - 31 mmol/L    Anion Gap 9 5 - 18 mmol/L    Urea Nitrogen 22 8 - 22 mg/dL    Creatinine 0.73 0.60 - 1.10 mg/dL    Calcium 9.4 8.5 - 10.5 mg/dL    Glucose 142 (H) 70 - 125 mg/dL    Alkaline Phosphatase 41 (L) 45 - 120 U/L    AST 46 (H) 0 - 40 U/L    ALT 40 0 - 45 U/L    Protein Total 6.6 6.0 - 8.0 g/dL    Albumin 3.5 3.5 - 5.0 g/dL    Bilirubin Total 0.3 0.0 - 1.0 mg/dL    GFR Estimate 89 >60 mL/min/1.73m2   CBC with platelets and differential   Result Value Ref Range    WBC Count 6.0 4.0 - 11.0 10e3/uL    RBC Count 3.91 3.80 - 5.20 10e6/uL    Hemoglobin 13.5 11.7 - 15.7 g/dL    Hematocrit 41.1 35.0 - 47.0 %     (H) 78 - 100 fL    MCH 34.5 (H) 26.5 - 33.0 pg    MCHC 32.8 31.5 - 36.5 g/dL    RDW 16.5 (H) 10.0 - 15.0 %    Platelet Count 215 150 - 450 10e3/uL    % Neutrophils 88 %    % Lymphocytes 9 %    % Monocytes 1 %    % Eosinophils 0 %    % Basophils 1 %    % Immature Granulocytes 1 %    NRBCs per 100 WBC 0 <1 /100    Absolute Neutrophils 5.4 1.6 - 8.3 10e3/uL    Absolute Lymphocytes 0.6 (L) 0.8 - 5.3 10e3/uL    Absolute Monocytes 0.1 0.0 - 1.3 10e3/uL    Absolute Eosinophils 0.0 0.0 - 0.7 10e3/uL    Absolute Basophils 0.1 0.0 - 0.2 10e3/uL    Absolute Immature Granulocytes 0.0 <=0.4 10e3/uL    Absolute NRBCs 0.0 10e3/uL   IgG  Lab Results   Component Value Date     (L) 06/01/2022     (L) 05/11/2022     (L) 03/30/2022     (L) 02/16/2022     (L) 01/26/2022     12/29/2021     12/15/2021    IGG 1,032 11/24/2021    IGG 1,609 11/03/2021    IGG 2,736 (H) 10/13/2021   kappa light chains:  Lab Results   Component Value Date    KFLCA 1.84 06/01/2022    KFLCA 1.37 05/11/2022    KFLCA 1.76 04/27/2022    KFLCA 0.98 03/30/2022    KFLCA 1.03 02/16/2022    KFLCA 1.31 01/26/2022    KFLCA 1.06 12/29/2021    KFLCA 1.26 12/15/2021    KFLCA 1.55 11/24/2021    KFLCA 2.03 (H) 11/03/2021    Monoclonal peak  Lab Results   Component Value Date    ELPM 0.1 06/01/2022    ELPM 0.2 04/27/2022    ELPM 0.2 03/30/2022    ELPM 0.3 02/16/2022    ELPM 0.2 01/26/2022    ELPM 0.4 12/29/2021    ELPM 0.4 12/15/2021    ELPM 0.6 11/24/2021    ELPM 1.1 11/03/2021    ELPM 1.7 10/13/2021   ]  Imaging    No results found.      Signed by: SINDHU Lundberg CNP

## 2022-06-09 ENCOUNTER — TELEPHONE (OUTPATIENT)
Dept: RADIATION ONCOLOGY | Facility: HOSPITAL | Age: 72
End: 2022-06-09

## 2022-06-09 ENCOUNTER — MYC MEDICAL ADVICE (OUTPATIENT)
Dept: PHYSICAL MEDICINE AND REHAB | Facility: CLINIC | Age: 72
End: 2022-06-09
Payer: COMMERCIAL

## 2022-06-09 DIAGNOSIS — S22.060S CLOSED WEDGE COMPRESSION FRACTURE OF T7 VERTEBRA, SEQUELA: Primary | ICD-10-CM

## 2022-06-09 NOTE — TELEPHONE ENCOUNTER
Scarlett Flynn, [facility]  P Palliative Care Scheduling    Follow up in 3 months for video visit for symptom management.

## 2022-06-13 NOTE — TELEPHONE ENCOUNTER
Please contact the patient and inform her that I will order an x-ray of her thoracic spine to evaluate for progressed spinal fracture or change in the fracture.    I would also like her to come in and be seen by myself or Valerie Kaye after the x-rays been completed.

## 2022-06-14 ENCOUNTER — HOSPITAL ENCOUNTER (OUTPATIENT)
Dept: RADIOLOGY | Facility: CLINIC | Age: 72
Discharge: HOME | End: 2022-06-14
Attending: PHYSICAL MEDICINE & REHABILITATION | Admitting: PHYSICAL MEDICINE & REHABILITATION
Payer: COMMERCIAL

## 2022-06-14 DIAGNOSIS — S22.060S CLOSED WEDGE COMPRESSION FRACTURE OF T7 VERTEBRA, SEQUELA: ICD-10-CM

## 2022-06-14 PROCEDURE — 72070 X-RAY EXAM THORAC SPINE 2VWS: CPT

## 2022-06-14 NOTE — TELEPHONE ENCOUNTER
Pt scheduled x-ray for this afternoon. Assisted pt in scheduling follow-up appt with Dr. Pascual for 6/17.

## 2022-06-15 ENCOUNTER — INFUSION THERAPY VISIT (OUTPATIENT)
Dept: INFUSION THERAPY | Facility: HOSPITAL | Age: 72
End: 2022-06-15
Attending: INTERNAL MEDICINE
Payer: COMMERCIAL

## 2022-06-15 DIAGNOSIS — C90.00 MULTIPLE MYELOMA WITH FAILED REMISSION (H): Primary | ICD-10-CM

## 2022-06-15 LAB
BASOPHILS # BLD MANUAL: 0.1 10E3/UL (ref 0–0.2)
BASOPHILS NFR BLD MANUAL: 1 %
EOSINOPHIL # BLD MANUAL: 0.3 10E3/UL (ref 0–0.7)
EOSINOPHIL NFR BLD MANUAL: 5 %
ERYTHROCYTE [DISTWIDTH] IN BLOOD BY AUTOMATED COUNT: 16.9 % (ref 10–15)
HCT VFR BLD AUTO: 43.1 % (ref 35–47)
HGB BLD-MCNC: 14 G/DL (ref 11.7–15.7)
LYMPHOCYTES # BLD MANUAL: 0.8 10E3/UL (ref 0.8–5.3)
LYMPHOCYTES NFR BLD MANUAL: 14 %
MCH RBC QN AUTO: 34.5 PG (ref 26.5–33)
MCHC RBC AUTO-ENTMCNC: 32.5 G/DL (ref 31.5–36.5)
MCV RBC AUTO: 106 FL (ref 78–100)
MONOCYTES # BLD MANUAL: 0.2 10E3/UL (ref 0–1.3)
MONOCYTES NFR BLD MANUAL: 4 %
NEUTROPHILS # BLD MANUAL: 4.5 10E3/UL (ref 1.6–8.3)
NEUTROPHILS NFR BLD MANUAL: 76 %
PLAT MORPH BLD: NORMAL
PLATELET # BLD AUTO: 226 10E3/UL (ref 150–450)
RBC # BLD AUTO: 4.06 10E6/UL (ref 3.8–5.2)
RBC MORPH BLD: NORMAL
WBC # BLD AUTO: 5.9 10E3/UL (ref 4–11)

## 2022-06-15 PROCEDURE — 250N000011 HC RX IP 250 OP 636: Mod: JW | Performed by: INTERNAL MEDICINE

## 2022-06-15 PROCEDURE — 85027 COMPLETE CBC AUTOMATED: CPT | Performed by: INTERNAL MEDICINE

## 2022-06-15 PROCEDURE — 85007 BL SMEAR W/DIFF WBC COUNT: CPT | Performed by: INTERNAL MEDICINE

## 2022-06-15 PROCEDURE — 96401 CHEMO ANTI-NEOPL SQ/IM: CPT

## 2022-06-15 PROCEDURE — 36415 COLL VENOUS BLD VENIPUNCTURE: CPT | Performed by: INTERNAL MEDICINE

## 2022-06-15 RX ADMIN — BORTEZOMIB 2.1 MG: 3.5 INJECTION, POWDER, LYOPHILIZED, FOR SOLUTION INTRAVENOUS; SUBCUTANEOUS at 14:05

## 2022-06-15 NOTE — PROGRESS NOTES
"Pt ambulates to infusion center for lab and injection.  Pt c/o back pain and LE swelling L>R.  Pt states that the back pain started when she was laying in bed and \"coughed\" last week.  She states that she \"heard and felt a snap\".  Pt contacted her primary care MD and had an xray done.  She is meeting with this MD on 6/17/2022.  Pt also states that \"she's always had some LE edema, but that she felt it has worsened since injuring her back.  She denies need for US, stating \"I really don't think its a DVT\".  She will also address this with her primary MD at her upcoming visit.  Lab results reviewed and Velcade given as ordered without difficulty.  Pt left clinic stable to lobby. Plan RTC as scheduled.  "

## 2022-06-16 ENCOUNTER — ONCOLOGY VISIT (OUTPATIENT)
Dept: ONCOLOGY | Facility: CLINIC | Age: 72
End: 2022-06-16
Attending: INTERNAL MEDICINE
Payer: COMMERCIAL

## 2022-06-16 DIAGNOSIS — C90.00 MULTIPLE MYELOMA WITH FAILED REMISSION (H): ICD-10-CM

## 2022-06-16 DIAGNOSIS — F43.23 ADJUSTMENT DISORDER WITH MIXED ANXIETY AND DEPRESSED MOOD: Primary | ICD-10-CM

## 2022-06-16 PROCEDURE — 90834 PSYTX W PT 45 MINUTES: CPT | Performed by: SOCIAL WORKER

## 2022-06-16 ASSESSMENT — ANXIETY QUESTIONNAIRES
GAD7 TOTAL SCORE: 10
GAD7 TOTAL SCORE: 10
1. FEELING NERVOUS, ANXIOUS, OR ON EDGE: MORE THAN HALF THE DAYS
2. NOT BEING ABLE TO STOP OR CONTROL WORRYING: MORE THAN HALF THE DAYS
6. BECOMING EASILY ANNOYED OR IRRITABLE: SEVERAL DAYS
5. BEING SO RESTLESS THAT IT IS HARD TO SIT STILL: SEVERAL DAYS
3. WORRYING TOO MUCH ABOUT DIFFERENT THINGS: SEVERAL DAYS
7. FEELING AFRAID AS IF SOMETHING AWFUL MIGHT HAPPEN: SEVERAL DAYS

## 2022-06-16 ASSESSMENT — PATIENT HEALTH QUESTIONNAIRE - PHQ9
SUM OF ALL RESPONSES TO PHQ QUESTIONS 1-9: 10
5. POOR APPETITE OR OVEREATING: MORE THAN HALF THE DAYS

## 2022-06-16 NOTE — LETTER
6/16/2022         RE: Lizzie Viera  [address]  [place] [address]        Dear Colleague,    Thank you for referring your patient, Lizzie Viera, to the [facility]. Please see a copy of my visit note below.    The author of this note documented a reason for not sharing it with the patient.                   Again, thank you for allowing me to participate in the care of your patient.        Sincerely,        JAI AMADO LP, LICSW
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
## 2022-06-17 ENCOUNTER — OFFICE VISIT (OUTPATIENT)
Dept: PHYSICAL MEDICINE AND REHAB | Facility: CLINIC | Age: 72
End: 2022-06-17
Payer: COMMERCIAL

## 2022-06-17 VITALS
HEART RATE: 63 BPM | WEIGHT: 130.4 LBS | HEIGHT: 61 IN | DIASTOLIC BLOOD PRESSURE: 65 MMHG | BODY MASS INDEX: 24.62 KG/M2 | SYSTOLIC BLOOD PRESSURE: 139 MMHG

## 2022-06-17 DIAGNOSIS — M48.04 THORACIC SPINAL STENOSIS: Primary | ICD-10-CM

## 2022-06-17 DIAGNOSIS — S22.060D CLOSED WEDGE COMPRESSION FRACTURE OF T7 VERTEBRA WITH ROUTINE HEALING, SUBSEQUENT ENCOUNTER: ICD-10-CM

## 2022-06-17 DIAGNOSIS — M79.18 MYOFASCIAL PAIN: ICD-10-CM

## 2022-06-17 DIAGNOSIS — R07.81 RIB PAIN ON LEFT SIDE: ICD-10-CM

## 2022-06-17 PROCEDURE — 99214 OFFICE O/P EST MOD 30 MIN: CPT | Performed by: PHYSICAL MEDICINE & REHABILITATION

## 2022-06-17 ASSESSMENT — PAIN SCALES - GENERAL: PAINLEVEL: MILD PAIN (3)

## 2022-06-17 NOTE — PATIENT INSTRUCTIONS
An xray was ordered for you today.  Please call Radiology at [phone].    2. Continue to modify activities and Physical therapy

## 2022-06-17 NOTE — LETTER
6/17/2022         RE: Lizzie Viera  [address]  [place] [address]        Dear Colleague,    Thank you for referring your patient, Lizzie Viera, to the [facility]ERY. Please see a copy of my visit note below.      Assessment/Plan:      Diagnoses and all orders for this visit:    Thoracic spinal stenosis    Rib pain on left side  -     XR Ribs & Chest Lt 3v; Future    Closed wedge compression fracture of T7 vertebra with routine healing, subsequent encounter    Myofascial pain         Assessment: Hossein 71 year old female with a history of hyperlipidemia, anxiety, depression, irritable bowel, neuropathy, osteoporosis and multiple myeloma with a T7 fracture and lesion  resulting in spinal stenosis:     1.  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  No signs of thoracic myelopathy neurologically stable.    Symptoms are stable and paresthesias may be slightly improving with physical therapy.  Overall doing well with gabapentin 600 mg 3 times daily.  Still has weakness in the right greater than left lower extremities but appears relatively stable.  Significant increased pain over the last week after coughing and now some mild improvement.  She has pain over the left ribs and mid thoracic spine.  No new compression fractures on plain films.     2.  Chronic lumbar spine pain intermittently.    Symptoms are stable with physical therapy.  Only some mild degenerative changes on MRI.     3.  Bilateral foot paresthesias consistent with neuropathy.  Stable with PT.      4.  She has cervical spine and upper thoracic spine myofascial pain.    Discussion:    1. *We discussed the diagnosis and treatment options.  She has had some improvement over the past few days with physical therapy but still having left rib pain and thoracic spine pain.  Plain films are negative for  new fracture.  We discussed options of medication changes monitoring symptoms or further imaging.    2.  I recommend a left rib plain film to evaluate for multiple myeloma lesion in the rib or rib fracture.    3.  Continue with activity modifications and physical therapy.    4.  Can consider increasing gabapentin to 900 mg 3 times daily but she would like to keep her dose at 600 mg 3 times daily for now.    5.  Follow-up as needed if symptoms continue to improve we will contact her through [employer] with imaging when available.      It was our pleasure caring for your patient today, if there any questions or concerns please do not hesitate to contact us.         Subjective:   Patient ID: Lizzie Viera is a 71 year old female.    History of Present Illness: Patient presents for follow-up of thoracic spine pain.  She has thoracic spine and left rib pain left shoulder pain.  This started last week.  She had some pain around the scapular area and was driving and felt some pain along the left pectoral muscle aspirin was not any help.  And laying in bed later she coughed and felt a snap in the thoracic spine began having severe pain in the thoracic spine left shoulder underneath the axilla along the ribs on the left.  About ribs 7 based on where she points.  She began having numbness and tingling in both legs and feet and any touching her shoulder back because significant pain better with rest.  Pain is an 8/10 at worst 3/10 today and 3/10 at best.  Has improved somewhat over the past week.  She did go to physical therapy which did seem to help decrease the pain with some massage therapy as well/manual therapy.  She does take Dilaudid occasionally and medical THC.  She is some numbness and tingling down the back of the left leg to the knee as well.  Has had plain films since her last visit.      Imaging: Plain films thoracic spine imaging report personally reviewed.  This shows osteopenia with superior endplate  "compression deformity T8 vertebral body unchanged.  Thoracic kyphosis of 48 degrees.  Superior endplate compression deformity of T11 unchanged and T1 is obscured by overlapping tissues difficult to assess.    Personally reviewed MRI images of the thoracic spine From March as well for medical decision-making purposes.  Decrease in previous noted marrow signal abnormality T6-7.  New T1 hypointense enhancing lesion.  Subtle enhancement T2 and T3 vertebral bodies likely artifact.  Moderate foraminal stenosis T6-7 T7-8.    Review of Systems: Pertinent positives: Numbness tingling weakness left leg bottom of both feet.  She has headaches pain worse at night difficulty swallowing.  Denies bowel or bladder incontinence falls, fevers or weight loss.            Past Medical History:   Diagnosis Date     Cervical dysplasia      Chronic RUQ pain      Depressive disorder      Multiple myeloma (H)      Osteoporosis        The following portions of the patient's history were reviewed and updated as appropriate: allergies, current medications, past family history, past medical history, past social history, past surgical history and problem list.           Objective:   Physical Exam:    /65 (BP Location: Right arm, Patient Position: Sitting, Cuff Size: Adult Regular)   Pulse 63   Ht 5' 1\" (1.549 m)   Wt 130 lb 6.4 oz (59.1 kg)   BMI 24.64 kg/m    Body mass index is 24.64 kg/m .      General: Alert and oriented with normal affect. Attention, knowledge, memory, and language are intact. No acute distress.   Eyes: Sclerae are clear.  Respirations: Unlabored. CV: No lower extremity edema.  Skin: No rashes seen.    Gait:  Nonantalgic  Tenderness over the T6-8 spinous process and parascapular tissues more on the left with tenderness along the left ribs 7 and 6 laterally.  Sensation is intact to light touch throughout the upper and lower extremities.  Reflexes are 2+ and symmetric in the biceps triceps and brachioradialis with " negative Hoffmans. 2+ patellar and 0 Achilles      Manual muscle testing reveals:  Right /Left out of 5     5/5 hip flexors  5/5 knee flexors  5/5 knee extensors  5/5 ankle plantar flexors  5/5 ankle dorsiflexors  4 / 4 [facility]        Again, thank you for allowing me to participate in the care of your patient.        Sincerely,        Wyatt Pascual, DO

## 2022-06-20 ENCOUNTER — HOSPITAL ENCOUNTER (OUTPATIENT)
Dept: RADIOLOGY | Facility: CLINIC | Age: 72
Discharge: HOME | End: 2022-06-20
Attending: PHYSICAL MEDICINE & REHABILITATION | Admitting: PHYSICAL MEDICINE & REHABILITATION
Payer: COMMERCIAL

## 2022-06-20 DIAGNOSIS — R07.81 RIB PAIN ON LEFT SIDE: ICD-10-CM

## 2022-06-20 PROCEDURE — 71101 X-RAY EXAM UNILAT RIBS/CHEST: CPT | Mod: LT

## 2022-06-21 NOTE — PROGRESS NOTES
Pt here for velcade and zometa. No problem with either. Pt layed in bed for injection which was more comfortable for her. Pt orvillec using rolling walker to lobby to meet her friend.   Detail Level: Detailed

## 2022-06-22 ENCOUNTER — INFUSION THERAPY VISIT (OUTPATIENT)
Dept: INFUSION THERAPY | Facility: HOSPITAL | Age: 72
End: 2022-06-22
Attending: INTERNAL MEDICINE
Payer: COMMERCIAL

## 2022-06-22 VITALS
TEMPERATURE: 98.4 F | OXYGEN SATURATION: 95 % | SYSTOLIC BLOOD PRESSURE: 113 MMHG | HEART RATE: 60 BPM | RESPIRATION RATE: 16 BRPM | DIASTOLIC BLOOD PRESSURE: 49 MMHG

## 2022-06-22 DIAGNOSIS — C90.00 MULTIPLE MYELOMA WITH FAILED REMISSION (H): Primary | ICD-10-CM

## 2022-06-22 DIAGNOSIS — M84.58XK PATHOLOGICAL FRACTURE OF VERTEBRAE IN NEOPLASTIC DISEASE WITH NONUNION: ICD-10-CM

## 2022-06-22 LAB
BASOPHILS # BLD AUTO: 0.1 10E3/UL (ref 0–0.2)
BASOPHILS NFR BLD AUTO: 1 %
EOSINOPHIL # BLD AUTO: 0 10E3/UL (ref 0–0.7)
EOSINOPHIL NFR BLD AUTO: 1 %
ERYTHROCYTE [DISTWIDTH] IN BLOOD BY AUTOMATED COUNT: 16.2 % (ref 10–15)
HCT VFR BLD AUTO: 39.2 % (ref 35–47)
HGB BLD-MCNC: 12.7 G/DL (ref 11.7–15.7)
IMM GRANULOCYTES # BLD: 0 10E3/UL
IMM GRANULOCYTES NFR BLD: 1 %
LYMPHOCYTES # BLD AUTO: 0.5 10E3/UL (ref 0.8–5.3)
LYMPHOCYTES NFR BLD AUTO: 8 %
MCH RBC QN AUTO: 34 PG (ref 26.5–33)
MCHC RBC AUTO-ENTMCNC: 32.4 G/DL (ref 31.5–36.5)
MCV RBC AUTO: 105 FL (ref 78–100)
MONOCYTES # BLD AUTO: 0.1 10E3/UL (ref 0–1.3)
MONOCYTES NFR BLD AUTO: 2 %
NEUTROPHILS # BLD AUTO: 5.8 10E3/UL (ref 1.6–8.3)
NEUTROPHILS NFR BLD AUTO: 87 %
NRBC # BLD AUTO: 0 10E3/UL
NRBC BLD AUTO-RTO: 0 /100
PLATELET # BLD AUTO: 165 10E3/UL (ref 150–450)
RBC # BLD AUTO: 3.73 10E6/UL (ref 3.8–5.2)
WBC # BLD AUTO: 6.5 10E3/UL (ref 4–11)

## 2022-06-22 PROCEDURE — 84165 PROTEIN E-PHORESIS SERUM: CPT | Mod: TC

## 2022-06-22 PROCEDURE — 86334 IMMUNOFIX E-PHORESIS SERUM: CPT

## 2022-06-22 PROCEDURE — 85025 COMPLETE CBC W/AUTO DIFF WBC: CPT | Performed by: INTERNAL MEDICINE

## 2022-06-22 PROCEDURE — 82784 ASSAY IGA/IGD/IGG/IGM EACH: CPT

## 2022-06-22 PROCEDURE — 36415 COLL VENOUS BLD VENIPUNCTURE: CPT

## 2022-06-22 PROCEDURE — 83521 IG LIGHT CHAINS FREE EACH: CPT

## 2022-06-22 PROCEDURE — 250N000011 HC RX IP 250 OP 636: Performed by: INTERNAL MEDICINE

## 2022-06-22 PROCEDURE — 84165 PROTEIN E-PHORESIS SERUM: CPT | Mod: 26 | Performed by: PATHOLOGY

## 2022-06-22 PROCEDURE — 96401 CHEMO ANTI-NEOPL SQ/IM: CPT

## 2022-06-22 PROCEDURE — 84155 ASSAY OF PROTEIN SERUM: CPT

## 2022-06-22 PROCEDURE — 86334 IMMUNOFIX E-PHORESIS SERUM: CPT | Mod: 26 | Performed by: PATHOLOGY

## 2022-06-22 RX ADMIN — BORTEZOMIB 2.1 MG: 3.5 INJECTION, POWDER, LYOPHILIZED, FOR SOLUTION INTRAVENOUS; SUBCUTANEOUS at 14:53

## 2022-06-22 ASSESSMENT — PAIN SCALES - GENERAL: PAINLEVEL: MODERATE PAIN (5)

## 2022-06-22 NOTE — PROGRESS NOTES
Pt arrived ambulatory to clinic for Cycle # 13 Day # 15 of her chemotherapy regimen.  Labs were reviewed, pt met parameters for treatment.  Administered subcutaneous Velcade into LLQ of ABD per MD order.  Pt tolerated procedure well, no s/s of bleeding or bruising at site.  Pt verbalized understanding of plan of care and return to clinic.

## 2022-06-23 DIAGNOSIS — C90.00 MULTIPLE MYELOMA WITH FAILED REMISSION (H): Primary | ICD-10-CM

## 2022-06-23 LAB
IGA SERPL-MCNC: 28 MG/DL (ref 65–400)
IGG SERPL-MCNC: 425 MG/DL (ref 700–1700)
IGM SERPL-MCNC: 26 MG/DL (ref 60–280)
KAPPA LC FREE SER-MCNC: 1.6 MG/DL (ref 0.33–1.94)
KAPPA LC FREE/LAMBDA FREE SER NEPH: 1.28 {RATIO} (ref 0.26–1.65)
LAMBDA LC FREE SERPL-MCNC: 1.25 MG/DL (ref 0.57–2.63)
TOTAL PROTEIN SERUM FOR ELP: 5.6 G/DL (ref 6–8)

## 2022-06-23 RX ORDER — DEXAMETHASONE 4 MG/1
TABLET ORAL
Qty: 100 TABLET | Refills: 1 | Status: SHIPPED | OUTPATIENT
Start: 2022-06-23 | End: 2022-08-08

## 2022-06-23 RX ORDER — LENALIDOMIDE 25 MG/1
25 CAPSULE ORAL DAILY
Qty: 14 CAPSULE | Refills: 0 | Status: SHIPPED | OUTPATIENT
Start: 2022-06-29 | End: 2022-07-14

## 2022-06-24 LAB
ALBUMIN PERCENT: 64.4 % (ref 51–67)
ALBUMIN SERPL ELPH-MCNC: 3.6 G/DL (ref 3.2–4.7)
ALPHA 1 PERCENT: 3.5 % (ref 2–4)
ALPHA 2 PERCENT: 13.1 % (ref 5–13)
ALPHA1 GLOB SERPL ELPH-MCNC: 0.2 G/DL (ref 0.1–0.3)
ALPHA2 GLOB SERPL ELPH-MCNC: 0.7 G/DL (ref 0.4–0.9)
B-GLOBULIN SERPL ELPH-MCNC: 0.7 G/DL (ref 0.7–1.2)
BETA PERCENT: 11.8 % (ref 10–17)
GAMMA GLOB SERPL ELPH-MCNC: 0.4 G/DL (ref 0.6–1.4)
GAMMA GLOBULIN PERCENT: 7.2 % (ref 9–20)
PATH ICD:: ABNORMAL
PATH ICD:: NORMAL
PROT PATTERN SERPL ELPH-IMP: ABNORMAL
PROT PATTERN SERPL IFE-IMP: NORMAL
REVIEWING PATHOLOGIST: ABNORMAL
REVIEWING PATHOLOGIST: NORMAL
TOTAL PROTEIN SERUM FOR ELP (SYNCED VALUE): 5.6 G/DL

## 2022-06-25 ASSESSMENT — ENCOUNTER SYMPTOMS
WEAKNESS: 1
DYSURIA: 0
PALPITATIONS: 0
COUGH: 0
BREAST MASS: 0
NAUSEA: 1
NERVOUS/ANXIOUS: 0
EYE PAIN: 0
HEARTBURN: 0
CHILLS: 0
ABDOMINAL PAIN: 0
HEMATURIA: 0
DIZZINESS: 1
ARTHRALGIAS: 0
FREQUENCY: 0
SHORTNESS OF BREATH: 0
HEMATOCHEZIA: 0
MYALGIAS: 1
DIARRHEA: 0
CONSTIPATION: 0
SORE THROAT: 0
JOINT SWELLING: 0
PARESTHESIAS: 0
HEADACHES: 1
FEVER: 0

## 2022-06-25 ASSESSMENT — PATIENT HEALTH QUESTIONNAIRE - PHQ9
SUM OF ALL RESPONSES TO PHQ QUESTIONS 1-9: 7
10. IF YOU CHECKED OFF ANY PROBLEMS, HOW DIFFICULT HAVE THESE PROBLEMS MADE IT FOR YOU TO DO YOUR WORK, TAKE CARE OF THINGS AT HOME, OR GET ALONG WITH OTHER PEOPLE: SOMEWHAT DIFFICULT
SUM OF ALL RESPONSES TO PHQ QUESTIONS 1-9: 7

## 2022-06-25 ASSESSMENT — ACTIVITIES OF DAILY LIVING (ADL): CURRENT_FUNCTION: LAUNDRY REQUIRES ASSISTANCE

## 2022-06-28 NOTE — PROGRESS NOTES
The author of this note documented a reason for not sharing it with the patient.

## 2022-06-29 ENCOUNTER — LAB (OUTPATIENT)
Dept: INFUSION THERAPY | Facility: HOSPITAL | Age: 72
End: 2022-06-29
Attending: INTERNAL MEDICINE
Payer: COMMERCIAL

## 2022-06-29 ENCOUNTER — TRANSFERRED RECORDS (OUTPATIENT)
Dept: HEALTH INFORMATION MANAGEMENT | Facility: CLINIC | Age: 72
End: 2022-06-29

## 2022-06-29 VITALS
RESPIRATION RATE: 18 BRPM | TEMPERATURE: 98 F | DIASTOLIC BLOOD PRESSURE: 46 MMHG | SYSTOLIC BLOOD PRESSURE: 105 MMHG | HEART RATE: 60 BPM | OXYGEN SATURATION: 99 %

## 2022-06-29 DIAGNOSIS — C90.00 MULTIPLE MYELOMA WITH FAILED REMISSION (H): Primary | ICD-10-CM

## 2022-06-29 LAB
ALBUMIN SERPL-MCNC: 3.3 G/DL (ref 3.5–5)
ALP SERPL-CCNC: 33 U/L (ref 45–120)
ALT SERPL W P-5'-P-CCNC: 27 U/L (ref 0–45)
ANION GAP SERPL CALCULATED.3IONS-SCNC: 8 MMOL/L (ref 5–18)
AST SERPL W P-5'-P-CCNC: 16 U/L (ref 0–40)
BASOPHILS # BLD AUTO: 0.1 10E3/UL (ref 0–0.2)
BASOPHILS NFR BLD AUTO: 1 %
BILIRUB SERPL-MCNC: 0.3 MG/DL (ref 0–1)
BUN SERPL-MCNC: 22 MG/DL (ref 8–22)
CALCIUM SERPL-MCNC: 9.3 MG/DL (ref 8.5–10.5)
CHLORIDE BLD-SCNC: 106 MMOL/L (ref 98–107)
CO2 SERPL-SCNC: 28 MMOL/L (ref 22–31)
CREAT SERPL-MCNC: 0.73 MG/DL (ref 0.6–1.1)
EOSINOPHIL # BLD AUTO: 0.2 10E3/UL (ref 0–0.7)
EOSINOPHIL NFR BLD AUTO: 2 %
ERYTHROCYTE [DISTWIDTH] IN BLOOD BY AUTOMATED COUNT: 16.2 % (ref 10–15)
GFR SERPL CREATININE-BSD FRML MDRD: 89 ML/MIN/1.73M2
GLUCOSE BLD-MCNC: 97 MG/DL (ref 70–125)
HCT VFR BLD AUTO: 41.2 % (ref 35–47)
HGB BLD-MCNC: 13.3 G/DL (ref 11.7–15.7)
IMM GRANULOCYTES # BLD: 0 10E3/UL
IMM GRANULOCYTES NFR BLD: 0 %
LYMPHOCYTES # BLD AUTO: 1.2 10E3/UL (ref 0.8–5.3)
LYMPHOCYTES NFR BLD AUTO: 20 %
MCH RBC QN AUTO: 33.8 PG (ref 26.5–33)
MCHC RBC AUTO-ENTMCNC: 32.3 G/DL (ref 31.5–36.5)
MCV RBC AUTO: 105 FL (ref 78–100)
MONOCYTES # BLD AUTO: 1 10E3/UL (ref 0–1.3)
MONOCYTES NFR BLD AUTO: 16 %
NEUTROPHILS # BLD AUTO: 3.8 10E3/UL (ref 1.6–8.3)
NEUTROPHILS NFR BLD AUTO: 61 %
NRBC # BLD AUTO: 0 10E3/UL
NRBC BLD AUTO-RTO: 0 /100
PLATELET # BLD AUTO: 233 10E3/UL (ref 150–450)
POTASSIUM BLD-SCNC: 3.8 MMOL/L (ref 3.5–5)
PROT SERPL-MCNC: 6.1 G/DL (ref 6–8)
RBC # BLD AUTO: 3.93 10E6/UL (ref 3.8–5.2)
SODIUM SERPL-SCNC: 142 MMOL/L (ref 136–145)
WBC # BLD AUTO: 6.2 10E3/UL (ref 4–11)

## 2022-06-29 PROCEDURE — 80053 COMPREHEN METABOLIC PANEL: CPT | Performed by: NURSE PRACTITIONER

## 2022-06-29 PROCEDURE — 85025 COMPLETE CBC W/AUTO DIFF WBC: CPT | Performed by: NURSE PRACTITIONER

## 2022-06-29 PROCEDURE — 250N000011 HC RX IP 250 OP 636: Performed by: NURSE PRACTITIONER

## 2022-06-29 PROCEDURE — 96401 CHEMO ANTI-NEOPL SQ/IM: CPT

## 2022-06-29 PROCEDURE — 82040 ASSAY OF SERUM ALBUMIN: CPT | Performed by: NURSE PRACTITIONER

## 2022-06-29 PROCEDURE — 36415 COLL VENOUS BLD VENIPUNCTURE: CPT | Performed by: NURSE PRACTITIONER

## 2022-06-29 RX ORDER — NALOXONE HYDROCHLORIDE 0.4 MG/ML
0.2 INJECTION, SOLUTION INTRAMUSCULAR; INTRAVENOUS; SUBCUTANEOUS
Status: DISCONTINUED | OUTPATIENT
Start: 2022-06-29 | End: 2022-06-29 | Stop reason: HOSPADM

## 2022-06-29 RX ORDER — ALBUTEROL SULFATE 90 UG/1
1-2 AEROSOL, METERED RESPIRATORY (INHALATION)
Status: DISCONTINUED | OUTPATIENT
Start: 2022-06-29 | End: 2022-06-29 | Stop reason: HOSPADM

## 2022-06-29 RX ORDER — METHYLPREDNISOLONE SODIUM SUCCINATE 125 MG/2ML
125 INJECTION, POWDER, LYOPHILIZED, FOR SOLUTION INTRAMUSCULAR; INTRAVENOUS
Status: DISCONTINUED | OUTPATIENT
Start: 2022-06-29 | End: 2022-06-29 | Stop reason: HOSPADM

## 2022-06-29 RX ORDER — DIPHENHYDRAMINE HYDROCHLORIDE 50 MG/ML
50 INJECTION INTRAMUSCULAR; INTRAVENOUS
Status: DISCONTINUED | OUTPATIENT
Start: 2022-06-29 | End: 2022-06-29 | Stop reason: HOSPADM

## 2022-06-29 RX ORDER — ALBUTEROL SULFATE 0.83 MG/ML
2.5 SOLUTION RESPIRATORY (INHALATION)
Status: DISCONTINUED | OUTPATIENT
Start: 2022-06-29 | End: 2022-06-29 | Stop reason: HOSPADM

## 2022-06-29 RX ORDER — MEPERIDINE HYDROCHLORIDE 25 MG/ML
25 INJECTION INTRAMUSCULAR; INTRAVENOUS; SUBCUTANEOUS EVERY 30 MIN PRN
Status: DISCONTINUED | OUTPATIENT
Start: 2022-06-29 | End: 2022-06-29 | Stop reason: HOSPADM

## 2022-06-29 RX ORDER — EPINEPHRINE 1 MG/ML
0.3 INJECTION, SOLUTION INTRAMUSCULAR; SUBCUTANEOUS EVERY 5 MIN PRN
Status: DISCONTINUED | OUTPATIENT
Start: 2022-06-29 | End: 2022-06-29 | Stop reason: HOSPADM

## 2022-06-29 RX ADMIN — BORTEZOMIB 2.1 MG: 3.5 INJECTION, POWDER, LYOPHILIZED, FOR SOLUTION INTRAVENOUS; SUBCUTANEOUS at 14:36

## 2022-06-29 ASSESSMENT — PAIN SCALES - GENERAL: PAINLEVEL: MILD PAIN (3)

## 2022-06-29 NOTE — PROGRESS NOTES
Infusion Nursing Note:  Lizzie Viera presents today for C#14 D#1 of treatment regimen.    Patient seen by provider today: No   present during visit today: Not Applicable.    Note: Patient assessed and vital signs stable. Administered Velcade subcutaneous (RLQ) as ordered per provider.     Intravenous Access:  Labs drawn peripherally.    Treatment Conditions:  Lab Results   Component Value Date    HGB 13.3 06/29/2022    WBC 6.2 06/29/2022    ANEU 4.5 06/15/2022    ANEUTAUTO 3.8 06/29/2022     06/29/2022      Results reviewed, labs MET treatment parameters, ok to proceed with treatment.    Post Infusion Assessment:  Patient tolerated injection without incident.     Discharge Plan:   Patient discharged in stable condition accompanied by: self.  Departure Mode: Ambulatory.      JOS SWAIN RN

## 2022-06-30 ENCOUNTER — ONCOLOGY VISIT (OUTPATIENT)
Dept: ONCOLOGY | Facility: CLINIC | Age: 72
End: 2022-06-30
Attending: INTERNAL MEDICINE
Payer: COMMERCIAL

## 2022-06-30 ENCOUNTER — OFFICE VISIT (OUTPATIENT)
Dept: FAMILY MEDICINE | Facility: CLINIC | Age: 72
End: 2022-06-30
Payer: COMMERCIAL

## 2022-06-30 VITALS
WEIGHT: 129 LBS | DIASTOLIC BLOOD PRESSURE: 58 MMHG | BODY MASS INDEX: 24.35 KG/M2 | SYSTOLIC BLOOD PRESSURE: 104 MMHG | HEIGHT: 61 IN | TEMPERATURE: 98.5 F | RESPIRATION RATE: 16 BRPM | OXYGEN SATURATION: 97 % | HEART RATE: 73 BPM

## 2022-06-30 DIAGNOSIS — F43.23 ADJUSTMENT DISORDER WITH MIXED ANXIETY AND DEPRESSED MOOD: Primary | ICD-10-CM

## 2022-06-30 DIAGNOSIS — M48.04 THORACIC SPINAL STENOSIS: ICD-10-CM

## 2022-06-30 DIAGNOSIS — Z71.6 ENCOUNTER FOR TOBACCO USE CESSATION COUNSELING: ICD-10-CM

## 2022-06-30 DIAGNOSIS — S22.060S COMPRESSION FRACTURE OF T7 VERTEBRA, SEQUELA: ICD-10-CM

## 2022-06-30 DIAGNOSIS — M80.80XD LOCALIZED OSTEOPOROSIS WITH CURRENT PATHOLOGICAL FRACTURE WITH ROUTINE HEALING, SUBSEQUENT ENCOUNTER: ICD-10-CM

## 2022-06-30 DIAGNOSIS — Z12.31 VISIT FOR SCREENING MAMMOGRAM: ICD-10-CM

## 2022-06-30 DIAGNOSIS — E78.2 MIXED HYPERLIPIDEMIA: Primary | ICD-10-CM

## 2022-06-30 DIAGNOSIS — C90.00 MULTIPLE MYELOMA WITH FAILED REMISSION (H): ICD-10-CM

## 2022-06-30 DIAGNOSIS — Z79.899 ENCOUNTER FOR LONG-TERM (CURRENT) USE OF MEDICATIONS: ICD-10-CM

## 2022-06-30 DIAGNOSIS — M79.18 MYOFASCIAL PAIN: ICD-10-CM

## 2022-06-30 PROCEDURE — 90834 PSYTX W PT 45 MINUTES: CPT | Performed by: SOCIAL WORKER

## 2022-06-30 PROCEDURE — 99397 PER PM REEVAL EST PAT 65+ YR: CPT | Performed by: FAMILY MEDICINE

## 2022-06-30 ASSESSMENT — ENCOUNTER SYMPTOMS
CHILLS: 0
ABDOMINAL PAIN: 0
HEMATURIA: 0
FREQUENCY: 0
NERVOUS/ANXIOUS: 0
HEADACHES: 1
EYE PAIN: 0
COUGH: 0
PARESTHESIAS: 0
CONSTIPATION: 0
FEVER: 0
JOINT SWELLING: 0
DYSURIA: 0
DIZZINESS: 1
NAUSEA: 1
SORE THROAT: 0
HEMATOCHEZIA: 0
BREAST MASS: 0
MYALGIAS: 1
SHORTNESS OF BREATH: 0
PALPITATIONS: 0
ARTHRALGIAS: 0
HEARTBURN: 0
DIARRHEA: 0
WEAKNESS: 1

## 2022-06-30 ASSESSMENT — ANXIETY QUESTIONNAIRES
IF YOU CHECKED OFF ANY PROBLEMS ON THIS QUESTIONNAIRE, HOW DIFFICULT HAVE THESE PROBLEMS MADE IT FOR YOU TO DO YOUR WORK, TAKE CARE OF THINGS AT HOME, OR GET ALONG WITH OTHER PEOPLE: SOMEWHAT DIFFICULT
3. WORRYING TOO MUCH ABOUT DIFFERENT THINGS: SEVERAL DAYS
5. BEING SO RESTLESS THAT IT IS HARD TO SIT STILL: SEVERAL DAYS
7. FEELING AFRAID AS IF SOMETHING AWFUL MIGHT HAPPEN: MORE THAN HALF THE DAYS
1. FEELING NERVOUS, ANXIOUS, OR ON EDGE: MORE THAN HALF THE DAYS
GAD7 TOTAL SCORE: 12
6. BECOMING EASILY ANNOYED OR IRRITABLE: MORE THAN HALF THE DAYS
2. NOT BEING ABLE TO STOP OR CONTROL WORRYING: MORE THAN HALF THE DAYS
GAD7 TOTAL SCORE: 12

## 2022-06-30 ASSESSMENT — PATIENT HEALTH QUESTIONNAIRE - PHQ9
SUM OF ALL RESPONSES TO PHQ QUESTIONS 1-9: 11
5. POOR APPETITE OR OVEREATING: MORE THAN HALF THE DAYS

## 2022-06-30 ASSESSMENT — ACTIVITIES OF DAILY LIVING (ADL): CURRENT_FUNCTION: LAUNDRY REQUIRES ASSISTANCE
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
## 2022-06-30 NOTE — PROGRESS NOTES
"SUBJECTIVE:   Lizzie Viera is a 71 year old female who presents for Preventive Visit.    Patient has been advised of split billing requirements and indicates understanding: Yes  Are you in the first 12 months of your Medicare coverage?  No    Healthy Habits:     In general, how would you rate your overall health?  Good    Frequency of exercise:  2-3 days/week    Duration of exercise:  15-30 minutes    Do you usually eat at least 4 servings of fruit and vegetables a day, include whole grains    & fiber and avoid regularly eating high fat or \"junk\" foods?  Yes    Taking medications regularly:  No    Barriers to taking medications:  Other    Medication side effects:  Other    Ability to successfully perform activities of daily living:  Laundry requires assistance    Home Safety:  No safety concerns identified    Hearing Impairment:  Difficulty following a conversation in a noisy restaurant or crowded room, feel that people are mumbling or not speaking clearly, need to ask people to speak up or repeat themselves and difficulty understanding soft or whispered speech    In the past 6 months, have you been bothered by leaking of urine?  No    In general, how would you rate your overall mental or emotional health?  Good      PHQ-2 Total Score: 2    Additional concerns today:  Yes    Do you feel safe in your environment? Yes    Have you ever done Advance Care Planning? (For example, a Health Directive, POLST, or a discussion with a medical provider or your loved ones about your wishes): Yes, advance care planning is on file.    Looking for new homes  Chest pain while out looking at houses  Coughed and heard a snap last week - unable to walk on Sunday  Massage helped at PT  Smoking again - diff with concentration  wellbutrin caused anger, chantix caused strong feelings of anger  Prev tied gum but that aggravates TMJ. Lozenges - diff to tolerate  Medical THC   has end-stage renal disease, recent " diarrhea      Fall risk  Fallen 2 or more times in the past year?: Yes  Any fall with injury in the past year?: No  click delete button to remove this line now  Cognitive Screening   1) Repeat 3 items (Leader, Season, Table)    2) Clock draw: NORMAL  3) 3 item recall: Recalls 3 objects  Results: 3 items recalled: COGNITIVE IMPAIRMENT LESS LIKELY    Mini-CogTM Copyright JANAK Doss. Licensed by the author for use in [facility]; reprinted with permission (soob@[facility]). All rights reserved.      Do you have sleep apnea, excessive snoring or daytime drowsiness?: yes    Reviewed and updated as needed this visit by clinical staff   Tobacco  Allergies  Meds   Med Hx  Surg Hx  Fam Hx  Soc Hx          Reviewed and updated as needed this visit by Provider   Tobacco  Allergies  Meds   Med Hx  Surg Hx  Fam Hx  Soc Hx         Social History     Tobacco Use     Smoking status: Current Every Day Smoker     Packs/day: 0.50     Years: 45.00     Pack years: 22.50     Types: Cigarettes     Start date: 6/16/2022     Smokeless tobacco: Never Used   Substance Use Topics     Alcohol use: Yes     Comment: Alcoholic Drinks/day: 0-1 drinks per week     If you drink alcohol do you typically have >3 drinks per day or >7 drinks per week? No    Alcohol Use 6/25/2022   Prescreen: >3 drinks/day or >7 drinks/week? Not Applicable       Current providers sharing in care for this patient include:   Patient Care Team:  Anne Marie Walker MD as PCP - General (Family Practice)  Malena Monique NP as Nurse Practitioner (Hematology & Oncology)  Anne Marie Walker MD as Assigned PCP  Nohemi Rapp MD as MD (Radiation Oncology)  Eleonora Thomas, RN as Specialty Care Coordinator  Mary Ellen Chan LP, [facility] as Assigned Behavioral Health Provider  Luis iWlson MD as BMT Physician (Transplant)  Wyatt Pascual DO as Assigned Neuroscience Provider  Loco Blanco MD, MD as MD (Hematology)  Zehra Pablo APRN CNP as  Assigned Cancer Care Provider    The following health maintenance items are reviewed in Epic and correct as of today:  Health Maintenance Due   Topic Date Due     SPIROMETRY  Never done     URINE DRUG SCREEN  Never done     COPD ACTION PLAN  Never done     DEPRESSION ACTION PLAN  Never done     COVID-19 Vaccine (1) Never done     ZOSTER IMMUNIZATION (1 of 2) Never done     Pneumococcal Vaccine: 65+ Years (2 - PPSV23 or PCV20) 08/23/2019     MEDICARE ANNUAL WELLNESS VISIT  12/31/2021     MAMMO SCREENING  07/30/2022     Pneumonia Vaccine:For adults with an immunocompromising condition, cerebrospinal fluid leak, or cochlear implant, administer 1 dose of PCV13 first then give 1 dose of PPSV23 at least 1 year later. Anyone who received any doses of PPSV23 before age 65 should receive 1 final dose of the vaccine at age 65 or older. Administer this last dose at least 5 years after the prior PPSV23 dose.  Mammogram Screening: Mammogram Screening: Recommended mammography every 1-2 years with patient discussion and risk factor consideration    FHS-7: No flowsheet data found.    Mammogram Screening: Recommended mammography every 1-2 years with patient discussion and risk factor consideration  Pertinent mammograms are reviewed under the imaging tab.    Review of Systems   Constitutional: Negative for chills and fever.   HENT: Positive for ear pain and hearing loss. Negative for congestion and sore throat.    Eyes: Positive for visual disturbance. Negative for pain.   Respiratory: Negative for cough and shortness of breath.    Cardiovascular: Positive for peripheral edema. Negative for chest pain and palpitations.   Gastrointestinal: Positive for nausea. Negative for abdominal pain, constipation, diarrhea, heartburn and hematochezia.   Breasts:  Negative for tenderness, breast mass and discharge.   Genitourinary: Negative for dysuria, frequency, genital sores, hematuria, pelvic pain, urgency, vaginal bleeding and vaginal  "discharge.   Musculoskeletal: Positive for myalgias. Negative for arthralgias and joint swelling.   Skin: Negative for rash.   Neurological: Positive for dizziness, weakness and headaches. Negative for paresthesias.   Psychiatric/Behavioral: Negative for mood changes. The patient is not nervous/anxious.        OBJECTIVE:   /58 (BP Location: Left arm, Patient Position: Sitting, Cuff Size: Adult Regular)   Pulse 73   Temp 98.5  F (36.9  C) (Oral)   Resp 16   Ht 1.549 m (5' 1\")   Wt 58.5 kg (129 lb)   LMP  (LMP Unknown)   SpO2 97%   Breastfeeding No   BMI 24.37 kg/m   Estimated body mass index is 24.37 kg/m  as calculated from the following:    Height as of this encounter: 1.549 m (5' 1\").    Weight as of this encounter: 58.5 kg (129 lb).  Physical Exam  GEN: Alert and oriented, NAD, well nourished  SKIN:  Normal skin turgor, no lesions/rashes   HEENT: NC/AT, moist mucous membranes, no rhinorrhea.    NECK: Normal.  No adenopathy or thyromegaly.  CV: Regular rate and rhythm, no murmurs.   LUNGS: Clear to auscultation bilaterally.    ABDOMEN: Soft, non-tender, non-distended, no masses   BACK: Normal  EXTREMITY: No edema, cyanosis  NEURO: Grossly normal.       Diagnostic Test Results:  No results found for any visits on 06/30/22.    ASSESSMENT / PLAN:   Lizzie was seen today for annual visit.    Diagnoses and all orders for this visit:    Mixed hyperlipidemia  -     Lipid panel reflex to direct LDL Fasting; Future  -     CRP cardiac risk; Future    Compression fracture of T7 vertebra, sequela    Multiple myeloma with failed remission (H)    Encounter for long-term (current) use of medications    Localized osteoporosis with current pathological fracture with routine healing, subsequent encounter  -     Vitamin D Deficiency; Future    Encounter for tobacco use cessation counseling  -     nicotine (NICOTROL) 10 MG inhaler; Use 1 cartridge as needed for urge to smoke by puffing over course of 20min.  Use 6-16 " "cart/day; reduce number of cart/day over 6-12 weeks.    Visit for screening mammogram  -     MA SCREENING DIGITAL BILAT - Future  (s+30); Future        Patient has been advised of split billing requirements and indicates understanding: Yes    COUNSELING:  Reviewed preventive health counseling, as reflected in patient instructions       Regular exercise       Healthy diet/nutrition       Dental care       Bladder control       Fall risk prevention       Osteoporosis prevention/bone health       Colon cancer screening       Advanced Planning     Estimated body mass index is 24.37 kg/m  as calculated from the following:    Height as of this encounter: 1.549 m (5' 1\").    Weight as of this encounter: 58.5 kg (129 lb).      She reports that she has been smoking cigarettes. She started smoking about 2 weeks ago. She has a 22.50 pack-year smoking history. She has never used smokeless tobacco.  Tobacco Cessation Action Plan:   Pharmacotherapies : other Nicotine replacement - nicotrol inhaler      Appropriate preventive services were discussed with this patient, including applicable screening as appropriate for cardiovascular disease, diabetes, osteopenia/osteoporosis, and glaucoma.  As appropriate for age/gender, discussed screening for colorectal cancer, prostate cancer, breast cancer, and cervical cancer. Checklist reviewing preventive services available has been given to the patient.    Reviewed patients plan of care and provided an AVS. The Basic Care Plan (routine screening as documented in Health Maintenance) for Lizzie meets the Care Plan requirement. This Care Plan has been established and reviewed with the Patient.    Counseling Resources:  ATP IV Guidelines  Pooled Cohorts Equation Calculator  Breast Cancer Risk Calculator  Breast Cancer: Medication to Reduce Risk  FRAX Risk Assessment  ICSI Preventive Guidelines  Dietary Guidelines for Americans, 2010  USDA's MyPlate  ASA Prophylaxis  Lung CA Screening    Anne Marie " LIDA Walker MD  [facility]    Identified Health Risks:

## 2022-07-01 ENCOUNTER — TELEPHONE (OUTPATIENT)
Dept: ONCOLOGY | Facility: HOSPITAL | Age: 72
End: 2022-07-01

## 2022-07-01 RX ORDER — GABAPENTIN 300 MG/1
600 CAPSULE ORAL 3 TIMES DAILY
Qty: 180 CAPSULE | Refills: 0 | Status: SHIPPED | OUTPATIENT
Start: 2022-07-01 | End: 2022-07-20

## 2022-07-01 NOTE — ORAL ONC MGMT
Oral Chemotherapy Monitoring Program   Left Voicemail    Attempted to contact patient today for follow up regarding oral chemotherapy, lenalidomide, for monthly follow-up. No answer. Left voicemail for patient to call us back at [phone] when able. No medication name was left.    Minnie Agrawal, PharmD, [facility]  Oral Chemotherapy Pharmacist  [phone]

## 2022-07-06 ENCOUNTER — ANCILLARY PROCEDURE (OUTPATIENT)
Dept: MAMMOGRAPHY | Facility: HOSPITAL | Age: 72
End: 2022-07-06
Attending: FAMILY MEDICINE
Payer: COMMERCIAL

## 2022-07-06 ENCOUNTER — INFUSION THERAPY VISIT (OUTPATIENT)
Dept: INFUSION THERAPY | Facility: HOSPITAL | Age: 72
End: 2022-07-06
Attending: INTERNAL MEDICINE
Payer: COMMERCIAL

## 2022-07-06 VITALS
DIASTOLIC BLOOD PRESSURE: 64 MMHG | SYSTOLIC BLOOD PRESSURE: 137 MMHG | HEART RATE: 60 BPM | TEMPERATURE: 98.1 F | RESPIRATION RATE: 16 BRPM | OXYGEN SATURATION: 94 %

## 2022-07-06 DIAGNOSIS — Z12.31 VISIT FOR SCREENING MAMMOGRAM: ICD-10-CM

## 2022-07-06 DIAGNOSIS — C90.00 MULTIPLE MYELOMA WITH FAILED REMISSION (H): Primary | ICD-10-CM

## 2022-07-06 LAB
BASOPHILS # BLD AUTO: 0.1 10E3/UL (ref 0–0.2)
BASOPHILS NFR BLD AUTO: 1 %
EOSINOPHIL # BLD AUTO: 0.1 10E3/UL (ref 0–0.7)
EOSINOPHIL NFR BLD AUTO: 1 %
ERYTHROCYTE [DISTWIDTH] IN BLOOD BY AUTOMATED COUNT: 16.3 % (ref 10–15)
HCT VFR BLD AUTO: 40.1 % (ref 35–47)
HGB BLD-MCNC: 13.1 G/DL (ref 11.7–15.7)
IMM GRANULOCYTES # BLD: 0 10E3/UL
IMM GRANULOCYTES NFR BLD: 1 %
LYMPHOCYTES # BLD AUTO: 0.7 10E3/UL (ref 0.8–5.3)
LYMPHOCYTES NFR BLD AUTO: 13 %
MCH RBC QN AUTO: 34.1 PG (ref 26.5–33)
MCHC RBC AUTO-ENTMCNC: 32.7 G/DL (ref 31.5–36.5)
MCV RBC AUTO: 104 FL (ref 78–100)
MONOCYTES # BLD AUTO: 0.1 10E3/UL (ref 0–1.3)
MONOCYTES NFR BLD AUTO: 2 %
NEUTROPHILS # BLD AUTO: 4.8 10E3/UL (ref 1.6–8.3)
NEUTROPHILS NFR BLD AUTO: 82 %
NRBC # BLD AUTO: 0 10E3/UL
NRBC BLD AUTO-RTO: 0 /100
PLATELET # BLD AUTO: 300 10E3/UL (ref 150–450)
RBC # BLD AUTO: 3.84 10E6/UL (ref 3.8–5.2)
WBC # BLD AUTO: 5.8 10E3/UL (ref 4–11)

## 2022-07-06 PROCEDURE — 36415 COLL VENOUS BLD VENIPUNCTURE: CPT | Performed by: NURSE PRACTITIONER

## 2022-07-06 PROCEDURE — 96401 CHEMO ANTI-NEOPL SQ/IM: CPT

## 2022-07-06 PROCEDURE — 85025 COMPLETE CBC W/AUTO DIFF WBC: CPT | Performed by: NURSE PRACTITIONER

## 2022-07-06 PROCEDURE — 77067 SCR MAMMO BI INCL CAD: CPT

## 2022-07-06 PROCEDURE — 250N000011 HC RX IP 250 OP 636: Performed by: NURSE PRACTITIONER

## 2022-07-06 RX ADMIN — BORTEZOMIB 2.1 MG: 3.5 INJECTION, POWDER, LYOPHILIZED, FOR SOLUTION INTRAVENOUS; SUBCUTANEOUS at 14:32

## 2022-07-06 NOTE — PROGRESS NOTES
Infusion Nursing Note:  Lizzie Viera presents today for Cycle 14, Day 8 of her chemotherapy plan.    Patient seen by provider today: No   present during visit today: Not Applicable.    Note: Velcade administered subcutaneous as ordered in RLQ abd. No swelling or bleeding noted. Band aid placed over injection site.    Intravenous Access:  No Intravenous access. Labs drawn per EDY Prescott.    Treatment Conditions:  Lab Results   Component Value Date    HGB 13.1 07/06/2022    WBC 5.8 07/06/2022    ANEU 4.5 06/15/2022    ANEUTAUTO 4.8 07/06/2022     07/06/2022      Results reviewed, labs MET treatment parameters, ok to proceed with treatment.    Post Infusion Assessment:  Patient tolerated injection without incident.     Discharge Plan:   Patient will return July 13th for next appointment.   Patient discharged in stable condition accompanied by: self.  Departure Mode: Ambulatory.      Sharri Friedman RN

## 2022-07-08 ENCOUNTER — LAB (OUTPATIENT)
Dept: LAB | Facility: CLINIC | Age: 72
End: 2022-07-08
Payer: COMMERCIAL

## 2022-07-08 DIAGNOSIS — C90.00 MULTIPLE MYELOMA WITH FAILED REMISSION (H): ICD-10-CM

## 2022-07-08 DIAGNOSIS — E78.2 MIXED HYPERLIPIDEMIA: ICD-10-CM

## 2022-07-08 DIAGNOSIS — M80.80XD LOCALIZED OSTEOPOROSIS WITH CURRENT PATHOLOGICAL FRACTURE WITH ROUTINE HEALING, SUBSEQUENT ENCOUNTER: ICD-10-CM

## 2022-07-08 LAB
CHOLEST SERPL-MCNC: 136 MG/DL
CRP SERPL HS-MCNC: 3.3 MG/L
DEPRECATED CALCIDIOL+CALCIFEROL SERPL-MC: 64 UG/L (ref 20–75)
HDLC SERPL-MCNC: 55 MG/DL
LDLC SERPL CALC-MCNC: 58 MG/DL
NONHDLC SERPL-MCNC: 81 MG/DL
TOTAL PROTEIN SERUM FOR ELP: 5.7 G/DL (ref 6.4–8.3)
TRIGL SERPL-MCNC: 114 MG/DL

## 2022-07-08 PROCEDURE — 83521 IG LIGHT CHAINS FREE EACH: CPT

## 2022-07-08 PROCEDURE — 36415 COLL VENOUS BLD VENIPUNCTURE: CPT

## 2022-07-08 PROCEDURE — 84155 ASSAY OF PROTEIN SERUM: CPT

## 2022-07-08 PROCEDURE — 82306 VITAMIN D 25 HYDROXY: CPT

## 2022-07-08 PROCEDURE — 86141 C-REACTIVE PROTEIN HS: CPT

## 2022-07-08 PROCEDURE — 84165 PROTEIN E-PHORESIS SERUM: CPT | Performed by: PATHOLOGY

## 2022-07-08 PROCEDURE — 86334 IMMUNOFIX E-PHORESIS SERUM: CPT

## 2022-07-08 PROCEDURE — 82784 ASSAY IGA/IGD/IGG/IGM EACH: CPT

## 2022-07-08 PROCEDURE — 80061 LIPID PANEL: CPT

## 2022-07-11 LAB
IGA SERPL-MCNC: 22 MG/DL (ref 84–499)
IGG SERPL-MCNC: 383 MG/DL (ref 610–1616)
IGM SERPL-MCNC: 20 MG/DL (ref 35–242)
KAPPA LC FREE SER-MCNC: 1.19 MG/DL (ref 0.33–1.94)
KAPPA LC FREE/LAMBDA FREE SER NEPH: 1.08 {RATIO} (ref 0.26–1.65)
LAMBDA LC FREE SERPL-MCNC: 1.1 MG/DL (ref 0.57–2.63)
PROT PATTERN SERPL IFE-IMP: NORMAL

## 2022-07-12 DIAGNOSIS — G89.3 CANCER ASSOCIATED PAIN: ICD-10-CM

## 2022-07-12 LAB
ALBUMIN SERPL ELPH-MCNC: 3.6 G/DL (ref 3.7–5.1)
ALPHA1 GLOB SERPL ELPH-MCNC: 0.3 G/DL (ref 0.2–0.4)
ALPHA2 GLOB SERPL ELPH-MCNC: 0.8 G/DL (ref 0.5–0.9)
B-GLOBULIN SERPL ELPH-MCNC: 0.5 G/DL (ref 0.6–1)
GAMMA GLOB SERPL ELPH-MCNC: 0.4 G/DL (ref 0.7–1.6)
M PROTEIN SERPL ELPH-MCNC: 0.1 G/DL
PROT PATTERN SERPL ELPH-IMP: ABNORMAL

## 2022-07-12 RX ORDER — METHOCARBAMOL 500 MG/1
500 TABLET, FILM COATED ORAL 4 TIMES DAILY PRN
Qty: 90 TABLET | Refills: 0 | Status: SHIPPED | OUTPATIENT
Start: 2022-07-12 | End: 2022-08-16

## 2022-07-13 ENCOUNTER — INFUSION THERAPY VISIT (OUTPATIENT)
Dept: INFUSION THERAPY | Facility: HOSPITAL | Age: 72
End: 2022-07-13
Attending: INTERNAL MEDICINE
Payer: COMMERCIAL

## 2022-07-13 VITALS
SYSTOLIC BLOOD PRESSURE: 137 MMHG | OXYGEN SATURATION: 95 % | RESPIRATION RATE: 18 BRPM | HEART RATE: 57 BPM | TEMPERATURE: 98 F | DIASTOLIC BLOOD PRESSURE: 63 MMHG

## 2022-07-13 DIAGNOSIS — C90.00 MULTIPLE MYELOMA WITH FAILED REMISSION (H): Primary | ICD-10-CM

## 2022-07-13 LAB
BASOPHILS # BLD MANUAL: 0 10E3/UL (ref 0–0.2)
BASOPHILS NFR BLD MANUAL: 0 %
EOSINOPHIL # BLD MANUAL: 0.1 10E3/UL (ref 0–0.7)
EOSINOPHIL NFR BLD MANUAL: 1 %
ERYTHROCYTE [DISTWIDTH] IN BLOOD BY AUTOMATED COUNT: 15.9 % (ref 10–15)
HCT VFR BLD AUTO: 42.3 % (ref 35–47)
HGB BLD-MCNC: 13.8 G/DL (ref 11.7–15.7)
HOLD SPECIMEN: NORMAL
HOLD SPECIMEN: NORMAL
LYMPHOCYTES # BLD MANUAL: 0.8 10E3/UL (ref 0.8–5.3)
LYMPHOCYTES NFR BLD MANUAL: 11 %
MCH RBC QN AUTO: 34.3 PG (ref 26.5–33)
MCHC RBC AUTO-ENTMCNC: 32.6 G/DL (ref 31.5–36.5)
MCV RBC AUTO: 105 FL (ref 78–100)
MONOCYTES # BLD MANUAL: 0.1 10E3/UL (ref 0–1.3)
MONOCYTES NFR BLD MANUAL: 1 %
NEUTROPHILS # BLD MANUAL: 6.3 10E3/UL (ref 1.6–8.3)
NEUTROPHILS NFR BLD MANUAL: 87 %
PLAT MORPH BLD: NORMAL
PLATELET # BLD AUTO: 185 10E3/UL (ref 150–450)
RBC # BLD AUTO: 4.02 10E6/UL (ref 3.8–5.2)
RBC MORPH BLD: NORMAL
WBC # BLD AUTO: 7.2 10E3/UL (ref 4–11)

## 2022-07-13 PROCEDURE — 250N000011 HC RX IP 250 OP 636: Performed by: NURSE PRACTITIONER

## 2022-07-13 PROCEDURE — 36415 COLL VENOUS BLD VENIPUNCTURE: CPT | Performed by: NURSE PRACTITIONER

## 2022-07-13 PROCEDURE — 85007 BL SMEAR W/DIFF WBC COUNT: CPT | Performed by: NURSE PRACTITIONER

## 2022-07-13 PROCEDURE — 85018 HEMOGLOBIN: CPT | Performed by: NURSE PRACTITIONER

## 2022-07-13 PROCEDURE — 96401 CHEMO ANTI-NEOPL SQ/IM: CPT

## 2022-07-13 RX ORDER — EPINEPHRINE 1 MG/ML
0.3 INJECTION, SOLUTION INTRAMUSCULAR; SUBCUTANEOUS EVERY 5 MIN PRN
Status: DISCONTINUED | OUTPATIENT
Start: 2022-07-13 | End: 2022-07-13 | Stop reason: HOSPADM

## 2022-07-13 RX ORDER — METHYLPREDNISOLONE SODIUM SUCCINATE 125 MG/2ML
125 INJECTION, POWDER, LYOPHILIZED, FOR SOLUTION INTRAMUSCULAR; INTRAVENOUS
Status: DISCONTINUED | OUTPATIENT
Start: 2022-07-13 | End: 2022-07-13 | Stop reason: HOSPADM

## 2022-07-13 RX ORDER — NALOXONE HYDROCHLORIDE 0.4 MG/ML
0.2 INJECTION, SOLUTION INTRAMUSCULAR; INTRAVENOUS; SUBCUTANEOUS
Status: DISCONTINUED | OUTPATIENT
Start: 2022-07-13 | End: 2022-07-13 | Stop reason: HOSPADM

## 2022-07-13 RX ORDER — ALBUTEROL SULFATE 0.83 MG/ML
2.5 SOLUTION RESPIRATORY (INHALATION)
Status: DISCONTINUED | OUTPATIENT
Start: 2022-07-13 | End: 2022-07-13 | Stop reason: HOSPADM

## 2022-07-13 RX ORDER — ALBUTEROL SULFATE 90 UG/1
1-2 AEROSOL, METERED RESPIRATORY (INHALATION)
Status: DISCONTINUED | OUTPATIENT
Start: 2022-07-13 | End: 2022-07-13 | Stop reason: HOSPADM

## 2022-07-13 RX ORDER — DIPHENHYDRAMINE HYDROCHLORIDE 50 MG/ML
50 INJECTION INTRAMUSCULAR; INTRAVENOUS
Status: DISCONTINUED | OUTPATIENT
Start: 2022-07-13 | End: 2022-07-13 | Stop reason: HOSPADM

## 2022-07-13 RX ORDER — MEPERIDINE HYDROCHLORIDE 25 MG/ML
25 INJECTION INTRAMUSCULAR; INTRAVENOUS; SUBCUTANEOUS EVERY 30 MIN PRN
Status: DISCONTINUED | OUTPATIENT
Start: 2022-07-13 | End: 2022-07-13 | Stop reason: HOSPADM

## 2022-07-13 RX ADMIN — BORTEZOMIB 2.1 MG: 3.5 INJECTION, POWDER, LYOPHILIZED, FOR SOLUTION INTRAVENOUS; SUBCUTANEOUS at 14:38

## 2022-07-13 ASSESSMENT — PAIN SCALES - GENERAL: PAINLEVEL: SEVERE PAIN (6)

## 2022-07-13 NOTE — PROGRESS NOTES
The author of this note documented a reason for not sharing it with the patient.

## 2022-07-13 NOTE — PROGRESS NOTES
Infusion Nursing Note:  Lizzie Viera presents today for C#14 D#15 of chemotherapy regimen.    Patient seen by provider today: No   present during visit today: Not Applicable.    Note: Patient assessed and vital signs stable. Patient slightly lightheaded and nauseous today. Encouraged patient to increase oral intake and take antiemetics as needed. Lizzie also mentioned swelling in left leg worse than right and pain behind left knee. Patient states she has had this for a long time. Zehra Pablo CNP notified of this and states since she has had this for awhile and isn't changing we can keep an eye on it for now. Patient encouraged to call us if it gets worse before she comes again. Patient verbalized understanding. Administered Velcade subcutaneous (left lower abdomen) as ordered per provider.     Intravenous Access:  Labs drawn without difficulty.    Treatment Conditions:  Lab Results   Component Value Date    HGB 13.8 07/13/2022    WBC 7.2 07/13/2022    ANEU 6.3 07/13/2022    ANEUTAUTO 4.8 07/06/2022     07/13/2022      Results reviewed, labs MET treatment parameters, ok to proceed with treatment.    Post Infusion Assessment:  Patient tolerated injection without incident.     Discharge Plan:   Patient discharged in stable condition accompanied by: self.  Departure Mode: Ambulatory.      JOS SWAIN RN

## 2022-07-14 ENCOUNTER — ONCOLOGY VISIT (OUTPATIENT)
Dept: ONCOLOGY | Facility: CLINIC | Age: 72
End: 2022-07-14
Attending: INTERNAL MEDICINE
Payer: COMMERCIAL

## 2022-07-14 ENCOUNTER — TELEPHONE (OUTPATIENT)
Dept: ONCOLOGY | Facility: HOSPITAL | Age: 72
End: 2022-07-14

## 2022-07-14 DIAGNOSIS — C90.00 MULTIPLE MYELOMA WITH FAILED REMISSION (H): Primary | ICD-10-CM

## 2022-07-14 DIAGNOSIS — M79.662 PAIN OF LEFT LOWER LEG: Primary | ICD-10-CM

## 2022-07-14 DIAGNOSIS — F43.23 ADJUSTMENT DISORDER WITH MIXED ANXIETY AND DEPRESSED MOOD: Primary | ICD-10-CM

## 2022-07-14 DIAGNOSIS — C90.00 MULTIPLE MYELOMA WITH FAILED REMISSION (H): ICD-10-CM

## 2022-07-14 PROCEDURE — 90837 PSYTX W PT 60 MINUTES: CPT | Performed by: SOCIAL WORKER

## 2022-07-14 RX ORDER — LENALIDOMIDE 25 MG/1
25 CAPSULE ORAL DAILY
Qty: 14 CAPSULE | Refills: 0 | Status: SHIPPED | OUTPATIENT
Start: 2022-07-19 | End: 2022-08-04

## 2022-07-14 ASSESSMENT — ANXIETY QUESTIONNAIRES
1. FEELING NERVOUS, ANXIOUS, OR ON EDGE: MORE THAN HALF THE DAYS
6. BECOMING EASILY ANNOYED OR IRRITABLE: MORE THAN HALF THE DAYS
GAD7 TOTAL SCORE: 13
5. BEING SO RESTLESS THAT IT IS HARD TO SIT STILL: MORE THAN HALF THE DAYS
7. FEELING AFRAID AS IF SOMETHING AWFUL MIGHT HAPPEN: MORE THAN HALF THE DAYS
3. WORRYING TOO MUCH ABOUT DIFFERENT THINGS: SEVERAL DAYS
2. NOT BEING ABLE TO STOP OR CONTROL WORRYING: MORE THAN HALF THE DAYS
GAD7 TOTAL SCORE: 13

## 2022-07-14 ASSESSMENT — PATIENT HEALTH QUESTIONNAIRE - PHQ9
5. POOR APPETITE OR OVEREATING: MORE THAN HALF THE DAYS
SUM OF ALL RESPONSES TO PHQ QUESTIONS 1-9: 10

## 2022-07-14 NOTE — LETTER
7/14/2022         RE: Lizzie Viera  [address]  [place] [address]        Dear Colleague,    Thank you for referring your patient, Lizzie Viera, to the [facility]. Please see a copy of my visit note below.    The author of this note documented a reason for not sharing it with the patient.                             Again, thank you for allowing me to participate in the care of your patient.        Sincerely,        JAI AMADO LP, LICSW
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
## 2022-07-14 NOTE — RESULT ENCOUNTER NOTE
Vitamin D level is normal. Lipids are excellent. CRP is in the high risk range. Consider evaluation with cardiology to discuss further evaluation.

## 2022-07-14 NOTE — TELEPHONE ENCOUNTER
Oral Chemotherapy Monitoring Program    Medication: Revlimid  Rx:  25mg PO daily on days 1-14 of 21 day cycle #28    Auth #: [id number]  Risk Category: Adult female NOT of reproductive capacity    Routine survey questions reviewed.    Thank you,    Sara Martinez  Oncology/Transplant Float [email or web address]  Phone:[phone]  Fax:[phone]

## 2022-07-15 ENCOUNTER — TELEPHONE (OUTPATIENT)
Dept: ONCOLOGY | Facility: HOSPITAL | Age: 72
End: 2022-07-15

## 2022-07-15 ENCOUNTER — HOSPITAL ENCOUNTER (OUTPATIENT)
Dept: ULTRASOUND IMAGING | Facility: CLINIC | Age: 72
Discharge: HOME | End: 2022-07-15
Attending: NURSE PRACTITIONER | Admitting: NURSE PRACTITIONER
Payer: COMMERCIAL

## 2022-07-15 DIAGNOSIS — M79.662 PAIN OF LEFT LOWER LEG: ICD-10-CM

## 2022-07-15 PROCEDURE — 93971 EXTREMITY STUDY: CPT | Mod: LT

## 2022-07-18 ENCOUNTER — TELEPHONE (OUTPATIENT)
Dept: ONCOLOGY | Facility: HOSPITAL | Age: 72
End: 2022-07-18

## 2022-07-18 NOTE — TELEPHONE ENCOUNTER
[facility]: Cancer Care                                                                                          I call Valeria to let her know that LNAE Shahid has reviewed her ultrasound. Results are negative for a deep venous thrombosis in the left lower extremity. Valeria verbalized understanding and appreciation of our conversation.     Signature:  Katie Hogan RN

## 2022-07-19 ENCOUNTER — INFUSION THERAPY VISIT (OUTPATIENT)
Dept: INFUSION THERAPY | Facility: HOSPITAL | Age: 72
End: 2022-07-19
Attending: INTERNAL MEDICINE
Payer: COMMERCIAL

## 2022-07-19 ENCOUNTER — ONCOLOGY VISIT (OUTPATIENT)
Dept: ONCOLOGY | Facility: HOSPITAL | Age: 72
End: 2022-07-19
Attending: INTERNAL MEDICINE
Payer: COMMERCIAL

## 2022-07-19 VITALS
WEIGHT: 133 LBS | OXYGEN SATURATION: 97 % | HEART RATE: 54 BPM | DIASTOLIC BLOOD PRESSURE: 70 MMHG | TEMPERATURE: 98.1 F | RESPIRATION RATE: 16 BRPM | BODY MASS INDEX: 25.13 KG/M2 | SYSTOLIC BLOOD PRESSURE: 154 MMHG

## 2022-07-19 DIAGNOSIS — C90.00 MULTIPLE MYELOMA WITH FAILED REMISSION (H): Primary | ICD-10-CM

## 2022-07-19 DIAGNOSIS — M89.8X9 BONE PAIN: Primary | ICD-10-CM

## 2022-07-19 DIAGNOSIS — I10 ESSENTIAL HYPERTENSION, BENIGN: ICD-10-CM

## 2022-07-19 DIAGNOSIS — C90.00 MULTIPLE MYELOMA NOT HAVING ACHIEVED REMISSION (H): ICD-10-CM

## 2022-07-19 DIAGNOSIS — C90.00 MULTIPLE MYELOMA WITH FAILED REMISSION (H): ICD-10-CM

## 2022-07-19 LAB
ALBUMIN SERPL-MCNC: 3.2 G/DL (ref 3.5–5)
ALP SERPL-CCNC: 25 U/L (ref 45–120)
ALT SERPL W P-5'-P-CCNC: 18 U/L (ref 0–45)
ANION GAP SERPL CALCULATED.3IONS-SCNC: 8 MMOL/L (ref 5–18)
AST SERPL W P-5'-P-CCNC: 13 U/L (ref 0–40)
BASOPHILS # BLD AUTO: 0 10E3/UL (ref 0–0.2)
BASOPHILS NFR BLD AUTO: 1 %
BILIRUB SERPL-MCNC: 0.3 MG/DL (ref 0–1)
BUN SERPL-MCNC: 17 MG/DL (ref 8–22)
CALCIUM SERPL-MCNC: 9.1 MG/DL (ref 8.5–10.5)
CHLORIDE BLD-SCNC: 106 MMOL/L (ref 98–107)
CO2 SERPL-SCNC: 27 MMOL/L (ref 22–31)
CREAT SERPL-MCNC: 0.72 MG/DL (ref 0.6–1.1)
EOSINOPHIL # BLD AUTO: 0.1 10E3/UL (ref 0–0.7)
EOSINOPHIL NFR BLD AUTO: 3 %
ERYTHROCYTE [DISTWIDTH] IN BLOOD BY AUTOMATED COUNT: 15.9 % (ref 10–15)
GFR SERPL CREATININE-BSD FRML MDRD: 90 ML/MIN/1.73M2
GLUCOSE BLD-MCNC: 133 MG/DL (ref 70–125)
HCT VFR BLD AUTO: 39.6 % (ref 35–47)
HGB BLD-MCNC: 13 G/DL (ref 11.7–15.7)
IMM GRANULOCYTES # BLD: 0 10E3/UL
IMM GRANULOCYTES NFR BLD: 0 %
LYMPHOCYTES # BLD AUTO: 1 10E3/UL (ref 0.8–5.3)
LYMPHOCYTES NFR BLD AUTO: 20 %
MCH RBC QN AUTO: 34.7 PG (ref 26.5–33)
MCHC RBC AUTO-ENTMCNC: 32.8 G/DL (ref 31.5–36.5)
MCV RBC AUTO: 106 FL (ref 78–100)
MONOCYTES # BLD AUTO: 0.6 10E3/UL (ref 0–1.3)
MONOCYTES NFR BLD AUTO: 13 %
NEUTROPHILS # BLD AUTO: 3.1 10E3/UL (ref 1.6–8.3)
NEUTROPHILS NFR BLD AUTO: 63 %
NRBC # BLD AUTO: 0 10E3/UL
NRBC BLD AUTO-RTO: 0 /100
PLATELET # BLD AUTO: 169 10E3/UL (ref 150–450)
POTASSIUM BLD-SCNC: 3.6 MMOL/L (ref 3.5–5)
PROT SERPL-MCNC: 5.9 G/DL (ref 6–8)
RBC # BLD AUTO: 3.75 10E6/UL (ref 3.8–5.2)
SODIUM SERPL-SCNC: 141 MMOL/L (ref 136–145)
WBC # BLD AUTO: 4.9 10E3/UL (ref 4–11)

## 2022-07-19 PROCEDURE — 99214 OFFICE O/P EST MOD 30 MIN: CPT | Performed by: NURSE PRACTITIONER

## 2022-07-19 PROCEDURE — 96401 CHEMO ANTI-NEOPL SQ/IM: CPT

## 2022-07-19 PROCEDURE — 250N000011 HC RX IP 250 OP 636: Performed by: NURSE PRACTITIONER

## 2022-07-19 PROCEDURE — 36415 COLL VENOUS BLD VENIPUNCTURE: CPT | Performed by: NURSE PRACTITIONER

## 2022-07-19 PROCEDURE — G0463 HOSPITAL OUTPT CLINIC VISIT: HCPCS | Mod: 25

## 2022-07-19 PROCEDURE — 85025 COMPLETE CBC W/AUTO DIFF WBC: CPT | Performed by: NURSE PRACTITIONER

## 2022-07-19 PROCEDURE — 80053 COMPREHEN METABOLIC PANEL: CPT | Performed by: NURSE PRACTITIONER

## 2022-07-19 RX ORDER — METHYLPREDNISOLONE SODIUM SUCCINATE 125 MG/2ML
125 INJECTION, POWDER, LYOPHILIZED, FOR SOLUTION INTRAMUSCULAR; INTRAVENOUS
Status: CANCELLED
Start: 2022-08-09

## 2022-07-19 RX ORDER — NALOXONE HYDROCHLORIDE 0.4 MG/ML
0.2 INJECTION, SOLUTION INTRAMUSCULAR; INTRAVENOUS; SUBCUTANEOUS
Status: CANCELLED | OUTPATIENT
Start: 2022-08-23

## 2022-07-19 RX ORDER — MEPERIDINE HYDROCHLORIDE 25 MG/ML
25 INJECTION INTRAMUSCULAR; INTRAVENOUS; SUBCUTANEOUS EVERY 30 MIN PRN
Status: CANCELLED | OUTPATIENT
Start: 2022-09-13

## 2022-07-19 RX ORDER — NALOXONE HYDROCHLORIDE 0.4 MG/ML
0.2 INJECTION, SOLUTION INTRAMUSCULAR; INTRAVENOUS; SUBCUTANEOUS
Status: CANCELLED | OUTPATIENT
Start: 2022-09-06

## 2022-07-19 RX ORDER — ALBUTEROL SULFATE 0.83 MG/ML
2.5 SOLUTION RESPIRATORY (INHALATION)
Status: CANCELLED | OUTPATIENT
Start: 2022-08-30

## 2022-07-19 RX ORDER — LORAZEPAM 2 MG/ML
0.5 INJECTION INTRAMUSCULAR EVERY 4 HOURS PRN
Status: CANCELLED | OUTPATIENT
Start: 2022-08-30

## 2022-07-19 RX ORDER — METHYLPREDNISOLONE SODIUM SUCCINATE 125 MG/2ML
125 INJECTION, POWDER, LYOPHILIZED, FOR SOLUTION INTRAMUSCULAR; INTRAVENOUS
Status: CANCELLED
Start: 2022-08-16

## 2022-07-19 RX ORDER — DIPHENHYDRAMINE HYDROCHLORIDE 50 MG/ML
50 INJECTION INTRAMUSCULAR; INTRAVENOUS
Status: CANCELLED
Start: 2022-09-06

## 2022-07-19 RX ORDER — EPINEPHRINE 1 MG/ML
0.3 INJECTION, SOLUTION INTRAMUSCULAR; SUBCUTANEOUS EVERY 5 MIN PRN
Status: CANCELLED | OUTPATIENT
Start: 2022-08-30

## 2022-07-19 RX ORDER — ALBUTEROL SULFATE 90 UG/1
1-2 AEROSOL, METERED RESPIRATORY (INHALATION)
Status: CANCELLED
Start: 2022-08-30

## 2022-07-19 RX ORDER — ALBUTEROL SULFATE 0.83 MG/ML
2.5 SOLUTION RESPIRATORY (INHALATION)
Status: CANCELLED | OUTPATIENT
Start: 2022-08-23

## 2022-07-19 RX ORDER — ALBUTEROL SULFATE 0.83 MG/ML
2.5 SOLUTION RESPIRATORY (INHALATION)
Status: CANCELLED | OUTPATIENT
Start: 2022-09-13

## 2022-07-19 RX ORDER — AMLODIPINE BESYLATE 5 MG/1
5 TABLET ORAL DAILY
Qty: 30 TABLET | Refills: 2 | Status: SHIPPED | OUTPATIENT
Start: 2022-07-19 | End: 2022-10-11

## 2022-07-19 RX ORDER — MEPERIDINE HYDROCHLORIDE 25 MG/ML
25 INJECTION INTRAMUSCULAR; INTRAVENOUS; SUBCUTANEOUS EVERY 30 MIN PRN
Status: CANCELLED | OUTPATIENT
Start: 2022-08-09

## 2022-07-19 RX ORDER — ALBUTEROL SULFATE 90 UG/1
1-2 AEROSOL, METERED RESPIRATORY (INHALATION)
Status: CANCELLED
Start: 2022-08-09

## 2022-07-19 RX ORDER — MEPERIDINE HYDROCHLORIDE 25 MG/ML
25 INJECTION INTRAMUSCULAR; INTRAVENOUS; SUBCUTANEOUS EVERY 30 MIN PRN
Status: CANCELLED | OUTPATIENT
Start: 2022-09-06

## 2022-07-19 RX ORDER — ALBUTEROL SULFATE 90 UG/1
1-2 AEROSOL, METERED RESPIRATORY (INHALATION)
Status: CANCELLED
Start: 2022-09-13

## 2022-07-19 RX ORDER — NALOXONE HYDROCHLORIDE 0.4 MG/ML
0.2 INJECTION, SOLUTION INTRAMUSCULAR; INTRAVENOUS; SUBCUTANEOUS
Status: CANCELLED | OUTPATIENT
Start: 2022-08-09

## 2022-07-19 RX ORDER — ALBUTEROL SULFATE 90 UG/1
1-2 AEROSOL, METERED RESPIRATORY (INHALATION)
Status: CANCELLED
Start: 2022-09-06

## 2022-07-19 RX ORDER — DIPHENHYDRAMINE HYDROCHLORIDE 50 MG/ML
50 INJECTION INTRAMUSCULAR; INTRAVENOUS
Status: CANCELLED
Start: 2022-08-30

## 2022-07-19 RX ORDER — ALBUTEROL SULFATE 0.83 MG/ML
2.5 SOLUTION RESPIRATORY (INHALATION)
Status: CANCELLED | OUTPATIENT
Start: 2022-09-06

## 2022-07-19 RX ORDER — EPINEPHRINE 1 MG/ML
0.3 INJECTION, SOLUTION INTRAMUSCULAR; SUBCUTANEOUS EVERY 5 MIN PRN
Status: CANCELLED | OUTPATIENT
Start: 2022-08-09

## 2022-07-19 RX ORDER — METHYLPREDNISOLONE SODIUM SUCCINATE 125 MG/2ML
125 INJECTION, POWDER, LYOPHILIZED, FOR SOLUTION INTRAMUSCULAR; INTRAVENOUS
Status: CANCELLED
Start: 2022-08-30

## 2022-07-19 RX ORDER — MEPERIDINE HYDROCHLORIDE 25 MG/ML
25 INJECTION INTRAMUSCULAR; INTRAVENOUS; SUBCUTANEOUS EVERY 30 MIN PRN
Status: CANCELLED | OUTPATIENT
Start: 2022-08-23

## 2022-07-19 RX ORDER — NALOXONE HYDROCHLORIDE 0.4 MG/ML
0.2 INJECTION, SOLUTION INTRAMUSCULAR; INTRAVENOUS; SUBCUTANEOUS
Status: CANCELLED | OUTPATIENT
Start: 2022-09-13

## 2022-07-19 RX ORDER — DIPHENHYDRAMINE HYDROCHLORIDE 50 MG/ML
50 INJECTION INTRAMUSCULAR; INTRAVENOUS
Status: CANCELLED
Start: 2022-08-23

## 2022-07-19 RX ORDER — MEPERIDINE HYDROCHLORIDE 25 MG/ML
25 INJECTION INTRAMUSCULAR; INTRAVENOUS; SUBCUTANEOUS EVERY 30 MIN PRN
Status: CANCELLED | OUTPATIENT
Start: 2022-08-30

## 2022-07-19 RX ORDER — METHYLPREDNISOLONE SODIUM SUCCINATE 125 MG/2ML
125 INJECTION, POWDER, LYOPHILIZED, FOR SOLUTION INTRAMUSCULAR; INTRAVENOUS
Status: CANCELLED
Start: 2022-09-13

## 2022-07-19 RX ORDER — EPINEPHRINE 1 MG/ML
0.3 INJECTION, SOLUTION INTRAMUSCULAR; SUBCUTANEOUS EVERY 5 MIN PRN
Status: CANCELLED | OUTPATIENT
Start: 2022-08-23

## 2022-07-19 RX ORDER — NALOXONE HYDROCHLORIDE 0.4 MG/ML
0.2 INJECTION, SOLUTION INTRAMUSCULAR; INTRAVENOUS; SUBCUTANEOUS
Status: CANCELLED | OUTPATIENT
Start: 2022-08-30

## 2022-07-19 RX ORDER — METHYLPREDNISOLONE SODIUM SUCCINATE 125 MG/2ML
125 INJECTION, POWDER, LYOPHILIZED, FOR SOLUTION INTRAMUSCULAR; INTRAVENOUS
Status: CANCELLED
Start: 2022-09-06

## 2022-07-19 RX ORDER — LORAZEPAM 2 MG/ML
0.5 INJECTION INTRAMUSCULAR EVERY 4 HOURS PRN
Status: CANCELLED | OUTPATIENT
Start: 2022-08-09

## 2022-07-19 RX ORDER — EPINEPHRINE 1 MG/ML
0.3 INJECTION, SOLUTION INTRAMUSCULAR; SUBCUTANEOUS EVERY 5 MIN PRN
Status: CANCELLED | OUTPATIENT
Start: 2022-09-06

## 2022-07-19 RX ORDER — NALOXONE HYDROCHLORIDE 0.4 MG/ML
0.2 INJECTION, SOLUTION INTRAMUSCULAR; INTRAVENOUS; SUBCUTANEOUS
Status: CANCELLED | OUTPATIENT
Start: 2022-08-16

## 2022-07-19 RX ORDER — EPINEPHRINE 1 MG/ML
0.3 INJECTION, SOLUTION INTRAMUSCULAR; SUBCUTANEOUS EVERY 5 MIN PRN
Status: CANCELLED | OUTPATIENT
Start: 2022-08-16

## 2022-07-19 RX ORDER — DIPHENHYDRAMINE HYDROCHLORIDE 50 MG/ML
50 INJECTION INTRAMUSCULAR; INTRAVENOUS
Status: CANCELLED
Start: 2022-09-13

## 2022-07-19 RX ORDER — EPINEPHRINE 1 MG/ML
0.3 INJECTION, SOLUTION INTRAMUSCULAR; SUBCUTANEOUS EVERY 5 MIN PRN
Status: CANCELLED | OUTPATIENT
Start: 2022-09-13

## 2022-07-19 RX ORDER — LORAZEPAM 2 MG/ML
0.5 INJECTION INTRAMUSCULAR EVERY 4 HOURS PRN
Status: CANCELLED | OUTPATIENT
Start: 2022-08-23

## 2022-07-19 RX ORDER — ALBUTEROL SULFATE 0.83 MG/ML
2.5 SOLUTION RESPIRATORY (INHALATION)
Status: CANCELLED | OUTPATIENT
Start: 2022-08-16

## 2022-07-19 RX ORDER — DIPHENHYDRAMINE HYDROCHLORIDE 50 MG/ML
50 INJECTION INTRAMUSCULAR; INTRAVENOUS
Status: CANCELLED
Start: 2022-08-16

## 2022-07-19 RX ORDER — ALBUTEROL SULFATE 90 UG/1
1-2 AEROSOL, METERED RESPIRATORY (INHALATION)
Status: CANCELLED
Start: 2022-08-16

## 2022-07-19 RX ORDER — LORAZEPAM 2 MG/ML
0.5 INJECTION INTRAMUSCULAR EVERY 4 HOURS PRN
Status: CANCELLED | OUTPATIENT
Start: 2022-08-16

## 2022-07-19 RX ORDER — MEPERIDINE HYDROCHLORIDE 25 MG/ML
25 INJECTION INTRAMUSCULAR; INTRAVENOUS; SUBCUTANEOUS EVERY 30 MIN PRN
Status: CANCELLED | OUTPATIENT
Start: 2022-08-16

## 2022-07-19 RX ORDER — ALBUTEROL SULFATE 0.83 MG/ML
2.5 SOLUTION RESPIRATORY (INHALATION)
Status: CANCELLED | OUTPATIENT
Start: 2022-08-09

## 2022-07-19 RX ORDER — METHYLPREDNISOLONE SODIUM SUCCINATE 125 MG/2ML
125 INJECTION, POWDER, LYOPHILIZED, FOR SOLUTION INTRAMUSCULAR; INTRAVENOUS
Status: CANCELLED
Start: 2022-08-23

## 2022-07-19 RX ORDER — LORAZEPAM 2 MG/ML
0.5 INJECTION INTRAMUSCULAR EVERY 4 HOURS PRN
Status: CANCELLED | OUTPATIENT
Start: 2022-09-06

## 2022-07-19 RX ORDER — ALBUTEROL SULFATE 90 UG/1
1-2 AEROSOL, METERED RESPIRATORY (INHALATION)
Status: CANCELLED
Start: 2022-08-23

## 2022-07-19 RX ORDER — LORAZEPAM 2 MG/ML
0.5 INJECTION INTRAMUSCULAR EVERY 4 HOURS PRN
Status: CANCELLED | OUTPATIENT
Start: 2022-09-13

## 2022-07-19 RX ORDER — DIPHENHYDRAMINE HYDROCHLORIDE 50 MG/ML
50 INJECTION INTRAMUSCULAR; INTRAVENOUS
Status: CANCELLED
Start: 2022-08-09

## 2022-07-19 RX ADMIN — BORTEZOMIB 2.1 MG: 3.5 INJECTION, POWDER, LYOPHILIZED, FOR SOLUTION INTRAVENOUS; SUBCUTANEOUS at 14:32

## 2022-07-19 ASSESSMENT — PAIN SCALES - GENERAL: PAINLEVEL: NO PAIN (0)

## 2022-07-19 NOTE — PROGRESS NOTES
"Oncology Rooming Note    July 19, 2022 1:34 PM   Lizzie Viera is a 71 year old female who presents for:    Chief Complaint   Patient presents with     Oncology Clinic Visit     Initial Vitals: BP (!) 154/70   Pulse 54   Temp 98.1  F (36.7  C)   Resp 16   Wt 60.3 kg (133 lb)   LMP  (LMP Unknown)   SpO2 97%   BMI 25.13 kg/m   Estimated body mass index is 25.13 kg/m  as calculated from the following:    Height as of 6/30/22: 1.549 m (5' 1\").    Weight as of this encounter: 60.3 kg (133 lb). Body surface area is 1.61 meters squared.  No Pain (0) Comment: Data Unavailable   No LMP recorded (lmp unknown). Patient is postmenopausal.  Allergies reviewed: Yes  Medications reviewed: Yes    Medications: Medication refills not needed today.  Pharmacy name entered into [facility]: [id number] - [place], [place] - [address]    Clinical concerns: medication side effects    Narda Kingston RN            "

## 2022-07-19 NOTE — PROGRESS NOTES
Pt arrived ambulatory to clinic for Cycle # 15 Day # 1 of her chemotherapy regimen.  Administered subcutaneous Velcade into RLQ of ABD per MD order.  Pt tolerated procedure well, no s/s of bleeding or bruising at site.  Pt verbalized understanding of plan of care and return to clinic.

## 2022-07-19 NOTE — PROGRESS NOTES
[facility] Hematology and Oncology Progress Note    Patient: Lizzie Viera  MRN: [id number]  Date of Service: Jul 19, 2022          Reason for Visit    Chief Complaint   Patient presents with     Oncology Clinic Visit       Assessment and Plan    Cancer Staging  No matching staging information was found for the patient.    1. Myeloma: has started on treatment with RVd.  She is getting Velcade weekly, Revlimid 2 weeks on, 1 week off and Dex weekly.  Her light chains and IgG levels have now normalized.  Her hemoglobin has normalized.  Her monoclonal peak has gone from 3.3 down to 0.1.   She is tolerating treatment fairly well.  She did meet with the transplant team.  She has thought about it and she is a little reluctant to do that at this time.  She did find an article that suggested transplant did not really have any difference in the overall survival so she is actually thinking she may not want to do it at all but is still thinking about it.  She prefers her monoclonal peak to be at 0 before she does it so we will continue her current regimen.  Currently 0.1. We will refer her back to transplant team when and if she feels ready.  We will see Dr. Blanco or myself in 6 weeks.    2. Bone lesions: has started on zometa.  Is been getting monthly.  Her last dose was January 5.  We are going to hold that for now.  She states she needs to get some dental work done.  Urged her to let us know when her dental work is done    3.  Worsening bone pain, rib pain: Patient requesting a body scan.  We will evaluate whether her insurance will pay for a PET scan.  I think it has been well over a year since she is had any sort of body imaging and we will pursue that.    4.  Hypertension: Patient was started on amlodipine when she was in the hospital.  She initially started at 5 mg and then they went up to 10 mg.  Patient had some swelling that we thought was related to this and her blood pressure looked really good so we  went down to 5 mg.  She is tolerating that so we will continue that for now.  Courage her to find a primary care doctor      ECOG Performance    0 - Independent    Distress Screening (within last 30 days)    1. How concerned are you about your ability to eat? : 0  2. How concerned are you about unintended weight loss or your current weight? : 0  3. How concerned are you about feeling depressed or very sad? : 0  4. How concerned are you about feeling anxious or very scared? : 0  5. Do you struggle with the loss of meaning and rambo in your life? : Not at all  6. How concerned are you about work and home life issues that may be affected by your cancer? : 2  7. How concerned are you about knowing what resources are available to help you? : 0  8. Do you currently have what you would describe as Sikh or spiritual struggles?            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 Lizzie Viera is a very pleasant 70 year old woman who has been diagnosed with multiple myeloma IgG kappa type in May 2021.  She had initially presented with compression fracture of T7 vertebral body in September 2020.  She had a back pain which led to that evaluation.  Bone density confirmed that she had osteoporosis.   MRI showed diffuse marrow edema in October 2020.  In April 2021 the MRI of the thoracic spine showed worsening T7 vertebral body height loss because of likely pathological compression fracture with extraosseous extension.  She then had IR guided bone biopsy on 7 May 2021 and pathology confirmed the presence of kappa light chain restricted plasma cells.  Her cytogenetics confirmed the presence of hyperdiploid E with gains of chromosome 5, 9 and 15.     She was then seen by Dr. Baig and had a bone marrow biopsy which also confirmed 60% involvement of the marrow with plasma cells.  The bone marrow was done on 3 Jocelin 2021.  The bone marrow also confirmed the presence of hyperdiploid E.  She had a gain of chromosome 3, 5, 7, 9, 11, 15 as well as 19.  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  Her beta-2 microglobulin was actually normal at the time of her diagnosis as was her albumin at 4.0.  Monoclonal protein 3.1 g/dL.  Kappa free light chain levels of 13 mg/dL IgG level of 4280 with depressed levels of IgM and IgA.  Urine was also positive for kappa light chains as well as immunofixation of the urine was positive for IgG kappa.  Normal kidney function.  Normal hemoglobin.     As mentioned above she had bone lesions in the T7 vertebral body, T1, skull area.  Her main pain is coming from her T7 vertebral body compression fracture.     Due to the pain she has been seen by radiation oncology and has received radiation therapy.  She also got a dose of steroids for pain control.     Currently her pain is controlled with combination of Dilaudid as well as some Tylenol.  She is wearing a brace.  She did notice that when she was on dexamethasone her pain was significantly better.     She was initially thinking of doing some natural therapies but once her symptoms got worse, she came back in was counseled about treatment.  She has been given information about Velcade Revlimid and dexamethasone.  has started that treatment.  She states that  she is tolerating this quite well.  Her myeloma has significantly improved.  She did meet with the transplant doctor and she is a little reluctant to do that.  She states she would prefer to be in complete remission before she does something like that.    Patient had been doing fine and then in April she did get admitted for pneumonia.  She did have a little bit of a prolonged recovery but has been doing pretty well since then.    Patient states that overall she is doing okay.  She is having some intermittent swelling in her legs which is bothering her.  She did get an ultrasound last week that did not show any blood clot.  She is having worsening pain in her ribs and she is worried about that.  Her myeloma labs look quite stable.  She denies any breathing issues.  Denies any infectious complaints.    Review of Systems    Pertinent items are noted in HPI.    Past History    Past Medical History:   Diagnosis Date     Cervical dysplasia      Chronic RUQ pain      Depressive disorder      Multiple myeloma (H)      Osteoporosis        PHYSICAL EXAM  BP (!) 154/70   Pulse 54   Temp 98.1  F (36.7  C)   Resp 16   Wt 60.3 kg (133 lb)   LMP  (LMP Unknown)   SpO2 97%   BMI 25.13 kg/m      GENERAL: no acute distress. Cooperative in conversation. Here with sister. Mask on  RESP: Regular respiratory rate. No expiratory wheezes   MUSCULOSKELETAL: no bilateral leg swelling  NEURO: non focal. Alert and oriented x3.   PSYCH: within normal limits. No depression or anxiety.  SKIN: exposed skin is dry intact.     Lab Results    Recent Results (from the past 168 hour(s))   CBC with platelets and differential   Result Value Ref Range    WBC Count 7.2 4.0 - 11.0 10e3/uL    RBC Count 4.02 3.80 - 5.20 10e6/uL    Hemoglobin 13.8 11.7 - 15.7 g/dL    Hematocrit 42.3 35.0 - 47.0 %     (H) 78 - 100 fL    MCH 34.3 (H) 26.5 - 33.0 pg    MCHC 32.6 31.5 - 36.5 g/dL    RDW 15.9 (H) 10.0 - 15.0 %    Platelet Count 185 150 - 450 10e3/uL    Extra Red Top Tube   Result Value Ref Range    Hold Specimen JIC    Extra Red Top Tube   Result Value Ref Range    Hold Specimen JIC    Manual Differential   Result Value Ref Range    % Neutrophils 87 %    % Lymphocytes 11 %    % Monocytes 1 %    % Eosinophils 1 %    % Basophils 0 %    Absolute Neutrophils 6.3 1.6 - 8.3 10e3/uL    Absolute Lymphocytes 0.8 0.8 - 5.3 10e3/uL    Absolute Monocytes 0.1 0.0 - 1.3 10e3/uL    Absolute Eosinophils 0.1 0.0 - 0.7 10e3/uL    Absolute Basophils 0.0 0.0 - 0.2 10e3/uL    RBC Morphology Confirmed RBC Indices     Platelet Assessment  Automated Count Confirmed. Platelet morphology is normal.     Automated Count Confirmed. Platelet morphology is normal.   Comprehensive metabolic panel   Result Value Ref Range    Sodium 141 136 - 145 mmol/L    Potassium 3.6 3.5 - 5.0 mmol/L    Chloride 106 98 - 107 mmol/L    Carbon Dioxide (CO2) 27 22 - 31 mmol/L    Anion Gap 8 5 - 18 mmol/L    Urea Nitrogen 17 8 - 22 mg/dL    Creatinine 0.72 0.60 - 1.10 mg/dL    Calcium 9.1 8.5 - 10.5 mg/dL    Glucose 133 (H) 70 - 125 mg/dL    Alkaline Phosphatase 25 (L) 45 - 120 U/L    AST 13 0 - 40 U/L    ALT 18 0 - 45 U/L    Protein Total 5.9 (L) 6.0 - 8.0 g/dL    Albumin 3.2 (L) 3.5 - 5.0 g/dL    Bilirubin Total 0.3 0.0 - 1.0 mg/dL    GFR Estimate 90 >60 mL/min/1.73m2   CBC with platelets and differential   Result Value Ref Range    WBC Count 4.9 4.0 - 11.0 10e3/uL    RBC Count 3.75 (L) 3.80 - 5.20 10e6/uL    Hemoglobin 13.0 11.7 - 15.7 g/dL    Hematocrit 39.6 35.0 - 47.0 %     (H) 78 - 100 fL    MCH 34.7 (H) 26.5 - 33.0 pg    MCHC 32.8 31.5 - 36.5 g/dL    RDW 15.9 (H) 10.0 - 15.0 %    Platelet Count 169 150 - 450 10e3/uL    % Neutrophils 63 %    % Lymphocytes 20 %    % Monocytes 13 %    % Eosinophils 3 %    % Basophils 1 %    % Immature Granulocytes 0 %    NRBCs per 100 WBC 0 <1 /100    Absolute Neutrophils 3.1 1.6 - 8.3 10e3/uL    Absolute Lymphocytes 1.0 0.8 - 5.3 10e3/uL    Absolute Monocytes 0.6 0.0  - 1.3 10e3/uL    Absolute Eosinophils 0.1 0.0 - 0.7 10e3/uL    Absolute Basophils 0.0 0.0 - 0.2 10e3/uL    Absolute Immature Granulocytes 0.0 <=0.4 10e3/uL    Absolute NRBCs 0.0 10e3/uL   IgG  Lab Results   Component Value Date     (L) 07/08/2022     (L) 06/22/2022     (L) 06/01/2022     (L) 05/11/2022     (L) 03/30/2022     (L) 02/16/2022     (L) 01/26/2022     12/29/2021     12/15/2021    IGG 1,032 11/24/2021   kappa light chains:  Lab Results   Component Value Date    KFLCA 1.19 07/08/2022    KFLCA 1.60 06/22/2022    KFLCA 1.84 06/01/2022    KFLCA 1.37 05/11/2022    KFLCA 1.76 04/27/2022    KFLCA 0.98 03/30/2022    KFLCA 1.03 02/16/2022    KFLCA 1.31 01/26/2022    KFLCA 1.06 12/29/2021    KFLCA 1.26 12/15/2021   Monoclonal peak  Lab Results   Component Value Date    ELPM 0.1 (H) 07/08/2022    ELPM 0.1 06/01/2022    ELPM 0.2 04/27/2022    ELPM 0.2 03/30/2022    ELPM 0.3 02/16/2022    ELPM 0.2 01/26/2022    ELPM 0.4 12/29/2021    ELPM 0.4 12/15/2021    ELPM 0.6 11/24/2021    ELPM 1.1 11/03/2021   ]  Imaging    MA SCREENING DIGITAL BILAT - Future  (s+30)    Result Date: 7/7/2022  BILATERAL FULL FIELD DIGITAL SCREENING MAMMOGRAM Performed on: 7/6/22 Compared to: 07/30/2020, 07/13/2018, and 10/06/2014 Technique: This study was evaluated with the assistance of Computer-Aided Detection. Findings: The breasts have scattered areas of fibroglandular density.  There is no radiographic evidence of malignancy. IMPRESSION: ACR BI-RADS Category 1: Negative RECOMMENDED FOLLOW-UP: Annual routine screening mammogram The results and recommendations of this examination will be communicated to the patient.     US Lower Extremity Venous Duplex Left    Result Date: 7/15/2022  EXAM: US LOWER EXTREMITY VENOUS DUPLEX LEFT LOCATION: [facility] DATE/TIME: 7/15/2022 4:14 PM INDICATION:  Pain of left lower leg COMPARISON: None. TECHNIQUE: Venous Duplex  ultrasound of the left lower extremity with and without compression, augmentation and duplex. Color flow and spectral Doppler with waveform analysis performed. FINDINGS: Exam includes the common femoral, femoral, popliteal, and contralateral common femoral veins as well as segmentally visualized deep calf veins and greater saphenous vein. LEFT: No deep vein thrombosis. No superficial thrombophlebitis. No popliteal cyst.     IMPRESSION: 1.  No deep venous thrombosis in the left lower extremity.    XR Ribs & Chest Lt 3v    Result Date: 6/20/2022  EXAM: XR RIBS and CHEST LT 3VW LOCATION: [facility] DATE/TIME: 6/20/2022 3:56 PM INDICATION: Left rib pain eval for fracture Rib 7 8 COMPARISON: None.     IMPRESSION: The visualized heart and lungs are negative. There is a healing fracture of the left 10th anterior rib no acute rib fractures are identified.        Signed by: SINDHU Lundberg CNP

## 2022-07-19 NOTE — LETTER
"    7/19/2022         RE: Lizzie Viera  [address]  [place] [address]        Dear Colleague,    Thank you for referring your patient, Lizzie Viera, to the [facility] [place]. Please see a copy of my visit note below.    Oncology Rooming Note    July 19, 2022 1:34 PM   Lizzie Viera is a 71 year old female who presents for:    Chief Complaint   Patient presents with     Oncology Clinic Visit     Initial Vitals: BP (!) 154/70   Pulse 54   Temp 98.1  F (36.7  C)   Resp 16   Wt 60.3 kg (133 lb)   LMP  (LMP Unknown)   SpO2 97%   BMI 25.13 kg/m   Estimated body mass index is 25.13 kg/m  as calculated from the following:    Height as of 6/30/22: 1.549 m (5' 1\").    Weight as of this encounter: 60.3 kg (133 lb). Body surface area is 1.61 meters squared.  No Pain (0) Comment: Data Unavailable   No LMP recorded (lmp unknown). Patient is postmenopausal.  Allergies reviewed: Yes  Medications reviewed: Yes    Medications: Medication refills not needed today.  Pharmacy name entered into [facility]anda[facility]: [id number] [address] [facility] [[email or web address]] PT. JOVITA ROBERTS.    Clinical concerns: medication side effects    Narda Kingston RN              [facility] Progress Note    Patient: Lizzie Viera  MRN: [id number]  Date of Service: Jul 19, 2022          Reason for Visit    Chief Complaint   Patient presents with     Oncology Clinic Visit       Assessment and Plan    Cancer Staging  No matching staging information was found for the patient.    1. Myeloma: has started on treatment with RVd.  She is getting Velcade weekly, Revlimid 2 weeks on, 1 week off and Dex weekly.  Her light chains and IgG levels have now normalized.  Her hemoglobin has normalized.  Her monoclonal peak has gone from 3.3 down to 0.1.   She is tolerating treatment fairly well.  She did meet with the transplant team.  She has thought about it and she is a little reluctant to " do that at this time.  She did find an article that suggested transplant did not really have any difference in the overall survival so she is actually thinking she may not want to do it at all but is still thinking about it.  She prefers her monoclonal peak to be at 0 before she does it so we will continue her current regimen.  Currently 0.1. We will refer her back to transplant team when and if she feels ready.  We will see Dr. Blanco or myself in 6 weeks.    2. Bone lesions: has started on zometa.  Is been getting monthly.  Her last dose was January 5.  We are going to hold that for now.  She states she needs to get some dental work done.  Urged her to let us know when her dental work is done    3.  Worsening bone pain, rib pain: Patient requesting a body scan.  We will evaluate whether her insurance will pay for a PET scan.  I think it has been well over a year since she is had any sort of body imaging and we will pursue that.    4.  Hypertension: Patient was started on amlodipine when she was in the hospital.  She initially started at 5 mg and then they went up to 10 mg.  Patient had some swelling that we thought was related to this and her blood pressure looked really good so we went down to 5 mg.  She is tolerating that so we will continue that for now.  Courage her to find a primary care doctor      ECOG Performance    0 - Independent    Distress Screening (within last 30 days)    1. How concerned are you about your ability to eat? : 0  2. How concerned are you about unintended weight loss or your current weight? : 0  3. How concerned are you about feeling depressed or very sad? : 0  4. How concerned are you about feeling anxious or very scared? : 0  5. Do you struggle with the loss of meaning and rambo in your life? : Not at all  6. How concerned are you about work and home life issues that may be affected by your cancer? : 2  7. How concerned are you about knowing what resources are available to help you? :  0  8. Do you currently have what you would describe as Congregational or spiritual struggles?            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 Lizzie Viera is a very pleasant 70 year old woman who has been diagnosed with multiple myeloma IgG kappa type in May 2021.  She had initially presented with compression fracture of T7 vertebral body in September 2020.  She had a back pain which led to that evaluation.  Bone density confirmed that she had osteoporosis.  MRI showed diffuse marrow edema in October 2020.  In April 2021 the MRI of the thoracic spine showed worsening T7 vertebral body height loss because of likely pathological compression fracture with extraosseous extension.  She then had IR guided bone biopsy on 7 May 2021 and pathology confirmed the presence of kappa light chain restricted plasma cells.  Her cytogenetics confirmed the presence of hyperdiploid E with gains of chromosome 5, 9 and 15.     She was then seen by Dr. Baig and had a bone marrow biopsy which also confirmed 60% involvement of the marrow with plasma cells.  The bone marrow was done on 3 Jocelin 2021.  The bone marrow also confirmed the presence of hyperdiploid E.  She had a gain of chromosome 3, 5, 7, 9, 11, 15 as  well as 19.  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  Her beta-2 microglobulin was actually normal at the time of her diagnosis as was her albumin at 4.0.  Monoclonal protein 3.1 g/dL.  Kappa free light chain levels of 13 mg/dL IgG level of 4280 with depressed levels of IgM and IgA.  Urine was also positive for kappa light chains as well as immunofixation of the urine was positive for IgG kappa.  Normal kidney function.  Normal hemoglobin.     As mentioned above she had bone lesions in the T7 vertebral body, T1, skull area.  Her main pain is coming from her T7 vertebral body compression fracture.     Due to the pain she has been seen by radiation oncology and has received radiation therapy.  She also got a dose of steroids for pain control.     Currently her pain is controlled with combination of Dilaudid as well as some Tylenol.  She is wearing a brace.  She did notice that when she was on dexamethasone her pain was significantly better.     She was initially thinking of doing some natural therapies but once her symptoms got worse, she came back in was counseled about treatment.  She has been given information about Velcade Revlimid and dexamethasone.  has started that treatment.  She states that she is tolerating this quite well.  Her myeloma has significantly improved.  She did meet with the transplant doctor and she is a little reluctant to do that.  She states she would prefer to be in complete remission before she does something like that.    Patient had been doing fine and then in April she did get admitted for pneumonia.  She did have a little bit of a prolonged recovery but has been doing pretty well since then.    Patient states that overall she is doing okay.  She is having some intermittent swelling in her legs which is bothering her.  She did get an ultrasound last week that did not show any blood clot.  She is having worsening pain in her ribs and she is worried about that.  Her myeloma labs look quite stable.  She  denies any breathing issues.  Denies any infectious complaints.    Review of Systems    Pertinent items are noted in HPI.    Past History    Past Medical History:   Diagnosis Date     Cervical dysplasia      Chronic RUQ pain      Depressive disorder      Multiple myeloma (H)      Osteoporosis        PHYSICAL EXAM  BP (!) 154/70   Pulse 54   Temp 98.1  F (36.7  C)   Resp 16   Wt 60.3 kg (133 lb)   LMP  (LMP Unknown)   SpO2 97%   BMI 25.13 kg/m      GENERAL: no acute distress. Cooperative in conversation. Here with sister. Mask on  RESP: Regular respiratory rate. No expiratory wheezes   MUSCULOSKELETAL: no bilateral leg swelling  NEURO: non focal. Alert and oriented x3.   PSYCH: within normal limits. No depression or anxiety.  SKIN: exposed skin is dry intact.     Lab Results    Recent Results (from the past 168 hour(s))   CBC with platelets and differential   Result Value Ref Range    WBC Count 7.2 4.0 - 11.0 10e3/uL    RBC Count 4.02 3.80 - 5.20 10e6/uL    Hemoglobin 13.8 11.7 - 15.7 g/dL    Hematocrit 42.3 35.0 - 47.0 %     (H) 78 - 100 fL    MCH 34.3 (H) 26.5 - 33.0 pg    MCHC 32.6 31.5 - 36.5 g/dL    RDW 15.9 (H) 10.0 - 15.0 %    Platelet Count 185 150 - 450 10e3/uL   Extra Red Top Tube   Result Value Ref Range    Hold Specimen JIC    Extra Red Top Tube   Result Value Ref Range    Hold Specimen JIC    Manual Differential   Result Value Ref Range    % Neutrophils 87 %    % Lymphocytes 11 %    % Monocytes 1 %    % Eosinophils 1 %    % Basophils 0 %    Absolute Neutrophils 6.3 1.6 - 8.3 10e3/uL    Absolute Lymphocytes 0.8 0.8 - 5.3 10e3/uL    Absolute Monocytes 0.1 0.0 - 1.3 10e3/uL    Absolute Eosinophils 0.1 0.0 - 0.7 10e3/uL    Absolute Basophils 0.0 0.0 - 0.2 10e3/uL    RBC Morphology Confirmed RBC Indices     Platelet Assessment  Automated Count Confirmed. Platelet morphology is normal.     Automated Count Confirmed. Platelet morphology is normal.   Comprehensive metabolic panel   Result Value Ref  Range    Sodium 141 136 - 145 mmol/L    Potassium 3.6 3.5 - 5.0 mmol/L    Chloride 106 98 - 107 mmol/L    Carbon Dioxide (CO2) 27 22 - 31 mmol/L    Anion Gap 8 5 - 18 mmol/L    Urea Nitrogen 17 8 - 22 mg/dL    Creatinine 0.72 0.60 - 1.10 mg/dL    Calcium 9.1 8.5 - 10.5 mg/dL    Glucose 133 (H) 70 - 125 mg/dL    Alkaline Phosphatase 25 (L) 45 - 120 U/L    AST 13 0 - 40 U/L    ALT 18 0 - 45 U/L    Protein Total 5.9 (L) 6.0 - 8.0 g/dL    Albumin 3.2 (L) 3.5 - 5.0 g/dL    Bilirubin Total 0.3 0.0 - 1.0 mg/dL    GFR Estimate 90 >60 mL/min/1.73m2   CBC with platelets and differential   Result Value Ref Range    WBC Count 4.9 4.0 - 11.0 10e3/uL    RBC Count 3.75 (L) 3.80 - 5.20 10e6/uL    Hemoglobin 13.0 11.7 - 15.7 g/dL    Hematocrit 39.6 35.0 - 47.0 %     (H) 78 - 100 fL    MCH 34.7 (H) 26.5 - 33.0 pg    MCHC 32.8 31.5 - 36.5 g/dL    RDW 15.9 (H) 10.0 - 15.0 %    Platelet Count 169 150 - 450 10e3/uL    % Neutrophils 63 %    % Lymphocytes 20 %    % Monocytes 13 %    % Eosinophils 3 %    % Basophils 1 %    % Immature Granulocytes 0 %    NRBCs per 100 WBC 0 <1 /100    Absolute Neutrophils 3.1 1.6 - 8.3 10e3/uL    Absolute Lymphocytes 1.0 0.8 - 5.3 10e3/uL    Absolute Monocytes 0.6 0.0 - 1.3 10e3/uL    Absolute Eosinophils 0.1 0.0 - 0.7 10e3/uL    Absolute Basophils 0.0 0.0 - 0.2 10e3/uL    Absolute Immature Granulocytes 0.0 <=0.4 10e3/uL    Absolute NRBCs 0.0 10e3/uL   IgG  Lab Results   Component Value Date     (L) 07/08/2022     (L) 06/22/2022     (L) 06/01/2022     (L) 05/11/2022     (L) 03/30/2022     (L) 02/16/2022     (L) 01/26/2022     12/29/2021     12/15/2021    IGG 1,032 11/24/2021   kappa light chains:  Lab Results   Component Value Date    KFLCA 1.19 07/08/2022    KFLCA 1.60 06/22/2022    KFLCA 1.84 06/01/2022    KFLCA 1.37 05/11/2022    KFLCA 1.76 04/27/2022    KFLCA 0.98 03/30/2022    KFLCA 1.03 02/16/2022    KFLCA 1.31 01/26/2022    KFLCA  1.06 12/29/2021    KFLCA 1.26 12/15/2021   Monoclonal peak  Lab Results   Component Value Date    ELPM 0.1 (H) 07/08/2022    ELPM 0.1 06/01/2022    ELPM 0.2 04/27/2022    ELPM 0.2 03/30/2022    ELPM 0.3 02/16/2022    ELPM 0.2 01/26/2022    ELPM 0.4 12/29/2021    ELPM 0.4 12/15/2021    ELPM 0.6 11/24/2021    ELPM 1.1 11/03/2021   ]  Imaging    MA SCREENING DIGITAL BILAT - Future  (s+30)    Result Date: 7/7/2022  BILATERAL FULL FIELD DIGITAL SCREENING MAMMOGRAM Performed on: 7/6/22 Compared to: 07/30/2020, 07/13/2018, and 10/06/2014 Technique: This study was evaluated with the assistance of Computer-Aided Detection. Findings: The breasts have scattered areas of fibroglandular density.  There is no radiographic evidence of malignancy. IMPRESSION: ACR BI-RADS Category 1: Negative RECOMMENDED FOLLOW-UP: Annual routine screening mammogram The results and recommendations of this examination will be communicated to the patient.     US Lower Extremity Venous Duplex Left    Result Date: 7/15/2022  EXAM: US LOWER EXTREMITY VENOUS DUPLEX LEFT LOCATION: [facility] DATE/TIME: 7/15/2022 4:14 PM INDICATION:  Pain of left lower leg COMPARISON: None. TECHNIQUE: Venous Duplex ultrasound of the left lower extremity with and without compression, augmentation and duplex. Color flow and spectral Doppler with waveform analysis performed. FINDINGS: Exam includes the common femoral, femoral, popliteal, and contralateral common femoral veins as well as segmentally visualized deep calf veins and greater saphenous vein. LEFT: No deep vein thrombosis. No superficial thrombophlebitis. No popliteal cyst.     IMPRESSION: 1.  No deep venous thrombosis in the left lower extremity.    XR Ribs & Chest Lt 3v    Result Date: 6/20/2022  EXAM: XR RIBS and CHEST LT 3VW LOCATION: [facility] DATE/TIME: 6/20/2022 3:56 PM INDICATION: Left rib pain eval for fracture Rib 7 8 COMPARISON: None.     IMPRESSION: The  visualized heart and lungs are negative. There is a healing fracture of the left 10th anterior rib no acute rib fractures are identified.        Signed by: SINDHU Lundberg CNP      Again, thank you for allowing me to participate in the care of your patient.        Sincerely,        SINDHU Lundberg CNP

## 2022-07-20 DIAGNOSIS — M48.04 THORACIC SPINAL STENOSIS: ICD-10-CM

## 2022-07-20 DIAGNOSIS — M79.18 MYOFASCIAL PAIN: ICD-10-CM

## 2022-07-20 RX ORDER — GABAPENTIN 300 MG/1
600 CAPSULE ORAL 3 TIMES DAILY
Qty: 180 CAPSULE | Refills: 2 | Status: SHIPPED | OUTPATIENT
Start: 2022-07-20 | End: 2022-10-27

## 2022-07-25 ENCOUNTER — HOSPITAL ENCOUNTER (OUTPATIENT)
Dept: PET IMAGING | Facility: HOSPITAL | Age: 72
Discharge: HOME | End: 2022-07-25
Attending: NURSE PRACTITIONER | Admitting: NURSE PRACTITIONER
Payer: COMMERCIAL

## 2022-07-25 DIAGNOSIS — M89.8X9 BONE PAIN: ICD-10-CM

## 2022-07-25 DIAGNOSIS — C90.00 MULTIPLE MYELOMA WITH FAILED REMISSION (H): ICD-10-CM

## 2022-07-25 LAB — GLUCOSE BLDC GLUCOMTR-MCNC: 98 MG/DL (ref 70–99)

## 2022-07-25 PROCEDURE — 343N000001 HC RX 343: Performed by: NURSE PRACTITIONER

## 2022-07-25 PROCEDURE — A9552 F18 FDG: HCPCS | Performed by: NURSE PRACTITIONER

## 2022-07-25 PROCEDURE — 82962 GLUCOSE BLOOD TEST: CPT

## 2022-07-25 PROCEDURE — 78816 PET IMAGE W/CT FULL BODY: CPT | Mod: PS

## 2022-07-25 RX ADMIN — FLUDEOXYGLUCOSE F-18 10.23 MCI.: 500 INJECTION, SOLUTION INTRAVENOUS at 10:22

## 2022-07-27 ENCOUNTER — INFUSION THERAPY VISIT (OUTPATIENT)
Dept: INFUSION THERAPY | Facility: HOSPITAL | Age: 72
End: 2022-07-27
Attending: NURSE PRACTITIONER
Payer: COMMERCIAL

## 2022-07-27 ENCOUNTER — PATIENT OUTREACH (OUTPATIENT)
Dept: ONCOLOGY | Facility: HOSPITAL | Age: 72
End: 2022-07-27

## 2022-07-27 ENCOUNTER — LAB (OUTPATIENT)
Dept: INFUSION THERAPY | Facility: HOSPITAL | Age: 72
End: 2022-07-27
Attending: INTERNAL MEDICINE
Payer: COMMERCIAL

## 2022-07-27 VITALS
DIASTOLIC BLOOD PRESSURE: 55 MMHG | RESPIRATION RATE: 16 BRPM | SYSTOLIC BLOOD PRESSURE: 138 MMHG | TEMPERATURE: 97.8 F | HEART RATE: 55 BPM | OXYGEN SATURATION: 98 %

## 2022-07-27 DIAGNOSIS — C90.00 MULTIPLE MYELOMA WITH FAILED REMISSION (H): Primary | ICD-10-CM

## 2022-07-27 LAB
BASOPHILS # BLD AUTO: 0.1 10E3/UL (ref 0–0.2)
BASOPHILS NFR BLD AUTO: 1 %
EOSINOPHIL # BLD AUTO: 0.4 10E3/UL (ref 0–0.7)
EOSINOPHIL NFR BLD AUTO: 6 %
ERYTHROCYTE [DISTWIDTH] IN BLOOD BY AUTOMATED COUNT: 16.2 % (ref 10–15)
HCT VFR BLD AUTO: 40.1 % (ref 35–47)
HGB BLD-MCNC: 13.3 G/DL (ref 11.7–15.7)
IMM GRANULOCYTES # BLD: 0 10E3/UL
IMM GRANULOCYTES NFR BLD: 1 %
LYMPHOCYTES # BLD AUTO: 1.1 10E3/UL (ref 0.8–5.3)
LYMPHOCYTES NFR BLD AUTO: 17 %
MCH RBC QN AUTO: 35 PG (ref 26.5–33)
MCHC RBC AUTO-ENTMCNC: 33.2 G/DL (ref 31.5–36.5)
MCV RBC AUTO: 106 FL (ref 78–100)
MONOCYTES # BLD AUTO: 0.4 10E3/UL (ref 0–1.3)
MONOCYTES NFR BLD AUTO: 6 %
NEUTROPHILS # BLD AUTO: 4.7 10E3/UL (ref 1.6–8.3)
NEUTROPHILS NFR BLD AUTO: 69 %
NRBC # BLD AUTO: 0 10E3/UL
NRBC BLD AUTO-RTO: 0 /100
PLATELET # BLD AUTO: 206 10E3/UL (ref 150–450)
RBC # BLD AUTO: 3.8 10E6/UL (ref 3.8–5.2)
WBC # BLD AUTO: 6.6 10E3/UL (ref 4–11)

## 2022-07-27 PROCEDURE — 36415 COLL VENOUS BLD VENIPUNCTURE: CPT | Performed by: NURSE PRACTITIONER

## 2022-07-27 PROCEDURE — 85025 COMPLETE CBC W/AUTO DIFF WBC: CPT | Performed by: NURSE PRACTITIONER

## 2022-07-27 PROCEDURE — 96401 CHEMO ANTI-NEOPL SQ/IM: CPT

## 2022-07-27 PROCEDURE — 250N000011 HC RX IP 250 OP 636: Performed by: NURSE PRACTITIONER

## 2022-07-27 RX ADMIN — BORTEZOMIB 2.1 MG: 3.5 INJECTION, POWDER, LYOPHILIZED, FOR SOLUTION INTRAVENOUS; SUBCUTANEOUS at 14:41

## 2022-07-27 NOTE — PROGRESS NOTES
Pt arrived ambulatory to clinic for Cycle # 15 Day # 8 of her chemotherapy regimen.  Labs were reviewed, pt met parameters for treatment.  Administered subcutaneous Velcade into LLQ of ABD per MD order.  Pt tolerated procedure well, no s/s of bleeding or bruising at site.  Pt verbalized understanding of plan of care and return to clinic.

## 2022-07-27 NOTE — PROGRESS NOTES
Valeria was called to let her know that per Zehra SHERMAN her scan does not show any active disease in her bones.  Patient had seen the results on MyChart.  She was appreciative of the call.

## 2022-07-28 ENCOUNTER — ONCOLOGY VISIT (OUTPATIENT)
Dept: ONCOLOGY | Facility: CLINIC | Age: 72
End: 2022-07-28
Attending: INTERNAL MEDICINE
Payer: COMMERCIAL

## 2022-07-28 DIAGNOSIS — F43.23 ADJUSTMENT DISORDER WITH MIXED ANXIETY AND DEPRESSED MOOD: Primary | ICD-10-CM

## 2022-07-28 DIAGNOSIS — C90.00 MULTIPLE MYELOMA WITH FAILED REMISSION (H): ICD-10-CM

## 2022-07-28 PROCEDURE — 90834 PSYTX W PT 45 MINUTES: CPT | Performed by: SOCIAL WORKER

## 2022-07-28 ASSESSMENT — ANXIETY QUESTIONNAIRES
7. FEELING AFRAID AS IF SOMETHING AWFUL MIGHT HAPPEN: MORE THAN HALF THE DAYS
5. BEING SO RESTLESS THAT IT IS HARD TO SIT STILL: MORE THAN HALF THE DAYS
IF YOU CHECKED OFF ANY PROBLEMS ON THIS QUESTIONNAIRE, HOW DIFFICULT HAVE THESE PROBLEMS MADE IT FOR YOU TO DO YOUR WORK, TAKE CARE OF THINGS AT HOME, OR GET ALONG WITH OTHER PEOPLE: VERY DIFFICULT
GAD7 TOTAL SCORE: 13
2. NOT BEING ABLE TO STOP OR CONTROL WORRYING: MORE THAN HALF THE DAYS
1. FEELING NERVOUS, ANXIOUS, OR ON EDGE: MORE THAN HALF THE DAYS
GAD7 TOTAL SCORE: 13
6. BECOMING EASILY ANNOYED OR IRRITABLE: SEVERAL DAYS
3. WORRYING TOO MUCH ABOUT DIFFERENT THINGS: MORE THAN HALF THE DAYS

## 2022-07-28 ASSESSMENT — PATIENT HEALTH QUESTIONNAIRE - PHQ9
5. POOR APPETITE OR OVEREATING: MORE THAN HALF THE DAYS
SUM OF ALL RESPONSES TO PHQ QUESTIONS 1-9: 14

## 2022-07-28 NOTE — LETTER
7/28/2022         RE: Lizzie Viera  [address]  [place] [address]        Dear Colleague,    Thank you for referring your patient, Lizzie Viera, to the [facility]. Please see a copy of my visit note below.    The author of this note documented a reason for not sharing it with the patient.           Again, thank you for allowing me to participate in the care of your patient.        Sincerely,        JAI AMADO LP, LICSW
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
## 2022-08-02 ENCOUNTER — ONCOLOGY VISIT (OUTPATIENT)
Dept: ONCOLOGY | Facility: HOSPITAL | Age: 72
End: 2022-08-02
Attending: INTERNAL MEDICINE
Payer: COMMERCIAL

## 2022-08-02 DIAGNOSIS — F43.23 ADJUSTMENT DISORDER WITH MIXED ANXIETY AND DEPRESSED MOOD: Primary | ICD-10-CM

## 2022-08-02 DIAGNOSIS — C90.00 MULTIPLE MYELOMA WITH FAILED REMISSION (H): ICD-10-CM

## 2022-08-02 PROCEDURE — 90834 PSYTX W PT 45 MINUTES: CPT | Performed by: SOCIAL WORKER

## 2022-08-02 ASSESSMENT — ANXIETY QUESTIONNAIRES
6. BECOMING EASILY ANNOYED OR IRRITABLE: SEVERAL DAYS
7. FEELING AFRAID AS IF SOMETHING AWFUL MIGHT HAPPEN: MORE THAN HALF THE DAYS
GAD7 TOTAL SCORE: 11
5. BEING SO RESTLESS THAT IT IS HARD TO SIT STILL: SEVERAL DAYS
3. WORRYING TOO MUCH ABOUT DIFFERENT THINGS: SEVERAL DAYS
IF YOU CHECKED OFF ANY PROBLEMS ON THIS QUESTIONNAIRE, HOW DIFFICULT HAVE THESE PROBLEMS MADE IT FOR YOU TO DO YOUR WORK, TAKE CARE OF THINGS AT HOME, OR GET ALONG WITH OTHER PEOPLE: SOMEWHAT DIFFICULT
GAD7 TOTAL SCORE: 11
2. NOT BEING ABLE TO STOP OR CONTROL WORRYING: MORE THAN HALF THE DAYS
1. FEELING NERVOUS, ANXIOUS, OR ON EDGE: MORE THAN HALF THE DAYS

## 2022-08-02 ASSESSMENT — PATIENT HEALTH QUESTIONNAIRE - PHQ9
SUM OF ALL RESPONSES TO PHQ QUESTIONS 1-9: 8
5. POOR APPETITE OR OVEREATING: MORE THAN HALF THE DAYS
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
## 2022-08-02 NOTE — LETTER
8/2/2022         RE: Lizzie Viera  [address]  [place] [address]        Dear Colleague,    Thank you for referring your patient, Lizzie Viera, to the [facility]. Please see a copy of my visit note below.    .      Again, thank you for allowing me to participate in the care of your patient.        Sincerely,        JAI AMADO LP, LICSW

## 2022-08-03 ENCOUNTER — LAB (OUTPATIENT)
Dept: INFUSION THERAPY | Facility: HOSPITAL | Age: 72
End: 2022-08-03
Attending: INTERNAL MEDICINE
Payer: COMMERCIAL

## 2022-08-03 ENCOUNTER — INFUSION THERAPY VISIT (OUTPATIENT)
Dept: INFUSION THERAPY | Facility: HOSPITAL | Age: 72
End: 2022-08-03
Attending: NURSE PRACTITIONER
Payer: COMMERCIAL

## 2022-08-03 DIAGNOSIS — C90.00 MULTIPLE MYELOMA WITH FAILED REMISSION (H): Primary | ICD-10-CM

## 2022-08-03 LAB
BASOPHILS # BLD AUTO: 0 10E3/UL (ref 0–0.2)
BASOPHILS NFR BLD AUTO: 1 %
EOSINOPHIL # BLD AUTO: 0.2 10E3/UL (ref 0–0.7)
EOSINOPHIL NFR BLD AUTO: 3 %
ERYTHROCYTE [DISTWIDTH] IN BLOOD BY AUTOMATED COUNT: 15.6 % (ref 10–15)
HCT VFR BLD AUTO: 40.6 % (ref 35–47)
HGB BLD-MCNC: 13.4 G/DL (ref 11.7–15.7)
IMM GRANULOCYTES # BLD: 0 10E3/UL
IMM GRANULOCYTES NFR BLD: 1 %
LYMPHOCYTES # BLD AUTO: 0.8 10E3/UL (ref 0.8–5.3)
LYMPHOCYTES NFR BLD AUTO: 12 %
MCH RBC QN AUTO: 34.6 PG (ref 26.5–33)
MCHC RBC AUTO-ENTMCNC: 33 G/DL (ref 31.5–36.5)
MCV RBC AUTO: 105 FL (ref 78–100)
MONOCYTES # BLD AUTO: 0.7 10E3/UL (ref 0–1.3)
MONOCYTES NFR BLD AUTO: 11 %
NEUTROPHILS # BLD AUTO: 4.6 10E3/UL (ref 1.6–8.3)
NEUTROPHILS NFR BLD AUTO: 72 %
NRBC # BLD AUTO: 0 10E3/UL
NRBC BLD AUTO-RTO: 0 /100
PLATELET # BLD AUTO: 173 10E3/UL (ref 150–450)
RBC # BLD AUTO: 3.87 10E6/UL (ref 3.8–5.2)
WBC # BLD AUTO: 6.3 10E3/UL (ref 4–11)

## 2022-08-03 PROCEDURE — 85025 COMPLETE CBC W/AUTO DIFF WBC: CPT | Performed by: NURSE PRACTITIONER

## 2022-08-03 PROCEDURE — 250N000011 HC RX IP 250 OP 636: Performed by: NURSE PRACTITIONER

## 2022-08-03 PROCEDURE — 96401 CHEMO ANTI-NEOPL SQ/IM: CPT

## 2022-08-03 PROCEDURE — 84155 ASSAY OF PROTEIN SERUM: CPT

## 2022-08-03 PROCEDURE — 83521 IG LIGHT CHAINS FREE EACH: CPT

## 2022-08-03 PROCEDURE — 86334 IMMUNOFIX E-PHORESIS SERUM: CPT | Performed by: PATHOLOGY

## 2022-08-03 PROCEDURE — 82784 ASSAY IGA/IGD/IGG/IGM EACH: CPT

## 2022-08-03 PROCEDURE — 36415 COLL VENOUS BLD VENIPUNCTURE: CPT | Performed by: NURSE PRACTITIONER

## 2022-08-03 PROCEDURE — 84165 PROTEIN E-PHORESIS SERUM: CPT | Mod: TC | Performed by: PATHOLOGY

## 2022-08-03 RX ADMIN — BORTEZOMIB 2.1 MG: 3.5 INJECTION, POWDER, LYOPHILIZED, FOR SOLUTION INTRAVENOUS; SUBCUTANEOUS at 14:45

## 2022-08-03 NOTE — PROGRESS NOTES
Pt arrived ambulatory to clinic for Cycle # 15 Day # 15 of her chemotherapy regimen.  Labs were reviewed, pt met parameters for treatment.  Administered subcutaneous Velcade into RLQ of ABD per MD order.  Pt tolerated procedure well, no s/s of bleeding or bruising at site.  Pt verbalized understanding of plan of care and return to clinic.

## 2022-08-04 ENCOUNTER — TELEPHONE (OUTPATIENT)
Dept: ONCOLOGY | Facility: HOSPITAL | Age: 72
End: 2022-08-04

## 2022-08-04 DIAGNOSIS — C90.00 MULTIPLE MYELOMA WITH FAILED REMISSION (H): Primary | ICD-10-CM

## 2022-08-04 LAB
IGA SERPL-MCNC: 23 MG/DL (ref 84–499)
IGG SERPL-MCNC: 325 MG/DL (ref 610–1616)
IGM SERPL-MCNC: 17 MG/DL (ref 35–242)
KAPPA LC FREE SER-MCNC: 1.07 MG/DL (ref 0.33–1.94)
KAPPA LC FREE/LAMBDA FREE SER NEPH: 0.99 {RATIO} (ref 0.26–1.65)
LAMBDA LC FREE SERPL-MCNC: 1.08 MG/DL (ref 0.57–2.63)
TOTAL PROTEIN SERUM FOR ELP: 5.4 G/DL (ref 6.4–8.3)

## 2022-08-04 RX ORDER — LENALIDOMIDE 25 MG/1
25 CAPSULE ORAL DAILY
Qty: 14 CAPSULE | Refills: 0 | Status: SHIPPED | OUTPATIENT
Start: 2022-08-10 | End: 2022-08-25

## 2022-08-04 NOTE — ORAL ONC MGMT
Oral Chemotherapy Monitoring Program    Subjective/Objective:  Lizzie Viera is a 71 year old female with multiple myeloma contacted by phone for a monthly follow-up visit for oral chemotherapy. Valeria continues to take lenalidomide 25 mg daily and started her off week yesterday. She noticed she had 1 capsule left, but is not sure which day she missed. She continues to have bilateral lower extremity edema. She states that she had very mild edema before starting amlodipine, but it has gotten much worse since. Even with the dose decrease to amlodipine 5 mg, her feet are too swollen to fit in her shoes by the end of the day and are painful. She is staying active during the day and elevating her feet as much as possible. Her systolic blood pressure has been running between 100-140 mmHg. I told her to start keeping a log that she can bring into appointments. Her PCP left and she is currently trying to find a new one to manage her blood pressure.     ORAL CHEMOTHERAPY 5/12/2022 5/12/2022 6/2/2022 6/23/2022 7/1/2022 7/14/2022 8/4/2022   Assessment Type Monthly Follow up Refill Refill Refill Monthly Follow up;Left Voicemail Refill Monthly Follow up;Refill   Diagnosis Code Multiple Myeloma Multiple Myeloma Multiple Myeloma Multiple Myeloma Multiple Myeloma Multiple Myeloma Multiple Myeloma   Providers Dr. Bella Blanco   Clinic Name/Location [facility]   Drug Name Revlimid (lenalidomide) Revlimid (lenalidomide) Revlimid (lenalidomide) Revlimid (lenalidomide) Revlimid (lenalidomide) Revlimid (lenalidomide) Revlimid (lenalidomide)   Dose 25 mg 25 mg 25 mg 25 mg 25 mg 25 mg 25 mg   Current Schedule Daily Daily Daily Daily Daily Daily Daily   Cycle Details 2 weeks on, 1 week off 2 weeks on, 1 week off 2 weeks on, 1 week off 2 weeks on, 1 week off 2 weeks on, 1 week off 2 weeks on, 1 week off 2  "weeks on, 1 week off   Start Date of Last Cycle 4/27/2022 - - - - - 7/20/2022   Planned next cycle start date 5/18/2022 - - - - - 8/10/2022   Doses missed in last 2 weeks 0 0 - - - - 1   Adherence Assessment Adherent - - - - - Adherent   Adverse Effects - - - - - - Edema   Nausea - - - - - - -   Pharmacist Intervention(nausea) - - - - - - -   Diarrhea - - - - - - -   Pharmacist Intervention(diarrhea) - - - - - - -   Intervention(s) - - - - - - -   Decrease Appetite/Weight Loss - - - - - - -   Pharmacist Intervention(decreased appetite/weight loss) - - - - - - -   Intervention(s) - - - - - - -   Fatigue - - - - - - -   Pharmacist Intervention(fatigue) - - - - - - -   Any new drug interactions? No - - - - - No   Is the dose as ordered appropriate for the patient? (No Data) - - - - - Yes       Last PHQ-2 Score on record:   PHQ-2 ( 1999 [employer]) 6/25/2022 5/3/2022   Q1: Little interest or pleasure in doing things 1 1   Q2: Feeling down, depressed or hopeless 1 0   PHQ-2 Score 2 1   Q1: Little interest or pleasure in doing things Several days Several days   Q2: Feeling down, depressed or hopeless Several days Not at all   PHQ-2 Score 2 1       Vitals:  BP:   BP Readings from Last 1 Encounters:   07/27/22 138/55     Wt Readings from Last 1 Encounters:   07/19/22 60.3 kg (133 lb)     Estimated body surface area is 1.61 meters squared as calculated from the following:    Height as of 6/30/22: 1.549 m (5' 1\").    Weight as of 7/19/22: 60.3 kg (133 lb).    Labs:  _  Result Component Current Result Ref Range   Sodium 141 (7/19/2022) 136 - 145 mmol/L     _  Result Component Current Result Ref Range   Potassium 3.6 (7/19/2022) 3.5 - 5.0 mmol/L     _  Result Component Current Result Ref Range   Calcium 9.1 (7/19/2022) 8.5 - 10.5 mg/dL     No results found for Mag within last 30 days.     No results found for Phos within last 30 days.     _  Result Component Current Result Ref Range   Albumin 3.2 (L) (7/19/2022) 3.5 - 5.0 g/dL "     _  Result Component Current Result Ref Range   Urea Nitrogen 17 (7/19/2022) 8 - 22 mg/dL     _  Result Component Current Result Ref Range   Creatinine 0.72 (7/19/2022) 0.60 - 1.10 mg/dL     _  Result Component Current Result Ref Range   AST 13 (7/19/2022) 0 - 40 U/L     _  Result Component Current Result Ref Range   ALT 18 (7/19/2022) 0 - 45 U/L     _  Result Component Current Result Ref Range   Bilirubin Total 0.3 (7/19/2022) 0.0 - 1.0 mg/dL     _  Result Component Current Result Ref Range   WBC Count 6.3 (8/3/2022) 4.0 - 11.0 10e3/uL     _  Result Component Current Result Ref Range   Hemoglobin 13.4 (8/3/2022) 11.7 - 15.7 g/dL     _  Result Component Current Result Ref Range   Platelet Count 173 (8/3/2022) 150 - 450 10e3/uL     _  Result Component Current Result Ref Range   Absolute Neutrophils 6.3 (7/13/2022) 1.6 - 8.3 10e3/uL     _  Result Component Current Result Ref Range   Absolute Neutrophils 4.6 (8/3/2022) 1.6 - 8.3 10e3/uL      Medication Reconciliation:  No changes    Assessment/Plan:  Valeria should continue to monitor her BP and to elevate her feet as much as possible. It sounds like the edema may be related to amlodipine since it got worse after starting. She should establish care with a new PCP to manage her BP medications. I will follow-up with the treatment team to see if they would like to make any changes until she established care with a new PCP. Continue with current treatment plan.    Follow-Up:  8/10 - labs  8/17 - labs  8/24 - labs  8/31 - labs + appt    We will call Valeria about 2 weeks after her appt for her next monthly follow-up.    Refill Due:  Released today    Osvaldo InfanteD  Oral Chemotherapy Pharmacist  [phone]

## 2022-08-05 LAB
ALBUMIN SERPL ELPH-MCNC: 3.4 G/DL (ref 3.7–5.1)
ALPHA1 GLOB SERPL ELPH-MCNC: 0.3 G/DL (ref 0.2–0.4)
ALPHA2 GLOB SERPL ELPH-MCNC: 0.8 G/DL (ref 0.5–0.9)
B-GLOBULIN SERPL ELPH-MCNC: 0.6 G/DL (ref 0.6–1)
GAMMA GLOB SERPL ELPH-MCNC: 0.3 G/DL (ref 0.7–1.6)
M PROTEIN SERPL ELPH-MCNC: 0.1 G/DL
PROT PATTERN SERPL ELPH-IMP: ABNORMAL
PROT PATTERN SERPL IFE-IMP: NORMAL

## 2022-08-05 PROCEDURE — 84165 PROTEIN E-PHORESIS SERUM: CPT | Mod: 26 | Performed by: PATHOLOGY

## 2022-08-05 PROCEDURE — 86334 IMMUNOFIX E-PHORESIS SERUM: CPT | Mod: 26 | Performed by: PATHOLOGY

## 2022-08-08 RX ORDER — DEXAMETHASONE 4 MG/1
TABLET ORAL
Qty: 100 TABLET | Refills: 1 | Status: SHIPPED | OUTPATIENT
Start: 2022-08-08 | End: 2022-09-15

## 2022-08-10 ENCOUNTER — INFUSION THERAPY VISIT (OUTPATIENT)
Dept: INFUSION THERAPY | Facility: HOSPITAL | Age: 72
End: 2022-08-10
Attending: NURSE PRACTITIONER
Payer: COMMERCIAL

## 2022-08-10 ENCOUNTER — LAB (OUTPATIENT)
Dept: INFUSION THERAPY | Facility: HOSPITAL | Age: 72
End: 2022-08-10
Attending: INTERNAL MEDICINE
Payer: COMMERCIAL

## 2022-08-10 VITALS
TEMPERATURE: 98 F | HEART RATE: 60 BPM | RESPIRATION RATE: 16 BRPM | DIASTOLIC BLOOD PRESSURE: 51 MMHG | SYSTOLIC BLOOD PRESSURE: 115 MMHG | OXYGEN SATURATION: 95 %

## 2022-08-10 DIAGNOSIS — C90.00 MULTIPLE MYELOMA WITH FAILED REMISSION (H): Primary | ICD-10-CM

## 2022-08-10 LAB
ALBUMIN SERPL-MCNC: 3.2 G/DL (ref 3.5–5)
ALP SERPL-CCNC: 26 U/L (ref 45–120)
ALT SERPL W P-5'-P-CCNC: 14 U/L (ref 0–45)
ANION GAP SERPL CALCULATED.3IONS-SCNC: 6 MMOL/L (ref 5–18)
AST SERPL W P-5'-P-CCNC: 10 U/L (ref 0–40)
BASOPHILS # BLD AUTO: 0 10E3/UL (ref 0–0.2)
BASOPHILS NFR BLD AUTO: 0 %
BILIRUB SERPL-MCNC: 0.3 MG/DL (ref 0–1)
BUN SERPL-MCNC: 23 MG/DL (ref 8–22)
CALCIUM SERPL-MCNC: 8.8 MG/DL (ref 8.5–10.5)
CHLORIDE BLD-SCNC: 107 MMOL/L (ref 98–107)
CO2 SERPL-SCNC: 27 MMOL/L (ref 22–31)
CREAT SERPL-MCNC: 0.7 MG/DL (ref 0.6–1.1)
EOSINOPHIL # BLD AUTO: 0.2 10E3/UL (ref 0–0.7)
EOSINOPHIL NFR BLD AUTO: 3 %
ERYTHROCYTE [DISTWIDTH] IN BLOOD BY AUTOMATED COUNT: 15.8 % (ref 10–15)
GFR SERPL CREATININE-BSD FRML MDRD: >90 ML/MIN/1.73M2
GLUCOSE BLD-MCNC: 98 MG/DL (ref 70–125)
HCT VFR BLD AUTO: 38.1 % (ref 35–47)
HGB BLD-MCNC: 12.8 G/DL (ref 11.7–15.7)
IMM GRANULOCYTES # BLD: 0 10E3/UL
IMM GRANULOCYTES NFR BLD: 0 %
LYMPHOCYTES # BLD AUTO: 0.6 10E3/UL (ref 0.8–5.3)
LYMPHOCYTES NFR BLD AUTO: 9 %
MCH RBC QN AUTO: 35.2 PG (ref 26.5–33)
MCHC RBC AUTO-ENTMCNC: 33.6 G/DL (ref 31.5–36.5)
MCV RBC AUTO: 105 FL (ref 78–100)
MONOCYTES # BLD AUTO: 1 10E3/UL (ref 0–1.3)
MONOCYTES NFR BLD AUTO: 14 %
NEUTROPHILS # BLD AUTO: 5.3 10E3/UL (ref 1.6–8.3)
NEUTROPHILS NFR BLD AUTO: 74 %
NRBC # BLD AUTO: 0 10E3/UL
NRBC BLD AUTO-RTO: 0 /100
PLATELET # BLD AUTO: 155 10E3/UL (ref 150–450)
POTASSIUM BLD-SCNC: 3.7 MMOL/L (ref 3.5–5)
PROT SERPL-MCNC: 5.6 G/DL (ref 6–8)
RBC # BLD AUTO: 3.64 10E6/UL (ref 3.8–5.2)
SODIUM SERPL-SCNC: 140 MMOL/L (ref 136–145)
WBC # BLD AUTO: 7.1 10E3/UL (ref 4–11)

## 2022-08-10 PROCEDURE — 80053 COMPREHEN METABOLIC PANEL: CPT | Performed by: NURSE PRACTITIONER

## 2022-08-10 PROCEDURE — 36415 COLL VENOUS BLD VENIPUNCTURE: CPT | Performed by: NURSE PRACTITIONER

## 2022-08-10 PROCEDURE — 85025 COMPLETE CBC W/AUTO DIFF WBC: CPT | Performed by: NURSE PRACTITIONER

## 2022-08-10 PROCEDURE — 96401 CHEMO ANTI-NEOPL SQ/IM: CPT

## 2022-08-10 PROCEDURE — 250N000011 HC RX IP 250 OP 636: Performed by: NURSE PRACTITIONER

## 2022-08-10 RX ADMIN — BORTEZOMIB 2.1 MG: 3.5 INJECTION, POWDER, LYOPHILIZED, FOR SOLUTION INTRAVENOUS; SUBCUTANEOUS at 15:01

## 2022-08-10 NOTE — PROGRESS NOTES
Infusion Nursing Note:  Lizzie Viera presents today for labs and Velcade injection.    Patient seen by provider today: No   present during visit today: Not Applicable.    Note: Writer verified Valeria is taking dexamethasone and Revlimid as prescribed. Velcade administered subcutaneous as ordered in left lower abdomen. 2x2 gauze and paper tape placed over injection site.    Intravenous Access:  No Intravenous access. Labs drawn per EDY Prescott.    Treatment Conditions:  Lab Results   Component Value Date    HGB 12.8 08/10/2022    WBC 7.1 08/10/2022    ANEU 6.3 07/13/2022    ANEUTAUTO 5.3 08/10/2022     08/10/2022      Lab Results   Component Value Date     08/10/2022    POTASSIUM 3.7 08/10/2022    MAG 1.9 04/19/2022    CR 0.70 08/10/2022    SERAFIN 8.8 08/10/2022    BILITOTAL 0.3 08/10/2022    ALBUMIN 3.2 (L) 08/10/2022    ALT 14 08/10/2022    AST 10 08/10/2022     Results reviewed, labs MET treatment parameters, ok to proceed with treatment.    Post Infusion Assessment:  Patient tolerated injection without incident.     Discharge Plan:   Patient will return August 17th for next appointment.   Patient discharged in stable condition accompanied by: self.  Departure Mode: Ambulatory.      Sharri Friedman RN

## 2022-08-15 DIAGNOSIS — G89.3 CANCER ASSOCIATED PAIN: ICD-10-CM

## 2022-08-15 NOTE — TELEPHONE ENCOUNTER
Received Switchboardt message from patient requesting refill of robaxin.     Last office visit: 6/7/22  Scheduled for follow up 9/13/22     Will route request to APRN for review.

## 2022-08-16 RX ORDER — METHOCARBAMOL 500 MG/1
500 TABLET, FILM COATED ORAL 4 TIMES DAILY PRN
Qty: 90 TABLET | Refills: 0 | Status: SHIPPED | OUTPATIENT
Start: 2022-08-16 | End: 2022-09-13

## 2022-08-17 ENCOUNTER — INFUSION THERAPY VISIT (OUTPATIENT)
Dept: INFUSION THERAPY | Facility: HOSPITAL | Age: 72
End: 2022-08-17
Attending: NURSE PRACTITIONER
Payer: COMMERCIAL

## 2022-08-17 ENCOUNTER — LAB (OUTPATIENT)
Dept: INFUSION THERAPY | Facility: HOSPITAL | Age: 72
End: 2022-08-17
Attending: INTERNAL MEDICINE
Payer: COMMERCIAL

## 2022-08-17 VITALS
RESPIRATION RATE: 16 BRPM | DIASTOLIC BLOOD PRESSURE: 59 MMHG | OXYGEN SATURATION: 96 % | TEMPERATURE: 98.4 F | SYSTOLIC BLOOD PRESSURE: 128 MMHG | HEART RATE: 64 BPM

## 2022-08-17 DIAGNOSIS — C90.00 MULTIPLE MYELOMA WITH FAILED REMISSION (H): Primary | ICD-10-CM

## 2022-08-17 LAB
BASOPHILS # BLD AUTO: 0.1 10E3/UL (ref 0–0.2)
BASOPHILS NFR BLD AUTO: 1 %
EOSINOPHIL # BLD AUTO: 0.4 10E3/UL (ref 0–0.7)
EOSINOPHIL NFR BLD AUTO: 8 %
ERYTHROCYTE [DISTWIDTH] IN BLOOD BY AUTOMATED COUNT: 16.1 % (ref 10–15)
HCT VFR BLD AUTO: 40 % (ref 35–47)
HGB BLD-MCNC: 13.7 G/DL (ref 11.7–15.7)
IMM GRANULOCYTES # BLD: 0 10E3/UL
IMM GRANULOCYTES NFR BLD: 1 %
LYMPHOCYTES # BLD AUTO: 0.8 10E3/UL (ref 0.8–5.3)
LYMPHOCYTES NFR BLD AUTO: 14 %
MCH RBC QN AUTO: 36.2 PG (ref 26.5–33)
MCHC RBC AUTO-ENTMCNC: 34.3 G/DL (ref 31.5–36.5)
MCV RBC AUTO: 106 FL (ref 78–100)
MONOCYTES # BLD AUTO: 0.3 10E3/UL (ref 0–1.3)
MONOCYTES NFR BLD AUTO: 6 %
NEUTROPHILS # BLD AUTO: 4.1 10E3/UL (ref 1.6–8.3)
NEUTROPHILS NFR BLD AUTO: 70 %
NRBC # BLD AUTO: 0 10E3/UL
NRBC BLD AUTO-RTO: 0 /100
PLATELET # BLD AUTO: 217 10E3/UL (ref 150–450)
RBC # BLD AUTO: 3.78 10E6/UL (ref 3.8–5.2)
WBC # BLD AUTO: 5.8 10E3/UL (ref 4–11)

## 2022-08-17 PROCEDURE — 85014 HEMATOCRIT: CPT | Performed by: NURSE PRACTITIONER

## 2022-08-17 PROCEDURE — 96401 CHEMO ANTI-NEOPL SQ/IM: CPT

## 2022-08-17 PROCEDURE — 36415 COLL VENOUS BLD VENIPUNCTURE: CPT | Performed by: NURSE PRACTITIONER

## 2022-08-17 PROCEDURE — 250N000011 HC RX IP 250 OP 636: Performed by: NURSE PRACTITIONER

## 2022-08-17 RX ADMIN — BORTEZOMIB 2.1 MG: 3.5 INJECTION, POWDER, LYOPHILIZED, FOR SOLUTION INTRAVENOUS; SUBCUTANEOUS at 14:17

## 2022-08-17 NOTE — PROGRESS NOTES
Pt here for labs and injection. Labs noted and injection given R lower abdomen without incident. Pt denies new complaint. Pt jorge ambulatory to lobby to meet her friend and is aware of treatment plan.

## 2022-08-18 ENCOUNTER — ONCOLOGY VISIT (OUTPATIENT)
Dept: ONCOLOGY | Facility: CLINIC | Age: 72
End: 2022-08-18
Attending: SOCIAL WORKER
Payer: COMMERCIAL

## 2022-08-18 DIAGNOSIS — C90.00 MULTIPLE MYELOMA WITH FAILED REMISSION (H): ICD-10-CM

## 2022-08-18 DIAGNOSIS — F43.23 ADJUSTMENT DISORDER WITH MIXED ANXIETY AND DEPRESSED MOOD: Primary | ICD-10-CM

## 2022-08-18 PROCEDURE — 90834 PSYTX W PT 45 MINUTES: CPT | Performed by: SOCIAL WORKER

## 2022-08-18 ASSESSMENT — ANXIETY QUESTIONNAIRES
3. WORRYING TOO MUCH ABOUT DIFFERENT THINGS: SEVERAL DAYS
GAD7 TOTAL SCORE: 14
5. BEING SO RESTLESS THAT IT IS HARD TO SIT STILL: MORE THAN HALF THE DAYS
IF YOU CHECKED OFF ANY PROBLEMS ON THIS QUESTIONNAIRE, HOW DIFFICULT HAVE THESE PROBLEMS MADE IT FOR YOU TO DO YOUR WORK, TAKE CARE OF THINGS AT HOME, OR GET ALONG WITH OTHER PEOPLE: VERY DIFFICULT
6. BECOMING EASILY ANNOYED OR IRRITABLE: MORE THAN HALF THE DAYS
GAD7 TOTAL SCORE: 14
7. FEELING AFRAID AS IF SOMETHING AWFUL MIGHT HAPPEN: MORE THAN HALF THE DAYS
1. FEELING NERVOUS, ANXIOUS, OR ON EDGE: NEARLY EVERY DAY
2. NOT BEING ABLE TO STOP OR CONTROL WORRYING: MORE THAN HALF THE DAYS

## 2022-08-18 ASSESSMENT — PATIENT HEALTH QUESTIONNAIRE - PHQ9
SUM OF ALL RESPONSES TO PHQ QUESTIONS 1-9: 12
5. POOR APPETITE OR OVEREATING: MORE THAN HALF THE DAYS
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
## 2022-08-18 NOTE — LETTER
8/18/2022         RE: Lizzie Viera  [address]  [place] [address]        Dear Colleague,    Thank you for referring your patient, Lizzie Viera, to the [facility] ED. Please see a copy of my visit note below.    The author of this note documented a reason for not sharing it with the patient.                   Again, thank you for allowing me to participate in the care of your patient.        Sincerely,        JAI AMADO LP, LICSW

## 2022-08-24 ENCOUNTER — INFUSION THERAPY VISIT (OUTPATIENT)
Dept: INFUSION THERAPY | Facility: HOSPITAL | Age: 72
End: 2022-08-24
Attending: NURSE PRACTITIONER
Payer: COMMERCIAL

## 2022-08-24 ENCOUNTER — LAB (OUTPATIENT)
Dept: INFUSION THERAPY | Facility: HOSPITAL | Age: 72
End: 2022-08-24
Attending: INTERNAL MEDICINE
Payer: COMMERCIAL

## 2022-08-24 ENCOUNTER — HOSPITAL ENCOUNTER (OUTPATIENT)
Dept: MRI IMAGING | Facility: HOSPITAL | Age: 72
Discharge: HOME | End: 2022-08-24
Attending: PHYSICAL MEDICINE & REHABILITATION
Payer: COMMERCIAL

## 2022-08-24 VITALS
OXYGEN SATURATION: 95 % | HEART RATE: 58 BPM | DIASTOLIC BLOOD PRESSURE: 53 MMHG | RESPIRATION RATE: 16 BRPM | TEMPERATURE: 97.9 F | SYSTOLIC BLOOD PRESSURE: 123 MMHG

## 2022-08-24 DIAGNOSIS — M48.04 THORACIC SPINAL STENOSIS: ICD-10-CM

## 2022-08-24 DIAGNOSIS — C90.00 MULTIPLE MYELOMA WITH FAILED REMISSION (H): Primary | ICD-10-CM

## 2022-08-24 DIAGNOSIS — S22.060D CLOSED WEDGE COMPRESSION FRACTURE OF T7 VERTEBRA WITH ROUTINE HEALING, SUBSEQUENT ENCOUNTER: ICD-10-CM

## 2022-08-24 DIAGNOSIS — R29.898 LEG WEAKNESS, BILATERAL: ICD-10-CM

## 2022-08-24 DIAGNOSIS — C90.00 MULTIPLE MYELOMA, REMISSION STATUS UNSPECIFIED (H): ICD-10-CM

## 2022-08-24 LAB
BASOPHILS # BLD AUTO: 0.1 10E3/UL (ref 0–0.2)
BASOPHILS NFR BLD AUTO: 1 %
EOSINOPHIL # BLD AUTO: 0.2 10E3/UL (ref 0–0.7)
EOSINOPHIL NFR BLD AUTO: 2 %
ERYTHROCYTE [DISTWIDTH] IN BLOOD BY AUTOMATED COUNT: 15.8 % (ref 10–15)
HCT VFR BLD AUTO: 41.2 % (ref 35–47)
HGB BLD-MCNC: 13.7 G/DL (ref 11.7–15.7)
IMM GRANULOCYTES # BLD: 0 10E3/UL
IMM GRANULOCYTES NFR BLD: 0 %
LYMPHOCYTES # BLD AUTO: 0.9 10E3/UL (ref 0.8–5.3)
LYMPHOCYTES NFR BLD AUTO: 12 %
MCH RBC QN AUTO: 35.2 PG (ref 26.5–33)
MCHC RBC AUTO-ENTMCNC: 33.3 G/DL (ref 31.5–36.5)
MCV RBC AUTO: 106 FL (ref 78–100)
MONOCYTES # BLD AUTO: 0.7 10E3/UL (ref 0–1.3)
MONOCYTES NFR BLD AUTO: 10 %
NEUTROPHILS # BLD AUTO: 5.4 10E3/UL (ref 1.6–8.3)
NEUTROPHILS NFR BLD AUTO: 75 %
NRBC # BLD AUTO: 0 10E3/UL
NRBC BLD AUTO-RTO: 0 /100
PLATELET # BLD AUTO: 161 10E3/UL (ref 150–450)
RBC # BLD AUTO: 3.89 10E6/UL (ref 3.8–5.2)
TOTAL PROTEIN SERUM FOR ELP: 5.7 G/DL (ref 6.4–8.3)
WBC # BLD AUTO: 7.2 10E3/UL (ref 4–11)

## 2022-08-24 PROCEDURE — 85025 COMPLETE CBC W/AUTO DIFF WBC: CPT | Performed by: NURSE PRACTITIONER

## 2022-08-24 PROCEDURE — 36415 COLL VENOUS BLD VENIPUNCTURE: CPT

## 2022-08-24 PROCEDURE — 72146 MRI CHEST SPINE W/O DYE: CPT

## 2022-08-24 PROCEDURE — 250N000011 HC RX IP 250 OP 636: Performed by: NURSE PRACTITIONER

## 2022-08-24 PROCEDURE — 84155 ASSAY OF PROTEIN SERUM: CPT

## 2022-08-24 PROCEDURE — 96401 CHEMO ANTI-NEOPL SQ/IM: CPT

## 2022-08-24 PROCEDURE — 84165 PROTEIN E-PHORESIS SERUM: CPT | Mod: TC | Performed by: PATHOLOGY

## 2022-08-24 PROCEDURE — 82784 ASSAY IGA/IGD/IGG/IGM EACH: CPT

## 2022-08-24 PROCEDURE — 86334 IMMUNOFIX E-PHORESIS SERUM: CPT | Performed by: PATHOLOGY

## 2022-08-24 PROCEDURE — 83521 IG LIGHT CHAINS FREE EACH: CPT

## 2022-08-24 RX ADMIN — BORTEZOMIB 2.1 MG: 3.5 INJECTION, POWDER, LYOPHILIZED, FOR SOLUTION INTRAVENOUS; SUBCUTANEOUS at 14:13

## 2022-08-24 NOTE — PROGRESS NOTES
Pt here for injection after labs. Injection given L abdomen without incident. Pt doing well. Her L ankle is slightly swollen and she will monitor. Pt aldo.c ambulatory to lobby to meet her friend. Pt is aware of treatment plan.

## 2022-08-25 ENCOUNTER — TELEPHONE (OUTPATIENT)
Dept: ONCOLOGY | Facility: HOSPITAL | Age: 72
End: 2022-08-25

## 2022-08-25 DIAGNOSIS — C90.00 MULTIPLE MYELOMA WITH FAILED REMISSION (H): Primary | ICD-10-CM

## 2022-08-25 LAB
ALBUMIN SERPL ELPH-MCNC: 3.6 G/DL (ref 3.7–5.1)
ALPHA1 GLOB SERPL ELPH-MCNC: 0.3 G/DL (ref 0.2–0.4)
ALPHA2 GLOB SERPL ELPH-MCNC: 0.9 G/DL (ref 0.5–0.9)
B-GLOBULIN SERPL ELPH-MCNC: 0.6 G/DL (ref 0.6–1)
GAMMA GLOB SERPL ELPH-MCNC: 0.3 G/DL (ref 0.7–1.6)
M PROTEIN SERPL ELPH-MCNC: 0.1 G/DL
PROT PATTERN SERPL ELPH-IMP: ABNORMAL
PROT PATTERN SERPL IFE-IMP: NORMAL

## 2022-08-25 PROCEDURE — 86334 IMMUNOFIX E-PHORESIS SERUM: CPT | Mod: 26 | Performed by: PATHOLOGY

## 2022-08-25 PROCEDURE — 84165 PROTEIN E-PHORESIS SERUM: CPT | Mod: 26

## 2022-08-25 RX ORDER — LENALIDOMIDE 25 MG/1
25 CAPSULE ORAL DAILY
Qty: 14 CAPSULE | Refills: 0 | Status: SHIPPED | OUTPATIENT
Start: 2022-08-31 | End: 2022-09-15

## 2022-08-26 LAB
IGA SERPL-MCNC: 23 MG/DL (ref 84–499)
IGG SERPL-MCNC: 315 MG/DL (ref 610–1616)
IGM SERPL-MCNC: 14 MG/DL (ref 35–242)
KAPPA LC FREE SER-MCNC: 1.12 MG/DL (ref 0.33–1.94)
KAPPA LC FREE/LAMBDA FREE SER NEPH: 0.99 {RATIO} (ref 0.26–1.65)
LAMBDA LC FREE SERPL-MCNC: 1.13 MG/DL (ref 0.57–2.63)

## 2022-08-31 ENCOUNTER — PATIENT OUTREACH (OUTPATIENT)
Dept: ONCOLOGY | Facility: HOSPITAL | Age: 72
End: 2022-08-31

## 2022-08-31 ENCOUNTER — ONCOLOGY VISIT (OUTPATIENT)
Dept: ONCOLOGY | Facility: HOSPITAL | Age: 72
End: 2022-08-31
Attending: INTERNAL MEDICINE
Payer: COMMERCIAL

## 2022-08-31 ENCOUNTER — INFUSION THERAPY VISIT (OUTPATIENT)
Dept: INFUSION THERAPY | Facility: HOSPITAL | Age: 72
End: 2022-08-31
Attending: INTERNAL MEDICINE
Payer: COMMERCIAL

## 2022-08-31 VITALS
DIASTOLIC BLOOD PRESSURE: 63 MMHG | WEIGHT: 133.8 LBS | HEART RATE: 64 BPM | SYSTOLIC BLOOD PRESSURE: 143 MMHG | OXYGEN SATURATION: 94 % | RESPIRATION RATE: 18 BRPM | TEMPERATURE: 98 F | BODY MASS INDEX: 25.28 KG/M2

## 2022-08-31 DIAGNOSIS — C90.00 MULTIPLE MYELOMA NOT HAVING ACHIEVED REMISSION (H): ICD-10-CM

## 2022-08-31 DIAGNOSIS — M80.80XD LOCALIZED OSTEOPOROSIS WITH CURRENT PATHOLOGICAL FRACTURE WITH ROUTINE HEALING, SUBSEQUENT ENCOUNTER: ICD-10-CM

## 2022-08-31 DIAGNOSIS — C90.00 MULTIPLE MYELOMA WITH FAILED REMISSION (H): Primary | ICD-10-CM

## 2022-08-31 LAB
ALBUMIN SERPL-MCNC: 3.4 G/DL (ref 3.5–5)
ALP SERPL-CCNC: 32 U/L (ref 45–120)
ALT SERPL W P-5'-P-CCNC: 19 U/L (ref 0–45)
ANION GAP SERPL CALCULATED.3IONS-SCNC: 7 MMOL/L (ref 5–18)
AST SERPL W P-5'-P-CCNC: 13 U/L (ref 0–40)
BASOPHILS # BLD AUTO: 0.1 10E3/UL (ref 0–0.2)
BASOPHILS NFR BLD AUTO: 1 %
BILIRUB SERPL-MCNC: 0.4 MG/DL (ref 0–1)
BUN SERPL-MCNC: 20 MG/DL (ref 8–22)
CALCIUM SERPL-MCNC: 9.3 MG/DL (ref 8.5–10.5)
CHLORIDE BLD-SCNC: 105 MMOL/L (ref 98–107)
CO2 SERPL-SCNC: 28 MMOL/L (ref 22–31)
CREAT SERPL-MCNC: 0.75 MG/DL (ref 0.6–1.1)
EOSINOPHIL # BLD AUTO: 0.1 10E3/UL (ref 0–0.7)
EOSINOPHIL NFR BLD AUTO: 1 %
ERYTHROCYTE [DISTWIDTH] IN BLOOD BY AUTOMATED COUNT: 15.9 % (ref 10–15)
GFR SERPL CREATININE-BSD FRML MDRD: 86 ML/MIN/1.73M2
GLUCOSE BLD-MCNC: 102 MG/DL (ref 70–125)
HCT VFR BLD AUTO: 42.2 % (ref 35–47)
HGB BLD-MCNC: 14 G/DL (ref 11.7–15.7)
IMM GRANULOCYTES # BLD: 0 10E3/UL
IMM GRANULOCYTES NFR BLD: 1 %
LYMPHOCYTES # BLD AUTO: 0.6 10E3/UL (ref 0.8–5.3)
LYMPHOCYTES NFR BLD AUTO: 10 %
MCH RBC QN AUTO: 34.7 PG (ref 26.5–33)
MCHC RBC AUTO-ENTMCNC: 33.2 G/DL (ref 31.5–36.5)
MCV RBC AUTO: 105 FL (ref 78–100)
MONOCYTES # BLD AUTO: 0.5 10E3/UL (ref 0–1.3)
MONOCYTES NFR BLD AUTO: 8 %
NEUTROPHILS # BLD AUTO: 5.2 10E3/UL (ref 1.6–8.3)
NEUTROPHILS NFR BLD AUTO: 79 %
NRBC # BLD AUTO: 0 10E3/UL
NRBC BLD AUTO-RTO: 0 /100
PLATELET # BLD AUTO: 229 10E3/UL (ref 150–450)
POTASSIUM BLD-SCNC: 4.3 MMOL/L (ref 3.5–5)
PROT SERPL-MCNC: 6.2 G/DL (ref 6–8)
RBC # BLD AUTO: 4.04 10E6/UL (ref 3.8–5.2)
SODIUM SERPL-SCNC: 140 MMOL/L (ref 136–145)
WBC # BLD AUTO: 6.4 10E3/UL (ref 4–11)

## 2022-08-31 PROCEDURE — 80053 COMPREHEN METABOLIC PANEL: CPT | Performed by: NURSE PRACTITIONER

## 2022-08-31 PROCEDURE — G0463 HOSPITAL OUTPT CLINIC VISIT: HCPCS | Mod: 25

## 2022-08-31 PROCEDURE — 36415 COLL VENOUS BLD VENIPUNCTURE: CPT | Performed by: NURSE PRACTITIONER

## 2022-08-31 PROCEDURE — 96401 CHEMO ANTI-NEOPL SQ/IM: CPT

## 2022-08-31 PROCEDURE — 99215 OFFICE O/P EST HI 40 MIN: CPT | Performed by: INTERNAL MEDICINE

## 2022-08-31 PROCEDURE — 250N000011 HC RX IP 250 OP 636: Performed by: NURSE PRACTITIONER

## 2022-08-31 PROCEDURE — 85025 COMPLETE CBC W/AUTO DIFF WBC: CPT | Performed by: NURSE PRACTITIONER

## 2022-08-31 RX ORDER — LORAZEPAM 2 MG/ML
0.5 INJECTION INTRAMUSCULAR EVERY 4 HOURS PRN
Status: CANCELLED | OUTPATIENT
Start: 2022-09-27

## 2022-08-31 RX ORDER — METHYLPREDNISOLONE SODIUM SUCCINATE 125 MG/2ML
125 INJECTION, POWDER, LYOPHILIZED, FOR SOLUTION INTRAMUSCULAR; INTRAVENOUS
Status: CANCELLED
Start: 2022-10-04

## 2022-08-31 RX ORDER — ALBUTEROL SULFATE 0.83 MG/ML
2.5 SOLUTION RESPIRATORY (INHALATION)
Status: CANCELLED | OUTPATIENT
Start: 2022-10-04

## 2022-08-31 RX ORDER — LORAZEPAM 2 MG/ML
0.5 INJECTION INTRAMUSCULAR EVERY 4 HOURS PRN
Status: CANCELLED | OUTPATIENT
Start: 2022-09-20

## 2022-08-31 RX ORDER — MEPERIDINE HYDROCHLORIDE 25 MG/ML
25 INJECTION INTRAMUSCULAR; INTRAVENOUS; SUBCUTANEOUS EVERY 30 MIN PRN
Status: CANCELLED | OUTPATIENT
Start: 2022-09-27

## 2022-08-31 RX ORDER — METHYLPREDNISOLONE SODIUM SUCCINATE 125 MG/2ML
125 INJECTION, POWDER, LYOPHILIZED, FOR SOLUTION INTRAMUSCULAR; INTRAVENOUS
Status: CANCELLED
Start: 2022-09-27

## 2022-08-31 RX ORDER — NALOXONE HYDROCHLORIDE 0.4 MG/ML
0.2 INJECTION, SOLUTION INTRAMUSCULAR; INTRAVENOUS; SUBCUTANEOUS
Status: CANCELLED | OUTPATIENT
Start: 2022-09-20

## 2022-08-31 RX ORDER — METHYLPREDNISOLONE SODIUM SUCCINATE 125 MG/2ML
125 INJECTION, POWDER, LYOPHILIZED, FOR SOLUTION INTRAMUSCULAR; INTRAVENOUS
Status: CANCELLED
Start: 2022-09-20

## 2022-08-31 RX ORDER — LORAZEPAM 2 MG/ML
0.5 INJECTION INTRAMUSCULAR EVERY 4 HOURS PRN
Status: CANCELLED | OUTPATIENT
Start: 2022-10-04

## 2022-08-31 RX ORDER — DIPHENHYDRAMINE HYDROCHLORIDE 50 MG/ML
50 INJECTION INTRAMUSCULAR; INTRAVENOUS
Status: CANCELLED
Start: 2022-09-27

## 2022-08-31 RX ORDER — ALBUTEROL SULFATE 90 UG/1
1-2 AEROSOL, METERED RESPIRATORY (INHALATION)
Status: CANCELLED
Start: 2022-09-20

## 2022-08-31 RX ORDER — NALOXONE HYDROCHLORIDE 0.4 MG/ML
0.2 INJECTION, SOLUTION INTRAMUSCULAR; INTRAVENOUS; SUBCUTANEOUS
Status: CANCELLED | OUTPATIENT
Start: 2022-09-27

## 2022-08-31 RX ORDER — DIPHENHYDRAMINE HYDROCHLORIDE 50 MG/ML
50 INJECTION INTRAMUSCULAR; INTRAVENOUS
Status: CANCELLED
Start: 2022-09-20

## 2022-08-31 RX ORDER — MEPERIDINE HYDROCHLORIDE 25 MG/ML
25 INJECTION INTRAMUSCULAR; INTRAVENOUS; SUBCUTANEOUS EVERY 30 MIN PRN
Status: CANCELLED | OUTPATIENT
Start: 2022-09-20

## 2022-08-31 RX ORDER — DIPHENHYDRAMINE HYDROCHLORIDE 50 MG/ML
50 INJECTION INTRAMUSCULAR; INTRAVENOUS
Status: CANCELLED
Start: 2022-10-04

## 2022-08-31 RX ORDER — MEPERIDINE HYDROCHLORIDE 25 MG/ML
25 INJECTION INTRAMUSCULAR; INTRAVENOUS; SUBCUTANEOUS EVERY 30 MIN PRN
Status: CANCELLED | OUTPATIENT
Start: 2022-10-04

## 2022-08-31 RX ORDER — EPINEPHRINE 1 MG/ML
0.3 INJECTION, SOLUTION INTRAMUSCULAR; SUBCUTANEOUS EVERY 5 MIN PRN
Status: CANCELLED | OUTPATIENT
Start: 2022-09-20

## 2022-08-31 RX ORDER — ALBUTEROL SULFATE 0.83 MG/ML
2.5 SOLUTION RESPIRATORY (INHALATION)
Status: CANCELLED | OUTPATIENT
Start: 2022-09-20

## 2022-08-31 RX ORDER — ALBUTEROL SULFATE 0.83 MG/ML
2.5 SOLUTION RESPIRATORY (INHALATION)
Status: CANCELLED | OUTPATIENT
Start: 2022-09-27

## 2022-08-31 RX ORDER — ALBUTEROL SULFATE 90 UG/1
1-2 AEROSOL, METERED RESPIRATORY (INHALATION)
Status: CANCELLED
Start: 2022-09-27

## 2022-08-31 RX ORDER — EPINEPHRINE 1 MG/ML
0.3 INJECTION, SOLUTION INTRAMUSCULAR; SUBCUTANEOUS EVERY 5 MIN PRN
Status: CANCELLED | OUTPATIENT
Start: 2022-09-27

## 2022-08-31 RX ORDER — EPINEPHRINE 1 MG/ML
0.3 INJECTION, SOLUTION INTRAMUSCULAR; SUBCUTANEOUS EVERY 5 MIN PRN
Status: CANCELLED | OUTPATIENT
Start: 2022-10-04

## 2022-08-31 RX ORDER — NALOXONE HYDROCHLORIDE 0.4 MG/ML
0.2 INJECTION, SOLUTION INTRAMUSCULAR; INTRAVENOUS; SUBCUTANEOUS
Status: CANCELLED | OUTPATIENT
Start: 2022-10-04

## 2022-08-31 RX ORDER — ALBUTEROL SULFATE 90 UG/1
1-2 AEROSOL, METERED RESPIRATORY (INHALATION)
Status: CANCELLED
Start: 2022-10-04

## 2022-08-31 RX ADMIN — BORTEZOMIB 2.1 MG: 3.5 INJECTION, POWDER, LYOPHILIZED, FOR SOLUTION INTRAVENOUS; SUBCUTANEOUS at 15:20

## 2022-08-31 ASSESSMENT — PAIN SCALES - GENERAL: PAINLEVEL: SEVERE PAIN (6)

## 2022-08-31 NOTE — PROGRESS NOTES
[facility] [employer] form completed, signed and faxed to [phone]

## 2022-08-31 NOTE — PROGRESS NOTES
[facility] Hematology and Oncology Progress Note    Patient: Lizzie Viera  MRN: [id number]  Date of Service: Aug 31, 2022         Reason for Visit    Multiple Myeloma    Assessment     1.  A very pleasant 71 year old woman with average risk multiple myeloma is on the cytogenetics.  However clinically was behaving somewhat more aggressively.  Started on VRD treatment.  Responding quite well.  2.  Significant bone disease with osteoporosis and compression fractures.  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 This appears to be significantly improved on the CT scan in March 2022.  MRI also shows no new lesions.  3.  Normal hemoglobin, calcium, kidney function along with beta-2 microglobulin and albumin at the time of diagnosis.  4.  Positive Covid antibodies from infection. Unvaccinated. Declined Evusheld.  5.  Pain from compression fracture status post radiation therapy.  Significantly improved.      Plan    1.  Continue treatment with Velcade Revlimid and dexamethasone.  She is coming very close to complete remission.  Therefore is a good time to start her on Revlimid maintenance.  Revlimid maintenance will start at the end of September.  She will get 10 mg daily.  No dexamethasone needed.  We will monitor CBC monthly for the first couple of months.  After that she will have myeloma labs checked every other month.  We will continue that for as long as it last.  2.  Resume Zometa if she is ready.  She is getting couple of teeth pulled 1 from her left upper jaw which cracked.  So she will not go on Zometa for a while.  3.  Continue with valacyclovir prophylaxis.  4.  Continue good diet and exercise.  5.  Continue with calcium and vitamin D supplementation.  6.  Follow-up in 4 to 8 weeks timeframe.        Cancer Staging  No matching staging information was found for the patient.    ECOG Performance    2 - Ambulatory and independent in all ADLs; cannot work; up > 50% of the time      History of  Present Illness    Ms. Lizzie Viera is a very pleasant 71 year old woman who has been diagnosed with multiple myeloma IgG kappa type in May 2021.  She had initially presented with compression fracture of T7 vertebral body in September 2020.  She had a back pain which led to that evaluation.  Bone density confirmed that she had osteoporosis.  MRI showed diffuse marrow edema in October 2020.  In April 2021 the MRI of the thoracic spine showed worsening T7 vertebral body height loss because of likely pathological compression fracture with extraosseous extension.  She then had IR guided bone biopsy on 7 May 2021 and pathology confirmed the presence of kappa light chain restricted plasma cells.  Her cytogenetics confirmed the presence of hyperdiploid E with gains of chromosome 5, 9 and 15.    She was then seen by Dr. Baig and had a bone marrow biopsy which also confirmed 60% involvement of the marrow with plasma cells.  The bone marrow was done on 3 Jocelin 2021.  The bone marrow also confirmed the presence of hyperdiploid E.  She had a gain of chromosome 3, 5, 7, 9, 11, 15 as well as 19.  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  Her beta-2 microglobulin was actually normal at the time of her diagnosis as was her albumin at 4.0.  Monoclonal protein 3.1 g/dL.  Kappa free light chain levels of 13 mg/dL IgG level of 4280 with depressed levels of IgM and IgA.  Urine was also positive for kappa light chains as well as immunofixation of the urine was positive for IgG kappa.  Normal kidney function.  Normal hemoglobin.    As mentioned above she had bone lesions in the T7 vertebral body, T1, skull area.  Her main pain is coming from her T7 vertebral body compression fracture.    Due to the pain she has been seen by radiation oncology and has received radiation therapy.  She also got a dose of steroids for pain control.    She is wearing a soft brace.  She did notice that when she was on dexamethasone her pain was significantly  better.    She was initially thinking of doing some natural therapies but once her symptoms got worse, she came back in was counseled about treatment.      She has started on Velcade Revlimid and dexamethasone since September 2021. Tolerating it well. Responding nicely.  Immunoglobulins have  normalized completely.  Back pain is still an issue getting better.  The back pain gets some spasms.  Not taking any more Dilaudid.  He also has not used her brace.  Her immunoglobin levels have almost normalized in fact the IgG levels have gone below normal.  Immunofixation still shows a very faint IgG kappa monoclonal gammopathy.    She was  hospitalized in April 2022 with COPD exacerbation/pneumonia.  Was given some steroids and antibiotic.  She was slightly hypoxic initially but then got better after that.    She was referred to [facility] for transplant evaluation.  She was somewhat reluctant to go forward with that.  Otherwise she seems to be doing quite well.  Her immunoglobulin levels have normalized or actually are below normal.  Immunofixation faintly positive.      Comes in today for scheduled follow-up after the MRI.  She has cracked a molar on the upper left jaw sometime in July.  There was some tooth decay.  She is having them extracted in the next few weeks.    Review of systems.  A 14 point review of systems was obtained.  Positive findings noted in the history.  Rest of the review of system is otherwise negative.     Past History    Past Medical History:   Diagnosis Date     Cervical dysplasia      Chronic RUQ pain      Depressive disorder      Multiple myeloma (H)      Osteoporosis        Past Surgical History:   Procedure Laterality Date     CONIZATION CERVIX,KNIFE/LASER      Description: Cervical Conization By Laser;  Recorded: 09/20/2007;     HC DILATION/CURETTAGE DIAG/THER NON OB      Description: Dilation And Curettage;  Recorded: 09/20/2007;     HC REMOVE TONSILS/ADENOIDS,<13 Y/O       Description: Tonsillectomy With Adenoidectomy;  Recorded: 09/20/2007;     [facility] LAP,CHOLECYSTECTOMY/EXPLORE  12/27/2004     [facility] LIGATE FALLOPIAN TUBE      Description: Tubal Ligation;  Recorded: 09/20/2007;         Physical Exam    BP (!) 143/63   Pulse 64   Temp 98  F (36.7  C)   Resp 18   Wt 60.7 kg (133 lb 12.8 oz)   LMP  (LMP Unknown)   SpO2 94%   BMI 25.28 kg/m      GENERAL: No acute distress. Cooperative in conversation.   HEENT:  Pupils are equal, round and reactive. Oral mucosa is clean and intact. No ulcerations or mucositis noted. No bleeding noted.  RESP:Chest symmetric lungs are clear bilaterally per auscultation. Regular respiratory rate. No wheezes or rhonchi.  CV: Normal S1 S2 Regular, rate and rhythm. No murmurs.    ABD: Nondistended, soft, nontender. Positive bowel sounds. No organomegaly.   EXTREMITIES: No lower extremity edema.   NEURO: Non- focal. Alert and oriented x3.  Cranial nerves appear intact.  PSYCH: Within normal limits. No depression or anxiety.  SKIN: Warm dry intact.     Lab Results    Recent Results (from the past 168 hour(s))   CBC with platelets and differential   Result Value Ref Range    WBC Count 6.4 4.0 - 11.0 10e3/uL    RBC Count 4.04 3.80 - 5.20 10e6/uL    Hemoglobin 14.0 11.7 - 15.7 g/dL    Hematocrit 42.2 35.0 - 47.0 %     (H) 78 - 100 fL    MCH 34.7 (H) 26.5 - 33.0 pg    MCHC 33.2 31.5 - 36.5 g/dL    RDW 15.9 (H) 10.0 - 15.0 %    Platelet Count 229 150 - 450 10e3/uL    % Neutrophils 79 %    % Lymphocytes 10 %    % Monocytes 8 %    % Eosinophils 1 %    % Basophils 1 %    % Immature Granulocytes 1 %    NRBCs per 100 WBC 0 <1 /100    Absolute Neutrophils 5.2 1.6 - 8.3 10e3/uL    Absolute Lymphocytes 0.6 (L) 0.8 - 5.3 10e3/uL    Absolute Monocytes 0.5 0.0 - 1.3 10e3/uL    Absolute Eosinophils 0.1 0.0 - 0.7 10e3/uL    Absolute Basophils 0.1 0.0 - 0.2 10e3/uL    Absolute Immature Granulocytes 0.0 <=0.4 10e3/uL    Absolute NRBCs 0.0 10e3/uL       Imaging    MR  Thoracic Spine w/o Contrast    Result Date: 8/31/2022  EXAM: MR THORACIC SPINE W/O CONTRAST LOCATION: [facility] DATE/TIME: 8/24/2022 11:51 AM INDICATION: Spine fracture, thoracic, pathological Bone lesion, T-spine, malignancy suspected Compression fracture, T-spine. Multiple myeloma. COMPARISON: MRI thoracic spine 03/09/2022 TECHNIQUE: Routine Thoracic Spine MRI without IV contrast. FINDINGS: Please note that this exam performed on 08/24/2022 is first received by me for interpretation on 08/31/2022. 12 thoracic type vertebra. Unchanged thoracic alignment with accentuated thoracic kyphosis centered at the T7 level. Unchanged pathologic compression fractures at T1 and T7 with 60% central vertebral height loss at T1 level and marked anterior wedging at T7. No definite new compression fracture. Chronic Schmorl's node at T11. Persistent abnormal marrow signal extending into the pedicles and posterior elements at T7 and also involving adjacent posterior inferior T6 and posterior superior T8 vertebral bodies. Marrow signal is unchanged compared to the previous MRI. No significant paraspinal or epidural extension of neoplasm. Diffuse thoracic spondylosis with mild to moderate multilevel loss of disc height and signal, greatest at C5-C6. Slight annular bulging at multiple levels without significant focal disc herniation. Mild facet arthropathy. No spinal canal stenosis. Persistent moderate bilateral neural foraminal stenosis at T6-T7 and T7-T8. No definite cord signal abnormality. Mild paraspinal muscular atrophy. Partially visualized left renal cyst similar to the previous exam.     IMPRESSION: 1.  Unchanged pathologic compression fractures at T1 and T7 compared to 03/09/2022. No new fracture or progressive vertebral height loss identified. 2.  Persistent abnormal marrow signal involving the T6 and T8 vertebral bodies which may be reactive or neoplastic in nature. 3.  No evidence for extraosseous  extension of neoplasm. No evidence for new osseous lesions. 4.  Multilevel thoracic spondylosis without high-grade central spinal canal stenosis. Unchanged moderate bilateral neural foraminal stenosis at T6-T7 and T7-T8.    Images reviewed with the patient.    Signed by: Loco Blanco MD,

## 2022-08-31 NOTE — LETTER
"    8/31/2022         RE: Lizzie Viera  [address]  [place] [address]        Dear Colleague,    Thank you for referring your patient, Lizzie Viera, to the [facility] [place]. Please see a copy of my visit note below.    Oncology Rooming Note    August 31, 2022 2:24 PM   Lizzie Viera is a 71 year old female who presents for:    Chief Complaint   Patient presents with     Oncology Clinic Visit     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     Initial Vitals: BP (!) 143/63   Pulse 64   Temp 98  F (36.7  C)   Resp 18   Wt 60.7 kg (133 lb 12.8 oz)   LMP  (LMP Unknown)   SpO2 94%   BMI 25.28 kg/m   Estimated body mass index is 25.28 kg/m  as calculated from the following:    Height as of 6/30/22: 1.549 m (5' 1\").    Weight as of this encounter: 60.7 kg (133 lb 12.8 oz). Body surface area is 1.62 meters squared.  Severe Pain (6) Comment: Data Unavailable   No LMP recorded (lmp unknown). Patient is postmenopausal.  Allergies reviewed: Yes  Medications reviewed: Yes    Medications: Medication refills not needed today.  Pharmacy name entered into [facility]: [id number] [address]    Clinical concerns: Tingling in feet and eyes (hard to focus)        Viviane Chu LPN              [facility] Hematology and Oncology Progress Note    Patient: Lizzie Viera  MRN: [id number]  Date of Service: Aug 31, 2022         Reason for Visit    Multiple Myeloma    Assessment     1.  A very pleasant 71 year old woman with average risk multiple myeloma is on the cytogenetics.  However clinically was behaving somewhat more aggressively.  Started on VRD treatment.  Responding quite well.  2.  Significant bone disease with osteoporosis and compression fractures.  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 This appears to be significantly improved on the CT scan in March 2022.  MRI also " shows no new lesions.  3.  Normal hemoglobin, calcium, kidney function along with beta-2 microglobulin and albumin at the time of diagnosis.  4.  Positive Covid antibodies from infection. Unvaccinated. Declined Evusheld.  5.  Pain from compression fracture status post radiation therapy.  Significantly improved.      Plan    1.  Continue treatment with Velcade Revlimid and dexamethasone.  She is coming very close to complete remission.  Therefore is a good time to start her on Revlimid maintenance.  Revlimid maintenance will start at the end of September.  She will get 10 mg daily.  No dexamethasone needed.  We will monitor CBC monthly for the first couple of months.  After that she will have myeloma labs checked every other month.  We will continue that for as long as it last.  2.  Resume Zometa if she is ready.  She is getting couple of teeth pulled 1 from her left upper jaw which cracked.  So she will not go on Zometa for a while.  3.  Continue with valacyclovir prophylaxis.  4.  Continue good diet and exercise.  5.  Continue with calcium and vitamin D supplementation.  6.  Follow-up in 4 to 8 weeks timeframe.        Cancer Staging  No matching staging information was found for the patient.    ECOG Performance    2 - Ambulatory and independent in all ADLs; cannot work; up > 50% of the time      History of Present Illness    Ms. Lizzie Viera is a very pleasant 71 year old woman who has been diagnosed with multiple myeloma IgG kappa type in May 2021.  She had initially presented with compression fracture of T7 vertebral body in September 2020.  She had a back pain which led to that evaluation.  Bone density confirmed that she had osteoporosis.  MRI showed diffuse marrow edema in October 2020.  In April 2021 the MRI of the thoracic spine showed worsening T7 vertebral body height loss because of likely pathological compression fracture with extraosseous extension.  She then had IR guided bone biopsy on 7 May  2021 and pathology confirmed the presence of kappa light chain restricted plasma cells.  Her cytogenetics confirmed the presence of hyperdiploid E with gains of chromosome 5, 9 and 15.    She was then seen by Dr. Baig and had a bone marrow biopsy which also confirmed 60% involvement of the marrow with plasma cells.  The bone marrow was done on 3 Jocelin 2021.  The bone marrow also confirmed the presence of hyperdiploid E.  She had a gain of chromosome 3, 5, 7, 9, 11, 15 as well as 19.  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  Her beta-2 microglobulin was actually normal at the time of her diagnosis as was her albumin at 4.0.  Monoclonal protein 3.1 g/dL.  Kappa free light chain levels of 13 mg/dL IgG level of 4280 with depressed levels of IgM and IgA.  Urine was also positive for kappa light chains as well as immunofixation of the urine was positive for IgG kappa.  Normal kidney function.  Normal hemoglobin.    As mentioned above she had bone lesions in the T7 vertebral body, T1, skull area.  Her main pain is coming from her T7 vertebral body compression fracture.    Due to the pain she has been seen by radiation oncology and has received radiation therapy.  She also got a dose of steroids for pain control.    She is wearing a soft brace.  She did notice that when she was on dexamethasone her pain was significantly better.    She was initially thinking of doing some natural therapies but once her symptoms got worse, she came back in was counseled about treatment.      She has started on Velcade Revlimid and dexamethasone since September 2021. Tolerating it well. Responding nicely.  Immunoglobulins have  normalized completely.  Back pain is still an issue getting better.  The back pain gets some spasms.  Not taking any more Dilaudid.  He also has not used her brace.  Her immunoglobin levels have almost normalized in fact the IgG levels have gone below normal.  Immunofixation still shows a very faint IgG kappa monoclonal  gammopathy.    She was  hospitalized in April 2022 with COPD exacerbation/pneumonia.  Was given some steroids and antibiotic.  She was slightly hypoxic initially but then got better after that.    She was referred to [facility] for transplant evaluation.  She was somewhat reluctant to go forward with that.  Otherwise she seems to be doing quite well.  Her immunoglobulin levels have normalized or actually are below normal.  Immunofixation faintly positive.      Comes in today for scheduled follow-up after the MRI.  She has cracked a molar on the upper left jaw sometime in July.  There was some tooth decay.  She is having them extracted in the next few weeks.    Review of systems.  A 14 point review of systems was obtained.  Positive findings noted in the history.  Rest of the review of system is otherwise negative.     Past History    Past Medical History:   Diagnosis Date     Cervical dysplasia      Chronic RUQ pain      Depressive disorder      Multiple myeloma (H)      Osteoporosis        Past Surgical History:   Procedure Laterality Date     CONIZATION CERVIX,KNIFE/LASER      Description: Cervical Conization By Laser;  Recorded: 09/20/2007;     [facility] DILATION/CURETTAGE DIAG/THER NON OB      Description: Dilation And Curettage;  Recorded: 09/20/2007;     HC REMOVE TONSILS/ADENOIDS,<11 Y/O      Description: Tonsillectomy With Adenoidectomy;  Recorded: 09/20/2007;     [facility] LAP,CHOLECYSTECTOMY/EXPLORE  12/27/2004     [facility] LIGATE FALLOPIAN TUBE      Description: Tubal Ligation;  Recorded: 09/20/2007;         Physical Exam    BP (!) 143/63   Pulse 64   Temp 98  F (36.7  C)   Resp 18   Wt 60.7 kg (133 lb 12.8 oz)   LMP  (LMP Unknown)   SpO2 94%   BMI 25.28 kg/m      GENERAL: No acute distress. Cooperative in conversation.   HEENT:  Pupils are equal, round and reactive. Oral mucosa is clean and intact. No ulcerations or mucositis noted. No bleeding noted.  RESP:Chest symmetric lungs are clear bilaterally per  auscultation. Regular respiratory rate. No wheezes or rhonchi.  CV: Normal S1 S2 Regular, rate and rhythm. No murmurs.    ABD: Nondistended, soft, nontender. Positive bowel sounds. No organomegaly.   EXTREMITIES: No lower extremity edema.   NEURO: Non- focal. Alert and oriented x3.  Cranial nerves appear intact.  PSYCH: Within normal limits. No depression or anxiety.  SKIN: Warm dry intact.     Lab Results    Recent Results (from the past 168 hour(s))   CBC with platelets and differential   Result Value Ref Range    WBC Count 6.4 4.0 - 11.0 10e3/uL    RBC Count 4.04 3.80 - 5.20 10e6/uL    Hemoglobin 14.0 11.7 - 15.7 g/dL    Hematocrit 42.2 35.0 - 47.0 %     (H) 78 - 100 fL    MCH 34.7 (H) 26.5 - 33.0 pg    MCHC 33.2 31.5 - 36.5 g/dL    RDW 15.9 (H) 10.0 - 15.0 %    Platelet Count 229 150 - 450 10e3/uL    % Neutrophils 79 %    % Lymphocytes 10 %    % Monocytes 8 %    % Eosinophils 1 %    % Basophils 1 %    % Immature Granulocytes 1 %    NRBCs per 100 WBC 0 <1 /100    Absolute Neutrophils 5.2 1.6 - 8.3 10e3/uL    Absolute Lymphocytes 0.6 (L) 0.8 - 5.3 10e3/uL    Absolute Monocytes 0.5 0.0 - 1.3 10e3/uL    Absolute Eosinophils 0.1 0.0 - 0.7 10e3/uL    Absolute Basophils 0.1 0.0 - 0.2 10e3/uL    Absolute Immature Granulocytes 0.0 <=0.4 10e3/uL    Absolute NRBCs 0.0 10e3/uL       Imaging    MR Thoracic Spine w/o Contrast    Result Date: 8/31/2022  EXAM: MR THORACIC SPINE W/O CONTRAST LOCATION: [facility] DATE/TIME: 8/24/2022 11:51 AM INDICATION: Spine fracture, thoracic, pathological Bone lesion, T-spine, malignancy suspected Compression fracture, T-spine. Multiple myeloma. COMPARISON: MRI thoracic spine 03/09/2022 TECHNIQUE: Routine Thoracic Spine MRI without IV contrast. FINDINGS: Please note that this exam performed on 08/24/2022 is first received by me for interpretation on 08/31/2022. 12 thoracic type vertebra. Unchanged thoracic alignment with accentuated thoracic kyphosis centered  at the T7 level. Unchanged pathologic compression fractures at T1 and T7 with 60% central vertebral height loss at T1 level and marked anterior wedging at T7. No definite new compression fracture. Chronic Schmorl's node at T11. Persistent abnormal marrow signal extending into the pedicles and posterior elements at T7 and also involving adjacent posterior inferior T6 and posterior superior T8 vertebral bodies. Marrow signal is unchanged compared to the previous MRI. No significant paraspinal or epidural extension of neoplasm. Diffuse thoracic spondylosis with mild to moderate multilevel loss of disc height and signal, greatest at C5-C6. Slight annular bulging at multiple levels without significant focal disc herniation. Mild facet arthropathy. No spinal canal stenosis. Persistent moderate bilateral neural foraminal stenosis at T6-T7 and T7-T8. No definite cord signal abnormality. Mild paraspinal muscular atrophy. Partially visualized left renal cyst similar to the previous exam.     IMPRESSION: 1.  Unchanged pathologic compression fractures at T1 and T7 compared to 03/09/2022. No new fracture or progressive vertebral height loss identified. 2.  Persistent abnormal marrow signal involving the T6 and T8 vertebral bodies which may be reactive or neoplastic in nature. 3.  No evidence for extraosseous extension of neoplasm. No evidence for new osseous lesions. 4.  Multilevel thoracic spondylosis without high-grade central spinal canal stenosis. Unchanged moderate bilateral neural foraminal stenosis at T6-T7 and T7-T8.    Images reviewed with the patient.    Signed by: Loco Blanco MD,         Again, thank you for allowing me to participate in the care of your patient.        Sincerely,        Loco Blanco MD, MD

## 2022-08-31 NOTE — PROGRESS NOTES
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    Weight as of this encounter: 60.7 kg (133 lb 12.8 oz). Body surface area is 1.62 meters squared.  Severe Pain (6) Comment: Data Unavailable   No LMP recorded (lmp unknown). Patient is postmenopausal.  Allergies reviewed: Yes  Medications reviewed: Yes    Medications: Medication refills not needed today.  Pharmacy name entered into [facility]: [id number] - [place], [place] - [address]    Clinical concerns: Tingling in feet and eyes (hard to focus)        Viviane Chu LPN            "

## 2022-08-31 NOTE — PROGRESS NOTES
Pt arrived ambulatory to clinic for Cycle # 17 Day # 1 of her chemotherapy regimen.  Per Dr. Blanco, Zometa is still on hold since pt is having 2 teeth removed soon.  Administered subcutaneous Velcade into RLQ of ABD per MD order.  Pt tolerated procedure well, no s/s of bleeding or bruising at site.  Pt verbalized understanding of plan of care and return to clinic.

## 2022-09-01 ENCOUNTER — ONCOLOGY VISIT (OUTPATIENT)
Dept: ONCOLOGY | Facility: CLINIC | Age: 72
End: 2022-09-01
Attending: SOCIAL WORKER
Payer: COMMERCIAL

## 2022-09-01 DIAGNOSIS — C90.00 MULTIPLE MYELOMA WITH FAILED REMISSION (H): ICD-10-CM

## 2022-09-01 DIAGNOSIS — F43.23 ADJUSTMENT DISORDER WITH MIXED ANXIETY AND DEPRESSED MOOD: Primary | ICD-10-CM

## 2022-09-01 PROCEDURE — 90834 PSYTX W PT 45 MINUTES: CPT | Performed by: SOCIAL WORKER

## 2022-09-01 RX ORDER — ACYCLOVIR 400 MG/1
400 TABLET ORAL 2 TIMES DAILY
Qty: 180 TABLET | Refills: 1 | Status: SHIPPED | OUTPATIENT
Start: 2022-09-01 | End: 2022-09-15

## 2022-09-01 RX ORDER — ACYCLOVIR 400 MG/1
400 TABLET ORAL 2 TIMES DAILY
Qty: 180 TABLET | Refills: 1 | Status: CANCELLED | OUTPATIENT
Start: 2022-09-01

## 2022-09-01 ASSESSMENT — ANXIETY QUESTIONNAIRES
2. NOT BEING ABLE TO STOP OR CONTROL WORRYING: MORE THAN HALF THE DAYS
5. BEING SO RESTLESS THAT IT IS HARD TO SIT STILL: SEVERAL DAYS
1. FEELING NERVOUS, ANXIOUS, OR ON EDGE: SEVERAL DAYS
GAD7 TOTAL SCORE: 7
6. BECOMING EASILY ANNOYED OR IRRITABLE: SEVERAL DAYS
7. FEELING AFRAID AS IF SOMETHING AWFUL MIGHT HAPPEN: NOT AT ALL
GAD7 TOTAL SCORE: 7
3. WORRYING TOO MUCH ABOUT DIFFERENT THINGS: SEVERAL DAYS

## 2022-09-01 ASSESSMENT — PATIENT HEALTH QUESTIONNAIRE - PHQ9
SUM OF ALL RESPONSES TO PHQ QUESTIONS 1-9: 13
5. POOR APPETITE OR OVEREATING: SEVERAL DAYS
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
## 2022-09-01 NOTE — LETTER
9/1/2022         RE: Lizzie Viera  [address]  [place] [address]        Dear Colleague,    Thank you for referring your patient, Lizzie Viera, to the [facility]. Please see a copy of my visit note below.    The author of this note documented a reason for not sharing it with the patient.                       Again, thank you for allowing me to participate in the care of your patient.        Sincerely,        JAI AMADO LP, LICSW

## 2022-09-08 ENCOUNTER — LAB (OUTPATIENT)
Dept: INFUSION THERAPY | Facility: HOSPITAL | Age: 72
End: 2022-09-08
Attending: INTERNAL MEDICINE
Payer: COMMERCIAL

## 2022-09-08 VITALS
TEMPERATURE: 97.6 F | DIASTOLIC BLOOD PRESSURE: 52 MMHG | SYSTOLIC BLOOD PRESSURE: 121 MMHG | OXYGEN SATURATION: 95 % | HEART RATE: 57 BPM | RESPIRATION RATE: 16 BRPM

## 2022-09-08 DIAGNOSIS — C90.00 MULTIPLE MYELOMA NOT HAVING ACHIEVED REMISSION (H): Primary | ICD-10-CM

## 2022-09-08 DIAGNOSIS — C90.00 MULTIPLE MYELOMA WITH FAILED REMISSION (H): ICD-10-CM

## 2022-09-08 LAB
BASOPHILS # BLD AUTO: 0.1 10E3/UL (ref 0–0.2)
BASOPHILS NFR BLD AUTO: 1 %
EOSINOPHIL # BLD AUTO: 0.2 10E3/UL (ref 0–0.7)
EOSINOPHIL NFR BLD AUTO: 3 %
ERYTHROCYTE [DISTWIDTH] IN BLOOD BY AUTOMATED COUNT: 15.7 % (ref 10–15)
HCT VFR BLD AUTO: 39.8 % (ref 35–47)
HGB BLD-MCNC: 13.3 G/DL (ref 11.7–15.7)
IMM GRANULOCYTES # BLD: 0.1 10E3/UL
IMM GRANULOCYTES NFR BLD: 1 %
LYMPHOCYTES # BLD AUTO: 0.5 10E3/UL (ref 0.8–5.3)
LYMPHOCYTES NFR BLD AUTO: 7 %
MCH RBC QN AUTO: 34.9 PG (ref 26.5–33)
MCHC RBC AUTO-ENTMCNC: 33.4 G/DL (ref 31.5–36.5)
MCV RBC AUTO: 105 FL (ref 78–100)
MONOCYTES # BLD AUTO: 0.3 10E3/UL (ref 0–1.3)
MONOCYTES NFR BLD AUTO: 4 %
NEUTROPHILS # BLD AUTO: 6.6 10E3/UL (ref 1.6–8.3)
NEUTROPHILS NFR BLD AUTO: 84 %
NRBC # BLD AUTO: 0 10E3/UL
NRBC BLD AUTO-RTO: 0 /100
PLATELET # BLD AUTO: 246 10E3/UL (ref 150–450)
RBC # BLD AUTO: 3.81 10E6/UL (ref 3.8–5.2)
WBC # BLD AUTO: 7.7 10E3/UL (ref 4–11)

## 2022-09-08 PROCEDURE — 85004 AUTOMATED DIFF WBC COUNT: CPT | Performed by: NURSE PRACTITIONER

## 2022-09-08 PROCEDURE — 250N000011 HC RX IP 250 OP 636: Performed by: NURSE PRACTITIONER

## 2022-09-08 PROCEDURE — 36415 COLL VENOUS BLD VENIPUNCTURE: CPT | Performed by: NURSE PRACTITIONER

## 2022-09-08 PROCEDURE — 96401 CHEMO ANTI-NEOPL SQ/IM: CPT

## 2022-09-08 RX ADMIN — BORTEZOMIB 2.1 MG: 3.5 INJECTION, POWDER, LYOPHILIZED, FOR SOLUTION INTRAVENOUS; SUBCUTANEOUS at 14:51

## 2022-09-08 NOTE — PROGRESS NOTES
Pt arrived ambulatory to clinic for Cycle # 17 Day # 8 of her chemotherapy regimen.  Per Dr. Blanco, Zometa is still on hold since pt is having 2 teeth removed soon.  Labs were reviewed, pt met parameters for treatment.  Administered subcutaneous Velcade into LLQ of ABD per MD order.  Pt tolerated procedure well, no s/s of bleeding or bruising at site.  Pt verbalized understanding of plan of care and return to clinic.

## 2022-09-09 ENCOUNTER — OFFICE VISIT (OUTPATIENT)
Dept: PHYSICAL MEDICINE AND REHAB | Facility: CLINIC | Age: 72
End: 2022-09-09
Payer: COMMERCIAL

## 2022-09-09 VITALS — SYSTOLIC BLOOD PRESSURE: 136 MMHG | DIASTOLIC BLOOD PRESSURE: 63 MMHG | HEART RATE: 58 BPM

## 2022-09-09 DIAGNOSIS — C90.00 MULTIPLE MYELOMA, REMISSION STATUS UNSPECIFIED (H): ICD-10-CM

## 2022-09-09 DIAGNOSIS — M99.07 SOMATIC DYSFUNCTION OF UPPER EXTREMITY: ICD-10-CM

## 2022-09-09 DIAGNOSIS — G89.29 CHRONIC MIDLINE THORACIC BACK PAIN: ICD-10-CM

## 2022-09-09 DIAGNOSIS — M79.18 MYOFASCIAL PAIN: ICD-10-CM

## 2022-09-09 DIAGNOSIS — M99.00 SOMATIC DYSFUNCTION OF HEAD REGION: ICD-10-CM

## 2022-09-09 DIAGNOSIS — M54.50 LUMBAR SPINE PAIN: ICD-10-CM

## 2022-09-09 DIAGNOSIS — M99.01 SOMATIC DYSFUNCTION OF CERVICAL REGION: ICD-10-CM

## 2022-09-09 DIAGNOSIS — S22.060S CLOSED WEDGE COMPRESSION FRACTURE OF T7 VERTEBRA, SEQUELA: Primary | ICD-10-CM

## 2022-09-09 DIAGNOSIS — M99.02 SOMATIC DYSFUNCTION OF THORACIC REGION: ICD-10-CM

## 2022-09-09 DIAGNOSIS — M99.08 SOMATIC DYSFUNCTION OF RIB REGION: ICD-10-CM

## 2022-09-09 DIAGNOSIS — M54.6 CHRONIC MIDLINE THORACIC BACK PAIN: ICD-10-CM

## 2022-09-09 PROCEDURE — 98927 OSTEOPATH MANJ 5-6 REGIONS: CPT | Performed by: PHYSICAL MEDICINE & REHABILITATION

## 2022-09-09 PROCEDURE — 99214 OFFICE O/P EST MOD 30 MIN: CPT | Mod: 25 | Performed by: PHYSICAL MEDICINE & REHABILITATION

## 2022-09-09 RX ORDER — BACLOFEN 10 MG/1
10 TABLET ORAL 3 TIMES DAILY PRN
Qty: 60 TABLET | Refills: 1 | Status: SHIPPED | OUTPATIENT
Start: 2022-09-09 | End: 2022-11-25

## 2022-09-09 ASSESSMENT — PAIN SCALES - GENERAL: PAINLEVEL: MODERATE PAIN (4)

## 2022-09-09 NOTE — LETTER
9/9/2022         RE: Lizzie Viera  [address]  [place] [address]        Dear Colleague,    Thank you for referring your patient, Lizzie Viera, to the [facility]. Please see a copy of my visit note below.    Assessment/Plan:      Lizzie was seen today for back pain.    Diagnoses and all orders for this visit:    Closed wedge compression fracture of T7 vertebra, sequela    Myofascial pain  -     baclofen (LIORESAL) 10 MG tablet; Take 1 tablet (10 mg) by mouth 3 times daily as needed for muscle spasms    Multiple myeloma, remission status unspecified (H)    Chronic midline thoracic back pain    Lumbar spine pain    Somatic dysfunction of head region  -     OSTEOPATHIC MANIP,5-6 BODY REGN    Somatic dysfunction of cervical region  -     OSTEOPATHIC MANIP,5-6 BODY REGN    Somatic dysfunction of rib region  -     OSTEOPATHIC MANIP,5-6 BODY REGN    Somatic dysfunction of thoracic region  -     OSTEOPATHIC MANIP,5-6 BODY REGN    Somatic dysfunction of upper extremity  -     OSTEOPATHIC MANIP,5-6 BODY REGN         Assessment: Hossein 71 year old female with a history of hyperlipidemia, anxiety, depression, irritable bowel, neuropathy, osteoporosis and multiple myeloma with a T7 fracture and lesion  resulting in spinal stenosis:     1.  Persistent chronic thoracic pain  around T7-8 where she has the pathologic T7 fracture with epidural neoplasm compressing the spinal cord.  No signs of thoracic myelopathy neurologically stable.    Symptoms are stable and paresthesias may be slightly improving with physical therapy.  Overall doing well with gabapentin 600 mg 3 times daily.  Still has weakness in the right greater than left lower extremities but appears relatively stable.  Significant increased pain over the last week after coughing and now some mild improvement.  She has pain over the left ribs and mid thoracic spine.    Fracture is stable and new MRI at T1 and  T7.  She has significant upper thoracic and cervical myofascial pain as well.     2.  Chronic lumbar spine pain intermittently.    Symptoms are stable with physical therapy.  Only some mild degenerative changes on MRI.     3.  Bilateral foot paresthesias consistent with neuropathy.  Stable or slightly worsening.  [place] to be related to chemotherapy.      4.  She has cervical spine and upper thoracic spine myofascial pain.    5.  Somatic dysfunctions of the cranium, cervical spine, rib cage, upper extremities, thoracic spine  that contribute to the patient's pain complaints.    Discussion:    1.  We discussed the diagnosis and treatment options.  We discussed the option of duloxetine along with Osteopathic manipulative medicine as she has no more visits of therapy available this year as well as medication options such as baclofen in place of methocarbamol.  She would like to try Osteopathic manipulative medicine baclofen.    2.  Baclofen 5 to 10 mg 3 times daily as needed.  This can be tried in place of methocarbamol.    3.  Trial osteopathic neoplasia today.  She agrees to proceed.  Please see attached procedure note.  Gentle myofascial treatments the preferred method for her today.    4.  She can consider duloxetine  20 mg daily.  She will discuss this with her pain provider.  5.  She can also consider classical footstool to bring with her to appointments to keep her feet elevated when sitting in chair.      6.  Follow-up 2 to 4 weeks for additional OMT.        It was our pleasure caring for your patient today, if there any questions or concerns please do not hesitate to contact us.      Subjective:   Patient ID: Lizzie Viera is a 71 year old female.    History of Present Illness: Patient presents for follow-up of thoracic spine pain cervical spine pain lumbar spine pain.  Most significant pain is in the mid to upper thoracic spine feels muscle pain in the parascapular region to the cervical spine.  Her   has been quite ill after receiving a kidney transplant and has been spending a lot of time at the clinic and sitting for 4 to 5 hours in the chairs are difficult for her.  She gets a lot of pain and tightness to the upper thoracic and cervical spine.  She has some numbness and tingling in the arms and legs that she feels is related to her chemotherapy is relatively stable but she only has a couple more chemotherapy treatments left and then she is on maintenance.  Symptoms are better with laying down as well as medical cannabis.  Taking gabapentin methocarbamol.  She felt that physical therapy with myofascial release was very helpful for her thoracic spine pain.  Her pain is an 8/10 at worst 4/10./10 at best.  Has had an MRI since last visit.      Imaging: MRI report and images were personally reviewed and discussed with the patient.  A plastic model was utilized during the discussion.  MRI of the thoracic spine personally reviewed.  This was on August 24, 2022.  Unchanged pathologic compression fractures..  No new fractures.  There is marrow edema at the T8 6 and T8 vertebral bodies reactive versus neoplastic.  No high-grade central stenosis with moderate foraminal stenosis bilaterally T6/7 and T7-T8.    Review of Systems: Pertinent positives: Numbness and tingling in the hands and feet swallowing issues.  No bowel or bladder incontinence balance issues fevers or weight loss.         Past Medical History:   Diagnosis Date     Cervical dysplasia      Chronic RUQ pain      Depressive disorder      Multiple myeloma (H)      Osteoporosis        The following portions of the patient's history were reviewed and updated as appropriate: allergies, current medications, past family history, past medical history, past social history, past surgical history and problem list.           Objective:   Physical Exam:    /63   Pulse 58   LMP  (LMP Unknown)   There is no height or weight on file to calculate  BMI.      General: Alert and oriented with normal affect. Attention, knowledge, memory, and language are intact. No acute distress.     Respirations: Unlabored. CV: No lower extremity edema.  Skin: No rashes seen.    Gait:  Nonantalgic  Thoracic kyphosis is exaggerated.  Sensation is intact to light touch throughout the upper and lower extremities.  Reflexes are 2+ and symmetric in the biceps triceps and brachioradialis with negative Hoffmans     Manual muscle testing reveals:  Right /Left out of 5     5/5 elbow flexors  5/5 elbow extensors  5/5 wrist extensors  5/5 interosseus  5/5 finger flexors     5/5 ankle plantar flexors  5/5 ankle dorsiflexors  5/5   ankle evertors     structural exam: Cranium: Left cranial torsion, OA sidebent right rotated left cervical spine: C2 rotated right side bent right, C3 rotated right side bent right,   C5 rotated left sidebent left. Rib cage: Rib one elevated on the right. Thoracic spine: T1 rotated left sidebent left, upper thoracic myofascial restrictions.. Upper Extremities: myofascial restrictions of the bilat upper trap right greater than left, infraspinatus/parascapular muscles. bilateral pectoral restrictions    Procedure:    After discussing the risks and benefits of osteopathic manipulative medicine, verbal consent was obtained. The somatic dysfunctions listed above were treated with the following techniques: Cranium: Cranial indirect technique, VSD, and very gentle  muscle energy for the OA. Cervical spine:   still technique, FPR, myofascial release, BLT, and soft tissue techniques. Rib cage: Myofascial release and FPR. Thoracic spine: Myofascial release, Upper Exrtremity: MFR, FPR, BLT.  The patient tolerated the procedure well and had improved range of motion in all areas treated prior to leaving the clinic.        Again, thank you for allowing me to participate in the care of your patient.        Sincerely,        Wyatt Pascual DO

## 2022-09-09 NOTE — PROGRESS NOTES
Assessment/Plan:      Lizzie was seen today for back pain.    Diagnoses and all orders for this visit:    Closed wedge compression fracture of T7 vertebra, sequela    Myofascial pain  -     baclofen (LIORESAL) 10 MG tablet; Take 1 tablet (10 mg) by mouth 3 times daily as needed for muscle spasms    Multiple myeloma, remission status unspecified (H)    Chronic midline thoracic back pain    Lumbar spine pain    Somatic dysfunction of head region  -     OSTEOPATHIC MANIP,5-6 BODY REGN    Somatic dysfunction of cervical region  -     OSTEOPATHIC MANIP,5-6 BODY REGN    Somatic dysfunction of rib region  -     OSTEOPATHIC MANIP,5-6 BODY REGN    Somatic dysfunction of thoracic region  -     OSTEOPATHIC MANIP,5-6 BODY REGN    Somatic dysfunction of upper extremity  -     OSTEOPATHIC MANIP,5-6 BODY REGN         Assessment: Pleasant 71 year old female with a history of hyperlipidemia, anxiety, depression, irritable bowel, neuropathy, osteoporosis and multiple myeloma with a T7 fracture and lesion  resulting in spinal stenosis:     1.  Persistent chronic thoracic pain  around T7-8 where she has the pathologic T7 fracture with epidural neoplasm compressing the spinal cord.  No signs of thoracic myelopathy neurologically stable.    Symptoms are stable and paresthesias may be slightly improving with physical therapy.  Overall doing well with gabapentin 600 mg 3 times daily.  Still has weakness in the right greater than left lower extremities but appears relatively stable.  Significant increased pain over the last week after coughing and now some mild improvement.  She has pain over the left ribs and mid thoracic spine.    Fracture is stable and new MRI at T1 and T7.  She has significant upper thoracic and cervical myofascial pain as well.     2.  Chronic lumbar spine pain intermittently.    Symptoms are stable with physical therapy.  Only some mild degenerative changes on MRI.     3.  Bilateral foot paresthesias consistent with  neuropathy.  Stable or slightly worsening.  [place] to be related to chemotherapy.      4.  She has cervical spine and upper thoracic spine myofascial pain.    5.  Somatic dysfunctions of the cranium, cervical spine, rib cage, upper extremities, thoracic spine  that contribute to the patient's pain complaints.    Discussion:    1.  We discussed the diagnosis and treatment options.  We discussed the option of duloxetine along with Osteopathic manipulative medicine as she has no more visits of therapy available this year as well as medication options such as baclofen in place of methocarbamol.  She would like to try Osteopathic manipulative medicine baclofen.    2.  Baclofen 5 to 10 mg 3 times daily as needed.  This can be tried in place of methocarbamol.    3.  Trial osteopathic neoplasia today.  She agrees to proceed.  Please see attached procedure note.  Gentle myofascial treatments the preferred method for her today.    4.  She can consider duloxetine  20 mg daily.  She will discuss this with her pain provider.  5.  She can also consider classical footstool to bring with her to appointments to keep her feet elevated when sitting in chair.      6.  Follow-up 2 to 4 weeks for additional OMT.        It was our pleasure caring for your patient today, if there any questions or concerns please do not hesitate to contact us.      Subjective:   Patient ID: Lizzie Viera is a 71 year old female.    History of Present Illness: Patient presents for follow-up of thoracic spine pain cervical spine pain lumbar spine pain.  Most significant pain is in the mid to upper thoracic spine feels muscle pain in the parascapular region to the cervical spine.  Her  has been quite ill after receiving a kidney transplant and has been spending a lot of time at the clinic and sitting for 4 to 5 hours in the chairs are difficult for her.  She gets a lot of pain and tightness to the upper thoracic and cervical spine.  She has some  numbness and tingling in the arms and legs that she feels is related to her chemotherapy is relatively stable but she only has a couple more chemotherapy treatments left and then she is on maintenance.  Symptoms are better with laying down as well as medical cannabis.  Taking gabapentin methocarbamol.  She felt that physical therapy with myofascial release was very helpful for her thoracic spine pain.  Her pain is an 8/10 at worst 4/10./10 at best.  Has had an MRI since last visit.      Imaging: MRI report and images were personally reviewed and discussed with the patient.  A plastic model was utilized during the discussion.  MRI of the thoracic spine personally reviewed.  This was on August 24, 2022.  Unchanged pathologic compression fractures..  No new fractures.  There is marrow edema at the T8 6 and T8 vertebral bodies reactive versus neoplastic.  No high-grade central stenosis with moderate foraminal stenosis bilaterally T6/7 and T7-T8.    Review of Systems: Pertinent positives: Numbness and tingling in the hands and feet swallowing issues.  No bowel or bladder incontinence balance issues fevers or weight loss.         Past Medical History:   Diagnosis Date     Cervical dysplasia      Chronic RUQ pain      Depressive disorder      Multiple myeloma (H)      Osteoporosis        The following portions of the patient's history were reviewed and updated as appropriate: allergies, current medications, past family history, past medical history, past social history, past surgical history and problem list.           Objective:   Physical Exam:    /63   Pulse 58   LMP  (LMP Unknown)   There is no height or weight on file to calculate BMI.      General: Alert and oriented with normal affect. Attention, knowledge, memory, and language are intact. No acute distress.     Respirations: Unlabored. CV: No lower extremity edema.  Skin: No rashes seen.    Gait:  Nonantalgic  Thoracic kyphosis is exaggerated.  Sensation is  intact to light touch throughout the upper and lower extremities.  Reflexes are 2+ and symmetric in the biceps triceps and brachioradialis with negative Hoffmans     Manual muscle testing reveals:  Right /Left out of 5     5/5 elbow flexors  5/5 elbow extensors  5/5 wrist extensors  5/5 interosseus  5/5 finger flexors     5/5 ankle plantar flexors  5/5 ankle dorsiflexors  5/5   ankle evertors     structural exam: Cranium: Left cranial torsion, OA sidebent right rotated left cervical spine: C2 rotated right side bent right, C3 rotated right side bent right,   C5 rotated left sidebent left. Rib cage: Rib one elevated on the right. Thoracic spine: T1 rotated left sidebent left, upper thoracic myofascial restrictions.. Upper Extremities: myofascial restrictions of the bilat upper trap right greater than left, infraspinatus/parascapular muscles. bilateral pectoral restrictions    Procedure:    After discussing the risks and benefits of osteopathic manipulative medicine, verbal consent was obtained. The somatic dysfunctions listed above were treated with the following techniques: Cranium: Cranial indirect technique, VSD, and very gentle  muscle energy for the OA. Cervical spine:   still technique, FPR, myofascial release, BLT, and soft tissue techniques. Rib cage: Myofascial release and FPR. Thoracic spine: Myofascial release, Upper Exrtremity: MFR, FPR, BLT.  The patient tolerated the procedure well and had improved range of motion in all areas treated prior to leaving the clinic.

## 2022-09-09 NOTE — PATIENT INSTRUCTIONS
Baclofen (muscle relaxant medication) has been prescribed today. Please take 1/2-1 tablet three times daily as needed for muscle pain. This medication may cause drowsiness. Please do not work or drive while taking this medication until you know how it effects you. If it does make you drowsy, you should only take it before bedtime or at times that you do not have to work/drive.   2. Osteopathic Manual medicine today

## 2022-09-13 ENCOUNTER — VIRTUAL VISIT (OUTPATIENT)
Dept: RADIATION ONCOLOGY | Facility: HOSPITAL | Age: 72
End: 2022-09-13
Attending: INTERNAL MEDICINE
Payer: COMMERCIAL

## 2022-09-13 DIAGNOSIS — G89.3 CANCER ASSOCIATED PAIN: ICD-10-CM

## 2022-09-13 DIAGNOSIS — R53.0 NEOPLASTIC MALIGNANT RELATED FATIGUE: ICD-10-CM

## 2022-09-13 DIAGNOSIS — F41.9 ANXIETY: ICD-10-CM

## 2022-09-13 DIAGNOSIS — Z51.5 ENCOUNTER FOR PALLIATIVE CARE: Primary | ICD-10-CM

## 2022-09-13 PROCEDURE — 99215 OFFICE O/P EST HI 40 MIN: CPT | Mod: 95 | Performed by: CLINICAL NURSE SPECIALIST

## 2022-09-13 RX ORDER — METHOCARBAMOL 500 MG/1
500 TABLET, FILM COATED ORAL 4 TIMES DAILY PRN
Qty: 90 TABLET | Refills: 0 | Status: SHIPPED | OUTPATIENT
Start: 2022-09-13 | End: 2022-10-17

## 2022-09-13 NOTE — CONFIDENTIAL NOTE
"[facility]  Palliative Care Daily Progress Note    \"This video visit will be conducted via a call between you and your physician/provider. We have found that certain health care needs can be provided without the need for an in-person physical exam.  This service lets us provide the care you need with a video conversation.  If a prescription is necessary, we can send it directly to your pharmacy.  If lab work is needed, we can place an order for that and you can then stop by our lab to have the test done at a later time.     Video visits are billed at different rates depending on your insurance coverage. Please reach out to your insurance provider with any questions.     If during the course of the call the physician/provider feels a video visit is not appropriate, you will not be charged for this service.\"     Patient has given verbal consent to a Video visit? yes     Video Start - End Time:  5357 - 8056    Code status: [unfilled]     Impressions, Recommendations & Counseling     SYMPTOM ASSESSMENT:  1.  Cancer related pain thoracic spine pain that radiates to bilateral lower extremities  Comment: Controlled.  Currently rates pain as \"low\".  - Continue Gabapentin 600 mg by mouth 3 times daily.  - Dexamethasone per chemotherapy regimen - assists with pain, nausea and appetite stimulation.  - S/P Single fraction radiation to T6 through T8/9.  - S/P T1 radiation 10/6/2021-10/12/2021.    - Dilaudid to 2-4 mg by mouth every 4 hours as needed.  Takes infrequently.  - Continue medical cannabis per department of Health. - pills and oil. Feels she is having good relief with this.  Does not take cannabis when knows she has to drive the next day.  - Continue Robaxin 500 mg po q6h prn spasms - taking 3 times a day. Refilled today.  - Tried baclofen at bedtime without benefit. It did not assist with sleep or relief of spasms.  -Discussed possibility of initiation of Cymbalta as this could help with " "neuropathic component to pain as well as assist with depression.  Patient would like to hold off on this time.  Does not want to feel any masking of highs or lows per her report.  Knows it is an option for future consideration.    - Patient was taking  naltrexone 3 mg by mouth daily.  Had discussion with patient that this could reverse/minimize effect of opiate and she may feel more pain.  Taking this as a \"anti-inflammatory\" component of her natural/complementary treatments.     2. Cancer related fatigue - fluctuates.  Feels fatigued at days end of her lenalidomide cycle.  - Activity as tolerated.  - Patient utilizing rolling walker while up.     3. Anxiety and depression related to health/diagnosis and current social stressors  - Patient seeing Mary Ellen Chan for coping, support and processing diagnosis.  - Patient sees an energy healer.     ADVANCED CARE PLANNING/GOALS OF CARE DISCUSSION:  - Code status: DNR/DNI  - Honoring Choices and POLST in chart.  - Follow-up in palliative care clinic in 3 months for ongoing pain management and decisional support.   -Call [phone] with questions or refill needs.      HPI          Lizzie Viera is a 71 year old female with a past medical history of multiple myeloma with pathologic compression fracture of T6 through T8/9 spine based on CT scan and received one fraction of radiation to the levels T6 through T8/9 on 9/1/2021 and T1 radiation from 10/6/2021 - 10/12/2021. Patient fitted for and wear TLSO brace for spine stabilization.   Historically, 9/2020 patient noted to have osteoporotic T7 compression fracture with diffuse marrow edema and an indeterminate T1 lesion.  Further imaging in 4/26/2021 showed worsening of her T7 pathologic fracture with new extraosseous extension causing severe spinal canal stenosis.  5/7/2021 biopsy showed neoplasm and biopsy obtained of right iliac crest showed multiple myeloma.  Patient initially declined chemo and radiation at that time " and sought naturapathic approach to treatment.  Current treatment - Velcade, Revlimid and Dexamethasone.     Today, the patient was seen for:  Emotional support and pain management    Prognosis, Goals, or Advance Care Planning was addressed today with: Yes.  Mood, coping, and/or meaning in the context of serious illness were addressed today: Yes.  Summary/Comments: Continue all current cares and treatments.  Patient encouraged that she will start maintenance treatment with Revlimid in October and feels this will help increase her energy.  Feels pain is well controlled with methocarbamol, gabapentin and cannabis.  Has no new concerns or complaints.            Interval History:     Chart review/discussion with unit or clinical team members:   Introduced patient to Dr. Salazar who will be taking on palliative care clinic.    Key Palliative Symptoms:  # Pain severity the last 12 hours: low  # Dyspnea severity the last 12 hours: none  # Nausea severity the last 12 hours: none  # Anxiety severity the last 12 hours: none  Fatigue: Low to moderate  Dizziness: None           Review of Systems:     Besides above, an additional system ROS was reviewed and is unremarkable          Medications:     I have reviewed this patient's medication profile and PDMP.           Physical Exam:      General appearance: alert, appears stated age, cooperative and no distress  Head: Normocephalic, without obvious abnormality, atraumatic  Eyes: lids and lashes normal  Nose: no discharge  Throat: lips without lesions.   Lungs: non-labored at rest with speaking  Neurologic: alert. oriented x4.             Data Reviewed:     Pertinent Labs  Lab Results: personally reviewed.   Lab Results   Component Value Date     08/31/2022    CO2 28 08/31/2022    BUN 20 08/31/2022     Lab Results   Component Value Date    WBC 7.7 09/08/2022    HGB 13.3 09/08/2022    HCT 39.8 09/08/2022     09/08/2022     09/08/2022     AST   Date Value Ref  Range Status   08/31/2022 13 0 - 40 U/L Final     ALT   Date Value Ref Range Status   08/31/2022 19 0 - 45 U/L Final     Alkaline Phosphatase   Date Value Ref Range Status   08/31/2022 32 (L) 45 - 120 U/L Final     Albumin   Date Value Ref Range Status   08/31/2022 3.4 (L) 3.5 - 5.0 g/dL Final         Radiology Results  MR Thoracic Spine w/o Contrast    Result Date: 8/31/2022  EXAM: MR THORACIC SPINE W/O CONTRAST LOCATION: [facility] DATE/TIME: 8/24/2022 11:51 AM INDICATION: Spine fracture, thoracic, pathological Bone lesion, T-spine, malignancy suspected Compression fracture, T-spine. Multiple myeloma. COMPARISON: MRI thoracic spine 03/09/2022 TECHNIQUE: Routine Thoracic Spine MRI without IV contrast. FINDINGS: Please note that this exam performed on 08/24/2022 is first received by me for interpretation on 08/31/2022. 12 thoracic type vertebra. Unchanged thoracic alignment with accentuated thoracic kyphosis centered at the T7 level. Unchanged pathologic compression fractures at T1 and T7 with 60% central vertebral height loss at T1 level and marked anterior wedging at T7. No definite new compression fracture. Chronic Schmorl's node at T11. Persistent abnormal marrow signal extending into the pedicles and posterior elements at T7 and also involving adjacent posterior inferior T6 and posterior superior T8 vertebral bodies. Marrow signal is unchanged compared to the previous MRI. No significant paraspinal or epidural extension of neoplasm. Diffuse thoracic spondylosis with mild to moderate multilevel loss of disc height and signal, greatest at C5-C6. Slight annular bulging at multiple levels without significant focal disc herniation. Mild facet arthropathy. No spinal canal stenosis. Persistent moderate bilateral neural foraminal stenosis at T6-T7 and T7-T8. No definite cord signal abnormality. Mild paraspinal muscular atrophy. Partially visualized left renal cyst similar to the previous exam.      IMPRESSION: 1.  Unchanged pathologic compression fractures at T1 and T7 compared to 03/09/2022. No new fracture or progressive vertebral height loss identified. 2.  Persistent abnormal marrow signal involving the T6 and T8 vertebral bodies which may be reactive or neoplastic in nature. 3.  No evidence for extraosseous extension of neoplasm. No evidence for new osseous lesions. 4.  Multilevel thoracic spondylosis without high-grade central spinal canal stenosis. Unchanged moderate bilateral neural foraminal stenosis at T6-T7 and T7-T8.         TTS: I have personally spent a total of 40 minutes today on unit in review of medical record and assessment of patient today, with more than 50% of this time spent in counseling, coordination of care, and discussion with patient symptom management, risks and benefits of management options, and development of plan of care as noted above.    VILMA Flynn, APRN, CNS  Palliative Care  [phone]

## 2022-09-13 NOTE — PROGRESS NOTES
Valeria is a 71 year old who is being evaluated via a billable video visit.      How would you like to obtain your AVS? [email or web address]  If the video visit is dropped, the invitation should be resent by: Send to e-mail at: [email or web address]  Will anyone else be joining your video visit? No        Video-Visit Details    Video Start Time: 1350    Type of service:  Video Visit    Video End Time:    Originating Location (pt. Location): Home    Distant Location (provider location):  [facility] [place]     Platform used for Video Visit: Brad     Pt called prior to video visit, see assessment. Further recommendations and orders per provider.

## 2022-09-13 NOTE — Clinical Note
follow-up with outpatient palliative care and video visit in 10 weeks for ongoing symptom management.

## 2022-09-13 NOTE — PATIENT INSTRUCTIONS
Follow up in 3-4 months.    No changes to medications today.    Okay to discontinue baclofen as patient did not feel effective.    Call palliative care at [phone] with questions or needs.

## 2022-09-14 ENCOUNTER — INFUSION THERAPY VISIT (OUTPATIENT)
Dept: INFUSION THERAPY | Facility: HOSPITAL | Age: 72
End: 2022-09-14
Attending: INTERNAL MEDICINE
Payer: COMMERCIAL

## 2022-09-14 ENCOUNTER — PSYCHE (OUTPATIENT)
Dept: ONCOLOGY | Facility: HOSPITAL | Age: 72
End: 2022-09-14
Attending: SOCIAL WORKER
Payer: COMMERCIAL

## 2022-09-14 VITALS
HEART RATE: 52 BPM | TEMPERATURE: 98.1 F | RESPIRATION RATE: 16 BRPM | DIASTOLIC BLOOD PRESSURE: 57 MMHG | SYSTOLIC BLOOD PRESSURE: 126 MMHG | OXYGEN SATURATION: 96 %

## 2022-09-14 DIAGNOSIS — C90.00 MULTIPLE MYELOMA WITH FAILED REMISSION (H): Primary | ICD-10-CM

## 2022-09-14 DIAGNOSIS — C90.00 MULTIPLE MYELOMA WITH FAILED REMISSION (H): ICD-10-CM

## 2022-09-14 DIAGNOSIS — M80.80XD LOCALIZED OSTEOPOROSIS WITH CURRENT PATHOLOGICAL FRACTURE WITH ROUTINE HEALING, SUBSEQUENT ENCOUNTER: ICD-10-CM

## 2022-09-14 DIAGNOSIS — C90.00 MULTIPLE MYELOMA NOT HAVING ACHIEVED REMISSION (H): ICD-10-CM

## 2022-09-14 DIAGNOSIS — F43.23 ADJUSTMENT DISORDER WITH MIXED ANXIETY AND DEPRESSED MOOD: Primary | ICD-10-CM

## 2022-09-14 LAB
BASOPHILS # BLD AUTO: 0.1 10E3/UL (ref 0–0.2)
BASOPHILS NFR BLD AUTO: 1 %
EOSINOPHIL # BLD AUTO: 0.2 10E3/UL (ref 0–0.7)
EOSINOPHIL NFR BLD AUTO: 3 %
ERYTHROCYTE [DISTWIDTH] IN BLOOD BY AUTOMATED COUNT: 15.2 % (ref 10–15)
HCT VFR BLD AUTO: 42.4 % (ref 35–47)
HGB BLD-MCNC: 14.2 G/DL (ref 11.7–15.7)
IMM GRANULOCYTES # BLD: 0 10E3/UL
IMM GRANULOCYTES NFR BLD: 0 %
LDH SERPL L TO P-CCNC: 233 U/L (ref 125–220)
LYMPHOCYTES # BLD AUTO: 0.9 10E3/UL (ref 0.8–5.3)
LYMPHOCYTES NFR BLD AUTO: 14 %
MCH RBC QN AUTO: 34.5 PG (ref 26.5–33)
MCHC RBC AUTO-ENTMCNC: 33.5 G/DL (ref 31.5–36.5)
MCV RBC AUTO: 103 FL (ref 78–100)
MONOCYTES # BLD AUTO: 1.1 10E3/UL (ref 0–1.3)
MONOCYTES NFR BLD AUTO: 16 %
NEUTROPHILS # BLD AUTO: 4.5 10E3/UL (ref 1.6–8.3)
NEUTROPHILS NFR BLD AUTO: 66 %
NRBC # BLD AUTO: 0 10E3/UL
NRBC BLD AUTO-RTO: 0 /100
PLATELET # BLD AUTO: 197 10E3/UL (ref 150–450)
RBC # BLD AUTO: 4.12 10E6/UL (ref 3.8–5.2)
TOTAL PROTEIN SERUM FOR ELP: 5.5 G/DL (ref 6.4–8.3)
WBC # BLD AUTO: 6.8 10E3/UL (ref 4–11)

## 2022-09-14 PROCEDURE — 83615 LACTATE (LD) (LDH) ENZYME: CPT

## 2022-09-14 PROCEDURE — 82784 ASSAY IGA/IGD/IGG/IGM EACH: CPT

## 2022-09-14 PROCEDURE — 83521 IG LIGHT CHAINS FREE EACH: CPT

## 2022-09-14 PROCEDURE — 36415 COLL VENOUS BLD VENIPUNCTURE: CPT

## 2022-09-14 PROCEDURE — 86334 IMMUNOFIX E-PHORESIS SERUM: CPT | Performed by: PATHOLOGY

## 2022-09-14 PROCEDURE — 90834 PSYTX W PT 45 MINUTES: CPT

## 2022-09-14 PROCEDURE — 84165 PROTEIN E-PHORESIS SERUM: CPT | Mod: TC | Performed by: PATHOLOGY

## 2022-09-14 PROCEDURE — 96401 CHEMO ANTI-NEOPL SQ/IM: CPT

## 2022-09-14 PROCEDURE — 84155 ASSAY OF PROTEIN SERUM: CPT

## 2022-09-14 PROCEDURE — 250N000011 HC RX IP 250 OP 636: Performed by: NURSE PRACTITIONER

## 2022-09-14 PROCEDURE — 85025 COMPLETE CBC W/AUTO DIFF WBC: CPT | Performed by: NURSE PRACTITIONER

## 2022-09-14 RX ADMIN — BORTEZOMIB 2.1 MG: 3.5 INJECTION, POWDER, LYOPHILIZED, FOR SOLUTION INTRAVENOUS; SUBCUTANEOUS at 12:21

## 2022-09-14 ASSESSMENT — ANXIETY QUESTIONNAIRES
6. BECOMING EASILY ANNOYED OR IRRITABLE: SEVERAL DAYS
2. NOT BEING ABLE TO STOP OR CONTROL WORRYING: MORE THAN HALF THE DAYS
GAD7 TOTAL SCORE: 11
GAD7 TOTAL SCORE: 11
1. FEELING NERVOUS, ANXIOUS, OR ON EDGE: MORE THAN HALF THE DAYS
7. FEELING AFRAID AS IF SOMETHING AWFUL MIGHT HAPPEN: SEVERAL DAYS
5. BEING SO RESTLESS THAT IT IS HARD TO SIT STILL: MORE THAN HALF THE DAYS
3. WORRYING TOO MUCH ABOUT DIFFERENT THINGS: SEVERAL DAYS
IF YOU CHECKED OFF ANY PROBLEMS ON THIS QUESTIONNAIRE, HOW DIFFICULT HAVE THESE PROBLEMS MADE IT FOR YOU TO DO YOUR WORK, TAKE CARE OF THINGS AT HOME, OR GET ALONG WITH OTHER PEOPLE: SOMEWHAT DIFFICULT
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
## 2022-09-14 NOTE — PROGRESS NOTES
Infusion Nursing Note:  Lizzie Viera presents today for C17D15 Velcade.    Patient seen by provider today: No   present during visit today: Not Applicable.    Note: Velcade given into RLQ Abdomen.    Intravenous Access:  No Intravenous access/labs at this visit.  Lab draw site R AC, Needle type butterfly, Gauge 23.    Treatment Conditions:  Lab Results   Component Value Date    HGB 14.2 09/14/2022    WBC 6.8 09/14/2022    ANEU 6.3 07/13/2022    ANEUTAUTO 4.5 09/14/2022     09/14/2022      Lab Results   Component Value Date     08/31/2022    POTASSIUM 4.3 08/31/2022    MAG 1.9 04/19/2022    CR 0.75 08/31/2022    SERAFIN 9.3 08/31/2022    BILITOTAL 0.4 08/31/2022    ALBUMIN 3.4 (L) 08/31/2022    ALT 19 08/31/2022    AST 13 08/31/2022     Results reviewed, labs MET treatment parameters, ok to proceed with treatment.    Post Infusion Assessment:  Patient tolerated injection without incident.  Site patent and intact, free from redness, edema or discomfort.     Discharge Plan:   [facility]  Patient will return 9/21/22 for next appointment.   Patient discharged in stable condition accompanied by: self.  Departure Mode: Ambulatory.      Roberta Zavala RN

## 2022-09-15 DIAGNOSIS — C90.00 MULTIPLE MYELOMA WITH FAILED REMISSION (H): ICD-10-CM

## 2022-09-15 DIAGNOSIS — C90.00 MULTIPLE MYELOMA NOT HAVING ACHIEVED REMISSION (H): Primary | ICD-10-CM

## 2022-09-15 LAB
ALBUMIN SERPL ELPH-MCNC: 3.5 G/DL (ref 3.7–5.1)
ALPHA1 GLOB SERPL ELPH-MCNC: 0.3 G/DL (ref 0.2–0.4)
ALPHA2 GLOB SERPL ELPH-MCNC: 0.8 G/DL (ref 0.5–0.9)
B-GLOBULIN SERPL ELPH-MCNC: 0.6 G/DL (ref 0.6–1)
GAMMA GLOB SERPL ELPH-MCNC: 0.3 G/DL (ref 0.7–1.6)
IGA SERPL-MCNC: 23 MG/DL (ref 84–499)
IGG SERPL-MCNC: 281 MG/DL (ref 610–1616)
IGM SERPL-MCNC: 16 MG/DL (ref 35–242)
KAPPA LC FREE SER-MCNC: 1.04 MG/DL (ref 0.33–1.94)
KAPPA LC FREE/LAMBDA FREE SER NEPH: 0.94 {RATIO} (ref 0.26–1.65)
LAMBDA LC FREE SERPL-MCNC: 1.11 MG/DL (ref 0.57–2.63)
M PROTEIN SERPL ELPH-MCNC: 0.1 G/DL
PROT PATTERN SERPL ELPH-IMP: ABNORMAL
PROT PATTERN SERPL IFE-IMP: NORMAL

## 2022-09-15 PROCEDURE — 86334 IMMUNOFIX E-PHORESIS SERUM: CPT | Mod: 26 | Performed by: PATHOLOGY

## 2022-09-15 PROCEDURE — 84165 PROTEIN E-PHORESIS SERUM: CPT | Mod: 26

## 2022-09-15 RX ORDER — LENALIDOMIDE 10 MG/1
10 CAPSULE ORAL DAILY
Qty: 28 CAPSULE | Refills: 0 | Status: SHIPPED | OUTPATIENT
Start: 2022-09-30 | End: 2022-10-27

## 2022-09-16 ENCOUNTER — TELEPHONE (OUTPATIENT)
Dept: ONCOLOGY | Facility: HOSPITAL | Age: 72
End: 2022-09-16

## 2022-09-16 NOTE — ORAL ONC MGMT
Oral Chemotherapy Monitoring Program    Subjective/Objective:  Lizzie Viera is a 71 year old female with multiple myeloma contacted by phone for a monthly follow-up visit for oral chemotherapy. Valeria continues to take lenalidomide 25 mg once daily and started her week off yesterday. She still has mild bilateral edema, but she has been unable to find a new PCP to discuss her amlodipine. We discussed that she will be starting maintenance lenalidomide 10 mg once daily the end of September. She requests to start 10/2/22 so she will be starting the beginning of the week. We reviewed that she will no longer take a week off and instead will continuously take 1 capsule daily.      ORAL CHEMOTHERAPY 6/23/2022 7/1/2022 7/14/2022 8/4/2022 8/25/2022 9/15/2022 9/16/2022   Assessment Type Refill Monthly Follow up;Left Voicemail Refill Monthly Follow up;Refill Refill Left Voicemail Monthly Follow up   Diagnosis Code Multiple Myeloma Multiple Myeloma Multiple Myeloma Multiple Myeloma Multiple Myeloma Multiple Myeloma Multiple Myeloma   Providers Dr. Bella Blanco   Clinic Name/Location [facility]   Drug Name Revlimid (lenalidomide) Revlimid (lenalidomide) Revlimid (lenalidomide) Revlimid (lenalidomide) Revlimid (lenalidomide) Revlimid (lenalidomide) Revlimid (lenalidomide)   Dose 25 mg 25 mg 25 mg 25 mg 25 mg 10 mg 10 mg   Current Schedule Daily Daily Daily Daily Daily Daily Daily   Cycle Details 2 weeks on, 1 week off 2 weeks on, 1 week off 2 weeks on, 1 week off 2 weeks on, 1 week off 2 weeks on, 1 week off Continuous Continuous   Start Date of Last Cycle - - - 7/20/2022 - - 10/2/2022   Planned next cycle start date - - - 8/10/2022 - - -   Doses missed in last 2 weeks - - - 1 - - 0   Adherence Assessment - - - Adherent - - Adherent   Adverse Effects - - - Edema - - Edema   Nausea - - - - - - -  "  Pharmacist Intervention(nausea) - - - - - - -   Diarrhea - - - - - - -   Pharmacist Intervention(diarrhea) - - - - - - -   Intervention(s) - - - - - - -   Decrease Appetite/Weight Loss - - - - - - -   Pharmacist Intervention(decreased appetite/weight loss) - - - - - - -   Intervention(s) - - - - - - -   Fatigue - - - - - - -   Pharmacist Intervention(fatigue) - - - - - - -   Any new drug interactions? - - - No - - No   Is the dose as ordered appropriate for the patient? - - - Yes - - Yes       Last PHQ-2 Score on record:   PHQ-2 ( 1999 [employer]) 6/25/2022 5/3/2022   Q1: Little interest or pleasure in doing things 1 1   Q2: Feeling down, depressed or hopeless 1 0   PHQ-2 Score 2 1   Q1: Little interest or pleasure in doing things Several days Several days   Q2: Feeling down, depressed or hopeless Several days Not at all   PHQ-2 Score 2 1       Vitals:  BP:   BP Readings from Last 1 Encounters:   09/14/22 126/57     Wt Readings from Last 1 Encounters:   08/31/22 60.7 kg (133 lb 12.8 oz)     Estimated body surface area is 1.62 meters squared as calculated from the following:    Height as of 6/30/22: 1.549 m (5' 1\").    Weight as of 8/31/22: 60.7 kg (133 lb 12.8 oz).    Labs:  _  Result Component Current Result Ref Range   Sodium 140 (8/31/2022) 136 - 145 mmol/L     _  Result Component Current Result Ref Range   Potassium 4.3 (8/31/2022) 3.5 - 5.0 mmol/L     _  Result Component Current Result Ref Range   Calcium 9.3 (8/31/2022) 8.5 - 10.5 mg/dL     No results found for Mag within last 30 days.     No results found for Phos within last 30 days.     _  Result Component Current Result Ref Range   Albumin 3.4 (L) (8/31/2022) 3.5 - 5.0 g/dL     _  Result Component Current Result Ref Range   Urea Nitrogen 20 (8/31/2022) 8 - 22 mg/dL     _  Result Component Current Result Ref Range   Creatinine 0.75 (8/31/2022) 0.60 - 1.10 mg/dL     _  Result Component Current Result Ref Range   AST 13 (8/31/2022) 0 - 40 U/L     _  Result " Component Current Result Ref Range   ALT 19 (8/31/2022) 0 - 45 U/L     _  Result Component Current Result Ref Range   Bilirubin Total 0.4 (8/31/2022) 0.0 - 1.0 mg/dL     _  Result Component Current Result Ref Range   WBC Count 6.8 (9/14/2022) 4.0 - 11.0 10e3/uL     _  Result Component Current Result Ref Range   Hemoglobin 14.2 (9/14/2022) 11.7 - 15.7 g/dL     _  Result Component Current Result Ref Range   Platelet Count 197 (9/14/2022) 150 - 450 10e3/uL     No results found for ANC within last 30 days.     _  Result Component Current Result Ref Range   Absolute Neutrophils 4.5 (9/14/2022) 1.6 - 8.3 10e3/uL      Medication Reconciliation:  No changes    Assessment/Plan:  I encouraged Valeria to continue to work on finding a PCP to manage her BP and edema. She confirms understanding with the changes to treatment. Continue with current treatment plan.    Follow-Up:  10/26 - labs + appt    We will call Vaelria about 1 week into maintenance to see how she is tolerating therapy.    Refill Due:  Maintenance released    Jeison Bendix, PharmD  Oral Chemotherapy Pharmacist  [phone]

## 2022-09-23 ENCOUNTER — OFFICE VISIT (OUTPATIENT)
Dept: PHYSICAL MEDICINE AND REHAB | Facility: CLINIC | Age: 72
End: 2022-09-23
Payer: COMMERCIAL

## 2022-09-23 VITALS — SYSTOLIC BLOOD PRESSURE: 143 MMHG | DIASTOLIC BLOOD PRESSURE: 63 MMHG

## 2022-09-23 DIAGNOSIS — M99.07 SOMATIC DYSFUNCTION OF UPPER EXTREMITY: ICD-10-CM

## 2022-09-23 DIAGNOSIS — M99.00 SOMATIC DYSFUNCTION OF HEAD REGION: ICD-10-CM

## 2022-09-23 DIAGNOSIS — M99.02 SOMATIC DYSFUNCTION OF THORACIC REGION: ICD-10-CM

## 2022-09-23 DIAGNOSIS — M99.01 SOMATIC DYSFUNCTION OF CERVICAL REGION: ICD-10-CM

## 2022-09-23 DIAGNOSIS — S22.060S CLOSED WEDGE COMPRESSION FRACTURE OF T7 VERTEBRA, SEQUELA: ICD-10-CM

## 2022-09-23 DIAGNOSIS — M79.18 MYOFASCIAL PAIN: Primary | ICD-10-CM

## 2022-09-23 DIAGNOSIS — C90.00 MULTIPLE MYELOMA, REMISSION STATUS UNSPECIFIED (H): ICD-10-CM

## 2022-09-23 DIAGNOSIS — M99.08 SOMATIC DYSFUNCTION OF RIB REGION: ICD-10-CM

## 2022-09-23 PROCEDURE — 98927 OSTEOPATH MANJ 5-6 REGIONS: CPT | Performed by: PHYSICAL MEDICINE & REHABILITATION

## 2022-09-23 ASSESSMENT — PAIN SCALES - GENERAL: PAINLEVEL: MILD PAIN (3)

## 2022-09-23 NOTE — LETTER
9/23/2022         RE: Lizzie Viera  [address]  [place] [address]        Dear Colleague,    Thank you for referring your patient, Lizzie Viera, to the [facility]. Please see a copy of my visit note below.    Assessment/Plan:      Lizzie was seen today for back pain.    Diagnoses and all orders for this visit:    Myofascial pain    Closed wedge compression fracture of T7 vertebra, sequela    Multiple myeloma, remission status unspecified (H)    Somatic dysfunction of head region  -     OSTEOPATHIC MANIP,5-6 BODY REGN    Somatic dysfunction of cervical region  -     OSTEOPATHIC MANIP,5-6 BODY REGN    Somatic dysfunction of rib region  -     OSTEOPATHIC MANIP,5-6 BODY REGN    Somatic dysfunction of thoracic region  -     OSTEOPATHIC MANIP,5-6 BODY REGN    Somatic dysfunction of upper extremity  -     OSTEOPATHIC MANIP,5-6 BODY REGN         Assessment: Hossein 71 year old female with a history of hyperlipidemia, anxiety, depression, irritable bowel, neuropathy, osteoporosis and multiple myeloma with a T7 fracture and lesion  resulting in spinal stenosis:     1.  Improved and now worsening chronic thoracic pain  around T7-8 where she has the pathologic T7 fracture with epidural neoplasm compressing the spinal cord.  No signs of thoracic myelopathy neurologically stable.    Symptoms are stable and paresthesias may be slightly improving with physical therapy.  Overall doing well with gabapentin 600 mg 3 times daily.  Still has weakness in the right greater than left lower extremities but appears relatively stable.  Significant increased pain over the last week after coughing and now some mild improvement.  She has pain over the left ribs and mid thoracic spine.    Fracture is stable and new MRI at T1 and T7.  She has significant upper thoracic and cervical myofascial pain as well.  Did quite well after Osteopathic manipulative medicine for approximately 4 to  5 days and the pain started to return.     2.  Chronic lumbar spine pain intermittently.    Symptoms are stable with physical therapy.  Only some mild degenerative changes on MRI.     3.  Bilateral foot paresthesias consistent with neuropathy.  Stable or slightly worsening.  [place] to be related to chemotherapy.      4.  She has cervical spine and upper thoracic spine myofascial pain.     5.    Somatic dysfunctions of the cranium, cervical spine, rib cage, upper extremities, thoracic spine  that contribute to the patient's pain complaints.      Discussion:    1.  Patient presents for Osteopathic manipulative medicine day.  Did well with last treatment.  Wishes to proceed.  Please see attached procedure note.    2.  Follow-up as needed.      It was our pleasure caring for your patient today, if there any questions or concerns please do not hesitate to contact us.      Subjective:   Patient ID: Lizzie Viera is a 71 year old female.    History of Present Illness:Patient presents for osteopathic Sharon today.  She had OMT last visit which was significantly helpful for the headaches much improvement of the headache frequency and intensity.  40% improvement with regards to upper neck and back pain.  Felt very good and was doing a lot of cleaning last week and has had increased pain for the past several days.  Pain is worse with activity better with sleep and pain medication.  Pain is to the upper thoracic spine and cervical spine with headaches.  Pain is an 8/10 at worst 3/10 today 1/10 at best.  He depressive-like manipulation and would like to proceed with additional treatment today.       Review of Systems: Complains of numbness tingling weakness in the legs.  Has headaches swallowing difficulties.  Denies bowel or bladder incontinence, falls, fevers or unintentional weight loss.      Past Medical History:   Diagnosis Date     Cervical dysplasia      Chronic RUQ pain      Depressive disorder      Multiple  myeloma (H)      Osteoporosis        The following portions of the patient's history were reviewed and updated as appropriate: allergies, current medications, past family history, past medical history, past social history, past surgical history and problem list.           Objective:   Physical Exam:    BP (!) 143/63   LMP  (LMP Unknown)   There is no height or weight on file to calculate BMI.      General: Alert and oriented with normal affect. Attention, knowledge, memory, and language are intact. No acute distress.    Gait:  Nonantalgic    Sensation is intact to light touch throughout the upper   extremities.  Reflexes are negative Hoffmans.      Manual muscle testing reveals:  Right /Left out of 5     5/5 elbow flexors  5/5 elbow extensors  5/5 wrist extensors  5/5 interosseus  5/5 finger flexors       Structural exam: Cranium: Left torsion, OA sidebent right rotated left cervical spine: C2 rotated left side bent left, C3 rotated right sidebent right  Rib cage: Rib one elevated on the left. Thoracic spine: T1 rotated right sidebent right, upper thoracic myofascial restrictions.  Upper Extremities: myofascial restrictions of the bilat upper trap, infraspinatus/parascapular muscles. Bilateral pectoral restrictions.    Procedure:    After discussing the risks and benefits of osteopathic manipulative medicine, verbal consent was obtained. The somatic dysfunctions listed above were treated with the following techniques: Cranium: Cranial indirect technique, VSD, and muscle energy for the OA. Cervical spine: Muscle energy, still technique, FPR, myofascial release, BLT, and soft tissue techniques. Rib cage: Myofascial release and FPR. Thoracic spine: Myofascial release, BLT.   Upper Exrtremity: MFR, FPR, BLT.  The patient tolerated the procedure well and had improved range of motion in all areas treated prior to leaving the clinic.        Again, thank you for allowing me to participate in the care of your patient.         Sincerely,        Wyatt Pascual, DO

## 2022-09-23 NOTE — PROGRESS NOTES
Assessment/Plan:      Lizzie was seen today for back pain.    Diagnoses and all orders for this visit:    Myofascial pain    Closed wedge compression fracture of T7 vertebra, sequela    Multiple myeloma, remission status unspecified (H)    Somatic dysfunction of head region  -     OSTEOPATHIC MANIP,5-6 BODY REGN    Somatic dysfunction of cervical region  -     OSTEOPATHIC MANIP,5-6 BODY REGN    Somatic dysfunction of rib region  -     OSTEOPATHIC MANIP,5-6 BODY REGN    Somatic dysfunction of thoracic region  -     OSTEOPATHIC MANIP,5-6 BODY REGN    Somatic dysfunction of upper extremity  -     OSTEOPATHIC MANIP,5-6 BODY REGN         Assessment: Pleasant 71 year old female with a history of hyperlipidemia, anxiety, depression, irritable bowel, neuropathy, osteoporosis and multiple myeloma with a T7 fracture and lesion  resulting in spinal stenosis:     1.  Improved and now worsening chronic thoracic pain  around T7-8 where she has the pathologic T7 fracture with epidural neoplasm compressing the spinal cord.  No signs of thoracic myelopathy neurologically stable.    Symptoms are stable and paresthesias may be slightly improving with physical therapy.  Overall doing well with gabapentin 600 mg 3 times daily.  Still has weakness in the right greater than left lower extremities but appears relatively stable.  Significant increased pain over the last week after coughing and now some mild improvement.  She has pain over the left ribs and mid thoracic spine.    Fracture is stable and new MRI at T1 and T7.  She has significant upper thoracic and cervical myofascial pain as well.  Did quite well after Osteopathic manipulative medicine for approximately 4 to 5 days and the pain started to return.     2.  Chronic lumbar spine pain intermittently.    Symptoms are stable with physical therapy.  Only some mild degenerative changes on MRI.     3.  Bilateral foot paresthesias consistent with neuropathy.  Stable or slightly  worsening.  [place] to be related to chemotherapy.      4.  She has cervical spine and upper thoracic spine myofascial pain.     5.    Somatic dysfunctions of the cranium, cervical spine, rib cage, upper extremities, thoracic spine  that contribute to the patient's pain complaints.      Discussion:    1.  Patient presents for Osteopathic manipulative medicine day.  Did well with last treatment.  Wishes to proceed.  Please see attached procedure note.    2.  Follow-up as needed.      It was our pleasure caring for your patient today, if there any questions or concerns please do not hesitate to contact us.      Subjective:   Patient ID: Lizzie Viera is a 71 year old female.    History of Present Illness:Patient presents for osteopathic Sharon today.  She had OMT last visit which was significantly helpful for the headaches much improvement of the headache frequency and intensity.  40% improvement with regards to upper neck and back pain.  Felt very good and was doing a lot of cleaning last week and has had increased pain for the past several days.  Pain is worse with activity better with sleep and pain medication.  Pain is to the upper thoracic spine and cervical spine with headaches.  Pain is an 8/10 at worst 3/10 today 1/10 at best.  He depressive-like manipulation and would like to proceed with additional treatment today.       Review of Systems: Complains of numbness tingling weakness in the legs.  Has headaches swallowing difficulties.  Denies bowel or bladder incontinence, falls, fevers or unintentional weight loss.      Past Medical History:   Diagnosis Date     Cervical dysplasia      Chronic RUQ pain      Depressive disorder      Multiple myeloma (H)      Osteoporosis        The following portions of the patient's history were reviewed and updated as appropriate: allergies, current medications, past family history, past medical history, past social history, past surgical history and problem list.            Objective:   Physical Exam:    BP (!) 143/63   LMP  (LMP Unknown)   There is no height or weight on file to calculate BMI.      General: Alert and oriented with normal affect. Attention, knowledge, memory, and language are intact. No acute distress.    Gait:  Nonantalgic    Sensation is intact to light touch throughout the upper   extremities.  Reflexes are negative Hoffmans.      Manual muscle testing reveals:  Right /Left out of 5     5/5 elbow flexors  5/5 elbow extensors  5/5 wrist extensors  5/5 interosseus  5/5 finger flexors       Structural exam: Cranium: Left torsion, OA sidebent right rotated left cervical spine: C2 rotated left side bent left, C3 rotated right sidebent right  Rib cage: Rib one elevated on the left. Thoracic spine: T1 rotated right sidebent right, upper thoracic myofascial restrictions.  Upper Extremities: myofascial restrictions of the bilat upper trap, infraspinatus/parascapular muscles. Bilateral pectoral restrictions.    Procedure:    After discussing the risks and benefits of osteopathic manipulative medicine, verbal consent was obtained. The somatic dysfunctions listed above were treated with the following techniques: Cranium: Cranial indirect technique, VSD, and muscle energy for the OA. Cervical spine: Muscle energy, still technique, FPR, myofascial release, BLT, and soft tissue techniques. Rib cage: Myofascial release and FPR. Thoracic spine: Myofascial release, BLT.   Upper Exrtremity: MFR, FPR, BLT.  The patient tolerated the procedure well and had improved range of motion in all areas treated prior to leaving the clinic.

## 2022-09-28 ENCOUNTER — PSYCHE (OUTPATIENT)
Dept: ONCOLOGY | Facility: HOSPITAL | Age: 72
End: 2022-09-28
Attending: SOCIAL WORKER
Payer: COMMERCIAL

## 2022-09-28 DIAGNOSIS — C90.00 MULTIPLE MYELOMA NOT HAVING ACHIEVED REMISSION (H): ICD-10-CM

## 2022-09-28 DIAGNOSIS — F43.23 ADJUSTMENT DISORDER WITH MIXED ANXIETY AND DEPRESSED MOOD: Primary | ICD-10-CM

## 2022-09-28 PROCEDURE — 90834 PSYTX W PT 45 MINUTES: CPT | Performed by: SOCIAL WORKER

## 2022-09-28 ASSESSMENT — ANXIETY QUESTIONNAIRES
2. NOT BEING ABLE TO STOP OR CONTROL WORRYING: MORE THAN HALF THE DAYS
5. BEING SO RESTLESS THAT IT IS HARD TO SIT STILL: MORE THAN HALF THE DAYS
1. FEELING NERVOUS, ANXIOUS, OR ON EDGE: MORE THAN HALF THE DAYS
6. BECOMING EASILY ANNOYED OR IRRITABLE: SEVERAL DAYS
3. WORRYING TOO MUCH ABOUT DIFFERENT THINGS: SEVERAL DAYS
IF YOU CHECKED OFF ANY PROBLEMS ON THIS QUESTIONNAIRE, HOW DIFFICULT HAVE THESE PROBLEMS MADE IT FOR YOU TO DO YOUR WORK, TAKE CARE OF THINGS AT HOME, OR GET ALONG WITH OTHER PEOPLE: SOMEWHAT DIFFICULT

## 2022-09-28 ASSESSMENT — PATIENT HEALTH QUESTIONNAIRE - PHQ9
5. POOR APPETITE OR OVEREATING: MORE THAN HALF THE DAYS
SUM OF ALL RESPONSES TO PHQ QUESTIONS 1-9: 11
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
## 2022-09-28 NOTE — H&P
[facility]    History and Physical - Hospitalist Service       Date of Admission:  8/31/2021    Assessment & Plan      Lizzie Viera is a 70 year old female admitted on 8/31/2021. She has history of T7 compression fracture since September 2020, multiple myeloma diagnosed by bone biopsy on May 7, 2021, chronic back pain and upper abdominal spasms, osteoporosis.  She was evaluated at the neurosurgery clinic for increasing back pain after a fall on August 29, 2021.    1.  Acute on chronic mid back pain, cancer related  2.  Pathologic compression fracture at T7  MRI showed progression of vertebral height loss  3.  Multiple myeloma  Skull, T1 and T7 lesions  MRI showed epidural neoplasm resulting in high-grade narrowing of the thecal sac and impingement upon the mid thoracic cord extending from the lower T6 through upper T8 levels, mild associated cord edema, moderate to high grade neural foraminal narrowing at T6-T7 and T7-T8 bilaterally and moderate neural foraminal narrowing on the left at T8-T9.  Neurosurgery and radiation oncology notes reviewed  Plan for radiation oncology in the morning, patient seems to be in agreement at this time  [facility]  [facility]  Palliative care consult for cancer pain and goals of care, patient requested full code    4.  Acute cystitis with hematuria  Received 1 dose of levofloxacin 750 mg IV in the ED  Start Bactrim for an additional 4 days if able to tolerate by mouth    5.  Severe hypokalemia  Secondary to chronic diarrhea  Received a total of 80 mEq oral potassium  Maintenance IV with potassium and recheck level    6.  Chronic diarrhea  No signs of infection  Ongoing since April, likely functional    7.  Severe malnutrition  Patient reported 50 pound weight loss since April 2021  Albumin 2.9  Check prealbumin and phosphorus, replace electrolytes as needed  RD consult for nutrition risk assessment     Diet: NPO for Medical/Clinical Reasons Except  for: Meds, Ice Chips    DVT Prophylaxis: Pneumatic Compression Devices  Stover Catheter: Not present  Central Lines: None  Code Status: Full Code      Clinically Significant Risk Factors Present on Admission        # Hypokalemia: K = 2.1 mmol/L (Ref range: 3.5 - 5.0 mmol/L) on admission, will replace as needed            Disposition Plan   Expected discharge:  2 days recommended to prior living arrangement once adequate pain management/ tolerating PO medications and antibiotic plan established.     The patient's care was discussed with the Patient.    Omkar Jeffers MD  [facility]  Securely message with the Vocera Web Console (learn more here)  Text page via [facility] Paging/Directory      ______________________________________________________________________    Chief Complaint   Back pain    History is obtained from the patient, electronic health record and emergency department physician    History of Present Illness   Lizzie Viera is a 70 year old female who was diagnosed with T7 compression fracture in September 2020.  Evaluated by orthopedic, IR, neurosurgery with bone density scan consistent with osteoporosis.  However, MRI in October 2020 showed acute to subacute T7 compression fracture with diffuse marrow edema, indeterminate T1 lesion.  Medical management for osteoporosis and brace for compression fracture.  Follow-up MRI on April 26, 2021 showed worsening T7 height loss likely pathologic and extraosseous extension into the vertebral lateral epidural space causing moderate to severe spinal canal stenosis.  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  She was evaluated by hematologist Dr. Baig but patient deferred treatment.  Since April 2021, she has lost approximately 50 pounds and has had daily watery diarrhea.  She has used CBD with some improvement of her pain symptoms although she dislikes the taste.  She was  evaluated by neurosurgery on July 1, 2021 and was recommended 6-month follow-up given intact neurologic exam.  On August 29, 2021 she was ambulating when suddenly her right leg went numb and gave out.  Since then, she has had increased mid back pain with radiation in a bandlike fashion across her upper abdomen especially in the right upper quadrant.  She is no longer wearing her brace since it does not fit after losing weight.  She complains of dysuria and has had fecal incontinence.  She used to smoke half a pack of cigarettes daily but quit in the recent past.    Review of Systems    The 10 point Review of Systems is negative other than noted in the HPI or here.     Past Medical History    I have reviewed this patient's medical history and updated it with pertinent information if needed.   Past Medical History:   Diagnosis Date     Cervical dysplasia      Chronic RUQ pain      Osteoporosis        Past Surgical History   I have reviewed this patient's surgical history and updated it with pertinent information if needed.  Past Surgical History:   Procedure Laterality Date     C LAP,CHOLECYSTECTOMY/EXPLORE  12/27/2004     C LIGATE FALLOPIAN TUBE      Description: Tubal Ligation;  Recorded: 09/20/2007;     CONIZATION CERVIX,KNIFE/LASER      Description: Cervical Conization By Laser;  Recorded: 09/20/2007;     HC DILATION/CURETTAGE DIAG/THER NON OB      Description: Dilation And Curettage;  Recorded: 09/20/2007;     HC REMOVE TONSILS/ADENOIDS,<13 Y/O      Description: Tonsillectomy With Adenoidectomy;  Recorded: 09/20/2007;       Social History   I have reviewed this patient's social history and updated it with pertinent information if needed.  Social History     Tobacco Use     Smoking status: Former Smoker     Packs/day: 0.50     Years: 45.00     Pack years: 22.50     Types: Cigarettes     Smokeless tobacco: Never Used   Substance Use Topics     Alcohol use: Yes     Comment: Alcoholic Drinks/day: 0-1 drinks per week      Drug use: Not Currently       Family History   I have reviewed this patient's family history and updated it with pertinent information if needed.  Family History   Problem Relation Age of Onset     Diabetes Mother      Arthritis Mother      LUNG DISEASE Father      Diabetes Maternal Uncle      Breast Cancer Paternal Aunt      Heart Failure Maternal Grandmother      Heart Disease Maternal Grandmother      Heart Failure Maternal Grandfather      Heart Disease Maternal Grandfather      Heart Failure Paternal Grandmother      Heart Disease Paternal Grandmother        Prior to Admission Medications   Prior to Admission Medications   Prescriptions Last Dose Informant Patient Reported? Taking?   Coenzyme Q10 300 MG CAPS 8/31/2021 at AM  Yes Yes   Sig: Take 300 mg by mouth daily   Melatonin 10 MG TABS tablet 8/30/2021 at PM  Yes Yes   Sig: Take 20 mg by mouth At Bedtime   NALTREXONE HCL PO 8/31/2021 at AM  Yes Yes   Sig: Take 3 mg by mouth daily   TURMERIC PO 8/31/2021 at AM  Yes Yes   Sig: Take 1 capsule by mouth daily   UNABLE TO FIND 8/31/2021 at AM  Yes Yes   Sig: MEDICATION NAME: Nutrazyme - 2 capsules by mouth twice daily.   UNABLE TO FIND 8/31/2021 at AM  Yes Yes   Sig: MEDICATION NAME: Panacur C - 1 capsule daily   UNABLE TO FIND   Yes Yes   Sig: MEDICATION NAME: Pectasol - 1 scoop by mouth daily   UNABLE TO FIND   Yes Yes   Sig: MEDICATION NAME: NK stim - 1 tablet daily   UNABLE TO FIND   Yes Yes   Sig: MEDICATION NAME:  mg daily   acetylcysteine (NAC) 600 MG CAPS capsule 8/31/2021 at AM  Yes Yes   Sig: Take 600 mg by mouth daily   fish oil-omega-3 fatty acids 1000 MG capsule 8/31/2021 at AM  Yes Yes   Sig: Take 2 g by mouth daily   levOCARNitine (CARNITOR) 330 MG tablet 8/31/2021 at AM  Yes Yes   Sig: Take 330 mg by mouth daily      Facility-Administered Medications: None     Allergies   Allergies   Allergen Reactions     Cefdinir Shortness Of Breath     Latex Shortness Of Breath     Short Ragweed  Pollen Ext Cough     Adhesive Tape Rash       Physical Exam   Vital Signs: Temp: 98.6  F (37  C) Temp src: Temporal BP: 116/56 Pulse: 58   Resp: 18 SpO2: 97 % O2 Device: None (Room air)    Weight: 129 lbs 0 oz    General: In no acute distress  H EENT: Normocephalic, traumatic  Neck: No JVD  Heart: Regular rate and rhythm  Lungs: Clear to auscultation  Abdomen: Bowel sounds present, nondistended, right upper quadrant tenderness  Extremities: Nontender, no edema  Skin: Warm, dry  Neuro: Awake, alert, oriented, weak right dorsiflexion  Psychiatric: Normal affect    Data   Data reviewed today: I reviewed all medications, new labs and imaging results over the last 24 hours. I personally reviewed no images or EKG's today.    Recent Labs   Lab 08/31/21  1611   WBC 7.6   HGB 10.2*   MCV 96         POTASSIUM 2.1*   CHLORIDE 106   CO2 24   BUN 10   CR 0.77   ANIONGAP 12   SERAFIN 9.6   GLC 83   ALBUMIN 2.9*   PROTTOTAL 10.0*   BILITOTAL 0.5   ALKPHOS 40*   ALT 23   AST 22   LIPASE <9     Recent Results (from the past 24 hour(s))   XR Thoracic Spine 2 Views    Narrative    EXAM: XR THORACIC SPINE 2 VIEWS  LOCATION: [facility]  DATE/TIME: 8/31/2021 1:37 PM    INDICATION:  Closed T7 fracture (H)  COMPARISON: 06/29/2021.  TECHNIQUE: CR Thoracic Spine.      Impression    IMPRESSION:     Severe T7 vertebral body compression deformity is similar to prior. Again localized accentuation of kyphosis at this level. There is mild dextroconvex curvature. The remaining visualized vertebral bodies are unremarkable. Mild multilevel spondylosis is   similar to prior. Pedicles appear symmetric. Visualized lung fields are well aerated.    MR Thoracic Spine w/o & w Contrast    Addendum: 8/31/2021    Findings were called to Dr. Mendez at 8/31/2021 6:32 PM by Dr. GREGORIA Calloway.      Narrative    EXAM: MR THORACIC SPINE W/O and W CONTRAST  LOCATION: [facility]  DATE/TIME: 8/31/2021 4:47  PM    INDICATION: Pathologic fracture of T7.  COMPARISON: MRI thoracic spine 04/26/2021  CONTRAST: 6ml Gadavist  TECHNIQUE: Routine Thoracic Spine MRI without and with IV contrast.    FINDINGS:   12 rib-bearing thoracic type vertebra. Marked pathologic compression fracture of T7 with progressive vertebral height loss compared to the previous MRI. There is now essentially vertebra plana at this level with progressive segmental kyphosis measuring   27 degrees from T6 through T8. Pathologic marrow involving the posterior T7 vertebral margin with increased extension into the adjacent ventral epidural space, progressive involvement of the pedicles/lamina, and new marrow replacement/enhancement   involving the posterior inferior T6 and superior T8 vertebral margins with additional progressive paraspinal extraosseous enhancing soft tissue mass. There is now circumferential epidural neoplasm at the T7 level with severe spinal canal stenosis,   increased impingement upon the thoracic cord, and minimal intramedullary T2 prolongation. The additional metastatic deposit at T1 mild diffuse disc desiccation throughout the thoracic spine, with moderate loss of disc height at T5-T6 and mild multilevel   loss of disc height at multiple additional levels from T3 through T10. No focal disc herniations. Minimal thoracic facet arthropathy. No additional high-grade spinal canal stenosis. Neoplastic soft tissue results in moderate to severe bilateral neural   foraminal stenosis at T6-T7, severe bilateral neural foraminal stenosis at T7-T8, and mild to moderate left neural foraminal stenosis at T8-T9.    Mild progression of paraspinal soft tissue mass centered at T7 as above. For example, maximum transverse dimension of enhancing soft tissue measuring 6.2 cm on axial images compared to 5.3 cm previously.      Impression    IMPRESSION:  1.  Pathologic compression fracture at T7 with progressive vertebral height loss and increasing  epidural/paraspinal extraosseous neoplasm compared to 04/26/2021.  2.  Epidural neoplasm results in high-grade narrowing of the thecal sac and impingement upon the mid thoracic cord extending from the lower T6 through upper T8 levels. Minimal associated cord edema.  3.  Epidural neoplasm results in moderate to high-grade neural foraminal narrowing at T6-T7 and T7-T8 bilaterally and moderate neural foraminal narrowing on the left at T8-T9.  4.  Additional osseous metastasis at T1 similar to the prior exam. No new osseous metastasis elsewhere.      20:46

## 2022-10-03 ENCOUNTER — ONCOLOGY VISIT (OUTPATIENT)
Dept: ONCOLOGY | Facility: CLINIC | Age: 72
End: 2022-10-03
Attending: SOCIAL WORKER
Payer: COMMERCIAL

## 2022-10-03 DIAGNOSIS — C90.00 MULTIPLE MYELOMA NOT HAVING ACHIEVED REMISSION (H): ICD-10-CM

## 2022-10-03 DIAGNOSIS — F43.23 ADJUSTMENT DISORDER WITH MIXED ANXIETY AND DEPRESSED MOOD: Primary | ICD-10-CM

## 2022-10-03 PROCEDURE — 90834 PSYTX W PT 45 MINUTES: CPT | Performed by: SOCIAL WORKER

## 2022-10-03 ASSESSMENT — ANXIETY QUESTIONNAIRES
GAD7 TOTAL SCORE: 12
3. WORRYING TOO MUCH ABOUT DIFFERENT THINGS: SEVERAL DAYS
IF YOU CHECKED OFF ANY PROBLEMS ON THIS QUESTIONNAIRE, HOW DIFFICULT HAVE THESE PROBLEMS MADE IT FOR YOU TO DO YOUR WORK, TAKE CARE OF THINGS AT HOME, OR GET ALONG WITH OTHER PEOPLE: SOMEWHAT DIFFICULT
2. NOT BEING ABLE TO STOP OR CONTROL WORRYING: MORE THAN HALF THE DAYS
7. FEELING AFRAID AS IF SOMETHING AWFUL MIGHT HAPPEN: SEVERAL DAYS
5. BEING SO RESTLESS THAT IT IS HARD TO SIT STILL: MORE THAN HALF THE DAYS
6. BECOMING EASILY ANNOYED OR IRRITABLE: MORE THAN HALF THE DAYS
1. FEELING NERVOUS, ANXIOUS, OR ON EDGE: MORE THAN HALF THE DAYS
GAD7 TOTAL SCORE: 12
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
## 2022-10-03 NOTE — LETTER
10/3/2022         RE: Lizzie Viera  [address]  [place] [address]        Dear Colleague,    Thank you for referring your patient, Lizzie Viera, to the [facility]. Please see a copy of my visit note below.    The author of this note documented a reason for not sharing it with the patient.               Again, thank you for allowing me to participate in the care of your patient.        Sincerely,        JAI AMADO LP, LICSW

## 2022-10-14 ENCOUNTER — TELEPHONE (OUTPATIENT)
Dept: ONCOLOGY | Facility: HOSPITAL | Age: 72
End: 2022-10-14

## 2022-10-14 NOTE — ORAL ONC MGMT
Oral Chemotherapy Monitoring Program    Subjective/Objective:  Lizzie Viera is a 72 year old female contacted by phone for a follow-up visit for oral chemotherapy.  Valeria confirms starting lenalidomide 10 mg daily continuous maintenance starting 10/3. She denies missing doses. She has no new side effects from lenalidomide. She did have two teeth pulled yesterday, no bleeding or swelling.     ORAL CHEMOTHERAPY 7/1/2022 7/14/2022 8/4/2022 8/25/2022 9/15/2022 9/16/2022 10/14/2022   Assessment Type Monthly Follow up;Left Voicemail Refill Monthly Follow up;Refill Refill Left Voicemail Monthly Follow up Monthly Follow up   Diagnosis Code Multiple Myeloma Multiple Myeloma Multiple Myeloma Multiple Myeloma Multiple Myeloma Multiple Myeloma Multiple Myeloma   Providers Dr. Bella Blanco   Clinic Name/Location [facility]   Drug Name Revlimid (lenalidomide) Revlimid (lenalidomide) Revlimid (lenalidomide) Revlimid (lenalidomide) Revlimid (lenalidomide) Revlimid (lenalidomide) Revlimid (lenalidomide)   Dose 25 mg 25 mg 25 mg 25 mg 10 mg 10 mg 10 mg   Current Schedule Daily Daily Daily Daily Daily Daily Daily   Cycle Details 2 weeks on, 1 week off 2 weeks on, 1 week off 2 weeks on, 1 week off 2 weeks on, 1 week off Continuous Continuous Continuous   Start Date of Last Cycle - - 7/20/2022 - - 10/2/2022 10/3/2022   Planned next cycle start date - - 8/10/2022 - - - -   Doses missed in last 2 weeks - - 1 - - 0 0   Adherence Assessment - - Adherent - - Adherent Adherent   Adverse Effects - - Edema - - Edema -   Nausea - - - - - - -   Pharmacist Intervention(nausea) - - - - - - -   Diarrhea - - - - - - -   Pharmacist Intervention(diarrhea) - - - - - - -   Intervention(s) - - - - - - -   Decrease Appetite/Weight Loss - - - - - - -   Pharmacist Intervention(decreased appetite/weight loss) - - - - - -  "-   Intervention(s) - - - - - - -   Fatigue - - - - - - -   Pharmacist Intervention(fatigue) - - - - - - -   Any new drug interactions? - - No - - No No   Is the dose as ordered appropriate for the patient? - - Yes - - Yes Yes       Last PHQ-2 Score on record:   PHQ-2 ( 1999 [employer]) 6/25/2022 5/3/2022   Q1: Little interest or pleasure in doing things 1 1   Q2: Feeling down, depressed or hopeless 1 0   PHQ-2 Score 2 1   Q1: Little interest or pleasure in doing things Several days Several days   Q2: Feeling down, depressed or hopeless Several days Not at all   PHQ-2 Score 2 1       Vitals:  BP:   BP Readings from Last 1 Encounters:   09/23/22 (!) 143/63     Wt Readings from Last 1 Encounters:   08/31/22 60.7 kg (133 lb 12.8 oz)     Estimated body surface area is 1.62 meters squared as calculated from the following:    Height as of 6/30/22: 1.549 m (5' 1\").    Weight as of 8/31/22: 60.7 kg (133 lb 12.8 oz).    Labs:  No results found for NA within last 30 days.     No results found for K within last 30 days.     No results found for CA within last 30 days.     No results found for Mag within last 30 days.     No results found for Phos within last 30 days.     No results found for ALBUMIN within last 30 days.     No results found for BUN within last 30 days.     No results found for CR within last 30 days.     No results found for AST within last 30 days.     No results found for ALT within last 30 days.     No results found for BILITOTAL within last 30 days.     No results found for WBC within last 30 days.     No results found for HGB within last 30 days.     No results found for PLT within last 30 days.     No results found for ANC within last 30 days.     No results found for ANC within last 30 days.      Assessment/Plan:  Valeria is tolerating lenalidomide and will continue as planned. Discussed monitoring closely for wound healing while on the lenalidomide and encouraged to reach out to care team for any potential " infectious symptoms. She had zoledronic acid held since January for dental procedures.     Follow-Up:  Will review 10/26 appt then plan to call for one more follow-up assessment before considering LTSP.     Refill Due:  10/24/22    Minnie Agrawal, PharmD, [facility]  Oral Chemotherapy Pharmacist  [phone]

## 2022-10-17 ENCOUNTER — MYC REFILL (OUTPATIENT)
Dept: RADIATION ONCOLOGY | Facility: HOSPITAL | Age: 72
End: 2022-10-17

## 2022-10-17 DIAGNOSIS — G89.3 CANCER ASSOCIATED PAIN: ICD-10-CM

## 2022-10-17 RX ORDER — METHOCARBAMOL 500 MG/1
500 TABLET, FILM COATED ORAL 4 TIMES DAILY PRN
Qty: 120 TABLET | Refills: 0 | Status: SHIPPED | OUTPATIENT
Start: 2022-10-17 | End: 2022-11-15

## 2022-10-17 NOTE — TELEPHONE ENCOUNTER
Received [employer]t message from patient requesting refill of Robaxin. Pt requesting a full 30-day supply.     Last office visit: 9/13/22  Scheduled for follow up 11/15/22     Will route request to MD for review.     Reviewed [place]  Report.

## 2022-10-23 ENCOUNTER — HEALTH MAINTENANCE LETTER (OUTPATIENT)
Age: 72
End: 2022-10-23

## 2022-10-26 ENCOUNTER — ONCOLOGY VISIT (OUTPATIENT)
Dept: ONCOLOGY | Facility: HOSPITAL | Age: 72
End: 2022-10-26
Attending: INTERNAL MEDICINE
Payer: COMMERCIAL

## 2022-10-26 ENCOUNTER — LAB (OUTPATIENT)
Dept: INFUSION THERAPY | Facility: HOSPITAL | Age: 72
End: 2022-10-26
Attending: INTERNAL MEDICINE
Payer: COMMERCIAL

## 2022-10-26 VITALS
BODY MASS INDEX: 25.43 KG/M2 | TEMPERATURE: 98.2 F | WEIGHT: 134.6 LBS | DIASTOLIC BLOOD PRESSURE: 58 MMHG | SYSTOLIC BLOOD PRESSURE: 130 MMHG | HEART RATE: 55 BPM | RESPIRATION RATE: 18 BRPM | OXYGEN SATURATION: 98 %

## 2022-10-26 DIAGNOSIS — C90.00 MULTIPLE MYELOMA WITH FAILED REMISSION (H): ICD-10-CM

## 2022-10-26 DIAGNOSIS — C90.00 MULTIPLE MYELOMA NOT HAVING ACHIEVED REMISSION (H): ICD-10-CM

## 2022-10-26 DIAGNOSIS — C90.00 MULTIPLE MYELOMA NOT HAVING ACHIEVED REMISSION (H): Primary | ICD-10-CM

## 2022-10-26 LAB
ALBUMIN SERPL BCG-MCNC: 3.6 G/DL (ref 3.5–5.2)
ALP SERPL-CCNC: 36 U/L (ref 35–104)
ALT SERPL W P-5'-P-CCNC: 17 U/L (ref 10–35)
ANION GAP SERPL CALCULATED.3IONS-SCNC: 9 MMOL/L (ref 7–15)
AST SERPL W P-5'-P-CCNC: 13 U/L (ref 10–35)
BASOPHILS # BLD AUTO: 0.1 10E3/UL (ref 0–0.2)
BASOPHILS NFR BLD AUTO: 1 %
BILIRUB SERPL-MCNC: <0.2 MG/DL
BUN SERPL-MCNC: 17.7 MG/DL (ref 8–23)
CALCIUM SERPL-MCNC: 9 MG/DL (ref 8.8–10.2)
CHLORIDE SERPL-SCNC: 104 MMOL/L (ref 98–107)
CREAT SERPL-MCNC: 0.68 MG/DL (ref 0.51–0.95)
DEPRECATED HCO3 PLAS-SCNC: 26 MMOL/L (ref 22–29)
EOSINOPHIL # BLD AUTO: 0.3 10E3/UL (ref 0–0.7)
EOSINOPHIL NFR BLD AUTO: 5 %
ERYTHROCYTE [DISTWIDTH] IN BLOOD BY AUTOMATED COUNT: 14.9 % (ref 10–15)
GFR SERPL CREATININE-BSD FRML MDRD: >90 ML/MIN/1.73M2
GLUCOSE SERPL-MCNC: 112 MG/DL (ref 70–99)
HCT VFR BLD AUTO: 42.5 % (ref 35–47)
HGB BLD-MCNC: 14.3 G/DL (ref 11.7–15.7)
HOLD SPECIMEN: NORMAL
HOLD SPECIMEN: NORMAL
IMM GRANULOCYTES # BLD: 0 10E3/UL
IMM GRANULOCYTES NFR BLD: 0 %
LYMPHOCYTES # BLD AUTO: 1.6 10E3/UL (ref 0.8–5.3)
LYMPHOCYTES NFR BLD AUTO: 31 %
MCH RBC QN AUTO: 34.8 PG (ref 26.5–33)
MCHC RBC AUTO-ENTMCNC: 33.6 G/DL (ref 31.5–36.5)
MCV RBC AUTO: 103 FL (ref 78–100)
MONOCYTES # BLD AUTO: 0.7 10E3/UL (ref 0–1.3)
MONOCYTES NFR BLD AUTO: 13 %
NEUTROPHILS # BLD AUTO: 2.7 10E3/UL (ref 1.6–8.3)
NEUTROPHILS NFR BLD AUTO: 50 %
NRBC # BLD AUTO: 0 10E3/UL
NRBC BLD AUTO-RTO: 0 /100
PLATELET # BLD AUTO: 263 10E3/UL (ref 150–450)
POTASSIUM SERPL-SCNC: 4.2 MMOL/L (ref 3.4–5.3)
PROT SERPL-MCNC: 5.5 G/DL (ref 6.4–8.3)
RBC # BLD AUTO: 4.11 10E6/UL (ref 3.8–5.2)
SODIUM SERPL-SCNC: 139 MMOL/L (ref 136–145)
WBC # BLD AUTO: 5.3 10E3/UL (ref 4–11)

## 2022-10-26 PROCEDURE — 84155 ASSAY OF PROTEIN SERUM: CPT

## 2022-10-26 PROCEDURE — 83521 IG LIGHT CHAINS FREE EACH: CPT | Mod: 59

## 2022-10-26 PROCEDURE — G0463 HOSPITAL OUTPT CLINIC VISIT: HCPCS

## 2022-10-26 PROCEDURE — 80053 COMPREHEN METABOLIC PANEL: CPT

## 2022-10-26 PROCEDURE — 85025 COMPLETE CBC W/AUTO DIFF WBC: CPT

## 2022-10-26 PROCEDURE — 82784 ASSAY IGA/IGD/IGG/IGM EACH: CPT

## 2022-10-26 PROCEDURE — 99214 OFFICE O/P EST MOD 30 MIN: CPT | Performed by: NURSE PRACTITIONER

## 2022-10-26 PROCEDURE — 36415 COLL VENOUS BLD VENIPUNCTURE: CPT

## 2022-10-26 PROCEDURE — 84165 PROTEIN E-PHORESIS SERUM: CPT | Mod: TC | Performed by: PATHOLOGY

## 2022-10-26 ASSESSMENT — PAIN SCALES - GENERAL: PAINLEVEL: MILD PAIN (3)

## 2022-10-26 NOTE — PROGRESS NOTES
"Oncology Rooming Note    October 26, 2022 2:23 PM   Lizzie Viera is a 72 year old female who presents for:    Chief Complaint   Patient presents with     Oncology Clinic Visit     Multiple myeloma not having achieved remission (H)  Multiple myeloma with failed remission (H)     Initial Vitals: /58   Pulse 55   Temp 98.2  F (36.8  C)   Resp 18   Wt 61.1 kg (134 lb 9.6 oz)   LMP  (LMP Unknown)   SpO2 98%   BMI 25.43 kg/m   Estimated body mass index is 25.43 kg/m  as calculated from the following:    Height as of 6/30/22: 1.549 m (5' 1\").    Weight as of this encounter: 61.1 kg (134 lb 9.6 oz). Body surface area is 1.62 meters squared.  Mild Pain (3) Comment: Data Unavailable   No LMP recorded (lmp unknown). Patient is postmenopausal.  Allergies reviewed: Yes  Medications reviewed: Yes    Medications: Medication refills not needed today.  Pharmacy name entered into [facility]:    [id number] - [place], [place] - [address]  [facility] - [place], [place] - [address]    Clinical concerns: None       Viviane Chu LPN            "

## 2022-10-26 NOTE — LETTER
"    10/26/2022         RE: Lizzie Viera  [address]  [place] [address]        Dear Colleague,    Thank you for referring your patient, Lizzie Viera, to the [facility] [place]. Please see a copy of my visit note below.    Oncology Rooming Note    October 26, 2022 2:23 PM   Lizzie Viera is a 72 year old female who presents for:    Chief Complaint   Patient presents with     Oncology Clinic Visit     Multiple myeloma not having achieved remission (H)  Multiple myeloma with failed remission (H)     Initial Vitals: /58   Pulse 55   Temp 98.2  F (36.8  C)   Resp 18   Wt 61.1 kg (134 lb 9.6 oz)   LMP  (LMP Unknown)   SpO2 98%   BMI 25.43 kg/m   Estimated body mass index is 25.43 kg/m  as calculated from the following:    Height as of 6/30/22: 1.549 m (5' 1\").    Weight as of this encounter: 61.1 kg (134 lb 9.6 oz). Body surface area is 1.62 meters squared.  Mild Pain (3) Comment: Data Unavailable   No LMP recorded (lmp unknown). Patient is postmenopausal.  Allergies reviewed: Yes  Medications reviewed: Yes    Medications: Medication refills not needed today.  Pharmacy name entered into [facility]:    [id number] - [place], [place] - [address]  [facility] - [place], [place] - [address]    Clinical concerns: None       Viviane Chu LPN              [facility] Hematology and Oncology Progress Note    Patient: Lizzie Viera  MRN: [id number]  Date of Service: Oct 26, 2022          Reason for Visit    Chief Complaint   Patient presents with     Oncology Clinic Visit     Multiple myeloma not having achieved remission (H)  Multiple myeloma with failed remission (H)       Assessment and Plan     Cancer Staging   No matching staging information was found for the patient.    1. Myeloma: has started on treatment with RVd.  She is getting Velcade weekly, Revlimid 2 weeks on, 1 week off and Dex " weekly.  Patient received 17 cycles of that finishing in September 2022.  Her light chains and IgG levels have now normalized.  Her hemoglobin has normalized.  Her monoclonal peak has gone from 3.3 down to 0.1.   She did meet with the transplant team.  She has thought about it and she is a little reluctant to do that at this time.  She did find an article that suggested transplant did not really have any difference in the overall survival so she is actually thinking she may not want to do it at all but is still thinking about it.  She prefers her monoclonal peak to be at 0 before she does it so we will continue her current regimen.  Currently 0.1. We will refer her back to transplant team when and if she feels ready.  At the end of September Dr. Blanco did switch her to maintenance treatment so she is now currently just getting maintenance Revlimid 10 mg every day.  No dexamethasone and Velcade has been stopped.  Patient will return in 1 month for labs.    2. Bone lesions: Patient did get a couple doses of Zometa but we have been holding it due to ongoing dental work needed.  Did tell her that once that is complete we should probably get back on Zometa intermittently.          ECOG Performance    0 - Independent    Distress Screening (within last 30 days)    1. How concerned are you about your ability to eat? : 0  2. How concerned are you about unintended weight loss or your current weight? : 0  3. How concerned are you about feeling depressed or very sad? : 0  4. How concerned are you about feeling anxious or very scared? : 0  5. Do you struggle with the loss of meaning and rambo in your life? : Not at all  6. How concerned are you about work and home life issues that may be affected by your cancer? : 2  7. How concerned are you about knowing what resources are available to help you? : 0  8. Do you currently have what you would describe as Anabaptist or spiritual struggles?            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 Lizzie Viera is a very pleasant 70 year old woman who has been diagnosed with multiple myeloma IgG kappa type in May 2021.  She had initially presented with compression fracture of T7 vertebral body in September 2020.  She had a back pain which led to that evaluation.  Bone density confirmed that she had osteoporosis.  MRI showed diffuse marrow edema in October 2020.  In April 2021 the MRI of the thoracic spine showed worsening T7 vertebral body height loss because of likely pathological compression fracture with extraosseous extension.  She then had IR guided bone biopsy on 7 May 2021 and pathology confirmed the presence of kappa light chain restricted plasma cells.  Her cytogenetics confirmed the presence of hyperdiploid E with gains of chromosome 5, 9 and 15.     She was then seen by Dr. Baig and had a bone marrow biopsy which also confirmed 60% involvement of the marrow with plasma cells.  The bone marrow was done on 3 Jocelin 2021.  The bone marrow also confirmed the presence of hyperdiploid E.  She had a gain of chromosome 3, 5, 7, 9, 11, 15 as well as 19.  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  Her beta-2 microglobulin was  actually normal at the time of her diagnosis as was her albumin at 4.0.  Monoclonal protein 3.1 g/dL.  Kappa free light chain levels of 13 mg/dL IgG level of 4280 with depressed levels of IgM and IgA.  Urine was also positive for kappa light chains as well as immunofixation of the urine was positive for IgG kappa.  Normal kidney function.  Normal hemoglobin.     As mentioned above she had bone lesions in the T7 vertebral body, T1, skull area.  Her main pain is coming from her T7 vertebral body compression fracture.     Due to the pain she has been seen by radiation oncology and has received radiation therapy.  She also got a dose of steroids for pain control.     Currently her pain is controlled with combination of Dilaudid as well as some Tylenol.  She is wearing a brace.  She did notice that when she was on dexamethasone her pain was significantly better.     She was initially thinking of doing some natural therapies but once her symptoms got worse, she came back in was counseled about treatment.  She has been given information about Velcade Revlimid and dexamethasone.  has started that treatment.  She states that she is tolerating this quite well.  Her myeloma has significantly improved.  She did meet with the transplant doctor and she is a little reluctant to do that.  She states she would prefer to be in complete remission before she does something like that.    Patient had been doing fine and then in April 2022 she did get admitted for pneumonia.  She did have a little bit of a prolonged recovery but has been doing pretty well since then.    Patient continued on her treatment when she was out of the hospital.  She continue that for about 17 cycles and then in September Dr. Blanco thought we have gotten maximum response out of her treatment so we switched her to maintenance Revlimid.  So she now is currently taking Revlimid 10 mg every day.  No other treatment.  She states that she is feeling really good.  She  denies any new bone or back pain.  She says her energy levels are better being off the full treatment.  She denies any infectious complaints.  Overall she is happy with how she is doing.  She is anxious to see her myeloma labs this month.    Review of Systems    Pertinent items are noted in HPI.    Past History    Past Medical History:   Diagnosis Date     Cervical dysplasia      Chronic RUQ pain      Depressive disorder      Multiple myeloma (H)      Osteoporosis        PHYSICAL EXAM  /58   Pulse 55   Temp 98.2  F (36.8  C)   Resp 18   Wt 61.1 kg (134 lb 9.6 oz)   LMP  (LMP Unknown)   SpO2 98%   BMI 25.43 kg/m      GENERAL: no acute distress. Cooperative in conversation. Here with sister. Mask on  RESP: Regular respiratory rate. No expiratory wheezes   MUSCULOSKELETAL: no bilateral leg swelling  NEURO: non focal. Alert and oriented x3.   PSYCH: within normal limits. No depression or anxiety.  SKIN: exposed skin is dry intact.     Lab Results    Recent Results (from the past 168 hour(s))   Comprehensive metabolic panel   Result Value Ref Range    Sodium 139 136 - 145 mmol/L    Potassium 4.2 3.4 - 5.3 mmol/L    Chloride 104 98 - 107 mmol/L    Carbon Dioxide (CO2) 26 22 - 29 mmol/L    Anion Gap 9 7 - 15 mmol/L    Urea Nitrogen 17.7 8.0 - 23.0 mg/dL    Creatinine 0.68 0.51 - 0.95 mg/dL    Calcium 9.0 8.8 - 10.2 mg/dL    Glucose 112 (H) 70 - 99 mg/dL    Alkaline Phosphatase 36 35 - 104 U/L    AST 13 10 - 35 U/L    ALT 17 10 - 35 U/L    Protein Total 5.5 (L) 6.4 - 8.3 g/dL    Albumin 3.6 3.5 - 5.2 g/dL    Bilirubin Total <0.2 <=1.2 mg/dL    GFR Estimate >90 >60 mL/min/1.73m2   CBC with platelets and differential   Result Value Ref Range    WBC Count 5.3 4.0 - 11.0 10e3/uL    RBC Count 4.11 3.80 - 5.20 10e6/uL    Hemoglobin 14.3 11.7 - 15.7 g/dL    Hematocrit 42.5 35.0 - 47.0 %     (H) 78 - 100 fL    MCH 34.8 (H) 26.5 - 33.0 pg    MCHC 33.6 31.5 - 36.5 g/dL    RDW 14.9 10.0 - 15.0 %    Platelet Count  263 150 - 450 10e3/uL    % Neutrophils 50 %    % Lymphocytes 31 %    % Monocytes 13 %    % Eosinophils 5 %    % Basophils 1 %    % Immature Granulocytes 0 %    NRBCs per 100 WBC 0 <1 /100    Absolute Neutrophils 2.7 1.6 - 8.3 10e3/uL    Absolute Lymphocytes 1.6 0.8 - 5.3 10e3/uL    Absolute Monocytes 0.7 0.0 - 1.3 10e3/uL    Absolute Eosinophils 0.3 0.0 - 0.7 10e3/uL    Absolute Basophils 0.1 0.0 - 0.2 10e3/uL    Absolute Immature Granulocytes 0.0 <=0.4 10e3/uL    Absolute NRBCs 0.0 10e3/uL   Extra Red Top Tube   Result Value Ref Range    Hold Specimen JIC    Extra Red Top Tube   Result Value Ref Range    Hold Specimen JIC    Total Protein, Serum for ELP   Result Value Ref Range    Total Protein Serum for ELP 5.3 (L) 6.4 - 8.3 g/dL   IgG  Lab Results   Component Value Date     (L) 09/14/2022     (L) 08/24/2022     (L) 08/03/2022     (L) 07/08/2022     (L) 06/22/2022     (L) 06/01/2022     (L) 05/11/2022     (L) 03/30/2022     (L) 02/16/2022     (L) 01/26/2022   kappa light chains:  Lab Results   Component Value Date    KFLCA 1.04 09/14/2022    KFLCA 1.12 08/24/2022    KFLCA 1.07 08/03/2022    KFLCA 1.19 07/08/2022    KFLCA 1.60 06/22/2022    KFLCA 1.84 06/01/2022    KFLCA 1.37 05/11/2022    KFLCA 1.76 04/27/2022    KFLCA 0.98 03/30/2022    KFLCA 1.03 02/16/2022   Monoclonal peak  Lab Results   Component Value Date    ELPM 0.1 (H) 09/14/2022    ELPM 0.1 (H) 08/24/2022    ELPM 0.1 (H) 08/03/2022    ELPM 0.1 (H) 07/08/2022    ELPM 0.1 06/01/2022    ELPM 0.2 04/27/2022    ELPM 0.2 03/30/2022    ELPM 0.3 02/16/2022    ELPM 0.2 01/26/2022    ELPM 0.4 12/29/2021   Myeloma labs still pending from today.  Patient will see them in my chart.     Imaging    No results found.      Signed by: SINDHU Lundberg CNP      Again, thank you for allowing me to participate in the care of your patient.        Sincerely,        SINDHU Lundberg CNP

## 2022-10-27 DIAGNOSIS — M79.18 MYOFASCIAL PAIN: ICD-10-CM

## 2022-10-27 DIAGNOSIS — C90.00 MULTIPLE MYELOMA NOT HAVING ACHIEVED REMISSION (H): Primary | ICD-10-CM

## 2022-10-27 DIAGNOSIS — C90.00 MULTIPLE MYELOMA WITH FAILED REMISSION (H): ICD-10-CM

## 2022-10-27 DIAGNOSIS — M48.04 THORACIC SPINAL STENOSIS: ICD-10-CM

## 2022-10-27 LAB
IGA SERPL-MCNC: 35 MG/DL (ref 84–499)
IGG SERPL-MCNC: 361 MG/DL (ref 610–1616)
IGM SERPL-MCNC: 25 MG/DL (ref 35–242)
KAPPA LC FREE SER-MCNC: 1.34 MG/DL (ref 0.33–1.94)
KAPPA LC FREE/LAMBDA FREE SER NEPH: 0.69 {RATIO} (ref 0.26–1.65)
LAMBDA LC FREE SERPL-MCNC: 1.95 MG/DL (ref 0.57–2.63)
TOTAL PROTEIN SERUM FOR ELP: 5.3 G/DL (ref 6.4–8.3)

## 2022-10-27 RX ORDER — LENALIDOMIDE 10 MG/1
10 CAPSULE ORAL DAILY
Qty: 28 CAPSULE | Refills: 0 | Status: SHIPPED | OUTPATIENT
Start: 2022-10-28 | End: 2022-11-22

## 2022-10-27 RX ORDER — GABAPENTIN 300 MG/1
600 CAPSULE ORAL 3 TIMES DAILY
Qty: 180 CAPSULE | Refills: 0 | Status: SHIPPED | OUTPATIENT
Start: 2022-10-27 | End: 2022-11-25

## 2022-10-27 NOTE — PROGRESS NOTES
[facility] Hematology and Oncology Progress Note    Patient: Lizzie Viera  MRN: [id number]  Date of Service: Oct 26, 2022          Reason for Visit    Chief Complaint   Patient presents with     Oncology Clinic Visit     Multiple myeloma not having achieved remission (H)  Multiple myeloma with failed remission (H)       Assessment and Plan     Cancer Staging   No matching staging information was found for the patient.    1. Myeloma: has started on treatment with RVd.  She is getting Velcade weekly, Revlimid 2 weeks on, 1 week off and Dex weekly.  Patient received 17 cycles of that finishing in September 2022.  Her light chains and IgG levels have now normalized.  Her hemoglobin has normalized.  Her monoclonal peak has gone from 3.3 down to 0.1.   She did meet with the transplant team.  She has thought about it and she is a little reluctant to do that at this time.  She did find an article that suggested transplant did not really have any difference in the overall survival so she is actually thinking she may not want to do it at all but is still thinking about it.  She prefers her monoclonal peak to be at 0 before she does it so we will continue her current regimen.  Currently 0.1. We will refer her back to transplant team when and if she feels ready.  At the end of September Dr. Blanco did switch her to maintenance treatment so she is now currently just getting maintenance Revlimid 10 mg every day.  No dexamethasone and Velcade has been stopped.  Patient will return in 1 month for labs.    2. Bone lesions: Patient did get a couple doses of Zometa but we have been holding it due to ongoing dental work needed.  Did tell her that once that is complete we should probably get back on Zometa intermittently.          ECOG Performance    0 - Independent    Distress Screening (within last 30 days)    1. How concerned are you about your ability to eat? : 0  2. How concerned are you about unintended weight loss  or your current weight? : 0  3. How concerned are you about feeling depressed or very sad? : 0  4. How concerned are you about feeling anxious or very scared? : 0  5. Do you struggle with the loss of meaning and rambo in your life? : Not at all  6. How concerned are you about work and home life issues that may be affected by your cancer? : 2  7. How concerned are you about knowing what resources are available to help you? : 0  8. Do you currently have what you would describe as Christianity or spiritual struggles?            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 Lizzie Viera is a very pleasant 70 year old woman who has been diagnosed with multiple myeloma IgG kappa type in May 2021.  She had initially presented with compression fracture of T7 vertebral body in September 2020.  She had a back pain which led to that evaluation.  Bone density confirmed that she had osteoporosis.  MRI showed diffuse marrow edema in October 2020.  In April 2021 the MRI of the thoracic spine showed worsening T7 vertebral body height loss because of likely pathological compression fracture with extraosseous extension.  She then had IR guided bone biopsy on  7 May 2021 and pathology confirmed the presence of kappa light chain restricted plasma cells.  Her cytogenetics confirmed the presence of hyperdiploid E with gains of chromosome 5, 9 and 15.     She was then seen by Dr. Baig and had a bone marrow biopsy which also confirmed 60% involvement of the marrow with plasma cells.  The bone marrow was done on 3 Jocelin 2021.  The bone marrow also confirmed the presence of hyperdiploid E.  She had a gain of chromosome 3, 5, 7, 9, 11, 15 as well as 19.  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  Her beta-2 microglobulin was actually normal at the time of her diagnosis as was her albumin at 4.0.  Monoclonal protein 3.1 g/dL.  Kappa free light chain levels of 13 mg/dL IgG level of 4280 with depressed levels of IgM and IgA.  Urine was also positive for kappa light chains as well as immunofixation of the urine was positive for IgG kappa.  Normal kidney function.  Normal hemoglobin.     As mentioned above she had bone lesions in the T7 vertebral body, T1, skull area.  Her main pain is coming from her T7 vertebral body compression fracture.     Due to the pain she has been seen by radiation oncology and has received radiation therapy.  She also got a dose of steroids for pain control.     Currently her pain is controlled with combination of Dilaudid as well as some Tylenol.  She is wearing a brace.  She did notice that when she was on dexamethasone her pain was significantly better.     She was initially thinking of doing some natural therapies but once her symptoms got worse, she came back in was counseled about treatment.  She has been given information about Velcade Revlimid and dexamethasone.  has started that treatment.  She states that she is tolerating this quite well.  Her myeloma has significantly improved.  She did meet with the transplant doctor and she is a little reluctant to do that.  She states she would prefer to be in complete remission before she does something like that.    Patient  had been doing fine and then in April 2022 she did get admitted for pneumonia.  She did have a little bit of a prolonged recovery but has been doing pretty well since then.    Patient continued on her treatment when she was out of the hospital.  She continue that for about 17 cycles and then in September Dr. Blanco thought we have gotten maximum response out of her treatment so we switched her to maintenance Revlimid.  So she now is currently taking Revlimid 10 mg every day.  No other treatment.  She states that she is feeling really good.  She denies any new bone or back pain.  She says her energy levels are better being off the full treatment.  She denies any infectious complaints.  Overall she is happy with how she is doing.  She is anxious to see her myeloma labs this month.    Review of Systems    Pertinent items are noted in HPI.    Past History    Past Medical History:   Diagnosis Date     Cervical dysplasia      Chronic RUQ pain      Depressive disorder      Multiple myeloma (H)      Osteoporosis        PHYSICAL EXAM  /58   Pulse 55   Temp 98.2  F (36.8  C)   Resp 18   Wt 61.1 kg (134 lb 9.6 oz)   LMP  (LMP Unknown)   SpO2 98%   BMI 25.43 kg/m      GENERAL: no acute distress. Cooperative in conversation. Here with sister. Mask on  RESP: Regular respiratory rate. No expiratory wheezes   MUSCULOSKELETAL: no bilateral leg swelling  NEURO: non focal. Alert and oriented x3.   PSYCH: within normal limits. No depression or anxiety.  SKIN: exposed skin is dry intact.     Lab Results    Recent Results (from the past 168 hour(s))   Comprehensive metabolic panel   Result Value Ref Range    Sodium 139 136 - 145 mmol/L    Potassium 4.2 3.4 - 5.3 mmol/L    Chloride 104 98 - 107 mmol/L    Carbon Dioxide (CO2) 26 22 - 29 mmol/L    Anion Gap 9 7 - 15 mmol/L    Urea Nitrogen 17.7 8.0 - 23.0 mg/dL    Creatinine 0.68 0.51 - 0.95 mg/dL    Calcium 9.0 8.8 - 10.2 mg/dL    Glucose 112 (H) 70 - 99 mg/dL    Alkaline  Phosphatase 36 35 - 104 U/L    AST 13 10 - 35 U/L    ALT 17 10 - 35 U/L    Protein Total 5.5 (L) 6.4 - 8.3 g/dL    Albumin 3.6 3.5 - 5.2 g/dL    Bilirubin Total <0.2 <=1.2 mg/dL    GFR Estimate >90 >60 mL/min/1.73m2   CBC with platelets and differential   Result Value Ref Range    WBC Count 5.3 4.0 - 11.0 10e3/uL    RBC Count 4.11 3.80 - 5.20 10e6/uL    Hemoglobin 14.3 11.7 - 15.7 g/dL    Hematocrit 42.5 35.0 - 47.0 %     (H) 78 - 100 fL    MCH 34.8 (H) 26.5 - 33.0 pg    MCHC 33.6 31.5 - 36.5 g/dL    RDW 14.9 10.0 - 15.0 %    Platelet Count 263 150 - 450 10e3/uL    % Neutrophils 50 %    % Lymphocytes 31 %    % Monocytes 13 %    % Eosinophils 5 %    % Basophils 1 %    % Immature Granulocytes 0 %    NRBCs per 100 WBC 0 <1 /100    Absolute Neutrophils 2.7 1.6 - 8.3 10e3/uL    Absolute Lymphocytes 1.6 0.8 - 5.3 10e3/uL    Absolute Monocytes 0.7 0.0 - 1.3 10e3/uL    Absolute Eosinophils 0.3 0.0 - 0.7 10e3/uL    Absolute Basophils 0.1 0.0 - 0.2 10e3/uL    Absolute Immature Granulocytes 0.0 <=0.4 10e3/uL    Absolute NRBCs 0.0 10e3/uL   Extra Red Top Tube   Result Value Ref Range    Hold Specimen JI    Extra Red Top Tube   Result Value Ref Range    Hold Specimen JI    Total Protein, Serum for ELP   Result Value Ref Range    Total Protein Serum for ELP 5.3 (L) 6.4 - 8.3 g/dL   IgG  Lab Results   Component Value Date     (L) 09/14/2022     (L) 08/24/2022     (L) 08/03/2022     (L) 07/08/2022     (L) 06/22/2022     (L) 06/01/2022     (L) 05/11/2022     (L) 03/30/2022     (L) 02/16/2022     (L) 01/26/2022   kappa light chains:  Lab Results   Component Value Date    KFLCA 1.04 09/14/2022    KFLCA 1.12 08/24/2022    KFLCA 1.07 08/03/2022    KFLCA 1.19 07/08/2022    KFLCA 1.60 06/22/2022    KFLCA 1.84 06/01/2022    KFLCA 1.37 05/11/2022    KFLCA 1.76 04/27/2022    KFLCA 0.98 03/30/2022    KFLCA 1.03 02/16/2022   Monoclonal peak  Lab Results   Component  Value Date    ELPM 0.1 (H) 09/14/2022    ELPM 0.1 (H) 08/24/2022    ELPM 0.1 (H) 08/03/2022    ELPM 0.1 (H) 07/08/2022    ELPM 0.1 06/01/2022    ELPM 0.2 04/27/2022    ELPM 0.2 03/30/2022    ELPM 0.3 02/16/2022    ELPM 0.2 01/26/2022    ELPM 0.4 12/29/2021   Myeloma labs still pending from today.  Patient will see them in my chart.     Imaging    No results found.      Signed by: SINDHU Lundberg CNP

## 2022-10-31 LAB
ALBUMIN SERPL ELPH-MCNC: 3.3 G/DL (ref 3.7–5.1)
ALPHA1 GLOB SERPL ELPH-MCNC: 0.3 G/DL (ref 0.2–0.4)
ALPHA2 GLOB SERPL ELPH-MCNC: 0.8 G/DL (ref 0.5–0.9)
B-GLOBULIN SERPL ELPH-MCNC: 0.6 G/DL (ref 0.6–1)
GAMMA GLOB SERPL ELPH-MCNC: 0.3 G/DL (ref 0.7–1.6)
M PROTEIN SERPL ELPH-MCNC: 0.1 G/DL
PROT PATTERN SERPL ELPH-IMP: ABNORMAL

## 2022-10-31 PROCEDURE — 84165 PROTEIN E-PHORESIS SERUM: CPT | Mod: 26

## 2022-11-04 NOTE — TELEPHONE ENCOUNTER
New Appointment Needed  What is the reason for the visit:    Pre-Op Appt Request  When is the surgery? :  Friday 05/07/21  Where is the surgery?:   [facility]  Who is the surgeon? :  Interventional  Radiology  What type of surgery is being done?: Biopsy T7  Provider Preference: Any available  How soon do you need to be seen?: today  Waitlist offered?: No  Okay to leave a detailed message:  Yes    
appt 5/6 10 am Rayshawn Valenzuela  
Incision made and right recurrent laryngeal nerve identified. superior parathyroid gland was identified with the landmarks. Parathyroid gland excised. Intraop PTH monitoring performed. Incision closed with chromic and monocryl sutures. Steristrips placed above incision.

## 2022-11-10 NOTE — LETTER
"    3/23/2022         RE: Lizzie Viera  [address]  [place] [address]        Dear Colleague,    Thank you for referring your patient, Lizzie Viera, to the [facility] [place]. Please see a copy of my visit note below.    Oncology Rooming Note    March 23, 2022 2:14 PM   Lizzie Viera is a 71 year old female who presents for:    Chief Complaint   Patient presents with     Oncology Clinic Visit     Initial Vitals: /55   Pulse 62   Temp 98.3  F (36.8  C)   Resp 16   Wt 58.1 kg (128 lb)   SpO2 96%   BMI 23.41 kg/m   Estimated body mass index is 23.41 kg/m  as calculated from the following:    Height as of 10/19/21: 1.575 m (5' 2.01\").    Weight as of this encounter: 58.1 kg (128 lb). Body surface area is 1.59 meters squared.  Moderate Pain (4) Comment: Data Unavailable   No LMP recorded. Patient is postmenopausal.  Allergies reviewed: Yes  Medications reviewed: Yes    Medications: Medication refills not needed today.  Pharmacy name entered into [employer]: [id number] - [place], [place] - [address].    Clinical concerns: Lab works      Narda Kingston RN              [facility] Hematology and Oncology Progress Note    Patient: Lizzie Viera  MRN: [id number]  Date of Service: Mar 23, 2022          Reason for Visit    Chief Complaint   Patient presents with     Oncology Clinic Visit       Assessment and Plan    Cancer Staging  No matching staging information was found for the patient.    1. Myeloma: has started on treatment with RVd.  She is getting Velcade weekly, Revlimid 2 weeks on, 1 week off and Dex weekly.  Today is cycle 9.  Her light chains and IgG levels have now normalized.  Her hemoglobin has normalized.  Her monoclonal peak has gone from 3.3 down to 0.2-0.3.   She is tolerating treatment fairly well.  She did meet with the transplant team.  She has thought about it and she is a little reluctant to do that at this " time.  She prefers her monoclonal peak to be at 0 before she does it so we will continue her current regimen.  She also doesn't want to do it when the weather is bad. We will refer her back to transplant team when she feels ready.  We will see Dr. Blanco in 3-4 weeks.    2. Bone lesions: has started on zometa.  Is been getting monthly.  Her last dose was January 5.  We are going to hold that for now.  She states she needs to get some dental work done.  Urged her to let us know when her dental work is done    3.  Scalp lesion: Patient states that this has continued to improve.  We have held off on radiation for now.  Continue to monitor.  Her CT scan shows improved disease     4.  Back pain: She says is probably related to try to do more in physical therapy and pushing herself.  She also was with her  in the hospital for couple of days and was not comfortable in a chair.  She is meeting with the spine center after having an MRI.  The MRI show some improvement in disease but potentially may be some new disease.  It would be very odd for her to have new lesions since her numbers have looked significantly better since her diagnosis.  She will follow with the spine center    ECOG Performance    0 - Independent    Distress Screening (within last 30 days)    1. How concerned are you about your ability to eat? : 0  2. How concerned are you about unintended weight loss or your current weight? : 0  3. How concerned are you about feeling depressed or very sad? : 0  4. How concerned are you about feeling anxious or very scared? : 0  5. Do you struggle with the loss of meaning and rambo in your life? : Not at all  6. How concerned are you about work and home life issues that may be affected by your cancer? : 2  7. How concerned are you about knowing what resources are available to help you? : 0  8. Do you currently have what you would describe as Worship or spiritual struggles?            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 Lizzie Viera is a very pleasant 70 year old woman who has been diagnosed with multiple myeloma IgG kappa type in May 2021.  She had initially presented with compression fracture of T7 vertebral body in September 2020.  She had a back pain which led to that evaluation.  Bone density confirmed that she had osteoporosis.  MRI showed diffuse marrow edema in October 2020.  In April 2021 the MRI of the thoracic spine showed worsening T7 vertebral body height loss because of likely pathological compression fracture with extraosseous extension.  She then had IR guided bone biopsy on 7 May 2021 and pathology confirmed the presence of kappa light chain restricted plasma cells.  Her cytogenetics confirmed the presence of hyperdiploid E with gains of chromosome 5, 9 and 15.     She was then seen by Dr. Baig and had a bone marrow biopsy which also confirmed 60% involvement of the marrow with plasma cells.  The bone marrow was done on  3 June 2021.  The bone marrow also confirmed the presence of hyperdiploid E.  She had a gain of chromosome 3, 5, 7, 9, 11, 15 as well as 19.  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  Her beta-2 microglobulin was actually normal at the time of her diagnosis as was her albumin at 4.0.  Monoclonal protein 3.1 g/dL.  Kappa free light chain levels of 13 mg/dL IgG level of 4280 with depressed levels of IgM and IgA.  Urine was also positive for kappa light chains as well as immunofixation of the urine was positive for IgG kappa.  Normal kidney function.  Normal hemoglobin.     As mentioned above she had bone lesions in the T7 vertebral body, T1, skull area.  Her main pain is coming from her T7 vertebral body compression fracture.     Due to the pain she has been seen by radiation oncology and has received radiation therapy.  She also got a dose of steroids for pain control.     Currently her pain is controlled with combination of Dilaudid as well as some Tylenol.  She is wearing a brace.  She did notice that when she was on dexamethasone her pain was significantly better.     She was initially thinking of doing some natural therapies but once her symptoms got worse, she came back in was counseled about treatment.  She has been given information about Velcade Revlimid and dexamethasone.  has started that treatment.  She states that she is tolerating this quite well.  Her myeloma has significantly improved.  She did meet with the transplant doctor and she is a little reluctant to do that.  She states she would prefer to be in complete remission before she does something like that.  She was also worried about being in the hospital with COVID being so prevalent at that time.  For right now she like to continue the current regimen.  She is tolerating it well.  Mild fatigue.  She does feel that her back pain is gotten a little bit worse.  She is meeting with the pain team as well as the spine center.  No new issues besides that.  No fevers  or infectious complaints.    Review of Systems    Pertinent items are noted in HPI.    Past History    Past Medical History:   Diagnosis Date     Cervical dysplasia      Chronic RUQ pain      Depressive disorder      Multiple myeloma (H)      Osteoporosis        PHYSICAL EXAM  /55   Pulse 62   Temp 98.3  F (36.8  C)   Resp 16   Wt 58.1 kg (128 lb)   SpO2 96%   BMI 23.41 kg/m      GENERAL: no acute distress. Cooperative in conversation. Here with sister. Mask on  RESP: Regular respiratory rate. No expiratory wheezes   MUSCULOSKELETAL: no bilateral leg swelling  NEURO: non focal. Alert and oriented x3.   PSYCH: within normal limits. No depression or anxiety.  SKIN: exposed skin is dry intact.     Lab Results    Recent Results (from the past 168 hour(s))   CBC with platelets and differential   Result Value Ref Range    WBC Count 5.0 4.0 - 11.0 10e3/uL    RBC Count 3.95 3.80 - 5.20 10e6/uL    Hemoglobin 13.0 11.7 - 15.7 g/dL    Hematocrit 40.5 35.0 - 47.0 %     (H) 78 - 100 fL    MCH 32.9 26.5 - 33.0 pg    MCHC 32.1 31.5 - 36.5 g/dL    RDW 17.2 (H) 10.0 - 15.0 %    Platelet Count 227 150 - 450 10e3/uL    % Neutrophils 84 %    % Lymphocytes 11 %    % Monocytes 2 %    % Eosinophils 1 %    % Basophils 1 %    % Immature Granulocytes 1 %    NRBCs per 100 WBC 0 <1 /100    Absolute Neutrophils 4.3 1.6 - 8.3 10e3/uL    Absolute Lymphocytes 0.6 (L) 0.8 - 5.3 10e3/uL    Absolute Monocytes 0.1 0.0 - 1.3 10e3/uL    Absolute Eosinophils 0.1 0.0 - 0.7 10e3/uL    Absolute Basophils 0.0 0.0 - 0.2 10e3/uL    Absolute Immature Granulocytes 0.1 <=0.4 10e3/uL    Absolute NRBCs 0.0 10e3/uL   IgG  Lab Results   Component Value Date     (L) 02/16/2022     (L) 01/26/2022     12/29/2021     12/15/2021    IGG 1,032 11/24/2021    IGG 1,609 11/03/2021    IGG 2,736 (H) 10/13/2021    IGG 4,439 (H) 09/20/2021   kappa light chains:  Lab Results   Component Value Date    KFLCA 1.03 02/16/2022    KFLCA 1.31  01/26/2022    KFLCA 1.06 12/29/2021    KFLCA 1.26 12/15/2021    KFLCA 1.55 11/24/2021    KFLCA 2.03 (H) 11/03/2021    KFLCA 4.30 (H) 10/13/2021    KFLCA 12.57 (H) 09/20/2021    KFLCA 13.37 (H) 05/27/2021   Monoclonal peak  Lab Results   Component Value Date    ELPM 0.3 02/16/2022    ELPM 0.2 01/26/2022    ELPM 0.4 12/29/2021    ELPM 0.4 12/15/2021    ELPM 0.6 11/24/2021    ELPM 1.1 11/03/2021    ELPM 1.7 10/13/2021    ELPM 3.3 09/20/2021    ELPM 3.1 05/27/2021   ]  Imaging    MR Thoracic Spine w/o & w Contrast    Result Date: 3/10/2022  EXAM: MR THORACIC SPINE W/O and W CONTRAST LOCATION: [facility] DATE/TIME: 3/9/2022 12:16 PM INDICATION: 71 year old?woman who has been diagnosed with multiple myeloma IgG kappa type. COMPARISON: Thoracic spine MRI 08/31/2021. CONTRAST: Gadavist 6 mL. TECHNIQUE: Routine Thoracic Spine MRI without and with IV contrast. FINDINGS: Mild dextrocurvature of the thoracic spine. Normal sagittal alignment. Compared to the prior thoracic spine MRI, there has been interval positive response to treatment. Interval significant decrease in the previously noted abnormal marrow signal and enhancement involving the T6-T7 vertebral bodies and bilateral pedicles. The associated prevertebral soft tissue mass is almost completely resolved. Similarly the associated epidural soft tissue lesions involving both the ventral and dorsal epidural space at T6-T7 and previously causing high-grade spinal stenosis have resolved. The spinal canal is widely patent at that level on today's exam. There is a new T1 hypointense and enhancing lesion at T1 compared to the previous thoracic spine MRI. There are suggestion of subtle enhancement involving T2 and T3 vertebral bodies on the postcontrast images although no convincing abnormal signal noted on the sagittal T1-weighted images. Small Schmorl's nodes from T10 to L1. No convincing abnormal cord signal on today's images. There is suggestion of  subtle increased T2 signal within the cord at T6-T7 on the sagittal T2-weighted images but this is not confirmed on the other sequences and  is likely artifactual. Spinal canal is widely patent throughout the entire thoracic spine. Moderate bilateral neural foraminal narrowing at T6-T7 and T7-T8, actually improved compared to the prior thoracic spine MRI. Mild symmetric atrophy of the posterior paraspinal musculature. Normal diameter of the descending aorta.     IMPRESSION: 1.  Compared to previous thoracic spine MRI 08/31/2021, interval positive response to treatment. Interval significant decrease in the previously noted abnormal marrow signal and enhancement involving the T6-T7 vertebral bodies and bilateral pedicles. The  associated prevertebral soft tissue mass is almost completely resolved. Similarly the associated epidural soft tissue lesions involving both the ventral and dorsal epidural space at T6-T7 and previously causing high-grade spinal stenosis have resolved. 2.  There is a new T1 hypointense and enhancing lesion at T1 compared to the previous thoracic spine MRI. This is suspicious for new infiltrative marrow lesion. 3.  There are suggestion of subtle enhancement involving T2 and T3 vertebral bodies on the postcontrast images although no convincing abnormal signal noted on the sagittal T1-weighted images. These are favored to be artifactual. 4.  No high-grade spinal canal narrowing. 5.  Moderate bilateral neural foraminal narrowing at T6-T7 and T7-T8, actually improved compared to the prior thoracic spine MRI.    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 Multiplanar reformats. Dose reduction  techniques were used. FINDINGS: INTRACRANIAL CONTENTS: No intracranial hemorrhage, extraaxial collection, or mass effect.  No CT evidence of acute infarct. Unchanged mild presumed chronic small vessel ischemic changes. Unchanged mild generalized volume loss. No hydrocephalus. VISUALIZED ORBITS/SINUSES/MASTOIDS: No intraorbital abnormality. No paranasal sinus mucosal disease. No middle ear or mastoid effusion. BONES/SOFT TISSUES: Unchanged size of the dominant left parietal bone lesion. However, there is increased mineralization/ossification along the margins of the lesion.     IMPRESSION: 1.  When compared to 12/29/2021, there has been interval increased mineralization/ossification along the margins of the dominant left parietal bone lesion suggestive of ongoing healing change. 2.  No acute intracranial abnormality.        Signed by: SINDHU Lundberg CNP      Again, thank you for allowing me to participate in the care of your patient.        Sincerely,        SINDHU Lundberg CNP     Normal rate, regular rhythm.  Heart sounds S1, S2.  No murmurs, rubs or gallops.  normal radial pulse.

## 2022-11-15 ENCOUNTER — TELEPHONE (OUTPATIENT)
Dept: ONCOLOGY | Facility: HOSPITAL | Age: 72
End: 2022-11-15

## 2022-11-15 ENCOUNTER — VIRTUAL VISIT (OUTPATIENT)
Dept: RADIATION ONCOLOGY | Facility: HOSPITAL | Age: 72
End: 2022-11-15
Attending: RADIOLOGY
Payer: COMMERCIAL

## 2022-11-15 DIAGNOSIS — C90.01 MULTIPLE MYELOMA IN REMISSION (H): Primary | ICD-10-CM

## 2022-11-15 DIAGNOSIS — G89.3 CANCER ASSOCIATED PAIN: ICD-10-CM

## 2022-11-15 PROCEDURE — 99214 OFFICE O/P EST MOD 30 MIN: CPT | Mod: 95 | Performed by: FAMILY MEDICINE

## 2022-11-15 PROCEDURE — G0463 HOSPITAL OUTPT CLINIC VISIT: HCPCS | Mod: PN,RTG | Performed by: FAMILY MEDICINE

## 2022-11-15 RX ORDER — METHOCARBAMOL 500 MG/1
500 TABLET, FILM COATED ORAL 4 TIMES DAILY PRN
Qty: 120 TABLET | Refills: 4 | Status: SHIPPED | OUTPATIENT
Start: 2022-11-15 | End: 2023-06-02

## 2022-11-15 NOTE — PROGRESS NOTES
Valeria is a 72 year old who is being evaluated via a billable video visit.      How would you like to obtain your AVS? MyChart  If the video visit is dropped, the invitation should be resent by: Send to e-mail at: [email or web address]  Will anyone else be joining your video visit? No       Droita Buitrago VF    Video-Visit Details    Video Start Time: 1450    Type of service:  Video Visit    Video End Time:3:10 PM    Originating Location (pt. Location): Home        Distant Location (provider location):  On-site    Platform used for Video Visit: [facility]     Palliative Care Outpatient Clinic Progress Note    Patient Name:  Lizzie Viera  Primary Provider:  Anne Marie Walker    Chief Complaint: cancer associated pain    Interim History:  Lizzie Viera 72 year old female returns to be seen by palliative care today.  She is encouraged that she started maintenance treatment with Revlimid in October and feels this has helped increase her energy.  Feels pain is well controlled with methocarbamol, gabapentin and cannabis and rare dilaudid.  Has no new concerns or complaints. She misses Mary Ellen Chan and is hopeful the new clinical SW will be as kind (I assured she is.)    She needs her methocarbamol refilled and I sent that in to [facility] in [place].    Coping:  Pretty well though she misses the emotional support Mary Ellen Chan provided. She is looking forward to a smaller family Thanksgiving Dinner at her home and then a large party with her seven siblings at a hotel on the Friday after.    Social History:  Pertinent changes to social history/social situation since last visit: none  Key support resources: family and hopes new clinical  will be as helpful as Mary Ellen Chan was     Social History     Tobacco Use     Smoking status: Some Days     Packs/day: 0.50     Years: 45.00     Pack years: 22.50     Types: Cigarettes     Start date: 6/16/2022     Smokeless tobacco: Never   Vaping Use     Vaping Use:  Never used   Substance Use Topics     Alcohol use: Yes     Comment: Alcoholic Drinks/day: 0-1 drinks per week     Drug use: Not Currently         Allergies   Allergen Reactions     Cefdinir Shortness Of Breath     Latex Shortness Of Breath     Short Ragweed Pollen Ext Cough     Adhesive Tape Rash     Current Outpatient Medications   Medication Sig Dispense Refill     methocarbamol (ROBAXIN) 500 MG tablet Take 1 tablet (500 mg) by mouth 4 times daily as needed for muscle spasms 120 tablet 4     albuterol (PROAIR HFA/PROVENTIL HFA/VENTOLIN HFA) 108 (90 Base) MCG/ACT inhaler Inhale 2 puffs into the lungs every 6 hours as needed for wheezing or shortness of breath / dyspnea 18 g 1     albuterol (PROVENTIL) (2.5 MG/3ML) 0.083% neb solution Take 1 vial (2.5 mg) by nebulization every 4 hours as needed for wheezing or shortness of breath / dyspnea 90 mL 0     alpha-lipoic acid 100 MG capsule Take 300 mg by mouth daily       amLODIPine (NORVASC) 5 MG tablet TAKE ONE TABLET BY MOUTH ONE TIME DAILY 30 tablet 11     Ascorbic Acid (VITAMIN C) 100 MG CHEW Take 1 tablet by mouth daily       aspirin (ASA) 81 MG chewable tablet Take 81 mg by mouth daily       baclofen (LIORESAL) 10 MG tablet Take 1 tablet (10 mg) by mouth 3 times daily as needed for muscle spasms 60 tablet 1     Coenzyme Q10 300 MG CAPS Take 300 mg by mouth daily       fish oil-omega-3 fatty acids 1000 MG capsule Take 2 g by mouth daily       gabapentin (NEURONTIN) 300 MG capsule Take 2 capsules (600 mg) by mouth 3 times daily 180 capsule 0     HYDROmorphone (DILAUDID) 4 MG tablet Take 0.5-1 tablets (2-4 mg) by mouth every 4 hours as needed for moderate to severe pain 60 tablet 0     LENalidomide (REVLIMID) 10 MG CAPS capsule Take 1 capsule (10 mg) by mouth daily for 28 days Take at the same time each day. Do not open, break or chew the capsule. Swallow whole, with water. 28 capsule 0     medical cannabis (Patient's own supply) See Admin Instructions (The purpose of  this order is to document that the patient reports taking medical cannabis.  This is not a prescription, and is not used to certify that the patient has a qualifying medical condition.)     Oil formulation       Melatonin 10 MG TABS tablet Take 20 mg by mouth At Bedtime       Milk Thistle-Dand-Fennel-Licor (MILK THISTLE XTRA) CAPS capsule Take 1 capsule by mouth daily       NALTREXONE HCL PO Take 3 mg by mouth daily       nicotine (NICOTROL) 10 MG inhaler Use 1 cartridge as needed for urge to smoke by puffing over course of 20min.  Use 6-16 cart/day; reduce number of cart/day over 6-12 weeks. 300 each 4     phenazopyridine (AZO URINARY PAIN RELIEF) 95 MG tablet Take 190 mg by mouth 3 times daily       TURMERIC PO Take 1 capsule by mouth daily       UNABLE TO FIND 1 capsule daily MEDICATION NAME: Serrapeptase       UNABLE TO FIND 1 capsule daily MEDICATION NAME: Lions García Mushroom       UNABLE TO FIND 1 capsule daily MEDICATION NAME: DIM (diinolylmethane)       UNABLE TO FIND MEDICATION NAME: Panacur C - 1 capsule daily       Vitamin A 3 MG (11241 UT) TABS Take 1 tablet by mouth daily       Vitamin D, Cholecalciferol, 25 MCG (1000 UT) CAPS Take 1,000 Units by mouth daily Liquid, not capsule       Past Medical History:   Diagnosis Date     Cervical dysplasia      Chronic RUQ pain      Depressive disorder      Multiple myeloma (H)      Osteoporosis      Past Surgical History:   Procedure Laterality Date     CONIZATION CERVIX,KNIFE/LASER      Description: Cervical Conization By Laser;  Recorded: 09/20/2007;     HC DILATION/CURETTAGE DIAG/THER NON OB      Description: Dilation And Curettage;  Recorded: 09/20/2007;      REMOVE TONSILS/ADENOIDS,<13 Y/O      Description: Tonsillectomy With Adenoidectomy;  Recorded: 09/20/2007;     [facility] LAP,CHOLECYSTECTOMY/EXPLORE  12/27/2004     [facility] LIGATE FALLOPIAN TUBE      Description: Tubal Ligation;  Recorded: 09/20/2007;       Review of Systems:   ROS: 10 point ROS neg other than  the symptoms noted above in the HPI and pertinents here:  Palliative Symptom Review (0=no symptom/no concern, 1=mild, 2=moderate, 3=severe):      Pain: 1      Fatigue: 1      Nausea: 0      Constipation: 0      Diarrhea: 0      Depressive Symptoms: 1      Anxiety: 1      Drowsiness: 0      Poor Appetite: 0      Shortness of Breath: 0      Insomnia: 1-2      Other: 0      Overall (0 good/no concerns, 3 very poor):  1    ECO    GENERAL APPEARANCE: healthy, alert and no distress; neatly groomed; she looks like she has a lot of energy today  EYES: Eyes grossly normal to inspection, PERRLA, conjunctivae and sclerae without injection or discharge, EOM intact   RESP:  no increased work of breathing; speaks in complete sentences;   MS: No musculoskeletal defects are noted  SKIN: No suspicious lesions or rashes, hydration status appears adequate with normal skin turgor   PSYCH: Alert and oriented x3; speech- coherent , normal rate and volume; able to articulate logical thoughts, able to abstract reason, no tangential thoughts, no hallucinations or delusions, mentation appears normal, Mood is euthymic. Affect is appropriate for this mood state and bright. Thought content is free of suicidal ideation, hallucinations, and delusions.  Eye contact is good during conversation.     Key Data Reviewed:  LABS: 10/26/2022 Cr 0.68; total protein 5.5; Hgb 14.3;  Alb 3.4 (22)  IMAGIN2022 MR THORACIC SPINE W/0 CONTRAST  IMPRESSION:  1.  Unchanged pathologic compression fractures at T1 and T7 compared to 2022. No new fracture or progressive vertebral height loss identified.  2.  Persistent abnormal marrow signal involving the T6 and T8 vertebral bodies which may be reactive or neoplastic in nature.  3.  No evidence for extraosseous extension of neoplasm. No evidence for new osseous lesions.  4.  Multilevel thoracic spondylosis without high-grade central spinal canal stenosis. Unchanged moderate bilateral neural  "foraminal stenosis at T6-T7 and T7-T8.      Impression & Recommendations & Counseling:  Multiple myeloma  Compression Fracture mid-T spine  Cancer associated pain--well controlled  Insomnia well controlled    SYMPTOM ASSESSMENT:  1.  Cancer related pain thoracic spine pain that radiates to bilateral lower extremities  Comment: Controlled.  Currently rates pain as \"low\".  - Continue Gabapentin 600 mg by mouth 3 times daily.  - S/P Single fraction radiation to T6 through T8/9.  - S/P T1 radiation 10/6/2021-10/12/2021.    - Dilaudid to 2-4 mg by mouth every 4 hours as needed.  Takes infrequently.  - Continue medical cannabis per department of Health. - pills and oil. Feels she is having good relief with this.  Does not take cannabis when knows she has to drive the next day.  - Continue Robaxin 500 mg po q6h prn spasms - taking 3 times a day. Refilled today.  - Tried baclofen at bedtime without benefit. It did not assist with sleep or relief of spasms.  -Discussed possibility of initiation of Cymbalta as this could help with neuropathic component to pain as well as assist with depression.  Patient would like to hold off on this time.  Does not want to feel any masking of highs or lows per her report.     - Patient was taking  naltrexone 3 mg by mouth daily.  Had discussion with patient that this could reverse/minimize effect of opiate and she may feel more pain.  Taking this as a \"anti-inflammatory\" component of her natural/complementary treatments and doesn't feel it interferes when she uses her infrequent dilaudid.     2. Cancer related fatigue - fluctuates.  Feels fatigued at days end occasionally but she is now exercising up to four times a day on her treadmill and she is walking at 3.3 mph (with a goal to resume 5 mph!)  - Activity as tolerated.  - Patient utilizing rolling walker while up but walking up to 3.3 mph on treadmill.     3. Anxiety and depression related to health/diagnosis and current social " stressors  - Patient is on list to see Kacy Edouard  for coping, support and processing diagnosis; I encouraged her to call the clinic next week if she hasn't been called to schedule an appointment.  - Patient sees an energy healer.     ADVANCED CARE PLANNING/GOALS OF CARE DISCUSSION:  - Code status: DNR/DNI  - Honoring Choices and POLST in chart.  - Follow-up in palliative care clinic in 3 months for ongoing pain management and decisional support.   -Call [phone] with questions or refill needs.    F/up in 3 months; sooner if needed.  35 minutes were spent in review of medical record, performing video visit, documentation and care coordination.    Yuri Salazar MD MS FAAFP  [facility]  Office [phone]  Fax [phone]

## 2022-11-15 NOTE — PATIENT INSTRUCTIONS
Nice to see you today, Valeria.  You look really good.      I hope the Holidays are a good time for you.    I sent in the refill for the Methocarbamol to [facility].    Please have the cancer center cira set up an appointment with Kacy (the mike Mary Ellen Chan).    The team will call to set up an appointment in February.    Happy Holidays!    Here are some sleep ideas:    Sleep Hygine plan:   1. Regular bedtime and rise time  2. Avoid napping --especially naps lasting longer than 1 hour and naps late in the day.   3. Limit caffeine --avoid caffeine after lunch.   4. Exercise   5. Keep the sleep environment quiet and dark.  Noiseand light exposure during the night can disrupt sleep. White noise or ear plugs are often recommended to reduce noise. Using blackout shades or an eye mask is commonly recommended to reduce light.   6. NO television ortechnology near bedtime      Thank you for meeting with us in the [facility].    How to get a hold of us:  For non-urgent matters, MyChart works best.    For more urgent matters, or if you prefer not to use MyChart, call our clinic nurse coordinator Carine Ferreira RN at [phone]    We have an on-call number for evenings and weekends. Please call this only if you are having uncontrolled symptoms or serious side effects from your medicines: [phone].     For refills, please give us a week (5 working days) notice. We don't always have providers available everyday to do refills. If you call the day you run out of your medicine, we may not be able to refill it in time, so call 5 days in advance!     Yuri Salazar MD MS FAAFP  [facility]  Office [phone]  Fax [phone]

## 2022-11-15 NOTE — ORAL ONC MGMT
Oral Chemotherapy Monitoring Program    Subjective/Objective:  Lizzie Viera is a 72 year old female with multiple myeloma contacted by phone for a monthly follow-up visit for oral chemotherapy. Valeria continues to take lenalidomide 10 mg once daily with no missed doses. She reports no side effects at this time.    ORAL CHEMOTHERAPY 8/4/2022 8/25/2022 9/15/2022 9/16/2022 10/14/2022 10/27/2022 11/15/2022   Assessment Type Monthly Follow up;Refill Refill Left Voicemail Monthly Follow up Monthly Follow up Refill Monthly Follow up   Diagnosis Code Multiple Myeloma Multiple Myeloma Multiple Myeloma Multiple Myeloma Multiple Myeloma Multiple Myeloma Multiple Myeloma   Providers Dr. Bella Blanco   Clinic Name/Location [facility]   Drug Name Revlimid (lenalidomide) Revlimid (lenalidomide) Revlimid (lenalidomide) Revlimid (lenalidomide) Revlimid (lenalidomide) Revlimid (lenalidomide) Revlimid (lenalidomide)   Dose 25 mg 25 mg 10 mg 10 mg 10 mg 10 mg 10 mg   Current Schedule Daily Daily Daily Daily Daily Daily Daily   Cycle Details 2 weeks on, 1 week off 2 weeks on, 1 week off Continuous Continuous Continuous Continuous Continuous   Start Date of Last Cycle 7/20/2022 - - 10/2/2022 10/3/2022 10/3/2022 -   Planned next cycle start date 8/10/2022 - - - - - -   Doses missed in last 2 weeks 1 - - 0 0 - 0   Adherence Assessment Adherent - - Adherent Adherent - Adherent   Adverse Effects Edema - - Edema No AE identified during assessment - No AE identified during assessment   Nausea - - - - - - -   Pharmacist Intervention(nausea) - - - - - - -   Diarrhea - - - - - - -   Pharmacist Intervention(diarrhea) - - - - - - -   Intervention(s) - - - - - - -   Decrease Appetite/Weight Loss - - - - - - -   Pharmacist Intervention(decreased appetite/weight loss) - - - - - - -   Intervention(s) - - - - - - -  "  Fatigue - - - - - - -   Pharmacist Intervention(fatigue) - - - - - - -   Any new drug interactions? No - - No No - No   Is the dose as ordered appropriate for the patient? Yes - - Yes Yes - Yes       Last PHQ-2 Score on record:   PHQ-2 ( 1999 [employer]) 6/25/2022 5/3/2022   Q1: Little interest or pleasure in doing things 1 1   Q2: Feeling down, depressed or hopeless 1 0   PHQ-2 Score 2 1   Q1: Little interest or pleasure in doing things Several days Several days   Q2: Feeling down, depressed or hopeless Several days Not at all   PHQ-2 Score 2 1       Vitals:  BP:   BP Readings from Last 1 Encounters:   10/26/22 130/58     Wt Readings from Last 1 Encounters:   10/26/22 61.1 kg (134 lb 9.6 oz)     Estimated body surface area is 1.62 meters squared as calculated from the following:    Height as of 6/30/22: 1.549 m (5' 1\").    Weight as of 10/26/22: 61.1 kg (134 lb 9.6 oz).    Labs:  _  Result Component Current Result Ref Range   Sodium 139 (10/26/2022) 136 - 145 mmol/L     _  Result Component Current Result Ref Range   Potassium 4.2 (10/26/2022) 3.4 - 5.3 mmol/L     _  Result Component Current Result Ref Range   Calcium 9.0 (10/26/2022) 8.8 - 10.2 mg/dL     No results found for Mag within last 30 days.     No results found for Phos within last 30 days.     _  Result Component Current Result Ref Range   Albumin 3.6 (10/26/2022) 3.5 - 5.2 g/dL     _  Result Component Current Result Ref Range   Urea Nitrogen 17.7 (10/26/2022) 8.0 - 23.0 mg/dL     _  Result Component Current Result Ref Range   Creatinine 0.68 (10/26/2022) 0.51 - 0.95 mg/dL     _  Result Component Current Result Ref Range   AST 13 (10/26/2022) 10 - 35 U/L     _  Result Component Current Result Ref Range   ALT 17 (10/26/2022) 10 - 35 U/L     _  Result Component Current Result Ref Range   Bilirubin Total <0.2 (10/26/2022) <=1.2 mg/dL     _  Result Component Current Result Ref Range   WBC Count 5.3 (10/26/2022) 4.0 - 11.0 10e3/uL     _  Result Component Current " Result Ref Range   Hemoglobin 14.3 (10/26/2022) 11.7 - 15.7 g/dL     _  Result Component Current Result Ref Range   Platelet Count 263 (10/26/2022) 150 - 450 10e3/uL     No results found for ANC within last 30 days.     _  Result Component Current Result Ref Range   Absolute Neutrophils 2.7 (10/26/2022) 1.6 - 8.3 10e3/uL      Medication Reconciliation:  No changes    Assessment/Plan:  Valeria is tolerating therapy well, continue with current treatment plan.    Follow-Up:  11/30 - labs + appt    Valeria is clinically stable and on her lenalidomide oral cancer treatment longer than 1 year. The Oral Chemotherapy Monitoring Program will continue to manage refills, but will discontinue monthly assessments and lab monitoring.    Refill Due:  11/23/22    Jeison Burns, PharmD  Oral Chemotherapy Pharmacist  [phone]

## 2022-11-15 NOTE — NURSING NOTE
Patient denies any changes since echeck-in regarding medication and allergies and states all information entered during echeck-in remains accurate.    Dorita MATTHEWS

## 2022-11-16 PROBLEM — I10 ESSENTIAL HYPERTENSION, BENIGN: Status: ACTIVE | Noted: 2022-11-16

## 2022-11-16 PROBLEM — E87.6 HYPOKALEMIA: Status: RESOLVED | Noted: 2021-08-31 | Resolved: 2022-11-16

## 2022-11-16 PROBLEM — R09.02 HYPOXIA: Status: RESOLVED | Noted: 2022-04-12 | Resolved: 2022-11-16

## 2022-11-16 PROBLEM — J18.9 PNEUMONIA OF RIGHT LOWER LOBE DUE TO INFECTIOUS ORGANISM: Status: RESOLVED | Noted: 2022-04-12 | Resolved: 2022-11-16

## 2022-11-17 ENCOUNTER — OFFICE VISIT (OUTPATIENT)
Dept: FAMILY MEDICINE | Facility: CLINIC | Age: 72
End: 2022-11-17
Payer: COMMERCIAL

## 2022-11-17 VITALS
HEIGHT: 61 IN | DIASTOLIC BLOOD PRESSURE: 58 MMHG | SYSTOLIC BLOOD PRESSURE: 132 MMHG | HEART RATE: 66 BPM | TEMPERATURE: 98.2 F | WEIGHT: 139.2 LBS | RESPIRATION RATE: 18 BRPM | BODY MASS INDEX: 26.28 KG/M2 | OXYGEN SATURATION: 98 %

## 2022-11-17 DIAGNOSIS — Z76.89 ENCOUNTER TO ESTABLISH CARE: Primary | ICD-10-CM

## 2022-11-17 DIAGNOSIS — Z72.0 TOBACCO USE: ICD-10-CM

## 2022-11-17 DIAGNOSIS — G89.29 CHRONIC MIDLINE THORACIC BACK PAIN: ICD-10-CM

## 2022-11-17 DIAGNOSIS — I77.4 CELIAC ARTERY STENOSIS (H): ICD-10-CM

## 2022-11-17 DIAGNOSIS — C90.00 MULTIPLE MYELOMA WITH FAILED REMISSION (H): ICD-10-CM

## 2022-11-17 DIAGNOSIS — M84.58XK PATHOLOGICAL FRACTURE OF VERTEBRAE IN NEOPLASTIC DISEASE WITH NONUNION: ICD-10-CM

## 2022-11-17 DIAGNOSIS — I70.8 LEFT SUBCLAVIAN ARTERY OCCLUSION: ICD-10-CM

## 2022-11-17 DIAGNOSIS — M54.6 CHRONIC MIDLINE THORACIC BACK PAIN: ICD-10-CM

## 2022-11-17 DIAGNOSIS — I10 ESSENTIAL HYPERTENSION, BENIGN: ICD-10-CM

## 2022-11-17 DIAGNOSIS — M81.0 AGE-RELATED OSTEOPOROSIS WITHOUT CURRENT PATHOLOGICAL FRACTURE: ICD-10-CM

## 2022-11-17 DIAGNOSIS — G43.109 MIGRAINE WITH AURA AND WITHOUT STATUS MIGRAINOSUS, NOT INTRACTABLE: ICD-10-CM

## 2022-11-17 DIAGNOSIS — E78.2 MIXED HYPERLIPIDEMIA: ICD-10-CM

## 2022-11-17 PROCEDURE — 99214 OFFICE O/P EST MOD 30 MIN: CPT | Performed by: FAMILY MEDICINE

## 2022-11-20 PROBLEM — I77.4 CELIAC ARTERY STENOSIS (H): Status: ACTIVE | Noted: 2022-11-20

## 2022-11-20 NOTE — PROGRESS NOTES
Assessment & Plan     Encounter to establish care  Past medical history, social history, family history reviewed.    Essential hypertension, benign  Adequately controlled on amlodipine at current dose.  Continue to work on healthy lifestyle habits.  I would not be comfortable in reducing dose at this time but could give consideration to doing so in the future.  Advised that she check her blood pressures at home as well.    Migraine with aura and without status migrainosus, not intractable  Doing well with Excedrin Migraine or Imitrex, continue as needed.    Mixed hyperlipidemia  This does not require treatment in the past.  In light of her known aorta plaque and narrowing at celiac axis and left subclavian, would give consideration to statin medication.    Multiple myeloma with failed remission (H)  She continues to work with oncology team, currently on maintenance dose chemotherapy.    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    Tobacco use  Advised cessation.  Continue Nicotrol inhaler, does not tolerate other medications to support tobacco cessation.  She declines referral to [place] quit plan.    Celiac artery stenosis (H)  Left subclavian artery occlusion  Diagnosed on CT scan September 2020 during hospitalization at which time it was recommended that she follow-up with vascular surgery.  This would not perform in the past.  Referral is placed to vascular surgery for further evaluation and treatment recommendations.    Osteoporosis  Orders placed for follow-up bone density scan.  Of note, prior vertebral fracture was not a fragility fracture, which is new information from previous bone density scan and therefore the impact interpretation.           BMI:   Estimated body mass index is 26.3 kg/m  as calculated from the following:    Height as of this encounter: 1.549 m (5'  "1\").    Weight as of this encounter: 63.1 kg (139 lb 3.2 oz).           No follow-ups on file.    Sona Sandoval MD  [facility] VINICIUS Shaw is a 72 year old, presenting for the following health issues:  Establish Care (Review health history )      She is here today to establish care with me, previously receiving her care from Dr. Anne Marie Walker at [facility].  She also sees a naturopathic provider.  Most recent past medical history has been remarkable for multiple myeloma diagnosed May 2021.  Her journey started when she had a T7 compression fracture September 2020.  Incidental lesion was also noted at T1 at that time, it sounds as though it took quite some time for her to ultimately have the biopsy which proved multiple myeloma.  This was a frustrating journey for her.  She has been followed by the spine care center Dr. Chatman.  She is followed by Dr. Blanco of hematology oncology, treated initially with chemotherapy and now on a maintenance dose of chemotherapy daily.  She has not achieved remission but is stable.  She receives care from palliative care team and management of her chronic thoracic back pain, currently utilizing methocarbamol, intermittent hydromorphone, gabapentin, and medical cannabis.  States she receives Zometa due to her bone metastases.  History of smoking, is transitioning to Nicotrol Inhaler, has not tolerated nicotine lozenges or gum.  Notes that she developed anger with intensity bupropion or Chantix.  Her last actual cigarette was in August.    History of migraine headaches in the past that were primarily hormonal, now they are occurring about every 3 months and managed Excedrin Migraine or Imitrex.  She gets an aura with these.  History of hypertension, started on amlodipine in April, is really hoping to discontinue this medication.  She notes a history of subclavian artery occlusion diagnosed in September 2020 during hospitalization for " "compression fracture, noted on imaging study.  She has never had any follow-up for this.  She notes that she sometimes will feel lightheaded when she tips her head that.  No known heart disease.  Exercising on a treadmill 4 times a week and feeling well with this.    She is .  Her  had a kidney transplant at the end of July.  There is some tension currently in her relationship with her  with significant hospitalizations this year associated with a transplant.  53 children, currently living with a daughter and her 2 sons who are 5 and 9 years old.  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.  She is considering some part-time work.  They spent 3 weeks in [place] every February.    History of Present Illness       Reason for visit:  Establish care with new provider as Dr. Walker is no longer available.    She eats 2-3 servings of fruits and vegetables daily.She consumes 1 sweetened beverage(s) daily.She exercises with enough effort to increase her heart rate 20 to 29 minutes per day.  She exercises with enough effort to increase her heart rate 4 days per week.   She is taking medications regularly.             Review of Systems         Objective    /58   Pulse 66   Temp 98.2  F (36.8  C) (Oral)   Resp 18   Ht 1.549 m (5' 1\")   Wt 63.1 kg (139 lb 3.2 oz)   LMP  (LMP Unknown)   SpO2 98%   BMI 26.30 kg/m    Body mass index is 26.3 kg/m .  Physical Exam   Alert and very pleasant female.  Heart with regular rate rhythm.  Lungs clear.  Ambulates without difficulty.  No edema.                    "

## 2022-11-21 DIAGNOSIS — I10 ESSENTIAL HYPERTENSION, BENIGN: ICD-10-CM

## 2022-11-21 DIAGNOSIS — R27.8 OTHER LACK OF COORDINATION: ICD-10-CM

## 2022-11-21 DIAGNOSIS — Z72.0 TOBACCO USE: ICD-10-CM

## 2022-11-21 DIAGNOSIS — I77.4 CELIAC ARTERY STENOSIS (H): ICD-10-CM

## 2022-11-21 DIAGNOSIS — I70.8 LEFT SUBCLAVIAN ARTERY OCCLUSION: Primary | ICD-10-CM

## 2022-11-21 DIAGNOSIS — E78.2 MIXED HYPERLIPIDEMIA: ICD-10-CM

## 2022-11-22 ENCOUNTER — TELEPHONE (OUTPATIENT)
Dept: ONCOLOGY | Facility: HOSPITAL | Age: 72
End: 2022-11-22

## 2022-11-22 DIAGNOSIS — C90.00 MULTIPLE MYELOMA NOT HAVING ACHIEVED REMISSION (H): Primary | ICD-10-CM

## 2022-11-22 DIAGNOSIS — C90.00 MULTIPLE MYELOMA WITH FAILED REMISSION (H): ICD-10-CM

## 2022-11-22 RX ORDER — LENALIDOMIDE 10 MG/1
10 CAPSULE ORAL DAILY
Qty: 28 CAPSULE | Refills: 0 | Status: SHIPPED | OUTPATIENT
Start: 2022-11-25 | End: 2022-12-20

## 2022-11-25 DIAGNOSIS — M79.18 MYOFASCIAL PAIN: ICD-10-CM

## 2022-11-25 DIAGNOSIS — M48.04 THORACIC SPINAL STENOSIS: ICD-10-CM

## 2022-11-25 RX ORDER — BACLOFEN 10 MG/1
TABLET ORAL
Qty: 60 TABLET | Refills: 0 | Status: SHIPPED | OUTPATIENT
Start: 2022-11-25 | End: 2024-01-01

## 2022-11-25 RX ORDER — GABAPENTIN 300 MG/1
CAPSULE ORAL
Qty: 180 CAPSULE | Refills: 0 | Status: SHIPPED | OUTPATIENT
Start: 2022-11-25 | End: 2022-12-19

## 2022-11-30 ENCOUNTER — LAB (OUTPATIENT)
Dept: INFUSION THERAPY | Facility: HOSPITAL | Age: 72
End: 2022-11-30
Attending: INTERNAL MEDICINE
Payer: COMMERCIAL

## 2022-11-30 ENCOUNTER — ANCILLARY PROCEDURE (OUTPATIENT)
Dept: VASCULAR ULTRASOUND | Facility: CLINIC | Age: 72
End: 2022-11-30
Attending: SURGERY
Payer: COMMERCIAL

## 2022-11-30 ENCOUNTER — ONCOLOGY VISIT (OUTPATIENT)
Dept: ONCOLOGY | Facility: HOSPITAL | Age: 72
End: 2022-11-30
Attending: INTERNAL MEDICINE
Payer: COMMERCIAL

## 2022-11-30 ENCOUNTER — HOSPITAL ENCOUNTER (OUTPATIENT)
Dept: CT IMAGING | Facility: HOSPITAL | Age: 72
Discharge: HOME | End: 2022-11-30
Attending: SURGERY
Payer: COMMERCIAL

## 2022-11-30 VITALS
HEART RATE: 54 BPM | RESPIRATION RATE: 14 BRPM | OXYGEN SATURATION: 97 % | TEMPERATURE: 98.1 F | DIASTOLIC BLOOD PRESSURE: 64 MMHG | BODY MASS INDEX: 25.51 KG/M2 | HEIGHT: 61 IN | SYSTOLIC BLOOD PRESSURE: 139 MMHG | WEIGHT: 135.1 LBS

## 2022-11-30 DIAGNOSIS — R27.8 OTHER LACK OF COORDINATION: ICD-10-CM

## 2022-11-30 DIAGNOSIS — I70.8 LEFT SUBCLAVIAN ARTERY OCCLUSION: ICD-10-CM

## 2022-11-30 DIAGNOSIS — C90.00 MULTIPLE MYELOMA NOT HAVING ACHIEVED REMISSION (H): ICD-10-CM

## 2022-11-30 DIAGNOSIS — I77.4 CELIAC ARTERY STENOSIS (H): ICD-10-CM

## 2022-11-30 DIAGNOSIS — E78.2 MIXED HYPERLIPIDEMIA: ICD-10-CM

## 2022-11-30 DIAGNOSIS — C90.00 MULTIPLE MYELOMA WITH FAILED REMISSION (H): Primary | ICD-10-CM

## 2022-11-30 DIAGNOSIS — I10 ESSENTIAL HYPERTENSION, BENIGN: ICD-10-CM

## 2022-11-30 DIAGNOSIS — Z72.0 TOBACCO USE: ICD-10-CM

## 2022-11-30 DIAGNOSIS — C90.00 MULTIPLE MYELOMA WITH FAILED REMISSION (H): ICD-10-CM

## 2022-11-30 LAB
ALBUMIN SERPL BCG-MCNC: 3.9 G/DL (ref 3.5–5.2)
ALP SERPL-CCNC: 32 U/L (ref 35–104)
ALT SERPL W P-5'-P-CCNC: 27 U/L (ref 10–35)
ANION GAP SERPL CALCULATED.3IONS-SCNC: 8 MMOL/L (ref 7–15)
AST SERPL W P-5'-P-CCNC: 26 U/L (ref 10–35)
BASOPHILS # BLD AUTO: 0.2 10E3/UL (ref 0–0.2)
BASOPHILS NFR BLD AUTO: 2 %
BILIRUB SERPL-MCNC: 0.2 MG/DL
BUN SERPL-MCNC: 17.9 MG/DL (ref 8–23)
CALCIUM SERPL-MCNC: 9 MG/DL (ref 8.8–10.2)
CHLORIDE SERPL-SCNC: 108 MMOL/L (ref 98–107)
CREAT SERPL-MCNC: 0.82 MG/DL (ref 0.51–0.95)
DEPRECATED HCO3 PLAS-SCNC: 26 MMOL/L (ref 22–29)
EOSINOPHIL # BLD AUTO: 0.5 10E3/UL (ref 0–0.7)
EOSINOPHIL NFR BLD AUTO: 8 %
ERYTHROCYTE [DISTWIDTH] IN BLOOD BY AUTOMATED COUNT: 14 % (ref 10–15)
GFR SERPL CREATININE-BSD FRML MDRD: 77 ML/MIN/1.73M2
GLUCOSE SERPL-MCNC: 102 MG/DL (ref 70–99)
HCT VFR BLD AUTO: 44.3 % (ref 35–47)
HGB BLD-MCNC: 14.8 G/DL (ref 11.7–15.7)
HOLD SPECIMEN: NORMAL
HOLD SPECIMEN: NORMAL
IMM GRANULOCYTES # BLD: 0 10E3/UL
IMM GRANULOCYTES NFR BLD: 1 %
LYMPHOCYTES # BLD AUTO: 1.6 10E3/UL (ref 0.8–5.3)
LYMPHOCYTES NFR BLD AUTO: 24 %
MCH RBC QN AUTO: 34.9 PG (ref 26.5–33)
MCHC RBC AUTO-ENTMCNC: 33.4 G/DL (ref 31.5–36.5)
MCV RBC AUTO: 105 FL (ref 78–100)
MONOCYTES # BLD AUTO: 0.8 10E3/UL (ref 0–1.3)
MONOCYTES NFR BLD AUTO: 13 %
NEUTROPHILS # BLD AUTO: 3.5 10E3/UL (ref 1.6–8.3)
NEUTROPHILS NFR BLD AUTO: 52 %
NRBC # BLD AUTO: 0 10E3/UL
NRBC BLD AUTO-RTO: 0 /100
PLATELET # BLD AUTO: 236 10E3/UL (ref 150–450)
POTASSIUM SERPL-SCNC: 3.9 MMOL/L (ref 3.4–5.3)
PROT SERPL-MCNC: 5.9 G/DL (ref 6.4–8.3)
RBC # BLD AUTO: 4.24 10E6/UL (ref 3.8–5.2)
SODIUM SERPL-SCNC: 142 MMOL/L (ref 136–145)
TOTAL PROTEIN SERUM FOR ELP: 5.8 G/DL (ref 6.4–8.3)
WBC # BLD AUTO: 6.6 10E3/UL (ref 4–11)

## 2022-11-30 PROCEDURE — G0463 HOSPITAL OUTPT CLINIC VISIT: HCPCS | Mod: 25

## 2022-11-30 PROCEDURE — 99214 OFFICE O/P EST MOD 30 MIN: CPT | Performed by: NURSE PRACTITIONER

## 2022-11-30 PROCEDURE — 82784 ASSAY IGA/IGD/IGG/IGM EACH: CPT

## 2022-11-30 PROCEDURE — 80053 COMPREHEN METABOLIC PANEL: CPT

## 2022-11-30 PROCEDURE — 83521 IG LIGHT CHAINS FREE EACH: CPT

## 2022-11-30 PROCEDURE — 250N000011 HC RX IP 250 OP 636: Performed by: SURGERY

## 2022-11-30 PROCEDURE — 74174 CTA ABD&PLVS W/CONTRAST: CPT

## 2022-11-30 PROCEDURE — 93880 EXTRACRANIAL BILAT STUDY: CPT

## 2022-11-30 PROCEDURE — 36415 COLL VENOUS BLD VENIPUNCTURE: CPT

## 2022-11-30 PROCEDURE — 86334 IMMUNOFIX E-PHORESIS SERUM: CPT | Performed by: PATHOLOGY

## 2022-11-30 PROCEDURE — 84165 PROTEIN E-PHORESIS SERUM: CPT | Mod: TC | Performed by: PATHOLOGY

## 2022-11-30 PROCEDURE — 84155 ASSAY OF PROTEIN SERUM: CPT

## 2022-11-30 PROCEDURE — 85025 COMPLETE CBC W/AUTO DIFF WBC: CPT

## 2022-11-30 RX ORDER — IOPAMIDOL 755 MG/ML
100 INJECTION, SOLUTION INTRAVASCULAR ONCE
Status: DISCONTINUED | OUTPATIENT
Start: 2022-11-30 | End: 2022-11-30

## 2022-11-30 RX ORDER — ACYCLOVIR 400 MG/1
400 TABLET ORAL 2 TIMES DAILY
Qty: 180 TABLET | Refills: 1 | Status: SHIPPED | OUTPATIENT
Start: 2022-11-30 | End: 2024-01-01

## 2022-11-30 RX ORDER — IOPAMIDOL 755 MG/ML
75 INJECTION, SOLUTION INTRAVASCULAR ONCE
Status: COMPLETED | OUTPATIENT
Start: 2022-11-30 | End: 2022-11-30

## 2022-11-30 RX ADMIN — IOPAMIDOL 75 ML: 755 INJECTION, SOLUTION INTRAVENOUS at 12:07

## 2022-11-30 ASSESSMENT — PAIN SCALES - GENERAL: PAINLEVEL: MODERATE PAIN (5)

## 2022-11-30 NOTE — LETTER
"    11/30/2022         RE: Lizzie Viera  [address]  [place] [address]        Dear Colleague,    Thank you for referring your patient, Lizzie Viera, to the [facility] [place]. Please see a copy of my visit note below.    Oncology Rooming Note    November 30, 2022 10:32 AM   Lizzie Viera is a 72 year old female who presents for:    Chief Complaint   Patient presents with     Oncology Clinic Visit     1 month follow up with labs related to Multiple myeloma not having achieved remission     Initial Vitals: /64 (BP Location: Right arm, Patient Position: Sitting, Cuff Size: Adult Regular)   Pulse 54   Temp 98.1  F (36.7  C) (Tympanic)   Resp 14   Ht 1.549 m (5' 1\")   Wt 61.3 kg (135 lb 1.6 oz)   LMP  (LMP Unknown)   SpO2 97%   BMI 25.53 kg/m   Estimated body mass index is 25.53 kg/m  as calculated from the following:    Height as of this encounter: 1.549 m (5' 1\").    Weight as of this encounter: 61.3 kg (135 lb 1.6 oz). Body surface area is 1.62 meters squared.  Moderate Pain (5) Comment: Data Unavailable   No LMP recorded (lmp unknown). Patient is postmenopausal.  Allergies reviewed: Yes  Medications reviewed: Yes    Medications: Medication refills not needed today.  Pharmacy name entered into [facility]:    [id number] - [place], [place] - [address]  [facility] - [place], [place] - [address]    Clinical concerns: 1 month follow up with labs related to Multiple myeloma not having achieved remission    KWADWO MELTON CMA              [facility] Hematology and Oncology Progress Note    Patient: Lizzie Viera  MRN: [id number]  Date of Service: Nov 30, 2022          Reason for Visit    Chief Complaint   Patient presents with     Oncology Clinic Visit     1 month follow up with labs related to Multiple myeloma not having achieved remission       Assessment and Plan     Cancer Staging   No " matching staging information was found for the patient.    1. Myeloma: has started on treatment with RVd.  She is getting Velcade weekly, Revlimid 2 weeks on, 1 week off and Dex weekly.  Patient received 17 cycles of that finishing in September 2022.  Her light chains and IgG levels have now normalized.  Her hemoglobin has normalized.  Her monoclonal peak has gone from 3.3 down to 0.1.   She did meet with the transplant team.  She has thought about it and she is a little reluctant to do that at this time.  She did find an article that suggested transplant did not really have any difference in the overall survival so she is actually thinking she may not want to do it at all but is still thinking about it.  She prefers her monoclonal peak to be at 0 before she does it so we will continue her current regimen.  Currently 0.1. We will refer her back to transplant team when and if she feels ready.  At the end of September Dr. Blanco did switch her to maintenance treatment so she is now currently just getting maintenance Revlimid 10 mg every day.  No dexamethasone and Velcade has been stopped.  Patient will return in 1 month for labs.    2. Bone lesions: Patient did get a couple doses of Zometa but we have been holding it due to ongoing dental work needed.  Did tell her that once that is complete we should probably get back on Zometa intermittently.          ECOG Performance    0 - Independent    Distress Screening (within last 30 days)    1. How concerned are you about your ability to eat? : 0  2. How concerned are you about unintended weight loss or your current weight? : 0  3. How concerned are you about feeling depressed or very sad? : 0  4. How concerned are you about feeling anxious or very scared? : 0  5. Do you struggle with the loss of meaning and rambo in your life? : Not at all  6. How concerned are you about work and home life issues that may be affected by your cancer? : 2  7. How concerned are you about knowing  what resources are available to help you? : 0  8. Do you currently have what you would describe as Adventism or spiritual struggles?            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 Lizzie Viera is a very pleasant 70 year old woman who has been diagnosed with multiple myeloma IgG kappa type in May 2021.  She had initially presented with compression fracture of T7 vertebral body in September 2020.  She had a back pain which led to that evaluation.  Bone density confirmed that she had osteoporosis.  MRI showed diffuse marrow edema in October 2020.  In April 2021 the MRI of the thoracic spine showed worsening T7 vertebral body height loss because of likely pathological compression fracture with extraosseous extension.  She then had IR guided bone biopsy on 7 May 2021 and pathology confirmed the presence of kappa light chain restricted plasma cells.  Her cytogenetics confirmed the presence of hyperdiploid E with gains of chromosome 5, 9 and 15.     She was then seen by Dr. Baig and had a bone marrow biopsy which also confirmed 60% involvement of the marrow with plasma cells.  The bone marrow was done on 3 Jocelin 2021.  The bone marrow also confirmed the  presence of hyperdiploid E.  She had a gain of chromosome 3, 5, 7, 9, 11, 15 as well as 19.  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  Her beta-2 microglobulin was actually normal at the time of her diagnosis as was her albumin at 4.0.  Monoclonal protein 3.1 g/dL.  Kappa free light chain levels of 13 mg/dL IgG level of 4280 with depressed levels of IgM and IgA.  Urine was also positive for kappa light chains as well as immunofixation of the urine was positive for IgG kappa.  Normal kidney function.  Normal hemoglobin.     As mentioned above she had bone lesions in the T7 vertebral body, T1, skull area.  Her main pain is coming from her T7 vertebral body compression fracture.     Due to the pain she has been seen by radiation oncology and has received radiation therapy.  She also got a dose of steroids for pain control.     Currently her pain is controlled with combination of Dilaudid as well as some Tylenol.  She is wearing a brace.  She did notice that when she was on dexamethasone her pain was significantly better.     She was initially thinking of doing some natural therapies but once her symptoms got worse, she came back in was counseled about treatment.  She has been given information about Velcade Revlimid and dexamethasone.  has started that treatment.  She states that she is tolerating this quite well.  Her myeloma has significantly improved.  She did meet with the transplant doctor and she is a little reluctant to do that.  She states she would prefer to be in complete remission before she does something like that.    Patient had been doing fine and then in April 2022 she did get admitted for pneumonia.  She did have a little bit of a prolonged recovery but has been doing pretty well since then.    Patient continued on her treatment when she was out of the hospital.  She continue that for about 17 cycles and then in September Dr. Blanco thought we have gotten maximum response out of her treatment so we switched her to  "maintenance Revlimid.  So she now is currently taking Revlimid 10 mg every day.  No other treatment.  She states that she is feeling really good.  She is much more active than she was on treatment.  She states her back is a little bit more sore but she thinks is probably because of that.  She is walking over 2 miles every day.  She is really feeling quite good.  She denies any infectious complaints.  Overall she is happy with how she is doing.  She is anxious to see her myeloma labs this month.    Review of Systems    Pertinent items are noted in HPI.    Past History    Past Medical History:   Diagnosis Date     Cervical dysplasia      Chronic RUQ pain      Depressive disorder      Multiple myeloma (H)      Osteoporosis        PHYSICAL EXAM  /64 (BP Location: Right arm, Patient Position: Sitting, Cuff Size: Adult Regular)   Pulse 54   Temp 98.1  F (36.7  C) (Tympanic)   Resp 14   Ht 1.549 m (5' 1\")   Wt 61.3 kg (135 lb 1.6 oz)   LMP  (LMP Unknown)   SpO2 97%   BMI 25.53 kg/m      GENERAL: no acute distress. Cooperative in conversation. Here with sister. Mask on  RESP: Regular respiratory rate. No expiratory wheezes   MUSCULOSKELETAL: no bilateral leg swelling  NEURO: non focal. Alert and oriented x3.   PSYCH: within normal limits. No depression or anxiety.  SKIN: exposed skin is dry intact.     Lab Results    Recent Results (from the past 168 hour(s))   Comprehensive metabolic panel   Result Value Ref Range    Sodium 142 136 - 145 mmol/L    Potassium 3.9 3.4 - 5.3 mmol/L    Chloride 108 (H) 98 - 107 mmol/L    Carbon Dioxide (CO2) 26 22 - 29 mmol/L    Anion Gap 8 7 - 15 mmol/L    Urea Nitrogen 17.9 8.0 - 23.0 mg/dL    Creatinine 0.82 0.51 - 0.95 mg/dL    Calcium 9.0 8.8 - 10.2 mg/dL    Glucose 102 (H) 70 - 99 mg/dL    Alkaline Phosphatase 32 (L) 35 - 104 U/L    AST 26 10 - 35 U/L    ALT 27 10 - 35 U/L    Protein Total 5.9 (L) 6.4 - 8.3 g/dL    Albumin 3.9 3.5 - 5.2 g/dL    Bilirubin Total 0.2 <=1.2 " mg/dL    GFR Estimate 77 >60 mL/min/1.73m2   CBC with platelets and differential   Result Value Ref Range    WBC Count 6.6 4.0 - 11.0 10e3/uL    RBC Count 4.24 3.80 - 5.20 10e6/uL    Hemoglobin 14.8 11.7 - 15.7 g/dL    Hematocrit 44.3 35.0 - 47.0 %     (H) 78 - 100 fL    MCH 34.9 (H) 26.5 - 33.0 pg    MCHC 33.4 31.5 - 36.5 g/dL    RDW 14.0 10.0 - 15.0 %    Platelet Count 236 150 - 450 10e3/uL    % Neutrophils 52 %    % Lymphocytes 24 %    % Monocytes 13 %    % Eosinophils 8 %    % Basophils 2 %    % Immature Granulocytes 1 %    NRBCs per 100 WBC 0 <1 /100    Absolute Neutrophils 3.5 1.6 - 8.3 10e3/uL    Absolute Lymphocytes 1.6 0.8 - 5.3 10e3/uL    Absolute Monocytes 0.8 0.0 - 1.3 10e3/uL    Absolute Eosinophils 0.5 0.0 - 0.7 10e3/uL    Absolute Basophils 0.2 0.0 - 0.2 10e3/uL    Absolute Immature Granulocytes 0.0 <=0.4 10e3/uL    Absolute NRBCs 0.0 10e3/uL   IgG  Lab Results   Component Value Date     (L) 10/26/2022     (L) 09/14/2022     (L) 08/24/2022     (L) 08/03/2022     (L) 07/08/2022     (L) 06/22/2022     (L) 06/01/2022     (L) 05/11/2022     (L) 03/30/2022     (L) 02/16/2022   kappa light chains:  Lab Results   Component Value Date    KFLCA 1.34 10/26/2022    KFLCA 1.04 09/14/2022    KFLCA 1.12 08/24/2022    KFLCA 1.07 08/03/2022    KFLCA 1.19 07/08/2022    KFLCA 1.60 06/22/2022    KFLCA 1.84 06/01/2022    KFLCA 1.37 05/11/2022    KFLCA 1.76 04/27/2022    KFLCA 0.98 03/30/2022   Monoclonal peak  Lab Results   Component Value Date    ELPM 0.1 (H) 10/26/2022    ELPM 0.1 (H) 09/14/2022    ELPM 0.1 (H) 08/24/2022    ELPM 0.1 (H) 08/03/2022    ELPM 0.1 (H) 07/08/2022    ELPM 0.1 06/01/2022    ELPM 0.2 04/27/2022    ELPM 0.2 03/30/2022    ELPM 0.3 02/16/2022    ELPM 0.2 01/26/2022   Myeloma labs still pending from today.  Patient will see them in my chart.     Imaging    No results found.      Signed by: SINDHU Lundberg  CNP      Again, thank you for allowing me to participate in the care of your patient.        Sincerely,        SINDHU Lundberg CNP

## 2022-11-30 NOTE — PROGRESS NOTES
"Oncology Rooming Note    November 30, 2022 10:32 AM   Lizzie Viera is a 72 year old female who presents for:    Chief Complaint   Patient presents with     Oncology Clinic Visit     1 month follow up with labs related to Multiple myeloma not having achieved remission     Initial Vitals: /64 (BP Location: Right arm, Patient Position: Sitting, Cuff Size: Adult Regular)   Pulse 54   Temp 98.1  F (36.7  C) (Tympanic)   Resp 14   Ht 1.549 m (5' 1\")   Wt 61.3 kg (135 lb 1.6 oz)   LMP  (LMP Unknown)   SpO2 97%   BMI 25.53 kg/m   Estimated body mass index is 25.53 kg/m  as calculated from the following:    Height as of this encounter: 1.549 m (5' 1\").    Weight as of this encounter: 61.3 kg (135 lb 1.6 oz). Body surface area is 1.62 meters squared.  Moderate Pain (5) Comment: Data Unavailable   No LMP recorded (lmp unknown). Patient is postmenopausal.  Allergies reviewed: Yes  Medications reviewed: Yes    Medications: Medication refills not needed today.  Pharmacy name entered into [employer]:    [id number] - [place], [place] - [address]  [facility] - [place], [place] - [address]    Clinical concerns: 1 month follow up with labs related to Multiple myeloma not having achieved remission    KWADWO MELTON CMA            "

## 2022-11-30 NOTE — PROGRESS NOTES
[facility] Hematology and Oncology Progress Note    Patient: Lizzie Viera  MRN: [id number]  Date of Service: Nov 30, 2022          Reason for Visit    Chief Complaint   Patient presents with     Oncology Clinic Visit     1 month follow up with labs related to Multiple myeloma not having achieved remission       Assessment and Plan     Cancer Staging   No matching staging information was found for the patient.    1. Myeloma: has started on treatment with RVd.  She is getting Velcade weekly, Revlimid 2 weeks on, 1 week off and Dex weekly.  Patient received 17 cycles of that finishing in September 2022.  Her light chains and IgG levels have now normalized.  Her hemoglobin has normalized.  Her monoclonal peak has gone from 3.3 down to 0.1.   She did meet with the transplant team.  She has thought about it and she is a little reluctant to do that at this time.  She did find an article that suggested transplant did not really have any difference in the overall survival so she is actually thinking she may not want to do it at all but is still thinking about it.  She prefers her monoclonal peak to be at 0 before she does it so we will continue her current regimen.  Currently 0.1. We will refer her back to transplant team when and if she feels ready.  At the end of September Dr. Blanco did switch her to maintenance treatment so she is now currently just getting maintenance Revlimid 10 mg every day.  No dexamethasone and Velcade has been stopped.  Patient will return in 1 month for labs.    2. Bone lesions: Patient did get a couple doses of Zometa but we have been holding it due to ongoing dental work needed.  Did tell her that once that is complete we should probably get back on Zometa intermittently.          ECOG Performance    0 - Independent    Distress Screening (within last 30 days)    1. How concerned are you about your ability to eat? : 0  2. How concerned are you about unintended weight loss or your  current weight? : 0  3. How concerned are you about feeling depressed or very sad? : 0  4. How concerned are you about feeling anxious or very scared? : 0  5. Do you struggle with the loss of meaning and rambo in your life? : Not at all  6. How concerned are you about work and home life issues that may be affected by your cancer? : 2  7. How concerned are you about knowing what resources are available to help you? : 0  8. Do you currently have what you would describe as Hindu or spiritual struggles?            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 Lizzie Viera is a very pleasant 70 year old woman who has been diagnosed with multiple myeloma IgG kappa type in May 2021.  She had initially presented with compression fracture of T7 vertebral body in September 2020.  She had a back pain which led to that evaluation.  Bone density confirmed that she had osteoporosis.  MRI showed diffuse marrow edema in October 2020.  In April 2021 the MRI of the thoracic spine showed worsening T7 vertebral body height loss because of likely pathological compression fracture with extraosseous extension.  She then had IR guided bone biopsy on 7 May 2021 and pathology confirmed  the presence of kappa light chain restricted plasma cells.  Her cytogenetics confirmed the presence of hyperdiploid E with gains of chromosome 5, 9 and 15.     She was then seen by Dr. Baig and had a bone marrow biopsy which also confirmed 60% involvement of the marrow with plasma cells.  The bone marrow was done on 3 Jocelin 2021.  The bone marrow also confirmed the presence of hyperdiploid E.  She had a gain of chromosome 3, 5, 7, 9, 11, 15 as well as 19.  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  Her beta-2 microglobulin was actually normal at the time of her diagnosis as was her albumin at 4.0.  Monoclonal protein 3.1 g/dL.  Kappa free light chain levels of 13 mg/dL IgG level of 4280 with depressed levels of IgM and IgA.  Urine was also positive for kappa light chains as well as immunofixation of the urine was positive for IgG kappa.  Normal kidney function.  Normal hemoglobin.     As mentioned above she had bone lesions in the T7 vertebral body, T1, skull area.  Her main pain is coming from her T7 vertebral body compression fracture.     Due to the pain she has been seen by radiation oncology and has received radiation therapy.  She also got a dose of steroids for pain control.     Currently her pain is controlled with combination of Dilaudid as well as some Tylenol.  She is wearing a brace.  She did notice that when she was on dexamethasone her pain was significantly better.     She was initially thinking of doing some natural therapies but once her symptoms got worse, she came back in was counseled about treatment.  She has been given information about Velcade Revlimid and dexamethasone.  has started that treatment.  She states that she is tolerating this quite well.  Her myeloma has significantly improved.  She did meet with the transplant doctor and she is a little reluctant to do that.  She states she would prefer to be in complete remission before she does something like that.    Patient had been doing fine and then in  "April 2022 she did get admitted for pneumonia.  She did have a little bit of a prolonged recovery but has been doing pretty well since then.    Patient continued on her treatment when she was out of the hospital.  She continue that for about 17 cycles and then in September Dr. Blanco thought we have gotten maximum response out of her treatment so we switched her to maintenance Revlimid.  So she now is currently taking Revlimid 10 mg every day.  No other treatment.  She states that she is feeling really good.  She is much more active than she was on treatment.  She states her back is a little bit more sore but she thinks is probably because of that.  She is walking over 2 miles every day.  She is really feeling quite good.  She denies any infectious complaints.  Overall she is happy with how she is doing.  She is anxious to see her myeloma labs this month.    Review of Systems    Pertinent items are noted in HPI.    Past History    Past Medical History:   Diagnosis Date     Cervical dysplasia      Chronic RUQ pain      Depressive disorder      Multiple myeloma (H)      Osteoporosis        PHYSICAL EXAM  /64 (BP Location: Right arm, Patient Position: Sitting, Cuff Size: Adult Regular)   Pulse 54   Temp 98.1  F (36.7  C) (Tympanic)   Resp 14   Ht 1.549 m (5' 1\")   Wt 61.3 kg (135 lb 1.6 oz)   LMP  (LMP Unknown)   SpO2 97%   BMI 25.53 kg/m      GENERAL: no acute distress. Cooperative in conversation. Here with sister. Mask on  RESP: Regular respiratory rate. No expiratory wheezes   MUSCULOSKELETAL: no bilateral leg swelling  NEURO: non focal. Alert and oriented x3.   PSYCH: within normal limits. No depression or anxiety.  SKIN: exposed skin is dry intact.     Lab Results    Recent Results (from the past 168 hour(s))   Comprehensive metabolic panel   Result Value Ref Range    Sodium 142 136 - 145 mmol/L    Potassium 3.9 3.4 - 5.3 mmol/L    Chloride 108 (H) 98 - 107 mmol/L    Carbon Dioxide (CO2) 26 22 - 29 " mmol/L    Anion Gap 8 7 - 15 mmol/L    Urea Nitrogen 17.9 8.0 - 23.0 mg/dL    Creatinine 0.82 0.51 - 0.95 mg/dL    Calcium 9.0 8.8 - 10.2 mg/dL    Glucose 102 (H) 70 - 99 mg/dL    Alkaline Phosphatase 32 (L) 35 - 104 U/L    AST 26 10 - 35 U/L    ALT 27 10 - 35 U/L    Protein Total 5.9 (L) 6.4 - 8.3 g/dL    Albumin 3.9 3.5 - 5.2 g/dL    Bilirubin Total 0.2 <=1.2 mg/dL    GFR Estimate 77 >60 mL/min/1.73m2   CBC with platelets and differential   Result Value Ref Range    WBC Count 6.6 4.0 - 11.0 10e3/uL    RBC Count 4.24 3.80 - 5.20 10e6/uL    Hemoglobin 14.8 11.7 - 15.7 g/dL    Hematocrit 44.3 35.0 - 47.0 %     (H) 78 - 100 fL    MCH 34.9 (H) 26.5 - 33.0 pg    MCHC 33.4 31.5 - 36.5 g/dL    RDW 14.0 10.0 - 15.0 %    Platelet Count 236 150 - 450 10e3/uL    % Neutrophils 52 %    % Lymphocytes 24 %    % Monocytes 13 %    % Eosinophils 8 %    % Basophils 2 %    % Immature Granulocytes 1 %    NRBCs per 100 WBC 0 <1 /100    Absolute Neutrophils 3.5 1.6 - 8.3 10e3/uL    Absolute Lymphocytes 1.6 0.8 - 5.3 10e3/uL    Absolute Monocytes 0.8 0.0 - 1.3 10e3/uL    Absolute Eosinophils 0.5 0.0 - 0.7 10e3/uL    Absolute Basophils 0.2 0.0 - 0.2 10e3/uL    Absolute Immature Granulocytes 0.0 <=0.4 10e3/uL    Absolute NRBCs 0.0 10e3/uL   IgG  Lab Results   Component Value Date     (L) 10/26/2022     (L) 09/14/2022     (L) 08/24/2022     (L) 08/03/2022     (L) 07/08/2022     (L) 06/22/2022     (L) 06/01/2022     (L) 05/11/2022     (L) 03/30/2022     (L) 02/16/2022   kappa light chains:  Lab Results   Component Value Date    KFLCA 1.34 10/26/2022    KFLCA 1.04 09/14/2022    KFLCA 1.12 08/24/2022    KFLCA 1.07 08/03/2022    KFLCA 1.19 07/08/2022    KFLCA 1.60 06/22/2022    KFLCA 1.84 06/01/2022    KFLCA 1.37 05/11/2022    KFLCA 1.76 04/27/2022    KFLCA 0.98 03/30/2022   Monoclonal peak  Lab Results   Component Value Date    ELPM 0.1 (H) 10/26/2022    ELPM 0.1 (H)  09/14/2022    ELPM 0.1 (H) 08/24/2022    ELPM 0.1 (H) 08/03/2022    ELPM 0.1 (H) 07/08/2022    ELPM 0.1 06/01/2022    ELPM 0.2 04/27/2022    ELPM 0.2 03/30/2022    ELPM 0.3 02/16/2022    ELPM 0.2 01/26/2022   Myeloma labs still pending from today.  Patient will see them in my chart.     Imaging    No results found.      Signed by: SINDHU Lundberg CNP

## 2022-12-01 LAB
ALBUMIN SERPL ELPH-MCNC: 3.8 G/DL (ref 3.7–5.1)
ALPHA1 GLOB SERPL ELPH-MCNC: 0.3 G/DL (ref 0.2–0.4)
ALPHA2 GLOB SERPL ELPH-MCNC: 0.8 G/DL (ref 0.5–0.9)
B-GLOBULIN SERPL ELPH-MCNC: 0.6 G/DL (ref 0.6–1)
GAMMA GLOB SERPL ELPH-MCNC: 0.4 G/DL (ref 0.7–1.6)
IGA SERPL-MCNC: 44 MG/DL (ref 84–499)
IGG SERPL-MCNC: 433 MG/DL (ref 610–1616)
IGM SERPL-MCNC: 25 MG/DL (ref 35–242)
KAPPA LC FREE SER-MCNC: 1.39 MG/DL (ref 0.33–1.94)
KAPPA LC FREE/LAMBDA FREE SER NEPH: 0.82 {RATIO} (ref 0.26–1.65)
LAMBDA LC FREE SERPL-MCNC: 1.7 MG/DL (ref 0.57–2.63)
M PROTEIN SERPL ELPH-MCNC: 0.1 G/DL
PROT PATTERN SERPL ELPH-IMP: ABNORMAL
PROT PATTERN SERPL IFE-IMP: NORMAL

## 2022-12-01 PROCEDURE — 84165 PROTEIN E-PHORESIS SERUM: CPT | Mod: 26

## 2022-12-01 PROCEDURE — 86334 IMMUNOFIX E-PHORESIS SERUM: CPT | Mod: 26

## 2022-12-15 ENCOUNTER — OFFICE VISIT (OUTPATIENT)
Dept: VASCULAR SURGERY | Facility: CLINIC | Age: 72
End: 2022-12-15
Attending: FAMILY MEDICINE
Payer: COMMERCIAL

## 2022-12-15 VITALS
WEIGHT: 130 LBS | BODY MASS INDEX: 24.55 KG/M2 | HEART RATE: 58 BPM | OXYGEN SATURATION: 98 % | DIASTOLIC BLOOD PRESSURE: 80 MMHG | HEIGHT: 61 IN | SYSTOLIC BLOOD PRESSURE: 140 MMHG

## 2022-12-15 DIAGNOSIS — I70.8 LEFT SUBCLAVIAN ARTERY OCCLUSION: ICD-10-CM

## 2022-12-15 DIAGNOSIS — I77.4 CELIAC ARTERY STENOSIS (H): ICD-10-CM

## 2022-12-15 PROCEDURE — G0463 HOSPITAL OUTPT CLINIC VISIT: HCPCS | Performed by: SURGERY

## 2022-12-15 PROCEDURE — 99204 OFFICE O/P NEW MOD 45 MIN: CPT | Performed by: SURGERY

## 2022-12-15 ASSESSMENT — PAIN SCALES - GENERAL: PAINLEVEL: MODERATE PAIN (4)

## 2022-12-15 NOTE — PROGRESS NOTES
VASCULAR SURGERY CLINIC CONSULTATION    VASCULAR SURGEON: Marily Goldberg MD, RPVI     LOCATION:  [facility]     Lizzie Viera   Medical Record #:  [id number]  YOB: 1950  Age:  72 year old     Date of Service: 12/15/2022    PRIMARY CARE PROVIDER: Sona Sandoval      Reason for visit: Peripheral arterial disease    IMPRESSION: 72-year-old female who comes to vascular surgery clinic for evaluation of peripheral arterial disease.  Patient has known history of left subclavian artery occlusion with retrograde flow via vertebral artery.  Patient remains asymptomatic and denies any symptoms of steal syndrome.  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  No issues with ambulation.    RECOMMENDATION/RISKS: No surgical interventions are indicated since patient is asymptomatic.  Continue optimal medical management therapy.  Annual follow-up with primary care physician.    HPI:  Lizzie Viera is a 72 year old female who was seen today in consultation for peripheral arterial disease.  Patient denies any significant symptoms.  Patient denies any symptoms of left upper extremity claudication or steal syndrome.  Patient denies any embolic events.  Denies any claudication in bilateral lower extremities.    REVIEW OF SYSTEMS:    A 12 point ROS was reviewed and is negative     PHH:    Past Medical History:   Diagnosis Date     Cervical dysplasia      Chronic RUQ pain      Depressive disorder      Multiple myeloma (H)      Osteoporosis           Past Surgical History:   Procedure Laterality Date     CONIZATION CERVIX,KNIFE/LASER      Description: Cervical Conization By Laser;  Recorded: 09/20/2007;     HC DILATION/CURETTAGE DIAG/THER NON OB      Description: Dilation And Curettage;  Recorded: 09/20/2007;     HC REMOVE TONSILS/ADENOIDS,<11 Y/O      Description: Tonsillectomy With Adenoidectomy;  Recorded: 09/20/2007;      [facility] LAP,CHOLECYSTECTOMY/EXPLORE  12/27/2004     [facility] LIGATE FALLOPIAN TUBE      Description: Tubal Ligation;  Recorded: 09/20/2007;       ALLERGIES:  Cefdinir, Latex, Short ragweed pollen ext, and Adhesive tape    MEDS:    Current Outpatient Medications:      acyclovir (ZOVIRAX) 400 MG tablet, Take 1 tablet (400 mg) by mouth 2 times daily Viral Prophylaxis., Disp: 180 tablet, Rfl: 1     alpha-lipoic acid 100 MG capsule, Take 300 mg by mouth daily, Disp: , Rfl:      amLODIPine (NORVASC) 5 MG tablet, TAKE ONE TABLET BY MOUTH ONE TIME DAILY, Disp: 30 tablet, Rfl: 11     Ascorbic Acid (VITAMIN C) 100 MG CHEW, Take 1 tablet by mouth daily, Disp: , Rfl:      aspirin (ASA) 81 MG chewable tablet, Take 81 mg by mouth daily, Disp: , Rfl:      baclofen (LIORESAL) 10 MG tablet, TAKE 1 TABLET BY MOUTH 3 TIMES A DAY AS NEEDED FOR MUSCLE SPASMS, Disp: 60 tablet, Rfl: 0     Coenzyme Q10 300 MG CAPS, Take 300 mg by mouth daily, Disp: , Rfl:      fish oil-omega-3 fatty acids 1000 MG capsule, Take 2 g by mouth daily, Disp: , Rfl:      gabapentin (NEURONTIN) 300 MG capsule, TAKE TWO CAPSULES BY MOUTH THREE TIMES DAILY, Disp: 180 capsule, Rfl: 0     HYDROmorphone (DILAUDID) 4 MG tablet, Take 0.5-1 tablets (2-4 mg) by mouth every 4 hours as needed for moderate to severe pain, Disp: 60 tablet, Rfl: 0     LENalidomide (REVLIMID) 10 MG CAPS capsule, Take 1 capsule (10 mg) by mouth daily for 28 days Take at the same time each day. Do not open, break or chew the capsule. Swallow whole, with water., Disp: 28 capsule, Rfl: 0     medical cannabis (Patient's own supply), See Admin Instructions (The purpose of this order is to document that the patient reports taking medical cannabis.  This is not a prescription, and is not used to certify that the patient has a qualifying medical condition.)   Oil formulation, Disp: , Rfl:      Melatonin 10 MG TABS tablet, Take 20 mg by mouth At Bedtime, Disp: , Rfl:      methocarbamol (ROBAXIN) 500 MG tablet,  Take 1 tablet (500 mg) by mouth 4 times daily as needed for muscle spasms, Disp: 120 tablet, Rfl: 4     Milk Thistle-Dand-Fennel-Licor (MILK THISTLE XTRA) CAPS capsule, Take 1 capsule by mouth daily, Disp: , Rfl:      NALTREXONE HCL PO, Take 3 mg by mouth daily, Disp: , Rfl:      nicotine (NICOTROL) 10 MG inhaler, Use 1 cartridge as needed for urge to smoke by puffing over course of 20min.  Use 6-16 cart/day; reduce number of cart/day over 6-12 weeks., Disp: 300 each, Rfl: 4     phenazopyridine (AZO URINARY PAIN RELIEF) 95 MG tablet, Take 190 mg by mouth 3 times daily, Disp: , Rfl:      TURMERIC PO, Take 1 capsule by mouth daily, Disp: , Rfl:      UNABLE TO FIND, 1 capsule daily MEDICATION NAME: Serrapeptase, Disp: , Rfl:      UNABLE TO FIND, 1 capsule daily MEDICATION NAME: Edita Mariano Mushroom, Disp: , Rfl:      UNABLE TO FIND, 1 capsule daily MEDICATION NAME: DIM (diinolylmethane), Disp: , Rfl:      UNABLE TO FIND, MEDICATION NAME: Panacur C - 1 capsule daily, Disp: , Rfl:      Vitamin A 3 MG (75073 UT) TABS, Take 1 tablet by mouth daily, Disp: , Rfl:      Vitamin D, Cholecalciferol, 25 MCG (1000 UT) CAPS, Take 1,000 Units by mouth daily Liquid, not capsule, Disp: , Rfl:      albuterol (PROAIR HFA/PROVENTIL HFA/VENTOLIN HFA) 108 (90 Base) MCG/ACT inhaler, Inhale 2 puffs into the lungs every 6 hours as needed for wheezing or shortness of breath / dyspnea (Patient not taking: Reported on 12/15/2022), Disp: 18 g, Rfl: 1     albuterol (PROVENTIL) (2.5 MG/3ML) 0.083% neb solution, Take 1 vial (2.5 mg) by nebulization every 4 hours as needed for wheezing or shortness of breath / dyspnea (Patient not taking: Reported on 12/15/2022), Disp: 90 mL, Rfl: 0    SOCIAL HABITS:    History   Smoking Status     Some Days     Packs/day: 0.50     Years: 45.00     Types: Cigarettes     Start date: 6/16/2022   Smokeless Tobacco     Never     Social History    Substance and Sexual Activity      Alcohol use: Yes        Comment:  "Alcoholic Drinks/day: 0-1 drinks per week      History   Drug Use Unknown       FAMILY HISTORY:    Family History   Problem Relation Age of Onset     Diabetes Mother      Arthritis Mother      LUNG DISEASE Father      Diabetes Maternal Uncle      Breast Cancer Paternal Aunt      Heart Failure Maternal Grandmother      Heart Disease Maternal Grandmother      Heart Failure Maternal Grandfather      Heart Disease Maternal Grandfather      Heart Failure Paternal Grandmother      Heart Disease Paternal Grandmother        PE:  BP (!) 140/80   Pulse 58   Ht 1.549 m (5' 1\")   Wt 59 kg (130 lb)   LMP  (LMP Unknown)   SpO2 98%   BMI 24.56 kg/m    Wt Readings from Last 1 Encounters:   12/15/22 59 kg (130 lb)     Body mass index is 24.56 kg/m .    EXAM:  GENERAL: This is a well-developed 72 year old female who appears her stated age  EYES: Grossly normal.  MOUTH: Buccal mucosa normal   CARDIAC: Normal   CHEST/LUNG: Clear to auscultation bilaterally  GASTROINTESINAL soft nontender nondistended  MUSCULOSKELETAL: Grossly normal and both lower extremities are intact.  HEME/LYMPH: No lymphedema  NEUROLOGIC: Focally intact, Alert and oriented x 3.   PSYCH: appropriate affect  INTEGUMENT: No open lesions or ulcers  Pulse Exam: Multiphasic signals present in bilateral lower extremities.  Palpable pulses in bilateral upper extremities.          DIAGNOSTIC STUDIES:     Images:  US Carotid Bilateral    Result Date: 11/30/2022  Table formatting from the original result was not included. BILATERAL CAROTID ULTRASOUND (Date: 11/30/22) Indication: History of left subclavian stenosis, carotid bruit Previous: 10/19/20, CT pulmonary angiogram dated 4/12/2022 Symptoms: Imbalance, Hypertension and Previous Smoker TECHNIQUE: Duplex exam performed utilizing 2D gray-scale imaging, Doppler interrogation with color-flow and spectral waveform analysis. Right Velocity  Chart (cm/sec) Location Right DISTAL CCA-PS 92 DISTAL CCA- ED 15 PROX ICA-PS 73 " PROX ICA-ED 14 ECA- ECA-ED 14 Vertebral- Antegrade 104 Subclavian A- Triphasic 172 Ratio ICA/CCA-PS 0.80 Ratio ICA/CCA-ED 0.93 Left Velocity  Chart (cm/sec) Location Left DISTAL CCA-PS 95 DISTAL CCA- ED 19 PROX ICA-PS 66 PROX ICA-ED 16 ECA-PS 80 ECA-ED 12 Vertebral- Retrograde 122 Subclavian A- Monophasic 81 Ratio ICA/CCA-PS 0.69 Ratio ICA/CCA-ED 0.84 FINDINGS: RIGHT: There is minimal  atheromatous plaque.  LEFT: There is minimal atheromatous plaque.  RIGHT: Vertebral artery and subclavian artery waveforms are antegrade. LEFT: Monophasic waveforms in the subclavian artery.  Retrograde flow in the vertebral artery. Impression:  1. Less than 50% diameter stenosis of the right ICA relative to the proximal ICA diameter. 2. Less than 50% diameter stenosis of the left ICA relative to the proximal ICA diameter.  Retrograde flow in the left vertebral artery, reflecting subclavian steal phenomenon.  The patient is noted to have an occlusion of the proximal left subclavian artery on CT pulmonary angiogram dated 4/12/2022. Evaluation based on velocities and NASCET criteria Category PSV (cm/s)  Plaque Imaging EDV (cm/s)  ICA/CCA Ratio Normal,  <125  None <40 <2 Mild <50% <125 < than 50% DR stenosis <40 <2 Moderate Disease 50-69% 125-230 > than 50% DR stenosis  2.0-4.0 Severe->70% but less than near occlusion >230 > than 50% DR stenosis >100 >4.0 Near Occlusion May be low or undetectable Visible, extensive Variable Variable Occlusion No Flow Visible with no detectable lumen N/A N/A Plaquetype Description Type 1 Uniformly echolucent Type 2 Predominantly echolucent >50% Type 3 Predominantly echogenic >50% Type 4 Uniformly echogenic Type 5 Unclassified due to poor visualization Ulcer Visible Ulcerative Plaque     CTA Abdomen Pelvis with Contrast    Result Date: 11/30/2022  EXAM: CTA ABDOMEN PELVIS WITH CONTRAST LOCATION: [facility] DATE/TIME: 11/30/2022 12:19 PM INDICATION: Celiac artery  stenosis (H). COMPARISON: PET/CT exam on 07/25/2022 and CT PE exam performed 04/12/2022. TECHNIQUE: CT angiogram abdomen pelvis during arterial phase of injection of IV contrast. 2D and 3D MIP reconstructions were performed by the CT technologist. Dose reduction techniques were used. CONTRAST: 80 mL Isovue 370 FINDINGS: Subpleural nodule in the left lower lobe is 4 mm. This is stable since 03/27/2020 and therefore may be considered benign. No acute osseous abnormality. Cholecystectomy clips. Suspect minimal-to-mild intrahepatic biliary dilatation, unchanged and likely related to previous cholecystectomy. The spleen, right adrenal gland, and pancreas are unremarkable. Pancreatic duct is slightly prominent, though also unchanged. Left adrenal gland nodule is again noted measuring 1.7 x 1.4 cm, unchanged from previous exam. Both kidneys are normally perfused. Hypodensity in the left kidney is about 2.4 x 2.8 cm and 13 Hounsfield units, similar to previous exam. Smaller scattered subcentimeter bilateral renal hypodensities. The bladder is partially distended and unremarkable. Uterus is unremarkable. No dilated loops of large or small bowel. The visible descending thoracic aorta is patent. The celiac axis, SMA, bilateral renal arteries are patent. An accessory right renal artery is incidentally noted with origin immediately caudal to the main renal artery. The infrarenal abdominal aorta is nonaneurysmal, measuring 1.8 x 2.1 cm, though with mild mural thrombus and slight luminal irregularity at the level of the inferior mesenteric artery. The inferior mesenteric artery is patent. Moderate atherosclerotic plaque in both common iliac arteries, both internal iliac and external iliac arteries are patent, though with scattered plaque. Both common femoral arteries are patent. There is irregular calcified plaque extending into the lumen of the right common iliac artery with greater than 50% stenosis. There may be mild stenosis  in the left common iliac artery distally, though not clearly hemodynamically significant. As mentioned, origins of the celiac axis and SMA are patent. However, the luminal size of the celiac axis is approximately 4 mm at the proximal segment corresponding to a relative stenosis, though not clearly caused by atherosclerotic plaque. No visible dissection. The distal hepatic and splenic arteries appear otherwise patent.     IMPRESSION: 1.  May be relative stenosis of the proximal celiac axis given luminal diameter is approximately 4 mm on sagittal imaging, though not clearly related to atherosclerotic plaque. 2 cm beyond the origin, celiac axis is 8 mm. 2.  Remaining visceral arteries are patent with accessory right renal artery. Infrarenal abdominal aorta is mildly irregular with mural thrombus, though without aneurysmal dilatation or stenosis. 3.  Scattered plaque throughout both common iliac arteries with focal atherosclerotic calcified plaque in the right common iliac artery with greater than 50% narrowing. This is a short segment lesion of less than 2 cm. 4.  Left adrenal gland nodule and bilateral renal hypodensities are relatively unchanged. Adrenal nodule is thought to be benign given density. Adjacent areas of nodularity are unchanged.        LABS:      Sodium   Date Value Ref Range Status   11/30/2022 142 136 - 145 mmol/L Final   10/26/2022 139 136 - 145 mmol/L Final   08/31/2022 140 136 - 145 mmol/L Final     Urea Nitrogen   Date Value Ref Range Status   11/30/2022 17.9 8.0 - 23.0 mg/dL Final   10/26/2022 17.7 8.0 - 23.0 mg/dL Final   08/31/2022 20 8 - 22 mg/dL Final   08/10/2022 23 (H) 8 - 22 mg/dL Final   07/19/2022 17 8 - 22 mg/dL Final     Hemoglobin   Date Value Ref Range Status   11/30/2022 14.8 11.7 - 15.7 g/dL Final   10/26/2022 14.3 11.7 - 15.7 g/dL Final   09/14/2022 14.2 11.7 - 15.7 g/dL Final     Platelet Count   Date Value Ref Range Status   11/30/2022 236 150 - 450 10e3/uL Final   10/26/2022  263 150 - 450 10e3/uL Final   09/14/2022 197 150 - 450 10e3/uL Final     BNP   Date Value Ref Range Status   04/12/2022 112 0 - 117 pg/mL Final     INR   Date Value Ref Range Status   04/12/2022 1.11 0.85 - 1.15 Final       40 minutes spent on the day of encounter doing chart review, history and exam, documentation, and further activities as noted.         Marily Goldberg MD, [facility]  VASCULAR SURGERY

## 2022-12-15 NOTE — NURSING NOTE
"[facility]        Patient is here for a consult to discussLeft subclavian artery occulaions and celiac axis stenosis diagnosed 9/2020,   CTA/US 11/30/22    She is on chemo for multi myloma  Uses a nicotine inhaler      Pt is currently taking Aspirin.    BP (!) 140/80   Pulse 58   Ht 5' 1\" (1.549 m)   Wt 130 lb (59 kg)   LMP  (LMP Unknown)   SpO2 98%   BMI 24.56 kg/m      The provider has been notified that the patient what is th plan questions about port    Questions patient would like addressed today are: N/A.    Refills are needed: No    Has homecare services and agency name:  [facility]    "

## 2022-12-15 NOTE — PATIENT INSTRUCTIONS
Lorena Samuel,    Thank you for entrusting your care with us today. After your visit today with MD Marily Goldberg this is the plan that was discussed at your appointment.    Follow up PRN      I am including additional information on these things and our contact information if you have any questions or concerns.   Please do not hesitate to reach out to us if you felt we did not answer your questions or you are unsure of the treatment plan after your visit today. Our number is [phone].Thank you for trusting us with your care.

## 2022-12-19 DIAGNOSIS — M79.18 MYOFASCIAL PAIN: ICD-10-CM

## 2022-12-19 DIAGNOSIS — M48.04 THORACIC SPINAL STENOSIS: ICD-10-CM

## 2022-12-19 RX ORDER — GABAPENTIN 300 MG/1
CAPSULE ORAL
Qty: 180 CAPSULE | Refills: 0 | Status: SHIPPED | OUTPATIENT
Start: 2022-12-19 | End: 2023-01-23

## 2022-12-20 DIAGNOSIS — C90.00 MULTIPLE MYELOMA WITH FAILED REMISSION (H): ICD-10-CM

## 2022-12-20 DIAGNOSIS — C90.00 MULTIPLE MYELOMA NOT HAVING ACHIEVED REMISSION (H): Primary | ICD-10-CM

## 2022-12-20 RX ORDER — LENALIDOMIDE 10 MG/1
10 CAPSULE ORAL DAILY
Qty: 28 CAPSULE | Refills: 0 | Status: SHIPPED | OUTPATIENT
Start: 2023-01-04 | End: 2023-01-16

## 2022-12-21 ENCOUNTER — TELEPHONE (OUTPATIENT)
Dept: ONCOLOGY | Facility: HOSPITAL | Age: 72
End: 2022-12-21

## 2022-12-23 ENCOUNTER — TELEPHONE (OUTPATIENT)
Dept: ONCOLOGY | Facility: HOSPITAL | Age: 72
End: 2022-12-23

## 2022-12-29 ENCOUNTER — ANCILLARY PROCEDURE (OUTPATIENT)
Dept: BONE DENSITY | Facility: CLINIC | Age: 72
End: 2022-12-29
Attending: FAMILY MEDICINE
Payer: COMMERCIAL

## 2022-12-29 DIAGNOSIS — M81.0 AGE-RELATED OSTEOPOROSIS WITHOUT CURRENT PATHOLOGICAL FRACTURE: ICD-10-CM

## 2022-12-29 PROCEDURE — 77080 DXA BONE DENSITY AXIAL: CPT | Mod: TC | Performed by: RADIOLOGY

## 2023-01-01 ENCOUNTER — ONCOLOGY VISIT (OUTPATIENT)
Dept: ONCOLOGY | Facility: HOSPITAL | Age: 73
End: 2023-01-01
Attending: INTERNAL MEDICINE
Payer: COMMERCIAL

## 2023-01-01 ENCOUNTER — OFFICE VISIT (OUTPATIENT)
Dept: PHYSICAL MEDICINE AND REHAB | Facility: CLINIC | Age: 73
End: 2023-01-01
Payer: COMMERCIAL

## 2023-01-01 ENCOUNTER — TRANSFERRED RECORDS (OUTPATIENT)
Dept: HEALTH INFORMATION MANAGEMENT | Facility: CLINIC | Age: 73
End: 2023-01-01
Payer: COMMERCIAL

## 2023-01-01 ENCOUNTER — TELEPHONE (OUTPATIENT)
Dept: ONCOLOGY | Facility: CLINIC | Age: 73
End: 2023-01-01
Payer: COMMERCIAL

## 2023-01-01 ENCOUNTER — ONCOLOGY VISIT (OUTPATIENT)
Dept: ONCOLOGY | Facility: CLINIC | Age: 73
End: 2023-01-01
Attending: PSYCHOLOGIST
Payer: COMMERCIAL

## 2023-01-01 ENCOUNTER — LAB (OUTPATIENT)
Dept: INFUSION THERAPY | Facility: HOSPITAL | Age: 73
End: 2023-01-01
Attending: INTERNAL MEDICINE
Payer: COMMERCIAL

## 2023-01-01 ENCOUNTER — MYC REFILL (OUTPATIENT)
Dept: FAMILY MEDICINE | Facility: CLINIC | Age: 73
End: 2023-01-01
Payer: COMMERCIAL

## 2023-01-01 ENCOUNTER — TELEPHONE (OUTPATIENT)
Dept: ONCOLOGY | Facility: CLINIC | Age: 73
End: 2023-01-01

## 2023-01-01 ENCOUNTER — TELEPHONE (OUTPATIENT)
Dept: ONCOLOGY | Facility: HOSPITAL | Age: 73
End: 2023-01-01
Payer: COMMERCIAL

## 2023-01-01 ENCOUNTER — VIRTUAL VISIT (OUTPATIENT)
Dept: RADIATION ONCOLOGY | Facility: CLINIC | Age: 73
End: 2023-01-01
Attending: FAMILY MEDICINE
Payer: COMMERCIAL

## 2023-01-01 VITALS — SYSTOLIC BLOOD PRESSURE: 129 MMHG | HEART RATE: 63 BPM | DIASTOLIC BLOOD PRESSURE: 61 MMHG

## 2023-01-01 VITALS — HEIGHT: 61 IN | WEIGHT: 129 LBS | BODY MASS INDEX: 24.35 KG/M2

## 2023-01-01 VITALS
HEIGHT: 61 IN | OXYGEN SATURATION: 97 % | RESPIRATION RATE: 18 BRPM | HEART RATE: 63 BPM | TEMPERATURE: 97.9 F | WEIGHT: 129.3 LBS | DIASTOLIC BLOOD PRESSURE: 61 MMHG | BODY MASS INDEX: 24.41 KG/M2 | SYSTOLIC BLOOD PRESSURE: 105 MMHG

## 2023-01-01 DIAGNOSIS — C90.00 MULTIPLE MYELOMA WITH FAILED REMISSION (H): Primary | ICD-10-CM

## 2023-01-01 DIAGNOSIS — F43.23 ADJUSTMENT DISORDER WITH MIXED ANXIETY AND DEPRESSED MOOD: Primary | ICD-10-CM

## 2023-01-01 DIAGNOSIS — M99.00 SOMATIC DYSFUNCTION OF HEAD REGION: ICD-10-CM

## 2023-01-01 DIAGNOSIS — M99.07 SOMATIC DYSFUNCTION OF UPPER EXTREMITY: ICD-10-CM

## 2023-01-01 DIAGNOSIS — C90.00 MULTIPLE MYELOMA NOT HAVING ACHIEVED REMISSION (H): ICD-10-CM

## 2023-01-01 DIAGNOSIS — C90.01 MULTIPLE MYELOMA IN REMISSION (H): ICD-10-CM

## 2023-01-01 DIAGNOSIS — M79.18 MYOFASCIAL PAIN: ICD-10-CM

## 2023-01-01 DIAGNOSIS — C90.00 MULTIPLE MYELOMA, REMISSION STATUS UNSPECIFIED (H): Primary | ICD-10-CM

## 2023-01-01 DIAGNOSIS — S22.060S CLOSED WEDGE COMPRESSION FRACTURE OF T7 VERTEBRA, SEQUELA: ICD-10-CM

## 2023-01-01 DIAGNOSIS — M54.2 CERVICAL SPINE PAIN: ICD-10-CM

## 2023-01-01 DIAGNOSIS — M99.01 SOMATIC DYSFUNCTION OF CERVICAL REGION: ICD-10-CM

## 2023-01-01 DIAGNOSIS — M48.50XA PATHOLOGIC COMPRESSION FRACTURE OF SPINE, INITIAL ENCOUNTER (H): ICD-10-CM

## 2023-01-01 DIAGNOSIS — R53.0 NEOPLASTIC MALIGNANT RELATED FATIGUE: ICD-10-CM

## 2023-01-01 DIAGNOSIS — I10 ESSENTIAL HYPERTENSION, BENIGN: ICD-10-CM

## 2023-01-01 DIAGNOSIS — R25.2 LEG CRAMPS: ICD-10-CM

## 2023-01-01 DIAGNOSIS — M80.80XD LOCALIZED OSTEOPOROSIS WITH CURRENT PATHOLOGICAL FRACTURE WITH ROUTINE HEALING, SUBSEQUENT ENCOUNTER: ICD-10-CM

## 2023-01-01 DIAGNOSIS — C90.00 MULTIPLE MYELOMA, REMISSION STATUS UNSPECIFIED (H): ICD-10-CM

## 2023-01-01 DIAGNOSIS — M99.08 SOMATIC DYSFUNCTION OF RIB REGION: ICD-10-CM

## 2023-01-01 DIAGNOSIS — G89.3 CANCER ASSOCIATED PAIN: ICD-10-CM

## 2023-01-01 DIAGNOSIS — M48.04 THORACIC SPINAL STENOSIS: ICD-10-CM

## 2023-01-01 DIAGNOSIS — M99.02 SOMATIC DYSFUNCTION OF THORACIC REGION: ICD-10-CM

## 2023-01-01 DIAGNOSIS — C90.00 MULTIPLE MYELOMA WITH FAILED REMISSION (H): ICD-10-CM

## 2023-01-01 DIAGNOSIS — M84.58XK PATHOLOGICAL FRACTURE OF VERTEBRAE IN NEOPLASTIC DISEASE WITH NONUNION: ICD-10-CM

## 2023-01-01 DIAGNOSIS — M48.04 THORACIC SPINAL STENOSIS: Primary | ICD-10-CM

## 2023-01-01 DIAGNOSIS — Z51.5 ENCOUNTER FOR PALLIATIVE CARE: Primary | ICD-10-CM

## 2023-01-01 LAB
ALBUMIN SERPL BCG-MCNC: 3.9 G/DL (ref 3.5–5.2)
ALBUMIN SERPL ELPH-MCNC: 3.6 G/DL (ref 3.7–5.1)
ALP SERPL-CCNC: 45 U/L (ref 35–104)
ALPHA1 GLOB SERPL ELPH-MCNC: 0.3 G/DL (ref 0.2–0.4)
ALPHA2 GLOB SERPL ELPH-MCNC: 0.8 G/DL (ref 0.5–0.9)
ALT SERPL W P-5'-P-CCNC: 28 U/L (ref 0–50)
ANION GAP SERPL CALCULATED.3IONS-SCNC: 7 MMOL/L (ref 7–15)
AST SERPL W P-5'-P-CCNC: 18 U/L (ref 0–45)
B-GLOBULIN SERPL ELPH-MCNC: 0.6 G/DL (ref 0.6–1)
BASOPHILS # BLD AUTO: 0.1 10E3/UL (ref 0–0.2)
BASOPHILS NFR BLD AUTO: 1 %
BILIRUB SERPL-MCNC: 0.3 MG/DL
BUN SERPL-MCNC: 25.6 MG/DL (ref 8–23)
CALCIUM SERPL-MCNC: 9.4 MG/DL (ref 8.8–10.2)
CHLORIDE SERPL-SCNC: 102 MMOL/L (ref 98–107)
CREAT SERPL-MCNC: 0.86 MG/DL (ref 0.51–0.95)
DEPRECATED HCO3 PLAS-SCNC: 31 MMOL/L (ref 22–29)
EGFRCR SERPLBLD CKD-EPI 2021: 72 ML/MIN/1.73M2
EOSINOPHIL # BLD AUTO: 0.4 10E3/UL (ref 0–0.7)
EOSINOPHIL NFR BLD AUTO: 7 %
ERYTHROCYTE [DISTWIDTH] IN BLOOD BY AUTOMATED COUNT: 13.7 % (ref 10–15)
GAMMA GLOB SERPL ELPH-MCNC: 0.4 G/DL (ref 0.7–1.6)
GLUCOSE SERPL-MCNC: 88 MG/DL (ref 70–99)
HCT VFR BLD AUTO: 43.1 % (ref 35–47)
HGB BLD-MCNC: 14.4 G/DL (ref 11.7–15.7)
IGA SERPL-MCNC: 104 MG/DL (ref 84–499)
IGG SERPL-MCNC: 384 MG/DL (ref 610–1616)
IGM SERPL-MCNC: 18 MG/DL (ref 35–242)
IMM GRANULOCYTES # BLD: 0 10E3/UL
IMM GRANULOCYTES NFR BLD: 0 %
KAPPA LC FREE SER-MCNC: 2.05 MG/DL (ref 0.33–1.94)
KAPPA LC FREE/LAMBDA FREE SER NEPH: 1.1 {RATIO} (ref 0.26–1.65)
LAMBDA LC FREE SERPL-MCNC: 1.87 MG/DL (ref 0.57–2.63)
LYMPHOCYTES # BLD AUTO: 2.2 10E3/UL (ref 0.8–5.3)
LYMPHOCYTES NFR BLD AUTO: 42 %
M PROTEIN SERPL ELPH-MCNC: 0.1 G/DL
MCH RBC QN AUTO: 34.6 PG (ref 26.5–33)
MCHC RBC AUTO-ENTMCNC: 33.4 G/DL (ref 31.5–36.5)
MCV RBC AUTO: 104 FL (ref 78–100)
MONOCYTES # BLD AUTO: 0.5 10E3/UL (ref 0–1.3)
MONOCYTES NFR BLD AUTO: 9 %
NEUTROPHILS # BLD AUTO: 2.2 10E3/UL (ref 1.6–8.3)
NEUTROPHILS NFR BLD AUTO: 41 %
NRBC # BLD AUTO: 0 10E3/UL
NRBC BLD AUTO-RTO: 0 /100
PLATELET # BLD AUTO: 195 10E3/UL (ref 150–450)
POTASSIUM SERPL-SCNC: 3.7 MMOL/L (ref 3.4–5.3)
PROT PATTERN SERPL ELPH-IMP: ABNORMAL
PROT SERPL-MCNC: 6.1 G/DL (ref 6.4–8.3)
RBC # BLD AUTO: 4.16 10E6/UL (ref 3.8–5.2)
SODIUM SERPL-SCNC: 140 MMOL/L (ref 135–145)
TOTAL PROTEIN SERUM FOR ELP: 5.6 G/DL (ref 6.4–8.3)
WBC # BLD AUTO: 5.4 10E3/UL (ref 4–11)

## 2023-01-01 PROCEDURE — 98927 OSTEOPATH MANJ 5-6 REGIONS: CPT | Performed by: PHYSICAL MEDICINE & REHABILITATION

## 2023-01-01 PROCEDURE — 99214 OFFICE O/P EST MOD 30 MIN: CPT | Performed by: INTERNAL MEDICINE

## 2023-01-01 PROCEDURE — 84155 ASSAY OF PROTEIN SERUM: CPT

## 2023-01-01 PROCEDURE — 99214 OFFICE O/P EST MOD 30 MIN: CPT | Mod: VID | Performed by: FAMILY MEDICINE

## 2023-01-01 PROCEDURE — 84165 PROTEIN E-PHORESIS SERUM: CPT | Mod: TC | Performed by: PATHOLOGY

## 2023-01-01 PROCEDURE — 85025 COMPLETE CBC W/AUTO DIFF WBC: CPT

## 2023-01-01 PROCEDURE — 90837 PSYTX W PT 60 MINUTES: CPT | Performed by: PSYCHOLOGIST

## 2023-01-01 PROCEDURE — 99214 OFFICE O/P EST MOD 30 MIN: CPT | Mod: 25 | Performed by: PHYSICAL MEDICINE & REHABILITATION

## 2023-01-01 PROCEDURE — 83521 IG LIGHT CHAINS FREE EACH: CPT

## 2023-01-01 PROCEDURE — 80053 COMPREHEN METABOLIC PANEL: CPT

## 2023-01-01 PROCEDURE — 84165 PROTEIN E-PHORESIS SERUM: CPT | Mod: 26 | Performed by: PATHOLOGY

## 2023-01-01 PROCEDURE — G0463 HOSPITAL OUTPT CLINIC VISIT: HCPCS | Performed by: INTERNAL MEDICINE

## 2023-01-01 PROCEDURE — 36415 COLL VENOUS BLD VENIPUNCTURE: CPT

## 2023-01-01 PROCEDURE — 82784 ASSAY IGA/IGD/IGG/IGM EACH: CPT

## 2023-01-01 RX ORDER — HYDROCHLOROTHIAZIDE 12.5 MG/1
12.5 TABLET ORAL DAILY
Qty: 90 TABLET | Refills: 0 | Status: SHIPPED | OUTPATIENT
Start: 2023-01-01 | End: 2024-01-01

## 2023-01-01 RX ORDER — LENALIDOMIDE 10 MG/1
10 CAPSULE ORAL DAILY
Qty: 28 CAPSULE | Refills: 0 | Status: SHIPPED | OUTPATIENT
Start: 2024-01-01 | End: 2024-01-01

## 2023-01-01 RX ORDER — LENALIDOMIDE 10 MG/1
10 CAPSULE ORAL DAILY
Qty: 28 CAPSULE | Refills: 0 | Status: SHIPPED | OUTPATIENT
Start: 2024-01-01 | End: 2023-01-01

## 2023-01-01 RX ORDER — GABAPENTIN 300 MG/1
600 CAPSULE ORAL 3 TIMES DAILY
Qty: 180 CAPSULE | Refills: 3 | Status: SHIPPED | OUTPATIENT
Start: 2023-01-01 | End: 2024-01-01

## 2023-01-01 RX ORDER — GABAPENTIN 300 MG/1
600 CAPSULE ORAL 3 TIMES DAILY
Qty: 180 CAPSULE | Refills: 0 | Status: SHIPPED | OUTPATIENT
Start: 2023-01-01 | End: 2023-01-01

## 2023-01-01 ASSESSMENT — PAIN SCALES - GENERAL
PAINLEVEL: NO PAIN (0)
PAINLEVEL: MODERATE PAIN (5)
PAINLEVEL: SEVERE PAIN (7)

## 2023-01-04 ENCOUNTER — ONCOLOGY VISIT (OUTPATIENT)
Dept: ONCOLOGY | Facility: HOSPITAL | Age: 73
End: 2023-01-04
Attending: INTERNAL MEDICINE
Payer: COMMERCIAL

## 2023-01-04 ENCOUNTER — LAB (OUTPATIENT)
Dept: INFUSION THERAPY | Facility: HOSPITAL | Age: 73
End: 2023-01-04
Attending: INTERNAL MEDICINE
Payer: COMMERCIAL

## 2023-01-04 VITALS
DIASTOLIC BLOOD PRESSURE: 65 MMHG | OXYGEN SATURATION: 96 % | SYSTOLIC BLOOD PRESSURE: 157 MMHG | HEART RATE: 53 BPM | RESPIRATION RATE: 16 BRPM | TEMPERATURE: 98.1 F | WEIGHT: 137.9 LBS | BODY MASS INDEX: 26.06 KG/M2

## 2023-01-04 DIAGNOSIS — C90.00 MULTIPLE MYELOMA NOT HAVING ACHIEVED REMISSION (H): ICD-10-CM

## 2023-01-04 DIAGNOSIS — C90.00 MULTIPLE MYELOMA WITH FAILED REMISSION (H): ICD-10-CM

## 2023-01-04 DIAGNOSIS — C90.00 MULTIPLE MYELOMA NOT HAVING ACHIEVED REMISSION (H): Primary | ICD-10-CM

## 2023-01-04 DIAGNOSIS — I10 ESSENTIAL HYPERTENSION, BENIGN: ICD-10-CM

## 2023-01-04 LAB
ALBUMIN SERPL BCG-MCNC: 4.1 G/DL (ref 3.5–5.2)
ALP SERPL-CCNC: 37 U/L (ref 35–104)
ALT SERPL W P-5'-P-CCNC: 43 U/L (ref 10–35)
ANION GAP SERPL CALCULATED.3IONS-SCNC: 9 MMOL/L (ref 7–15)
AST SERPL W P-5'-P-CCNC: 24 U/L (ref 10–35)
BASOPHILS # BLD MANUAL: 0.2 10E3/UL (ref 0–0.2)
BASOPHILS NFR BLD MANUAL: 4 %
BILIRUB SERPL-MCNC: 0.3 MG/DL
BUN SERPL-MCNC: 18.7 MG/DL (ref 8–23)
CALCIUM SERPL-MCNC: 9.4 MG/DL (ref 8.8–10.2)
CHLORIDE SERPL-SCNC: 102 MMOL/L (ref 98–107)
CREAT SERPL-MCNC: 0.82 MG/DL (ref 0.51–0.95)
DEPRECATED HCO3 PLAS-SCNC: 29 MMOL/L (ref 22–29)
EOSINOPHIL # BLD MANUAL: 0.2 10E3/UL (ref 0–0.7)
EOSINOPHIL NFR BLD MANUAL: 4 %
ERYTHROCYTE [DISTWIDTH] IN BLOOD BY AUTOMATED COUNT: 14.6 % (ref 10–15)
GFR SERPL CREATININE-BSD FRML MDRD: 77 ML/MIN/1.73M2
GLUCOSE SERPL-MCNC: 82 MG/DL (ref 70–99)
HCT VFR BLD AUTO: 41.9 % (ref 35–47)
HGB BLD-MCNC: 14.4 G/DL (ref 11.7–15.7)
LYMPHOCYTES # BLD MANUAL: 1.9 10E3/UL (ref 0.8–5.3)
LYMPHOCYTES NFR BLD MANUAL: 33 %
MCH RBC QN AUTO: 35.3 PG (ref 26.5–33)
MCHC RBC AUTO-ENTMCNC: 34.4 G/DL (ref 31.5–36.5)
MCV RBC AUTO: 103 FL (ref 78–100)
MONOCYTES # BLD MANUAL: 0.3 10E3/UL (ref 0–1.3)
MONOCYTES NFR BLD MANUAL: 5 %
NEUTROPHILS # BLD MANUAL: 3.1 10E3/UL (ref 1.6–8.3)
NEUTROPHILS NFR BLD MANUAL: 54 %
NRBC # BLD AUTO: 0 10E3/UL
NRBC BLD AUTO-RTO: 0 /100
PATH REV: NORMAL
PLAT MORPH BLD: NORMAL
PLATELET # BLD AUTO: 199 10E3/UL (ref 150–450)
POTASSIUM SERPL-SCNC: 3.8 MMOL/L (ref 3.4–5.3)
PROT SERPL-MCNC: 6.3 G/DL (ref 6.4–8.3)
RBC # BLD AUTO: 4.08 10E6/UL (ref 3.8–5.2)
RBC MORPH BLD: NORMAL
SODIUM SERPL-SCNC: 140 MMOL/L (ref 136–145)
TOTAL PROTEIN SERUM FOR ELP: 5.8 G/DL (ref 6.4–8.3)
WBC # BLD AUTO: 5.7 10E3/UL (ref 4–11)

## 2023-01-04 PROCEDURE — G0463 HOSPITAL OUTPT CLINIC VISIT: HCPCS | Performed by: INTERNAL MEDICINE

## 2023-01-04 PROCEDURE — 85007 BL SMEAR W/DIFF WBC COUNT: CPT

## 2023-01-04 PROCEDURE — 84165 PROTEIN E-PHORESIS SERUM: CPT | Mod: TC | Performed by: PATHOLOGY

## 2023-01-04 PROCEDURE — 82784 ASSAY IGA/IGD/IGG/IGM EACH: CPT

## 2023-01-04 PROCEDURE — 80053 COMPREHEN METABOLIC PANEL: CPT

## 2023-01-04 PROCEDURE — 84155 ASSAY OF PROTEIN SERUM: CPT

## 2023-01-04 PROCEDURE — 85027 COMPLETE CBC AUTOMATED: CPT

## 2023-01-04 PROCEDURE — 83521 IG LIGHT CHAINS FREE EACH: CPT

## 2023-01-04 PROCEDURE — 99214 OFFICE O/P EST MOD 30 MIN: CPT | Performed by: INTERNAL MEDICINE

## 2023-01-04 PROCEDURE — G0463 HOSPITAL OUTPT CLINIC VISIT: HCPCS

## 2023-01-04 PROCEDURE — 36415 COLL VENOUS BLD VENIPUNCTURE: CPT

## 2023-01-04 ASSESSMENT — PAIN SCALES - GENERAL: PAINLEVEL: SEVERE PAIN (7)

## 2023-01-04 NOTE — PROGRESS NOTES
"Oncology Rooming Note    January 4, 2023 2:10 PM   Lizzie Viera is a 72 year old female who presents for:    Chief Complaint   Patient presents with     Oncology Clinic Visit     Multiple myeloma not having achieved remission (H)  Multiple myeloma with failed remission (H)     Initial Vitals: BP (!) 157/65   Pulse 53   Temp 98.1  F (36.7  C)   Resp 16   Wt 62.6 kg (137 lb 14.4 oz)   LMP  (LMP Unknown)   SpO2 96%   BMI 26.06 kg/m   Estimated body mass index is 26.06 kg/m  as calculated from the following:    Height as of 12/15/22: 1.549 m (5' 1\").    Weight as of this encounter: 62.6 kg (137 lb 14.4 oz). Body surface area is 1.64 meters squared.  Severe Pain (7) Comment: Data Unavailable   No LMP recorded (lmp unknown). Patient is postmenopausal.  Allergies reviewed: Yes  Medications reviewed: Yes    Medications: yes.  Pharmacy name entered into [facility]: [id number] - [place], [place] - [address]    Clinical concerns: None       Viviane Chu LPN            "

## 2023-01-04 NOTE — PROGRESS NOTES
[facility] Hematology and Oncology Progress Note    Patient: Lizzie Viera  MRN: [id number]  Date of Service: Jan 4, 2023         Reason for Visit    Multiple Myeloma    Assessment     1.  A very pleasant 72 year old woman with average risk multiple myeloma is on the cytogenetics.  However clinically was behaving somewhat more aggressively.  Started on VRD treatment.  Responded quite well. After 17 cycles the treatment was switched to Revlimid 10 mg daily.  Valeria has been tolerating it quite well.  The lab work-up in the end of November 2022 showed maintenance of response.  2.  Significant bone disease with osteoporosis and compression fractures.  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 This appears to be significantly improved on the CT scan in March 2022.  MRI also shows no new lesions.  Most recent bone density still shows osteoporosis.  3.  Normal hemoglobin, calcium, kidney function along with beta-2 microglobulin and albumin at the time of diagnosis.  4.  Positive Covid antibodies from infection. Unvaccinated. Declined Evusheld.  5.  Pain from compression fracture status post radiation therapy.  Significantly improved.  6.  Some dental issues on left upper molars which were cracked and has been extracted.  She also has a tooth on the right lower jaw which the dentists are contemplating extraction.      Plan    1.  Revlimid 10 mg p.o. daily.  2.  We will continue to wait for at least another month and a half before resuming Zometa since she had dental extraction in October 2022.  I would not like to start Zometa before end of February 2022.  I have advised her not to extract the right lower molar if she can help it.  This will help us restart her Zometa sooner rather than later.  3.  Continue with valacyclovir prophylaxis.  4.  continue follow-up on the lab work.  5.  Diet rich in calcium and vitamin D.  6.  Advised to do some core strengthening exercises.  Observe pandemic  precautions.     Cancer Staging   No matching staging information was found for the patient.    ECOG Performance    2 - Ambulatory and independent in all ADLs; cannot work; up > 50% of the time      History of Present Illness    Ms. Lizzie Viera is a very pleasant 72 year old woman who has been diagnosed with multiple myeloma IgG kappa type in May 2021.  She had initially presented with compression fracture of T7 vertebral body in September 2020.  She had a back pain which led to that evaluation.  Bone density confirmed that she had osteoporosis.  MRI showed diffuse marrow edema in October 2020.  In April 2021 the MRI of the thoracic spine showed worsening T7 vertebral body height loss because of likely pathological compression fracture with extraosseous extension.  She then had IR guided bone biopsy on 7 May 2021 and pathology confirmed the presence of kappa light chain restricted plasma cells.  Her cytogenetics confirmed the presence of hyperdiploid E with gains of chromosome 5, 9 and 15.    She was then seen by Dr. Baig and had a bone marrow biopsy which also confirmed 60% involvement of the marrow with plasma cells.  The bone marrow was done on 3 Jocelin 2021.  The bone marrow also confirmed the presence of hyperdiploid E.  She had a gain of chromosome 3, 5, 7, 9, 11, 15 as well as 19.  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  Her beta-2 microglobulin was actually normal at the time of her diagnosis as was her albumin at 4.0.  Monoclonal protein 3.1 g/dL.  Kappa free light chain levels of 13 mg/dL IgG level of 4280 with depressed levels of IgM and IgA.  Urine was also positive for kappa light chains as well as immunofixation of the urine was positive for IgG kappa.  Normal kidney function.  Normal hemoglobin.    As mentioned above she had bone lesions in the T7 vertebral body, T1, skull area.  Her main pain is coming from her T7 vertebral body compression fracture.    Due to the pain she has been seen by radiation  oncology and has received radiation therapy.  She also got a dose of steroids for pain control.    She was initially thinking of doing some natural therapies but once her symptoms got worse, she came back in was counseled about treatment.      She started on Velcade Revlimid and dexamethasone since September 2021.  Responding nicely.  Immunoglobulins have  normalized completely. Her immunoglobin levels have almost normalized in fact the IgG levels have gone below normal.  Immunofixation still shows a very faint kappa monoclonal gammopathy.    She was  hospitalized in April 2022 with COPD exacerbation/pneumonia.  Was given some steroids and antibiotic.  She was slightly hypoxic initially but then got better after that.    She was referred to [facility] for transplant evaluation.  She was somewhat reluctant to go forward with that.  Otherwise she seems to be doing quite well.  Her immunoglobulin levels have normalized or actually are below normal.  Immunofixation faintly positive.    She has cracked a molar on the upper left jaw sometime in July 2022.  There was some tooth decay.  She had them extracted.    In September 2022 we discontinued the VRD treatment and now Valeria is on Revlimid maintenance 10 mg p.o. daily.  She seems to be tolerating it well.  She is physically more active so she is noticing some soreness in her back.  Still need some dental work done.    Review of systems.  A 14 point review of systems was obtained.  Positive findings noted in the history.  Rest of the review of system is otherwise negative.     Past History    Past Medical History:   Diagnosis Date     Cervical dysplasia      Chronic RUQ pain      Depressive disorder      Multiple myeloma (H)      Osteoporosis        Past Surgical History:   Procedure Laterality Date     CONIZATION CERVIX,KNIFE/LASER      Description: Cervical Conization By Laser;  Recorded: 09/20/2007;     HC DILATION/CURETTAGE DIAG/THER NON OB      Description:  Dilation And Curettage;  Recorded: 09/20/2007;      REMOVE TONSILS/ADENOIDS,<13 Y/O      Description: Tonsillectomy With Adenoidectomy;  Recorded: 09/20/2007;     [facility] LAP,CHOLECYSTECTOMY/EXPLORE  12/27/2004     [facility] LIGATE FALLOPIAN TUBE      Description: Tubal Ligation;  Recorded: 09/20/2007;         Physical Exam    BP (!) 157/65   Pulse 53   Temp 98.1  F (36.7  C)   Resp 16   Wt 62.6 kg (137 lb 14.4 oz)   LMP  (LMP Unknown)   SpO2 96%   BMI 26.06 kg/m      GENERAL: No acute distress. Cooperative in conversation.   HEENT:  Pupils are equal, round and reactive. Oral mucosa is clean and intact. No ulcerations or mucositis noted. No bleeding noted.  RESP:Chest symmetric lungs are clear bilaterally per auscultation. Regular respiratory rate. No wheezes or rhonchi.  CV: Normal S1 S2 Regular, rate and rhythm. No murmurs.    ABD: Nondistended, soft, nontender. Positive bowel sounds. No organomegaly.   EXTREMITIES: No lower extremity edema.   NEURO: Non- focal. Alert and oriented x3.  Cranial nerves appear intact.  PSYCH: Within normal limits. No depression or anxiety.  SKIN: Warm dry intact.     Lab Results    No results found for this or any previous visit (from the past 168 hour(s)).    Imaging    DX Hip/Pelvis/Spine    Result Date: 12/29/2022  EXAM: DX HIP/PELVIS/SPINE LOCATION: [facility] DATE/TIME: 12/29/2022 1:22 PM INDICATION: Age-related osteoporosis without current pathological fracture. COMPARISON: 12/1/2020. TECHNIQUE: Dual-energy x-ray absorptiometry performed with routine technique. FINDINGS: Lumbar Spine: L1-L4: BMD: 0.931 g/cm2. T-score: -2.1. Z-score: -0.4 RIGHT Hip Total: BMD: 0.749 g/cm2. T-score: -2.1. Z-score: -0.6 RIGHT Hip Femoral neck: BMD: 0.670 g/cm2. T-score: -2.7. Z-score: -1.0 LEFT Hip Total: BMD: 0.768 g/cm2. T-score: -1.9. Z-score: -0.4 LEFT Hip Femoral neck: BMD: 0.670 g/cm2. T-score: -2.6. Z-score: -1.0 WHO Criteria: Normal: T score at or above -1 SD  Osteopenia: T score between -1 and -2.5 SD Osteoporosis: T score at or below -2.5 SD COMPARISON: There has been a 8.6% decrease in lumbar spine BMD. There has been a 5.8% decrease in bilateral hip BMD. FRAX Results: Not applicable due to osteoporosis.     IMPRESSION: OSTEOPOROSIS. T score meets the World Health Organization (WHO) criteria for osteoporosis at one or more measured sites. The risk of osteoporotic fracture increased approximately two-fold for each SD decrease in T-score.         Signed by: Loco Blanco MD,

## 2023-01-04 NOTE — LETTER
"    1/4/2023         RE: Lizzie Viera  [address]  [place] [address]        Dear Colleague,    Thank you for referring your patient, Lizzie Viera, to the [facility] [place]. Please see a copy of my visit note below.    Oncology Rooming Note    January 4, 2023 2:10 PM   Lizzie Viera is a 72 year old female who presents for:    Chief Complaint   Patient presents with     Oncology Clinic Visit     Multiple myeloma not having achieved remission (H)  Multiple myeloma with failed remission (H)     Initial Vitals: BP (!) 157/65   Pulse 53   Temp 98.1  F (36.7  C)   Resp 16   Wt 62.6 kg (137 lb 14.4 oz)   LMP  (LMP Unknown)   SpO2 96%   BMI 26.06 kg/m   Estimated body mass index is 26.06 kg/m  as calculated from the following:    Height as of 12/15/22: 1.549 m (5' 1\").    Weight as of this encounter: 62.6 kg (137 lb 14.4 oz). Body surface area is 1.64 meters squared.  Severe Pain (7) Comment: Data Unavailable   No LMP recorded (lmp unknown). Patient is postmenopausal.  Allergies reviewed: Yes  Medications reviewed: Yes    Medications: yes.  Pharmacy name entered into [facility]: [id number] [address]    Clinical concerns: None       Viviane Chu LPN              [facility] Hematology and Oncology Progress Note    Patient: Lizzie Viera  MRN: [id number]  Date of Service: Jan 4, 2023         Reason for Visit    Multiple Myeloma    Assessment     1.  A very pleasant 72 year old woman with average risk multiple myeloma is on the cytogenetics.  However clinically was behaving somewhat more aggressively.  Started on VRD treatment.  Responded quite well. After 17 cycles the treatment was switched to Revlimid 10 mg daily.  Valeria has been tolerating it quite well.  The lab work-up in the end of November 2022 showed maintenance of response.  2.  Significant bone disease with osteoporosis and compression " fractures.  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 This appears to be significantly improved on the CT scan in March 2022.  MRI also shows no new lesions.  Most recent bone density still shows osteoporosis.  3.  Normal hemoglobin, calcium, kidney function along with beta-2 microglobulin and albumin at the time of diagnosis.  4.  Positive Covid antibodies from infection. Unvaccinated. Declined Evusheld.  5.  Pain from compression fracture status post radiation therapy.  Significantly improved.  6.  Some dental issues on left upper molars which were cracked and has been extracted.  She also has a tooth on the right lower jaw which the dentists are contemplating extraction.      Plan    1.  Revlimid 10 mg p.o. daily.  2.  We will continue to wait for at least another month and a half before resuming Zometa since she had dental extraction in October 2022.  I would not like to start Zometa before end of February 2022.  I have advised her not to extract the right lower molar if she can help it.  This will help us restart her Zometa sooner rather than later.  3.  Continue with valacyclovir prophylaxis.  4.  continue follow-up on the lab work.  5.  Diet rich in calcium and vitamin D.  6.  Advised to do some core strengthening exercises.  Observe pandemic precautions.     Cancer Staging   No matching staging information was found for the patient.    ECOG Performance    2 - Ambulatory and independent in all ADLs; cannot work; up > 50% of the time      History of Present Illness    Ms. Lizzie Viera is a very pleasant 72 year old woman who has been diagnosed with multiple myeloma IgG kappa type in May 2021.  She had initially presented with compression fracture of T7 vertebral body in September 2020.  She had a back pain which led to that evaluation.  Bone density confirmed that she had osteoporosis.  MRI showed diffuse marrow edema in October 2020.  In April 2021 the MRI of the thoracic spine showed  worsening T7 vertebral body height loss because of likely pathological compression fracture with extraosseous extension.  She then had IR guided bone biopsy on 7 May 2021 and pathology confirmed the presence of kappa light chain restricted plasma cells.  Her cytogenetics confirmed the presence of hyperdiploid E with gains of chromosome 5, 9 and 15.    She was then seen by Dr. Baig and had a bone marrow biopsy which also confirmed 60% involvement of the marrow with plasma cells.  The bone marrow was done on 3 Jocelin 2021.  The bone marrow also confirmed the presence of hyperdiploid E.  She had a gain of chromosome 3, 5, 7, 9, 11, 15 as well as 19.  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  Her beta-2 microglobulin was actually normal at the time of her diagnosis as was her albumin at 4.0.  Monoclonal protein 3.1 g/dL.  Kappa free light chain levels of 13 mg/dL IgG level of 4280 with depressed levels of IgM and IgA.  Urine was also positive for kappa light chains as well as immunofixation of the urine was positive for IgG kappa.  Normal kidney function.  Normal hemoglobin.    As mentioned above she had bone lesions in the T7 vertebral body, T1, skull area.  Her main pain is coming from her T7 vertebral body compression fracture.    Due to the pain she has been seen by radiation oncology and has received radiation therapy.  She also got a dose of steroids for pain control.    She was initially thinking of doing some natural therapies but once her symptoms got worse, she came back in was counseled about treatment.      She started on Velcade Revlimid and dexamethasone since September 2021.  Responding nicely.  Immunoglobulins have  normalized completely. Her immunoglobin levels have almost normalized in fact the IgG levels have gone below normal.  Immunofixation still shows a very faint kappa monoclonal gammopathy.    She was  hospitalized in April 2022 with COPD exacerbation/pneumonia.  Was given some steroids and antibiotic.  She was  slightly hypoxic initially but then got better after that.    She was referred to [facility] for transplant evaluation.  She was somewhat reluctant to go forward with that.  Otherwise she seems to be doing quite well.  Her immunoglobulin levels have normalized or actually are below normal.  Immunofixation faintly positive.    She has cracked a molar on the upper left jaw sometime in July 2022.  There was some tooth decay.  She had them extracted.    In September 2022 we discontinued the VRD treatment and now Valeria is on Revlimid maintenance 10 mg p.o. daily.  She seems to be tolerating it well.  She is physically more active so she is noticing some soreness in her back.  Still need some dental work done.    Review of systems.  A 14 point review of systems was obtained.  Positive findings noted in the history.  Rest of the review of system is otherwise negative.     Past History    Past Medical History:   Diagnosis Date     Cervical dysplasia      Chronic RUQ pain      Depressive disorder      Multiple myeloma (H)      Osteoporosis        Past Surgical History:   Procedure Laterality Date     CONIZATION CERVIX,KNIFE/LASER      Description: Cervical Conization By Laser;  Recorded: 09/20/2007;     HC DILATION/CURETTAGE DIAG/THER NON OB      Description: Dilation And Curettage;  Recorded: 09/20/2007;     HC REMOVE TONSILS/ADENOIDS,<13 Y/O      Description: Tonsillectomy With Adenoidectomy;  Recorded: 09/20/2007;     [facility] LAP,CHOLECYSTECTOMY/EXPLORE  12/27/2004     [facility] LIGATE FALLOPIAN TUBE      Description: Tubal Ligation;  Recorded: 09/20/2007;         Physical Exam    BP (!) 157/65   Pulse 53   Temp 98.1  F (36.7  C)   Resp 16   Wt 62.6 kg (137 lb 14.4 oz)   LMP  (LMP Unknown)   SpO2 96%   BMI 26.06 kg/m      GENERAL: No acute distress. Cooperative in conversation.   HEENT:  Pupils are equal, round and reactive. Oral mucosa is clean and intact. No ulcerations or mucositis noted. No bleeding  noted.  RESP:Chest symmetric lungs are clear bilaterally per auscultation. Regular respiratory rate. No wheezes or rhonchi.  CV: Normal S1 S2 Regular, rate and rhythm. No murmurs.    ABD: Nondistended, soft, nontender. Positive bowel sounds. No organomegaly.   EXTREMITIES: No lower extremity edema.   NEURO: Non- focal. Alert and oriented x3.  Cranial nerves appear intact.  PSYCH: Within normal limits. No depression or anxiety.  SKIN: Warm dry intact.     Lab Results    No results found for this or any previous visit (from the past 168 hour(s)).    Imaging    DX Hip/Pelvis/Spine    Result Date: 12/29/2022  EXAM: DX HIP/PELVIS/SPINE LOCATION: [facility] DATE/TIME: 12/29/2022 1:22 PM INDICATION: Age-related osteoporosis without current pathological fracture. COMPARISON: 12/1/2020. TECHNIQUE: Dual-energy x-ray absorptiometry performed with routine technique. FINDINGS: Lumbar Spine: L1-L4: BMD: 0.931 g/cm2. T-score: -2.1. Z-score: -0.4 RIGHT Hip Total: BMD: 0.749 g/cm2. T-score: -2.1. Z-score: -0.6 RIGHT Hip Femoral neck: BMD: 0.670 g/cm2. T-score: -2.7. Z-score: -1.0 LEFT Hip Total: BMD: 0.768 g/cm2. T-score: -1.9. Z-score: -0.4 LEFT Hip Femoral neck: BMD: 0.670 g/cm2. T-score: -2.6. Z-score: -1.0 WHO Criteria: Normal: T score at or above -1 SD Osteopenia: T score between -1 and -2.5 SD Osteoporosis: T score at or below -2.5 SD COMPARISON: There has been a 8.6% decrease in lumbar spine BMD. There has been a 5.8% decrease in bilateral hip BMD. FRAX Results: Not applicable due to osteoporosis.     IMPRESSION: OSTEOPOROSIS. T score meets the World Health Organization (WHO) criteria for osteoporosis at one or more measured sites. The risk of osteoporotic fracture increased approximately two-fold for each SD decrease in T-score.         Signed by: Loco Blanco MD,         Again, thank you for allowing me to participate in the care of your patient.        Sincerely,        Loco Blanco MD,  MD

## 2023-01-05 LAB
ALBUMIN SERPL ELPH-MCNC: 3.7 G/DL (ref 3.7–5.1)
ALPHA1 GLOB SERPL ELPH-MCNC: 0.3 G/DL (ref 0.2–0.4)
ALPHA2 GLOB SERPL ELPH-MCNC: 0.8 G/DL (ref 0.5–0.9)
B-GLOBULIN SERPL ELPH-MCNC: 0.6 G/DL (ref 0.6–1)
GAMMA GLOB SERPL ELPH-MCNC: 0.4 G/DL (ref 0.7–1.6)
IGA SERPL-MCNC: 50 MG/DL (ref 84–499)
IGG SERPL-MCNC: 422 MG/DL (ref 610–1616)
IGM SERPL-MCNC: 16 MG/DL (ref 35–242)
KAPPA LC FREE SER-MCNC: 1.54 MG/DL (ref 0.33–1.94)
KAPPA LC FREE/LAMBDA FREE SER NEPH: 0.87 {RATIO} (ref 0.26–1.65)
LAMBDA LC FREE SERPL-MCNC: 1.77 MG/DL (ref 0.57–2.63)
M PROTEIN SERPL ELPH-MCNC: 0.1 G/DL
PROT PATTERN SERPL ELPH-IMP: ABNORMAL

## 2023-01-05 PROCEDURE — 84165 PROTEIN E-PHORESIS SERUM: CPT | Mod: 26

## 2023-01-11 ENCOUNTER — ONCOLOGY VISIT (OUTPATIENT)
Dept: ONCOLOGY | Facility: CLINIC | Age: 73
End: 2023-01-11
Attending: FAMILY MEDICINE
Payer: COMMERCIAL

## 2023-01-11 DIAGNOSIS — F43.22 ADJUSTMENT DISORDER WITH ANXIETY: Primary | ICD-10-CM

## 2023-01-11 PROCEDURE — 90837 PSYTX W PT 60 MINUTES: CPT | Performed by: PSYCHOLOGIST

## 2023-01-11 NOTE — PROGRESS NOTES
"[facility] Oncology- Psychotherapy                                    Progress Note    Patient Name: Lizzie Viera  Date: 2023         Service Type: Individual      Session Start Time: 1200  Session End Time: 1255     Session Length: 55 mins    Session #: 1    Attendees: Client attended alone    Service Modality:  In-person    DATA  Interactive Complexity: No  Crisis: No        Progress Since Last Session (Related to Symptoms / Goals / Homework):   Symptoms: First visit    Homework: NA      Episode of Care Goals: 1st session     Current / Ongoing Stressors and Concerns:   Pt reports significant stress with her spouse. He is experiencing depression and will not get help. He has agreed now to get help after pt threatened to leave him. He has not made an appointment however. He has medical issues and refuses to see his friends. His mother  and he intended to miss the  until pt very much encouraged him to go. Pt has been with her spouse for 50 years.      Pt reports she is retired and worked in [employer] for her career which was very successful.      Pt has three children: Any (40) has mental illness and is receiving disability. She has two boys ages 5 and 9 and they live with pt and spouse.      Pt reports \"Collette\" is 36 and a teacher at [employer]. She has two children. She also has MH issues.       Pt reports \"Trell\" is 32 and is single, lives in [place], [place] and works at a [employer].      Treatment Objective(s) Addressed in This Session:   identify multiple stressors which contribute to feelings of anxiety  ,     Intervention:   CBT: ,  Solution Focused: ,    Assessments completed prior to visit:  The following assessments were completed by patient for this visit:  None      ASSESSMENT: Current Emotional / Mental Status (status of significant symptoms):   Risk status (Self / Other harm or suicidal ideation)   Patient denies current fears or " concerns for personal safety.   Patient denies current or recent suicidal ideation or behaviors.   Patient denies current or recent homicidal ideation or behaviors.   Patient denies current or recent self injurious behavior or ideation.   Patient denies other safety concerns.   Patient reports there has been no change in risk factors since their last session.     Patient reports there has been no change in protective factors since their last session.     Recommended that patient call 911 or go to the local ED should there be a change in any of these risk factors.     Appearance:   Appropriate    Eye Contact:   Good    Psychomotor Behavior: Normal    Attitude:   Cooperative    Orientation:   All   Speech    Rate / Production: Normal     Volume:  Normal    Mood:    Normal   Affect:    Appropriate    Thought Content:  Clear    Thought Form:  Coherent  Logical    Insight:    Good         Changes in Health Issues:   None reported     Chemical Use Review:   Substance Use: Chemical use reviewed, no active concerns identified     Diagnosis:  No diagnosis found.    Collateral Reports Completed:   Not Applicable    PLAN: (Patient Tasks / Therapist Tasks / Other)  Return every two weeks        Mario Long PsyD                                                         ______________________________________________________________________    Individual Treatment Plan    Patient's Name: Lizzie Viera  YOB: 1950    Date of Creation: 1/11/2023  Date Treatment Plan Last Reviewed/Revised: 1/11/2023    DSM5 Diagnoses: Adjustment Disorder  Psychosocial / Contextual Factors: stress with spouse  PROMIS (reviewed every 90 days):     Referral / Collaboration:  Referral to another professional/service is not indicated at this time..    Anticipated number of session for this episode of care: 9-12 sessions  Anticipation frequency of session: Biweekly  Anticipated Duration of each session: 53 or more  minutes  Treatment plan will be reviewed in 90 days or when goals have been changed.       MeasurableTreatment Goal(s) related to diagnosis / functional impairment(s)  Goal 1: Patient will reduce anxiety    I will know I've met my goal when I feel less anxious.      Objective #A (Patient Action)    Patient will identify multiple stressors which contribute to feelings of anxiety.  Status: New - Date: 1/11/23     Intervention(s)  Therapist will teach emotional regulation skills. ,.    Objective #B  Patient will identify multiple stressors which contribute to feelings of anxiety.  Status: New - Date: 1/11/23     Intervention(s)  Therapist will teach emotional regulation skills. ,.      Patient has reviewed and agreed to the above plan.      Mario Long PsyD  January 11, 2023

## 2023-01-11 NOTE — LETTER
"    2023         RE: Lizzie Viera  [address]  [place] [address]        Dear Colleague,    Thank you for referring your patient, Lizzie Viera, to the [facility] [place]. Please see a copy of my visit note below.        [facility] Oncology- Psychotherapy                                    Progress Note    Patient Name: Lizzie Viera  Date: 2023         Service Type: Individual      Session Start Time: 1200  Session End Time: 1255     Session Length: 55 mins    Session #: 1    Attendees: Client attended alone    Service Modality:  In-person    DATA  Interactive Complexity: No  Crisis: No        Progress Since Last Session (Related to Symptoms / Goals / Homework):   Symptoms: First visit    Homework: NA      Episode of Care Goals: 1st session     Current / Ongoing Stressors and Concerns:   Pt reports significant stress with her spouse. He is experiencing depression and will not get help. He has agreed now to get help after pt threatened to leave him. He has not made an appointment however. He has medical issues and refuses to see his friends. His mother  and he intended to miss the  until pt very much encouraged him to go. Pt has been with her spouse for 50 years.      Pt reports she is retired and worked in [employer] for her career which was very successful.      Pt has three children: Any (40) has mental illness and is receiving disability. She has two boys ages 5 and 9 and they live with pt and spouse.      Pt reports \"Collette\" is 36 and a teacher at [employer]. She has two children. She also has MH issues.       Pt reports \"Trell\" is 32 and is single, lives in [place], [place] and works at a [employer].      Treatment Objective(s) Addressed in This Session:   identify multiple stressors which contribute to feelings of anxiety  ,     Intervention:   CBT: ,  Solution Focused: ,    Assessments completed " prior to visit:  The following assessments were completed by patient for this visit:  None      ASSESSMENT: Current Emotional / Mental Status (status of significant symptoms):   Risk status (Self / Other harm or suicidal ideation)   Patient denies current fears or concerns for personal safety.   Patient denies current or recent suicidal ideation or behaviors.   Patient denies current or recent homicidal ideation or behaviors.   Patient denies current or recent self injurious behavior or ideation.   Patient denies other safety concerns.   Patient reports there has been no change in risk factors since their last session.     Patient reports there has been no change in protective factors since their last session.     Recommended that patient call 911 or go to the local ED should there be a change in any of these risk factors.     Appearance:   Appropriate    Eye Contact:   Good    Psychomotor Behavior: Normal    Attitude:   Cooperative    Orientation:   All   Speech    Rate / Production: Normal     Volume:  Normal    Mood:    Normal   Affect:    Appropriate    Thought Content:  Clear    Thought Form:  Coherent  Logical    Insight:    Good         Changes in Health Issues:   None reported     Chemical Use Review:   Substance Use: Chemical use reviewed, no active concerns identified     Diagnosis:  No diagnosis found.    Collateral Reports Completed:   Not Applicable    PLAN: (Patient Tasks / Therapist Tasks / Other)  Return every two weeks        Mario Long PsyD                                                         ______________________________________________________________________    Individual Treatment Plan    Patient's Name: Lizzie Viera  YOB: 1950    Date of Creation: 1/11/2023  Date Treatment Plan Last Reviewed/Revised: 1/11/2023    DSM5 Diagnoses: Adjustment Disorder  Psychosocial / Contextual Factors: stress with spouse  PROMIS (reviewed every 90 days):     Referral /  Collaboration:  Referral to another professional/service is not indicated at this time..    Anticipated number of session for this episode of care: 9-12 sessions  Anticipation frequency of session: Biweekly  Anticipated Duration of each session: 53 or more minutes  Treatment plan will be reviewed in 90 days or when goals have been changed.       MeasurableTreatment Goal(s) related to diagnosis / functional impairment(s)  Goal 1: Patient will reduce anxiety    I will know I've met my goal when I feel less anxious.      Objective #A (Patient Action)    Patient will identify multiple stressors which contribute to feelings of anxiety.  Status: New - Date: 1/11/23     Intervention(s)  Therapist will teach emotional regulation skills. ,.    Objective #B  Patient will identify multiple stressors which contribute to feelings of anxiety.  Status: New - Date: 1/11/23     Intervention(s)  Therapist will teach emotional regulation skills. ,.      Patient has reviewed and agreed to the above plan.      Mario Long PsyD  January 11, 2023        Again, thank you for allowing me to participate in the care of your patient.        Sincerely,        Mario Long PsyD

## 2023-01-16 ENCOUNTER — TELEPHONE (OUTPATIENT)
Dept: ONCOLOGY | Facility: HOSPITAL | Age: 73
End: 2023-01-16

## 2023-01-16 DIAGNOSIS — C90.00 MULTIPLE MYELOMA WITH FAILED REMISSION (H): ICD-10-CM

## 2023-01-16 DIAGNOSIS — C90.00 MULTIPLE MYELOMA NOT HAVING ACHIEVED REMISSION (H): Primary | ICD-10-CM

## 2023-01-16 RX ORDER — LENALIDOMIDE 10 MG/1
10 CAPSULE ORAL DAILY
Qty: 28 CAPSULE | Refills: 0 | Status: SHIPPED | OUTPATIENT
Start: 2023-02-15 | End: 2023-03-16

## 2023-01-16 NOTE — TELEPHONE ENCOUNTER
Shailesh/ Assistance Approved    Medication Revlimid  Amount/ $ 12,000  [facility]  Phone # [phone]  Effective Dates 12/17/2022 to 12/16/2023  Additional Information Bin:[id number]; PCN:[id number]; Group:[id number]; ID:[id number]  Patient notified? Yes

## 2023-01-23 DIAGNOSIS — M79.18 MYOFASCIAL PAIN: ICD-10-CM

## 2023-01-23 DIAGNOSIS — M48.04 THORACIC SPINAL STENOSIS: ICD-10-CM

## 2023-01-23 RX ORDER — GABAPENTIN 300 MG/1
CAPSULE ORAL
Qty: 180 CAPSULE | Refills: 0 | Status: SHIPPED | OUTPATIENT
Start: 2023-01-23 | End: 2023-02-20

## 2023-01-25 ENCOUNTER — ONCOLOGY VISIT (OUTPATIENT)
Dept: ONCOLOGY | Facility: CLINIC | Age: 73
End: 2023-01-25
Attending: PSYCHOLOGIST
Payer: COMMERCIAL

## 2023-01-25 DIAGNOSIS — F43.22 ADJUSTMENT DISORDER WITH ANXIETY: Primary | ICD-10-CM

## 2023-01-25 PROCEDURE — 90837 PSYTX W PT 60 MINUTES: CPT | Performed by: PSYCHOLOGIST

## 2023-01-25 NOTE — PROGRESS NOTES
"[facility] Oncology- Psychotherapy                                    Progress Note    Patient Name: Lizzie Viera  Date: ***         Service Type: {[facility] SERVICE TYPE:418888}      Session Start Time: ***  Session End Time: ***     Session Length: ***    Session #: ***    Attendees: {[facility] ATTENDEE:822500}    Service Modality:  {Service Modality:230639}    DATA  Interactive Complexity: {63183 add on - Interactive Complexity:169686}  Crisis: {32948 (60 min) / 95247 (30 min add-on; stackable if needed):783924}        Progress Since Last Session (Related to Symptoms / Goals / Homework):   Symptoms: {FCCStatusSinceLastSession:789087}    Homework: {Homework:951550}      Episode of Care Goals: {Goals Progress:379904}     Current / Ongoing Stressors and Concerns:   ***     Treatment Objective(s) Addressed in This Session:   {Treatment Objective Groups:066820}  ***     Intervention:   {FCCINTERVENTION:605646}    Assessments completed prior to visit:  {OP  ASSESSMENTS:043044::\"The following assessments were completed by patient for this visit:\"}      ASSESSMENT: Current Emotional / Mental Status (status of significant symptoms):   Risk status (Self / Other harm or suicidal ideation)   Patient {PERSONAL SAFETY:509405}   Patient {SUICIDAL IDEATION:251017}   Patient {HOMICIDAL IDEATION:001933}   Patient {SELF INJURIOUS:702220}   Patient {OTHER SAFETY CONCERNS:607018}   Patient {[facility] CHANGE IN RISK FACTORS:089499}   Patient {[facility] CHANGE IN PROTECTIVE FACTORS:061949}   {SAFETY PLAN:131918}     Appearance:   {Appearance:945230::\"Appropriate \"}   Eye Contact:   {Eye Contact:739287::\"Good \"}   Psychomotor Behavior: {Psychomotor Behavior:570355::\"Normal \"}   Attitude:   {Attitude:910414::\"Cooperative \"}   Orientation:   {Orientation:302529::\"All\"}   Speech    Rate / Production: {Speech Rate/Production:211064::\"Normal \"}    Volume:  {Speech Volume:907574::\"Normal " "\"}   Mood:    {Mood:131827::\"Normal\"}   Affect:    {Affect:973264::\"Appropriate \"}   Thought Content:  {Thought Content:929683::\"Clear \"}   Thought Form:  {Thought Form:752083::\"Coherent \",\"Logical \"}   Insight:    {Insight:410905::\"Good \"}     Medication Review:   {Med Review:848603}     Medication Compliance:   {Changes:403287}     Changes in Health Issues:   {YES / NO:484862}     Chemical Use Review:   Substance Use: {YES / NO:282578}     Tobacco Use: {YES / NO:154700}    Diagnosis:  No diagnosis found.    Collateral Reports Completed:   {[facility] COLLATERAL:373091}    PLAN: (Patient Tasks / Therapist Tasks / Other)  ***        Mario Long PsyD                                                         ______________________________________________________________________    {[facility] SERVICE TYPE:727518} Treatment Plan    Patient's Name: Lizzie Viera  YOB: 1950    Date of Creation: ***  Date Treatment Plan Last Reviewed/Revised: ***    DSM5 Diagnoses: {DSM5 MH Diagnosis:163523:o}  Psychosocial / Contextual Factors: ***  PROMIS (reviewed every 90 days):     Referral / Collaboration:  {Referral to Professional:626645}.    Anticipated number of session for this episode of care: {OP BEH Treatment  Session AMNT:393331}  Anticipation frequency of session: {OP BEH TREATMENT FREQUENCY:971314}  Anticipated Duration of each session: {OP BEH BILLING TIME:693238::\"38-52 minutes\"}  Treatment plan will be reviewed in 90 days or when goals have been changed.       MeasurableTreatment Goal(s) related to diagnosis / functional impairment(s)  Goal 1: Patient will ***    I will know I've met my goal when ***.      Objective #A (Patient Action)    Patient will {Treatment Objective Groups:463786}.  Status: {Status:890799}     Intervention(s)  Therapist will teach {Teach:858741}.    Objective #B  Patient will {Treatment Objective Groups:843920}.  Status: {Status:211852}     Intervention(s)  Therapist will " {Interventions:773435}.    Objective #C  Patient will {Treatment Objective Groups:485332}.  Status: {Status:041295}     Intervention(s)  Therapist will {Interventions:286110}.      Goal 2: Patient will ***    I will know I've met my goal when ***.      Objective #A (Patient Action)    Status: {Status:737049}     Patient will {Treatment Objective Groups:218450}.    Intervention(s)  Therapist will {Interventions:518049}.    Objective #B  Patient will {Treatment Objective Groups:190237}.    Status: {Status:857483}     Intervention(s)  Therapist will {Interventions:699360}.    Objective #C  Patient will {Treatment Objective Groups:173601}.  Status: {Status:838637}     Intervention(s)  Therapist will {Interventions:033006}.      Goal 3: Patiient will ***    I will know I've met my goal when ***.      Objective #A (Patient Action)    Status: {Status:721829}     Patient will {Treatment Objective Groups:479944}.    Intervention(s)  Therapist will {Interventions:956492}.    Objective #B  Patient will {Treatment Objective Groups:049717}.    Status: {Status:098645}     Intervention(s)  Therapist will {Interventions:932284}.    Objective #C  Patient will {Treatment Objective Groups:585184}.  Status: {Status:097259}     Intervention(s)  Therapist will {Interventions:093366}.      {Patient:991060} {Reviewed:323136}.      Mario Long PsyD  January 25, 2023

## 2023-01-25 NOTE — CONFIDENTIAL NOTE
"[facility] Oncology- Psychotherapy                                    Progress Note    Patient Name: Lizzie Viera  Date: 1/25/2023         Service Type: Individual      Session Start Time: 1200  Session End Time: 1255     Session Length: 55 mins    Session #: 2    Attendees: Client attended alone    Service Modality:  In-person    DATA  Interactive Complexity: No  Crisis: No        Progress Since Last Session (Related to Symptoms / Goals / Homework):   Symptoms: Improving ,    Homework: None      Episode of Care Goals: Satisfactory progress - ACTION (Actively working towards change); Intervened by reinforcing change plan / affirming steps taken     Current / Ongoing Stressors and Concerns:   Pt reports \"Collette\" (36) causes stress living with pt. Pt suspects Collette is threatening self harm as a manipulation possibly. Collette wanted to put the dog down in their house possibly due to the dog falling at times on hardwood floors. It could be related to falls by pts spouse in the past.      Pt reports she needs to do her PT exercises more frequently for back issue.      Pt reports a good relationship with her grandchildren which is important to her. She wants to maintain the connection with her grandkids in [place] (Any's children) that she does not see as much as her grand kids that live with her.      Pt really wants to move out of her current home due to pollution from the refinery close to her home.      Pt reports her spouse is willing to see a mental health provider but has not made an appointment.      Treatment Objective(s) Addressed in This Session:   identify multiple stressors which contribute to feelings of anxiety  ,     Intervention:   CBT: ,  Solution Focused: ,    Assessments completed prior to visit:  The following assessments were completed by patient for this visit:  none      ASSESSMENT: Current Emotional / Mental Status (status of significant symptoms):   Risk status " (Self / Other harm or suicidal ideation)   Patient denies current fears or concerns for personal safety.   Patient denies current or recent suicidal ideation or behaviors.   Patient denies current or recent homicidal ideation or behaviors.   Patient denies current or recent self injurious behavior or ideation.   Patient denies other safety concerns.   Patient reports there has been no change in risk factors since their last session.     Patient reports there has been no change in protective factors since their last session.     Recommended that patient call 911 or go to the local ED should there be a change in any of these risk factors.     Appearance:   Appropriate    Eye Contact:   Good    Psychomotor Behavior: Normal    Attitude:   Cooperative    Orientation:   All   Speech    Rate / Production: Normal     Volume:  Normal    Mood:    Anxious    Affect:    Appropriate    Thought Content:  Clear    Thought Form:  Coherent  Logical    Insight:    Good    Changes in Health Issues:   None reported     Chemical Use Review:   Substance Use: Chemical use reviewed, no active concerns identified     Diagnosis:  No diagnosis found.    Collateral Reports Completed:   Not Applicable    PLAN: (Patient Tasks / Therapist Tasks / Other)  Return in two weeks        Mario Long PsyD                                                         ______________________________________________________________________  Date of Creation: 1/11/2023  Date Treatment Plan Last Reviewed/Revised: 1/11/2023     DSM5 Diagnoses: Adjustment Disorder  Psychosocial / Contextual Factors: stress with spouse  PROMIS (reviewed every 90 days):      Referral / Collaboration:  Referral to another professional/service is not indicated at this time..     Anticipated number of session for this episode of care: 9-12 sessions  Anticipation frequency of session: Biweekly  Anticipated Duration of each session: 53 or more minutes  Treatment plan will be  reviewed in 90 days or when goals have been changed.         MeasurableTreatment Goal(s) related to diagnosis / functional impairment(s)  Goal 1: Patient will reduce anxiety    I will know I've met my goal when I feel less anxious.       Objective #A (Patient Action)                          Patient will identify multiple stressors which contribute to feelings of anxiety.  Status: New - Date: 1/11/23      Intervention(s)  Therapist will teach emotional regulation skills. ,.     Objective #B  Patient will identify multiple stressors which contribute to feelings of anxiety.  Status: New - Date: 1/11/23      Intervention(s)  Therapist will teach emotional regulation skills. ,.        Patient has reviewed and agreed to the above plan.    Mario Long PsyD  January 25, 2023

## 2023-01-25 NOTE — LETTER
1/25/2023         RE: Lizzie Viera  [address] [place] [address]        Dear Colleague,    Thank you for referring your patient, Lizzie Viera, to the [facility]. Please see a copy of my visit note below.    No notes on file    Again, thank you for allowing me to participate in the care of your patient.        Sincerely,        Mario Long PsyD

## 2023-02-03 ENCOUNTER — TELEPHONE (OUTPATIENT)
Dept: PALLIATIVE CARE | Facility: CLINIC | Age: 73
End: 2023-02-03
Payer: COMMERCIAL

## 2023-02-06 NOTE — TELEPHONE ENCOUNTER
Central Prior Authorization Team   Phone: [phone]      PA Initiation    Medication: METHOCARBAMOL 500mg   Insurance Company: [employer] - Phone [phone] Fax [phone]  Pharmacy Filling the Rx: [id number] - [place], [place] - [address]  Filling Pharmacy Phone: [phone]  Filling Pharmacy Fax:    Start Date: 2/6/2023

## 2023-02-07 NOTE — TELEPHONE ENCOUNTER
Prior Authorization Approval    Authorization Effective Date: 1/7/2023  Authorization Expiration Date: 2/6/2024  Medication: METHOCARBAMOL 500mg -PA approved  Approved Dose/Quantity:   Reference #: [id number]   Insurance Company: [employer] - Phone [phone] Fax [phone]  Expected CoPay:       CoPay Card Available:      Foundation Assistance Needed:    Which Pharmacy is filling the prescription (Not needed for infusion/clinic administered): [id number] - [place], [place] - [address]  Pharmacy Notified: Yes  Patient Notified: No

## 2023-02-08 ENCOUNTER — ONCOLOGY VISIT (OUTPATIENT)
Dept: ONCOLOGY | Facility: CLINIC | Age: 73
End: 2023-02-08
Attending: PSYCHOLOGIST
Payer: COMMERCIAL

## 2023-02-08 DIAGNOSIS — F43.22 ADJUSTMENT DISORDER WITH ANXIETY: Primary | ICD-10-CM

## 2023-02-08 PROCEDURE — 90837 PSYTX W PT 60 MINUTES: CPT | Performed by: PSYCHOLOGIST

## 2023-02-08 NOTE — LETTER
2/8/2023         RE: Lizzie Viera  [address] [place] [address]        Dear Colleague,    Thank you for referring your patient, Lizzie Viera, to the [facility]. Please see a copy of my visit note below.    NA      Again, thank you for allowing me to participate in the care of your patient.        Sincerely,        Mario Long PsyD

## 2023-02-08 NOTE — CONFIDENTIAL NOTE
[facility] Oncology- Psychotherapy                                    Progress Note    Patient Name: Lizzie Viera  Date: 2/8/2023         Service Type: Individual      Session Start Time: 1200  Session End Time: 1255     Session Length: 55 mins    Session #: 3    Attendees: Client attended alone    Service Modality:  In-person    DATA  Interactive Complexity: No  Crisis: No        Progress Since Last Session (Related to Symptoms / Goals / Homework):   Symptoms: Improving , mood seems sonewhat improved.     Homework: None      Episode of Care Goals: Satisfactory progress - ACTION (Actively working towards change); Intervened by reinforcing change plan / affirming steps taken     Current / Ongoing Stressors and Concerns:   Pt reports frustration with her spouse since he has not scheduled a therapy appointment.      Pt reports [place] has been frustrating due to the chemicals in the water and a bad smell particularly on weekends.       Pt has a goal to move to a place that uses well water.      Pt reports [place]. She is looking forward to the trip with her sister. Pts spouse is not going.      Pt reports her daughter Collette is not consistently parenting her kids and pt is having to show consistent boundaries to them. She has kids ages 5 and 9.      Pt is frustrated with Collette for driving her car to [facility] after asked her to not do that.      Treatment Objective(s) Addressed in This Session:   identify multiple stressors which contribute to feelings of anxiety  .     Intervention:   CBT: ,  Solution Focused: ,    Assessments completed prior to visit:  The following assessments were completed by patient for this visit:  none      ASSESSMENT: Current Emotional / Mental Status (status of significant symptoms):   Risk status (Self / Other harm or suicidal ideation)   Patient denies current fears or concerns for personal safety.   Patient denies  current or recent suicidal ideation or behaviors.   Patient denies current or recent homicidal ideation or behaviors.   Patient denies current or recent self injurious behavior or ideation.   Patient denies other safety concerns.   Patient reports there has been no change in risk factors since their last session.     Patient reports there has been no change in protective factors since their last session.     Recommended that patient call 911 or go to the local ED should there be a change in any of these risk factors.     Appearance:   Appropriate    Eye Contact:   Good    Psychomotor Behavior: Normal    Attitude:   Cooperative    Orientation:   All   Speech    Rate / Production: Normal     Volume:  Normal    Mood:    Normal   Affect:    Appropriate    Thought Content:  Clear    Thought Form:  Coherent  Logical    Insight:    Good      Changes in Health Issues:   None reported     Chemical Use Review:   Substance Use: Chemical use reviewed, no active concerns identified     Diagnosis:  No diagnosis found.    Collateral Reports Completed:   Not Applicable    PLAN: (Patient Tasks / Therapist Tasks / Other)  Return 2/22/23        Mario Long PsyD                                                         ____________________________________________________________________________________________________________________________________________     Individual Treatment Plan     Patient's Name: Lizzie Viera                   YOB: 1950     Date of Creation: 1/11/2023  Date Treatment Plan Last Reviewed/Revised: 2/8/2023     DSM5 Diagnoses: Adjustment Disorder  Psychosocial / Contextual Factors: stress with spouse  PROMIS (reviewed every 90 days):      Referral / Collaboration:  Referral to another professional/service is not indicated at this time..     Anticipated number of session for this episode of care: 9-12 sessions  Anticipation frequency of session: Biweekly  Anticipated Duration of each  session: 53 or more minutes  Treatment plan will be reviewed in 90 days or when goals have been changed.         MeasurableTreatment Goal(s) related to diagnosis / functional impairment(s)  Goal 1: Patient will reduce anxiety    I will know I've met my goal when I feel less anxious.       Objective #A (Patient Action)                          Patient will identify multiple stressors which contribute to feelings of anxiety.  Status: Reviewed 2/8/23     Intervention(s)  Therapist will teach emotional regulation skills. ,.     Objective #B  Patient will identify multiple stressors which contribute to feelings of anxiety.  Status: Reviewed 2/8/23     Intervention(s)  Therapist will teach emotional regulation skills. ,.        Patient has reviewed and agreed to the above plan.     Mario Long PsyD  February 8, 2023

## 2023-02-09 ENCOUNTER — VIRTUAL VISIT (OUTPATIENT)
Dept: RADIATION ONCOLOGY | Facility: CLINIC | Age: 73
End: 2023-02-09
Attending: INTERNAL MEDICINE
Payer: COMMERCIAL

## 2023-02-09 DIAGNOSIS — R53.0 NEOPLASTIC MALIGNANT RELATED FATIGUE: ICD-10-CM

## 2023-02-09 DIAGNOSIS — M48.50XA PATHOLOGIC COMPRESSION FRACTURE OF SPINE, INITIAL ENCOUNTER (H): ICD-10-CM

## 2023-02-09 DIAGNOSIS — C90.00 MULTIPLE MYELOMA NOT HAVING ACHIEVED REMISSION (H): ICD-10-CM

## 2023-02-09 DIAGNOSIS — Z51.5 ENCOUNTER FOR PALLIATIVE CARE: Primary | ICD-10-CM

## 2023-02-09 DIAGNOSIS — G89.3 CANCER ASSOCIATED PAIN: ICD-10-CM

## 2023-02-09 PROCEDURE — G0463 HOSPITAL OUTPT CLINIC VISIT: HCPCS | Mod: PN,GT | Performed by: FAMILY MEDICINE

## 2023-02-09 PROCEDURE — 99215 OFFICE O/P EST HI 40 MIN: CPT | Mod: VID | Performed by: FAMILY MEDICINE

## 2023-02-09 NOTE — NURSING NOTE
Is the patient currently in the state of [place]? YES    Visit mode:VIDEO    If the visit is dropped, the patient can be reconnected by: VIDEO VISIT: Text to cell phone: [phone]    Will anyone else be joining the visit? NO      How would you like to obtain your AVS? MyChart    Are changes needed to the allergy or medication list? NO    Comments or concerns regarding today's visit: Patient declined individual allergy and medication review by support staff because she stated there have been no changes since 1/4 review. PO

## 2023-02-09 NOTE — PATIENT INSTRUCTIONS
It was good to see you today, Valeria.    Here are the things we talked about:  Enjoy [place]!  No med changes today.  Keep trying to interrupt the thoughts that aren't helpful for you.    Someone from the team will reach out to schedule a follow up appointment in 12 weeks.       How to get a hold of us:  For non-urgent matters, MyChart works best.    For more urgent matters, or if you prefer not to use MyChart, call our clinic nurse coordinator Carine Ferreira RN at [phone]    We have an on-call number for evenings and weekends. Please call this only if you are having uncontrolled symptoms or serious side effects from your medicines: [phone].     For refills, please give us a week (5 working days) notice. We don't always have providers available everyday to do refills. If you call the day you run out of your medicine, we may not be able to refill it in time, so call 5 days in advance!    Yuri Salazar MD MS FAAFP  [facility]  Office [phone]  Fax [phone]

## 2023-02-09 NOTE — PROGRESS NOTES
Video-Visit Details    Type of service:  Video Visit    Video Start Time (time video started): 1150    Video End Time (time video stopped): 1220    Originating Location (pt. Location): Home        Distant Location (provider location):  On-site    Mode of Communication:  Video Conference via [facility]    Palliative Care Outpatient Clinic Progress Note    Patient Name: Lizzie Viera  Primary Provider: Sona Sandoval    Impression & Recommendations & Counseling:  Multiple myeloma  Compression Fracture mid-T spine  Cancer associated pain--well controlled  Insomnia well controlled     ECO  Decisional Capacity: very present    SYMPTOM ASSESSMENT:  1.  Cancer related pain thoracic spine pain that radiates to bilateral lower extremities  Comment: Controlled.  Currently rates pain as low on days when doesn't overdo it (3-4) but someday's at the end of the day if she's been on her feet a lot and done a lot of housework and it is 7-8/10.  Resting and going to bed helps after awhile.  She uses the medical marijuana maybe once every two weeks.  She hasn't used dilaudid for months. She is looking forward to go to [place] at the end of the month for 18 days.  - Continue Gabapentin 600 mg by mouth 3 times daily.  - S/P Single fraction radiation to T6 through T .  - S/P T1 radiation 10/6/2021-10/12/2021.    - Dilaudid to 2-4 mg by mouth every 4 hours as needed.  Takes infrequently.  - Continue medical cannabis per department of Health - pills and oil. Feels she is having good relief with this.  Does not take cannabis when knows she has to drive the next day.  - Continue Robaxin 500 mg po q6h prn spasms - taking 3 times a day. Refilled last week.  - Tried baclofen at bedtime without benefit. It did not assist with sleep or relief of spasms.  -Discussed possibility of initiation of Cymbalta as this could help with neuropathic component to pain as well as assist with depression.  Patient would like to hold off on this  "time until she returns from [place].  Does not want to feel any masking of highs or lows per her report.     - Patient is taking naltrexone 3 mg by mouth daily.  Had discussion with patient that this could reverse/minimize effect of opiate and she may feel more pain.  Taking this as a \"anti-inflammatory\" component of her natural/complementary treatments and doesn't feel it interferes when she uses her infrequent dilaudid. Her last hsCRP was elevated.  She is also using Tumeric to help reduce inflammation.  She has naturopath who she visits in [place].     2. Cancer related fatigue - fluctuates.  Feels fatigued at days end occasionally but she is now exercising up to four times a day on her treadmill and she is walking at 3.3 mph with a max of 3.7 mph (with a goal to resume 5 mph!) She finds going faster than 3.7 mph is hard on her back.  - Activity as tolerated.  - Patient utilizing a cane when walking around but walking up to 3.3 mph on treadmill.     3. Anxiety and depression related to health/diagnosis and current social stressors  - Patient is on list to see Kacy Edourad  for coping, support and processing diagnosis; I encouraged her to call the clinic next week if she hasn't been called to schedule an appointment.  - Patient sees an energy healer.     ADVANCED CARE PLANNING/GOALS OF CARE DISCUSSION:  - Code status: DNR/DNI  - Honoring Choices and POLST in chart.  - Follow-up in palliative care clinic in 3 months for ongoing pain management and decisional support.   -Call [phone] with questions or refill needs.    Counseling: All of the above was explained to the patient in lay language. The patient has verbalized a clear understanding of the discussion, asked appropriate questions, which have been answered to patient's apparent satisfaction. The patient is in agreement with the above plan.      Chief Complaint/Patient ID: Lizzie Viera 72 year old female with PMHx of multiple myeloma    Last " Palliative care appointment: 11/15/2022  with me     Reviewed: yes, no concerns, last opiate Rx was April 2022    Interim History:  Lizzie Viera is a  72 year old female who is seen today for follow up with Palliative Care via billable video visit.      Pain:  as above usually well controlled unless she is very physically active    Appetite/Nausea: OK; minimal nausea     Bowels: alternates constipation and diarrhea with Revlimid     Sleep: some trouble falling asleep; has begun to use a sleep mask     Mood: things feel stable and enjoying creating her therapeutic relationship with Jack; she still misses Mary Ellen Chan    Other:  Excited about upcoming trip to [place] ([facility])     Coping:  Can find herself periodically worried that the MM might return--almost like a panic attack;    Family History- Reviewed in Epic.    Allergies   Allergen Reactions     Cefdinir Shortness Of Breath     Latex Shortness Of Breath     Short Ragweed Pollen Ext Cough     Adhesive Tape Rash       Social History:  Pertinent changes to social history/social situation since last visit: none  Key support resources: sister who she will be with in [place]; grandsons Maximiliano (5) and Dayday (10 yo)  Advance Directive Status:  present in Epic    Social History     Tobacco Use     Smoking status: Some Days     Packs/day: 0.50     Years: 45.00     Pack years: 22.50     Types: Cigarettes     Start date: 6/16/2022     Smokeless tobacco: Never   Vaping Use     Vaping Use: Never used   Substance Use Topics     Alcohol use: Yes     Comment: Alcoholic Drinks/day: 0-1 drinks per week     Drug use: Not Currently         Allergies   Allergen Reactions     Cefdinir Shortness Of Breath     Latex Shortness Of Breath     Short Ragweed Pollen Ext Cough     Adhesive Tape Rash     Current Outpatient Medications   Medication Sig Dispense Refill     acyclovir (ZOVIRAX) 400 MG tablet Take 1 tablet (400 mg) by mouth 2 times daily Viral Prophylaxis. 180  tablet 1     albuterol (PROAIR HFA/PROVENTIL HFA/VENTOLIN HFA) 108 (90 Base) MCG/ACT inhaler Inhale 2 puffs into the lungs every 6 hours as needed for wheezing or shortness of breath / dyspnea (Patient not taking: Reported on 12/15/2022) 18 g 1     albuterol (PROVENTIL) (2.5 MG/3ML) 0.083% neb solution Take 1 vial (2.5 mg) by nebulization every 4 hours as needed for wheezing or shortness of breath / dyspnea (Patient not taking: Reported on 12/15/2022) 90 mL 0     alpha-lipoic acid 100 MG capsule Take 300 mg by mouth daily       amLODIPine (NORVASC) 5 MG tablet TAKE ONE TABLET BY MOUTH ONE TIME DAILY 30 tablet 11     Ascorbic Acid (VITAMIN C) 100 MG CHEW Take 1 tablet by mouth daily       aspirin (ASA) 81 MG chewable tablet Take 81 mg by mouth daily       baclofen (LIORESAL) 10 MG tablet TAKE 1 TABLET BY MOUTH 3 TIMES A DAY AS NEEDED FOR MUSCLE SPASMS 60 tablet 0     Coenzyme Q10 300 MG CAPS Take 300 mg by mouth daily       fish oil-omega-3 fatty acids 1000 MG capsule Take 2 g by mouth daily       gabapentin (NEURONTIN) 300 MG capsule TAKE TWO CAPSULES BY MOUTH THREE TIMES DAILY 180 capsule 0     HYDROmorphone (DILAUDID) 4 MG tablet Take 0.5-1 tablets (2-4 mg) by mouth every 4 hours as needed for moderate to severe pain 60 tablet 0     [START ON 2/15/2023] LENalidomide (REVLIMID) 10 MG CAPS capsule Take 1 capsule (10 mg) by mouth daily for 28 days Take at the same time each day. Do not open, break or chew the capsule. Swallow whole, with water. 28 capsule 0     medical cannabis (Patient's own supply) See Admin Instructions (The purpose of this order is to document that the patient reports taking medical cannabis.  This is not a prescription, and is not used to certify that the patient has a qualifying medical condition.)     Oil formulation       Melatonin 10 MG TABS tablet Take 20 mg by mouth At Bedtime       methocarbamol (ROBAXIN) 500 MG tablet Take 1 tablet (500 mg) by mouth 4 times daily as needed for muscle  spasms 120 tablet 4     Milk Thistle-Dand-Fennel-Licor (MILK THISTLE XTRA) CAPS capsule Take 1 capsule by mouth daily       NALTREXONE HCL PO Take 3 mg by mouth daily       nicotine (NICOTROL) 10 MG inhaler Use 1 cartridge as needed for urge to smoke by puffing over course of 20min.  Use 6-16 cart/day; reduce number of cart/day over 6-12 weeks. 300 each 4     phenazopyridine (AZO URINARY PAIN RELIEF) 95 MG tablet Take 190 mg by mouth 3 times daily       TURMERIC PO Take 1 capsule by mouth daily       UNABLE TO FIND 1 capsule daily MEDICATION NAME: Serrapeptase       UNABLE TO FIND 1 capsule daily MEDICATION NAME: Edita Mariano Mushroom       UNABLE TO FIND 1 capsule daily MEDICATION NAME: DIM (diinolylmethane)       UNABLE TO FIND MEDICATION NAME: Panacur C - 1 capsule daily       Vitamin A 3 MG (12267 UT) TABS Take 1 tablet by mouth daily       Vitamin D, Cholecalciferol, 25 MCG (1000 UT) CAPS Take 1,000 Units by mouth daily Liquid, not capsule       Past Medical History:   Diagnosis Date     Cervical dysplasia      Chronic RUQ pain      Depressive disorder      Multiple myeloma (H)      Osteoporosis      Past Surgical History:   Procedure Laterality Date     CONIZATION CERVIX,KNIFE/LASER      Description: Cervical Conization By Laser;  Recorded: 09/20/2007;     HC DILATION/CURETTAGE DIAG/THER NON OB      Description: Dilation And Curettage;  Recorded: 09/20/2007;      REMOVE TONSILS/ADENOIDS,<11 Y/O      Description: Tonsillectomy With Adenoidectomy;  Recorded: 09/20/2007;     [facility] LAP,CHOLECYSTECTOMY/EXPLORE  12/27/2004     [facility] LIGATE FALLOPIAN TUBE      Description: Tubal Ligation;  Recorded: 09/20/2007;        Physical Exam:   GENERAL APPEARANCE:    healthy, alert and no distress; neatly groomed  EYES: Eyes grossly normal to inspection, PERRLA, conjunctivae and sclerae without injection or discharge, EOM intact   RESP:  no increased work of breathing; speaks in complete sentences;   MS: No musculoskeletal defects  are noted  SKIN: No suspicious lesions or rashes, hydration status appears adequate with normal skin turgor   PSYCH: Alert and oriented x3; speech- coherent , normal rate and volume; able to articulate logical thoughts, able to abstract reason, no tangential thoughts, no hallucinations or delusions, mentation appears normal, Mood is euthymic. Affect is appropriate for this mood state and bright. Thought content is free of suicidal ideation, hallucinations, and delusions.  Eye contact is good during conversation.       Key Data Reviewed:  LABS: 2023- Cr 0.82, Albumin 4.1,  Hgb 14.4,      IMAGIN2022 CTA AP w/contrast  IMPRESSION:  1.  May be relative stenosis of the proximal celiac axis given luminal diameter is approximately 4 mm on sagittal imaging, though not clearly related to atherosclerotic plaque. 2 cm beyond the origin, celiac axis is 8 mm.  2.  Remaining visceral arteries are patent with accessory right renal artery. Infrarenal abdominal aorta is mildly irregular with mural thrombus, though without aneurysmal dilatation or stenosis.  3.  Scattered plaque throughout both common iliac arteries with focal atherosclerotic calcified plaque in the right common iliac artery with greater than 50% narrowing. This is a short segment lesion of less than 2 cm.  4.  Left adrenal gland nodule and bilateral renal hypodensities are relatively unchanged. Adrenal nodule is thought to be benign given density. Adjacent areas of nodularity are unchanged.    40 minutes were spent on the date of the encounter doing chart review, history and exam, documentation and further activities as noted above.    Yuri Salazar MD MS FAAFP  [facility] Palliative Care Service  Office [phone]  Fax [phone]

## 2023-02-15 ENCOUNTER — LAB (OUTPATIENT)
Dept: INFUSION THERAPY | Facility: HOSPITAL | Age: 73
End: 2023-02-15
Attending: INTERNAL MEDICINE
Payer: COMMERCIAL

## 2023-02-15 ENCOUNTER — ONCOLOGY VISIT (OUTPATIENT)
Dept: ONCOLOGY | Facility: HOSPITAL | Age: 73
End: 2023-02-15
Attending: INTERNAL MEDICINE
Payer: COMMERCIAL

## 2023-02-15 VITALS
OXYGEN SATURATION: 97 % | SYSTOLIC BLOOD PRESSURE: 163 MMHG | RESPIRATION RATE: 16 BRPM | HEART RATE: 53 BPM | TEMPERATURE: 97.9 F | BODY MASS INDEX: 24.75 KG/M2 | WEIGHT: 131 LBS | DIASTOLIC BLOOD PRESSURE: 70 MMHG

## 2023-02-15 DIAGNOSIS — C90.00 MULTIPLE MYELOMA WITH FAILED REMISSION (H): ICD-10-CM

## 2023-02-15 DIAGNOSIS — F43.22 ADJUSTMENT DISORDER WITH ANXIETY: Primary | ICD-10-CM

## 2023-02-15 DIAGNOSIS — C90.00 MULTIPLE MYELOMA NOT HAVING ACHIEVED REMISSION (H): ICD-10-CM

## 2023-02-15 DIAGNOSIS — M80.80XD LOCALIZED OSTEOPOROSIS WITH CURRENT PATHOLOGICAL FRACTURE WITH ROUTINE HEALING, SUBSEQUENT ENCOUNTER: ICD-10-CM

## 2023-02-15 DIAGNOSIS — M84.58XK PATHOLOGICAL FRACTURE OF VERTEBRAE IN NEOPLASTIC DISEASE WITH NONUNION: ICD-10-CM

## 2023-02-15 LAB
ALBUMIN SERPL BCG-MCNC: 4 G/DL (ref 3.5–5.2)
ALP SERPL-CCNC: 39 U/L (ref 35–104)
ALT SERPL W P-5'-P-CCNC: 35 U/L (ref 10–35)
ANION GAP SERPL CALCULATED.3IONS-SCNC: 10 MMOL/L (ref 7–15)
AST SERPL W P-5'-P-CCNC: 29 U/L (ref 10–35)
BASOPHILS # BLD AUTO: 0.1 10E3/UL (ref 0–0.2)
BASOPHILS NFR BLD AUTO: 2 %
BILIRUB SERPL-MCNC: 0.3 MG/DL
BUN SERPL-MCNC: 14.8 MG/DL (ref 8–23)
CALCIUM SERPL-MCNC: 9.2 MG/DL (ref 8.8–10.2)
CHLORIDE SERPL-SCNC: 107 MMOL/L (ref 98–107)
CREAT SERPL-MCNC: 0.67 MG/DL (ref 0.51–0.95)
DEPRECATED HCO3 PLAS-SCNC: 27 MMOL/L (ref 22–29)
EOSINOPHIL # BLD AUTO: 0.3 10E3/UL (ref 0–0.7)
EOSINOPHIL NFR BLD AUTO: 7 %
ERYTHROCYTE [DISTWIDTH] IN BLOOD BY AUTOMATED COUNT: 14.6 % (ref 10–15)
GFR SERPL CREATININE-BSD FRML MDRD: >90 ML/MIN/1.73M2
GLUCOSE SERPL-MCNC: 79 MG/DL (ref 70–99)
HCT VFR BLD AUTO: 41.6 % (ref 35–47)
HGB BLD-MCNC: 14 G/DL (ref 11.7–15.7)
IMM GRANULOCYTES # BLD: 0 10E3/UL
IMM GRANULOCYTES NFR BLD: 0 %
LYMPHOCYTES # BLD AUTO: 1.3 10E3/UL (ref 0.8–5.3)
LYMPHOCYTES NFR BLD AUTO: 31 %
MCH RBC QN AUTO: 35.4 PG (ref 26.5–33)
MCHC RBC AUTO-ENTMCNC: 33.7 G/DL (ref 31.5–36.5)
MCV RBC AUTO: 105 FL (ref 78–100)
MONOCYTES # BLD AUTO: 0.5 10E3/UL (ref 0–1.3)
MONOCYTES NFR BLD AUTO: 12 %
NEUTROPHILS # BLD AUTO: 2.1 10E3/UL (ref 1.6–8.3)
NEUTROPHILS NFR BLD AUTO: 48 %
NRBC # BLD AUTO: 0 10E3/UL
NRBC BLD AUTO-RTO: 0 /100
PLATELET # BLD AUTO: 175 10E3/UL (ref 150–450)
POTASSIUM SERPL-SCNC: 3.2 MMOL/L (ref 3.4–5.3)
PROT SERPL-MCNC: 6.3 G/DL (ref 6.4–8.3)
RBC # BLD AUTO: 3.95 10E6/UL (ref 3.8–5.2)
SODIUM SERPL-SCNC: 144 MMOL/L (ref 136–145)
TOTAL PROTEIN SERUM FOR ELP: 5.9 G/DL (ref 6.4–8.3)
WBC # BLD AUTO: 4.3 10E3/UL (ref 4–11)

## 2023-02-15 PROCEDURE — 84155 ASSAY OF PROTEIN SERUM: CPT

## 2023-02-15 PROCEDURE — G0463 HOSPITAL OUTPT CLINIC VISIT: HCPCS | Performed by: INTERNAL MEDICINE

## 2023-02-15 PROCEDURE — 84165 PROTEIN E-PHORESIS SERUM: CPT | Mod: TC | Performed by: PATHOLOGY

## 2023-02-15 PROCEDURE — 82784 ASSAY IGA/IGD/IGG/IGM EACH: CPT

## 2023-02-15 PROCEDURE — 85025 COMPLETE CBC W/AUTO DIFF WBC: CPT

## 2023-02-15 PROCEDURE — 83521 IG LIGHT CHAINS FREE EACH: CPT | Mod: 59

## 2023-02-15 PROCEDURE — 99214 OFFICE O/P EST MOD 30 MIN: CPT | Performed by: INTERNAL MEDICINE

## 2023-02-15 PROCEDURE — 36415 COLL VENOUS BLD VENIPUNCTURE: CPT

## 2023-02-15 PROCEDURE — 80053 COMPREHEN METABOLIC PANEL: CPT

## 2023-02-15 ASSESSMENT — PAIN SCALES - GENERAL: PAINLEVEL: MODERATE PAIN (4)

## 2023-02-15 NOTE — PROGRESS NOTES
"Oncology Rooming Note    February 15, 2023 2:02 PM   Lizzie Viera is a 72 year old female who presents for:    Chief Complaint   Patient presents with     Oncology Clinic Visit     Multiple myeloma not having achieved remission (H)  Multiple myeloma with failed remission (H)     Initial Vitals: BP (!) 163/70   Pulse 53   Temp 97.9  F (36.6  C)   Resp 16   Wt 59.4 kg (131 lb)   LMP  (LMP Unknown)   SpO2 97%   BMI 24.75 kg/m   Estimated body mass index is 24.75 kg/m  as calculated from the following:    Height as of 12/15/22: 1.549 m (5' 1\").    Weight as of this encounter: 59.4 kg (131 lb). Body surface area is 1.6 meters squared.  Moderate Pain (4) Comment: Data Unavailable   No LMP recorded (lmp unknown). Patient is postmenopausal.  Allergies reviewed: Yes  Medications reviewed: Yes    Medications: MEDICATION REFILLS NEEDED TODAY. Provider was notified.  Pharmacy name entered into [facility]: [id number] [address]    Clinical concerns: 2-3 weeks feels nauseated, diarrhea, cramps, bruising easily.       Viviane Chu LPN            "

## 2023-02-15 NOTE — LETTER
"    2/15/2023         RE: Lizzie Viera  [address]  [place] [address]        Dear Colleague,    Thank you for referring your patient, Lizzie Viera, to the [facility] [place]. Please see a copy of my visit note below.    Oncology Rooming Note    February 15, 2023 2:02 PM   Lizzie Viera is a 72 year old female who presents for:    Chief Complaint   Patient presents with     Oncology Clinic Visit     Multiple myeloma not having achieved remission (H)  Multiple myeloma with failed remission (H)     Initial Vitals: BP (!) 163/70   Pulse 53   Temp 97.9  F (36.6  C)   Resp 16   Wt 59.4 kg (131 lb)   LMP  (LMP Unknown)   SpO2 97%   BMI 24.75 kg/m   Estimated body mass index is 24.75 kg/m  as calculated from the following:    Height as of 12/15/22: 1.549 m (5' 1\").    Weight as of this encounter: 59.4 kg (131 lb). Body surface area is 1.6 meters squared.  Moderate Pain (4) Comment: Data Unavailable   No LMP recorded (lmp unknown). Patient is postmenopausal.  Allergies reviewed: Yes  Medications reviewed: Yes    Medications: MEDICATION REFILLS NEEDED TODAY. Provider was notified.  Pharmacy name entered into [facility]: [facility] #[id number] [address]    Clinical concerns: 2-3 weeks feels nauseated, diarrhea, cramps, bruising easily.       Viviane Chu LPN              [facility] Hematology and Oncology Progress Note    Patient: Lizzie Viera  MRN: [id number]  Date of Service: Feb 15, 2023         Reason for Visit    Multiple Myeloma    Assessment     1.  A very pleasant 72 year old woman with IgG kappa multiple myeloma with hyperdiploid cytogenetics.  However clinically was behaving somewhat more aggressively.  Started on VRD treatment.  Responded quite well. After 17 cycles the treatment was switched to Revlimid 10 mg daily in September 2022.  Valeria has been tolerating it quite well.  The lab work-up in the end " of November 2022 showed maintenance of response.  2.  Significant bone disease with osteoporosis and compression fractures.  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  Improved significantly on the CT scan in March 2022.  MRI also shows no new lesions.  Most recent bone density still shows osteoporosis.  3.  Normal hemoglobin, calcium, kidney function along with beta-2 microglobulin and albumin at the time of diagnosis.  4.  Positive Covid antibodies from infection. Unvaccinated. Declined Evusheld.  5.  Pain from compression fracture status post radiation therapy.  Significantly improved.  6.  Some dental issues on left upper molars which were cracked and has been extracted.  She also has a tooth on the right lower jaw which the dentists performed extraction.  Now she may need a implant.  7.  Some side effects which was thinks might be from daily Revlimid.    Plan    1.  At this time I have recommended to Valeria that she should take few weeks break from Revlimid.  She is going down to [place] for 2 3 weeks so I think it is a good time to take that break.  2.  We discussed the starting of some sort of bone enhancement/improvement medication.  She had a dental extraction done in October 2022.  So she is over 4 months out from that.  Still needing some other procedures.  We may have to switch to denosumab instead of Revlimid because she does need something for her bones.  3.  Continue to monitor her labs.  We will call her with the results.  4.  Continue the other pandemic precautions.     Cancer Staging   No matching staging information was found for the patient.    ECOG Performance    2 - Ambulatory and independent in all ADLs; cannot work; up > 50% of the time      History of Present Illness    Ms. Lizzie Viera is a very pleasant 72 year old woman who has been diagnosed with multiple myeloma IgG kappa type in May 2021.  She had initially presented with compression fracture of T7 vertebral body in  September 2020.  She had a back pain which led to that evaluation.  Bone density confirmed that she had osteoporosis.  MRI showed diffuse marrow edema in October 2020.  In April 2021 the MRI of the thoracic spine showed worsening T7 vertebral body height loss because of likely pathological compression fracture with extraosseous extension.  She then had IR guided bone biopsy on 7 May 2021 and pathology confirmed the presence of kappa light chain restricted plasma cells.  Her cytogenetics confirmed the presence of hyperdiploid E with gains of chromosome 5, 9 and 15.    She was then seen by Dr. Baig and had a bone marrow biopsy which also confirmed 60% involvement of the marrow with plasma cells.  The bone marrow was done on 3 Jocelin 2021.  The bone marrow also confirmed the presence of hyperdiploid E.  She had a gain of chromosome 3, 5, 7, 9, 11, 15 as well as 19.  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  Her beta-2 microglobulin was actually normal at the time of her diagnosis as was her albumin at 4.0.  Monoclonal protein 3.1 g/dL.  Kappa free light chain levels of 13 mg/dL IgG level of 4280 with depressed levels of IgM and IgA.  Urine was also positive for kappa light chains as well as immunofixation of the urine was positive for IgG kappa.  Normal kidney function.  Normal hemoglobin.    As mentioned above she had bone lesions in the T7 vertebral body, T1, skull area.  Her main pain is coming from her T7 vertebral body compression fracture.    Due to the pain she has been seen by radiation oncology and has received radiation therapy.  She also got a dose of steroids for pain control.    She was initially thinking of doing some natural therapies but once her symptoms got worse, she came back in was counseled about treatment.      She started on Velcade Revlimid and dexamethasone in September 2021.  Responded nicely.  Immunoglobulins have  normalized completely. Her immunoglobin levels have almost normalized in fact the IgG levels  have gone below normal.  Immunofixation still shows a very faint kappa monoclonal gammopathy.    She was  hospitalized in April 2022 with COPD exacerbation/pneumonia.  Was given some steroids and antibiotic.  She was slightly hypoxic initially but then got better after that.    She was referred to [facility] for transplant evaluation.  She was somewhat reluctant to go forward with that.  Otherwise she seems to be doing quite well.  Her immunoglobulin levels have normalized or actually are below normal.  Immunofixation faintly positive.    She has cracked a molar on the upper left jaw sometime in July 2022.  There was some tooth decay.  She had them extracted.    In September 2022 we discontinued the VRD treatment and now Valeria is on Revlimid maintenance 10 mg p.o. daily.  She seems to be tolerating it well.  She is physically more active so she is noticing some soreness in her back.    Had dental extraction done in October from the right lower jaw.  That seems to be healing well.  However she may need some implants according to her.    Underwent bone densitometry recently.  That confirms presence of osteoporosis which is not a new finding for her.    She has been feeling a little bit more tired and fatigue at times especially if she overdoes it.  Also complaining of some diarrhea at times.  Wondering if that is from Revlimid.  Noticing increased bruisability.  Occasional low-grade nausea.    Review of systems.    A 14 point review of systems was obtained.  Positive findings noted in the history.  Rest of the review of system is otherwise negative.     Past History    Past Medical History:   Diagnosis Date     Cervical dysplasia      Chronic RUQ pain      Depressive disorder      Multiple myeloma (H)      Osteoporosis        Past Surgical History:   Procedure Laterality Date     CONIZATION CERVIX,KNIFE/LASER      Description: Cervical Conization By Laser;  Recorded: 09/20/2007;      DILATION/CURETTAGE  DIAG/THER NON OB      Description: Dilation And Curettage;  Recorded: 09/20/2007;     HC REMOVE TONSILS/ADENOIDS,<11 Y/O      Description: Tonsillectomy With Adenoidectomy;  Recorded: 09/20/2007;     [facility] LAP,CHOLECYSTECTOMY/EXPLORE  12/27/2004     [facility] LIGATE FALLOPIAN TUBE      Description: Tubal Ligation;  Recorded: 09/20/2007;         Physical Exam    BP (!) 163/70   Pulse 53   Temp 97.9  F (36.6  C)   Resp 16   Wt 59.4 kg (131 lb)   LMP  (LMP Unknown)   SpO2 97%   BMI 24.75 kg/m      GENERAL: No acute distress. Cooperative in conversation.   HEENT:  Pupils are equal, round and reactive. Oral mucosa is clean and intact. No ulcerations or mucositis noted. No bleeding noted.  RESP:Chest symmetric lungs are clear bilaterally per auscultation. Regular respiratory rate. No wheezes or rhonchi.  CV: Normal S1 S2 Regular, rate and rhythm. No murmurs.    ABD: Nondistended, soft, nontender. Positive bowel sounds. No organomegaly.   EXTREMITIES: No lower extremity edema.   NEURO: Non- focal. Alert and oriented x3.  Cranial nerves appear intact.  PSYCH: Within normal limits. No depression or anxiety.  SKIN: Warm dry intact.     Lab Results    Recent Results (from the past 168 hour(s))   Comprehensive metabolic panel   Result Value Ref Range    Sodium 144 136 - 145 mmol/L    Potassium 3.2 (L) 3.4 - 5.3 mmol/L    Chloride 107 98 - 107 mmol/L    Carbon Dioxide (CO2) 27 22 - 29 mmol/L    Anion Gap 10 7 - 15 mmol/L    Urea Nitrogen 14.8 8.0 - 23.0 mg/dL    Creatinine 0.67 0.51 - 0.95 mg/dL    Calcium 9.2 8.8 - 10.2 mg/dL    Glucose 79 70 - 99 mg/dL    Alkaline Phosphatase 39 35 - 104 U/L    AST 29 10 - 35 U/L    ALT 35 10 - 35 U/L    Protein Total 6.3 (L) 6.4 - 8.3 g/dL    Albumin 4.0 3.5 - 5.2 g/dL    Bilirubin Total 0.3 <=1.2 mg/dL    GFR Estimate >90 >60 mL/min/1.73m2   CBC with platelets and differential   Result Value Ref Range    WBC Count 4.3 4.0 - 11.0 10e3/uL    RBC Count 3.95 3.80 - 5.20 10e6/uL    Hemoglobin 14.0  11.7 - 15.7 g/dL    Hematocrit 41.6 35.0 - 47.0 %     (H) 78 - 100 fL    MCH 35.4 (H) 26.5 - 33.0 pg    MCHC 33.7 31.5 - 36.5 g/dL    RDW 14.6 10.0 - 15.0 %    Platelet Count 175 150 - 450 10e3/uL    % Neutrophils 48 %    % Lymphocytes 31 %    % Monocytes 12 %    % Eosinophils 7 %    % Basophils 2 %    % Immature Granulocytes 0 %    NRBCs per 100 WBC 0 <1 /100    Absolute Neutrophils 2.1 1.6 - 8.3 10e3/uL    Absolute Lymphocytes 1.3 0.8 - 5.3 10e3/uL    Absolute Monocytes 0.5 0.0 - 1.3 10e3/uL    Absolute Eosinophils 0.3 0.0 - 0.7 10e3/uL    Absolute Basophils 0.1 0.0 - 0.2 10e3/uL    Absolute Immature Granulocytes 0.0 <=0.4 10e3/uL    Absolute NRBCs 0.0 10e3/uL       Imaging    No results found.      Signed by: Loco Blanco MD,         Again, thank you for allowing me to participate in the care of your patient.        Sincerely,        Loco Blanco MD, MD

## 2023-02-15 NOTE — PROGRESS NOTES
[facility] Hematology and Oncology Progress Note    Patient: Lizzie Viera  MRN: [id number]  Date of Service: Feb 15, 2023         Reason for Visit    Multiple Myeloma    Assessment     1.  A very pleasant 72 year old woman with IgG kappa multiple myeloma with hyperdiploid cytogenetics.  However clinically was behaving somewhat more aggressively.  Started on VRD treatment.  Responded quite well. After 17 cycles the treatment was switched to Revlimid 10 mg daily in September 2022.  Valeria has been tolerating it quite well.  The lab work-up in the end of November 2022 showed maintenance of response.  2.  Significant bone disease with osteoporosis and compression fractures.  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  Improved significantly on the CT scan in March 2022.  MRI also shows no new lesions.  Most recent bone density still shows osteoporosis.  3.  Normal hemoglobin, calcium, kidney function along with beta-2 microglobulin and albumin at the time of diagnosis.  4.  Positive Covid antibodies from infection. Unvaccinated. Declined Evusheld.  5.  Pain from compression fracture status post radiation therapy.  Significantly improved.  6.  Some dental issues on left upper molars which were cracked and has been extracted.  She also has a tooth on the right lower jaw which the dentists performed extraction.  Now she may need a implant.  7.  Some side effects which was thinks might be from daily Revlimid.    Plan    1.  At this time I have recommended to Valeria that she should take few weeks break from Revlimid.  She is going down to [place] for 2 3 weeks so I think it is a good time to take that break.  2.  We discussed the starting of some sort of bone enhancement/improvement medication.  She had a dental extraction done in October 2022.  So she is over 4 months out from that.  Still needing some other procedures.  We may have to switch to denosumab instead of Revlimid because she does need something  for her bones.  3.  Continue to monitor her labs.  We will call her with the results.  4.  Continue the other pandemic precautions.     Cancer Staging   No matching staging information was found for the patient.    ECOG Performance    2 - Ambulatory and independent in all ADLs; cannot work; up > 50% of the time      History of Present Illness    Ms. Lizzie Viera is a very pleasant 72 year old woman who has been diagnosed with multiple myeloma IgG kappa type in May 2021.  She had initially presented with compression fracture of T7 vertebral body in September 2020.  She had a back pain which led to that evaluation.  Bone density confirmed that she had osteoporosis.  MRI showed diffuse marrow edema in October 2020.  In April 2021 the MRI of the thoracic spine showed worsening T7 vertebral body height loss because of likely pathological compression fracture with extraosseous extension.  She then had IR guided bone biopsy on 7 May 2021 and pathology confirmed the presence of kappa light chain restricted plasma cells.  Her cytogenetics confirmed the presence of hyperdiploid E with gains of chromosome 5, 9 and 15.    She was then seen by Dr. Baig and had a bone marrow biopsy which also confirmed 60% involvement of the marrow with plasma cells.  The bone marrow was done on 3 Jocelin 2021.  The bone marrow also confirmed the presence of hyperdiploid E.  She had a gain of chromosome 3, 5, 7, 9, 11, 15 as well as 19.  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  Her beta-2 microglobulin was actually normal at the time of her diagnosis as was her albumin at 4.0.  Monoclonal protein 3.1 g/dL.  Kappa free light chain levels of 13 mg/dL IgG level of 4280 with depressed levels of IgM and IgA.  Urine was also positive for kappa light chains as well as immunofixation of the urine was positive for IgG kappa.  Normal kidney function.  Normal hemoglobin.    As mentioned above she had bone lesions in the T7 vertebral body, T1, skull area.  Her main  pain is coming from her T7 vertebral body compression fracture.    Due to the pain she has been seen by radiation oncology and has received radiation therapy.  She also got a dose of steroids for pain control.    She was initially thinking of doing some natural therapies but once her symptoms got worse, she came back in was counseled about treatment.      She started on Velcade Revlimid and dexamethasone in September 2021.  Responded nicely.  Immunoglobulins have  normalized completely. Her immunoglobin levels have almost normalized in fact the IgG levels have gone below normal.  Immunofixation still shows a very faint kappa monoclonal gammopathy.    She was  hospitalized in April 2022 with COPD exacerbation/pneumonia.  Was given some steroids and antibiotic.  She was slightly hypoxic initially but then got better after that.    She was referred to [facility] for transplant evaluation.  She was somewhat reluctant to go forward with that.  Otherwise she seems to be doing quite well.  Her immunoglobulin levels have normalized or actually are below normal.  Immunofixation faintly positive.    She has cracked a molar on the upper left jaw sometime in July 2022.  There was some tooth decay.  She had them extracted.    In September 2022 we discontinued the VRD treatment and now Valeria is on Revlimid maintenance 10 mg p.o. daily.  She seems to be tolerating it well.  She is physically more active so she is noticing some soreness in her back.    Had dental extraction done in October from the right lower jaw.  That seems to be healing well.  However she may need some implants according to her.    Underwent bone densitometry recently.  That confirms presence of osteoporosis which is not a new finding for her.    She has been feeling a little bit more tired and fatigue at times especially if she overdoes it.  Also complaining of some diarrhea at times.  Wondering if that is from Revlimid.  Noticing increased  bruisability.  Occasional low-grade nausea.    Review of systems.    A 14 point review of systems was obtained.  Positive findings noted in the history.  Rest of the review of system is otherwise negative.     Past History    Past Medical History:   Diagnosis Date     Cervical dysplasia      Chronic RUQ pain      Depressive disorder      Multiple myeloma (H)      Osteoporosis        Past Surgical History:   Procedure Laterality Date     CONIZATION CERVIX,KNIFE/LASER      Description: Cervical Conization By Laser;  Recorded: 09/20/2007;     HC DILATION/CURETTAGE DIAG/THER NON OB      Description: Dilation And Curettage;  Recorded: 09/20/2007;     HC REMOVE TONSILS/ADENOIDS,<13 Y/O      Description: Tonsillectomy With Adenoidectomy;  Recorded: 09/20/2007;     Z[facility] LAP,CHOLECYSTECTOMY/EXPLORE  12/27/2004     [facility] LIGATE FALLOPIAN TUBE      Description: Tubal Ligation;  Recorded: 09/20/2007;         Physical Exam    BP (!) 163/70   Pulse 53   Temp 97.9  F (36.6  C)   Resp 16   Wt 59.4 kg (131 lb)   LMP  (LMP Unknown)   SpO2 97%   BMI 24.75 kg/m      GENERAL: No acute distress. Cooperative in conversation.   HEENT:  Pupils are equal, round and reactive. Oral mucosa is clean and intact. No ulcerations or mucositis noted. No bleeding noted.  RESP:Chest symmetric lungs are clear bilaterally per auscultation. Regular respiratory rate. No wheezes or rhonchi.  CV: Normal S1 S2 Regular, rate and rhythm. No murmurs.    ABD: Nondistended, soft, nontender. Positive bowel sounds. No organomegaly.   EXTREMITIES: No lower extremity edema.   NEURO: Non- focal. Alert and oriented x3.  Cranial nerves appear intact.  PSYCH: Within normal limits. No depression or anxiety.  SKIN: Warm dry intact.     Lab Results    Recent Results (from the past 168 hour(s))   Comprehensive metabolic panel   Result Value Ref Range    Sodium 144 136 - 145 mmol/L    Potassium 3.2 (L) 3.4 - 5.3 mmol/L    Chloride 107 98 - 107 mmol/L    Carbon Dioxide  (CO2) 27 22 - 29 mmol/L    Anion Gap 10 7 - 15 mmol/L    Urea Nitrogen 14.8 8.0 - 23.0 mg/dL    Creatinine 0.67 0.51 - 0.95 mg/dL    Calcium 9.2 8.8 - 10.2 mg/dL    Glucose 79 70 - 99 mg/dL    Alkaline Phosphatase 39 35 - 104 U/L    AST 29 10 - 35 U/L    ALT 35 10 - 35 U/L    Protein Total 6.3 (L) 6.4 - 8.3 g/dL    Albumin 4.0 3.5 - 5.2 g/dL    Bilirubin Total 0.3 <=1.2 mg/dL    GFR Estimate >90 >60 mL/min/1.73m2   CBC with platelets and differential   Result Value Ref Range    WBC Count 4.3 4.0 - 11.0 10e3/uL    RBC Count 3.95 3.80 - 5.20 10e6/uL    Hemoglobin 14.0 11.7 - 15.7 g/dL    Hematocrit 41.6 35.0 - 47.0 %     (H) 78 - 100 fL    MCH 35.4 (H) 26.5 - 33.0 pg    MCHC 33.7 31.5 - 36.5 g/dL    RDW 14.6 10.0 - 15.0 %    Platelet Count 175 150 - 450 10e3/uL    % Neutrophils 48 %    % Lymphocytes 31 %    % Monocytes 12 %    % Eosinophils 7 %    % Basophils 2 %    % Immature Granulocytes 0 %    NRBCs per 100 WBC 0 <1 /100    Absolute Neutrophils 2.1 1.6 - 8.3 10e3/uL    Absolute Lymphocytes 1.3 0.8 - 5.3 10e3/uL    Absolute Monocytes 0.5 0.0 - 1.3 10e3/uL    Absolute Eosinophils 0.3 0.0 - 0.7 10e3/uL    Absolute Basophils 0.1 0.0 - 0.2 10e3/uL    Absolute Immature Granulocytes 0.0 <=0.4 10e3/uL    Absolute NRBCs 0.0 10e3/uL       Imaging    No results found.      Signed by: Loco Blanco MD,

## 2023-02-16 LAB
ALBUMIN SERPL ELPH-MCNC: 3.8 G/DL (ref 3.7–5.1)
ALPHA1 GLOB SERPL ELPH-MCNC: 0.3 G/DL (ref 0.2–0.4)
ALPHA2 GLOB SERPL ELPH-MCNC: 0.8 G/DL (ref 0.5–0.9)
B-GLOBULIN SERPL ELPH-MCNC: 0.6 G/DL (ref 0.6–1)
GAMMA GLOB SERPL ELPH-MCNC: 0.4 G/DL (ref 0.7–1.6)
IGA SERPL-MCNC: 60 MG/DL (ref 84–499)
IGG SERPL-MCNC: 437 MG/DL (ref 610–1616)
IGM SERPL-MCNC: 17 MG/DL (ref 35–242)
KAPPA LC FREE SER-MCNC: 1.37 MG/DL (ref 0.33–1.94)
KAPPA LC FREE/LAMBDA FREE SER NEPH: 0.9 {RATIO} (ref 0.26–1.65)
LAMBDA LC FREE SERPL-MCNC: 1.52 MG/DL (ref 0.57–2.63)
M PROTEIN SERPL ELPH-MCNC: 0.1 G/DL
PROT PATTERN SERPL ELPH-IMP: ABNORMAL

## 2023-02-16 PROCEDURE — 84165 PROTEIN E-PHORESIS SERUM: CPT | Mod: 26

## 2023-02-18 DIAGNOSIS — M48.04 THORACIC SPINAL STENOSIS: ICD-10-CM

## 2023-02-18 DIAGNOSIS — M79.18 MYOFASCIAL PAIN: ICD-10-CM

## 2023-02-20 RX ORDER — GABAPENTIN 300 MG/1
CAPSULE ORAL
Qty: 180 CAPSULE | Refills: 0 | Status: SHIPPED | OUTPATIENT
Start: 2023-02-20 | End: 2023-03-23

## 2023-02-20 NOTE — TELEPHONE ENCOUNTER
Pharmacy sent refill request for gabapentin   --Med last Rx 1/23/23 #180, 0 refill   --Last OV 9/23/22   --Future appt: none  --Please advise

## 2023-02-22 ENCOUNTER — ONCOLOGY VISIT (OUTPATIENT)
Dept: ONCOLOGY | Facility: CLINIC | Age: 73
End: 2023-02-22
Attending: PSYCHOLOGIST
Payer: COMMERCIAL

## 2023-02-22 DIAGNOSIS — F43.23 ADJUSTMENT DISORDER WITH MIXED ANXIETY AND DEPRESSED MOOD: Primary | ICD-10-CM

## 2023-02-22 PROCEDURE — 90837 PSYTX W PT 60 MINUTES: CPT | Performed by: PSYCHOLOGIST

## 2023-02-22 NOTE — LETTER
2/22/2023         RE: Lizzie Viera  [address] [place] [address]        Dear Colleague,    Thank you for referring your patient, Lizzie Viera, to the [facility]. Please see a copy of my visit note below.    No notes on file    Again, thank you for allowing me to participate in the care of your patient.        Sincerely,        Mario Long PsyD

## 2023-02-22 NOTE — CONFIDENTIAL NOTE
"[facility] Oncology- Psychotherapy                                    Progress Note    Patient Name: Lizzie Viera  Date: 2/22/2023         Service Type: Individual      Session Start Time: 1200  Session End Time: 1255     Session Length: 55 mins    Session #: 4    Attendees: Client attended alone    Service Modality:  In-person    DATA  Interactive Complexity: No  Crisis: No        Progress Since Last Session (Related to Symptoms / Goals / Homework):   Symptoms: Pts doog needed to be put down Monday leading to current grief sx. Pt reports sadness and crying since Monday.     Homework: None      Episode of Care Goals: Satisfactory progress - CONTEMPLATION (Considering change and yet undecided); Intervened by assessing the negative and positive thinking (ambivalence) about behavior change     Current / Ongoing Stressors and Concerns:   Pt reports her dog's death has been difficult for her. A frustrating factor is that her spouse would not be there to support her. Pt is considering divorce or separation.      Pt reports she is scheduled to leave tomorrow for [place] to spend time with her sister there (spouse not interested in going) and has some fun events planned in [facility] [place]. Pt reports she has soul searching to do while there. She will be gone 18 days.      Pt reports she wants to move away from the refinery she is close to but does not want to move with her spouse.     Pt reports her spouse has a 1950's mentality about his role at home. He will not help with anything at home. It is all on pt.      Pt reports she is the consistent caregiver with her grandchildren of Collette (daughter).      Pt reports \"Sharifa\" (daughter) tries to have a relationship with her father but it does not work well.      Pt reports her son \"Rosalino\" has really \"blossomed\" since leaving the house to live in [place].      Treatment Objective(s) Addressed in This Session:   identify multiple stressors which contribute " to feelings of depression  ,     Intervention:   CBT: ,  Solution Focused: ,    Assessments completed prior to visit:  The following assessments were completed by patient for this visit:  none      ASSESSMENT: Current Emotional / Mental Status (status of significant symptoms):   Risk status (Self / Other harm or suicidal ideation)   Patient denies current fears or concerns for personal safety.   Patient denies current or recent suicidal ideation or behaviors.   Patient denies current or recent homicidal ideation or behaviors.   Patient denies current or recent self injurious behavior or ideation.   Patient denies other safety concerns.   Patient reports there has been no change in risk factors since their last session.     Patient reports there has been no change in protective factors since their last session.     Recommended that patient call 911 or go to the local ED should there be a change in any of these risk factors.     Appearance:   Appropriate    Eye Contact:   Good    Psychomotor Behavior: Normal    Attitude:   Cooperative    Orientation:   All   Speech    Rate / Production: Normal     Volume:  Normal    Mood:    Depressed  Sad    Affect:    Constricted    Thought Content:  Clear    Thought Form:  Coherent  Logical    Insight:    Good      Changes in Health Issues:   None reported     Chemical Use Review:   Substance Use: Chemical use reviewed, no active concerns identified     Diagnosis:  No diagnosis found.    Collateral Reports Completed:   Not Applicable    PLAN: (Patient Tasks / Therapist Tasks / Other)  Return after vacation.         Mario Long PsyD                                                         ______________________________________________________________________  Individual Treatment Plan     Patient's Name: Lizzie Viera                   YOB: 1950     Date of Creation: 1/11/2023  Date Treatment Plan Last Reviewed/Revised: 2/22/2023     DSM5  Diagnoses: Adjustment Disorder  Psychosocial / Contextual Factors: stress with spouse  PROMIS (reviewed every 90 days):      Referral / Collaboration:  Referral to another professional/service is not indicated at this time..     Anticipated number of session for this episode of care: 9-12 sessions  Anticipation frequency of session: Biweekly  Anticipated Duration of each session: 53 or more minutes  Treatment plan will be reviewed in 90 days or when goals have been changed.         MeasurableTreatment Goal(s) related to diagnosis / functional impairment(s)  Goal 1: Patient will reduce anxiety    I will know I've met my goal when I feel less anxious.       Objective #A (Patient Action)                          Patient will identify multiple stressors which contribute to feelings of anxiety.  Status: Reviewed 2/22/23     Intervention(s)  Therapist will teach emotional regulation skills. ,.     Objective #B  Patient will identify multiple stressors which contribute to feelings of anxiety.  Status: Reviewed 2/22/23     Intervention(s)  Therapist will teach emotional regulation skills. ,.        Patient has reviewed and agreed to the above plan.    Mario Long PsyD  February 22, 2023

## 2023-03-16 ENCOUNTER — TELEPHONE (OUTPATIENT)
Dept: ONCOLOGY | Facility: CLINIC | Age: 73
End: 2023-03-16
Payer: COMMERCIAL

## 2023-03-16 DIAGNOSIS — C90.00 MULTIPLE MYELOMA WITH FAILED REMISSION (H): ICD-10-CM

## 2023-03-16 DIAGNOSIS — C90.00 MULTIPLE MYELOMA NOT HAVING ACHIEVED REMISSION (H): Primary | ICD-10-CM

## 2023-03-16 RX ORDER — LENALIDOMIDE 10 MG/1
10 CAPSULE ORAL DAILY
Qty: 28 CAPSULE | Refills: 0 | Status: SHIPPED | OUTPATIENT
Start: 2023-03-16 | End: 2023-04-12

## 2023-03-16 NOTE — TELEPHONE ENCOUNTER
Oral Chemotherapy Monitoring Program   Medication: Revlimid  Rx: 10mg PO daily on days 1 through 28 of 28 day cycle   Auth #: [id number]  Risk Category: Adult Female not of Reproductive potential  Routine survey questions reviewed.   Rx to be Escribed to SP

## 2023-03-22 ENCOUNTER — ONCOLOGY VISIT (OUTPATIENT)
Dept: ONCOLOGY | Facility: CLINIC | Age: 73
End: 2023-03-22
Attending: PSYCHOLOGIST
Payer: COMMERCIAL

## 2023-03-22 DIAGNOSIS — F43.23 ADJUSTMENT DISORDER WITH MIXED ANXIETY AND DEPRESSED MOOD: Primary | ICD-10-CM

## 2023-03-22 PROCEDURE — 90837 PSYTX W PT 60 MINUTES: CPT | Performed by: PSYCHOLOGIST

## 2023-03-22 NOTE — LETTER
3/22/2023         RE: Lizzie Viera  [address]  [place] [address]        Dear Colleague,    Thank you for referring your patient, Lizzie Viera, to the [facility]. Please see a copy of my visit note below.    .fcc      Again, thank you for allowing me to participate in the care of your patient.        Sincerely,        Mario Long PsyD

## 2023-03-22 NOTE — CONFIDENTIAL NOTE
[facility] Oncology- Psychotherapy                                    Progress Note    Patient Name: Lizzie Viera  Date: 3/22/2023         Service Type: Individual      Session Start Time: 1200  Session End Time: 1253     Session Length: 53 mins    Session #: 6    Attendees: Client attended alone    Service Modality:  In-person    DATA  Interactive Complexity: No  Crisis: No        Progress Since Last Session (Related to Symptoms / Goals / Homework):   Symptoms: Pt reports guilt over her dog needing to be put down. She reports her daughter also induced guilt in her for not leaving her spouse when the daughter was young. Pt reports an increase in anxiety due to returning home from a trip and her spouse stressing her.     Homework: none      Episode of Care Goals: No improvement - ACTION (Actively working towards change); Intervened by reinforcing change plan / affirming steps taken     Current / Ongoing Stressors and Concerns:   Pt reports stress with taxes and her spouse bothering her when she is trying to complete them with inane questions.      Pt reports guilt over her daughter's statement pt should have left her spouse years ago. Pt reports her son also made comments about how unapproachable his  father is.      Pt reports some hope because her spouse called the insurance company to get information on a therapist he could see.      Pt reports stress over her cancer dx ongoing.      Pt reports she had a nice trip to [place] with her sister X 2-3 weeks.      Treatment Objective(s) Addressed in This Session:   identify multiple stressors which contribute to feelings of depression  And anxiety     Intervention:   CBT: ,  Solution Focused: ,    Assessments completed prior to visit:  The following assessments were completed by patient for this visit:  none      ASSESSMENT: Current Emotional / Mental Status (status of significant symptoms):   Risk status (Self / Other harm or suicidal  ideation)   Patient denies current fears or concerns for personal safety.   Patient denies current or recent suicidal ideation or behaviors.   Patient denies current or recent homicidal ideation or behaviors.   Patient denies current or recent self injurious behavior or ideation.   Patient denies other safety concerns.   Patient reports there has been no change in risk factors since their last session.     Patient reports there has been no change in protective factors since their last session.     Recommended that patient call 911 or go to the local ED should there be a change in any of these risk factors.     Appearance:   Appropriate    Eye Contact:   Good    Psychomotor Behavior: Normal    Attitude:   Cooperative    Orientation:   All   Speech    Rate / Production: Normal     Volume:  Normal    Mood:    Depressed    Affect:    Appropriate    Thought Content:  Clear    Thought Form:  Coherent  Logical    Insight:    Good      Changes in Health Issues:   Yes: cancer, Associated Psychological Distress     Chemical Use Review:   Substance Use: Chemical use reviewed, no active concerns identified     Diagnosis:  No diagnosis found.    Collateral Reports Completed:   Not Applicable    PLAN: (Patient Tasks / Therapist Tasks / Other)  Return in two weeks.        Mario Long PsyD                                                         ______________________________________________________________________  Individual Treatment Plan     Patient's Name: Lizzie Viera                   YOB: 1950     Date of Creation: 1/11/2023  Date Treatment Plan Last Reviewed/Revised: 2/22/2023     DSM5 Diagnoses: Adjustment Disorder  Psychosocial / Contextual Factors: stress with spouse  PROMIS (reviewed every 90 days):      Referral / Collaboration:  Referral to another professional/service is not indicated at this time..     Anticipated number of session for this episode of care: 9-12  sessions  Anticipation frequency of session: Biweekly  Anticipated Duration of each session: 53 or more minutes  Treatment plan will be reviewed in 90 days or when goals have been changed.         MeasurableTreatment Goal(s) related to diagnosis / functional impairment(s)  Goal 1: Patient will reduce anxiety    I will know I've met my goal when I feel less anxious.       Objective #A (Patient Action)                          Patient will identify multiple stressors which contribute to feelings of anxiety.  Status: Reviewed 3/22/23     Intervention(s)  Therapist will teach emotional regulation skills. ,.     Objective #B  Patient will identify multiple stressors which contribute to feelings of anxiety.  Status: Reviewed 3/22/23     Intervention(s)  Therapist will teach emotional regulation skills. ,.        Patient has reviewed and agreed to the above plan.          Mario Long PsyD  March 22, 2023

## 2023-03-23 DIAGNOSIS — M79.18 MYOFASCIAL PAIN: ICD-10-CM

## 2023-03-23 DIAGNOSIS — M48.04 THORACIC SPINAL STENOSIS: ICD-10-CM

## 2023-03-23 RX ORDER — GABAPENTIN 300 MG/1
CAPSULE ORAL
Qty: 180 CAPSULE | Refills: 0 | Status: SHIPPED | OUTPATIENT
Start: 2023-03-23 | End: 2023-04-27

## 2023-04-05 ENCOUNTER — ONCOLOGY VISIT (OUTPATIENT)
Dept: ONCOLOGY | Facility: CLINIC | Age: 73
End: 2023-04-05
Attending: PSYCHOLOGIST
Payer: COMMERCIAL

## 2023-04-05 DIAGNOSIS — F43.23 ADJUSTMENT DISORDER WITH MIXED ANXIETY AND DEPRESSED MOOD: Primary | ICD-10-CM

## 2023-04-05 PROCEDURE — 90837 PSYTX W PT 60 MINUTES: CPT | Performed by: PSYCHOLOGIST

## 2023-04-05 NOTE — LETTER
4/5/2023         RE: Lizzie Viera  [address] [place] [address]        Dear Colleague,    Thank you for referring your patient, Lizzie Viera, to the [facility]. Please see a copy of my visit note below.    No notes on file    Again, thank you for allowing me to participate in the care of your patient.        Sincerely,        Mario Long PsyD

## 2023-04-05 NOTE — CONFIDENTIAL NOTE
[facility] Oncology- Psychotherapy                                     Progress Note     Patient Name: Lizzie Viera                      Date:   4/5/2023                                           Service Type: Individual                            Session Start Time:  1200                Session End Time:    1253                Session Length:        53 mins     Session #:      6     Attendees:     Client attended alone     Service Modality:  In-person     DATA  Interactive Complexity: No  Crisis: No                               Progress Since Last Session (Related to Symptoms / Goals / Homework)   Symptoms: Pt reports  back pain, anxiety, frustration with spouse and daughter issues. Pt appears low today.      Homework: none                            Episode of Care Goals: No improvement - ACTION (Actively working towards change); Intervened by reinforcing change plan / affirming steps taken                 Current / Ongoing Stressors and Concerns:            Pt reports ongoing stress with her daughter since she has consistent anger and acting out, and manipulating people by  threatening self harm. She has told pt she wants to move out of her home but lacks resources to do that. Pt finds it a consistent  annoyance and possible cause of her back pain.      Pt reports her spouse has had an initial appointment with a therapist. Pt sees that as a positive move. It gives some hope. He  continues to yell at the kids and act like a child per pt.      Pt reports back pain and sinus issues are a stress.                 Pt reports stress over her cancer dx ongoing.                  Pt reports she had a nice trip to [place] with her sister X 2-3 weeks.                  Treatment Objective(s) Addressed in This Session:          identify multiple stressors which contribute to feelings of depression  And anxiety                 Intervention:              CBT: ,  Solution Focused: ,     Assessments completed  prior to visit:  The following assessments were completed by patient for this visit:  none                    ASSESSMENT: Current Emotional / Mental Status (status of significant symptoms):              Risk status (Self / Other harm or suicidal ideation)              Patient denies current fears or concerns for personal safety.              Patient denies current or recent suicidal ideation or behaviors.              Patient denies current or recent homicidal ideation or behaviors.              Patient denies current or recent self injurious behavior or ideation.              Patient denies other safety concerns.              Patient reports there has been no change in risk factors since their last session.                Patient reports there has been no change in protective factors since their last session.                Recommended that patient call 911 or go to the local ED should there be a change in any of these risk factors.                 Appearance:                            Appropriate               Eye Contact:                           Good               Psychomotor Behavior:          Normal               Attitude:                                   Cooperative               Orientation:                             All              Speech                          Rate / Production:       Normal                           Volume:                       Normal               Mood:                                      Depressed               Affect:                                      Appropriate               Thought Content:                    Clear               Thought Form:                        Coherent  Logical               Insight:                                     Good                  Changes in Health Issues:              Yes: cancer, Associated Psychological Distress                 Chemical Use Review:              Substance Use: Chemical use reviewed, no active concerns identified       Diagnosis:  No diagnosis found.     Collateral Reports Completed:              Not Applicable     PLAN: (Patient Tasks / Therapist Tasks / Other)  Return in two weeks.           Mario Long MA [facility]                                                           ______________________________________________________________________  Individual Treatment Plan     Patient's Name: Lizzie Viera                   YOB: 1950     Date of Creation: 1/11/2023  Date Treatment Plan Last Reviewed/Revised: 4/5/2023     DSM5 Diagnoses: Adjustment Disorder  Psychosocial / Contextual Factors: stress with spouse  PROMIS (reviewed every 90 days):      Referral / Collaboration:  Referral to another professional/service is not indicated at this time..     Anticipated number of session for this episode of care: 9-12 sessions  Anticipation frequency of session: Biweekly  Anticipated Duration of each session: 53 or more minutes  Treatment plan will be reviewed in 90 days or when goals have been changed.         MeasurableTreatment Goal(s) related to diagnosis / functional impairment(s)  Goal 1: Patient will reduce anxiety    I will know I've met my goal when I feel less anxious.       Objective #A (Patient Action)                          Patient will identify multiple stressors which contribute to feelings of anxiety.  Status: Reviewed 4/5//23     Intervention(s)  Therapist will teach emotional regulation skills. ,.     Objective #B  Patient will identify multiple stressors which contribute to feelings of anxiety.  Status: Reviewed 4/5/23     Intervention(s)  Therapist will teach emotional regulation skills. ,.        Patient has reviewed and agreed to the above plan.              Mario Long MA [facility]               4/5/2023

## 2023-04-12 ENCOUNTER — TELEPHONE (OUTPATIENT)
Dept: ONCOLOGY | Facility: CLINIC | Age: 73
End: 2023-04-12

## 2023-04-12 ENCOUNTER — LAB (OUTPATIENT)
Dept: INFUSION THERAPY | Facility: HOSPITAL | Age: 73
End: 2023-04-12
Attending: INTERNAL MEDICINE
Payer: COMMERCIAL

## 2023-04-12 ENCOUNTER — ONCOLOGY VISIT (OUTPATIENT)
Dept: ONCOLOGY | Facility: HOSPITAL | Age: 73
End: 2023-04-12
Attending: INTERNAL MEDICINE
Payer: COMMERCIAL

## 2023-04-12 VITALS
WEIGHT: 129.5 LBS | OXYGEN SATURATION: 98 % | DIASTOLIC BLOOD PRESSURE: 72 MMHG | BODY MASS INDEX: 24.47 KG/M2 | RESPIRATION RATE: 20 BRPM | SYSTOLIC BLOOD PRESSURE: 165 MMHG | TEMPERATURE: 98 F | HEART RATE: 52 BPM

## 2023-04-12 DIAGNOSIS — C90.00 MULTIPLE MYELOMA NOT HAVING ACHIEVED REMISSION (H): Primary | ICD-10-CM

## 2023-04-12 DIAGNOSIS — C90.00 MULTIPLE MYELOMA WITH FAILED REMISSION (H): ICD-10-CM

## 2023-04-12 DIAGNOSIS — C90.00 MULTIPLE MYELOMA NOT HAVING ACHIEVED REMISSION (H): ICD-10-CM

## 2023-04-12 DIAGNOSIS — M84.58XK PATHOLOGICAL FRACTURE OF VERTEBRAE IN NEOPLASTIC DISEASE WITH NONUNION: ICD-10-CM

## 2023-04-12 DIAGNOSIS — M80.80XD LOCALIZED OSTEOPOROSIS WITH CURRENT PATHOLOGICAL FRACTURE WITH ROUTINE HEALING, SUBSEQUENT ENCOUNTER: ICD-10-CM

## 2023-04-12 LAB
ALBUMIN SERPL BCG-MCNC: 3.9 G/DL (ref 3.5–5.2)
ALP SERPL-CCNC: 38 U/L (ref 35–104)
ALT SERPL W P-5'-P-CCNC: 17 U/L (ref 10–35)
ANION GAP SERPL CALCULATED.3IONS-SCNC: 7 MMOL/L (ref 7–15)
AST SERPL W P-5'-P-CCNC: 21 U/L (ref 10–35)
BASOPHILS # BLD AUTO: 0.1 10E3/UL (ref 0–0.2)
BASOPHILS NFR BLD AUTO: 1 %
BILIRUB SERPL-MCNC: 0.4 MG/DL
BUN SERPL-MCNC: 19.5 MG/DL (ref 8–23)
CALCIUM SERPL-MCNC: 8.7 MG/DL (ref 8.8–10.2)
CHLORIDE SERPL-SCNC: 104 MMOL/L (ref 98–107)
CREAT SERPL-MCNC: 0.67 MG/DL (ref 0.51–0.95)
DEPRECATED HCO3 PLAS-SCNC: 28 MMOL/L (ref 22–29)
EOSINOPHIL # BLD AUTO: 0.2 10E3/UL (ref 0–0.7)
EOSINOPHIL NFR BLD AUTO: 4 %
ERYTHROCYTE [DISTWIDTH] IN BLOOD BY AUTOMATED COUNT: 14.2 % (ref 10–15)
GFR SERPL CREATININE-BSD FRML MDRD: >90 ML/MIN/1.73M2
GLUCOSE SERPL-MCNC: 91 MG/DL (ref 70–99)
HCT VFR BLD AUTO: 42.6 % (ref 35–47)
HGB BLD-MCNC: 14.1 G/DL (ref 11.7–15.7)
IMM GRANULOCYTES # BLD: 0 10E3/UL
IMM GRANULOCYTES NFR BLD: 0 %
LYMPHOCYTES # BLD AUTO: 1.5 10E3/UL (ref 0.8–5.3)
LYMPHOCYTES NFR BLD AUTO: 26 %
MCH RBC QN AUTO: 34.9 PG (ref 26.5–33)
MCHC RBC AUTO-ENTMCNC: 33.1 G/DL (ref 31.5–36.5)
MCV RBC AUTO: 105 FL (ref 78–100)
MONOCYTES # BLD AUTO: 0.8 10E3/UL (ref 0–1.3)
MONOCYTES NFR BLD AUTO: 13 %
NEUTROPHILS # BLD AUTO: 3.2 10E3/UL (ref 1.6–8.3)
NEUTROPHILS NFR BLD AUTO: 56 %
NRBC # BLD AUTO: 0 10E3/UL
NRBC BLD AUTO-RTO: 0 /100
PLATELET # BLD AUTO: 222 10E3/UL (ref 150–450)
POTASSIUM SERPL-SCNC: 4.1 MMOL/L (ref 3.4–5.3)
PROT SERPL-MCNC: 6.1 G/DL (ref 6.4–8.3)
RBC # BLD AUTO: 4.04 10E6/UL (ref 3.8–5.2)
SODIUM SERPL-SCNC: 139 MMOL/L (ref 136–145)
TOTAL PROTEIN SERUM FOR ELP: 5.8 G/DL (ref 6.4–8.3)
WBC # BLD AUTO: 5.8 10E3/UL (ref 4–11)

## 2023-04-12 PROCEDURE — G0463 HOSPITAL OUTPT CLINIC VISIT: HCPCS | Performed by: INTERNAL MEDICINE

## 2023-04-12 PROCEDURE — 85025 COMPLETE CBC W/AUTO DIFF WBC: CPT

## 2023-04-12 PROCEDURE — 82784 ASSAY IGA/IGD/IGG/IGM EACH: CPT

## 2023-04-12 PROCEDURE — 36415 COLL VENOUS BLD VENIPUNCTURE: CPT

## 2023-04-12 PROCEDURE — 99214 OFFICE O/P EST MOD 30 MIN: CPT | Performed by: INTERNAL MEDICINE

## 2023-04-12 PROCEDURE — 84165 PROTEIN E-PHORESIS SERUM: CPT | Mod: TC | Performed by: PATHOLOGY

## 2023-04-12 PROCEDURE — 84155 ASSAY OF PROTEIN SERUM: CPT

## 2023-04-12 PROCEDURE — 83521 IG LIGHT CHAINS FREE EACH: CPT

## 2023-04-12 PROCEDURE — 80053 COMPREHEN METABOLIC PANEL: CPT

## 2023-04-12 RX ORDER — LENALIDOMIDE 10 MG/1
10 CAPSULE ORAL DAILY
Qty: 28 CAPSULE | Refills: 0 | Status: SHIPPED | OUTPATIENT
Start: 2023-06-08 | End: 2023-07-05

## 2023-04-12 ASSESSMENT — PAIN SCALES - GENERAL: PAINLEVEL: MODERATE PAIN (5)

## 2023-04-12 NOTE — PROGRESS NOTES
[facility] Hematology and Oncology Progress Note    Patient: Lizzie Viera  MRN: [id number]  Date of Service: Apr 12, 2023         Reason for Visit    Multiple Myeloma    Assessment     1.  A very pleasant 72 year old woman with IgG kappa multiple myeloma with hyperdiploid cytogenetics.  However clinically was behaving somewhat more aggressively.  Started on VRD treatment.  Responded quite well. After 17 cycles the treatment was switched to Revlimid 10 mg daily in September 2022.  Valeria has been tolerating it quite well.  The lab work-up in the end of November 2022 showed maintenance of response.  The labs in February 2023 also look pretty stable.  2.  Significant bone disease with osteoporosis and compression fractures.  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  Improved significantly on the CT scan in March 2022.  MRI also shows no new lesions.  Most recent bone density still shows osteoporosis.  3.  Normal hemoglobin, calcium, kidney function along with beta-2 microglobulin and albumin at the time of diagnosis.  4.  Positive Covid antibodies from infection. Unvaccinated. Declined Evusheld.  5.  Pain from compression fracture status post radiation therapy.  Significantly improved.  6.  Some dental issues on left upper molars which were cracked and has been extracted.  She also has a tooth on the right lower jaw which the dentists performed extraction.  Now she may need a implant.      Plan    1.  At this time continue with Revlimid 10 mg p.o. daily.  2.  Consider starting denosumab or Zometa for osteoporosis. Valeria is hoping to get a dental implant next month so want to hold off. She was informed that she needs to get some treatment asap.  3.  Follow-up on the labs done today.  4.  Continue with gentle exercises.  5. Will have her return in 2 months for labs etc.     Cancer Staging   No matching staging information was found for the patient.    ECOG Performance    2 - Ambulatory and  independent in all ADLs; cannot work; up > 50% of the time      History of Present Illness    Ms. Lizzie Viera is a very pleasant 72 year old woman who has been diagnosed with multiple myeloma IgG kappa type in May 2021.  She had initially presented with compression fracture of T7 vertebral body in September 2020.  She had a back pain which led to that evaluation.  Bone density confirmed that she had osteoporosis.  MRI showed diffuse marrow edema in October 2020.  In April 2021 the MRI of the thoracic spine showed worsening T7 vertebral body height loss because of likely pathological compression fracture with extraosseous extension.  She then had IR guided bone biopsy on 7 May 2021 and pathology confirmed the presence of kappa light chain restricted plasma cells.  Her cytogenetics confirmed the presence of hyperdiploid E with gains of chromosome 5, 9 and 15.    She was then seen by Dr. Baig and had a bone marrow biopsy which also confirmed 60% involvement of the marrow with plasma cells.  The bone marrow was done on 3 Jocelin 2021.  The bone marrow also confirmed the presence of hyperdiploid E.  She had a gain of chromosome 3, 5, 7, 9, 11, 15 as well as 19.  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  Her beta-2 microglobulin was actually normal at the time of her diagnosis as was her albumin at 4.0.  Monoclonal protein 3.1 g/dL.  Kappa free light chain levels of 13 mg/dL IgG level of 4280 with depressed levels of IgM and IgA.  Urine was also positive for kappa light chains as well as immunofixation of the urine was positive for IgG kappa.  Normal kidney function.  Normal hemoglobin.    As mentioned above she had bone lesions in the T7 vertebral body, T1, skull area.  Her main pain is coming from her T7 vertebral body compression fracture.    Due to the pain she has been seen by radiation oncology and has received radiation therapy.  She also got a dose of steroids for pain control.    She was initially thinking of doing some  natural therapies but once her symptoms got worse, she came back in was counseled about treatment.      She started on Velcade Revlimid and dexamethasone in September 2021.  Responded nicely.  Immunoglobulins have  normalized completely. Her immunoglobin levels have almost normalized in fact the IgG levels have gone below normal.  Immunofixation still shows a very faint kappa monoclonal gammopathy.    She was  hospitalized in April 2022 with COPD exacerbation/pneumonia.  Was given some steroids and antibiotic.  She was slightly hypoxic initially but then got better after that.    She was referred to [facility] for transplant evaluation.  She was somewhat reluctant to go forward with that.  Otherwise she seems to be doing quite well.  Her immunoglobulin levels have normalized or actually are below normal.  Immunofixation faintly positive.    She has cracked a molar on the upper left jaw sometime in July 2022.  There was some tooth decay.  She had them extracted.    In September 2022 we discontinued the VRD treatment and now Valeria is on Revlimid maintenance 10 mg p.o. daily.  She seems to be tolerating it well.  She is physically more active so she is noticing some soreness in her back.    Had dental extraction done in October from the right lower jaw.  That seems to be healing well.  However she may need some implants according to her.    Underwent bone densitometry recently.  That confirms presence of osteoporosis which is not a new finding for her.    In February 2023 her labs were pretty stable.  She was going down to [place] so we gave her 2 to 3 weeks of break from Revlimid.  She started that.    Comes in today for scheduled follow-up.  Overall seems to be doing okay.  Having some side effect from Revlimid.  No new aches and pains.    Review of systems.    A 14 point review of systems was obtained.  Positive findings noted in the history.  Rest of the review of system is otherwise negative.     Past  History    Past Medical History:   Diagnosis Date     Cervical dysplasia      Chronic RUQ pain      Depressive disorder      Multiple myeloma (H)      Osteoporosis        Past Surgical History:   Procedure Laterality Date     CONIZATION CERVIX,KNIFE/LASER      Description: Cervical Conization By Laser;  Recorded: 09/20/2007;     [facility] DILATION/CURETTAGE DIAG/THER NON OB      Description: Dilation And Curettage;  Recorded: 09/20/2007;     HC REMOVE TONSILS/ADENOIDS,<11 Y/O      Description: Tonsillectomy With Adenoidectomy;  Recorded: 09/20/2007;     [facility] LAP,CHOLECYSTECTOMY/EXPLORE  12/27/2004     [facility] LIGATE FALLOPIAN TUBE      Description: Tubal Ligation;  Recorded: 09/20/2007;         Physical Exam    BP (!) 165/72 (BP Location: Right arm, Patient Position: Sitting, Cuff Size: Adult Regular)   Pulse 52   Temp 98  F (36.7  C) (Oral)   Resp 20   Wt 58.7 kg (129 lb 8 oz)   LMP  (LMP Unknown)   SpO2 98%   BMI 24.47 kg/m      GENERAL: No acute distress. Cooperative in conversation.   HEENT:  Pupils are equal, round and reactive. Oral mucosa is clean and intact. No ulcerations or mucositis noted. No bleeding noted.  RESP:Chest symmetric lungs are clear bilaterally per auscultation. Regular respiratory rate. No wheezes or rhonchi.  CV: Normal S1 S2 Regular, rate and rhythm. No murmurs.    ABD: Nondistended, soft, nontender. Positive bowel sounds. No organomegaly.   EXTREMITIES: No lower extremity edema.   NEURO: Non- focal. Alert and oriented x3.  Cranial nerves appear intact.  PSYCH: Within normal limits. No depression or anxiety.  SKIN: Warm dry intact.     Lab Results    Recent Results (from the past 168 hour(s))   Comprehensive metabolic panel   Result Value Ref Range    Sodium 139 136 - 145 mmol/L    Potassium 4.1 3.4 - 5.3 mmol/L    Chloride 104 98 - 107 mmol/L    Carbon Dioxide (CO2) 28 22 - 29 mmol/L    Anion Gap 7 7 - 15 mmol/L    Urea Nitrogen 19.5 8.0 - 23.0 mg/dL    Creatinine 0.67 0.51 - 0.95 mg/dL     Calcium 8.7 (L) 8.8 - 10.2 mg/dL    Glucose 91 70 - 99 mg/dL    Alkaline Phosphatase 38 35 - 104 U/L    AST 21 10 - 35 U/L    ALT 17 10 - 35 U/L    Protein Total 6.1 (L) 6.4 - 8.3 g/dL    Albumin 3.9 3.5 - 5.2 g/dL    Bilirubin Total 0.4 <=1.2 mg/dL    GFR Estimate >90 >60 mL/min/1.73m2   CBC with platelets and differential   Result Value Ref Range    WBC Count 5.8 4.0 - 11.0 10e3/uL    RBC Count 4.04 3.80 - 5.20 10e6/uL    Hemoglobin 14.1 11.7 - 15.7 g/dL    Hematocrit 42.6 35.0 - 47.0 %     (H) 78 - 100 fL    MCH 34.9 (H) 26.5 - 33.0 pg    MCHC 33.1 31.5 - 36.5 g/dL    RDW 14.2 10.0 - 15.0 %    Platelet Count 222 150 - 450 10e3/uL    % Neutrophils 56 %    % Lymphocytes 26 %    % Monocytes 13 %    % Eosinophils 4 %    % Basophils 1 %    % Immature Granulocytes 0 %    NRBCs per 100 WBC 0 <1 /100    Absolute Neutrophils 3.2 1.6 - 8.3 10e3/uL    Absolute Lymphocytes 1.5 0.8 - 5.3 10e3/uL    Absolute Monocytes 0.8 0.0 - 1.3 10e3/uL    Absolute Eosinophils 0.2 0.0 - 0.7 10e3/uL    Absolute Basophils 0.1 0.0 - 0.2 10e3/uL    Absolute Immature Granulocytes 0.0 <=0.4 10e3/uL    Absolute NRBCs 0.0 10e3/uL       Imaging    No results found.      Signed by: Lcoo Blanco MD,

## 2023-04-12 NOTE — TELEPHONE ENCOUNTER
Oral Chemotherapy Monitoring Program   Medication: Revlimid  Rx: 10mg PO daily on days 1 through 28 of 28 day cycle   Auth #: [id number]  Risk Category: Adult female not of reproductive potential  Routine survey questions reviewed.   Rx to be Escribed to SP

## 2023-04-12 NOTE — PROGRESS NOTES
"Oncology Rooming Note    April 12, 2023 12:42 PM   Lizzie Viera is a 72 year old female who presents for:    Chief Complaint   Patient presents with     Oncology Clinic Visit     2 month Return Multiple myeloma with failed remission,review labs      Initial Vitals: BP (!) 165/72 (BP Location: Right arm, Patient Position: Sitting, Cuff Size: Adult Regular)   Pulse 52   Temp 98  F (36.7  C) (Oral)   Resp 20   Wt 58.7 kg (129 lb 8 oz)   LMP  (LMP Unknown)   SpO2 98%   BMI 24.47 kg/m   Estimated body mass index is 24.47 kg/m  as calculated from the following:    Height as of 12/15/22: 1.549 m (5' 1\").    Weight as of this encounter: 58.7 kg (129 lb 8 oz). Body surface area is 1.59 meters squared.  Moderate Pain (5) Comment: Data Unavailable   No LMP recorded (lmp unknown). Patient is postmenopausal.  Allergies reviewed: Yes  Medications reviewed: Yes    Medications: MEDICATION REFILLS NEEDED TODAY. Provider was notified.  Pharmacy name entered into [facility]: [id number] - [place], [place] - [address]    Clinical concerns: 2 month Return Multiple myeloma with failed remission,review labs     Marina Wills CMA            "

## 2023-04-12 NOTE — LETTER
4/12/2023         RE: Lizzie Viera  [address]  [place] [address]        Dear Colleague,    Thank you for referring your patient, Lizzie Viera, to the [facility]. Please see a copy of my visit note below.    [facility] Hematology and Oncology Progress Note    Patient: Lizzie Viera  MRN: [id number]  Date of Service: Apr 12, 2023         Reason for Visit    Multiple Myeloma    Assessment     1.  A very pleasant 72 year old woman with IgG kappa multiple myeloma with hyperdiploid cytogenetics.  However clinically was behaving somewhat more aggressively.  Started on VRD treatment.  Responded quite well. After 17 cycles the treatment was switched to Revlimid 10 mg daily in September 2022.  Valeria has been tolerating it quite well.  The lab work-up in the end of November 2022 showed maintenance of response.  The labs in February 2023 also look pretty stable.  2.  Significant bone disease with osteoporosis and compression fractures.  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  Improved significantly on the CT scan in March 2022.  MRI also shows no new lesions.  Most recent bone density still shows osteoporosis.  3.  Normal hemoglobin, calcium, kidney function along with beta-2 microglobulin and albumin at the time of diagnosis.  4.  Positive Covid antibodies from infection. Unvaccinated. Declined Evusheld.  5.  Pain from compression fracture status post radiation therapy.  Significantly improved.  6.  Some dental issues on left upper molars which were cracked and has been extracted.  She also has a tooth on the right lower jaw which the dentists performed extraction.  Now she may need a implant.      Plan    1.  At this time continue with Revlimid 10 mg p.o. daily.  2.  Consider starting denosumab or Zometa for osteoporosis. Valeria is hoping to get a dental implant next month so want to hold off. She was informed that she needs to get some  treatment asap.  3.  Follow-up on the labs done today.  4.  Continue with gentle exercises.  5. Will have her return in 2 months for labs etc.     Cancer Staging   No matching staging information was found for the patient.    ECOG Performance    2 - Ambulatory and independent in all ADLs; cannot work; up > 50% of the time      History of Present Illness    Ms. Lizzie Viera is a very pleasant 72 year old woman who has been diagnosed with multiple myeloma IgG kappa type in May 2021.  She had initially presented with compression fracture of T7 vertebral body in September 2020.  She had a back pain which led to that evaluation.  Bone density confirmed that she had osteoporosis.  MRI showed diffuse marrow edema in October 2020.  In April 2021 the MRI of the thoracic spine showed worsening T7 vertebral body height loss because of likely pathological compression fracture with extraosseous extension.  She then had IR guided bone biopsy on 7 May 2021 and pathology confirmed the presence of kappa light chain restricted plasma cells.  Her cytogenetics confirmed the presence of hyperdiploid E with gains of chromosome 5, 9 and 15.    She was then seen by Dr. Baig and had a bone marrow biopsy which also confirmed 60% involvement of the marrow with plasma cells.  The bone marrow was done on 3 Jocelin 2021.  The bone marrow also confirmed the presence of hyperdiploid E.  She had a gain of chromosome 3, 5, 7, 9, 11, 15 as well as 19.  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  Her beta-2 microglobulin was actually normal at the time of her diagnosis as was her albumin at 4.0.  Monoclonal protein 3.1 g/dL.  Kappa free light chain levels of 13 mg/dL IgG level of 4280 with depressed levels of IgM and IgA.  Urine was also positive for kappa light chains as well as immunofixation of the urine was positive for IgG kappa.  Normal kidney function.  Normal hemoglobin.    As mentioned above she had bone lesions in the T7 vertebral body, T1, skull area.   Her main pain is coming from her T7 vertebral body compression fracture.    Due to the pain she has been seen by radiation oncology and has received radiation therapy.  She also got a dose of steroids for pain control.    She was initially thinking of doing some natural therapies but once her symptoms got worse, she came back in was counseled about treatment.      She started on Velcade Revlimid and dexamethasone in September 2021.  Responded nicely.  Immunoglobulins have  normalized completely. Her immunoglobin levels have almost normalized in fact the IgG levels have gone below normal.  Immunofixation still shows a very faint kappa monoclonal gammopathy.    She was  hospitalized in April 2022 with COPD exacerbation/pneumonia.  Was given some steroids and antibiotic.  She was slightly hypoxic initially but then got better after that.    She was referred to [facility] for transplant evaluation.  She was somewhat reluctant to go forward with that.  Otherwise she seems to be doing quite well.  Her immunoglobulin levels have normalized or actually are below normal.  Immunofixation faintly positive.    She has cracked a molar on the upper left jaw sometime in July 2022.  There was some tooth decay.  She had them extracted.    In September 2022 we discontinued the VRD treatment and now Valeria is on Revlimid maintenance 10 mg p.o. daily.  She seems to be tolerating it well.  She is physically more active so she is noticing some soreness in her back.    Had dental extraction done in October from the right lower jaw.  That seems to be healing well.  However she may need some implants according to her.    Underwent bone densitometry recently.  That confirms presence of osteoporosis which is not a new finding for her.    In February 2023 her labs were pretty stable.  She was going down to [place] so we gave her 2 to 3 weeks of break from Revlimid.  She started that.    Comes in today for scheduled follow-up.  Overall  seems to be doing okay.  Having some side effect from Revlimid.  No new aches and pains.    Review of systems.    A 14 point review of systems was obtained.  Positive findings noted in the history.  Rest of the review of system is otherwise negative.     Past History    Past Medical History:   Diagnosis Date     Cervical dysplasia      Chronic RUQ pain      Depressive disorder      Multiple myeloma (H)      Osteoporosis        Past Surgical History:   Procedure Laterality Date     CONIZATION CERVIX,KNIFE/LASER      Description: Cervical Conization By Laser;  Recorded: 09/20/2007;     HC DILATION/CURETTAGE DIAG/THER NON OB      Description: Dilation And Curettage;  Recorded: 09/20/2007;     HC REMOVE TONSILS/ADENOIDS,<13 Y/O      Description: Tonsillectomy With Adenoidectomy;  Recorded: 09/20/2007;     C LAP,CHOLECYSTECTOMY/EXPLORE  12/27/2004     [facility] LIGATE FALLOPIAN TUBE      Description: Tubal Ligation;  Recorded: 09/20/2007;         Physical Exam    BP (!) 165/72 (BP Location: Right arm, Patient Position: Sitting, Cuff Size: Adult Regular)   Pulse 52   Temp 98  F (36.7  C) (Oral)   Resp 20   Wt 58.7 kg (129 lb 8 oz)   LMP  (LMP Unknown)   SpO2 98%   BMI 24.47 kg/m      GENERAL: No acute distress. Cooperative in conversation.   HEENT:  Pupils are equal, round and reactive. Oral mucosa is clean and intact. No ulcerations or mucositis noted. No bleeding noted.  RESP:Chest symmetric lungs are clear bilaterally per auscultation. Regular respiratory rate. No wheezes or rhonchi.  CV: Normal S1 S2 Regular, rate and rhythm. No murmurs.    ABD: Nondistended, soft, nontender. Positive bowel sounds. No organomegaly.   EXTREMITIES: No lower extremity edema.   NEURO: Non- focal. Alert and oriented x3.  Cranial nerves appear intact.  PSYCH: Within normal limits. No depression or anxiety.  SKIN: Warm dry intact.     Lab Results    Recent Results (from the past 168 hour(s))   Comprehensive metabolic panel   Result Value  Ref Range    Sodium 139 136 - 145 mmol/L    Potassium 4.1 3.4 - 5.3 mmol/L    Chloride 104 98 - 107 mmol/L    Carbon Dioxide (CO2) 28 22 - 29 mmol/L    Anion Gap 7 7 - 15 mmol/L    Urea Nitrogen 19.5 8.0 - 23.0 mg/dL    Creatinine 0.67 0.51 - 0.95 mg/dL    Calcium 8.7 (L) 8.8 - 10.2 mg/dL    Glucose 91 70 - 99 mg/dL    Alkaline Phosphatase 38 35 - 104 U/L    AST 21 10 - 35 U/L    ALT 17 10 - 35 U/L    Protein Total 6.1 (L) 6.4 - 8.3 g/dL    Albumin 3.9 3.5 - 5.2 g/dL    Bilirubin Total 0.4 <=1.2 mg/dL    GFR Estimate >90 >60 mL/min/1.73m2   CBC with platelets and differential   Result Value Ref Range    WBC Count 5.8 4.0 - 11.0 10e3/uL    RBC Count 4.04 3.80 - 5.20 10e6/uL    Hemoglobin 14.1 11.7 - 15.7 g/dL    Hematocrit 42.6 35.0 - 47.0 %     (H) 78 - 100 fL    MCH 34.9 (H) 26.5 - 33.0 pg    MCHC 33.1 31.5 - 36.5 g/dL    RDW 14.2 10.0 - 15.0 %    Platelet Count 222 150 - 450 10e3/uL    % Neutrophils 56 %    % Lymphocytes 26 %    % Monocytes 13 %    % Eosinophils 4 %    % Basophils 1 %    % Immature Granulocytes 0 %    NRBCs per 100 WBC 0 <1 /100    Absolute Neutrophils 3.2 1.6 - 8.3 10e3/uL    Absolute Lymphocytes 1.5 0.8 - 5.3 10e3/uL    Absolute Monocytes 0.8 0.0 - 1.3 10e3/uL    Absolute Eosinophils 0.2 0.0 - 0.7 10e3/uL    Absolute Basophils 0.1 0.0 - 0.2 10e3/uL    Absolute Immature Granulocytes 0.0 <=0.4 10e3/uL    Absolute NRBCs 0.0 10e3/uL       Imaging    No results found.      Signed by: Loco Blanco MD,       Oncology Rooming Note    April 12, 2023 12:42 PM   Lizzie Viera is a 72 year old female who presents for:    Chief Complaint   Patient presents with     Oncology Clinic Visit     2 month Return Multiple myeloma with failed remission,review labs      Initial Vitals: BP (!) 165/72 (BP Location: Right arm, Patient Position: Sitting, Cuff Size: Adult Regular)   Pulse 52   Temp 98  F (36.7  C) (Oral)   Resp 20   Wt 58.7 kg (129 lb 8 oz)   LMP  (LMP Unknown)   SpO2 98%   BMI  "24.47 kg/m   Estimated body mass index is 24.47 kg/m  as calculated from the following:    Height as of 12/15/22: 1.549 m (5' 1\").    Weight as of this encounter: 58.7 kg (129 lb 8 oz). Body surface area is 1.59 meters squared.  Moderate Pain (5) Comment: Data Unavailable   No LMP recorded (lmp unknown). Patient is postmenopausal.  Allergies reviewed: Yes  Medications reviewed: Yes    Medications: MEDICATION REFILLS NEEDED TODAY. Provider was notified.  Pharmacy name entered into [facility]: [id number] [address]    Clinical concerns: 2 month Return Multiple myeloma with failed remission,review labs     Marina Wills [facility]                Again, thank you for allowing me to participate in the care of your patient.        Sincerely,        Loco Blanco MD, MD    "

## 2023-04-13 LAB
IGA SERPL-MCNC: 81 MG/DL (ref 84–499)
IGG SERPL-MCNC: 457 MG/DL (ref 610–1616)
IGM SERPL-MCNC: 28 MG/DL (ref 35–242)
KAPPA LC FREE SER-MCNC: 1.64 MG/DL (ref 0.33–1.94)
KAPPA LC FREE/LAMBDA FREE SER NEPH: 0.76 {RATIO} (ref 0.26–1.65)
LAMBDA LC FREE SERPL-MCNC: 2.15 MG/DL (ref 0.57–2.63)

## 2023-04-14 LAB
ALBUMIN SERPL ELPH-MCNC: 3.6 G/DL (ref 3.7–5.1)
ALPHA1 GLOB SERPL ELPH-MCNC: 0.3 G/DL (ref 0.2–0.4)
ALPHA2 GLOB SERPL ELPH-MCNC: 0.9 G/DL (ref 0.5–0.9)
B-GLOBULIN SERPL ELPH-MCNC: 0.6 G/DL (ref 0.6–1)
GAMMA GLOB SERPL ELPH-MCNC: 0.4 G/DL (ref 0.7–1.6)
M PROTEIN SERPL ELPH-MCNC: 0.1 G/DL
PROT PATTERN SERPL ELPH-IMP: ABNORMAL

## 2023-04-14 PROCEDURE — 84165 PROTEIN E-PHORESIS SERUM: CPT | Mod: 26

## 2023-04-19 ENCOUNTER — ONCOLOGY VISIT (OUTPATIENT)
Dept: ONCOLOGY | Facility: CLINIC | Age: 73
End: 2023-04-19
Attending: PSYCHOLOGIST
Payer: COMMERCIAL

## 2023-04-19 DIAGNOSIS — F43.23 ADJUSTMENT DISORDER WITH MIXED ANXIETY AND DEPRESSED MOOD: Primary | ICD-10-CM

## 2023-04-19 PROCEDURE — 90837 PSYTX W PT 60 MINUTES: CPT | Performed by: PSYCHOLOGIST

## 2023-04-19 NOTE — LETTER
4/19/2023         RE: Lizzie Viera  [address] [place] [address]        Dear Colleague,    Thank you for referring your patient, Lizzie Viera, to the [facility]. Please see a copy of my visit note below.    NA      Again, thank you for allowing me to participate in the care of your patient.        Sincerely,        Mario Long PsyD

## 2023-04-19 NOTE — CONFIDENTIAL NOTE
[facility] Oncology- Psychotherapy                                     Progress Note     Patient Name: Lizzie Viera                      Date:   4/19/2023                                           Service Type: Individual                            Session Start Time:  1200                Session End Time:    1253                Session Length:        53 mins     Session #:      7     Attendees:     Client attended alone     Service Modality:  In-person     DATA  Interactive Complexity: No  Crisis: No                               Progress Since Last Session (Related to Symptoms / Goals / Homework)              Symptoms: Pt reports  back pain, anxiety, frustration with spouse and daughter issues. Pt appears low today.                 Homework: none                            Episode of Care Goals: No improvement - ACTION (Actively working towards change); Intervened by reinforcing change plan / affirming steps taken                 Current / Ongoing Stressors and Concerns:  Pt reports her B/P is high which is a disappointment.     Pt reports her spouse wants to get a new dog which pt appreciates and he is open to the kind of breed she wants.     Pt reports her spouse may have had an intake recently with a therapy clinic. She does not know if further appointments are coming up? He is not transparent about his appointments. Pt reports she will not move with him per his request unless he makes changes in his behavior and life.     Pt reports her daughter is doing better with her mental health recently. She has less ups and downs per pt.     Pt reports financial stress with her medication bills and trying to eat well.     Pt reports she visited her older daughter up by [place] over the weekend for L[place]. Pt reports she had a nice time and enjoyed her grand kids. Pts older daughter is a teacher and has a new job grading AP tests.                  Treatment Objective(s) Addressed in This  Session:          identify multiple stressors which contribute to feelings of depression  And anxiety                 Intervention:              CBT: ,   Solution Focused: ,                 ASSESSMENT: Current Emotional / Mental Status (status of significant symptoms):              Risk status (Self / Other harm or suicidal ideation)              Patient denies current fears or concerns for personal safety.              Patient denies current or recent suicidal ideation or behaviors.              Patient denies current or recent homicidal ideation or behaviors.              Patient denies current or recent self injurious behavior or ideation.              Patient denies other safety concerns.              Patient reports there has been no change in risk factors since their last session.                Patient reports there has been no change in protective factors since their last session.                Recommended that patient call 911 or go to the local ED should there be a change in any of these risk factors.                 Appearance:                            Appropriate               Eye Contact:                           Good               Psychomotor Behavior:          Normal               Attitude:                                   Cooperative               Orientation:                             All              Speech                          Rate / Production:       Normal                           Volume:                       Normal               Mood:                                      Depressed               Affect:                                      Appropriate               Thought Content:                    Clear               Thought Form:                        Coherent  Logical               Insight:                                     Good                  Changes in Health Issues:              Yes: cancer, high BP ; Associated Psychological Distress                 Chemical Use  Review:              Substance Use: Chemical use reviewed, no active concerns identified      Diagnosis:  F43.23 Adjustment D/O with mixed anxiety and depressed mood.      Collateral Reports Completed:              Not Applicable     PLAN: (Patient Tasks / Therapist Tasks / Other)  Return in two weeks.           Mario Long MA [facility]                                                           ______________________________________________________________________  Individual Treatment Plan     Patient's Name: Lizzie Viera                   YOB: 1950     Date of Creation: 1/11/2023  Date Treatment Plan Last Reviewed/Revised: 4/19/2023     DSM5 Diagnoses: Adjustment Disorder  Psychosocial / Contextual Factors: stress with spouse  PROMIS (reviewed every 90 days):      Referral / Collaboration:  Referral to another professional/service is not indicated at this time..     Anticipated number of session for this episode of care: 9-12 sessions  Anticipation frequency of session: Biweekly  Anticipated Duration of each session: 53 or more minutes  Treatment plan will be reviewed in 90 days or when goals have been changed.         MeasurableTreatment Goal(s) related to diagnosis / functional impairment(s)  Goal 1: Patient will reduce anxiety    I will know I've met my goal when I feel less anxious.       Objective #A (Patient Action)                          Patient will identify multiple stressors which contribute to feelings of anxiety.  Status: Reviewed 4/19/23     Intervention(s)  Therapist will teach emotional regulation skills. ,.     Objective #B  Patient will identify multiple stressors which contribute to feelings of anxiety.  Status: Reviewed 4/19/23     Intervention(s)  Therapist will teach emotional regulation skills. ,.        Patient has reviewed and agreed to the above plan.              Mario Long MA [facility]               4/19/2023

## 2023-05-03 ENCOUNTER — ONCOLOGY VISIT (OUTPATIENT)
Dept: ONCOLOGY | Facility: CLINIC | Age: 73
End: 2023-05-03
Attending: PSYCHOLOGIST
Payer: COMMERCIAL

## 2023-05-03 DIAGNOSIS — F43.23 ADJUSTMENT DISORDER WITH MIXED ANXIETY AND DEPRESSED MOOD: Primary | ICD-10-CM

## 2023-05-03 PROCEDURE — 90837 PSYTX W PT 60 MINUTES: CPT | Performed by: PSYCHOLOGIST

## 2023-05-03 NOTE — CONFIDENTIAL NOTE
"[facility] Oncology- Psychotherapy                                     Progress Note     Patient Name: Lizzie Viera                      Date:   5/3/2023                                           Service Type: Individual                            Session Start Time:  1200                Session End Time:    1253                Session Length:        53 mins     Session #:      8     Attendees:     Client attended alone     Service Modality:  In-person     DATA  Interactive Complexity: No  Crisis: No                               Progress Since Last Session (Related to Symptoms / Goals / Homework)              Symptoms: Pt reports  back pain, anxiety, frustration with spouse and daughter issues. Pt appears low today.                 Homework: none                            Episode of Care Goals: No improvement - ACTION (Actively working towards change); Intervened by reinforcing change plan / affirming steps taken                 Current / Ongoing Stressors and Concerns:  Pt is worried about her medication for cancer and the samanta she receives running out. She is unsure how she will pay for it.     Pt reports finances are the largest stress in her life.     Pt reports a lot of birthdays in may to celebrate.     Pt report her spouse has pre diabetes and does not watch what he eats. He eats a lot of junk food per pt.     Pt reports she loves her new dog and that the grandkids have a great times with the dog.     Pt reports she will be babysitting her grand kids in [place] for two weeks in June and is looking forward to that.      Social Hx:     Pt is  and has a strained relationship with her spouse.      Pt reports she has two daughters:      \"Sharifa\" is  and lives in [place] and [facility]. She has two children. Pt really likes her and her spouse.      \"Collette\" is challenged by her mental health and is on Social Security. Collette has two kids ages 6 \"Maximiliano\" and 10 \"Dayday.\"  Collette and " her kids live with pt and spouse.                 Treatment Objective(s) Addressed in This Session:          identify multiple stressors which contribute to feelings of depression  And anxiety                 Intervention:              CBT: ,              Solution Focused: ,                 ASSESSMENT: Current Emotional / Mental Status (status of significant symptoms):              Risk status (Self / Other harm or suicidal ideation)              Patient denies current fears or concerns for personal safety.              Patient denies current or recent suicidal ideation or behaviors.              Patient denies current or recent homicidal ideation or behaviors.              Patient denies current or recent self injurious behavior or ideation.              Patient denies other safety concerns.              Patient reports there has been no change in risk factors since their last session.                Patient reports there has been no change in protective factors since their last session.                Recommended that patient call 911 or go to the local ED should there be a change in any of these risk factors.                 Appearance:                            Appropriate               Eye Contact:                           Good               Psychomotor Behavior:          Normal               Attitude:                                   Cooperative               Orientation:                             All              Speech                          Rate / Production:       Normal                           Volume:                       Normal               Mood:                                      Depressed               Affect:                                      Appropriate               Thought Content:                    Clear               Thought Form:                        Coherent  Logical               Insight:                                     Good                  Changes in Health  Issues:              Yes: cancer, high BP ; Associated Psychological Distress                 Chemical Use Review:              Substance Use: Chemical use reviewed, no active concerns identified      Diagnosis:  F43.23 Adjustment D/O with mixed anxiety and depressed mood.      Collateral Reports Completed:              Not Applicable     PLAN: (Patient Tasks / Therapist Tasks / Other)  Return in two weeks.           Mario Long MA [facility]                                                           ______________________________________________________________________  Individual Treatment Plan     Patient's Name: Lizzie Viera                   YOB: 1950     Date of Creation: 1/11/2023  Date Treatment Plan Last Reviewed/Revised: 5/3/2023     DSM5 Diagnoses: Adjustment Disorder  Psychosocial / Contextual Factors: stress with spouse, her medical issues  PROMIS (reviewed every 90 days):      Referral / Collaboration:  Referral to another professional/service is not indicated at this time..     Anticipated number of session for this episode of care: 9-12 sessions  Anticipation frequency of session: Biweekly  Anticipated Duration of each session: 53 or more minutes  Treatment plan will be reviewed in 90 days or when goals have been changed.         MeasurableTreatment Goal(s) related to diagnosis / functional impairment(s)  Goal 1: Patient will reduce anxiety    I will know I've met my goal when I feel less anxious.       Objective #A (Patient Action)                          Patient will identify multiple stressors which contribute to feelings of anxiety.  Status: Reviewed 5/3/23     Intervention(s)  Therapist will teach emotional regulation skills. ,.     Objective #B  Patient will identify multiple stressors which contribute to feelings of anxiety.  Status: Reviewed 5/3/23     Intervention(s)  Therapist will teach emotional regulation skills. ,.        Patient has reviewed and agreed to  the above plan.              Mario Long MA [facility]               5/3/2023

## 2023-05-03 NOTE — LETTER
5/3/2023         RE: Lizzie Viera  [address] [place] [address]        Dear Colleague,    Thank you for referring your patient, Lizzie Viera, to the [facility]. Please see a copy of my visit note below.    NA      Again, thank you for allowing me to participate in the care of your patient.        Sincerely,        Mario Long PsyD

## 2023-05-09 ENCOUNTER — TELEPHONE (OUTPATIENT)
Dept: ONCOLOGY | Facility: HOSPITAL | Age: 73
End: 2023-05-09
Payer: COMMERCIAL

## 2023-05-09 ENCOUNTER — VIRTUAL VISIT (OUTPATIENT)
Dept: RADIATION ONCOLOGY | Facility: HOSPITAL | Age: 73
End: 2023-05-09
Attending: FAMILY MEDICINE
Payer: COMMERCIAL

## 2023-05-09 DIAGNOSIS — Z51.5 ENCOUNTER FOR PALLIATIVE CARE: Primary | ICD-10-CM

## 2023-05-09 DIAGNOSIS — C90.00 MULTIPLE MYELOMA NOT HAVING ACHIEVED REMISSION (H): Primary | ICD-10-CM

## 2023-05-09 DIAGNOSIS — G89.3 CANCER ASSOCIATED PAIN: ICD-10-CM

## 2023-05-09 DIAGNOSIS — C90.00 MULTIPLE MYELOMA WITH FAILED REMISSION (H): ICD-10-CM

## 2023-05-09 DIAGNOSIS — C90.01 MULTIPLE MYELOMA IN REMISSION (H): ICD-10-CM

## 2023-05-09 DIAGNOSIS — M84.58XK: ICD-10-CM

## 2023-05-09 PROCEDURE — 99214 OFFICE O/P EST MOD 30 MIN: CPT | Mod: VID | Performed by: FAMILY MEDICINE

## 2023-05-09 PROCEDURE — G0463 HOSPITAL OUTPT CLINIC VISIT: HCPCS | Mod: PN,GT | Performed by: FAMILY MEDICINE

## 2023-05-09 RX ORDER — LENALIDOMIDE 10 MG/1
10 CAPSULE ORAL DAILY
Qty: 28 CAPSULE | Refills: 0 | Status: SHIPPED | OUTPATIENT
Start: 2023-08-03 | End: 2023-06-01

## 2023-05-09 NOTE — PATIENT INSTRUCTIONS
It was good to see you today,Valeria.    Here are the things we talked about:    If you don't hear from Kacy by Thursday morning let me or Carine know.    Keep up with your current medications and enjoy your visit with your family in [place].    Someone from the team will reach out to schedule a follow up appointment in 3 months.       How to get a hold of us:  For non-urgent matters, MyChart works best.    For more urgent matters, or if you prefer not to use MyChart, call our clinic nurse coordinator Carine Ferreira RN at [phone]    We have an on-call number for evenings and weekends. Please call this only if you are having uncontrolled symptoms or serious side effects from your medicines: [phone].     For refills, please give us a week (5 working days) notice. We don't always have providers available everyday to do refills. If you call the day you run out of your medicine, we may not be able to refill it in time, so call 5 days in advance!    Yuri Salazar MD MS FAAFP CAQHPM  [facility]  Office [phone]  Fax [phone]

## 2023-05-09 NOTE — NURSING NOTE
Is the patient currently in the state of [place]? NO    Visit mode:VIDEO    If the visit is dropped, the patient can be reconnected by: VIDEO VISIT: Send to e-mail at: [email or web address]    Will anyone else be joining the visit? NO      How would you like to obtain your AVS? MyChart    Are changes needed to the allergy or medication list? NO    Patient confirms medications and allergies are accurate via patients echeck in completion, and or denies any changes since last reviewed/verified.     Patient declined individual allergy and medication review by support staff because recently reviewed.     Shira Jefferson, Virtual Facilitator    Reason for visit: Video Visit (Follow up )

## 2023-05-09 NOTE — PROGRESS NOTES
Virtual Visit Details    Type of service:  Video Visit   Video Start Time:   Video End Time:11:51 AM    Originating Location (pt. Location):  Jeanine Shaw was at her daughter's home in [place], [place].    Distant Location (provider location):  On-site  Platform used for Video Visit: [facility]       Palliative Care Outpatient Clinic Progress Note    Patient Name: Lizzie Viera  Primary Provider: Sona Sandoval      Impression & Recommendations & Counseling:  Multiple myeloma  Compression Fracture mid-T spine  Cancer associated pain--well controlled  Insomnia well controlled     ECO  Decisional Capacity: very present     SYMPTOM ASSESSMENT:  1.  Cancer related pain thoracic spine pain that radiates to bilateral lower extremities  Comment: Controlled.  Currently rates pain as low on days when doesn't overdo it (3-4) but someday's at the end of the day if she's been on her feet a lot and done a lot of housework and it is 7-8/10.  Resting and going to bed helps after awhile.  She uses the medical marijuana maybe once every two weeks at night and it is pretty effective.  She did use dilaudid twice when she was in [place].  She  Did get  to [place] at the end of February for 18 days and she really enjoyed it..  - Continue Gabapentin 600 mg by mouth 3 times daily.  - S/P Single fraction radiation to T6 through T .  - S/P T1 radiation 10/6/2021-10/12/2021.    - Dilaudid to 2-4 mg by mouth every 4 hours as needed.  Takes infrequently.  - Continue medical cannabis per department of Health - pills and oil. Feels she is having good relief with this.  Does not take cannabis when knows she has to drive the next day.  - Continue Robaxin 500 mg po q6h prn spasms - taking 3 times a day. Refilled last week.  - Tried baclofen at bedtime without benefit. It did not assist with sleep or relief of spasms.  -Discussed possibility of initiation of Cymbalta as this could help with neuropathic component to pain as well as assist with  "depression.  Patient would like to hold off on this at this time.  She feels the gabapentin is 'doing the job' for her.  Does not want to feel any masking of highs or lows per her report.     - Patient is taking naltrexone 3 mg by mouth at .  Had discussion with patient that this could reverse/minimize effect of opiate and she may feel more pain.  Taking this as a \"anti-inflammatory\" component of her natural/complementary treatments and doesn't feel it interferes when she uses her infrequent dilaudid. Her last hsCRP was elevated.  She is also using Tumeric to help reduce inflammation.  She has naturopath who she visits in [place].     2. Cancer related fatigue - fluctuates.  Feels fatigued at days end occasionally but she is now exercising up to four times a day on her treadmill and she is walking at 3.3 mph with a max of 3.7 mph (with a goal to resume 5 mph!) She finds going faster than 3.7 mph is hard on her back.  - Activity as tolerated.  - Patient utilizing a cane when walking around but walking up to 3.3 mph on treadmill.     3. Anxiety and depression related to health/diagnosis and current social stressors and stresses around paying for her medications.  - Patient is on list to see Kacy Edouard  for coping, support and processing diagnosis and I also requested help with paying for revlimid; She does see Jack in health psychology  - Patient sees an energy healer.     ADVANCED CARE PLANNING/GOALS OF CARE DISCUSSION:  - Code status: DNR/DNI  - Honoring Choices and POLST in chart.  - Follow-up in palliative care clinic in 3 months for ongoing pain management and decisional support.   -Call [phone] with questions or refill needs.    Counseling: All of the above was explained to the patient in lay language. The patient has verbalized a clear understanding of the discussion, asked appropriate questions, which have been answered to patient's apparent satisfaction. The patient is in agreement with the above " plan.      Chief Complaint/Patient ID: Lizzie Viera 72 year old female with PMHx of multiple myeloma    Last Palliative care appointment: 2023 with me     Reviewed: yes, no concerns    Interim History:  Lizzie Viera is a 72 year old female who is seen today for follow up with Palliative Care via  billable video visit.      Pain:  for the most part very well controlled with current regimen    Appetite/Nausea: no concerns     Bowels: has a predictable cycle with revlimid     Sleep: no concerns     Mood: overall good; looking forward to staying with her grandkids for two weeks this summer     Coping:  Overall dong well with a good set of support resources    Family History- Reviewed in Epic.    Allergies   Allergen Reactions     Cefdinir Shortness Of Breath     Latex Shortness Of Breath     Short Ragweed Pollen Ext Cough     Adhesive Tape Rash       Social History:  Pertinent changes to social history/social situation since last visit: none  Key support resources: family  Advance Directive Status:  Filed in epic    Social History     Tobacco Use     Smoking status: Former     Packs/day: 0.50     Years: 45.00     Pack years: 22.50     Types: Cigarettes     Quit date: 2023     Years since quittin.2     Smokeless tobacco: Never   Vaping Use     Vaping status: Every Day     Passive vaping exposure: Yes   Substance Use Topics     Alcohol use: Yes     Comment: Alcoholic Drinks/day: 0-1 drinks per week     Drug use: Not Currently         Allergies   Allergen Reactions     Cefdinir Shortness Of Breath     Latex Shortness Of Breath     Short Ragweed Pollen Ext Cough     Adhesive Tape Rash     Current Outpatient Medications   Medication Sig Dispense Refill     acyclovir (ZOVIRAX) 400 MG tablet Take 1 tablet (400 mg) by mouth 2 times daily Viral Prophylaxis. 180 tablet 1     albuterol (PROVENTIL) (2.5 MG/3ML) 0.083% neb solution Take 1 vial (2.5 mg) by nebulization every 4 hours as needed for  wheezing or shortness of breath / dyspnea 90 mL 0     alpha-lipoic acid 100 MG capsule Take 300 mg by mouth daily       amLODIPine (NORVASC) 5 MG tablet TAKE ONE TABLET BY MOUTH ONE TIME DAILY 30 tablet 11     Ascorbic Acid (VITAMIN C) 100 MG CHEW Take 1 tablet by mouth daily       aspirin (ASA) 81 MG chewable tablet Take 81 mg by mouth daily       Coenzyme Q10 300 MG CAPS Take 300 mg by mouth daily       fish oil-omega-3 fatty acids 1000 MG capsule Take 2 g by mouth daily       gabapentin (NEURONTIN) 300 MG capsule TAKE TWO CAPSULES BY MOUTH THREE TIMES DAILY 180 capsule 1     HYDROmorphone (DILAUDID) 4 MG tablet Take 0.5-1 tablets (2-4 mg) by mouth every 4 hours as needed for moderate to severe pain 60 tablet 0     [START ON 8/3/2023] LENalidomide (REVLIMID) 10 MG CAPS capsule Take 1 capsule (10 mg) by mouth daily for 28 days Take at the same time each day. Do not open, break or chew the capsule. Swallow whole, with water. 28 capsule 0     [START ON 6/8/2023] LENalidomide (REVLIMID) 10 MG CAPS capsule Take 1 capsule (10 mg) by mouth daily for 28 days Take at the same time each day. Do not open, break or chew the capsule. Swallow whole, with water. 28 capsule 0     medical cannabis (Patient's own supply) See Admin Instructions (The purpose of this order is to document that the patient reports taking medical cannabis.  This is not a prescription, and is not used to certify that the patient has a qualifying medical condition.)     Oil formulation       Melatonin 10 MG TABS tablet Take 20 mg by mouth At Bedtime       methocarbamol (ROBAXIN) 500 MG tablet Take 1 tablet (500 mg) by mouth 4 times daily as needed for muscle spasms 120 tablet 4     Milk Thistle-Dand-Fennel-Licor (MILK THISTLE XTRA) CAPS capsule Take 1 capsule by mouth daily       NALTREXONE HCL PO Take 3 mg by mouth daily       nicotine (NICOTROL) 10 MG inhaler Use 1 cartridge as needed for urge to smoke by puffing over course of 20min.  Use 6-16 cart/day;  reduce number of cart/day over 6-12 weeks. 300 each 4     phenazopyridine (AZO URINARY PAIN RELIEF) 95 MG tablet Take 190 mg by mouth 3 times daily       TURMERIC PO Take 1 capsule by mouth daily       UNABLE TO FIND 1 capsule daily MEDICATION NAME: Serrapeptase       UNABLE TO FIND 1 capsule daily MEDICATION NAME: Lions García Mushroom       UNABLE TO FIND 1 capsule daily MEDICATION NAME: DIM (diinolylmethane)       UNABLE TO FIND MEDICATION NAME: Panacur C - 1 capsule daily       Vitamin A 3 MG (29994 UT) TABS Take 1 tablet by mouth daily       Vitamin D, Cholecalciferol, 25 MCG (1000 UT) CAPS Take 1,000 Units by mouth daily Liquid, not capsule       albuterol (PROAIR HFA/PROVENTIL HFA/VENTOLIN HFA) 108 (90 Base) MCG/ACT inhaler Inhale 2 puffs into the lungs every 6 hours as needed for wheezing or shortness of breath / dyspnea (Patient not taking: Reported on 4/12/2023) 18 g 1     baclofen (LIORESAL) 10 MG tablet TAKE 1 TABLET BY MOUTH 3 TIMES A DAY AS NEEDED FOR MUSCLE SPASMS (Patient not taking: Reported on 4/12/2023) 60 tablet 0     Past Medical History:   Diagnosis Date     Cervical dysplasia      Chronic RUQ pain      Depressive disorder      Multiple myeloma (H)      Osteoporosis      Past Surgical History:   Procedure Laterality Date     CONIZATION CERVIX,KNIFE/LASER      Description: Cervical Conization By Laser;  Recorded: 09/20/2007;     HC DILATION/CURETTAGE DIAG/THER NON OB      Description: Dilation And Curettage;  Recorded: 09/20/2007;      REMOVE TONSILS/ADENOIDS,<13 Y/O      Description: Tonsillectomy With Adenoidectomy;  Recorded: 09/20/2007;     RUST LAP,CHOLECYSTECTOMY/EXPLORE  12/27/2004     RUST LIGATE FALLOPIAN TUBE      Description: Tubal Ligation;  Recorded: 09/20/2007;       Physical Exam:   GENERAL APPEARANCE:  , alert and no distress; neatly groomed  EYES: Eyes grossly normal to inspection, PERRLA, conjunctivae and sclerae without injection or discharge, EOM intact   RESP:  no increased  work of breathing; speaks in complete sentences;   MS: No musculoskeletal defects are noted  SKIN: No suspicious lesions or rashes, hydration status appears adequate with normal skin turgor   PSYCH: Alert and oriented x3; speech- coherent , normal rate and volume; able to articulate logical thoughts, able to abstract reason, no tangential thoughts, no hallucinations or delusions, mentation appears normal, Mood is euthymic. Affect is appropriate for this mood state and bright. Thought content is free of suicidal ideation, hallucinations, and delusions.  Eye contact is good during conversation.       Key Data Reviewed:  LABS: 4/12/2023- Cr 0.67, Albumin 3.9,  Hgb 14.1;      IMAGING: no recent imaging for review    30 minutes spent on the date of the encounter doing chart review, history and exam, patient education & counseling, documentation and other activities as noted above.    Yuri Salazar MD MS FAAFP CAQHPM  [facility]  Office [phone]  Fax [phone]

## 2023-05-10 ENCOUNTER — PATIENT OUTREACH (OUTPATIENT)
Dept: CARE COORDINATION | Facility: CLINIC | Age: 73
End: 2023-05-10
Payer: COMMERCIAL

## 2023-05-10 NOTE — PROGRESS NOTES
Oncology Distress Screening Follow-up  Clinical Social Work  [facility]    Identified Concern and Score From Distress Screening:   How concerned do you feel regarding the following common issues people with cancer face. Please answer with a number from 0-10 where 0=not concerned and 10=very concerned. PT response of >=8  will result in outreach from Support Team.   1. How concerned are you about your ability to eat?  0       2. How concerned are you about unintended weight loss or your current weight?  0       3. How concerned are you about feeling depressed or very sad?  3       4. How concerned are you about feeling anxious or very scared?  4       5. Do you struggle with the loss of meaning and rambo in your life?  Not at all       6. How concerned are you about work and home life issues that may be affected by your cancer?  5       7. How concerned are you about knowing what resources are available to help you?  2       8. Do you currently have what you would describe as Cheondoism or spiritual struggles? Not at all       You can also ask to be contacted by one of our Oncology Supportive Care professionals.    9. If you want to be contacted by one of our professionals, I can send a message to them right now.  Oncology Social Worker           Date of Distress Screenin23      Data: At time of last visit, Valeria requested call from ELISEO.     Intervention/Education Provided:   Eliseo also had message from Dr. Salazar (Palliative) that Valeria had some concerns re medication coverage. ELISEO connected with Radha in pharmacy and she reached out to Valeria to reassure her that she wouldn't have costs associated with her remlivid.     ELISEO called Valeria and left voicemail introducing self, offering support and gave contact information.       Follow-up Required:  will remain available as needed.    Kacy Edouard, NOEMI, [facility]  Adult Oncology Clinics  [place] (M,W), [place] (T) & [place] (Th)  *I am off Friday  Office:  [phone]

## 2023-05-17 ENCOUNTER — ONCOLOGY VISIT (OUTPATIENT)
Dept: ONCOLOGY | Facility: CLINIC | Age: 73
End: 2023-05-17
Attending: PSYCHOLOGIST
Payer: COMMERCIAL

## 2023-05-17 DIAGNOSIS — F43.23 ADJUSTMENT DISORDER WITH MIXED ANXIETY AND DEPRESSED MOOD: Primary | ICD-10-CM

## 2023-05-17 PROCEDURE — 90837 PSYTX W PT 60 MINUTES: CPT | Performed by: PSYCHOLOGIST

## 2023-05-17 NOTE — CONFIDENTIAL NOTE
"[facility] On[facility]- Psychotherapy                                     Progress Note     Patient Name: Lizzie Viera                      Date:   5/17/2023                                           Service Type: Individual                            Session Start Time:  1200                Session End Time:    1253                Session Length:        53 mins     Session #:      9     Attendees:     Client attended alone     Service Modality:  In-person     DATA  Interactive Complexity: No  Crisis: No                               Progress Since Last Session (Related to Symptoms / Goals / Homework)   Symptoms: Pt reports  back pain, anxiety, frustration with spouse. Pt reports anxiety has increased with her spouse's behavior.                             Episode of Care Goals: No improvement - ACTION (Actively working towards change); Intervened by reinforcing change plan / affirming steps taken                 Current / Ongoing Stressors and Concerns:    Pt reports her grandson had a birthday recently and they went to [place] per Maximiliano's request (age 6) and pts spouse was rude to their daughter Collette. Pt was irritated with him and told him so. He then began playing the victim per pt. Pt is frustrated being with him.     Pt reports she loves her new dog and that the grandkids have a great times with the dog.      Pt reports she will be babysitting her grand kids in [place] for two weeks in June and is looking forward to that.      Social Hx:      Pt is  and has a strained relationship with her spouse.                  Pt reports she has two daughters:                  \"Sharifa\" ( )  is  and lives in [facility]. She has two children. Pt really likes her and her spouse. She has two children \"Henry\" is 10 and \"Gonsales\" is 12.                 \"Collette\" (37) is challenged by her mental health and is on Social Security. Collette has two kids ages 6 \"Maximiliano\" and 10 \"Dayday.\"  " Collette and her kids live with pt and spouse.                  Treatment Objective(s) Addressed in This Session:          identify multiple stressors which contribute to feelings of depression  And anxiety                 Intervention:              CBT: ,              Solution Focused: ,                 ASSESSMENT: Current Emotional / Mental Status (status of significant symptoms):              Risk status (Self / Other harm or suicidal ideation)              Patient denies current fears or concerns for personal safety.              Patient denies current or recent suicidal ideation or behaviors.              Patient denies current or recent homicidal ideation or behaviors.              Patient denies current or recent self injurious behavior or ideation.              Patient denies other safety concerns.              Patient reports there has been no change in risk factors since their last session.                Patient reports there has been no change in protective factors since their last session.                Recommended that patient call 911 or go to the local ED should there be a change in any of these risk factors.                 Appearance:                            Appropriate               Eye Contact:                           Good               Psychomotor Behavior:          Normal               Attitude:                                   Cooperative               Orientation:                             All              Speech                          Rate / Production:       Normal                           Volume:                       Normal               Mood:                                      Depressed               Affect:                                      Appropriate               Thought Content:                    Clear               Thought Form:                        Coherent  Logical               Insight:                                     Good                  Changes in  Health Issues:              Yes: cancer, high BP ; Associated Psychological Distress                 Chemical Use Review:              Substance Use: Chemical use reviewed, no active concerns identified      Diagnosis:  F43.23 Adjustment D/O with mixed anxiety and depressed mood.      Collateral Reports Completed:              Not Applicable     PLAN: (Patient Tasks / Therapist Tasks / Other)  Return in two weeks.           Mario Long MA [facility]                                                           ______________________________________________________________________  Individual Treatment Plan     Patient's Name: Lizzie Viera                   YOB: 1950     Date of Creation: 1/11/2023  Date Treatment Plan Last Reviewed/Revised: 5/17/2023     DSM5 Diagnoses: Adjustment Disorder  Psychosocial / Contextual Factors: stress with spouse, her medical issues  PROMIS (reviewed every 90 days):      Referral / Collaboration:  Referral to another professional/service is not indicated at this time..     Anticipated number of session for this episode of care: 9-12 sessions  Anticipation frequency of session: Biweekly  Anticipated Duration of each session: 53 or more minutes  Treatment plan will be reviewed in 90 days or when goals have been changed.         MeasurableTreatment Goal(s) related to diagnosis / functional impairment(s)  Goal 1: Patient will reduce anxiety    I will know I've met my goal when I feel less anxious.       Objective #A (Patient Action)                          Patient will identify multiple stressors which contribute to feelings of anxiety.  Status: Reviewed 5/3/23     Intervention(s)  Therapist will teach emotional regulation skills. ,.     Objective #B  Patient will identify multiple stressors which contribute to feelings of anxiety.  Status: Reviewed 5/3/23     Intervention(s)  Therapist will teach emotional regulation skills. ,.        Patient has reviewed and  agreed to the above plan.              Mario Long MA [facility]               5/17/2023

## 2023-05-17 NOTE — LETTER
5/17/2023         RE: Lizzie Viera  [address] [place] [address]        Dear Colleague,    Thank you for referring your patient, Lizzie Viera, to the [facility]. Please see a copy of my visit note below.    See confidential note      Again, thank you for allowing me to participate in the care of your patient.        Sincerely,        Mario Long PsyD

## 2023-05-18 ENCOUNTER — OFFICE VISIT (OUTPATIENT)
Dept: FAMILY MEDICINE | Facility: CLINIC | Age: 73
End: 2023-05-18
Payer: COMMERCIAL

## 2023-05-18 VITALS
DIASTOLIC BLOOD PRESSURE: 54 MMHG | WEIGHT: 128 LBS | HEIGHT: 61 IN | BODY MASS INDEX: 24.17 KG/M2 | HEART RATE: 52 BPM | SYSTOLIC BLOOD PRESSURE: 136 MMHG | OXYGEN SATURATION: 96 % | TEMPERATURE: 98.1 F | RESPIRATION RATE: 16 BRPM

## 2023-05-18 DIAGNOSIS — I77.4 CELIAC ARTERY STENOSIS (H): ICD-10-CM

## 2023-05-18 DIAGNOSIS — J43.1 PANLOBULAR EMPHYSEMA (H): ICD-10-CM

## 2023-05-18 DIAGNOSIS — H93.13 TINNITUS, BILATERAL: Primary | ICD-10-CM

## 2023-05-18 DIAGNOSIS — G47.19 EXCESSIVE DAYTIME SLEEPINESS: ICD-10-CM

## 2023-05-18 DIAGNOSIS — R06.83 SNORING: ICD-10-CM

## 2023-05-18 DIAGNOSIS — I10 ESSENTIAL HYPERTENSION, BENIGN: ICD-10-CM

## 2023-05-18 PROCEDURE — 99214 OFFICE O/P EST MOD 30 MIN: CPT | Performed by: FAMILY MEDICINE

## 2023-05-18 RX ORDER — HYDROCHLOROTHIAZIDE 12.5 MG/1
12.5 TABLET ORAL DAILY
Qty: 90 TABLET | Refills: 1 | Status: SHIPPED | OUTPATIENT
Start: 2023-05-18 | End: 2023-01-01

## 2023-05-18 ASSESSMENT — PATIENT HEALTH QUESTIONNAIRE - PHQ9
SUM OF ALL RESPONSES TO PHQ QUESTIONS 1-9: 5
10. IF YOU CHECKED OFF ANY PROBLEMS, HOW DIFFICULT HAVE THESE PROBLEMS MADE IT FOR YOU TO DO YOUR WORK, TAKE CARE OF THINGS AT HOME, OR GET ALONG WITH OTHER PEOPLE: NOT DIFFICULT AT ALL
SUM OF ALL RESPONSES TO PHQ QUESTIONS 1-9: 5

## 2023-05-18 ASSESSMENT — PAIN SCALES - GENERAL: PAINLEVEL: NO PAIN (0)

## 2023-05-18 NOTE — PATIENT INSTRUCTIONS
Stop amlodipine.  Begin hydrochlorothiazide 12.5 mg daily.  Update me with your home blood pressures in 2 to 3 weeks.  Lets see if this helps with your blood pressure management and with your ear ringing.  We will check your kidney function when you see Dr. Blanco next for labs.    I have placed an order to the sleep center to discuss options for testing for sleep apnea.

## 2023-05-19 PROBLEM — J43.1 PANLOBULAR EMPHYSEMA (H): Status: ACTIVE | Noted: 2023-05-19

## 2023-05-19 ASSESSMENT — PATIENT HEALTH QUESTIONNAIRE - PHQ9
SUM OF ALL RESPONSES TO PHQ QUESTIONS 1-9: 5
10. IF YOU CHECKED OFF ANY PROBLEMS, HOW DIFFICULT HAVE THESE PROBLEMS MADE IT FOR YOU TO DO YOUR WORK, TAKE CARE OF THINGS AT HOME, OR GET ALONG WITH OTHER PEOPLE: NOT DIFFICULT AT ALL

## 2023-05-19 NOTE — PROGRESS NOTES
Assessment & Plan     Tinnitus, bilateral  We discussed broad differential diagnosis.  Chronic worsening of hearing loss could be contributing.  Medications also be contribute.  Risk of aspirin discontinuation outweighs.he disruption in medications is causing, we continue aspirin.  Amlodipine could be contributing.  We will have her stop this and instead begin hydrochlorothiazide 12.5 mg daily to see if this makes a difference.  Consider audiology assessment if persisting.    Essential hypertension, benign  Stop amlodipine as above.  Given hydrochlorothiazide 12.5 mg daily.  Update me with home blood pressures in 2 to 3 weeks via [place].  She will have comprehensive metabolic panel in June under the direction of her oncology team to reassess renal function and electrolytes with this change.  - hydrochlorothiazide (HYDRODIURIL) 12.5 MG tablet; Take 1 tablet (12.5 mg) by mouth daily    Celiac artery stenosis (H)  Continue aspirin.  Advised initiation of statin medication.  We discussed risk versus benefits of this.  Significant myalgia with atorvastatin, discussed low-dose pravastatin.  She declines.    Snoring  Excessive daytime sleepiness  Referral is placed for sleep clinic evaluation.  - Adult Sleep Eval & Management  Referral; Future    Panlobular emphysema (H)  Stable without need for daily medication.                   Sona Sandoval MD  [facility] OAKHYACINTH Shaw is a 72 year old, presenting for the following health issues:  Hypertension (Elevated blood pressure), snoring, and Ringing/buzzing in ears        6/30/2022     2:23 PM   Additional Questions   Roomed by Dena Matthews   Accompanied by none     Noting recent elevation of blood pressures into the 150s systolic at oncology visits and 140s at home.  She has had ringing in her ears for several years but now it is it more intense and constant, hearing has worsened mildly.  Uncertain if its been causing the tinnitus or  "tinnitus is causing her hearing to be reduced.  Seen by vascular surgery due to celiac artery stenosis, as this is asymptomatic they have recommended optimizing medical management.  She is therefore taking aspirin.  She had myalgias with atorvastatin, not currently taking a statin.  Was told by grandson that she is snoring loudly.  Oftentimes does not feel rested when awakening.  Reports excessive daytime sleepiness, sometimes falling asleep inappropriately.  Notes current pollen allergies, however symptoms preceded allergy symptoms this season.    History of Present Illness       Hypertension: She presents for follow up of hypertension.  She does check blood pressure  regularly outside of the clinic. Outside blood pressures have been over 140/90. She does not follow a low salt diet.     Reason for visit:  Check blood pressure, discuss snoring and ringing in ears.    She eats 2-3 servings of fruits and vegetables daily.She consumes 0 sweetened beverage(s) daily.She exercises with enough effort to increase her heart rate 10 to 19 minutes per day.  She exercises with enough effort to increase her heart rate 4 days per week.   She is taking medications regularly.    Today's PHQ-9         PHQ-9 Total Score: 5    PHQ-9 Q9 Thoughts of better off dead/self-harm past 2 weeks :   Not at all    How difficult have these problems made it for you to do your work, take care of things at home, or get along with other people: Not difficult at all               Review of Systems         Objective    /54   Pulse 52   Temp 98.1  F (36.7  C) (Oral)   Resp 16   Ht 1.549 m (5' 1\")   Wt 58.1 kg (128 lb)   LMP  (LMP Unknown)   SpO2 96%   BMI 24.19 kg/m    Body mass index is 24.19 kg/m .  Physical Exam   Alert and pleasant.  Tympanic membrane's pearly and translucent bilaterally.  Mucous membranes moist.  Heart with regular rate and rhythm.  Lungs clear.  Extremities without edema.                    "

## 2023-05-22 ENCOUNTER — OFFICE VISIT (OUTPATIENT)
Dept: PHYSICAL MEDICINE AND REHAB | Facility: CLINIC | Age: 73
End: 2023-05-22
Payer: COMMERCIAL

## 2023-05-22 VITALS — DIASTOLIC BLOOD PRESSURE: 66 MMHG | SYSTOLIC BLOOD PRESSURE: 142 MMHG | HEART RATE: 57 BPM

## 2023-05-22 DIAGNOSIS — M54.6 CHRONIC MIDLINE THORACIC BACK PAIN: ICD-10-CM

## 2023-05-22 DIAGNOSIS — R20.2 PARESTHESIA: Primary | ICD-10-CM

## 2023-05-22 DIAGNOSIS — M48.04 THORACIC SPINAL STENOSIS: ICD-10-CM

## 2023-05-22 DIAGNOSIS — M79.18 MYOFASCIAL PAIN: ICD-10-CM

## 2023-05-22 DIAGNOSIS — S22.060S CLOSED WEDGE COMPRESSION FRACTURE OF T7 VERTEBRA, SEQUELA: ICD-10-CM

## 2023-05-22 DIAGNOSIS — G89.29 CHRONIC MIDLINE THORACIC BACK PAIN: ICD-10-CM

## 2023-05-22 DIAGNOSIS — M25.562 CHRONIC PAIN OF LEFT KNEE: ICD-10-CM

## 2023-05-22 DIAGNOSIS — G89.29 CHRONIC PAIN OF LEFT KNEE: ICD-10-CM

## 2023-05-22 PROCEDURE — 99214 OFFICE O/P EST MOD 30 MIN: CPT | Performed by: PHYSICAL MEDICINE & REHABILITATION

## 2023-05-22 RX ORDER — GABAPENTIN 300 MG/1
600 CAPSULE ORAL 3 TIMES DAILY
Qty: 180 CAPSULE | Refills: 3 | Status: SHIPPED | OUTPATIENT
Start: 2023-05-22 | End: 2023-09-18

## 2023-05-22 ASSESSMENT — PAIN SCALES - GENERAL: PAINLEVEL: MILD PAIN (2)

## 2023-05-22 NOTE — PROGRESS NOTES
Assessment/Plan:      Lizzie was seen today for numbness.    Diagnoses and all orders for this visit:    Paresthesia  -     EMG; Future    Chronic pain of left knee  -     XR Knee Left 3 Views; Future  -     diclofenac (VOLTAREN) 1 % topical gel; Apply 2 g topically 4 times daily    Closed wedge compression fracture of T7 vertebra, sequela    Chronic midline thoracic back pain    Thoracic spinal stenosis  -     gabapentin (NEURONTIN) 300 MG capsule; Take 2 capsules (600 mg) by mouth 3 times daily    Myofascial pain  -     gabapentin (NEURONTIN) 300 MG capsule; Take 2 capsules (600 mg) by mouth 3 times daily         Assessment: Pleasant 72 year old female with a history of hyperlipidemia, anxiety, depression, irritable bowel, neuropathy, osteoporosis and multiple myeloma with a T7 fracture and lesion  resulting in spinal stenosis:     1 increasing Bilateral foot paresthesias.  Left greater than right consistent with polyneuropathy.  However the asymmetry is concerning for more focal neuropathy in the left lower extremity.  She has a history of chemotherapy.         2.  Chronic left knee pain posteriorly consistent with osteoarthritis potential for meniscal tear.    3.  Waxing waning persistent chronic thoracic pain  around T7-8 where she has the pathologic T7 fracture with epidural neoplasm compressing the spinal cord.  No signs of thoracic myelopathy neurologically stable.    Symptoms are stable and paresthesias may be slightly improving with physical therapy.  Overall doing well with gabapentin 600 mg 3 times daily.  Still has weakness in the right greater than left lower extremities but appears relatively stable.  Significant increased pain over the last week after coughing and now some mild improvement.  She has pain over the left ribs and mid thoracic spine.    Fracture is stable and new MRI at T1 and T7.  She has significant upper thoracic and cervical myofascial pain as well.    Has done quite well after  Osteopathic manipulative medicine for approximately 4 to 5 days at a time and does well in the morning..         4.  She has cervical spine and upper thoracic spine myofascial pain.           Discussion:    1.  I discussed the diagnosis and treatment options.  Discussed the options of medication changes or continuing medication, further diagnostics for the knee and polyneuropathy symptoms.    2  EMG  bilateral lower extremities to evaluate for polyneuropathy along with left focal neuropathy or radiculopathy.    3.  Plain films of the left knee to evaluate extent of osteoarthritis.    4.  Trial Voltaren gel over the left knee as needed.    5.  Continue gabapentin 600 mg 3 times daily for now.  Refill provided for the patient.  Can consider change to Lyrica but will see how EMG looks     6.  Follow-up at earliest convenience for EMG.    It was our pleasure caring for your patient today, if there any questions or concerns please do not hesitate to contact us.      Subjective:   Patient ID: Lizzie Viera is a 72 year old female.    History of Present Illness:Patient presents for an evaluation of worsening bilateral lower extremity paresthesias along with worsening left knee pain.  Patient reports numbness and tingling in the bottom of the left greater than right foot.  Typically starts in the foot works its way to the anterior/dorsal foot.  Starts in the left but now is worsening on the right as well.  This been worsening over the past several months.  Worse with laying in bed at night.  No low back pain with this but does have chronic thoracic pain with this and has a history of compression fracture with multiple myeloma.  She is taking gabapentin 600 mg 3 times daily and does have some drowsiness.  Also occasional THC and methocarbamol.    She also has chronic left knee pain worsening over the past 5 years worse with sitting.  This is over the posterior left knee.  No radiation distally.  This is after a fall  several years ago.     Imaging:MRI lumbar spine from February 2022 images personally reviewed for medical decision-making purposes.  Mild T2 signal loss throughout the lumbar spine with mild disc height loss L4-5 L3-4 no high-grade central stenosis or foraminal stenosis at any level.  She does have mild facet arthropathy L4-5 L5-S1.  Some minimal left lateral recess stenosis at L4-5.    Review of Systems: Pertinent positives: Complains of paresthesias and weakness in the legs and headaches.  Pertinent negatives:   No bowel or bladder incontinence.  No urinary retention.  No fevers, unintentional weight loss, balance changes,   frequent falling, difficulty swallowing, or coordination difficulties.  All others reviewed are negative.       Past Medical History:   Diagnosis Date     Cervical dysplasia      Chronic RUQ pain      Depressive disorder      Multiple myeloma (H)      Osteoporosis        The following portions of the patient's history were reviewed and updated as appropriate: allergies, current medications, past family history, past medical history, past social history, past surgical history and problem list.           Objective:   Physical Exam:    BP (!) 142/66   Pulse 57   LMP  (LMP Unknown)   There is no height or weight on file to calculate BMI.      General: Alert and oriented with normal affect. Attention, knowledge, memory, and language are intact. No acute distress.    CV: No lower extremity edema.  Skin: No rashes seen.    Gait:  Nonantalgic.  Negative Romberg.  Full range of motion of the knees from seated.  Minimal effusion/Baker's cyst left lower extremity.  Sensation is decreased to light touch bilateral first dorsal webspace of the bilateral lower extremities.  Decreased left lateral malleolus and medial malleolus as well.  Reflexes are   negative Hoffmans.  0 patellar and Achilles with downgoing toes.    Manual muscle testing reveals:  Right /Left out of 5     5/5 hip flexors  5/5 knee  flexors  5/5 knee extensors  5/5 ankle plantar flexors  5/5 ankle dorsiflexors  5/5  L

## 2023-05-22 NOTE — PATIENT INSTRUCTIONS
Schedule an EMG (nerve test) with Dr Pascual.   An xray was ordered for you today.  Please call Radiology at [phone].     Try voltaren gel on your left knee as needed  Refill of gabapentin provided today

## 2023-05-22 NOTE — LETTER
5/22/2023         RE: Lizzie Viera  [address]  [place] [address]        Dear Colleague,    Thank you for referring your patient, Lizzie Viera, to the [facility]. Please see a copy of my visit note below.    Assessment/Plan:      Lizzie was seen today for numbness.    Diagnoses and all orders for this visit:    Paresthesia  -     EMG; Future    Chronic pain of left knee  -     XR Knee Left 3 Views; Future  -     diclofenac (VOLTAREN) 1 % topical gel; Apply 2 g topically 4 times daily    Closed wedge compression fracture of T7 vertebra, sequela    Chronic midline thoracic back pain    Thoracic spinal stenosis  -     gabapentin (NEURONTIN) 300 MG capsule; Take 2 capsules (600 mg) by mouth 3 times daily    Myofascial pain  -     gabapentin (NEURONTIN) 300 MG capsule; Take 2 capsules (600 mg) by mouth 3 times daily         Assessment: Hossein 72 year old female with a history of hyperlipidemia, anxiety, depression, irritable bowel, neuropathy, osteoporosis and multiple myeloma with a T7 fracture and lesion  resulting in spinal stenosis:     1 increasing Bilateral foot paresthesias.  Left greater than right consistent with polyneuropathy.  However the asymmetry is concerning for more focal neuropathy in the left lower extremity.  She has a history of chemotherapy.         2.  Chronic left knee pain posteriorly consistent with osteoarthritis potential for meniscal tear.    3.  Waxing waning persistent chronic thoracic pain  around T7-8 where she has the pathologic T7 fracture with epidural neoplasm compressing the spinal cord.  No signs of thoracic myelopathy neurologically stable.    Symptoms are stable and paresthesias may be slightly improving with physical therapy.  Overall doing well with gabapentin 600 mg 3 times daily.  Still has weakness in the right greater than left lower extremities but appears relatively stable.  Significant increased pain over  the last week after coughing and now some mild improvement.  She has pain over the left ribs and mid thoracic spine.    Fracture is stable and new MRI at T1 and T7.  She has significant upper thoracic and cervical myofascial pain as well.    Has done quite well after Osteopathic manipulative medicine for approximately 4 to 5 days at a time and does well in the morning..         4.  She has cervical spine and upper thoracic spine myofascial pain.           Discussion:    1.  I discussed the diagnosis and treatment options.  Discussed the options of medication changes or continuing medication, further diagnostics for the knee and polyneuropathy symptoms.    2  EMG  bilateral lower extremities to evaluate for polyneuropathy along with left focal neuropathy or radiculopathy.    3.  Plain films of the left knee to evaluate extent of osteoarthritis.    4.  Trial Voltaren gel over the left knee as needed.    5.  Continue gabapentin 600 mg 3 times daily for now.  Refill provided for the patient.  Can consider change to Lyrica but will see how EMG looks     6.  Follow-up at earliest convenience for EMG.    It was our pleasure caring for your patient today, if there any questions or concerns please do not hesitate to contact us.      Subjective:   Patient ID: Lizzie Viera is a 72 year old female.    History of Present Illness:Patient presents for an evaluation of worsening bilateral lower extremity paresthesias along with worsening left knee pain.  Patient reports numbness and tingling in the bottom of the left greater than right foot.  Typically starts in the foot works its way to the anterior/dorsal foot.  Starts in the left but now is worsening on the right as well.  This been worsening over the past several months.  Worse with laying in bed at night.  No low back pain with this but does have chronic thoracic pain with this and has a history of compression fracture with multiple myeloma.  She is taking gabapentin  600 mg 3 times daily and does have some drowsiness.  Also occasional THC and methocarbamol.    She also has chronic left knee pain worsening over the past 5 years worse with sitting.  This is over the posterior left knee.  No radiation distally.  This is after a fall several years ago.     Imaging:MRI lumbar spine from February 2022 images personally reviewed for medical decision-making purposes.  Mild T2 signal loss throughout the lumbar spine with mild disc height loss L4-5 L3-4 no high-grade central stenosis or foraminal stenosis at any level.  She does have mild facet arthropathy L4-5 L5-S1.  Some minimal left lateral recess stenosis at L4-5.    Review of Systems: Pertinent positives: Complains of paresthesias and weakness in the legs and headaches.  Pertinent negatives:   No bowel or bladder incontinence.  No urinary retention.  No fevers, unintentional weight loss, balance changes,   frequent falling, difficulty swallowing, or coordination difficulties.  All others reviewed are negative.       Past Medical History:   Diagnosis Date     Cervical dysplasia      Chronic RUQ pain      Depressive disorder      Multiple myeloma (H)      Osteoporosis        The following portions of the patient's history were reviewed and updated as appropriate: allergies, current medications, past family history, past medical history, past social history, past surgical history and problem list.           Objective:   Physical Exam:    BP (!) 142/66   Pulse 57   LMP  (LMP Unknown)   There is no height or weight on file to calculate BMI.      General: Alert and oriented with normal affect. Attention, knowledge, memory, and language are intact. No acute distress.    CV: No lower extremity edema.  Skin: No rashes seen.    Gait:  Nonantalgic.  Negative Romberg.  Full range of motion of the knees from seated.  Minimal effusion/Baker's cyst left lower extremity.  Sensation is decreased to light touch bilateral first dorsal webspace of  the bilateral lower extremities.  Decreased left lateral malleolus and medial malleolus as well.  Reflexes are   negative Hoffmans.  0 patellar and Achilles with downgoing toes.    Manual muscle testing reveals:  Right /Left out of 5     5/5 hip flexors  5/5 knee flexors  5/5 knee extensors  5/5 ankle plantar flexors  5/5 ankle dorsiflexors  5/5  EHL        Again, thank you for allowing me to participate in the care of your patient.        Sincerely,        Wyatt Pascual, DO

## 2023-05-23 ENCOUNTER — HOSPITAL ENCOUNTER (OUTPATIENT)
Dept: RADIOLOGY | Facility: CLINIC | Age: 73
Discharge: HOME | End: 2023-05-23
Attending: PHYSICAL MEDICINE & REHABILITATION | Admitting: PHYSICAL MEDICINE & REHABILITATION
Payer: COMMERCIAL

## 2023-05-23 DIAGNOSIS — G89.29 CHRONIC PAIN OF LEFT KNEE: ICD-10-CM

## 2023-05-23 DIAGNOSIS — M25.562 CHRONIC PAIN OF LEFT KNEE: ICD-10-CM

## 2023-05-23 PROCEDURE — 73562 X-RAY EXAM OF KNEE 3: CPT | Mod: LT

## 2023-06-01 DIAGNOSIS — C90.00 MULTIPLE MYELOMA WITH FAILED REMISSION (H): ICD-10-CM

## 2023-06-01 DIAGNOSIS — C90.00 MULTIPLE MYELOMA NOT HAVING ACHIEVED REMISSION (H): ICD-10-CM

## 2023-06-01 RX ORDER — LENALIDOMIDE 10 MG/1
10 CAPSULE ORAL DAILY
Qty: 28 CAPSULE | Refills: 0 | Status: SHIPPED | OUTPATIENT
Start: 2023-08-03 | End: 2023-07-05

## 2023-06-02 ENCOUNTER — ONCOLOGY VISIT (OUTPATIENT)
Dept: ONCOLOGY | Facility: CLINIC | Age: 73
End: 2023-06-02
Attending: PSYCHOLOGIST
Payer: COMMERCIAL

## 2023-06-02 DIAGNOSIS — F43.23 ADJUSTMENT DISORDER WITH MIXED ANXIETY AND DEPRESSED MOOD: Primary | ICD-10-CM

## 2023-06-02 DIAGNOSIS — G89.3 CANCER ASSOCIATED PAIN: ICD-10-CM

## 2023-06-02 PROCEDURE — 90837 PSYTX W PT 60 MINUTES: CPT | Performed by: PSYCHOLOGIST

## 2023-06-02 RX ORDER — METHOCARBAMOL 500 MG/1
500 TABLET, FILM COATED ORAL 4 TIMES DAILY PRN
Qty: 120 TABLET | Refills: 4 | Status: SHIPPED | OUTPATIENT
Start: 2023-06-02 | End: 2024-01-01
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
## 2023-06-02 NOTE — LETTER
6/2/2023         RE: Lizize Viera  [address]  [place] [address]        Dear Colleague,    Thank you for referring your patient, Lizzie Viera, to the [facility]. Please see a copy of my visit note below.    See confidential note      Again, thank you for allowing me to participate in the care of your patient.        Sincerely,        Mario Long PsyD

## 2023-06-02 NOTE — TELEPHONE ENCOUNTER
Received fax from pharmacy requesting refill of methocarbamol.     Last office visit: 5/9/23  Scheduled for follow up 8/8/23     Will route request to MD for review.

## 2023-06-19 ENCOUNTER — OFFICE VISIT (OUTPATIENT)
Dept: PHYSICAL MEDICINE AND REHAB | Facility: CLINIC | Age: 73
End: 2023-06-19
Attending: PHYSICAL MEDICINE & REHABILITATION
Payer: COMMERCIAL

## 2023-06-19 VITALS — HEART RATE: 56 BPM | DIASTOLIC BLOOD PRESSURE: 59 MMHG | SYSTOLIC BLOOD PRESSURE: 123 MMHG

## 2023-06-19 DIAGNOSIS — S22.060S CLOSED WEDGE COMPRESSION FRACTURE OF T7 VERTEBRA, SEQUELA: ICD-10-CM

## 2023-06-19 DIAGNOSIS — G89.29 CHRONIC PAIN OF LEFT KNEE: ICD-10-CM

## 2023-06-19 DIAGNOSIS — G57.32 NEUROPATHY OF LEFT PERONEAL NERVE: ICD-10-CM

## 2023-06-19 DIAGNOSIS — R20.2 PARESTHESIA: Primary | ICD-10-CM

## 2023-06-19 DIAGNOSIS — M79.18 MYOFASCIAL PAIN: ICD-10-CM

## 2023-06-19 DIAGNOSIS — M25.562 CHRONIC PAIN OF LEFT KNEE: ICD-10-CM

## 2023-06-19 PROCEDURE — 95886 MUSC TEST DONE W/N TEST COMP: CPT | Performed by: PHYSICAL MEDICINE & REHABILITATION

## 2023-06-19 PROCEDURE — 99214 OFFICE O/P EST MOD 30 MIN: CPT | Mod: 25 | Performed by: PHYSICAL MEDICINE & REHABILITATION

## 2023-06-19 PROCEDURE — 95911 NRV CNDJ TEST 9-10 STUDIES: CPT | Performed by: PHYSICAL MEDICINE & REHABILITATION

## 2023-06-19 ASSESSMENT — PAIN SCALES - GENERAL: PAINLEVEL: SEVERE PAIN (6)

## 2023-06-19 NOTE — LETTER
6/19/2023         RE: Lizzie Viera  [address]  [place] [address]        Dear Colleague,    Thank you for referring your patient, Lizzie Viera, to the [facility]. Please see a copy of my visit note below.    Assessment/Plan:      Lizzie was seen today for emg.    Diagnoses and all orders for this visit:    Paresthesia  -     EMG  -     [place] NEEDLE EMG EA EXTREMTY W/PARASPINAL AREA COMPLETE  -     [place] NCS MOTOR W OR W/O F-WAVE, 7 OR 8  -     Physical Therapy Referral; Future    Chronic pain of left knee  -     MR Knee Left w/o Contrast; Future  -     Physical Therapy Referral; Future    Neuropathy of left peroneal nerve  -     MR Knee Left w/o Contrast; Future  -     [place] NEEDLE EMG EA EXTREMTY W/PARASPINAL AREA COMPLETE  -     [place] NCS MOTOR W OR W/O F-WAVE, 7 OR 8  -     Physical Therapy Referral; Future    Closed wedge compression fracture of T7 vertebra, sequela  -     Physical Therapy Referral; Future    Myofascial pain  -     Physical Therapy Referral; Future         Assessment: Hossein 72 year old female with a history of hyperlipidemia, anxiety, depression, irritable bowel, neuropathy, osteoporosis and multiple myeloma with a T7 fracture and lesion  resulting in spinal stenosis:     1 persistent bilateral foot paresthesias.  Left greater than right consistent with polyneuropathy.   This is mildly asymmetric.  She has  absent sural SNAPs on EMG today cannot exclude peripheral polyneuropathy although this would be relatively mild.  She may have a small fiber neuropathy.  She has a history of chemotherapy.         2.  Chronic left knee pain posteriorly.  No significant osteoarthritis on plain films.  May be soft tissue injury within the knee.  She also has a left peroneal neuropathy at the knee.  She has milder peroneal neuropathy on the right knee.     3.    Significant increase of chronic thoracic pain  around T7-8 where she has the pathologic T7  fracture with epidural neoplasm compressing the spinal cord.  No signs of thoracic myelopathy neurologically stable.   Symptoms are stable and paresthesias may be slightly improving with physical therapy.  Overall doing well with gabapentin 600 mg 3 times daily.  Still has weakness in the right greater than left lower extremities but appears relatively stable.  Significant increased pain over the last week after coughing and now some mild improvement.  She has pain over the left ribs and mid thoracic spine.    Fracture is stable and new MRI at T1 and T7.  She has significant upper thoracic and cervical myofascial pain as well.    Has done quite well after Osteopathic manipulative medicine for approximately 4 to 5 days at a time.  Significant increased pain after visiting her daughter over the past week.         4.  She has cervical spine and upper thoracic spine myofascial pain.    Discussion:    1.  I discussed the diagnosis and treatment options.  Reviewed the EMG today.  We discussed her increased thoracic pain as well.  She has worsening knee pain and pain is most consistent on the left leg with a peroneal neuropathy.  She does not cross her legs and she is encouraged to avoid this.  We also need to evaluate for pathology within the knee compressing the peroneal nerve.    2.  MRI of the left knee to evaluate.    3.  Start physical therapy for the left knee along with the increase in thoracic pain.    4.  Continue with gabapentin 600 mg 3 times daily.  I did recommend increasing nighttime dose but she is wanting to hold off for now.    5.  Follow-up 1 to 2 months but will contact her via [employer] when imaging is available to make further recommendations at that time.    It was our pleasure caring for your patient today, if there any questions or concerns please do not hesitate to contact us.      Subjective:   Patient ID: Lizzie Viera is a 72 year old female.    History of Present Illness:Patient presents  for follow-up of left knee pain, paresthesias of the legs and thoracic spine pain which is significantly increased over the past week after visiting her daughter and caring for her grandchildren.  Her thoracic pain is through the upper back with tightness and spasm through the thoracic spine.  That pain is 9/10 at worst 6/10 today 0/10 at best.  Worse with laying in bed on her side or using her arms better with rest.  She attributes the increased pain to riding in the car and sleeping on an uneven bed but there was no specific injury.    She continues to have tightness through the left knee as well with numbness down the left greater than right foot to the great toe.  Numbness in all the toes of both feet as well but just in the tips.  The left leg is worse with sitting on a chair with her legs elevated.  Is taking gabapentin 600 mg 3 times daily.  Doing well with that no adverse side effects.  He is still having some trouble sleeping at night getting to sleep but then does well.      Imaging: Plain film imaging of the left knee personally reviewed along with the report.  This shows normal joint space with no fracture    .CT abdomen and pelvis with angiogram from November 2022 images personally reviewed for medical decision-making purposes.  Minimal right hip degenerative changes normal left hip.  Vascular calcifications throughout the aorta and iliac arteries.  Normal alignment lumbar spine with mild disc height loss T12-L1.  No spondylolisthesis.  Does have mild foraminal stenosis bilaterally at L4-5 with facet arthropathy relatively mild throughout the lumbar spine.    MRI thoracic spine 2023: Thoracic compression fracture noted at T1 and T7 no high-grade central stenosis.    MRI from 2022 was reviewed of the lumbar spine showing no central stenosis with mild degenerative disc disease.      Review of Systems: Pertinent positives: Paresthesias of both feet weakness in the legs and headaches, difficulty with  coordination.  Denies bowel or bladder incontinence, falls, swallowing difficulties, fevers or unintentional weight loss.           Past Medical History:   Diagnosis Date     Cervical dysplasia      Chronic RUQ pain      Depressive disorder      Multiple myeloma (H)      Osteoporosis        The following portions of the patient's history were reviewed and updated as appropriate: allergies, current medications, past family history, past medical history, past social history, past surgical history and problem list.           Objective:   Physical Exam:    /59   Pulse 56   LMP  (LMP Unknown)   There is no height or weight on file to calculate BMI.      General: Alert and oriented with normal affect. Attention, knowledge, memory, and language are intact. No acute distress.      Gait:  Nonantalgic  Negative Tinel's at the knee on the left.  Sensation is intact to light touch throughout the  lower extremities.  Reflexes are 0 patellar and Achilles with downgoing toes.    Manual muscle testing reveals:  Right /Left out of 5     5/5 hip flexors  5/5 knee flexors  5/5 knee extensors  5/5 ankle plantar flexors  5/5 ankle dorsiflexors  5-/5-  EHL     [address] [address]  Office: [phone] Fax: [phone]    Electromyography and Nerve Conduction Study Report        Indication: Patient presents at the request of Dr. Wyatt Pascual for bilateral lower extremity EMG.  She has left greater than right foot paresthesias in the great toe.  Has numbness in all the toes of both feet.  On exam she has normal sensation to light touch medial and lateral malleolus of decree sensation of the great toes.  She is 0 patellar and Achilles reflexes.  5 -/5 strength bilateral EHL with 5/5 bilateral ankle plantar flexors, dorsiflexors, knee flexors and extensors and hip flexors.        Pt Exam Discussion (Communication Barriers):  Electromyography and nerve  conduction testing, including associated discomfort, risks, benefits, and alternatives was discussed with the patient prior to the procedure.  No learning/ communication barriers; patient verbalized understanding of procedure.  Informed consent was obtained.           Pt Assessment:  Testing was successfully completed; patient tolerated testing well.       Blood Thinners: ASA Skin Temperature: Warmed 29.2                   EMG/NCS  results:     Nerve Conduction Studies  Motor Sites      Segment Distal Latency Neg. Amp CV F-Latency F-Estimate Comment   Site  (ms) mV m/s ms ms    Left Fibular (EDB) Motor   Ankle Ankle-EDB *6.3 0.42       Fib Head Fib Head-Ankle 14.0 0.32 35      Knee Knee-Ankle 17.6 *0.28 *34      Right Fibular (EDB) Motor   Ankle Ankle-EDB 5.9 2.0       Fib Head Fib Head-Ankle 12.6 2.2 41      Knee Knee-Ankle 15.2 2.1 *40      Left Fibular (TA) Motor   Fib Head Fib Head-Tib anterior 3.6 7.1       Knee Knee-Fib Head 6.5 6.1 *34      Right Fibular (TA) Motor   Fib Head Fib Head-Tib anterior 3.4 5.4       Knee Knee-Fib Head 6.2 6.3 *36      Left Tibial (AH) Motor   Ankle Ankle-AH 3.5 5.9  54.2 -    Knee Knee-Ankle 12.0 4.0 44      Right Tibial (AH) Motor   Ankle Ankle-AH 3.5 7.8       Knee Knee-Ankle 11.3 6.7 51        Sensory Sites      Onset Lat Peak Lat Amp CV Comment   Site (ms) (ms)  V m/s    Left Sural Sensory   B-Ankle *NR *NR *NR *NR    Right Sural Sensory   B-Ankle *NR *NR *NR *NR        NCS Waveforms:    Motor                    F-Wave       Sensory           Electromyography     Side Muscle Nerve Root Ins Act Fibs Psw Fasc Recrt Dur Amp Poly Comment   Right AntTibialis Dp Br Fibular L4-5 Nml Nml Nml Nml Nml Nml Nml 0    Right Gastroc Tibial S1-2 Nml Nml Nml Nml Nml Nml Nml 0    Right Fibularis Long Sup Br Fibular L5-S1 Nml Nml Nml Nml Nml Nml Nml 0    Right VastusLat Femoral L2-4 Nml Nml Nml Nml Nml Nml Nml 0    Right RectFemoris Femoral L2-4 Nml Nml Nml Nml Nml Nml Nml 0    Right Ext Dig  Brev Dp Br Fibular L5, S1 Nml Nml Nml Nml Nml Nml Nml 0    Left AntTibialis Dp Br Fibular L4-5 Nml Nml Nml Nml Nml Nml Nml 0    Left Gastroc Tibial S1-2 Nml Nml Nml Nml Nml Nml Nml 0    Left Fibularis Long Sup Br Fibular L5-S1 Nml Nml Nml Nml Nml Nml Nml 0    Left VastusLat Femoral L2-4 Nml Nml Nml Nml Nml Nml Nml 0    Left Ext Dig Brev Dp Br Fibular L5, S1 Dcr Nml Nml Nml         Poor activation         Comment NCS: Abnormal study  1.  Absent bilateral sural SNAPs.  Can be normal for patient's age.  2.  Prolonged latency left peroneal CMAP to the EDB with slowed conduction velocity and generalized reduced amplitude.  3.  Borderline conduction velocity of the right peroneal CMAP to the EDB with borderline latency.  Normal amplitudes.  4.  Normal bilateral tibial CMAP's.  5.  Slowed conduction velocity with normal amplitude of the bilateral peroneal CMAP's to the tibialis anterior.    Comment EMG: Abnormal study  1.  Decreased insertional activity of the left EDB with no motor unit activation.  Remainder left lower extremity needle EMG normal.  2.  Normal right lower extremity needle EMG.    Interpretation: Abnormal study: There is electrodiagnostic evidence of:    1.  Bilateral peroneal neuropathy at the knee.  Appears relatively mild left greater than right.  Decreased insertional activity of the left EDB and needle EMG without active denervation.     2.  Absent bilateral sural SNAPs.  This can be normal for patient's age but I cannot completely exclude a sensory or sensorimotor peripheral polyneuropathy.     3.  There is no electrodiagnostic evidence of lumbosacral radiculopathy  or tibial neuropathy in the bilateral lower extremities.     The testing was completed in its entirety by the physician.      It was our pleasure caring for your patient today, if there any questions or concerns please do not hesitate to contact us.            Again, thank you for allowing me to participate in the care of your patient.         Sincerely,        Wyatt Pascual, DO

## 2023-06-19 NOTE — PROGRESS NOTES
[facility]  [address] [place] [address] [address]  [place] [address]  Office: [phone] Fax: [phone]    Electromyography and Nerve Conduction Study Report        Indication: Patient presents at the request of Dr. Wyatt Pascual for bilateral lower extremity EMG.  She has left greater than right foot paresthesias in the great toe.  Has numbness in all the toes of both feet.  On exam she has normal sensation to light touch medial and lateral malleolus of decree sensation of the great toes.  She is 0 patellar and Achilles reflexes.  5 -/5 strength bilateral EHL with 5/5 bilateral ankle plantar flexors, dorsiflexors, knee flexors and extensors and hip flexors.        Pt Exam Discussion (Communication Barriers):  Electromyography and nerve conduction testing, including associated discomfort, risks, benefits, and alternatives was discussed with the patient prior to the procedure.  No learning/ communication barriers; patient verbalized understanding of procedure.  Informed consent was obtained.           Pt Assessment:  Testing was successfully completed; patient tolerated testing well.       Blood Thinners: ASA Skin Temperature: Warmed 29.2                   EMG/NCS  results:     Nerve Conduction Studies  Motor Sites      Segment Distal Latency Neg. Amp CV F-Latency F-Estimate Comment   Site  (ms) mV m/s ms ms    Left Fibular (EDB) Motor   Ankle Ankle-EDB *6.3 0.42       Fib Head Fib Head-Ankle 14.0 0.32 35      Knee Knee-Ankle 17.6 *0.28 *34      Right Fibular (EDB) Motor   Ankle Ankle-EDB 5.9 2.0       Fib Head Fib Head-Ankle 12.6 2.2 41      Knee Knee-Ankle 15.2 2.1 *40      Left Fibular (TA) Motor   Fib Head Fib Head-Tib anterior 3.6 7.1       Knee Knee-Fib Head 6.5 6.1 *34      Right Fibular (TA) Motor   Fib Head Fib Head-Tib anterior 3.4 5.4       Knee Knee-Fib Head 6.2 6.3 *36      Left Tibial (AH) Motor   Ankle Ankle-AH 3.5 5.9  54.2 -    Knee Knee-Ankle 12.0 4.0 44      Right  Tibial (AH) Motor   Ankle Ankle-AH 3.5 7.8       Knee Knee-Ankle 11.3 6.7 51        Sensory Sites      Onset Lat Peak Lat Amp CV Comment   Site (ms) (ms)  V m/s    Left Sural Sensory   B-Ankle *NR *NR *NR *NR    Right Sural Sensory   B-Ankle *NR *NR *NR *NR        NCS Waveforms:    Motor                    F-Wave       Sensory           Electromyography     Side Muscle Nerve Root Ins Act Fibs Psw Fasc Recrt Dur Amp Poly Comment   Right AntTibialis Dp Br Fibular L4-5 Nml Nml Nml Nml Nml Nml Nml 0    Right Gastroc Tibial S1-2 Nml Nml Nml Nml Nml Nml Nml 0    Right Fibularis Long Sup Br Fibular L5-S1 Nml Nml Nml Nml Nml Nml Nml 0    Right VastusLat Femoral L2-4 Nml Nml Nml Nml Nml Nml Nml 0    Right RectFemoris Femoral L2-4 Nml Nml Nml Nml Nml Nml Nml 0    Right Ext Dig Brev Dp Br Fibular L5, S1 Nml Nml Nml Nml Nml Nml Nml 0    Left AntTibialis Dp Br Fibular L4-5 Nml Nml Nml Nml Nml Nml Nml 0    Left Gastroc Tibial S1-2 Nml Nml Nml Nml Nml Nml Nml 0    Left Fibularis Long Sup Br Fibular L5-S1 Nml Nml Nml Nml Nml Nml Nml 0    Left VastusLat Femoral L2-4 Nml Nml Nml Nml Nml Nml Nml 0    Left Ext Dig Brev Dp Br Fibular L5, S1 Dcr Nml Nml Nml         Poor activation         Comment NCS: Abnormal study  1.  Absent bilateral sural SNAPs.  Can be normal for patient's age.  2.  Prolonged latency left peroneal CMAP to the EDB with slowed conduction velocity and generalized reduced amplitude.  3.  Borderline conduction velocity of the right peroneal CMAP to the EDB with borderline latency.  Normal amplitudes.  4.  Normal bilateral tibial CMAP's.  5.  Slowed conduction velocity with normal amplitude of the bilateral peroneal CMAP's to the tibialis anterior.    Comment EMG: Abnormal study  1.  Decreased insertional activity of the left EDB with no motor unit activation.  Remainder left lower extremity needle EMG normal.  2.  Normal right lower extremity needle EMG.    Interpretation: Abnormal study: There is electrodiagnostic  evidence of:    1.  Bilateral peroneal neuropathy at the knee.  Appears relatively mild left greater than right.  Decreased insertional activity of the left EDB and needle EMG without active denervation.     2.  Absent bilateral sural SNAPs.  This can be normal for patient's age but I cannot completely exclude a sensory or sensorimotor peripheral polyneuropathy.     3.  There is no electrodiagnostic evidence of lumbosacral radiculopathy  or tibial neuropathy in the bilateral lower extremities.     The testing was completed in its entirety by the physician.      It was our pleasure caring for your patient today, if there any questions or concerns please do not hesitate to contact us.

## 2023-06-19 NOTE — PROGRESS NOTES
Assessment/Plan:      Lizzie was seen today for emg.    Diagnoses and all orders for this visit:    Paresthesia  -     EMG  -     [place] NEEDLE EMG EA EXTREMTY W/PARASPINAL AREA COMPLETE  -     [place] NCS MOTOR W OR W/O F-WAVE, 7 OR 8  -     Physical Therapy Referral; Future    Chronic pain of left knee  -     MR Knee Left w/o Contrast; Future  -     Physical Therapy Referral; Future    Neuropathy of left peroneal nerve  -     MR Knee Left w/o Contrast; Future  -     [place] NEEDLE EMG EA EXTREMTY W/PARASPINAL AREA COMPLETE  -     [place] NCS MOTOR W OR W/O F-WAVE, 7 OR 8  -     Physical Therapy Referral; Future    Closed wedge compression fracture of T7 vertebra, sequela  -     Physical Therapy Referral; Future    Myofascial pain  -     Physical Therapy Referral; Future         Assessment: Pleasant 72 year old female with a history of hyperlipidemia, anxiety, depression, irritable bowel, neuropathy, osteoporosis and multiple myeloma with a T7 fracture and lesion  resulting in spinal stenosis:     1 persistent bilateral foot paresthesias.  Left greater than right consistent with polyneuropathy.   This is mildly asymmetric.  She has  absent sural SNAPs on EMG today cannot exclude peripheral polyneuropathy although this would be relatively mild.  She may have a small fiber neuropathy.  She has a history of chemotherapy.         2.  Chronic left knee pain posteriorly.  No significant osteoarthritis on plain films.  May be soft tissue injury within the knee.  She also has a left peroneal neuropathy at the knee.  She has milder peroneal neuropathy on the right knee.     3.    Significant increase of chronic thoracic pain  around T7-8 where she has the pathologic T7 fracture with epidural neoplasm compressing the spinal cord.  No signs of thoracic myelopathy neurologically stable.   Symptoms are stable and paresthesias may be slightly improving with physical therapy.  Overall doing well with gabapentin 600 mg 3 times daily.  Still has  weakness in the right greater than left lower extremities but appears relatively stable.  Significant increased pain over the last week after coughing and now some mild improvement.  She has pain over the left ribs and mid thoracic spine.    Fracture is stable and new MRI at T1 and T7.  She has significant upper thoracic and cervical myofascial pain as well.    Has done quite well after Osteopathic manipulative medicine for approximately 4 to 5 days at a time.  Significant increased pain after visiting her daughter over the past week.         4.  She has cervical spine and upper thoracic spine myofascial pain.    Discussion:    1.  I discussed the diagnosis and treatment options.  Reviewed the EMG today.  We discussed her increased thoracic pain as well.  She has worsening knee pain and pain is most consistent on the left leg with a peroneal neuropathy.  She does not cross her legs and she is encouraged to avoid this.  We also need to evaluate for pathology within the knee compressing the peroneal nerve.    2.  MRI of the left knee to evaluate.    3.  Start physical therapy for the left knee along with the increase in thoracic pain.    4.  Continue with gabapentin 600 mg 3 times daily.  I did recommend increasing nighttime dose but she is wanting to hold off for now.    5.  Follow-up 1 to 2 months but will contact her via [employer] when imaging is available to make further recommendations at that time.    It was our pleasure caring for your patient today, if there any questions or concerns please do not hesitate to contact us.      Subjective:   Patient ID: Lizzie Viera is a 72 year old female.    History of Present Illness:Patient presents for follow-up of left knee pain, paresthesias of the legs and thoracic spine pain which is significantly increased over the past week after visiting her daughter and caring for her grandchildren.  Her thoracic pain is through the upper back with tightness and spasm through  the thoracic spine.  That pain is 9/10 at worst 6/10 today 0/10 at best.  Worse with laying in bed on her side or using her arms better with rest.  She attributes the increased pain to riding in the car and sleeping on an uneven bed but there was no specific injury.    She continues to have tightness through the left knee as well with numbness down the left greater than right foot to the great toe.  Numbness in all the toes of both feet as well but just in the tips.  The left leg is worse with sitting on a chair with her legs elevated.  Is taking gabapentin 600 mg 3 times daily.  Doing well with that no adverse side effects.  He is still having some trouble sleeping at night getting to sleep but then does well.      Imaging: Plain film imaging of the left knee personally reviewed along with the report.  This shows normal joint space with no fracture    .CT abdomen and pelvis with angiogram from November 2022 images personally reviewed for medical decision-making purposes.  Minimal right hip degenerative changes normal left hip.  Vascular calcifications throughout the aorta and iliac arteries.  Normal alignment lumbar spine with mild disc height loss T12-L1.  No spondylolisthesis.  Does have mild foraminal stenosis bilaterally at L4-5 with facet arthropathy relatively mild throughout the lumbar spine.    MRI thoracic spine 2023: Thoracic compression fracture noted at T1 and T7 no high-grade central stenosis.    MRI from 2022 was reviewed of the lumbar spine showing no central stenosis with mild degenerative disc disease.      Review of Systems: Pertinent positives: Paresthesias of both feet weakness in the legs and headaches, difficulty with coordination.  Denies bowel or bladder incontinence, falls, swallowing difficulties, fevers or unintentional weight loss.           Past Medical History:   Diagnosis Date     Cervical dysplasia      Chronic RUQ pain      Depressive disorder      Multiple myeloma (H)       Osteoporosis        The following portions of the patient's history were reviewed and updated as appropriate: allergies, current medications, past family history, past medical history, past social history, past surgical history and problem list.           Objective:   Physical Exam:    /59   Pulse 56   LMP  (LMP Unknown)   There is no height or weight on file to calculate BMI.      General: Alert and oriented with normal affect. Attention, knowledge, memory, and language are intact. No acute distress.      Gait:  Nonantalgic  Negative Tinel's at the knee on the left.  Sensation is intact to light touch throughout the  lower extremities.  Reflexes are 0 patellar and Achilles with downgoing toes.    Manual muscle testing reveals:  Right /Left out of 5     5/5 hip flexors  5/5 knee flexors  5/5 knee extensors  5/5 ankle plantar flexors  5/5 ankle dorsiflexors  5-/5-  EHL

## 2023-06-19 NOTE — PATIENT INSTRUCTIONS
A physical therapy order was provided for you today.  You will be contacted by physical therapy.  If nobody contacts you within 3 to 5 days, please contact the clinic at [phone].  It will be very important for you to do your physical therapy exercises on a regular basis to decrease your pain and prevent future pain flares.   Continue with gabapentin 600mg three times daily  An MRI was ordered for you today.  You will be contacted by scheduling within 3 days.    If you are not contacted, please call Radiology at [phone].

## 2023-06-21 ENCOUNTER — LAB (OUTPATIENT)
Dept: INFUSION THERAPY | Facility: HOSPITAL | Age: 73
End: 2023-06-21
Attending: INTERNAL MEDICINE
Payer: COMMERCIAL

## 2023-06-21 ENCOUNTER — ONCOLOGY VISIT (OUTPATIENT)
Dept: ONCOLOGY | Facility: CLINIC | Age: 73
End: 2023-06-21
Attending: PSYCHOLOGIST
Payer: COMMERCIAL

## 2023-06-21 ENCOUNTER — ONCOLOGY VISIT (OUTPATIENT)
Dept: ONCOLOGY | Facility: HOSPITAL | Age: 73
End: 2023-06-21
Attending: INTERNAL MEDICINE
Payer: COMMERCIAL

## 2023-06-21 VITALS
WEIGHT: 125.3 LBS | HEART RATE: 42 BPM | SYSTOLIC BLOOD PRESSURE: 159 MMHG | BODY MASS INDEX: 23.66 KG/M2 | DIASTOLIC BLOOD PRESSURE: 75 MMHG | RESPIRATION RATE: 18 BRPM | HEIGHT: 61 IN | TEMPERATURE: 97.9 F | OXYGEN SATURATION: 97 %

## 2023-06-21 DIAGNOSIS — C90.00 MULTIPLE MYELOMA NOT HAVING ACHIEVED REMISSION (H): ICD-10-CM

## 2023-06-21 DIAGNOSIS — F43.23 ADJUSTMENT DISORDER WITH MIXED ANXIETY AND DEPRESSED MOOD: Primary | ICD-10-CM

## 2023-06-21 DIAGNOSIS — C90.00 MULTIPLE MYELOMA NOT HAVING ACHIEVED REMISSION (H): Primary | ICD-10-CM

## 2023-06-21 DIAGNOSIS — C90.00 MULTIPLE MYELOMA WITH FAILED REMISSION (H): ICD-10-CM

## 2023-06-21 LAB
ALBUMIN SERPL BCG-MCNC: 4 G/DL (ref 3.5–5.2)
ALP SERPL-CCNC: 39 U/L (ref 35–104)
ALT SERPL W P-5'-P-CCNC: 26 U/L (ref 0–50)
ANION GAP SERPL CALCULATED.3IONS-SCNC: 8 MMOL/L (ref 7–15)
AST SERPL W P-5'-P-CCNC: 22 U/L (ref 0–45)
BASOPHILS # BLD AUTO: 0.1 10E3/UL (ref 0–0.2)
BASOPHILS NFR BLD AUTO: 2 %
BILIRUB SERPL-MCNC: 0.4 MG/DL
BUN SERPL-MCNC: 20.4 MG/DL (ref 8–23)
CALCIUM SERPL-MCNC: 9.1 MG/DL (ref 8.8–10.2)
CHLORIDE SERPL-SCNC: 101 MMOL/L (ref 98–107)
CREAT SERPL-MCNC: 0.77 MG/DL (ref 0.51–0.95)
DEPRECATED HCO3 PLAS-SCNC: 30 MMOL/L (ref 22–29)
EOSINOPHIL # BLD AUTO: 0.2 10E3/UL (ref 0–0.7)
EOSINOPHIL NFR BLD AUTO: 5 %
ERYTHROCYTE [DISTWIDTH] IN BLOOD BY AUTOMATED COUNT: 13.7 % (ref 10–15)
GFR SERPL CREATININE-BSD FRML MDRD: 83 ML/MIN/1.73M2
GLUCOSE SERPL-MCNC: 97 MG/DL (ref 70–99)
HCT VFR BLD AUTO: 42.2 % (ref 35–47)
HGB BLD-MCNC: 14.2 G/DL (ref 11.7–15.7)
IMM GRANULOCYTES # BLD: 0 10E3/UL
IMM GRANULOCYTES NFR BLD: 0 %
LYMPHOCYTES # BLD AUTO: 1.9 10E3/UL (ref 0.8–5.3)
LYMPHOCYTES NFR BLD AUTO: 41 %
MCH RBC QN AUTO: 34.6 PG (ref 26.5–33)
MCHC RBC AUTO-ENTMCNC: 33.6 G/DL (ref 31.5–36.5)
MCV RBC AUTO: 103 FL (ref 78–100)
MONOCYTES # BLD AUTO: 0.4 10E3/UL (ref 0–1.3)
MONOCYTES NFR BLD AUTO: 10 %
NEUTROPHILS # BLD AUTO: 1.9 10E3/UL (ref 1.6–8.3)
NEUTROPHILS NFR BLD AUTO: 42 %
NRBC # BLD AUTO: 0 10E3/UL
NRBC BLD AUTO-RTO: 0 /100
PLATELET # BLD AUTO: 175 10E3/UL (ref 150–450)
POTASSIUM SERPL-SCNC: 3.8 MMOL/L (ref 3.4–5.3)
PROT SERPL-MCNC: 6.2 G/DL (ref 6.4–8.3)
RBC # BLD AUTO: 4.1 10E6/UL (ref 3.8–5.2)
SODIUM SERPL-SCNC: 139 MMOL/L (ref 136–145)
TOTAL PROTEIN SERUM FOR ELP: 5.9 G/DL (ref 6.4–8.3)
WBC # BLD AUTO: 4.6 10E3/UL (ref 4–11)

## 2023-06-21 PROCEDURE — G0463 HOSPITAL OUTPT CLINIC VISIT: HCPCS | Performed by: INTERNAL MEDICINE

## 2023-06-21 PROCEDURE — 36415 COLL VENOUS BLD VENIPUNCTURE: CPT

## 2023-06-21 PROCEDURE — 82374 ASSAY BLOOD CARBON DIOXIDE: CPT

## 2023-06-21 PROCEDURE — 84165 PROTEIN E-PHORESIS SERUM: CPT | Mod: TC | Performed by: PATHOLOGY

## 2023-06-21 PROCEDURE — 85025 COMPLETE CBC W/AUTO DIFF WBC: CPT

## 2023-06-21 PROCEDURE — 99214 OFFICE O/P EST MOD 30 MIN: CPT | Performed by: INTERNAL MEDICINE

## 2023-06-21 PROCEDURE — 83521 IG LIGHT CHAINS FREE EACH: CPT

## 2023-06-21 PROCEDURE — 90837 PSYTX W PT 60 MINUTES: CPT | Performed by: PSYCHOLOGIST

## 2023-06-21 PROCEDURE — 84155 ASSAY OF PROTEIN SERUM: CPT | Mod: 91

## 2023-06-21 PROCEDURE — 82310 ASSAY OF CALCIUM: CPT

## 2023-06-21 PROCEDURE — 82784 ASSAY IGA/IGD/IGG/IGM EACH: CPT

## 2023-06-21 ASSESSMENT — PAIN SCALES - GENERAL: PAINLEVEL: MODERATE PAIN (5)

## 2023-06-21 NOTE — LETTER
"    6/21/2023         RE: Lizzie Viera  [address]  [place] [address]        Dear Colleague,    Thank you for referring your patient, Lizzie Viera, to the [facility]. Please see a copy of my visit note below.    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    Weight as of this encounter: 56.8 kg (125 lb 4.8 oz). Body surface area is 1.56 meters squared.  Moderate Pain (5) Comment: Data Unavailable   No LMP recorded (lmp unknown). Patient is postmenopausal.  Allergies reviewed: Yes  Medications reviewed: Yes    Medications: Medication refills not needed today.  Pharmacy name entered into [facility]: [id number] [place], [place] - [address]    Clinical concerns: None       Viviane Chu LPN              [facility] and Oncology Progress Note    Patient: Lizzie Viera  MRN: [id number]  Date of Service: Jun 21, 2023         Reason for Visit    Multiple Myeloma    Assessment     1.  A very pleasant 72 year old woman with IgG kappa multiple myeloma with hyperdiploid cytogenetics.  However clinically was behaving somewhat more aggressively.  Started on VRD treatment.  Responded quite well. After 17 cycles the treatment was switched to Revlimid 10 mg daily in September 2022.  Valeria has been tolerating it " quite well.  The lab work-up in the end of November 2022 showed maintenance of response.  The labs in February 2023 and then 1 in April 2023 also look pretty stable.  2.  Significant bone disease with osteoporosis and compression fractures.  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  Improved significantly on the CT scan in March 2022.  MRI also shows no new lesions.  Most recent bone density still shows osteoporosis.  Has not been able to restart Zometa due to dental implant needs.  3.  Normal hemoglobin, calcium, kidney function along with beta-2 microglobulin and albumin at the time of diagnosis.  4.  Positive Covid antibodies from infection. Unvaccinated. Declined Evusheld.  5.  Pain from compression fracture status post radiation therapy.  Significantly improved.  6.  Some dental issues on left upper molars which were cracked and has been extracted.  She also has a tooth on the right lower jaw which the dentists performed extraction.  Now she may need a implant.      Plan    1.  Recommend Revlimid 10 mg p.o. daily.  2.  Diet rich in calcium and vitamin D.  3.  After the dental implant is done restart Zometa for osteoporosis treatment.  Advised Valeria to be careful while pulling weight or pushing weight.  4.  Continue with good and gentle exercise.  5.  We will see Valeria back in about 2 months timeframe.  We will continue to check labs every 2-3 months timeframe as long as they stay stable.     Cancer Staging   No matching staging information was found for the patient.    ECOG Performance    2 - Ambulatory and independent in all ADLs; cannot work; up > 50% of the time      History of Present Illness    Ms. Lizzie Viera is a very pleasant 72 year old woman who has been diagnosed with multiple myeloma IgG kappa type in May 2021.  She had initially presented with compression fracture of T7 vertebral body in September 2020.  She had a back pain which led to that evaluation.  Bone density confirmed that  she had osteoporosis.  MRI showed diffuse marrow edema in October 2020.  In April 2021 the MRI of the thoracic spine showed worsening T7 vertebral body height loss because of likely pathological compression fracture with extraosseous extension.  She then had IR guided bone biopsy on 7 May 2021 and pathology confirmed the presence of kappa light chain restricted plasma cells.  Her cytogenetics confirmed the presence of hyperdiploid E with gains of chromosome 5, 9 and 15.    She was then seen by Dr. Baig and had a bone marrow biopsy which also confirmed 60% involvement of the marrow with plasma cells.  The bone marrow was done on 3 Jocelin 2021.  The bone marrow also confirmed the presence of hyperdiploid E.  She had a gain of chromosome 3, 5, 7, 9, 11, 15 as well as 19.  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  Her beta-2 microglobulin was actually normal at the time of her diagnosis as was her albumin at 4.0.  Monoclonal protein 3.1 g/dL.  Kappa free light chain levels of 13 mg/dL IgG level of 4280 with depressed levels of IgM and IgA.  Urine was also positive for kappa light chains as well as immunofixation of the urine was positive for IgG kappa.  Normal kidney function.  Normal hemoglobin.    As mentioned above she had bone lesions in the T7 vertebral body, T1, skull area.  Her main pain is coming from her T7 vertebral body compression fracture.    Due to the pain she has been seen by radiation oncology and has received radiation therapy.  She also got a dose of steroids for pain control.    She was initially thinking of doing some natural therapies but once her symptoms got worse, she came back in was counseled about treatment.      She started on Velcade Revlimid and dexamethasone in September 2021.  Responded nicely.  Immunoglobulins  normalized completely. Her immunoglobin levels have almost normalized in fact the IgG levels have gone below normal.  Immunofixation still shows a very faint kappa monoclonal gammopathy.    She  was  hospitalized in April 2022 with COPD exacerbation/pneumonia.  Was given some steroids and antibiotic.  She was slightly hypoxic initially but then got better after that.    She was referred to [facility] for transplant evaluation.  She was somewhat reluctant to go forward with that.  Otherwise she seems to be doing quite well.  Her immunoglobulin levels have normalized or actually are below normal.  Immunofixation faintly positive.    She has cracked a molar on the upper left jaw sometime in July 2022.  There was some tooth decay.  She had them extracted.    In September 2022 we discontinued the VRD treatment and now Valeria is on Revlimid maintenance 10 mg p.o. daily.  She seems to be tolerating it well.  She is physically more active so she is noticing some soreness in her back.    Had dental extraction done in October 2022 from the right lower jaw.  That seems to be healing well.  Underwent bone densitometry recently.  That confirms presence of osteoporosis which is not a new finding for her.    In February 2023 her labs were pretty stable.  She went to [place] so we gave her 2 to 3 weeks of break from Revlimid.  She restarted the treatment.    Comes in today for scheduled follow-up.  Overall seems to be doing okay.  Having some side effect from Revlimid.  No new aches and pains.  Valeria saw her neurologist.  Had EMG done.  No significant neuropathy was diagnosed but she does have a  nerve impingement behind the knee.    Review of systems.    A 14 point review of systems was obtained.  Positive findings noted in the history.  Rest of the review of system is otherwise negative.     Past History    Past Medical History:   Diagnosis Date     Cervical dysplasia      Chronic RUQ pain      Depressive disorder      Multiple myeloma (H)      Osteoporosis        Past Surgical History:   Procedure Laterality Date     CONIZATION CERVIX,KNIFE/LASER      Description: Cervical Conization By Laser;  Recorded:  "09/20/2007;     HC DILATION/CURETTAGE DIAG/THER NON OB      Description: Dilation And Curettage;  Recorded: 09/20/2007;     HC REMOVE TONSILS/ADENOIDS,<13 Y/O      Description: Tonsillectomy With Adenoidectomy;  Recorded: 09/20/2007;     [facility] LAP,CHOLECYSTECTOMY/EXPLORE  12/27/2004     Z LIGATE FALLOPIAN TUBE      Description: Tubal Ligation;  Recorded: 09/20/2007;         Physical Exam    BP (!) 159/75   Pulse (!) 42   Temp 97.9  F (36.6  C)   Resp 18   Ht 1.549 m (5' 1\")   Wt 56.8 kg (125 lb 4.8 oz)   LMP  (LMP Unknown)   SpO2 97%   BMI 23.68 kg/m      GENERAL: No acute distress. Cooperative in conversation.   HEENT:  Pupils are equal, round and reactive. Oral mucosa is clean and intact. No ulcerations or mucositis noted. No bleeding noted.  RESP:Chest symmetric lungs are clear bilaterally per auscultation. Regular respiratory rate. No wheezes or rhonchi.  CV: Normal S1 S2 Regular, rate and rhythm. No murmurs.    ABD: Nondistended, soft, nontender. Positive bowel sounds. No organomegaly.   EXTREMITIES: No lower extremity edema.   NEURO: Non- focal. Alert and oriented x3.  Cranial nerves appear intact.  PSYCH: Within normal limits. No depression or anxiety.  SKIN: Warm dry intact.     Lab Results    Recent Results (from the past 168 hour(s))   CBC with platelets and differential   Result Value Ref Range    WBC Count 4.6 4.0 - 11.0 10e3/uL    RBC Count 4.10 3.80 - 5.20 10e6/uL    Hemoglobin 14.2 11.7 - 15.7 g/dL    Hematocrit 42.2 35.0 - 47.0 %     (H) 78 - 100 fL    MCH 34.6 (H) 26.5 - 33.0 pg    MCHC 33.6 31.5 - 36.5 g/dL    RDW 13.7 10.0 - 15.0 %    Platelet Count 175 150 - 450 10e3/uL    % Neutrophils 42 %    % Lymphocytes 41 %    % Monocytes 10 %    % Eosinophils 5 %    % Basophils 2 %    % Immature Granulocytes 0 %    NRBCs per 100 WBC 0 <1 /100    Absolute Neutrophils 1.9 1.6 - 8.3 10e3/uL    Absolute Lymphocytes 1.9 0.8 - 5.3 10e3/uL    Absolute Monocytes 0.4 0.0 - 1.3 10e3/uL    Absolute " Eosinophils 0.2 0.0 - 0.7 10e3/uL    Absolute Basophils 0.1 0.0 - 0.2 10e3/uL    Absolute Immature Granulocytes 0.0 <=0.4 10e3/uL    Absolute NRBCs 0.0 10e3/uL       Imaging    XR Knee Left 3 Views    Result Date: 5/23/2023  EXAM: XR KNEE LEFT 3 VIEWS LOCATION: [facility] DATE/TIME: 5/23/2023 11:38 AM CDT INDICATION: Left knee pain, eval for OA COMPARISON: None.     IMPRESSION: Normal joint spaces and alignment. No fracture or joint effusion.        Signed by: Loco Blanco MD,         Again, thank you for allowing me to participate in the care of your patient.        Sincerely,        Loco Blanco MD, MD

## 2023-06-21 NOTE — LETTER
6/21/2023         RE: Lizzie Viera  [address] [place] [address]        Dear Colleague,    Thank you for referring your patient, Lizzie Viera, to the [facility]. Please see a copy of my visit note below.    NA      Again, thank you for allowing me to participate in the care of your patient.        Sincerely,        Mario Long PsyD

## 2023-06-21 NOTE — PROGRESS NOTES
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    Weight as of this encounter: 56.8 kg (125 lb 4.8 oz). Body surface area is 1.56 meters squared.  Moderate Pain (5) Comment: Data Unavailable   No LMP recorded (lmp unknown). Patient is postmenopausal.  Allergies reviewed: Yes  Medications reviewed: Yes    Medications: Medication refills not needed today.  Pharmacy name entered into [facility]: [facility] #[id number] - [facility] [address]    Clinical concerns: None       Viviane Chu LPN            "

## 2023-06-21 NOTE — PROGRESS NOTES
[facility] Hematology and Oncology Progress Note    Patient: Lizzie Viera  MRN: [id number]  Date of Service: Jun 21, 2023         Reason for Visit    Multiple Myeloma    Assessment     1.  A very pleasant 72 year old woman with IgG kappa multiple myeloma with hyperdiploid cytogenetics.  However clinically was behaving somewhat more aggressively.  Started on VRD treatment.  Responded quite well. After 17 cycles the treatment was switched to Revlimid 10 mg daily in September 2022.  Valeria has been tolerating it quite well.  The lab work-up in the end of November 2022 showed maintenance of response.  The labs in February 2023 and then 1 in April 2023 also look pretty stable.  2.  Significant bone disease with osteoporosis and compression fractures.  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  Improved significantly on the CT scan in March 2022.  MRI also shows no new lesions.  Most recent bone density still shows osteoporosis.  Has not been able to restart Zometa due to dental implant needs.  3.  Normal hemoglobin, calcium, kidney function along with beta-2 microglobulin and albumin at the time of diagnosis.  4.  Positive Covid antibodies from infection. Unvaccinated. Declined Evusheld.  5.  Pain from compression fracture status post radiation therapy.  Significantly improved.  6.  Some dental issues on left upper molars which were cracked and has been extracted.  She also has a tooth on the right lower jaw which the dentists performed extraction.  Now she may need a implant.      Plan    1.  Recommend Revlimid 10 mg p.o. daily.  2.  Diet rich in calcium and vitamin D.  3.  After the dental implant is done restart Zometa for osteoporosis treatment.  Advised Valeria to be careful while pulling weight or pushing weight.  4.  Continue with good and gentle exercise.  5.  We will see Valeria back in about 2 months timeframe.  We will continue to check labs every 2-3 months timeframe as long as they stay  stable.     Cancer Staging   No matching staging information was found for the patient.    ECOG Performance    2 - Ambulatory and independent in all ADLs; cannot work; up > 50% of the time      History of Present Illness    Ms. Lizzie Viera is a very pleasant 72 year old woman who has been diagnosed with multiple myeloma IgG kappa type in May 2021.  She had initially presented with compression fracture of T7 vertebral body in September 2020.  She had a back pain which led to that evaluation.  Bone density confirmed that she had osteoporosis.  MRI showed diffuse marrow edema in October 2020.  In April 2021 the MRI of the thoracic spine showed worsening T7 vertebral body height loss because of likely pathological compression fracture with extraosseous extension.  She then had IR guided bone biopsy on 7 May 2021 and pathology confirmed the presence of kappa light chain restricted plasma cells.  Her cytogenetics confirmed the presence of hyperdiploid E with gains of chromosome 5, 9 and 15.    She was then seen by Dr. Baig and had a bone marrow biopsy which also confirmed 60% involvement of the marrow with plasma cells.  The bone marrow was done on 3 Jocelin 2021.  The bone marrow also confirmed the presence of hyperdiploid E.  She had a gain of chromosome 3, 5, 7, 9, 11, 15 as well as 19.  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  Her beta-2 microglobulin was actually normal at the time of her diagnosis as was her albumin at 4.0.  Monoclonal protein 3.1 g/dL.  Kappa free light chain levels of 13 mg/dL IgG level of 4280 with depressed levels of IgM and IgA.  Urine was also positive for kappa light chains as well as immunofixation of the urine was positive for IgG kappa.  Normal kidney function.  Normal hemoglobin.    As mentioned above she had bone lesions in the T7 vertebral body, T1, skull area.  Her main pain is coming from her T7 vertebral body compression fracture.    Due to the pain she has been seen by radiation oncology  and has received radiation therapy.  She also got a dose of steroids for pain control.    She was initially thinking of doing some natural therapies but once her symptoms got worse, she came back in was counseled about treatment.      She started on Velcade Revlimid and dexamethasone in September 2021.  Responded nicely.  Immunoglobulins  normalized completely. Her immunoglobin levels have almost normalized in fact the IgG levels have gone below normal.  Immunofixation still shows a very faint kappa monoclonal gammopathy.    She was  hospitalized in April 2022 with COPD exacerbation/pneumonia.  Was given some steroids and antibiotic.  She was slightly hypoxic initially but then got better after that.    She was referred to [facility] for transplant evaluation.  She was somewhat reluctant to go forward with that.  Otherwise she seems to be doing quite well.  Her immunoglobulin levels have normalized or actually are below normal.  Immunofixation faintly positive.    She has cracked a molar on the upper left jaw sometime in July 2022.  There was some tooth decay.  She had them extracted.    In September 2022 we discontinued the VRD treatment and now Valeria is on Revlimid maintenance 10 mg p.o. daily.  She seems to be tolerating it well.  She is physically more active so she is noticing some soreness in her back.    Had dental extraction done in October 2022 from the right lower jaw.  That seems to be healing well.  Underwent bone densitometry recently.  That confirms presence of osteoporosis which is not a new finding for her.    In February 2023 her labs were pretty stable.  She went to [place] so we gave her 2 to 3 weeks of break from Revlimid.  She restarted the treatment.    Comes in today for scheduled follow-up.  Overall seems to be doing okay.  Having some side effect from Revlimid.  No new aches and pains.  Valeria saw her neurologist.  Had EMG done.  No significant neuropathy was diagnosed but she does  "have a  nerve impingement behind the knee.    Review of systems.    A 14 point review of systems was obtained.  Positive findings noted in the history.  Rest of the review of system is otherwise negative.     Past History    Past Medical History:   Diagnosis Date     Cervical dysplasia      Chronic RUQ pain      Depressive disorder      Multiple myeloma (H)      Osteoporosis        Past Surgical History:   Procedure Laterality Date     CONIZATION CERVIX,KNIFE/LASER      Description: Cervical Conization By Laser;  Recorded: 09/20/2007;     HC DILATION/CURETTAGE DIAG/THER NON OB      Description: Dilation And Curettage;  Recorded: 09/20/2007;     HC REMOVE TONSILS/ADENOIDS,<11 Y/O      Description: Tonsillectomy With Adenoidectomy;  Recorded: 09/20/2007;     ZZC LAP,CHOLECYSTECTOMY/EXPLORE  12/27/2004     Z LIGATE FALLOPIAN TUBE      Description: Tubal Ligation;  Recorded: 09/20/2007;         Physical Exam    BP (!) 159/75   Pulse (!) 42   Temp 97.9  F (36.6  C)   Resp 18   Ht 1.549 m (5' 1\")   Wt 56.8 kg (125 lb 4.8 oz)   LMP  (LMP Unknown)   SpO2 97%   BMI 23.68 kg/m      GENERAL: No acute distress. Cooperative in conversation.   HEENT:  Pupils are equal, round and reactive. Oral mucosa is clean and intact. No ulcerations or mucositis noted. No bleeding noted.  RESP:Chest symmetric lungs are clear bilaterally per auscultation. Regular respiratory rate. No wheezes or rhonchi.  CV: Normal S1 S2 Regular, rate and rhythm. No murmurs.    ABD: Nondistended, soft, nontender. Positive bowel sounds. No organomegaly.   EXTREMITIES: No lower extremity edema.   NEURO: Non- focal. Alert and oriented x3.  Cranial nerves appear intact.  PSYCH: Within normal limits. No depression or anxiety.  SKIN: Warm dry intact.     Lab Results    Recent Results (from the past 168 hour(s))   CBC with platelets and differential   Result Value Ref Range    WBC Count 4.6 4.0 - 11.0 10e3/uL    RBC Count 4.10 3.80 - 5.20 10e6/uL    " Hemoglobin 14.2 11.7 - 15.7 g/dL    Hematocrit 42.2 35.0 - 47.0 %     (H) 78 - 100 fL    MCH 34.6 (H) 26.5 - 33.0 pg    MCHC 33.6 31.5 - 36.5 g/dL    RDW 13.7 10.0 - 15.0 %    Platelet Count 175 150 - 450 10e3/uL    % Neutrophils 42 %    % Lymphocytes 41 %    % Monocytes 10 %    % Eosinophils 5 %    % Basophils 2 %    % Immature Granulocytes 0 %    NRBCs per 100 WBC 0 <1 /100    Absolute Neutrophils 1.9 1.6 - 8.3 10e3/uL    Absolute Lymphocytes 1.9 0.8 - 5.3 10e3/uL    Absolute Monocytes 0.4 0.0 - 1.3 10e3/uL    Absolute Eosinophils 0.2 0.0 - 0.7 10e3/uL    Absolute Basophils 0.1 0.0 - 0.2 10e3/uL    Absolute Immature Granulocytes 0.0 <=0.4 10e3/uL    Absolute NRBCs 0.0 10e3/uL       Imaging    XR Knee Left 3 Views    Result Date: 5/23/2023  EXAM: XR KNEE LEFT 3 VIEWS LOCATION: [facility] DATE/TIME: 5/23/2023 11:38 AM CDT INDICATION: Left knee pain, eval for OA COMPARISON: None.     IMPRESSION: Normal joint spaces and alignment. No fracture or joint effusion.        Signed by: Loco Blanco MD,
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
## 2023-06-22 LAB
ALBUMIN SERPL ELPH-MCNC: 3.8 G/DL (ref 3.7–5.1)
ALPHA1 GLOB SERPL ELPH-MCNC: 0.3 G/DL (ref 0.2–0.4)
ALPHA2 GLOB SERPL ELPH-MCNC: 0.8 G/DL (ref 0.5–0.9)
B-GLOBULIN SERPL ELPH-MCNC: 0.6 G/DL (ref 0.6–1)
GAMMA GLOB SERPL ELPH-MCNC: 0.4 G/DL (ref 0.7–1.6)
IGA SERPL-MCNC: 70 MG/DL (ref 84–499)
IGG SERPL-MCNC: 415 MG/DL (ref 610–1616)
IGM SERPL-MCNC: 15 MG/DL (ref 35–242)
KAPPA LC FREE SER-MCNC: 1.61 MG/DL (ref 0.33–1.94)
KAPPA LC FREE/LAMBDA FREE SER NEPH: 0.88 {RATIO} (ref 0.26–1.65)
LAMBDA LC FREE SERPL-MCNC: 1.82 MG/DL (ref 0.57–2.63)
M PROTEIN SERPL ELPH-MCNC: 0.1 G/DL
PROT PATTERN SERPL ELPH-IMP: ABNORMAL

## 2023-06-22 PROCEDURE — 84165 PROTEIN E-PHORESIS SERUM: CPT | Mod: 26

## 2023-06-29 ENCOUNTER — HOSPITAL ENCOUNTER (OUTPATIENT)
Dept: MRI IMAGING | Facility: CLINIC | Age: 73
Discharge: HOME | End: 2023-06-29
Attending: PHYSICAL MEDICINE & REHABILITATION | Admitting: PHYSICAL MEDICINE & REHABILITATION
Payer: COMMERCIAL

## 2023-06-29 DIAGNOSIS — G89.29 CHRONIC PAIN OF LEFT KNEE: ICD-10-CM

## 2023-06-29 DIAGNOSIS — M25.562 CHRONIC PAIN OF LEFT KNEE: ICD-10-CM

## 2023-06-29 DIAGNOSIS — G57.32 NEUROPATHY OF LEFT PERONEAL NERVE: ICD-10-CM

## 2023-06-29 PROCEDURE — 73721 MRI JNT OF LWR EXTRE W/O DYE: CPT | Mod: LT

## 2023-06-30 ENCOUNTER — TELEPHONE (OUTPATIENT)
Dept: PHYSICAL MEDICINE AND REHAB | Facility: CLINIC | Age: 73
End: 2023-06-30
Payer: COMMERCIAL

## 2023-06-30 NOTE — TELEPHONE ENCOUNTER
Phone call to patient to review results and covering provider's recommendations. Results given and explained. Stated understanding and appreciation for call. Patient will return as scheduled for her follow op in the beginning of August.

## 2023-06-30 NOTE — TELEPHONE ENCOUNTER
----- Message from Sandor Larkin DO sent at 6/30/2023 12:49 PM CDT -----  Please call the patient and let her know I reviewed MRI of her left knee.  It does show likely neuritis of the peroneal nerve which does correlate from findings of the EMG/nerve conduction study.  I recommend that she follow-up with Dr. Pascual as scheduled.

## 2023-07-05 ENCOUNTER — TELEPHONE (OUTPATIENT)
Dept: ONCOLOGY | Facility: HOSPITAL | Age: 73
End: 2023-07-05
Payer: COMMERCIAL

## 2023-07-05 DIAGNOSIS — C90.00 MULTIPLE MYELOMA NOT HAVING ACHIEVED REMISSION (H): Primary | ICD-10-CM

## 2023-07-05 DIAGNOSIS — C90.00 MULTIPLE MYELOMA WITH FAILED REMISSION (H): ICD-10-CM

## 2023-07-05 RX ORDER — LENALIDOMIDE 10 MG/1
10 CAPSULE ORAL DAILY
Qty: 28 CAPSULE | Refills: 0 | Status: SHIPPED | OUTPATIENT
Start: 2023-01-01 | End: 2023-08-01

## 2023-07-10 ENCOUNTER — TELEPHONE (OUTPATIENT)
Dept: PHYSICAL MEDICINE AND REHAB | Facility: CLINIC | Age: 73
End: 2023-07-10
Payer: COMMERCIAL

## 2023-07-10 NOTE — TELEPHONE ENCOUNTER
[facility] Call Center    Phone Message    May a detailed message be left on voicemail: no     Reason for Call: Other: Please fax physical therapy orders to [facility] therapy [phone]     Action Taken: Other: [facility] [[id number]]    Travel Screening: Not Applicable

## 2023-07-12 ENCOUNTER — ONCOLOGY VISIT (OUTPATIENT)
Dept: ONCOLOGY | Facility: CLINIC | Age: 73
End: 2023-07-12
Attending: PSYCHOLOGIST
Payer: COMMERCIAL

## 2023-07-12 ENCOUNTER — TRANSFERRED RECORDS (OUTPATIENT)
Dept: HEALTH INFORMATION MANAGEMENT | Facility: CLINIC | Age: 73
End: 2023-07-12

## 2023-07-12 DIAGNOSIS — F43.23 ADJUSTMENT DISORDER WITH MIXED ANXIETY AND DEPRESSED MOOD: Primary | ICD-10-CM

## 2023-07-12 PROCEDURE — 90837 PSYTX W PT 60 MINUTES: CPT | Performed by: PSYCHOLOGIST

## 2023-07-12 NOTE — LETTER
7/12/2023         RE: Lizzie Viera  [address]  [place] [address]        Dear Colleague,    Thank you for referring your patient, Lizzie Viera, to the [facility]. Please see a copy of my visit note below.    See confidential note      Again, thank you for allowing me to participate in the care of your patient.        Sincerely,        Mario Long LP
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
## 2023-07-26 ENCOUNTER — ONCOLOGY VISIT (OUTPATIENT)
Dept: ONCOLOGY | Facility: CLINIC | Age: 73
End: 2023-07-26
Attending: PSYCHOLOGIST
Payer: COMMERCIAL

## 2023-07-26 DIAGNOSIS — F43.23 ADJUSTMENT DISORDER WITH MIXED ANXIETY AND DEPRESSED MOOD: Primary | ICD-10-CM

## 2023-07-26 PROCEDURE — 90837 PSYTX W PT 60 MINUTES: CPT | Performed by: PSYCHOLOGIST

## 2023-07-26 NOTE — LETTER
7/26/2023         RE: Lizzie Viera  [address]  [place] [address]        Dear Colleague,    Thank you for referring your patient, Lizzie Viera, to the [facility]. Please see a copy of my visit note below.    See confidential      Again, thank you for allowing me to participate in the care of your patient.        Sincerely,        Mario Long LP
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
## 2023-08-01 ENCOUNTER — OFFICE VISIT (OUTPATIENT)
Dept: PHYSICAL MEDICINE AND REHAB | Facility: CLINIC | Age: 73
End: 2023-08-01
Payer: COMMERCIAL

## 2023-08-01 ENCOUNTER — TELEPHONE (OUTPATIENT)
Dept: ONCOLOGY | Facility: HOSPITAL | Age: 73
End: 2023-08-01

## 2023-08-01 VITALS — DIASTOLIC BLOOD PRESSURE: 62 MMHG | HEART RATE: 57 BPM | SYSTOLIC BLOOD PRESSURE: 129 MMHG

## 2023-08-01 DIAGNOSIS — M48.04 THORACIC SPINAL STENOSIS: ICD-10-CM

## 2023-08-01 DIAGNOSIS — M54.2 CERVICAL SPINE PAIN: ICD-10-CM

## 2023-08-01 DIAGNOSIS — C90.00 MULTIPLE MYELOMA NOT HAVING ACHIEVED REMISSION (H): ICD-10-CM

## 2023-08-01 DIAGNOSIS — M79.18 MYOFASCIAL PAIN: ICD-10-CM

## 2023-08-01 DIAGNOSIS — C90.00 MULTIPLE MYELOMA WITH FAILED REMISSION (H): Primary | ICD-10-CM

## 2023-08-01 DIAGNOSIS — M99.00 SOMATIC DYSFUNCTION OF HEAD REGION: ICD-10-CM

## 2023-08-01 DIAGNOSIS — M99.01 SOMATIC DYSFUNCTION OF CERVICAL REGION: ICD-10-CM

## 2023-08-01 DIAGNOSIS — M99.08 SOMATIC DYSFUNCTION OF RIB REGION: ICD-10-CM

## 2023-08-01 DIAGNOSIS — M99.02 SOMATIC DYSFUNCTION OF THORACIC REGION: ICD-10-CM

## 2023-08-01 DIAGNOSIS — S22.060S CLOSED WEDGE COMPRESSION FRACTURE OF T7 VERTEBRA, SEQUELA: ICD-10-CM

## 2023-08-01 DIAGNOSIS — G57.02 COMMON PERONEAL NEUROPATHY OF LEFT LOWER EXTREMITY: ICD-10-CM

## 2023-08-01 DIAGNOSIS — M99.07 SOMATIC DYSFUNCTION OF UPPER EXTREMITY: ICD-10-CM

## 2023-08-01 DIAGNOSIS — R25.2 LEG CRAMPS: ICD-10-CM

## 2023-08-01 DIAGNOSIS — R20.2 PARESTHESIA: Primary | ICD-10-CM

## 2023-08-01 PROCEDURE — 98927 OSTEOPATH MANJ 5-6 REGIONS: CPT | Performed by: PHYSICAL MEDICINE & REHABILITATION

## 2023-08-01 PROCEDURE — 99214 OFFICE O/P EST MOD 30 MIN: CPT | Mod: 25 | Performed by: PHYSICAL MEDICINE & REHABILITATION

## 2023-08-01 RX ORDER — LENALIDOMIDE 10 MG/1
10 CAPSULE ORAL DAILY
Qty: 28 CAPSULE | Refills: 0 | Status: SHIPPED | OUTPATIENT
Start: 2023-08-02 | End: 2023-08-29

## 2023-08-01 ASSESSMENT — PAIN SCALES - GENERAL: PAINLEVEL: MILD PAIN (3)

## 2023-08-01 NOTE — LETTER
8/1/2023         RE: Lizzie Viera  [address]  [place] [address]        Dear Colleague,    Thank you for referring your patient, Lizzie Viera, to the [facility] SP[facility] AND NEUR[facility]GERY. Please see a copy of my visit note below.    Assessment/Plan:      Lizzie was seen today for follow up.    Diagnoses and all orders for this visit:    Paresthesia    Common peroneal neuropathy of left lower extremity    Closed wedge compression fracture of T7 vertebra, sequela    Thoracic spinal stenosis    Myofascial pain    Cervical spine pain    Somatic dysfunction of head region  -     OSTEOPATHIC MANIP,5-6 BODY REGN    Somatic dysfunction of cervical region  -     OSTEOPATHIC MANIP,5-6 BODY REGN    Somatic dysfunction of rib region  -     OSTEOPATHIC MANIP,5-6 BODY REGN    Somatic dysfunction of upper extremity  -     OSTEOPATHIC MANIP,5-6 BODY REGN    Somatic dysfunction of thoracic region  -     OSTEOPATHIC MANIP,5-6 BODY REGN         Assessment: Hossein 72 year old female with a history of hyperlipidemia, anxiety, depression, irritable bowel, neuropathy, osteoporosis and multiple myeloma with a T7 fracture and lesion  resulting in spinal stenosis:     1   bilateral foot paresthesias.  Left greater than right consistent with polyneuropathy.   This is mildly asymmetric.  She has  absent sural SNAPs on EMG cannot exclude peripheral polyneuropathy although this would be relatively mild.  She may have a small fiber neuropathy.  She has a history of chemotherapy.   Improved with physical therapy      2.  Chronic left knee pain posteriorly.  No significant osteoarthritis on plain films.  May be soft tissue injury within the knee.  She also has a left peroneal neuropathy at the knee.  She has milder  peroneal neuropathy on the right knee.  MRI confirms early bifurcation of the common peroneal nerve superior to the fibula both branches showing increased T2 signal consistent with neuritis.  Has  improved with physical therapy but still having pain.     3.  Persistent chronic thoracic pain  around T7-8 where she has the pathologic T7 fracture with epidural neoplasm compressing the spinal cord.  No signs of thoracic myelopathy neurologically stable.   Symptoms are stable and paresthesias may be slightly improving with physical therapy.  Overall doing well with gabapentin 600 mg 3 times daily.  Still has weakness in the right greater than left lower extremities but appears relatively stable.  Significant increased pain over the last week after coughing and now some mild improvement.  She has pain over the left ribs and mid thoracic spine.    Fracture is stable and new MRI at T1 and T7.  She has significant upper thoracic and cervical myofascial pain as well.    Has done quite well after Osteopathic manipulative medicine for approximately 4 to 5 days at a time.  Having some improvement with physical therapy.    4.  Increase cervical spine upper thoracic pain consistent myofascial pain with physical therapy.    5.  Leg cramps at night.  Intermittent.             Discussion:    1.  We discussed her diagnosis and treatment options.  I reviewed the MRI of her left knee.  Overall her paresthesias and left knee symptoms are improving with conservative management.  Continue with physical therapy and continue to avoid crossing her legs.    2.  Continue physical therapy for the thoracic spine and cervical spine.    3.  For cramping, I would recommend adjusting the timing of her magnesium to take this tablet at bedtime to see if that would help.    4.  She will continue with methocarbamol not interested in medication changes as baclofen was not as helpful.    5.  Recommend Osteopathic manipulative medicine day for the cervical spine.  She does well with this in the past for her thoracic spine.  She agrees to proceed.  Please see attached procedure note.    6.  Follow-up with me as needed.      It was our pleasure caring  for your patient today, if there any questions or concerns please do not hesitate to contact us.    Over 30 minutes were spent on the date of the encounter performing chart review, patient visit and documentation in addition to any procedure.        Subjective:   Patient ID: Lizzie Viera is a 72 year old female.    History of Present Illness: Patient presents for follow-up of left knee pain bilateral foot paresthesias thoracic pain and some new cervical spine pain and leg cramping.  Has been working physical therapy for the thoracic spine pain knee pain and paresthesias and has been very helpful.  Her paresthesias have significantly improved still having some left knee issues working with therapy.  The thoracic spine has improved with some of the therapy as well working on increasing mobility and strength.  Has had some tightness increasing in the cervical spine with her therapy exercises however.  Nothing new down the arms paresthesias or weakness in the arms.    She also is having some new leg cramping.  This occurs 2-3 3 times a week overnight.  Last for about 5 minutes at a time does some stretching and seems to help.  Till taking methocarbamol THC and gabapentin.  Takes magnesium around noon.  Overall her pain is a 9/10 at worst 3/10 today 0/10 at best.      Imaging: I personally reviewed MRI images of the left knee report and imaging.  This shows no cystic structure in the popliteal fossa.  There is no acute injury.  Enhancement of the T2 signal of the common peroneal nerve on the left indicating neuritis.  There is early bifurcation of the nerve and both branches show enhancement.      Review of Systems: Pertinent positives: Paresthesias and weakness along with headaches difficulty swallowing.  Denies bowel or bladder incontinence, falls, fevers or unintentional weight loss.         Past Medical History:   Diagnosis Date     Cervical dysplasia      Chronic RUQ pain      Depressive disorder       Multiple myeloma (H)      Osteoporosis        The following portions of the patient's history were reviewed and updated as appropriate: allergies, current medications, past family history, past medical history, past social history, past surgical history and problem list.           Objective:   Physical Exam:    LMP  (LMP Unknown)   There is no height or weight on file to calculate BMI.      General: Alert and oriented with normal affect. Attention, knowledge, memory, and language are intact. No acute distress.   Eyes: Sclerae are clear.  Respirations: Unlabored. CV: No lower extremity edema.  Skin: No rashes seen.    Gait:  Nonantalgic  Hypertonic tissue textures upper trapezius bilateral  Sensation is intact to light touch throughout the upper and lower extremities.  Reflexes are  negative Hoffmans.      Manual muscle testing reveals:  Right /Left out of 5     5/5 elbow flexors  5/5 elbow extensors  5/5 wrist extensors  5/5 interosseus  5/5 finger flexors.  5/5 knee flexors  5/5 knee extensors  5/5 ankle plantar flexors  5/5 ankle dorsiflexors  5/5  EHL      Structural exam: Cranium: Left sidebending rotation OA sidebent right rotated left cervical spine: C2 rotated left sidebent left, C3 rotated right sidebent right, C5 rotated left sidebent left. Rib cage: Rib one elevated on the left. Thoracic spine: T1 rotated right sidebent right,  Upper Extremities: myofascial restrictions of the left greater than right upper trap, infraspinatus/parascapular muscles. bilateral glenohumeral resrictions.    Procedure:    After discussing the risks and benefits of osteopathic manipulative medicine, verbal consent was obtained. The somatic dysfunctions listed above were treated with the following techniques: Cranium: Cranial indirect technique, VSD, and muscle energy for the OA. Cervical spine: Muscle energy, still technique, FPR, myofascial release, BLT, and soft tissue techniques. Rib cage: Myofascial release and FPR. Thoracic  spine: Myofascial release, BLT.     Upper Exrtremity: MFR, FPR, BLT.  The patient tolerated the procedure well and had improved range of motion in all areas treated prior to leaving the clinic.      Again, thank you for allowing me to participate in the care of your patient.        Sincerely,        Wyatt Pascual, DO

## 2023-08-01 NOTE — PROGRESS NOTES
Assessment/Plan:      Lizzie was seen today for follow up.    Diagnoses and all orders for this visit:    Paresthesia    Common peroneal neuropathy of left lower extremity    Closed wedge compression fracture of T7 vertebra, sequela    Thoracic spinal stenosis    Myofascial pain    Cervical spine pain    Somatic dysfunction of head region  -     OSTEOPATHIC MANIP,5-6 BODY REGN    Somatic dysfunction of cervical region  -     OSTEOPATHIC MANIP,5-6 BODY REGN    Somatic dysfunction of rib region  -     OSTEOPATHIC MANIP,5-6 BODY REGN    Somatic dysfunction of upper extremity  -     OSTEOPATHIC MANIP,5-6 BODY REGN    Somatic dysfunction of thoracic region  -     OSTEOPATHIC MANIP,5-6 BODY REGN         Assessment: Pleasant 72 year old female with a history of hyperlipidemia, anxiety, depression, irritable bowel, neuropathy, osteoporosis and multiple myeloma with a T7 fracture and lesion  resulting in spinal stenosis:     1   bilateral foot paresthesias.  Left greater than right consistent with polyneuropathy.   This is mildly asymmetric.  She has  absent sural SNAPs on EMG cannot exclude peripheral polyneuropathy although this would be relatively mild.  She may have a small fiber neuropathy.  She has a history of chemotherapy.   Improved with physical therapy      2.  Chronic left knee pain posteriorly.  No significant osteoarthritis on plain films.  May be soft tissue injury within the knee.  She also has a left peroneal neuropathy at the knee.  She has milder  peroneal neuropathy on the right knee.  MRI confirms early bifurcation of the common peroneal nerve superior to the fibula both branches showing increased T2 signal consistent with neuritis.  Has improved with physical therapy but still having pain.     3.  Persistent chronic thoracic pain  around T7-8 where she has the pathologic T7 fracture with epidural neoplasm compressing the spinal cord.  No signs of thoracic myelopathy neurologically stable.   Symptoms  are stable and paresthesias may be slightly improving with physical therapy.  Overall doing well with gabapentin 600 mg 3 times daily.  Still has weakness in the right greater than left lower extremities but appears relatively stable.  Significant increased pain over the last week after coughing and now some mild improvement.  She has pain over the left ribs and mid thoracic spine.    Fracture is stable and new MRI at T1 and T7.  She has significant upper thoracic and cervical myofascial pain as well.    Has done quite well after Osteopathic manipulative medicine for approximately 4 to 5 days at a time.  Having some improvement with physical therapy.    4.  Increase cervical spine upper thoracic pain consistent myofascial pain with physical therapy.    5.  Leg cramps at night.  Intermittent.             Discussion:    1.  We discussed her diagnosis and treatment options.  I reviewed the MRI of her left knee.  Overall her paresthesias and left knee symptoms are improving with conservative management.  Continue with physical therapy and continue to avoid crossing her legs.    2.  Continue physical therapy for the thoracic spine and cervical spine.    3.  For cramping, I would recommend adjusting the timing of her magnesium to take this tablet at bedtime to see if that would help.    4.  She will continue with methocarbamol not interested in medication changes as baclofen was not as helpful.    5.  Recommend Osteopathic manipulative medicine day for the cervical spine.  She does well with this in the past for her thoracic spine.  She agrees to proceed.  Please see attached procedure note.    6.  Follow-up with me as needed.      It was our pleasure caring for your patient today, if there any questions or concerns please do not hesitate to contact us.    Over 30 minutes were spent on the date of the encounter performing chart review, patient visit and documentation in addition to any procedure.        Subjective:    Patient ID: Lizzie Viera is a 72 year old female.    History of Present Illness: Patient presents for follow-up of left knee pain bilateral foot paresthesias thoracic pain and some new cervical spine pain and leg cramping.  Has been working physical therapy for the thoracic spine pain knee pain and paresthesias and has been very helpful.  Her paresthesias have significantly improved still having some left knee issues working with therapy.  The thoracic spine has improved with some of the therapy as well working on increasing mobility and strength.  Has had some tightness increasing in the cervical spine with her therapy exercises however.  Nothing new down the arms paresthesias or weakness in the arms.    She also is having some new leg cramping.  This occurs 2-3 3 times a week overnight.  Last for about 5 minutes at a time does some stretching and seems to help.  Till taking methocarbamol THC and gabapentin.  Takes magnesium around noon.  Overall her pain is a 9/10 at worst 3/10 today 0/10 at best.      Imaging: I personally reviewed MRI images of the left knee report and imaging.  This shows no cystic structure in the popliteal fossa.  There is no acute injury.  Enhancement of the T2 signal of the common peroneal nerve on the left indicating neuritis.  There is early bifurcation of the nerve and both branches show enhancement.      Review of Systems: Pertinent positives: Paresthesias and weakness along with headaches difficulty swallowing.  Denies bowel or bladder incontinence, falls, fevers or unintentional weight loss.         Past Medical History:   Diagnosis Date    Cervical dysplasia     Chronic RUQ pain     Depressive disorder     Multiple myeloma (H)     Osteoporosis        The following portions of the patient's history were reviewed and updated as appropriate: allergies, current medications, past family history, past medical history, past social history, past surgical history and problem  list.           Objective:   Physical Exam:    LMP  (LMP Unknown)   There is no height or weight on file to calculate BMI.      General: Alert and oriented with normal affect. Attention, knowledge, memory, and language are intact. No acute distress.   Eyes: Sclerae are clear.  Respirations: Unlabored. CV: No lower extremity edema.  Skin: No rashes seen.    Gait:  Nonantalgic  Hypertonic tissue textures upper trapezius bilateral  Sensation is intact to light touch throughout the upper and lower extremities.  Reflexes are  negative Hoffmans.      Manual muscle testing reveals:  Right /Left out of 5     5/5 elbow flexors  5/5 elbow extensors  5/5 wrist extensors  5/5 interosseus  5/5 finger flexors.  5/5 knee flexors  5/5 knee extensors  5/5 ankle plantar flexors  5/5 ankle dorsiflexors  5/5  EHL      Structural exam: Cranium: Left sidebending rotation OA sidebent right rotated left cervical spine: C2 rotated left sidebent left, C3 rotated right sidebent right, C5 rotated left sidebent left. Rib cage: Rib one elevated on the left. Thoracic spine: T1 rotated right sidebent right,  Upper Extremities: myofascial restrictions of the left greater than right upper trap, infraspinatus/parascapular muscles. bilateral glenohumeral resrictions.    Procedure:    After discussing the risks and benefits of osteopathic manipulative medicine, verbal consent was obtained. The somatic dysfunctions listed above were treated with the following techniques: Cranium: Cranial indirect technique, VSD, and muscle energy for the OA. Cervical spine: Muscle energy, still technique, FPR, myofascial release, BLT, and soft tissue techniques. Rib cage: Myofascial release and FPR. Thoracic spine: Myofascial release, BLT.     Upper Exrtremity: MFR, FPR, BLT.  The patient tolerated the procedure well and had improved range of motion in all areas treated prior to leaving the clinic.

## 2023-08-08 ENCOUNTER — VIRTUAL VISIT (OUTPATIENT)
Dept: RADIATION ONCOLOGY | Facility: HOSPITAL | Age: 73
End: 2023-08-08
Attending: SURGERY
Payer: COMMERCIAL

## 2023-08-08 VITALS — BODY MASS INDEX: 23.6 KG/M2 | HEIGHT: 61 IN | WEIGHT: 125 LBS

## 2023-08-08 DIAGNOSIS — Z51.5 ENCOUNTER FOR PALLIATIVE CARE: ICD-10-CM

## 2023-08-08 DIAGNOSIS — G89.3 CANCER ASSOCIATED PAIN: Primary | ICD-10-CM

## 2023-08-08 DIAGNOSIS — C90.01 MULTIPLE MYELOMA IN REMISSION (H): ICD-10-CM

## 2023-08-08 DIAGNOSIS — M48.50XA PATHOLOGIC COMPRESSION FRACTURE OF SPINE, INITIAL ENCOUNTER (H): ICD-10-CM

## 2023-08-08 PROCEDURE — 99214 OFFICE O/P EST MOD 30 MIN: CPT | Mod: VID | Performed by: FAMILY MEDICINE

## 2023-08-08 ASSESSMENT — PAIN SCALES - GENERAL: PAINLEVEL: MODERATE PAIN (4)

## 2023-08-08 NOTE — NURSING NOTE
Is the patient currently in the state of [place]? YES    Visit mode:VIDEO    If the visit is dropped, the patient can be reconnected by: VIDEO VISIT: Send to e-mail at: [email or web address]    Will anyone else be joining the visit? NO      How would you like to obtain your AVS? MyChart    Are changes needed to the allergy or medication list? NO    Marzena MATTHEWS    Reason for visit: RECHECK

## 2023-08-08 NOTE — PROGRESS NOTES
Virtual Visit Details    Type of service:  Video Visit   Video Start Time:  1140  Video End Time:12:07 PM    Originating Location (pt. Location): Home    Distant Location (provider location):  On-site  Platform used for Video Visit: [facility]    Palliative Care Outpatient Clinic Progress Note    Patient Name: Lizzie Viera  Primary Provider: Sona Sandoval    Impression & Recommendations & Counseling:  Multiple myeloma  Compression Fracture mid-T spine worsening spasms and tension headaches that start in right shoulder into her neck and head and no specific triggers  Cancer associated pain--well controlled  Insomnia well controlled     ECO  Decisional Capacity: very present     SYMPTOM ASSESSMENT:  1.  Cancer related pain thoracic spine pain that radiates to bilateral lower extremities  Comment: Controlled.  Currently rates pain as low on days when doesn't overdo it (3-4) but someday's at the end of the day if she's been on her feet a lot and done a lot of housework or gardening and it is 7-8/10.  Resting and going to bed helps after awhile.  She uses the medical marijuana maybe once every two weeks at night and it is pretty effective. She needs to be recertified.   She uses her dilaudid very infrequently--mainly when she goes to [place] to see her grandkids where she can't legally use her medical cannabis.     - Continue Gabapentin 600 mg by mouth 3 times daily.  - S/P Single fraction radiation to T6 through T 8.  - S/P T1 radiation 10/6/2021-10/12/2021.    - Continue resumed PT for back and for left knee  - Dilaudid to 2-4 mg by mouth every 4 hours as needed.  Takes infrequently.  - Continue medical cannabis per department of Health - pills and oil. Feels she is having good relief with this.  Does not take cannabis when knows she has to drive the next day.  - Continue Robaxin 500 mg po q6h prn spasms - taking 3 times a day.  - Tried baclofen at bedtime without benefit. It did not assist with sleep or relief  "of spasms.  -Discussed possibility of initiation of Cymbalta as this could help with neuropathic component to pain as well as assist with depression.  Patient would like to hold off on this at this time.  She feels the gabapentin is 'doing the job' for her.  Does not want to feel any masking of highs or lows per her report.     - Patient is taking naltrexone 3 mg by mouth at hs.  Had discussion with patient that this could reverse/minimize effect of opiate and she may feel more pain.  Taking this as a \"anti-inflammatory\" component of her natural/complementary treatments and doesn't feel it interferes when she uses her infrequent dilaudid. Her last hsCRP was elevated.  She is also using Tumeric to help reduce inflammation.  She has naturopath who she visits in [place].     2. Cancer related fatigue - fluctuates.  Feels fatigued at days end occasionally but she is now exercising up to four times a day on her treadmill and she is walking at 3.3 mph with a max of 3.7 mph (with a goal to resume 5 mph!) She finds going faster than 3.7 mph is hard on her back.  - Activity as tolerated.  - no recent falls and currently not using a cane     3. Anxiety and depression related to health/diagnosis and current social stressors and stresses around paying for her medications.  - Patient is on list to see Kacy Edouard  for coping, support and processing diagnosis and I also requested help with paying for revlimid; She does see Jack in health psychology  - Patient sees an energy healer.     ADVANCED CARE PLANNING/GOALS OF CARE DISCUSSION:  - Code status: DNR/DNI  - Honoring Choices and POLST in chart.  - Follow-up in palliative care clinic in 3 months for ongoing pain management and decisional support.   -Call [phone] with questions or refill needs.     Counseling: All of the above was explained to the patient in lay language. The patient has verbalized a clear understanding of the discussion, asked appropriate questions, " which have been answered to patient's apparent satisfaction. The patient is in agreement with the above plan.        Chief Complaint/Patient ID: Lizzie Viera 72 year old female with PMHx of multiple myeloma    Last Palliative care appointment: 2023 with me     Reviewed: yes, no concerns    Interim History:  Lizize Viera is a 72 year old female who is seen today for follow up with Palliative Care via billable video visit. She has had a good summer but has overdone it some times in her garden (She is at war with creeping daphne and has overdone it and aggravated her back and knee).     Pain:  overall under pretty good control and not needing anything new.    Appetite/Nausea: good; weight stable     Bowels: no problems     Sleep: sleeps well once she falls asleep until she has to get up to urinate     Mood: overall good; no concerns     Coping:  overall Valeria feels she is remaining positive and on top of cancer and symptoms.    Family History- Reviewed in Epic.    Allergies   Allergen Reactions    Cefdinir Shortness Of Breath    Latex Shortness Of Breath    Atorvastatin Muscle Pain (Myalgia)    Short Ragweed Pollen Ext Cough    Adhesive Tape Rash       Social History:  Pertinent changes to social history/social situation since last visit: none  Key support resources: family  Advance Directive Status:  documents in Epic.    Social History     Tobacco Use    Smoking status: Former     Packs/day: 0.50     Years: 45.00     Pack years: 22.50     Types: Cigarettes     Quit date: 2023     Years since quittin.5    Smokeless tobacco: Never   Vaping Use    Vaping Use: Every day   Substance Use Topics    Alcohol use: Yes     Comment: Alcoholic Drinks/day: 0-1 drinks per week    Drug use: Not Currently         Allergies   Allergen Reactions    Cefdinir Shortness Of Breath    Latex Shortness Of Breath    Atorvastatin Muscle Pain (Myalgia)    Short Ragweed Pollen Ext Cough    Adhesive Tape Rash      Current Outpatient Medications   Medication Sig Dispense Refill    acyclovir (ZOVIRAX) 400 MG tablet Take 1 tablet (400 mg) by mouth 2 times daily Viral Prophylaxis. 180 tablet 1    albuterol (PROAIR HFA/PROVENTIL HFA/VENTOLIN HFA) 108 (90 Base) MCG/ACT inhaler Inhale 2 puffs into the lungs every 6 hours as needed for wheezing or shortness of breath / dyspnea 18 g 1    albuterol (PROVENTIL) (2.5 MG/3ML) 0.083% neb solution Take 1 vial (2.5 mg) by nebulization every 4 hours as needed for wheezing or shortness of breath / dyspnea 90 mL 0    alpha-lipoic acid 100 MG capsule Take 300 mg by mouth daily      Ascorbic Acid (VITAMIN C) 100 MG CHEW Take 1 tablet by mouth daily      aspirin (ASA) 81 MG chewable tablet Take 81 mg by mouth daily      Coenzyme Q10 300 MG CAPS Take 300 mg by mouth daily      diclofenac (VOLTAREN) 1 % topical gel Apply 2 g topically 4 times daily 50 g 1    fish oil-omega-3 fatty acids 1000 MG capsule Take 2 g by mouth daily      gabapentin (NEURONTIN) 300 MG capsule Take 2 capsules (600 mg) by mouth 3 times daily 180 capsule 3    hydrochlorothiazide (HYDRODIURIL) 12.5 MG tablet Take 1 tablet (12.5 mg) by mouth daily 90 tablet 1    HYDROmorphone (DILAUDID) 4 MG tablet Take 0.5-1 tablets (2-4 mg) by mouth every 4 hours as needed for moderate to severe pain 60 tablet 0    LENalidomide (REVLIMID) 10 MG CAPS capsule Take 1 capsule (10 mg) by mouth daily for 28 days Take at the same time each day. Do not open, break or chew the capsule. Swallow whole, with water. 28 capsule 0    medical cannabis (Patient's own supply) See Admin Instructions (The purpose of this order is to document that the patient reports taking medical cannabis.  This is not a prescription, and is not used to certify that the patient has a qualifying medical condition.)     Oil formulation      Melatonin 10 MG TABS tablet Take 20 mg by mouth At Bedtime      methocarbamol (ROBAXIN) 500 MG tablet Take 1 tablet (500 mg) by mouth 4  times daily as needed for muscle spasms 120 tablet 4    Milk Thistle-Dand-Fennel-Licor (MILK THISTLE XTRA) CAPS capsule Take 1 capsule by mouth daily      NALTREXONE HCL PO Take 3 mg by mouth daily      nicotine (NICOTROL) 10 MG inhaler Use 1 cartridge as needed for urge to smoke by puffing over course of 20min.  Use 6-16 cart/day; reduce number of cart/day over 6-12 weeks. 300 each 4    phenazopyridine (AZO URINARY PAIN RELIEF) 95 MG tablet Take 190 mg by mouth 3 times daily      STATIN NOT PRESCRIBED (INTENTIONAL) Please choose reason not prescribed from choices below.      TURMERIC PO Take 1 capsule by mouth daily      UNABLE TO FIND 1 capsule daily MEDICATION NAME: Serrapeptase      UNABLE TO FIND 1 capsule daily MEDICATION NAME: Lireina García Mushroom      UNABLE TO FIND 1 capsule daily MEDICATION NAME: DIM (diinolylmethane)      UNABLE TO FIND MEDICATION NAME: Panacur C - 1 capsule daily      Vitamin A 3 MG (76980 UT) TABS Take 1 tablet by mouth daily      Vitamin D, Cholecalciferol, 25 MCG (1000 UT) CAPS Take 1,000 Units by mouth daily Liquid, not capsule      baclofen (LIORESAL) 10 MG tablet TAKE 1 TABLET BY MOUTH 3 TIMES A DAY AS NEEDED FOR MUSCLE SPASMS (Patient not taking: Reported on 5/22/2023) 60 tablet 0     Past Medical History:   Diagnosis Date    Cervical dysplasia     Chronic RUQ pain     Depressive disorder     Multiple myeloma (H)     Osteoporosis      Past Surgical History:   Procedure Laterality Date    CONIZATION CERVIX,KNIFE/LASER      Description: Cervical Conization By Laser;  Recorded: 09/20/2007;    HC DILATION/CURETTAGE DIAG/THER NON OB      Description: Dilation And Curettage;  Recorded: 09/20/2007;     REMOVE TONSILS/ADENOIDS,<13 Y/O      Description: Tonsillectomy With Adenoidectomy;  Recorded: 09/20/2007;    [facility] LAP,CHOLECYSTECTOMY/EXPLORE  12/27/2004    [facility] LIGATE FALLOPIAN TUBE      Description: Tubal Ligation;  Recorded: 09/20/2007;       Physical Exam:   GENERAL APPEARANCE:  healthy appearing, alert and no distress; neatly groomed  EYES: Eyes grossly normal to inspection, PERRLA, conjunctivae and sclerae without injection or discharge, EOM intact   RESP:  no increased work of breathing; speaks in complete sentences;   MS: No musculoskeletal defects are noted  SKIN: No suspicious lesions or rashes, hydration status appears adequate with normal skin turgor   PSYCH: Alert and oriented x3; speech- coherent , normal rate and volume; able to articulate logical thoughts, able to abstract reason, no tangential thoughts, no hallucinations or delusions, mentation appears normal, Mood is euthymic. Affect is appropriate for this mood state and bright. Thought content is free of suicidal ideation, hallucinations, and delusions.  Eye contact is good during conversation.       Key Data Reviewed:  LABS: 2023- Cr 0.77, Albumin 4,  Hgb 14.2,  monoclonal peak is 0.1 for past 16 months    IMAGIN2023 MR LEFT KNEE WO CONTRAST  IMPRESSION:  1.  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 Both branches show increased T2 signal, which would be evidence of neuritis in the appropriate clinical setting.  2.  Patellofemoral chondromalacia.  3.  Otherwise negative.    30 minutes spent on the date of the encounter doing chart review, history and exam, patient education & counseling, documentation and other activities as noted above.    Yuri Salazar MD MS FAAFP CAQHPM  [facility]  Office [phone]  Fax [phone]

## 2023-08-09 ENCOUNTER — ONCOLOGY VISIT (OUTPATIENT)
Dept: ONCOLOGY | Facility: CLINIC | Age: 73
End: 2023-08-09
Attending: PSYCHOLOGIST
Payer: COMMERCIAL

## 2023-08-09 DIAGNOSIS — F43.23 ADJUSTMENT DISORDER WITH MIXED ANXIETY AND DEPRESSED MOOD: Primary | ICD-10-CM

## 2023-08-09 PROCEDURE — 90837 PSYTX W PT 60 MINUTES: CPT | Performed by: PSYCHOLOGIST
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
## 2023-08-09 NOTE — LETTER
8/9/2023         RE: Lizzie Viera  [address]  [place] [address]        Dear Colleague,    Thank you for referring your patient, Lizzie Viera, to the [facility]. Please see a copy of my visit note below.    See confidential note      Again, thank you for allowing me to participate in the care of your patient.        Sincerely,        Mario Long LP

## 2023-08-23 ENCOUNTER — ONCOLOGY VISIT (OUTPATIENT)
Dept: ONCOLOGY | Facility: CLINIC | Age: 73
End: 2023-08-23
Attending: PSYCHOLOGIST
Payer: COMMERCIAL

## 2023-08-23 DIAGNOSIS — F43.23 ADJUSTMENT DISORDER WITH MIXED ANXIETY AND DEPRESSED MOOD: Primary | ICD-10-CM

## 2023-08-23 PROCEDURE — 90837 PSYTX W PT 60 MINUTES: CPT | Performed by: PSYCHOLOGIST

## 2023-08-23 NOTE — LETTER
8/23/2023         RE: Lizzie Viera  [address]  [place] [address]        Dear Colleague,    Thank you for referring your patient, Lizzie Viera, to the [facility]. Please see a copy of my visit note below.    See confidential note      Again, thank you for allowing me to participate in the care of your patient.        Sincerely,        Mario Long LP
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
## 2023-08-29 ENCOUNTER — TELEPHONE (OUTPATIENT)
Dept: ONCOLOGY | Facility: CLINIC | Age: 73
End: 2023-08-29
Payer: COMMERCIAL

## 2023-08-29 DIAGNOSIS — C90.00 MULTIPLE MYELOMA NOT HAVING ACHIEVED REMISSION (H): ICD-10-CM

## 2023-08-29 DIAGNOSIS — C90.00 MULTIPLE MYELOMA WITH FAILED REMISSION (H): Primary | ICD-10-CM

## 2023-08-29 RX ORDER — LENALIDOMIDE 10 MG/1
10 CAPSULE ORAL DAILY
Qty: 28 CAPSULE | Refills: 0 | Status: SHIPPED | OUTPATIENT
Start: 2023-09-01 | End: 2023-09-26

## 2023-08-29 NOTE — TELEPHONE ENCOUNTER
Oral Chemotherapy Monitoring Program    Medication: Revlimid  Rx:  10 mg PO daily on days 1 - 28 of 28 day cycle  #28    Auth #: [id number]  Risk Category: Adult female NOT of reproductive capacity    Routine survey questions reviewed.    Thank you,    Sara Martinez  Oncology/Transplant Float [email or web address]  Phone:[phone]  Fax:[phone]

## 2023-09-02 ENCOUNTER — HEALTH MAINTENANCE LETTER (OUTPATIENT)
Age: 73
End: 2023-09-02

## 2023-09-06 ENCOUNTER — ONCOLOGY VISIT (OUTPATIENT)
Dept: ONCOLOGY | Facility: CLINIC | Age: 73
End: 2023-09-06
Attending: PSYCHOLOGIST
Payer: COMMERCIAL

## 2023-09-06 DIAGNOSIS — F43.23 ADJUSTMENT DISORDER WITH MIXED ANXIETY AND DEPRESSED MOOD: Primary | ICD-10-CM

## 2023-09-06 PROCEDURE — 90837 PSYTX W PT 60 MINUTES: CPT | Performed by: PSYCHOLOGIST

## 2023-09-06 NOTE — LETTER
9/6/2023         RE: Lizzie Viera  [address]  [place] [address]        Dear Colleague,    Thank you for referring your patient, Lizzie Viera, to the [facility]. Please see a copy of my visit note below.    See Confidential      Again, thank you for allowing me to participate in the care of your patient.        Sincerely,        Mario Long LP
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
## 2023-09-07 ENCOUNTER — ONCOLOGY VISIT (OUTPATIENT)
Dept: ONCOLOGY | Facility: HOSPITAL | Age: 73
End: 2023-09-07
Attending: INTERNAL MEDICINE
Payer: COMMERCIAL

## 2023-09-07 ENCOUNTER — LAB (OUTPATIENT)
Dept: INFUSION THERAPY | Facility: HOSPITAL | Age: 73
End: 2023-09-07
Attending: INTERNAL MEDICINE
Payer: COMMERCIAL

## 2023-09-07 VITALS
SYSTOLIC BLOOD PRESSURE: 149 MMHG | HEART RATE: 48 BPM | DIASTOLIC BLOOD PRESSURE: 63 MMHG | OXYGEN SATURATION: 97 % | RESPIRATION RATE: 16 BRPM | TEMPERATURE: 97.8 F | BODY MASS INDEX: 25.05 KG/M2 | WEIGHT: 132.6 LBS

## 2023-09-07 DIAGNOSIS — M84.58XK PATHOLOGICAL FRACTURE OF VERTEBRAE IN NEOPLASTIC DISEASE WITH NONUNION: ICD-10-CM

## 2023-09-07 DIAGNOSIS — C90.00 MULTIPLE MYELOMA WITH FAILED REMISSION (H): Primary | ICD-10-CM

## 2023-09-07 DIAGNOSIS — C90.00 MULTIPLE MYELOMA NOT HAVING ACHIEVED REMISSION (H): ICD-10-CM

## 2023-09-07 DIAGNOSIS — C90.00 MULTIPLE MYELOMA WITH FAILED REMISSION (H): ICD-10-CM

## 2023-09-07 LAB
ALBUMIN SERPL BCG-MCNC: 4.2 G/DL (ref 3.5–5.2)
ALP SERPL-CCNC: 46 U/L (ref 35–104)
ALT SERPL W P-5'-P-CCNC: 22 U/L (ref 0–50)
ANION GAP SERPL CALCULATED.3IONS-SCNC: 11 MMOL/L (ref 7–15)
AST SERPL W P-5'-P-CCNC: 19 U/L (ref 0–45)
BASOPHILS # BLD AUTO: 0.1 10E3/UL (ref 0–0.2)
BASOPHILS NFR BLD AUTO: 2 %
BILIRUB SERPL-MCNC: 0.4 MG/DL
BUN SERPL-MCNC: 26.1 MG/DL (ref 8–23)
CALCIUM SERPL-MCNC: 9.5 MG/DL (ref 8.8–10.2)
CHLORIDE SERPL-SCNC: 101 MMOL/L (ref 98–107)
CREAT SERPL-MCNC: 0.88 MG/DL (ref 0.51–0.95)
DEPRECATED HCO3 PLAS-SCNC: 29 MMOL/L (ref 22–29)
EGFRCR SERPLBLD CKD-EPI 2021: 70 ML/MIN/1.73M2
EOSINOPHIL # BLD AUTO: 0.4 10E3/UL (ref 0–0.7)
EOSINOPHIL NFR BLD AUTO: 9 %
ERYTHROCYTE [DISTWIDTH] IN BLOOD BY AUTOMATED COUNT: 14.4 % (ref 10–15)
GLUCOSE SERPL-MCNC: 91 MG/DL (ref 70–99)
HCT VFR BLD AUTO: 47.7 % (ref 35–47)
HGB BLD-MCNC: 16.3 G/DL (ref 11.7–15.7)
IMM GRANULOCYTES # BLD: 0 10E3/UL
IMM GRANULOCYTES NFR BLD: 0 %
LYMPHOCYTES # BLD AUTO: 1.7 10E3/UL (ref 0.8–5.3)
LYMPHOCYTES NFR BLD AUTO: 35 %
MCH RBC QN AUTO: 35.6 PG (ref 26.5–33)
MCHC RBC AUTO-ENTMCNC: 34.2 G/DL (ref 31.5–36.5)
MCV RBC AUTO: 104 FL (ref 78–100)
MONOCYTES # BLD AUTO: 0.4 10E3/UL (ref 0–1.3)
MONOCYTES NFR BLD AUTO: 9 %
NEUTROPHILS # BLD AUTO: 2.2 10E3/UL (ref 1.6–8.3)
NEUTROPHILS NFR BLD AUTO: 45 %
NRBC # BLD AUTO: 0 10E3/UL
NRBC BLD AUTO-RTO: 0 /100
PLATELET # BLD AUTO: 173 10E3/UL (ref 150–450)
POTASSIUM SERPL-SCNC: 3.7 MMOL/L (ref 3.4–5.3)
PROT SERPL-MCNC: 6.6 G/DL (ref 6.4–8.3)
RBC # BLD AUTO: 4.58 10E6/UL (ref 3.8–5.2)
SODIUM SERPL-SCNC: 141 MMOL/L (ref 136–145)
TOTAL PROTEIN SERUM FOR ELP: 6.1 G/DL (ref 6.4–8.3)
WBC # BLD AUTO: 4.8 10E3/UL (ref 4–11)

## 2023-09-07 PROCEDURE — 85025 COMPLETE CBC W/AUTO DIFF WBC: CPT

## 2023-09-07 PROCEDURE — 84165 PROTEIN E-PHORESIS SERUM: CPT | Mod: TC | Performed by: PATHOLOGY

## 2023-09-07 PROCEDURE — 84155 ASSAY OF PROTEIN SERUM: CPT

## 2023-09-07 PROCEDURE — 36415 COLL VENOUS BLD VENIPUNCTURE: CPT

## 2023-09-07 PROCEDURE — G0463 HOSPITAL OUTPT CLINIC VISIT: HCPCS | Performed by: INTERNAL MEDICINE

## 2023-09-07 PROCEDURE — 82784 ASSAY IGA/IGD/IGG/IGM EACH: CPT

## 2023-09-07 PROCEDURE — 99214 OFFICE O/P EST MOD 30 MIN: CPT | Performed by: INTERNAL MEDICINE

## 2023-09-07 PROCEDURE — 80053 COMPREHEN METABOLIC PANEL: CPT

## 2023-09-07 PROCEDURE — 83521 IG LIGHT CHAINS FREE EACH: CPT

## 2023-09-07 ASSESSMENT — PAIN SCALES - GENERAL: PAINLEVEL: MODERATE PAIN (4)

## 2023-09-07 NOTE — LETTER
"    9/7/2023         RE: Lizzie Viera  [address]  [place] [address]        Dear Colleague,    Thank you for referring your patient, Lizzie Viera, to the [facility]. Please see a copy of my visit note below.    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    Weight as of this encounter: 60.1 kg (132 lb 9.6 oz). Body surface area is 1.61 meters squared.  Moderate Pain (4) Comment: Data Unavailable   No LMP recorded (lmp unknown). Patient is postmenopausal.  Allergies reviewed: Yes  Medications reviewed: Yes    Medications: Medication refills not needed today.  Pharmacy name entered into [facility]: [id number] [address]    Clinical concerns: None       Viviane Chu LPN               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  A very pleasant 72 year old woman with IgG kappa multiple myeloma with hyperdiploid cytogenetics.  However clinically was behaving somewhat more aggressively.  Started on VRD treatment.  Responded quite well. After 17 cycles the treatment was switched to Revlimid 10 mg daily in September 2022.  Valeria has been tolerating it quite well.  The lab work-up in the end of November 2022 showed maintenance of response.  The labs in February 2023 and then labs done in April and then again in June 2023 continue to look pretty stable.    2.  Had significant bone disease with osteoporosis and compression fractures.  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  Improved significantly on the CT scan in March 2022.  MRI also shows no new lesions.  Most recent bone density still shows osteoporosis.  Has not been able to restart Zometa due to dental implant needs.  3.  Normal hemoglobin, calcium, kidney function along with beta-2 microglobulin and albumin at the time of diagnosis.  Currently her hemoglobin is above normal.  4.  Positive Covid antibodies from infection. Unvaccinated. Declined Evusheld.  5.  Pain from compression fracture status post radiation therapy.  Significantly improved.  6.  Some dental issues on left upper molars which were cracked and has been extracted.  She also has a tooth on the right lower jaw which the dentists performed extraction.  Dental implant done on August 16th. She has been told it takes 6 months to heal.      Plan    1.  Recommend treatment with Revlimid 10 mg p.o. daily.  We will follow-up from the labs today.  2.  She should continue to take diet rich in calcium and vitamin D.  3.  She needs to be on some  bisphosphonate therapy once her dental situation has been taken care of. More than likely in April 2024 we can get started on Zometa.  4. Follow-up in couple of months with repeat labs.  5. Follow up on sleep study.     Cancer Staging   No matching staging information was found for the patient.    ECOG Performance    2 - Ambulatory and independent in all ADLs; cannot work; up > 50% of the time      History of Present Illness    Ms. Lizzie Viera is a very pleasant 72 year old woman who has been diagnosed with multiple myeloma IgG kappa type in May 2021.  She had initially presented with compression fracture of T7 vertebral body in September 2020.  She had a back pain which led to that evaluation.  Bone density confirmed that she had osteoporosis.  MRI showed diffuse marrow edema in October 2020.  In April 2021 the MRI of the thoracic spine showed worsening T7 vertebral body height loss because of likely pathological compression fracture with extraosseous extension.  She then had IR guided bone biopsy on 7 May 2021 and pathology confirmed the presence of kappa light chain restricted plasma cells.  Her cytogenetics confirmed the presence of hyperdiploid E with gains of chromosome 5, 9 and 15.    She was then seen by Dr. Baig and had a bone marrow biopsy which also confirmed 60% involvement of the marrow with plasma cells.  The bone marrow was done on 3 Jocelin 2021.  The bone marrow also confirmed the presence of hyperdiploidy.  She had a gain of chromosome 3, 5, 7, 9, 11, 15 as well as 19.  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  Her beta-2 microglobulin was  normal at the time of her diagnosis as was her albumin at 4.0.  Monoclonal protein 3.1 g/dL.  Kappa free light chain levels of 13 mg/dL IgG level of 4280 with depressed levels of IgM and IgA.  Urine was also positive for kappa light chains as well as immunofixation of the urine was positive for IgG kappa.  Normal kidney function.  Normal hemoglobin.    As mentioned  above she had bone lesions in the T7 vertebral body, T1, skull area.  Her main pain is coming from her T7 vertebral body compression fracture.    Due to the pain she has been seen by radiation oncology and has received radiation therapy.  She also got a dose of steroids for pain control.    She was initially thinking of doing some natural therapies but once her symptoms got worse, she came back in was counseled about treatment.      She started on Velcade Revlimid and dexamethasone in September 2021.  Responded nicely.  Immunoglobulins  normalized completely. Her immunoglobin levels have almost normalized in fact the IgG levels have gone below normal.  Immunofixation still shows a very faint kappa monoclonal gammopathy.    She was  hospitalized in April 2022 with COPD exacerbation/pneumonia.  Was given some steroids and antibiotic.  She was slightly hypoxic initially but then got better after that.    She was referred to [facility] for transplant evaluation.  She was somewhat reluctant to go forward with that.  Otherwise she seems to be doing quite well.  Her immunoglobulin levels have normalized or actually are below normal.  Immunofixation faintly positive.    She has cracked a molar on the upper left jaw sometime in July 2022.  There was some tooth decay.  She had them extracted.    In September 2022 we discontinued the VRD treatment and now Valeria is on Revlimid maintenance 10 mg p.o. daily.  She seems to be tolerating it well.  She is physically more active so she is noticing some soreness in her back.    Had dental extraction done in October 2022 from the right lower jaw.  That seems to be healing well.  Underwent bone densitometry recently.  That confirms presence of osteoporosis which is not a new finding for her.    In February 2023 her labs were pretty stable.  She went to [place] so we gave her 2 to 3 weeks of break from Revlimid.  She restarted the treatment.    Comes in today for scheduled  follow-up.  Overall seems to be doing okay.  Had dental implant on August 16, 2023. Did well.     She has been snoring more, so sought opinion from her PMD. Has been referred to sleep study.       Review of system      Details noted in the history of present illness.  A detailed review of systems is otherwise negative.      Physical exam        BP (!) 149/63   Pulse (!) 48   Temp 97.8  F (36.6  C)   Resp 16   Wt 60.1 kg (132 lb 9.6 oz)   LMP  (LMP Unknown)   SpO2 97%   BMI 25.05 kg/m        GENERAL: No acute distress. Cooperative in conversation.   HEENT:  Pupils are equal, round and reactive. Oral mucosa is clean and intact. No ulcerations or mucositis noted. No bleeding noted.  RESP:Chest symmetric lungs are clear bilaterally per auscultation. Regular respiratory rate. No wheezes or rhonchi.  CV: Normal S1 S2 Regular, rate and rhythm.     ABD: Nondistended, soft, nontender. Positive bowel sounds. No organomegaly.   EXTREMITIES: No lower extremity edema.   NEURO: Non- focal. Alert and oriented x3.  Cranial nerves appear intact.  PSYCH: Within normal limits. No depression or anxiety.  SKIN: Warm dry intact.    Lab results Reviewed      Recent Results (from the past 168 hour(s))   CBC with platelets and differential   Result Value Ref Range    WBC Count 4.8 4.0 - 11.0 10e3/uL    RBC Count 4.58 3.80 - 5.20 10e6/uL    Hemoglobin 16.3 (H) 11.7 - 15.7 g/dL    Hematocrit 47.7 (H) 35.0 - 47.0 %     (H) 78 - 100 fL    MCH 35.6 (H) 26.5 - 33.0 pg    MCHC 34.2 31.5 - 36.5 g/dL    RDW 14.4 10.0 - 15.0 %    Platelet Count 173 150 - 450 10e3/uL    % Neutrophils 45 %    % Lymphocytes 35 %    % Monocytes 9 %    % Eosinophils 9 %    % Basophils 2 %    % Immature Granulocytes 0 %    NRBCs per 100 WBC 0 <1 /100    Absolute Neutrophils 2.2 1.6 - 8.3 10e3/uL    Absolute Lymphocytes 1.7 0.8 - 5.3 10e3/uL    Absolute Monocytes 0.4 0.0 - 1.3 10e3/uL    Absolute Eosinophils 0.4 0.0 - 0.7 10e3/uL    Absolute Basophils 0.1 0.0  - 0.2 10e3/uL    Absolute Immature Granulocytes 0.0 <=0.4 10e3/uL    Absolute NRBCs 0.0 10e3/uL       Imaging results Reviewed        No results found.      Signed by: Loco Blanco MD      This note has been dictated using voice recognition software. Any grammatical or context distortions are unintentional and inherent to the software       Again, thank you for allowing me to participate in the care of your patient.        Sincerely,        Loco Blanco MD

## 2023-09-07 NOTE — PROGRESS NOTES
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  A very pleasant 72 year old woman with IgG kappa multiple myeloma with hyperdiploid cytogenetics.  However clinically was behaving somewhat more aggressively.  Started on VRD treatment.  Responded quite well. After 17 cycles the treatment was switched to Revlimid 10 mg daily in September 2022.  Valeria has been tolerating it quite well.  The lab work-up in the end of November 2022 showed maintenance of response.  The labs in February 2023 and then labs done in April and then again in June 2023 continue to look pretty stable.    2.  Had significant bone disease with osteoporosis and compression fractures.  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  Improved significantly on the CT scan in March 2022.  MRI also shows no new lesions.  Most recent bone density still shows osteoporosis.  Has not been able to restart Zometa due to dental implant needs.  3.  Normal hemoglobin, calcium, kidney function along with beta-2 microglobulin and albumin at the time of diagnosis.  Currently her hemoglobin is above normal.  4.  Positive Covid antibodies from infection. Unvaccinated. Declined Evusheld.  5.  Pain from  compression fracture status post radiation therapy.  Significantly improved.  6.  Some dental issues on left upper molars which were cracked and has been extracted.  She also has a tooth on the right lower jaw which the dentists performed extraction.  Dental implant done on August 16th. She has been told it takes 6 months to heal.      Plan    1.  Recommend treatment with Revlimid 10 mg p.o. daily.  We will follow-up from the labs today.  2.  She should continue to take diet rich in calcium and vitamin D.  3.  She needs to be on some bisphosphonate therapy once her dental situation has been taken care of. More than likely in April 2024 we can get started on Zometa.  4. Follow-up in couple of months with repeat labs.  5. Follow up on sleep study.     Cancer Staging   No matching staging information was found for the patient.    ECOG Performance    2 - Ambulatory and independent in all ADLs; cannot work; up > 50% of the time      History of Present Illness    Ms. Lizzie Viera is a very pleasant 72 year old woman who has been diagnosed with multiple myeloma IgG kappa type in May 2021.  She had initially presented with compression fracture of T7 vertebral body in September 2020.  She had a back pain which led to that evaluation.  Bone density confirmed that she had osteoporosis.  MRI showed diffuse marrow edema in October 2020.  In April 2021 the MRI of the thoracic spine showed worsening T7 vertebral body height loss because of likely pathological compression fracture with extraosseous extension.  She then had IR guided bone biopsy on 7 May 2021 and pathology confirmed the presence of kappa light chain restricted plasma cells.  Her cytogenetics confirmed the presence of hyperdiploid E with gains of chromosome 5, 9 and 15.    She was then seen by Dr. Baig and had a bone marrow biopsy which also confirmed 60% involvement of the marrow with plasma cells.  The bone marrow was done on 3 Jocelin 2021.  The bone marrow  also confirmed the presence of hyperdiploidy.  She had a gain of chromosome 3, 5, 7, 9, 11, 15 as well as 19.  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  Her beta-2 microglobulin was  normal at the time of her diagnosis as was her albumin at 4.0.  Monoclonal protein 3.1 g/dL.  Kappa free light chain levels of 13 mg/dL IgG level of 4280 with depressed levels of IgM and IgA.  Urine was also positive for kappa light chains as well as immunofixation of the urine was positive for IgG kappa.  Normal kidney function.  Normal hemoglobin.    As mentioned above she had bone lesions in the T7 vertebral body, T1, skull area.  Her main pain is coming from her T7 vertebral body compression fracture.    Due to the pain she has been seen by radiation oncology and has received radiation therapy.  She also got a dose of steroids for pain control.    She was initially thinking of doing some natural therapies but once her symptoms got worse, she came back in was counseled about treatment.      She started on Velcade Revlimid and dexamethasone in September 2021.  Responded nicely.  Immunoglobulins  normalized completely. Her immunoglobin levels have almost normalized in fact the IgG levels have gone below normal.  Immunofixation still shows a very faint kappa monoclonal gammopathy.    She was  hospitalized in April 2022 with COPD exacerbation/pneumonia.  Was given some steroids and antibiotic.  She was slightly hypoxic initially but then got better after that.    She was referred to [facility] for transplant evaluation.  She was somewhat reluctant to go forward with that.  Otherwise she seems to be doing quite well.  Her immunoglobulin levels have normalized or actually are below normal.  Immunofixation faintly positive.    She has cracked a molar on the upper left jaw sometime in July 2022.  There was some tooth decay.  She had them extracted.    In September 2022 we discontinued the VRD treatment and now Valeria is on Revlimid maintenance 10  mg p.o. daily.  She seems to be tolerating it well.  She is physically more active so she is noticing some soreness in her back.    Had dental extraction done in October 2022 from the right lower jaw.  That seems to be healing well.  Underwent bone densitometry recently.  That confirms presence of osteoporosis which is not a new finding for her.    In February 2023 her labs were pretty stable.  She went to [place] so we gave her 2 to 3 weeks of break from Revlimid.  She restarted the treatment.    Comes in today for scheduled follow-up.  Overall seems to be doing okay.  Had dental implant on August 16, 2023. Did well.     She has been snoring more, so sought opinion from her PMD. Has been referred to sleep study.       Review of system      Details noted in the history of present illness.  A detailed review of systems is otherwise negative.      Physical exam        BP (!) 149/63   Pulse (!) 48   Temp 97.8  F (36.6  C)   Resp 16   Wt 60.1 kg (132 lb 9.6 oz)   LMP  (LMP Unknown)   SpO2 97%   BMI 25.05 kg/m        GENERAL: No acute distress. Cooperative in conversation.   HEENT:  Pupils are equal, round and reactive. Oral mucosa is clean and intact. No ulcerations or mucositis noted. No bleeding noted.  RESP:Chest symmetric lungs are clear bilaterally per auscultation. Regular respiratory rate. No wheezes or rhonchi.  CV: Normal S1 S2 Regular, rate and rhythm.     ABD: Nondistended, soft, nontender. Positive bowel sounds. No organomegaly.   EXTREMITIES: No lower extremity edema.   NEURO: Non- focal. Alert and oriented x3.  Cranial nerves appear intact.  PSYCH: Within normal limits. No depression or anxiety.  SKIN: Warm dry intact.    Lab results Reviewed      Recent Results (from the past 168 hour(s))   CBC with platelets and differential   Result Value Ref Range    WBC Count 4.8 4.0 - 11.0 10e3/uL    RBC Count 4.58 3.80 - 5.20 10e6/uL    Hemoglobin 16.3 (H) 11.7 - 15.7 g/dL    Hematocrit 47.7 (H) 35.0 - 47.0 %      (H) 78 - 100 fL    MCH 35.6 (H) 26.5 - 33.0 pg    MCHC 34.2 31.5 - 36.5 g/dL    RDW 14.4 10.0 - 15.0 %    Platelet Count 173 150 - 450 10e3/uL    % Neutrophils 45 %    % Lymphocytes 35 %    % Monocytes 9 %    % Eosinophils 9 %    % Basophils 2 %    % Immature Granulocytes 0 %    NRBCs per 100 WBC 0 <1 /100    Absolute Neutrophils 2.2 1.6 - 8.3 10e3/uL    Absolute Lymphocytes 1.7 0.8 - 5.3 10e3/uL    Absolute Monocytes 0.4 0.0 - 1.3 10e3/uL    Absolute Eosinophils 0.4 0.0 - 0.7 10e3/uL    Absolute Basophils 0.1 0.0 - 0.2 10e3/uL    Absolute Immature Granulocytes 0.0 <=0.4 10e3/uL    Absolute NRBCs 0.0 10e3/uL       Imaging results Reviewed        No results found.      Signed by: Loco Blanco MD      This note has been dictated using voice recognition software. Any grammatical or context distortions are unintentional and inherent to the software

## 2023-09-07 NOTE — PROGRESS NOTES
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    Weight as of this encounter: 60.1 kg (132 lb 9.6 oz). Body surface area is 1.61 meters squared.  Moderate Pain (4) Comment: Data Unavailable   No LMP recorded (lmp unknown). Patient is postmenopausal.  Allergies reviewed: Yes  Medications reviewed: Yes    Medications: Medication refills not needed today.  Pharmacy name entered into [facility]: [id number] - [place], [place] - [address]    Clinical concerns: None       Viviane Chu LPN             "

## 2023-09-08 LAB
ALBUMIN SERPL ELPH-MCNC: 4 G/DL (ref 3.7–5.1)
ALPHA1 GLOB SERPL ELPH-MCNC: 0.3 G/DL (ref 0.2–0.4)
ALPHA2 GLOB SERPL ELPH-MCNC: 0.8 G/DL (ref 0.5–0.9)
B-GLOBULIN SERPL ELPH-MCNC: 0.6 G/DL (ref 0.6–1)
GAMMA GLOB SERPL ELPH-MCNC: 0.4 G/DL (ref 0.7–1.6)
IGA SERPL-MCNC: 91 MG/DL (ref 84–499)
IGG SERPL-MCNC: 429 MG/DL (ref 610–1616)
IGM SERPL-MCNC: 26 MG/DL (ref 35–242)
KAPPA LC FREE SER-MCNC: 2.07 MG/DL (ref 0.33–1.94)
KAPPA LC FREE/LAMBDA FREE SER NEPH: 1.09 {RATIO} (ref 0.26–1.65)
LAMBDA LC FREE SERPL-MCNC: 1.9 MG/DL (ref 0.57–2.63)
M PROTEIN SERPL ELPH-MCNC: 0.1 G/DL
PROT PATTERN SERPL ELPH-IMP: ABNORMAL

## 2023-09-08 PROCEDURE — 84165 PROTEIN E-PHORESIS SERUM: CPT | Mod: 26

## 2023-09-18 DIAGNOSIS — M48.04 THORACIC SPINAL STENOSIS: ICD-10-CM

## 2023-09-18 DIAGNOSIS — M79.18 MYOFASCIAL PAIN: ICD-10-CM

## 2023-09-18 RX ORDER — GABAPENTIN 300 MG/1
600 CAPSULE ORAL 3 TIMES DAILY
Qty: 180 CAPSULE | Refills: 1 | Status: SHIPPED | OUTPATIENT
Start: 2023-09-18 | End: 2023-01-01

## 2023-09-20 ENCOUNTER — ONCOLOGY VISIT (OUTPATIENT)
Dept: ONCOLOGY | Facility: CLINIC | Age: 73
End: 2023-09-20
Attending: PSYCHOLOGIST
Payer: COMMERCIAL

## 2023-09-20 DIAGNOSIS — F43.23 ADJUSTMENT DISORDER WITH MIXED ANXIETY AND DEPRESSED MOOD: Primary | ICD-10-CM

## 2023-09-20 PROCEDURE — 90837 PSYTX W PT 60 MINUTES: CPT | Performed by: PSYCHOLOGIST

## 2023-09-20 NOTE — LETTER
9/20/2023         RE: Lizzie Viera  [address]  [place] [address]        Dear Colleague,    Thank you for referring your patient, Lizzie Viera, to the [facility]. Please see a copy of my visit note below.    See confidential note      Again, thank you for allowing me to participate in the care of your patient.        Sincerely,        Mario Long LP
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
## 2023-09-26 ENCOUNTER — TELEPHONE (OUTPATIENT)
Dept: ONCOLOGY | Facility: HOSPITAL | Age: 73
End: 2023-09-26
Payer: COMMERCIAL

## 2023-09-26 DIAGNOSIS — C90.00 MULTIPLE MYELOMA NOT HAVING ACHIEVED REMISSION (H): ICD-10-CM

## 2023-09-26 DIAGNOSIS — C90.00 MULTIPLE MYELOMA WITH FAILED REMISSION (H): Primary | ICD-10-CM

## 2023-09-26 RX ORDER — LENALIDOMIDE 10 MG/1
10 CAPSULE ORAL DAILY
Qty: 28 CAPSULE | Refills: 0 | Status: SHIPPED | OUTPATIENT
Start: 2023-01-01 | End: 2023-01-01

## 2023-09-27 ASSESSMENT — SLEEP AND FATIGUE QUESTIONNAIRES
HOW LIKELY ARE YOU TO NOD OFF OR FALL ASLEEP WHILE SITTING AND READING: SLIGHT CHANCE OF DOZING
HOW LIKELY ARE YOU TO NOD OFF OR FALL ASLEEP WHEN YOU ARE A PASSENGER IN A CAR FOR AN HOUR WITHOUT A BREAK: WOULD NEVER DOZE
HOW LIKELY ARE YOU TO NOD OFF OR FALL ASLEEP WHILE SITTING AND TALKING TO SOMEONE: WOULD NEVER DOZE
HOW LIKELY ARE YOU TO NOD OFF OR FALL ASLEEP WHILE WATCHING TV: SLIGHT CHANCE OF DOZING
HOW LIKELY ARE YOU TO NOD OFF OR FALL ASLEEP WHILE SITTING INACTIVE IN A PUBLIC PLACE: WOULD NEVER DOZE
HOW LIKELY ARE YOU TO NOD OFF OR FALL ASLEEP WHILE SITTING QUIETLY AFTER LUNCH WITHOUT ALCOHOL: WOULD NEVER DOZE
HOW LIKELY ARE YOU TO NOD OFF OR FALL ASLEEP IN A CAR, WHILE STOPPED FOR A FEW MINUTES IN TRAFFIC: WOULD NEVER DOZE
HOW LIKELY ARE YOU TO NOD OFF OR FALL ASLEEP WHILE LYING DOWN TO REST IN THE AFTERNOON WHEN CIRCUMSTANCES PERMIT: MODERATE CHANCE OF DOZING

## 2023-09-28 ENCOUNTER — OFFICE VISIT (OUTPATIENT)
Dept: SLEEP MEDICINE | Facility: CLINIC | Age: 73
End: 2023-09-28
Attending: FAMILY MEDICINE
Payer: COMMERCIAL

## 2023-09-28 VITALS
BODY MASS INDEX: 24.24 KG/M2 | DIASTOLIC BLOOD PRESSURE: 56 MMHG | WEIGHT: 128.38 LBS | HEART RATE: 54 BPM | OXYGEN SATURATION: 97 % | HEIGHT: 61 IN | SYSTOLIC BLOOD PRESSURE: 126 MMHG

## 2023-09-28 DIAGNOSIS — I10 ESSENTIAL HYPERTENSION, BENIGN: ICD-10-CM

## 2023-09-28 DIAGNOSIS — R06.83 SNORING: ICD-10-CM

## 2023-09-28 DIAGNOSIS — R51.9 SLEEP RELATED HEADACHES: ICD-10-CM

## 2023-09-28 DIAGNOSIS — G47.19 EXCESSIVE DAYTIME SLEEPINESS: Primary | ICD-10-CM

## 2023-09-28 PROCEDURE — 99204 OFFICE O/P NEW MOD 45 MIN: CPT | Performed by: INTERNAL MEDICINE

## 2023-09-28 NOTE — NURSING NOTE
Sleep study and return visit has been scheduled. AVS and PSG packet handouts given to patient.     Edward Roth [facility] [place]

## 2023-09-28 NOTE — PROGRESS NOTES
Additional 15 minutes on the date of service was spent performing the following:    -Preparing to see the patient  -Obtaining and/or reviewing separately obtained history   -Ordering medications, tests, or procedures   -Documenting clinical information in the electronic or other health record     Assessment and Plan:    In summary Lizzie Viera is a 72 year old year old female here for sleep disturbance.  1.  Excessive daytime sleepiness/Snoring/Hypertension/Sleep-Related Headaches   Lizzie Viera has high risk for obstructive sleep apnea based on the history of excessive daytime sleepiness, snoring and a crowded airway. I educated the patient on the underlying pathophysiology of obstructive sleep apnea. We reviewed the risks associated with sleep apnea, including increased cardiovascular risk and overall death. We talked about treatments briefly. I recommend getting an split-night nocturnal polysomnography. The patient should return to the clinic to discuss results and treatment option in a patient-centered approach.      History of present illness:    She is a 72 year old female who comes to the clinic with a chief complaint of excessive daytime sleepiness of been going on for more than a year.  While the patient denies any episodes of witnessed apnea she has been told that she does have loud snoring during sleep.  She is also currently being treated for high blood pressure.  She also complains of occasionally waking up with headaches.     Ideal Sleep-Wake Cycle(devoid of societal pressure):    TIME IN BED:    1) Work/School Days:    Do you work or go to school? No   What time do you usually get into bed? Midnight   About how long does it take you to fall asleep? Usually a half an hour to an hour depending on my back pain.   How often do you have trouble falling asleep? Four or five nights per week   How often do you wake up during the night? Once or twice to go to the bathroom.   Do you work  days/evenings/nights/rotating shifts?    What wakes you up at night? Pain    Use the bathroom   How often do you have trouble falling back to sleep? Maybe once a week   About how long does it take to fall back to sleep? A half an hour to an hour   What do you usually do if you have trouble getting back to sleep? Deep breathing and relaxation techniques   What time do you usually get out of bed to start your day? Eight am   Do you use an alarm? No   2) Weekends/Non-work Days/All Other Days    What time do you usually get into bed? Midnight   About how long does it take you to fall asleep? A half hour to an hour depending on my back pain   What time do you usually get out of bed to start your day? Eight am   Do you use an alarm? No   SLEEP NEED    On average, about how much sleep do you think you get? Six to seven hours   About how much sleep do you think you need? Eight hours   SLEEP POSITION    Which sleep positions do you prefer? Back   Do you do any of the following activities in bed? Read   How often do you take a nap on purpose? Once a month   About how long are your naps? One to two hours   Do you feel better after naps? Yes   How often do you doze off unintentionally? Twice a week   Have you ever had a driving accident or near-miss due to sleepiness/drowsiness? No     Stop Bang Questionnaire    Question 9/27/2023  6:46 PM CDT - Filed by Patient   Do you SNORE loudly (louder than talking or loud enough to be heard through closed doors)? No   Do you often feel TIRED, fatigued, or sleepy during daytime? Yes   Has anyone OBSERVED you stop breathing during your sleep? No   Do you have or are you being treated for high blood PRESSURE? Yes   BMI more than 35kg/m2? No   AGE over 50 years old? Yes   NECK circumference > 16 inches (40cm)? No   GENDER: Male? No   STOP-BANG Total Score (range: 0 - 8) 4 (High risk of JR) Critical      CONCHITA:  CONCHITA Total Score: 11  Total score - [place]: 4 (9/27/2023  6:45 PM)    Patient told  to return in one week after the sleep study is interpreted.    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    Functional diarrhea    Multiple myeloma not having achieved remission (H)    Essential hypertension, benign    Celiac artery stenosis (H)    Panlobular emphysema (H)       Past Medical History  Past Medical History:   Diagnosis Date    Cervical dysplasia     Chronic RUQ pain     Depressive disorder     Multiple myeloma (H)     Osteoporosis         Past Surgical History  Past Surgical History:   Procedure Laterality Date    CONIZATION CERVIX,KNIFE/LASER      Description: Cervical Conization By Laser;  Recorded: 09/20/2007;    HC DILATION/CURETTAGE DIAG/THER NON OB      Description: Dilation And Curettage;  Recorded: 09/20/2007;    HC REMOVE TONSILS/ADENOIDS,<11 Y/O      Description: Tonsillectomy With Adenoidectomy;  Recorded: 09/20/2007;    ZC LAP,CHOLECYSTECTOMY/EXPLORE  12/27/2004    Z LIGATE FALLOPIAN TUBE      Description: Tubal Ligation;  Recorded: 09/20/2007;        Meds  Current Outpatient Medications   Medication Sig Dispense Refill    acyclovir (ZOVIRAX) 400 MG tablet Take 1 tablet (400 mg) by mouth 2 times daily Viral Prophylaxis. 180 tablet 1    albuterol (PROAIR HFA/PROVENTIL HFA/VENTOLIN HFA) 108 (90 Base) MCG/ACT inhaler Inhale 2 puffs into the lungs every 6 hours as needed for wheezing or shortness of breath / dyspnea 18 g 1    albuterol (PROVENTIL) (2.5 MG/3ML) 0.083% neb solution Take 1 vial (2.5 mg) by nebulization every 4 hours as needed for wheezing or shortness of breath / dyspnea 90 mL 0    alpha-lipoic  acid 100 MG capsule Take 300 mg by mouth daily      Ascorbic Acid (VITAMIN C) 100 MG CHEW Take 1 tablet by mouth daily      aspirin (ASA) 81 MG chewable tablet Take 81 mg by mouth daily      Coenzyme Q10 300 MG CAPS Take 300 mg by mouth daily      diclofenac (VOLTAREN) 1 % topical gel Apply 2 g topically 4 times daily 50 g 1    fish oil-omega-3 fatty acids 1000 MG capsule Take 2 g by mouth daily      gabapentin (NEURONTIN) 300 MG capsule TAKE TWO CAPSULES BY MOUTH THREE TIMES DAILY 180 capsule 1    hydrochlorothiazide (HYDRODIURIL) 12.5 MG tablet Take 1 tablet (12.5 mg) by mouth daily 90 tablet 1    HYDROmorphone (DILAUDID) 4 MG tablet Take 0.5-1 tablets (2-4 mg) by mouth every 4 hours as needed for moderate to severe pain 60 tablet 0    [START ON 11/15/2023] LENalidomide (REVLIMID) 10 MG CAPS capsule Take 1 capsule (10 mg) by mouth daily for 28 days Take at the same time each day. Do not open, break or chew the capsule. Swallow whole, with water. 28 capsule 0    medical cannabis (Patient's own supply) See Admin Instructions (The purpose of this order is to document that the patient reports taking medical cannabis.  This is not a prescription, and is not used to certify that the patient has a qualifying medical condition.)     Oil formulation      Melatonin 10 MG TABS tablet Take 20 mg by mouth At Bedtime      methocarbamol (ROBAXIN) 500 MG tablet Take 1 tablet (500 mg) by mouth 4 times daily as needed for muscle spasms 120 tablet 4    Milk Thistle-Dand-Fennel-Licor (MILK THISTLE XTRA) CAPS capsule Take 1 capsule by mouth daily      NALTREXONE HCL PO Take 3 mg by mouth daily      nicotine (NICOTROL) 10 MG inhaler Use 1 cartridge as needed for urge to smoke by puffing over course of 20min.  Use 6-16 cart/day; reduce number of cart/day over 6-12 weeks. 300 each 4    phenazopyridine (AZO URINARY PAIN RELIEF) 95 MG tablet Take 190 mg by mouth 3 times daily      STATIN NOT PRESCRIBED (INTENTIONAL) Please choose reason  not prescribed from choices below.      TURMERIC PO Take 1 capsule by mouth daily      UNABLE TO FIND 1 capsule daily MEDICATION NAME: Serrapeptase      UNABLE TO FIND 1 capsule daily MEDICATION NAME: Edita Mariano Mushroom      UNABLE TO FIND 1 capsule daily MEDICATION NAME: DIM (diinolylmethane)      UNABLE TO FIND MEDICATION NAME: Panacur C - 1 capsule daily      Vitamin A 3 MG (93473 UT) TABS Take 1 tablet by mouth daily      Vitamin D, Cholecalciferol, 25 MCG (1000 UT) CAPS Take 1,000 Units by mouth daily Liquid, not capsule      baclofen (LIORESAL) 10 MG tablet TAKE 1 TABLET BY MOUTH 3 TIMES A DAY AS NEEDED FOR MUSCLE SPASMS (Patient not taking: Reported on 2023) 60 tablet 0        Allergies  Cefdinir, Latex, Atorvastatin, Short ragweed pollen ext, and Adhesive tape     Social History  Social History     Socioeconomic History    Marital status:      Spouse name: Not on file    Number of children: 3    Years of education: Not on file    Highest education level: Not on file   Occupational History    Not on file   Tobacco Use    Smoking status: Former     Packs/day: 0.50     Years: 45.00     Pack years: 22.50     Types: Cigarettes     Quit date: 2023     Years since quittin.6    Smokeless tobacco: Never   Vaping Use    Vaping Use: Every day   Substance and Sexual Activity    Alcohol use: Yes     Comment: Alcoholic Drinks/day: 0-1 drinks per week    Drug use: Not Currently    Sexual activity: Not Currently     Partners: Male     Birth control/protection: Surgical   Other Topics Concern    Parent/sibling w/ CABG, MI or angioplasty before 65F 55M? Not Asked   Social History Narrative    Not on file     Social Determinants of Health     Financial Resource Strain: Not on file   Food Insecurity: Not on file   Transportation Needs: Not on file   Physical Activity: Not on file   Stress: Not on file   Social Connections: Not on file   Interpersonal Safety: Not on file   Housing Stability: Not on file       "  Family History  Family History   Problem Relation Age of Onset    Diabetes Mother     Arthritis Mother     LUNG DISEASE Father     Diabetes Maternal Uncle     Breast Cancer Paternal Aunt     Heart Failure Maternal Grandmother     Heart Disease Maternal Grandmother     Heart Failure Maternal Grandfather     Heart Disease Maternal Grandfather     Heart Failure Paternal Grandmother     Heart Disease Paternal Grandmother      Patient's family history was not pertinent to chief complaint.     Review of Systems:  Constitutional: Negative except as noted in HPI.   Eyes: Negative except as noted in HPI.   ENT: Negative except as noted in HPI.   Cardiovascular: Negative except as noted in HPI.   Respiratory: Negative except as noted in HPI.   Gastrointestinal: Negative except as noted in HPI.   Genitourinary: Negative except as noted in HPI.   Musculoskeletal: Negative except as noted in HPI.   Integumentary: Negative except as noted in HPI.   Neurological: Negative except as noted in HPI.   Psychiatric: Negative except as noted in HPI.   Endocrine: Negative except as noted in HPI.   Hematologic/Lymphatic: Negative except as noted in HPI.      Physical Exam:  /56   Pulse 54   Ht 1.549 m (5' 1\")   Wt 58.2 kg (128 lb 6 oz)   LMP  (LMP Unknown)   SpO2 97%   BMI 24.26 kg/m    BMI:Body mass index is 24.26 kg/m .   GEN: NAD, appropriate for age  Head: Normocephalic.  EYES: PERRLA, EOMI  ENT: Oropharynx is clear, Deras class 3+ airway. Uvula is intact  Nasal mucosa is moist without erythema  Neck : Thyroid is within normal limits.   CV: Regular rate and rhythm, S1 & S2 positive.  LUNGS: Bilateral breathsounds heard.   ABDOMEN: Positive bowel sounds in all quadrants, soft, no rebound or guarding  MUSCULOSKELETAL: No leg swelling  SKIN: warm, dry, no rashes  Neurological: Alert, Gait is normal. Strength 5/5 in all extremities.  Psych: normal mood, normal affect     Labs/Studies:     Lab Results   Component Value Date "    PH 7.31 (L) 04/13/2022    PO2 110 (H) 04/13/2022    PCO2 52 (H) 04/13/2022    HCO3 24 04/13/2022    MELVA -0.1 04/13/2022     No results found for: TSH  Lab Results   Component Value Date    GLC 91 09/07/2023    GLC 97 06/21/2023     Lab Results   Component Value Date    HGB 16.3 (H) 09/07/2023    HGB 14.2 06/21/2023     Lab Results   Component Value Date    BUN 26.1 (H) 09/07/2023    BUN 20.4 06/21/2023    CR 0.88 09/07/2023    CR 0.77 06/21/2023     Lab Results   Component Value Date    AST 19 09/07/2023    AST 22 06/21/2023    ALT 22 09/07/2023    ALT 26 06/21/2023    ALKPHOS 46 09/07/2023    ALKPHOS 39 06/21/2023    BILITOTAL 0.4 09/07/2023    BILITOTAL 0.4 06/21/2023     No results found for: UAMP, UBARB, BENZODIAZEUR, UCANN, UCOC, OPIT, UPCP      Patient verbalized understanding of these issues, agrees with the plan and all questions were answered today. Patient was given an opportuntity to voice any other symptoms or concerns not listed above. Patient did not have any other symptoms or concerns.         Phillip Wu DO,   Board Certified in Internal Medicine and Sleep Medicine    (Note created with Dragon voice recognition and unintended spelling errors and word substitutions may occur)

## 2023-09-28 NOTE — NURSING NOTE
"Chief Complaint   Patient presents with    Consult    Snoring       Initial /56   Pulse 54   Ht 1.549 m (5' 1\")   Wt 58.2 kg (128 lb 6 oz)   LMP  (LMP Unknown)   SpO2 97%   BMI 24.26 kg/m   Estimated body mass index is 24.26 kg/m  as calculated from the following:    Height as of this encounter: 1.549 m (5' 1\").    Weight as of this encounter: 58.2 kg (128 lb 6 oz).    Medication Reconciliation: complete    Neck circumference:  inches / 33 centimeters.    DME:     KARTHIK Caicedo  [facility] [place]      "

## 2023-09-28 NOTE — PATIENT INSTRUCTIONS
Patient education: What is a sleep study?     What is a sleep study? -- A sleep study is a test that measures how well you sleep and checks for sleep problems. For some sleep studies, you stay overnight in a sleep lab at a hospital or sleep center.     What happens during a sleep study? -- Before you go to sleep, a technician attaches small, sticky patches called  electrodes  to your head, chest, and legs. He or she will also place a small tube beneath your nose and might wrap 1 or 2 belts around your chest.   Each of these items has wires that connect to monitors. The monitors record your movement, brain activity, breathing, and other body functions while you sleep.  If you have a history of trouble falling asleep, your doctor might prescribe a medicine to help you fall asleep in the lab. If you have never taken the medicine before, your doctor might ask you take it on a night before your sleep study to see how it affects you.   Why might my doctor order a sleep study? -- Your doctor will order a sleep study if he or she thinks you have sleep apnea or a different condition that makes you:   ?Have sudden jerking leg movements while you sleep, called  periodic limb movements.    ?Feel very sleepy during the day and fall asleep all of a sudden, called  narcolepsy.    ?Have trouble falling asleep or staying asleep over a long period of time, called  chronic insomnia.    ?Do odd things while you sleep, such as walking.  How should I prepare for a sleep study? -- On the day of your sleep study, you should:   ?Avoid alcohol   ?Avoid drinking coffee, tea, sodas, and other drinks that have caffeine in the afternoon and evening   ?Take all of your regular medicines     The cost of care estimate line is [phone]. They are able to give the patient an estimate of the charges and also an estimate of their insurance coverage/patient responsibility.   After your sleep study is performed, please call us at [phone] or  [phone]  to schedule for a follow up to review the results of the sleep study.    Please bring one tab of low dose melatonin 3 mg or less to the night of the study.    Melatonin intake is completely voluntary.    You may take own melatonin after arrival to sleep center. Do not drive or operate machinery after intake of melatonin.     Please make every effort you can to sleep on your back with one pillow behind your head.    Please also call your insurance company next week to see if your sleep study is approved and find out your co-pay.

## 2023-10-04 ENCOUNTER — ONCOLOGY VISIT (OUTPATIENT)
Dept: ONCOLOGY | Facility: CLINIC | Age: 73
End: 2023-10-04
Attending: PSYCHOLOGIST
Payer: COMMERCIAL

## 2023-10-04 DIAGNOSIS — F43.23 ADJUSTMENT DISORDER WITH MIXED ANXIETY AND DEPRESSED MOOD: Primary | ICD-10-CM

## 2023-10-04 PROCEDURE — 90837 PSYTX W PT 60 MINUTES: CPT | Performed by: PSYCHOLOGIST
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
## 2023-10-04 NOTE — LETTER
10/4/2023         RE: Lizzie Viera  [address]  [place] [address]        Dear Colleague,    Thank you for referring your patient, Lizzie Viera, to the [facility]. Please see a copy of my visit note below.    See confidential note      Again, thank you for allowing me to participate in the care of your patient.        Sincerely,        Mario Long LP

## 2023-10-05 NOTE — PROGRESS NOTES
Assessment/Plan:      Diagnoses and all orders for this visit:    Thoracic spinal stenosis    Rib pain on left side  -     XR Ribs & Chest Lt 3v; Future    Closed wedge compression fracture of T7 vertebra with routine healing, subsequent encounter    Myofascial pain         Assessment: Pleasant 71 year old female with a history of hyperlipidemia, anxiety, depression, irritable bowel, neuropathy, osteoporosis and multiple myeloma with a T7 fracture and lesion  resulting in spinal stenosis:     1.  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  No signs of thoracic myelopathy neurologically stable.    Symptoms are stable and paresthesias may be slightly improving with physical therapy.  Overall doing well with gabapentin 600 mg 3 times daily.  Still has weakness in the right greater than left lower extremities but appears relatively stable.  Significant increased pain over the last week after coughing and now some mild improvement.  She has pain over the left ribs and mid thoracic spine.  No new compression fractures on plain films.     2.  Chronic lumbar spine pain intermittently.    Symptoms are stable with physical therapy.  Only some mild degenerative changes on MRI.     3.  Bilateral foot paresthesias consistent with neuropathy.  Stable with PT.      4.  She has cervical spine and upper thoracic spine myofascial pain.    Discussion:    1. *We discussed the diagnosis and treatment options.  She has had some improvement over the past few days with physical therapy but still having left rib pain and thoracic spine pain.  Plain films are negative for new fracture.  We discussed options of medication changes monitoring symptoms or further imaging.    2.  I recommend a left rib plain film to evaluate for multiple myeloma lesion in the rib or rib fracture.    3.  Continue with activity modifications and physical  Likely ETOH related, history of alcoholism  Component of fatty liver certainly possible  Stable with no decompensation  Follow-up with PCP and hepatologist as outpatient   therapy.    4.  Can consider increasing gabapentin to 900 mg 3 times daily but she would like to keep her dose at 600 mg 3 times daily for now.    5.  Follow-up as needed if symptoms continue to improve we will contact her through [employer] with imaging when available.      It was our pleasure caring for your patient today, if there any questions or concerns please do not hesitate to contact us.         Subjective:   Patient ID: Lizzie Viera is a 71 year old female.    History of Present Illness: Patient presents for follow-up of thoracic spine pain.  She has thoracic spine and left rib pain left shoulder pain.  This started last week.  She had some pain around the scapular area and was driving and felt some pain along the left pectoral muscle aspirin was not any help.  And laying in bed later she coughed and felt a snap in the thoracic spine began having severe pain in the thoracic spine left shoulder underneath the axilla along the ribs on the left.  About ribs 7 based on where she points.  She began having numbness and tingling in both legs and feet and any touching her shoulder back because significant pain better with rest.  Pain is an 8/10 at worst 3/10 today and 3/10 at best.  Has improved somewhat over the past week.  She did go to physical therapy which did seem to help decrease the pain with some massage therapy as well/manual therapy.  She does take Dilaudid occasionally and medical THC.  She is some numbness and tingling down the back of the left leg to the knee as well.  Has had plain films since her last visit.      Imaging: Plain films thoracic spine imaging report personally reviewed.  This shows osteopenia with superior endplate compression deformity T8 vertebral body unchanged.  Thoracic kyphosis of 48 degrees.  Superior endplate compression deformity of T11 unchanged and T1 is obscured by overlapping tissues difficult to assess.    Personally reviewed MRI images of the thoracic spine From  "March as well for medical decision-making purposes.  Decrease in previous noted marrow signal abnormality T6-7.  New T1 hypointense enhancing lesion.  Subtle enhancement T2 and T3 vertebral bodies likely artifact.  Moderate foraminal stenosis T6-7 T7-8.    Review of Systems: Pertinent positives: Numbness tingling weakness left leg bottom of both feet.  She has headaches pain worse at night difficulty swallowing.  Denies bowel or bladder incontinence falls, fevers or weight loss.            Past Medical History:   Diagnosis Date     Cervical dysplasia      Chronic RUQ pain      Depressive disorder      Multiple myeloma (H)      Osteoporosis        The following portions of the patient's history were reviewed and updated as appropriate: allergies, current medications, past family history, past medical history, past social history, past surgical history and problem list.           Objective:   Physical Exam:    /65 (BP Location: Right arm, Patient Position: Sitting, Cuff Size: Adult Regular)   Pulse 63   Ht 5' 1\" (1.549 m)   Wt 130 lb 6.4 oz (59.1 kg)   BMI 24.64 kg/m    Body mass index is 24.64 kg/m .      General: Alert and oriented with normal affect. Attention, knowledge, memory, and language are intact. No acute distress.   Eyes: Sclerae are clear.  Respirations: Unlabored. CV: No lower extremity edema.  Skin: No rashes seen.    Gait:  Nonantalgic  Tenderness over the T6-8 spinous process and parascapular tissues more on the left with tenderness along the left ribs 7 and 6 laterally.  Sensation is intact to light touch throughout the upper and lower extremities.  Reflexes are 2+ and symmetric in the biceps triceps and brachioradialis with negative Hoffmans. 2+ patellar and 0 Achilles      Manual muscle testing reveals:  Right /Left out of 5     5/5 hip flexors  5/5 knee flexors  5/5 knee extensors  5/5 ankle plantar flexors  5/5 ankle dorsiflexors  4 / 4 EHL    "
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
## 2023-10-18 NOTE — LETTER
10/18/2023         RE: Lizzie Viera  [address]  [place] [address]        Dear Colleague,    Thank you for referring your patient, Lizzie Viera, to the [facility]. Please see a copy of my visit note below.    See confidential note      Again, thank you for allowing me to participate in the care of your patient.        Sincerely,        Mario Long LP

## 2023-10-24 NOTE — TELEPHONE ENCOUNTER
Oral Chemotherapy Monitoring Program    Medication: Revlimid  Rx:  10 mg PO daily on days 1 - 28 of 28 day cycle #28    Auth #: [id number]  Risk Category: Adult female NOT of reproductive capacity    Routine survey questions reviewed.    Thank you,    Sara Martinez  Oncology/Transplant Float [email or web address]  Phone:[phone]  Fax:[phone]

## 2023-11-01 NOTE — LETTER
11/1/2023         RE: Lizzie Viera  [address]  [place] [address]        Dear Colleague,    Thank you for referring your patient, Lizzie Viera, to the [facility]. Please see a copy of my visit note below.    See confidential note      Again, thank you for allowing me to participate in the care of your patient.        Sincerely,        Mario Long LP
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
## 2023-11-08 NOTE — LETTER
"    11/8/2023         RE: Lizzie Viera  [address]  [place] [address]        Dear Colleague,    Thank you for referring your patient, Lizzie Viera, to the [facility] [place]. Please see a copy of my visit note below.    Oncology Rooming Note    November 8, 2023 2:41 PM   Lizzie Viera is a 73 year old female who presents for:    Chief Complaint   Patient presents with     Oncology Clinic Visit     2 month follow up with labs related to Multiple myeloma, remission status unspecified     Initial Vitals: /61 (BP Location: Right arm, Patient Position: Sitting, Cuff Size: Adult Regular)   Pulse 63   Temp 97.9  F (36.6  C) (Tympanic)   Resp 18   Ht 1.549 m (5' 1\")   Wt 58.7 kg (129 lb 4.8 oz)   LMP  (LMP Unknown)   SpO2 97%   BMI 24.43 kg/m   Estimated body mass index is 24.43 kg/m  as calculated from the following:    Height as of this encounter: 1.549 m (5' 1\").    Weight as of this encounter: 58.7 kg (129 lb 4.8 oz). Body surface area is 1.59 meters squared.  Moderate Pain (5) Comment: Data Unavailable   No LMP recorded (lmp unknown). Patient is postmenopausal.  Allergies reviewed: Yes  Medications reviewed: Yes    Medications: Medication refills not needed today.  Pharmacy name entered into [employer] +: [id number] [address]    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  A very pleasant 73 year old woman with IgG kappa multiple myeloma with hyperdiploid cytogenetics.  However clinically was behaving somewhat more aggressively.  Started on VRD treatment.  Responded quite well. After 17 cycles the treatment was switched to Revlimid 10 mg daily in September 2022.  Valeria has been tolerating it quite well.  The lab work-up in the end of November 2022 showed maintenance of response.  The labs since February 2023 have been pretty stable.  Kappa lambda light chains have been normal for most part.  In September 2023 kappa light chains were slightly above normal but is lambda light chains were also above their previous numbers.  The ratio really did not change a whole lot.  Immunoglobulins number were stable.  Albumin continues to be normal.  2.  Had significant bone disease with osteoporosis and compression fractures.  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  Improved significantly on the CT scan in March 2022.  MRI also shows no new lesions.  Most recent bone density still shows osteoporosis.  Has not been able to restart Zometa due to dental implant needs.  The dental implants have now been done.  3.  Normal hemoglobin, calcium, kidney function along with beta-2 microglobulin and albumin at the time of diagnosis.    4.  Positive Covid antibodies from infection. Unvaccinated. Declined Evusheld.  5.  Pain from compression fracture status post radiation therapy.  Significantly improved.  6.  Some dental issues on left upper molars which were cracked and has been extracted.  She also has a tooth on the right lower jaw which the dentists performed extraction.  Dental implant done on August 16th. She has been told it takes 6 months to heal.  7.  Clinical suspicion of sleep apnea.  Has been seen by sleep medicine people.  Scheduled to get a sleep study done in February 2024.    Plan    1.  Revlimid 10 mg p.o.  daily.  2.  We will follow-up on the labs from today.  3.  Continue to take diet rich in calcium and vitamin D.  4.  We will start bisphosphonate once the dental implants are done in February 2024.  5.  Follow-up in 2-3 months timeframe.  6.  Follow-up on sleep study.     Cancer Staging   No matching staging information was found for the patient.      ECOG Performance    2 - Ambulatory and independent in all ADLs; cannot work; up > 50% of the time      History of Present Illness    Ms. Lizzie Viera is a very pleasant 73 year old woman who has been diagnosed with multiple myeloma IgG kappa type in May 2021.  She had initially presented with compression fracture of T7 vertebral body in September 2020.  She had a back pain which led to that evaluation.  Bone density confirmed that she had osteoporosis.  MRI showed diffuse marrow edema in October 2020.  In April 2021 the MRI of the thoracic spine showed worsening T7 vertebral body height loss because of likely pathological compression fracture with extraosseous extension.  She then had IR guided bone biopsy on 7 May 2021 and pathology confirmed the presence of kappa light chain restricted plasma cells.  Her cytogenetics confirmed the presence of hyperdiploid E with gains of chromosome 5, 9 and 15.    She was then seen by Dr. Baig and had a bone marrow biopsy which also confirmed 60% involvement of the marrow with plasma cells.  The bone marrow was done on 3 Jocelin 2021.  The bone marrow also confirmed the presence of hyperdiploidy.  She had a gain of chromosome 3, 5, 7, 9, 11, 15 as well as 19.  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  Her beta-2 microglobulin was  normal at the time of her diagnosis as was her albumin at 4.0.  Monoclonal protein 3.1 g/dL.  Kappa free light chain levels of 13 mg/dL IgG level of 4280 with depressed levels of IgM and IgA.  Urine was also positive for kappa light chains as well as immunofixation of the urine was positive for IgG kappa.  Normal  kidney function.  Normal hemoglobin.    As mentioned above she had bone lesions in the T7 vertebral body, T1, skull area.  Her main pain is coming from her T7 vertebral body compression fracture.    Due to the pain she has been seen by radiation oncology and has received radiation therapy.  She also got a dose of steroids for pain control.    She was initially thinking of doing some natural therapies but once her symptoms got worse, she came back in was counseled about treatment.      She started on Velcade Revlimid and dexamethasone in September 2021.  Responded nicely.  Immunoglobulins  normalized completely. Her immunoglobin levels have almost normalized in fact the IgG levels have gone below normal.  Immunofixation still shows a very faint kappa monoclonal gammopathy.    She was  hospitalized in April 2022 with COPD exacerbation/pneumonia.  Was given some steroids and antibiotic.  She was slightly hypoxic initially but then got better after that.    She was referred to [facility] for transplant evaluation.  She was somewhat reluctant to go forward with that.  Otherwise she seems to be doing quite well.  Her immunoglobulin levels have normalized or actually are below normal.  Immunofixation faintly positive.    She has cracked a molar on the upper left jaw sometime in July 2022.  There was some tooth decay.  She had them extracted.    In September 2022 we discontinued the VRD treatment and now Valeria is on Revlimid maintenance 10 mg p.o. daily.  She seems to be tolerating it well.  She is physically more active so she is noticing some soreness in her back.    Had dental extraction done in October 2022 from the right lower jaw.  That seems to be healing well.  Underwent bone densitometry recently.  That confirms presence of osteoporosis which is not a new finding for her.    In February 2023 her labs were pretty stable.  She went to [place] so we gave her 2 to 3 weeks of break from Revlimid.  She restarted  "the treatment.    Comes in today for scheduled follow-up.  Overall seems to be doing okay.  Had dental implant on August 16, 2023. Did well.     She has been snoring more, so sought opinion from her PMD. Has been referred to sleep study.  He has been seen by sleep study medicine.  Scheduled for sleep study in February 2024.  Otherwise doing well.  No new medical issues.  Due to the fall she is having some upper respite tract allergy issues.      Review of system      Details noted in the history of present illness.  A detailed review of systems is otherwise negative.      Physical exam        /61 (BP Location: Right arm, Patient Position: Sitting, Cuff Size: Adult Regular)   Pulse 63   Temp 97.9  F (36.6  C) (Tympanic)   Resp 18   Ht 1.549 m (5' 1\")   Wt 58.7 kg (129 lb 4.8 oz)   LMP  (LMP Unknown)   SpO2 97%   BMI 24.43 kg/m      GENERAL: no acute distress. Cooperative in conversation.   HEENT: pupils are equal, round and reactive. Oral mucosa is moist and intact.  RESP:Chest symmetric. Regular respiratory rate. No stridor.  ABD: Nondistended, soft.  EXTREMITIES: No lower extremity edema.   NEURO: non focal. Alert and oriented x3.   PSYCH: within normal limits. No depression or anxiety.  SKIN: warm dry intact       Lab results Reviewed      Recent Results (from the past 168 hour(s))   Comprehensive metabolic panel (BMP + Alb, Alk Phos, ALT, AST, Total. Bili, TP)   Result Value Ref Range    Sodium 140 135 - 145 mmol/L    Potassium 3.7 3.4 - 5.3 mmol/L    Carbon Dioxide (CO2) 31 (H) 22 - 29 mmol/L    Anion Gap 7 7 - 15 mmol/L    Urea Nitrogen 25.6 (H) 8.0 - 23.0 mg/dL    Creatinine 0.86 0.51 - 0.95 mg/dL    GFR Estimate 72 >60 mL/min/1.73m2    Calcium 9.4 8.8 - 10.2 mg/dL    Chloride 102 98 - 107 mmol/L    Glucose 88 70 - 99 mg/dL    Alkaline Phosphatase 45 35 - 104 U/L    AST 18 0 - 45 U/L    ALT 28 0 - 50 U/L    Protein Total 6.1 (L) 6.4 - 8.3 g/dL    Albumin 3.9 3.5 - 5.2 g/dL    Bilirubin Total 0.3 " <=1.2 mg/dL   CBC with platelets and differential   Result Value Ref Range    WBC Count 5.4 4.0 - 11.0 10e3/uL    RBC Count 4.16 3.80 - 5.20 10e6/uL    Hemoglobin 14.4 11.7 - 15.7 g/dL    Hematocrit 43.1 35.0 - 47.0 %     (H) 78 - 100 fL    MCH 34.6 (H) 26.5 - 33.0 pg    MCHC 33.4 31.5 - 36.5 g/dL    RDW 13.7 10.0 - 15.0 %    Platelet Count 195 150 - 450 10e3/uL    % Neutrophils 41 %    % Lymphocytes 42 %    % Monocytes 9 %    % Eosinophils 7 %    % Basophils 1 %    % Immature Granulocytes 0 %    NRBCs per 100 WBC 0 <1 /100    Absolute Neutrophils 2.2 1.6 - 8.3 10e3/uL    Absolute Lymphocytes 2.2 0.8 - 5.3 10e3/uL    Absolute Monocytes 0.5 0.0 - 1.3 10e3/uL    Absolute Eosinophils 0.4 0.0 - 0.7 10e3/uL    Absolute Basophils 0.1 0.0 - 0.2 10e3/uL    Absolute Immature Granulocytes 0.0 <=0.4 10e3/uL    Absolute NRBCs 0.0 10e3/uL       Imaging results Reviewed        No results found.    Total time spent was over 32 minutes.  This includes chart prep time, review of clinical data including notes from outside physicians, labs, review of medical imaging, discussion about  plan of care, documentation and .    Signed by: Loco Blanco MD      This note has been dictated using voice recognition software. Any grammatical or context distortions are unintentional and inherent to the software       Again, thank you for allowing me to participate in the care of your patient.        Sincerely,        Loco Blanco MD

## 2023-11-08 NOTE — PROGRESS NOTES
"Oncology Rooming Note    November 8, 2023 2:41 PM   Lizzie Viera is a 73 year old female who presents for:    Chief Complaint   Patient presents with    Oncology Clinic Visit     2 month follow up with labs related to Multiple myeloma, remission status unspecified     Initial Vitals: /61 (BP Location: Right arm, Patient Position: Sitting, Cuff Size: Adult Regular)   Pulse 63   Temp 97.9  F (36.6  C) (Tympanic)   Resp 18   Ht 1.549 m (5' 1\")   Wt 58.7 kg (129 lb 4.8 oz)   LMP  (LMP Unknown)   SpO2 97%   BMI 24.43 kg/m   Estimated body mass index is 24.43 kg/m  as calculated from the following:    Height as of this encounter: 1.549 m (5' 1\").    Weight as of this encounter: 58.7 kg (129 lb 4.8 oz). Body surface area is 1.59 meters squared.  Moderate Pain (5) Comment: Data Unavailable   No LMP recorded (lmp unknown). Patient is postmenopausal.  Allergies reviewed: Yes  Medications reviewed: Yes    Medications: Medication refills not needed today.  Pharmacy name entered into [employer]: [facility] #[id number] - [facility] [address]    Clinical concerns: 2 month follow up with labs related to Multiple myeloma, remission status unspecified      KWADWO MELTON CMA            "

## 2023-11-08 NOTE — PROGRESS NOTES
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  A very pleasant 73 year old woman with IgG kappa multiple myeloma with hyperdiploid cytogenetics.  However clinically was behaving somewhat more aggressively.  Started on VRD treatment.  Responded quite well. After 17 cycles the treatment was switched to Revlimid 10 mg daily in September 2022.  Valeria has been tolerating it quite well.  The lab work-up in the end of November 2022 showed maintenance of response.  The labs since February 2023 have been pretty stable.  Kappa lambda light chains have been normal for most part.  In September 2023 kappa light chains were slightly above normal but is lambda light chains were also above their previous numbers.  The ratio really did not change a whole lot.  Immunoglobulins number were stable.  Albumin continues to be normal.  2.  Had significant bone disease with osteoporosis and compression fractures.  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  Improved significantly on the CT scan in March 2022.  MRI also shows no new lesions.  Most recent bone density still shows osteoporosis.  Has not been able to restart Zometa due to dental implant needs.  The dental implants have now been done.  3.  Normal hemoglobin, calcium, kidney function along with beta-2 microglobulin and albumin at the time of diagnosis.    4.  Positive Covid antibodies from infection. Unvaccinated. Declined Evusheld.  5.  Pain from compression fracture status post radiation therapy.   Significantly improved.  6.  Some dental issues on left upper molars which were cracked and has been extracted.  She also has a tooth on the right lower jaw which the dentists performed extraction.  Dental implant done on August 16th. She has been told it takes 6 months to heal.  7.  Clinical suspicion of sleep apnea.  Has been seen by sleep medicine people.  Scheduled to get a sleep study done in February 2024.    Plan    1.  Revlimid 10 mg p.o. daily.  2.  We will follow-up on the labs from today.  3.  Continue to take diet rich in calcium and vitamin D.  4.  We will start bisphosphonate once the dental implants are done in February 2024.  5.  Follow-up in 2-3 months timeframe.  6.  Follow-up on sleep study.     Cancer Staging   No matching staging information was found for the patient.      ECOG Performance    2 - Ambulatory and independent in all ADLs; cannot work; up > 50% of the time      History of Present Illness    Ms. Lizzie Viera is a very pleasant 73 year old woman who has been diagnosed with multiple myeloma IgG kappa type in May 2021.  She had initially presented with compression fracture of T7 vertebral body in September 2020.  She had a back pain which led to that evaluation.  Bone density confirmed that she had osteoporosis.  MRI showed diffuse marrow edema in October 2020.  In April 2021 the MRI of the thoracic spine showed worsening T7 vertebral body height loss because of likely pathological compression fracture with extraosseous extension.  She then had IR guided bone biopsy on 7 May 2021 and pathology confirmed the presence of kappa light chain restricted plasma cells.  Her cytogenetics confirmed the presence of hyperdiploid E with gains of chromosome 5, 9 and 15.    She was then seen by Dr. Baig and had a bone marrow biopsy which also confirmed 60% involvement of the marrow with plasma cells.  The bone marrow was done on 3 Jocelin 2021.  The bone marrow also confirmed the presence of  hyperdiploidy.  She had a gain of chromosome 3, 5, 7, 9, 11, 15 as well as 19.  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  Her beta-2 microglobulin was  normal at the time of her diagnosis as was her albumin at 4.0.  Monoclonal protein 3.1 g/dL.  Kappa free light chain levels of 13 mg/dL IgG level of 4280 with depressed levels of IgM and IgA.  Urine was also positive for kappa light chains as well as immunofixation of the urine was positive for IgG kappa.  Normal kidney function.  Normal hemoglobin.    As mentioned above she had bone lesions in the T7 vertebral body, T1, skull area.  Her main pain is coming from her T7 vertebral body compression fracture.    Due to the pain she has been seen by radiation oncology and has received radiation therapy.  She also got a dose of steroids for pain control.    She was initially thinking of doing some natural therapies but once her symptoms got worse, she came back in was counseled about treatment.      She started on Velcade Revlimid and dexamethasone in September 2021.  Responded nicely.  Immunoglobulins  normalized completely. Her immunoglobin levels have almost normalized in fact the IgG levels have gone below normal.  Immunofixation still shows a very faint kappa monoclonal gammopathy.    She was  hospitalized in April 2022 with COPD exacerbation/pneumonia.  Was given some steroids and antibiotic.  She was slightly hypoxic initially but then got better after that.    She was referred to [facility] for transplant evaluation.  She was somewhat reluctant to go forward with that.  Otherwise she seems to be doing quite well.  Her immunoglobulin levels have normalized or actually are below normal.  Immunofixation faintly positive.    She has cracked a molar on the upper left jaw sometime in July 2022.  There was some tooth decay.  She had them extracted.    In September 2022 we discontinued the VRD treatment and now Valeria is on Revlimid maintenance 10 mg p.o. daily.  She seems to be  "tolerating it well.  She is physically more active so she is noticing some soreness in her back.    Had dental extraction done in October 2022 from the right lower jaw.  That seems to be healing well.  Underwent bone densitometry recently.  That confirms presence of osteoporosis which is not a new finding for her.    In February 2023 her labs were pretty stable.  She went to [place] so we gave her 2 to 3 weeks of break from Revlimid.  She restarted the treatment.    Comes in today for scheduled follow-up.  Overall seems to be doing okay.  Had dental implant on August 16, 2023. Did well.     She has been snoring more, so sought opinion from her PMD. Has been referred to sleep study.  He has been seen by sleep study medicine.  Scheduled for sleep study in February 2024.  Otherwise doing well.  No new medical issues.  Due to the fall she is having some upper respite tract allergy issues.      Review of system      Details noted in the history of present illness.  A detailed review of systems is otherwise negative.      Physical exam        /61 (BP Location: Right arm, Patient Position: Sitting, Cuff Size: Adult Regular)   Pulse 63   Temp 97.9  F (36.6  C) (Tympanic)   Resp 18   Ht 1.549 m (5' 1\")   Wt 58.7 kg (129 lb 4.8 oz)   LMP  (LMP Unknown)   SpO2 97%   BMI 24.43 kg/m      GENERAL: no acute distress. Cooperative in conversation.   HEENT: pupils are equal, round and reactive. Oral mucosa is moist and intact.  RESP:Chest symmetric. Regular respiratory rate. No stridor.  ABD: Nondistended, soft.  EXTREMITIES: No lower extremity edema.   NEURO: non focal. Alert and oriented x3.   PSYCH: within normal limits. No depression or anxiety.  SKIN: warm dry intact       Lab results Reviewed      Recent Results (from the past 168 hour(s))   Comprehensive metabolic panel (BMP + Alb, Alk Phos, ALT, AST, Total. Bili, TP)   Result Value Ref Range    Sodium 140 135 - 145 mmol/L    Potassium 3.7 3.4 - 5.3 mmol/L    " Carbon Dioxide (CO2) 31 (H) 22 - 29 mmol/L    Anion Gap 7 7 - 15 mmol/L    Urea Nitrogen 25.6 (H) 8.0 - 23.0 mg/dL    Creatinine 0.86 0.51 - 0.95 mg/dL    GFR Estimate 72 >60 mL/min/1.73m2    Calcium 9.4 8.8 - 10.2 mg/dL    Chloride 102 98 - 107 mmol/L    Glucose 88 70 - 99 mg/dL    Alkaline Phosphatase 45 35 - 104 U/L    AST 18 0 - 45 U/L    ALT 28 0 - 50 U/L    Protein Total 6.1 (L) 6.4 - 8.3 g/dL    Albumin 3.9 3.5 - 5.2 g/dL    Bilirubin Total 0.3 <=1.2 mg/dL   CBC with platelets and differential   Result Value Ref Range    WBC Count 5.4 4.0 - 11.0 10e3/uL    RBC Count 4.16 3.80 - 5.20 10e6/uL    Hemoglobin 14.4 11.7 - 15.7 g/dL    Hematocrit 43.1 35.0 - 47.0 %     (H) 78 - 100 fL    MCH 34.6 (H) 26.5 - 33.0 pg    MCHC 33.4 31.5 - 36.5 g/dL    RDW 13.7 10.0 - 15.0 %    Platelet Count 195 150 - 450 10e3/uL    % Neutrophils 41 %    % Lymphocytes 42 %    % Monocytes 9 %    % Eosinophils 7 %    % Basophils 1 %    % Immature Granulocytes 0 %    NRBCs per 100 WBC 0 <1 /100    Absolute Neutrophils 2.2 1.6 - 8.3 10e3/uL    Absolute Lymphocytes 2.2 0.8 - 5.3 10e3/uL    Absolute Monocytes 0.5 0.0 - 1.3 10e3/uL    Absolute Eosinophils 0.4 0.0 - 0.7 10e3/uL    Absolute Basophils 0.1 0.0 - 0.2 10e3/uL    Absolute Immature Granulocytes 0.0 <=0.4 10e3/uL    Absolute NRBCs 0.0 10e3/uL       Imaging results Reviewed        No results found.    Total time spent was over 32 minutes.  This includes chart prep time, review of clinical data including notes from outside physicians, labs, review of medical imaging, discussion about  plan of care, documentation and .    Signed by: Loco Blanco MD      This note has been dictated using voice recognition software. Any grammatical or context distortions are unintentional and inherent to the software

## 2023-11-09 NOTE — PROGRESS NOTES
Virtual Visit Details    Type of service:  Video Visit   Video Start Time: 11:39 AM  Video End Time: 1205    Originating Location (pt. Location): Home    Distant Location (provider location):  On-site  Platform used for Video Visit: [facility]    Palliative Care Outpatient Clinic Progress Note    Patient Name: Lizzie Viera  Primary Provider: Sona Sandoval    Impression & Recommendations & Counseling:  Multiple myeloma  Compression Fracture mid-T spine worsening spasms and tension headaches that start in right shoulder into her neck and head and no specific triggers  Cancer associated pain--well controlled  Insomnia well controlled     ECO  Decisional Capacity: very present     SYMPTOM ASSESSMENT:  1.  Cancer related pain thoracic spine pain that radiates to bilateral lower extremities  Comment: Controlled.  Currently rates pain as low on days when doesn't overdo it (3-4) but someday's at the end of the day if she's been on her feet a lot and done a lot of housework or gardening and it is 7-8/10.  Resting and going to bed helps after awhile.  She uses the medical marijuana asneeded at night and it is pretty effective   She uses her dilaudid very infrequently--mainly when she goes to [place] to see her [facility] [employer] where she can't legally use her medical cannabis.     - Continue Gabapentin 600 mg by mouth 3 times daily.  - S/P Single fraction radiation to T6 through T .  - S/P T1 radiation 10/6/2021-10/12/2021.    - Continue resumed PT for back and for left knee  - Dilaudid to 2-4 mg by mouth every 4 hours as needed.  Takes infrequently.  - Continue medical cannabis per department of Health - pills and oil. Feels she is having good relief with this.  Does not take cannabis when knows she has to drive the next day.  - Continue Robaxin 500 mg po q6h prn spasms - taking 3 times a day.  - Tried baclofen at bedtime without benefit. It did not assist with sleep or relief of spasms.  -Discussed possibility of  "initiation of Cymbalta as this could help with neuropathic component to pain as well as assist with depression.  Patient would like to hold off on this at this time.  She feels the gabapentin is 'doing the job' for her.  Does not want to feel any masking of highs or lows per her report.     - Patient is taking naltrexone 3 mg by mouth at hs.  Had discussion with patient that this could reverse/minimize effect of opiate and she may feel more pain.  Taking this as a \"anti-inflammatory\" component of her natural/complementary treatments and doesn't feel it interferes when she uses her infrequent dilaudid. Her last hsCRP was elevated.  She is also using Tumeric to help reduce inflammation.  She has naturopath who she visits in [place].     2. Cancer related fatigue - fluctuates.  Feels fatigued at days end occasionally but she is now exercising on her treadmill (she had been doing a lot of walking outdoors and moved in due to colder weather)    - Activity as tolerated.  - no recent falls and currently not using a cane     3. Anxiety and depression related to health/diagnosis and current social stressors and stresses around paying for her medications.  - Patient is on list to see Kacy Edouard  for coping, support and processing diagnosis and I also requested help with paying for revlimid; She does see Jack in health psychology  - Patient sees an energy healer.     ADVANCED CARE PLANNING/GOALS OF CARE DISCUSSION:  - Code status: DNR/DNI  - Honoring Choices and POLST in chart.  - Follow-up in palliative care clinic in 3 months for ongoing pain management and decisional support.   -Call [phone] with questions or refill needs.     Counseling: All of the above was explained to the patient in lay language. The patient has verbalized a clear understanding of the discussion, asked appropriate questions, which have been answered to patient's apparent satisfaction. The patient is in agreement with the above plan.      "   Chief Complaint/Patient ID: Lizzie Viera 72 year old female with PMHx of multiple myeloma    Last Palliative care appointment: 2023 with me     Reviewed: yes, no concerns    Interim History:  Lizzie Viera is a 73 year old female who is seen today for follow up with Palliative Care via billable video visit. She reports her MM is stable on the Revlomid     Pain:  PT is very helpful; she finds colder weather exacerbates spasms; she used medical cannabis at night to help with sleep; rarely uses dilaudid unless she has traveled out of state    Appetite/Nausea: good appetite     Bowels: some constipation when she needs to use dilaudid that responds to increased fiber in diet     Sleep: overall good     Mood: overall good; some tough days about things going around the house.--she had unexpected bills for a leaky faucet and her cars;      Coping:  overall good     Family History- Reviewed in Epic.    Allergies   Allergen Reactions    Cefdinir Shortness Of Breath    Latex Shortness Of Breath    Atorvastatin Muscle Pain (Myalgia)    Short Ragweed Pollen Ext Cough    Adhesive Tape Rash       Social History:  Pertinent changes to social history/social situation since last visit: no changes  Key support resources: family and her health care team  Advance Directive Status:  documents present in [facility].    Social History     Tobacco Use    Smoking status: Former     Packs/day: 0.50     Years: 45.00     Additional pack years: 0.00     Total pack years: 22.50     Types: Cigarettes     Quit date: 2023     Years since quittin.7    Smokeless tobacco: Never   Vaping Use    Vaping Use: Every day   Substance Use Topics    Alcohol use: Yes     Comment: Alcoholic Drinks/day: 0-1 drinks per week    Drug use: Not Currently         Allergies   Allergen Reactions    Cefdinir Shortness Of Breath    Latex Shortness Of Breath    Atorvastatin Muscle Pain (Myalgia)    Short Ragweed Pollen Ext Cough    Adhesive Tape  Rash     Current Outpatient Medications   Medication Sig Dispense Refill    acyclovir (ZOVIRAX) 400 MG tablet Take 1 tablet (400 mg) by mouth 2 times daily Viral Prophylaxis. 180 tablet 1    albuterol (PROAIR HFA/PROVENTIL HFA/VENTOLIN HFA) 108 (90 Base) MCG/ACT inhaler Inhale 2 puffs into the lungs every 6 hours as needed for wheezing or shortness of breath / dyspnea 18 g 1    albuterol (PROVENTIL) (2.5 MG/3ML) 0.083% neb solution Take 1 vial (2.5 mg) by nebulization every 4 hours as needed for wheezing or shortness of breath / dyspnea 90 mL 0    alpha-lipoic acid 100 MG capsule Take 300 mg by mouth daily      Ascorbic Acid (VITAMIN C) 100 MG CHEW Take 1 tablet by mouth daily      aspirin (ASA) 81 MG chewable tablet Take 81 mg by mouth daily      baclofen (LIORESAL) 10 MG tablet TAKE 1 TABLET BY MOUTH 3 TIMES A DAY AS NEEDED FOR MUSCLE SPASMS 60 tablet 0    Coenzyme Q10 300 MG CAPS Take 300 mg by mouth daily      diclofenac (VOLTAREN) 1 % topical gel Apply 2 g topically 4 times daily 50 g 1    fish oil-omega-3 fatty acids 1000 MG capsule Take 2 g by mouth daily      gabapentin (NEURONTIN) 300 MG capsule TAKE TWO CAPSULES BY MOUTH THREE TIMES DAILY 180 capsule 1    hydrochlorothiazide (HYDRODIURIL) 12.5 MG tablet Take 1 tablet (12.5 mg) by mouth daily 90 tablet 0    HYDROmorphone (DILAUDID) 4 MG tablet Take 0.5-1 tablets (2-4 mg) by mouth every 4 hours as needed for moderate to severe pain 60 tablet 0    [START ON 1/17/2024] LENalidomide (REVLIMID) 10 MG CAPS capsule Take 1 capsule (10 mg) by mouth daily for 28 days Take at the same time each day. Do not open, break or chew the capsule. Swallow whole, with water. 28 capsule 0    medical cannabis (Patient's own supply) See Admin Instructions (The purpose of this order is to document that the patient reports taking medical cannabis.  This is not a prescription, and is not used to certify that the patient has a qualifying medical condition.)     Oil formulation       Melatonin 10 MG TABS tablet Take 20 mg by mouth At Bedtime      methocarbamol (ROBAXIN) 500 MG tablet Take 1 tablet (500 mg) by mouth 4 times daily as needed for muscle spasms 120 tablet 4    Milk Thistle-Dand-Fennel-Licor (MILK THISTLE XTRA) CAPS capsule Take 1 capsule by mouth daily      NALTREXONE HCL PO Take 3 mg by mouth daily      nicotine (NICOTROL) 10 MG inhaler Use 1 cartridge as needed for urge to smoke by puffing over course of 20min.  Use 6-16 cart/day; reduce number of cart/day over 6-12 weeks. 300 each 4    phenazopyridine (AZO URINARY PAIN RELIEF) 95 MG tablet Take 190 mg by mouth 3 times daily      STATIN NOT PRESCRIBED (INTENTIONAL) Please choose reason not prescribed from choices below.      TURMERIC PO Take 1 capsule by mouth daily      UNABLE TO FIND 1 capsule daily MEDICATION NAME: Serrapeptase      UNABLE TO FIND 1 capsule daily MEDICATION NAME: Edita Mariano Mushroom      UNABLE TO FIND 1 capsule daily MEDICATION NAME: DIM (diinolylmethane)      UNABLE TO FIND MEDICATION NAME: Panacur C - 1 capsule daily      Vitamin A 3 MG (55655 UT) TABS Take 1 tablet by mouth daily      Vitamin D, Cholecalciferol, 25 MCG (1000 UT) CAPS Take 1,000 Units by mouth daily Liquid, not capsule       Past Medical History:   Diagnosis Date    Cervical dysplasia     Chronic RUQ pain     Depressive disorder     Multiple myeloma (H)     Osteoporosis      Past Surgical History:   Procedure Laterality Date    CONIZATION CERVIX,KNIFE/LASER      Description: Cervical Conization By Laser;  Recorded: 09/20/2007;    HC DILATION/CURETTAGE DIAG/THER NON OB      Description: Dilation And Curettage;  Recorded: 09/20/2007;     REMOVE TONSILS/ADENOIDS,<13 Y/O      Description: Tonsillectomy With Adenoidectomy;  Recorded: 09/20/2007;    [facility] LAP,CHOLECYSTECTOMY/EXPLORE  12/27/2004    [facility] LIGATE FALLOPIAN TUBE      Description: Tubal Ligation;  Recorded: 09/20/2007;       Physical Exam:   GENERAL APPEARANCE: healthy appearing, alert  and no distress; neatly groomed  EYES: Eyes grossly normal to inspection, PERRLA, conjunctivae and sclerae without injection or discharge, EOM intact   RESP:  no increased work of breathing; speaks in complete sentences;   MS: No musculoskeletal defects are noted  SKIN: No suspicious lesions or rashes, hydration status appears adequate with normal skin turgor   PSYCH: Alert and oriented x3; speech- coherent , normal rate and volume; able to articulate logical thoughts, able to abstract reason, no tangential thoughts, no hallucinations or delusions, mentation appears normal, Mood is euthymic. Affect is appropriate for this mood state and bright. Thought content is free of suicidal ideation, hallucinations, and delusions.  Eye contact is good during conversation.       Key Data Reviewed:  LABS: 11/8/2023- Cr 0.86, Albumin 3.9,  Hgb 14.4,      IMAGING: no recent imaging for review    35 minutes spent on the date of the encounter doing chart review, history and exam, patient education & counseling, documentation and other activities as noted above.    Yuri Salazar MD MS FAAFP [facility]  [facility]  Office [phone]  Fax [phone]

## 2023-11-09 NOTE — NURSING NOTE
Is the patient currently in the state of [place]? YES    Visit mode:VIDEO    If the visit is dropped, the patient can be reconnected by: VIDEO VISIT: Send to e-mail at: [email or web address]    Will anyone else be joining the visit? NO  (If patient encounters technical issues they should call [phone]    How would you like to obtain your AVS? MyChart    Are changes needed to the allergy or medication list? No    Reason for visit: Video Visit (Follow UP )    Sofía PEÑALOZA
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
## 2023-11-29 NOTE — LETTER
11/29/2023         RE: Lizzie Viera  [address]  [place] [address]        Dear Colleague,    Thank you for referring your patient, Lizzie Viera, to the [facility]. Please see a copy of my visit note below.    See confidential note      Again, thank you for allowing me to participate in the care of your patient.        Sincerely,        Mario Long LP

## 2023-12-04 NOTE — PATIENT INSTRUCTIONS
Continue with physical therapy and home exercises  Continue with gabapentin  Osteopathic manual medicine today.

## 2023-12-04 NOTE — LETTER
12/4/2023         RE: Lizzie Viera  [address]  [place] [address]        Dear Colleague,    Thank you for referring your patient, Lizzie Viera, to the [facility] SPINE AND NEUROSURGERY. Please see a copy of my visit note below.    Assessment/Plan:      Valeria was seen today for neck pain.    Diagnoses and all orders for this visit:    Thoracic spinal stenosis  -     gabapentin (NEURONTIN) 300 MG capsule; Take 2 capsules (600 mg) by mouth 3 times daily    Closed wedge compression fracture of T7 vertebra, sequela    Myofascial pain  -     gabapentin (NEURONTIN) 300 MG capsule; Take 2 capsules (600 mg) by mouth 3 times daily    Cervical spine pain    Leg cramps    Somatic dysfunction of head region  -     OSTEOPATHIC MANIP,5-6 BODY REGN    Somatic dysfunction of cervical region  -     OSTEOPATHIC MANIP,5-6 BODY REGN    Somatic dysfunction of rib region  -     OSTEOPATHIC MANIP,5-6 BODY REGN    Somatic dysfunction of upper extremity  -     OSTEOPATHIC MANIP,5-6 BODY REGN    Somatic dysfunction of thoracic region  -     OSTEOPATHIC MANIP,5-6 BODY REGN         Assessment: Hossein 73 year old female  with a history of hyperlipidemia, anxiety, depression, irritable bowel, neuropathy, osteoporosis and multiple myeloma with a T7 fracture and lesion  resulting in spinal stenosis:     1   worsening of chronic thoracic pain  around the mid to upper thoracic spine and does well with Osteopathic manipulative medicine and continues to do well with physical therapy and home exercises still has 8 sessions remaining.  Gabapentin is also helpful.  T7-8 where she has the pathologic T7 fracture with epidural neoplasm compressing the spinal cord.  No signs of thoracic myelopathy neurologically stable.   Symptoms are stable and paresthesias may be slightly improving with physical therapy.  Overall doing well with gabapentin 600 mg 3 times daily.  Still has weakness in the right greater than left lower  extremities but appears relatively stable.  Significant increased pain over the last week after coughing and now some mild improvement.  She has pain over the left ribs and mid thoracic spine.    Fracture is stable and new MRI at T1 and T7.  She has significant upper thoracic and cervical myofascial pain as well.    Has done quite well after Osteopathic manipulative medicine for approximately 4 to 5 days at a time.  Having some improvement with physical therapy.       2.  Improved bilateral foot paresthesias.  Left greater than right consistent with polyneuropathy.   This is mildly asymmetric.  She has  absent sural SNAPs on EMG cannot exclude peripheral polyneuropathy although this would be relatively mild.  She may have a small fiber neuropathy.  She has a history of chemotherapy.  Improved with physical therapy and gabapentin         3.   Worsening cervical spine upper thoracic pain consistent myofascial pain with physical therapy.     4.  Leg cramps at night.  Intermittent.  Improved with therapy        5.   somatic dysfunctions of the cranium, cervical spine, rib cage, upper extremities, thoracic spine that contribute to the patient's pain complaints.      Discussion:    1.  We discussed her progress.  She is doing physical therapy once a week and doing very well.  She has approximately 8 sessions remaining.  We also discussed gabapentin use and she needs a refill if appropriate.  We discussed Osteopathic manipulative medicine does help her cervical and thoracic spine pain.    2.  Recommend Osteopathic manipulative medicine today.  She did well with cervical and upper thoracic spine treatment.  She agrees to proceed.  Please see attached procedure note.    3.  Continue with physical therapy and home exercises.    4.  I will provide a refill of gabapentin.  Taking 600 mg 3 times daily. checked and she is receiving gabapentin for me only.    5.  Follow-up with me as needed    It was our pleasure caring for  your patient today, if there any questions or concerns please do not hesitate to contact us.      Subjective:   Patient ID: Lizzie Viera is a 73 year old female.    History of Present Illness: Patient presents for flare of cervical and thoracic spine pain.  Has been doing well overall.  She has cervical spine pain thoracic spine pain leg paresthesias and cramping.  She has had increased pain over the past few weeks after doing increase in housework and lifting and also travel to [place] to her daughters.  Better with rest physical therapy exercises.  She takes medical marijuana through the pain clinic along with methocarbamol.  Gabapentin 600 mg 3 times daily is helpful for her leg paresthesias and pain.  She has had a flare of the mid thoracic spine pain into the sternum.  Working with physical therapy once weekly finds this very helpful for her.  They are also helpful with the leg cramping.  Reports approximately 8 visits remaining on her current prescription.  Needs a refill of medication/gabapentin if appropriate.  OMT is also very effective pain is rated a 10/10 at worst 7/10 today 3/10 at best worse with lifting better with rest.           Review of Systems: Pertinent positives: Paresthesias in the feet weakness and headaches.  Denies bowel or bladder incontinence, falls, swallowing issues, fevers and unintentional weight loss.              Past Medical History:   Diagnosis Date     Cervical dysplasia      Chronic RUQ pain      Depressive disorder      Multiple myeloma (H)      Osteoporosis        The following portions of the patient's history were reviewed and updated as appropriate: allergies, current medications, past family history, past medical history, past social history, past surgical history and problem list.           Objective:   Physical Exam:    LMP  (LMP Unknown)   There is no height or weight on file to calculate BMI.      General: Alert and oriented with normal affect. Attention,  knowledge, memory, and language are intact. No acute distress.   Eyes: Sclerae are clear.  Respirations: Unlabored.   Gait:  Nonantalgic    Sensation is intact to light touch throughout the upper  extremities.  Reflexes are  negative Hoffmans.      Manual muscle testing reveals:  Right /Left out of 5  5/5 shoulder abductors  5/5 elbow flexors  5/5 elbow extensors  5/5 wrist extensors  5/5 interosseus  5/5 finger flexors       Structural exam: Cranium: Right sidebending rotation, OA sidebent right rotated left cervical spine: C2 rotated left sidebent left C3 rotated right sidebent right,  Rib cage: Rib one elevated on the left. Thoracic spine: T1 rotated right sidebent right upper thoracic myofascial restrictions through mid thoracic myofascial restrictions.  Upper Extremities: myofascial restrictions of the bilat upper trap, infraspinatus/parascapular muscles.  Left glenohumeral resrictions.    Procedure:    After discussing the risks and benefits of osteopathic manipulative medicine, verbal consent was obtained. The somatic dysfunctions listed above were treated with the following techniques: Cranium: Cranial indirect technique, VSD, and muscle energy for the OA. Cervical spine: Muscle energy, still technique, FPR, myofascial release, BLT, and soft tissue techniques. Rib cage: Myofascial release and FPR. Thoracic spine: Myofascial release, BLT.    Upper Exrtremity: MFR, FPR, BLT.  The patient tolerated the procedure well and had improved range of motion in all areas treated prior to leaving the clinic.      Again, thank you for allowing me to participate in the care of your patient.        Sincerely,        Wyatt Pascual DO

## 2023-12-04 NOTE — PROGRESS NOTES
Assessment/Plan:      Valeria was seen today for neck pain.    Diagnoses and all orders for this visit:    Thoracic spinal stenosis  -     gabapentin (NEURONTIN) 300 MG capsule; Take 2 capsules (600 mg) by mouth 3 times daily    Closed wedge compression fracture of T7 vertebra, sequela    Myofascial pain  -     gabapentin (NEURONTIN) 300 MG capsule; Take 2 capsules (600 mg) by mouth 3 times daily    Cervical spine pain    Leg cramps    Somatic dysfunction of head region  -     OSTEOPATHIC MANIP,5-6 BODY REGN    Somatic dysfunction of cervical region  -     OSTEOPATHIC MANIP,5-6 BODY REGN    Somatic dysfunction of rib region  -     OSTEOPATHIC MANIP,5-6 BODY REGN    Somatic dysfunction of upper extremity  -     OSTEOPATHIC MANIP,5-6 BODY REGN    Somatic dysfunction of thoracic region  -     OSTEOPATHIC MANIP,5-6 BODY REGN         Assessment: Pleasant 73 year old female  with a history of hyperlipidemia, anxiety, depression, irritable bowel, neuropathy, osteoporosis and multiple myeloma with a T7 fracture and lesion  resulting in spinal stenosis:     1   worsening of chronic thoracic pain  around the mid to upper thoracic spine and does well with Osteopathic manipulative medicine and continues to do well with physical therapy and home exercises still has 8 sessions remaining.  Gabapentin is also helpful.  T7-8 where she has the pathologic T7 fracture with epidural neoplasm compressing the spinal cord.  No signs of thoracic myelopathy neurologically stable.   Symptoms are stable and paresthesias may be slightly improving with physical therapy.  Overall doing well with gabapentin 600 mg 3 times daily.  Still has weakness in the right greater than left lower extremities but appears relatively stable.  Significant increased pain over the last week after coughing and now some mild improvement.  She has pain over the left ribs and mid thoracic spine.    Fracture is stable and new MRI at T1 and T7.  She has significant upper  thoracic and cervical myofascial pain as well.    Has done quite well after Osteopathic manipulative medicine for approximately 4 to 5 days at a time.  Having some improvement with physical therapy.       2.  Improved bilateral foot paresthesias.  Left greater than right consistent with polyneuropathy.   This is mildly asymmetric.  She has  absent sural SNAPs on EMG cannot exclude peripheral polyneuropathy although this would be relatively mild.  She may have a small fiber neuropathy.  She has a history of chemotherapy.  Improved with physical therapy and gabapentin         3.   Worsening cervical spine upper thoracic pain consistent myofascial pain with physical therapy.     4.  Leg cramps at night.  Intermittent.  Improved with therapy        5.   somatic dysfunctions of the cranium, cervical spine, rib cage, upper extremities, thoracic spine that contribute to the patient's pain complaints.      Discussion:    1.  We discussed her progress.  She is doing physical therapy once a week and doing very well.  She has approximately 8 sessions remaining.  We also discussed gabapentin use and she needs a refill if appropriate.  We discussed Osteopathic manipulative medicine does help her cervical and thoracic spine pain.    2.  Recommend Osteopathic manipulative medicine today.  She did well with cervical and upper thoracic spine treatment.  She agrees to proceed.  Please see attached procedure note.    3.  Continue with physical therapy and home exercises.    4.  I will provide a refill of gabapentin.  Taking 600 mg 3 times daily. checked and she is receiving gabapentin for me only.    5.  Follow-up with me as needed    It was our pleasure caring for your patient today, if there any questions or concerns please do not hesitate to contact us.      Subjective:   Patient ID: Lizzie Viera is a 73 year old female.    History of Present Illness: Patient presents for flare of cervical and thoracic spine pain.  Has  been doing well overall.  She has cervical spine pain thoracic spine pain leg paresthesias and cramping.  She has had increased pain over the past few weeks after doing increase in housework and lifting and also travel to [place] to her daughters.  Better with rest physical therapy exercises.  She takes medical marijuana through the pain clinic along with methocarbamol.  Gabapentin 600 mg 3 times daily is helpful for her leg paresthesias and pain.  She has had a flare of the mid thoracic spine pain into the sternum.  Working with physical therapy once weekly finds this very helpful for her.  They are also helpful with the leg cramping.  Reports approximately 8 visits remaining on her current prescription.  Needs a refill of medication/gabapentin if appropriate.  OMT is also very effective pain is rated a 10/10 at worst 7/10 today 3/10 at best worse with lifting better with rest.           Review of Systems: Pertinent positives: Paresthesias in the feet weakness and headaches.  Denies bowel or bladder incontinence, falls, swallowing issues, fevers and unintentional weight loss.              Past Medical History:   Diagnosis Date    Cervical dysplasia     Chronic RUQ pain     Depressive disorder     Multiple myeloma (H)     Osteoporosis        The following portions of the patient's history were reviewed and updated as appropriate: allergies, current medications, past family history, past medical history, past social history, past surgical history and problem list.           Objective:   Physical Exam:    LMP  (LMP Unknown)   There is no height or weight on file to calculate BMI.      General: Alert and oriented with normal affect. Attention, knowledge, memory, and language are intact. No acute distress.   Eyes: Sclerae are clear.  Respirations: Unlabored.   Gait:  Nonantalgic    Sensation is intact to light touch throughout the upper  extremities.  Reflexes are  negative Hoffmans.      Manual muscle testing  reveals:  Right /Left out of 5  5/5 shoulder abductors  5/5 elbow flexors  5/5 elbow extensors  5/5 wrist extensors  5/5 interosseus  5/5 finger flexors       Structural exam: Cranium: Right sidebending rotation, OA sidebent right rotated left cervical spine: C2 rotated left sidebent left C3 rotated right sidebent right,  Rib cage: Rib one elevated on the left. Thoracic spine: T1 rotated right sidebent right upper thoracic myofascial restrictions through mid thoracic myofascial restrictions.  Upper Extremities: myofascial restrictions of the bilat upper trap, infraspinatus/parascapular muscles.  Left glenohumeral resrictions.    Procedure:    After discussing the risks and benefits of osteopathic manipulative medicine, verbal consent was obtained. The somatic dysfunctions listed above were treated with the following techniques: Cranium: Cranial indirect technique, VSD, and muscle energy for the OA. Cervical spine: Muscle energy, still technique, FPR, myofascial release, BLT, and soft tissue techniques. Rib cage: Myofascial release and FPR. Thoracic spine: Myofascial release, BLT.    Upper Exrtremity: MFR, FPR, BLT.  The patient tolerated the procedure well and had improved range of motion in all areas treated prior to leaving the clinic.

## 2023-12-18 NOTE — TELEPHONE ENCOUNTER
REMI APPROVED    Medication: REVLIMID 10 MG PO CAPS  Amount: $ 12,000  Foundation Name: [employer]  [facility] Phone: [phone]  Member ID: [id number]  BIN: [id number]  PCN: [id number]  Group: [id number]  Foundation Effective Date: 12/17/2023  Foundation Expiration Date: 12/16/2024  Patient Notified: Yes

## 2024-01-01 ENCOUNTER — TELEPHONE (OUTPATIENT)
Dept: ONCOLOGY | Facility: HOSPITAL | Age: 74
End: 2024-01-01
Payer: COMMERCIAL

## 2024-01-01 ENCOUNTER — ONCOLOGY VISIT (OUTPATIENT)
Dept: ONCOLOGY | Facility: HOSPITAL | Age: 74
End: 2024-01-01
Attending: PSYCHOLOGIST
Payer: COMMERCIAL

## 2024-01-01 ENCOUNTER — LAB (OUTPATIENT)
Dept: INFUSION THERAPY | Facility: HOSPITAL | Age: 74
End: 2024-01-01
Attending: INTERNAL MEDICINE
Payer: COMMERCIAL

## 2024-01-01 ENCOUNTER — OFFICE VISIT (OUTPATIENT)
Dept: FAMILY MEDICINE | Facility: CLINIC | Age: 74
End: 2024-01-01
Payer: COMMERCIAL

## 2024-01-01 ENCOUNTER — OFFICE VISIT (OUTPATIENT)
Dept: AUDIOLOGY | Facility: CLINIC | Age: 74
End: 2024-01-01
Payer: COMMERCIAL

## 2024-01-01 ENCOUNTER — OFFICE VISIT (OUTPATIENT)
Dept: AUDIOLOGY | Facility: CLINIC | Age: 74
End: 2024-01-01

## 2024-01-01 ENCOUNTER — PATIENT OUTREACH (OUTPATIENT)
Dept: CARE COORDINATION | Facility: CLINIC | Age: 74
End: 2024-01-01
Payer: COMMERCIAL

## 2024-01-01 ENCOUNTER — TELEPHONE (OUTPATIENT)
Dept: FAMILY MEDICINE | Facility: CLINIC | Age: 74
End: 2024-01-01

## 2024-01-01 ENCOUNTER — HOSPITAL ENCOUNTER (INPATIENT)
Facility: HOSPITAL | Age: 74
LOS: 1 days | DRG: 871 | End: 2024-10-16
Attending: EMERGENCY MEDICINE | Admitting: INTERNAL MEDICINE
Payer: COMMERCIAL

## 2024-01-01 ENCOUNTER — HOSPITAL ENCOUNTER (OUTPATIENT)
Dept: CARDIOLOGY | Facility: HOSPITAL | Age: 74
Discharge: HOME | End: 2024-05-20
Attending: INTERNAL MEDICINE | Admitting: INTERNAL MEDICINE
Payer: COMMERCIAL

## 2024-01-01 ENCOUNTER — LAB (OUTPATIENT)
Dept: LAB | Facility: CLINIC | Age: 74
End: 2024-01-01
Payer: COMMERCIAL

## 2024-01-01 ENCOUNTER — HOSPITAL ENCOUNTER (INPATIENT)
Facility: HOSPITAL | Age: 74
LOS: 5 days | Discharge: HOME | DRG: 193 | End: 2024-03-02
Attending: EMERGENCY MEDICINE | Admitting: HOSPITALIST
Payer: COMMERCIAL

## 2024-01-01 ENCOUNTER — PATIENT OUTREACH (OUTPATIENT)
Dept: ONCOLOGY | Facility: HOSPITAL | Age: 74
End: 2024-01-01

## 2024-01-01 ENCOUNTER — NURSE TRIAGE (OUTPATIENT)
Dept: FAMILY MEDICINE | Facility: CLINIC | Age: 74
End: 2024-01-01
Payer: COMMERCIAL

## 2024-01-01 ENCOUNTER — ONCOLOGY VISIT (OUTPATIENT)
Dept: ONCOLOGY | Facility: HOSPITAL | Age: 74
End: 2024-01-01
Attending: INTERNAL MEDICINE
Payer: COMMERCIAL

## 2024-01-01 ENCOUNTER — OFFICE VISIT (OUTPATIENT)
Dept: SLEEP MEDICINE | Facility: CLINIC | Age: 74
End: 2024-01-01
Payer: COMMERCIAL

## 2024-01-01 ENCOUNTER — PATIENT OUTREACH (OUTPATIENT)
Dept: ONCOLOGY | Facility: HOSPITAL | Age: 74
End: 2024-01-01
Payer: COMMERCIAL

## 2024-01-01 ENCOUNTER — OFFICE VISIT (OUTPATIENT)
Dept: RADIATION ONCOLOGY | Facility: HOSPITAL | Age: 74
End: 2024-01-01
Payer: COMMERCIAL

## 2024-01-01 ENCOUNTER — OFFICE VISIT (OUTPATIENT)
Dept: PULMONOLOGY | Facility: CLINIC | Age: 74
End: 2024-01-01
Attending: INTERNAL MEDICINE
Payer: COMMERCIAL

## 2024-01-01 ENCOUNTER — OFFICE VISIT (OUTPATIENT)
Dept: OTOLARYNGOLOGY | Facility: CLINIC | Age: 74
End: 2024-01-01
Payer: COMMERCIAL

## 2024-01-01 ENCOUNTER — HOSPITAL ENCOUNTER (OUTPATIENT)
Dept: CT IMAGING | Facility: HOSPITAL | Age: 74
Discharge: HOME | End: 2024-02-08
Attending: FAMILY MEDICINE | Admitting: FAMILY MEDICINE
Payer: COMMERCIAL

## 2024-01-01 ENCOUNTER — APPOINTMENT (OUTPATIENT)
Dept: RADIOLOGY | Facility: HOSPITAL | Age: 74
DRG: 871 | End: 2024-01-01
Attending: STUDENT IN AN ORGANIZED HEALTH CARE EDUCATION/TRAINING PROGRAM
Payer: COMMERCIAL

## 2024-01-01 ENCOUNTER — TRANSFERRED RECORDS (OUTPATIENT)
Dept: HEALTH INFORMATION MANAGEMENT | Facility: CLINIC | Age: 74
End: 2024-01-01

## 2024-01-01 ENCOUNTER — OFFICE VISIT (OUTPATIENT)
Dept: PHYSICAL MEDICINE AND REHAB | Facility: CLINIC | Age: 74
End: 2024-01-01
Payer: COMMERCIAL

## 2024-01-01 ENCOUNTER — LAB (OUTPATIENT)
Dept: INFUSION THERAPY | Facility: HOSPITAL | Age: 74
End: 2024-01-01
Payer: COMMERCIAL

## 2024-01-01 ENCOUNTER — APPOINTMENT (OUTPATIENT)
Dept: RADIOLOGY | Facility: HOSPITAL | Age: 74
DRG: 193 | End: 2024-01-01
Attending: INTERNAL MEDICINE
Payer: COMMERCIAL

## 2024-01-01 ENCOUNTER — HOSPITAL ENCOUNTER (OUTPATIENT)
Dept: RADIOLOGY | Facility: HOSPITAL | Age: 74
Discharge: HOME | End: 2024-06-18
Attending: INTERNAL MEDICINE
Payer: COMMERCIAL

## 2024-01-01 ENCOUNTER — APPOINTMENT (OUTPATIENT)
Dept: ULTRASOUND IMAGING | Facility: HOSPITAL | Age: 74
DRG: 193 | End: 2024-01-01
Attending: INTERNAL MEDICINE
Payer: COMMERCIAL

## 2024-01-01 ENCOUNTER — APPOINTMENT (OUTPATIENT)
Dept: CT IMAGING | Facility: HOSPITAL | Age: 74
DRG: 871 | End: 2024-01-01
Attending: EMERGENCY MEDICINE
Payer: COMMERCIAL

## 2024-01-01 ENCOUNTER — TRANSFERRED RECORDS (OUTPATIENT)
Dept: HEALTH INFORMATION MANAGEMENT | Facility: CLINIC | Age: 74
End: 2024-01-01
Payer: COMMERCIAL

## 2024-01-01 ENCOUNTER — PATIENT OUTREACH (OUTPATIENT)
Dept: CARE COORDINATION | Facility: CLINIC | Age: 74
End: 2024-01-01

## 2024-01-01 ENCOUNTER — OFFICE VISIT (OUTPATIENT)
Dept: ALLERGY | Facility: CLINIC | Age: 74
End: 2024-01-01
Payer: COMMERCIAL

## 2024-01-01 ENCOUNTER — ONCOLOGY VISIT (OUTPATIENT)
Dept: ONCOLOGY | Facility: HOSPITAL | Age: 74
End: 2024-01-01
Payer: COMMERCIAL

## 2024-01-01 ENCOUNTER — OFFICE VISIT (OUTPATIENT)
Dept: ALLERGY | Facility: CLINIC | Age: 74
End: 2024-01-01
Attending: INTERNAL MEDICINE
Payer: COMMERCIAL

## 2024-01-01 ENCOUNTER — OFFICE VISIT (OUTPATIENT)
Dept: PULMONOLOGY | Facility: CLINIC | Age: 74
End: 2024-01-01
Attending: FAMILY MEDICINE
Payer: COMMERCIAL

## 2024-01-01 ENCOUNTER — ANCILLARY PROCEDURE (OUTPATIENT)
Dept: MAMMOGRAPHY | Facility: HOSPITAL | Age: 74
End: 2024-01-01
Attending: FAMILY MEDICINE
Payer: COMMERCIAL

## 2024-01-01 ENCOUNTER — APPOINTMENT (OUTPATIENT)
Dept: CT IMAGING | Facility: HOSPITAL | Age: 74
DRG: 193 | End: 2024-01-01
Attending: STUDENT IN AN ORGANIZED HEALTH CARE EDUCATION/TRAINING PROGRAM
Payer: COMMERCIAL

## 2024-01-01 ENCOUNTER — APPOINTMENT (OUTPATIENT)
Dept: PHYSICAL THERAPY | Facility: HOSPITAL | Age: 74
DRG: 193 | End: 2024-01-01
Attending: HOSPITALIST
Payer: COMMERCIAL

## 2024-01-01 ENCOUNTER — APPOINTMENT (OUTPATIENT)
Dept: RADIOLOGY | Facility: HOSPITAL | Age: 74
DRG: 871 | End: 2024-01-01
Attending: INTERNAL MEDICINE
Payer: COMMERCIAL

## 2024-01-01 ENCOUNTER — HOSPITAL ENCOUNTER (OUTPATIENT)
Dept: NUCLEAR MEDICINE | Facility: HOSPITAL | Age: 74
Discharge: HOME | End: 2024-06-18
Attending: INTERNAL MEDICINE
Payer: COMMERCIAL

## 2024-01-01 ENCOUNTER — OFFICE VISIT (OUTPATIENT)
Dept: RADIATION ONCOLOGY | Facility: HOSPITAL | Age: 74
End: 2024-01-01
Attending: FAMILY MEDICINE
Payer: COMMERCIAL

## 2024-01-01 ENCOUNTER — MYC MEDICAL ADVICE (OUTPATIENT)
Dept: FAMILY MEDICINE | Facility: CLINIC | Age: 74
End: 2024-01-01
Payer: COMMERCIAL

## 2024-01-01 ENCOUNTER — APPOINTMENT (OUTPATIENT)
Dept: RADIOLOGY | Facility: HOSPITAL | Age: 74
DRG: 193 | End: 2024-01-01
Attending: EMERGENCY MEDICINE
Payer: COMMERCIAL

## 2024-01-01 ENCOUNTER — APPOINTMENT (OUTPATIENT)
Dept: RADIOLOGY | Facility: HOSPITAL | Age: 74
DRG: 871 | End: 2024-01-01
Attending: EMERGENCY MEDICINE
Payer: COMMERCIAL

## 2024-01-01 ENCOUNTER — THERAPY VISIT (OUTPATIENT)
Dept: SLEEP MEDICINE | Facility: CLINIC | Age: 74
End: 2024-01-01
Payer: COMMERCIAL

## 2024-01-01 VITALS
HEIGHT: 61 IN | HEART RATE: 60 BPM | OXYGEN SATURATION: 98 % | DIASTOLIC BLOOD PRESSURE: 56 MMHG | TEMPERATURE: 97.3 F | SYSTOLIC BLOOD PRESSURE: 136 MMHG | WEIGHT: 127 LBS | BODY MASS INDEX: 23.98 KG/M2 | RESPIRATION RATE: 20 BRPM

## 2024-01-01 VITALS
OXYGEN SATURATION: 94 % | HEIGHT: 61 IN | HEART RATE: 60 BPM | BODY MASS INDEX: 23.81 KG/M2 | DIASTOLIC BLOOD PRESSURE: 63 MMHG | TEMPERATURE: 98.5 F | SYSTOLIC BLOOD PRESSURE: 130 MMHG | RESPIRATION RATE: 16 BRPM | WEIGHT: 126.1 LBS

## 2024-01-01 VITALS
DIASTOLIC BLOOD PRESSURE: 60 MMHG | SYSTOLIC BLOOD PRESSURE: 128 MMHG | BODY MASS INDEX: 23.6 KG/M2 | HEIGHT: 61 IN | TEMPERATURE: 97.7 F | WEIGHT: 125 LBS | OXYGEN SATURATION: 95 % | RESPIRATION RATE: 18 BRPM | HEART RATE: 49 BPM

## 2024-01-01 VITALS
BODY MASS INDEX: 24.37 KG/M2 | OXYGEN SATURATION: 97 % | DIASTOLIC BLOOD PRESSURE: 71 MMHG | WEIGHT: 129 LBS | HEART RATE: 58 BPM | TEMPERATURE: 98.1 F | SYSTOLIC BLOOD PRESSURE: 156 MMHG | RESPIRATION RATE: 18 BRPM

## 2024-01-01 VITALS — HEART RATE: 65 BPM | SYSTOLIC BLOOD PRESSURE: 149 MMHG | DIASTOLIC BLOOD PRESSURE: 70 MMHG

## 2024-01-01 VITALS
HEART RATE: 56 BPM | WEIGHT: 127.4 LBS | OXYGEN SATURATION: 95 % | SYSTOLIC BLOOD PRESSURE: 141 MMHG | DIASTOLIC BLOOD PRESSURE: 62 MMHG | TEMPERATURE: 98.1 F | BODY MASS INDEX: 24.07 KG/M2 | RESPIRATION RATE: 20 BRPM

## 2024-01-01 VITALS
DIASTOLIC BLOOD PRESSURE: 67 MMHG | TEMPERATURE: 98.2 F | OXYGEN SATURATION: 94 % | WEIGHT: 123 LBS | HEART RATE: 56 BPM | RESPIRATION RATE: 20 BRPM | BODY MASS INDEX: 23.24 KG/M2 | SYSTOLIC BLOOD PRESSURE: 149 MMHG

## 2024-01-01 VITALS
DIASTOLIC BLOOD PRESSURE: 72 MMHG | BODY MASS INDEX: 24 KG/M2 | SYSTOLIC BLOOD PRESSURE: 130 MMHG | HEART RATE: 54 BPM | WEIGHT: 127 LBS | OXYGEN SATURATION: 97 %

## 2024-01-01 VITALS
WEIGHT: 122.8 LBS | SYSTOLIC BLOOD PRESSURE: 113 MMHG | RESPIRATION RATE: 18 BRPM | TEMPERATURE: 98 F | BODY MASS INDEX: 23.2 KG/M2 | HEART RATE: 60 BPM | DIASTOLIC BLOOD PRESSURE: 56 MMHG | OXYGEN SATURATION: 97 %

## 2024-01-01 VITALS
BODY MASS INDEX: 22.66 KG/M2 | WEIGHT: 120 LBS | DIASTOLIC BLOOD PRESSURE: 18 MMHG | OXYGEN SATURATION: 75 % | TEMPERATURE: 100 F | HEIGHT: 61 IN | SYSTOLIC BLOOD PRESSURE: 38 MMHG

## 2024-01-01 VITALS — HEART RATE: 102 BPM | SYSTOLIC BLOOD PRESSURE: 125 MMHG | DIASTOLIC BLOOD PRESSURE: 58 MMHG

## 2024-01-01 VITALS
HEART RATE: 53 BPM | TEMPERATURE: 98.3 F | WEIGHT: 126.6 LBS | HEIGHT: 61 IN | BODY MASS INDEX: 23.9 KG/M2 | OXYGEN SATURATION: 98 % | SYSTOLIC BLOOD PRESSURE: 136 MMHG | DIASTOLIC BLOOD PRESSURE: 58 MMHG | RESPIRATION RATE: 18 BRPM

## 2024-01-01 VITALS
BODY MASS INDEX: 24.07 KG/M2 | TEMPERATURE: 98 F | RESPIRATION RATE: 16 BRPM | OXYGEN SATURATION: 97 % | WEIGHT: 127.4 LBS | DIASTOLIC BLOOD PRESSURE: 59 MMHG | SYSTOLIC BLOOD PRESSURE: 130 MMHG | HEART RATE: 57 BPM

## 2024-01-01 VITALS — HEART RATE: 51 BPM | WEIGHT: 126 LBS | OXYGEN SATURATION: 96 % | BODY MASS INDEX: 23.79 KG/M2 | HEIGHT: 61 IN

## 2024-01-01 VITALS
SYSTOLIC BLOOD PRESSURE: 146 MMHG | WEIGHT: 125 LBS | HEART RATE: 54 BPM | HEIGHT: 61 IN | BODY MASS INDEX: 23.6 KG/M2 | OXYGEN SATURATION: 94 % | DIASTOLIC BLOOD PRESSURE: 63 MMHG

## 2024-01-01 VITALS
OXYGEN SATURATION: 94 % | SYSTOLIC BLOOD PRESSURE: 143 MMHG | HEART RATE: 60 BPM | WEIGHT: 124 LBS | BODY MASS INDEX: 23.43 KG/M2 | DIASTOLIC BLOOD PRESSURE: 63 MMHG

## 2024-01-01 VITALS — SYSTOLIC BLOOD PRESSURE: 135 MMHG | HEART RATE: 59 BPM | DIASTOLIC BLOOD PRESSURE: 63 MMHG

## 2024-01-01 VITALS
HEART RATE: 62 BPM | SYSTOLIC BLOOD PRESSURE: 124 MMHG | WEIGHT: 127 LBS | DIASTOLIC BLOOD PRESSURE: 68 MMHG | HEIGHT: 61 IN | OXYGEN SATURATION: 93 % | BODY MASS INDEX: 23.98 KG/M2

## 2024-01-01 VITALS
BODY MASS INDEX: 23.6 KG/M2 | OXYGEN SATURATION: 92 % | HEIGHT: 61 IN | HEART RATE: 60 BPM | WEIGHT: 125 LBS | SYSTOLIC BLOOD PRESSURE: 137 MMHG | DIASTOLIC BLOOD PRESSURE: 62 MMHG

## 2024-01-01 VITALS
HEART RATE: 51 BPM | BODY MASS INDEX: 23.75 KG/M2 | TEMPERATURE: 97.8 F | RESPIRATION RATE: 16 BRPM | DIASTOLIC BLOOD PRESSURE: 67 MMHG | SYSTOLIC BLOOD PRESSURE: 157 MMHG | OXYGEN SATURATION: 96 % | HEIGHT: 61 IN | WEIGHT: 125.8 LBS

## 2024-01-01 VITALS
HEIGHT: 61 IN | HEART RATE: 55 BPM | DIASTOLIC BLOOD PRESSURE: 64 MMHG | SYSTOLIC BLOOD PRESSURE: 147 MMHG | BODY MASS INDEX: 24.05 KG/M2 | WEIGHT: 127.4 LBS

## 2024-01-01 VITALS — RESPIRATION RATE: 18 BRPM | OXYGEN SATURATION: 97 % | HEART RATE: 56 BPM

## 2024-01-01 VITALS — HEART RATE: 60 BPM | DIASTOLIC BLOOD PRESSURE: 63 MMHG | SYSTOLIC BLOOD PRESSURE: 139 MMHG

## 2024-01-01 VITALS — SYSTOLIC BLOOD PRESSURE: 147 MMHG | DIASTOLIC BLOOD PRESSURE: 62 MMHG | HEART RATE: 56 BPM

## 2024-01-01 VITALS — SYSTOLIC BLOOD PRESSURE: 139 MMHG | HEART RATE: 64 BPM | DIASTOLIC BLOOD PRESSURE: 63 MMHG

## 2024-01-01 DIAGNOSIS — H90.3 SENSORINEURAL HEARING LOSS (SNHL) OF BOTH EARS: Primary | ICD-10-CM

## 2024-01-01 DIAGNOSIS — M79.18 MYOFASCIAL PAIN: ICD-10-CM

## 2024-01-01 DIAGNOSIS — M80.80XD LOCALIZED OSTEOPOROSIS WITH CURRENT PATHOLOGICAL FRACTURE WITH ROUTINE HEALING, SUBSEQUENT ENCOUNTER: ICD-10-CM

## 2024-01-01 DIAGNOSIS — F43.23 ADJUSTMENT DISORDER WITH MIXED ANXIETY AND DEPRESSED MOOD: Primary | ICD-10-CM

## 2024-01-01 DIAGNOSIS — M54.6 THORACIC SPINE PAIN: ICD-10-CM

## 2024-01-01 DIAGNOSIS — C90.00 MULTIPLE MYELOMA NOT HAVING ACHIEVED REMISSION (H): ICD-10-CM

## 2024-01-01 DIAGNOSIS — M89.8X9 BONE PAIN: ICD-10-CM

## 2024-01-01 DIAGNOSIS — M99.02 SOMATIC DYSFUNCTION OF THORACIC REGION: ICD-10-CM

## 2024-01-01 DIAGNOSIS — M54.2 CERVICAL SPINE PAIN: Primary | ICD-10-CM

## 2024-01-01 DIAGNOSIS — M99.00 SOMATIC DYSFUNCTION OF HEAD REGION: ICD-10-CM

## 2024-01-01 DIAGNOSIS — M76.62 ACHILLES TENDINITIS OF LEFT LOWER EXTREMITY: ICD-10-CM

## 2024-01-01 DIAGNOSIS — R94.2 DIFFUSION CAPACITY OF LUNG (DL), DECREASED: Primary | ICD-10-CM

## 2024-01-01 DIAGNOSIS — M48.04 THORACIC SPINAL STENOSIS: Primary | ICD-10-CM

## 2024-01-01 DIAGNOSIS — C90.01 MULTIPLE MYELOMA IN REMISSION (H): ICD-10-CM

## 2024-01-01 DIAGNOSIS — C90.00 MULTIPLE MYELOMA WITH FAILED REMISSION (H): Primary | ICD-10-CM

## 2024-01-01 DIAGNOSIS — M76.62 ACHILLES TENDINITIS OF BOTH LOWER EXTREMITIES: Primary | ICD-10-CM

## 2024-01-01 DIAGNOSIS — H90.3 SENSORINEURAL HEARING LOSS (SNHL) OF BOTH EARS: ICD-10-CM

## 2024-01-01 DIAGNOSIS — Z51.5 ENCOUNTER FOR PALLIATIVE CARE: Primary | ICD-10-CM

## 2024-01-01 DIAGNOSIS — J18.9 PNEUMONIA OF RIGHT LUNG DUE TO INFECTIOUS ORGANISM, UNSPECIFIED PART OF LUNG: ICD-10-CM

## 2024-01-01 DIAGNOSIS — H91.93 BILATERAL HEARING LOSS, UNSPECIFIED HEARING LOSS TYPE: ICD-10-CM

## 2024-01-01 DIAGNOSIS — M84.58XK PATHOLOGICAL FRACTURE OF VERTEBRAE IN NEOPLASTIC DISEASE WITH NONUNION: ICD-10-CM

## 2024-01-01 DIAGNOSIS — M76.61 ACHILLES TENDINITIS OF BOTH LOWER EXTREMITIES: ICD-10-CM

## 2024-01-01 DIAGNOSIS — C90.00 MULTIPLE MYELOMA WITH FAILED REMISSION (H): ICD-10-CM

## 2024-01-01 DIAGNOSIS — M99.07 SOMATIC DYSFUNCTION OF UPPER EXTREMITY: ICD-10-CM

## 2024-01-01 DIAGNOSIS — S22.060S COMPRESSION FRACTURE OF T7 VERTEBRA, SEQUELA: ICD-10-CM

## 2024-01-01 DIAGNOSIS — R20.2 PARESTHESIA OF FOOT, BILATERAL: ICD-10-CM

## 2024-01-01 DIAGNOSIS — I70.8 LEFT SUBCLAVIAN ARTERY OCCLUSION: ICD-10-CM

## 2024-01-01 DIAGNOSIS — G47.34 SLEEP RELATED HYPOXIA: Primary | ICD-10-CM

## 2024-01-01 DIAGNOSIS — R50.81 NEUTROPENIC FEVER (H): ICD-10-CM

## 2024-01-01 DIAGNOSIS — M99.08 SOMATIC DYSFUNCTION OF RIB REGION: ICD-10-CM

## 2024-01-01 DIAGNOSIS — R51.9 SLEEP RELATED HEADACHES: ICD-10-CM

## 2024-01-01 DIAGNOSIS — M99.01 SOMATIC DYSFUNCTION OF CERVICAL REGION: ICD-10-CM

## 2024-01-01 DIAGNOSIS — H93.13 TINNITUS OF BOTH EARS: ICD-10-CM

## 2024-01-01 DIAGNOSIS — R94.2 DIFFUSION CAPACITY OF LUNG (DL), DECREASED: ICD-10-CM

## 2024-01-01 DIAGNOSIS — G89.3 CANCER ASSOCIATED PAIN: ICD-10-CM

## 2024-01-01 DIAGNOSIS — M54.2 CERVICAL SPINE PAIN: ICD-10-CM

## 2024-01-01 DIAGNOSIS — Z87.891 PERSONAL HISTORY OF TOBACCO USE: ICD-10-CM

## 2024-01-01 DIAGNOSIS — F41.9 ANXIETY: ICD-10-CM

## 2024-01-01 DIAGNOSIS — L08.9 INFECTION OF INDEX FINGER: ICD-10-CM

## 2024-01-01 DIAGNOSIS — M81.0 AGE-RELATED OSTEOPOROSIS WITHOUT CURRENT PATHOLOGICAL FRACTURE: ICD-10-CM

## 2024-01-01 DIAGNOSIS — J18.9 PNEUMONIA DUE TO INFECTIOUS ORGANISM, UNSPECIFIED LATERALITY, UNSPECIFIED PART OF LUNG: Primary | ICD-10-CM

## 2024-01-01 DIAGNOSIS — J31.0 NONALLERGIC RHINITIS: ICD-10-CM

## 2024-01-01 DIAGNOSIS — J43.1 PANLOBULAR EMPHYSEMA (H): ICD-10-CM

## 2024-01-01 DIAGNOSIS — Z88.1 ALLERGY TO CEPHALOSPORIN: Primary | ICD-10-CM

## 2024-01-01 DIAGNOSIS — I10 ESSENTIAL HYPERTENSION, BENIGN: ICD-10-CM

## 2024-01-01 DIAGNOSIS — M76.61 ACHILLES TENDINITIS OF BOTH LOWER EXTREMITIES: Primary | ICD-10-CM

## 2024-01-01 DIAGNOSIS — M25.511 ACUTE PAIN OF RIGHT SHOULDER: Primary | ICD-10-CM

## 2024-01-01 DIAGNOSIS — E78.2 MIXED HYPERLIPIDEMIA: ICD-10-CM

## 2024-01-01 DIAGNOSIS — Z00.00 MEDICARE ANNUAL WELLNESS VISIT, SUBSEQUENT: Primary | ICD-10-CM

## 2024-01-01 DIAGNOSIS — S22.060S CLOSED WEDGE COMPRESSION FRACTURE OF T7 VERTEBRA, SEQUELA: ICD-10-CM

## 2024-01-01 DIAGNOSIS — R53.0 NEOPLASTIC MALIGNANT RELATED FATIGUE: ICD-10-CM

## 2024-01-01 DIAGNOSIS — M48.50XA PATHOLOGIC COMPRESSION FRACTURE OF SPINE, INITIAL ENCOUNTER (H): ICD-10-CM

## 2024-01-01 DIAGNOSIS — H90.3 ASYMMETRICAL SENSORINEURAL HEARING LOSS: ICD-10-CM

## 2024-01-01 DIAGNOSIS — R09.02 HYPOXIA: ICD-10-CM

## 2024-01-01 DIAGNOSIS — G47.19 EXCESSIVE DAYTIME SLEEPINESS: ICD-10-CM

## 2024-01-01 DIAGNOSIS — J96.21 ACUTE ON CHRONIC RESPIRATORY FAILURE WITH HYPOXIA (H): ICD-10-CM

## 2024-01-01 DIAGNOSIS — M76.62 ACHILLES TENDINITIS OF BOTH LOWER EXTREMITIES: ICD-10-CM

## 2024-01-01 DIAGNOSIS — J96.21 ACUTE ON CHRONIC RESPIRATORY FAILURE WITH HYPOXIA (H): Primary | ICD-10-CM

## 2024-01-01 DIAGNOSIS — I95.1 ORTHOSTATIC HYPOTENSION: ICD-10-CM

## 2024-01-01 DIAGNOSIS — H90.A21 SENSORINEURAL HEARING LOSS (SNHL) OF RIGHT EAR WITH RESTRICTED HEARING OF LEFT EAR: Primary | ICD-10-CM

## 2024-01-01 DIAGNOSIS — M48.04 THORACIC SPINAL STENOSIS: ICD-10-CM

## 2024-01-01 DIAGNOSIS — H93.13 TINNITUS OF BOTH EARS: Primary | ICD-10-CM

## 2024-01-01 DIAGNOSIS — Z12.31 ENCOUNTER FOR SCREENING MAMMOGRAM FOR MALIGNANT NEOPLASM OF BREAST: ICD-10-CM

## 2024-01-01 DIAGNOSIS — R06.83 SNORING: ICD-10-CM

## 2024-01-01 DIAGNOSIS — Z88.9 DRUG ALLERGY: Primary | ICD-10-CM

## 2024-01-01 DIAGNOSIS — D70.9 NEUTROPENIC FEVER (H): ICD-10-CM

## 2024-01-01 DIAGNOSIS — H93.13 TINNITUS, BILATERAL: ICD-10-CM

## 2024-01-01 DIAGNOSIS — F17.290 OTHER TOBACCO PRODUCT NICOTINE DEPENDENCE, UNCOMPLICATED: ICD-10-CM

## 2024-01-01 DIAGNOSIS — G43.109 MIGRAINE WITH AURA AND WITHOUT STATUS MIGRAINOSUS, NOT INTRACTABLE: ICD-10-CM

## 2024-01-01 DIAGNOSIS — Z88.1 ALLERGY TO ANTIBIOTIC: Primary | ICD-10-CM

## 2024-01-01 DIAGNOSIS — H93.8X3 SENSATION OF FULLNESS IN BOTH EARS: ICD-10-CM

## 2024-01-01 DIAGNOSIS — J18.9 PNEUMONIA OF BOTH LOWER LOBES DUE TO INFECTIOUS ORGANISM: ICD-10-CM

## 2024-01-01 DIAGNOSIS — J32.0 LEFT MAXILLARY SINUSITIS: ICD-10-CM

## 2024-01-01 DIAGNOSIS — J30.9 ALLERGIC RHINITIS, UNSPECIFIED SEASONALITY, UNSPECIFIED TRIGGER: ICD-10-CM

## 2024-01-01 DIAGNOSIS — Z12.11 SCREENING FOR COLON CANCER: ICD-10-CM

## 2024-01-01 DIAGNOSIS — J96.01 ACUTE RESPIRATORY FAILURE WITH HYPOXIA (H): ICD-10-CM

## 2024-01-01 DIAGNOSIS — I77.4 CELIAC ARTERY STENOSIS (H): ICD-10-CM

## 2024-01-01 LAB
ALBUMIN SERPL BCG-MCNC: 2.9 G/DL (ref 3.5–5.2)
ALBUMIN SERPL BCG-MCNC: 3.2 G/DL (ref 3.5–5.2)
ALBUMIN SERPL BCG-MCNC: 3.7 G/DL (ref 3.5–5.2)
ALBUMIN SERPL BCG-MCNC: 3.8 G/DL (ref 3.5–5.2)
ALBUMIN SERPL BCG-MCNC: 4 G/DL (ref 3.5–5.2)
ALBUMIN SERPL ELPH-MCNC: 3.6 G/DL (ref 3.7–5.1)
ALBUMIN SERPL ELPH-MCNC: 3.6 G/DL (ref 3.7–5.1)
ALBUMIN UR-MCNC: 100 MG/DL
ALBUMIN UR-MCNC: 70 MG/DL
ALP SERPL-CCNC: 35 U/L (ref 40–150)
ALP SERPL-CCNC: 44 U/L (ref 40–150)
ALP SERPL-CCNC: 45 U/L (ref 40–150)
ALP SERPL-CCNC: 45 U/L (ref 40–150)
ALP SERPL-CCNC: 47 U/L (ref 40–150)
ALP SERPL-CCNC: 49 U/L (ref 40–150)
ALP SERPL-CCNC: 59 U/L (ref 40–150)
ALPHA1 GLOB SERPL ELPH-MCNC: 0.3 G/DL (ref 0.2–0.4)
ALPHA1 GLOB SERPL ELPH-MCNC: 0.4 G/DL (ref 0.2–0.4)
ALPHA2 GLOB SERPL ELPH-MCNC: 0.8 G/DL (ref 0.5–0.9)
ALPHA2 GLOB SERPL ELPH-MCNC: 0.8 G/DL (ref 0.5–0.9)
ALT SERPL W P-5'-P-CCNC: 17 U/L (ref 0–50)
ALT SERPL W P-5'-P-CCNC: 18 U/L (ref 0–50)
ALT SERPL W P-5'-P-CCNC: 25 U/L (ref 0–50)
ALT SERPL W P-5'-P-CCNC: 27 U/L (ref 0–50)
ALT SERPL W P-5'-P-CCNC: 78 U/L (ref 0–50)
ALT SERPL W P-5'-P-CCNC: 78 U/L (ref 0–50)
ALT SERPL W P-5'-P-CCNC: 81 U/L (ref 0–50)
AMORPH CRY #/AREA URNS HPF: ABNORMAL /HPF
AMORPH CRY #/AREA URNS HPF: ABNORMAL /HPF
ANA SER QL IF: NEGATIVE
ANCA AB PATTERN SER IF-IMP: NORMAL
ANION GAP SERPL CALCULATED.3IONS-SCNC: 12 MMOL/L (ref 7–15)
ANION GAP SERPL CALCULATED.3IONS-SCNC: 12 MMOL/L (ref 7–15)
ANION GAP SERPL CALCULATED.3IONS-SCNC: 14 MMOL/L (ref 7–15)
ANION GAP SERPL CALCULATED.3IONS-SCNC: 17 MMOL/L (ref 7–15)
ANION GAP SERPL CALCULATED.3IONS-SCNC: 6 MMOL/L (ref 7–15)
ANION GAP SERPL CALCULATED.3IONS-SCNC: 7 MMOL/L (ref 7–15)
ANION GAP SERPL CALCULATED.3IONS-SCNC: 8 MMOL/L (ref 7–15)
ANION GAP SERPL CALCULATED.3IONS-SCNC: 9 MMOL/L (ref 7–15)
APPEARANCE UR: ABNORMAL
APPEARANCE UR: ABNORMAL
AST SERPL W P-5'-P-CCNC: 101 U/L (ref 0–45)
AST SERPL W P-5'-P-CCNC: 143 U/L (ref 0–45)
AST SERPL W P-5'-P-CCNC: 15 U/L (ref 0–45)
AST SERPL W P-5'-P-CCNC: 19 U/L (ref 0–45)
AST SERPL W P-5'-P-CCNC: 20 U/L (ref 0–45)
AST SERPL W P-5'-P-CCNC: 29 U/L (ref 0–45)
AST SERPL W P-5'-P-CCNC: 70 U/L (ref 0–45)
ATRIAL RATE - MUSE: 65 BPM
ATRIAL RATE - MUSE: 91 BPM
B-GLOBULIN SERPL ELPH-MCNC: 0.6 G/DL (ref 0.6–1)
B-GLOBULIN SERPL ELPH-MCNC: 0.6 G/DL (ref 0.6–1)
BACTERIA #/AREA URNS HPF: ABNORMAL /HPF
BACTERIA BLD CULT: NO GROWTH
BACTERIA BLD CULT: NO GROWTH
BACTERIA SPT CULT: NO GROWTH
BASE EXCESS BLDV CALC-SCNC: 7.3 MMOL/L (ref -3–3)
BASE EXCESS BLDV CALC-SCNC: 7.4 MMOL/L (ref -3–3)
BASOPHILS # BLD AUTO: 0 10E3/UL (ref 0–0.2)
BASOPHILS # BLD AUTO: 0.1 10E3/UL (ref 0–0.2)
BASOPHILS # BLD AUTO: 0.1 10E3/UL (ref 0–0.2)
BASOPHILS # BLD AUTO: ABNORMAL 10*3/UL
BASOPHILS # BLD MANUAL: 0 10E3/UL (ref 0–0.2)
BASOPHILS NFR BLD AUTO: 0 %
BASOPHILS NFR BLD AUTO: 1 %
BASOPHILS NFR BLD AUTO: 2 %
BASOPHILS NFR BLD AUTO: ABNORMAL %
BASOPHILS NFR BLD MANUAL: 0 %
BASOPHILS NFR BLD MANUAL: 1 %
BASOPHILS NFR BLD MANUAL: 2 %
BILIRUB DIRECT SERPL-MCNC: <0.2 MG/DL (ref 0–0.3)
BILIRUB SERPL-MCNC: 0.3 MG/DL
BILIRUB SERPL-MCNC: 0.6 MG/DL
BILIRUB SERPL-MCNC: 0.8 MG/DL
BILIRUB SERPL-MCNC: 0.9 MG/DL
BILIRUB UR QL STRIP: NEGATIVE
BILIRUB UR QL STRIP: NEGATIVE
BUN SERPL-MCNC: 18.1 MG/DL (ref 8–23)
BUN SERPL-MCNC: 19.7 MG/DL (ref 8–23)
BUN SERPL-MCNC: 20.1 MG/DL (ref 8–23)
BUN SERPL-MCNC: 22.7 MG/DL (ref 8–23)
BUN SERPL-MCNC: 22.7 MG/DL (ref 8–23)
BUN SERPL-MCNC: 24 MG/DL (ref 8–23)
BUN SERPL-MCNC: 38.5 MG/DL (ref 8–23)
BUN SERPL-MCNC: 41.7 MG/DL (ref 8–23)
BURR CELLS BLD QL SMEAR: ABNORMAL
C PNEUM DNA SPEC QL NAA+PROBE: NOT DETECTED
C PNEUM DNA SPEC QL NAA+PROBE: NOT DETECTED
C-ANCA TITR SER IF: NORMAL {TITER}
CALCIUM SERPL-MCNC: 6.6 MG/DL (ref 8.8–10.4)
CALCIUM SERPL-MCNC: 8.4 MG/DL (ref 8.8–10.2)
CALCIUM SERPL-MCNC: 8.7 MG/DL (ref 8.8–10.4)
CALCIUM SERPL-MCNC: 8.9 MG/DL (ref 8.8–10.4)
CALCIUM SERPL-MCNC: 9.1 MG/DL (ref 8.8–10.2)
CALCIUM SERPL-MCNC: 9.3 MG/DL (ref 8.8–10.2)
CALCIUM SERPL-MCNC: 9.4 MG/DL (ref 8.8–10.2)
CALCIUM SERPL-MCNC: 9.5 MG/DL (ref 8.8–10.2)
CHLORIDE SERPL-SCNC: 100 MMOL/L (ref 98–107)
CHLORIDE SERPL-SCNC: 101 MMOL/L (ref 98–107)
CHLORIDE SERPL-SCNC: 102 MMOL/L (ref 98–107)
CHLORIDE SERPL-SCNC: 103 MMOL/L (ref 98–107)
CHLORIDE SERPL-SCNC: 103 MMOL/L (ref 98–107)
CHLORIDE SERPL-SCNC: 90 MMOL/L (ref 98–107)
CHLORIDE SERPL-SCNC: 93 MMOL/L (ref 98–107)
CHLORIDE SERPL-SCNC: 94 MMOL/L (ref 98–107)
CHOLEST SERPL-MCNC: 166 MG/DL
COLOR UR AUTO: YELLOW
COLOR UR AUTO: YELLOW
CREAT SERPL-MCNC: 0.76 MG/DL (ref 0.51–0.95)
CREAT SERPL-MCNC: 0.86 MG/DL (ref 0.51–0.95)
CREAT SERPL-MCNC: 0.91 MG/DL (ref 0.51–0.95)
CREAT SERPL-MCNC: 0.94 MG/DL (ref 0.51–0.95)
CREAT SERPL-MCNC: 0.97 MG/DL (ref 0.51–0.95)
CREAT SERPL-MCNC: 0.97 MG/DL (ref 0.51–0.95)
CREAT SERPL-MCNC: 2.1 MG/DL (ref 0.51–0.95)
CREAT SERPL-MCNC: 2.3 MG/DL (ref 0.51–0.95)
D DIMER PPP FEU-MCNC: 1.37 UG/ML FEU (ref 0–0.5)
DEPRECATED HCO3 PLAS-SCNC: 27 MMOL/L (ref 22–29)
DEPRECATED HCO3 PLAS-SCNC: 29 MMOL/L (ref 22–29)
DEPRECATED HCO3 PLAS-SCNC: 29 MMOL/L (ref 22–29)
DEPRECATED HCO3 PLAS-SCNC: 31 MMOL/L (ref 22–29)
DEPRECATED HCO3 PLAS-SCNC: 32 MMOL/L (ref 22–29)
DIASTOLIC BLOOD PRESSURE - MUSE: 65 MMHG
DIASTOLIC BLOOD PRESSURE - MUSE: NORMAL MMHG
DLCOCOR-%PRED-PRE: 57 %
DLCOCOR-%PRED-PRE: 57 %
DLCOCOR-PRE: 10.01 ML/MIN/MMHG
DLCOCOR-PRE: 10.02 ML/MIN/MMHG
DLCOUNC-%PRED-PRE: 58 %
DLCOUNC-%PRED-PRE: 58 %
DLCOUNC-PRE: 10.22 ML/MIN/MMHG
DLCOUNC-PRE: 10.23 ML/MIN/MMHG
DLCOUNC-PRED: 17.44 ML/MIN/MMHG
DLCOUNC-PRED: 17.46 ML/MIN/MMHG
EGFRCR SERPLBLD CKD-EPI 2021: 22 ML/MIN/1.73M2
EGFRCR SERPLBLD CKD-EPI 2021: 24 ML/MIN/1.73M2
EGFRCR SERPLBLD CKD-EPI 2021: 62 ML/MIN/1.73M2
EGFRCR SERPLBLD CKD-EPI 2021: 62 ML/MIN/1.73M2
EGFRCR SERPLBLD CKD-EPI 2021: 65 ML/MIN/1.73M2
EGFRCR SERPLBLD CKD-EPI 2021: 67 ML/MIN/1.73M2
EGFRCR SERPLBLD CKD-EPI 2021: 72 ML/MIN/1.73M2
EGFRCR SERPLBLD CKD-EPI 2021: 83 ML/MIN/1.73M2
EOSINOPHIL # BLD AUTO: 0 10E3/UL (ref 0–0.7)
EOSINOPHIL # BLD AUTO: 0.1 10E3/UL (ref 0–0.7)
EOSINOPHIL # BLD AUTO: 0.2 10E3/UL (ref 0–0.7)
EOSINOPHIL # BLD AUTO: 0.2 10E3/UL (ref 0–0.7)
EOSINOPHIL # BLD AUTO: 0.3 10E3/UL (ref 0–0.7)
EOSINOPHIL # BLD AUTO: ABNORMAL 10*3/UL
EOSINOPHIL # BLD MANUAL: 0 10E3/UL (ref 0–0.7)
EOSINOPHIL # BLD MANUAL: 0.1 10E3/UL (ref 0–0.7)
EOSINOPHIL # BLD MANUAL: 0.2 10E3/UL (ref 0–0.7)
EOSINOPHIL NFR BLD AUTO: 1 %
EOSINOPHIL NFR BLD AUTO: 1 %
EOSINOPHIL NFR BLD AUTO: 15 %
EOSINOPHIL NFR BLD AUTO: 4 %
EOSINOPHIL NFR BLD AUTO: 4 %
EOSINOPHIL NFR BLD AUTO: 7 %
EOSINOPHIL NFR BLD AUTO: 8 %
EOSINOPHIL NFR BLD AUTO: ABNORMAL %
EOSINOPHIL NFR BLD MANUAL: 0 %
EOSINOPHIL NFR BLD MANUAL: 6 %
EOSINOPHIL NFR BLD MANUAL: 7 %
ERV-%PRED-PRE: 25 %
ERV-%PRED-PRE: 35 %
ERV-PRE: 0.21 L
ERV-PRE: 0.3 L
ERV-PRED: 0.85 L
ERV-PRED: 0.85 L
ERYTHROCYTE [DISTWIDTH] IN BLOOD BY AUTOMATED COUNT: 13.2 % (ref 10–15)
ERYTHROCYTE [DISTWIDTH] IN BLOOD BY AUTOMATED COUNT: 13.2 % (ref 10–15)
ERYTHROCYTE [DISTWIDTH] IN BLOOD BY AUTOMATED COUNT: 13.3 % (ref 10–15)
ERYTHROCYTE [DISTWIDTH] IN BLOOD BY AUTOMATED COUNT: 13.4 % (ref 10–15)
ERYTHROCYTE [DISTWIDTH] IN BLOOD BY AUTOMATED COUNT: 13.5 % (ref 10–15)
ERYTHROCYTE [DISTWIDTH] IN BLOOD BY AUTOMATED COUNT: 13.6 % (ref 10–15)
ERYTHROCYTE [DISTWIDTH] IN BLOOD BY AUTOMATED COUNT: 14.1 % (ref 10–15)
ERYTHROCYTE [DISTWIDTH] IN BLOOD BY AUTOMATED COUNT: 14.2 % (ref 10–15)
ERYTHROCYTE [DISTWIDTH] IN BLOOD BY AUTOMATED COUNT: 14.4 % (ref 10–15)
ERYTHROCYTE [DISTWIDTH] IN BLOOD BY AUTOMATED COUNT: 14.6 % (ref 10–15)
ERYTHROCYTE [DISTWIDTH] IN BLOOD BY AUTOMATED COUNT: 15.7 % (ref 10–15)
EXPTIME-PRE: 6.41 SEC
EXPTIME-PRE: 6.61 SEC
FASTING STATUS PATIENT QL REPORTED: YES
FASTING STATUS PATIENT QL REPORTED: YES
FEF2575-%PRED-POST: 62 %
FEF2575-%PRED-PRE: 45 %
FEF2575-%PRED-PRE: 47 %
FEF2575-POST: 1 L/SEC
FEF2575-PRE: 0.72 L/SEC
FEF2575-PRE: 0.76 L/SEC
FEF2575-PRED: 1.6 L/SEC
FEF2575-PRED: 1.61 L/SEC
FEFMAX-%PRED-PRE: 78 %
FEFMAX-%PRED-PRE: 81 %
FEFMAX-PRE: 3.95 L/SEC
FEFMAX-PRE: 4.15 L/SEC
FEFMAX-PRED: 5.06 L/SEC
FEFMAX-PRED: 5.08 L/SEC
FEV1-%PRED-PRE: 67 %
FEV1-%PRED-PRE: 69 %
FEV1-PRE: 1.23 L
FEV1-PRE: 1.25 L
FEV1FEV6-PRE: 68 %
FEV1FEV6-PRE: 69 %
FEV1FEV6-PRED: 79 %
FEV1FEV6-PRED: 79 %
FEV1FVC-PRE: 69 %
FEV1FVC-PRE: 70 %
FEV1FVC-PRED: 79 %
FEV1FVC-PRED: 79 %
FEV1SVC-PRE: 64 %
FEV1SVC-PRE: 67 %
FEV1SVC-PRED: 66 %
FEV1SVC-PRED: 66 %
FIFMAX-PRE: 2.67 L/SEC
FIFMAX-PRE: 3.34 L/SEC
FLUAV H1 2009 PAND RNA SPEC QL NAA+PROBE: NOT DETECTED
FLUAV H1 2009 PAND RNA SPEC QL NAA+PROBE: NOT DETECTED
FLUAV H1 RNA SPEC QL NAA+PROBE: NOT DETECTED
FLUAV H1 RNA SPEC QL NAA+PROBE: NOT DETECTED
FLUAV H3 RNA SPEC QL NAA+PROBE: NOT DETECTED
FLUAV H3 RNA SPEC QL NAA+PROBE: NOT DETECTED
FLUAV RNA SPEC QL NAA+PROBE: NEGATIVE
FLUAV RNA SPEC QL NAA+PROBE: NEGATIVE
FLUAV RNA SPEC QL NAA+PROBE: NOT DETECTED
FLUAV RNA SPEC QL NAA+PROBE: NOT DETECTED
FLUBV RNA RESP QL NAA+PROBE: NEGATIVE
FLUBV RNA RESP QL NAA+PROBE: NEGATIVE
FLUBV RNA SPEC QL NAA+PROBE: NOT DETECTED
FLUBV RNA SPEC QL NAA+PROBE: NOT DETECTED
FRCPLETH-%PRED-PRE: 132 %
FRCPLETH-PRE: 3.36 L
FRCPLETH-PRED: 2.54 L
FVC-%PRED-PRE: 76 %
FVC-%PRED-PRE: 77 %
FVC-PRE: 1.78 L
FVC-PRE: 1.8 L
FVC-PRED: 2.3 L
FVC-PRED: 2.31 L
GAMMA GLOB SERPL ELPH-MCNC: 0.4 G/DL (ref 0.7–1.6)
GAMMA GLOB SERPL ELPH-MCNC: 0.4 G/DL (ref 0.7–1.6)
GLUCOSE BLDC GLUCOMTR-MCNC: 101 MG/DL (ref 70–99)
GLUCOSE BLDC GLUCOMTR-MCNC: 107 MG/DL (ref 70–99)
GLUCOSE BLDC GLUCOMTR-MCNC: 114 MG/DL (ref 70–99)
GLUCOSE BLDC GLUCOMTR-MCNC: 43 MG/DL (ref 70–99)
GLUCOSE BLDC GLUCOMTR-MCNC: 52 MG/DL (ref 70–99)
GLUCOSE BLDC GLUCOMTR-MCNC: 60 MG/DL (ref 70–99)
GLUCOSE BLDC GLUCOMTR-MCNC: 71 MG/DL (ref 70–99)
GLUCOSE BLDC GLUCOMTR-MCNC: 81 MG/DL (ref 70–99)
GLUCOSE BLDC GLUCOMTR-MCNC: 89 MG/DL (ref 70–99)
GLUCOSE BLDC GLUCOMTR-MCNC: 98 MG/DL (ref 70–99)
GLUCOSE SERPL-MCNC: 100 MG/DL (ref 70–99)
GLUCOSE SERPL-MCNC: 102 MG/DL (ref 70–99)
GLUCOSE SERPL-MCNC: 105 MG/DL (ref 70–99)
GLUCOSE SERPL-MCNC: 107 MG/DL (ref 70–99)
GLUCOSE SERPL-MCNC: 109 MG/DL (ref 70–99)
GLUCOSE SERPL-MCNC: 116 MG/DL (ref 70–99)
GLUCOSE SERPL-MCNC: 80 MG/DL (ref 70–99)
GLUCOSE SERPL-MCNC: 93 MG/DL (ref 70–99)
GLUCOSE UR STRIP-MCNC: NEGATIVE MG/DL
GLUCOSE UR STRIP-MCNC: NEGATIVE MG/DL
GRAM STAIN RESULT: NORMAL
GRANULAR CAST: 123 /LPF
HADV DNA SPEC QL NAA+PROBE: NOT DETECTED
HADV DNA SPEC QL NAA+PROBE: NOT DETECTED
HCO3 BLDV-SCNC: 32 MMOL/L (ref 21–28)
HCO3 BLDV-SCNC: 33 MMOL/L (ref 21–28)
HCO3 SERPL-SCNC: 22 MMOL/L (ref 22–29)
HCO3 SERPL-SCNC: 29 MMOL/L (ref 22–29)
HCO3 SERPL-SCNC: 30 MMOL/L (ref 22–29)
HCOV PNL SPEC NAA+PROBE: NOT DETECTED
HCOV PNL SPEC NAA+PROBE: NOT DETECTED
HCT VFR BLD AUTO: 33 % (ref 35–47)
HCT VFR BLD AUTO: 34.5 % (ref 35–47)
HCT VFR BLD AUTO: 34.6 % (ref 35–47)
HCT VFR BLD AUTO: 35.9 % (ref 35–47)
HCT VFR BLD AUTO: 38.6 % (ref 35–47)
HCT VFR BLD AUTO: 39.3 % (ref 35–47)
HCT VFR BLD AUTO: 39.4 % (ref 35–47)
HCT VFR BLD AUTO: 40 % (ref 35–47)
HCT VFR BLD AUTO: 41.6 % (ref 35–47)
HCT VFR BLD AUTO: 42.9 % (ref 35–47)
HCT VFR BLD AUTO: 43.8 % (ref 35–47)
HDLC SERPL-MCNC: 54 MG/DL
HGB BLD-MCNC: 11.4 G/DL (ref 11.7–15.7)
HGB BLD-MCNC: 11.6 G/DL (ref 11.7–15.7)
HGB BLD-MCNC: 11.7 G/DL (ref 11.7–15.7)
HGB BLD-MCNC: 12.1 G/DL (ref 11.7–15.7)
HGB BLD-MCNC: 12.4 G/DL (ref 11.7–15.7)
HGB BLD-MCNC: 13.2 G/DL (ref 11.7–15.7)
HGB BLD-MCNC: 13.3 G/DL (ref 11.7–15.7)
HGB BLD-MCNC: 13.3 G/DL (ref 11.7–15.7)
HGB BLD-MCNC: 13.6 G/DL (ref 11.7–15.7)
HGB BLD-MCNC: 14 G/DL (ref 11.7–15.7)
HGB BLD-MCNC: 14.1 G/DL
HGB BLD-MCNC: 14.1 G/DL
HGB BLD-MCNC: 14.7 G/DL (ref 11.7–15.7)
HGB BLD-MCNC: 14.7 G/DL (ref 11.7–15.7)
HGB UR QL STRIP: ABNORMAL
HGB UR QL STRIP: ABNORMAL
HMPV RNA SPEC QL NAA+PROBE: NOT DETECTED
HMPV RNA SPEC QL NAA+PROBE: NOT DETECTED
HOLD SPECIMEN: NORMAL
HPIV1 RNA SPEC QL NAA+PROBE: NOT DETECTED
HPIV1 RNA SPEC QL NAA+PROBE: NOT DETECTED
HPIV2 RNA SPEC QL NAA+PROBE: NOT DETECTED
HPIV2 RNA SPEC QL NAA+PROBE: NOT DETECTED
HPIV3 RNA SPEC QL NAA+PROBE: NOT DETECTED
HPIV3 RNA SPEC QL NAA+PROBE: NOT DETECTED
HPIV4 RNA SPEC QL NAA+PROBE: NOT DETECTED
HPIV4 RNA SPEC QL NAA+PROBE: NOT DETECTED
IC-%PRED-PRE: 92 %
IC-%PRED-PRE: 95 %
IC-PRE: 1.57 L
IC-PRE: 1.62 L
IC-PRED: 1.7 L
IC-PRED: 1.7 L
IGA SERPL-MCNC: 126 MG/DL (ref 84–499)
IGA SERPL-MCNC: 129 MG/DL (ref 84–499)
IGA SERPL-MCNC: 135 MG/DL (ref 84–499)
IGA SERPL-MCNC: 138 MG/DL (ref 84–499)
IGG SERPL-MCNC: 355 MG/DL (ref 610–1616)
IGG SERPL-MCNC: 371 MG/DL (ref 610–1616)
IGG SERPL-MCNC: 379 MG/DL (ref 610–1616)
IGG SERPL-MCNC: 420 MG/DL (ref 610–1616)
IGM SERPL-MCNC: 19 MG/DL (ref 35–242)
IGM SERPL-MCNC: 21 MG/DL (ref 35–242)
IGM SERPL-MCNC: 26 MG/DL (ref 35–242)
IGM SERPL-MCNC: 31 MG/DL (ref 35–242)
IMM GRANULOCYTES # BLD: 0 10E3/UL
IMM GRANULOCYTES # BLD: ABNORMAL 10*3/UL
IMM GRANULOCYTES NFR BLD: 0 %
IMM GRANULOCYTES NFR BLD: 1 %
IMM GRANULOCYTES NFR BLD: 1 %
IMM GRANULOCYTES NFR BLD: ABNORMAL %
INTERPRETATION ECG - MUSE: NORMAL
INTERPRETATION ECG - MUSE: NORMAL
KAPPA LC FREE SER-MCNC: 1.66 MG/DL (ref 0.33–1.94)
KAPPA LC FREE SER-MCNC: 1.69 MG/DL (ref 0.33–1.94)
KAPPA LC FREE SER-MCNC: 2.02 MG/DL (ref 0.33–1.94)
KAPPA LC FREE SER-MCNC: 2.03 MG/DL (ref 0.33–1.94)
KAPPA LC FREE/LAMBDA FREE SER NEPH: 0.72 {RATIO} (ref 0.26–1.65)
KAPPA LC FREE/LAMBDA FREE SER NEPH: 0.75 {RATIO} (ref 0.26–1.65)
KAPPA LC FREE/LAMBDA FREE SER NEPH: 0.79 {RATIO} (ref 0.26–1.65)
KAPPA LC FREE/LAMBDA FREE SER NEPH: 1.34 {RATIO} (ref 0.26–1.65)
KETONES UR STRIP-MCNC: NEGATIVE MG/DL
KETONES UR STRIP-MCNC: NEGATIVE MG/DL
L PNEUMO1 AG UR QL IA: NEGATIVE
L PNEUMO1 AG UR QL IA: POSITIVE
LACTATE SERPL-SCNC: 1.5 MMOL/L (ref 0.7–2)
LACTATE SERPL-SCNC: 2.5 MMOL/L (ref 0.7–2)
LAMBDA LC FREE SERPL-MCNC: 1.51 MG/DL (ref 0.57–2.63)
LAMBDA LC FREE SERPL-MCNC: 2.1 MG/DL (ref 0.57–2.63)
LAMBDA LC FREE SERPL-MCNC: 2.36 MG/DL (ref 0.57–2.63)
LAMBDA LC FREE SERPL-MCNC: 2.71 MG/DL (ref 0.57–2.63)
LDH SERPL L TO P-CCNC: 134 U/L (ref 0–250)
LDH SERPL L TO P-CCNC: 162 U/L (ref 0–250)
LDLC SERPL CALC-MCNC: 98 MG/DL
LEUKOCYTE ESTERASE UR QL STRIP: NEGATIVE
LEUKOCYTE ESTERASE UR QL STRIP: NEGATIVE
LVEF ECHO: NORMAL
LYMPHOCYTES # BLD AUTO: 0.2 10E3/UL (ref 0.8–5.3)
LYMPHOCYTES # BLD AUTO: 0.8 10E3/UL (ref 0.8–5.3)
LYMPHOCYTES # BLD AUTO: 0.9 10E3/UL (ref 0.8–5.3)
LYMPHOCYTES # BLD AUTO: 1.2 10E3/UL (ref 0.8–5.3)
LYMPHOCYTES # BLD AUTO: 1.4 10E3/UL (ref 0.8–5.3)
LYMPHOCYTES # BLD AUTO: 2.2 10E3/UL (ref 0.8–5.3)
LYMPHOCYTES # BLD AUTO: 2.4 10E3/UL (ref 0.8–5.3)
LYMPHOCYTES # BLD AUTO: ABNORMAL 10*3/UL
LYMPHOCYTES # BLD MANUAL: 0.6 10E3/UL (ref 0.8–5.3)
LYMPHOCYTES # BLD MANUAL: 0.7 10E3/UL (ref 0.8–5.3)
LYMPHOCYTES # BLD MANUAL: 0.8 10E3/UL (ref 0.8–5.3)
LYMPHOCYTES NFR BLD AUTO: 23 %
LYMPHOCYTES NFR BLD AUTO: 25 %
LYMPHOCYTES NFR BLD AUTO: 31 %
LYMPHOCYTES NFR BLD AUTO: 34 %
LYMPHOCYTES NFR BLD AUTO: 39 %
LYMPHOCYTES NFR BLD AUTO: 42 %
LYMPHOCYTES NFR BLD AUTO: 46 %
LYMPHOCYTES NFR BLD AUTO: ABNORMAL %
LYMPHOCYTES NFR BLD MANUAL: 29 %
LYMPHOCYTES NFR BLD MANUAL: 32 %
LYMPHOCYTES NFR BLD MANUAL: 42 %
M PNEUMO DNA SPEC QL NAA+PROBE: NOT DETECTED
M PNEUMO DNA SPEC QL NAA+PROBE: NOT DETECTED
M PROTEIN SERPL ELPH-MCNC: 0.1 G/DL
M PROTEIN SERPL ELPH-MCNC: 0.1 G/DL
MAGNESIUM SERPL-MCNC: 1.5 MG/DL (ref 1.7–2.3)
MAGNESIUM SERPL-MCNC: 2.5 MG/DL (ref 1.7–2.3)
MCH RBC QN AUTO: 34.7 PG (ref 26.5–33)
MCH RBC QN AUTO: 34.8 PG (ref 26.5–33)
MCH RBC QN AUTO: 34.8 PG (ref 26.5–33)
MCH RBC QN AUTO: 34.9 PG (ref 26.5–33)
MCH RBC QN AUTO: 34.9 PG (ref 26.5–33)
MCH RBC QN AUTO: 35.1 PG (ref 26.5–33)
MCH RBC QN AUTO: 35.1 PG (ref 26.5–33)
MCH RBC QN AUTO: 35.2 PG (ref 26.5–33)
MCH RBC QN AUTO: 35.2 PG (ref 26.5–33)
MCH RBC QN AUTO: 35.3 PG (ref 26.5–33)
MCH RBC QN AUTO: 36.1 PG (ref 26.5–33)
MCHC RBC AUTO-ENTMCNC: 32.7 G/DL (ref 31.5–36.5)
MCHC RBC AUTO-ENTMCNC: 33.5 G/DL (ref 31.5–36.5)
MCHC RBC AUTO-ENTMCNC: 33.5 G/DL (ref 31.5–36.5)
MCHC RBC AUTO-ENTMCNC: 33.6 G/DL (ref 31.5–36.5)
MCHC RBC AUTO-ENTMCNC: 33.7 G/DL (ref 31.5–36.5)
MCHC RBC AUTO-ENTMCNC: 33.8 G/DL (ref 31.5–36.5)
MCHC RBC AUTO-ENTMCNC: 33.9 G/DL (ref 31.5–36.5)
MCHC RBC AUTO-ENTMCNC: 34.3 G/DL (ref 31.5–36.5)
MCHC RBC AUTO-ENTMCNC: 34.5 G/DL (ref 31.5–36.5)
MCHC RBC AUTO-ENTMCNC: 34.5 G/DL (ref 31.5–36.5)
MCHC RBC AUTO-ENTMCNC: 35 G/DL (ref 31.5–36.5)
MCV RBC AUTO: 101 FL (ref 78–100)
MCV RBC AUTO: 102 FL (ref 78–100)
MCV RBC AUTO: 103 FL (ref 78–100)
MCV RBC AUTO: 103 FL (ref 78–100)
MCV RBC AUTO: 104 FL (ref 78–100)
MCV RBC AUTO: 104 FL (ref 78–100)
MCV RBC AUTO: 105 FL (ref 78–100)
MCV RBC AUTO: 106 FL (ref 78–100)
MCV RBC AUTO: 107 FL (ref 78–100)
METAMYELOCYTES # BLD MANUAL: 0 10E3/UL
METAMYELOCYTES # BLD MANUAL: 0.1 10E3/UL
METAMYELOCYTES NFR BLD MANUAL: 1 %
METAMYELOCYTES NFR BLD MANUAL: 3 %
MONOCYTES # BLD AUTO: 0 10E3/UL (ref 0–1.3)
MONOCYTES # BLD AUTO: 0.4 10E3/UL (ref 0–1.3)
MONOCYTES # BLD AUTO: 0.5 10E3/UL (ref 0–1.3)
MONOCYTES # BLD AUTO: 0.6 10E3/UL (ref 0–1.3)
MONOCYTES # BLD AUTO: 0.6 10E3/UL (ref 0–1.3)
MONOCYTES # BLD AUTO: ABNORMAL 10*3/UL
MONOCYTES # BLD MANUAL: 0.3 10E3/UL (ref 0–1.3)
MONOCYTES # BLD MANUAL: 0.4 10E3/UL (ref 0–1.3)
MONOCYTES # BLD MANUAL: 0.4 10E3/UL (ref 0–1.3)
MONOCYTES NFR BLD AUTO: 10 %
MONOCYTES NFR BLD AUTO: 11 %
MONOCYTES NFR BLD AUTO: 12 %
MONOCYTES NFR BLD AUTO: 16 %
MONOCYTES NFR BLD AUTO: 17 %
MONOCYTES NFR BLD AUTO: 8 %
MONOCYTES NFR BLD AUTO: 9 %
MONOCYTES NFR BLD AUTO: ABNORMAL %
MONOCYTES NFR BLD MANUAL: 15 %
MONOCYTES NFR BLD MANUAL: 16 %
MONOCYTES NFR BLD MANUAL: 18 %
MRSA DNA SPEC QL NAA+PROBE: NEGATIVE
MUCOUS THREADS #/AREA URNS LPF: PRESENT /LPF
NEUTROPHILS # BLD AUTO: 0.2 10E3/UL (ref 1.6–8.3)
NEUTROPHILS # BLD AUTO: 1.2 10E3/UL (ref 1.6–8.3)
NEUTROPHILS # BLD AUTO: 1.8 10E3/UL (ref 1.6–8.3)
NEUTROPHILS # BLD AUTO: 1.8 10E3/UL (ref 1.6–8.3)
NEUTROPHILS # BLD AUTO: 1.9 10E3/UL (ref 1.6–8.3)
NEUTROPHILS # BLD AUTO: 2 10E3/UL (ref 1.6–8.3)
NEUTROPHILS # BLD AUTO: 2.6 10E3/UL (ref 1.6–8.3)
NEUTROPHILS # BLD AUTO: ABNORMAL 10*3/UL
NEUTROPHILS # BLD MANUAL: 0.6 10E3/UL (ref 1.6–8.3)
NEUTROPHILS # BLD MANUAL: 1 10E3/UL (ref 1.6–8.3)
NEUTROPHILS # BLD MANUAL: 1.2 10E3/UL (ref 1.6–8.3)
NEUTROPHILS NFR BLD AUTO: 39 %
NEUTROPHILS NFR BLD AUTO: 40 %
NEUTROPHILS NFR BLD AUTO: 44 %
NEUTROPHILS NFR BLD AUTO: 45 %
NEUTROPHILS NFR BLD AUTO: 46 %
NEUTROPHILS NFR BLD AUTO: 54 %
NEUTROPHILS NFR BLD AUTO: 57 %
NEUTROPHILS NFR BLD AUTO: ABNORMAL %
NEUTROPHILS NFR BLD MANUAL: 33 %
NEUTROPHILS NFR BLD MANUAL: 47 %
NEUTROPHILS NFR BLD MANUAL: 48 %
NITRATE UR QL: NEGATIVE
NITRATE UR QL: NEGATIVE
NONHDLC SERPL-MCNC: 112 MG/DL
NRBC # BLD AUTO: 0 10E3/UL
NRBC BLD AUTO-RTO: 0 /100
NT-PROBNP SERPL-MCNC: 2505 PG/ML (ref 0–900)
NT-PROBNP SERPL-MCNC: 524 PG/ML (ref 0–900)
NT-PROBNP SERPL-MCNC: 738 PG/ML (ref 0–900)
O2/TOTAL GAS SETTING VFR VENT: 21 %
O2/TOTAL GAS SETTING VFR VENT: 28 %
OXYHGB MFR BLDV: 18 % (ref 70–75)
OXYHGB MFR BLDV: 32 % (ref 70–75)
P AXIS - MUSE: -7 DEGREES
P AXIS - MUSE: 48 DEGREES
PCO2 BLDV: 47 MM HG (ref 40–50)
PCO2 BLDV: 48 MM HG (ref 40–50)
PH BLDV: 7.43 [PH] (ref 7.32–7.43)
PH BLDV: 7.45 [PH] (ref 7.32–7.43)
PH UR STRIP: 5.5 [PH] (ref 5–7)
PH UR STRIP: 5.5 [PH] (ref 5–7)
PLAT MORPH BLD: ABNORMAL
PLAT MORPH BLD: NORMAL
PLATELET # BLD AUTO: 119 10E3/UL (ref 150–450)
PLATELET # BLD AUTO: 123 10E3/UL (ref 150–450)
PLATELET # BLD AUTO: 152 10E3/UL (ref 150–450)
PLATELET # BLD AUTO: 161 10E3/UL (ref 150–450)
PLATELET # BLD AUTO: 161 10E3/UL (ref 150–450)
PLATELET # BLD AUTO: 163 10E3/UL (ref 150–450)
PLATELET # BLD AUTO: 172 10E3/UL (ref 150–450)
PLATELET # BLD AUTO: 194 10E3/UL (ref 150–450)
PLATELET # BLD AUTO: 195 10E3/UL (ref 150–450)
PLATELET # BLD AUTO: 225 10E3/UL (ref 150–450)
PLATELET # BLD AUTO: 59 10E3/UL (ref 150–450)
PO2 BLDV: 15 MM HG (ref 25–47)
PO2 BLDV: 21 MM HG (ref 25–47)
POTASSIUM SERPL-SCNC: 3 MMOL/L (ref 3.4–5.3)
POTASSIUM SERPL-SCNC: 3.2 MMOL/L (ref 3.4–5.3)
POTASSIUM SERPL-SCNC: 3.3 MMOL/L (ref 3.4–5.3)
POTASSIUM SERPL-SCNC: 3.4 MMOL/L (ref 3.4–5.3)
POTASSIUM SERPL-SCNC: 3.5 MMOL/L (ref 3.4–5.3)
POTASSIUM SERPL-SCNC: 3.6 MMOL/L (ref 3.4–5.3)
POTASSIUM SERPL-SCNC: 3.6 MMOL/L (ref 3.4–5.3)
POTASSIUM SERPL-SCNC: 3.7 MMOL/L (ref 3.4–5.3)
POTASSIUM SERPL-SCNC: 3.8 MMOL/L (ref 3.4–5.3)
POTASSIUM SERPL-SCNC: 3.8 MMOL/L (ref 3.4–5.3)
POTASSIUM SERPL-SCNC: 3.9 MMOL/L (ref 3.4–5.3)
POTASSIUM SERPL-SCNC: 4 MMOL/L (ref 3.4–5.3)
POTASSIUM SERPL-SCNC: 4 MMOL/L (ref 3.4–5.3)
POTASSIUM SERPL-SCNC: 4.1 MMOL/L (ref 3.4–5.3)
POTASSIUM SERPL-SCNC: 4.2 MMOL/L (ref 3.4–5.3)
PR INTERVAL - MUSE: 166 MS
PR INTERVAL - MUSE: 172 MS
PROCALCITONIN SERPL IA-MCNC: 0.18 NG/ML
PROCALCITONIN SERPL IA-MCNC: 41.1 NG/ML
PROT PATTERN SERPL ELPH-IMP: ABNORMAL
PROT PATTERN SERPL ELPH-IMP: ABNORMAL
PROT PATTERN SERPL IFE-IMP: NORMAL
PROT SERPL-MCNC: 5.8 G/DL (ref 6.4–8.3)
PROT SERPL-MCNC: 6.1 G/DL (ref 6.4–8.3)
PROT SERPL-MCNC: 6.3 G/DL (ref 6.4–8.3)
PROT SERPL-MCNC: 6.3 G/DL (ref 6.4–8.3)
PROT SERPL-MCNC: 6.6 G/DL (ref 6.4–8.3)
PROT SERPL-MCNC: 6.6 G/DL (ref 6.4–8.3)
PROT SERPL-MCNC: 7 G/DL (ref 6.4–8.3)
QRS DURATION - MUSE: 92 MS
QRS DURATION - MUSE: 96 MS
QT - MUSE: 366 MS
QT - MUSE: 400 MS
QTC - MUSE: 416 MS
QTC - MUSE: 450 MS
R AXIS - MUSE: 59 DEGREES
R AXIS - MUSE: 99 DEGREES
RBC # BLD AUTO: 3.24 10E6/UL (ref 3.8–5.2)
RBC # BLD AUTO: 3.32 10E6/UL (ref 3.8–5.2)
RBC # BLD AUTO: 3.37 10E6/UL (ref 3.8–5.2)
RBC # BLD AUTO: 3.48 10E6/UL (ref 3.8–5.2)
RBC # BLD AUTO: 3.68 10E6/UL (ref 3.8–5.2)
RBC # BLD AUTO: 3.76 10E6/UL (ref 3.8–5.2)
RBC # BLD AUTO: 3.79 10E6/UL (ref 3.8–5.2)
RBC # BLD AUTO: 3.91 10E6/UL (ref 3.8–5.2)
RBC # BLD AUTO: 3.98 10E6/UL (ref 3.8–5.2)
RBC # BLD AUTO: 4.16 10E6/UL (ref 3.8–5.2)
RBC # BLD AUTO: 4.21 10E6/UL (ref 3.8–5.2)
RBC MORPH BLD: ABNORMAL
RBC MORPH BLD: NORMAL
RBC URINE: 0 /HPF
RBC URINE: 0 /HPF
RSV RNA SPEC NAA+PROBE: NEGATIVE
RSV RNA SPEC NAA+PROBE: NEGATIVE
RSV RNA SPEC QL NAA+PROBE: NOT DETECTED
RV+EV RNA SPEC QL NAA+PROBE: NOT DETECTED
RV+EV RNA SPEC QL NAA+PROBE: NOT DETECTED
RVPLETH-%PRED-PRE: 152 %
RVPLETH-PRE: 2.98 L
RVPLETH-PRED: 1.95 L
S PNEUM AG SPEC QL: NEGATIVE
S PNEUM AG SPEC QL: NEGATIVE
SA TARGET DNA: POSITIVE
SAO2 % BLDV: 18.9 % (ref 70–75)
SAO2 % BLDV: 33.7 % (ref 70–75)
SARS-COV-2 RNA RESP QL NAA+PROBE: NEGATIVE
SARS-COV-2 RNA RESP QL NAA+PROBE: NEGATIVE
SLPCOMP: NORMAL
SODIUM SERPL-SCNC: 133 MMOL/L (ref 135–145)
SODIUM SERPL-SCNC: 135 MMOL/L (ref 135–145)
SODIUM SERPL-SCNC: 137 MMOL/L (ref 135–145)
SODIUM SERPL-SCNC: 137 MMOL/L (ref 135–145)
SODIUM SERPL-SCNC: 138 MMOL/L (ref 135–145)
SODIUM SERPL-SCNC: 139 MMOL/L (ref 135–145)
SODIUM SERPL-SCNC: 140 MMOL/L (ref 135–145)
SODIUM SERPL-SCNC: 141 MMOL/L (ref 135–145)
SP GR UR STRIP: 1.02 (ref 1–1.03)
SP GR UR STRIP: 1.02 (ref 1–1.03)
SPECIMEN TYPE: ABNORMAL
SQUAMOUS EPITHELIAL: 1 /HPF
SQUAMOUS EPITHELIAL: 2 /HPF
SYSTOLIC BLOOD PRESSURE - MUSE: 139 MMHG
SYSTOLIC BLOOD PRESSURE - MUSE: NORMAL MMHG
T AXIS - MUSE: 19 DEGREES
T AXIS - MUSE: 57 DEGREES
TLCPLETH-%PRED-PRE: 111 %
TLCPLETH-PRE: 4.93 L
TLCPLETH-PRED: 4.44 L
TOTAL PROTEIN SERUM FOR ELP: 5.7 G/DL (ref 6.4–8.3)
TOTAL PROTEIN SERUM FOR ELP: 5.8 G/DL (ref 6.4–8.3)
TRIGL SERPL-MCNC: 71 MG/DL
TROPONIN T SERPL HS-MCNC: 17 NG/L
TROPONIN T SERPL HS-MCNC: 23 NG/L
TROPONIN T SERPL HS-MCNC: 25 NG/L
TROPONIN T SERPL HS-MCNC: 76 NG/L
TROPONIN T SERPL HS-MCNC: 82 NG/L
UROBILINOGEN UR STRIP-MCNC: <2 MG/DL
UROBILINOGEN UR STRIP-MCNC: <2 MG/DL
VA-%PRED-PRE: 89 %
VA-%PRED-PRE: 94 %
VA-PRE: 3.68 L
VA-PRE: 3.88 L
VANCOMYCIN SERPL-MCNC: 10.1 UG/ML
VANCOMYCIN SERPL-MCNC: 6.8 UG/ML
VC-%PRED-PRE: 66 %
VC-%PRED-PRE: 70 %
VC-PRE: 1.84 L
VC-PRE: 1.95 L
VC-PRED: 2.76 L
VC-PRED: 2.76 L
VENTRICULAR RATE- MUSE: 65 BPM
VENTRICULAR RATE- MUSE: 91 BPM
WBC # BLD AUTO: 0.2 10E3/UL (ref 4–11)
WBC # BLD AUTO: 0.5 10E3/UL (ref 4–11)
WBC # BLD AUTO: 1.8 10E3/UL (ref 4–11)
WBC # BLD AUTO: 2 10E3/UL (ref 4–11)
WBC # BLD AUTO: 2.5 10E3/UL (ref 4–11)
WBC # BLD AUTO: 3 10E3/UL (ref 4–11)
WBC # BLD AUTO: 3.3 10E3/UL (ref 4–11)
WBC # BLD AUTO: 3.6 10E3/UL (ref 4–11)
WBC # BLD AUTO: 3.6 10E3/UL (ref 4–11)
WBC # BLD AUTO: 4.1 10E3/UL (ref 4–11)
WBC # BLD AUTO: 4.7 10E3/UL (ref 4–11)
WBC # BLD AUTO: 5.7 10E3/UL (ref 4–11)
WBC URINE: 2 /HPF
WBC URINE: 5 /HPF

## 2024-01-01 PROCEDURE — 99214 OFFICE O/P EST MOD 30 MIN: CPT | Performed by: FAMILY MEDICINE

## 2024-01-01 PROCEDURE — 94640 AIRWAY INHALATION TREATMENT: CPT

## 2024-01-01 PROCEDURE — 81001 URINALYSIS AUTO W/SCOPE: CPT | Performed by: EMERGENCY MEDICINE

## 2024-01-01 PROCEDURE — 36415 COLL VENOUS BLD VENIPUNCTURE: CPT | Performed by: HOSPITALIST

## 2024-01-01 PROCEDURE — 99291 CRITICAL CARE FIRST HOUR: CPT

## 2024-01-01 PROCEDURE — 84165 PROTEIN E-PHORESIS SERUM: CPT | Mod: 26 | Performed by: PATHOLOGY

## 2024-01-01 PROCEDURE — 94640 AIRWAY INHALATION TREATMENT: CPT | Mod: 76

## 2024-01-01 PROCEDURE — 99215 OFFICE O/P EST HI 40 MIN: CPT | Performed by: INTERNAL MEDICINE

## 2024-01-01 PROCEDURE — 83615 LACTATE (LD) (LDH) ENZYME: CPT

## 2024-01-01 PROCEDURE — 84132 ASSAY OF SERUM POTASSIUM: CPT | Performed by: INTERNAL MEDICINE

## 2024-01-01 PROCEDURE — 82805 BLOOD GASES W/O2 SATURATION: CPT | Performed by: STUDENT IN AN ORGANIZED HEALTH CARE EDUCATION/TRAINING PROGRAM

## 2024-01-01 PROCEDURE — 85041 AUTOMATED RBC COUNT: CPT

## 2024-01-01 PROCEDURE — 83880 ASSAY OF NATRIURETIC PEPTIDE: CPT | Performed by: INTERNAL MEDICINE

## 2024-01-01 PROCEDURE — V5020 CONFORMITY EVALUATION: HCPCS | Mod: GA | Performed by: AUDIOLOGIST

## 2024-01-01 PROCEDURE — 120N000001 HC R&B MED SURG/OB

## 2024-01-01 PROCEDURE — 36415 COLL VENOUS BLD VENIPUNCTURE: CPT | Performed by: STUDENT IN AN ORGANIZED HEALTH CARE EDUCATION/TRAINING PROGRAM

## 2024-01-01 PROCEDURE — 82465 ASSAY BLD/SERUM CHOLESTEROL: CPT

## 2024-01-01 PROCEDURE — 82040 ASSAY OF SERUM ALBUMIN: CPT | Performed by: EMERGENCY MEDICINE

## 2024-01-01 PROCEDURE — 272N000035 NM LUNG SCAN VENTILATION AND PERFUSION

## 2024-01-01 PROCEDURE — 250N000013 HC RX MED GY IP 250 OP 250 PS 637: Performed by: INTERNAL MEDICINE

## 2024-01-01 PROCEDURE — 98927 OSTEOPATH MANJ 5-6 REGIONS: CPT | Performed by: PHYSICAL MEDICINE & REHABILITATION

## 2024-01-01 PROCEDURE — 90837 PSYTX W PT 60 MINUTES: CPT | Performed by: PSYCHOLOGIST

## 2024-01-01 PROCEDURE — 99213 OFFICE O/P EST LOW 20 MIN: CPT | Mod: 25 | Performed by: PHYSICAL MEDICINE & REHABILITATION

## 2024-01-01 PROCEDURE — 250N000011 HC RX IP 250 OP 636: Performed by: INTERNAL MEDICINE

## 2024-01-01 PROCEDURE — 80053 COMPREHEN METABOLIC PANEL: CPT

## 2024-01-01 PROCEDURE — 36415 COLL VENOUS BLD VENIPUNCTURE: CPT

## 2024-01-01 PROCEDURE — 81001 URINALYSIS AUTO W/SCOPE: CPT | Performed by: INTERNAL MEDICINE

## 2024-01-01 PROCEDURE — 77067 SCR MAMMO BI INCL CAD: CPT

## 2024-01-01 PROCEDURE — 36415 COLL VENOUS BLD VENIPUNCTURE: CPT | Performed by: INTERNAL MEDICINE

## 2024-01-01 PROCEDURE — 36415 COLL VENOUS BLD VENIPUNCTURE: CPT | Performed by: EMERGENCY MEDICINE

## 2024-01-01 PROCEDURE — 99214 OFFICE O/P EST MOD 30 MIN: CPT | Mod: 25 | Performed by: FAMILY MEDICINE

## 2024-01-01 PROCEDURE — 250N000009 HC RX 250: Performed by: INTERNAL MEDICINE

## 2024-01-01 PROCEDURE — 999N000157 HC STATISTIC RCP TIME EA 10 MIN

## 2024-01-01 PROCEDURE — 999N000208 ECHOCARDIOGRAM COMPLETE

## 2024-01-01 PROCEDURE — 272N000452 HC KIT SHRLOCK 5FR POWER PICC TRIPLE LUMEN

## 2024-01-01 PROCEDURE — 999N000127 HC STATISTIC PERIPHERAL IV START W US GUIDANCE

## 2024-01-01 PROCEDURE — 82805 BLOOD GASES W/O2 SATURATION: CPT | Performed by: EMERGENCY MEDICINE

## 2024-01-01 PROCEDURE — 83521 IG LIGHT CHAINS FREE EACH: CPT

## 2024-01-01 PROCEDURE — 71045 X-RAY EXAM CHEST 1 VIEW: CPT

## 2024-01-01 PROCEDURE — 99213 OFFICE O/P EST LOW 20 MIN: CPT | Performed by: INTERNAL MEDICINE

## 2024-01-01 PROCEDURE — 272N000278 HC DEVICE 5FR SECURACATH

## 2024-01-01 PROCEDURE — 83735 ASSAY OF MAGNESIUM: CPT | Performed by: INTERNAL MEDICINE

## 2024-01-01 PROCEDURE — 82040 ASSAY OF SERUM ALBUMIN: CPT

## 2024-01-01 PROCEDURE — 99232 SBSQ HOSP IP/OBS MODERATE 35: CPT | Performed by: INTERNAL MEDICINE

## 2024-01-01 PROCEDURE — 85379 FIBRIN DEGRADATION QUANT: CPT | Performed by: EMERGENCY MEDICINE

## 2024-01-01 PROCEDURE — 94726 PLETHYSMOGRAPHY LUNG VOLUMES: CPT | Mod: 26

## 2024-01-01 PROCEDURE — 250N000011 HC RX IP 250 OP 636: Mod: JZ | Performed by: INTERNAL MEDICINE

## 2024-01-01 PROCEDURE — 93010 ELECTROCARDIOGRAM REPORT: CPT | Performed by: INTERNAL MEDICINE

## 2024-01-01 PROCEDURE — 258N000003 HC RX IP 258 OP 636: Performed by: INTERNAL MEDICINE

## 2024-01-01 PROCEDURE — 82784 ASSAY IGA/IGD/IGG/IGM EACH: CPT

## 2024-01-01 PROCEDURE — 999N000065 XR CHEST PORT 1 VIEW

## 2024-01-01 PROCEDURE — 85007 BL SMEAR W/DIFF WBC COUNT: CPT | Performed by: STUDENT IN AN ORGANIZED HEALTH CARE EDUCATION/TRAINING PROGRAM

## 2024-01-01 PROCEDURE — G0463 HOSPITAL OUTPT CLINIC VISIT: HCPCS | Performed by: INTERNAL MEDICINE

## 2024-01-01 PROCEDURE — 97161 PT EVAL LOW COMPLEX 20 MIN: CPT | Mod: GP | Performed by: PHYSICAL THERAPIST

## 2024-01-01 PROCEDURE — 99221 1ST HOSP IP/OBS SF/LOW 40: CPT | Performed by: INTERNAL MEDICINE

## 2024-01-01 PROCEDURE — 99233 SBSQ HOSP IP/OBS HIGH 50: CPT | Performed by: INTERNAL MEDICINE

## 2024-01-01 PROCEDURE — 78582 LUNG VENTILAT&PERFUS IMAGING: CPT

## 2024-01-01 PROCEDURE — 99214 OFFICE O/P EST MOD 30 MIN: CPT | Performed by: INTERNAL MEDICINE

## 2024-01-01 PROCEDURE — 93005 ELECTROCARDIOGRAM TRACING: CPT

## 2024-01-01 PROCEDURE — 96375 TX/PRO/DX INJ NEW DRUG ADDON: CPT

## 2024-01-01 PROCEDURE — 250N000013 HC RX MED GY IP 250 OP 250 PS 637: Performed by: HOSPITALIST

## 2024-01-01 PROCEDURE — 84145 PROCALCITONIN (PCT): CPT | Performed by: EMERGENCY MEDICINE

## 2024-01-01 PROCEDURE — 94729 DIFFUSING CAPACITY: CPT | Mod: 26 | Performed by: INTERNAL MEDICINE

## 2024-01-01 PROCEDURE — 99495 TRANSJ CARE MGMT MOD F2F 14D: CPT | Performed by: FAMILY MEDICINE

## 2024-01-01 PROCEDURE — 96365 THER/PROPH/DIAG IV INF INIT: CPT

## 2024-01-01 PROCEDURE — G0463 HOSPITAL OUTPT CLINIC VISIT: HCPCS

## 2024-01-01 PROCEDURE — 71275 CT ANGIOGRAPHY CHEST: CPT

## 2024-01-01 PROCEDURE — 85018 HEMOGLOBIN: CPT | Mod: QW

## 2024-01-01 PROCEDURE — 87633 RESP VIRUS 12-25 TARGETS: CPT | Performed by: INTERNAL MEDICINE

## 2024-01-01 PROCEDURE — 76856 US EXAM PELVIC COMPLETE: CPT

## 2024-01-01 PROCEDURE — 83605 ASSAY OF LACTIC ACID: CPT | Performed by: STUDENT IN AN ORGANIZED HEALTH CARE EDUCATION/TRAINING PROGRAM

## 2024-01-01 PROCEDURE — 94060 EVALUATION OF WHEEZING: CPT | Mod: 26

## 2024-01-01 PROCEDURE — 86038 ANTINUCLEAR ANTIBODIES: CPT

## 2024-01-01 PROCEDURE — 87040 BLOOD CULTURE FOR BACTERIA: CPT | Performed by: EMERGENCY MEDICINE

## 2024-01-01 PROCEDURE — 76830 TRANSVAGINAL US NON-OB: CPT

## 2024-01-01 PROCEDURE — 80048 BASIC METABOLIC PNL TOTAL CA: CPT | Performed by: INTERNAL MEDICINE

## 2024-01-01 PROCEDURE — 86334 IMMUNOFIX E-PHORESIS SERUM: CPT | Performed by: PATHOLOGY

## 2024-01-01 PROCEDURE — 94375 RESPIRATORY FLOW VOLUME LOOP: CPT | Mod: 26 | Performed by: INTERNAL MEDICINE

## 2024-01-01 PROCEDURE — 95810 POLYSOM 6/> YRS 4/> PARAM: CPT | Performed by: INTERNAL MEDICINE

## 2024-01-01 PROCEDURE — 99291 CRITICAL CARE FIRST HOUR: CPT | Performed by: INTERNAL MEDICINE

## 2024-01-01 PROCEDURE — 99207 PR NO BILLABLE SERVICE THIS VISIT: CPT | Performed by: INTERNAL MEDICINE

## 2024-01-01 PROCEDURE — 85025 COMPLETE CBC W/AUTO DIFF WBC: CPT

## 2024-01-01 PROCEDURE — 84155 ASSAY OF PROTEIN SERUM: CPT

## 2024-01-01 PROCEDURE — 85004 AUTOMATED DIFF WBC COUNT: CPT

## 2024-01-01 PROCEDURE — 87205 SMEAR GRAM STAIN: CPT | Performed by: INTERNAL MEDICINE

## 2024-01-01 PROCEDURE — 85007 BL SMEAR W/DIFF WBC COUNT: CPT | Performed by: INTERNAL MEDICINE

## 2024-01-01 PROCEDURE — 92591 PR HEARING AID EXAM BINAURAL: CPT | Performed by: AUDIOLOGIST

## 2024-01-01 PROCEDURE — 94799 UNLISTED PULMONARY SVC/PX: CPT

## 2024-01-01 PROCEDURE — G2211 COMPLEX E/M VISIT ADD ON: HCPCS | Performed by: INTERNAL MEDICINE

## 2024-01-01 PROCEDURE — 87486 CHLMYD PNEUM DNA AMP PROBE: CPT | Performed by: INTERNAL MEDICINE

## 2024-01-01 PROCEDURE — 99203 OFFICE O/P NEW LOW 30 MIN: CPT | Performed by: OTOLARYNGOLOGY

## 2024-01-01 PROCEDURE — 93005 ELECTROCARDIOGRAM TRACING: CPT | Performed by: EMERGENCY MEDICINE

## 2024-01-01 PROCEDURE — 250N000011 HC RX IP 250 OP 636: Performed by: EMERGENCY MEDICINE

## 2024-01-01 PROCEDURE — 99214 OFFICE O/P EST MOD 30 MIN: CPT | Performed by: PHYSICAL MEDICINE & REHABILITATION

## 2024-01-01 PROCEDURE — 85027 COMPLETE CBC AUTOMATED: CPT | Performed by: HOSPITALIST

## 2024-01-01 PROCEDURE — G0463 HOSPITAL OUTPT CLINIC VISIT: HCPCS | Performed by: FAMILY MEDICINE

## 2024-01-01 PROCEDURE — 99214 OFFICE O/P EST MOD 30 MIN: CPT | Mod: 25 | Performed by: PHYSICAL MEDICINE & REHABILITATION

## 2024-01-01 PROCEDURE — 82247 BILIRUBIN TOTAL: CPT | Performed by: HOSPITALIST

## 2024-01-01 PROCEDURE — 250N000011 HC RX IP 250 OP 636: Performed by: STUDENT IN AN ORGANIZED HEALTH CARE EDUCATION/TRAINING PROGRAM

## 2024-01-01 PROCEDURE — 71271 CT THORAX LUNG CANCER SCR C-: CPT

## 2024-01-01 PROCEDURE — 999N000215 HC STATISTIC HFNC ADULT NON-CPAP

## 2024-01-01 PROCEDURE — 85048 AUTOMATED LEUKOCYTE COUNT: CPT | Performed by: INTERNAL MEDICINE

## 2024-01-01 PROCEDURE — G0439 PPPS, SUBSEQ VISIT: HCPCS | Performed by: FAMILY MEDICINE

## 2024-01-01 PROCEDURE — 97116 GAIT TRAINING THERAPY: CPT | Mod: GP | Performed by: PHYSICAL THERAPIST

## 2024-01-01 PROCEDURE — 93005 ELECTROCARDIOGRAM TRACING: CPT | Performed by: INTERNAL MEDICINE

## 2024-01-01 PROCEDURE — 87637 SARSCOV2&INF A&B&RSV AMP PRB: CPT | Performed by: EMERGENCY MEDICINE

## 2024-01-01 PROCEDURE — 258N000003 HC RX IP 258 OP 636: Performed by: EMERGENCY MEDICINE

## 2024-01-01 PROCEDURE — 92557 COMPREHENSIVE HEARING TEST: CPT | Performed by: AUDIOLOGIST

## 2024-01-01 PROCEDURE — 99207 PR NO BILLABLE SERVICE THIS VISIT: CPT

## 2024-01-01 PROCEDURE — 250N000009 HC RX 250: Performed by: EMERGENCY MEDICINE

## 2024-01-01 PROCEDURE — V5011 HEARING AID FITTING/CHECKING: HCPCS | Mod: GA | Performed by: AUDIOLOGIST

## 2024-01-01 PROCEDURE — A9540 TC99M MAA: HCPCS | Performed by: INTERNAL MEDICINE

## 2024-01-01 PROCEDURE — 999N000287 HC ICU ADULT ROUNDING, EACH 10 MINS

## 2024-01-01 PROCEDURE — V5299 HEARING SERVICE: HCPCS | Performed by: AUDIOLOGIST

## 2024-01-01 PROCEDURE — 95004 PERQ TESTS W/ALRGNC XTRCS: CPT | Performed by: ALLERGY & IMMUNOLOGY

## 2024-01-01 PROCEDURE — 85025 COMPLETE CBC W/AUTO DIFF WBC: CPT | Performed by: EMERGENCY MEDICINE

## 2024-01-01 PROCEDURE — 85027 COMPLETE CBC AUTOMATED: CPT | Performed by: INTERNAL MEDICINE

## 2024-01-01 PROCEDURE — 99292 CRITICAL CARE ADDL 30 MIN: CPT

## 2024-01-01 PROCEDURE — 70450 CT HEAD/BRAIN W/O DYE: CPT

## 2024-01-01 PROCEDURE — A9567 TECHNETIUM TC-99M AEROSOL: HCPCS | Performed by: INTERNAL MEDICINE

## 2024-01-01 PROCEDURE — 96374 THER/PROPH/DIAG INJ IV PUSH: CPT | Mod: 59

## 2024-01-01 PROCEDURE — 95076 INGEST CHALLENGE INI 120 MIN: CPT | Performed by: ALLERGY & IMMUNOLOGY

## 2024-01-01 PROCEDURE — 343N000001 HC RX 343: Performed by: INTERNAL MEDICINE

## 2024-01-01 PROCEDURE — 3E043XZ INTRODUCTION OF VASOPRESSOR INTO CENTRAL VEIN, PERCUTANEOUS APPROACH: ICD-10-PCS | Performed by: INTERNAL MEDICINE

## 2024-01-01 PROCEDURE — 83880 ASSAY OF NATRIURETIC PEPTIDE: CPT | Performed by: STUDENT IN AN ORGANIZED HEALTH CARE EDUCATION/TRAINING PROGRAM

## 2024-01-01 PROCEDURE — 92550 TYMPANOMETRY & REFLEX THRESH: CPT | Performed by: AUDIOLOGIST

## 2024-01-01 PROCEDURE — 84165 PROTEIN E-PHORESIS SERUM: CPT | Mod: TC | Performed by: PATHOLOGY

## 2024-01-01 PROCEDURE — 99203 OFFICE O/P NEW LOW 30 MIN: CPT | Mod: 25 | Performed by: ALLERGY & IMMUNOLOGY

## 2024-01-01 PROCEDURE — 272N000054 HC CANNULA HIGH FLOW, ADULT

## 2024-01-01 PROCEDURE — 36569 INSJ PICC 5 YR+ W/O IMAGING: CPT

## 2024-01-01 PROCEDURE — 84484 ASSAY OF TROPONIN QUANT: CPT | Performed by: STUDENT IN AN ORGANIZED HEALTH CARE EDUCATION/TRAINING PROGRAM

## 2024-01-01 PROCEDURE — V5160 DISPENSING FEE BINAURAL: HCPCS | Mod: GA | Performed by: AUDIOLOGIST

## 2024-01-01 PROCEDURE — V5261 HEARING AID, DIGIT, BIN, BTE: HCPCS | Mod: GA | Performed by: AUDIOLOGIST

## 2024-01-01 PROCEDURE — 250N000011 HC RX IP 250 OP 636: Performed by: HOSPITALIST

## 2024-01-01 PROCEDURE — 258N000003 HC RX IP 258 OP 636: Performed by: HOSPITALIST

## 2024-01-01 PROCEDURE — 93306 TTE W/DOPPLER COMPLETE: CPT | Mod: 26 | Performed by: INTERNAL MEDICINE

## 2024-01-01 PROCEDURE — 99239 HOSP IP/OBS DSCHRG MGMT >30: CPT | Performed by: HOSPITALIST

## 2024-01-01 PROCEDURE — 71046 X-RAY EXAM CHEST 2 VIEWS: CPT

## 2024-01-01 PROCEDURE — 87641 MR-STAPH DNA AMP PROBE: CPT | Performed by: INTERNAL MEDICINE

## 2024-01-01 PROCEDURE — 80202 ASSAY OF VANCOMYCIN: CPT | Performed by: INTERNAL MEDICINE

## 2024-01-01 PROCEDURE — 272N000064 HC CIRCUIT HUMIDITY W/CPAP BIPAP

## 2024-01-01 PROCEDURE — 96361 HYDRATE IV INFUSION ADD-ON: CPT

## 2024-01-01 PROCEDURE — 85025 COMPLETE CBC W/AUTO DIFF WBC: CPT | Performed by: INTERNAL MEDICINE

## 2024-01-01 PROCEDURE — 87640 STAPH A DNA AMP PROBE: CPT | Performed by: INTERNAL MEDICINE

## 2024-01-01 PROCEDURE — 93005 ELECTROCARDIOGRAM TRACING: CPT | Performed by: STUDENT IN AN ORGANIZED HEALTH CARE EDUCATION/TRAINING PROGRAM

## 2024-01-01 PROCEDURE — 200N000001 HC R&B ICU

## 2024-01-01 PROCEDURE — 84484 ASSAY OF TROPONIN QUANT: CPT | Performed by: EMERGENCY MEDICINE

## 2024-01-01 PROCEDURE — 250N000013 HC RX MED GY IP 250 OP 250 PS 637: Performed by: EMERGENCY MEDICINE

## 2024-01-01 PROCEDURE — 92550 TYMPANOMETRY & REFLEX THRESH: CPT | Mod: 52 | Performed by: AUDIOLOGIST

## 2024-01-01 PROCEDURE — 87040 BLOOD CULTURE FOR BACTERIA: CPT | Performed by: HOSPITALIST

## 2024-01-01 PROCEDURE — 99223 1ST HOSP IP/OBS HIGH 75: CPT | Performed by: HOSPITALIST

## 2024-01-01 PROCEDURE — 250N000009 HC RX 250: Performed by: STUDENT IN AN ORGANIZED HEALTH CARE EDUCATION/TRAINING PROGRAM

## 2024-01-01 PROCEDURE — 87899 AGENT NOS ASSAY W/OPTIC: CPT | Performed by: INTERNAL MEDICINE

## 2024-01-01 PROCEDURE — 80048 BASIC METABOLIC PNL TOTAL CA: CPT | Performed by: HOSPITALIST

## 2024-01-01 PROCEDURE — 99204 OFFICE O/P NEW MOD 45 MIN: CPT | Performed by: INTERNAL MEDICINE

## 2024-01-01 PROCEDURE — 94660 CPAP INITIATION&MGMT: CPT

## 2024-01-01 PROCEDURE — 85018 HEMOGLOBIN: CPT | Mod: QW | Performed by: INTERNAL MEDICINE

## 2024-01-01 PROCEDURE — 258N000001 HC RX 258: Performed by: INTERNAL MEDICINE

## 2024-01-01 PROCEDURE — 83880 ASSAY OF NATRIURETIC PEPTIDE: CPT | Performed by: EMERGENCY MEDICINE

## 2024-01-01 PROCEDURE — 87899 AGENT NOS ASSAY W/OPTIC: CPT | Performed by: HOSPITALIST

## 2024-01-01 PROCEDURE — 272N000202 HC AEROBIKA WITH MANOMETER

## 2024-01-01 PROCEDURE — 5A09357 ASSISTANCE WITH RESPIRATORY VENTILATION, LESS THAN 24 CONSECUTIVE HOURS, CONTINUOUS POSITIVE AIRWAY PRESSURE: ICD-10-PCS | Performed by: NURSE PRACTITIONER

## 2024-01-01 PROCEDURE — 94729 DIFFUSING CAPACITY: CPT | Mod: 26

## 2024-01-01 PROCEDURE — 84484 ASSAY OF TROPONIN QUANT: CPT | Performed by: INTERNAL MEDICINE

## 2024-01-01 PROCEDURE — 85018 HEMOGLOBIN: CPT | Performed by: INTERNAL MEDICINE

## 2024-01-01 PROCEDURE — 85007 BL SMEAR W/DIFF WBC COUNT: CPT | Performed by: HOSPITALIST

## 2024-01-01 PROCEDURE — 85027 COMPLETE CBC AUTOMATED: CPT | Performed by: STUDENT IN AN ORGANIZED HEALTH CARE EDUCATION/TRAINING PROGRAM

## 2024-01-01 PROCEDURE — 85014 HEMATOCRIT: CPT | Performed by: INTERNAL MEDICINE

## 2024-01-01 PROCEDURE — 999N000044 HC STATISTIC CVC DRESSING CHANGE

## 2024-01-01 PROCEDURE — 84145 PROCALCITONIN (PCT): CPT | Performed by: STUDENT IN AN ORGANIZED HEALTH CARE EDUCATION/TRAINING PROGRAM

## 2024-01-01 PROCEDURE — 99285 EMERGENCY DEPT VISIT HI MDM: CPT | Mod: 25

## 2024-01-01 PROCEDURE — 84295 ASSAY OF SERUM SODIUM: CPT | Performed by: STUDENT IN AN ORGANIZED HEALTH CARE EDUCATION/TRAINING PROGRAM

## 2024-01-01 PROCEDURE — 99223 1ST HOSP IP/OBS HIGH 75: CPT | Performed by: INTERNAL MEDICINE

## 2024-01-01 PROCEDURE — 86036 ANCA SCREEN EACH ANTIBODY: CPT

## 2024-01-01 PROCEDURE — 86334 IMMUNOFIX E-PHORESIS SERUM: CPT | Mod: 26 | Performed by: PATHOLOGY

## 2024-01-01 PROCEDURE — 99222 1ST HOSP IP/OBS MODERATE 55: CPT | Performed by: NURSE PRACTITIONER

## 2024-01-01 RX ORDER — ALBUTEROL SULFATE 0.83 MG/ML
2.5 SOLUTION RESPIRATORY (INHALATION)
Status: DISCONTINUED | OUTPATIENT
Start: 2024-01-01 | End: 2024-10-17 | Stop reason: HOSPADM

## 2024-01-01 RX ORDER — HYDROCORTISONE SODIUM SUCCINATE 100 MG/2ML
50 INJECTION INTRAMUSCULAR; INTRAVENOUS EVERY 6 HOURS
Status: DISCONTINUED | OUTPATIENT
Start: 2024-01-01 | End: 2024-10-17 | Stop reason: HOSPADM

## 2024-01-01 RX ORDER — HYDROMORPHONE HYDROCHLORIDE 4 MG/1
2-4 TABLET ORAL EVERY 4 HOURS PRN
Qty: 60 TABLET | Refills: 0 | Status: SHIPPED | OUTPATIENT
Start: 2024-01-01

## 2024-01-01 RX ORDER — ZOLEDRONIC ACID 0.04 MG/ML
4 INJECTION, SOLUTION INTRAVENOUS ONCE
OUTPATIENT
Start: 2024-01-01 | End: 2024-01-01

## 2024-01-01 RX ORDER — HYDROCHLOROTHIAZIDE 12.5 MG/1
12.5 TABLET ORAL DAILY
Status: DISCONTINUED | OUTPATIENT
Start: 2024-01-01 | End: 2024-01-01 | Stop reason: HOSPADM

## 2024-01-01 RX ORDER — LENALIDOMIDE 10 MG/1
10 CAPSULE ORAL DAILY
Qty: 28 CAPSULE | Refills: 0 | Status: SHIPPED | OUTPATIENT
Start: 2024-01-01 | End: 2024-01-01

## 2024-01-01 RX ORDER — AMOXICILLIN 250 MG
1 CAPSULE ORAL 2 TIMES DAILY PRN
Status: DISCONTINUED | OUTPATIENT
Start: 2024-01-01 | End: 2024-01-01 | Stop reason: HOSPADM

## 2024-01-01 RX ORDER — DEXTROSE MONOHYDRATE 25 G/50ML
25-50 INJECTION, SOLUTION INTRAVENOUS
Status: DISCONTINUED | OUTPATIENT
Start: 2024-01-01 | End: 2024-10-17 | Stop reason: HOSPADM

## 2024-01-01 RX ORDER — ACETAMINOPHEN 650 MG/1
650 SUPPOSITORY RECTAL EVERY 4 HOURS PRN
Status: DISCONTINUED | OUTPATIENT
Start: 2024-01-01 | End: 2024-10-17 | Stop reason: HOSPADM

## 2024-01-01 RX ORDER — SODIUM CHLORIDE FOR INHALATION 3 %
3 VIAL, NEBULIZER (ML) INHALATION
Status: DISCONTINUED | OUTPATIENT
Start: 2024-01-01 | End: 2024-10-17 | Stop reason: HOSPADM

## 2024-01-01 RX ORDER — LEVOFLOXACIN 750 MG/1
750 TABLET, FILM COATED ORAL EVERY 24 HOURS
Status: DISCONTINUED | OUTPATIENT
Start: 2024-01-01 | End: 2024-01-01 | Stop reason: HOSPADM

## 2024-01-01 RX ORDER — GABAPENTIN 300 MG/1
600 CAPSULE ORAL 3 TIMES DAILY
Qty: 180 CAPSULE | Refills: 0 | Status: SHIPPED | OUTPATIENT
Start: 2024-01-01

## 2024-01-01 RX ORDER — LEVOFLOXACIN 5 MG/ML
750 INJECTION, SOLUTION INTRAVENOUS ONCE
Status: COMPLETED | OUTPATIENT
Start: 2024-01-01 | End: 2024-01-01

## 2024-01-01 RX ORDER — CLINDAMYCIN HCL 300 MG
300 CAPSULE ORAL EVERY 8 HOURS
Qty: 21 CAPSULE | Refills: 0 | Status: SHIPPED | OUTPATIENT
Start: 2024-01-01 | End: 2024-01-01

## 2024-01-01 RX ORDER — CLINDAMYCIN HCL 150 MG
300 CAPSULE ORAL EVERY 8 HOURS SCHEDULED
Status: DISCONTINUED | OUTPATIENT
Start: 2024-01-01 | End: 2024-01-01 | Stop reason: HOSPADM

## 2024-01-01 RX ORDER — METHYLPREDNISOLONE SODIUM SUCCINATE 40 MG/ML
40 INJECTION, POWDER, LYOPHILIZED, FOR SOLUTION INTRAMUSCULAR; INTRAVENOUS ONCE
Status: COMPLETED | OUTPATIENT
Start: 2024-01-01 | End: 2024-01-01

## 2024-01-01 RX ORDER — POTASSIUM CHLORIDE 29.8 MG/ML
20 INJECTION INTRAVENOUS
Status: COMPLETED | OUTPATIENT
Start: 2024-01-01 | End: 2024-01-01

## 2024-01-01 RX ORDER — ACETAMINOPHEN 325 MG/1
650 TABLET ORAL ONCE
Status: COMPLETED | OUTPATIENT
Start: 2024-01-01 | End: 2024-01-01

## 2024-01-01 RX ORDER — AMOXICILLIN 250 MG
2 CAPSULE ORAL 2 TIMES DAILY PRN
Status: DISCONTINUED | OUTPATIENT
Start: 2024-01-01 | End: 2024-01-01 | Stop reason: HOSPADM

## 2024-01-01 RX ORDER — LENALIDOMIDE 10 MG/1
CAPSULE ORAL DAILY
COMMUNITY
Start: 2024-01-01 | End: 2024-01-01

## 2024-01-01 RX ORDER — ALBUTEROL SULFATE 0.83 MG/ML
2.5 SOLUTION RESPIRATORY (INHALATION) EVERY 4 HOURS PRN
Status: DISCONTINUED | OUTPATIENT
Start: 2024-01-01 | End: 2024-01-01 | Stop reason: HOSPADM

## 2024-01-01 RX ORDER — ASPIRIN 81 MG/1
81 TABLET, CHEWABLE ORAL DAILY
Status: DISCONTINUED | OUTPATIENT
Start: 2024-01-01 | End: 2024-01-01 | Stop reason: HOSPADM

## 2024-01-01 RX ORDER — HEPARIN SODIUM 5000 [USP'U]/.5ML
5000 INJECTION, SOLUTION INTRAVENOUS; SUBCUTANEOUS EVERY 8 HOURS
Status: DISCONTINUED | OUTPATIENT
Start: 2024-01-01 | End: 2024-10-17 | Stop reason: HOSPADM

## 2024-01-01 RX ORDER — HEPARIN SODIUM,PORCINE 10 UNIT/ML
5-20 VIAL (ML) INTRAVENOUS DAILY PRN
OUTPATIENT
Start: 2024-01-01

## 2024-01-01 RX ORDER — ENOXAPARIN SODIUM 100 MG/ML
40 INJECTION SUBCUTANEOUS EVERY 24 HOURS
Status: DISCONTINUED | OUTPATIENT
Start: 2024-01-01 | End: 2024-01-01 | Stop reason: HOSPADM

## 2024-01-01 RX ORDER — NALOXONE HYDROCHLORIDE 0.4 MG/ML
0.4 INJECTION, SOLUTION INTRAMUSCULAR; INTRAVENOUS; SUBCUTANEOUS
Status: DISCONTINUED | OUTPATIENT
Start: 2024-01-01 | End: 2024-01-01 | Stop reason: HOSPADM

## 2024-01-01 RX ORDER — IPRATROPIUM BROMIDE AND ALBUTEROL SULFATE 2.5; .5 MG/3ML; MG/3ML
3 SOLUTION RESPIRATORY (INHALATION)
Status: DISCONTINUED | OUTPATIENT
Start: 2024-01-01 | End: 2024-01-01 | Stop reason: HOSPADM

## 2024-01-01 RX ORDER — DOXYCYCLINE 100 MG/1
100 CAPSULE ORAL EVERY 12 HOURS
Qty: 14 CAPSULE | Refills: 0 | Status: SHIPPED | OUTPATIENT
Start: 2024-01-01 | End: 2024-01-01

## 2024-01-01 RX ORDER — CEFAZOLIN SODIUM 1 G/50ML
1250 SOLUTION INTRAVENOUS
Status: DISCONTINUED | OUTPATIENT
Start: 2024-01-01 | End: 2024-01-01

## 2024-01-01 RX ORDER — ZOLEDRONIC ACID 0.04 MG/ML
4 INJECTION, SOLUTION INTRAVENOUS ONCE
OUTPATIENT
Start: 2025-01-13 | End: 2025-01-13

## 2024-01-01 RX ORDER — CEFDINIR 250 MG/5ML
POWDER, FOR SUSPENSION ORAL
Qty: 60 ML | Refills: 0 | Status: SHIPPED | OUTPATIENT
Start: 2024-01-01

## 2024-01-01 RX ORDER — DEXTROSE MONOHYDRATE 100 MG/ML
INJECTION, SOLUTION INTRAVENOUS CONTINUOUS
Status: DISCONTINUED | OUTPATIENT
Start: 2024-01-01 | End: 2024-10-17 | Stop reason: HOSPADM

## 2024-01-01 RX ORDER — SODIUM CHLORIDE 9 MG/ML
INJECTION, SOLUTION INTRAVENOUS CONTINUOUS
Status: DISCONTINUED | OUTPATIENT
Start: 2024-01-01 | End: 2024-10-17 | Stop reason: HOSPADM

## 2024-01-01 RX ORDER — PIPERACILLIN SODIUM, TAZOBACTAM SODIUM 3; .375 G/15ML; G/15ML
3.38 INJECTION, POWDER, LYOPHILIZED, FOR SOLUTION INTRAVENOUS ONCE
Status: COMPLETED | OUTPATIENT
Start: 2024-01-01 | End: 2024-01-01

## 2024-01-01 RX ORDER — LENALIDOMIDE 10 MG/1
10 CAPSULE ORAL DAILY
Qty: 28 CAPSULE | Refills: 0 | Status: ON HOLD | OUTPATIENT
Start: 2024-01-01 | End: 2024-01-01

## 2024-01-01 RX ORDER — GUAIFENESIN 200 MG/10ML
200 LIQUID ORAL EVERY 4 HOURS PRN
Status: DISCONTINUED | OUTPATIENT
Start: 2024-01-01 | End: 2024-01-01 | Stop reason: HOSPADM

## 2024-01-01 RX ORDER — METHOCARBAMOL 500 MG/1
500 TABLET, FILM COATED ORAL 4 TIMES DAILY PRN
Qty: 120 TABLET | Refills: 11 | Status: SHIPPED | OUTPATIENT
Start: 2024-01-01

## 2024-01-01 RX ORDER — MAGNESIUM SULFATE HEPTAHYDRATE 40 MG/ML
2 INJECTION, SOLUTION INTRAVENOUS ONCE
Status: COMPLETED | OUTPATIENT
Start: 2024-01-01 | End: 2024-01-01

## 2024-01-01 RX ORDER — NICOTINE POLACRILEX 4 MG
15-30 LOZENGE BUCCAL
Status: DISCONTINUED | OUTPATIENT
Start: 2024-01-01 | End: 2024-10-17 | Stop reason: HOSPADM

## 2024-01-01 RX ORDER — ONDANSETRON 2 MG/ML
4 INJECTION INTRAMUSCULAR; INTRAVENOUS EVERY 6 HOURS PRN
Status: DISCONTINUED | OUTPATIENT
Start: 2024-01-01 | End: 2024-01-01 | Stop reason: HOSPADM

## 2024-01-01 RX ORDER — POLYETHYLENE GLYCOL 3350 17 G/17G
17 POWDER, FOR SOLUTION ORAL DAILY
Status: DISCONTINUED | OUTPATIENT
Start: 2024-01-01 | End: 2024-01-01 | Stop reason: HOSPADM

## 2024-01-01 RX ORDER — NALOXONE HYDROCHLORIDE 0.4 MG/ML
0.2 INJECTION, SOLUTION INTRAMUSCULAR; INTRAVENOUS; SUBCUTANEOUS
Status: DISCONTINUED | OUTPATIENT
Start: 2024-01-01 | End: 2024-01-01 | Stop reason: HOSPADM

## 2024-01-01 RX ORDER — ALBUTEROL SULFATE 90 UG/1
2 AEROSOL, METERED RESPIRATORY (INHALATION) EVERY 6 HOURS PRN
Status: DISCONTINUED | OUTPATIENT
Start: 2024-01-01 | End: 2024-01-01 | Stop reason: HOSPADM

## 2024-01-01 RX ORDER — POTASSIUM CHLORIDE 1500 MG/1
40 TABLET, EXTENDED RELEASE ORAL ONCE
Status: COMPLETED | OUTPATIENT
Start: 2024-01-01 | End: 2024-01-01

## 2024-01-01 RX ORDER — LIDOCAINE 40 MG/G
CREAM TOPICAL
Status: DISCONTINUED | OUTPATIENT
Start: 2024-01-01 | End: 2024-01-01 | Stop reason: HOSPADM

## 2024-01-01 RX ORDER — METHOCARBAMOL 500 MG/1
500 TABLET, FILM COATED ORAL 4 TIMES DAILY PRN
Status: DISCONTINUED | OUTPATIENT
Start: 2024-01-01 | End: 2024-01-01 | Stop reason: HOSPADM

## 2024-01-01 RX ORDER — VANCOMYCIN HYDROCHLORIDE 1 G/200ML
1000 INJECTION, SOLUTION INTRAVENOUS EVERY 24 HOURS
Status: DISCONTINUED | OUTPATIENT
Start: 2024-01-01 | End: 2024-01-01

## 2024-01-01 RX ORDER — HYDROMORPHONE HCL IN WATER/PF 6 MG/30 ML
0.2 PATIENT CONTROLLED ANALGESIA SYRINGE INTRAVENOUS
Status: DISCONTINUED | OUTPATIENT
Start: 2024-01-01 | End: 2024-10-17 | Stop reason: HOSPADM

## 2024-01-01 RX ORDER — LEVOFLOXACIN 750 MG/1
750 TABLET, FILM COATED ORAL EVERY 24 HOURS
Qty: 7 TABLET | Refills: 0 | Status: SHIPPED | OUTPATIENT
Start: 2024-01-01 | End: 2024-01-01

## 2024-01-01 RX ORDER — POTASSIUM CHLORIDE 1.5 G/1.58G
40 POWDER, FOR SOLUTION ORAL ONCE
Status: COMPLETED | OUTPATIENT
Start: 2024-01-01 | End: 2024-01-01

## 2024-01-01 RX ORDER — ENOXAPARIN SODIUM 100 MG/ML
40 INJECTION SUBCUTANEOUS ONCE
Status: COMPLETED | OUTPATIENT
Start: 2024-01-01 | End: 2024-01-01

## 2024-01-01 RX ORDER — BENZONATATE 100 MG/1
100 CAPSULE ORAL 3 TIMES DAILY PRN
Status: DISCONTINUED | OUTPATIENT
Start: 2024-01-01 | End: 2024-01-01 | Stop reason: HOSPADM

## 2024-01-01 RX ORDER — GABAPENTIN 300 MG/1
600 CAPSULE ORAL 3 TIMES DAILY
Qty: 180 CAPSULE | Refills: 1 | Status: SHIPPED | OUTPATIENT
Start: 2024-01-01 | End: 2024-01-01

## 2024-01-01 RX ORDER — ACETAMINOPHEN 325 MG/1
650 TABLET ORAL EVERY 4 HOURS PRN
Status: DISCONTINUED | OUTPATIENT
Start: 2024-01-01 | End: 2024-01-01 | Stop reason: HOSPADM

## 2024-01-01 RX ORDER — FLUTICASONE PROPIONATE 50 MCG
2 SPRAY, SUSPENSION (ML) NASAL DAILY
Qty: 15.8 ML | Refills: 4 | Status: SHIPPED | OUTPATIENT
Start: 2024-01-01

## 2024-01-01 RX ORDER — MEROPENEM 1 G/1
1 INJECTION, POWDER, FOR SOLUTION INTRAVENOUS EVERY 8 HOURS
Status: DISCONTINUED | OUTPATIENT
Start: 2024-01-01 | End: 2024-01-01

## 2024-01-01 RX ORDER — DIPHENHYDRAMINE HYDROCHLORIDE 50 MG/ML
25 INJECTION INTRAMUSCULAR; INTRAVENOUS EVERY 6 HOURS PRN
Status: DISCONTINUED | OUTPATIENT
Start: 2024-01-01 | End: 2024-01-01 | Stop reason: HOSPADM

## 2024-01-01 RX ORDER — GABAPENTIN 300 MG/1
600 CAPSULE ORAL 3 TIMES DAILY
Qty: 180 CAPSULE | Refills: 0 | Status: SHIPPED | OUTPATIENT
Start: 2024-01-01 | End: 2024-01-01

## 2024-01-01 RX ORDER — DIPHENHYDRAMINE HCL 25 MG
25 CAPSULE ORAL EVERY 6 HOURS PRN
Status: DISCONTINUED | OUTPATIENT
Start: 2024-01-01 | End: 2024-01-01 | Stop reason: HOSPADM

## 2024-01-01 RX ORDER — CARBOXYMETHYLCELLULOSE SODIUM 5 MG/ML
1 SOLUTION/ DROPS OPHTHALMIC
Status: DISCONTINUED | OUTPATIENT
Start: 2024-01-01 | End: 2024-10-17 | Stop reason: HOSPADM

## 2024-01-01 RX ORDER — PHENOL 1.4 %
10 AEROSOL, SPRAY (ML) MUCOUS MEMBRANE AT BEDTIME
COMMUNITY
Start: 2024-01-01

## 2024-01-01 RX ORDER — PIPERACILLIN SODIUM, TAZOBACTAM SODIUM 3; .375 G/15ML; G/15ML
3.38 INJECTION, POWDER, LYOPHILIZED, FOR SOLUTION INTRAVENOUS EVERY 8 HOURS
Status: DISCONTINUED | OUTPATIENT
Start: 2024-01-01 | End: 2024-01-01

## 2024-01-01 RX ORDER — LIDOCAINE 40 MG/G
CREAM TOPICAL
Status: DISCONTINUED | OUTPATIENT
Start: 2024-01-01 | End: 2024-10-17 | Stop reason: HOSPADM

## 2024-01-01 RX ORDER — ONDANSETRON 4 MG/1
4 TABLET, ORALLY DISINTEGRATING ORAL EVERY 6 HOURS PRN
Status: DISCONTINUED | OUTPATIENT
Start: 2024-01-01 | End: 2024-01-01 | Stop reason: HOSPADM

## 2024-01-01 RX ORDER — HYDROMORPHONE HYDROCHLORIDE 2 MG/1
2 TABLET ORAL
Status: DISCONTINUED | OUTPATIENT
Start: 2024-01-01 | End: 2024-01-01 | Stop reason: HOSPADM

## 2024-01-01 RX ORDER — BENZONATATE 100 MG/1
100 CAPSULE ORAL 3 TIMES DAILY PRN
Qty: 20 CAPSULE | Refills: 0 | Status: SHIPPED | OUTPATIENT
Start: 2024-01-01 | End: 2024-01-01

## 2024-01-01 RX ORDER — NALOXONE HYDROCHLORIDE 0.4 MG/ML
0.4 INJECTION, SOLUTION INTRAMUSCULAR; INTRAVENOUS; SUBCUTANEOUS
Status: DISCONTINUED | OUTPATIENT
Start: 2024-01-01 | End: 2024-10-17 | Stop reason: HOSPADM

## 2024-01-01 RX ORDER — HEPARIN SODIUM (PORCINE) LOCK FLUSH IV SOLN 100 UNIT/ML 100 UNIT/ML
5 SOLUTION INTRAVENOUS
OUTPATIENT
Start: 2024-01-01

## 2024-01-01 RX ORDER — NOREPINEPHRINE BITARTRATE 0.02 MG/ML
.01-.6 INJECTION, SOLUTION INTRAVENOUS CONTINUOUS
Status: DISCONTINUED | OUTPATIENT
Start: 2024-01-01 | End: 2024-10-17 | Stop reason: HOSPADM

## 2024-01-01 RX ORDER — CEFAZOLIN SODIUM 1 G/50ML
1250 SOLUTION INTRAVENOUS EVERY 24 HOURS
Status: DISCONTINUED | OUTPATIENT
Start: 2024-01-01 | End: 2024-01-01

## 2024-01-01 RX ORDER — CEFAZOLIN SODIUM 1 G/50ML
1250 SOLUTION INTRAVENOUS ONCE
Status: COMPLETED | OUTPATIENT
Start: 2024-01-01 | End: 2024-01-01

## 2024-01-01 RX ORDER — ALBUTEROL SULFATE 5 MG/ML
2.5 SOLUTION RESPIRATORY (INHALATION) EVERY 6 HOURS PRN
Status: DISCONTINUED | OUTPATIENT
Start: 2024-01-01 | End: 2024-01-01 | Stop reason: DRUGHIGH

## 2024-01-01 RX ORDER — FUROSEMIDE 10 MG/ML
20 INJECTION INTRAMUSCULAR; INTRAVENOUS DAILY
Status: DISCONTINUED | OUTPATIENT
Start: 2024-01-01 | End: 2024-01-01

## 2024-01-01 RX ORDER — LENALIDOMIDE 10 MG/1
10 CAPSULE ORAL DAILY
Qty: 28 CAPSULE | Refills: 0 | Status: SHIPPED | OUTPATIENT
Start: 2024-01-01 | End: 2024-11-01

## 2024-01-01 RX ORDER — IPRATROPIUM BROMIDE 42 UG/1
2 SPRAY, METERED NASAL 3 TIMES DAILY PRN
Status: DISCONTINUED | OUTPATIENT
Start: 2024-01-01 | End: 2024-10-17 | Stop reason: HOSPADM

## 2024-01-01 RX ORDER — HEPARIN SODIUM,PORCINE 10 UNIT/ML
5-20 VIAL (ML) INTRAVENOUS DAILY PRN
OUTPATIENT
Start: 2025-01-13

## 2024-01-01 RX ORDER — METHYLPREDNISOLONE SODIUM SUCCINATE 125 MG/2ML
125 INJECTION INTRAMUSCULAR; INTRAVENOUS ONCE
Status: COMPLETED | OUTPATIENT
Start: 2024-01-01 | End: 2024-01-01

## 2024-01-01 RX ORDER — HYDROCHLOROTHIAZIDE 12.5 MG/1
12.5 TABLET ORAL DAILY
Qty: 90 TABLET | Refills: 4 | Status: SHIPPED | OUTPATIENT
Start: 2024-01-01

## 2024-01-01 RX ORDER — VANCOMYCIN HYDROCHLORIDE 1 G/200ML
1000 INJECTION, SOLUTION INTRAVENOUS ONCE
Status: COMPLETED | OUTPATIENT
Start: 2024-01-01 | End: 2024-01-01

## 2024-01-01 RX ORDER — POTASSIUM CHLORIDE 7.45 MG/ML
10 INJECTION INTRAVENOUS ONCE
Status: COMPLETED | OUTPATIENT
Start: 2024-01-01 | End: 2024-01-01

## 2024-01-01 RX ORDER — ROPIVACAINE IN 0.9% SOD CHL/PF 0.1 %
.01-.125 PLASTIC BAG, INJECTION (ML) EPIDURAL CONTINUOUS
Status: DISCONTINUED | OUTPATIENT
Start: 2024-01-01 | End: 2024-01-01

## 2024-01-01 RX ORDER — PIPERACILLIN SODIUM, TAZOBACTAM SODIUM 3; .375 G/15ML; G/15ML
3.38 INJECTION, POWDER, LYOPHILIZED, FOR SOLUTION INTRAVENOUS EVERY 12 HOURS
Status: DISCONTINUED | OUTPATIENT
Start: 2024-01-01 | End: 2024-10-17 | Stop reason: HOSPADM

## 2024-01-01 RX ORDER — IPRATROPIUM BROMIDE AND ALBUTEROL SULFATE 2.5; .5 MG/3ML; MG/3ML
3 SOLUTION RESPIRATORY (INHALATION) ONCE
Status: COMPLETED | OUTPATIENT
Start: 2024-01-01 | End: 2024-01-01

## 2024-01-01 RX ORDER — DOXYCYCLINE 100 MG/1
100 CAPSULE ORAL EVERY 12 HOURS SCHEDULED
Status: DISCONTINUED | OUTPATIENT
Start: 2024-01-01 | End: 2024-01-01 | Stop reason: HOSPADM

## 2024-01-01 RX ORDER — ALBUTEROL SULFATE 0.83 MG/ML
2.5 SOLUTION RESPIRATORY (INHALATION) EVERY 6 HOURS PRN
Status: DISCONTINUED | OUTPATIENT
Start: 2024-01-01 | End: 2024-10-17 | Stop reason: HOSPADM

## 2024-01-01 RX ORDER — IPRATROPIUM BROMIDE AND ALBUTEROL SULFATE 2.5; .5 MG/3ML; MG/3ML
3 SOLUTION RESPIRATORY (INHALATION) 2 TIMES DAILY
Status: COMPLETED | OUTPATIENT
Start: 2024-01-01 | End: 2024-01-01

## 2024-01-01 RX ORDER — CALCIUM CARBONATE 500 MG/1
1000 TABLET, CHEWABLE ORAL 4 TIMES DAILY PRN
Status: DISCONTINUED | OUTPATIENT
Start: 2024-01-01 | End: 2024-01-01 | Stop reason: HOSPADM

## 2024-01-01 RX ORDER — IOPAMIDOL 755 MG/ML
47 INJECTION, SOLUTION INTRAVASCULAR ONCE
Status: COMPLETED | OUTPATIENT
Start: 2024-01-01 | End: 2024-01-01

## 2024-01-01 RX ORDER — BISACODYL 10 MG
10 SUPPOSITORY, RECTAL RECTAL DAILY PRN
Status: DISCONTINUED | OUTPATIENT
Start: 2024-01-01 | End: 2024-01-01 | Stop reason: HOSPADM

## 2024-01-01 RX ORDER — NALOXONE HYDROCHLORIDE 0.4 MG/ML
0.2 INJECTION, SOLUTION INTRAMUSCULAR; INTRAVENOUS; SUBCUTANEOUS
Status: DISCONTINUED | OUTPATIENT
Start: 2024-01-01 | End: 2024-10-17 | Stop reason: HOSPADM

## 2024-01-01 RX ORDER — ACETAMINOPHEN 325 MG/1
650 TABLET ORAL EVERY 4 HOURS PRN
Status: DISCONTINUED | OUTPATIENT
Start: 2024-01-01 | End: 2024-10-17 | Stop reason: HOSPADM

## 2024-01-01 RX ORDER — GABAPENTIN 300 MG/1
600 CAPSULE ORAL 3 TIMES DAILY
Status: DISCONTINUED | OUTPATIENT
Start: 2024-01-01 | End: 2024-01-01 | Stop reason: HOSPADM

## 2024-01-01 RX ORDER — HEPARIN SODIUM (PORCINE) LOCK FLUSH IV SOLN 100 UNIT/ML 100 UNIT/ML
5 SOLUTION INTRAVENOUS
OUTPATIENT
Start: 2025-01-13

## 2024-01-01 RX ORDER — ACETAMINOPHEN 650 MG/1
650 SUPPOSITORY RECTAL EVERY 4 HOURS PRN
Status: DISCONTINUED | OUTPATIENT
Start: 2024-01-01 | End: 2024-01-01 | Stop reason: HOSPADM

## 2024-01-01 RX ADMIN — METHOCARBAMOL 500 MG: 500 TABLET ORAL at 10:06

## 2024-01-01 RX ADMIN — ASPIRIN 81 MG CHEWABLE TABLET 81 MG: 81 TABLET CHEWABLE at 08:51

## 2024-01-01 RX ADMIN — ENOXAPARIN SODIUM 40 MG: 40 INJECTION SUBCUTANEOUS at 22:21

## 2024-01-01 RX ADMIN — GABAPENTIN 600 MG: 300 CAPSULE ORAL at 13:52

## 2024-01-01 RX ADMIN — DEXTROSE MONOHYDRATE 50 ML: 25 INJECTION, SOLUTION INTRAVENOUS at 21:38

## 2024-01-01 RX ADMIN — CLINDAMYCIN HYDROCHLORIDE 300 MG: 150 CAPSULE ORAL at 05:43

## 2024-01-01 RX ADMIN — Medication 10 MG: at 21:27

## 2024-01-01 RX ADMIN — DOXYCYCLINE HYCLATE 100 MG: 100 CAPSULE ORAL at 20:37

## 2024-01-01 RX ADMIN — SODIUM CHLORIDE 1000 ML: 9 INJECTION, SOLUTION INTRAVENOUS at 12:28

## 2024-01-01 RX ADMIN — KIT FOR THE PREPARATION OF TECHNETIUM TC 99M ALBUMIN AGGREGATED 8 MILLICURIE: 2.5 INJECTION, POWDER, FOR SOLUTION INTRAVENOUS at 13:23

## 2024-01-01 RX ADMIN — CLINDAMYCIN HYDROCHLORIDE 300 MG: 150 CAPSULE ORAL at 05:23

## 2024-01-01 RX ADMIN — BENZONATATE 100 MG: 100 CAPSULE ORAL at 11:41

## 2024-01-01 RX ADMIN — POTASSIUM CHLORIDE 10 MEQ: 7.46 INJECTION, SOLUTION INTRAVENOUS at 16:30

## 2024-01-01 RX ADMIN — GABAPENTIN 600 MG: 300 CAPSULE ORAL at 14:56

## 2024-01-01 RX ADMIN — MEROPENEM 1 G: 1 INJECTION, POWDER, FOR SOLUTION INTRAVENOUS at 22:38

## 2024-01-01 RX ADMIN — BENZONATATE 100 MG: 100 CAPSULE ORAL at 14:51

## 2024-01-01 RX ADMIN — METHYLPREDNISOLONE SODIUM SUCCINATE 125 MG: 125 INJECTION, POWDER, FOR SOLUTION INTRAMUSCULAR; INTRAVENOUS at 11:28

## 2024-01-01 RX ADMIN — BENZONATATE 100 MG: 100 CAPSULE ORAL at 21:04

## 2024-01-01 RX ADMIN — BENZONATATE 100 MG: 100 CAPSULE ORAL at 08:30

## 2024-01-01 RX ADMIN — IPRATROPIUM BROMIDE AND ALBUTEROL SULFATE 3 ML: .5; 3 SOLUTION RESPIRATORY (INHALATION) at 11:40

## 2024-01-01 RX ADMIN — METHYLPREDNISOLONE SODIUM SUCCINATE 40 MG: 40 INJECTION, POWDER, FOR SOLUTION INTRAMUSCULAR; INTRAVENOUS at 11:24

## 2024-01-01 RX ADMIN — Medication 10 MG: at 21:04

## 2024-01-01 RX ADMIN — LIDOCAINE HYDROCHLORIDE 1 ML: 10 INJECTION, SOLUTION EPIDURAL; INFILTRATION; INTRACAUDAL; PERINEURAL at 16:30

## 2024-01-01 RX ADMIN — VANCOMYCIN HYDROCHLORIDE 1000 MG: 1 INJECTION, SOLUTION INTRAVENOUS at 17:47

## 2024-01-01 RX ADMIN — ASPIRIN 81 MG CHEWABLE TABLET 81 MG: 81 TABLET CHEWABLE at 09:34

## 2024-01-01 RX ADMIN — BENZONATATE 100 MG: 100 CAPSULE ORAL at 23:40

## 2024-01-01 RX ADMIN — HYDROCHLOROTHIAZIDE 12.5 MG: 12.5 TABLET ORAL at 09:38

## 2024-01-01 RX ADMIN — LEVOFLOXACIN 750 MG: 750 TABLET, FILM COATED ORAL at 20:33

## 2024-01-01 RX ADMIN — ENOXAPARIN SODIUM 40 MG: 40 INJECTION SUBCUTANEOUS at 20:47

## 2024-01-01 RX ADMIN — AZITHROMYCIN DIHYDRATE 500 MG: 500 INJECTION, POWDER, LYOPHILIZED, FOR SOLUTION INTRAVENOUS at 20:04

## 2024-01-01 RX ADMIN — ACETAMINOPHEN 650 MG: 325 TABLET ORAL at 09:34

## 2024-01-01 RX ADMIN — HYDROCHLOROTHIAZIDE 12.5 MG: 12.5 TABLET ORAL at 08:35

## 2024-01-01 RX ADMIN — POTASSIUM CHLORIDE 40 MEQ: 1.5 POWDER, FOR SOLUTION ORAL at 11:40

## 2024-01-01 RX ADMIN — BENZONATATE 100 MG: 100 CAPSULE ORAL at 12:32

## 2024-01-01 RX ADMIN — IPRATROPIUM BROMIDE AND ALBUTEROL SULFATE 3 ML: .5; 3 SOLUTION RESPIRATORY (INHALATION) at 08:49

## 2024-01-01 RX ADMIN — ACETAMINOPHEN 650 MG: 325 TABLET ORAL at 10:06

## 2024-01-01 RX ADMIN — HYDROMORPHONE HYDROCHLORIDE 0.2 MG: 0.2 INJECTION, SOLUTION INTRAMUSCULAR; INTRAVENOUS; SUBCUTANEOUS at 20:36

## 2024-01-01 RX ADMIN — CLINDAMYCIN HYDROCHLORIDE 300 MG: 150 CAPSULE ORAL at 14:24

## 2024-01-01 RX ADMIN — DOXYCYCLINE HYCLATE 100 MG: 100 CAPSULE ORAL at 20:21

## 2024-01-01 RX ADMIN — HYDROCHLOROTHIAZIDE 12.5 MG: 12.5 TABLET ORAL at 10:07

## 2024-01-01 RX ADMIN — BENZONATATE 100 MG: 100 CAPSULE ORAL at 22:55

## 2024-01-01 RX ADMIN — METHOCARBAMOL 500 MG: 500 TABLET ORAL at 15:01

## 2024-01-01 RX ADMIN — METHOCARBAMOL 500 MG: 500 TABLET ORAL at 21:27

## 2024-01-01 RX ADMIN — DOXYCYCLINE HYCLATE 100 MG: 100 CAPSULE ORAL at 09:34

## 2024-01-01 RX ADMIN — GABAPENTIN 600 MG: 300 CAPSULE ORAL at 20:21

## 2024-01-01 RX ADMIN — PANTOPRAZOLE SODIUM 40 MG: 40 INJECTION, POWDER, FOR SOLUTION INTRAVENOUS at 20:04

## 2024-01-01 RX ADMIN — MEROPENEM 1 G: 1 INJECTION, POWDER, FOR SOLUTION INTRAVENOUS at 06:16

## 2024-01-01 RX ADMIN — HYDROCORTISONE SODIUM SUCCINATE 50 MG: 100 INJECTION, POWDER, FOR SOLUTION INTRAMUSCULAR; INTRAVENOUS at 04:24

## 2024-01-01 RX ADMIN — GABAPENTIN 600 MG: 300 CAPSULE ORAL at 20:47

## 2024-01-01 RX ADMIN — DEXTROSE MONOHYDRATE 50 ML: 25 INJECTION, SOLUTION INTRAVENOUS at 22:01

## 2024-01-01 RX ADMIN — ACETAMINOPHEN 650 MG: 325 TABLET ORAL at 20:47

## 2024-01-01 RX ADMIN — SODIUM CHLORIDE: 9 INJECTION, SOLUTION INTRAVENOUS at 18:21

## 2024-01-01 RX ADMIN — GABAPENTIN 600 MG: 300 CAPSULE ORAL at 08:28

## 2024-01-01 RX ADMIN — IPRATROPIUM BROMIDE AND ALBUTEROL SULFATE 3 ML: .5; 3 SOLUTION RESPIRATORY (INHALATION) at 20:38

## 2024-01-01 RX ADMIN — CLINDAMYCIN HYDROCHLORIDE 300 MG: 150 CAPSULE ORAL at 21:04

## 2024-01-01 RX ADMIN — METHOCARBAMOL 500 MG: 500 TABLET ORAL at 21:03

## 2024-01-01 RX ADMIN — PIPERACILLIN AND TAZOBACTAM 3.38 G: 3; .375 INJECTION, POWDER, FOR SOLUTION INTRAVENOUS at 16:12

## 2024-01-01 RX ADMIN — DIPHENHYDRAMINE HYDROCHLORIDE 25 MG: 25 CAPSULE ORAL at 21:27

## 2024-01-01 RX ADMIN — ALBUTEROL SULFATE 2.5 MG: 2.5 SOLUTION RESPIRATORY (INHALATION) at 17:29

## 2024-01-01 RX ADMIN — Medication 10 MG: at 22:45

## 2024-01-01 RX ADMIN — DIPHENHYDRAMINE HYDROCHLORIDE 25 MG: 50 INJECTION INTRAMUSCULAR; INTRAVENOUS at 11:31

## 2024-01-01 RX ADMIN — HYDROMORPHONE HYDROCHLORIDE 2 MG: 2 TABLET ORAL at 20:47

## 2024-01-01 RX ADMIN — POTASSIUM CHLORIDE 40 MEQ: 1500 TABLET, EXTENDED RELEASE ORAL at 14:51

## 2024-01-01 RX ADMIN — IPRATROPIUM BROMIDE AND ALBUTEROL SULFATE 3 ML: .5; 3 SOLUTION RESPIRATORY (INHALATION) at 11:21

## 2024-01-01 RX ADMIN — PIPERACILLIN AND TAZOBACTAM 3.38 G: 3; .375 INJECTION, POWDER, FOR SOLUTION INTRAVENOUS at 13:19

## 2024-01-01 RX ADMIN — LEVOFLOXACIN 750 MG: 5 INJECTION, SOLUTION INTRAVENOUS at 17:24

## 2024-01-01 RX ADMIN — ALBUTEROL SULFATE 2.5 MG: 2.5 SOLUTION RESPIRATORY (INHALATION) at 11:40

## 2024-01-01 RX ADMIN — KIT FOR THE PREPARATION OF TECHNETIUM TC 99M PENTETATE 59.7 MILLICURIE: 20 INJECTION, POWDER, LYOPHILIZED, FOR SOLUTION INTRAVENOUS; RESPIRATORY (INHALATION) at 13:05

## 2024-01-01 RX ADMIN — ACETAMINOPHEN 650 MG: 650 SUPPOSITORY RECTAL at 16:12

## 2024-01-01 RX ADMIN — MEROPENEM 1 G: 1 INJECTION, POWDER, FOR SOLUTION INTRAVENOUS at 14:51

## 2024-01-01 RX ADMIN — HYDROCORTISONE SODIUM SUCCINATE 50 MG: 100 INJECTION, POWDER, FOR SOLUTION INTRAMUSCULAR; INTRAVENOUS at 16:12

## 2024-01-01 RX ADMIN — DEXTROSE MONOHYDRATE 25 ML: 25 INJECTION, SOLUTION INTRAVENOUS at 16:22

## 2024-01-01 RX ADMIN — GABAPENTIN 600 MG: 300 CAPSULE ORAL at 09:34

## 2024-01-01 RX ADMIN — IOPAMIDOL 47 ML: 755 INJECTION, SOLUTION INTRAVENOUS at 15:41

## 2024-01-01 RX ADMIN — AZITHROMYCIN DIHYDRATE 500 MG: 500 INJECTION, POWDER, LYOPHILIZED, FOR SOLUTION INTRAVENOUS at 20:19

## 2024-01-01 RX ADMIN — POTASSIUM CHLORIDE 20 MEQ: 29.8 INJECTION, SOLUTION INTRAVENOUS at 11:20

## 2024-01-01 RX ADMIN — ACETAMINOPHEN 650 MG: 325 TABLET ORAL at 00:31

## 2024-01-01 RX ADMIN — METHOCARBAMOL 500 MG: 500 TABLET ORAL at 09:34

## 2024-01-01 RX ADMIN — GABAPENTIN 600 MG: 300 CAPSULE ORAL at 14:51

## 2024-01-01 RX ADMIN — MAGNESIUM SULFATE HEPTAHYDRATE 2 G: 40 INJECTION, SOLUTION INTRAVENOUS at 11:20

## 2024-01-01 RX ADMIN — VANCOMYCIN HYDROCHLORIDE 1250 MG: 5 INJECTION, POWDER, LYOPHILIZED, FOR SOLUTION INTRAVENOUS at 20:41

## 2024-01-01 RX ADMIN — CLINDAMYCIN HYDROCHLORIDE 300 MG: 150 CAPSULE ORAL at 13:52

## 2024-01-01 RX ADMIN — GABAPENTIN 600 MG: 300 CAPSULE ORAL at 14:24

## 2024-01-01 RX ADMIN — HYDROMORPHONE HYDROCHLORIDE 2 MG: 2 TABLET ORAL at 08:29

## 2024-01-01 RX ADMIN — SODIUM CHLORIDE 1000 ML: 9 INJECTION, SOLUTION INTRAVENOUS at 14:36

## 2024-01-01 RX ADMIN — ASPIRIN 81 MG CHEWABLE TABLET 81 MG: 81 TABLET CHEWABLE at 09:37

## 2024-01-01 RX ADMIN — GABAPENTIN 600 MG: 300 CAPSULE ORAL at 20:37

## 2024-01-01 RX ADMIN — BENZONATATE 100 MG: 100 CAPSULE ORAL at 06:16

## 2024-01-01 RX ADMIN — VANCOMYCIN HYDROCHLORIDE 1000 MG: 1 INJECTION, SOLUTION INTRAVENOUS at 14:38

## 2024-01-01 RX ADMIN — HYDROMORPHONE HYDROCHLORIDE 0.2 MG: 0.2 INJECTION, SOLUTION INTRAMUSCULAR; INTRAVENOUS; SUBCUTANEOUS at 17:46

## 2024-01-01 RX ADMIN — CLINDAMYCIN HYDROCHLORIDE 300 MG: 150 CAPSULE ORAL at 21:26

## 2024-01-01 RX ADMIN — ACETAMINOPHEN 650 MG: 325 TABLET ORAL at 14:44

## 2024-01-01 RX ADMIN — ASPIRIN 81 MG CHEWABLE TABLET 81 MG: 81 TABLET CHEWABLE at 08:28

## 2024-01-01 RX ADMIN — VANCOMYCIN HYDROCHLORIDE 1250 MG: 5 INJECTION, POWDER, LYOPHILIZED, FOR SOLUTION INTRAVENOUS at 21:13

## 2024-01-01 RX ADMIN — PIPERACILLIN AND TAZOBACTAM 3.38 G: 3; .375 INJECTION, POWDER, FOR SOLUTION INTRAVENOUS at 20:21

## 2024-01-01 RX ADMIN — PIPERACILLIN AND TAZOBACTAM 3.38 G: 3; .375 INJECTION, POWDER, FOR SOLUTION INTRAVENOUS at 04:24

## 2024-01-01 RX ADMIN — ALBUTEROL SULFATE 2.5 MG: 2.5 SOLUTION RESPIRATORY (INHALATION) at 13:41

## 2024-01-01 RX ADMIN — MEROPENEM 1 G: 1 INJECTION, POWDER, FOR SOLUTION INTRAVENOUS at 14:56

## 2024-01-01 RX ADMIN — NOREPINEPHRINE BITARTRATE 0.03 MCG/KG/MIN: 0.02 INJECTION, SOLUTION INTRAVENOUS at 15:30

## 2024-01-01 RX ADMIN — ALBUTEROL SULFATE 2.5 MG: 2.5 SOLUTION RESPIRATORY (INHALATION) at 21:17

## 2024-01-01 RX ADMIN — Medication 3 ML: at 07:35

## 2024-01-01 RX ADMIN — GABAPENTIN 600 MG: 300 CAPSULE ORAL at 08:51

## 2024-01-01 RX ADMIN — HYDROMORPHONE HYDROCHLORIDE 2 MG: 2 TABLET ORAL at 21:08

## 2024-01-01 RX ADMIN — IPRATROPIUM BROMIDE 2 SPRAY: 42 SPRAY NASAL at 04:42

## 2024-01-01 RX ADMIN — HYDROCORTISONE SODIUM SUCCINATE 50 MG: 100 INJECTION, POWDER, FOR SOLUTION INTRAMUSCULAR; INTRAVENOUS at 00:05

## 2024-01-01 RX ADMIN — IPRATROPIUM BROMIDE AND ALBUTEROL SULFATE 3 ML: .5; 3 SOLUTION RESPIRATORY (INHALATION) at 13:43

## 2024-01-01 RX ADMIN — POTASSIUM CHLORIDE 40 MEQ: 1500 TABLET, EXTENDED RELEASE ORAL at 18:41

## 2024-01-01 RX ADMIN — HYDROCORTISONE SODIUM SUCCINATE 50 MG: 100 INJECTION, POWDER, FOR SOLUTION INTRAMUSCULAR; INTRAVENOUS at 11:21

## 2024-01-01 RX ADMIN — HEPARIN SODIUM 5000 UNITS: 10000 INJECTION, SOLUTION INTRAVENOUS; SUBCUTANEOUS at 22:35

## 2024-01-01 RX ADMIN — ASPIRIN 81 MG CHEWABLE TABLET 81 MG: 81 TABLET CHEWABLE at 10:07

## 2024-01-01 RX ADMIN — ACETAMINOPHEN 650 MG: 325 TABLET ORAL at 06:07

## 2024-01-01 RX ADMIN — FILGRASTIM-AAFI 300 MCG: 300 INJECTION, SOLUTION SUBCUTANEOUS at 20:47

## 2024-01-01 RX ADMIN — HYDROCHLOROTHIAZIDE 12.5 MG: 12.5 TABLET ORAL at 08:51

## 2024-01-01 RX ADMIN — MEROPENEM 1 G: 1 INJECTION, POWDER, FOR SOLUTION INTRAVENOUS at 06:20

## 2024-01-01 RX ADMIN — Medication 10 MG: at 20:48

## 2024-01-01 RX ADMIN — PANTOPRAZOLE SODIUM 40 MG: 40 INJECTION, POWDER, FOR SOLUTION INTRAVENOUS at 20:16

## 2024-01-01 RX ADMIN — ENOXAPARIN SODIUM 40 MG: 40 INJECTION SUBCUTANEOUS at 20:34

## 2024-01-01 RX ADMIN — IPRATROPIUM BROMIDE AND ALBUTEROL SULFATE 3 ML: .5; 3 SOLUTION RESPIRATORY (INHALATION) at 20:00

## 2024-01-01 RX ADMIN — LEVOFLOXACIN 750 MG: 750 TABLET, FILM COATED ORAL at 20:37

## 2024-01-01 RX ADMIN — METHOCARBAMOL 500 MG: 500 TABLET ORAL at 14:50

## 2024-01-01 RX ADMIN — MEROPENEM 1 G: 1 INJECTION, POWDER, FOR SOLUTION INTRAVENOUS at 22:45

## 2024-01-01 RX ADMIN — DEXTROSE MONOHYDRATE: 100 INJECTION, SOLUTION INTRAVENOUS at 16:29

## 2024-01-01 RX ADMIN — VANCOMYCIN HYDROCHLORIDE 1250 MG: 5 INJECTION, POWDER, LYOPHILIZED, FOR SOLUTION INTRAVENOUS at 21:08

## 2024-01-01 RX ADMIN — LEVOFLOXACIN 750 MG: 750 TABLET, FILM COATED ORAL at 20:47

## 2024-01-01 RX ADMIN — POTASSIUM CHLORIDE 20 MEQ: 29.8 INJECTION, SOLUTION INTRAVENOUS at 09:58

## 2024-01-01 RX ADMIN — POLYETHYLENE GLYCOL 3350 17 G: 17 POWDER, FOR SOLUTION ORAL at 20:46

## 2024-01-01 RX ADMIN — METHOCARBAMOL 500 MG: 500 TABLET ORAL at 08:28

## 2024-01-01 RX ADMIN — GABAPENTIN 600 MG: 300 CAPSULE ORAL at 09:37

## 2024-01-01 RX ADMIN — Medication 10 MG: at 21:13

## 2024-01-01 RX ADMIN — ALBUTEROL SULFATE 2.5 MG: 2.5 SOLUTION RESPIRATORY (INHALATION) at 07:34

## 2024-01-01 RX ADMIN — GABAPENTIN 600 MG: 300 CAPSULE ORAL at 20:34

## 2024-01-01 RX ADMIN — DOXYCYCLINE HYCLATE 100 MG: 100 CAPSULE ORAL at 10:07

## 2024-01-01 RX ADMIN — IPRATROPIUM BROMIDE AND ALBUTEROL SULFATE 3 ML: .5; 3 SOLUTION RESPIRATORY (INHALATION) at 08:15

## 2024-01-01 RX ADMIN — FILGRASTIM-AAFI 300 MCG: 300 INJECTION, SOLUTION SUBCUTANEOUS at 20:27

## 2024-01-01 RX ADMIN — LEVOFLOXACIN 750 MG: 750 TABLET, FILM COATED ORAL at 20:21

## 2024-01-01 RX ADMIN — HYDROMORPHONE HYDROCHLORIDE 0.2 MG: 0.2 INJECTION, SOLUTION INTRAMUSCULAR; INTRAVENOUS; SUBCUTANEOUS at 13:51

## 2024-01-01 RX ADMIN — GABAPENTIN 600 MG: 300 CAPSULE ORAL at 10:07

## 2024-01-01 ASSESSMENT — ACTIVITIES OF DAILY LIVING (ADL)
ADLS_ACUITY_SCORE: 24
ADLS_ACUITY_SCORE: 21
ADLS_ACUITY_SCORE: 22
ADLS_ACUITY_SCORE: 20
ADLS_ACUITY_SCORE: 26
ADLS_ACUITY_SCORE: 22
ADLS_ACUITY_SCORE: 22
ADLS_ACUITY_SCORE: 23
ADLS_ACUITY_SCORE: 24
ADLS_ACUITY_SCORE: 21
ADLS_ACUITY_SCORE: 21
ADLS_ACUITY_SCORE: 24
ADLS_ACUITY_SCORE: 20
ADLS_ACUITY_SCORE: 22
ADLS_ACUITY_SCORE: 20
ADLS_ACUITY_SCORE: 39
ADLS_ACUITY_SCORE: 21
ADLS_ACUITY_SCORE: 37
ADLS_ACUITY_SCORE: 23
ADLS_ACUITY_SCORE: 22
ADLS_ACUITY_SCORE: 23
ADLS_ACUITY_SCORE: 24
ADLS_ACUITY_SCORE: 21
ADLS_ACUITY_SCORE: 24
ADLS_ACUITY_SCORE: 20
ADLS_ACUITY_SCORE: 24
ADLS_ACUITY_SCORE: 23
ADLS_ACUITY_SCORE: 24
ADLS_ACUITY_SCORE: 24
ADLS_ACUITY_SCORE: 21
ADLS_ACUITY_SCORE: 24
ADLS_ACUITY_SCORE: 20
ADLS_ACUITY_SCORE: 21
ADLS_ACUITY_SCORE: 23
ADLS_ACUITY_SCORE: 23
ADLS_ACUITY_SCORE: 22
ADLS_ACUITY_SCORE: 26
ADLS_ACUITY_SCORE: 21
ADLS_ACUITY_SCORE: 41
ADLS_ACUITY_SCORE: 22
ADLS_ACUITY_SCORE: 37
ADLS_ACUITY_SCORE: 21
ADLS_ACUITY_SCORE: 23
ADLS_ACUITY_SCORE: 24
ADLS_ACUITY_SCORE: 41
ADLS_ACUITY_SCORE: 21
ADLS_ACUITY_SCORE: 22
ADLS_ACUITY_SCORE: 22
ADLS_ACUITY_SCORE: 20
ADLS_ACUITY_SCORE: 23
ADLS_ACUITY_SCORE: 21
ADLS_ACUITY_SCORE: 24
ADLS_ACUITY_SCORE: 23
ADLS_ACUITY_SCORE: 21
ADLS_ACUITY_SCORE: 26
ADLS_ACUITY_SCORE: 23
ADLS_ACUITY_SCORE: 21
ADLS_ACUITY_SCORE: 23
ADLS_ACUITY_SCORE: 23
ADLS_ACUITY_SCORE: 22
ADLS_ACUITY_SCORE: 22
ADLS_ACUITY_SCORE: 20
ADLS_ACUITY_SCORE: 41
ADLS_ACUITY_SCORE: 21
ADLS_ACUITY_SCORE: 22
ADLS_ACUITY_SCORE: 20
ADLS_ACUITY_SCORE: 21
ADLS_ACUITY_SCORE: 22
ADLS_ACUITY_SCORE: 23
ADLS_ACUITY_SCORE: 37
ADLS_ACUITY_SCORE: 23
ADLS_ACUITY_SCORE: 21
ADLS_ACUITY_SCORE: 23
ADLS_ACUITY_SCORE: 23
ADLS_ACUITY_SCORE: 22
ADLS_ACUITY_SCORE: 22
ADLS_ACUITY_SCORE: 37
ADLS_ACUITY_SCORE: 41
ADLS_ACUITY_SCORE: 26
ADLS_ACUITY_SCORE: 24
ADLS_ACUITY_SCORE: 37
ADLS_ACUITY_SCORE: 20
ADLS_ACUITY_SCORE: 24
ADLS_ACUITY_SCORE: 41
ADLS_ACUITY_SCORE: 23
ADLS_ACUITY_SCORE: 22
ADLS_ACUITY_SCORE: 24
ADLS_ACUITY_SCORE: 23
ADLS_ACUITY_SCORE: 21
ADLS_ACUITY_SCORE: 20
ADLS_ACUITY_SCORE: 41
ADLS_ACUITY_SCORE: 24
ADLS_ACUITY_SCORE: 23
ADLS_ACUITY_SCORE: 21
ADLS_ACUITY_SCORE: 23
ADLS_ACUITY_SCORE: 21
ADLS_ACUITY_SCORE: 23
ADLS_ACUITY_SCORE: 37
ADLS_ACUITY_SCORE: 24
ADLS_ACUITY_SCORE: 23
ADLS_ACUITY_SCORE: 21
ADLS_ACUITY_SCORE: 41
ADLS_ACUITY_SCORE: 26
ADLS_ACUITY_SCORE: 22
ADLS_ACUITY_SCORE: 24
ADLS_ACUITY_SCORE: 37
ADLS_ACUITY_SCORE: 21
ADLS_ACUITY_SCORE: 22
ADLS_ACUITY_SCORE: 41
ADLS_ACUITY_SCORE: 37
ADLS_ACUITY_SCORE: 22
ADLS_ACUITY_SCORE: 23
ADLS_ACUITY_SCORE: 24
CURRENT_FUNCTION: NO ASSISTANCE NEEDED
ADLS_ACUITY_SCORE: 21
ADLS_ACUITY_SCORE: 24
ADLS_ACUITY_SCORE: 22
ADLS_ACUITY_SCORE: 41
ADLS_ACUITY_SCORE: 23
ADLS_ACUITY_SCORE: 37
ADLS_ACUITY_SCORE: 23
ADLS_ACUITY_SCORE: 37
ADLS_ACUITY_SCORE: 37
ADLS_ACUITY_SCORE: 21
ADLS_ACUITY_SCORE: 24
ADLS_ACUITY_SCORE: 23
ADLS_ACUITY_SCORE: 22
ADLS_ACUITY_SCORE: 20
ADLS_ACUITY_SCORE: 20
ADLS_ACUITY_SCORE: 37
ADLS_ACUITY_SCORE: 21
ADLS_ACUITY_SCORE: 24
ADLS_ACUITY_SCORE: 23
ADLS_ACUITY_SCORE: 41
ADLS_ACUITY_SCORE: 37
ADLS_ACUITY_SCORE: 37
ADLS_ACUITY_SCORE: 22
ADLS_ACUITY_SCORE: 22
ADLS_ACUITY_SCORE: 37
ADLS_ACUITY_SCORE: 23
ADLS_ACUITY_SCORE: 21
ADLS_ACUITY_SCORE: 22
ADLS_ACUITY_SCORE: 26

## 2024-01-01 ASSESSMENT — ENCOUNTER SYMPTOMS
DYSURIA: 0
WEAKNESS: 0
PARESTHESIAS: 0
JOINT SWELLING: 0
DIARRHEA: 0
MYALGIAS: 1
SORE THROAT: 0
DIZZINESS: 0
CONSTIPATION: 0
ABDOMINAL PAIN: 1
HEARTBURN: 0
FEVER: 0
NERVOUS/ANXIOUS: 0
HEADACHES: 1
PALPITATIONS: 0
COUGH: 0
ARTHRALGIAS: 0
EYE PAIN: 0
SHORTNESS OF BREATH: 0
CHILLS: 0
HEMATOCHEZIA: 0
BREAST MASS: 0
HEMATURIA: 0
NAUSEA: 0
FREQUENCY: 0

## 2024-01-01 ASSESSMENT — PAIN SCALES - GENERAL
PAINLEVEL: SEVERE PAIN (6)
PAINLEVEL: SEVERE PAIN (7)
PAINLEVEL: SEVERE PAIN (6)
PAINLEVEL: MILD PAIN (3)
PAINLEVEL: SEVERE PAIN (7)
PAINLEVEL: EXTREME PAIN (8)
PAINLEVEL: SEVERE PAIN (7)
PAINLEVEL: MODERATE PAIN (5)
PAINLEVEL: MODERATE PAIN (4)
PAINLEVEL: MODERATE PAIN (4)
PAINLEVEL: SEVERE PAIN (6)
PAINLEVEL: MODERATE PAIN (5)
PAINLEVEL: MODERATE PAIN (4)
PAINLEVEL: SEVERE PAIN (6)
PAINLEVEL: MILD PAIN (2)
PAINLEVEL: SEVERE PAIN (7)
PAINLEVEL: NO PAIN (0)
PAINLEVEL: MODERATE PAIN (4)

## 2024-01-01 ASSESSMENT — SLEEP AND FATIGUE QUESTIONNAIRES
HOW LIKELY ARE YOU TO NOD OFF OR FALL ASLEEP WHILE SITTING AND READING: SLIGHT CHANCE OF DOZING
HOW LIKELY ARE YOU TO NOD OFF OR FALL ASLEEP WHILE SITTING QUIETLY AFTER LUNCH WITHOUT ALCOHOL: WOULD NEVER DOZE
HOW LIKELY ARE YOU TO NOD OFF OR FALL ASLEEP WHILE SITTING AND TALKING TO SOMEONE: WOULD NEVER DOZE
HOW LIKELY ARE YOU TO NOD OFF OR FALL ASLEEP IN A CAR, WHILE STOPPED FOR A FEW MINUTES IN TRAFFIC: WOULD NEVER DOZE
HOW LIKELY ARE YOU TO NOD OFF OR FALL ASLEEP WHILE SITTING AND TALKING TO SOMEONE: WOULD NEVER DOZE
HOW LIKELY ARE YOU TO NOD OFF OR FALL ASLEEP WHILE SITTING QUIETLY AFTER LUNCH WITHOUT ALCOHOL: WOULD NEVER DOZE
HOW LIKELY ARE YOU TO NOD OFF OR FALL ASLEEP WHILE SITTING INACTIVE IN A PUBLIC PLACE: WOULD NEVER DOZE
HOW LIKELY ARE YOU TO NOD OFF OR FALL ASLEEP WHILE LYING DOWN TO REST IN THE AFTERNOON WHEN CIRCUMSTANCES PERMIT: SLIGHT CHANCE OF DOZING
HOW LIKELY ARE YOU TO NOD OFF OR FALL ASLEEP IN A CAR, WHILE STOPPED FOR A FEW MINUTES IN TRAFFIC: WOULD NEVER DOZE
HOW LIKELY ARE YOU TO NOD OFF OR FALL ASLEEP WHEN YOU ARE A PASSENGER IN A CAR FOR AN HOUR WITHOUT A BREAK: WOULD NEVER DOZE
HOW LIKELY ARE YOU TO NOD OFF OR FALL ASLEEP WHILE WATCHING TV: MODERATE CHANCE OF DOZING
HOW LIKELY ARE YOU TO NOD OFF OR FALL ASLEEP WHILE SITTING INACTIVE IN A PUBLIC PLACE: WOULD NEVER DOZE
HOW LIKELY ARE YOU TO NOD OFF OR FALL ASLEEP WHILE WATCHING TV: SLIGHT CHANCE OF DOZING
HOW LIKELY ARE YOU TO NOD OFF OR FALL ASLEEP WHILE SITTING AND READING: SLIGHT CHANCE OF DOZING
HOW LIKELY ARE YOU TO NOD OFF OR FALL ASLEEP WHEN YOU ARE A PASSENGER IN A CAR FOR AN HOUR WITHOUT A BREAK: WOULD NEVER DOZE
HOW LIKELY ARE YOU TO NOD OFF OR FALL ASLEEP WHILE LYING DOWN TO REST IN THE AFTERNOON WHEN CIRCUMSTANCES PERMIT: MODERATE CHANCE OF DOZING

## 2024-01-01 ASSESSMENT — PATIENT HEALTH QUESTIONNAIRE - PHQ9
10. IF YOU CHECKED OFF ANY PROBLEMS, HOW DIFFICULT HAVE THESE PROBLEMS MADE IT FOR YOU TO DO YOUR WORK, TAKE CARE OF THINGS AT HOME, OR GET ALONG WITH OTHER PEOPLE: NOT DIFFICULT AT ALL
SUM OF ALL RESPONSES TO PHQ QUESTIONS 1-9: 8
SUM OF ALL RESPONSES TO PHQ QUESTIONS 1-9: 8
SUM OF ALL RESPONSES TO PHQ QUESTIONS 1-9: 0

## 2024-01-01 ASSESSMENT — COLUMBIA-SUICIDE SEVERITY RATING SCALE - C-SSRS
1. IN THE PAST MONTH, HAVE YOU WISHED YOU WERE DEAD OR WISHED YOU COULD GO TO SLEEP AND NOT WAKE UP?: NO
2. HAVE YOU ACTUALLY HAD ANY THOUGHTS OF KILLING YOURSELF IN THE PAST MONTH?: NO
6. HAVE YOU EVER DONE ANYTHING, STARTED TO DO ANYTHING, OR PREPARED TO DO ANYTHING TO END YOUR LIFE?: NO
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
## 2024-01-03 NOTE — LETTER
1/3/2024         RE: Lizzie Viera  [address]  [place] [address]        Dear Colleague,    Thank you for referring your patient, Lizzie Viera, to the [facility]. Please see a copy of my visit note below.    See confidential note      Again, thank you for allowing me to participate in the care of your patient.        Sincerely,        Mario Long LP

## 2024-01-04 NOTE — PROGRESS NOTES
Assessment/Plan:      Valeria was seen today for back pain.    Diagnoses and all orders for this visit:    Cervical spine pain    Thoracic spine pain    Myofascial pain    Closed wedge compression fracture of T7 vertebra, sequela    Somatic dysfunction of head region  -     OSTEOPATHIC MANIP,5-6 BODY REGN    Somatic dysfunction of cervical region  -     OSTEOPATHIC MANIP,5-6 BODY REGN    Somatic dysfunction of rib region  -     OSTEOPATHIC MANIP,5-6 BODY REGN    Somatic dysfunction of upper extremity  -     OSTEOPATHIC MANIP,5-6 BODY REGN    Somatic dysfunction of thoracic region  -     OSTEOPATHIC MANIP,5-6 BODY REGN         Assessment: Pleasant 73 year old female with a history of hyperlipidemia, anxiety, depression, irritable bowel, neuropathy, osteoporosis and multiple myeloma with a T7 fracture and lesion  resulting in spinal stenosis:     1 increase in cervical and chronic thoracic pain  around the mid to upper thoracic spine radiating up to the cervical spine and suboccipital region with headaches.   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  No signs of thoracic myelopathy neurologically stable. Fracture is stable on MRI from 2022 T1 and T7.   Symptoms have increased with increased activity around the holidays with lifting.   Continues to do well with   gabapentin 600 mg 3 times daily.   Has done well with OMT and continues to do physical therapy.        2.  Improved bilateral foot paresthesias.  Left greater than right consistent with polyneuropathy.   This is mildly asymmetric.  She has  absent sural SNAPs on EMG cannot exclude peripheral polyneuropathy although this would be relatively mild.  She may have a small fiber neuropathy.  She has a history of chemotherapy.  Improved with physical therapy and continues to take gabapentin.           3.    Increased cervical spine upper thoracic pain consistent myofascial pain with physical therapy.     4.  Somatic  dysfunctions of the cranium, cervical spine, rib cage, upper extremities, thoracic spine that contribute to the patient's pain complaints.         Discussion:    1.  We discussed her increased pain.  At this time she has had increased pain in the thoracic spine and cervical spine with headaches but no new concerning symptoms such as paresthesias or weakness.  She can continue with activity modification continue with physical therapy.  We discussed Osteopathic manipulative medicine.    2.  Consideration for updated plain films of the cervical spine and thoracic spine.  At this time without any new concerning findings she would like to hold off.    3.  Osteopathic manipulative medicine today.  She agrees to proceed.  Please see attached procedure note.    4.  Follow-up as needed    It was our pleasure caring for your patient today, if there any questions or concerns please do not hesitate to contact us.      Subjective:   Patient ID: Lizzie Viera is a 73 year old female.    History of Present Illness: Patient presents for evaluation of increased thoracic spine pain cervical spine pain.  She has increased pain in the parascapular region bilaterally rating up to the cervical spine giving her headaches and suboccipital region.  This is worse after the holidays doing a lot of bending lifting wrapping presents.  Doing home exercises from PT continues to see PT and doing well.  Also THC helps.  Pain is a 10/10 at worst 5/10 today 3/10 at best.  Osteopathic manipulative medicine level.  Continues to take gabapentin 600 mg 3 times daily also take methocarbamol.      Imaging: CT cervical spine from September 26, 2021 images reviewed.  Normal alignment.  No high-grade central stenosis.  There is a bony lesion at T1 anteriorly with slight compression anteriorly.  I do not appreciate any high-grade central stenosis or foraminal stenosis.    Review of Systems: Complains of paresthesias weakness headaches difficulty  swallowing.  Denies bowel or bladder incontinence, falls, fevers and unintentional weight loss.           Past Medical History:   Diagnosis Date    Cervical dysplasia     Chronic RUQ pain     Depressive disorder     Multiple myeloma (H)     Osteoporosis        The following portions of the patient's history were reviewed and updated as appropriate: allergies, current medications, past family history, past medical history, past social history, past surgical history and problem list.           Objective:   Physical Exam:    /63   Pulse 64   LMP  (LMP Unknown)   There is no height or weight on file to calculate BMI.      General: Alert and oriented with normal affect. Attention, knowledge, memory, and language are intact. No acute distress.   Eyes: Sclerae are clear.  Respirations: Unlabored. CV: No lower extremity edema.     Gait:  Nonantalgic    Sensation is intact to light touch throughout the upper and lower extremities.  Reflexes are 2+ and symmetric in the biceps triceps and brachioradialis with negative Hoffmans.      Manual muscle testing reveals:  Right /Left out of 5     5/5 elbow flexors  5/5 elbow extensors  5/5 wrist extensors  5/5 interosseus  5/5 finger flexors     5/5 ankle plantar flexors  5/5 ankle dorsiflexors  5/5   ankle evertors    Structural exam: Cranium: Left cranial torsion, OA sidebent right rotated left cervical spine: C2 rotated right sidebent right, C3 rotated right sidebent right, C5 rotated left sidebent left. Rib cage: Rib one elevated on the left. Thoracic spine: T1 rotated right sidebent right. Upper Extremities: myofascial restrictions of the bilat upper trap, infraspinatus/parascapular muscles. bilateral glenohumeral resrictions.    Procedure:    After discussing the risks and benefits of osteopathic manipulative medicine, verbal consent was obtained. The somatic dysfunctions listed above were treated with the following techniques: Cranium: Cranial indirect technique, VSD,  and muscle energy for the OA. Cervical spine: Muscle energy, still technique, FPR, myofascial release, BLT, and soft tissue techniques. Rib cage: Myofascial release and FPR. Thoracic spine: Myofascial release, BLT.   Upper Exrtremity: MFR, FPR, BLT.  The patient tolerated the procedure well and had improved range of motion in all areas treated prior to leaving the clinic.

## 2024-01-04 NOTE — LETTER
1/4/2024         RE: Lizzie Viera  [address]  [place] [address]        Dear Colleague,    Thank you for referring your patient, Lizzie Viera, to the [facility]. Please see a copy of my visit note below.    Assessment/Plan:      Valeria was seen today for back pain.    Diagnoses and all orders for this visit:    Cervical spine pain    Thoracic spine pain    Myofascial pain    Closed wedge compression fracture of T7 vertebra, sequela    Somatic dysfunction of head region  -     OSTEOPATHIC MANIP,5-6 BODY REGN    Somatic dysfunction of cervical region  -     OSTEOPATHIC MANIP,5-6 BODY REGN    Somatic dysfunction of rib region  -     OSTEOPATHIC MANIP,5-6 BODY REGN    Somatic dysfunction of upper extremity  -     OSTEOPATHIC MANIP,5-6 BODY REGN    Somatic dysfunction of thoracic region  -     OSTEOPATHIC MANIP,5-6 BODY REGN         Assessment: Hossein 73 year old female with a history of hyperlipidemia, anxiety, depression, irritable bowel, neuropathy, osteoporosis and multiple myeloma with a T7 fracture and lesion  resulting in spinal stenosis:     1 increase in cervical and chronic thoracic pain  around the mid to upper thoracic spine radiating up to the cervical spine and suboccipital region with headaches.   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  No signs of thoracic myelopathy neurologically stable. Fracture is stable on MRI from 2022 T1 and T7.   Symptoms have increased with increased activity around the holidays with lifting.   Continues to do well with   gabapentin 600 mg 3 times daily.   Has done well with OMT and continues to do physical therapy.        2.  Improved bilateral foot paresthesias.  Left greater than right consistent with polyneuropathy.   This is mildly asymmetric.  She has  absent sural SNAPs on EMG cannot exclude peripheral polyneuropathy although this would be relatively mild.   She may have a small fiber neuropathy.  She has a history of chemotherapy.  Improved with physical therapy and continues to take gabapentin.           3.    Increased cervical spine upper thoracic pain consistent myofascial pain with physical therapy.     4.  Somatic dysfunctions of the cranium, cervical spine, rib cage, upper extremities, thoracic spine that contribute to the patient's pain complaints.         Discussion:    1.  We discussed her increased pain.  At this time she has had increased pain in the thoracic spine and cervical spine with headaches but no new concerning symptoms such as paresthesias or weakness.  She can continue with activity modification continue with physical therapy.  We discussed Osteopathic manipulative medicine.    2.  Consideration for updated plain films of the cervical spine and thoracic spine.  At this time without any new concerning findings she would like to hold off.    3.  Osteopathic manipulative medicine today.  She agrees to proceed.  Please see attached procedure note.    4.  Follow-up as needed    It was our pleasure caring for your patient today, if there any questions or concerns please do not hesitate to contact us.      Subjective:   Patient ID: Lizzie Viera is a 73 year old female.    History of Present Illness: Patient presents for evaluation of increased thoracic spine pain cervical spine pain.  She has increased pain in the parascapular region bilaterally rating up to the cervical spine giving her headaches and suboccipital region.  This is worse after the holidays doing a lot of bending lifting wrapping presents.  Doing home exercises from PT continues to see PT and doing well.  Also THC helps.  Pain is a 10/10 at worst 5/10 today 3/10 at best.  Osteopathic manipulative medicine level.  Continues to take gabapentin 600 mg 3 times daily also take methocarbamol.      Imaging: CT cervical spine from September 26, 2021 images reviewed.  Normal alignment.  No  high-grade central stenosis.  There is a bony lesion at T1 anteriorly with slight compression anteriorly.  I do not appreciate any high-grade central stenosis or foraminal stenosis.    Review of Systems: Complains of paresthesias weakness headaches difficulty swallowing.  Denies bowel or bladder incontinence, falls, fevers and unintentional weight loss.           Past Medical History:   Diagnosis Date     Cervical dysplasia      Chronic RUQ pain      Depressive disorder      Multiple myeloma (H)      Osteoporosis        The following portions of the patient's history were reviewed and updated as appropriate: allergies, current medications, past family history, past medical history, past social history, past surgical history and problem list.           Objective:   Physical Exam:    /63   Pulse 64   LMP  (LMP Unknown)   There is no height or weight on file to calculate BMI.      General: Alert and oriented with normal affect. Attention, knowledge, memory, and language are intact. No acute distress.   Eyes: Sclerae are clear.  Respirations: Unlabored. CV: No lower extremity edema.     Gait:  Nonantalgic    Sensation is intact to light touch throughout the upper and lower extremities.  Reflexes are 2+ and symmetric in the biceps triceps and brachioradialis with negative Hoffmans.      Manual muscle testing reveals:  Right /Left out of 5     5/5 elbow flexors  5/5 elbow extensors  5/5 wrist extensors  5/5 interosseus  5/5 finger flexors     5/5 ankle plantar flexors  5/5 ankle dorsiflexors  5/5   ankle evertors    Structural exam: Cranium: Left cranial torsion, OA sidebent right rotated left cervical spine: C2 rotated right sidebent right, C3 rotated right sidebent right, C5 rotated left sidebent left. Rib cage: Rib one elevated on the left. Thoracic spine: T1 rotated right sidebent right. Upper Extremities: myofascial restrictions of the bilat upper trap, infraspinatus/parascapular muscles. bilateral  glenohumeral resrictions.    Procedure:    After discussing the risks and benefits of osteopathic manipulative medicine, verbal consent was obtained. The somatic dysfunctions listed above were treated with the following techniques: Cranium: Cranial indirect technique, VSD, and muscle energy for the OA. Cervical spine: Muscle energy, still technique, FPR, myofascial release, BLT, and soft tissue techniques. Rib cage: Myofascial release and FPR. Thoracic spine: Myofascial release, BLT.   Upper Exrtremity: MFR, FPR, BLT.  The patient tolerated the procedure well and had improved range of motion in all areas treated prior to leaving the clinic.      Again, thank you for allowing me to participate in the care of your patient.        Sincerely,        Wyatt Pascual, DO

## 2024-01-10 NOTE — PROGRESS NOTES
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  A very pleasant 73 year old woman with IgG kappa multiple myeloma with hyperdiploid cytogenetics.  However clinically was behaving somewhat more aggressively.  Started on VRD treatment.  Responded quite well. After 17 cycles the treatment was switched to Revlimid 10 mg daily in September 2022.  Valeria has been tolerating it quite well.  The lab work-up in the end of November 2022 showed maintenance of response.  The labs since February 2023 have been pretty stable.  Kappa lambda light chains have been normal for most part.  In September 2023 kappa light chains were slightly above normal but is lambda light chains were also above their previous numbers.  The ratio really did not change a whole lot.  Immunoglobulins number were stable.  Albumin continues to be normal.  Hemoglobin is normal as well.  2.  Had significant bone disease with osteoporosis and compression fractures.  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  Improved significantly on the CT scan in March 2022.  MRI also shows no new lesions.  December 2022 bone density showed osteoporosis.  Has not been able to restart Zometa due to dental implant needs.  The dental implants have now been done.  3.  Normal hemoglobin, calcium, kidney function along with beta-2 microglobulin and albumin at the time of diagnosis.    4.  Positive Covid antibodies from infection. Unvaccinated. Declined Evusheld.  5.  Pain from compression fracture status post radiation therapy.  Significantly improved.  6.  Some dental issues on left upper  molars which were cracked and has been extracted.  She also has a tooth on the right lower jaw which the dentists performed extraction.  Dental implant done on August 16th. She has been told it takes 6 months to heal.  7.  Clinical suspicion of sleep apnea.  Has been seen by sleep medicine people.  Scheduled to get a sleep study done in February 2024.    Plan    Decision to continue Revlimid 10 mg daily  Continue with diet rich in calcium and vitamin D.  Start bisphosphonate therapy as soon as the dental implants are done next month.  Follow-up on the lab results from today.  Follow-up on the sleep study that is being scheduled for February 2024.     Cancer Staging   No matching staging information was found for the patient.      ECOG Performance    2 - Ambulatory and independent in all ADLs; cannot work; up > 50% of the time      History of Present Illness    Ms. Lizzie Viera is a very pleasant 73 year old woman who has been diagnosed with multiple myeloma IgG kappa type in May 2021.  She had initially presented with compression fracture of T7 vertebral body in September 2020.  She had a back pain which led to that evaluation.  Bone density confirmed that she had osteoporosis.  MRI showed diffuse marrow edema in October 2020.  In April 2021 the MRI of the thoracic spine showed worsening T7 vertebral body height loss because of likely pathological compression fracture with extraosseous extension.  She then had IR guided bone biopsy on 7 May 2021 and pathology confirmed the presence of kappa light chain restricted plasma cells.  Her cytogenetics confirmed the presence of hyperdiploid E with gains of chromosome 5, 9 and 15.    She was then seen by Dr. Baig and had a bone marrow biopsy which also confirmed 60% involvement of the marrow with plasma cells.  The bone marrow was done on 3 Jocelin 2021.  The bone marrow also confirmed the presence of hyperdiploidy.  She had a gain of chromosome 3, 5, 7, 9, 11, 15 as  well as 19.  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  Her beta-2 microglobulin was  normal at the time of her diagnosis as was her albumin at 4.0.  Monoclonal protein 3.1 g/dL.  Kappa free light chain levels of 13 mg/dL IgG level of 4280 with depressed levels of IgM and IgA.  Urine was also positive for kappa light chains as well as immunofixation of the urine was positive for IgG kappa.  Normal kidney function.  Normal hemoglobin.    As mentioned above she had bone lesions in the T7 vertebral body, T1, skull area.  Her main pain is coming from her T7 vertebral body compression fracture.    Due to the pain she has been seen by radiation oncology and has received radiation therapy.  She also got a dose of steroids for pain control.    She was initially thinking of doing some natural therapies but once her symptoms got worse, she came back in was counseled about treatment.      She started on Velcade Revlimid and dexamethasone in September 2021.  Responded nicely.  Immunoglobulins  normalized completely. Her immunoglobin levels have almost normalized in fact the IgG levels have gone below normal.  Immunofixation still shows a very faint kappa monoclonal gammopathy.    She was  hospitalized in April 2022 with COPD exacerbation/pneumonia.  Was given some steroids and antibiotic.  She was slightly hypoxic initially but then got better after that.    She was referred to [facility] for transplant evaluation.  She was somewhat reluctant to go forward with that.  Otherwise she seems to be doing quite well.  Her immunoglobulin levels have normalized or actually are below normal.  Immunofixation faintly positive.    She has cracked a molar on the upper left jaw sometime in July 2022.  There was some tooth decay.  She had them extracted.    In September 2022 we discontinued the VRD treatment and now Valeria is on Revlimid maintenance 10 mg p.o. daily.  She seems to be tolerating it well.  She is physically more active so she is  noticing some soreness in her back.    Had dental extraction done in October 2022 from the right lower jaw.  That seems to be healing well.  Underwent bone densitometry recently.  That confirms presence of osteoporosis which is not a new finding for her.    In February 2023 her labs were pretty stable.  She went to [place] so we gave her 2 to 3 weeks of break from Revlimid.  She restarted the treatment.    Comes in today for scheduled follow-up.  Overall seems to be doing okay.  Had dental implant on August 16, 2023. Did well.     She has been snoring more, so sought opinion from her PMD. Has been referred to sleep study.  She has been seen by sleep study medicine.  Scheduled for sleep study in February 2024.  Otherwise doing well.  No new medical issues.  Still having some upper respite tract allergy issues.      Review of system      Details noted in the history of present illness.  A detailed review of systems is otherwise negative.      Physical exam        BP (!) 156/71   Pulse 58   Temp 98.1  F (36.7  C)   Resp 18   Wt 58.5 kg (129 lb)   LMP  (LMP Unknown)   SpO2 97%   BMI 24.37 kg/m      GENERAL: No acute distress. Cooperative in conversation.   HEENT:  Pupils are equal, round and reactive. Oral mucosa is clean and intact. No ulcerations or mucositis noted. No bleeding noted.  RESP:Chest symmetric lungs are clear bilaterally per auscultation. Regular respiratory rate. No wheezes or rhonchi.  CV: Normal S1 S2 Regular, rate and rhythm.     ABD: Nondistended, soft, nontender. Positive bowel sounds. No organomegaly.   EXTREMITIES: No lower extremity edema.   NEURO: Non- focal. Alert and oriented x3.  Cranial nerves appear intact.  PSYCH: Within normal limits. No depression or anxiety.  SKIN: Warm dry intact.        Lab results Reviewed      Recent Results (from the past 168 hour(s))   Comprehensive metabolic panel   Result Value Ref Range    Sodium 141 135 - 145 mmol/L    Potassium 3.8 3.4 - 5.3 mmol/L     Carbon Dioxide (CO2) 32 (H) 22 - 29 mmol/L    Anion Gap 7 7 - 15 mmol/L    Urea Nitrogen 20.1 8.0 - 23.0 mg/dL    Creatinine 0.94 0.51 - 0.95 mg/dL    GFR Estimate 65 >60 mL/min/1.73m2    Calcium 9.4 8.8 - 10.2 mg/dL    Chloride 102 98 - 107 mmol/L    Glucose 105 (H) 70 - 99 mg/dL    Alkaline Phosphatase 44 40 - 150 U/L    AST 19 0 - 45 U/L    ALT 27 0 - 50 U/L    Protein Total 6.3 (L) 6.4 - 8.3 g/dL    Albumin 4.0 3.5 - 5.2 g/dL    Bilirubin Total 0.3 <=1.2 mg/dL   CBC with platelets and differential   Result Value Ref Range    WBC Count 4.7 4.0 - 11.0 10e3/uL    RBC Count 4.16 3.80 - 5.20 10e6/uL    Hemoglobin 14.7 11.7 - 15.7 g/dL    Hematocrit 42.9 35.0 - 47.0 %     (H) 78 - 100 fL    MCH 35.3 (H) 26.5 - 33.0 pg    MCHC 34.3 31.5 - 36.5 g/dL    RDW 14.4 10.0 - 15.0 %    Platelet Count 194 150 - 450 10e3/uL    % Neutrophils 39 %    % Lymphocytes 46 %    % Monocytes 9 %    % Eosinophils 4 %    % Basophils 2 %    % Immature Granulocytes 0 %    NRBCs per 100 WBC 0 <1 /100    Absolute Neutrophils 1.8 1.6 - 8.3 10e3/uL    Absolute Lymphocytes 2.2 0.8 - 5.3 10e3/uL    Absolute Monocytes 0.4 0.0 - 1.3 10e3/uL    Absolute Eosinophils 0.2 0.0 - 0.7 10e3/uL    Absolute Basophils 0.1 0.0 - 0.2 10e3/uL    Absolute Immature Granulocytes 0.0 <=0.4 10e3/uL    Absolute NRBCs 0.0 10e3/uL       Imaging results Reviewed        No results found.      Signed by: Loco Blanco MD      This note has been dictated using voice recognition software. Any grammatical or context distortions are unintentional and inherent to the software

## 2024-01-10 NOTE — LETTER
"    1/10/2024         RE: Lizzie Viera  [address]  [place] [address]        Dear Colleague,    Thank you for referring your patient, Lizzie Viera, to the [facility] CANC[facility]. Please see a copy of my visit note below.    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    Weight as of this encounter: 58.5 kg (129 lb). Body surface area is 1.59 meters squared.  Moderate Pain (4) Comment: Data Unavailable   No LMP recorded (lmp unknown). Patient is postmenopausal.  Allergies reviewed: Yes  Medications reviewed: Yes    Medications: Medication refills not needed today.  Pharmacy name entered into [facility]: [facility][id number] [address]    Frailty Screening:   Is the patient here for a new oncology consult visit in cancer care? 2. 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  A very pleasant 73 year old woman " with IgG kappa multiple myeloma with hyperdiploid cytogenetics.  However clinically was behaving somewhat more aggressively.  Started on VRD treatment.  Responded quite well. After 17 cycles the treatment was switched to Revlimid 10 mg daily in September 2022.  Valeria has been tolerating it quite well.  The lab work-up in the end of November 2022 showed maintenance of response.  The labs since February 2023 have been pretty stable.  Kappa lambda light chains have been normal for most part.  In September 2023 kappa light chains were slightly above normal but is lambda light chains were also above their previous numbers.  The ratio really did not change a whole lot.  Immunoglobulins number were stable.  Albumin continues to be normal.  Hemoglobin is normal as well.  2.  Had significant bone disease with osteoporosis and compression fractures.  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  Improved significantly on the CT scan in March 2022.  MRI also shows no new lesions.  December 2022 bone density showed osteoporosis.  Has not been able to restart Zometa due to dental implant needs.  The dental implants have now been done.  3.  Normal hemoglobin, calcium, kidney function along with beta-2 microglobulin and albumin at the time of diagnosis.    4.  Positive Covid antibodies from infection. Unvaccinated. Declined Evusheld.  5.  Pain from compression fracture status post radiation therapy.  Significantly improved.  6.  Some dental issues on left upper molars which were cracked and has been extracted.  She also has a tooth on the right lower jaw which the dentists performed extraction.  Dental implant done on August 16th. She has been told it takes 6 months to heal.  7.  Clinical suspicion of sleep apnea.  Has been seen by sleep medicine people.  Scheduled to get a sleep study done in February 2024.    Plan    Decision to continue Revlimid 10 mg daily  Continue with diet rich in calcium and vitamin D.  Start  bisphosphonate therapy as soon as the dental implants are done next month.  Follow-up on the lab results from today.  Follow-up on the sleep study that is being scheduled for February 2024.     Cancer Staging   No matching staging information was found for the patient.      ECOG Performance    2 - Ambulatory and independent in all ADLs; cannot work; up > 50% of the time      History of Present Illness    Ms. Lizzie Viera is a very pleasant 73 year old woman who has been diagnosed with multiple myeloma IgG kappa type in May 2021.  She had initially presented with compression fracture of T7 vertebral body in September 2020.  She had a back pain which led to that evaluation.  Bone density confirmed that she had osteoporosis.  MRI showed diffuse marrow edema in October 2020.  In April 2021 the MRI of the thoracic spine showed worsening T7 vertebral body height loss because of likely pathological compression fracture with extraosseous extension.  She then had IR guided bone biopsy on 7 May 2021 and pathology confirmed the presence of kappa light chain restricted plasma cells.  Her cytogenetics confirmed the presence of hyperdiploid E with gains of chromosome 5, 9 and 15.    She was then seen by Dr. Baig and had a bone marrow biopsy which also confirmed 60% involvement of the marrow with plasma cells.  The bone marrow was done on 3 Jocelin 2021.  The bone marrow also confirmed the presence of hyperdiploidy.  She had a gain of chromosome 3, 5, 7, 9, 11, 15 as well as 19.  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  Her beta-2 microglobulin was  normal at the time of her diagnosis as was her albumin at 4.0.  Monoclonal protein 3.1 g/dL.  Kappa free light chain levels of 13 mg/dL IgG level of 4280 with depressed levels of IgM and IgA.  Urine was also positive for kappa light chains as well as immunofixation of the urine was positive for IgG kappa.  Normal kidney function.  Normal hemoglobin.    As mentioned above she had bone lesions  in the T7 vertebral body, T1, skull area.  Her main pain is coming from her T7 vertebral body compression fracture.    Due to the pain she has been seen by radiation oncology and has received radiation therapy.  She also got a dose of steroids for pain control.    She was initially thinking of doing some natural therapies but once her symptoms got worse, she came back in was counseled about treatment.      She started on Velcade Revlimid and dexamethasone in September 2021.  Responded nicely.  Immunoglobulins  normalized completely. Her immunoglobin levels have almost normalized in fact the IgG levels have gone below normal.  Immunofixation still shows a very faint kappa monoclonal gammopathy.    She was  hospitalized in April 2022 with COPD exacerbation/pneumonia.  Was given some steroids and antibiotic.  She was slightly hypoxic initially but then got better after that.    She was referred to [facility] for transplant evaluation.  She was somewhat reluctant to go forward with that.  Otherwise she seems to be doing quite well.  Her immunoglobulin levels have normalized or actually are below normal.  Immunofixation faintly positive.    She has cracked a molar on the upper left jaw sometime in July 2022.  There was some tooth decay.  She had them extracted.    In September 2022 we discontinued the VRD treatment and now Valeria is on Revlimid maintenance 10 mg p.o. daily.  She seems to be tolerating it well.  She is physically more active so she is noticing some soreness in her back.    Had dental extraction done in October 2022 from the right lower jaw.  That seems to be healing well.  Underwent bone densitometry recently.  That confirms presence of osteoporosis which is not a new finding for her.    In February 2023 her labs were pretty stable.  She went to [place] so we gave her 2 to 3 weeks of break from Revlimid.  She restarted the treatment.    Comes in today for scheduled follow-up.  Overall seems to be  doing okay.  Had dental implant on August 16, 2023. Did well.     She has been snoring more, so sought opinion from her PMD. Has been referred to sleep study.  She has been seen by sleep study medicine.  Scheduled for sleep study in February 2024.  Otherwise doing well.  No new medical issues.  Still having some upper respite tract allergy issues.      Review of system      Details noted in the history of present illness.  A detailed review of systems is otherwise negative.      Physical exam        BP (!) 156/71   Pulse 58   Temp 98.1  F (36.7  C)   Resp 18   Wt 58.5 kg (129 lb)   LMP  (LMP Unknown)   SpO2 97%   BMI 24.37 kg/m      GENERAL: No acute distress. Cooperative in conversation.   HEENT:  Pupils are equal, round and reactive. Oral mucosa is clean and intact. No ulcerations or mucositis noted. No bleeding noted.  RESP:Chest symmetric lungs are clear bilaterally per auscultation. Regular respiratory rate. No wheezes or rhonchi.  CV: Normal S1 S2 Regular, rate and rhythm.     ABD: Nondistended, soft, nontender. Positive bowel sounds. No organomegaly.   EXTREMITIES: No lower extremity edema.   NEURO: Non- focal. Alert and oriented x3.  Cranial nerves appear intact.  PSYCH: Within normal limits. No depression or anxiety.  SKIN: Warm dry intact.        Lab results Reviewed      Recent Results (from the past 168 hour(s))   Comprehensive metabolic panel   Result Value Ref Range    Sodium 141 135 - 145 mmol/L    Potassium 3.8 3.4 - 5.3 mmol/L    Carbon Dioxide (CO2) 32 (H) 22 - 29 mmol/L    Anion Gap 7 7 - 15 mmol/L    Urea Nitrogen 20.1 8.0 - 23.0 mg/dL    Creatinine 0.94 0.51 - 0.95 mg/dL    GFR Estimate 65 >60 mL/min/1.73m2    Calcium 9.4 8.8 - 10.2 mg/dL    Chloride 102 98 - 107 mmol/L    Glucose 105 (H) 70 - 99 mg/dL    Alkaline Phosphatase 44 40 - 150 U/L    AST 19 0 - 45 U/L    ALT 27 0 - 50 U/L    Protein Total 6.3 (L) 6.4 - 8.3 g/dL    Albumin 4.0 3.5 - 5.2 g/dL    Bilirubin Total 0.3 <=1.2 mg/dL    CBC with platelets and differential   Result Value Ref Range    WBC Count 4.7 4.0 - 11.0 10e3/uL    RBC Count 4.16 3.80 - 5.20 10e6/uL    Hemoglobin 14.7 11.7 - 15.7 g/dL    Hematocrit 42.9 35.0 - 47.0 %     (H) 78 - 100 fL    MCH 35.3 (H) 26.5 - 33.0 pg    MCHC 34.3 31.5 - 36.5 g/dL    RDW 14.4 10.0 - 15.0 %    Platelet Count 194 150 - 450 10e3/uL    % Neutrophils 39 %    % Lymphocytes 46 %    % Monocytes 9 %    % Eosinophils 4 %    % Basophils 2 %    % Immature Granulocytes 0 %    NRBCs per 100 WBC 0 <1 /100    Absolute Neutrophils 1.8 1.6 - 8.3 10e3/uL    Absolute Lymphocytes 2.2 0.8 - 5.3 10e3/uL    Absolute Monocytes 0.4 0.0 - 1.3 10e3/uL    Absolute Eosinophils 0.2 0.0 - 0.7 10e3/uL    Absolute Basophils 0.1 0.0 - 0.2 10e3/uL    Absolute Immature Granulocytes 0.0 <=0.4 10e3/uL    Absolute NRBCs 0.0 10e3/uL       Imaging results Reviewed        No results found.      Signed by: Loco Blanco MD      This note has been dictated using voice recognition software. Any grammatical or context distortions are unintentional and inherent to the software       Again, thank you for allowing me to participate in the care of your patient.        Sincerely,        Loco Blanco MD

## 2024-01-10 NOTE — PROGRESS NOTES
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    Weight as of this encounter: 58.5 kg (129 lb). Body surface area is 1.59 meters squared.  Moderate Pain (4) Comment: Data Unavailable   No LMP recorded (lmp unknown). Patient is postmenopausal.  Allergies reviewed: Yes  Medications reviewed: Yes    Medications: Medication refills not needed today.  Pharmacy name entered into [facility]: [facility] #[id number] [facility] [address]    Frailty Screening:   Is the patient here for a new oncology consult visit in cancer care? 2. No      Clinical concerns: None      Viviane Chu LPN             "

## 2024-01-11 NOTE — PROGRESS NOTES
[facility]: Cancer Care Follow-Up Note                                    Discussion with Patient:                                                      Patient in the clinic to see Dr Blanco in follow-up.      Goals          General    Patient will adhere to medication regimen     Notes - Note created  1/11/2024 11:15 AM by Aliza Sheehan RN Revlimid                Dates of Treatment:                                                      Infusion given in last 28 days       None          Treatment Plan Medications       Oral ONC Multiple Myeloma Maintenance Post Transplant - [place] Regimen - LENalidomide       Take Home Medications       Medication Sig Start/End Day/Cycle Status    LENalidomide (REVLIMID) 10 MG CAPS capsule Take 1 capsule (10 mg) by mouth daily for 28 days 1/18/2024 to 2/15/2024 Day 1, Cycle 14 - 1/18/2024 Released    LENalidomide (REVLIMID) 10 MG CAPS capsule Take 1 capsule (10 mg) by mouth daily for 28 days Take at the same time each day. Do not open, break or chew the capsule. Swallow whole, with water. S to S+28 Day 1, Cycle 14 - 1/18/2024 Signed    LENalidomide (REVLIMID) 10 MG CAPS capsule Take 1 capsule (10 mg) by mouth daily for 28 days Take at the same time each day. Do not open, break or chew the capsule. Swallow whole, with water. S to S+28 Day 1, Cycle 14 - 1/18/2024 Signed                            Assessment:                                                      Patient had multiple myeloma labs drawn today.  She will come back in 3 months with labs 1 week prior.      Intervention/Education provided during outreach:                                                       I will watch for these lab results, review with Dr Blanco and update patient.    Patient to follow up as scheduled at next appt  Patient to call/[employer] message with updates  Confirmed patient has clinic and triage numbers    Signature:  Aliza Sheehan RN

## 2024-01-15 NOTE — PROGRESS NOTES
[facility]: Cancer Care Follow-Up Note                                    Discussion with Patient:                                                      The Daily [employer] message sent to patient today letting her know that her myeloma labs are stable from last week, per Dr Blanco.      Goals          General    Patient will adhere to medication regimen     Notes - Note created  1/11/2024 11:15 AM by Aliza Sheehan RN [facility]                Dates of Treatment:                                                      Infusion given in last 28 days       None          Treatment Plan Medications       Oral ONC Multiple Myeloma Maintenance Post Transplant - [place] Regimen - LENalidomide       Take Home Medications       Medication Sig Start/End Day/Cycle Status    LENalidomide (REVLIMID) 10 MG CAPS capsule Take 1 capsule (10 mg) by mouth daily for 28 days 1/18/2024 to 2/15/2024 Day 1, Cycle 14 - 1/18/2024 Released    LENalidomide (REVLIMID) 10 MG CAPS capsule Take 1 capsule (10 mg) by mouth daily for 28 days Take at the same time each day. Do not open, break or chew the capsule. Swallow whole, with water. S to S+28 Day 1, Cycle 14 - 1/18/2024 Signed    LENalidomide (REVLIMID) 10 MG CAPS capsule Take 1 capsule (10 mg) by mouth daily for 28 days Take at the same time each day. Do not open, break or chew the capsule. Swallow whole, with water. S to S+28 Day 1, Cycle 14 - 1/18/2024 Signed                            Assessment:                                                      Myeloma labs are stable.    Intervention/Education provided during outreach:                                                       Patient should keep her lab only appointment for 4/3 and then follow-up with Dr Blanco on 4/10.    Patient to follow up as scheduled at next appt  Patient to call/[employer] message with updates  Confirmed patient has clinic and triage numbers    Signature:  Aliza Sheehan, RN
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
## 2024-01-17 NOTE — LETTER
1/17/2024         RE: Lizzie Viera  [address]  [place] [address]        Dear Colleague,    Thank you for referring your patient, Lizzie Viera, to the [facility]. Please see a copy of my visit note below.    See confidential note      Again, thank you for allowing me to participate in the care of your patient.        Sincerely,        Mario Long LP

## 2024-01-18 NOTE — PROGRESS NOTES
Preventive Care Visit  [facility] HECTORHYACINTH Sandoval MD, Family Medicine  Jan 18, 2024      SUBJECTIVE:   Valeria is a 73 year old, presenting for the following:  Wellness Visit (fasting), sore lesion left hand (Noticed yesterday), and Blood Pressure Check    Are you in the first 12 months of your Medicare coverage?  No    Seen today for routine preventive care visit.  Currently receiving treatment with Revlimid and management of multiple myeloma under the direction of Dr. Blanco.  He is also managing her osteoporosis, previously receiving Zometa although she this is held while she is having dental implants placed.  She has a history of thoracic vertebral compression fracture, is uncertain if she wants to resume this medication.  Her thoracic back pain has been managed with gabapentin, methocarbamol, and as needed cannabis.  When in states other than [place], she will occasionally take Dilaudid instead of cannabis.  History of panlobular emphysema, she is not needing medications for this but occasionally will feel short of breath.  Quit smoking February 2023 but using Nicotrol inhaler still.  She is interested in lung cancer screening.  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  Remains on hydrochlorothiazide management of hypertension.  Home blood pressures in the 120s over 60s.  History of migraines with aura, when the aura occurs she responds readily to Excedrin.  If they occur while she is sleeping she usually needs to take Imitrex and that works well.  She has a sleep study scheduled in February.  Struggling with reduced hearing bilaterally and some tinnitus and ear itching bilaterally.  Describes long-term allergies, has had more focal discomfort and drainage from left side of nose, pressure left maxillary sinus region.  She is also noting pain in  "her right index finger overlying the DIP joint, mild redness, has developed a little pustule today.  No fevers or chills.    Healthy Habits:     In general, how would you rate your overall health?  Good    Frequency of exercise:  2-3 days/week    Duration of exercise:  15-30 minutes    Do you usually eat at least 4 servings of fruit and vegetables a day, include whole grains    & fiber and avoid regularly eating high fat or \"junk\" foods?  No    Taking medications regularly:  Yes    Ability to successfully perform activities of daily living:  No assistance needed    Home Safety:  No safety concerns identified    Hearing Impairment:  Difficulty following a conversation in a noisy restaurant or crowded room, feel that people are mumbling or not speaking clearly, need to ask people to speak up or repeat themselves and difficulty understanding soft or whispered speech    In the past 6 months, have you been bothered by leaking of urine?  No    In general, how would you rate your overall mental or emotional health?  Good    Additional concerns today:  Yes    Today's PHQ-9 Score:       1/18/2024    10:45 AM   PHQ-9 SCORE   PHQ-9 Total Score MyChart 8 (Mild depression)   PHQ-9 Total Score 8         Have you ever done Advance Care Planning? (For example, a Health Directive, POLST, or a discussion with a medical provider or your loved ones about your wishes): No, advance care planning information given to patient to review.  Advanced care planning was discussed at today's visit.      Fall risk  Fallen 2 or more times in the past year?: No  Any fall with injury in the past year?: No    Cognitive Screening   1) Repeat 3 items (Leader, Season, Table)    2) Clock draw: NORMAL  3) 3 item recall: Recalls 3 objects  Results: 3 items recalled: COGNITIVE IMPAIRMENT LESS LIKELY    Mini-CogTM Copyright S Selam. Licensed by the author for use in [facility]; reprinted with permission (jennie@.[email or web address]). All rights reserved.  "       Reviewed and updated as needed this visit by clinical staff   Tobacco  Allergies  Meds              Reviewed and updated as needed this visit by Provider                  Social History     Tobacco Use    Smoking status: Former     Packs/day: 0.50     Years: 45.00     Additional pack years: 0.00     Total pack years: 22.50     Types: Cigarettes     Quit date: 2023     Years since quittin.9    Smokeless tobacco: Never   Substance Use Topics    Alcohol use: Yes     Comment: Alcoholic Drinks/day: 0-1 drinks per week             2024    10:46 AM   Alcohol Use   Prescreen: >3 drinks/day or >7 drinks/week? No     Do you have a current opioid prescription? (!) YES   How severe is your pain on a scale from 1-10? 5/10     Do you use any other controlled substances or medications that are not prescribed by a provider? None    Current providers sharing in care for this patient include:   Patient Care Team:  Sona Sandoval MD as PCP - General (Family Medicine)  Malena Monique NP as Nurse Practitioner (Hematology & Oncology)  Nohemi Rapp MD as MD (Radiation Oncology)  Luis Wilson MD as BMT Physician (Transplant)  Wyatt Pascual DO as Assigned Neuroscience Provider  Loco Blanco MD as MD (Hematology)  Yuri Salazar MD as Assigned Palliative Care Provider  Sona Sandoval MD as Assigned PCP  Marily Goldberg MD as Assigned Heart and Vascular Provider  Mario Long LP as Assigned Behavioral Health Provider  Loco Blanco MD as Assigned Cancer Care Provider  Phillip Wu DO as Assigned Sleep Provider  Aliza Sheehan RN as Specialty Care Coordinator (Hematology & Oncology)    The following health maintenance items are reviewed in Epic and correct as of today:  Health Maintenance   Topic Date Due    SPIROMETRY  Never done    COPD ACTION PLAN  Never done    DEPRESSION ACTION PLAN  Never done    COVID-19 Vaccine (1) Never done    ZOSTER  IMMUNIZATION (1 of 2) Never done    RSV VACCINE (Pregnancy & 60+) (1 - 1-dose 60+ series) Never done    Pneumococcal Vaccine: 65+ Years (2 of 2 - PPSV23 or PCV20) 10/18/2018    NICOTINE/TOBACCO CESSATION COUNSELING Q 1 YR  06/30/2023    MEDICARE ANNUAL WELLNESS VISIT  06/30/2023    LUNG CANCER SCREENING  07/25/2023    INFLUENZA VACCINE (1) 09/01/2023    ANNUAL REVIEW OF HM ORDERS  05/18/2024    MAMMO SCREENING  07/06/2024    PHQ-9  07/18/2024    COLORECTAL CANCER SCREENING  08/08/2024    FALL RISK ASSESSMENT  01/18/2025    LIPID  07/08/2027    ADVANCE CARE PLANNING  07/18/2027    DTAP/TDAP/TD IMMUNIZATION (3 - Td or Tdap) 08/23/2028    DEXA  12/29/2037    HEPATITIS C SCREENING  Completed    IPV IMMUNIZATION  Aged Out    HPV IMMUNIZATION  Aged Out    MENINGITIS IMMUNIZATION  Aged Out    RSV MONOCLONAL ANTIBODY  Aged Out     Lab work is in process  Labs reviewed in EPIC  BP Readings from Last 3 Encounters:   01/18/24 136/56   01/10/24 (!) 156/71   01/04/24 139/63    Wt Readings from Last 3 Encounters:   01/18/24 57.6 kg (127 lb)   01/10/24 58.5 kg (129 lb)   11/09/23 58.5 kg (129 lb)                  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    Functional diarrhea    Multiple myeloma not having achieved remission (H)    Essential hypertension, benign    Celiac artery stenosis (H24)    Panlobular emphysema (H)     Past Surgical History:   Procedure Laterality Date    CONIZATION CERVIX,KNIFE/LASER      Description: Cervical Conization By Laser;  Recorded: 09/20/2007;    HC DILATION/CURETTAGE DIAG/THER NON OB      Description: Dilation  And Curettage;  Recorded: 2007;    HC REMOVE TONSILS/ADENOIDS,<13 Y/O      Description: Tonsillectomy With Adenoidectomy;  Recorded: 2007;    [facility] LAP,CHOLECYSTECTOMY/EXPLORE  2004    [facility] LIGATE FALLOPIAN TUBE      Description: Tubal Ligation;  Recorded: 2007;       Social History     Tobacco Use    Smoking status: Former     Packs/day: 0.50     Years: 45.00     Additional pack years: 0.00     Total pack years: 22.50     Types: Cigarettes     Quit date: 2023     Years since quittin.9    Smokeless tobacco: Never   Substance Use Topics    Alcohol use: Yes     Comment: Alcoholic Drinks/day: 0-1 drinks per week     Family History   Problem Relation Age of Onset    Diabetes Mother     Arthritis Mother     LUNG DISEASE Father     Diabetes Maternal Uncle     Breast Cancer Paternal Aunt     Heart Failure Maternal Grandmother     Heart Disease Maternal Grandmother     Heart Failure Maternal Grandfather     Heart Disease Maternal Grandfather     Heart Failure Paternal Grandmother     Heart Disease Paternal Grandmother          Current Outpatient Medications   Medication Sig Dispense Refill    acyclovir (ZOVIRAX) 400 MG tablet Take 1 tablet (400 mg) by mouth 2 times daily Viral Prophylaxis. 180 tablet 1    albuterol (PROAIR HFA/PROVENTIL HFA/VENTOLIN HFA) 108 (90 Base) MCG/ACT inhaler Inhale 2 puffs into the lungs every 6 hours as needed for wheezing or shortness of breath / dyspnea 18 g 1    albuterol (PROVENTIL) (2.5 MG/3ML) 0.083% neb solution Take 1 vial (2.5 mg) by nebulization every 4 hours as needed for wheezing or shortness of breath / dyspnea 90 mL 0    alpha-lipoic acid 100 MG capsule Take 300 mg by mouth daily      amoxicillin-clavulanate (AUGMENTIN) 875-125 MG tablet Take 1 tablet by mouth 2 times daily for 7 days 14 tablet 0    Ascorbic Acid (VITAMIN C) 100 MG CHEW Take 1 tablet by mouth daily      aspirin (ASA) 81 MG chewable tablet Take 81 mg by mouth daily      baclofen (LIORESAL)  10 MG tablet TAKE 1 TABLET BY MOUTH 3 TIMES A DAY AS NEEDED FOR MUSCLE SPASMS 60 tablet 0    Coenzyme Q10 300 MG CAPS Take 300 mg by mouth daily      diclofenac (VOLTAREN) 1 % topical gel Apply 2 g topically 4 times daily 50 g 1    fish oil-omega-3 fatty acids 1000 MG capsule Take 2 g by mouth daily      gabapentin (NEURONTIN) 300 MG capsule Take 2 capsules (600 mg) by mouth 3 times daily 180 capsule 3    hydrochlorothiazide (HYDRODIURIL) 12.5 MG tablet Take 1 tablet (12.5 mg) by mouth daily 90 tablet 4    HYDROmorphone (DILAUDID) 4 MG tablet Take 0.5-1 tablets (2-4 mg) by mouth every 4 hours as needed for moderate to severe pain 60 tablet 0    LENalidomide (REVLIMID) 10 MG CAPS capsule Take 1 capsule (10 mg) by mouth daily for 28 days Take at the same time each day. Do not open, break or chew the capsule. Swallow whole, with water. 28 capsule 0    medical cannabis (Patient's own supply) See Admin Instructions (The purpose of this order is to document that the patient reports taking medical cannabis.  This is not a prescription, and is not used to certify that the patient has a qualifying medical condition.)     Oil formulation      Melatonin 10 MG TABS tablet Take 20 mg by mouth At Bedtime      methocarbamol (ROBAXIN) 500 MG tablet Take 1 tablet (500 mg) by mouth 4 times daily as needed for muscle spasms 120 tablet 4    Milk Thistle-Dand-Fennel-Licor (MILK THISTLE XTRA) CAPS capsule Take 1 capsule by mouth daily      NALTREXONE HCL PO Take 3 mg by mouth daily      nicotine (NICOTROL) 10 MG inhaler Use 1 cartridge as needed for urge to smoke by puffing over course of 20min.  Use 6-16 cart/day; reduce number of cart/day over 6-12 weeks. 300 each 4    phenazopyridine (AZO URINARY PAIN RELIEF) 95 MG tablet Take 190 mg by mouth 3 times daily      STATIN NOT PRESCRIBED (INTENTIONAL) Please choose reason not prescribed from choices below.      TURMERIC PO Take 1 capsule by mouth daily      UNABLE TO FIND 1 capsule daily  "MEDICATION NAME: Serrapeptase      UNABLE TO FIND 1 capsule daily MEDICATION NAME: Lions García Mushroom      UNABLE TO FIND 1 capsule daily MEDICATION NAME: DIM (diinolylmethane)      UNABLE TO FIND MEDICATION NAME: Panacur C - 1 capsule daily      Vitamin A 3 MG (08436 UT) TABS Take 1 tablet by mouth daily      Vitamin D, Cholecalciferol, 25 MCG (1000 UT) CAPS Take 1,000 Units by mouth daily Liquid, not capsule       Allergies   Allergen Reactions    Cefdinir Shortness Of Breath    Latex Shortness Of Breath    Atorvastatin Muscle Pain (Myalgia)    Short Ragweed Pollen Ext Cough    Adhesive Tape Rash     Recent Labs   Lab Test 01/10/24  1359 11/08/23  1402 09/07/23  1232 07/19/22  1309 07/08/22  0904 08/31/21  1611 05/27/21  1143 09/09/20  1745 08/28/20  0930 09/24/18  1405 09/06/18  0804   A1C  --   --   --   --   --   --   --   --   --   --  5.3   LDL  --   --   --   --  58  --   --   --  148*  --  147*   HDL  --   --   --   --  55  --   --   --  41*  --  41*   TRIG  --   --   --   --  114  --   --   --  114  --  149   ALT 27 28 22   < >  --    < > <9 32  --    < > 18   CR 0.94 0.86 0.88   < >  --    < > 0.71 0.75 0.75   < > 0.74   GFRESTIMATED 65 72 70   < >  --    < > >60 >60 >60   < > >60   GFRESTBLACK  --   --   --   --   --   --  >60 >60 >60   < > >60   POTASSIUM 3.8 3.7 3.7   < >  --    < > 3.8 4.0 4.4   < > 4.5    < > = values in this interval not displayed.        OBJECTIVE:   /56   Pulse 60   Temp 97.3  F (36.3  C) (Temporal)   Resp 20   Ht 1.549 m (5' 1\")   Wt 57.6 kg (127 lb)   LMP  (LMP Unknown)   SpO2 98%   BMI 24.00 kg/m     Estimated body mass index is 24 kg/m  as calculated from the following:    Height as of this encounter: 1.549 m (5' 1\").    Weight as of this encounter: 57.6 kg (127 lb).  Review of Systems   Constitutional:  Negative for chills and fever.   HENT:  Positive for congestion, ear pain and hearing loss. Negative for sore throat.    Eyes:  Negative for pain and visual " disturbance.   Respiratory:  Negative for cough and shortness of breath.    Cardiovascular:  Negative for chest pain and palpitations.   Gastrointestinal:  Positive for abdominal pain. Negative for constipation, diarrhea and nausea.   Genitourinary:  Negative for dysuria, frequency, genital sores, hematuria, pelvic pain, urgency, vaginal bleeding and vaginal discharge.   Musculoskeletal:  Positive for myalgias. Negative for arthralgias and joint swelling.   Skin:  Negative for rash.   Neurological:  Positive for headaches. Negative for dizziness and weakness.   Psychiatric/Behavioral:  The patient is not nervous/anxious.       Physical Exam  GENERAL: alert and no distress  EYES: Eyes grossly normal to inspection, PERRL and conjunctivae and sclerae normal  HENT: ear canals and TM's normal, nose and mouth without ulcers or lesions  NECK: no adenopathy, no asymmetry, masses, or scars  RESP: lungs clear to auscultation - no rales, rhonchi or wheezes  BREAST: normal without masses, tenderness or nipple discharge and no palpable axillary masses or adenopathy  CV: regular rate and rhythm, normal S1 S2, no S3 or S4, no murmur, click or rub, no peripheral edema  ABDOMEN: soft, nontender, no hepatosplenomegaly, no masses and bowel sounds normal  MS: no gross musculoskeletal defects noted, no edema  SKIN: no suspicious lesions or rashes; right index fingers with very mild erythema overlying the right index PIP joint.  There is a white pustule present in the center of it, no active drainage.  Good range of motion at the joint.  Mild tenderness.  NEURO: Normal strength and tone, mentation intact and speech normal  PSYCH: mentation appears normal, affect normal/bright  LYMPH: no cervical, supraclavicular, axillary, or inguinal adenopathy    Diagnostic Test Results:  Labs reviewed in Epic    ASSESSMENT / PLAN:     Medicare annual wellness visit, subsequent  At today's visit, we discussed lifestyle interventions to promote  self-management and wellness, including maintenance of a healthy weight, healthy diet, regular physical activity and exercise, and falls prevention.  She declines vaccinations today.  Encourage consideration of COVID-vaccine, PCV 20, seasonal influenza vaccine, RSV vaccine, shingles series.  Advised mammogram.  Colonoscopy is due in August, order placed.  We discussed lung cancer screening today, she is interested and orders placed.    Screening for colon cancer  - Colonoscopy Screening  Referral; Future    Encounter for screening mammogram for malignant neoplasm of breast  - *MA Screening Digital Bilateral; Future    Left maxillary sinusitis  Reviewed symptomatic treatments.  Will treat with course of Augmentin.  Notify us with inadequate effect or worsening.  - amoxicillin-clavulanate (AUGMENTIN) 875-125 MG tablet; Take 1 tablet by mouth 2 times daily for 7 days    Infection of index finger  Very localized and mild.  Nothing to suggest intra-articular infection at this time, reviewed warning signs and symptoms of more intensive infection and when to seek care.  Advise warm soaks or warm packs to help the small pustule drain.  Prescription provided for Augmentin, I am not suspicious of this being from staph infection currently.  Notify me with inadequate effect or worsening.  - amoxicillin-clavulanate (AUGMENTIN) 875-125 MG tablet; Take 1 tablet by mouth 2 times daily for 7 days    Multiple myeloma with failed remission (H)  She continues to work closely with oncology team and Dr. Blanco.  Continue.    Panlobular emphysema (H)  Stable, no new symptoms from this.  She quit smoking, using Nicotrol inhaler regularly, encouraged continued efforts at weaning this.    Essential hypertension, benign  Stable and controlled on hydrochlorothiazide, continue.  We will check renal function and electrolytes at a future lab only visit, typically already drawn by oncology team.  Encouraged healthy lifestyle habits.  -  hydrochlorothiazide (HYDRODIURIL) 12.5 MG tablet; Take 1 tablet (12.5 mg) by mouth daily    Tinnitus, bilateral  Bilateral hearing loss, unspecified hearing loss type  Examination today is benign.  Suspect sensorineural hearing loss.  Referral made to audiology for further assessment.  - Adult Audiology  Referral; Future    Migraine with aura and without status migrainosus, not intractable  Stable and controlled with either Excedrin at onset of aura or Imitrex if awakens with migraine.  Continue.    Mixed hyperlipidemia  Encouraged healthy lifestyle habits.  We will check future lipids with her next lab draw for oncology.  - Lipid panel reflex to direct LDL Non-fasting; Future    Left subclavian artery occlusion  Continue to control blood pressure, continue aspirin, encourage consideration of statin medication, she declined this despite vascular recommendations.  Will check lipids.    Age-related osteoporosis without current pathological fracture  Compression fracture of T7 vertebra, sequela  Managed by oncology, previously on Zometa, she is going to discuss alternatives with them.  On hold due to current dental procedures.    Personal history of tobacco use  Other tobacco product nicotine dependence, uncomplicated  Congratulated her on being on cigarette free, encouraged continued tapering of Nicotrol.  Discussed risks versus benefits of CT lung cancer screening, she is interested in pursuing.  - CT Chest Lung Cancer Scrn Low Dose wo; Future          Counseling  Reviewed preventive health counseling, as reflected in patient instructions        She reports that she quit smoking about a year ago. Her smoking use included cigarettes. She has a 22.5 pack-year smoking history. She has never used smokeless tobacco.      Appropriate preventive services were discussed with this patient, including applicable screening as appropriate for fall prevention, nutrition, physical activity, Tobacco-use cessation, weight  loss and cognition.  Checklist reviewing preventive services available has been given to the patient.    Reviewed patients plan of care and provided an AVS. The Basic Care Plan (routine screening as documented in Health Maintenance) for Lizzie meets the Care Plan requirement. This Care Plan has been established and reviewed with the Patient.          Signed Electronically by: Sona Sandoval MD    Identified Health Risks  I have reviewed Opioid Use Disorder and Substance Use Disorder risk factors and made any needed referrals.   Answers submitted by the patient for this visit:  Patient Health Questionnaire (Submitted on 1/18/2024)  If you checked off any problems, how difficult have these problems made it for you to do your work, take care of things at home, or get along with other people?: Not difficult at all  PHQ9 TOTAL SCORE: 8  Annual Preventive Visit (Submitted on 1/18/2024)  Chief Complaint: Annual Exam:  Blood in stool: No  heartburn: No  peripheral edema: No  mood changes: No  Skin sensation changes: No  tenderness: No  breast mass: No  breast discharge: No  The patient was counseled and encouraged to consider modifying their diet and eating habits. She was provided with information on recommended healthy diet options.  The patient was provided with written information regarding signs of hearing loss.  The patient's PHQ-9 score is consistent with mild depression. She was provided with information regarding depression.she feels overall she is doing well emotionally and declines further assistance.    Lung Cancer Screening Shared Decision Making Visit     Lizzie Viera, a 73 year old female, is eligible for lung cancer screening    History   Smoking Status    Former    Packs/day: 0.50    Years: 45.00    Types: Cigarettes    Quit date: 2/1/2023   Smokeless Tobacco    Never       I have discussed with patient the risks and benefits of screening for lung cancer with low-dose CT.     The risks  include:    radiation exposure: one low dose chest CT has as much ionizing radiation as about 15 chest x-rays, or 6 months of background radiation living in [place]      false positives: most findings/nodules are NOT cancer, but some might still require additional diagnostic evaluation, including biopsy    over-diagnosis: some slow growing cancers that might never have been clinically significant will be detected and treated unnecessarily     The benefit of early detection of lung cancer is contingent upon adherence to annual screening or more frequent follow up if indicated.     Furthermore, to benefit from screening, Lizzie must be willing and able to undergo diagnostic procedures, if indicated. Although no specific guide is available for determining severity of comorbidities, it is reasonable to withhold screening in patients who have greater mortality risk from other diseases.     We did discuss that the best way to prevent lung cancer is to not smoke.    Some patients may value a numeric estimation of lung cancer risk when evaluating if lung cancer screening is right for them, here is one calculator:    ShouldIScreen

## 2024-01-18 NOTE — PATIENT INSTRUCTIONS
Patient Education   Personalized Prevention Plan  You are due for the preventive services outlined below.  Your care team is available to assist you in scheduling these services.  If you have already completed any of these items, please share that information with your care team to update in your medical record.  Health Maintenance Due   Topic Date Due     Breathing Capacity Test  Never done     COPD Action Plan  Never done     Depression Action Plan  Never done     COVID-19 Vaccine (1) Never done     Zoster (Shingles) Vaccine (1 of 2) Never done     RSV VACCINE (Pregnancy & 60+) (1 - 1-dose 60+ series) Never done     Pneumococcal Vaccine (2 of 2 - PPSV23 or PCV20) 10/18/2018     Talk to your care team about options to quit tobacco use.  06/30/2023     LUNG CANCER SCREENING  07/25/2023     Flu Vaccine (1) 09/01/2023     Learning About Dietary Guidelines  What are the Dietary Guidelines for Americans?     Dietary Guidelines for Americans provide tips for eating well and staying healthy. This helps reduce the risk for long-term (chronic) diseases.  These guidelines recommend that you:  Eat and drink the right amount for you. The U.S[place] government's food guide is called MyPlate. It can help you make your own well-balanced eating plan.  Try to balance your eating with your activity. This helps you stay at a healthy weight.  Drink alcohol in moderation, if at all.  Limit foods high in salt, saturated fat, trans fat, and added sugar.  These guidelines are from the U.S. Department of Agriculture and the U.S. Department of Health and Human Services. They are updated every 5 years.  What is MyPlate?  MyPlate is the U.S. government's food guide. It can help you make your own well-balanced eating plan. A balanced eating plan means that you eat enough, but not too much, and that your food gives you the nutrients you need to stay healthy.  MyPlate focuses on eating plenty of whole grains, fruits, and vegetables, and on limiting  "fat and sugar. It is available online at www.ChooseMyPlate.gov.  How can you get started?  If you're trying to eat healthier, you can slowly change your eating habits over time. You don't have to make big changes all at once. Start by adding one or two healthy foods to your meals each day.  Grains  Choose whole-grain breads and cereals and whole-wheat pasta and whole-grain crackers.  Vegetables  Eat a variety of vegetables every day. They have lots of nutrients and are part of a heart-healthy diet.  Fruits  Eat a variety of fruits every day. Fruits contain lots of nutrients. Choose fresh fruit instead of fruit juice.  Protein foods  Choose fish and lean poultry more often. Eat red meat and fried meats less often. Dried beans, tofu, and nuts are also good sources of protein.  Dairy  Choose low-fat or fat-free products from this food group. If you have problems digesting milk, try eating cheese or yogurt instead.  Fats and oils  Limit fats and oils if you're trying to cut calories. Choose healthy fats when you cook. These include canola oil and olive oil.  Where can you learn more?  Go to https://www.[employer].net/patiented  Enter D676 in the search box to learn more about \"Learning About Dietary Guidelines.\"  Current as of: February 28, 2023               Content Version: 13.8    2001-3378 [employer].   Care instructions adapted under license by your healthcare professional. If you have questions about a medical condition or this instruction, always ask your healthcare professional. [employer] disclaims any warranty or liability for your use of this information.      Hearing Loss: Care Instructions  Overview     Hearing loss is a sudden or slow decrease in how well you hear. It can range from slight to profound. Permanent hearing loss can occur with aging. It also can happen when you are exposed long-term to loud noise. Examples include listening to loud music, riding motorcycles, or " being around other loud machines.  Hearing loss can affect your work and home life. It can make you feel lonely or depressed. You may feel that you have lost your independence. But hearing aids and other devices can help you hear better and feel connected to others.  Follow-up care is a key part of your treatment and safety. Be sure to make and go to all appointments, and call your doctor if you are having problems. It's also a good idea to know your test results and keep a list of the medicines you take.  How can you care for yourself at home?  Avoid loud noises whenever possible. This helps keep your hearing from getting worse.  Always wear hearing protection around loud noises.  Wear a hearing aid as directed.  A professional can help you pick a hearing aid that will work best for you.  You can also get hearing aids over the counter for mild to moderate hearing loss.  Have hearing tests as your doctor suggests. They can show whether your hearing has changed. Your hearing aid may need to be adjusted.  Use other devices as needed. These may include:  Telephone amplifiers and hearing aids that can connect to a television, stereo, radio, or microphone.  Devices that use lights or vibrations. These alert you to the doorbell, a ringing telephone, or a baby monitor.  Television closed-captioning. This shows the words at the bottom of the screen. Most new TVs can do this.  TTY (text telephone). This lets you type messages back and forth on the telephone instead of talking or listening. These devices are also called TDD. When messages are typed on the keyboard, they are sent over the phone line to a receiving TTY. The message is shown on a monitor.  Use text messaging, social media, and email if it is hard for you to communicate by telephone.  Try to learn a listening technique called speechreading. It is not lipreading. You pay attention to people's gestures, expressions, posture, and tone of voice. These clues can help  "you understand what a person is saying. Face the person you are talking to, and have them face you. Make sure the lighting is good. You need to see the other person's face clearly.  Think about counseling if you need help to adjust to your hearing loss.  When should you call for help?  Watch closely for changes in your health, and be sure to contact your doctor if:    You think your hearing is getting worse.     You have new symptoms, such as dizziness or nausea.   Where can you learn more?  Go to https://www.[employer].net/patiented  Enter R798 in the search box to learn more about \"Hearing Loss: Care Instructions.\"  Current as of: February 28, 2023               Content Version: 13.8    3467-6141 [employer].   Care instructions adapted under license by your healthcare professional. If you have questions about a medical condition or this instruction, always ask your healthcare professional. [employer] disclaims any warranty or liability for your use of this information.      Learning About Depression Screening  What is depression screening?  Depression screening is a way to see if you have depression symptoms. It may be done by a doctor or counselor. It's often part of a routine checkup. That's because your mental health is just as important as your physical health.  Depression is a mental health condition that affects how you feel, think, and act. You may:  Have less energy.  Lose interest in your daily activities.  Feel sad and grouchy for a long time.  Depression is very common. It affects people of all ages.  Many things can lead to depression. Some people become depressed after they have a stroke or find out they have a major illness like cancer or heart disease. The death of a loved one or a breakup may lead to depression. It can run in families. Most experts believe that a combination of inherited genes and stressful life events can cause it.  What happens during screening?  You " "may be asked to fill out a form about your depression symptoms. You and the doctor will discuss your answers. The doctor may ask you more questions to learn more about how you think, act, and feel.  What happens after screening?  If you have symptoms of depression, your doctor will talk to you about your options.  Doctors usually treat depression with medicines or counseling. Often, combining the two works best. Many people don't get help because they think that they'll get over the depression on their own. But people with depression may not get better unless they get treatment.  The cause of depression is not well understood. There may be many factors involved. But if you have depression, it's not your fault.  A serious symptom of depression is thinking about death or suicide. If you or someone you care about talks about this or about feeling hopeless, get help right away.  It's important to know that depression can be treated. Medicine, counseling, and self-care may help.  Where can you learn more?  Go to https://www.[employer].net/patiented  Enter T185 in the search box to learn more about \"Learning About Depression Screening.\"  Current as of: June 25, 2023               Content Version: 13.8    6429-6041 [employer].   Care instructions adapted under license by your healthcare professional. If you have questions about a medical condition or this instruction, always ask your healthcare professional. [employer] disclaims any warranty or liability for your use of this information.         Lung Cancer Screening   Frequently Asked Questions  If you are at high-risk for lung cancer, getting screened with low-dose computed tomography (LDCT) every year can help save your life. This handout offers answers to some of the most common questions about lung cancer screening. If you have other questions, please call 2-357-0-PCancer ([phone]).     What is it?  Lung cancer screening uses " special X-ray technology to create an image of your lung tissue. The exam is quick and easy and takes less than 10 seconds. We don t give you any medicine or use any needles. You can eat before and after the exam. You don t need to change your clothes as long as the clothing on your chest doesn t contain metal. But, you do need to be able to hold your breath for at least 6 seconds during the exam.    What is the goal of lung cancer screening?  The goal of lung cancer screening is to save lives. Many times, lung cancer is not found until a person starts having physical symptoms. Lung cancer screening can help detect lung cancer in the earliest stages when it may be easier to treat.    Who should be screened for lung cancer?  We suggest lung cancer screening for anyone who is at high-risk for lung cancer. You are in the high-risk group if you:      are between the ages of 55 and 79, and    have smoked at least 1 pack of cigarettes a day for 20 or more years, and    still smoke or have quit within the past 15 years.    However, if you have a new cough or shortness of breath, you should talk to your doctor before being screened.    Why does it matter if I have symptoms?  Certain symptoms can be a sign that you have a condition in your lungs that should be checked and treated by your doctor. These symptoms include fever, chest pain, a new or changing cough, shortness of breath that you have never felt before, coughing up blood or unexplained weight loss. Having any of these symptoms can greatly affect the results of lung cancer screening.       Should all smokers get an LDCT lung cancer screening exam?  It depends. Lung cancer screening is for a very specific group of men and women who have a history of heavy smoking over a long period of time (see  Who should be screened for lung cancer  above).  I am in the high-risk group, but have been diagnosed with cancer in the past. Is LDCT lung cancer screening right for me?  In  some cases, you should not have LDCT lung screening, such as when your doctor is already following your cancer with CT scan studies. Your doctor will help you decide if LDCT lung screening is right for you.  Do I need to have a screening exam every year?  Yes. If you are in the high-risk group described earlier, you should get an LDCT lung cancer screening exam every year until you are 79, or are no longer willing or able to undergo screening and possible procedures to diagnose and treat lung cancer.  How effective is LDCT at preventing death from lung cancer?  Studies have shown that LDCT lung cancer screening can lower the risk of death from lung cancer by 20 percent in people who are at high-risk.  What are the risks?  There are some risks and limitations of LDCT lung cancer screening. We want to make sure you understand the risks and benefits, so please let us know if you have any questions. Your doctor may want to talk with you more about these risks.    Radiation exposure: As with any exam that uses radiation, there is a very small increased risk of cancer. The amount of radiation in LDCT is small--about the same amount a person would get from a mammogram. Your doctor orders the exam when he or she feels the potential benefits outweigh the risks.    False negatives: No test is perfect, including LDCT. It is possible that you may have a medical condition, including lung cancer, that is not found during your exam. This is called a false negative result.    False positives and more testing: LDCT very often finds something in the lung that could be cancer, but in fact is not. This is called a false positive result. False positive tests often cause anxiety. To make sure these findings are not cancer, you may need to have more tests. These tests will be done only if you give us permission. Sometimes patients need a treatment that can have side effects, such as a biopsy. For more information on false positives, see   What can I expect from the results?     Findings not related to lung cancer: Your LDCT exam also takes pictures of areas of your body next to your lungs. In a very small number of cases, the CT scan will show an abnormal finding in one of these areas, such as your kidneys, adrenal glands, liver or thyroid. This finding may not be serious, but you may need more tests. Your doctor can help you decide what other tests you may need, if any.  What can I expect from the results?  About 1 out of 4 LDCT exams will find something that may need more tests. Most of the time, these findings are lung nodules. Lung nodules are very small collections of tissue in the lung. These nodules are very common, and the vast majority--more than 97 percent--are not cancer (benign). Most are normal lymph nodes or small areas of scarring from past infections.  But, if a small lung nodule is found to be cancer, the cancer can be cured more than 90 percent of the time. To know if the nodule is cancer, we may need to get more images before your next yearly screening exam. If the nodule has suspicious features (for example, it is large, has an odd shape or grows over time), we will refer you to a specialist for further testing.  Will my doctor also get the results?  Yes. Your doctor will get a copy of your results.  Is it okay to keep smoking now that there s a cancer screening exam?  No. Tobacco is one of the strongest cancer-causing agents. It causes not only lung cancer, but other cancers and cardiovascular (heart) diseases as well. The damage caused by smoking builds over time. This means that the longer you smoke, the higher your risk of disease. While it is never too late to quit, the sooner you quit, the better.  Where can I find help to quit smoking?  The best way to prevent lung cancer is to stop smoking. If you have already quit smoking, congratulations and keep it up! For help on quitting smoking, please call [facility] at  2-800-QUIT-NOW (1-497.702.6357) or the American Cancer Society at 1-128.950.9748 to find local resources near you.  One-on-one health coaching:  If you d prefer to work individually with a health care provider on tobacco cessation, we offer:      Medication Therapy Management:  Our specially trained pharmacists work closely with you and your doctor to help you quit smoking.  Call [phone] or [phone] (toll free).

## 2024-01-31 NOTE — LETTER
1/31/2024         RE: Lizzie Viera  [address]  [place] [address]        Dear Colleague,    Thank you for referring your patient, Lizzie Viera, to the [facility]. Please see a copy of my visit note below.    See confidential note      Again, thank you for allowing me to participate in the care of your patient.        Sincerely,        Mario Long LP
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
## 2024-02-01 NOTE — PROGRESS NOTES
Assessment/Plan:      Valeria was seen today for neck pain.    Diagnoses and all orders for this visit:    Cervical spine pain    Thoracic spine pain    Myofascial pain    Somatic dysfunction of head region  -     OSTEOPATHIC MANIP,5-6 BODY REGN    Somatic dysfunction of cervical region  -     OSTEOPATHIC MANIP,5-6 BODY REGN    Somatic dysfunction of rib region  -     OSTEOPATHIC MANIP,5-6 BODY REGN    Somatic dysfunction of upper extremity  -     OSTEOPATHIC MANIP,5-6 BODY REGN    Somatic dysfunction of thoracic region  -     OSTEOPATHIC MANIP,5-6 BODY REGN         Assessment: Pleasant 73 year old female with a history of hyperlipidemia, anxiety, depression, irritable bowel, neuropathy, osteoporosis and multiple myeloma with a T7 fracture and lesion  resulting in spinal stenosis:     Increase in chronic cervical and chronic thoracic pain around the mid to upper thoracic spine radiating up to the cervical spine and suboccipital region with headaches.   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  No signs of thoracic myelopathy neurologically stable. Fracture is stable on MRI from 2022 T1 and T7.  Symptoms increased with increased housework recently.   Continues with gabapentin 600 mg 3 times daily.  Continues with physical therapy.  Here for OMT today.  Helpful.        2.  Persistent cervical spine upper thoracic pain consistent myofascial pain with physical therapy.     3.  Somatic dysfunctions of the cranium, cervical spine, rib cage, upper extremities, thoracic spine that contribute to the patient's pain complaints.    4.  Bilateral foot paresthesias.  Left greater than right consistent with polyneuropathy.   This is mildly asymmetric.  She has  absent sural SNAPs on EMG cannot exclude peripheral polyneuropathy although this would be relatively mild.  She may have a small fiber neuropathy.  She has a history of chemotherapy.    Improved with physical therapy and  continues to take gabapentin.  Not treated today                Discussion:    1.  Patient has had increased cervical thoracic pain myofascial pain related to increased housework.  Continues with gabapentin and PT.  Here for Osteopathic manipulative medicine.    2.  Osteopathic manipulative medicine today.  She agrees to proceed.  Please see attached procedure note.    3.  Follow-up as needed.  Having a CT scan done of her chest.  If symptoms persist we can review that at next appointment.    It was our pleasure caring for your patient today, if there any questions or concerns please do not hesitate to contact us.      Subjective:   Patient ID: Lizzie Viera is a 73 year old female.    History of Present Illness: Patient presents for Osteopathic manipulative medicine she has cervical and thoracic spine pain increased recently.  Has mice at home and doing a lot more cleaning and laundry.  Doing a lot of overhead work increases her pain along with the home exercises from PT.  Continues to do PT and appointments are helpful for along with rest and medications on the THC.  Pain is a 9/10 at worst 4/10 today 1/10 at best.  No new or concerning symptoms for her.         Review of Systems: Pertinent positives: Paresthesias weakness headaches swallowing difficulties.  Denies bowel or bladder incontinence, falls, fevers unintentional weight loss.         Past Medical History:   Diagnosis Date    Cervical dysplasia     Chronic RUQ pain     Depressive disorder     Multiple myeloma (H)     Osteoporosis        The following portions of the patient's history were reviewed and updated as appropriate: allergies, current medications, past family history, past medical history, past social history, past surgical history and problem list.           Objective:   Physical Exam:    /63   Pulse 59   LMP  (LMP Unknown)   There is no height or weight on file to calculate BMI.      General: Alert and oriented with normal  affect. Attention, knowledge, memory, and language are intact. No acute distress.   Eyes: Sclerae are clear.  Respirations: Unlabored.       Hypertonic tissue textures upper trapezius bilaterally  Sensation is intact to light touch throughout the upper   extremities.       Manual muscle testing reveals:  Right /Left out of 5     5/5 elbow flexors  5/5 elbow extensors  5/5 wrist extensors  5/5 interosseus  5/5 finger flexors       Structural exam: Cranium: Left cranial torsion, OA sidebent right rotated left cervical spine: C2 rotated left sidebent left C3 rotated left sidebent left, C 4 and 5 rotated right sidebent right. Rib cage: Rib one elevated on the left. Thoracic spine: T1 rotated right sidebent right  Upper Extremities: myofascial restrictions of the right greater than left upper trap, bilateral infraspinatus/parascapular muscles. bilateral glenohumeral resrictions.    Procedure:    After discussing the risks and benefits of osteopathic manipulative medicine, verbal consent was obtained. The somatic dysfunctions listed above were treated with the following techniques: Cranium: Cranial indirect technique, VSD, and muscle energy for the OA. Cervical spine: Muscle energy, still technique, FPR, myofascial release, BLT, and soft tissue techniques. Rib cage: Myofascial release and FPR. Thoracic spine: Myofascial release, BLT.   Upper Exrtremity: MFR, FPR, BLT.  The patient tolerated the procedure well and had improved range of motion in all areas treated prior to leaving the clinic.

## 2024-02-01 NOTE — LETTER
2/1/2024         RE: Lizzie Viera  [address]  [place] [address]        Dear Colleague,    Thank you for referring your patient, Lizzie Viera, to the [facility]. Please see a copy of my visit note below.    Assessment/Plan:      Valeria was seen today for neck pain.    Diagnoses and all orders for this visit:    Cervical spine pain    Thoracic spine pain    Myofascial pain    Somatic dysfunction of head region  -     OSTEOPATHIC MANIP,5-6 BODY REGN    Somatic dysfunction of cervical region  -     OSTEOPATHIC MANIP,5-6 BODY REGN    Somatic dysfunction of rib region  -     OSTEOPATHIC MANIP,5-6 BODY REGN    Somatic dysfunction of upper extremity  -     OSTEOPATHIC MANIP,5-6 BODY REGN    Somatic dysfunction of thoracic region  -     OSTEOPATHIC MANIP,5-6 BODY REGN         Assessment: Hossein 73 year old female with a history of hyperlipidemia, anxiety, depression, irritable bowel, neuropathy, osteoporosis and multiple myeloma with a T7 fracture and lesion  resulting in spinal stenosis:     Increase in chronic cervical and chronic thoracic pain around the mid to upper thoracic spine radiating up to the cervical spine and suboccipital region with headaches.   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  No signs of thoracic myelopathy neurologically stable. Fracture is stable on MRI from 2022 T1 and T7.  Symptoms increased with increased housework recently.   Continues with gabapentin 600 mg 3 times daily.  Continues with physical therapy.  Here for OMT today.  Helpful.        2.  Persistent cervical spine upper thoracic pain consistent myofascial pain with physical therapy.     3.  Somatic dysfunctions of the cranium, cervical spine, rib cage, upper extremities, thoracic spine that contribute to the patient's pain complaints.    4.  Bilateral foot paresthesias.  Left greater than right consistent with  polyneuropathy.   This is mildly asymmetric.  She has  absent sural SNAPs on EMG cannot exclude peripheral polyneuropathy although this would be relatively mild.  She may have a small fiber neuropathy.  She has a history of chemotherapy.    Improved with physical therapy and continues to take gabapentin.  Not treated today                Discussion:    1.  Patient has had increased cervical thoracic pain myofascial pain related to increased housework.  Continues with gabapentin and PT.  Here for Osteopathic manipulative medicine.    2.  Osteopathic manipulative medicine today.  She agrees to proceed.  Please see attached procedure note.    3.  Follow-up as needed.  Having a CT scan done of her chest.  If symptoms persist we can review that at next appointment.    It was our pleasure caring for your patient today, if there any questions or concerns please do not hesitate to contact us.      Subjective:   Patient ID: Lizzie Viera is a 73 year old female.    History of Present Illness: Patient presents for Osteopathic manipulative medicine she has cervical and thoracic spine pain increased recently.  Has mice at home and doing a lot more cleaning and laundry.  Doing a lot of overhead work increases her pain along with the home exercises from PT.  Continues to do PT and appointments are helpful for along with rest and medications on the THC.  Pain is a 9/10 at worst 4/10 today 1/10 at best.  No new or concerning symptoms for her.         Review of Systems: Pertinent positives: Paresthesias weakness headaches swallowing difficulties.  Denies bowel or bladder incontinence, falls, fevers unintentional weight loss.         Past Medical History:   Diagnosis Date     Cervical dysplasia      Chronic RUQ pain      Depressive disorder      Multiple myeloma (H)      Osteoporosis        The following portions of the patient's history were reviewed and updated as appropriate: allergies, current medications, past family  history, past medical history, past social history, past surgical history and problem list.           Objective:   Physical Exam:    /63   Pulse 59   LMP  (LMP Unknown)   There is no height or weight on file to calculate BMI.      General: Alert and oriented with normal affect. Attention, knowledge, memory, and language are intact. No acute distress.   Eyes: Sclerae are clear.  Respirations: Unlabored.       Hypertonic tissue textures upper trapezius bilaterally  Sensation is intact to light touch throughout the upper   extremities.       Manual muscle testing reveals:  Right /Left out of 5     5/5 elbow flexors  5/5 elbow extensors  5/5 wrist extensors  5/5 interosseus  5/5 finger flexors       Structural exam: Cranium: Left cranial torsion, OA sidebent right rotated left cervical spine: C2 rotated left sidebent left C3 rotated left sidebent left, C 4 and 5 rotated right sidebent right. Rib cage: Rib one elevated on the left. Thoracic spine: T1 rotated right sidebent right  Upper Extremities: myofascial restrictions of the right greater than left upper trap, bilateral infraspinatus/parascapular muscles. bilateral glenohumeral resrictions.    Procedure:    After discussing the risks and benefits of osteopathic manipulative medicine, verbal consent was obtained. The somatic dysfunctions listed above were treated with the following techniques: Cranium: Cranial indirect technique, VSD, and muscle energy for the OA. Cervical spine: Muscle energy, still technique, FPR, myofascial release, BLT, and soft tissue techniques. Rib cage: Myofascial release and FPR. Thoracic spine: Myofascial release, BLT.   Upper Exrtremity: MFR, FPR, BLT.  The patient tolerated the procedure well and had improved range of motion in all areas treated prior to leaving the clinic.      Again, thank you for allowing me to participate in the care of your patient.        Sincerely,        Wyatt Pascual DO

## 2024-02-13 NOTE — PATIENT INSTRUCTIONS
It was good to see you today, Valeria.    Here are the things we talked about:  I refilled the robaxin with 11 refills.    Make an appointment for 3 months.     How to get a hold of us:  For non-urgent matters, MyChart works best.    For more urgent matters, or if you prefer not to use MyChart, call our clinic nurse coordinator Carine Ferreira RN at [phone]    We have an on-call number for evenings and weekends. Please call this only if you are having uncontrolled symptoms or serious side effects from your medicines: [phone].     For refills, please give us a week (5 working days) notice. We don't always have providers available everyday to do refills. If you call the day you run out of your medicine, we may not be able to refill it in time, so call 5 days in advance!    Yuri Salazar MD MS FAAFP CAQHPM  [facility]  Office [phone]  Fax [phone]

## 2024-02-13 NOTE — PROGRESS NOTES
"Oncology Rooming Note    February 13, 2024 11:24 AM   Lizzie Viera is a 73 year old female who presents for:    Chief Complaint   Patient presents with    Palliative     Follow up with Dr. Salazar     Initial Vitals: /59 (BP Location: Right arm, Patient Position: Sitting, Cuff Size: Adult Regular)   Pulse 57   Temp 98  F (36.7  C) (Oral)   Resp 16   Wt 57.8 kg (127 lb 6.4 oz)   LMP  (LMP Unknown)   SpO2 97%   BMI 24.07 kg/m   Estimated body mass index is 24.07 kg/m  as calculated from the following:    Height as of 1/18/24: 1.549 m (5' 1\").    Weight as of this encounter: 57.8 kg (127 lb 6.4 oz). Body surface area is 1.58 meters squared.  Mild Pain (2) Comment: Data Unavailable   No LMP recorded (lmp unknown). Patient is postmenopausal.  Allergies reviewed: Yes  Medications reviewed: Yes    Medications: MEDICATION REFILLS NEEDED TODAY. Provider was notified.  Pharmacy name entered into [facility]: [id number] [address] [place] [address] PT. JOVITA GAFFNEY    Frailty Screening:   Is the patient here for a new oncology consult visit in cancer care? 2. No      Clinical concerns: Patient here ambulatory for follow-up with palliative care.  Patient has ongoing pain but today here her pain is 3/10 and she feels that she is doing pretty well.  Today's visit is her 3-month follow-up and refills as needed.  Seen by Dr. Salazar on.  Plan return to clinic for follow-up as directed by provider.  Dr. Salazar was notified.      Marly Alcala RN              "

## 2024-02-13 NOTE — PROGRESS NOTES
Palliative Care Outpatient Clinic Progress Note    Patient Name: Lizzie Viera  Primary Provider: Sona Sandoval    Impression & Recommendations & Counseling:  Multiple myeloma  Compression Fracture mid-T spine worsening spasms and tension headaches that start in right shoulder into her neck and head and no specific triggers  Cancer associated pain--well controlled  Insomnia well controlled     ECO  Decisional Capacity: very present     SYMPTOM ASSESSMENT:  1.  Cancer related pain thoracic spine pain that radiates to bilateral lower extremities  Comment: Controlled.  Currently rates pain as low on days when doesn't overdo it (3-4) but someday's at the end of the day if she's been on her feet a lot and done a lot of housework or gardening and it is 7-8/10.  Resting and going to bed helps after awhile.  She uses the medical marijuana asneeded at night and it is pretty effective   She uses her dilaudid very infrequently--mainly when she goes to [place] to see her grandkids where she can't legally use her medical cannabis.     - Continue Gabapentin 600 mg by mouth 3 times daily.  - S/P Single fraction radiation to T6 through T .  - S/P T1 radiation 10/6/2021-10/12/2021.    - Continue resumed PT for back and for left knee  - Dilaudid to 2-4 mg by mouth every 4 hours as needed.  Takes infrequently.  - Continue medical cannabis per department of Health - pills and oil. Feels she is having good relief with this.  Does not take cannabis when knows she has to drive the next day.  - Continue Robaxin 500 mg po q6h prn spasms - new refill sent 2024taking 3 times a day.  - Tried baclofen at bedtime without benefit. It did not assist with sleep or relief of spasms.  -Discussed possibility of initiation of Cymbalta as this could help with neuropathic component to pain as well as assist with depression.  Patient would like to hold off on this at this time.  She feels the gabapentin is 'doing the job' for her.  Does  "not want to feel any masking of highs or lows per her report.     - Patient is taking naltrexone 3 mg by mouth at .  Had discussion with patient that this could reverse/minimize effect of opiate and she may feel more pain.  Taking this as a \"anti-inflammatory\" component of her natural/complementary treatments and doesn't feel it interferes when she uses her infrequent dilaudid. Her last hsCRP was elevated.  She is also using Tumeric to help reduce inflammation.  She has naturopath who she visits in [place].     2. Cancer related fatigue - fluctuates.  Feels fatigued at days end occasionally but she is now exercising on her treadmill (she had been doing a lot of walking outdoors and moved in due to colder weather)    - Activity as tolerated.  - no recent falls and currently not using a cane     3. Anxiety and depression related to health/diagnosis and current social stressors and stresses around paying for her medications.  - Patient is on list to see Kacy Edouard  for coping, support and processing diagnosis and I also requested help with paying for revlimid; She does see Jack in health psychology  - Patient sees an energy healer.     ADVANCED CARE PLANNING/GOALS OF CARE DISCUSSION:  - Code status: DNR/DNI  - Honoring Choices and POLST in chart.  - Follow-up in palliative care clinic in 3 months for ongoing pain management and decisional support.   -Call [phone] with questions or refill needs.     Counseling: All of the above was explained to the patient in lay language. The patient has verbalized a clear understanding of the discussion, asked appropriate questions, which have been answered to patient's apparent satisfaction. The patient is in agreement with the above plan.        Chief Complaint/Patient ID: Lizzie Viera 73 year old female with PMHx of multiple myeloma    Last Palliative care appointment: 11/9/2024 with me     Reviewed: yes, no concerns    Interim History:  Lizzie Viera " is a 73 year old female who is seen today for follow up with Palliative Care. Valeria continues to do well with Revlimid and is scheduled to see Dr. Blanco in April.    Pain:  good control; needs robaxin refilled.    Appetite/Nausea: good; maintaining weight.     Bowels: cycles with constipation and constipation based on where she is in Revlimid cycle     Sleep: good     Mood: no concerns--having grandkids has been really good for her happiness and rambo.     Coping:  very well; grand kids are very important to her; Valeria travels to [place], [place], often for their activities.    Family History- Reviewed in Epic.    Allergies   Allergen Reactions    Cefdinir Shortness Of Breath    Latex Shortness Of Breath    Atorvastatin Muscle Pain (Myalgia)    Short Ragweed Pollen Ext Cough    Adhesive Tape Rash       Social History:  Pertinent changes to social history/social situation since last visit: none  Key support resources: family  Advance Directive Status:  ACP documents in Epic    Social History     Tobacco Use    Smoking status: Former     Packs/day: 0.50     Years: 45.00     Additional pack years: 0.00     Total pack years: 22.50     Types: Cigarettes     Quit date: 2023     Years since quittin.0    Smokeless tobacco: Never   Vaping Use    Vaping Use: Every day   Substance Use Topics    Alcohol use: Yes     Comment: Alcoholic Drinks/day: 0-1 drinks per week    Drug use: Not Currently         Allergies   Allergen Reactions    Cefdinir Shortness Of Breath    Latex Shortness Of Breath    Atorvastatin Muscle Pain (Myalgia)    Short Ragweed Pollen Ext Cough    Adhesive Tape Rash     Current Outpatient Medications   Medication Sig Dispense Refill    albuterol (PROAIR HFA/PROVENTIL HFA/VENTOLIN HFA) 108 (90 Base) MCG/ACT inhaler Inhale 2 puffs into the lungs every 6 hours as needed for wheezing or shortness of breath / dyspnea 18 g 1    albuterol (PROVENTIL) (2.5 MG/3ML) 0.083% neb solution Take 1 vial (2.5 mg) by  nebulization every 4 hours as needed for wheezing or shortness of breath / dyspnea 90 mL 0    alpha-lipoic acid 100 MG capsule Take 300 mg by mouth daily      Ascorbic Acid (VITAMIN C) 100 MG CHEW Take 1 tablet by mouth daily      aspirin (ASA) 81 MG chewable tablet Take 81 mg by mouth daily      Coenzyme Q10 300 MG CAPS Take 300 mg by mouth daily      diclofenac (VOLTAREN) 1 % topical gel Apply 2 g topically 4 times daily 50 g 1    fish oil-omega-3 fatty acids 1000 MG capsule Take 2 g by mouth daily      gabapentin (NEURONTIN) 300 MG capsule Take 2 capsules (600 mg) by mouth 3 times daily 180 capsule 3    hydrochlorothiazide (HYDRODIURIL) 12.5 MG tablet Take 1 tablet (12.5 mg) by mouth daily 90 tablet 4    HYDROmorphone (DILAUDID) 4 MG tablet Take 0.5-1 tablets (2-4 mg) by mouth every 4 hours as needed for moderate to severe pain 60 tablet 0    LENalidomide (REVLIMID) 10 MG CAPS capsule Take 1 capsule (10 mg) by mouth daily for 28 days Take at the same time each day. Do not open, break or chew the capsule. Swallow whole, with water. 28 capsule 0    medical cannabis (Patient's own supply) See Admin Instructions (The purpose of this order is to document that the patient reports taking medical cannabis.  This is not a prescription, and is not used to certify that the patient has a qualifying medical condition.)     Oil formulation      Melatonin 10 MG TABS tablet Take 20 mg by mouth At Bedtime      methocarbamol (ROBAXIN) 500 MG tablet Take 1 tablet (500 mg) by mouth 4 times daily as needed for muscle spasms 120 tablet 4    Milk Thistle-Dand-Fennel-Licor (MILK THISTLE XTRA) CAPS capsule Take 1 capsule by mouth daily      NALTREXONE HCL PO Take 3 mg by mouth daily      nicotine (NICOTROL) 10 MG inhaler Use 1 cartridge as needed for urge to smoke by puffing over course of 20min.  Use 6-16 cart/day; reduce number of cart/day over 6-12 weeks. 300 each 4    phenazopyridine (AZO URINARY PAIN RELIEF) 95 MG tablet Take 190 mg  by mouth 3 times daily      STATIN NOT PRESCRIBED (INTENTIONAL) Please choose reason not prescribed from choices below.      TURMERIC PO Take 1 capsule by mouth daily      UNABLE TO FIND 1 capsule daily MEDICATION NAME: Serrapeptase      UNABLE TO FIND 1 capsule daily MEDICATION NAME: Lions García Mushroom      UNABLE TO FIND 1 capsule daily MEDICATION NAME: DIM (diinolylmethane)      UNABLE TO FIND MEDICATION NAME: Panacur C - 1 capsule daily      Vitamin A 3 MG (42960 UT) TABS Take 1 tablet by mouth daily      Vitamin D, Cholecalciferol, 25 MCG (1000 UT) CAPS Take 1,000 Units by mouth daily Liquid, not capsule      acyclovir (ZOVIRAX) 400 MG tablet Take 1 tablet (400 mg) by mouth 2 times daily Viral Prophylaxis. (Patient not taking: Reported on 2/13/2024) 180 tablet 1    baclofen (LIORESAL) 10 MG tablet TAKE 1 TABLET BY MOUTH 3 TIMES A DAY AS NEEDED FOR MUSCLE SPASMS (Patient not taking: Reported on 2/13/2024) 60 tablet 0     Past Medical History:   Diagnosis Date    Cervical dysplasia     Chronic RUQ pain     Depressive disorder     Multiple myeloma (H)     Osteoporosis      Past Surgical History:   Procedure Laterality Date    CONIZATION CERVIX,KNIFE/LASER      Description: Cervical Conization By Laser;  Recorded: 09/20/2007;    HC DILATION/CURETTAGE DIAG/THER NON OB      Description: Dilation And Curettage;  Recorded: 09/20/2007;    HC REMOVE TONSILS/ADENOIDS,<13 Y/O      Description: Tonsillectomy With Adenoidectomy;  Recorded: 09/20/2007;    [facility] LAP,CHOLECYSTECTOMY/EXPLORE  12/27/2004    [facility] LIGATE FALLOPIAN TUBE      Description: Tubal Ligation;  Recorded: 09/20/2007;       Physical Exam:   GENERAL APPEARANCE: healthy, alert and no distress; neatly groomed  EYES: Eyes grossly normal to inspection, PERRLA, conjunctivae and sclerae without injection or discharge, EOM intact   RESP:  no increased work of breathing; speaks in complete sentences;   MS: No musculoskeletal defects are noted  SKIN: No suspicious  lesions or rashes, hydration status appears adequate with normal skin turgor   PSYCH: Alert and oriented x3; speech- coherent , normal rate and volume; able to articulate logical thoughts, able to abstract reason, no tangential thoughts, no hallucinations or delusions, mentation appears normal, Mood is euthymic. Affect is appropriate for this mood state and bright. Thought content is free of suicidal ideation, hallucinations, and delusions.  Eye contact is good during conversation.       Key Data Reviewed:  LABS: 1/10/2024- Cr 0.94, Albumin 4.0,  Hgb 14.7,      IMAGING: no recent imaging available for review    34 minutes spent on the date of the encounter doing chart review, history and exam, patient education & counseling, documentation and other activities as noted above.    Yuri Salazar MD MS FAAFP CAQHPM  [facility]  Office [phone]  Fax [phone]

## 2024-02-14 NOTE — LETTER
2/14/2024         RE: Lizzie Viera  [address]  [place] [address]        Dear Colleague,    Thank you for referring your patient, Lizzie Viera, to the [facility]. Please see a copy of my visit note below.    See confidential note      Again, thank you for allowing me to participate in the care of your patient.        Sincerely,        Mario Long LP
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
## 2024-02-20 NOTE — NURSING NOTE
Return in about 1 year reminder created and to be send via [employer].       Edward Roth ANKIT  [facility]

## 2024-02-20 NOTE — PROGRESS NOTES
Additional 10 minutes on the date of service was spent performing the following:    -Preparing to see the patient  -Ordering medications, tests, or procedures   -Documenting clinical information in the electronic or other health record     Thank you for the opportunity to participate in the care of Lizzie Viera.     She is a 73 year old  female patient who comes to the sleep medicine clinic for review of sleep study results. The patient had a sleep study performed on 02/04/2024 (AHI= 2.8).  The patient was found to have sleep-related hypoxia and borderline periodic limb movement sleep.    I certify that this patient, Lizzie Viera has been under my care (or a nurse practitioner or physican's assistant working with me). This is the face-to-face encounter for oxygen medical necessity.      At the time of this encounter supplemental oxygen is reasonable and necessary and is expected to improve the patient's condition in a home setting.       Patient has continued oxygen desaturation due to Emphysema J43.9.    If portability is ordered, is the patient mobile within the home? yes    Assessment and Plan:  In summary Lizzie Viera is a 73 year old year old female here for review of sleep study results.  1. Sleep related hypoxia  I will refer the patient to be evaluated for oxygen therapy via nasal cannula at 2 L/min.  Return to clinic annually.  - Oxygen Therapy Referral; Future    Lab reviewed: Discussed with patient.    Sleep-Wake Cycle:    The patient likes to initiate sleep at around 10-12 at night with a sleep latency of 15-20 minutes. The patient has 2 nocturnal awakenings. Final wake up time is around 8-9 AM.    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    Functional diarrhea    Multiple myeloma not having achieved remission (H)    Essential hypertension, benign    Celiac artery stenosis (H24)    Panlobular emphysema (H)       Current Outpatient Medications   Medication Sig Dispense Refill    albuterol (PROAIR HFA/PROVENTIL HFA/VENTOLIN HFA) 108 (90 Base) MCG/ACT inhaler Inhale 2 puffs into the lungs every 6 hours as needed for wheezing or shortness of breath / dyspnea 18 g 1    albuterol (PROVENTIL) (2.5 MG/3ML) 0.083% neb solution Take 1 vial (2.5 mg) by nebulization every 4 hours as needed for wheezing or shortness of breath / dyspnea 90 mL 0    alpha-lipoic acid 100 MG capsule Take 300 mg by mouth daily      Ascorbic Acid (VITAMIN C) 100 MG CHEW Take 1 tablet by mouth daily      aspirin (ASA) 81 MG chewable tablet Take 81 mg by mouth daily      Coenzyme Q10 300 MG CAPS Take 300 mg by mouth daily      diclofenac (VOLTAREN) 1 % topical gel Apply 2 g topically 4 times daily 50 g 1    fish oil-omega-3 fatty acids 1000 MG capsule Take 2 g by mouth daily      gabapentin (NEURONTIN) 300 MG capsule Take 2 capsules (600 mg) by mouth 3 times daily 180 capsule 3    hydrochlorothiazide (HYDRODIURIL) 12.5 MG tablet Take 1 tablet (12.5 mg) by mouth daily 90 tablet 4    HYDROmorphone (DILAUDID) 4 MG tablet Take 0.5-1 tablets (2-4 mg) by mouth every 4 hours as needed for moderate to severe pain 60 tablet 0    LENalidomide (REVLIMID) 10 MG CAPS capsule Take 1 capsule (10 mg) by mouth daily for 28 days Take at the same time each day. Do not open, break or chew the capsule. Swallow whole, with water. 28 capsule 0    medical cannabis (Patient's own supply) See Admin Instructions (The purpose of this order is to document that the patient reports taking medical  cannabis.  This is not a prescription, and is not used to certify that the patient has a qualifying medical condition.)     Oil formulation      Melatonin 10 MG TABS tablet Take 20 mg by mouth At Bedtime      methocarbamol (ROBAXIN) 500 MG tablet Take 1 tablet (500 mg) by mouth 4 times daily as needed for muscle spasms 120 tablet 11    Milk Thistle-Dand-Fennel-Licor (MILK THISTLE XTRA) CAPS capsule Take 1 capsule by mouth daily      NALTREXONE HCL PO Take 3 mg by mouth daily      nicotine (NICOTROL) 10 MG inhaler Use 1 cartridge as needed for urge to smoke by puffing over course of 20min.  Use 6-16 cart/day; reduce number of cart/day over 6-12 weeks. 300 each 4    phenazopyridine (AZO URINARY PAIN RELIEF) 95 MG tablet Take 190 mg by mouth 3 times daily      STATIN NOT PRESCRIBED (INTENTIONAL) Please choose reason not prescribed from choices below.      TURMERIC PO Take 1 capsule by mouth daily      UNABLE TO FIND 1 capsule daily MEDICATION NAME: Serrapeptase      UNABLE TO FIND 1 capsule daily MEDICATION NAME: Edita Mariano Mushroom      UNABLE TO FIND 1 capsule daily MEDICATION NAME: DIM (diinolylmethane)      UNABLE TO FIND MEDICATION NAME: Panacur C - 1 capsule daily      Vitamin A 3 MG (59522 UT) TABS Take 1 tablet by mouth daily      Vitamin D, Cholecalciferol, 25 MCG (1000 UT) CAPS Take 1,000 Units by mouth daily Liquid, not capsule      acyclovir (ZOVIRAX) 400 MG tablet Take 1 tablet (400 mg) by mouth 2 times daily Viral Prophylaxis. (Patient not taking: Reported on 2/13/2024) 180 tablet 1    baclofen (LIORESAL) 10 MG tablet TAKE 1 TABLET BY MOUTH 3 TIMES A DAY AS NEEDED FOR MUSCLE SPASMS (Patient not taking: Reported on 2/13/2024) 60 tablet 0       Allergies   Allergen Reactions    Cefdinir Shortness Of Breath    Latex Shortness Of Breath    Atorvastatin Muscle Pain (Myalgia)    Short Ragweed Pollen Ext Cough    Adhesive Tape Rash     Physical Exam:  BP (!) 143/63   Pulse 60   Wt 56.2 kg (124 lb)   LMP  (LMP  "Unknown)   SpO2 94%   BMI 23.43 kg/m    BMI:Body mass index is 23.43 kg/m .   GEN: NAD,   Head: Normocephalic.  EYES: PERRLA, EOMI  Psych: normal mood, normal affect     Labs/Studies:  - We reviewed the results of the overnight study as described on the HPI.     Lab Results   Component Value Date    PH 7.31 (L) 04/13/2022    PO2 110 (H) 04/13/2022    PCO2 52 (H) 04/13/2022    HCO3 24 04/13/2022    MELVA -0.1 04/13/2022     No results found for: \"TSH\"  Lab Results   Component Value Date     (H) 01/10/2024    GLC 88 11/08/2023     Lab Results   Component Value Date    HGB 14.7 01/10/2024    HGB 14.4 11/08/2023     Lab Results   Component Value Date    BUN 20.1 01/10/2024    BUN 25.6 (H) 11/08/2023    CR 0.94 01/10/2024    CR 0.86 11/08/2023     Lab Results   Component Value Date    AST 19 01/10/2024    AST 18 11/08/2023    ALT 27 01/10/2024    ALT 28 11/08/2023    ALKPHOS 44 01/10/2024    ALKPHOS 45 11/08/2023    BILITOTAL 0.3 01/10/2024    BILITOTAL 0.3 11/08/2023     No results found for: \"UAMP\", \"UBARB\", \"BENZODIAZEUR\", \"UCANN\", \"UCOC\", \"OPIT\", \"UPCP\"    Recent Labs   Lab Test 01/10/24  1359 11/08/23  1402    140   POTASSIUM 3.8 3.7   CHLORIDE 102 102   CO2 32* 31*   ANIONGAP 7 7   * 88   BUN 20.1 25.6*   CR 0.94 0.86   SERAFIN 9.4 9.4       No results found for: \"BLUE\"      Patient verbalized understanding of these issues, agrees with the plan and all questions were answered today. Patient was given an opportuntity to voice any other symptoms or concerns not listed above. Patient did not have any other symptoms or concerns.      Phillip Wu DO  Board Certified in Internal Medicine and Sleep Medicine      (Note created with Dragon voice recognition and unintended spelling errors and word substitutions may occur)   "

## 2024-02-20 NOTE — NURSING NOTE
"Chief Complaint   Patient presents with    Study Results       Initial BP (!) 143/63   Pulse 60   Wt 56.2 kg (124 lb)   LMP  (LMP Unknown)   SpO2 94%   BMI 23.43 kg/m   Estimated body mass index is 23.43 kg/m  as calculated from the following:    Height as of 1/18/24: 1.549 m (5' 1\").    Weight as of this encounter: 56.2 kg (124 lb).    Medication Reconciliation: complete    Neck circumference:     DME:     KARTHIK Caicedo  [facility]      "

## 2024-02-26 PROBLEM — J96.01 ACUTE RESPIRATORY FAILURE WITH HYPOXIA (H): Status: ACTIVE | Noted: 2024-01-01

## 2024-02-26 PROBLEM — Z72.0 TOBACCO USE: Status: ACTIVE | Noted: 2019-03-04

## 2024-02-26 PROBLEM — C90.00: Status: ACTIVE | Noted: 2021-06-25

## 2024-02-26 PROBLEM — C90.00 MULTIPLE MYELOMA NOT HAVING ACHIEVED REMISSION (H): Status: ACTIVE | Noted: 2022-08-31

## 2024-02-26 PROBLEM — J18.9 PNEUMONIA: Status: ACTIVE | Noted: 2024-01-01

## 2024-02-26 PROBLEM — D70.9 NEUTROPENIC FEVER (H): Status: ACTIVE | Noted: 2024-01-01

## 2024-02-26 PROBLEM — R50.81 NEUTROPENIC FEVER (H): Status: ACTIVE | Noted: 2024-01-01

## 2024-02-26 NOTE — ED TRIAGE NOTES
"Pt reports 3x days of shortness of breath and \"drainage everywhere\". Pt is 80% on RA after walking into triage bay. Pt placed on 2lpm O2. Pt is tachypneic. Pt's lips are cyanotic.    Pt's O2 nakia to 92%.      Triage Assessment (Adult)       Row Name 02/26/24 1321          Triage Assessment    Airway WDL WDL        Respiratory WDL    Respiratory WDL X;rhythm/pattern;cough;mucous membranes     Rhythm/Pattern, Respiratory shortness of breath;tachypneic     Mucous Membranes cyanotic        Skin Circulation/Temperature WDL    Skin Circulation/Temperature WDL WDL        Cardiac WDL    Cardiac WDL WDL        Peripheral/Neurovascular WDL    Peripheral Neurovascular WDL WDL        Cognitive/Neuro/Behavioral WDL    Cognitive/Neuro/Behavioral WDL WDL                     "

## 2024-02-26 NOTE — TELEPHONE ENCOUNTER
"Nurse Triage SBAR    Is this a 2nd Level Triage? YES, LICENSED PRACTITIONER REVIEW IS REQUIRED    Situation:   See [employer] message, into the clinic, from the patient, on 2/26/24, to report an ongoing sinus problem:    \"I am running a 100.8 temp with my pO2 in the mid to high 80's. My BP is 124/64 and my pulse is 76. Do I need to have lab tests or a chest x-ray? This has been occurring off and on over the past three weeks. My sinuses hurt, I have a headache and my chest is tight. I wonder if this is still part of the chronic sinus infection I was treated for in January.\"    Background:  Hx of small airway disease, panlobar emphysema, HTN, multiple myeloma, and former tobacco use    See office visit notes from the PCP on 1/18/24 regarding the patient's Left maxillary sinusitis:    \"Reviewed symptomatic treatments.  Will treat with course of Augmentin.  Notify us with inadequate effect or worsening.  - amoxicillin-clavulanate (AUGMENTIN) 875-125 MG tablet; Take 1 tablet by mouth 2 times daily for 7 days\"     Assessment:  Patient reports increased sinus pain, pressure and low oxygen saturation ongoing off and on for 3 weeks.    Stated her lowest O2 sat today was 83%-she completed a nebulizer treatment and her O2 sat went up to 90% and was 90% during the nurse triage    Patient is not on continuous oxygen     Patient does not sound confused, but is SOB and sounds congested-chest feels tight    Has a headache rated as 5 out of ten    Not tested for COVID-19    Running a temperature of 100.8 today     Very tired upon exertion, affecting her ADLs    Pain is a 7 out of ten on temples, 4 out of ten on nose    Has yellow nasal discharge and has to blow her nose often-draining post nasal drip, which is making her cough    Denies chest pain, rash, nausea, vomiting, diarrhea, dysuria, abdominal pain, back pain, flank pain, sore throat or earache, LOC, fainting, falling, facial swelling or redness, increased swelling of lower " extrenities, or weakness or numbness on either side of the body    Protocol Recommended Disposition:   Emergency department    Recommendation:   Gave care advice. Recommended that the patient go directly to the ED now per disposition.    Patient verbalized understanding, but declines to go to the ED now.    Patient would like to ask the PCP for a chest x-ray and possibly another prescription of antibiotics.    Writer huddled with the PCP right away, who stated that the patient needs to go to the ED now, not a clinic, and she should have someone else drive her to the ED or 911 should be called if she is unable to get a ride to the ED    Writer called the patient and relayed the PCP's recommendation.    Patient verbalized understanding and agrees with the plan.    Patient stated she will have her daughter bring her to the [facility] ED-writer offered to call 911 for the patient, but she declined    Writer called the [facility] ED and spoke to Rosalino Johnson, and relayed the patient's symptoms prior to the patient's arrival to the ED per patient request and writer recommendation.    Patient stated she will be at the ED within the hour and writer impressed several times the importance of going directly to the ED-patient verbalized understanding and stated she will go to the ED    GO TO ED NOW    Does the patient meet one of the following criteria for ADS visit consideration? 16+ years old, with an [facility] PCP     TIP  Providers, please consider if this condition is appropriate for management at one of our Acute and Diagnostic Services sites.     If patient is a good candidate, please use dotphrase <dot>triageresponse and select Refer to ADS to document.       Reason for Disposition   Difficulty breathing, and not from stuffy nose (e.g., not relieved by cleaning out the nose)    Additional Information   Negative: Sounds like a life-threatening emergency to the triager    Answer Assessment - Initial Assessment  "Questions  1. LOCATION: \"Where does it hurt?\"         Draining post nasal drip, which is making her cough    Pain under eyes and above on forehead and nose     2. ONSET: \"When did the sinus pain start?\"  (e.g., hours, days)         Started off and on for 3 weeks-started running a temp this past weekend    3. SEVERITY: \"How bad is the pain?\"   (Scale 1-10; mild, moderate or severe)    - MILD (1-3): doesn't interfere with normal activities     - MODERATE (4-7): interferes with normal activities (e.g., work or school) or awakens from sleep    - SEVERE (8-10): excruciating pain and patient unable to do any normal activities           Very tired upon exertion, pain is a 7 out of ten on temples, 4 out of ten on nose      4. RECURRENT SYMPTOM: \"Have you ever had sinus problems before?\" If so, ask: \"When was the last time?\" and \"What happened that time?\"         See office visit on 1/18/24      5. NASAL CONGESTION: \"Is the nose blocked?\" If so, ask, \"Can you open it or must you breathe through the mouth?\"        Can breathe through nose then it plugs    6. NASAL DISCHARGE: \"Do you have discharge from your nose?\" If so ask, \"What color?\"        Yellow chunks      7. FEVER: \"Do you have a fever?\" If so, ask: \"What is it, how was it measured, and when did it start?\"         100.8 today, over the weekend her fever got to 101.0    8. OTHER SYMPTOMS: \"Do you have any other symptoms?\" (e.g., sore throat, cough, earache, difficulty breathing)        Cough, SOB    No sore throat or earache    9. PREGNANCY: \"Is there any chance you are pregnant?\" \"When was your last menstrual period?\"        N/A    Protocols used: Sinus Pain and Congestion-A-[place]    Sumaya Chu RN, BSN  [facility]    "

## 2024-02-26 NOTE — ED NOTES
Pt requested to ambulate to bathroom. Portable oxygen was used on trip to the bathroom. 3L NC. Pt appears short of breath. Pt's O2 sat's 83% 3L NC upon returning to room. Took several minutes to recover. RN aware.

## 2024-02-26 NOTE — ED NOTES
Expected Patient Referral to ED  11:49 AM    Referring Clinic/Provider:  [facility] lerma    Reason for referral/Clinical facts:  Febrile, low O2 sats - 83%, not on home oxygen.    Recommendations provided:  Send to ED for further evaluation    Caller was informed that this institution does possess the capabilities and/or resources to provide for patient and should be transferred to our facility.    Discussed that if direct admit is sought and any hurdles are encountered, this ED would be happy to see the patient and evaluate.    Informed caller that recommendations provided are recommendations based only on the facts provided and that they responsible to accept or reject the advice, or to seek a formal in person consultation as needed and that this ED will see/treat patient should they arrive.      Trell Johnson DO  [facility] EMERGENCY DEPARTMENT  [facility] [facility] [id number] [phone]       Trell Johnson DO  02/26/24 [id number]

## 2024-02-26 NOTE — PHARMACY-ADMISSION MEDICATION HISTORY
Pharmacist Admission Medication History    Admission medication history is complete. The information provided in this note is only as accurate as the sources available at the time of the update.    Information Source(s): Patient and CareEverywhere/SureScripts via in-person    Pertinent Information: none    Changes made to PTA medication list:  Added: Vitamin K2  Deleted: Acyclovir, Alpha-lipoic acid, vitamin C, baclofen, CoQ10, Voltaren, Azo, Nicotrol inhaler, Vitamin A  Changed: None    Allergies reviewed with patient and updates made in EHR: yes    Medication History Completed By: VALERIANO COBOS RPH 2/26/2024 5:57 PM    PTA Med List   Medication Sig Note Last Dose    albuterol (PROAIR HFA/PROVENTIL HFA/VENTOLIN HFA) 108 (90 Base) MCG/ACT inhaler Inhale 2 puffs into the lungs every 6 hours as needed for wheezing or shortness of breath / dyspnea  Unknown at uses rarely, preferes nebs    albuterol (PROVENTIL) (2.5 MG/3ML) 0.083% neb solution Take 1 vial (2.5 mg) by nebulization every 4 hours as needed for wheezing or shortness of breath / dyspnea  2/26/2024 at am    aspirin (ASA) 81 MG chewable tablet Take 81 mg by mouth daily  2/26/2024 at am    fish oil-omega-3 fatty acids 1000 MG capsule Take 2 g by mouth daily  2/26/2024 at am    gabapentin (NEURONTIN) 300 MG capsule Take 2 capsules (600 mg) by mouth 3 times daily  2/26/2024 at am    hydrochlorothiazide (HYDRODIURIL) 12.5 MG tablet Take 1 tablet (12.5 mg) by mouth daily  2/26/2024 at am    HYDROmorphone (DILAUDID) 4 MG tablet Take 0.5-1 tablets (2-4 mg) by mouth every 4 hours as needed for moderate to severe pain  Unknown at uses PRN    LENalidomide (REVLIMID) 10 MG CAPS capsule Take 1 capsule (10 mg) by mouth daily for 28 days Take at the same time each day. Do not open, break or chew the capsule. Swallow whole, with water.  2/26/2024 at am    medical cannabis (Patient's own supply) See Admin Instructions (The purpose of this order is to document that the  patient reports taking medical cannabis.  This is not a prescription, and is not used to certify that the patient has a qualifying medical condition.)     Oil formulation  Unknown    Melatonin 10 MG TABS tablet Take 20 mg by mouth At Bedtime  2/25/2024 at [facility]    Menaquinone-7 (K2 PO) Take 1 capsule by mouth daily  2/26/2024 at am    methocarbamol (ROBAXIN) 500 MG tablet Take 1 tablet (500 mg) by mouth 4 times daily as needed for muscle spasms 2/26/2024: Normally takes TID 2/26/2024 at am    Milk Thistle-Dand-Fennel-Licor (MILK THISTLE XTRA) CAPS capsule Take 1 capsule by mouth daily  2/26/2024 at am    NALTREXONE HCL PO Take 3 mg by mouth daily  2/26/2024 at am    TURMERIC PO Take 1 capsule by mouth daily  2/26/2024 at am    UNABLE TO FIND 1 capsule daily MEDICATION NAME: Serrapeptase  2/26/2024 at am    UNABLE TO FIND 1 capsule daily MEDICATION NAME: Edita Mariano Mushroom  2/26/2024 at am    UNABLE TO FIND 1 capsule daily MEDICATION NAME: DIM (diinolylmethane)  2/26/2024 at am    UNABLE TO FIND MEDICATION NAME: Panacur C - 1 capsule daily  2/26/2024 at am    Vitamin D, Cholecalciferol, 25 MCG (1000 UT) CAPS Take 1,000 Units by mouth daily Liquid, not capsule  2/26/2024 at am

## 2024-02-26 NOTE — ED PROVIDER NOTES
EMERGENCY DEPARTMENT ENCOUNTER      NAME: Lizzie Viera  AGE: 73 year old female  YOB: 1950  MRN: [id number]  EVALUATION DATE & TIME: No admission date for patient encounter.    PCP: Sona Sandoval    ED PROVIDER: Migue Mendez M.D.      Chief Complaint   Patient presents with    Shortness of Breath         FINAL IMPRESSION:  Hypoxia  COPD exacerbation      ED COURSE & MEDICAL DECISION MAKING:    Pertinent Labs & Imaging studies reviewed. (See chart for details)  73 year old female presents to the Emergency Department for evaluation of congestion and worsening shortness of breath.  Patient reports started getting ill and short of breath Saturday night.  Developed fevers to 101.  Other family members with similar congestion.  Patient arrives with her daughter provides additional information.  Patient presented initially to clinic.  Clinic noted oxygenation level at 80% on arrival and was referred to the emergency room for further evaluation per review of records.  Past medical history includes sleep apnea with plan for initiation of nighttime oxygen.  Patient also with myelodysplastic syndrome.  On exam she is a thin adult female in mild distress.  Oxygenation on arrival 80%.  Patient placed on 2 L by nasal cannula with oxygenation increasing to 92%.  Chest x-ray and procalcitonin being obtained assess for evidence of pneumonia.  COVID/RSV/influenza swab sent.  Will obtain VBG to assess for evidence of hypercarbia.  Baseline blood work being obtained assess for contributory electrolyte imbalance or anemia.  Will also obtain troponin and BMP to assess for cardiac contribution to her shortness of breath.    1:35 PM I met with the patient for the initial interview and physical examination. Discussed plan for treatment and workup in the ED.    1:49 PM.  Exam completed.  Patient with diminished breath sounds bilaterally.  Cardiac exam unremarkable.  No murmurs.  No lower extremity edema.  2:22 PM.   VBG with relative hypoxia with pO2 of 21.  pH normal 7.43.  D-dimer moderately elevated 1.37.  CTA of the chest ordered by Dr. Gonzalez.  Remainder of laboratory evaluation pending.  Patient will require hospitalization given her hypoxia.  At the conclusion of the encounter I discussed the results of all of the tests and the disposition. The questions were answered and return precautions provided. The patient or family acknowledged understanding and was agreeable with the care plan.       Patient represents critical care situation.  Proximately 35 minutes spent directly involved patient's care independent of any procedures     PPE: Provider wore gloves, N95 mask    MEDICATIONS GIVEN IN THE EMERGENCY:  Medications   ipratropium - albuterol 0.5 mg/2.5 mg/3 mL (DUONEB) neb solution 3 mL (3 mLs Nebulization $Given 2/26/24 1343)       NEW PRESCRIPTIONS STARTED AT TODAY'S ER VISIT  New Prescriptions    No medications on file          =================================================================    HPI    Patient information was obtained from: patient, patient's family     Use of Intrepreter: N/A         Lizzie Viera is a 73 year old female with a pertient medical history of myelodysplastic syndrome, sleep apnea, small airway disease, hypertension, hyperlipidemia, tobacco abuse,  who presents to the ED for evaluation of shortness of breath, congestion. Patient's symptoms began night of 2/24/24 with fever of 101 F. Family currently with similar symptoms.     REVIEW OF SYSTEMS   Constitutional:  Fever, congestion, Denies chills  Respiratory:  Shortness of breath Denies productive cough  Cardiovascular:  Denies chest pain, palpitations  GI:  Denies abdominal pain, nausea, vomiting, or change in bowel or bladder habits   Musculoskeletal:  Denies any new muscle/joint swelling  Skin:  Denies rash   Neurologic:  Denies focal weakness  All systems negative except as marked.     PAST MEDICAL HISTORY:  Past Medical History:    Diagnosis Date    Cervical dysplasia     Chronic RUQ pain     Depressive disorder     Multiple myeloma (H)     Osteoporosis        PAST SURGICAL HISTORY:  Past Surgical History:   Procedure Laterality Date    CONIZATION CERVIX,KNIFE/LASER      Description: Cervical Conization By Laser;  Recorded: 09/20/2007;    HC DILATION/CURETTAGE DIAG/THER NON OB      Description: Dilation And Curettage;  Recorded: 09/20/2007;    HC REMOVE TONSILS/ADENOIDS,<13 Y/O      Description: Tonsillectomy With Adenoidectomy;  Recorded: 09/20/2007;    [facility] LAP,CHOLECYSTECTOMY/EXPLORE  12/27/2004    [facility] LIGATE FALLOPIAN TUBE      Description: Tubal Ligation;  Recorded: 09/20/2007;         CURRENT MEDICATIONS:    No current facility-administered medications for this encounter.    Current Outpatient Medications:     acyclovir (ZOVIRAX) 400 MG tablet, Take 1 tablet (400 mg) by mouth 2 times daily Viral Prophylaxis. (Patient not taking: Reported on 2/13/2024), Disp: 180 tablet, Rfl: 1    albuterol (PROAIR HFA/PROVENTIL HFA/VENTOLIN HFA) 108 (90 Base) MCG/ACT inhaler, Inhale 2 puffs into the lungs every 6 hours as needed for wheezing or shortness of breath / dyspnea, Disp: 18 g, Rfl: 1    albuterol (PROVENTIL) (2.5 MG/3ML) 0.083% neb solution, Take 1 vial (2.5 mg) by nebulization every 4 hours as needed for wheezing or shortness of breath / dyspnea, Disp: 90 mL, Rfl: 0    alpha-lipoic acid 100 MG capsule, Take 300 mg by mouth daily, Disp: , Rfl:     Ascorbic Acid (VITAMIN C) 100 MG CHEW, Take 1 tablet by mouth daily, Disp: , Rfl:     aspirin (ASA) 81 MG chewable tablet, Take 81 mg by mouth daily, Disp: , Rfl:     baclofen (LIORESAL) 10 MG tablet, TAKE 1 TABLET BY MOUTH 3 TIMES A DAY AS NEEDED FOR MUSCLE SPASMS (Patient not taking: Reported on 2/13/2024), Disp: 60 tablet, Rfl: 0    Coenzyme Q10 300 MG CAPS, Take 300 mg by mouth daily, Disp: , Rfl:     diclofenac (VOLTAREN) 1 % topical gel, Apply 2 g topically 4 times daily, Disp: 50 g, Rfl: 1     fish oil-omega-3 fatty acids 1000 MG capsule, Take 2 g by mouth daily, Disp: , Rfl:     gabapentin (NEURONTIN) 300 MG capsule, Take 2 capsules (600 mg) by mouth 3 times daily, Disp: 180 capsule, Rfl: 3    hydrochlorothiazide (HYDRODIURIL) 12.5 MG tablet, Take 1 tablet (12.5 mg) by mouth daily, Disp: 90 tablet, Rfl: 4    HYDROmorphone (DILAUDID) 4 MG tablet, Take 0.5-1 tablets (2-4 mg) by mouth every 4 hours as needed for moderate to severe pain, Disp: 60 tablet, Rfl: 0    LENalidomide (REVLIMID) 10 MG CAPS capsule, Take 1 capsule (10 mg) by mouth daily for 28 days Take at the same time each day. Do not open, break or chew the capsule. Swallow whole, with water., Disp: 28 capsule, Rfl: 0    medical cannabis (Patient's own supply), See Admin Instructions (The purpose of this order is to document that the patient reports taking medical cannabis.  This is not a prescription, and is not used to certify that the patient has a qualifying medical condition.)   Oil formulation, Disp: , Rfl:     Melatonin 10 MG TABS tablet, Take 20 mg by mouth At Bedtime, Disp: , Rfl:     methocarbamol (ROBAXIN) 500 MG tablet, Take 1 tablet (500 mg) by mouth 4 times daily as needed for muscle spasms, Disp: 120 tablet, Rfl: 11    Milk Thistle-Dand-Fennel-Licor (MILK THISTLE XTRA) CAPS capsule, Take 1 capsule by mouth daily, Disp: , Rfl:     NALTREXONE HCL PO, Take 3 mg by mouth daily, Disp: , Rfl:     nicotine (NICOTROL) 10 MG inhaler, Use 1 cartridge as needed for urge to smoke by puffing over course of 20min.  Use 6-16 cart/day; reduce number of cart/day over 6-12 weeks., Disp: 300 each, Rfl: 4    phenazopyridine (AZO URINARY PAIN RELIEF) 95 MG tablet, Take 190 mg by mouth 3 times daily, Disp: , Rfl:     STATIN NOT PRESCRIBED (INTENTIONAL), Please choose reason not prescribed from choices below., Disp: , Rfl:     TURMERIC PO, Take 1 capsule by mouth daily, Disp: , Rfl:     UNABLE TO FIND, 1 capsule daily MEDICATION NAME: Serrapeptase, Disp:  , Rfl:     UNABLE TO FIND, 1 capsule daily MEDICATION NAME: Lions García Mushroom, Disp: , Rfl:     UNABLE TO FIND, 1 capsule daily MEDICATION NAME: DIM (diinolylmethane), Disp: , Rfl:     UNABLE TO FIND, MEDICATION NAME: Panacur C - 1 capsule daily, Disp: , Rfl:     Vitamin A 3 MG (13212 UT) TABS, Take 1 tablet by mouth daily, Disp: , Rfl:     Vitamin D, Cholecalciferol, 25 MCG (1000 UT) CAPS, Take 1,000 Units by mouth daily Liquid, not capsule, Disp: , Rfl:     ALLERGIES:  Allergies   Allergen Reactions    Cefdinir Shortness Of Breath    Latex Shortness Of Breath    Atorvastatin Muscle Pain (Myalgia)    Short Ragweed Pollen Ext Cough    Adhesive Tape Rash       FAMILY HISTORY:  Family History   Problem Relation Age of Onset    Diabetes Mother     Arthritis Mother     LUNG DISEASE Father     Diabetes Maternal Uncle     Breast Cancer Paternal Aunt     Heart Failure Maternal Grandmother     Heart Disease Maternal Grandmother     Heart Failure Maternal Grandfather     Heart Disease Maternal Grandfather     Heart Failure Paternal Grandmother     Heart Disease Paternal Grandmother        SOCIAL HISTORY:   Social History     Socioeconomic History    Marital status:     Number of children: 3   Tobacco Use    Smoking status: Former     Packs/day: 0.50     Years: 45.00     Additional pack years: 0.00     Total pack years: 22.50     Types: Cigarettes     Quit date: 2023     Years since quittin.0    Smokeless tobacco: Never   Vaping Use    Vaping Use: Every day   Substance and Sexual Activity    Alcohol use: Yes     Comment: Alcoholic Drinks/day: 0-1 drinks per week    Drug use: Not Currently    Sexual activity: Not Currently     Partners: Male     Birth control/protection: Surgical     Social Determinants of Health     Financial Resource Strain: Low Risk  (2024)    Financial Resource Strain     Within the past 12 months, have you or your family members you live with been unable to get utilities (heat,  electricity) when it was really needed?: No   Food Insecurity: Low Risk  (1/18/2024)    Food Insecurity     Within the past 12 months, did you worry that your food would run out before you got money to buy more?: No     Within the past 12 months, did the food you bought just not last and you didn t have money to get more?: No   Transportation Needs: Low Risk  (1/18/2024)    Transportation Needs     Within the past 12 months, has lack of transportation kept you from medical appointments, getting your medicines, non-medical meetings or appointments, work, or from getting things that you need?: No   Interpersonal Safety: Low Risk  (1/18/2024)    Interpersonal Safety     Do you feel physically and emotionally safe where you currently live?: Yes     Within the past 12 months, have you been hit, slapped, kicked or otherwise physically hurt by someone?: No     Within the past 12 months, have you been humiliated or emotionally abused in other ways by your partner or ex-partner?: No   Housing Stability: Low Risk  (1/18/2024)    Housing Stability     Do you have housing? : Yes     Are you worried about losing your housing?: No       VITALS:  Patient Vitals for the past 24 hrs:   BP Temp Temp src Pulse Resp SpO2 Weight   02/26/24 1322 (!) 151/68 98.6  F (37  C) Oral 94 22 (!) 80 % 55.7 kg (122 lb 12.8 oz)        PHYSICAL EXAM    Constitutional:  Mild distress, Awake, alert  HENT:  Normocephalic, Atraumatic. Bilateral external ears normal. Oropharynx moist. Nose normal. Neck- Normal range of motion with no guarding, No midline cervical tenderness, Supple, No stridor.   Eyes:  PERRL, EOMI with no signs of entrapment, Conjunctiva normal, No discharge.   Respiratory:  Oxygenation on arrival 80%, 2L nasal cannula with oxygenation rising to 92%, decreased breath sounds with faint crackles in the apices.  Diminished breath sounds in the bases.  Cardiovascular:  Normal heart rate, Normal rhythm, No appreciable rubs or gallops.   GI:   Soft, No tenderness, No distension, No palpable masses  Musculoskeletal:   No edema. Good range of motion in all major joints. No tenderness to palpation or major deformities noted.  Integument:  Warm, Dry, No erythema, No rash.   Neurologic:  Alert & oriented, Normal motor function, Normal sensory function, No focal deficits noted.   Psychiatric:  Affect normal, Judgment normal, Mood normal.     LAB:  All pertinent labs reviewed and interpreted.     Results for orders placed or performed during the hospital encounter of 02/26/24   [place] Draw     Status: None (In process)    Narrative    The following orders were created for panel order [place] Draw.  Procedure                               Abnormality         Status                     ---------                               -----------         ------                     Extra Blue Top Tube[[id number]]                                                         Extra Red Top Tube[[id number]]                               In process                   Please view results for these tests on the individual orders.   Blood gas venous     Status: Abnormal   Result Value Ref Range    pH Venous 7.43 7.32 - 7.43    pCO2 Venous 48 40 - 50 mm Hg    pO2 Venous 21 (L) 25 - 47 mm Hg    Bicarbonate Venous 32 (H) 21 - 28 mmol/L    Base Excess/Deficit Venous 7.4 (H) -3.0 - 3.0 mmol/L    FIO2 28     Oxyhemoglobin Venous 32 (L) 70 - 75 %    O2 Sat, Venous 33.7 (L) 70.0 - 75.0 %    Narrative    In healthy individuals, oxyhemoglobin (O2Hb) and oxygen saturation (SO2) are approximately equal. In the presence of dyshemoglobins, oxyhemoglobin can be considerably lower than oxygen saturation.   D dimer quantitative     Status: Abnormal   Result Value Ref Range    D-Dimer Quantitative 1.37 (H) 0.00 - 0.50 ug/mL FEU    Narrative    This D-dimer assay is intended for use in conjunction with a clinical pretest probability assessment model to exclude pulmonary embolism (PE) and deep venous  thrombosis (DVT) in outpatients suspected of PE or DVT. The cut-off value is 0.50 ug/mL FEU.    For patients 50 years of age or older, the application of age-adjusted cut-off values for D-Dimer may increase the specificity without significant effect on sensitivity. The literature suggested calculation age adjusted cut-off in ug/L = age in years x 10 ug/L. The results in this laboratory are reported as ug/mL rather than ug/L. The calculation for age adjusted cut off in ug/mL= age in years x 0.01 ug/mL. For example, the cut off for a 76 year old male is 76 x 0.01 ug/mL = 0.76 ug/mL (760 ug/L).    M Bam et al. Age adjusted D-dimer cut-off levels to rule out pulmonary embolism: The ADJUST-PE Study. ROHAN 2014;311:2776-5454.; HJ Sonu et al. Diagnostic accuracy of conventional or age adjusted D-dimer cutoff values in older patients with suspected venous thromboembolism. Systemic review and meta-analysis. BMJ 2013:346:f2492.   CBC with platelets and differential     Status: Abnormal (Preliminary result)   Result Value Ref Range    WBC Count      RBC Count 3.98 3.80 - 5.20 10e6/uL    Hemoglobin 14.0 11.7 - 15.7 g/dL    Hematocrit 40.0 35.0 - 47.0 %     (H) 78 - 100 fL    MCH 35.2 (H) 26.5 - 33.0 pg    MCHC 35.0 31.5 - 36.5 g/dL    RDW 13.2 10.0 - 15.0 %    Platelet Count 172 150 - 450 10e3/uL    % Neutrophils      % Lymphocytes      % Monocytes      % Eosinophils      % Basophils      % Immature Granulocytes      Absolute Neutrophils      Absolute Lymphocytes      Absolute Monocytes      Absolute Eosinophils      Absolute Basophils      Absolute Immature Granulocytes     CBC with Platelets & Differential     Status: Abnormal (In process)    Narrative    The following orders were created for panel order CBC with Platelets & Differential.  Procedure                               Abnormality         Status                     ---------                               -----------         ------                      CBC with platelets and d...[[id number]]  Abnormal            Preliminary result           Please view results for these tests on the individual orders.      RADIOLOGY:  Reviewed all pertinent imaging. Please see official radiology report.  XR Chest Port 1 View    (Results Pending)       EKG:    Normal sinus rhythm.  Rate of 91.  Incomplete right bundle branch block morphology.  Slight ST segment scooping laterally.  Compared to April 12, 2022 partial right bundle block is new.  I have independently reviewed and interpreted the EKG(s) documented above.      I, Laurie Barahona, am serving as a scribe to document services personally performed by Migue Mendez MD, based on my observation and the provider's statements to me. I, Migue Mendez MD attest that Laurie Barahona is acting in a scribe capacity, has observed my performance of the services and has documented them in accordance with my direction.    Migue Mendez M.D.  Emergency Medicine  [facility] EMERGENCY DEPARTMENT     Migue Mendez MD  02/26/24 2118

## 2024-02-27 NOTE — UTILIZATION REVIEW
Admission Status; Secondary Review Determination       Under the authority of the Utilization Management Committee, the utilization review process indicated a secondary review on the above patient. The review outcome is based on review of the medical records, discussions with staff, and applying clinical experience noted on the date of the review.     (x) Inpatient Status Appropriate - This patient's medical care is consistent with medical management for inpatient care and reasonable inpatient medical practice.     RATIONALE FOR DETERMINATION     Ms. Viera is a 72 yo female with a PMH of multiple myeloma, COPD and chronic pain who presents to the ED with hypoxia and neutropenia.  Imaging consistent with extensive right-sided pneumonia.  Oxygen sats 80% on RA and still requiring supplemental oxygen.  ID consult requested; abd switched from IV zosyn q8 hr to IV meropenem, IV vanco and po levaquin today due to neutropenic fever risk.  Noted to be more pancytopenic on lab this am; Heme/onc consult requested and is pending.    She is requiring ongoing medical evaluation, treatment and close clinical monitoring for her current guarded medical condition.       At the time of admission with the information available to the attending physician more than 2 nights Hospital complex care was anticipated, based on patient risk of adverse outcome if treated as outpatient and complex care required. Inpatient admission is appropriate based on the Medicare guidelines.       The information on this document is developed by the utilization review team in order for the business office to ensure compliance. This only denotes the appropriateness of proper admission status and does not reflect the quality of care rendered.   The definitions of Inpatient Status and Observation Status used in making the determination above are those provided in the CMS Coverage Manual, Chapter 1 and Chapter 6, section 70.4.         Sincerely,     Aracelis  Cesar, DO  Utilization Review  Physician Advisor  [facility].

## 2024-02-27 NOTE — PLAN OF CARE
Problem: Risk for Delirium  Goal: Improved Behavioral Control  Intervention: Minimize Safety Risk  Recent Flowsheet Documentation  Taken 2/27/2024 0900 by Patty Parisi RN  Communication Enhancement Strategies: call light answered in person   Goal Outcome Evaluation:         Alert cooperative with cares lungs right side crackles history of multiple myoma no cough no fever, up independent in room.  Has medical THC box in room pharmacy checked. Wbc 1.8   K 3.2 medicated re check at 1600.

## 2024-02-27 NOTE — PROGRESS NOTES
"RCAT Treatment Plan    Patient Score:  9    Patient Acuity: 4    Clinical Indication for Therapy: hypoxia \"2/2 extensive right-sided pneumonia and neutropenia;\" congestive cough.    Therapy Ordered: Flutter valve QID per patient; Albu neb Q4PRN    Assessment Summary: Patient admitted with hypoxia, found to have extensive right-sided pneumonia and neutropenia. Hx of sleep-related hypoxia and ?COPD (no PFTs or emphysema on CT.) Per RCAT, patient is awake, alert, and oriented. Not in resp distress. On 2LPM NC, Sp02 mid to  high 90s. BS=no wheezing; few faint, fine crackles--mostly in the right lungs fields. States she doesn't use nebs or inhalers at home. Exhibits strong, congestive, currently--non-productive cough; loosened up a lot with a flutter valve.     Ordered on flutter valve QID, patient to do independently; instructions written on the white board. Bubbler humidity added to NC.     /60 (BP Location: Left arm)   Pulse 86   Temp 99.6  F (37.6  C) (Oral)   Resp 22   Wt 55.7 kg (122 lb 12.8 oz)   LMP  (LMP Unknown)   SpO2 91%   BMI 23.20 kg/m       Александр Ly, RRT   "

## 2024-02-27 NOTE — PHARMACY-VANCOMYCIN DOSING SERVICE
Pharmacy Vancomycin Note  Date of Service 2024  Patient's  1950   73 year old, female    Indication:  pneumonia, neutropenic fever  Day of Therapy: 2  Current vancomycin regimen:  1000 mg IV q24h  Current vancomycin monitoring method: AUC  Current vancomycin therapeutic monitoring goal: 400-600 mg*h/L    Insight[employer] Prediction of Current Vancomycin Regimen  Loading dose: 1250 mg IV  Regimen: 1000 mg IV every 24 hours.  Start time: 21:08 on 2024  Exposure target: AUC24 (range)400-600 mg/L.hr   AUC24,ss: 388 mg/L.hr  Probability of AUC24 > 400: 46 %  Ctrough,ss: 10.5 mg/L  Probability of Ctrough,ss > 20: 8 %  Probability of nephrotoxicity (Lodise PRAVEEN ): 6 %      Current estimated CrCl = Estimated Creatinine Clearance: 59.7 mL/min (based on SCr of 0.76 mg/dL).    Creatinine for last 3 days  2024:  1:54 PM Creatinine 0.97 mg/dL  2024:  6:46 AM Creatinine 0.76 mg/dL    Recent Vancomycin Levels (past 3 days)  No results found for requested labs within last 3 days.    Vancomycin IV Administrations (past 72 hours)                     vancomycin (VANCOCIN) 1,250 mg in sodium chloride 0.9 % 250 mL intermittent infusion (mg) 1,250 mg New Bag 24 2108                    Nephrotoxins and other renal medications (From now, onward)      Start     Dose/Rate Route Frequency Ordered Stop    24 2100  vancomycin (VANCOCIN) 1,250 mg in sodium chloride 0.9 % 250 mL intermittent infusion        See Hyperspace for full Linked Orders Report.    1,250 mg  over 90 Minutes Intravenous EVERY 24 HOURS 24 1005                 Contrast Orders - past 72 hours (72h ago, onward)      Start     Dose/Rate Route Frequency Stop    24 1600  iopamidol (ISOVUE-370) solution 47 mL         47 mL Intravenous ONCE 24 1541            Interpretation of levels and current regimen:  Current regimen is reflective of AUC less than 400 (no vancomycin level for interpretation, however some improvement  in renal function since admission).    Has serum creatinine changed greater than 50% in last 72 hours: No    Urine output:  good urine output    Renal Function: Improving    Insight[employer] Prediction of Planned New Vancomycin Regimen  Loading dose: N/A  Regimen: 1250 mg IV every 24 hours.  Start time: 21:08 on 02/27/2024  Exposure target: AUC24 (range)400-600 mg/L.hr   AUC24,ss: 478 mg/L.hr  Probability of AUC24 > 400: 70 %  Ctrough,ss: 13 mg/L  Probability of Ctrough,ss > 20: 17 %  Probability of nephrotoxicity (Lodise PRAVEEN 2009): 8 %      Plan:  Increase Dose to 1250 mg IV every 24 hours.  Vancomycin monitoring method: AUC  Vancomycin therapeutic monitoring goal: 400-600 mg*h/L  Pharmacy will check vancomycin level tomorrow morning to evaluate appropriateness of adjusted regimen.  Serum creatinine levels will be ordered a minimum of twice weekly.    Michelle Nevarez RPH

## 2024-02-27 NOTE — ED NOTES
[facility] ED Handoff Report    ED Chief Complaint: SOB    ED Diagnosis:  No diagnosis found.     PMH:    Past Medical History:   Diagnosis Date    Cervical dysplasia     Chronic RUQ pain     Depressive disorder     Multiple myeloma (H)     Osteoporosis         Code Status:  Prior     Falls Risk: Yes Band: Applied    Current Living Situation/Residence: lives with their son or daughter     Elimination Status: Continent: Yes     Activity Level: SBA w/ walker    Patients Preferred Language:  English     Needed: No    Vital Signs:  BP (!) 140/55   Pulse 79   Temp 98.6  F (37  C) (Oral)   Resp 27   Wt 55.7 kg (122 lb 12.8 oz)   LMP  (LMP Unknown)   SpO2 93%   BMI 23.20 kg/m       Cardiac Rhythm: SR    Pain Score: 3/10    Is the Patient Confused:  No    Last Food or Drink: 02/26/24 at 1000    Focused Assessment:  pt alert and oriented, needing 3 L o2 to maintain sats above 90.desats with walking even on o2. LS course    Tests Performed: Done: Labs and Imaging    Treatments Provided:  abx and o2    Family Dynamics/Concerns: No    Family Updated On Visitor Policy: Yes    Plan of Care Communicated to Family: Yes    Who Was Updated about Plan of Care: pt    Belongings Checklist Done and Signed by Patient: Yes    Belongings Sent with Patient: clothes wallet and purse     Medications sent with patient: na      Covid: symptomatic, negative    Additional Information:     RN: Marleen Roa RN 2/26/2024 6:41 PM

## 2024-02-27 NOTE — PLAN OF CARE
Physical Therapy Discharge Summary    Reason for therapy discharge:    All goals and outcomes met, no further needs identified.    Progress towards therapy goal(s). See goals on Care Plan in [facility] electronic health record for goal details.  Goals met    Therapy recommendation(s):    No further therapy is recommended.     Karen England, PT  2/27/2024

## 2024-02-27 NOTE — CONSULTS
[facility] Hematology and Oncology Inpatient Consult Note    Patient: Lizzie Viera  MRN: [id number]  Date of Service: 2/27/2024      Reason for Visit  Fever, cough and shortness of breath      Assessment  Neutropenic fever  Pulmonary opacities  Multiple myeloma  Shortness of breath  Transaminitis    Plan:  Presented with cough and shortness of breath along with fever since Friday.  She has multiple myeloma but is in [facility].  Has been on Revlimid for the last 2 years.  Dose has been pretty stable at 10 mg daily.  Last dose yesterday.  In the past she never had any issues with neutropenia with Revlimid.  ANC yesterday was 1 and today was 0.6.  Normal hemoglobin presentation yesterday at 14.  Normal platelet count.  Hemoglobin has slightly dropped 11.4 today and platelet count is stable at 152.  Mildly macrocytic which is again not uncommon with Revlimid.  Overall my suspicion for underlying myeloma progression causing neutropenia is pretty low considering that she had pretty stable monoclonal proteins in the recent past.  Also the fact her neutrophil count has been pretty stable over the last couple of years and Revlimid dose has been pretty steady, I do not think this is drug-induced neutropenia.  Potentially could be neutropenia secondary to ongoing viral infection.  Although hemoglobin has dropped a little bit, her platelet count appears to be normal.  So concern for progressive myeloma resulting in cytopenias is a bit low.  Neutropenia is also not the most common cytopenia that we see with myeloma.  Will hold off on bone marrow biopsy at this point in time.  Continue to trend blood counts on a daily basis with CBC with differential.  If there is further decline in the counts then we will consider obtaining a bone marrow biopsy to see what is going on.  May need G-CSF support.    Staging History    Cancer Staging   No matching staging information was found for the patient.        History  Ms. Lizzie VILLARREAL  Aylin is a 73 year old female with standard risk myeloma who is currently on maintenance therapy with Revlimid only has been hospitalized with fever cough and shortness of breath and has been consulted by the medicine team for further evaluation management of neutropenic fever.    She has been on a steady dose of Revlimid 10 mg daily for couple of years now.  Her myeloma labs are pretty stable except for mildly elevated kappa light chains.  She has a stable monoclonal protein 0.1 g/dL.  She presented yesterday to the hospital with shortness of breath and cough along with fever that has been going on since Sunday.  Many of her family members have some sort of a URI.  I will she was found to be slightly hypoxic with SpO2 at 80%.  CT chest angio looking for PE showed extensive but pulmonary space opacities especially on the right side started with infection/inflammatory process.  CBC showed neutropenia with an ANC of 1.  Normal hemoglobin and platelet counts.  Repeat labs done today shows further drop in the neutrophil count which is currently 8.6.  Viral respiratory panel is pending.  Influenza and RSV along with COVID testing negative.  Strep pneumo testing negative.  Blood cultures so far negative.  Currently on broad-spectrum antibiotics with levofloxacin and vancomycin.  Last dose of Revlimid was yesterday morning.  Like mentioned earlier her monoclonal protein has been pretty stable.    Review of systems.    A 14 point review of systems was obtained.  Positive findings noted in the history.  Rest of the review of system is otherwise negative.      Past History  Past Medical History:   Diagnosis Date    Cervical dysplasia     Chronic RUQ pain     Depressive disorder     Multiple myeloma (H)     Osteoporosis      Past Surgical History:   Procedure Laterality Date    CONIZATION CERVIX,KNIFE/LASER      Description: Cervical Conization By Laser;  Recorded: 09/20/2007;    HC DILATION/CURETTAGE DIAG/THER NON OB       Description: Dilation And Curettage;  Recorded: 2007;    HC REMOVE TONSILS/ADENOIDS,<11 Y/O      Description: Tonsillectomy With Adenoidectomy;  Recorded: 2007;    ZZC LAP,CHOLECYSTECTOMY/EXPLORE  2004    Z LIGATE FALLOPIAN TUBE      Description: Tubal Ligation;  Recorded: 2007;     Family History   Problem Relation Age of Onset    Diabetes Mother     Arthritis Mother     LUNG DISEASE Father     Diabetes Maternal Uncle     Breast Cancer Paternal Aunt     Heart Failure Maternal Grandmother     Heart Disease Maternal Grandmother     Heart Failure Maternal Grandfather     Heart Disease Maternal Grandfather     Heart Failure Paternal Grandmother     Heart Disease Paternal Grandmother      Social History     Socioeconomic History    Marital status:     Number of children: 3   Tobacco Use    Smoking status: Former     Packs/day: 0.50     Years: 45.00     Additional pack years: 0.00     Total pack years: 22.50     Types: Cigarettes     Quit date: 2023     Years since quittin.0    Smokeless tobacco: Never   Vaping Use    Vaping Use: Every day   Substance and Sexual Activity    Alcohol use: Yes     Comment: Alcoholic Drinks/day: 0-1 drinks per week    Drug use: Not Currently    Sexual activity: Not Currently     Partners: Male     Birth control/protection: Surgical     Social Determinants of Health     Financial Resource Strain: Low Risk  (2024)    Financial Resource Strain     Within the past 12 months, have you or your family members you live with been unable to get utilities (heat, electricity) when it was really needed?: No   Food Insecurity: Low Risk  (2024)    Food Insecurity     Within the past 12 months, did you worry that your food would run out before you got money to buy more?: No     Within the past 12 months, did the food you bought just not last and you didn t have money to get more?: No   Transportation Needs: Low Risk  (2024)    Transportation Needs      Within the past 12 months, has lack of transportation kept you from medical appointments, getting your medicines, non-medical meetings or appointments, work, or from getting things that you need?: No   Interpersonal Safety: Low Risk  (1/18/2024)    Interpersonal Safety     Do you feel physically and emotionally safe where you currently live?: Yes     Within the past 12 months, have you been hit, slapped, kicked or otherwise physically hurt by someone?: No     Within the past 12 months, have you been humiliated or emotionally abused in other ways by your partner or ex-partner?: No   Housing Stability: Low Risk  (1/18/2024)    Housing Stability     Do you have housing? : Yes     Are you worried about losing your housing?: No       Allergies    Allergies   Allergen Reactions    Cefdinir Shortness Of Breath    Latex Shortness Of Breath    Atorvastatin Muscle Pain (Myalgia)    Short Ragweed Pollen Ext Cough    Adhesive Tape Rash          Physical Exam    BP 98/48 (BP Location: Right arm)   Pulse 63   Temp 98  F (36.7  C) (Oral)   Resp 18   Wt 55.7 kg (122 lb 12.8 oz)   LMP  (LMP Unknown)   SpO2 96%   BMI 23.20 kg/m      GENERAL: Alert and oriented to time place and person. In no distress.    HEAD: Atraumatic and normocephalic.    EYES: RADHA, EOMI. No pallor. No icterus.    Oral cavity: Moist mucosa    NECK: supple. JVP normal.No thyroid enlargement.    LYMPH NODES: No palpable, cervical, axillary or inguinal lymphadenopathy.    CHEST: Bilateral crackles heard, right more than left, mild wheezing heard throughout the lung fields    CVS: S1 and S2 are Regular rate and rhythm.     ABDOMEN: Soft    EXTREMITIES: Warm.    NEUROLOGICAL: Alert and oriented x 3, neurological exam grossly normal    SKIN: no rash       Lab Results  Recent Results (from the past 24 hour(s))   Blood Culture Peripheral Blood    Collection Time: 02/26/24  8:10 PM    Specimen: Peripheral Blood   Result Value Ref Range    Culture No growth after  12 hours    Blood Culture Arm, Right    Collection Time: 02/26/24  8:10 PM    Specimen: Arm, Right; Blood   Result Value Ref Range    Culture No growth after 12 hours    Strep pneumo Agn Ur greater or equal to 13yrs or CSF any age    Collection Time: 02/26/24  8:50 PM    Specimen: Urine, Midstream   Result Value Ref Range    Streptococcus pneumoniae antigen Negative Negative   Legionella pneumophila antigen urine    Collection Time: 02/26/24  8:50 PM    Specimen: Urine, Midstream   Result Value Ref Range    Legionella pneumophila serogroup 1 urinary antigen Negative Negative   Basic metabolic panel    Collection Time: 02/27/24  6:46 AM   Result Value Ref Range    Sodium 135 135 - 145 mmol/L    Potassium 3.2 (L) 3.4 - 5.3 mmol/L    Chloride 94 (L) 98 - 107 mmol/L    Carbon Dioxide (CO2) 29 22 - 29 mmol/L    Anion Gap 12 7 - 15 mmol/L    Urea Nitrogen 18.1 8.0 - 23.0 mg/dL    Creatinine 0.76 0.51 - 0.95 mg/dL    GFR Estimate 83 >60 mL/min/1.73m2    Calcium 8.4 (L) 8.8 - 10.2 mg/dL    Glucose 107 (H) 70 - 99 mg/dL   Hepatic panel    Collection Time: 02/27/24  6:46 AM   Result Value Ref Range    Protein Total 5.8 (L) 6.4 - 8.3 g/dL    Albumin 2.9 (L) 3.5 - 5.2 g/dL    Bilirubin Total 0.6 <=1.2 mg/dL    Alkaline Phosphatase 49 40 - 150 U/L    AST 70 (H) 0 - 45 U/L    ALT 78 (H) 0 - 50 U/L    Bilirubin Direct <0.20 0.00 - 0.30 mg/dL   CBC with platelets and differential    Collection Time: 02/27/24  6:46 AM   Result Value Ref Range    WBC Count 1.8 (L) 4.0 - 11.0 10e3/uL    RBC Count 3.24 (L) 3.80 - 5.20 10e6/uL    Hemoglobin 11.4 (L) 11.7 - 15.7 g/dL    Hematocrit 33.0 (L) 35.0 - 47.0 %     (H) 78 - 100 fL    MCH 35.2 (H) 26.5 - 33.0 pg    MCHC 34.5 31.5 - 36.5 g/dL    RDW 13.2 10.0 - 15.0 %    Platelet Count 152 150 - 450 10e3/uL    % Neutrophils      % Lymphocytes      % Monocytes      % Eosinophils      % Basophils      % Immature Granulocytes      NRBCs per 100 WBC 0 <1 /100    Absolute Neutrophils       Absolute Lymphocytes      Absolute Monocytes      Absolute Eosinophils      Absolute Basophils      Absolute Immature Granulocytes      Absolute NRBCs 0.0 10e3/uL   Manual Differential    Collection Time: 02/27/24  6:46 AM   Result Value Ref Range    % Neutrophils 33 %    % Lymphocytes 42 %    % Monocytes 15 %    % Eosinophils 7 %    % Basophils 0 %    % Metamyelocytes 3 %    Absolute Neutrophils 0.6 (L) 1.6 - 8.3 10e3/uL    Absolute Lymphocytes 0.8 0.8 - 5.3 10e3/uL    Absolute Monocytes 0.3 0.0 - 1.3 10e3/uL    Absolute Eosinophils 0.1 0.0 - 0.7 10e3/uL    Absolute Basophils 0.0 0.0 - 0.2 10e3/uL    Absolute Metamyelocytes 0.1 (H) <=0.0 10e3/uL    RBC Morphology Confirmed RBC Indices     Platelet Assessment  Automated Count Confirmed. Platelet morphology is normal.     Automated Count Confirmed. Platelet morphology is normal.   Potassium    Collection Time: 02/27/24  2:47 PM   Result Value Ref Range    Potassium 4.0 3.4 - 5.3 mmol/L        Imaging Results    CT Chest Pulmonary Embolism w Contrast    Result Date: 2/26/2024  EXAM: CT CHEST PULMONARY EMBOLISM W CONTRAST LOCATION: [facility] DATE: 2/26/2024 INDICATION: hypoxia, COPD, multiple myeloma COMPARISON: 2/8/2024 TECHNIQUE: CT chest pulmonary angiogram during arterial phase injection of IV contrast. Multiplanar reformats and MIP reconstructions were performed. Dose reduction techniques were used. CONTRAST: 47ml isovue 370 FINDINGS: ANGIOGRAM CHEST: Pulmonary arteries are normal caliber and negative for pulmonary emboli. Thoracic aorta is negative for dissection. No CT evidence of right heart strain. LUNGS AND PLEURA: Extensive tree-in-bud opacities, mostly in the right lung, with associated bronchial wall thickening, endobronchial opacities, and a few areas of consolidation in the lingula, middle lobe, and right lower lobe. No pleural effusion or pneumothorax. MEDIASTINUM/AXILLAE: Normal. CORONARY ARTERY CALCIFICATION: Mild. UPPER  ABDOMEN: Cholecystectomy. Simple cyst in the left kidney, which does not require additional follow-up. Unchanged left adrenal adenomas. MUSCULOSKELETAL: Unchanged mild compression fracture of T1 and severe compression fracture of T7 with associated lucent lesions as sequelae of multiple myeloma.     IMPRESSION: 1.  No acute pulmonary emboli. 2.  Extensive pulmonary airspace opacities, likely infectious or inflammatory.    XR Chest Port 1 View    Result Date: 2/26/2024  EXAM: XR CHEST PORT 1 VIEW LOCATION: [facility] DATE: 2/26/2024 INDICATION: dyspnea hypoxia COMPARISON: Low-dose chest CT 02/08/2024 and older studies, chest x-ray 06/20/2022     IMPRESSION: Asymmetric interstitial and minimal peribronchiolar opacities have developed in the right lower lobe suggestive of a developing bronchopneumonia. No effusion. Left lung is clear. Heart and pulmonary vascularity are normal. NOTE: ABNORMAL REPORT THE DICTATION ABOVE DESCRIBES AN ABNORMALITY FOR WHICH FOLLOW-UP IS NEEDED. .      A total of 35 min were spent today on this visit which included face to face conversation with the patient, EMR review, counseling and co-ordination of care and medical documentation.    Signed by: Michael Faria MD

## 2024-02-27 NOTE — CONSULTS
[facility]    Infectious Disease Consultation     Date of Admission:  2/26/2024  Date of Consult (When I saw the patient): 02/27/24    Assessment & Plan   Lizzie Viera is a 73 year old female who was admitted on 2/26/2024.     Impression:  Multiple myeloma, on maintenance therapy with Revlimid  Hypoxia, pneumonia-oxygen saturation 80%  Multiple thoracic pathological fractures, chronic pain syndrome  Pneumonia, multiple sick family members with respiratory tract infection including grandson  Hypoxia  Right-sided pneumonia  Leukopenia  Cefdinir allergy listed, 9 years ago patient said she had shortness of breath.  Did not take Benadryl and did not need ER care at that time.  Patient stopped taking it and symptoms resolved  Patient has tolerated Zosyn in the past.  Appreciate input from pharmacist  Abrasions on hand, laceration, because her dog pulled her by the hand, also has a scrape on moving a chair, on the left index finger.  Monitor for secondary infections    Recommendations    Detailed discussion with pharmacist and patient.  Will try meropenem due to neutropenic fever risk  Monitor closely for side effects  Continue levofloxacin and vancomycin  MRSA nasal swab  Respiratory panel PCR  Sputum culture, blood culture  Consider oncology input regarding leukopenia, multiple myeloma history  Thank you for consulting infectious disease.  Will follow with you    Brenda Kenyon MD  [facility]  Answering Service: [phone]  On-Call ID provider: [phone], option: 9      Reason for Consult   Reason for consult: I was asked to evaluate this patient for     neutropenic fever, abnormal CT chest   .    Primary Care Physician   Sona Sandoval    Chief Complaint   Fever, shortness of breath    History is obtained from the patient and medical records    History of Present Illness   Lizzie Viera is a 73 year old female who presents with history of  multiple myeloma, on Revlimid, was not on oxygen at home, developed runny nose, dry cough.  Multiple family members are sick.  Patient complained of frontal and maxillary sinus pressure.  Oxygen saturations at home had been in the high 80s, patient presented to the ED because of the symptoms worsening over 3 days prior to presentation.  Chest pressure.  Was hypoxic at 80% on arrival.  No nausea or vomiting.  Patient has hand lacerations, abrasion from dog, and moving the chair.    Bloating, nausea after potassium supplement    Past Medical History   I have reviewed this patient's medical history and updated it with pertinent information if needed.   Past Medical History:   Diagnosis Date    Cervical dysplasia     Chronic RUQ pain     Depressive disorder     Multiple myeloma (H)     Osteoporosis        Past Surgical History   I have reviewed this patient's surgical history and updated it with pertinent information if needed.  Past Surgical History:   Procedure Laterality Date    CONIZATION CERVIX,KNIFE/LASER      Description: Cervical Conization By Laser;  Recorded: 09/20/2007;    HC DILATION/CURETTAGE DIAG/THER NON OB      Description: Dilation And Curettage;  Recorded: 09/20/2007;     REMOVE TONSILS/ADENOIDS,<11 Y/O      Description: Tonsillectomy With Adenoidectomy;  Recorded: 09/20/2007;    [facility] LAP,CHOLECYSTECTOMY/EXPLORE  12/27/2004    [facility] LIGATE FALLOPIAN TUBE      Description: Tubal Ligation;  Recorded: 09/20/2007;       Prior to Admission Medications   Prior to Admission Medications   Prescriptions Last Dose Informant Patient Reported? Taking?   HYDROmorphone (DILAUDID) 4 MG tablet Unknown at uses PRN  No Yes   Sig: Take 0.5-1 tablets (2-4 mg) by mouth every 4 hours as needed for moderate to severe pain   LENalidomide (REVLIMID) 10 MG CAPS capsule 2/26/2024 at am  No Yes   Sig: Take 1 capsule (10 mg) by mouth daily for 28 days Take at the same time each day. Do not open, break or chew the capsule. Swallow  whole, with water.   Melatonin 10 MG TABS tablet 2024 at hs  Yes Yes   Sig: Take 20 mg by mouth At Bedtime   Menaquinone-7 (K2 PO) 2024 at am  Yes Yes   Sig: Take 1 capsule by mouth daily   Milk Thistle-Dand-Fennel-Licor (MILK THISTLE XTRA) CAPS capsule 2024 at am  Yes Yes   Sig: Take 1 capsule by mouth daily   NALTREXONE HCL PO 2024 at am  Yes Yes   Sig: Take 3 mg by mouth daily   TURMERIC PO 2024 at am  Yes Yes   Sig: Take 1 capsule by mouth daily   UNABLE TO FIND 2024 at am  Yes Yes   Sig: MEDICATION NAME: Panacur C - 1 capsule daily   UNABLE TO FIND 2024 at am  Yes Yes   Si capsule daily MEDICATION NAME: Serrapeptase   UNABLE TO FIND 2024 at am  Yes Yes   Si capsule daily MEDICATION NAME: [employer] Mushroom   UNABLE TO FIND 2024 at am  Yes Yes   Si capsule daily MEDICATION NAME: DIM (diinolylmethane)   Vitamin D, Cholecalciferol, 25 MCG (1000 UT) CAPS 2024 at am  Yes Yes   Sig: Take 1,000 Units by mouth daily Liquid, not capsule   albuterol (PROAIR HFA/PROVENTIL HFA/VENTOLIN HFA) 108 (90 Base) MCG/ACT inhaler Unknown at uses rarely, preferes nebs  No Yes   Sig: Inhale 2 puffs into the lungs every 6 hours as needed for wheezing or shortness of breath / dyspnea   albuterol (PROVENTIL) (2.5 MG/3ML) 0.083% neb solution 2024 at am  No Yes   Sig: Take 1 vial (2.5 mg) by nebulization every 4 hours as needed for wheezing or shortness of breath / dyspnea   aspirin (ASA) 81 MG chewable tablet 2024 at am  Yes Yes   Sig: Take 81 mg by mouth daily   fish oil-omega-3 fatty acids 1000 MG capsule 2024 at am  Yes Yes   Sig: Take 2 g by mouth daily   gabapentin (NEURONTIN) 300 MG capsule 2024 at am  No Yes   Sig: Take 2 capsules (600 mg) by mouth 3 times daily   hydrochlorothiazide (HYDRODIURIL) 12.5 MG tablet 2024 at am  No Yes   Sig: Take 1 tablet (12.5 mg) by mouth daily   medical cannabis (Patient's own supply) Unknown  Yes Yes   Sig: See  Admin Instructions (The purpose of this order is to document that the patient reports taking medical cannabis.  This is not a prescription, and is not used to certify that the patient has a qualifying medical condition.)     Oil formulation   methocarbamol (ROBAXIN) 500 MG tablet 2/26/2024 at am  No Yes   Sig: Take 1 tablet (500 mg) by mouth 4 times daily as needed for muscle spasms      Facility-Administered Medications: None     Allergies   Allergies   Allergen Reactions    Cefdinir Shortness Of Breath    Latex Shortness Of Breath    Atorvastatin Muscle Pain (Myalgia)    Short Ragweed Pollen Ext Cough    Adhesive Tape Rash       Immunization History   Immunization History   Administered Date(s) Administered    DT (PEDS <7y) 07/16/2002    Flu, Unspecified 12/04/2007    Influenza (H1N1) 01/11/2010    Influenza Vaccine, 6+MO IM (QUADRIVALENT W/PRESERVATIVES) 11/08/2010    Pneumo Conj 13-V (2010&after) 08/23/2018    TDAP (Adacel,Boostrix) 09/02/2008, 08/23/2018    Td,adult,historic,unspecified 07/16/2002       Social History   I have reviewed this patient's social history and updated it with pertinent information if needed. Lizzie Viera  reports that she quit smoking about 12 months ago. Her smoking use included cigarettes. She has a 22.5 pack-year smoking history. She has never used smokeless tobacco. She reports current alcohol use. She reports that she does not currently use drugs.    Family History   I have reviewed this patient's family history and updated it with pertinent information if needed.   Family History   Problem Relation Age of Onset    Diabetes Mother     Arthritis Mother     LUNG DISEASE Father     Diabetes Maternal Uncle     Breast Cancer Paternal Aunt     Heart Failure Maternal Grandmother     Heart Disease Maternal Grandmother     Heart Failure Maternal Grandfather     Heart Disease Maternal Grandfather     Heart Failure Paternal Grandmother     Heart Disease Paternal Grandmother       Multiple family members are sick with respiratory tract infection    Review of Systems   The 10 point Review of Systems is negative other than noted in the HPI or here.     Physical Exam   Temp: 97.6  F (36.4  C) Temp src: Oral BP: 115/55 Pulse: 64   Resp: 18 SpO2: 97 % O2 Device: Nasal cannula Oxygen Delivery: 3 LPM  Vital Signs with Ranges  Temp:  [97.6  F (36.4  C)-99.6  F (37.6  C)] 97.6  F (36.4  C)  Pulse:  [64-93] 64  Resp:  [18-45] 18  BP: (115-150)/(50-67) 115/55  SpO2:  [85 %-97 %] 97 %  122 lbs 12.8 oz  Body mass index is 23.2 kg/m .    GENERAL APPEARANCE:  awake, appears ill, on oxygen  EYES: Eyes grossly normal to inspection, conjunctivae and sclerae normal  HENT: NC  NECK: supple  RESP: coarse  CV: S1 S2  LYMPHATICS: no swelling  ABDOMEN: soft, nontender, nondistended  MS: extremities normal- no gross deformities noted  SKIN: no suspicious lesions or rashes  NEURO: coherent      LABORATORY DATA:  Reviewed    CBC RESULTS:   Recent Labs   Lab Test 02/27/24  0646   WBC 1.8*   RBC 3.24*   HGB 11.4*   HCT 33.0*   *   MCH 35.2*   MCHC 34.5   RDW 13.2           Last Comprehensive Metabolic Panel:  Sodium   Date Value Ref Range Status   02/27/2024 135 135 - 145 mmol/L Final     Comment:     Reference intervals for this test were updated on 09/26/2023 to more accurately reflect our healthy population. There may be differences in the flagging of prior results with similar values performed with this method. Interpretation of those prior results can be made in the context of the updated reference intervals.      Potassium   Date Value Ref Range Status   02/27/2024 3.2 (L) 3.4 - 5.3 mmol/L Final   08/31/2022 4.3 3.5 - 5.0 mmol/L Final     Chloride   Date Value Ref Range Status   02/27/2024 94 (L) 98 - 107 mmol/L Final   08/31/2022 105 98 - 107 mmol/L Final     Carbon Dioxide (CO2)   Date Value Ref Range Status   02/27/2024 29 22 - 29 mmol/L Final   08/31/2022 28 22 - 31 mmol/L Final     Anion Gap    Date Value Ref Range Status   02/27/2024 12 7 - 15 mmol/L Final   08/31/2022 7 5 - 18 mmol/L Final     Glucose   Date Value Ref Range Status   02/27/2024 107 (H) 70 - 99 mg/dL Final   08/31/2022 102 70 - 125 mg/dL Final     Urea Nitrogen   Date Value Ref Range Status   02/27/2024 18.1 8.0 - 23.0 mg/dL Final   08/31/2022 20 8 - 22 mg/dL Final     Creatinine   Date Value Ref Range Status   02/27/2024 0.76 0.51 - 0.95 mg/dL Final     GFR Estimate   Date Value Ref Range Status   02/27/2024 83 >60 mL/min/1.73m2 Final   05/27/2021 >60 >60 mL/min/1.73m2 Final     Calcium   Date Value Ref Range Status   02/27/2024 8.4 (L) 8.8 - 10.2 mg/dL Final     Bilirubin Total   Date Value Ref Range Status   02/27/2024 0.6 <=1.2 mg/dL Final     Alkaline Phosphatase   Date Value Ref Range Status   02/27/2024 49 40 - 150 U/L Final     Comment:     Reference intervals for this test were updated on 11/14/2023 to more accurately reflect our healthy population. There may be differences in the flagging of prior results with similar values performed with this method. Interpretation of those prior results can be made in the context of the updated reference intervals.     ALT   Date Value Ref Range Status   02/27/2024 78 (H) 0 - 50 U/L Final     Comment:     Reference intervals for this test were updated on 6/12/2023 to more accurately reflect our healthy population. There may be differences in the flagging of prior results with similar values performed with this method. Interpretation of those prior results can be made in the context of the updated reference intervals.       AST   Date Value Ref Range Status   02/27/2024 70 (H) 0 - 45 U/L Final     Comment:     Reference intervals for this test were updated on 6/12/2023 to more accurately reflect our healthy population. There may be differences in the flagging of prior results with similar values performed with this method. Interpretation of those prior results can be made in the context of  the updated reference intervals.             CRP   Date Value Ref Range Status   04/12/2022 14.9 (H) 0.0 - 0.8 mg/dL Final            MICROBIOLOGY:    Reviewed    Blood cultures  Other Micro:    RADIOLOGY:    CT Chest Pulmonary Embolism w Contrast    Result Date: 2/26/2024  EXAM: CT CHEST PULMONARY EMBOLISM W CONTRAST LOCATION: [facility] DATE: 2/26/2024 INDICATION: hypoxia, COPD, multiple myeloma COMPARISON: 2/8/2024 TECHNIQUE: CT chest pulmonary angiogram during arterial phase injection of IV contrast. Multiplanar reformats and MIP reconstructions were performed. Dose reduction techniques were used. CONTRAST: 47ml isovue 370 FINDINGS: ANGIOGRAM CHEST: Pulmonary arteries are normal caliber and negative for pulmonary emboli. Thoracic aorta is negative for dissection. No CT evidence of right heart strain. LUNGS AND PLEURA: Extensive tree-in-bud opacities, mostly in the right lung, with associated bronchial wall thickening, endobronchial opacities, and a few areas of consolidation in the lingula, middle lobe, and right lower lobe. No pleural effusion or pneumothorax. MEDIASTINUM/AXILLAE: Normal. CORONARY ARTERY CALCIFICATION: Mild. UPPER ABDOMEN: Cholecystectomy. Simple cyst in the left kidney, which does not require additional follow-up. Unchanged left adrenal adenomas. MUSCULOSKELETAL: Unchanged mild compression fracture of T1 and severe compression fracture of T7 with associated lucent lesions as sequelae of multiple myeloma.     IMPRESSION: 1.  No acute pulmonary emboli. 2.  Extensive pulmonary airspace opacities, likely infectious or inflammatory.    XR Chest Port 1 View    Result Date: 2/26/2024  EXAM: XR CHEST PORT 1 VIEW LOCATION: [facility] DATE: 2/26/2024 INDICATION: dyspnea hypoxia COMPARISON: Low-dose chest CT 02/08/2024 and older studies, chest x-ray 06/20/2022     IMPRESSION: Asymmetric interstitial and minimal peribronchiolar opacities have developed in  the right lower lobe suggestive of a developing bronchopneumonia. No effusion. Left lung is clear. Heart and pulmonary vascularity are normal. NOTE: ABNORMAL REPORT THE DICTATION ABOVE DESCRIBES AN ABNORMALITY FOR WHICH FOLLOW-UP IS NEEDED. .     CT Chest Lung Cancer Scrn Low Dose wo    Result Date: 2/8/2024  EXAM: LOW DOSE LUNG CANCER SCREENING CT CHEST LOCATION: [facility] DATE: 2/8/2024 INDICATION: Lung cancer screening. History of smoking. High risk patient. COMPARISON: CT chest 04/12/2022 reviewed. TECHNIQUE: Low-dose lung cancer screening non-contrast CT chest. Dose reduction techniques were used. Images assessed using LungRADS 2022 criteria. FINDINGS: NODULES: No suspicious pulmonary nodule or mass. Tiny subpleural triangular intrapulmonary lymph nodes including 4 mm left lower lobe (series 3, image 192) and 3 mm superior segment left lower lobe (image 115), unchanged since 04/12/2022. LUNGS AND PLEURA: Minimal linear scarring or atelectasis in the lingula and right middle lobe. MEDIASTINUM: No adenopathy. No pericardial effusion. Moderate atherosclerotic calcifications. CORONARY ARTERY CALCIFICATION: Moderate. LIMITED UPPER ABDOMEN: Cholecystectomy. Low-attenuation changes in the left adrenal suggesting hyperplasia or adenomatous change, unchanged. MUSCULOSKELETAL: Bony demineralization. Severe chronic compression fracture T7 and mild chronic compression fractures T1, with underlying lucent lesions, unchanged, consistent with sequelae of multiple myeloma given the patient's history. No new suspicious osseous lesion.     IMPRESSION: Negative for lung cancer screening purposes. LungRADS CATEGORY: 2 : Benign. -Perifissural nodule(s): <10 mm -Solid nodule(s): <6 mm on baseline screening; or new nodule <4 mm RADIOLOGIST RECOMMENDATION(S): Continue annual screening with low-dose CT chest in 12 months.        Medical Decision Making       MANAGEMENT DISCUSSED with the following over the  past 24 hours: patient, Pharm D   NOTE(S)/MEDICAL RECORDS REVIEWED over the past 24 hours: reviewed  Tests ORDERED & REVIEWED in the past 24 hours:  - See lab/imaging results included in the data section of the note  Medical complexity over the past 24 hours:  - Intensive monitoring for MEDICATION TOXICITY  - Decision regarding ESCALATION OF LEVEL OF CARE

## 2024-02-27 NOTE — H&P
"[facility]    History and Physical - Hospitalist Service       Date of Admission:  2024  Lizzie Viera,  1950, MRN [id number]  PCP: Sona Sandoval    Assessment & Plan      Lizzie Viera is a 73 year old female admitted on 2024. She has history of multiple myeloma on maintenance therapy with Revlimid, chronic pain syndrome from multiple thoracic pathologic fractures, COPD, recently diagnosed nocturnal hypoxia not yet started on home oxygen, here for hypoxia found to have extensive right-sided pneumonia and neutropenia    # Neutropenic fever  # Extensive right-sided pneumonia  # Probable sinus infection  -Patient comes in with 3 days of fever, Tmax 101F found to be neutropenic with ANC of 1000, which is new for her.  Procalcitonin obtained and is normal but unsure reliability given immunocompromise state  -CT showing extensive right-sided pneumonia.  Also complains of fairly significant sinus pressure.  Concern for opportunistic infection due to immunocompromised state  -Started on Levaquin in the ED due to allergy to cephalosporin.  Continue Levaquin, add vancomycin  -Sputum culture if able to give specimen.  Blood cultures ordered. Urine Strep pneumo and Legionella tests  -Hold home Revlimid.  Will ask oncology to see her  -Flutter valve, I-S    # Acute hypoxic respiratory failure  # Chronic sleep-related hypoxia  -Presents with hypoxia, satting 80% and was cyanotic on arrival.  Now doing well on nasal cannula oxygen  -She was recently diagnosed with \"sleep-related hypoxia\" and was prescribed nocturnal oxygen therapy but has not received equipment yet  -Continuous oximetry  -Wean oxygen as tolerated.  Suspect she will need home oxygen given baseline oxygen need and extent of pneumonia    # Multiple myeloma  -Maintained on Revlimid, patient of Dr. Blanco  -As above appears now experiencing opportunistic infection related to Revlimid.  Asking oncology to see " her here    # Elevated liver enzymes  -Uncertain significance  -Does complain of decreased appetite x 1 week, but no other GI symptoms  -Recheck in the morning.  May need further evaluation e.g. right upper quadrant ultrasound if worsening    # COPD  -Not wheezing on exam, workup not consistent with COPD exacerbation  -Not on any maintenance inhalers  -Home albuterol neb as needed    # Nicotine dependence  -Uses Nicotrol inhaler, but not in the past few days.  She declines nicotine replacement therapy here    # Numerous minor abrasions/lacerations secondary to dog bites  -Her dog bites her hands to get her attention and direct her to its needs.  Cautioned patient with immunocompromise state, should definitely avoid any opportunities for seeding of the bloodstream.  -Abrasions do not currently appear cellulitic.    # Chronic pain syndrome  -Due to remote pathologic thoracic vertebral fractures  -Okay to use home medical marijuana following facility protocol  -Okay for home Dilaudid, gabapentin, Robaxin    # Hypertension  -Home hydrochlorothiazide with hold parameters    # Insomnia, home melatonin.  Uses a very high dose and did reduce this for hospital       DVT Prophylaxis: High risk. SCDs  Diet: Regular Diet Adult    Stover Catheter: Not present  Lines: None     Cardiac Monitoring: None  Code Status: No CPR- Do NOT Intubate.  Discussed and confirmed with patient    Clinically Significant Risk Factors Present on Admission                # Drug Induced Platelet Defect: home medication list includes an antiplatelet medication   # Hypertension: Noted on problem list                 Disposition Plan      Expected Discharge Date: 02/27/2024                The patient's care was discussed with the Patient.    Elizabeth Davis MD  Hospitalist Service  [facility]  Securely message with [employer] (more info)  Text page via [employer] Paging/Directory      ______________________________________________________________________    Chief Complaint   Dyspnea, hypoxia    History is obtained from the patient    History of Present Illness   Lizzie Viera is a 73 year old female who is here for aforementioned symptoms.  Patient reports 3 days ago, she developed a fever.  Tmax at home was 101 F, has been febrile every day since then.  Complains also short of breath over the last few days with a dry cough.  Also complains of significant frontal and maxillary sinus pressure.  Oxygen saturations at home had been in the high 80s with slight benefit from nebulizer treatment.  Today SpO2 was 80% on arrival.  Patient notes appetite down for the last week.  Complains of chest pressure and nebs do help with this.  Notes she was just treated for a sinus infection back in January (Augmentin x7 days).  Patient denies any nausea, vomiting, diarrhea, constipation, urinary issues.  She does note numerous small lacerations on her hands because her dog pulled her by the hand to get her attention.  She also has an abrasion on her left index finger knuckle which is a scrape when moving a chair.      Past Medical History    Past Medical History:   Diagnosis Date    Cervical dysplasia     Chronic RUQ pain     Depressive disorder     Multiple myeloma (H)     Osteoporosis        Past Surgical History   Past Surgical History:   Procedure Laterality Date    CONIZATION CERVIX,KNIFE/LASER      Description: Cervical Conization By Laser;  Recorded: 09/20/2007;    HC DILATION/CURETTAGE DIAG/THER NON OB      Description: Dilation And Curettage;  Recorded: 09/20/2007;     REMOVE TONSILS/ADENOIDS,<13 Y/O      Description: Tonsillectomy With Adenoidectomy;  Recorded: 09/20/2007;    [facility] LAP,CHOLECYSTECTOMY/EXPLORE  12/27/2004    [facility] LIGATE FALLOPIAN TUBE      Description: Tubal Ligation;  Recorded: 09/20/2007;       Prior to Admission Medications   Prior to Admission Medications   Prescriptions  Last Dose Informant Patient Reported? Taking?   HYDROmorphone (DILAUDID) 4 MG tablet Unknown at uses PRN  No Yes   Sig: Take 0.5-1 tablets (2-4 mg) by mouth every 4 hours as needed for moderate to severe pain   LENalidomide (REVLIMID) 10 MG CAPS capsule 2024 at am  No Yes   Sig: Take 1 capsule (10 mg) by mouth daily for 28 days Take at the same time each day. Do not open, break or chew the capsule. Swallow whole, with water.   Melatonin 10 MG TABS tablet 2024 at [facility]  Yes Yes   Sig: Take 20 mg by mouth At Bedtime   Menaquinone-7 (K2 PO) 2024 at am  Yes Yes   Sig: Take 1 capsule by mouth daily   Milk Thistle-Dand-Fennel-Licor (MILK THISTLE XTRA) CAPS capsule 2024 at am  Yes Yes   Sig: Take 1 capsule by mouth daily   NALTREXONE HCL PO 2024 at am  Yes Yes   Sig: Take 3 mg by mouth daily   TURMERIC PO 2024 at am  Yes Yes   Sig: Take 1 capsule by mouth daily   UNABLE TO FIND 2024 at am  Yes Yes   Sig: MEDICATION NAME: Panacur C - 1 capsule daily   UNABLE TO FIND 2024 at am  Yes Yes   Si capsule daily MEDICATION NAME: Serrapeptase   UNABLE TO FIND 2024 at am  Yes Yes   Si capsule daily MEDICATION NAME: Lions García Mushroom   UNABLE TO FIND 2024 at am  Yes Yes   Si capsule daily MEDICATION NAME: DIM (diinolylmethane)   Vitamin D, Cholecalciferol, 25 MCG (1000 UT) CAPS 2024 at am  Yes Yes   Sig: Take 1,000 Units by mouth daily Liquid, not capsule   albuterol (PROAIR HFA/PROVENTIL HFA/VENTOLIN HFA) 108 (90 Base) MCG/ACT inhaler Unknown at uses rarely, preferes nebs  No Yes   Sig: Inhale 2 puffs into the lungs every 6 hours as needed for wheezing or shortness of breath / dyspnea   albuterol (PROVENTIL) (2.5 MG/3ML) 0.083% neb solution 2024 at am  No Yes   Sig: Take 1 vial (2.5 mg) by nebulization every 4 hours as needed for wheezing or shortness of breath / dyspnea   aspirin (ASA) 81 MG chewable tablet 2024 at am  Yes Yes   Sig: Take 81 mg by mouth  daily   fish oil-omega-3 fatty acids 1000 MG capsule 2/26/2024 at am  Yes Yes   Sig: Take 2 g by mouth daily   gabapentin (NEURONTIN) 300 MG capsule 2/26/2024 at am  No Yes   Sig: Take 2 capsules (600 mg) by mouth 3 times daily   hydrochlorothiazide (HYDRODIURIL) 12.5 MG tablet 2/26/2024 at am  No Yes   Sig: Take 1 tablet (12.5 mg) by mouth daily   medical cannabis (Patient's own supply) Unknown  Yes Yes   Sig: See Admin Instructions (The purpose of this order is to document that the patient reports taking medical cannabis.  This is not a prescription, and is not used to certify that the patient has a qualifying medical condition.)     Oil formulation   methocarbamol (ROBAXIN) 500 MG tablet 2/26/2024 at am  No Yes   Sig: Take 1 tablet (500 mg) by mouth 4 times daily as needed for muscle spasms      Facility-Administered Medications: None           Physical Exam   Vital Signs: Temp: 98.6  F (37  C) Temp src: Oral BP: (!) 140/55 Pulse: 79   Resp: 26 SpO2: 94 %      Weight: 122 lbs 12.8 oz  General: in no apparent distress, non-toxic, and alert female sitting on edge of bed oriented x3  HEENT: Head normocephalic atraumatic, oral mucosa moist. Sclerae anicteric  CV: Regular rhythm, normal rate, no murmurs  Resp: rhonchi throughout the right lung field. Good air movement without wheezing.  GI: Belly soft, nondistended, nontender, bowel sounds present  Skin:  lips look a bit purple in color. Numerous small ecchymoses on BUE. Multiple small lacerations on arms/hands. A 1cm laceration on left index finger knuckle without surrounding cellulitis.  Extremities: No peripheral edema  Psych: Normal affect, mood dysthymic  Neuro: Grossly normal    Medical Decision Making                 Data   Imaging results reviewed over the past 24 hrs:   Recent Results (from the past 24 hour(s))   XR Chest Port 1 View    Narrative    EXAM: XR CHEST PORT 1 VIEW  LOCATION: [facility]  DATE: 2/26/2024    INDICATION:  dyspnea hypoxia  COMPARISON: Low-dose chest CT 02/08/2024 and older studies, chest x-ray 06/20/2022      Impression    IMPRESSION: Asymmetric interstitial and minimal peribronchiolar opacities have developed in the right lower lobe suggestive of a developing bronchopneumonia. No effusion.     Left lung is clear. Heart and pulmonary vascularity are normal.    NOTE: ABNORMAL REPORT    THE DICTATION ABOVE DESCRIBES AN ABNORMALITY FOR WHICH FOLLOW-UP IS NEEDED. .        CT Chest Pulmonary Embolism w Contrast    Narrative    EXAM: CT CHEST PULMONARY EMBOLISM W CONTRAST  LOCATION: [facility]  DATE: 2/26/2024    INDICATION: hypoxia, COPD, multiple myeloma  COMPARISON: 2/8/2024  TECHNIQUE: CT chest pulmonary angiogram during arterial phase injection of IV contrast. Multiplanar reformats and MIP reconstructions were performed. Dose reduction techniques were used.   CONTRAST: 47ml isovue 370    FINDINGS:  ANGIOGRAM CHEST: Pulmonary arteries are normal caliber and negative for pulmonary emboli. Thoracic aorta is negative for dissection. No CT evidence of right heart strain.    LUNGS AND PLEURA: Extensive tree-in-bud opacities, mostly in the right lung, with associated bronchial wall thickening, endobronchial opacities, and a few areas of consolidation in the lingula, middle lobe, and right lower lobe. No pleural effusion or   pneumothorax.    MEDIASTINUM/AXILLAE: Normal.    CORONARY ARTERY CALCIFICATION: Mild.    UPPER ABDOMEN: Cholecystectomy. Simple cyst in the left kidney, which does not require additional follow-up. Unchanged left adrenal adenomas.    MUSCULOSKELETAL: Unchanged mild compression fracture of T1 and severe compression fracture of T7 with associated lucent lesions as sequelae of multiple myeloma.      Impression    IMPRESSION:  1.  No acute pulmonary emboli.    2.  Extensive pulmonary airspace opacities, likely infectious or inflammatory.     Recent Results (from the past 12 hour(s))    Symptomatic Influenza A/B, RSV, & SARS-CoV2 PCR (COVID-19) Nasopharyngeal    Collection Time: 02/26/24  1:42 PM    Specimen: Nasopharyngeal; Swab   Result Value Ref Range    Influenza A PCR Negative Negative    Influenza B PCR Negative Negative    RSV PCR Negative Negative    SARS CoV2 PCR Negative Negative   Troponin T, High Sensitivity    Collection Time: 02/26/24  1:54 PM   Result Value Ref Range    Troponin T, High Sensitivity 25 (H) <=14 ng/L   Comprehensive metabolic panel    Collection Time: 02/26/24  1:54 PM   Result Value Ref Range    Sodium 137 135 - 145 mmol/L    Potassium 3.6 3.4 - 5.3 mmol/L    Carbon Dioxide (CO2) 27 22 - 29 mmol/L    Anion Gap 17 (H) 7 - 15 mmol/L    Urea Nitrogen 22.7 8.0 - 23.0 mg/dL    Creatinine 0.97 (H) 0.51 - 0.95 mg/dL    GFR Estimate 62 >60 mL/min/1.73m2    Calcium 9.3 8.8 - 10.2 mg/dL    Chloride 93 (L) 98 - 107 mmol/L    Glucose 116 (H) 70 - 99 mg/dL    Alkaline Phosphatase 59 40 - 150 U/L     (H) 0 - 45 U/L    ALT 81 (H) 0 - 50 U/L    Protein Total 7.0 6.4 - 8.3 g/dL    Albumin 3.7 3.5 - 5.2 g/dL    Bilirubin Total 0.8 <=1.2 mg/dL   Procalcitonin    Collection Time: 02/26/24  1:54 PM   Result Value Ref Range    Procalcitonin 0.18 <0.50 ng/mL   Nt probnp inpatient (BNP)    Collection Time: 02/26/24  1:54 PM   Result Value Ref Range    N terminal Pro BNP Inpatient 738 0 - 900 pg/mL   Blood gas venous    Collection Time: 02/26/24  1:54 PM   Result Value Ref Range    pH Venous 7.43 7.32 - 7.43    pCO2 Venous 48 40 - 50 mm Hg    pO2 Venous 21 (L) 25 - 47 mm Hg    Bicarbonate Venous 32 (H) 21 - 28 mmol/L    Base Excess/Deficit Venous 7.4 (H) -3.0 - 3.0 mmol/L    FIO2 28     Oxyhemoglobin Venous 32 (L) 70 - 75 %    O2 Sat, Venous 33.7 (L) 70.0 - 75.0 %   D dimer quantitative    Collection Time: 02/26/24  1:54 PM   Result Value Ref Range    D-Dimer Quantitative 1.37 (H) 0.00 - 0.50 ug/mL FEU   Extra Red Top Tube    Collection Time: 02/26/24  1:54 PM   Result Value Ref  Range    Hold Specimen [facility]    CBC with platelets and differential    Collection Time: 02/26/24  1:54 PM   Result Value Ref Range    WBC Count 2.0 (L) 4.0 - 11.0 10e3/uL    RBC Count 3.98 3.80 - 5.20 10e6/uL    Hemoglobin 14.0 11.7 - 15.7 g/dL    Hematocrit 40.0 35.0 - 47.0 %     (H) 78 - 100 fL    MCH 35.2 (H) 26.5 - 33.0 pg    MCHC 35.0 31.5 - 36.5 g/dL    RDW 13.2 10.0 - 15.0 %    Platelet Count 172 150 - 450 10e3/uL    % Neutrophils      % Lymphocytes      % Monocytes      % Eosinophils      % Basophils      % Immature Granulocytes      NRBCs per 100 WBC 0 <1 /100    Absolute Neutrophils      Absolute Lymphocytes      Absolute Monocytes      Absolute Eosinophils      Absolute Basophils      Absolute Immature Granulocytes      Absolute NRBCs 0.0 10e3/uL   Manual Differential    Collection Time: 02/26/24  1:54 PM   Result Value Ref Range    % Neutrophils 48 %    % Lymphocytes 32 %    % Monocytes 18 %    % Eosinophils 0 %    % Basophils 2 %    Absolute Neutrophils 1.0 (L) 1.6 - 8.3 10e3/uL    Absolute Lymphocytes 0.6 (L) 0.8 - 5.3 10e3/uL    Absolute Monocytes 0.4 0.0 - 1.3 10e3/uL    Absolute Eosinophils 0.0 0.0 - 0.7 10e3/uL    Absolute Basophils 0.0 0.0 - 0.2 10e3/uL    RBC Morphology Confirmed RBC Indices     Platelet Assessment  Automated Count Confirmed. Platelet morphology is normal.     Automated Count Confirmed. Platelet morphology is normal.

## 2024-02-27 NOTE — PROGRESS NOTES
Pharmacist has verified patient registration with the [facility] Medical Cannabis Registry. Registration number is [id number]. No obvious adulteration to product or expiration was identified.    Michelle Nevarez, PharmD

## 2024-02-27 NOTE — PROGRESS NOTES
"   02/27/24 0916   Appointment Info   Signing Clinician's Name / Credentials (PT) Karen England, PT, DPT   Living Environment   People in Home spouse;child(jung), adult;grandchild(jung)   Current Living Arrangements house   Home Accessibility no concerns   Self-Care   Equipment Currently Used at Home none   Fall history within last six months no   Activity/Exercise/Self-Care Comment Pt independent with all ADLs/IADLs at baseline.   General Information   Onset of Illness/Injury or Date of Surgery 02/26/24   Referring Physician Elizabeth Davis MD   Patient/Family Therapy Goals Statement (PT) None stated.   Pertinent History of Current Problem (include personal factors and/or comorbidities that impact the POC) Per H&P: \"73 year old female admitted on 2/26/2024. She has history of multiple myeloma on maintenance therapy with Revlimid, chronic pain syndrome from multiple thoracic pathologic fractures, COPD, recently diagnosed nocturnal hypoxia not yet started on home oxygen, here for hypoxia found to have extensive right-sided pneumonia and neutropenia\"   Existing Precautions/Restrictions fall   Range of Motion (ROM)   Range of Motion ROM is WFL   Strength (Manual Muscle Testing)   Strength (Manual Muscle Testing) strength is WFL   Bed Mobility   Bed Mobility supine-sit;sit-supine   Supine-Sit [place] (Bed Mobility) independent   Sit-Supine [place] (Bed Mobility) independent   Transfers   Transfers sit-stand transfer   Sit-Stand Transfer   Sit-Stand [place] (Transfers) supervision   Gait/Stairs (Locomotion)   [place] Level (Gait) supervision   Distance in Feet (Gait) 100'   Pattern (Gait) step-through   Deviations/Abnormal Patterns (Gait) wallace decreased;gait speed decreased   Clinical Impression   Criteria for Skilled Therapeutic Intervention Yes, treatment indicated   PT Diagnosis (PT) impaired functional mobility   Influenced by the following impairments decreased activity tolerance   Functional " limitations due to impairments transfers, ambulation   Clinical Presentation (PT Evaluation Complexity) stable   Clinical Presentation Rationale Clinical judgment.   Clinical Decision Making (Complexity) low complexity   Planned Therapy Interventions (PT) gait training   Risk & Benefits of therapy have been explained evaluation/treatment results reviewed;participants voiced agreement with care plan;participants included;patient   PT Total Evaluation Time   PT Eval, Low Complexity Minutes (71896) 10   Physical Therapy Goals   PT Frequency One time eval and treatment only   PT Predicted Duration/Target Date for Goal Attainment 02/27/24   PT Goals Gait;Transfers   PT: Transfers Independent;Sit to/from stand   PT: Gait Supervision/stand-by assist;100 feet   Interventions   Interventions Quick Adds Gait Training   Gait Training   Gait Training Minutes (92981) 10   Symptoms Noted During/After Treatment (Gait Training) shortness of breath   Treatment Detail/Skilled Intervention Without device. Pt steady, no LOB. O2 sats > 90% following walk. Education and cues for energy conservation.   Distance in Feet additional 100'   [place] Level (Gait Training) stand-by assist   Physical Assistance Level (Gait Training) supervision   Gait Analysis Deviations decreased wallace   Impairments (Gait Analysis/Training) balance impaired   PT Discharge Planning   PT Plan d/c PT   PT Discharge Recommendation (DC Rec) home with assist   PT Rationale for DC Rec Patient able to complete all transfers and mobility with stand-by to contact guard assist. Pt lives with family who can assist as needed. Anticipate pt will be safe discharge home when medically ready. No further inpatient PT needs.   PT Brief overview of current status Supine <> sit, independent. Sit <> stand without device, SBA. Ambulates 200' total without AD, CGA.   Total Session Time   Timed Code Treatment Minutes 10   Total Session Time (sum of timed and untimed services) 20

## 2024-02-27 NOTE — PLAN OF CARE
Problem: Pneumonia  Goal: Effective Oxygenation and Ventilation  Outcome: Progressing   Goal Outcome Evaluation:       Pt slept well overnight, A/Ox4, VSS on 2L NC. O2 sats >92%. Noted to have occasional episodes of non productive cough. Need sputum culture collected. Independent with activities. No acute events reports this shift.

## 2024-02-27 NOTE — PHARMACY-VANCOMYCIN DOSING SERVICE
Pharmacy Vancomycin Initial Note  Date of Service 2024  Patient's  1950  73 year old, female    Indication: Community Acquired Pneumonia    Current estimated CrCl = Estimated Creatinine Clearance: 46.8 mL/min (A) (based on SCr of 0.97 mg/dL (H)).    Creatinine for last 3 days  2024:  1:54 PM Creatinine 0.97 mg/dL    Recent Vancomycin Level(s) for last 3 days  No results found for requested labs within last 3 days.      Vancomycin IV Administrations (past 72 hours)        No vancomycin orders with administrations in past 72 hours.                    Nephrotoxins and other renal medications (From now, onward)      Start     Dose/Rate Route Frequency Ordered Stop    24  vancomycin (VANCOCIN) 1,000 mg in 200 mL dextrose intermittent infusion        See Hyperspace for full Linked Orders Report.    1,000 mg  200 mL/hr over 1 Hours Intravenous EVERY 24 HOURS 24  vancomycin (VANCOCIN) 1,250 mg in sodium chloride 0.9 % 250 mL intermittent infusion        See Hyperspace for full Linked Orders Report.    1,250 mg  over 90 Minutes Intravenous ONCE 24              Contrast Orders - past 72 hours (72h ago, onward)      Start     Dose/Rate Route Frequency Stop    24 1600  iopamidol (ISOVUE-370) solution 47 mL         47 mL Intravenous ONCE 24 1541            [employer] Prediction of Planned Initial Vancomycin Regimen  Loading dose: 1250 mg at 21:00 2024.  Regimen: 1000 mg IV every 24 hours.  Start time: 21:00 on 2024  Exposure target: AUC24 (range)400-600 mg/L.hr   AUC24,ss: 452 mg/L.hr  Probability of AUC24 > 400: 64 %  Ctrough,ss: 13.2 mg/L  Probability of Ctrough,ss > 20: 15 %  Probability of nephrotoxicity (Lodise PRAVEEN ): 8 %    Plan:  Start vancomycin  1000 mg IV q24h after loading dose  Vancomycin monitoring method: AUC  Vancomycin therapeutic monitoring goal: 400-600 mg*h/L  Pharmacy will check vancomycin levels as  appropriate in 1-3 Days.    Serum creatinine levels will be ordered daily for the first week of therapy and at least twice weekly for subsequent weeks.      Madelyn Kirby RPH

## 2024-02-27 NOTE — PLAN OF CARE
Occupational Therapy: Orders received. Chart reviewed and discussed with care team.? Occupational Therapy not indicated due to PT eval completed and found pt to be IND for ADLs. Pt has good support from family.? Defer discharge recommendations to PT.? Will complete orders.

## 2024-02-28 NOTE — PLAN OF CARE
Problem: Adult Inpatient Plan of Care  Goal: Optimal Comfort and Wellbeing  Outcome: Progressing   Goal Outcome Evaluation:       Alert, denies pain. On 2 L oxygen via nc, sputum sample pending. Up and independent in the room, cooperative with cares.

## 2024-02-28 NOTE — PROGRESS NOTES
"[facility]    Medicine Progress Note - Hospitalist Service    Date of Admission:  2/26/2024    Assessment & Plan         Lizzie Viera is a 73 year old female admitted on 2/26/2024. She has history of multiple myeloma on maintenance therapy with Revlimid, chronic pain syndrome from multiple thoracic pathologic fractures, COPD, recently diagnosed nocturnal hypoxia not yet started on home oxygen, here for hypoxia found to have extensive right-sided pneumonia and neutropenia         EVENTS HISTORY  :       Feb 27 :       No fevers  Still neutropenic  Cultures : negative so far  Continue levaquin, vancomycin, added meropenem  Consulted ID, oncology  Follow cbc      Not medically ready for discharge at this time.            A/p :       # Neutropenic fever  # Extensive right-sided pneumonia  # Probable sinus infection  -Patient comes in with 3 days of fever, Tmax 101F found to be neutropenic with ANC of 1000, which is new for her.  Procalcitonin obtained and is normal but unsure reliability given immunocompromise state  -CT showing extensive right-sided pneumonia.  Also complains of fairly significant sinus pressure.  Concern for opportunistic infection due to immunocompromised state  -Started on Levaquin in the ED due to allergy to cephalosporin.  Continue Levaquin, add vancomycin  -Sputum culture if able to give specimen.  Blood cultures ordered. Urine Strep pneumo and Legionella tests  -Hold home Revlimid.  Will ask oncology to see her  -Flutter valve, I-S     # Acute hypoxic respiratory failure  # Chronic sleep-related hypoxia  -Presents with hypoxia, satting 80% and was cyanotic on arrival.  Now doing well on nasal cannula oxygen  -She was recently diagnosed with \"sleep-related hypoxia\" and was prescribed nocturnal oxygen therapy but has not received equipment yet  -Continuous oximetry  -Wean oxygen as tolerated.  Suspect she will need home oxygen given baseline oxygen need and extent " of pneumonia     # Multiple myeloma  -Maintained on Revlimid, patient of Dr. Blanco  -As above appears now experiencing opportunistic infection related to Revlimid.  Asking oncology to see her here     # Elevated liver enzymes  -Uncertain significance  -Does complain of decreased appetite x 1 week, but no other GI symptoms  -Recheck in the morning.  May need further evaluation e.g. right upper quadrant ultrasound if worsening     # COPD  -Not wheezing on exam, workup not consistent with COPD exacerbation  -Not on any maintenance inhalers  -Home albuterol neb as needed     # Nicotine dependence  -Uses Nicotrol inhaler, but not in the past few days.  She declines nicotine replacement therapy here     # Numerous minor abrasions/lacerations secondary to dog bites  -Her dog bites her hands to get her attention and direct her to its needs.  Cautioned patient with immunocompromise state, should definitely avoid any opportunities for seeding of the bloodstream.  -Abrasions do not currently appear cellulitic.     # Chronic pain syndrome  -Due to remote pathologic thoracic vertebral fractures  -Okay to use home medical marijuana following facility protocol  -Okay for home Dilaudid, gabapentin, Robaxin     # Hypertension  -Home hydrochlorothiazide with hold parameters     # Insomnia, home melatonin.  Uses a very high dose and did reduce this for hospital          Diet: Regular Diet Adult    DVT Prophylaxis: Enoxaparin (Lovenox) SQ  Stover Catheter: Not present  Lines: None     Cardiac Monitoring: None  Code Status: No CPR- Do NOT Intubate      Clinically Significant Risk Factors        # Hypokalemia: Lowest K = 3.2 mmol/L in last 2 days, will replace as needed    # Hypercalcemia: corrected calcium is >10.1, will monitor as appropriate    # Hypoalbuminemia: Lowest albumin = 2.9 g/dL at 2/27/2024  6:46 AM, will monitor as appropriate     # Hypertension: Noted on problem list                   Disposition Plan      Expected  Discharge Date: 02/29/2024        Discharge Comments: ID consult; PT/OT signed off            Dov Lynn MD  Hospitalist Service  [facility]  Securely message with [employer] (more info)  Text page via [employer] Paging/Directory   ______________________________________________________________________      Physical Exam   Vital Signs: Temp: 98  F (36.7  C) Temp src: Oral BP: 98/48 Pulse: 63   Resp: 18 SpO2: 96 % O2 Device: Nasal cannula Oxygen Delivery: 3 LPM  Weight: 122 lbs 12.8 oz       GENERAL: The patient is not in any acute distressed. Awake and alert.  HEENT: Nonicteric sclerae, PERRLA, EOMI. Oropharynx clear. Moist mucous membranes. Conjunctivae appear well perfused.  HEART: Regular rate and rhythm without murmurs.  LUNGS: Clear to auscultation bilaterally. No wheezing or crackles.  ABDOMEN: Soft, positive bowel sounds, nontender.  SKIN: No rash, no excessive bruising, petechiae, or purpura.  EXTREMITIES : no rashes, no swelling in legs.  NEUROLOGIC: conscious and oriented, follows commands, no obvious focal deficits.  ROS: All other systems negative       Medical Decision Making       60 MINUTES SPENT BY ME on the date of service doing chart review, history, exam, documentation & further activities per the note.  MANAGEMENT DISCUSSED with the following over the past 24 hours: rn, patient       Data     I have personally reviewed the following data over the past 24 hrs:    1.8 (L)  \   11.4 (L)   / 152     135 94 (L) 18.1 /  107 (H)   4.0 29 0.76 \     ALT: 78 (H) AST: 70 (H) AP: 49 TBILI: 0.6   ALB: 2.9 (L) TOT PROTEIN: 5.8 (L) LIPASE: N/A

## 2024-02-28 NOTE — PROGRESS NOTES
[facility]    Infectious Disease Progress Note    Date of Service : 02/28/2024     Assessment & Plan   Lizzie Viera is a 73 year old female who was admitted on 2/26/2024.     ASSESSMENT:    Multiple myeloma, on maintenance therapy with Revlimid  Hypoxia, pneumonia-oxygen saturation 80%  Multiple thoracic pathological fractures, chronic pain syndrome  Pneumonia, multiple sick family members with respiratory tract infection including grandson  Hypoxia  Right-sided pneumonia  Leukopenia  Cefdinir allergy listed, 9 years ago patient said she had shortness of breath.  Did not take Benadryl and did not need ER care at that time.  Patient stopped taking it and symptoms resolved  Patient has tolerated Zosyn in the past.  Appreciate input from pharmacist  Abrasions on hand, laceration, because her dog pulled her by the hand, also has a scrape on moving a chair, on the left index finger.  Monitor for secondary infections  Resp panel PCR negative       RECOMMENDATIONS:    Antibiotics patient tolerated vancomycin and meropenem  Follow culture results   MRSA nasal screen negative-- will  likely stop IV vancomycin in 24-48 hours if no MRSA isolated from culture results  Focus/de-escalate antibiotics based on final culture results  Monitor CBC, CMP  Discussed with patient   Hem-Onc input appreciated  ID will follow      Brenda Kenyon MD  [facility]  [phone]      Interval History   Doing better  Remains on oxygen  Chest congestion, coughing    Physical Exam   Temp: 98.3  F (36.8  C) Temp src: Oral BP: 133/59 Pulse: 77   Resp: 18 SpO2: 93 % O2 Device: Nasal cannula with humidification    Vitals:    02/26/24 1322   Weight: 55.7 kg (122 lb 12.8 oz)     Vital Signs with Ranges  Temp:  [97.7  F (36.5  C)-98.3  F (36.8  C)] 98.3  F (36.8  C)  Pulse:  [63-77] 77  Resp:  [18] 18  BP: ()/(40-59) 133/59  SpO2:  [93 %-97 %] 93 %    Constitutional: Awake, coughing  Lungs:  "coarse  breathing pattern, rales  Cardiovascular: S1 S2  Abdomen: non distended  Skin: warm  Neuro: deconditioned  Psych: able to answer questions    Medications      aspirin  81 mg Oral Daily    enoxaparin ANTICOAGULANT  40 mg Subcutaneous Q24H    gabapentin  600 mg Oral TID    hydroCHLOROthiazide  12.5 mg Oral Daily    levofloxacin  750 mg Oral Q24H    medical cannabis  1 Dose Other See Admin Instructions    melatonin  10 mg Oral At Bedtime    meropenem  1 g Intravenous Q8H    polyethylene glycol  17 g Oral Daily    potassium chloride  40 mEq Oral Once    sodium chloride (PF)  3 mL Intracatheter Q8H    vancomycin  1,250 mg Intravenous Q24H       Data   All microbiology laboratory data reviewed.  Recent Labs   Lab Test 02/28/24  0740 02/27/24  0646 02/26/24  1354   WBC 2.5* 1.8* 2.0*   HGB 11.7 11.4* 14.0   HCT 34.5* 33.0* 40.0   * 102* 101*    152 172     Recent Labs   Lab Test 02/27/24  0646 02/26/24  1354 01/10/24  1359   CR 0.76 0.97* 0.94     Recent Labs   Lab Test 08/31/21  1611   *     No lab results found.    Invalid input(s): \"UC\"    MICROBIOLOGY:    Reviewed    7-Day Micro Results       Collected Updated Procedure Result Status      02/27/2024 1614 02/27/2024 2007 MRSA MSSA PCR, Nasal Swab [[id number]]    Swab from Nares, Bilateral    Final result Component Value   MRSA Target DNA Negative   SA Target DNA Positive            02/27/2024 1614 02/27/2024 2227 Respiratory Panel PCR [[id number]]    Swab from Nasopharyngeal    Final result Component Value   Adenovirus Not Detected   Coronavirus Not Detected   This test detects Coronavirus 229E, HKU1, NL63 and OC43 but does not distinguish between them. It does not detect MERS ( Respiratory Syndrome), SARS (Severe Acute Respiratory Syndrome) or 2019-nCoV (Novel 2019) Coronavirus.   Human Metapneumovirus Not Detected   Human Rhin/Enterovirus Not Detected   Influenza A Not Detected   Influenza A, H1 Not Detected   Influenza A " 2009 H1N1 Not Detected   Influenza A, H3 Not Detected   Influenza B Not Detected   Parainfluenza Virus 1 Not Detected   Parainfluenza Virus 2 Not Detected   Parainfluenza Virus 3 Not Detected   Parainfluenza Virus 4 Not Detected   Respiratory Syncytial Virus A Not Detected   Respiratory Syncytial Virus B Not Detected   Chlamydia Pneumoniae Not Detected   Mycoplasma Pneumoniae Not Detected            02/26/2024 2050 02/27/2024 0029 Strep pneumo Agn Ur greater or equal to 13yrs or CSF any age [[id number]]   Urine, Midstream    Final result Component Value   Streptococcus pneumoniae antigen Negative   A negative Streptococcus pneumoniae antigen result does not rule out infection with Streptococcus pneumoniae.            02/26/2024 2050 02/27/2024 0029 Legionella pneumophila antigen urine [[id number]]   Urine, Midstream    Final result Component Value   Legionella pneumophila serogroup 1 urinary antigen Negative   Suggests no recent or current infection. Infection due to Legionella cannot be ruled out, since other serogroups and species may cause disease, antigen may not be present in urine in early infection, and the level of antigen present in the urine may be below detectable limits of the test.            02/26/2024 2010 02/28/2024 0031 Blood Culture Peripheral Blood [[id number]]   Peripheral Blood    Preliminary result Component Value   Culture No growth after 1 day  [P]                02/26/2024 2010 02/28/2024 0031 Blood Culture Arm, Right [[id number]]   Blood from Arm, Right    Preliminary result Component Value   Culture No growth after 1 day  [P]                02/26/2024 1342 02/26/2024 1833 Symptomatic Influenza A/B, RSV, & SARS-CoV2 PCR (COVID-19) Nasopharyngeal [[id number]]    Swab from Nasopharyngeal    Final result Component Value   Influenza A PCR Negative   Influenza B PCR Negative   RSV PCR Negative   SARS CoV2 PCR Negative   NEGATIVE: SARS-CoV-2 (COVID-19) RNA not detected, presumed  negative.                     RADIOLOGY:    Reviewed  CT Chest Pulmonary Embolism w Contrast    Result Date: 2/26/2024  EXAM: CT CHEST PULMONARY EMBOLISM W CONTRAST LOCATION: [facility] DATE: 2/26/2024 INDICATION: hypoxia, COPD, multiple myeloma COMPARISON: 2/8/2024 TECHNIQUE: CT chest pulmonary angiogram during arterial phase injection of IV contrast. Multiplanar reformats and MIP reconstructions were performed. Dose reduction techniques were used. CONTRAST: 47ml isovue 370 FINDINGS: ANGIOGRAM CHEST: Pulmonary arteries are normal caliber and negative for pulmonary emboli. Thoracic aorta is negative for dissection. No CT evidence of right heart strain. LUNGS AND PLEURA: Extensive tree-in-bud opacities, mostly in the right lung, with associated bronchial wall thickening, endobronchial opacities, and a few areas of consolidation in the lingula, middle lobe, and right lower lobe. No pleural effusion or pneumothorax. MEDIASTINUM/AXILLAE: Normal. CORONARY ARTERY CALCIFICATION: Mild. UPPER ABDOMEN: Cholecystectomy. Simple cyst in the left kidney, which does not require additional follow-up. Unchanged left adrenal adenomas. MUSCULOSKELETAL: Unchanged mild compression fracture of T1 and severe compression fracture of T7 with associated lucent lesions as sequelae of multiple myeloma.     IMPRESSION: 1.  No acute pulmonary emboli. 2.  Extensive pulmonary airspace opacities, likely infectious or inflammatory.    XR Chest Port 1 View    Result Date: 2/26/2024  EXAM: XR CHEST PORT 1 VIEW LOCATION: [facility] DATE: 2/26/2024 INDICATION: dyspnea hypoxia COMPARISON: Low-dose chest CT 02/08/2024 and older studies, chest x-ray 06/20/2022     IMPRESSION: Asymmetric interstitial and minimal peribronchiolar opacities have developed in the right lower lobe suggestive of a developing bronchopneumonia. No effusion. Left lung is clear. Heart and pulmonary vascularity are normal. NOTE:  ABNORMAL REPORT THE DICTATION ABOVE DESCRIBES AN ABNORMALITY FOR WHICH FOLLOW-UP IS NEEDED. .     CT Chest Lung Cancer Scrn Low Dose wo    Result Date: 2/8/2024  EXAM: LOW DOSE LUNG CANCER SCREENING CT CHEST LOCATION: [facility] DATE: 2/8/2024 INDICATION: Lung cancer screening. History of smoking. High risk patient. COMPARISON: CT chest 04/12/2022 reviewed. TECHNIQUE: Low-dose lung cancer screening non-contrast CT chest. Dose reduction techniques were used. Images assessed using LungRADS 2022 criteria. FINDINGS: NODULES: No suspicious pulmonary nodule or mass. Tiny subpleural triangular intrapulmonary lymph nodes including 4 mm left lower lobe (series 3, image 192) and 3 mm superior segment left lower lobe (image 115), unchanged since 04/12/2022. LUNGS AND PLEURA: Minimal linear scarring or atelectasis in the lingula and right middle lobe. MEDIASTINUM: No adenopathy. No pericardial effusion. Moderate atherosclerotic calcifications. CORONARY ARTERY CALCIFICATION: Moderate. LIMITED UPPER ABDOMEN: Cholecystectomy. Low-attenuation changes in the left adrenal suggesting hyperplasia or adenomatous change, unchanged. MUSCULOSKELETAL: Bony demineralization. Severe chronic compression fracture T7 and mild chronic compression fractures T1, with underlying lucent lesions, unchanged, consistent with sequelae of multiple myeloma given the patient's history. No new suspicious osseous lesion.     IMPRESSION: Negative for lung cancer screening purposes. LungRADS CATEGORY: 2 : Benign. -Perifissural nodule(s): <10 mm -Solid nodule(s): <6 mm on baseline screening; or new nodule <4 mm RADIOLOGIST RECOMMENDATION(S): Continue annual screening with low-dose CT chest in 12 months.      Attestation:  Total time on the floor involved in the patient's care: minutes. Chart reviewed, reviewed cultures, external notes, labs, antiinfective monitoring, evaluation, counseling, management    Medical Decision Making        MANAGEMENT DISCUSSED with the following over the past 24 hours: patient   NOTE(S)/MEDICAL RECORDS REVIEWED over the past 24 hours: reviewed  Tests ORDERED & REVIEWED in the past 24 hours:  - See lab/imaging results included in the data section of the note  Medical complexity over the past 24 hours:  - Intensive monitoring for MEDICATION TOXICITY  - Decision regarding ESCALATION OF LEVEL OF CARE

## 2024-02-28 NOTE — PLAN OF CARE
Problem: Adult Inpatient Plan of Care  Goal: Absence of Hospital-Acquired Illness or Injury  Intervention: Prevent Infection  Recent Flowsheet Documentation  Taken 2/28/2024 0900 by Patty Parisi RN  Infection Prevention: hand hygiene promoted   Goal Outcome Evaluation:       Lungs coarse with crackle s cough increased today   Up walking with oxygen 2 liters showered today. K 3.3   Medicated recheck at 1800 continue neutropenic precautions

## 2024-02-28 NOTE — PLAN OF CARE
Care Plan Progress Note      Name: Lizzie Viera  : 1950  MRN: [id number]    VS: BP 98/48 (BP Location: Right arm)   Pulse 63   Temp 98  F (36.7  C) (Oral)   Resp 18   Wt 55.7 kg (122 lb 12.8 oz)   LMP  (LMP Unknown)   SpO2 96%   BMI 23.20 kg/m      VSS, denies increasing sob or need for pain medications at this time.  Nasal swabs sent, still needs sputum sample.  Patient's ANC 0.6 placed on neutropenic precautions per Dr Lynn.     Carine Young, RN   2024  6:23 PM

## 2024-02-29 NOTE — SIGNIFICANT EVENT
RN went into patient's room, patient complaining of throat and chest tightness and limb pain.  She stated she might be having an allergic reaction to Levaquin.  Dr Lynn paged and came to bedside.  VSS, O2 sats 95%.  Epi Neb and Solumedrol  and EKG ordered.  Will follow.

## 2024-02-29 NOTE — PROGRESS NOTES
[facility]    Infectious Disease Progress Note    Date of Service : 02/29/2024     Assessment & Plan   Lizzie Viera is a 73 year old female who was admitted on 2/26/2024.     ASSESSMENT:    Multiple myeloma, on maintenance therapy with Revlimid- held during hospitalization, infection  Hypoxia, pneumonia-oxygen saturation 80%  Multiple thoracic pathological fractures, chronic pain syndrome  Pneumonia, multiple sick family members with respiratory tract infection including grandson  Hypoxia  Right-sided pneumonia  Leukopenia  Cefdinir allergy listed, 9 years ago patient said she had shortness of breath.  Did not take Benadryl and did not need ER care at that time.  Patient stopped taking it and symptoms resolved  Patient has tolerated Zosyn in the past.  Appreciate input from pharmacist  Abrasions on hand, laceration, because her dog pulled her by the hand, also has a scrape on moving a chair, on the left index finger.  Monitor for secondary infections  Resp panel PCR negative       RECOMMENDATIONS:    Clindamycin, Doxycycline and Levofloxacin  Antibiotics patient tolerated vancomycin and meropenem previously. Described chest discomfort and possible throat discomfort on 2/29/2024. Meropenem held. Last dose of meropenem was several hours prior to the symptoms described by the patient  Follow culture results   As MRSA nasal screen negative-stop IV vancomycin as no MRSA isolated from culture results  Focus/de-escalate antibiotics based on final culture results  Monitor CBC, CMP  Discussed with patient   Hem-Onc input appreciated  Discussed with hospitalist and nurse  ID will follow      Brenda Kenyon MD  [facility]  [phone]      Interval History   Doing better.  Had chest pressure, discomfort and feels throat discomfort - no IV antibiotic infusing at the time of the symptoms.   Improved    Previous note:    Remains on oxygen  Chest congestion,  "coughing    Physical Exam   Temp: 98.3  F (36.8  C) Temp src: Oral BP: 123/43 Pulse: 71   Resp: 18 SpO2: 94 % O2 Device: Nasal cannula with humidification Oxygen Delivery: 2 LPM  Vitals:    02/26/24 1322   Weight: 55.7 kg (122 lb 12.8 oz)     Vital Signs with Ranges  Temp:  [97.9  F (36.6  C)-98.3  F (36.8  C)] 98.3  F (36.8  C)  Pulse:  [62-78] 71  Resp:  [18] 18  BP: (110-130)/(43-70) 123/43  SpO2:  [83 %-97 %] 94 %    Constitutional: Awake, coughing  No lip or throat swelling  Lungs: coarse  breathing pattern, rales. Supplemental oxygen  Cardiovascular: S1 S2  Abdomen: non distended  Skin: warm  Neuro: deconditioned  Psych: able to answer questions    Medications      aspirin  81 mg Oral Daily    enoxaparin ANTICOAGULANT  40 mg Subcutaneous Q24H    gabapentin  600 mg Oral TID    hydroCHLOROthiazide  12.5 mg Oral Daily    levofloxacin  750 mg Oral Q24H    medical cannabis  1 Dose Other See Admin Instructions    melatonin  10 mg Oral At Bedtime    meropenem  1 g Intravenous Q8H    polyethylene glycol  17 g Oral Daily    sodium chloride (PF)  3 mL Intracatheter Q8H    vancomycin  1,250 mg Intravenous Q24H       Data   All microbiology laboratory data reviewed.  Recent Labs   Lab Test 02/28/24  0740 02/27/24  0646 02/26/24  1354   WBC 2.5* 1.8* 2.0*   HGB 11.7 11.4* 14.0   HCT 34.5* 33.0* 40.0   * 102* 101*    152 172     Recent Labs   Lab Test 02/27/24  0646 02/26/24  1354 01/10/24  1359   CR 0.76 0.97* 0.94     Recent Labs   Lab Test 08/31/21  1611   *     No lab results found.    Invalid input(s): \"UC\"    MICROBIOLOGY:    Reviewed    7-Day Micro Results       Collected Updated Procedure Result Status      02/28/2024 1717 02/28/2024 2147 Respiratory Aerobic Bacterial Culture with Gram Stain [[id number]]   Sputum from Expectorate    Preliminary result Component Value   Gram Stain Result <10 Squamous epithelial cells/low power field  [P]     >25 PMNs/low power field  [P]     No organisms seen  " [P]                02/27/2024 1614 02/27/2024 2007 MRSA MSSA PCR, Nasal Swab [[id number]]    Swab from Nares, Bilateral    Final result Component Value   MRSA Target DNA Negative   SA Target DNA Positive            02/27/2024 1614 02/27/2024 2227 Respiratory Panel PCR [[id number]]    Swab from Nasopharyngeal    Final result Component Value   Adenovirus Not Detected   Coronavirus Not Detected   This test detects Coronavirus 229E, HKU1, NL63 and OC43 but does not distinguish between them. It does not detect MERS ( Respiratory Syndrome), SARS (Severe Acute Respiratory Syndrome) or 2019-nCoV (Novel 2019) Coronavirus.   Human Metapneumovirus Not Detected   Human Rhin/Enterovirus Not Detected   Influenza A Not Detected   Influenza A, H1 Not Detected   Influenza A 2009 H1N1 Not Detected   Influenza A, H3 Not Detected   Influenza B Not Detected   Parainfluenza Virus 1 Not Detected   Parainfluenza Virus 2 Not Detected   Parainfluenza Virus 3 Not Detected   Parainfluenza Virus 4 Not Detected   Respiratory Syncytial Virus A Not Detected   Respiratory Syncytial Virus B Not Detected   Chlamydia Pneumoniae Not Detected   Mycoplasma Pneumoniae Not Detected            02/26/2024 2050 02/27/2024 0029 Strep pneumo Agn Ur greater or equal to 13yrs or CSF any age [[id number]]   Urine, Midstream    Final result Component Value   Streptococcus pneumoniae antigen Negative   A negative Streptococcus pneumoniae antigen result does not rule out infection with Streptococcus pneumoniae.            02/26/2024 2050 02/27/2024 0029 Legionella pneumophila antigen urine [[id number]]   Urine, Midstream    Final result Component Value   Legionella pneumophila serogroup 1 urinary antigen Negative   Suggests no recent or current infection. Infection due to Legionella cannot be ruled out, since other serogroups and species may cause disease, antigen may not be present in urine in early infection, and the level of antigen present in  the urine may be below detectable limits of the test.            02/26/2024 2010 02/29/2024 0031 Blood Culture Peripheral Blood [[id number]]   Peripheral Blood    Preliminary result Component Value   Culture No growth after 2 days  [P]                02/26/2024 2010 02/29/2024 0031 Blood Culture Arm, Right [[id number]]   Blood from Arm, Right    Preliminary result Component Value   Culture No growth after 2 days  [P]                02/26/2024 1342 02/26/2024 1833 Symptomatic Influenza A/B, RSV, & SARS-CoV2 PCR (COVID-19) Nasopharyngeal [[id number]]    Swab from Nasopharyngeal    Final result Component Value   Influenza A PCR Negative   Influenza B PCR Negative   RSV PCR Negative   SARS CoV2 PCR Negative   NEGATIVE: SARS-CoV-2 (COVID-19) RNA not detected, presumed negative.                     RADIOLOGY:    Reviewed  CT Chest Pulmonary Embolism w Contrast    Result Date: 2/26/2024  EXAM: CT CHEST PULMONARY EMBOLISM W CONTRAST LOCATION: [facility] DATE: 2/26/2024 INDICATION: hypoxia, COPD, multiple myeloma COMPARISON: 2/8/2024 TECHNIQUE: CT chest pulmonary angiogram during arterial phase injection of IV contrast. Multiplanar reformats and MIP reconstructions were performed. Dose reduction techniques were used. CONTRAST: 47ml isovue 370 FINDINGS: ANGIOGRAM CHEST: Pulmonary arteries are normal caliber and negative for pulmonary emboli. Thoracic aorta is negative for dissection. No CT evidence of right heart strain. LUNGS AND PLEURA: Extensive tree-in-bud opacities, mostly in the right lung, with associated bronchial wall thickening, endobronchial opacities, and a few areas of consolidation in the lingula, middle lobe, and right lower lobe. No pleural effusion or pneumothorax. MEDIASTINUM/AXILLAE: Normal. CORONARY ARTERY CALCIFICATION: Mild. UPPER ABDOMEN: Cholecystectomy. Simple cyst in the left kidney, which does not require additional follow-up. Unchanged left adrenal adenomas.  MUSCULOSKELETAL: Unchanged mild compression fracture of T1 and severe compression fracture of T7 with associated lucent lesions as sequelae of multiple myeloma.     IMPRESSION: 1.  No acute pulmonary emboli. 2.  Extensive pulmonary airspace opacities, likely infectious or inflammatory.    XR Chest Port 1 View    Result Date: 2/26/2024  EXAM: XR CHEST PORT 1 VIEW LOCATION: [facility] DATE: 2/26/2024 INDICATION: dyspnea hypoxia COMPARISON: Low-dose chest CT 02/08/2024 and older studies, chest x-ray 06/20/2022     IMPRESSION: Asymmetric interstitial and minimal peribronchiolar opacities have developed in the right lower lobe suggestive of a developing bronchopneumonia. No effusion. Left lung is clear. Heart and pulmonary vascularity are normal. NOTE: ABNORMAL REPORT THE DICTATION ABOVE DESCRIBES AN ABNORMALITY FOR WHICH FOLLOW-UP IS NEEDED. .     CT Chest Lung Cancer Scrn Low Dose wo    Result Date: 2/8/2024  EXAM: LOW DOSE LUNG CANCER SCREENING CT CHEST LOCATION: [facility] DATE: 2/8/2024 INDICATION: Lung cancer screening. History of smoking. High risk patient. COMPARISON: CT chest 04/12/2022 reviewed. TECHNIQUE: Low-dose lung cancer screening non-contrast CT chest. Dose reduction techniques were used. Images assessed using LungRADS 2022 criteria. FINDINGS: NODULES: No suspicious pulmonary nodule or mass. Tiny subpleural triangular intrapulmonary lymph nodes including 4 mm left lower lobe (series 3, image 192) and 3 mm superior segment left lower lobe (image 115), unchanged since 04/12/2022. LUNGS AND PLEURA: Minimal linear scarring or atelectasis in the lingula and right middle lobe. MEDIASTINUM: No adenopathy. No pericardial effusion. Moderate atherosclerotic calcifications. CORONARY ARTERY CALCIFICATION: Moderate. LIMITED UPPER ABDOMEN: Cholecystectomy. Low-attenuation changes in the left adrenal suggesting hyperplasia or adenomatous change, unchanged.  MUSCULOSKELETAL: Bony demineralization. Severe chronic compression fracture T7 and mild chronic compression fractures T1, with underlying lucent lesions, unchanged, consistent with sequelae of multiple myeloma given the patient's history. No new suspicious osseous lesion.     IMPRESSION: Negative for lung cancer screening purposes. LungRADS CATEGORY: 2 : Benign. -Perifissural nodule(s): <10 mm -Solid nodule(s): <6 mm on baseline screening; or new nodule <4 mm RADIOLOGIST RECOMMENDATION(S): Continue annual screening with low-dose CT chest in 12 months.      Attestation:  Total time on the floor involved in the patient's care: minutes. Chart reviewed, reviewed cultures, external notes, labs, antiinfective monitoring, evaluation, counseling, management  Medical Decision Making       40 MINUTES SPENT BY ME on the date of service doing chart review, history, exam, documentation & further activities per the note.  MANAGEMENT DISCUSSED with the following over the past 24 hours: reviewed   NOTE(S)/MEDICAL RECORDS REVIEWED over the past 24 hours: reviewed  Tests ORDERED & REVIEWED in the past 24 hours:  - See lab/imaging results included in the data section of the note  SUPPLEMENTAL HISTORY, in addition to the patient's history, over the past 24 hours obtained from:   - Friend  Medical complexity over the past 24 hours:  - Intensive monitoring for MEDICATION TOXICITY  - Decision regarding ESCALATION OF LEVEL OF CARE

## 2024-02-29 NOTE — PLAN OF CARE
Problem: Risk for Delirium  Goal: Improved Sleep  Outcome: Progressing  Problem: Pneumonia  Goal: Effective Oxygenation and Ventilation  Outcome: Progressing  Problem: Pain Chronic (Persistent)  Goal: Optimal Pain Control and Function  Outcome: Progressing   Goal Outcome Evaluation:       Pt slept well overnight. A/Ox4, VSS on 2L NC, Independent in room. Reports chronic back pain of 7/10, prn dilaudid given with some effect. On IV abx. No acute events reported

## 2024-02-29 NOTE — PROGRESS NOTES
[address] [place] [address]  OPERATIVE REPORT    Name:  Laury Ochoa  MR#:  [id number]  :  1972  ACCOUNT #:  [de-identified]  [id number] (first 20 cm²)  #+[id number] second 20 cm²  #+51166 third 20 cm²  #+01594 fourth 20 cm²  #+93030 fifth 20 cm²  #+10084 sixth 20 cm²  #+22559 seventh 20 cm²  #+94489 eighth 20 cm²  #+54187 ninth 20 cm²  #+33839 Pt is A/O x 4, Pleasant and cooperative with all cares and treatments.  Sitting up in recliner for dinner.  Ambulated with contact guard on the unit this shift.    Oxygen on at 2 L/ NC humidified and pt's sats maintained at 93-97%. LS crackles in lower lobes bilaterally. Pt used arobeka x 5 this shift independently..    K+ prot ran 3.4 pt given 40 meq KCL Po and will recheck per protocol this evening.  Sputum culture sent this evening  Blood Cultures still pending  WBC 2.5    Denied need for prn pain meds this shift, but stated that she is in constant chronic pain.           [address], DO      [id number]  D:  08/01/2019 22:55  T:  08/01/2019 23:03  JOB #:  [id number]

## 2024-02-29 NOTE — PLAN OF CARE
Goal Outcome Evaluation:       Patient denies pain this shift.  Did have episode of feeling like her throat was closing and chest pain.  Benadryl given with good relief.  See significant event.  After Benadryl, patient felt much better, was able to walk the halls.  Independent in room, call light within reach.

## 2024-02-29 NOTE — PROGRESS NOTES
[facility]    Medicine Progress Note - Hospitalist Service    Date of Admission:  2/26/2024    Assessment & Plan         Lizzie Viera is a 73 year old female admitted on 2/26/2024. She has history of multiple myeloma on maintenance therapy with Revlimid, chronic pain syndrome from multiple thoracic pathologic fractures, COPD, recently diagnosed nocturnal hypoxia not yet started on home oxygen, here for hypoxia found to have extensive right-sided pneumonia and neutropenia         EVENTS HISTORY  :       Feb 27 :       No fevers  Still neutropenic  Cultures : negative so far  Continue levaquin, vancomycin, added meropenem  Consulted ID, oncology  Follow cbc      Not medically ready for discharge at this time.        Feb 28 :       No fevers  Still neutropenic - but improving  Cultures : negative so far  MRSA nasal screen negative-- will likely stop IV vancomycin in 24-48 hours if no MRSA isolated from culture results   Continue levaquin, vancomycin, meropenem for now  Consulted ID, oncology  Follow cbc      Not medically ready for discharge at this time.      A/p :       # Neutropenic fever  # Extensive right-sided pneumonia  # Probable sinus infection  -Patient comes in with 3 days of fever, Tmax 101F found to be neutropenic with ANC of 1000, which is new for her.  Procalcitonin obtained and is normal but unsure reliability given immunocompromise state  -CT showing extensive right-sided pneumonia.  Also complains of fairly significant sinus pressure.  Concern for opportunistic infection due to immunocompromised state  -Started on Levaquin in the ED due to allergy to cephalosporin.  Continue Levaquin, add vancomycin  -Sputum culture if able to give specimen.  Blood cultures ordered. Urine Strep pneumo and Legionella tests  -Hold home Revlimid.  Will ask oncology to see her  -Flutter valve, I-S     # Acute hypoxic respiratory failure  # Chronic sleep-related hypoxia  -Presents with  "hypoxia, satting 80% and was cyanotic on arrival.  Now doing well on nasal cannula oxygen  -She was recently diagnosed with \"sleep-related hypoxia\" and was prescribed nocturnal oxygen therapy but has not received equipment yet  -Continuous oximetry  -Wean oxygen as tolerated.  Suspect she will need home oxygen given baseline oxygen need and extent of pneumonia     # Multiple myeloma  -Maintained on Revlimid, patient of Dr. Blanco  -As above appears now experiencing opportunistic infection related to Revlimid.  Asking oncology to see her here     # Elevated liver enzymes  -Uncertain significance  -Does complain of decreased appetite x 1 week, but no other GI symptoms  -Recheck in the morning.  May need further evaluation e.g. right upper quadrant ultrasound if worsening     # COPD  -Not wheezing on exam, workup not consistent with COPD exacerbation  -Not on any maintenance inhalers  -Home albuterol neb as needed     # Nicotine dependence  -Uses Nicotrol inhaler, but not in the past few days.  She declines nicotine replacement therapy here     # Numerous minor abrasions/lacerations secondary to dog bites  -Her dog bites her hands to get her attention and direct her to its needs.  Cautioned patient with immunocompromise state, should definitely avoid any opportunities for seeding of the bloodstream.  -Abrasions do not currently appear cellulitic.     # Chronic pain syndrome  -Due to remote pathologic thoracic vertebral fractures  -Okay to use home medical marijuana following facility protocol  -Okay for home Dilaudid, gabapentin, Robaxin     # Hypertension  -Home hydrochlorothiazide with hold parameters     # Insomnia, home melatonin.  Uses a very high dose and did reduce this for hospital          Diet: Regular Diet Adult    DVT Prophylaxis: Enoxaparin (Lovenox) SQ  Stover Catheter: Not present  Lines: None     Cardiac Monitoring: None  Code Status: No CPR- Do NOT Intubate      Clinically Significant Risk Factors    "     # Hypokalemia: Lowest K = 3.2 mmol/L in last 2 days, will replace as needed       # Hypoalbuminemia: Lowest albumin = 2.9 g/dL at 2/27/2024  6:46 AM, will monitor as appropriate     # Hypertension: Noted on problem list                   Disposition Plan     Expected Discharge Date: 02/29/2024        Discharge Comments: ID consult; PT/OT signed off            Dov Lynn MD  Hospitalist Service  [facility]  Securely message with [employer] (more info)  Text page via [employer] Paging/Directory   ______________________________________________________________________      Physical Exam   Vital Signs: Temp: 97.9  F (36.6  C) Temp src: Oral BP: 110/47 Pulse: 75   Resp: 18 SpO2: (!) 83 % (rn advised) O2 Device: Nasal cannula with humidification Oxygen Delivery: 3 LPM  Weight: 122 lbs 12.8 oz       GENERAL: The patient is not in any acute distressed. Awake and alert.  HEENT: Nonicteric sclerae, PERRLA, EOMI. Oropharynx clear. Moist mucous membranes. Conjunctivae appear well perfused.  HEART: Regular rate and rhythm without murmurs.  LUNGS: Clear to auscultation bilaterally. No wheezing or crackles.  ABDOMEN: Soft, positive bowel sounds, nontender.  SKIN: No rash, no excessive bruising, petechiae, or purpura.  EXTREMITIES : no rashes, no swelling in legs.  NEUROLOGIC: conscious and oriented, follows commands, no obvious focal deficits.  ROS: All other systems negative       Medical Decision Making       60 MINUTES SPENT BY ME on the date of service doing chart review, history, exam, documentation & further activities per the note.  MANAGEMENT DISCUSSED with the following over the past 24 hours: rn, patient       Data     I have personally reviewed the following data over the past 24 hrs:    2.5 (L)  \   11.7   / 161     N/A N/A N/A /  N/A   3.4 N/A N/A \

## 2024-02-29 NOTE — CONSULTS
GYN CONSULTATION - POST MENOPAUSAL BLEEDING      NAME: Lizzie Viera   : 1950   MRN: [id number]      [facility]    Admission Date: 2024    PCP:  Sona Sandoval     CHIEF COMPLAINT: post menopausal bleeding     HPI: I have been requested by Dr. Lynn to evaluate Lizzie Viera for post menopausal bleeding. Patient is a 73 year old female. History is obtained through the patient and chart review. Patient states that she went through menopause approximately 20 years ago. She was admitted to the hospital with pneumonia in the setting of Multiple myeloma. She was started on Lovanox for VTE prophylaxis and some spotting was noted on her pad. No blood in urine or with stool. No clots. No pelvic pain. This same scenerio happened a few years ago and a normal USN was obtained and the bleeding stopped.          PAST MEDICAL HISTORY:  Past Medical History:   Diagnosis Date    Cervical dysplasia     Chronic RUQ pain     Depressive disorder     Multiple myeloma (H)     Osteoporosis        PAST SURGICAL HISTORY:  Past Surgical History:   Procedure Laterality Date    CONIZATION CERVIX,KNIFE/LASER      Description: Cervical Conization By Laser;  Recorded: 2007;    HC DILATION/CURETTAGE DIAG/THER NON OB      Description: Dilation And Curettage;  Recorded: 2007;    HC REMOVE TONSILS/ADENOIDS,<13 Y/O      Description: Tonsillectomy With Adenoidectomy;  Recorded: 2007;    ZC LAP,CHOLECYSTECTOMY/EXPLORE  2004    Z LIGATE FALLOPIAN TUBE      Description: Tubal Ligation;  Recorded: 2007;       SOCIAL HISTORY:  Social History     Socioeconomic History    Marital status:      Spouse name: Not on file    Number of children: 3    Years of education: Not on file    Highest education level: Not on file   Occupational History    Not on file   Tobacco Use    Smoking status: Former     Packs/day: 0.50     Years: 45.00     Additional pack years: 0.00     Total pack years:  22.50     Types: Cigarettes     Quit date: 2023     Years since quittin.0    Smokeless tobacco: Never   Vaping Use    Vaping Use: Every day   Substance and Sexual Activity    Alcohol use: Yes     Comment: Alcoholic Drinks/day: 0-1 drinks per week    Drug use: Not Currently    Sexual activity: Not Currently     Partners: Male     Birth control/protection: Surgical   Other Topics Concern    Parent/sibling w/ CABG, MI or angioplasty before 65F 55M? Not Asked   Social History Narrative    Not on file     Social Determinants of Health     Financial Resource Strain: Low Risk  (2024)    Financial Resource Strain     Within the past 12 months, have you or your family members you live with been unable to get utilities (heat, electricity) when it was really needed?: No   Food Insecurity: Low Risk  (2024)    Food Insecurity     Within the past 12 months, did you worry that your food would run out before you got money to buy more?: No     Within the past 12 months, did the food you bought just not last and you didn t have money to get more?: No   Transportation Needs: Low Risk  (2024)    Transportation Needs     Within the past 12 months, has lack of transportation kept you from medical appointments, getting your medicines, non-medical meetings or appointments, work, or from getting things that you need?: No   Physical Activity: Not on file   Stress: Not on file   Social Connections: Not on file   Interpersonal Safety: Low Risk  (2024)    Interpersonal Safety     Do you feel physically and emotionally safe where you currently live?: Yes     Within the past 12 months, have you been hit, slapped, kicked or otherwise physically hurt by someone?: No     Within the past 12 months, have you been humiliated or emotionally abused in other ways by your partner or ex-partner?: No   Housing Stability: Low Risk  (2024)    Housing Stability     Do you have housing? : Yes     Are you worried about losing your  housing?: No       MEDICATIONS:  Current Facility-Administered Medications   Medication    acetaminophen (TYLENOL) tablet 650 mg    Or    acetaminophen (TYLENOL) Suppository 650 mg    albuterol (PROVENTIL HFA/VENTOLIN HFA) inhaler    albuterol (PROVENTIL) neb solution 2.5 mg    aspirin (ASA) chewable tablet 81 mg    benzocaine-menthol (CHLORASEPTIC) 6-10 MG lozenge 1 lozenge    benzonatate (TESSALON) capsule 100 mg    bisacodyl (DULCOLAX) suppository 10 mg    calcium carbonate (TUMS) chewable tablet 1,000 mg    clindamycin (CLEOCIN) capsule 300 mg    diphenhydrAMINE (BENADRYL) capsule 25 mg    Or    diphenhydrAMINE (BENADRYL) injection 25 mg    doxycycline hyclate (VIBRAMYCIN) capsule 100 mg    [Held by provider] enoxaparin ANTICOAGULANT (LOVENOX) injection 40 mg    gabapentin (NEURONTIN) capsule 600 mg    guaiFENesin (ROBITUSSIN) 20 mg/mL solution 200 mg    hydroCHLOROthiazide tablet 12.5 mg    HYDROmorphone (DILAUDID) tablet 2 mg    ipratropium - albuterol 0.5 mg/2.5 mg/3 mL (DUONEB) neb solution 3 mL    levofloxacin (LEVAQUIN) tablet 750 mg    lidocaine (LMX4) cream    lidocaine 1 % 0.1-1 mL    medical cannabis self-directed use allowed by Medical Cannabis Policy    melatonin tablet 10 mg    methocarbamol (ROBAXIN) tablet 500 mg    ondansetron (ZOFRAN ODT) ODT tab 4 mg    Or    ondansetron (ZOFRAN) injection 4 mg    polyethylene glycol (MIRALAX) Packet 17 g    senna-docusate (SENOKOT-S/PERICOLACE) 8.6-50 MG per tablet 1 tablet    Or    senna-docusate (SENOKOT-S/PERICOLACE) 8.6-50 MG per tablet 2 tablet    sodium chloride (PF) 0.9% PF flush 3 mL    sodium chloride (PF) 0.9% PF flush 3 mL       ALLERGIES:  Allergies   Allergen Reactions    Cefdinir Shortness Of Breath    Latex Shortness Of Breath    Atorvastatin Muscle Pain (Myalgia)    Short Ragweed Pollen Ext Cough    Adhesive Tape Rash       REVIEW OF SYSTEMS    Negative except what is stated in the HPI    PHYSICAL EXAM:  /57 (BP Location: Right arm)    Pulse 87   Temp 97.9  F (36.6  C) (Oral)   Resp 20   Wt 55.7 kg (122 lb 12.8 oz)   LMP  (LMP Unknown)   SpO2 93%   BMI 23.20 kg/m     General Appearance: Alert, appropriate appearance for age. No acute distress,   HEENT : Grossly normal  Chest/Respiratory: Normal chest wall and respirations. Clear to auscultation.,   Cardiovascular: Regular rate and rhythm   Gastrointestinal: soft, NTND, NABS. No masses.   Pelvic Exam Female:  With nurse in room. Normal External genetalia. Digital exam allowed by patient and normal cervix  and uterus palpated. No adnexal masses palpated. No bleeding noted on the glove. ,   Musculoskeletal Exam: Back IS non-tender, no cva tenderness, moving all four extremities  Skin: no rash or abnormalities,   Neurologic: Normal gait and speech, no tremor  Psychiatric: Alert and oriented, appropriate affect.    LABS  Lab Results   Component Value Date    HGB 11.6 (L) 02/29/2024      Platelet 163          IMPRESSION:  73 year old with Pneumonia and recent vaginal spotting-possible related to VTE prophlaxis    RECOMMENDATIONS:  Ultrasound of the pelvis is ordered for tomorrow. If abnormalities are found, we will advise. If normal, she can be managed expectantly unless the bleeding becomes heavy. I think continuing the Lovanox is reasonable for now with such a small amount of bleeding. If the bleeding increases, could consider stopping the Lovanox.       Thank you for allowing us to participate in the care of this patient.  Please contact us with any questions/concerns.    Best Worrell MD        CC: Sona Sandoval,

## 2024-02-29 NOTE — PHARMACY-VANCOMYCIN DOSING SERVICE
Pharmacy Vancomycin Note  Date of Service 2024  Patient's  1950   73 year old, female    Indication:  pneumonia, neutropenic fever  Day of Therapy: 4  Current vancomycin regimen: 1250 mg IV q24h  Current vancomycin monitoring method: AUC  Current vancomycin therapeutic monitoring goal: 400-600 mg*h/L    InsightRX Prediction of Current Vancomycin Regimen  Loading dose: N/A  Regimen: 1250 mg IV every 24 hours.  Start time: 21:13 on 2024  Exposure target: AUC24 (range)400-600 mg/L.hr   AUC24,ss: 341 mg/L.hr  Probability of AUC24 > 400: 16 %  Ctrough,ss: 7.4 mg/L  Probability of Ctrough,ss > 20: 0 %  Probability of nephrotoxicity (Lodise PRAVEEN ): 4 %      Current estimated CrCl = Estimated Creatinine Clearance: 59.7 mL/min (based on SCr of 0.76 mg/dL).    Creatinine for last 3 days  2024:  1:54 PM Creatinine 0.97 mg/dL  2024:  6:46 AM Creatinine 0.76 mg/dL    Recent Vancomycin Levels (past 3 days)  2024:  9:35 AM Vancomycin 10.1 ug/mL    Vancomycin IV Administrations (past 72 hours)                     vancomycin (VANCOCIN) 1,250 mg in sodium chloride 0.9 % 250 mL intermittent infusion (mg) 1,250 mg New Bag 24     1,250 mg New Bag 24 2113    vancomycin (VANCOCIN) 1,250 mg in sodium chloride 0.9 % 250 mL intermittent infusion (mg) 1,250 mg New Bag 24 210                    Nephrotoxins and other renal medications (From now, onward)      Start     Dose/Rate Route Frequency Ordered Stop    24 1500  vancomycin (VANCOCIN) 1,250 mg in sodium chloride 0.9 % 250 mL intermittent infusion        See Hyperspace for full Linked Orders Report.    1,250 mg  over 90 Minutes Intravenous EVERY 18 HOURS 24 1221                 Contrast Orders - past 72 hours (72h ago, onward)      Start     Dose/Rate Route Frequency Stop    24 1600  iopamidol (ISOVUE-370) solution 47 mL         47 mL Intravenous ONCE 24 1541            Interpretation of levels and  current regimen:  Vancomycin level is reflective of -600    InsightRX Prediction of Planned New Vancomycin Regimen  Regimen: 1250 mg IV every 18 hours.  Start time: 15:00 on 02/29/2024  Exposure target: AUC24 (range)400-600 mg/L.hr   AUC24,ss: 451 mg/L.hr  Probability of AUC24 > 400: 78 %  Ctrough,ss: 11.2 mg/L  Probability of Ctrough,ss > 20: 1 %  Probability of nephrotoxicity (Lodise PRAVEEN 2009): 7 %      Plan:  Increase Dose to 1250 mg IV every 18 hours  Vancomycin monitoring method: AUC  Vancomycin therapeutic monitoring goal: 400-600 mg*h/L  Pharmacy will check vancomycin levels as appropriate in 1-3 Days.  Serum creatinine levels will be ordered daily for the first week of therapy and at least twice weekly for subsequent weeks.    Sarahi Acosta RPH

## 2024-03-01 NOTE — PROGRESS NOTES
Chart review only;    Patient not seen today.  Neutrophil count has significantly improved.  Currently normal.  Continue to hold Revlimid until she finishes antibiotic therapy.  Will arrange follow-ups in hematology clinic after discharge.    Michael Faria MD  Hematology and Medical Oncology  [facility]

## 2024-03-01 NOTE — PROGRESS NOTES
[facility]    Infectious Disease Progress Note    Date of Service : 03/01/2024     Assessment & Plan   Lizzie Viera is a 73 year old female who was admitted on 2/26/2024.     ASSESSMENT:    Multiple myeloma, on maintenance therapy with Revlimid- held during hospitalization, infection  Hypoxia, pneumonia-oxygen saturation 80% on admission  Multiple thoracic pathological fractures, chronic pain syndrome  Pneumonia, multiple sick family members with respiratory tract infection including grandson  Hypoxia- improving  Right-sided pneumonia  Leukopenia  Cefdinir allergy listed, 9 years ago patient said she had shortness of breath.  Did not take Benadryl and did not need ER care at that time.  Patient stopped taking it and symptoms resolved  Patient has tolerated Zosyn in the past.  Appreciate input from pharmacist  Abrasions on hand, laceration, because her dog pulled her by the hand, also has a scrape on moving a chair, on the left index finger.  Monitor for secondary infections  Resp panel PCR negative       RECOMMENDATIONS:    Clindamycin, Doxycycline and Levofloxacin- 7 more days to complete course for pneumonia. Discussed with patient and hospitalist  Antibiotics patient tolerated vancomycin and meropenem previously. Described chest discomfort and possible throat discomfort on 2/29/2024. Meropenem held. Last dose of meropenem was several hours prior to the symptoms described by the patient  Follow culture results   As MRSA nasal screen negative-stop IV vancomycin as no MRSA isolated from culture results  Focus/de-escalate antibiotics based on final culture results  Monitor CBC, CMP  Discussed with patient   Hem-Onc input appreciated  Discussed with hospitalist and nurse updated  Allergy testing as outpatient. Referral placed  ID will sign off. Please call with questions. Follow up with Hem-Onc      Brenda Kenyon MD  [facility]  [phone]      Interval  History   Doing better.  Ambulating, supplemental oxygen  Nurse at bedside  Going to get oxygen evaluation while ambulation  Previous note  Had chest pressure, discomfort and feels throat discomfort - no IV antibiotic infusing at the time of the symptoms.   Improved    Previous note:    Remains on oxygen  Chest congestion, coughing    Physical Exam   Temp: 97.9  F (36.6  C) Temp src: Oral BP: 114/65 Pulse: 67   Resp: 18 SpO2: 93 % O2 Device: Nasal cannula with humidification Oxygen Delivery: 2 LPM  Vitals:    02/26/24 1322   Weight: 55.7 kg (122 lb 12.8 oz)     Vital Signs with Ranges  Temp:  [97.9  F (36.6  C)-98.5  F (36.9  C)] 97.9  F (36.6  C)  Pulse:  [67-87] 67  Resp:  [18-20] 18  BP: (112-142)/(54-65) 114/65  SpO2:  [93 %-95 %] 93 %    Constitutional: Awake, coughing improved  No lip or throat swelling  Lungs: coarse  breathing pattern, rales. Supplemental oxygen  Cardiovascular: S1 S2  Abdomen: non distended  Skin: warm  Neuro: deconditioned  Psych: able to answer questions    Medications      aspirin  81 mg Oral Daily    clindamycin  300 mg Oral Q8H JOANNA    doxycycline hyclate  100 mg Oral Q12H JOANNA (08/20)    [Held by provider] enoxaparin ANTICOAGULANT  40 mg Subcutaneous Q24H    gabapentin  600 mg Oral TID    [Held by provider] hydroCHLOROthiazide  12.5 mg Oral Daily    ipratropium - albuterol 0.5 mg/2.5 mg/3 mL  3 mL Nebulization 2 times daily    levofloxacin  750 mg Oral Q24H    medical cannabis  1 Dose Other See Admin Instructions    melatonin  10 mg Oral At Bedtime    polyethylene glycol  17 g Oral Daily    sodium chloride (PF)  3 mL Intracatheter Q8H       Data   All microbiology laboratory data reviewed.  Recent Labs   Lab Test 03/01/24  0710 02/29/24  1754 02/29/24  0935 02/28/24  0740 02/27/24  0646   WBC 3.6*  --  3.3* 2.5* 1.8*   HGB  --  12.4 11.6* 11.7 11.4*   HCT  --   --  34.6* 34.5* 33.0*   MCV  --   --  104* 102* 102*   PLT  --   --  163 161 152     Recent Labs   Lab Test 02/27/24  0646  "02/26/24  1354 01/10/24  1359   CR 0.76 0.97* 0.94     Recent Labs   Lab Test 08/31/21  1611   *     No lab results found.    Invalid input(s): \"UC\"    MICROBIOLOGY:    Reviewed    7-Day Micro Results       Collected Updated Procedure Result Status      02/28/2024 1717 03/01/2024 0818 Respiratory Aerobic Bacterial Culture with Gram Stain [[id number]]   Sputum from Expectorate    Final result Component Value   Culture No Growth   Gram Stain Result <10 Squamous epithelial cells/low power field    >25 PMNs/low power field    No organisms seen               02/27/2024 1614 02/27/2024 2007 MRSA MSSA PCR, Nasal Swab [[id number]]    Swab from Nares, Bilateral    Final result Component Value   MRSA Target DNA Negative   SA Target DNA Positive            02/27/2024 1614 02/27/2024 2227 Respiratory Panel PCR [[id number]]    Swab from Nasopharyngeal    Final result Component Value   Adenovirus Not Detected   Coronavirus Not Detected   This test detects Coronavirus 229E, HKU1, NL63 and OC43 but does not distinguish between them. It does not detect MERS ( Respiratory Syndrome), SARS (Severe Acute Respiratory Syndrome) or 2019-nCoV (Novel 2019) Coronavirus.   Human Metapneumovirus Not Detected   Human Rhin/Enterovirus Not Detected   Influenza A Not Detected   Influenza A, H1 Not Detected   Influenza A 2009 H1N1 Not Detected   Influenza A, H3 Not Detected   Influenza B Not Detected   Parainfluenza Virus 1 Not Detected   Parainfluenza Virus 2 Not Detected   Parainfluenza Virus 3 Not Detected   Parainfluenza Virus 4 Not Detected   Respiratory Syncytial Virus A Not Detected   Respiratory Syncytial Virus B Not Detected   Chlamydia Pneumoniae Not Detected   Mycoplasma Pneumoniae Not Detected            02/26/2024 2050 02/27/2024 0029 Strep pneumo Agn Ur greater or equal to 13yrs or CSF any age [[id number]]   Urine, Midstream    Final result Component Value   Streptococcus pneumoniae antigen Negative   A " negative Streptococcus pneumoniae antigen result does not rule out infection with Streptococcus pneumoniae.            02/26/2024 2050 02/27/2024 0029 Legionella pneumophila antigen urine [[id number]]   Urine, Midstream    Final result Component Value   Legionella pneumophila serogroup 1 urinary antigen Negative   Suggests no recent or current infection. Infection due to Legionella cannot be ruled out, since other serogroups and species may cause disease, antigen may not be present in urine in early infection, and the level of antigen present in the urine may be below detectable limits of the test.            02/26/2024 2010 03/01/2024 0031 Blood Culture Peripheral Blood [[id number]]   Peripheral Blood    Preliminary result Component Value   Culture No growth after 3 days  [P]                02/26/2024 2010 03/01/2024 0031 Blood Culture Arm, Right [[id number]]   Blood from Arm, Right    Preliminary result Component Value   Culture No growth after 3 days  [P]                02/26/2024 1342 02/26/2024 1833 Symptomatic Influenza A/B, RSV, & SARS-CoV2 PCR (COVID-19) Nasopharyngeal [[id number]]    Swab from Nasopharyngeal    Final result Component Value   Influenza A PCR Negative   Influenza B PCR Negative   RSV PCR Negative   SARS CoV2 PCR Negative   NEGATIVE: SARS-CoV-2 (COVID-19) RNA not detected, presumed negative.                     RADIOLOGY:    Reviewed  CT Chest Pulmonary Embolism w Contrast    Result Date: 2/26/2024  EXAM: CT CHEST PULMONARY EMBOLISM W CONTRAST LOCATION: [facility] DATE: 2/26/2024 INDICATION: hypoxia, COPD, multiple myeloma COMPARISON: 2/8/2024 TECHNIQUE: CT chest pulmonary angiogram during arterial phase injection of IV contrast. Multiplanar reformats and MIP reconstructions were performed. Dose reduction techniques were used. CONTRAST: 47ml isovue 370 FINDINGS: ANGIOGRAM CHEST: Pulmonary arteries are normal caliber and negative for pulmonary emboli. Thoracic  aorta is negative for dissection. No CT evidence of right heart strain. LUNGS AND PLEURA: Extensive tree-in-bud opacities, mostly in the right lung, with associated bronchial wall thickening, endobronchial opacities, and a few areas of consolidation in the lingula, middle lobe, and right lower lobe. No pleural effusion or pneumothorax. MEDIASTINUM/AXILLAE: Normal. CORONARY ARTERY CALCIFICATION: Mild. UPPER ABDOMEN: Cholecystectomy. Simple cyst in the left kidney, which does not require additional follow-up. Unchanged left adrenal adenomas. MUSCULOSKELETAL: Unchanged mild compression fracture of T1 and severe compression fracture of T7 with associated lucent lesions as sequelae of multiple myeloma.     IMPRESSION: 1.  No acute pulmonary emboli. 2.  Extensive pulmonary airspace opacities, likely infectious or inflammatory.    XR Chest Port 1 View    Result Date: 2/26/2024  EXAM: XR CHEST PORT 1 VIEW LOCATION: [facility] DATE: 2/26/2024 INDICATION: dyspnea hypoxia COMPARISON: Low-dose chest CT 02/08/2024 and older studies, chest x-ray 06/20/2022     IMPRESSION: Asymmetric interstitial and minimal peribronchiolar opacities have developed in the right lower lobe suggestive of a developing bronchopneumonia. No effusion. Left lung is clear. Heart and pulmonary vascularity are normal. NOTE: ABNORMAL REPORT THE DICTATION ABOVE DESCRIBES AN ABNORMALITY FOR WHICH FOLLOW-UP IS NEEDED. .     CT Chest Lung Cancer Scrn Low Dose wo    Result Date: 2/8/2024  EXAM: LOW DOSE LUNG CANCER SCREENING CT CHEST LOCATION: [facility] DATE: 2/8/2024 INDICATION: Lung cancer screening. History of smoking. High risk patient. COMPARISON: CT chest 04/12/2022 reviewed. TECHNIQUE: Low-dose lung cancer screening non-contrast CT chest. Dose reduction techniques were used. Images assessed using LungRADS 2022 criteria. FINDINGS: NODULES: No suspicious pulmonary nodule or mass. Tiny subpleural triangular  intrapulmonary lymph nodes including 4 mm left lower lobe (series 3, image 192) and 3 mm superior segment left lower lobe (image 115), unchanged since 04/12/2022. LUNGS AND PLEURA: Minimal linear scarring or atelectasis in the lingula and right middle lobe. MEDIASTINUM: No adenopathy. No pericardial effusion. Moderate atherosclerotic calcifications. CORONARY ARTERY CALCIFICATION: Moderate. LIMITED UPPER ABDOMEN: Cholecystectomy. Low-attenuation changes in the left adrenal suggesting hyperplasia or adenomatous change, unchanged. MUSCULOSKELETAL: Bony demineralization. Severe chronic compression fracture T7 and mild chronic compression fractures T1, with underlying lucent lesions, unchanged, consistent with sequelae of multiple myeloma given the patient's history. No new suspicious osseous lesion.     IMPRESSION: Negative for lung cancer screening purposes. LungRADS CATEGORY: 2 : Benign. -Perifissural nodule(s): <10 mm -Solid nodule(s): <6 mm on baseline screening; or new nodule <4 mm RADIOLOGIST RECOMMENDATION(S): Continue annual screening with low-dose CT chest in 12 months.      Attestation:  Total time on the floor involved in the patient's care: minutes. Chart reviewed, reviewed cultures, external notes, labs, antiinfective monitoring, evaluation, counseling, management  Medical Decision Making       MANAGEMENT DISCUSSED with the following over the past 24 hours: patient, hospitalist, nurse   NOTE(S)/MEDICAL RECORDS REVIEWED over the past 24 hours: reviewed  Tests ORDERED & REVIEWED in the past 24 hours:  - See lab/imaging results included in the data section of the note  Medical complexity over the past 24 hours:  - Prescription DRUG MANAGEMENT performed

## 2024-03-01 NOTE — PLAN OF CARE
Problem: Adult Inpatient Plan of Care  Goal: Optimal Comfort and Wellbeing  Outcome: Progressing     Problem: Pneumonia  Goal: Effective Oxygenation and Ventilation  Outcome: Progressing   Goal Outcome Evaluation:    Pt is alert and oriented x4, c/o mild pain in back. Pt on 2L of O2 via nasal cannula. Has infrequent productive cough, dyspnea on exertion, crackles in lower lobes.     Pt has small amount of vaginal bleeding. Notified provider- Hbg and transvaginal ultrasound ordered. Order to hold lovenox.     Pt is independent in the room. Ambulated in hallway x1. Eating, drinking and voiding well. Able to make her needs known.     Staci Ann RN.

## 2024-03-01 NOTE — PLAN OF CARE
Problem: Adult Inpatient Plan of Care  Goal: Optimal Comfort and Wellbeing  Outcome: Progressing  Problem: Pneumonia  Goal: Effective Oxygenation and Ventilation  Outcome: Progressing  Problem: Pain Chronic (Persistent)  Goal: Optimal Pain Control and Function  Outcome: Progressing     AOx4. VSS on 2L Humidified NC. Reporting 5/10 nagging right abdomen pain, some ear pain. Gave  Tylenol + Robaxin. Infrequent, nonproductive cough, pt reported improvement with PRN Tessalon pearls. Independent in room. No acute events reported at this time, will continue to monitor.    Lm Marr RN

## 2024-03-01 NOTE — PROGRESS NOTES
[facility]    Medicine Progress Note - Hospitalist Service    Date of Admission:  2/26/2024    Assessment & Plan         Lizzie Viera is a 73 year old female admitted on 2/26/2024. She has history of multiple myeloma on maintenance therapy with Revlimid, chronic pain syndrome from multiple thoracic pathologic fractures, COPD, recently diagnosed nocturnal hypoxia not yet started on home oxygen, here for hypoxia found to have extensive right-sided pneumonia and neutropenia         EVENTS HISTORY  :       Feb 27 :       No fevers  Still neutropenic  Cultures : negative so far  Continue levaquin, vancomycin, added meropenem  Consulted ID, oncology  Follow cbc      Not medically ready for discharge at this time.        Feb 28 :       No fevers  Still neutropenic - but improving  Cultures : negative so far  MRSA nasal screen negative-- will likely stop IV vancomycin in 24-48 hours if no MRSA isolated from culture results   Continue levaquin, vancomycin, meropenem for now  Consulted ID, oncology  Follow cbc      Not medically ready for discharge at this time.        Feb 29 :       No fevers  Wbc improving  Cultures : negative so far  MRSA nasal screen negative-- discontinued IV vancomycin  Patient had a episode of throat tightness and chest pressure this morning around 9 am  Not convinced it is anaphylaxis but patient says she has a h/o COPD  Given iv steroids, nebs, check EKG, troponin.  Discussed with ID, continue levaquin, added clinda, doxy, stopped meropenem    Also had some vaginal bleeding this evening  Check hb, ordered pelvic ultrasound, consult gyn    Possible discharge in am       A/p :       # Neutropenic fever  # Extensive right-sided pneumonia  # Probable sinus infection  -Patient comes in with 3 days of fever, Tmax 101F found to be neutropenic with ANC of 1000, which is new for her.  Procalcitonin obtained and is normal but unsure reliability given immunocompromise state  -CT  "showing extensive right-sided pneumonia.  Also complains of fairly significant sinus pressure.  Concern for opportunistic infection due to immunocompromised state  -Started on Levaquin in the ED due to allergy to cephalosporin.  Continue Levaquin, add vancomycin  -Sputum culture if able to give specimen.  Blood cultures ordered. Urine Strep pneumo and Legionella tests  -Hold home Revlimid.  Will ask oncology to see her  -Flutter valve, I-S     # Acute hypoxic respiratory failure  # Chronic sleep-related hypoxia  -Presents with hypoxia, satting 80% and was cyanotic on arrival.  Now doing well on nasal cannula oxygen  -She was recently diagnosed with \"sleep-related hypoxia\" and was prescribed nocturnal oxygen therapy but has not received equipment yet  -Continuous oximetry  -Wean oxygen as tolerated.  Suspect she will need home oxygen given baseline oxygen need and extent of pneumonia     # Multiple myeloma  -Maintained on Revlimid, patient of Dr. Blanco  -As above appears now experiencing opportunistic infection related to Revlimid.  Asking oncology to see her here     # Elevated liver enzymes  -Uncertain significance  -Does complain of decreased appetite x 1 week, but no other GI symptoms  -Recheck in the morning.  May need further evaluation e.g. right upper quadrant ultrasound if worsening     # COPD  -Not wheezing on exam, workup not consistent with COPD exacerbation  -Not on any maintenance inhalers  -Home albuterol neb as needed     # Nicotine dependence  -Uses Nicotrol inhaler, but not in the past few days.  She declines nicotine replacement therapy here     # Numerous minor abrasions/lacerations secondary to dog bites  -Her dog bites her hands to get her attention and direct her to its needs.  Cautioned patient with immunocompromise state, should definitely avoid any opportunities for seeding of the bloodstream.  -Abrasions do not currently appear cellulitic.     # Chronic pain syndrome  -Due to remote " pathologic thoracic vertebral fractures  -Okay to use home medical marijuana following facility protocol  -Okay for home Dilaudid, gabapentin, Robaxin     # Hypertension  -Home hydrochlorothiazide with hold parameters     # Insomnia, home melatonin.  Uses a very high dose and did reduce this for hospital          Diet: Regular Diet Adult    DVT Prophylaxis: Enoxaparin (Lovenox) SQ  Stover Catheter: Not present  Lines: None     Cardiac Monitoring: None  Code Status: No CPR- Do NOT Intubate      Clinically Significant Risk Factors        # Hypokalemia: Lowest K = 3.3 mmol/L in last 2 days, will replace as needed       # Hypoalbuminemia: Lowest albumin = 2.9 g/dL at 2/27/2024  6:46 AM, will monitor as appropriate     # Hypertension: Noted on problem list                   Disposition Plan      Expected Discharge Date: 03/01/2024    Discharge Delays: IV Medication - consider oral or Home Infusion  Oxygen Needs - Arrange Home O2    Discharge Comments: ID consult; PT/OT signed off            Dov Lynn MD  Hospitalist Service  [facility]  Securely message with [employer] (more info)  Text page via [employer] Paging/Directory   ______________________________________________________________________      Physical Exam   Vital Signs: Temp: 97.9  F (36.6  C) Temp src: Oral BP: 127/57 Pulse: 87   Resp: 20 SpO2: 93 % O2 Device: Nasal cannula with humidification Oxygen Delivery: 2 LPM  Weight: 122 lbs 12.8 oz       GENERAL: The patient is not in any acute distressed. Awake and alert.  HEENT: Nonicteric sclerae, PERRLA, EOMI. Oropharynx clear. Moist mucous membranes. Conjunctivae appear well perfused.  HEART: Regular rate and rhythm without murmurs.  LUNGS: Clear to auscultation bilaterally. No wheezing or crackles.  ABDOMEN: Soft, positive bowel sounds, nontender.  SKIN: No rash, no excessive bruising, petechiae, or purpura.  EXTREMITIES : no rashes, no swelling in legs.  NEUROLOGIC: conscious and oriented,  follows commands, no obvious focal deficits.  ROS: All other systems negative       Medical Decision Making       60 MINUTES SPENT BY ME on the date of service doing chart review, history, exam, documentation & further activities per the note.  MANAGEMENT DISCUSSED with the following over the past 24 hours: rn, patient       Data     I have personally reviewed the following data over the past 24 hrs:    3.3 (L)  \   11.6 (L)   / 163     N/A N/A N/A /  N/A   4.2 N/A N/A \     Trop: 23 (H) BNP: N/A

## 2024-03-01 NOTE — PROGRESS NOTES
[facility]    Medicine Progress Note - Hospitalist Service    Date of Admission:  2/26/2024    Assessment & Plan         Lizzie Viera is a 73 year old female admitted on 2/26/2024. She has history of multiple myeloma on maintenance therapy with Revlimid, chronic pain syndrome from multiple thoracic pathologic fractures, COPD, recently diagnosed nocturnal hypoxia not yet started on home oxygen, here for hypoxia found to have extensive right-sided pneumonia and neutropenia             A/p :       # Neutropenic fever  # Extensive right-sided pneumonia  # Probable sinus infection  -Patient comes in with 3 days of fever, Tmax 101F found to be neutropenic with ANC of 1000, which is new for her.  Procalcitonin obtained and is normal but unsure reliability given immunocompromise state  -CT showing extensive right-sided pneumonia.  Also complains of fairly significant sinus pressure.  Concern for opportunistic infection due to immunocompromised state  -ID consulted for neutropenic fevers  - Started on Levaquin, vancomycin iv, meropenem  -Sputum culture : no growth.  Blood cultures : no growth. Urine Strep pneumo and Legionella tests - negative  -Hold home Revlimid, oncology consulted, plan to hold off revlimid till patient on antibiotics.  - on Feb 29 -  Patient had a episode of throat tightness and chest pressure this morning around 9 am, Not convinced it is anaphylaxis but patient says she has a h/o COPD with 50 year history of smoking but has quit. Given iv steroids, nebs, check EKG : no abnormality, troponin no significant elevation or upward trend. H/o anaphylaxis to cefdinir  - Feb 29 - Discussed with ID, continue levaquin, added clinda, doxy, stopped meropenem, vancomycin.   - neutropenic has improved, plan to discharge on levaquin, clinda, doxy for 7 days.     # Acute hypoxic respiratory failure  # Chronic sleep-related hypoxia  -Presents with hypoxia, satting 80% and was cyanotic on  "arrival.  Now doing well on nasal cannula oxygen  -She was recently diagnosed with \"sleep-related hypoxia\" and was prescribed nocturnal oxygen therapy but has not received equipment yet  - BNP normal, denies any significant sob at rest or with activity, no swelling of legs.  - likely underlying COPD but patient says she has a h/o COPD with 50 year history of smoking but has quit  - home oxygen evaluation done and needing 2 liters of oxygen with activity.  - plan to discharge with home oxygen, outpatient pulmonary follow up     # Multiple myeloma  -Maintained on Revlimid, patient of Dr. Blanco  -As above appears now experiencing opportunistic infection related to Revlimid.  Asking oncology to see her here     # Post menopausal bleeding :  hb stable, pelvic ultrasound - Simple left paraovarian cyst, Gyn consulted, outpatient Gyn follow up scheduled    # Elevated liver enzymes  -Uncertain significance  -Does complain of decreased appetite x 1 week, but no other GI symptoms  -no abdominal pain, outpatient follow up     # COPD  -Not wheezing on exam, workup not consistent with COPD exacerbation  -Not on any maintenance inhalers  -Home albuterol neb as needed     # Nicotine dependence  -Uses Nicotrol inhaler, but not in the past few days.  She declines nicotine replacement therapy here     # Numerous minor abrasions/lacerations secondary to dog bites  -Her dog bites her hands to get her attention and direct her to its needs.  Cautioned patient with immunocompromise state, should definitely avoid any opportunities for seeding of the bloodstream.  -Abrasions do not currently appear cellulitic.     # Chronic pain syndrome  -Due to remote pathologic thoracic vertebral fractures  -Okay to use home medical marijuana following facility protocol  -Okay for home Dilaudid, gabapentin, Robaxin     # Hypertension  -Home hydrochlorothiazide with hold parameters     # Insomnia, home melatonin.  Uses a very high dose and did reduce this " for hospital          Diet: Regular Diet Adult  Diet    DVT Prophylaxis: Enoxaparin (Lovenox) SQ  Stover Catheter: Not present  Lines: None     Cardiac Monitoring: None  Code Status: No CPR- Do NOT Intubate      Clinically Significant Risk Factors              # Hypoalbuminemia: Lowest albumin = 2.9 g/dL at 2/27/2024  6:46 AM, will monitor as appropriate     # Hypertension: Noted on problem list                   Disposition Plan      Expected Discharge Date: 03/01/2024,  6:00 PM  Discharge Delays: IV Medication - consider oral or Home Infusion  Oxygen Needs - Arrange Home O2    Discharge Comments: ID consult; PT/OT signed off            Dov Lynn MD  Hospitalist Service  [facility]  Securely message with [employer] (more info)  Text page via [employer] Paging/Directory   ______________________________________________________________________        EVENTS HISTORY  :       March 1 :     Home oxygen evaluation done   Needing 2 liters of oxygen with activity  Ordered home oxygen  Did discharge work  Patient to discharge this evening if she gets home oxygen on time        Physical Exam   Vital Signs: Temp: 97.4  F (36.3  C) Temp src: Oral BP: 115/58 Pulse: 62   Resp: 16 SpO2: 98 % O2 Device: Nasal cannula with humidification Oxygen Delivery: 2 LPM  Weight: 122 lbs 12.8 oz       GENERAL: The patient is not in any acute distressed. Awake and alert.  HEENT: Nonicteric sclerae, PERRLA, EOMI. Oropharynx clear. Moist mucous membranes. Conjunctivae appear well perfused.  HEART: Regular rate and rhythm without murmurs.  LUNGS: Clear to auscultation bilaterally. No wheezing or crackles.  ABDOMEN: Soft, positive bowel sounds, nontender.  SKIN: No rash, no excessive bruising, petechiae, or purpura.  EXTREMITIES : no rashes, no swelling in legs.  NEUROLOGIC: conscious and oriented, follows commands, no obvious focal deficits.  ROS: All other systems negative       Medical Decision Making       60 MINUTES SPENT  BY ME on the date of service doing chart review, history, exam, documentation & further activities per the note.  MANAGEMENT DISCUSSED with the following over the past 24 hours: rn, patient       Data     I have personally reviewed the following data over the past 24 hrs:    3.6 (L)  \   12.4   / N/A     N/A N/A N/A /  N/A   3.7 N/A N/A \     Trop: 17 (H) BNP: 524

## 2024-03-01 NOTE — PLAN OF CARE
Problem: Pain Chronic (Persistent)  Goal: Optimal Pain Control and Function  Intervention: Optimize Psychosocial Wellbeing  Recent Flowsheet Documentation  Taken 2/29/2024 2345 by Angeles Forbes, RN  Supportive Measures: active listening utilized   Goal Outcome Evaluation:  A&Ox4.  2L NC, continues to have cough with green sputum.  No telemetry.  Independent in room.  Chronic pain to back, rating 6/10, pt. Offered pain meds but refused, c/o ear pain.      Angeles Forbes, RN

## 2024-03-01 NOTE — PROVIDER NOTIFICATION
Pt requesting nighttime dose of Lovenox to be restarted since OB cleared her to take and ultrasounds were completed. Reached out to on call hospitalist. Ordered and given

## 2024-03-01 NOTE — PROGRESS NOTES
Oxygen Documentation  I certify that this patient, Lizzie Viera has been under my care (or a nurse practitioner or physician's assistant working with me). This is the face-to-face encounter for oxygen medical necessity.      At the time of this encounter, I have reviewed the qualifying testing and have determined that supplemental oxygen is reasonable and necessary and is expected to improve the patient's condition in a home setting.       Patient has continued oxygen desaturation due to COPD J44.9.    If portability is ordered, is the patient mobile within the home? yes

## 2024-03-01 NOTE — PLAN OF CARE
RCAT Treatment Plan    Patient Score: 10  Patient Acuity: 4    Clinical Indication for Therapy: COPD    Therapy Ordered: Duoneb BID, Albuterol neb Q4hrs and Albuterol MDI q6hrs prn.    Assessment Summary: Patient is on 2lpm nasal cannula. Pt does not use home oxygen. Patient was admitted with right-sided pneumonia and states she has been coughing green sputum.Plan is to continue current therapy as ordered and re-evaluate in 72 hours or as needed.    Analy Barrios, RT  2/29/2024

## 2024-03-01 NOTE — PROGRESS NOTES
Patient has been assessed for Home Oxygen needs. Oxygen readings:    *Pulse oximetry (SpO2) = 93% on room air at rest while awake.    *SpO2 improved to 94% on 1liters/minute at rest.    *SpO2 = 87% on room air during activity/with exercise.    *SpO2 improved to 92% on 3    liters/minute during activity/with exercise.

## 2024-03-02 NOTE — PROGRESS NOTES
AOx4, VSS on 2L NC, pain controlled with PRN robaxin and tylenol. Up independently in room.    Discharged home with Home O2 via wheelchair with family at approx 1100. No acute events reported at this time.    Lm Marr RN

## 2024-03-02 NOTE — PLAN OF CARE
Problem: Adult Inpatient Plan of Care  Goal: Optimal Comfort and Wellbeing  Outcome: Progressing     Problem: Pneumonia  Goal: Fluid Balance  Outcome: Progressing   Goal Outcome Evaluation:       Alert/orientedd, denies pain. On 2 L via nc. Independent in room, anticipated discharge today.

## 2024-03-02 NOTE — PLAN OF CARE
Problem: Adult Inpatient Plan of Care  Goal: Optimal Comfort and Wellbeing  Outcome: Progressing  Intervention: Monitor Pain and Promote Comfort  Recent Flowsheet Documentation  Taken 3/1/2024 2000 by Staci Ann RN  Pain Management Interventions:   care clustered   medication offered but refused     Problem: Adult Inpatient Plan of Care  Goal: Readiness for Transition of Care  Outcome: Progressing   Goal Outcome Evaluation:    Pt is alert and oriented x4. On 2L of O2 via nasal cannula. Pt has dyspnea on exertion, productive cough. Completed home O2 assessment today.   Pt is up independently in the room. Eating, drinking and voiding well. Vsited with family at bedside. Able to make her needs know.     Plan to discharge to home tomorrow AM, home oxygen arrived this evening.     Staci Ann RN.

## 2024-03-02 NOTE — PROGRESS NOTES
Care Management Discharge Note    Discharge Date: 03/02/2024       Discharge Disposition:  Home      Additional Information:  Discharge anticipated. No CM needs identified.    CARMEN Jay

## 2024-03-02 NOTE — DISCHARGE SUMMARY
[facility] MEDICINE  DISCHARGE SUMMARY     Primary Care Physician: Sona Sandoval  Admission Date: 2/26/2024   Discharge Provider: Sussy Damon MD Discharge Date: 3/2/2024   Diet:   Active Diet and Nourishment Order   Procedures    Regular Diet Adult    Diet       Code Status: No CPR- Do NOT Intubate   Activity: DCACTIVITY: Activity as tolerated        Condition at Discharge: Stable     REASON FOR PRESENTATION(See Admission Note for Details)     SOB    PRINCIPAL & ACTIVE DISCHARGE DIAGNOSES     Principal Problem:    Pneumonia  Active Problems:    Chronic midline thoracic back pain    Tobacco use    Multiple myeloma not having achieved remission (H)    Neutropenic fever  (H24)    Acute respiratory failure with hypoxia (H)      PENDING LABS     Unresulted Labs Ordered in the Past 30 Days of this Admission       Date and Time Order Name Status Description    2/26/2024  6:04 PM Blood Culture Arm, Right Preliminary     2/26/2024  6:04 PM Blood Culture Peripheral Blood Preliminary               PROCEDURES ( this hospitalization only)          RECOMMENDATIONS TO OUTPATIENT PROVIDER FOR F/U VISIT     Follow-up Appointments     Follow-up and recommended labs and tests       Follow up with primary care provider, Sona Sandoval, within 7 days for   hospital follow- up.   follow up labs or test are needed - cbc on Monday    Follow up with Oncology       As advised                DISPOSITION     Home    SUMMARY OF HOSPITAL COURSE:      Lizzie is a 73-year-old female who has history of multiple myeloma on maintenance therapy with Revlimid, chronic pain syndrome from multiple thoracic pathology fractures, COPD, nicotine dependence, hypertension, insomnia who was admitted for shortness of breath and found to have extensive right-sided pneumonia and neutropenia.    # Neutropenic fever  # Extensive right-sided pneumonia  # Probable sinus infection  -Patient presented with 3 days of fever  with Tmax of 101 and found to be neutropenic with ANC of thousand.  Patient also had elevated procalcitonin but not specific given her immunocompromise state.    -CT showing extensive right-sided pneumonia.  -Sputum culture : no growth.  Blood cultures : no growth. Urine Strep pneumo and Legionella tests - negative  - Started on Levaquin, vancomycin iv, meropenem--> this was transition to Levaquin, clindamycin and doxycycline for 1 week per ID recommendation.  -Patient is on home Revlimid for multiple myeloma, will be on hold until completion of antibiotics.  -Neutropenia has improved now.  -Appreciate ID and oncology input while here.       # Acute hypoxic respiratory failure  # Chronic sleep-related hypoxia  -Patient presented with hypoxia with saturation in 80s.  Per patient she was recently diagnosed with sleep-related hypoxia and was prescribed nocturnal oxygen but the equipment was not delivered prior to this admission.  -Likely underlying COPD.  -Home evaluation was done here and patient qualified for 2 L of oxygen with activity and this was arranged prior to discharge.  -Follow-up with pulmonology as outpatient.       # Multiple myeloma  -Maintained on Revlimid, patient of Dr. Blanco  -As above appears now experiencing opportunistic infection related to Revlimid.,  This would be on hold for 1 week while patient is on antibiotics.     # Post menopausal bleeding :  hb stable, pelvic ultrasound - Simple left paraovarian cyst, Gyn consulted, outpatient Gyn follow up scheduled     # Elevated liver enzymes  -Uncertain significance  -Does complain of decreased appetite x 1 week, but no other GI symptoms  -No abdominal pain, outpatient follow up     # COPD-not in exacerbation  -Continue inhalers.     # Nicotine dependence  -Declined nicotine replacement while here.    # Numerous minor abrasions/lacerations secondary to dog bites  -Her dog bites her hands to get her attention and direct her to its needs.  Cautioned  patient with immunocompromise state, should definitely avoid any opportunities for seeding of the bloodstream.  -Abrasions do not currently appear cellulitic.     # Chronic pain syndrome  -Due to remote pathologic thoracic vertebral fractures  -Continue home medications on discharge.     # Hypertension-blood pressure stable  -Continue home hydrochlorothiazide.     # Insomnia, : On melatonin, takes 20 mg at bedtime, this was reduced to 10 mg at bedtime.    .Patient stable to be discharged home today. Please refer to discharge medications and instructions for more details.        Discharge Medications with Med changes:     Current Discharge Medication List        START taking these medications    Details   benzonatate (TESSALON) 100 MG capsule Take 1 capsule (100 mg) by mouth 3 times daily as needed for cough  Qty: 20 capsule, Refills: 0    Associated Diagnoses: Pneumonia of right lung due to infectious organism, unspecified part of lung; Acute respiratory failure with hypoxia (H)      clindamycin (CLEOCIN) 300 MG capsule Take 1 capsule (300 mg) by mouth every 8 hours for 7 days  Qty: 21 capsule, Refills: 0    Associated Diagnoses: Pneumonia of right lung due to infectious organism, unspecified part of lung      doxycycline hyclate (VIBRAMYCIN) 100 MG capsule Take 1 capsule (100 mg) by mouth every 12 hours for 7 days  Qty: 14 capsule, Refills: 0    Associated Diagnoses: Pneumonia of right lung due to infectious organism, unspecified part of lung      levofloxacin (LEVAQUIN) 750 MG tablet Take 1 tablet (750 mg) by mouth every 24 hours for 7 days  Qty: 7 tablet, Refills: 0    Associated Diagnoses: Pneumonia of right lung due to infectious organism, unspecified part of lung           CONTINUE these medications which have CHANGED    Details   Melatonin 10 MG TABS tablet Take 1 tablet (10 mg) by mouth at bedtime           CONTINUE these medications which have NOT CHANGED    Details   albuterol (PROAIR HFA/PROVENTIL  HFA/VENTOLIN HFA) 108 (90 Base) MCG/ACT inhaler Inhale 2 puffs into the lungs every 6 hours as needed for wheezing or shortness of breath / dyspnea  Qty: 18 g, Refills: 1    Comments: Pharmacy may dispense brand covered by insurance (Proair, or proventil or ventolin or generic albuterol inhaler)  Associated Diagnoses: COPD exacerbation (H)      albuterol (PROVENTIL) (2.5 MG/3ML) 0.083% neb solution Take 1 vial (2.5 mg) by nebulization every 4 hours as needed for wheezing or shortness of breath / dyspnea  Qty: 90 mL, Refills: 0    Associated Diagnoses: COPD exacerbation (H)      aspirin (ASA) 81 MG chewable tablet Take 81 mg by mouth daily      fish oil-omega-3 fatty acids 1000 MG capsule Take 2 g by mouth daily      gabapentin (NEURONTIN) 300 MG capsule Take 2 capsules (600 mg) by mouth 3 times daily  Qty: 180 capsule, Refills: 3    Associated Diagnoses: Thoracic spinal stenosis; Myofascial pain      hydrochlorothiazide (HYDRODIURIL) 12.5 MG tablet Take 1 tablet (12.5 mg) by mouth daily  Qty: 90 tablet, Refills: 4    Associated Diagnoses: Essential hypertension, benign      HYDROmorphone (DILAUDID) 4 MG tablet Take 0.5-1 tablets (2-4 mg) by mouth every 4 hours as needed for moderate to severe pain  Qty: 60 tablet, Refills: 0    Associated Diagnoses: Cancer associated pain      LENalidomide (REVLIMID) 10 MG CAPS capsule Take by mouth daily      medical cannabis (Patient's own supply) See Admin Instructions (The purpose of this order is to document that the patient reports taking medical cannabis.  This is not a prescription, and is not used to certify that the patient has a qualifying medical condition.)     Oil formulation      Menaquinone-7 (K2 PO) Take 1 capsule by mouth daily      methocarbamol (ROBAXIN) 500 MG tablet Take 1 tablet (500 mg) by mouth 4 times daily as needed for muscle spasms  Qty: 120 tablet, Refills: 11    Associated Diagnoses: Cancer associated pain      Milk Thistle-Dand-Fennel-Licor (MILK  "THISTLE XTRA) CAPS capsule Take 1 capsule by mouth daily      NALTREXONE HCL PO Take 3 mg by mouth daily      TURMERIC PO Take 1 capsule by mouth daily      !! UNABLE TO FIND 1 capsule daily MEDICATION NAME: Serrapeptase      !! UNABLE TO FIND 1 capsule daily MEDICATION NAME: Edita Mariano Mushroom      !! UNABLE TO FIND 1 capsule daily MEDICATION NAME: DIM (diinolylmethane)      !! UNABLE TO FIND MEDICATION NAME: Panacur C - 1 capsule daily      Vitamin D, Cholecalciferol, 25 MCG (1000 UT) CAPS Take 1,000 Units by mouth daily Liquid, not capsule       !! - Potential duplicate medications found. Please discuss with provider.                Rationale for medication changes:      See med rec        Consults       PHYSICAL THERAPY ADULT IP CONSULT  OCCUPATIONAL THERAPY ADULT IP CONSULT  RESPIRATORY CARE IP CONSULT  PHARMACY TO DOSE VANCO  HEMATOLOGY & ONCOLOGY IP CONSULT  INFECTIOUS DISEASES IP CONSULT  OB GYN IP CONSULT    Immunizations given this encounter     Most Recent Immunizations   Administered Date(s) Administered    DT (PEDS <7y) 07/16/2002    Flu, Unspecified 12/04/2007    Influenza (H1N1) 01/11/2010    Influenza Vaccine, 6+MO IM (QUADRIVALENT W/PRESERVATIVES) 11/08/2010    Pneumo Conj 13-V (2010&after) 08/23/2018    TDAP (Adacel,Boostrix) 08/23/2018    Td,adult,historic,unspecified 07/16/2002           Anticoagulation Information      Recent INR results: No results for input(s): \"INR\" in the last 168 hours.  Warfarin doses (if applicable) or name of other anticoagulant: N/A      SIGNIFICANT IMAGING FINDINGS     Results for orders placed or performed during the hospital encounter of 02/26/24   XR Chest Port 1 View    Impression    IMPRESSION: Asymmetric interstitial and minimal peribronchiolar opacities have developed in the right lower lobe suggestive of a developing bronchopneumonia. No effusion.     Left lung is clear. Heart and pulmonary vascularity are normal.    NOTE: ABNORMAL REPORT    THE DICTATION " ABOVE DESCRIBES AN ABNORMALITY FOR WHICH FOLLOW-UP IS NEEDED. .        CT Chest Pulmonary Embolism w Contrast    Impression    IMPRESSION:  1.  No acute pulmonary emboli.    2.  Extensive pulmonary airspace opacities, likely infectious or inflammatory.   XR Chest Port 1 View    Impression    IMPRESSION: Stable heart size. No pleural effusions. Increasing interstitial prominence bilaterally with the appearance of curly B lines. These findings most likely represent asymmetric interstitial edema. Cephalic pulmonary vessels are prominent also.   These findings are most likely asymmetric CHF.   US Pelvic Complete with Transvaginal    Impression    IMPRESSION:  Simple left paraovarian cyst. No additional imaging follow-up needed. Otherwise normal ultrasound.    Postmenopausal asymptomatic simple cyst:    >1cm to <= 3 cm: Clinically inconsequential finding. No follow-up needed.    REFERENCE:  Management of Asymptomatic Ovarian and Other Adnexal Cysts Imaged at US: Society of Radiologists in Ultrasound Consensus Conference Statement. Radiology September 2010; [id number].    Simple Adnexal Cysts: SRU Consensus Conference Update on Follow-up and Reporting. Radiology September 2019. [phone].       SIGNIFICANT LABORATORY FINDINGS       Discharge Orders        Adult Allergy/Asthma  Referral      Primary Care - Care Coordination Referral      Reason for your hospital stay    Neutropenic fever     Follow-up and recommended labs and tests     Follow up with primary care provider, Sona Sandoval, within 7 days for hospital follow- up.   follow up labs or test are needed - cbc on Monday    Follow up with Oncology       As advised     Activity    Your activity upon discharge: activity as tolerated     Oxygen Adult/Peds    Oxygen Documentation  I certify that this patient, Lizzie Viera has been under my care (or a nurse practitioner or physician's assistant working with me). This is the face-to-face encounter for  oxygen medical necessity.      At the time of this encounter, I have reviewed the qualifying testing and have determined that supplemental oxygen is reasonable and necessary and is expected to improve the patient's condition in a home setting.       Patient has continued oxygen desaturation due to COPD J44.9.    If portability is ordered, is the patient mobile within the home? yes     Diet    Follow this diet upon discharge: Orders Placed This Encounter      Regular Diet Adult     CBC with Platelets & Differential       Examination   Physical Exam   Temp:  [97.4  F (36.3  C)-98.5  F (36.9  C)] 98  F (36.7  C)  Pulse:  [60-68] 60  Resp:  [16-18] 18  BP: (109-115)/(44-58) 113/56  SpO2:  [97 %-98 %] 97 %  Wt Readings from Last 1 Encounters:   02/26/24 55.7 kg (122 lb 12.8 oz)       General: Pleasant, elderly female in NAD  HEENT:EOMI, AT,NC  CVS:RRR, no edema  RS:CTAB, diminished BS  Abd: Soft, NT,ND  Neurology:Grossly normal  Psy:Approrpiate affect        Please see EMR for more detailed significant labs, imaging, consultant notes etc.    ISussy MD, personally saw the patient today and spent greater than 30 minutes discharging this patient.    Sussy Damon MD  [facility]    CC:Sona Sandoval

## 2024-03-04 NOTE — PROGRESS NOTES
Clinic Care Coordination Contact  [facility]/Voicemail    Clinical Data: Care Coordinator Outreach    Outreach Documentation Number of Outreach Attempt   3/4/2024  10:55 AM 1       Left message on patient's voicemail with call back information and requested return call.    Plan:  Care Coordinator will try to reach patient again in 1-2 business days.    David Myhre, RN   CCC RN

## 2024-03-04 NOTE — TELEPHONE ENCOUNTER
Hospital follow up appointment discharged 3/2    Patient calling trying to get a hospital follow up appointment with PCP.    Patient discharged on 3/2/24 from [place].    Writer able to schedule patient with PCP for hospital follow up on 3/13/24.    Routing to provider to ensure patient does not need to be seen sooner.     AROLDO FayN, RN  [facility]

## 2024-03-05 NOTE — PROGRESS NOTES
Clinic Care Coordination Contact  [facility]/Voicemail    Clinical Data: Care Coordinator Outreach    Outreach Documentation Number of Outreach Attempt   3/4/2024  10:55 AM 1   3/5/2024   3:56 PM 2       Left message on patient's voicemail with call back information and requested return call.    Plan: Care Coordinator will send care coordination introduction letter with care coordinator contact information and explanation of care coordination services via [employer]. Care Coordinator will do no further outreaches at this time.    David Myhre, RN   CCC RN

## 2024-03-05 NOTE — TELEPHONE ENCOUNTER
Received a phone call from Valeria today. She is home from the hospital and continues on antibiotics. She was told to hold her revlimid while on antibiotics, which she has been doing. She states the last day of antibiotics is Friday. Should she resume revlimid on Saturday? She can be reached at [phone].     Bella Foster RN on 3/5/2024 at 10:34 AM

## 2024-03-05 NOTE — LETTER
[facility] CARE COORDINATION  [facility]    March 5, 2024    Lizzie VILLARREAL Viera  [address] [place] [address]      Dear Valeria,    I am a clinic care coordinator who works with Sona Sandoval MD with the [facility]. I have been trying to reach you recently to introduce Clinic Care Coordination. Below is a description of clinic care coordination and how I can further assist you.       The clinic care coordination team is made up of a registered nurse, , financial resource worker and community health worker who understand the health care system. The goal of clinic care coordination is to help you manage your health and improve access to the health care system. Our team works alongside your provider to assist you in determining your health and social needs. We can help you obtain health care and community resources, providing you with necessary information and education. We can work with you through any barriers and develop a care plan that helps coordinate and strengthen the communication between you and your care team.  Our services are voluntary and are offered without charge to you personally.    Please feel free to contact me with any questions or concerns regarding care coordination and what we can offer.      We are focused on providing you with the highest-quality healthcare experience possible.    Sincerely,     David Myhre, RN  [facility] RN  [phone]

## 2024-03-05 NOTE — TELEPHONE ENCOUNTER
I could see her at 10:50 (10:30 arrival) on 3/7, otherwise will need to wait until visit as scheduled.  VJ

## 2024-03-05 NOTE — TELEPHONE ENCOUNTER
Writer called patient and left message to return call.     If patient returns call please relay Dr. Sandoval message below and assist with rescheduling appointment if she would like to change appointments.    AROLDO FayN, RN  [facility]

## 2024-03-05 NOTE — PROGRESS NOTES
[facility]: Post-Discharge Note  SITUATION                                                      Admission: [facility] 2/26/2024 for pneumonia          Discharge:    3/2/2024    BACKGROUND                                                      Per hospital discharge summary and inpatient provider notes.      ASSESSMENT       Patient was discharged on 3/2/2024 on antibiotics for pneumonia.  Per Dr Faria who is on-call, patient should hold Revlimid until she has completed her round of antibiotics.  Patient called in today stating that her antibiotics will finish on 3/8/2024.  She is wondering if she should restart her Revlimid on 3/9/2024.  Per Zehra Pablo CNP, as long as patient is feeling up to it, she can resume her Revlimid.  Patient tells me that the antibiotics are kind of tearing up her stomach.  I did tell her to wait on restarting her Revlimid until those symptoms have resolved after completing her antibiotic.  She will give us a call and let us know when she restarts her Revlimid.    Of note, patient is having myeloma labs drawn on 4/3 and will follow-up with Dr Blanco in the clinic on 4/10.      PLAN                                                      Outpatient Plan: Patient will call us when she restart her Revlimid    Future Appointments   Date Time Provider Department Center   3/7/2024  3:00 PM Karen Nunez AuD WIAUDI [facility]   3/11/2024  2:00 PM [facility]   3/13/2024 12:50 PM Sona Sandoval MD OKMYRON [facility]   3/15/2024 11:00 AM Mario Long [facility]   3/27/2024  8:00 AM Mario Long LP [facility]   4/3/2024  1:30 PM REUBEN CHEMO LAB DRAW 1 [facility]   4/10/2024  2:45 PM Loco Blanco MD [facility]   5/21/2024 10:40 AM Yuri Salazar MD [facility]   7/15/2024 11:00 [facility]   1/27/2025 10:30 AM Sona Sandoval MD OKFMOB [facility] JOSE         For  any urgent concerns, please contact our 24 hour clinic line:   [place]: [phone]       Aliza Sheehan RN

## 2024-03-07 PROBLEM — H90.A21 SENSORINEURAL HEARING LOSS (SNHL) OF RIGHT EAR WITH RESTRICTED HEARING OF LEFT EAR: Status: ACTIVE | Noted: 2024-01-01

## 2024-03-07 NOTE — PROGRESS NOTES
AUDIOLOGY REPORT    SUBJECTIVE:  Lizzie Viera is a 73 year old female who was seen in the Audiology Clinic at the [facility] for audiologic evaluation, referred by Sona Sandoval M.D. The patient reported decreased hearing ability, bilaterally, over the past 2-3 years. She is finding she needs to TV volume higher, and has some difficulty understanding her grandchildren's voices. She has history of migraine and multiple myeloma (treated with both chemotherapy and radiation). She was hospitalized last week for pneumonia and reported treatment with vancomycin. She endorsed bilateral aural fullness, bilateral tinnitus (with onset seven years ago), which is occasionally pulsatile, and bilateral otalgia. She denied otorrhea or vertigo.     OBJECTIVE:  Abuse Screening:  Do you feel unsafe at home or work/school? No  Do you feel threatened by someone? No  Does anyone try to keep you from having contact with others, or doing things outside of your home? No  Physical signs of abuse present? No     Fall Risk Screen:  1. Have you fallen two or more times in the past year? No  2. Have you fallen and had an injury in the past year? No    Is patient a fall risk? No  Referral initiated: No  Fall Risk Assessment Completed by Audiology    Otoscopic exam indicates ears are clear of cerumen, bilaterally.     Pure Tone Thresholds assessed using conventional audiometry with good  reliability from 250-8000 Hz bilaterally using insert earphones and circumaural headphones.     RIGHT:  mild sloping to moderate-severe sensorineural hearing loss    LEFT:    borderline-normal sloping to moderate-severe sensorineural hearing loss    Tympanogram:    RIGHT: shallow eardrum mobility with negative middle ear pressure    LEFT:   normal eardrum mobility    Reflexes for 1000 Hz (reported by stimulus ear):  RIGHT: Ipsilateral is absent at frequencies tested  RIGHT: Contralateral is absent at frequencies tested  LEFT:    Ipsilateral is present at normal levels  LEFT:   Contralateral is absent at frequencies tested      Speech Reception Threshold:    RIGHT: 35 dB HL    LEFT:   25 dB HL  Word Recognition Score:     RIGHT: 96% at 75 dB HL using NU-6 recorded word list.    LEFT:   100% at 70 dB HL using NU-6 recorded word list.      ASSESSMENT:     ICD-10-CM    1. Sensorineural hearing loss (SNHL) of right ear with restricted hearing of left ear  H90.A21       2. Bilateral hearing loss, unspecified hearing loss type  H91.93 Adult Audiology  Referral      3. Tinnitus of both ears  H93.13       4. Sensation of fullness in both ears  H93.8X3           Today s results were discussed with the patient in detail. Appropriate communication strategies were discussed at length, as were reasonable expectations regarding hearing at a distance, in noisy environments, or when attention is diverted elsewhere.    ENT consult is recommended due to low-frequency asymmetry to hearing loss and occasional pulsatile tinnitus.    PLAN:  Patient was counseled regarding hearing loss and impact on communication. Patient is a potential candidate for binaural amplification at this time, pending medical clearance. Ms. Viera was encouraged to check on her amplification benefits with her health insurance carrier, to determine her financial responsibility as well as where those benefits may be used. She was provided with a copy of today's audiogram and written information on the [facility] hearing aid program.    Ms. Viera should return for hearing evaluation annually to monitor current hearing thresholds, or per medical management/patient concern. Wear hearing protection consistently in noise to preserve residual hearing sensitivity and to minimize the effects of noise on tinnitus. Ms. Viera expressed verbal understanding of this information and plan. Please call this clinic with questions regarding these results or  recommendations.        Ana Laura Viera, [facility]  [facility] Audiologist [phone]

## 2024-03-07 NOTE — TELEPHONE ENCOUNTER
No sooner appt with PCP. Closing out encounter.    Mercedes Roth, AROLDON, RN  [facility]

## 2024-03-11 NOTE — PROGRESS NOTES
Assessment/Plan:      Valeria was seen today for neck pain.    Diagnoses and all orders for this visit:    Achilles tendinitis of both lower extremities    Cervical spine pain    Thoracic spine pain    Myofascial pain    Somatic dysfunction of head region  -     OSTEOPATHIC MANIP,5-6 BODY REGN    Somatic dysfunction of cervical region  -     OSTEOPATHIC MANIP,5-6 BODY REGN    Somatic dysfunction of rib region  -     OSTEOPATHIC MANIP,5-6 BODY REGN    Somatic dysfunction of upper extremity  -     OSTEOPATHIC MANIP,5-6 BODY REGN    Somatic dysfunction of thoracic region  -     OSTEOPATHIC MANIP,5-6 BODY REGN         Assessment: Pleasant 73 year old female with a history of hyperlipidemia, anxiety, depression, irritable bowel, neuropathy, osteoporosis and multiple myeloma with a T7 fracture and lesion  resulting in spinal stenosis:     Bilateral heel pain consistent with Achilles tendinitis.  Possibly from recent Levaquin use.        Increased  chronic cervical and chronic thoracic pain around the mid to upper thoracic spine radiating up to the cervical spine and suboccipital region with headaches.   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  No signs of thoracic myelopathy neurologically stable. Fracture is stable on MRI from 2022 T1 and T7.  Symptoms increased with increased housework recently.   Continues with gabapentin 600 mg 3 times daily.  Continues with physical therapy.  OMT is very helpful for several days at a time.  Recent increase flare after hospitalization.        3.    cervical spine upper thoracic pain consistent myofascial pain with physical therapy.      4.  Somatic dysfunctions of the cranium, cervical spine, rib cage, upper extremities, thoracic spine  that contribute to the patient's pain complaints.              Discussion:    1.  I discussed the diagnosis and treatment options.  My main concern today is Achilles tendinitis.  She will monitor symptoms  ice relative rest.  She has now completed Levaquin my hope is that this will improve soon.  If her symptoms worsen can consider MRI.    2.  For her upper back pain and cervical spine pain which has increased recommend Osteopathic manipulative medicine she is a good candidate.  She agrees to proceed.  Please see attached procedure note.    3.  Follow-up with me as needed.      It was our pleasure caring for your patient today, if there any questions or concerns please do not hesitate to contact us.      Subjective:   Patient ID: Lizzie Viera is a 73 year old female.    History of Present Illness: Patient presents today for evaluation of worsening cervical spine upper thoracic spine pain along with evaluation of bilateral heel pain.  She was recently hospitalized for pneumonia and spent several days in the hospital on antibiotics.  Started on Levaquin and when she started on that her bilateral heels began to hurt.  She was doing some physical therapy exercises of heel pumps for lymphatics and began having pain and is now decreased those exercises but the pain is still worse with standing and walking and tender to touch.  She is wondering my opinion.    She also has cervical spine and upper thoracic spine pain which increased after being hospitalized laying in the hospital bed.  Worse with using her arms better with rest.  Pain is a 9/10 at worst 6/10 today 1/10 at best.  Osteopathic manipulative medicine's to be helpful for her.  Continues on gabapentin methocarbamol and THC.  Last OMT treatment was very helpful for at least 3 to 4 days.         Review of Systems: Complains of paresthesias in the feet and weakness in general.  Has headaches difficulty swallowing.  Denies bowel or bladder incontinence, falls, fevers and unintentional weight loss.           Past Medical History:   Diagnosis Date    Cervical dysplasia     Chronic RUQ pain     Depressive disorder     Multiple myeloma (H)     Osteoporosis         The following portions of the patient's history were reviewed and updated as appropriate: allergies, current medications, past family history, past medical history, past social history, past surgical history and problem list.           Objective:   Physical Exam:    BP (!) 149/70   Pulse 65   LMP  (LMP Unknown)   There is no height or weight on file to calculate BMI.      General: Alert and oriented with normal affect. Attention, knowledge, memory, and language are intact. No acute distress.   Eyes: Sclerae are clear.  Respirations: Unlabored. CV: No lower extremity edema.     Gait:  Nonantalgic  Tenderness over the bilateral Achilles tendon.  Sensation is intact to light touch throughout the upper  extremities.  Reflexes: Negative.    Manual muscle testing reveals:  Right /Left out of 5     5/5 elbow flexors  5/5 elbow extensors  5/5 wrist extensors  5/5 interosseus  5/5 finger flexors       Structural exam: Cranium: Left cranial torsion, OA sidebent right rotated left C2 rotated left sidebent left, cervical spine: C3 rotated right sidebent right, C5 rotated left sidebent left. Rib cage: Rib one elevated on the left. Thoracic spine: T1 rotated right sidebent right  Upper Extremities: myofascial restrictions of the bilat upper trap, infraspinatus/parascapular muscles. bilateral glenohumeral resrictions.    Procedure:    After discussing the risks and benefits of osteopathic manipulative medicine, verbal consent was obtained. The somatic dysfunctions listed above were treated with the following techniques: Cranium: Cranial indirect technique, VSD, and muscle energy for the OA. Cervical spine: Muscle energy, still technique, FPR, myofascial release, BLT, and soft tissue techniques. Rib cage: Myofascial release and FPR. Thoracic spine: Myofascial release, BLT.  Upper Exrtremity: MFR, FPR, BLT.  The patient tolerated the procedure well and had improved range of motion in all areas treated prior to leaving the  clinic.

## 2024-03-11 NOTE — PATIENT INSTRUCTIONS
Monitor your achilles tendons. Relative rest.  If it worsens, please call.  Osteopathic manual medicine today

## 2024-03-13 PROBLEM — H90.3 ASYMMETRICAL SENSORINEURAL HEARING LOSS: Status: ACTIVE | Noted: 2024-01-01

## 2024-03-13 NOTE — PROGRESS NOTES
[facility]: Cancer Care                                                                                          Patient calls in today to let us know that she is feeling much better and has restarted her Revlimid today.  She states that she has about a week and a half's worth of Revlimid left at home.  She wants to be certain that we are able to get this reordered and sent out to her.  I let her know that I will update Dr Blanco as well as our oral chemotherapy pharmacist so we can get this process going for her.  She verbalized understanding and was very appreciative.    Of note, patient is following up and having her myeloma labs drawn on 4/3 and seeing Dr Blanco on 4/10 to review results.    Signature:  Aliza Sheehan RN

## 2024-03-13 NOTE — PROGRESS NOTES
Assessment & Plan     Pneumonia due to infectious organism, unspecified laterality, unspecified part of lung  Acute on chronic respiratory failure with hypoxia (H)  Significant improvement in symptoms, oxygenation at home is back to baseline.  Continues to be fatigued but steadily improving.  Advised tobacco cessation.  Advised continued measures to manage emphysema.  She will follow-up with pulmonology.  - Adult Pulmonary Medicine  Referral; Future    Panlobular emphysema (H)  Advised tobacco cessation, current treatment regimen.  Follow-up with pulmonology.  Referral placed.    Allergic rhinitis, unspecified seasonality, unspecified trigger  Suspect persistent sinus inflammation is not due to infection but instead due to ongoing inflammatory effect.  Initiate fluticasone nasal spray.  - fluticasone (FLONASE) 50 MCG/ACT nasal spray; Spray 2 sprays into both nostrils daily    Achilles tendinitis, left  Timing is very suspicious for side effect from levofloxacin.  Add this to her allergy list but reviewed with her that tendinitis is not an absolute future contraindication but we should certainly be thoughtful if electing to prescribe fluoroquinolone antibiotic in the future.    Asymmetrical sensorineural hearing loss  Referral made to ENT per audiology recommendation.  - Adult ENT  Referral; Future    Neutropenic fever  (H24)  Suspect this is related to chemotherapy for multiple myeloma.  Followed by oncology.    Celiac artery stenosis (H24)  This remains asymptomatic.          MED REC REQUIRED  Post Medication Reconciliation Status: discharge medications reconciled and changed, per note/orders        Subjective   Valeria is a 73 year old, presenting for the following health issues:  Hospital F/U    For follow-up of hospitalization at [facility] from 2/26/2024 through 3-24 with acute bilateral pneumonia and hypoxia, neutropenic fever thought to be due to pneumonia in the setting of chemotherapy  "Revlimid for multiple myeloma, concern also for probable sinus infection.  She was seen by infectious disease, treated initially with IV broad-spectrum antibiotics and transition to combination of Levaquin, clindamycin, and doxycycline for 1 week upon discharge.  She has been doing better from that standpoint.  She was discharged on 2 L of oxygen with activity, monitors her oxygen level at home and has been able to keep her oxygen saturations above 90 with activity or rest.  She will be resuming chemotherapy under the direction of hematology.  She had some left heel pain, seen by Dr. Chatman of spine care and diagnosed with Achilles tendinitis, instructions given in regards to avoidance of further injury or rupture, thought to be due to Levaquin.  This is stable and she is managing well.  During inpatient stay she had some vaginal spotting, has had this in the past with Lovenox, has appointment scheduled with gynecology.  Continues to have significant sinus inflammation, drainage is now bland and clear.           Hospital Follow-up Visit:    Hospital/Nursing Home/IP Rehab Facility: [facility]  Date of Admission: 2-26-24  Date of Discharge: 3-2-24  Reason(s) for Admission: pneumonia     Was your hospitalization related to COVID-19? No   Problems taking medications regularly:  None  Medication changes since discharge: None  Problems adhering to non-medication therapy:  None    Summary of hospitalization:  [facility] discharge summary reviewed  Diagnostic Tests/Treatments reviewed.  Follow up needed: none  Other Healthcare Providers Involved in Patient s Care:         Specialist appointment - oncology, pulmonology, gynecology, ENT  Update since discharge: improved.         Plan of care communicated with patient                     Objective    /58   Pulse 53   Temp 98.3  F (36.8  C) (Temporal)   Resp 18   Ht 1.549 m (5' 1\")   Wt 57.4 kg (126 lb 9.6 oz)   LMP  (LMP " Unknown)   SpO2 98%   BMI 23.92 kg/m    Body mass index is 23.92 kg/m .  Physical Exam   Alert and pleasant.  Speaking in full sentences without respiratory distress, cough, or wheeze.  Oropharynx is clear.  Moist mucous membranes.  Heart with regular rate and rhythm.  Lungs clear.  Extremities without edema.            Signed Electronically by: Sona Sandoval MD

## 2024-03-14 NOTE — LETTER
11/18/2021         RE: Lizzie Viera  [address]  [place] [address]        Dear Colleague,    Thank you for referring your patient, Lizzie Viera, to the [facility]UROSURGERY [facility]. Please see a copy of my visit note below.    Pt reports her blood pressure is low because she is dehydrated. She denies dizziness, headache, lightheadedness, unsteadiness, weakness.     Cathy Singh RN     Neurosurgery progress note:    HPI:  Valeria Viera is a 70-year-old F hx multiple myeloma with a T1 lesion as well as a severe T7 pathologic fracture. Initially cleared of TLSO brace in July. Returned with worsening leg symptoms after a new fall. New TLSO brace fitted (previous no longer fit) and urgent radiation ordered as fracture was worse with epidural extension of tumor at T7 with cord compression. Dr Campbell patient.    Back pain stable, she has been wearing a brace at all times. Neuro stable, imaging stable. Discharge from clinic. Valeria can trial not wearing the brace and see how her pain is, she can wear the brace as needed for comfort.     Imaging and plan to discontinue brace discussed with Dr. Campbell who is in agreement.       Exam:  General: seated, comfortable-appearing, wearing TLSO brace which appears to fit well. Gait is stable.    She has 4/5 weakness of R dorsiflexion and R EHL  Full strength throughout upper and lower extremities otherwise.  No clonus  Sensation is decreased to light touch right shin/foot.  Sensation otherwise intact to light touch is intact throughout the upper and lower extremities.      Imaging:  Thoracic XR 11/18/2021 personally reviewed  Stable upright appearance of severe T7 fracture, stable segmental kyphosis.       Tara Martines CNP  [facility]  O: [phone]  P: [phone]        Again, thank you for allowing me to participate in the care of your patient.        Sincerely,        Tara Martines NP     Quality 402: Tobacco Use And Help With Quitting Among Adolescents: Patient screened for tobacco and never smoked Quality 226: Preventive Care And Screening: Tobacco Use: Screening And Cessation Intervention: Patient screened for tobacco use and is an ex/non-smoker Quality 130: Documentation Of Current Medications In The Medical Record: Current Medications Documented Quality 431: Preventive Care And Screening: Unhealthy Alcohol Use - Screening: Patient not identified as an unhealthy alcohol user when screened for unhealthy alcohol use using a systematic screening method Detail Level: Detailed

## 2024-03-15 NOTE — LETTER
3/15/2024         RE: Lizzie Viera  [address]  [place] [address]        Dear Colleague,    Thank you for referring your patient, Lizzie Viera, to the [facility]. Please see a copy of my visit note below.    See confidential note      Again, thank you for allowing me to participate in the care of your patient.        Sincerely,        Mario Long LP
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
## 2024-03-27 NOTE — LETTER
3/27/2024         RE: Lizzie Viera  [address]  [place] [address]        Dear Colleague,    Thank you for referring your patient, Lizzie Viera, to the [facility]. Please see a copy of my visit note below.    See confidential note      Again, thank you for allowing me to participate in the care of your patient.        Sincerely,        Mario Long LP

## 2024-04-10 NOTE — PROGRESS NOTES
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  A very pleasant 73 year old woman with IgG kappa multiple myeloma with hyperdiploid cytogenetics.  However clinically was behaving somewhat more aggressively.  Started on VRD treatment.  Responded quite well. After 17 cycles the treatment was switched to Revlimid 10 mg daily in September 2022.  Valeria has been tolerating it quite well.  The lab work-up in the end of November 2022 showed maintenance of response.  The labs since February 2023 have been pretty stable.  Kappa lambda light chains have been normal for most part.  In September 2023 kappa light chains were slightly above normal but is lambda light chains were also above their previous numbers.  The ratio really did not change a whole lot.  Immunoglobulins number were stable.  Albumin continues to be normal.  Hemoglobin is normal as well.  Current labs indicate pretty normal kappa lambda ratio.  In fact the Levels are below the lambda levels.  2.  Had significant bone disease with osteoporosis and compression fractures.  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  Improved significantly on the CT scan in March 2022.  MRI also shows no new lesions.  December 2022 bone density showed osteoporosis.  Has not been able to restart Zometa due to dental implant needs.  She is going tomorrow for finishing the dental implant process.  We can probably resume Zometa few months after that.  3.  Normal hemoglobin, calcium, kidney function along with beta-2 microglobulin and albumin at the time of  diagnosis.    4.  Positive Covid antibodies from infection. Unvaccinated.  In the past has declined Evusheld.  5.  Pain from compression fracture status post radiation therapy.  Significantly improved except had some pain this morning.  6.  Slight hypoxia during REM sleep.    Plan    Revlimid 10 mg p.o. daily.  We will follow-up with labs in 3 months timeframe.  At that time we will do immunofixation assay.  Continue on a diet rich in calcium and vitamin D.  Start bisphosphonate therapy as soon as the dental implants are done.  We would wait to 2-3 months after the dental implant to resume Zometa therapy.  Continue to use oxygen at nighttime.  Repeat sleep study again next year hopefully to see if she can get off of the oxygen.  The longitudinal plan of care for the diagnosis(es)/condition(s) as documented were addressed during this visit. Due to the added complexity in care, I will continue to support Valeria in the subsequent management and with ongoing continuity of care.      Cancer Staging   No matching staging information was found for the patient.      ECOG Performance    2 - Ambulatory and independent in all ADLs; cannot work; up > 50% of the time      History of Present Illness    Ms. Lizzie Viera is a very pleasant 73 year old woman who has been diagnosed with multiple myeloma IgG kappa type in May 2021.  She had initially presented with compression fracture of T7 vertebral body in September 2020.  She had a back pain which led to that evaluation.  Bone density confirmed that she had osteoporosis.  MRI showed diffuse marrow edema in October 2020.  In April 2021 the MRI of the thoracic spine showed worsening T7 vertebral body height loss because of likely pathological compression fracture with extraosseous extension.  She then had IR guided bone biopsy on 7 May 2021 and pathology confirmed the presence of kappa light chain restricted plasma cells.  Her cytogenetics confirmed the presence of  hyperdiploid E with gains of chromosome 5, 9 and 15.    She was then seen by Dr. Baig and had a bone marrow biopsy which also confirmed 60% involvement of the marrow with plasma cells.  The bone marrow was done on 3 Jocelin 2021.  The bone marrow also confirmed the presence of hyperdiploidy.  She had a gain of chromosome 3, 5, 7, 9, 11, 15 as well as 19.  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  Her beta-2 microglobulin was  normal at the time of her diagnosis as was her albumin at 4.0.  Monoclonal protein 3.1 g/dL.  Kappa free light chain levels of 13 mg/dL IgG level of 4280 with depressed levels of IgM and IgA.  Urine was also positive for kappa light chains as well as immunofixation of the urine was positive for IgG kappa.  Normal kidney function.  Normal hemoglobin.    As mentioned above she had bone lesions in the T7 vertebral body, T1, skull area.  Her main pain is coming from her T7 vertebral body compression fracture.    Due to the pain she has been seen by radiation oncology and has received radiation therapy.  She also got a dose of steroids for pain control.    She was initially thinking of doing some natural therapies but once her symptoms got worse, she came back in was counseled about treatment.      She started on Velcade Revlimid and dexamethasone in September 2021.  Responded nicely.  Immunoglobulins  normalized completely. Her immunoglobin levels have almost normalized in fact the IgG levels have gone below normal.  Immunofixation still shows a very faint kappa monoclonal gammopathy.    She was  hospitalized in April 2022 with COPD exacerbation/pneumonia.  Was given some steroids and antibiotic.  She was slightly hypoxic initially but then got better after that.    She was referred to [facility] for transplant evaluation.  She was somewhat reluctant to go forward with that.  Otherwise she seems to be doing quite well.  Her immunoglobulin levels have normalized or actually are below normal.   "Immunofixation faintly positive.    She has cracked a molar on the upper left jaw sometime in July 2022.  There was some tooth decay.  She had them extracted.    In September 2022 we discontinued the VRD treatment and now Valeria is on Revlimid maintenance 10 mg p.o. daily.  She seems to be tolerating it well.  She is physically more active so she is noticing some soreness in her back.    Had dental extraction done in October 2022 from the right lower jaw.  That seems to be healing well.  Underwent bone densitometry recently.  That confirms presence of osteoporosis which is not a new finding for her.    In February 2023 her labs were pretty stable.  She went to [place] so we gave her 2 to 3 weeks of break from Revlimid.  She restarted the treatment.  Had been pretty stable.    In February 2024 had an episode of pneumonia.  It looks like a viral pneumonia since she had neutropenia as well.  Was treated with IV antibiotics and later on transition to oral antibiotics.  It took her a while to recover from that.  Did develop possible tendinitis from Levaquin.    She also had a sleep study done which showed slight hypoxia during the REM phase of her sleep.  So she is on oxygen at nighttime.    Overall seems to be doing well.  Comes in today for scheduled follow-up.  Had labs done few days ago.    Review of system      Details noted in the history of present illness.  A detailed review of systems is otherwise negative.      Physical exam        BP (!) 157/67 (BP Location: Right arm, Patient Position: Sitting, Cuff Size: Adult Regular)   Pulse 51   Temp 97.8  F (36.6  C) (Oral)   Resp 16   Ht 1.549 m (5' 1\")   Wt 57.1 kg (125 lb 12.8 oz)   LMP  (LMP Unknown)   SpO2 96%   BMI 23.77 kg/m      GENERAL: No acute distress. Cooperative in conversation.   HEENT:  Pupils are equal, round and reactive. Oral mucosa is clean and intact. No ulcerations or mucositis noted. No bleeding noted.  RESP:Chest symmetric lungs are clear " bilaterally per auscultation. Regular respiratory rate. No wheezes or rhonchi.  CV: Normal S1 S2 Regular, rate and rhythm.     ABD: Nondistended, soft, nontender. Positive bowel sounds. No organomegaly.   EXTREMITIES: No lower extremity edema.   NEURO: Non- focal. Alert and oriented x3.  Cranial nerves appear intact.  PSYCH: Within normal limits. No depression or anxiety.  SKIN: Warm dry intact.       Lab results Reviewed      Recent Results (from the past 720 hour(s))   Comprehensive metabolic panel    Collection Time: 04/03/24  1:19 PM   Result Value Ref Range    Sodium 140 135 - 145 mmol/L    Potassium 4.0 3.4 - 5.3 mmol/L    Carbon Dioxide (CO2) 31 (H) 22 - 29 mmol/L    Anion Gap 6 (L) 7 - 15 mmol/L    Urea Nitrogen 19.7 8.0 - 23.0 mg/dL    Creatinine 0.86 0.51 - 0.95 mg/dL    GFR Estimate 72 >60 mL/min/1.73m2    Calcium 9.1 8.8 - 10.2 mg/dL    Chloride 103 98 - 107 mmol/L    Glucose 93 70 - 99 mg/dL    Alkaline Phosphatase 35 (L) 40 - 150 U/L    AST 15 0 - 45 U/L    ALT 18 0 - 50 U/L    Protein Total 6.1 (L) 6.4 - 8.3 g/dL    Albumin 4.0 3.5 - 5.2 g/dL    Bilirubin Total 0.3 <=1.2 mg/dL   Kappa and lambda light chain    Collection Time: 04/03/24  1:19 PM   Result Value Ref Range    Kappa Free Light Chains 1.66 0.33 - 1.94 mg/dL    Lambda Free Light Chains 2.10 0.57 - 2.63 mg/dL    Kappa /Lambda Ratio 0.79 0.26 - 1.65   Immunoglobulins A G and M    Collection Time: 04/03/24  1:19 PM   Result Value Ref Range    Immunoglobulin G 355 (L) 610 - 1,616 mg/dL    Immunoglobulin A 135 84 - 499 mg/dL    Immunoglobulin M 26 (L) 35 - 242 mg/dL   Lactate Dehydrogenase    Collection Time: 04/03/24  1:19 PM   Result Value Ref Range    Lactate Dehydrogenase 134 0 - 250 U/L   CBC with platelets    Collection Time: 04/03/24  1:19 PM   Result Value Ref Range    WBC Count 3.6 (L) 4.0 - 11.0 10e3/uL    RBC Count 3.91 3.80 - 5.20 10e6/uL    Hemoglobin 13.6 11.7 - 15.7 g/dL    Hematocrit 41.6 35.0 - 47.0 %     (H) 78 - 100 fL     MCH 34.8 (H) 26.5 - 33.0 pg    MCHC 32.7 31.5 - 36.5 g/dL    RDW 15.7 (H) 10.0 - 15.0 %    Platelet Count 195 150 - 450 10e3/uL         Imaging results Reviewed        No results found.    Total time spent was over 45 minutes.  This includes chart prep time, review of clinical data including notes from outside physicians, labs, review of medical imaging, discussion about  plan of care, documentation and .    Signed by: Loco Blanco MD      This note has been dictated using voice recognition software. Any grammatical or context distortions are unintentional and inherent to the software

## 2024-04-10 NOTE — PROGRESS NOTES
"Oncology Rooming Note    April 10, 2024 2:40 PM   Lizzie Viera is a 73 year old female who presents for:    Chief Complaint   Patient presents with    Oncology Clinic Visit     Return visit 3 months with lab 04/03/24. Multiple myeloma with failed remission-restarted Revlimid.     Initial Vitals: BP (!) 169/68 (BP Location: Right arm, Patient Position: Sitting, Cuff Size: Adult Regular)   Pulse 51   Temp 97.8  F (36.6  C) (Oral)   Resp 16   Ht 1.549 m (5' 1\")   Wt 57.1 kg (125 lb 12.8 oz)   LMP  (LMP Unknown)   SpO2 96%   BMI 23.77 kg/m   Estimated body mass index is 23.77 kg/m  as calculated from the following:    Height as of this encounter: 1.549 m (5' 1\").    Weight as of this encounter: 57.1 kg (125 lb 12.8 oz). Body surface area is 1.57 meters squared.  Severe Pain (7) Comment: Data Unavailable   No LMP recorded (lmp unknown). Patient is postmenopausal.  Allergies reviewed: Yes  Medications reviewed: Yes    Medications: Medication refills not needed today.  Pharmacy name entered into [employer]:    [id number] [place], [place] - [address]  [facility] [place], [place] - [address]    Frailty Screening:   Is the patient here for a new oncology consult visit in cancer care? 2. No      Clinical concerns: Return visit 3 months with lab 04/03/24. Multiple myeloma with failed remission-restarted Revlimid. T-7 pain 7/10.      Nell Bergman CMA            "

## 2024-04-10 NOTE — LETTER
"    4/10/2024         RE: Lizzie Viera  [address]  [place] [address]        Dear Colleague,    Thank you for referring your patient, Lizzie Viera, to the [facility]. Please see a copy of my visit note below.    Oncology Rooming Note    April 10, 2024 2:40 PM   Lizzie Viera is a 73 year old female who presents for:    Chief Complaint   Patient presents with     Oncology Clinic Visit     Return visit 3 months with lab 04/03/24. Multiple myeloma with failed remission-restarted Revlimid.     Initial Vitals: BP (!) 169/68 (BP Location: Right arm, Patient Position: Sitting, Cuff Size: Adult Regular)   Pulse 51   Temp 97.8  F (36.6  C) (Oral)   Resp 16   Ht 1.549 m (5' 1\")   Wt 57.1 kg (125 lb 12.8 oz)   LMP  (LMP Unknown)   SpO2 96%   BMI 23.77 kg/m   Estimated body mass index is 23.77 kg/m  as calculated from the following:    Height as of this encounter: 1.549 m (5' 1\").    Weight as of this encounter: 57.1 kg (125 lb 12.8 oz). Body surface area is 1.57 meters squared.  Severe Pain (7) Comment: Data Unavailable   No LMP recorded (lmp unknown). Patient is postmenopausal.  Allergies reviewed: Yes  Medications reviewed: Yes    Medications: Medication refills not needed today.  Pharmacy name entered into [facility]:    [id number] [place], [place] - [address]  [facility] [address] [place] [address]    Frailty Screening:   Is the patient here for a new oncology consult visit in cancer care? 2. No      Clinical concerns: Return visit 3 months with lab 04/03/24. Multiple myeloma with failed remission-restarted Revlimid. T-7 pain 7/10.      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  A very pleasant 73 year old woman with IgG kappa multiple myeloma with hyperdiploid cytogenetics.  However clinically was behaving somewhat more aggressively.  Started on VRD treatment.  Responded quite well. After 17 cycles the treatment was switched to Revlimid 10 mg daily in September 2022.  Valeria has been tolerating it quite well.  The lab work-up in the end of November 2022 showed maintenance of response.  The labs since February 2023 have been pretty stable.  Kappa lambda light chains have been normal for most part.  In September 2023 kappa light chains were slightly above normal but is lambda light chains were also above their previous numbers.  The ratio really did not change a whole lot.  Immunoglobulins number were stable.  Albumin continues to be normal.  Hemoglobin is normal as well.  Current labs indicate pretty normal kappa lambda ratio.  In fact the Levels are below the lambda levels.  2.  Had significant bone disease with osteoporosis and compression fractures.  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  Improved significantly on the CT scan in March 2022.  MRI also shows no new lesions.  December 2022 bone density showed osteoporosis.  Has not been able to restart Zometa due to dental implant needs.  She is going tomorrow for finishing the dental implant process.  We can probably resume Zometa few months after that.  3.  Normal hemoglobin, calcium, kidney function along with beta-2 microglobulin and albumin at the time of diagnosis.    4.  Positive Covid antibodies from infection. Unvaccinated.  In the past has declined Evusheld.  5.  Pain from compression fracture status post radiation therapy.  Significantly improved except had some  pain this morning.  6.  Slight hypoxia during REM sleep.    Plan    Revlimid 10 mg p.o. daily.  We will follow-up with labs in 3 months timeframe.  At that time we will do immunofixation assay.  Continue on a diet rich in calcium and vitamin D.  Start bisphosphonate therapy as soon as the dental implants are done.  We would wait to 2-3 months after the dental implant to resume Zometa therapy.  Continue to use oxygen at nighttime.  Repeat sleep study again next year hopefully to see if she can get off of the oxygen.  The longitudinal plan of care for the diagnosis(es)/condition(s) as documented were addressed during this visit. Due to the added complexity in care, I will continue to support Valeria in the subsequent management and with ongoing continuity of care.      Cancer Staging   No matching staging information was found for the patient.      ECOG Performance    2 - Ambulatory and independent in all ADLs; cannot work; up > 50% of the time      History of Present Illness    Ms. Lizzie Viera is a very pleasant 73 year old woman who has been diagnosed with multiple myeloma IgG kappa type in May 2021.  She had initially presented with compression fracture of T7 vertebral body in September 2020.  She had a back pain which led to that evaluation.  Bone density confirmed that she had osteoporosis.  MRI showed diffuse marrow edema in October 2020.  In April 2021 the MRI of the thoracic spine showed worsening T7 vertebral body height loss because of likely pathological compression fracture with extraosseous extension.  She then had IR guided bone biopsy on 7 May 2021 and pathology confirmed the presence of kappa light chain restricted plasma cells.  Her cytogenetics confirmed the presence of hyperdiploid E with gains of chromosome 5, 9 and 15.    She was then seen by Dr. Baig and had a bone marrow biopsy which also confirmed 60% involvement of the marrow with plasma cells.  The bone marrow was done on 3 Jocelin 2021.   The bone marrow also confirmed the presence of hyperdiploidy.  She had a gain of chromosome 3, 5, 7, 9, 11, 15 as well as 19.  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  Her beta-2 microglobulin was  normal at the time of her diagnosis as was her albumin at 4.0.  Monoclonal protein 3.1 g/dL.  Kappa free light chain levels of 13 mg/dL IgG level of 4280 with depressed levels of IgM and IgA.  Urine was also positive for kappa light chains as well as immunofixation of the urine was positive for IgG kappa.  Normal kidney function.  Normal hemoglobin.    As mentioned above she had bone lesions in the T7 vertebral body, T1, skull area.  Her main pain is coming from her T7 vertebral body compression fracture.    Due to the pain she has been seen by radiation oncology and has received radiation therapy.  She also got a dose of steroids for pain control.    She was initially thinking of doing some natural therapies but once her symptoms got worse, she came back in was counseled about treatment.      She started on Velcade Revlimid and dexamethasone in September 2021.  Responded nicely.  Immunoglobulins  normalized completely. Her immunoglobin levels have almost normalized in fact the IgG levels have gone below normal.  Immunofixation still shows a very faint kappa monoclonal gammopathy.    She was  hospitalized in April 2022 with COPD exacerbation/pneumonia.  Was given some steroids and antibiotic.  She was slightly hypoxic initially but then got better after that.    She was referred to [facility] for transplant evaluation.  She was somewhat reluctant to go forward with that.  Otherwise she seems to be doing quite well.  Her immunoglobulin levels have normalized or actually are below normal.  Immunofixation faintly positive.    She has cracked a molar on the upper left jaw sometime in July 2022.  There was some tooth decay.  She had them extracted.    In September 2022 we discontinued the VRD treatment and now Valeria is on Revlimid  "maintenance 10 mg p.o. daily.  She seems to be tolerating it well.  She is physically more active so she is noticing some soreness in her back.    Had dental extraction done in October 2022 from the right lower jaw.  That seems to be healing well.  Underwent bone densitometry recently.  That confirms presence of osteoporosis which is not a new finding for her.    In February 2023 her labs were pretty stable.  She went to [place] so we gave her 2 to 3 weeks of break from Revlimid.  She restarted the treatment.  Had been pretty stable.    In February 2024 had an episode of pneumonia.  It looks like a viral pneumonia since she had neutropenia as well.  Was treated with IV antibiotics and later on transition to oral antibiotics.  It took her a while to recover from that.  Did develop possible tendinitis from Levaquin.    She also had a sleep study done which showed slight hypoxia during the REM phase of her sleep.  So she is on oxygen at nighttime.    Overall seems to be doing well.  Comes in today for scheduled follow-up.  Had labs done few days ago.    Review of system      Details noted in the history of present illness.  A detailed review of systems is otherwise negative.      Physical exam        BP (!) 157/67 (BP Location: Right arm, Patient Position: Sitting, Cuff Size: Adult Regular)   Pulse 51   Temp 97.8  F (36.6  C) (Oral)   Resp 16   Ht 1.549 m (5' 1\")   Wt 57.1 kg (125 lb 12.8 oz)   LMP  (LMP Unknown)   SpO2 96%   BMI 23.77 kg/m      GENERAL: No acute distress. Cooperative in conversation.   HEENT:  Pupils are equal, round and reactive. Oral mucosa is clean and intact. No ulcerations or mucositis noted. No bleeding noted.  RESP:Chest symmetric lungs are clear bilaterally per auscultation. Regular respiratory rate. No wheezes or rhonchi.  CV: Normal S1 S2 Regular, rate and rhythm.     ABD: Nondistended, soft, nontender. Positive bowel sounds. No organomegaly.   EXTREMITIES: No lower extremity edema. "   NEURO: Non- focal. Alert and oriented x3.  Cranial nerves appear intact.  PSYCH: Within normal limits. No depression or anxiety.  SKIN: Warm dry intact.       Lab results Reviewed      Recent Results (from the past 720 hour(s))   Comprehensive metabolic panel    Collection Time: 04/03/24  1:19 PM   Result Value Ref Range    Sodium 140 135 - 145 mmol/L    Potassium 4.0 3.4 - 5.3 mmol/L    Carbon Dioxide (CO2) 31 (H) 22 - 29 mmol/L    Anion Gap 6 (L) 7 - 15 mmol/L    Urea Nitrogen 19.7 8.0 - 23.0 mg/dL    Creatinine 0.86 0.51 - 0.95 mg/dL    GFR Estimate 72 >60 mL/min/1.73m2    Calcium 9.1 8.8 - 10.2 mg/dL    Chloride 103 98 - 107 mmol/L    Glucose 93 70 - 99 mg/dL    Alkaline Phosphatase 35 (L) 40 - 150 U/L    AST 15 0 - 45 U/L    ALT 18 0 - 50 U/L    Protein Total 6.1 (L) 6.4 - 8.3 g/dL    Albumin 4.0 3.5 - 5.2 g/dL    Bilirubin Total 0.3 <=1.2 mg/dL   Kappa and lambda light chain    Collection Time: 04/03/24  1:19 PM   Result Value Ref Range    Kappa Free Light Chains 1.66 0.33 - 1.94 mg/dL    Lambda Free Light Chains 2.10 0.57 - 2.63 mg/dL    Kappa /Lambda Ratio 0.79 0.26 - 1.65   Immunoglobulins A G and M    Collection Time: 04/03/24  1:19 PM   Result Value Ref Range    Immunoglobulin G 355 (L) 610 - 1,616 mg/dL    Immunoglobulin A 135 84 - 499 mg/dL    Immunoglobulin M 26 (L) 35 - 242 mg/dL   Lactate Dehydrogenase    Collection Time: 04/03/24  1:19 PM   Result Value Ref Range    Lactate Dehydrogenase 134 0 - 250 U/L   CBC with platelets    Collection Time: 04/03/24  1:19 PM   Result Value Ref Range    WBC Count 3.6 (L) 4.0 - 11.0 10e3/uL    RBC Count 3.91 3.80 - 5.20 10e6/uL    Hemoglobin 13.6 11.7 - 15.7 g/dL    Hematocrit 41.6 35.0 - 47.0 %     (H) 78 - 100 fL    MCH 34.8 (H) 26.5 - 33.0 pg    MCHC 32.7 31.5 - 36.5 g/dL    RDW 15.7 (H) 10.0 - 15.0 %    Platelet Count 195 150 - 450 10e3/uL         Imaging results Reviewed        No results found.    Total time spent was over 45 minutes.  This includes  chart prep time, review of clinical data including notes from outside physicians, labs, review of medical imaging, discussion about  plan of care, documentation and .    Signed by: Loco Blanco MD      This note has been dictated using voice recognition software. Any grammatical or context distortions are unintentional and inherent to the software      Again, thank you for allowing me to participate in the care of your patient.        Sincerely,        Loco Blanco MD
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
## 2024-04-10 NOTE — LETTER
4/10/2024         RE: Lizzie Viera  [address]  [place] [address]        Dear Colleague,    Thank you for referring your patient, Lizzie Viera, to the [facility]. Please see a copy of my visit note below.    See Confidential note      Again, thank you for allowing me to participate in the care of your patient.        Sincerely,        Mario Long LP

## 2024-04-11 NOTE — PROGRESS NOTES
Assessment/Plan:      Valeria was seen today for back pain.    Diagnoses and all orders for this visit:    Achilles tendinitis of both lower extremities    Cervical spine pain    Thoracic spine pain    Myofascial pain    Closed wedge compression fracture of T7 vertebra, sequela    Somatic dysfunction of head region  -     OSTEOPATHIC MANIP,5-6 BODY REGN    Somatic dysfunction of cervical region  -     OSTEOPATHIC MANIP,5-6 BODY REGN    Somatic dysfunction of rib region  -     OSTEOPATHIC MANIP,5-6 BODY REGN    Somatic dysfunction of upper extremity  -     OSTEOPATHIC MANIP,5-6 BODY REGN    Somatic dysfunction of thoracic region  -     OSTEOPATHIC MANIP,5-6 BODY REGN         Assessment: Pleasant 73 year old female  with a history of hyperlipidemia, anxiety, depression, irritable bowel, neuropathy, osteoporosis and multiple myeloma with a T7 fracture and lesion  resulting in spinal stenosis:       Bilateral heel pain consistent with Achilles tendinitis.  Possibly from recent Levaquin use.  Currently only the right Achilles is painful consistent with Achilles tendinitis.     Significant increase flare over the past few days of pain in the thoracic spine on the left and left cervical spine pain.  She has waxing and waning   chronic cervical and chronic thoracic pain around the mid to upper thoracic spine radiating up to the cervical spine and suboccipital region with headaches.   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  No signs of thoracic myelopathy neurologically stable. Fracture is stable on MRI from 2022 T1 and T7.  Symptoms increased with increased housework recently.   Continues with gabapentin 600 mg 3 times daily.  Continues with physical therapy.  OMT is helpful intermittently at controlling her pain.  Keeping her active.        3.    cervical spine upper thoracic pain consistent myofascial pain with physical therapy.      4.  Somatic dysfunctions of the cranium,  cervical spine, rib cage, upper extremities, thoracic spine  that contribute to the patient's pain complaints.    Discussion:    1.  I discussed the Achilles tendinitis.  We discussed options of wearing a boot at home, ultrasound or MRI of the heel or activity modification.  We also discussed the CT scan of her thoracic spine from recent imaging in February and compared it to 2022.  No new fractures on my review the images and although she has had a flare recently likely myofascial in nature.  We discussed options of new imaging and she is not interested.    2.  She has a boot at home for her right foot and she will wear this for the next 5 to 7 days to see if that will help her Achilles tendinitis.    3.  Continue home exercises from PT.    4.  Osteopathic manipulative medicine day this helps with her pain flares in her thoracic and cervical spine.  This allows her to continue to be more active.  She is a good candidate for treatment.  She agrees to proceed.  Please see attached procedure note.    5.  Follow-up as needed.      It was our pleasure caring for your patient today, if there any questions or concerns please do not hesitate to contact us.      Subjective:   Patient ID: Lizzie Viera is a 73 year old female.    History of Present Illness: Patient presents for follow-up of Achilles tendon pain along with increased pain in the thoracic spine and cervical spine particularly on the left.  The Achilles pain has improved in the left heel but having increased pain on the right worse with walking and standing at the Achilles tendon itself.    She also has had a pain flare of her thoracic spine and cervical spine pain over the past few days pain is 10/10 at worst 5/10 today 0/10 at best.  Significant flare even yesterday was at physical therapy did some trigger point type work and today she seems more back to baseline.  Increased stiffness in the back even when she first gets up in the morning which is  "unusual for her.  Using her arms overhead work increases her pain better with rest.    Imaging: I personally reviewed CT scanOf the chest from February of this year and compared to April of 2 years ago.  There is a T1 vertebral body lesion appears stable from prior MRI.  Severe vertebral body height loss of T7 and some scattered Schmorl's nodes all of which appear similar over the past 2 years.    Review of Systems: Pertinent positives: Paresthesias of the feet headaches pain worse at night difficulty swallowing.  Denies bowel or bladder incontinence, weakness, falls, fevers and unintentional weight loss.         Past Medical History:   Diagnosis Date    Cervical dysplasia     Chronic RUQ pain     Depressive disorder     Multiple myeloma (H)     Osteoporosis        The following portions of the patient's history were reviewed and updated as appropriate: allergies, current medications, past family history, past medical history, past social history, past surgical history and problem list.           Objective:   Physical Exam:    /62 (BP Location: Right arm, Patient Position: Sitting, Cuff Size: Adult Regular)   Pulse 60   Ht 5' 1\" (1.549 m)   Wt 125 lb (56.7 kg)   LMP  (LMP Unknown)   SpO2 92%   BMI 23.62 kg/m    There is no height or weight on file to calculate BMI.      General: Alert and oriented with normal affect. Attention, knowledge, memory, and language are intact. No acute distress.   Eyes: Sclerae are clear.  Respirations: Unlabored. CV: No lower extremity edema.    Gait:  Nonantalgic  No edema about the right ankle.  Tenderness along the Achilles tendon at the insertion to the calcaneus and throughout the tendon itself and myotendinous junction.  Sensation is intact to light touch throughout the upper   extremities.  Reflexes are  negative Hoffmans.      Manual muscle testing reveals:  Right /Left out of 5     5/5 elbow flexors  5/5 elbow extensors  5/5 wrist extensors  5/5 interosseus  5/5 " finger flexors     5/5 ankle dorsiflexors  5/5 ankle plantar flexors    Structural exam: Cranium: Left cranial torsion, OA sidebent right rotated left cervical spine: C2 to rotated left sidebent left, C3 rotated right sidebent right,   Rib cage: Rib one elevated on the left. Thoracic spine: T1 rotated right sidebent right left upper thoracic myofascial restrictions, upper Extremities: myofascial restrictions of the bilat upper trap, infraspinatus/parascapular muscles.  Left greater than right glenohumeral resrictions.    Procedure:    After discussing the risks and benefits of osteopathic manipulative medicine, verbal consent was obtained. The somatic dysfunctions listed above were treated with the following techniques: Cranium: Cranial indirect technique, VSD, and muscle energy for the OA. Cervical spine: Muscle energy, still technique, FPR, myofascial release, BLT, and soft tissue techniques. Rib cage: Myofascial release and FPR. Thoracic spine: Myofascial release, BLT.     Upper Exrtremity: MFR, FPR, BLT.  The patient tolerated the procedure well and had improved range of motion in all areas treated prior to leaving the clinic.

## 2024-04-11 NOTE — LETTER
4/11/2024         RE: Lizzie Viera  [address]  [place] [address]        Dear Colleague,    Thank you for referring your patient, Lizzie Viera, to the [facility]. Please see a copy of my visit note below.    Assessment/Plan:      Valeria was seen today for back pain.    Diagnoses and all orders for this visit:    Achilles tendinitis of both lower extremities    Cervical spine pain    Thoracic spine pain    Myofascial pain    Closed wedge compression fracture of T7 vertebra, sequela    Somatic dysfunction of head region  -     OSTEOPATHIC MANIP,5-6 BODY REGN    Somatic dysfunction of cervical region  -     OSTEOPATHIC MANIP,5-6 BODY REGN    Somatic dysfunction of rib region  -     OSTEOPATHIC MANIP,5-6 BODY REGN    Somatic dysfunction of upper extremity  -     OSTEOPATHIC MANIP,5-6 BODY REGN    Somatic dysfunction of thoracic region  -     OSTEOPATHIC MANIP,5-6 BODY REGN         Assessment: Hossein 73 year old female  with a history of hyperlipidemia, anxiety, depression, irritable bowel, neuropathy, osteoporosis and multiple myeloma with a T7 fracture and lesion  resulting in spinal stenosis:       Bilateral heel pain consistent with Achilles tendinitis.  Possibly from recent Levaquin use.  Currently only the right Achilles is painful consistent with Achilles tendinitis.     Significant increase flare over the past few days of pain in the thoracic spine on the left and left cervical spine pain.  She has waxing and waning   chronic cervical and chronic thoracic pain around the mid to upper thoracic spine radiating up to the cervical spine and suboccipital region with headaches.   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  No signs of thoracic myelopathy neurologically stable. Fracture is stable on MRI from 2022 T1 and T7.  Symptoms increased with increased housework recently.   Continues with gabapentin 600  mg 3 times daily.  Continues with physical therapy.  OMT is helpful intermittently at controlling her pain.  Keeping her active.        3.    cervical spine upper thoracic pain consistent myofascial pain with physical therapy.      4.  Somatic dysfunctions of the cranium, cervical spine, rib cage, upper extremities, thoracic spine  that contribute to the patient's pain complaints.    Discussion:    1.  I discussed the Achilles tendinitis.  We discussed options of wearing a boot at home, ultrasound or MRI of the heel or activity modification.  We also discussed the CT scan of her thoracic spine from recent imaging in February and compared it to 2022.  No new fractures on my review the images and although she has had a flare recently likely myofascial in nature.  We discussed options of new imaging and she is not interested.    2.  She has a boot at home for her right foot and she will wear this for the next 5 to 7 days to see if that will help her Achilles tendinitis.    3.  Continue home exercises from PT.    4.  Osteopathic manipulative medicine day this helps with her pain flares in her thoracic and cervical spine.  This allows her to continue to be more active.  She is a good candidate for treatment.  She agrees to proceed.  Please see attached procedure note.    5.  Follow-up as needed.      It was our pleasure caring for your patient today, if there any questions or concerns please do not hesitate to contact us.      Subjective:   Patient ID: Lizzie Viera is a 73 year old female.    History of Present Illness: Patient presents for follow-up of Achilles tendon pain along with increased pain in the thoracic spine and cervical spine particularly on the left.  The Achilles pain has improved in the left heel but having increased pain on the right worse with walking and standing at the Achilles tendon itself.    She also has had a pain flare of her thoracic spine and cervical spine pain over the past few days  "pain is 10/10 at worst 5/10 today 0/10 at best.  Significant flare even yesterday was at physical therapy did some trigger point type work and today she seems more back to baseline.  Increased stiffness in the back even when she first gets up in the morning which is unusual for her.  Using her arms overhead work increases her pain better with rest.    Imaging: I personally reviewed CT scanOf the chest from February of this year and compared to April of 2 years ago.  There is a T1 vertebral body lesion appears stable from prior MRI.  Severe vertebral body height loss of T7 and some scattered Schmorl's nodes all of which appear similar over the past 2 years.    Review of Systems: Pertinent positives: Paresthesias of the feet headaches pain worse at night difficulty swallowing.  Denies bowel or bladder incontinence, weakness, falls, fevers and unintentional weight loss.         Past Medical History:   Diagnosis Date     Cervical dysplasia      Chronic RUQ pain      Depressive disorder      Multiple myeloma (H)      Osteoporosis        The following portions of the patient's history were reviewed and updated as appropriate: allergies, current medications, past family history, past medical history, past social history, past surgical history and problem list.           Objective:   Physical Exam:    /62 (BP Location: Right arm, Patient Position: Sitting, Cuff Size: Adult Regular)   Pulse 60   Ht 5' 1\" (1.549 m)   Wt 125 lb (56.7 kg)   LMP  (LMP Unknown)   SpO2 92%   BMI 23.62 kg/m    There is no height or weight on file to calculate BMI.      General: Alert and oriented with normal affect. Attention, knowledge, memory, and language are intact. No acute distress.   Eyes: Sclerae are clear.  Respirations: Unlabored. CV: No lower extremity edema.    Gait:  Nonantalgic  No edema about the right ankle.  Tenderness along the Achilles tendon at the insertion to the calcaneus and throughout the tendon itself and " myotendinous junction.  Sensation is intact to light touch throughout the upper   extremities.  Reflexes are  negative Hoffmans.      Manual muscle testing reveals:  Right /Left out of 5     5/5 elbow flexors  5/5 elbow extensors  5/5 wrist extensors  5/5 interosseus  5/5 finger flexors     5/5 ankle dorsiflexors  5/5 ankle plantar flexors    Structural exam: Cranium: Left cranial torsion, OA sidebent right rotated left cervical spine: C2 to rotated left sidebent left, C3 rotated right sidebent right,   Rib cage: Rib one elevated on the left. Thoracic spine: T1 rotated right sidebent right left upper thoracic myofascial restrictions, upper Extremities: myofascial restrictions of the bilat upper trap, infraspinatus/parascapular muscles.  Left greater than right glenohumeral resrictions.    Procedure:    After discussing the risks and benefits of osteopathic manipulative medicine, verbal consent was obtained. The somatic dysfunctions listed above were treated with the following techniques: Cranium: Cranial indirect technique, VSD, and muscle energy for the OA. Cervical spine: Muscle energy, still technique, FPR, myofascial release, BLT, and soft tissue techniques. Rib cage: Myofascial release and FPR. Thoracic spine: Myofascial release, BLT.     Upper Exrtremity: MFR, FPR, BLT.  The patient tolerated the procedure well and had improved range of motion in all areas treated prior to leaving the clinic.      Again, thank you for allowing me to participate in the care of your patient.        Sincerely,        Wyatt Pascual DO

## 2024-04-11 NOTE — PATIENT INSTRUCTIONS
You may wear your boot on the right leg for about 5-7 days to see if it will help your right achilles pain  Osteopathic manual medicine today
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
## 2024-04-24 NOTE — LETTER
4/24/2024         RE: Lizzie Viera  [address]  [place] [address]        Dear Colleague,    Thank you for referring your patient, Lizzie Viera, to the [facility]. Please see a copy of my visit note below.    See confidential note      Again, thank you for allowing me to participate in the care of your patient.        Sincerely,        Mario Long LP

## 2024-04-26 NOTE — PROGRESS NOTES
CCx:Acute hypoxemic respiratory failure    HPI:Ms. Viera is a 73 year old lady with a past medical history significant for depression, multiple myeloma on Revlamid  and osteoporosis who presents to clinic today to establish care for the aforementioned. She was her usual state of health today when she contracted a URI likely from grandchildren who had been unwell at the time.  She was evaluated in the emergency department and initiated on treatment with both levofloxacin and clindamycin.  She states that she developed Achilles tendinitis and has been working through this now.  She is presently able to walk a mile to a mile and a half on a treadmill.  She has used Flonase for her allergic rhinitis that was prescribed by her primary care provider and thinks that this has significantly improved her symptoms.  She does note that she typically gets seasonal allergies which manifest as runny nose and previously has used Claritin from March to November.  Because of the fact that she is presently on Revlimid she was not sure if are not she should reinitiate this.  She does use supplemental oxygen overnight and has a nebulizer machine that she uses when she is unwell.    ROS:  Pertinent positives alluded to in the HPI. Remainder of 10 point ROS is negative.       PMH:  Depression  Multiple Myeloma   Osteoporosis    PSH:  Cervical Conization  Tonsillectomy and adenoidectomy  Lap Kathleen  Fallopian tube ligation    Allergies:  Seasonal.    Family HX:  Reviewed in epic.    Social Hx:  Marital status: Yes  Number of children: 3  Resides in a house which was built in 1978, some concern for mold in basement but this was abated. Lives at home with , daughter and 2 grandchildren. [place]  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 Has also worked at [employer]  and [employer].   service: No  Pets: 1 small dog  Smoking history: 40 pack year history; quit in 2021. Now uses a Vape pen.  Alcohol use: Social.   Recreational drug use: Medical marijuana. Once every three or four weeks.  Hobbies: Reads, júnior.  Recent Travel: No    Current Meds:  Current Outpatient Medications   Medication Sig Dispense Refill    albuterol (PROAIR HFA/PROVENTIL HFA/VENTOLIN HFA) 108 (90 Base) MCG/ACT inhaler Inhale 2 puffs into the lungs every 6 hours as needed for wheezing or shortness of breath / dyspnea 18 g 1    albuterol (PROVENTIL) (2.5 MG/3ML) 0.083% neb solution Take 1 vial (2.5 mg) by nebulization every 4 hours as needed for wheezing or shortness of breath / dyspnea 90 mL 0    aspirin (ASA) 81 MG chewable tablet Take 81 mg by mouth daily      fish oil-omega-3 fatty acids 1000 MG capsule Take 2 g by mouth daily      fluticasone (FLONASE) 50 MCG/ACT nasal spray Spray 2 sprays into both nostrils daily 15.8 mL 4    gabapentin (NEURONTIN) 300 MG capsule Take 2 capsules (600 mg) by mouth 3 times daily 180 capsule 3    hydrochlorothiazide (HYDRODIURIL) 12.5 MG tablet Take 1 tablet (12.5 mg) by mouth daily 90 tablet 4    HYDROmorphone (DILAUDID) 4 MG tablet Take 0.5-1 tablets (2-4 mg) by mouth every 4 hours as needed for moderate to severe pain 60 tablet 0    LENalidomide (REVLIMID) 10 MG CAPS capsule Take 1 capsule (10 mg) by mouth daily for 28 days Take at the same time each day. Do not open, break or chew the capsule. Swallow whole, with water. 28 capsule 0    medical cannabis (Patient's own supply) See Admin Instructions (The purpose of this order is to document that the patient reports taking medical cannabis.  This is not a prescription, and is not used to certify that the patient has a qualifying medical condition.)     Oil formulation      Melatonin 10 MG TABS tablet Take 1 tablet (10 mg) by mouth at bedtime      Menaquinone-7 (K2 PO) Take 1 capsule by mouth daily       methocarbamol (ROBAXIN) 500 MG tablet Take 1 tablet (500 mg) by mouth 4 times daily as needed for muscle spasms 120 tablet 11    Milk Thistle-Dand-Fennel-Licor (MILK THISTLE XTRA) CAPS capsule Take 1 capsule by mouth daily      NALTREXONE HCL PO Take 3 mg by mouth daily      TURMERIC PO Take 1 capsule by mouth daily      UNABLE TO FIND 1 capsule daily MEDICATION NAME: Serrapeptase      UNABLE TO FIND 1 capsule daily MEDICATION NAME: Lions García Mushroom      UNABLE TO FIND 1 capsule daily MEDICATION NAME: DIM (diinolylmethane)      UNABLE TO FIND MEDICATION NAME: Panacur C - 1 capsule daily      Vitamin D, Cholecalciferol, 25 MCG (1000 UT) CAPS Take 1,000 Units by mouth daily Liquid, not capsule       Labs:  Reviewed in epic.    Imaging studies:  CT PE Study 2/26/24:  1.  No acute pulmonary emboli.  2.  Extensive pulmonary airspace opacities, likely infectious or inflammatory.    PFT's 4/22/24:  FEV1/FVC is 69% and is reduced.  FEV1 is 1.23L (67%)  (Z Score -1.89) predicted and is reduced.  FVC is 1.78L (76%)  (Z Score -1.45) predicted and normal.  DLCO is 10.01ml/min/hg (57%)  (Z Score -3.09) predicted and is reduced when it is corrected for hemoglobin.  Flow volume loops indicate scooping of the expiratory limb.    Impression:  Full Pulmonary Function Test is abnormal.  PFTs are consistent with mild obstructive disease.  Diffusion capacity when corrected for hemoglobin is moderately reduced.    The results of SVC and DLCO testing met ATS criteria for comfortability and repeatability.  The results of FVC testing appears to be valid and met ATS criteria for repeatability but failed to be acceptability.  Patient declined plethysmography testing.  Deferred albuterol.    TTE 4/13/22:  The left ventricle is normal in size.  There is borderline concentric left ventricular hypertrophy.  Left ventricular systolic function is normal.  The visual ejection fraction is 60-65%.  No regional wall motion abnormalities noted.  No  "significant valve abnormality  ______________________________________________________________________________  Left Ventricle  The left ventricle is normal in size. There is borderline concentric left ventricular hypertrophy. Left ventricular systolic function is normal.  Diastolic Doppler findings (E/E' ratio and/or other parameters) suggest left ventricular filling pressures are indeterminate. Grade I or early diastolic dysfunction. The visual ejection fraction is 60-65%. No regional wall motion abnormalities noted.     Right Ventricle  Normal right ventricle size and systolic function. TAPSE is normal, which is consistent with normal right ventricular systolic function.     Atria  Normal left atrial size. Right atrial size is normal.     Mitral Valve  Mitral valve leaflets appear normal. There is no evidence of mitral stenosis or clinically significant mitral regurgitation. There is trace to mild mitral regurgitation.     Tricuspid Valve  There is trace to mild tricuspid regurgitation.     Aortic Valve  The aortic valve is not well visualized. No hemodynamically significant valvular aortic stenosis.     Pulmonic Valve  The pulmonic valve is not well visualized.     Vessels  The aorta root is normal. IVC diameter and respiratory changes fall into an intermediate range suggesting an RA pressure of 8 mmHg.     Pericardium  No significant pericardial effusion.       Physical Exam:  /68 (BP Location: Left arm, Patient Position: Chair, Cuff Size: Adult Regular)   Pulse 62   Ht 1.549 m (5' 1\")   Wt 57.6 kg (127 lb)   LMP  (LMP Unknown)   SpO2 93%   BMI 24.00 kg/m    GEN: NAD  HEENT: PERRL, No JVD  LUNGS: CTA BL  CVS: S1 & S2 no added sounds  ABD: No HSM, BS audible  EXT: No edema, no rashes  NEURO: AO*3 CN2-12 intact      Assessment and Plan:Lizzie Viera is a 73 year old with a past medical history significant for depression, multiple myeloma on Revlamid  and osteoporosis who presents to clinic today " for establishment of care for mild COPD noted on spirometry but with a moderate reduction of her diffusion capacity is unclear significance.  In reviewing her CT scan she has no evidence of emphysema and it would be worthwhile to the PFO as an etiology for her hypoxia. For the present we would recommend;    Mild obstructive COPD: Noted on PFTs.  She is mostly asymptomatic from this and states that she occasionally needs to use bronchodilators when she has URIs.  She is not limited in her activities of daily living from her COPD.  Of note she stopped smoking in 2021 once she was diagnosed with multiple myeloma but does continue to use some sort of vape pen.  As needed albuterol.  She already has a prescription for this and does not feel that she needs a long-acting inhaler.  I do agree given her minimal symptoms.  Moderate reduction of DLCO: As noted above.  She has paucity of interstitial findings on her last CT scan of the chest to explain her reduced DLCO.  Will obtain an echo with a bubble study to rule out a PFO as an etiology for this and if this is negative, I would likely proceed with an autoimmune evaluation and VQ scan.  HCM: Has received a pneumococcal vaccine but does not receive influenza vaccines because of ongoing treatment for her multiple myeloma.  Follow-up: 6 to 8 weeks with myself.      Alia Goldsmithqvi  Pulmonary and Critical Care  [id number]

## 2024-05-10 NOTE — PATIENT INSTRUCTIONS
Continue with gabapentin 600mg three times daily  Trial voltaren gel for your shoulder and ankle  3. Osteopathic manual medicine today

## 2024-05-10 NOTE — PROGRESS NOTES
Assessment/Plan:      Valeria was seen today for neck pain.    Diagnoses and all orders for this visit:    Acute pain of right shoulder    Achilles tendinitis of both lower extremities    Myofascial pain  -     gabapentin (NEURONTIN) 300 MG capsule; Take 2 capsules (600 mg) by mouth 3 times daily    Thoracic spinal stenosis  -     gabapentin (NEURONTIN) 300 MG capsule; Take 2 capsules (600 mg) by mouth 3 times daily    Cervical spine pain    Thoracic spine pain    Closed wedge compression fracture of T7 vertebra, sequela    Somatic dysfunction of head region  -     OSTEOPATHIC MANIP,5-6 BODY REGN    Somatic dysfunction of cervical region  -     OSTEOPATHIC MANIP,5-6 BODY REGN    Somatic dysfunction of rib region  -     OSTEOPATHIC MANIP,5-6 BODY REGN    Somatic dysfunction of upper extremity  -     OSTEOPATHIC MANIP,5-6 BODY REGN    Somatic dysfunction of thoracic region  -     OSTEOPATHIC MANIP,5-6 BODY REGN         Assessment: Pleasant 73 year old female with a history of hyperlipidemia, anxiety, depression, irritable bowel, neuropathy, osteoporosis and multiple myeloma with a T7 fracture and lesion  resulting in spinal stenosis:      Right shoulder pain.  Consistent with rotator cuff tendinitis/tendinopathy or impingement versus less likely biceps tendinitis.  There is increased recently with doing spring cleaning.    2. Slow improvement of  Bilateral heel pain consistent with Achilles tendinitis.  Possibly from recent Levaquin use.  Bilateral heel pain improves with wearing supportive shoes.    3.Increase in cervical and thoracic thoracic spine pain in the cervical spine parascapular region upper thoracic spine.  Increased after spring cleaning.  Chronic cervical and horacic pain around the mid to upper thoracic spine radiating up to the cervical spine and suboccipital region with headaches.   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  No signs of thoracic myelopathy  neurologically stable. Fracture has been stable on MRI from 2022 T1 and T7.  Continues on gabapentin 600 mg 3 times daily tolerating well.  Sleeping well.  Continues with physical therapy.  OMT is helpful intermittently at controlling her pain.  Keeping her active.        3.  Chronic cervical spine upper thoracic pain consistent myofascial pain with physical therapy.      4.  Somatic dysfunctions of the cranium, cervical spine, rib cage, upper extremities, thoracic spine  that contribute to the patient's pain complaints.         Discussion:    1.  I discussed the diagnosis and treatment options.  We discussed the right shoulder pain likely flare from recent activity we discussed options of further therapy but she continues with that physical therapy we discussed activity modifications injections.  We also discussed topical medications for the shoulder and ankles.    2.  Trial Voltaren gel for the right shoulder and bilateral heels.    3.  Activity modification for the right shoulder.    4.  We discussed increase gabapentin however at this time she is doing fairly well 600 mg 3 times daily and a new prescription is provided to continue on that dosage.   checked and appropriate.    5.  Patient does well with osteopathic manipulation helps manage her pain complaints.  She is a good candidate for manipulation.  Recommend OMT treatment.  She agrees to proceed.  Please see attached procedure note.    6.  Follow-up as needed    It was our pleasure caring for your patient today, if there any questions or concerns please do not hesitate to contact us.      Subjective:   Patient ID: Lizzie Viera is a 73 year old female.    History of Present Illness: Patient presents today for follow-up of cervical and thoracic spine pain which has increased significantly with doing housework.  She also has new right shoulder pain and ongoing bilateral calcaneal pain.  Cervical and upper thoracic spine pain is worsened with doing  recent house work/spring cleaning moving a lot of objects.  Pain with using her arms better with rest and medications.  Taking gabapentin 6 or milligrams 3 times daily tolerating this well and needs a refill of the medication.  Pain is a 10/10 or 7/10 today 0/10 at best.  Osteopathic manipulative medicine was very helpful allows her to have improved range of motion of her neck and decrease pain.Also taking methocarbamol.    She also recently with doing the activities at home has increased her right shoulder pain.  She has new anterior medial shoulder pain worse with lifting her right arm and any overhead activities.  Better with rest.  Continues with physical therapy doing exercises for her neck and upper back and would like to go to the gym again.  She does a lot of activities below shoulder level which is helpful.      She has ongoing heel pain as well worse at the end of the day better in the morning.  Better with using supportive shoes.         Review of Systems: Pertinent positives: Numbness and tingling in the feet, weakness, headache, swallowing issues, no bowel or bladder incontinence, falls, fevers or unintentional weight loss.               Past Medical History:   Diagnosis Date    Cervical dysplasia     Chronic RUQ pain     Depressive disorder     Multiple myeloma (H)     Osteoporosis        The following portions of the patient's history were reviewed and updated as appropriate: allergies, current medications, past family history, past medical history, past social history, past surgical history and problem list.           Objective:   Physical Exam:    BP (!) 147/62   Pulse 56   LMP  (LMP Unknown)   There is no height or weight on file to calculate BMI.      General: Alert and oriented with normal affect. Attention, knowledge, memory, and language are intact. No acute distress.   Eyes: Sclerae are clear.  Respirations: Unlabored.    Skin: No rashes seen.    Gait:  Nonantalgic  Pain in the right  shoulder with arm drop negative empty can and speeds test on the right tenderness over the right anterior medial shoulder from the subscapularis.  Sensation is intact to light touch throughout the upper and lower extremities.  Reflexes are negative Hoffmans.      Manual muscle testing reveals:  Right /Left out of 5  5/5 shoulder abductors  5/5 elbow flexors  5/5 elbow extensors  5/5 wrist extensors  5/5 interosseus  5/5 finger flexors       Structural exam: Cranium: Left cranial torsion, OA sidebent left rotated right cervical spine: C2 rotated right sidebent right, C3 rotated right sidebent right, C5 rotated left sidebent left. Rib cage: Rib one elevated on the left. Thoracic spine: T1 rotated right sidebent right, upper thoracic myofascial restrictions of the paraspinals.  Upper Extremities: myofascial restrictions of the bilat upper trap, infraspinatus/parascapular muscles.  Right glenohumeral resrictions.    Procedure:    After discussing the risks and benefits of osteopathic manipulative medicine, verbal consent was obtained. The somatic dysfunctions listed above were treated with the following techniques: Cranium: Cranial indirect technique, VSD, and muscle energy for the OA. Cervical spine: Muscle energy, still technique, FPR, myofascial release, BLT, and soft tissue techniques. Rib cage: Myofascial release and FPR. Thoracic spine: Myofascial release, BLT. Upper Exrtremity: MFR, FPR, BLT.  The patient tolerated the procedure well and had improved range of motion in all areas treated prior to leaving the clinic.

## 2024-05-10 NOTE — LETTER
5/10/2024         RE: Lizzie Viera  [address]  [place] [address]        Dear Colleague,    Thank you for referring your patient, Lizzie Viera, to the [facility] SP[facility]ERY. Please see a copy of my visit note below.    Assessment/Plan:      Valeria was seen today for neck pain.    Diagnoses and all orders for this visit:    Acute pain of right shoulder    Achilles tendinitis of both lower extremities    Myofascial pain  -     gabapentin (NEURONTIN) 300 MG capsule; Take 2 capsules (600 mg) by mouth 3 times daily    Thoracic spinal stenosis  -     gabapentin (NEURONTIN) 300 MG capsule; Take 2 capsules (600 mg) by mouth 3 times daily    Cervical spine pain    Thoracic spine pain    Closed wedge compression fracture of T7 vertebra, sequela    Somatic dysfunction of head region  -     OSTEOPATHIC MANIP,5-6 BODY REGN    Somatic dysfunction of cervical region  -     OSTEOPATHIC MANIP,5-6 BODY REGN    Somatic dysfunction of rib region  -     OSTEOPATHIC MANIP,5-6 BODY REGN    Somatic dysfunction of upper extremity  -     OSTEOPATHIC MANIP,5-6 BODY REGN    Somatic dysfunction of thoracic region  -     OSTEOPATHIC MANIP,5-6 BODY REGN         Assessment: Hossein 73 year old female with a history of hyperlipidemia, anxiety, depression, irritable bowel, neuropathy, osteoporosis and multiple myeloma with a T7 fracture and lesion  resulting in spinal stenosis:      Right shoulder pain.  Consistent with rotator cuff tendinitis/tendinopathy or impingement versus less likely biceps tendinitis.  There is increased recently with doing spring cleaning.    2. Slow improvement of  Bilateral heel pain consistent with Achilles tendinitis.  Possibly from recent Levaquin use.  Bilateral heel pain improves with wearing supportive shoes.    3.Increase in cervical and thoracic thoracic spine pain in the cervical spine parascapular region upper thoracic spine.  Increased after spring cleaning.   Chronic cervical and horacic pain around the mid to upper thoracic spine radiating up to the cervical spine and suboccipital region with headaches.   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  No signs of thoracic myelopathy neurologically stable. Fracture has been stable on MRI from 2022 T1 and T7.  Continues on gabapentin 600 mg 3 times daily tolerating well.  Sleeping well.  Continues with physical therapy.  OMT is helpful intermittently at controlling her pain.  Keeping her active.        3.  Chronic cervical spine upper thoracic pain consistent myofascial pain with physical therapy.      4.  Somatic dysfunctions of the cranium, cervical spine, rib cage, upper extremities, thoracic spine  that contribute to the patient's pain complaints.         Discussion:    1.  I discussed the diagnosis and treatment options.  We discussed the right shoulder pain likely flare from recent activity we discussed options of further therapy but she continues with that physical therapy we discussed activity modifications injections.  We also discussed topical medications for the shoulder and ankles.    2.  Trial Voltaren gel for the right shoulder and bilateral heels.    3.  Activity modification for the right shoulder.    4.  We discussed increase gabapentin however at this time she is doing fairly well 600 mg 3 times daily and a new prescription is provided to continue on that dosage.   checked and appropriate.    5.  Patient does well with osteopathic manipulation helps manage her pain complaints.  She is a good candidate for manipulation.  Recommend OMT treatment.  She agrees to proceed.  Please see attached procedure note.    6.  Follow-up as needed    It was our pleasure caring for your patient today, if there any questions or concerns please do not hesitate to contact us.      Subjective:   Patient ID: Lizzie Viera is a 73 year old female.    History of Present  Illness: Patient presents today for follow-up of cervical and thoracic spine pain which has increased significantly with doing housework.  She also has new right shoulder pain and ongoing bilateral calcaneal pain.  Cervical and upper thoracic spine pain is worsened with doing recent house work/spring cleaning moving a lot of objects.  Pain with using her arms better with rest and medications.  Taking gabapentin 6 or milligrams 3 times daily tolerating this well and needs a refill of the medication.  Pain is a 10/10 or 7/10 today 0/10 at best.  Osteopathic manipulative medicine was very helpful allows her to have improved range of motion of her neck and decrease pain.Also taking methocarbamol.    She also recently with doing the activities at home has increased her right shoulder pain.  She has new anterior medial shoulder pain worse with lifting her right arm and any overhead activities.  Better with rest.  Continues with physical therapy doing exercises for her neck and upper back and would like to go to the gym again.  She does a lot of activities below shoulder level which is helpful.      She has ongoing heel pain as well worse at the end of the day better in the morning.  Better with using supportive shoes.         Review of Systems: Pertinent positives: Numbness and tingling in the feet, weakness, headache, swallowing issues, no bowel or bladder incontinence, falls, fevers or unintentional weight loss.               Past Medical History:   Diagnosis Date     Cervical dysplasia      Chronic RUQ pain      Depressive disorder      Multiple myeloma (H)      Osteoporosis        The following portions of the patient's history were reviewed and updated as appropriate: allergies, current medications, past family history, past medical history, past social history, past surgical history and problem list.           Objective:   Physical Exam:    BP (!) 147/62   Pulse 56   LMP  (LMP Unknown)   There is no height or  weight on file to calculate BMI.      General: Alert and oriented with normal affect. Attention, knowledge, memory, and language are intact. No acute distress.   Eyes: Sclerae are clear.  Respirations: Unlabored.    Skin: No rashes seen.    Gait:  Nonantalgic  Pain in the right shoulder with arm drop negative empty can and speeds test on the right tenderness over the right anterior medial shoulder from the subscapularis.  Sensation is intact to light touch throughout the upper and lower extremities.  Reflexes are negative Hoffmans.      Manual muscle testing reveals:  Right /Left out of 5  5/5 shoulder abductors  5/5 elbow flexors  5/5 elbow extensors  5/5 wrist extensors  5/5 interosseus  5/5 finger flexors       Structural exam: Cranium: Left cranial torsion, OA sidebent left rotated right cervical spine: C2 rotated right sidebent right, C3 rotated right sidebent right, C5 rotated left sidebent left. Rib cage: Rib one elevated on the left. Thoracic spine: T1 rotated right sidebent right, upper thoracic myofascial restrictions of the paraspinals.  Upper Extremities: myofascial restrictions of the bilat upper trap, infraspinatus/parascapular muscles.  Right glenohumeral resrictions.    Procedure:    After discussing the risks and benefits of osteopathic manipulative medicine, verbal consent was obtained. The somatic dysfunctions listed above were treated with the following techniques: Cranium: Cranial indirect technique, VSD, and muscle energy for the OA. Cervical spine: Muscle energy, still technique, FPR, myofascial release, BLT, and soft tissue techniques. Rib cage: Myofascial release and FPR. Thoracic spine: Myofascial release, BLT. Upper Exrtremity: MFR, FPR, BLT.  The patient tolerated the procedure well and had improved range of motion in all areas treated prior to leaving the clinic.      Again, thank you for allowing me to participate in the care of your patient.        Sincerely,        Wyatt Pascual DO

## 2024-05-21 NOTE — PATIENT INSTRUCTIONS
It was good to see you today, Valeria.    Here are the things we talked about:  I refilled the dilaudid to the [facility] in [place]  I have no new medicines to add today    Stop to make an appointment in 3 months.  The women at the  can do this for you.     How to get a hold of us:  For non-urgent matters, MyChart works best.    For more urgent matters, or if you prefer not to use MyChart, call our clinic nurse coordinator Carine Ferreira RN at [phone]    We have an on-call number for evenings and weekends. Please call this only if you are having uncontrolled symptoms or serious side effects from your medicines: [phone].     For refills, please give us a week (5 working days) notice. We don't always have providers available everyday to do refills. If you call the day you run out of your medicine, we may not be able to refill it in time, so call 5 days in advance!    Yuri Salazar MD MS FAAFP CAQHPM  [facility]  Office [phone]  Fax [phone]

## 2024-05-21 NOTE — LETTER
"    5/21/2024         RE: Lizzie Viera  [address] [place] [address]        Dear Colleague,    Thank you for referring your patient, Lizzie Viera, to the [facility] RADIATION ONCOLOGY [place]. Please see a copy of my visit note below.    Oncology Rooming Note    May 21, 2024 10:51 AM   Lizzie Viera is a 73 year old female who presents for:    Chief Complaint   Patient presents with     Oncology Clinic Visit     Palliative care follow up     Initial Vitals: BP (!) 141/62 (BP Location: Right arm, Patient Position: Sitting)   Pulse 56   Temp 98.1  F (36.7  C)   Resp 20   Wt 57.8 kg (127 lb 6.4 oz)   LMP  (LMP Unknown)   SpO2 95%   BMI 24.07 kg/m   Estimated body mass index is 24.07 kg/m  as calculated from the following:    Height as of 4/26/24: 1.549 m (5' 1\").    Weight as of this encounter: 57.8 kg (127 lb 6.4 oz). Body surface area is 1.58 meters squared.  Severe Pain (7) Comment: Data Unavailable   No LMP recorded (lmp unknown). Patient is postmenopausal.  Allergies reviewed: Yes  Medications reviewed: Yes    Medications: Medication refills not needed today.  Pharmacy name entered into [facility]:    [id number] [place], [place] - [address]  [facility] [place], [place] - [address]    Frailty Screening:   Is the patient here for a new oncology consult visit in cancer care? 2. No      Clinical concerns: Palliative care follow up, twisted right foot, in boot, has pain in right foot 6/10 but walking with boot has caused lower back pain 7/10.  Dr. Salazar was notified.      Malu Duckworth, RN      Palliative Care Outpatient Clinic Progress Note    Patient Name: Lizzie Viera  Primary Provider: Sona Sandoval    Impression & Recommendations & Counseling:  Multiple myeloma  Compression Fracture mid-T spine worsening spasms and tension headaches that start in right shoulder into her neck and head and no specific " "triggers  Cancer associated pain--well controlled  Insomnia well controlled     ECO  Decisional Capacity: very present     SYMPTOM ASSESSMENT:  1.  Cancer related pain thoracic spine pain that radiates to bilateral lower extremities  Comment: Controlled.  Currently rates pain as low on days when doesn't overdo it (3-4) but someday's at the end of the day if she's been on her feet a lot and done a lot of housework or gardening and it is 7-8/10.  Resting and going to bed helps after awhile.  She uses the medical marijuana asneeded at night and it is pretty effective   She uses her dilaudid very infrequently--mainly when she goes to [place] to see her grandkids where she can't legally use her medical cannabis.     - Continue Gabapentin 600 mg by mouth 3 times daily.  - S/P Single fraction radiation to T6 through T .  - S/P T1 radiation 10/6/2021-10/12/2021.    - Continue resumed PT for back and for left knee  - Dilaudid to 2-4 mg by mouth every 4 hours as needed.  Takes infrequently.  - Continue medical cannabis per department of Health - pills and oil. Feels she is having good relief with this.  Does not take cannabis when knows she has to drive the next day.  - Continue Robaxin 500 mg po q6h prn spasms - new refill sent 2024 taking 3 times a day.  - Tried baclofen at bedtime without benefit. It did not assist with sleep or relief of spasms.  -Discussed possibility of initiation of Cymbalta as this could help with neuropathic component to pain as well as assist with depression.  Patient would like to hold off on this at this time.  She feels the gabapentin is 'doing the job' for her.  Does not want to feel any masking of highs or lows per her report.     - Patient is taking naltrexone 3 mg by mouth at hs.  Had discussion with patient that this could reverse/minimize effect of opiate and she may feel more pain.  Taking this as a \"anti-inflammatory\" component of her natural/complementary treatments and doesn't " feel it interferes when she uses her infrequent dilaudid. Her last hsCRP was elevated.  She is also using Tumeric to help reduce inflammation.  She has naturopath who she visits in [place].     2. Cancer related fatigue - fluctuates.  Feels fatigued at days end occasionally but she is now exercising on her treadmill (she had been doing a lot of walking outdoors and moved in due to colder weather)    - Activity as tolerated.  - no recent falls and currently not using a cane     3. Anxiety and depression related to health/diagnosis and current social stressors and stresses around paying for her medications.  - Patient is on list to see Kacy Edouard  for coping, support and processing diagnosis and I also requested help with paying for revlimid; She does see Jack in health psychology  - Patient sees an energy healer.     ADVANCED CARE PLANNING/GOALS OF CARE DISCUSSION:  - Code status: DNR/DNI  - Honoring Choices and POLST in chart.  - Follow-up in palliative care clinic in 3 months for ongoing pain management and decisional support.   -Call [phone] with questions or refill needs.     Counseling: All of the above was explained to the patient in lay language. The patient has verbalized a clear understanding of the discussion, asked appropriate questions, which have been answered to patient's apparent satisfaction. The patient is in agreement with the above plan.        Chief Complaint/Patient ID: Lizzie Viera 73 year old female with PMHx of multiple myeloma    Last Palliative care appointment: 02/13/2024 with me     Reviewed: yes, no concerns    Interim History:  Lizzie Viera is a 73 year old female who is seen today for follow up with Palliative Care Clinic.  She broke a bone in her right foot and is doing well in a cam walker--likely a Chauhan fracture.  She has also seen Dr. Castro for possible nocturnal hypoxia and now uses a little O2 at bedtime.She also has some right shoulder and right foot  tendinitis since she had a long course of a quinolone for CAP.  Dr. Blanco wants to start some bisphosphonates and Valeria is uncomfortable with that and they are negotiating.       Pain:  minimal at this time.    Appetite/Nausea: no complaints     Bowels: no concerns     Sleep: OK; no disturbance with O2 needs     Mood:  Overall good     Coping:  OK in spite of many recent health issues    Family History- Reviewed in Epic.    Allergies   Allergen Reactions     Cefdinir Shortness Of Breath     Latex Shortness Of Breath     Atorvastatin Muscle Pain (Myalgia)     Short Ragweed Pollen Ext Cough     Adhesive Tape Rash     Levaquin [Levofloxacin] Muscle Pain (Myalgia)     Achilles tendonitis after Levaquin (3/2024)       Social History:  Pertinent changes to social history/social situation since last visit: none--Valeria is relying more on her daughter who lives with her for help with transportation;   Key support resources: family (bebe daughter in [place], [place])  Advance Directive Status:  has ACP documents    Social History     Tobacco Use     Smoking status: Former     Current packs/day: 0.00     Average packs/day: 0.5 packs/day for 45.0 years (22.5 ttl pk-yrs)     Types: Cigarettes     Start date: 1978     Quit date: 2023     Years since quittin.3     Smokeless tobacco: Never   Vaping Use     Vaping status: Every Day   Substance Use Topics     Alcohol use: Yes     Comment: Alcoholic Drinks/day: 0-1 drinks per week     Drug use: Not Currently         Allergies   Allergen Reactions     Cefdinir Shortness Of Breath     Latex Shortness Of Breath     Atorvastatin Muscle Pain (Myalgia)     Short Ragweed Pollen Ext Cough     Adhesive Tape Rash     Levaquin [Levofloxacin] Muscle Pain (Myalgia)     Achilles tendonitis after Levaquin (3/2024)     Current Outpatient Medications   Medication Sig Dispense Refill     albuterol (PROAIR HFA/PROVENTIL HFA/VENTOLIN HFA) 108 (90 Base) MCG/ACT inhaler Inhale 2 puffs into the  lungs every 6 hours as needed for wheezing or shortness of breath / dyspnea 18 g 1     albuterol (PROVENTIL) (2.5 MG/3ML) 0.083% neb solution Take 1 vial (2.5 mg) by nebulization every 4 hours as needed for wheezing or shortness of breath / dyspnea 90 mL 0     aspirin (ASA) 81 MG chewable tablet Take 81 mg by mouth daily       fish oil-omega-3 fatty acids 1000 MG capsule Take 2 g by mouth daily       fluticasone (FLONASE) 50 MCG/ACT nasal spray Spray 2 sprays into both nostrils daily 15.8 mL 4     gabapentin (NEURONTIN) 300 MG capsule Take 2 capsules (600 mg) by mouth 3 times daily 180 capsule 1     hydrochlorothiazide (HYDRODIURIL) 12.5 MG tablet Take 1 tablet (12.5 mg) by mouth daily 90 tablet 4     HYDROmorphone (DILAUDID) 4 MG tablet Take 0.5-1 tablets (2-4 mg) by mouth every 4 hours as needed for moderate to severe pain 60 tablet 0     LENalidomide (REVLIMID) 10 MG CAPS capsule Take 1 capsule (10 mg) by mouth daily for 28 days Take at the same time each day. Do not open, break or chew the capsule. Swallow whole, with water. 28 capsule 0     medical cannabis (Patient's own supply) See Admin Instructions (The purpose of this order is to document that the patient reports taking medical cannabis.  This is not a prescription, and is not used to certify that the patient has a qualifying medical condition.)     Oil formulation       Melatonin 10 MG TABS tablet Take 1 tablet (10 mg) by mouth at bedtime       Menaquinone-7 (K2 PO) Take 1 capsule by mouth daily       methocarbamol (ROBAXIN) 500 MG tablet Take 1 tablet (500 mg) by mouth 4 times daily as needed for muscle spasms 120 tablet 11     Milk Thistle-Dand-Fennel-Licor (MILK THISTLE XTRA) CAPS capsule Take 1 capsule by mouth daily       NALTREXONE HCL PO Take 3 mg by mouth daily       TURMERIC PO Take 1 capsule by mouth daily       UNABLE TO FIND 1 capsule daily MEDICATION NAME: Serrapeptase       UNABLE TO FIND 1 capsule daily MEDICATION NAME: Lions García Mushroom        UNABLE TO FIND 1 capsule daily MEDICATION NAME: DIM (diinolylmethane)       UNABLE TO FIND MEDICATION NAME: Panacur C - 1 capsule daily       Vitamin D, Cholecalciferol, 25 MCG (1000 UT) CAPS Take 1,000 Units by mouth daily Liquid, not capsule       Past Medical History:   Diagnosis Date     Cervical dysplasia      Chronic RUQ pain      Depressive disorder      Multiple myeloma (H)      Osteoporosis      Past Surgical History:   Procedure Laterality Date     CONIZATION CERVIX,KNIFE/LASER      Description: Cervical Conization By Laser;  Recorded: 09/20/2007;     HC DILATION/CURETTAGE DIAG/THER NON OB      Description: Dilation And Curettage;  Recorded: 09/20/2007;     HC REMOVE TONSILS/ADENOIDS,<11 Y/O      Description: Tonsillectomy With Adenoidectomy;  Recorded: 09/20/2007;     RUST LAP,CHOLECYSTECTOMY/EXPLORE  12/27/2004     RUST LIGATE FALLOPIAN TUBE      Description: Tubal Ligation;  Recorded: 09/20/2007;       Physical Exam:   GENERAL APPEARANCE: healthy appearing 73 year old, alert and no distress; neatly groomed  EYES: Eyes grossly normal to inspection, PERRLA, conjunctivae and sclerae without injection or discharge, EOM intact   RESP:  no increased work of breathing; speaks in complete sentences;   MS: No musculoskeletal defects are noted  SKIN: No suspicious lesions or rashes, hydration status appears adequate with normal skin turgor   PSYCH: Alert and oriented x3; speech- coherent , normal rate and volume; able to articulate logical thoughts, able to abstract reason, no tangential thoughts, no hallucinations or delusions, mentation appears normal, Mood is euthymic. Affect is appropriate for this mood state and bright. Thought content is free of suicidal ideation, hallucinations, and delusions.  Eye contact is good during conversation.       Key Data Reviewed:  LABS: 04/03/2024- Cr 0.86, Albumin 4,  Hgb 14.1,      IMAGING: no recent imaging available    35 minutes spent on the date of the encounter doing  chart review, history and exam, patient education & counseling, documentation and other activities as noted above.    Yuri Salazar MD MS FAAFP CAQHPM  [facility]  Office [phone]  Fax [phone]         Again, thank you for allowing me to participate in the care of your patient.        Sincerely,        Yuri Salazar MD

## 2024-05-21 NOTE — PROGRESS NOTES
"Oncology Rooming Note    May 21, 2024 10:51 AM   Lizzie Viera is a 73 year old female who presents for:    Chief Complaint   Patient presents with    Oncology Clinic Visit     Palliative care follow up     Initial Vitals: BP (!) 141/62 (BP Location: Right arm, Patient Position: Sitting)   Pulse 56   Temp 98.1  F (36.7  C)   Resp 20   Wt 57.8 kg (127 lb 6.4 oz)   LMP  (LMP Unknown)   SpO2 95%   BMI 24.07 kg/m   Estimated body mass index is 24.07 kg/m  as calculated from the following:    Height as of 24: 1.549 m (5' 1\").    Weight as of this encounter: 57.8 kg (127 lb 6.4 oz). Body surface area is 1.58 meters squared.  Severe Pain (7) Comment: Data Unavailable   No LMP recorded (lmp unknown). Patient is postmenopausal.  Allergies reviewed: Yes  Medications reviewed: Yes    Medications: Medication refills not needed today.  Pharmacy name entered into [employer]:    [id number] [place], [place] - [address]  [facility] [place], [place] - [facility]ly [facility], [facility] [facility]    Frailty Screening:   Is the patient here for a new oncology consult visit in cancer care? 2. No      Clinical concerns: Palliative care follow up, twisted right foot, in boot, has pain in right foot 6/10 but walking with boot has caused lower back pain 7/10.  Dr. Salazar was notified.      Malu Duckworth RN      Palliative Care Outpatient Clinic Progress Note    Patient Name: Lizzie Viera  Primary Provider: Sona Sandoval    Impression & Recommendations & Counseling:  Multiple myeloma  Compression Fracture mid-T spine worsening spasms and tension headaches that start in right shoulder into her neck and head and no specific triggers  Cancer associated pain--well controlled  Insomnia well controlled     ECO  Decisional Capacity: very present     SYMPTOM ASSESSMENT:  1.  Cancer related pain thoracic spine pain that radiates to bilateral lower extremities  Comment: Controlled.  Currently rates pain " "as low on days when doesn't overdo it (3-4) but someday's at the end of the day if she's been on her feet a lot and done a lot of housework or gardening and it is 7-8/10.  Resting and going to bed helps after awhile.  She uses the medical marijuana asneeded at night and it is pretty effective   She uses her dilaudid very infrequently--mainly when she goes to [place] to see her grandkids where she can't legally use her medical cannabis.     - Continue Gabapentin 600 mg by mouth 3 times daily.  - S/P Single fraction radiation to T6 through T 8/9.  - S/P T1 radiation 10/6/2021-10/12/2021.    - Continue resumed PT for back and for left knee  - Dilaudid to 2-4 mg by mouth every 4 hours as needed.  Takes infrequently.  - Continue medical cannabis per department of Health - pills and oil. Feels she is having good relief with this.  Does not take cannabis when knows she has to drive the next day.  - Continue Robaxin 500 mg po q6h prn spasms - new refill sent 2/12/2024 taking 3 times a day.  - Tried baclofen at bedtime without benefit. It did not assist with sleep or relief of spasms.  -Discussed possibility of initiation of Cymbalta as this could help with neuropathic component to pain as well as assist with depression.  Patient would like to hold off on this at this time.  She feels the gabapentin is 'doing the job' for her.  Does not want to feel any masking of highs or lows per her report.     - Patient is taking naltrexone 3 mg by mouth at hs.  Had discussion with patient that this could reverse/minimize effect of opiate and she may feel more pain.  Taking this as a \"anti-inflammatory\" component of her natural/complementary treatments and doesn't feel it interferes when she uses her infrequent dilaudid. Her last hsCRP was elevated.  She is also using Tumeric to help reduce inflammation.  She has naturopath who she visits in [place].     2. Cancer related fatigue - fluctuates.  Feels fatigued at days end occasionally but she " is now exercising on her treadmill (she had been doing a lot of walking outdoors and moved in due to colder weather)    - Activity as tolerated.  - no recent falls and currently not using a cane     3. Anxiety and depression related to health/diagnosis and current social stressors and stresses around paying for her medications.  - Patient is on list to see Kacy Edouard  for coping, support and processing diagnosis and I also requested help with paying for revlimid; She does see Jack in health psychology  - Patient sees an energy healer.     ADVANCED CARE PLANNING/GOALS OF CARE DISCUSSION:  - Code status: DNR/DNI  - Honoring Choices and POLST in chart.  - Follow-up in palliative care clinic in 3 months for ongoing pain management and decisional support.   -Call [phone] with questions or refill needs.     Counseling: All of the above was explained to the patient in lay language. The patient has verbalized a clear understanding of the discussion, asked appropriate questions, which have been answered to patient's apparent satisfaction. The patient is in agreement with the above plan.        Chief Complaint/Patient ID: Lizzie Viera 73 year old female with PMHx of multiple myeloma    Last Palliative care appointment: 02/13/2024 with me     Reviewed: yes, no concerns    Interim History:  Lizzie Viera is a 73 year old female who is seen today for follow up with Palliative Care Clinic.  She broke a bone in her right foot and is doing well in a cam walker--likely a Chauhan fracture.  She has also seen Dr. Castro for possible nocturnal hypoxia and now uses a little O2 at bedtime.She also has some right shoulder and right foot tendinitis since she had a long course of a quinolone for CAP.  Dr. Blanco wants to start some bisphosphonates and Valeria is uncomfortable with that and they are negotiating.       Pain:  minimal at this time.    Appetite/Nausea: no complaints     Bowels: no concerns     Sleep: OK; no  disturbance with O2 needs     Mood:  Overall good     Coping:  OK in spite of many recent health issues    Family History- Reviewed in Epic.    Allergies   Allergen Reactions    Cefdinir Shortness Of Breath    Latex Shortness Of Breath    Atorvastatin Muscle Pain (Myalgia)    Short Ragweed Pollen Ext Cough    Adhesive Tape Rash    Levaquin [Levofloxacin] Muscle Pain (Myalgia)     Achilles tendonitis after Levaquin (3/2024)       Social History:  Pertinent changes to social history/social situation since last visit: none--Valeria is relying more on her daughter who lives with her for help with transportation;   Key support resources: family (bebe daughter in [place], [place])  Advance Directive Status:  has ACP documents    Social History     Tobacco Use    Smoking status: Former     Current packs/day: 0.00     Average packs/day: 0.5 packs/day for 45.0 years (22.5 ttl pk-yrs)     Types: Cigarettes     Start date: 1978     Quit date: 2023     Years since quittin.3    Smokeless tobacco: Never   Vaping Use    Vaping status: Every Day   Substance Use Topics    Alcohol use: Yes     Comment: Alcoholic Drinks/day: 0-1 drinks per week    Drug use: Not Currently         Allergies   Allergen Reactions    Cefdinir Shortness Of Breath    Latex Shortness Of Breath    Atorvastatin Muscle Pain (Myalgia)    Short Ragweed Pollen Ext Cough    Adhesive Tape Rash    Levaquin [Levofloxacin] Muscle Pain (Myalgia)     Achilles tendonitis after Levaquin (3/2024)     Current Outpatient Medications   Medication Sig Dispense Refill    albuterol (PROAIR HFA/PROVENTIL HFA/VENTOLIN HFA) 108 (90 Base) MCG/ACT inhaler Inhale 2 puffs into the lungs every 6 hours as needed for wheezing or shortness of breath / dyspnea 18 g 1    albuterol (PROVENTIL) (2.5 MG/3ML) 0.083% neb solution Take 1 vial (2.5 mg) by nebulization every 4 hours as needed for wheezing or shortness of breath / dyspnea 90 mL 0    aspirin (ASA) 81 MG chewable tablet Take 81  mg by mouth daily      fish oil-omega-3 fatty acids 1000 MG capsule Take 2 g by mouth daily      fluticasone (FLONASE) 50 MCG/ACT nasal spray Spray 2 sprays into both nostrils daily 15.8 mL 4    gabapentin (NEURONTIN) 300 MG capsule Take 2 capsules (600 mg) by mouth 3 times daily 180 capsule 1    hydrochlorothiazide (HYDRODIURIL) 12.5 MG tablet Take 1 tablet (12.5 mg) by mouth daily 90 tablet 4    HYDROmorphone (DILAUDID) 4 MG tablet Take 0.5-1 tablets (2-4 mg) by mouth every 4 hours as needed for moderate to severe pain 60 tablet 0    LENalidomide (REVLIMID) 10 MG CAPS capsule Take 1 capsule (10 mg) by mouth daily for 28 days Take at the same time each day. Do not open, break or chew the capsule. Swallow whole, with water. 28 capsule 0    medical cannabis (Patient's own supply) See Admin Instructions (The purpose of this order is to document that the patient reports taking medical cannabis.  This is not a prescription, and is not used to certify that the patient has a qualifying medical condition.)     Oil formulation      Melatonin 10 MG TABS tablet Take 1 tablet (10 mg) by mouth at bedtime      Menaquinone-7 (K2 PO) Take 1 capsule by mouth daily      methocarbamol (ROBAXIN) 500 MG tablet Take 1 tablet (500 mg) by mouth 4 times daily as needed for muscle spasms 120 tablet 11    Milk Thistle-Dand-Fennel-Licor (MILK THISTLE XTRA) CAPS capsule Take 1 capsule by mouth daily      NALTREXONE HCL PO Take 3 mg by mouth daily      TURMERIC PO Take 1 capsule by mouth daily      UNABLE TO FIND 1 capsule daily MEDICATION NAME: Serrapeptase      UNABLE TO FIND 1 capsule daily MEDICATION NAME: Edita Mariano Mushroom      UNABLE TO FIND 1 capsule daily MEDICATION NAME: DIM (diinolylmethane)      UNABLE TO FIND MEDICATION NAME: Panacur C - 1 capsule daily      Vitamin D, Cholecalciferol, 25 MCG (1000 UT) CAPS Take 1,000 Units by mouth daily Liquid, not capsule       Past Medical History:   Diagnosis Date    Cervical dysplasia      Chronic RUQ pain     Depressive disorder     Multiple myeloma (H)     Osteoporosis      Past Surgical History:   Procedure Laterality Date    CONIZATION CERVIX,KNIFE/LASER      Description: Cervical Conization By Laser;  Recorded: 09/20/2007;    HC DILATION/CURETTAGE DIAG/THER NON OB      Description: Dilation And Curettage;  Recorded: 09/20/2007;    HC REMOVE TONSILS/ADENOIDS,<11 Y/O      Description: Tonsillectomy With Adenoidectomy;  Recorded: 09/20/2007;    ZZC LAP,CHOLECYSTECTOMY/EXPLORE  12/27/2004    [facility] LIGATE FALLOPIAN TUBE      Description: Tubal Ligation;  Recorded: 09/20/2007;       Physical Exam:   GENERAL APPEARANCE: healthy appearing 73 year old, alert and no distress; neatly groomed  EYES: Eyes grossly normal to inspection, PERRLA, conjunctivae and sclerae without injection or discharge, EOM intact   RESP:  no increased work of breathing; speaks in complete sentences;   MS: No musculoskeletal defects are noted  SKIN: No suspicious lesions or rashes, hydration status appears adequate with normal skin turgor   PSYCH: Alert and oriented x3; speech- coherent , normal rate and volume; able to articulate logical thoughts, able to abstract reason, no tangential thoughts, no hallucinations or delusions, mentation appears normal, Mood is euthymic. Affect is appropriate for this mood state and bright. Thought content is free of suicidal ideation, hallucinations, and delusions.  Eye contact is good during conversation.       Key Data Reviewed:  LABS: 04/03/2024- Cr 0.86, Albumin 4,  Hgb 14.1,      IMAGING: no recent imaging available    35 minutes spent on the date of the encounter doing chart review, history and exam, patient education & counseling, documentation and other activities as noted above.    Yuri Salazar MD MS FAAFP CAQM  [facility]  Office [phone]  Fax [phone]
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
## 2024-05-22 NOTE — LETTER
5/22/2024         RE: Lizzie Viera  [address]  [place] [address]        Dear Colleague,    Thank you for referring your patient, Lizzie Viera, to the [facility]. Please see a copy of my visit note below.    See confidential note      Again, thank you for allowing me to participate in the care of your patient.        Sincerely,        Mario Long LP

## 2024-06-05 NOTE — LETTER
6/5/2024      Lizzie Viera  [address] [place] [address]      Dear Colleague,    Thank you for referring your patient, Lizzie Viera, to the [facility]. Please see a copy of my visit note below.    See confidential note      Again, thank you for allowing me to participate in the care of your patient.        Sincerely,        Mario Long LP
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
## 2024-06-10 NOTE — PROGRESS NOTES
Assessment/Plan:      Valeria was seen today for back pain.    Diagnoses and all orders for this visit:    Cervical spine pain    Thoracic spinal stenosis    Myofascial pain    Paresthesia of foot, bilateral    Somatic dysfunction of head region  -     OSTEOPATHIC MANIP,5-6 BODY REGN    Somatic dysfunction of cervical region  -     OSTEOPATHIC MANIP,5-6 BODY REGN    Somatic dysfunction of rib region  -     OSTEOPATHIC MANIP,5-6 BODY REGN    Somatic dysfunction of upper extremity  -     OSTEOPATHIC MANIP,5-6 BODY REGN    Somatic dysfunction of thoracic region  -     OSTEOPATHIC MANIP,5-6 BODY REGN         Assessment: Pleasant 73 year old female  with a history of hyperlipidemia, anxiety, depression, irritable bowel, neuropathy, osteoporosis and multiple myeloma with a T7 fracture and lesion  resulting in spinal stenosis:     Significant increase in cervical and thoracic thoracic spine pain in the cervical spine parascapular region upper thoracic spine.  Consistent with myofascial pain in the setting of chronic fractures.  Chronic cervical and horacic pain around the mid to upper thoracic spine radiating up to the cervical spine and suboccipital region with headaches.   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  No signs of thoracic myelopathy neurologically stable. Fracture has been stable on MRI from 2022 T1 and T7.  Continues on gabapentin 600 mg 3 times daily tolerating well.  Sleeping well.  Continues with physical therapy.    More manageable with gabapentin.  Osteopathic manipulation continues to be helpful keeping her active.    Progress bilateral lower extremity paresthesias.  Consistent with a peripheral polyneuropathy.  Feels this is increased after wearing her boot for fracture of the right fifth metatarsal.     3.   Bilateral heel pain consistent with Achilles tendinitis.  Had worsening of the right foot found to have a base of the fifth fracture after she  rolled her ankle.  Following with orthopedics.        4.  Somatic dysfunctions of the cranium, cervical spine, rib cage, upper extremities, thoracic spine  that contribute to the patient's pain complaints.         Discussion:    1.  I discussed the diagnosis and treatment options.  Discussed the gabapentin use along with the peripheral polyneuropathy.  We discussed options of EMG however she has a boot on her right foot.  Would have her complete the orthopedic evaluation and treatment for a base of the fifth metatarsal fracture of the right foot and then can determine the need for further workup of peripheral polyneuropathy and she agrees.    2.  Continue with gabapentin.    3.  Recommend Osteopathic manipulative medicine today.  She agrees to proceed.  Please see attached procedure note.    4.  Follow-up as needed.      It was our pleasure caring for your patient today, if there any questions or concerns please do not hesitate to contact us.      Subjective:   Patient ID: Lizzie Viera is a 73 year old female.    History of Present Illness: Patient presents for significant increase cervical spine upper thoracic spine pain.  Also has increase in right lower extremity pain and is in a boot.  After last visit had increased Achilles pain at 1 point and stood up and rolled her foot had severe pain was seen by [facility] and has a fracture of the base of the fifth metatarsal in a boot.  With walking in the boot she has had increased paresthesias of her feet as well as some increased back pain more significantly thoracic spine.  Cervical spine pain also cleaning her house has increased her neck pain and upper back pain better with rest.  Still taking gabapentin 600 mg 3 times daily which is helpful along with methocarbamol THC.  Pain is a 10/10 or 6/10 today 0/10 at best.  Osteopathic manipulative medicine helps manage her chronic cervical and thoracic spine pain.      Imaging: I personally reviewed x-rays  "of the right ankle showing base of the fifth metatarsal fracture of the right foot    Review of Systems: Complains of numbness and tingling in the feet which is worsening headaches swallowing issues.  Denies weakness, bowel or bladder incontinence, falls, fevers and unintentional weight loss.         Past Medical History:   Diagnosis Date    Cervical dysplasia     Chronic RUQ pain     Depressive disorder     Multiple myeloma (H)     Osteoporosis        The following portions of the patient's history were reviewed and updated as appropriate: allergies, current medications, past family history, past medical history, past social history, past surgical history and problem list.           Objective:   Physical Exam:    BP (!) 147/64 (BP Location: Right arm, Patient Position: Sitting, Cuff Size: Adult Regular)   Pulse 55   Ht 5' 1\" (1.549 m)   Wt 127 lb 6.4 oz (57.8 kg)   LMP  (LMP Unknown)   BMI 24.07 kg/m    Body mass index is 24.07 kg/m .      General: Alert and oriented with normal affect. Attention, knowledge, memory, and language are intact. No acute distress.   Eyes: Sclerae are clear.  Respirations: Unlabored. CV: No lower extremity edema left foot, boot in place on the right foot.     Gait:  Nonantalgic  Boot in place right foot.  Sensation is intact to light touch throughout the upper extremities.  Reflexes are negative Hoffmans.      Manual muscle testing reveals:  Right /Left out of 5     5/5 elbow flexors  5/5 elbow extensors  5/5 wrist extensors  5/5 interosseus  5/5 finger flexors       Structural exam: Cranium: OA sidebent right rotated left cervical spine: C2 rotated left sidebent left, C3 rotated right sidebent right   Rib cage: Rib one elevated on the left. Thoracic spine: T1 rotated right sidebent right  Upper Extremities: myofascial restrictions of the bilat upper trap, infraspinatus/parascapular muscles. bilateral glenohumeral resrictions.    Procedure:    After discussing the risks and benefits " of osteopathic manipulative medicine, verbal consent was obtained. The somatic dysfunctions listed above were treated with the following techniques: Cranium: Cranial indirect technique, VSD, and muscle energy for the OA. Cervical spine: Muscle energy, still technique, FPR, myofascial release, BLT, and soft tissue techniques. Rib cage: Myofascial release and FPR. Thoracic spine: Myofascial release, BLT.   Upper Exrtremity: MFR, FPR, BLT.  The patient tolerated the procedure well and had improved range of motion in all areas treated prior to leaving the clinic.

## 2024-06-10 NOTE — LETTER
6/10/2024      Lizzie Viera  [address]  [place] [address]      Dear Colleague,    Thank you for referring your patient, Lizzie Viera, to the [facility]. Please see a copy of my visit note below.    Assessment/Plan:      Valeria was seen today for back pain.    Diagnoses and all orders for this visit:    Cervical spine pain    Thoracic spinal stenosis    Myofascial pain    Paresthesia of foot, bilateral    Somatic dysfunction of head region  -     OSTEOPATHIC MANIP,5-6 BODY REGN    Somatic dysfunction of cervical region  -     OSTEOPATHIC MANIP,5-6 BODY REGN    Somatic dysfunction of rib region  -     OSTEOPATHIC MANIP,5-6 BODY REGN    Somatic dysfunction of upper extremity  -     OSTEOPATHIC MANIP,5-6 BODY REGN    Somatic dysfunction of thoracic region  -     OSTEOPATHIC MANIP,5-6 BODY REGN         Assessment: Hossein 73 year old female  with a history of hyperlipidemia, anxiety, depression, irritable bowel, neuropathy, osteoporosis and multiple myeloma with a T7 fracture and lesion  resulting in spinal stenosis:     Significant increase in cervical and thoracic thoracic spine pain in the cervical spine parascapular region upper thoracic spine.  Consistent with myofascial pain in the setting of chronic fractures.  Chronic cervical and horacic pain around the mid to upper thoracic spine radiating up to the cervical spine and suboccipital region with headaches.   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  No signs of thoracic myelopathy neurologically stable. Fracture has been stable on MRI from 2022 T1 and T7.  Continues on gabapentin 600 mg 3 times daily tolerating well.  Sleeping well.  Continues with physical therapy.    More manageable with gabapentin.  Osteopathic manipulation continues to be helpful keeping her active.    Progress bilateral lower extremity paresthesias.  Consistent with a peripheral  polyneuropathy.  Feels this is increased after wearing her boot for fracture of the right fifth metatarsal.     3.   Bilateral heel pain consistent with Achilles tendinitis.  Had worsening of the right foot found to have a base of the fifth fracture after she rolled her ankle.  Following with orthopedics.        4.  Somatic dysfunctions of the cranium, cervical spine, rib cage, upper extremities, thoracic spine  that contribute to the patient's pain complaints.         Discussion:    1.  I discussed the diagnosis and treatment options.  Discussed the gabapentin use along with the peripheral polyneuropathy.  We discussed options of EMG however she has a boot on her right foot.  Would have her complete the orthopedic evaluation and treatment for a base of the fifth metatarsal fracture of the right foot and then can determine the need for further workup of peripheral polyneuropathy and she agrees.    2.  Continue with gabapentin.    3.  Recommend Osteopathic manipulative medicine today.  She agrees to proceed.  Please see attached procedure note.    4.  Follow-up as needed.      It was our pleasure caring for your patient today, if there any questions or concerns please do not hesitate to contact us.      Subjective:   Patient ID: Lizzie Viera is a 73 year old female.    History of Present Illness: Patient presents for significant increase cervical spine upper thoracic spine pain.  Also has increase in right lower extremity pain and is in a boot.  After last visit had increased Achilles pain at 1 point and stood up and rolled her foot had severe pain was seen by [facility] and has a fracture of the base of the fifth metatarsal in a boot.  With walking in the boot she has had increased paresthesias of her feet as well as some increased back pain more significantly thoracic spine.  Cervical spine pain also cleaning her house has increased her neck pain and upper back pain better with rest.  Still taking  "gabapentin 600 mg 3 times daily which is helpful along with methocarbamol THC.  Pain is a 10/10 or 6/10 today 0/10 at best.  Osteopathic manipulative medicine helps manage her chronic cervical and thoracic spine pain.      Imaging: I personally reviewed x-rays of the right ankle showing base of the fifth metatarsal fracture of the right foot    Review of Systems: Complains of numbness and tingling in the feet which is worsening headaches swallowing issues.  Denies weakness, bowel or bladder incontinence, falls, fevers and unintentional weight loss.         Past Medical History:   Diagnosis Date     Cervical dysplasia      Chronic RUQ pain      Depressive disorder      Multiple myeloma (H)      Osteoporosis        The following portions of the patient's history were reviewed and updated as appropriate: allergies, current medications, past family history, past medical history, past social history, past surgical history and problem list.           Objective:   Physical Exam:    BP (!) 147/64 (BP Location: Right arm, Patient Position: Sitting, Cuff Size: Adult Regular)   Pulse 55   Ht 5' 1\" (1.549 m)   Wt 127 lb 6.4 oz (57.8 kg)   LMP  (LMP Unknown)   BMI 24.07 kg/m    Body mass index is 24.07 kg/m .      General: Alert and oriented with normal affect. Attention, knowledge, memory, and language are intact. No acute distress.   Eyes: Sclerae are clear.  Respirations: Unlabored. CV: No lower extremity edema left foot, boot in place on the right foot.     Gait:  Nonantalgic  Boot in place right foot.  Sensation is intact to light touch throughout the upper extremities.  Reflexes are negative Hoffmans.      Manual muscle testing reveals:  Right /Left out of 5     5/5 elbow flexors  5/5 elbow extensors  5/5 wrist extensors  5/5 interosseus  5/5 finger flexors       Structural exam: Cranium: OA sidebent right rotated left cervical spine: C2 rotated left sidebent left, C3 rotated right sidebent right   Rib cage: Rib one " elevated on the left. Thoracic spine: T1 rotated right sidebent right  Upper Extremities: myofascial restrictions of the bilat upper trap, infraspinatus/parascapular muscles. bilateral glenohumeral resrictions.    Procedure:    After discussing the risks and benefits of osteopathic manipulative medicine, verbal consent was obtained. The somatic dysfunctions listed above were treated with the following techniques: Cranium: Cranial indirect technique, VSD, and muscle energy for the OA. Cervical spine: Muscle energy, still technique, FPR, myofascial release, BLT, and soft tissue techniques. Rib cage: Myofascial release and FPR. Thoracic spine: Myofascial release, BLT.   Upper Exrtremity: MFR, FPR, BLT.  The patient tolerated the procedure well and had improved range of motion in all areas treated prior to leaving the clinic.      Again, thank you for allowing me to participate in the care of your patient.        Sincerely,        Wyatt Pascual DO

## 2024-06-10 NOTE — PATIENT INSTRUCTIONS
Continue with gabapentin.  Monitor foot paresthesia.  Continue with [facility] for your foot  Osteopathic manual medicine today

## 2024-06-12 NOTE — PROGRESS NOTES
Reviewed the results of her echocardiogram which reveals no abnormalities and specifically no evidence of an intracardiac shunt.    We will proceed with a VQ scan and baseline autoimmune workup as previously discussed. I was unable to speak with Ms. Viera but have left messages on her cell phone and home phone.    Alia Castro MD  Pulmonary and Critical Care  (P) [phone]
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
## 2024-06-19 NOTE — LETTER
6/19/2024      Lizzie Viera  [address] [place] [address]      Dear Colleague,    Thank you for referring your patient, Lizzie Viera, to the [facility]. Please see a copy of my visit note below.    See confidential note      Again, thank you for allowing me to participate in the care of your patient.        Sincerely,        Mario Long LP

## 2024-07-01 NOTE — LETTER
7/1/2024      Lizzie Viera  [address] [place] [address]      Dear Colleague,    Thank you for referring your patient, Lizzie Viera, to the [facility]. Please see a copy of my visit note below.    FOLLOW UP VISIT NOTE      HISTORY OF PRESENT ILLNESS    Valeria was seen in follow up for audiogram review.    AUDIOLOGY NOTE:    HX: Brown patient. 3/ 7/ 24 audio showed borderline normal, sloping to mild to mod- severe SNHL for 250- 8000 Hz in the left, and mild sloping to mod- severe SNHL for 250- 8000 Hz in the right. Pt denied a change in her hearing since last audio. She is interested in hearing aids. Reports pain in front of left ear. Tinnitus in both ears. Ears are itchy. Denied otalgia, otorrhea, vertigo, hx of ear surgery, loud noise exposure, aural fullness, and previous HA use. RESULTS: Otoscopy: Clear canals bilat. Tymps: Shallow mobility bilat. Reflexes: Absent in ipsi conditions, unable to test contra due to equipment limitations. Audio: Normal sloping to moderately severe SNHL bilat. Compared to audio 3/ 7/ 24 hearing is essentially stable bilat. 10 dB improvement at 250 Hz in the right, and 10 dB decline at 8k Hz bilat. REC: Follow up with ENT as scheduled.        REVIEW OF SYSTEMS    Review of Systems: a 10-system review is reviewed at this encounter.  See scanned document.       Allergies   Allergen Reactions     Cefdinir Shortness Of Breath     Latex Shortness Of Breath     Atorvastatin Muscle Pain (Myalgia)     Short Ragweed Pollen Ext Cough     Adhesive Tape Rash     Levaquin [Levofloxacin] Muscle Pain (Myalgia)     Achilles tendonitis after Levaquin (3/2024)           PHYSICAL EXAM:        HEAD: Normal appearance and symmetry:  No cutaneous lesions.      EARS:        RIGHT: TM intact   LEFT:   TM intact      NOSE:    Dorsum:   straight       ORAL CAVITY/OROPHARYNX:    Lips:  Normal.     NECK:  Trachea:  midline     NEURO:   Alert and Oriented    GAIT  AND STATION:  normal     RESPIRATORY:   Symmetry and Respiratory effort    PSYCH:   normal mood and affect    SKIN:  warm and dry     AUDIOLOGY:     IMPRESSION:   Encounter Diagnosis   Name Primary?     Sensorineural hearing loss (SNHL) of both ears Yes       RECOMMENDATIONS:    She is medically cleared for hearing aids  Return annually for hearing test  Return immediately for any sudden change in hearing      Again, thank you for allowing me to participate in the care of your patient.        Sincerely,        Bubba Carranza MD

## 2024-07-01 NOTE — PROGRESS NOTES
AUDIOLOGY REPORT    SUMMARY: Audiology visit completed. See audiogram for results.      RECOMMENDATIONS: Follow-up with ENT.    Ana Laura Segovia, CCC-A  [place] Licensed Audiologist #[id number]

## 2024-07-01 NOTE — PROGRESS NOTES
FOLLOW UP VISIT NOTE      HISTORY OF PRESENT ILLNESS    Valeria was seen in follow up for audiogram review.    AUDIOLOGY NOTE:    HX: Brown patient. 3/ 7/ 24 audio showed borderline normal, sloping to mild to mod- severe SNHL for 250- 8000 Hz in the left, and mild sloping to mod- severe SNHL for 250- 8000 Hz in the right. Pt denied a change in her hearing since last audio. She is interested in hearing aids. Reports pain in front of left ear. Tinnitus in both ears. Ears are itchy. Denied otalgia, otorrhea, vertigo, hx of ear surgery, loud noise exposure, aural fullness, and previous HA use. RESULTS: Otoscopy: Clear canals bilat. Tymps: Shallow mobility bilat. Reflexes: Absent in ipsi conditions, unable to test contra due to equipment limitations. Audio: Normal sloping to moderately severe SNHL bilat. Compared to audio 3/ 7/ 24 hearing is essentially stable bilat. 10 dB improvement at 250 Hz in the right, and 10 dB decline at 8k Hz bilat. REC: Follow up with ENT as scheduled.        REVIEW OF SYSTEMS    Review of Systems: a 10-system review is reviewed at this encounter.  See scanned document.       Allergies   Allergen Reactions    Cefdinir Shortness Of Breath    Latex Shortness Of Breath    Atorvastatin Muscle Pain (Myalgia)    Short Ragweed Pollen Ext Cough    Adhesive Tape Rash    Levaquin [Levofloxacin] Muscle Pain (Myalgia)     Achilles tendonitis after Levaquin (3/2024)           PHYSICAL EXAM:        HEAD: Normal appearance and symmetry:  No cutaneous lesions.      EARS:        RIGHT: TM intact   LEFT:   TM intact      NOSE:    Dorsum:   straight       ORAL CAVITY/OROPHARYNX:    Lips:  Normal.     NECK:  Trachea:  midline     NEURO:   Alert and Oriented    GAIT AND STATION:  normal     RESPIRATORY:   Symmetry and Respiratory effort    PSYCH:   normal mood and affect    SKIN:  warm and dry     AUDIOLOGY:     IMPRESSION:   Encounter Diagnosis   Name Primary?    Sensorineural hearing loss (SNHL) of both ears Yes        RECOMMENDATIONS:    She is medically cleared for hearing aids  Return annually for hearing test  Return immediately for any sudden change in hearing
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
## 2024-07-03 NOTE — LETTER
7/3/2024      Lizzie Viera  [address] [place] [address]      Dear Colleague,    Thank you for referring your patient, Lizzie Viera, to the [facility]. Please see a copy of my visit note below.    See confidential note      Again, thank you for allowing me to participate in the care of your patient.        Sincerely,        Mario Long LP

## 2024-07-10 NOTE — LETTER
"7/10/2024      Lizzie Viera  [address]  [place] [address]      Dear Colleague,    Thank you for referring your patient, Lizzie Viera, to the [facility]. Please see a copy of my visit note below.    Oncology Rooming Note    July 10, 2024 9:42 AM   Lizzie Viera is a 73 year old female who presents for:    Chief Complaint   Patient presents with     Oncology Clinic Visit     3 month follow up     Initial Vitals: /60 (BP Location: Right arm, Patient Position: Sitting, Cuff Size: Adult Regular)   Pulse (!) 49   Temp 97.7  F (36.5  C) (Tympanic)   Resp 18   Ht 1.549 m (5' 1\")   Wt 56.7 kg (125 lb)   LMP  (LMP Unknown)   SpO2 95%   BMI 23.62 kg/m   Estimated body mass index is 23.62 kg/m  as calculated from the following:    Height as of this encounter: 1.549 m (5' 1\").    Weight as of this encounter: 56.7 kg (125 lb). Body surface area is 1.56 meters squared.  Moderate Pain (4) Comment: Data Unavailable   No LMP recorded (lmp unknown). Patient is postmenopausal.  Allergies reviewed: Yes  Medications reviewed: Yes    Medications: Medication refills not needed today.  Pharmacy name entered into [facility]:    [id number] [place], [place] - [address]  [facility] [place], [place] - [address] [place] [address]    Frailty Screening:   Is the patient here for a new oncology consult visit in cancer care? 2. No      Clinical concerns:       KWADWO MELTON CMA              [facility] Hematology and Oncology Progress Note    Patient: Lizzie Viera  MRN: [id number]  Date of Service: Jul 10, 2024         Reason for Visit    Problem List Items Addressed This Visit          Hematologic    Multiple myeloma with failed remission (H) - Primary    Relevant Orders    Kappa and lambda light chain    Immunoglobulins A G and M    Protein electrophoresis    CBC with platelets differential    Comprehensive metabolic panel "    Kappa and lambda light chain    Immunoglobulins A G and M    Protein electrophoresis    CBC with platelets differential    Comprehensive metabolic panel    Multiple myeloma not having achieved remission (H)    Relevant Orders    Kappa and lambda light chain    Immunoglobulins A G and M    Protein electrophoresis    CBC with platelets differential    Comprehensive metabolic panel    Kappa and lambda light chain    Immunoglobulins A G and M    Protein electrophoresis    CBC with platelets differential    Comprehensive metabolic panel     Other Visit Diagnoses       Bone pain                Assessment     1.  A very pleasant 73 year old woman with IgG kappa multiple myeloma with hyperdiploid cytogenetics.  However clinically was behaving somewhat more aggressively.  Started on VRD treatment.  Responded quite well. After 17 cycles the treatment was switched to Revlimid 10 mg daily in September 2022.  Valeria has been tolerating it quite well.  The lab work-up in the end of November 2022 showed maintenance of response.  The labs since February 2023 have been pretty stable.  Kappa lambda light chains have been normal for most part.  In September 2023 kappa light chains were slightly above normal but is lambda light chains were also above their previous numbers.  The ratio really did not change a whole lot.  Immunoglobulins number were stable.  Albumin continues to be normal.  Hemoglobin is normal as well.  Current labs indicate pretty normal kappa lambda ratio.  Immunoglobin levels are slightly suppressed which is expected.  Immunofixation negative.  2.  Had significant bone disease with osteoporosis and compression fractures.  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  Improved significantly on the CT scan in March 2022.  MRI also shows no new lesions.  December 2022 bone density showed osteoporosis.  Has not been able to restart Zometa due to dental implant needs.  Had the last the dental implant process  involving any drilling into the bone on March 26, 2024.  We can probably resume Zometa few months after that.  3.  Normal hemoglobin, calcium, kidney function along with beta-2 microglobulin and albumin at the time of diagnosis.    4.  Positive Covid antibodies from infection. Unvaccinated.  In the past has declined Evusheld.  5.  Pain from compression fracture status post radiation therapy.  Significantly improved except had some pain this morning.  6.  Slight hypoxia during REM sleep.  Elevated by pulmonary.  Had VQ scan done which was negative.    Plan and medical decision making    Presenting with continuation of treatment for multiple myeloma and hypoxia for which she has been seen by pulmonology.Reviewed notes from each unique source.  Reviewed each unique test.  Ordered tests if indicated.  Independently interpreted lab tests and radiological exams performed by other physicians.  Personally reviewed the images the VQ scan.      Revlimid 10 mg p.o. daily.  Will follow-up in 3 months with the labs.  Will also repeat the immunofixation.  Will make contact with Dr. Castro from pulmonology and decide if Revlimid is still okay to continue.  She is doing so well otherwise that I would like to keep her on it if Dr. Castro is fine with that.  Will resume bisphosphonate therapy.  She has multiple myeloma and osteoporosis.  Very high risk for pathological fractures.  Diet rich in calcium and vitamin D.  Continue to use oxygen at nighttime or as recommended by the sleep people or pulmonary people.  The longitudinal plan of care for the diagnosis(es)/condition(s) as documented were addressed during this visit. Due to the added complexity in care, I will continue to support Valeria in the subsequent management and with ongoing continuity of care.      Cancer Staging   No matching staging information was found for the patient.      ECOG Performance    2 - Ambulatory and independent in all ADLs; cannot work; up > 50% of the  time      History of Present Illness    Ms. Lizzie Viera is a very pleasant 73 year old woman who has been diagnosed with multiple myeloma IgG kappa type in May 2021.  She had initially presented with compression fracture of T7 vertebral body in September 2020.  She had a back pain which led to that evaluation.  Bone density confirmed that she had osteoporosis.  MRI showed diffuse marrow edema in October 2020.  In April 2021 the MRI of the thoracic spine showed worsening T7 vertebral body height loss because of likely pathological compression fracture with extraosseous extension.  She then had IR guided bone biopsy on 7 May 2021 and pathology confirmed the presence of kappa light chain restricted plasma cells.  Her cytogenetics confirmed the presence of hyperdiploid E with gains of chromosome 5, 9 and 15.    She was then seen by Dr. Baig and had a bone marrow biopsy which also confirmed 60% involvement of the marrow with plasma cells.  The bone marrow was done on 3 Jocelin 2021.  The bone marrow also confirmed the presence of hyperdiploidy.  She had a gain of chromosome 3, 5, 7, 9, 11, 15 as well as 19.  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  Her beta-2 microglobulin was  normal at the time of her diagnosis as was her albumin at 4.0.  Monoclonal protein 3.1 g/dL.  Kappa free light chain levels of 13 mg/dL IgG level of 4280 with depressed levels of IgM and IgA.  Urine was also positive for kappa light chains as well as immunofixation of the urine was positive for IgG kappa.  Normal kidney function.  Normal hemoglobin.    As mentioned above she had bone lesions in the T7 vertebral body, T1, skull area.  Her main pain is coming from her T7 vertebral body compression fracture.    Due to the pain she has been seen by radiation oncology and has received radiation therapy.  She also got a dose of steroids for pain control.    She was initially thinking of doing some natural therapies but once her symptoms got worse, she came  back in was counseled about treatment.      She started on Velcade Revlimid and dexamethasone in September 2021.  Responded nicely.  Immunoglobulins  normalized completely. Her immunoglobin levels have almost normalized in fact the IgG levels have gone below normal.  Immunofixation still shows a very faint kappa monoclonal gammopathy.    She was  hospitalized in April 2022 with COPD exacerbation/pneumonia.  Was given some steroids and antibiotic.  She was slightly hypoxic initially but then got better after that.    She was referred to [facility] for [facility] evaluation.  She was somewhat reluctant to go forward with that.  Otherwise she seems to be doing quite well.  Her immunoglobulin levels have normalized or actually are below normal.  Immunofixation faintly positive.    She has cracked a molar on the upper left jaw sometime in July 2022.  There was some tooth decay.  She had them extracted.    In September 2022 we discontinued the VRD treatment and now Valeria is on Revlimid maintenance 10 mg p.o. daily.  She seems to be tolerating it well.  She is physically more active so she is noticing some soreness in her back.    Had dental extraction done in October 2022 from the right lower jaw.  That seems to be healing well.  Underwent bone densitometry recently.  That confirms presence of osteoporosis which is not a new finding for her.    In February 2023 her labs were pretty stable.  She went to [place] so we gave her 2 to 3 weeks of break from Revlimid.  She restarted the treatment.  Had been pretty stable.    In February 2024 had an episode of pneumonia.  It looks like a viral pneumonia since she had neutropenia as well.  Was treated with IV antibiotics and later on transition to oral antibiotics.  It took her a while to recover from that.  Did develop possible tendinitis from Levaquin.    She also had a sleep study done which showed slight hypoxia during the REM phase of her sleep.  So she is on oxygen  "at nighttime.  She was seen by Dr. Alia Castro from pulmonology.  Had a VQ scan done in June 2024 which was negative.    Comes in today for scheduled follow-up.  No new medical issues.  Seems to be handling the treatment okay.    Review of system      Details noted in the history of present illness.  A detailed review of systems is otherwise negative.      Physical exam        /60 (BP Location: Right arm, Patient Position: Sitting, Cuff Size: Adult Regular)   Pulse (!) 49   Temp 97.7  F (36.5  C) (Tympanic)   Resp 18   Ht 1.549 m (5' 1\")   Wt 56.7 kg (125 lb)   LMP  (LMP Unknown)   SpO2 95%   BMI 23.62 kg/m      GENERAL: No acute distress. Cooperative in conversation.   HEENT:  Pupils are equal, round and reactive. Oral mucosa is clean and intact. No ulcerations or mucositis noted. No bleeding noted.  RESP:Chest symmetric lungs are clear bilaterally per auscultation. Regular respiratory rate. No wheezes or rhonchi.  CV: Normal S1 S2 Regular, rate and rhythm.     ABD: Nondistended, soft, nontender. Positive bowel sounds. No organomegaly.   EXTREMITIES: No lower extremity edema.   NEURO: Non- focal. Alert and oriented x3.  Cranial nerves appear intact.  PSYCH: Within normal limits. No depression or anxiety.  SKIN: Warm dry intact.       Lab results Reviewed      Recent Results (from the past 720 hour(s))   Immunoglobulins A G and M    Collection Time: 07/01/24 11:08 AM   Result Value Ref Range    Immunoglobulin G 379 (L) 610 - 1,616 mg/dL    Immunoglobulin A 129 84 - 499 mg/dL    Immunoglobulin M 21 (L) 35 - 242 mg/dL   Kappa and lambda light chain    Collection Time: 07/01/24 11:08 AM   Result Value Ref Range    Kappa Free Light Chains 1.69 0.33 - 1.94 mg/dL    Lambda Free Light Chains 2.36 0.57 - 2.63 mg/dL    Kappa /Lambda Ratio 0.72 0.26 - 1.65   Lactate Dehydrogenase    Collection Time: 07/01/24 11:08 AM   Result Value Ref Range    Lactate Dehydrogenase 162 0 - 250 U/L   Protein Immunofixation " Serum    Collection Time: 07/01/24 11:08 AM   Result Value Ref Range    Immunofixation ELP       No monoclonal protein seen on immunofixation. Pathologic significance requires clinical correlation. Myra Shields MD   CBC with platelets and differential    Collection Time: 07/01/24 11:08 AM   Result Value Ref Range    WBC Count 4.1 4.0 - 11.0 10e3/uL    RBC Count 4.21 3.80 - 5.20 10e6/uL    Hemoglobin 14.7 11.7 - 15.7 g/dL    Hematocrit 43.8 35.0 - 47.0 %     (H) 78 - 100 fL    MCH 34.9 (H) 26.5 - 33.0 pg    MCHC 33.6 31.5 - 36.5 g/dL    RDW 14.6 10.0 - 15.0 %    Platelet Count 161 150 - 450 10e3/uL    % Neutrophils 46 %    % Lymphocytes 34 %    % Monocytes 10 %    % Eosinophils 8 %    % Basophils 2 %    % Immature Granulocytes 0 %    NRBCs per 100 WBC 0 <1 /100    Absolute Neutrophils 1.9 1.6 - 8.3 10e3/uL    Absolute Lymphocytes 1.4 0.8 - 5.3 10e3/uL    Absolute Monocytes 0.4 0.0 - 1.3 10e3/uL    Absolute Eosinophils 0.3 0.0 - 0.7 10e3/uL    Absolute Basophils 0.1 0.0 - 0.2 10e3/uL    Absolute Immature Granulocytes 0.0 <=0.4 10e3/uL    Absolute NRBCs 0.0 10e3/uL   Lipid panel reflex to direct LDL Non-fasting    Collection Time: 07/01/24 11:08 AM   Result Value Ref Range    Cholesterol 166 <200 mg/dL    Triglycerides 71 <150 mg/dL    Direct Measure HDL 54 >=50 mg/dL    LDL Cholesterol Calculated 98 <=100 mg/dL    Non HDL Cholesterol 112 <130 mg/dL    Patient Fasting > 8hrs? Yes    Comprehensive metabolic panel    Collection Time: 07/01/24 11:08 AM   Result Value Ref Range    Sodium 138 135 - 145 mmol/L    Potassium 4.1 3.4 - 5.3 mmol/L    Carbon Dioxide (CO2) 29 22 - 29 mmol/L    Anion Gap 8 7 - 15 mmol/L    Urea Nitrogen 24.0 (H) 8.0 - 23.0 mg/dL    Creatinine 0.91 0.51 - 0.95 mg/dL    GFR Estimate 67 >60 mL/min/1.73m2    Calcium 9.5 8.8 - 10.2 mg/dL    Chloride 101 98 - 107 mmol/L    Glucose 102 (H) 70 - 99 mg/dL    Alkaline Phosphatase 47 40 - 150 U/L    AST 20 0 - 45 U/L    ALT 25 0 - 50 U/L    Protein  Total 6.6 6.4 - 8.3 g/dL    Albumin 4.0 3.5 - 5.2 g/dL    Bilirubin Total 0.3 <=1.2 mg/dL    Patient Fasting > 8hrs? Yes    ANCA IgG by IFA with Reflex to Titer    Collection Time: 07/01/24 11:08 AM   Result Value Ref Range    Neutrophil Cytoplasmic Antibody <1:10 <=1:10    Neutrophil Cytoplasmic Antibody Pattern       The ANCA IFA is <1:10.  No further testing will be performed.   Anti Nuclear Joslyn IgG by IFA with Reflex    Collection Time: 07/01/24 11:08 AM   Result Value Ref Range    ADAM interpretation Negative Negative         Imaging results Reviewed        XR Chest 2 Views    Result Date: 6/18/2024  EXAM: XR CHEST 2 VIEWS LOCATION: [facility] DATE: 6/18/2024 INDICATION:  Diffusion capacity of lung (dl), decreased COMPARISON: 02/29/2024, chest CT from 02/26/2024     IMPRESSION: The lungs are hyperinflated but otherwise clear. No effusions or pneumothorax. Heart size is normal. No acute osseous findings. Vertebra plana of T7 is again seen. The scattered osseous lucencies are better seen on prior CT. Surgical clips in  the upper abdomen.    NM Lung Scan Ventilation and Perfusion    Result Date: 6/18/2024  EXAM: NM LUNG SCAN VENTILATION AND PERFUSION LOCATION: [facility] DATE: 6/18/2024 INDICATION: Shortness of breath. COMPARISON: Chest radiograph 6/18/2024. TECHNIQUE: 57.9 mCi Tc-99m DTPA inhaled. 8.0 mCi Tc-99m MAA, IV. Standard planar imaging during perfusion and ventilation portions of exam. FINDINGS: There is central clumping of the radiotracer on ventilation images and heterogeneous uptake throughout the lung parenchyma, suggesting turbulent airflow. Normal pulmonary perfusion. No mismatched segmental perfusion defect to suggest pulmonary embolism.     IMPRESSION: No scintigraphic evidence of pulmonary embolism.         Signed by: Loco Blanco MD      This note has been dictated using voice recognition software. Any grammatical or context distortions are  unintentional and inherent to the software      Again, thank you for allowing me to participate in the care of your patient.        Sincerely,        Loco Blanco MD

## 2024-07-10 NOTE — PROGRESS NOTES
[facility] Hematology and Oncology Progress Note    Patient: Lizzie Viera  MRN: [id number]  Date of Service: Jul 10, 2024         Reason for Visit    Problem List Items Addressed This Visit          Hematologic    Multiple myeloma with failed remission (H) - Primary    Relevant Orders    Kappa and lambda light chain    Immunoglobulins A G and M    Protein electrophoresis    CBC with platelets differential    Comprehensive metabolic panel    Kappa and lambda light chain    Immunoglobulins A G and M    Protein electrophoresis    CBC with platelets differential    Comprehensive metabolic panel    Multiple myeloma not having achieved remission (H)    Relevant Orders    Kappa and lambda light chain    Immunoglobulins A G and M    Protein electrophoresis    CBC with platelets differential    Comprehensive metabolic panel    Kappa and lambda light chain    Immunoglobulins A G and M    Protein electrophoresis    CBC with platelets differential    Comprehensive metabolic panel     Other Visit Diagnoses       Bone pain                Assessment     1.  A very pleasant 73 year old woman with IgG kappa multiple myeloma with hyperdiploid cytogenetics.  However clinically was behaving somewhat more aggressively.  Started on VRD treatment.  Responded quite well. After 17 cycles the treatment was switched to Revlimid 10 mg daily in September 2022.  Valeria has been tolerating it quite well.  The lab work-up in the end of November 2022 showed maintenance of response.  The labs since February 2023 have been pretty stable.  Kappa lambda light chains have been normal for most part.  In September 2023 kappa light chains were slightly above normal but is lambda light chains were also above their previous numbers.  The ratio really did not change a whole lot.  Immunoglobulins number were stable.  Albumin continues to be normal.  Hemoglobin is normal as well.  Current labs indicate pretty normal kappa lambda ratio.   Immunoglobin levels are slightly suppressed which is expected.  Immunofixation negative.  2.  Had significant bone disease with osteoporosis and compression fractures.  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  Improved significantly on the CT scan in March 2022.  MRI also shows no new lesions.  December 2022 bone density showed osteoporosis.  Has not been able to restart Zometa due to dental implant needs.  Had the last the dental implant process involving any drilling into the bone on March 26, 2024.  We can probably resume Zometa few months after that.  3.  Normal hemoglobin, calcium, kidney function along with beta-2 microglobulin and albumin at the time of diagnosis.    4.  Positive Covid antibodies from infection. Unvaccinated.  In the past has declined Evusheld.  5.  Pain from compression fracture status post radiation therapy.  Significantly improved except had some pain this morning.  6.  Slight hypoxia during REM sleep.  Elevated by pulmonary.  Had VQ scan done which was negative.    Plan and medical decision making    Presenting with continuation of treatment for multiple myeloma and hypoxia for which she has been seen by pulmonology.Reviewed notes from each unique source.  Reviewed each unique test.  Ordered tests if indicated.  Independently interpreted lab tests and radiological exams performed by other physicians.  Personally reviewed the images the VQ scan.      Revlimid 10 mg p.o. daily.  Will follow-up in 3 months with the labs.  Will also repeat the immunofixation.  Will make contact with Dr. Castro from pulmonology and decide if Revlimid is still okay to continue.  She is doing so well otherwise that I would like to keep her on it if Dr. Castro is fine with that.  Will resume bisphosphonate therapy.  She has multiple myeloma and osteoporosis.  Very high risk for pathological fractures.  Diet rich in calcium and vitamin D.  Continue to use oxygen at nighttime or as recommended by the sleep  people or pulmonary people.  The longitudinal plan of care for the diagnosis(es)/condition(s) as documented were addressed during this visit. Due to the added complexity in care, I will continue to support Valeria in the subsequent management and with ongoing continuity of care.      Cancer Staging   No matching staging information was found for the patient.      ECOG Performance    2 - Ambulatory and independent in all ADLs; cannot work; up > 50% of the time      History of Present Illness    Ms. Lizzie Viera is a very pleasant 73 year old woman who has been diagnosed with multiple myeloma IgG kappa type in May 2021.  She had initially presented with compression fracture of T7 vertebral body in September 2020.  She had a back pain which led to that evaluation.  Bone density confirmed that she had osteoporosis.  MRI showed diffuse marrow edema in October 2020.  In April 2021 the MRI of the thoracic spine showed worsening T7 vertebral body height loss because of likely pathological compression fracture with extraosseous extension.  She then had IR guided bone biopsy on 7 May 2021 and pathology confirmed the presence of kappa light chain restricted plasma cells.  Her cytogenetics confirmed the presence of hyperdiploid E with gains of chromosome 5, 9 and 15.    She was then seen by Dr. Baig and had a bone marrow biopsy which also confirmed 60% involvement of the marrow with plasma cells.  The bone marrow was done on 3 Jocelin 2021.  The bone marrow also confirmed the presence of hyperdiploidy.  She had a gain of chromosome 3, 5, 7, 9, 11, 15 as well as 19.  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  Her beta-2 microglobulin was  normal at the time of her diagnosis as was her albumin at 4.0.  Monoclonal protein 3.1 g/dL.  Kappa free light chain levels of 13 mg/dL IgG level of 4280 with depressed levels of IgM and IgA.  Urine was also positive for kappa light chains as well as immunofixation of the urine was positive for IgG kappa.   Normal kidney function.  Normal hemoglobin.    As mentioned above she had bone lesions in the T7 vertebral body, T1, skull area.  Her main pain is coming from her T7 vertebral body compression fracture.    Due to the pain she has been seen by radiation oncology and has received radiation therapy.  She also got a dose of steroids for pain control.    She was initially thinking of doing some natural therapies but once her symptoms got worse, she came back in was counseled about treatment.      She started on Velcade Revlimid and dexamethasone in September 2021.  Responded nicely.  Immunoglobulins  normalized completely. Her immunoglobin levels have almost normalized in fact the IgG levels have gone below normal.  Immunofixation still shows a very faint kappa monoclonal gammopathy.    She was  hospitalized in April 2022 with COPD exacerbation/pneumonia.  Was given some steroids and antibiotic.  She was slightly hypoxic initially but then got better after that.    She was referred to [facility] for transplant evaluation.  She was somewhat reluctant to go forward with that.  Otherwise she seems to be doing quite well.  Her immunoglobulin levels have normalized or actually are below normal.  Immunofixation faintly positive.    She has cracked a molar on the upper left jaw sometime in July 2022.  There was some tooth decay.  She had them extracted.    In September 2022 we discontinued the VRD treatment and now Valeria is on Revlimid maintenance 10 mg p.o. daily.  She seems to be tolerating it well.  She is physically more active so she is noticing some soreness in her back.    Had dental extraction done in October 2022 from the right lower jaw.  That seems to be healing well.  Underwent bone densitometry recently.  That confirms presence of osteoporosis which is not a new finding for her.    In February 2023 her labs were pretty stable.  She went to [place] so we gave her 2 to 3 weeks of break from Revlimid.  She  "restarted the treatment.  Had been pretty stable.    In February 2024 had an episode of pneumonia.  It looks like a viral pneumonia since she had neutropenia as well.  Was treated with IV antibiotics and later on transition to oral antibiotics.  It took her a while to recover from that.  Did develop possible tendinitis from Levaquin.    She also had a sleep study done which showed slight hypoxia during the REM phase of her sleep.  So she is on oxygen at nighttime.  She was seen by Dr. Alia Castro from pulmonology.  Had a VQ scan done in June 2024 which was negative.    Comes in today for scheduled follow-up.  No new medical issues.  Seems to be handling the treatment okay.    Review of system      Details noted in the history of present illness.  A detailed review of systems is otherwise negative.      Physical exam        /60 (BP Location: Right arm, Patient Position: Sitting, Cuff Size: Adult Regular)   Pulse (!) 49   Temp 97.7  F (36.5  C) (Tympanic)   Resp 18   Ht 1.549 m (5' 1\")   Wt 56.7 kg (125 lb)   LMP  (LMP Unknown)   SpO2 95%   BMI 23.62 kg/m      GENERAL: No acute distress. Cooperative in conversation.   HEENT:  Pupils are equal, round and reactive. Oral mucosa is clean and intact. No ulcerations or mucositis noted. No bleeding noted.  RESP:Chest symmetric lungs are clear bilaterally per auscultation. Regular respiratory rate. No wheezes or rhonchi.  CV: Normal S1 S2 Regular, rate and rhythm.     ABD: Nondistended, soft, nontender. Positive bowel sounds. No organomegaly.   EXTREMITIES: No lower extremity edema.   NEURO: Non- focal. Alert and oriented x3.  Cranial nerves appear intact.  PSYCH: Within normal limits. No depression or anxiety.  SKIN: Warm dry intact.       Lab results Reviewed      Recent Results (from the past 720 hour(s))   Immunoglobulins A G and M    Collection Time: 07/01/24 11:08 AM   Result Value Ref Range    Immunoglobulin G 379 (L) 610 - 1,616 mg/dL    Immunoglobulin " A 129 84 - 499 mg/dL    Immunoglobulin M 21 (L) 35 - 242 mg/dL   Kappa and lambda light chain    Collection Time: 07/01/24 11:08 AM   Result Value Ref Range    Kappa Free Light Chains 1.69 0.33 - 1.94 mg/dL    Lambda Free Light Chains 2.36 0.57 - 2.63 mg/dL    Kappa /Lambda Ratio 0.72 0.26 - 1.65   Lactate Dehydrogenase    Collection Time: 07/01/24 11:08 AM   Result Value Ref Range    Lactate Dehydrogenase 162 0 - 250 U/L   Protein Immunofixation Serum    Collection Time: 07/01/24 11:08 AM   Result Value Ref Range    Immunofixation ELP       No monoclonal protein seen on immunofixation. Pathologic significance requires clinical correlation. Myra Shields MD   CBC with platelets and differential    Collection Time: 07/01/24 11:08 AM   Result Value Ref Range    WBC Count 4.1 4.0 - 11.0 10e3/uL    RBC Count 4.21 3.80 - 5.20 10e6/uL    Hemoglobin 14.7 11.7 - 15.7 g/dL    Hematocrit 43.8 35.0 - 47.0 %     (H) 78 - 100 fL    MCH 34.9 (H) 26.5 - 33.0 pg    MCHC 33.6 31.5 - 36.5 g/dL    RDW 14.6 10.0 - 15.0 %    Platelet Count 161 150 - 450 10e3/uL    % Neutrophils 46 %    % Lymphocytes 34 %    % Monocytes 10 %    % Eosinophils 8 %    % Basophils 2 %    % Immature Granulocytes 0 %    NRBCs per 100 WBC 0 <1 /100    Absolute Neutrophils 1.9 1.6 - 8.3 10e3/uL    Absolute Lymphocytes 1.4 0.8 - 5.3 10e3/uL    Absolute Monocytes 0.4 0.0 - 1.3 10e3/uL    Absolute Eosinophils 0.3 0.0 - 0.7 10e3/uL    Absolute Basophils 0.1 0.0 - 0.2 10e3/uL    Absolute Immature Granulocytes 0.0 <=0.4 10e3/uL    Absolute NRBCs 0.0 10e3/uL   Lipid panel reflex to direct LDL Non-fasting    Collection Time: 07/01/24 11:08 AM   Result Value Ref Range    Cholesterol 166 <200 mg/dL    Triglycerides 71 <150 mg/dL    Direct Measure HDL 54 >=50 mg/dL    LDL Cholesterol Calculated 98 <=100 mg/dL    Non HDL Cholesterol 112 <130 mg/dL    Patient Fasting > 8hrs? Yes    Comprehensive metabolic panel    Collection Time: 07/01/24 11:08 AM   Result Value  Ref Range    Sodium 138 135 - 145 mmol/L    Potassium 4.1 3.4 - 5.3 mmol/L    Carbon Dioxide (CO2) 29 22 - 29 mmol/L    Anion Gap 8 7 - 15 mmol/L    Urea Nitrogen 24.0 (H) 8.0 - 23.0 mg/dL    Creatinine 0.91 0.51 - 0.95 mg/dL    GFR Estimate 67 >60 mL/min/1.73m2    Calcium 9.5 8.8 - 10.2 mg/dL    Chloride 101 98 - 107 mmol/L    Glucose 102 (H) 70 - 99 mg/dL    Alkaline Phosphatase 47 40 - 150 U/L    AST 20 0 - 45 U/L    ALT 25 0 - 50 U/L    Protein Total 6.6 6.4 - 8.3 g/dL    Albumin 4.0 3.5 - 5.2 g/dL    Bilirubin Total 0.3 <=1.2 mg/dL    Patient Fasting > 8hrs? Yes    ANCA IgG by IFA with Reflex to Titer    Collection Time: 07/01/24 11:08 AM   Result Value Ref Range    Neutrophil Cytoplasmic Antibody <1:10 <=1:10    Neutrophil Cytoplasmic Antibody Pattern       The ANCA IFA is <1:10.  No further testing will be performed.   Anti Nuclear Joslyn IgG by IFA with Reflex    Collection Time: 07/01/24 11:08 AM   Result Value Ref Range    ADAM interpretation Negative Negative         Imaging results Reviewed        XR Chest 2 Views    Result Date: 6/18/2024  EXAM: XR CHEST 2 VIEWS LOCATION: [facility] DATE: 6/18/2024 INDICATION:  Diffusion capacity of lung (dl), decreased COMPARISON: 02/29/2024, chest CT from 02/26/2024     IMPRESSION: The lungs are hyperinflated but otherwise clear. No effusions or pneumothorax. Heart size is normal. No acute osseous findings. Vertebra plana of T7 is again seen. The scattered osseous lucencies are better seen on prior CT. Surgical clips in  the upper abdomen.    NM Lung Scan Ventilation and Perfusion    Result Date: 6/18/2024  EXAM: NM LUNG SCAN VENTILATION AND PERFUSION LOCATION: [facility] DATE: 6/18/2024 INDICATION: Shortness of breath. COMPARISON: Chest radiograph 6/18/2024. TECHNIQUE: 57.9 mCi Tc-99m DTPA inhaled. 8.0 mCi Tc-99m MAA, IV. Standard planar imaging during perfusion and ventilation portions of exam. FINDINGS: There is central  clumping of the radiotracer on ventilation images and heterogeneous uptake throughout the lung parenchyma, suggesting turbulent airflow. Normal pulmonary perfusion. No mismatched segmental perfusion defect to suggest pulmonary embolism.     IMPRESSION: No scintigraphic evidence of pulmonary embolism.         Signed by: Loco Blanco MD      This note has been dictated using voice recognition software. Any grammatical or context distortions are unintentional and inherent to the software

## 2024-07-10 NOTE — PROGRESS NOTES
"Oncology Rooming Note    July 10, 2024 9:42 AM   Lizzie Viera is a 73 year old female who presents for:    Chief Complaint   Patient presents with    Oncology Clinic Visit     3 month follow up     Initial Vitals: /60 (BP Location: Right arm, Patient Position: Sitting, Cuff Size: Adult Regular)   Pulse (!) 49   Temp 97.7  F (36.5  C) (Tympanic)   Resp 18   Ht 1.549 m (5' 1\")   Wt 56.7 kg (125 lb)   LMP  (LMP Unknown)   SpO2 95%   BMI 23.62 kg/m   Estimated body mass index is 23.62 kg/m  as calculated from the following:    Height as of this encounter: 1.549 m (5' 1\").    Weight as of this encounter: 56.7 kg (125 lb). Body surface area is 1.56 meters squared.  Moderate Pain (4) Comment: Data Unavailable   No LMP recorded (lmp unknown). Patient is postmenopausal.  Allergies reviewed: Yes  Medications reviewed: Yes    Medications: Medication refills not needed today.  Pharmacy name entered into [facility]:    [id number] [place], [place] - [address]  [facility] [place], [place] - [address]    Frailty Screening:   Is the patient here for a new oncology consult visit in cancer care? 2. No      Clinical concerns:       KWADWO MELTON CMA            "

## 2024-07-11 NOTE — LETTER
7/11/2024      Lizzie Viera  [address]  [place] [address]      Dear Colleague,    Thank you for referring your patient, Lizzie Viera, to the [facility] SPINE AND NEUROSURGERY. Please see a copy of my visit note below.    Assessment/Plan:      Valeria was seen today for neck pain.    Diagnoses and all orders for this visit:    Cervical spine pain    Thoracic spinal stenosis    Myofascial pain    Somatic dysfunction of head region  -     OSTEOPATHIC MANIP,5-6 BODY REGN    Somatic dysfunction of cervical region  -     OSTEOPATHIC MANIP,5-6 BODY REGN    Somatic dysfunction of thoracic region  -     OSTEOPATHIC MANIP,5-6 BODY REGN    Somatic dysfunction of rib region  -     OSTEOPATHIC MANIP,5-6 BODY REGN    Somatic dysfunction of upper extremity  -     OSTEOPATHIC MANIP,5-6 BODY REGN         Assessment: Hossein 73 year old female with a history of hyperlipidemia, anxiety, depression, irritable bowel, neuropathy, osteoporosis and multiple myeloma with a T7 fracture and lesion  resulting in spinal stenosis:     Waxing and waning cervical and thoracic thoracic spine pain in the cervical spine parascapular region upper thoracic spine.  Consistent with myofascial pain in the setting of chronic fractures.  Chronic cervical and horacic pain around the mid to upper thoracic spine radiating up to the cervical spine and suboccipital region with headaches.   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  No signs of thoracic myelopathy neurologically stable. Fracture has been stable on MRI from 2022 T1 and T7.  Continues on gabapentin 600 mg 3 times daily tolerating well.  Sleeping well.  Continues with physical therapy.    More manageable with gabapentin.  Osteopathic manipulative medicine 1 to 2 weeks at a time pain slowly returned.     Progress bilateral lower extremity paresthesias.  Consistent with a peripheral polyneuropathy.  Feels this is  increased after wearing her boot for fracture of the right fifth metatarsal.      3.   Bilateral heel pain consistent with Achilles tendinitis.  Had worsening of the right foot found to have a base of the fifth fracture after she rolled her ankle.  Following with orthopedics.        4.  Somatic dysfunctions of the cranium, cervical spine, rib cage, upper extremities, thoracic spine  that contribute to the patient's pain complaints.    Discussion:    1.  Patient presents for Osteopathic manipulative medicine at this helps with her chronic pain for 1 to 2 weeks at a time and the pain slowly returns.  Helps keep her active.  She is a good candidate for repeat manipulation.  She agrees to proceed.  Please see attached procedure note.    2.  Follow-up with me as needed.      It was our pleasure caring for your patient today, if there any questions or concerns please do not hesitate to contact us.      Subjective:   Patient ID: Lizzie Viera is a 73 year old female.    History of Present Illness: Patient presents for follow-up of cervical and thoracic spine pain.  She has chronic cervical and thoracic spine pain with any bending using the arms or with activity better with rest and THC.  Osteopathic manipulative medicine up for 1 to 2 weeks at a time and the pain slowly worsens.  Pain is a 9/10 at worst 3/10 today 0/10 at best.  Also takes gabapentin and methocarbamol.  Would like OMT.       Review of Systems: Complains of numbness tingling weakness headaches nothing new.  Denies bowel or bladder incontinence, fevers and unintentional weight loss.       Past Medical History:   Diagnosis Date     Cervical dysplasia      Chronic RUQ pain      Depressive disorder      Multiple myeloma (H)      Osteoporosis        The following portions of the patient's history were reviewed and updated as appropriate: allergies, current medications, past family history, past medical history, past social history, past surgical history  "and problem list.           Objective:   Physical Exam:    BP (!) 146/63 (BP Location: Right arm, Patient Position: Sitting, Cuff Size: Adult Regular)   Pulse 54   Ht 5' 1\" (1.549 m)   Wt 125 lb (56.7 kg)   LMP  (LMP Unknown)   SpO2 94%   BMI 23.62 kg/m    There is no height or weight on file to calculate BMI.      General: Alert and oriented with normal affect. Attention, knowledge, memory, and language are intact. No acute distress.   Eyes: Sclerae are clear.  Respirations: Unlabored.    Gait:  Nonantalgic    Sensation is intact to light touch throughout the upper  extremities.  Reflexes are 2+ and symmetric in the biceps triceps and brachioradialis with negative Hoffmans.      Manual muscle testing reveals:  Right /Left out of 5     5/5 elbow flexors  5/5 elbow extensors  5/5 wrist extensors  5/5 interosseus  5/5 finger flexors       Structural exam: Cranium: Left cranial torsion, OA sidebent right rotated left cervical spine: C2 rotated left sidebent left, C3 rotated right sidebent right   Rib cage: Rib one elevated on the left. Thoracic spine: T1 rotated right sidebent right,  Upper Extremities: myofascial restrictions of the bilat upper trap, infraspinatus/parascapular muscles. bilateral glenohumeral resrictions.      Procedure:    After discussing the risks and benefits of osteopathic manipulative medicine, verbal consent was obtained. The somatic dysfunctions listed above were treated with the following techniques: Cranium: Cranial indirect technique, VSD, and muscle energy for the OA. Cervical spine: Muscle energy, still technique, FPR, myofascial release, BLT, and soft tissue techniques. Rib cage: Myofascial release and FPR. Thoracic spine: Myofascial release, BLT.  Upper Exrtremity: MFR, FPR, BLT.  The patient tolerated the procedure well and had improved range of motion in all areas treated prior to leaving the clinic.          Again, thank you for allowing me to participate in the care of your " patient.        Sincerely,        Wyatt Pascual, DO

## 2024-07-11 NOTE — PROGRESS NOTES
Assessment/Plan:      Valeria was seen today for neck pain.    Diagnoses and all orders for this visit:    Cervical spine pain    Thoracic spinal stenosis    Myofascial pain    Somatic dysfunction of head region  -     OSTEOPATHIC MANIP,5-6 BODY REGN    Somatic dysfunction of cervical region  -     OSTEOPATHIC MANIP,5-6 BODY REGN    Somatic dysfunction of thoracic region  -     OSTEOPATHIC MANIP,5-6 BODY REGN    Somatic dysfunction of rib region  -     OSTEOPATHIC MANIP,5-6 BODY REGN    Somatic dysfunction of upper extremity  -     OSTEOPATHIC MANIP,5-6 BODY REGN         Assessment: Pleasant 73 year old female with a history of hyperlipidemia, anxiety, depression, irritable bowel, neuropathy, osteoporosis and multiple myeloma with a T7 fracture and lesion  resulting in spinal stenosis:     Waxing and waning cervical and thoracic thoracic spine pain in the cervical spine parascapular region upper thoracic spine.  Consistent with myofascial pain in the setting of chronic fractures.  Chronic cervical and horacic pain around the mid to upper thoracic spine radiating up to the cervical spine and suboccipital region with headaches.   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  No signs of thoracic myelopathy neurologically stable. Fracture has been stable on MRI from 2022 T1 and T7.  Continues on gabapentin 600 mg 3 times daily tolerating well.  Sleeping well.  Continues with physical therapy.    More manageable with gabapentin.  Osteopathic manipulative medicine 1 to 2 weeks at a time pain slowly returned.     Progress bilateral lower extremity paresthesias.  Consistent with a peripheral polyneuropathy.  Feels this is increased after wearing her boot for fracture of the right fifth metatarsal.      3.   Bilateral heel pain consistent with Achilles tendinitis.  Had worsening of the right foot found to have a base of the fifth fracture after she rolled her ankle.  Following with  "orthopedics.        4.  Somatic dysfunctions of the cranium, cervical spine, rib cage, upper extremities, thoracic spine  that contribute to the patient's pain complaints.    Discussion:    1.  Patient presents for Osteopathic manipulative medicine at this helps with her chronic pain for 1 to 2 weeks at a time and the pain slowly returns.  Helps keep her active.  She is a good candidate for repeat manipulation.  She agrees to proceed.  Please see attached procedure note.    2.  Follow-up with me as needed.      It was our pleasure caring for your patient today, if there any questions or concerns please do not hesitate to contact us.      Subjective:   Patient ID: Lizzie Viera is a 73 year old female.    History of Present Illness: Patient presents for follow-up of cervical and thoracic spine pain.  She has chronic cervical and thoracic spine pain with any bending using the arms or with activity better with rest and THC.  Osteopathic manipulative medicine up for 1 to 2 weeks at a time and the pain slowly worsens.  Pain is a 9/10 at worst 3/10 today 0/10 at best.  Also takes gabapentin and methocarbamol.  Would like OMT.       Review of Systems: Complains of numbness tingling weakness headaches nothing new.  Denies bowel or bladder incontinence, fevers and unintentional weight loss.       Past Medical History:   Diagnosis Date    Cervical dysplasia     Chronic RUQ pain     Depressive disorder     Multiple myeloma (H)     Osteoporosis        The following portions of the patient's history were reviewed and updated as appropriate: allergies, current medications, past family history, past medical history, past social history, past surgical history and problem list.           Objective:   Physical Exam:    BP (!) 146/63 (BP Location: Right arm, Patient Position: Sitting, Cuff Size: Adult Regular)   Pulse 54   Ht 5' 1\" (1.549 m)   Wt 125 lb (56.7 kg)   LMP  (LMP Unknown)   SpO2 94%   BMI 23.62 kg/m    There " is no height or weight on file to calculate BMI.      General: Alert and oriented with normal affect. Attention, knowledge, memory, and language are intact. No acute distress.   Eyes: Sclerae are clear.  Respirations: Unlabored.    Gait:  Nonantalgic    Sensation is intact to light touch throughout the upper  extremities.  Reflexes are 2+ and symmetric in the biceps triceps and brachioradialis with negative Hoffmans.      Manual muscle testing reveals:  Right /Left out of 5     5/5 elbow flexors  5/5 elbow extensors  5/5 wrist extensors  5/5 interosseus  5/5 finger flexors       Structural exam: Cranium: Left cranial torsion, OA sidebent right rotated left cervical spine: C2 rotated left sidebent left, C3 rotated right sidebent right   Rib cage: Rib one elevated on the left. Thoracic spine: T1 rotated right sidebent right,  Upper Extremities: myofascial restrictions of the bilat upper trap, infraspinatus/parascapular muscles. bilateral glenohumeral resrictions.      Procedure:    After discussing the risks and benefits of osteopathic manipulative medicine, verbal consent was obtained. The somatic dysfunctions listed above were treated with the following techniques: Cranium: Cranial indirect technique, VSD, and muscle energy for the OA. Cervical spine: Muscle energy, still technique, FPR, myofascial release, BLT, and soft tissue techniques. Rib cage: Myofascial release and FPR. Thoracic spine: Myofascial release, BLT.  Upper Exrtremity: MFR, FPR, BLT.  The patient tolerated the procedure well and had improved range of motion in all areas treated prior to leaving the clinic.

## 2024-07-15 NOTE — PATIENT INSTRUCTIONS
Cefdinir challenge    2 hour visit -- must be healthy    Bicarbonate beverage    Astepro as needed for post nasal drip

## 2024-07-15 NOTE — PROGRESS NOTES
"      Raisa Shaw is a 73 year old, presenting for the following health issues:  Allergy Consult (Allergy to levaquin? Got it February and developed tendonitis in shoulder and foot.)    HPI     Chief complaint: Drug allergy    History of present illness: This is a pleasant 73-year-old woman that was asked to see for evaluation of cefdinir allergy by Dr. Kenyon patient states 9 or 10 years ago she had an allergic reaction to oral cefdinir.  Patient states reaction happened around the third dose and consisted of shortness of breath that resolved spontaneously.  No medical intervention was needed.  No rash that she recalls.  She has avoided all cephalosporins since that time.  She has tolerated Zosyn and meropenem, however per the record since that time.  Recently was hospitalized and given Levaquin.  She states she developed tendinitis after using Levaquin.    She would like to discuss postnasal drip.  She states she has a lot of thick drainage down the back of her throat.  She wonders if she has allergies.  She has been using Nasacort nasal spray which helps some.  She states it is worse in the morning.  Symptoms occur throughout the year and are not seasonal.  No history of asthma.  No use of over-the-counter antihistamines.    Past medical history: Sensorineural hearing loss, back pain, migraines, history of emphysema per the record, hypertension, osteoporosis, history of multiple myeloma complicated by neutropenic fever    Social history smoker, has a dog at home    Family history: Noncontributory but a  family history of environmental allergies and food allergy          Objective    Pulse 51   Ht 1.549 m (5' 1\")   Wt 57.2 kg (126 lb)   LMP  (LMP Unknown)   SpO2 96%   BMI 23.81 kg/m    Body mass index is 23.81 kg/m .  Physical Exam   Gen: Pleasant female not in acute distress  HEENT: Eyes no erythema of the bulbar or palpebral conjunctiva, no edema.  Nose: No congestion, mucosa normal. Mouth: Throat " clear, no lip or tongue edema.   Respiratory: Clear to auscultation bilaterally, no adventitious breath sounds  Skin: No rashes or lesions  Psych: Alert and oriented times 3      At today s visit the patient/parent and I engaged in an informed consent discussion about allergy testing.  We discussed skin testing, blood testing,  and the alternative of not undergoing any testing. The patient/ parent has a preference for skin testing. We then discussed the risks and benefits of skin testing.  The patient/ parent understands skin testing risks can include, but are not limited to, urticaria, angioedema, shortness of breath, and severe anaphylaxis.  The benefits include, but are not limited, to evaluation for allergens causing symptoms.  After answering the patients/parents questions they have agreed to proceed with skin testing.    30 percutaneous test were undertaken to the environmental skin test panel.  Positive histamine control with a negative allergy skin test.  Please see scanned photograph.    Impression report and plan:  1.  Cefdinir allergy    Recommend in office challenge.  Patient cautioned as to risk of anaphylaxis.  Patient will be contacted to schedule.    2.  Nonallergic rhinitis, postnasal drip    Recommend Astepro nasal spray and nasal rinses regularly.  Allergy testing was negative.  Signed Electronically by: Nandini RITCHIE MD

## 2024-07-15 NOTE — PROGRESS NOTES
CCx: Follow-up isolated DLCO deficit.    HPI:Ms. Viera is a 73 year old lady with a past medical history significant for depression, multiple myeloma on Revlamid  and osteoporosis who presents to clinic today  for the aforementioned.   At her last clinic visit, we had ordered a battery of tests to try to explain her isolated diffusion deficit.  Since then, she has had a negative VQ scan, negative ADAM and ANCA and continues to be entirely asymptomatic.  RA already.  Today she states that just yesterday she washed 3 loads of laundry cooked all day, was bathrooms and had no shortness of breath.  She has no symptoms at night either.   She was most recently seen in the oncology clinic and recent labs demonstrated a normal kappa lambda ratio with a negative immunofixation.  Her kidney function, hemoglobin beta-2 microglobulin are also within normal range.      Pertinent medical history:  She was her usual state of health today when she contracted a URI likely from grandchildren who had been unwell at the time.  She was evaluated in the emergency department and initiated on treatment with both levofloxacin and clindamycin.  She states that she developed Achilles tendinitis and has been working through this now.  She is presently able to walk a mile to a mile and a half on a treadmill.  She has used Flonase for her allergic rhinitis that was prescribed by her primary care provider and thinks that this has significantly improved her symptoms.  She does note that she typically gets seasonal allergies which manifest as runny nose and previously has used Claritin from March to November.  Because of the fact that she is presently on Revlimid she was not sure if are not she should reinitiate this.  She does use supplemental oxygen overnight and has a nebulizer machine that she uses when she is unwell.      ROS:  Pertinent positives alluded to in the HPI. Remainder of 10 point ROS is negative.       PMH (Unchanged from  previous visit):  Depression  Multiple Myeloma   Osteoporosis    PSH (Unchanged from previous visit):  Cervical Conization  Tonsillectomy and adenoidectomy  Lap Kathleen  Fallopian tube ligation    Allergies:  Seasonal.    Family HX:  Reviewed in epic.    Social Hx (Unchanged from previous visit):  Marital status: Yes  Number of children: 3  Resides in a house which was built in 1978, some concern for mold in basement but this was abated. Lives at home with , daughter and 2 grandchildren. [place]  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 Has also worked at [employer] and [employer].   service: No  Pets: 1 small dog  Smoking history: 40 pack year history; quit in 2021. Now uses a Vape pen.  Alcohol use: Social.   Recreational drug use: Medical marijuana. Once every three or four weeks.  Hobbies: Reads, júnior.  Recent Travel: No    Current Meds:  Current Outpatient Medications   Medication Sig Dispense Refill    albuterol (PROAIR HFA/PROVENTIL HFA/VENTOLIN HFA) 108 (90 Base) MCG/ACT inhaler Inhale 2 puffs into the lungs every 6 hours as needed for wheezing or shortness of breath / dyspnea 18 g 1    albuterol (PROVENTIL) (2.5 MG/3ML) 0.083% neb solution Take 1 vial (2.5 mg) by nebulization every 4 hours as needed for wheezing or shortness of breath / dyspnea 90 mL 0    aspirin (ASA) 81 MG chewable tablet Take 81 mg by mouth daily      fish oil-omega-3 fatty acids 1000 MG capsule Take 2 g by mouth daily      fluticasone (FLONASE) 50 MCG/ACT nasal spray Spray 2 sprays into both nostrils daily 15.8 mL 4    gabapentin (NEURONTIN) 300 MG capsule Take 2 capsules (600 mg) by mouth 3 times daily 180 capsule 1    hydrochlorothiazide (HYDRODIURIL) 12.5 MG tablet Take 1 tablet (12.5 mg) by mouth daily 90 tablet 4    HYDROmorphone (DILAUDID) 4  MG tablet Take 0.5-1 tablets (2-4 mg) by mouth every 4 hours as needed for moderate to severe pain 60 tablet 0    medical cannabis (Patient's own supply) See Admin Instructions (The purpose of this order is to document that the patient reports taking medical cannabis.  This is not a prescription, and is not used to certify that the patient has a qualifying medical condition.)     Oil formulation      Melatonin 10 MG TABS tablet Take 1 tablet (10 mg) by mouth at bedtime      Menaquinone-7 (K2 PO) Take 1 capsule by mouth daily      methocarbamol (ROBAXIN) 500 MG tablet Take 1 tablet (500 mg) by mouth 4 times daily as needed for muscle spasms 120 tablet 11    Milk Thistle-Dand-Fennel-Licor (MILK THISTLE XTRA) CAPS capsule Take 1 capsule by mouth daily      NALTREXONE HCL PO Take 3 mg by mouth daily      nicotine (NICOTROL) 10 MG/ML SOLN inhalation solution Spray 1 spray in nostril every hour as needed for nicotine withdrawal symptoms      TURMERIC PO Take 1 capsule by mouth daily      UNABLE TO FIND 1 capsule daily MEDICATION NAME: Serrapeptase      UNABLE TO FIND 1 capsule daily MEDICATION NAME: Edita Mariano Mushroom      UNABLE TO FIND 1 capsule daily MEDICATION NAME: DIM (diinolylmethane)      UNABLE TO FIND MEDICATION NAME: Panacur C - 1 capsule daily      Vitamin D, Cholecalciferol, 25 MCG (1000 UT) CAPS Take 1,000 Units by mouth daily Liquid, not capsule      LENalidomide (REVLIMID) 10 MG CAPS capsule Take 1 capsule (10 mg) by mouth daily for 28 days Take at the same time each day. Do not open, break or chew the capsule. Swallow whole, with water. 28 capsule 0    LENalidomide (REVLIMID) 10 MG CAPS capsule Take 1 capsule (10 mg) by mouth daily for 28 days Take at the same time each day. Do not open, break or chew the capsule. Swallow whole, with water. 28 capsule 0     Labs:  ADAM interpretation  Negative Negative       Imaging studies:  VQ Scan 6/18/24:  No scintigraphic evidence of pulmonary embolism.      (Unchanged from previous visit)  CT PE Study 2/26/24:  1.  No acute pulmonary emboli.  2.  Extensive pulmonary airspace opacities, likely infectious or inflammatory.    PFT's 4/22/24:  FEV1/FVC is 69% and is reduced.  FEV1 is 1.23L (67%)  (Z Score -1.89) predicted and is reduced.  FVC is 1.78L (76%)  (Z Score -1.45) predicted and normal.  DLCO is 10.01ml/min/hg (57%)  (Z Score -3.09) predicted and is reduced when it is corrected for hemoglobin.  Flow volume loops indicate scooping of the expiratory limb.    Impression:  Full Pulmonary Function Test is abnormal.  PFTs are consistent with mild obstructive disease.  Diffusion capacity when corrected for hemoglobin is moderately reduced.    The results of SVC and DLCO testing met ATS criteria for comfortability and repeatability.  The results of FVC testing appears to be valid and met ATS criteria for repeatability but failed to be acceptability.  Patient declined plethysmography testing.  Deferred albuterol.    TTE 4/13/22:  The left ventricle is normal in size.  There is borderline concentric left ventricular hypertrophy.  Left ventricular systolic function is normal.  The visual ejection fraction is 60-65%.  No regional wall motion abnormalities noted.  No significant valve abnormality  ______________________________________________________________________________  Left Ventricle  The left ventricle is normal in size. There is borderline concentric left ventricular hypertrophy. Left ventricular systolic function is normal.  Diastolic Doppler findings (E/E' ratio and/or other parameters) suggest left ventricular filling pressures are indeterminate. Grade I or early diastolic dysfunction. The visual ejection fraction is 60-65%. No regional wall motion abnormalities noted.     Right Ventricle  Normal right ventricle size and systolic function. TAPSE is normal, which is consistent with normal right ventricular systolic function.     Atria  Normal left atrial size.  Right atrial size is normal.     Mitral Valve  Mitral valve leaflets appear normal. There is no evidence of mitral stenosis or clinically significant mitral regurgitation. There is trace to mild mitral regurgitation.     Tricuspid Valve  There is trace to mild tricuspid regurgitation.     Aortic Valve  The aortic valve is not well visualized. No hemodynamically significant valvular aortic stenosis.     Pulmonic Valve  The pulmonic valve is not well visualized.     Vessels  The aorta root is normal. IVC diameter and respiratory changes fall into an intermediate range suggesting an RA pressure of 8 mmHg.     Pericardium  No significant pericardial effusion.       Physical Exam:  /72   Pulse 54   Wt 57.6 kg (127 lb)   LMP  (LMP Unknown)   SpO2 97%   BMI 24.00 kg/m    GEN: NAD  HEENT: PERRL, No JVD  LUNGS: CTA BL  CVS: S1 & S2 no added sounds  ABD: No HSM, BS audible  EXT: No edema, no rashes  NEURO: AO*3 CN2-12 intact      Assessment and Plan:Lizzie Viera is a 73 year old with a past medical history significant for depression, multiple myeloma on Revlamid  and osteoporosis who presents to clinic today for establishment of care for mild COPD noted on spirometry but with a moderate reduction of her diffusion capacity is unclear significance.  In reviewing her CT scan she has no evidence of emphysema and it would be worthwhile to the PFO as an etiology for her hypoxia. For the present we would recommend;    Mild obstructive COPD: Noted on PFTs.  She is mostly asymptomatic from this and states that she occasionally needs to use bronchodilators when she has URIs.  She is not limited in her activities of daily living from her COPD.  Of note she stopped smoking in 2021 once she was diagnosed with multiple myeloma but does continue to use some sort of vape pen.  As needed albuterol.  She already has a prescription for this and does not feel that she needs a long-acting inhaler.  I do agree given her minimal  symptoms.  Moderate reduction of DLCO: As noted above.  She has paucity of interstitial findings on her last CT scan of the chest to explain her reduced DLCO.  VQ scan also noted to be unremarkable and her ADAM screen was negative. At this point given her lack of symptoms I am not entirely clear on how to proceed. Ideally, we should perform a right heart cath to rule out pulmonary hypertension which is certainly a possibility with her known multiple myeloma on Revlimid treatment.  Repeat PFTs.  If DLCO continues to be low, will discuss this case with  At [facility] and see if he should proceed with a right heart cath.  Laura  HCM: Has received a pneumococcal vaccine but does not receive influenza vaccines because of ongoing treatment for her multiple myeloma.  Follow-up: I will call her with the results of her pulmonary function testing otherwise I will see her back in 6 months..      Alia Castro  Pulmonary and Critical Care  [id number]

## 2024-07-15 NOTE — LETTER
"7/15/2024      Lizzie Viera  [address] [place] [address]      Dear Colleague,    Thank you for referring your patient, Lizzie Viera, to the [facility]. Please see a copy of my visit note below.          Raisa Shaw is a 73 year old, presenting for the following health issues:  Allergy Consult (Allergy to levaquin? Got it February and developed tendonitis in shoulder and foot.)    HPI     Chief complaint: Drug allergy    History of present illness: This is a pleasant 73-year-old woman that was asked to see for evaluation of cefdinir allergy by Dr. Kenyon patient states 9 or 10 years ago she had an allergic reaction to oral cefdinir.  Patient states reaction happened around the third dose and consisted of shortness of breath that resolved spontaneously.  No medical intervention was needed.  No rash that she recalls.  She has avoided all cephalosporins since that time.  She has tolerated Zosyn and meropenem, however per the record since that time.  Recently was hospitalized and given Levaquin.  She states she developed tendinitis after using Levaquin.    She would like to discuss postnasal drip.  She states she has a lot of thick drainage down the back of her throat.  She wonders if she has allergies.  She has been using Nasacort nasal spray which helps some.  She states it is worse in the morning.  Symptoms occur throughout the year and are not seasonal.  No history of asthma.  No use of over-the-counter antihistamines.    Past medical history: Sensorineural hearing loss, back pain, migraines, history of emphysema per the record, hypertension, osteoporosis, history of multiple myeloma complicated by neutropenic fever    Social history smoker, has a dog at home    Family history: Noncontributory but a  family history of environmental allergies and food allergy          Objective   Pulse 51   Ht 1.549 m (5' 1\")   Wt 57.2 kg (126 lb)   LMP  (LMP Unknown)   " SpO2 96%   BMI 23.81 kg/m    Body mass index is 23.81 kg/m .  Physical Exam   Gen: Pleasant female not in acute distress  HEENT: Eyes no erythema of the bulbar or palpebral conjunctiva, no edema.  Nose: No congestion, mucosa normal. Mouth: Throat clear, no lip or tongue edema.   Respiratory: Clear to auscultation bilaterally, no adventitious breath sounds  Skin: No rashes or lesions  Psych: Alert and oriented times 3      At today s visit the patient/parent and I engaged in an informed consent discussion about allergy testing.  We discussed skin testing, blood testing,  and the alternative of not undergoing any testing. The patient/ parent has a preference for skin testing. We then discussed the risks and benefits of skin testing.  The patient/ parent understands skin testing risks can include, but are not limited to, urticaria, angioedema, shortness of breath, and severe anaphylaxis.  The benefits include, but are not limited, to evaluation for allergens causing symptoms.  After answering the patients/parents questions they have agreed to proceed with skin testing.    30 percutaneous test were undertaken to the environmental skin test panel.  Positive histamine control with a negative allergy skin test.  Please see scanned photograph.    Impression report and plan:  1.  Cefdinir allergy    Recommend in office challenge.  Patient cautioned as to risk of anaphylaxis.  Patient will be contacted to schedule.    2.  Nonallergic rhinitis, postnasal drip    Recommend Astepro nasal spray and nasal rinses regularly.  Allergy testing was negative.  Signed Electronically by: Nandini RITCHIE MD        Again, thank you for allowing me to participate in the care of your patient.        Sincerely,        Nandini RITCHIE MD

## 2024-07-16 NOTE — PROGRESS NOTES
[facility]: Cancer Care                                                                                            Situation: Patient chart reviewed by care coordinator.    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    Assessment: She was most recently seen in the clinic by Dr Blanco on 7/10.  He recommended that she continue Revlimid 10 mg daily for maintenance therapy.  She will follow-up in 3 months with labs.    He did talk with her about resuming biphosphonate therapy as she does have multiple myeloma and osteoporosis.  She is at a very high risk for pathological fracture.  She wanted to think about it so I followed up with her today.    Plan/Recommendations: Call placed to patient today who states that she does not want to resume biphosphonate therapy at this time.  She states she is doing her own therapy for this and wants to wait until she has another bone density scan to see if it is working.    She will call back if she needs anything further.    Signature:  Aliza Sheehan RN

## 2024-07-17 NOTE — LETTER
7/17/2024      Lizzie Viera  [address] [place] [address]      Dear Colleague,    Thank you for referring your patient, Lizzie Viera, to the [facility]. Please see a copy of my visit note below.    See confidential note      Again, thank you for allowing me to participate in the care of your patient.        Sincerely,        Mario Long LP
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
## 2024-07-31 NOTE — LETTER
7/31/2024      Lizzie Viera  [address] [place] [address]      Dear Colleague,    Thank you for referring your patient, Lizzie Viera, to the [facility]. Please see a copy of my visit note below.    See confidential note      Again, thank you for allowing me to participate in the care of your patient.        Sincerely,        Mario Long LP

## 2024-08-13 NOTE — PROGRESS NOTES
Assessment/Plan:      Valeria was seen today for neck pain.    Diagnoses and all orders for this visit:    Thoracic spinal stenosis    Myofascial pain    Cervical spine pain    Somatic dysfunction of head region  -     OSTEOPATHIC MANIP,5-6 BODY REGN    Somatic dysfunction of cervical region  -     OSTEOPATHIC MANIP,5-6 BODY REGN    Somatic dysfunction of thoracic region  -     OSTEOPATHIC MANIP,5-6 BODY REGN    Somatic dysfunction of rib region  -     OSTEOPATHIC MANIP,5-6 BODY REGN    Somatic dysfunction of upper extremity  -     OSTEOPATHIC MANIP,5-6 BODY REGN         Assessment: Pleasant 73 year old femalewith a history of hyperlipidemia, anxiety, depression, irritable bowel, neuropathy, osteoporosis and multiple myeloma with a T7 fracture and lesion  resulting in spinal stenosis:     Flare of cervical and thoracic thoracic spine pain in the cervical spine parascapular region upper thoracic spine.  Consistent with myofascial pain in the setting of chronic fractures.  Chronic cervical and horacic pain around the mid to upper thoracic spine radiating up to the cervical spine and suboccipital region with headaches.   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  No signs of thoracic myelopathy neurologically stable. Fracture has been stable on MRI from 2022 T1 and T7.  Continues on gabapentin 600 mg 3 times daily tolerating well.  Sleeping well.  Continues with physical therapy.    More manageable with gabapentin.  Osteopathic manipulative medicine continues to help manage symptoms.       Somatic dysfunctions of the cranium, cervical spine, rib cage, upper extremities, thoracic spine  that contribute to the patient's pain complaints.         Discussion:    1.  Patient does well with Osteopathic manipulative medicine she has increased thoracic pain but no new fractures on CT scan of the chest from February.  OMT helps maintain her activity level.  She is a good candidate  for additional treatment.  She agrees to proceed.  Please see attached procedure note.    2.  Follow-up as needed      It was our pleasure caring for your patient today, if there any questions or concerns please do not hesitate to contact us.      Subjective:   Patient ID: Lizzie Viera is a 73 year old female.    History of Present Illness: Patient presents for evaluation of thoracic pain.  Worsening thoracic pain mid thoracic spine.  Had to move some totes to get her air conditioner fixed and lifting her arms over her head increases her pain.  Pain medications help along with Osteopathic manipulative medicine no significant change in quality of her pain overall has had CT in February and chest x-ray in June.  Pain is a 10/10 at worst 7/10 today 0/10 at best.  OMT helps keep her pain manageable.         Review of Systems: Continues with paresthesias of the feet headaches swelling issues.  Denies bowel or bladder incontinence, difficulty with coordination fevers or unintentional weight loss         Current Outpatient Medications   Medication Sig Dispense Refill    albuterol (PROAIR HFA/PROVENTIL HFA/VENTOLIN HFA) 108 (90 Base) MCG/ACT inhaler Inhale 2 puffs into the lungs every 6 hours as needed for wheezing or shortness of breath / dyspnea 18 g 1    albuterol (PROVENTIL) (2.5 MG/3ML) 0.083% neb solution Take 1 vial (2.5 mg) by nebulization every 4 hours as needed for wheezing or shortness of breath / dyspnea 90 mL 0    aspirin (ASA) 81 MG chewable tablet Take 81 mg by mouth daily      cefdinir (OMNICEF) 250 MG/5ML suspension To be used in allergist's office for oral challenge 60 mL 0    fish oil-omega-3 fatty acids 1000 MG capsule Take 2 g by mouth daily      fluticasone (FLONASE) 50 MCG/ACT nasal spray Spray 2 sprays into both nostrils daily 15.8 mL 4    gabapentin (NEURONTIN) 300 MG capsule Take 2 capsules (600 mg) by mouth 3 times daily 180 capsule 0    hydrochlorothiazide (HYDRODIURIL) 12.5 MG tablet  Take 1 tablet (12.5 mg) by mouth daily 90 tablet 4    HYDROmorphone (DILAUDID) 4 MG tablet Take 0.5-1 tablets (2-4 mg) by mouth every 4 hours as needed for moderate to severe pain 60 tablet 0    LENalidomide (REVLIMID) 10 MG CAPS capsule Take 1 capsule (10 mg) by mouth daily for 28 days Take at the same time each day. Do not open, break or chew the capsule. Swallow whole, with water. 28 capsule 0    medical cannabis (Patient's own supply) See Admin Instructions (The purpose of this order is to document that the patient reports taking medical cannabis.  This is not a prescription, and is not used to certify that the patient has a qualifying medical condition.)     Oil formulation      Melatonin 10 MG TABS tablet Take 1 tablet (10 mg) by mouth at bedtime      Menaquinone-7 (K2 PO) Take 1 capsule by mouth daily      methocarbamol (ROBAXIN) 500 MG tablet Take 1 tablet (500 mg) by mouth 4 times daily as needed for muscle spasms 120 tablet 11    Milk Thistle-Dand-Fennel-Licor (MILK THISTLE XTRA) CAPS capsule Take 1 capsule by mouth daily      NALTREXONE HCL PO Take 3 mg by mouth daily      nicotine (NICOTROL) 10 MG/ML SOLN inhalation solution Spray 1 spray in nostril every hour as needed for nicotine withdrawal symptoms      TURMERIC PO Take 1 capsule by mouth daily      UNABLE TO FIND 1 capsule daily MEDICATION NAME: Serrapeptase      UNABLE TO FIND 1 capsule daily MEDICATION NAME: Edita Mariano Mushroom      UNABLE TO FIND 1 capsule daily MEDICATION NAME: DIM (diinolylmethane)      UNABLE TO FIND MEDICATION NAME: Panacur C - 1 capsule daily      Vitamin D, Cholecalciferol, 25 MCG (1000 UT) CAPS Take 1,000 Units by mouth daily Liquid, not capsule       No current facility-administered medications for this visit.       Past Medical History:   Diagnosis Date    Cervical dysplasia     Chronic RUQ pain     Depressive disorder     Multiple myeloma (H)     Osteoporosis        The following portions of the patient's history were  reviewed and updated as appropriate: allergies, current medications, past family history, past medical history, past social history, past surgical history and problem list.           Objective:   Physical Exam:    /63   Pulse 60   LMP  (LMP Unknown)   There is no height or weight on file to calculate BMI.      General: Alert and oriented with normal affect. Attention, knowledge, memory, and language are intact. No acute distress.   Eyes: Sclerae are clear.  Respirations: Unlabored.    Gait:  Nonantalgic    Sensation is intact to light touch throughout the upper  extremities.  Reflexes are 2+ and symmetric in the biceps triceps and brachioradialis with negative Hoffmans.      Manual muscle testing reveals:  Right /Left out of 5     5/5 elbow flexors  5/5 elbow extensors  5/5 wrist extensors  5/5 interosseus  5/5 finger flexors       Structural exam: Cranium: Left cranial torsion, OA sidebent left rotated right cervical spine: C2 rotated right sidebent right C3 rotated left sidebent left. Rib cage: Rib one elevated on the left. Thoracic spine: T1 rotated right sidebent right upper thoracic myofascial restrictions.  Upper Extremities: myofascial restrictions of the bilat upper trap, infraspinatus/parascapular muscles. bilateral glenohumeral resrictions.    Procedure:    After discussing the risks and benefits of osteopathic manipulative medicine, verbal consent was obtained. The somatic dysfunctions listed above were treated with the following techniques: Cranium: Cranial indirect technique, VSD, and muscle energy for the OA. Cervical spine: Muscle energy, still technique, FPR, myofascial release, BLT, and soft tissue techniques. Rib cage: Myofascial release and FPR. Thoracic spine: Myofascial release, BLT  Upper Exrtremity: MFR, FPR, BLT.  The patient tolerated the procedure well and had improved range of motion in all areas treated prior to leaving the clinic.

## 2024-08-13 NOTE — LETTER
8/13/2024      Lizzie Viera  [address]  [place] [address]      Dear Colleague,    Thank you for referring your patient, Lizzie Viera, to the [facility]. Please see a copy of my visit note below.    Assessment/Plan:      Valeria was seen today for neck pain.    Diagnoses and all orders for this visit:    Thoracic spinal stenosis    Myofascial pain    Cervical spine pain    Somatic dysfunction of head region  -     OSTEOPATHIC MANIP,5-6 BODY REGN    Somatic dysfunction of cervical region  -     OSTEOPATHIC MANIP,5-6 BODY REGN    Somatic dysfunction of thoracic region  -     OSTEOPATHIC MANIP,5-6 BODY REGN    Somatic dysfunction of rib region  -     OSTEOPATHIC MANIP,5-6 BODY REGN    Somatic dysfunction of upper extremity  -     OSTEOPATHIC MANIP,5-6 BODY REGN         Assessment: Hossein 73 year old femalewith a history of hyperlipidemia, anxiety, depression, irritable bowel, neuropathy, osteoporosis and multiple myeloma with a T7 fracture and lesion  resulting in spinal stenosis:     Flare of cervical and thoracic thoracic spine pain in the cervical spine parascapular region upper thoracic spine.  Consistent with myofascial pain in the setting of chronic fractures.  Chronic cervical and horacic pain around the mid to upper thoracic spine radiating up to the cervical spine and suboccipital region with headaches.   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  No signs of thoracic myelopathy neurologically stable. Fracture has been stable on MRI from 2022 T1 and T7.  Continues on gabapentin 600 mg 3 times daily tolerating well.  Sleeping well.  Continues with physical therapy.    More manageable with gabapentin.  Osteopathic manipulative medicine continues to help manage symptoms.       Somatic dysfunctions of the cranium, cervical spine, rib cage, upper extremities, thoracic spine  that contribute to the patient's  pain complaints.         Discussion:    1.  Patient does well with Osteopathic manipulative medicine she has increased thoracic pain but no new fractures on CT scan of the chest from February.  OMT helps maintain her activity level.  She is a good candidate for additional treatment.  She agrees to proceed.  Please see attached procedure note.    2.  Follow-up as needed      It was our pleasure caring for your patient today, if there any questions or concerns please do not hesitate to contact us.      Subjective:   Patient ID: Lizzie Viera is a 73 year old female.    History of Present Illness: Patient presents for evaluation of thoracic pain.  Worsening thoracic pain mid thoracic spine.  Had to move some totes to get her air conditioner fixed and lifting her arms over her head increases her pain.  Pain medications help along with Osteopathic manipulative medicine no significant change in quality of her pain overall has had CT in February and chest x-ray in June.  Pain is a 10/10 at worst 7/10 today 0/10 at best.  OMT helps keep her pain manageable.         Review of Systems: Continues with paresthesias of the feet headaches swelling issues.  Denies bowel or bladder incontinence, difficulty with coordination fevers or unintentional weight loss         Current Outpatient Medications   Medication Sig Dispense Refill     albuterol (PROAIR HFA/PROVENTIL HFA/VENTOLIN HFA) 108 (90 Base) MCG/ACT inhaler Inhale 2 puffs into the lungs every 6 hours as needed for wheezing or shortness of breath / dyspnea 18 g 1     albuterol (PROVENTIL) (2.5 MG/3ML) 0.083% neb solution Take 1 vial (2.5 mg) by nebulization every 4 hours as needed for wheezing or shortness of breath / dyspnea 90 mL 0     aspirin (ASA) 81 MG chewable tablet Take 81 mg by mouth daily       cefdinir (OMNICEF) 250 MG/5ML suspension To be used in allergist's office for oral challenge 60 mL 0     fish oil-omega-3 fatty acids 1000 MG capsule Take 2 g by mouth  daily       fluticasone (FLONASE) 50 MCG/ACT nasal spray Spray 2 sprays into both nostrils daily 15.8 mL 4     gabapentin (NEURONTIN) 300 MG capsule Take 2 capsules (600 mg) by mouth 3 times daily 180 capsule 0     hydrochlorothiazide (HYDRODIURIL) 12.5 MG tablet Take 1 tablet (12.5 mg) by mouth daily 90 tablet 4     HYDROmorphone (DILAUDID) 4 MG tablet Take 0.5-1 tablets (2-4 mg) by mouth every 4 hours as needed for moderate to severe pain 60 tablet 0     LENalidomide (REVLIMID) 10 MG CAPS capsule Take 1 capsule (10 mg) by mouth daily for 28 days Take at the same time each day. Do not open, break or chew the capsule. Swallow whole, with water. 28 capsule 0     medical cannabis (Patient's own supply) See Admin Instructions (The purpose of this order is to document that the patient reports taking medical cannabis.  This is not a prescription, and is not used to certify that the patient has a qualifying medical condition.)     Oil formulation       Melatonin 10 MG TABS tablet Take 1 tablet (10 mg) by mouth at bedtime       Menaquinone-7 (K2 PO) Take 1 capsule by mouth daily       methocarbamol (ROBAXIN) 500 MG tablet Take 1 tablet (500 mg) by mouth 4 times daily as needed for muscle spasms 120 tablet 11     Milk Thistle-Dand-Fennel-Licor (MILK THISTLE XTRA) CAPS capsule Take 1 capsule by mouth daily       NALTREXONE HCL PO Take 3 mg by mouth daily       nicotine (NICOTROL) 10 MG/ML SOLN inhalation solution Spray 1 spray in nostril every hour as needed for nicotine withdrawal symptoms       TURMERIC PO Take 1 capsule by mouth daily       UNABLE TO FIND 1 capsule daily MEDICATION NAME: Serrapeptase       UNABLE TO FIND 1 capsule daily MEDICATION NAME: Edita Mariano Mushroom       UNABLE TO FIND 1 capsule daily MEDICATION NAME: DIM (diinolylmethane)       UNABLE TO FIND MEDICATION NAME: Panacur C - 1 capsule daily       Vitamin D, Cholecalciferol, 25 MCG (1000 UT) CAPS Take 1,000 Units by mouth daily Liquid, not capsule        No current facility-administered medications for this visit.       Past Medical History:   Diagnosis Date     Cervical dysplasia      Chronic RUQ pain      Depressive disorder      Multiple myeloma (H)      Osteoporosis        The following portions of the patient's history were reviewed and updated as appropriate: allergies, current medications, past family history, past medical history, past social history, past surgical history and problem list.           Objective:   Physical Exam:    /63   Pulse 60   LMP  (LMP Unknown)   There is no height or weight on file to calculate BMI.      General: Alert and oriented with normal affect. Attention, knowledge, memory, and language are intact. No acute distress.   Eyes: Sclerae are clear.  Respirations: Unlabored.    Gait:  Nonantalgic    Sensation is intact to light touch throughout the upper  extremities.  Reflexes are 2+ and symmetric in the biceps triceps and brachioradialis with negative Hoffmans.      Manual muscle testing reveals:  Right /Left out of 5     5/5 elbow flexors  5/5 elbow extensors  5/5 wrist extensors  5/5 interosseus  5/5 finger flexors       Structural exam: Cranium: Left cranial torsion, OA sidebent left rotated right cervical spine: C2 rotated right sidebent right C3 rotated left sidebent left. Rib cage: Rib one elevated on the left. Thoracic spine: T1 rotated right sidebent right upper thoracic myofascial restrictions.  Upper Extremities: myofascial restrictions of the bilat upper trap, infraspinatus/parascapular muscles. bilateral glenohumeral resrictions.    Procedure:    After discussing the risks and benefits of osteopathic manipulative medicine, verbal consent was obtained. The somatic dysfunctions listed above were treated with the following techniques: Cranium: Cranial indirect technique, VSD, and muscle energy for the OA. Cervical spine: Muscle energy, still technique, FPR, myofascial release, BLT, and soft tissue techniques.  Rib cage: Myofascial release and FPR. Thoracic spine: Myofascial release, BLT  Upper Exrtremity: MFR, FPR, BLT.  The patient tolerated the procedure well and had improved range of motion in all areas treated prior to leaving the clinic.        Again, thank you for allowing me to participate in the care of your patient.        Sincerely,        Wyatt Pascual, DO

## 2024-08-14 NOTE — LETTER
8/14/2024      Lizzie Viera  [address] [place] [address]      Dear Colleague,    Thank you for referring your patient, Lizzie Viera, to the [facility]. Please see a copy of my visit note below.    confidential      Again, thank you for allowing me to participate in the care of your patient.        Sincerely,        Maroi Long LP
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
## 2024-08-21 NOTE — PROGRESS NOTES
AUDIOLOGY REPORT    SUBJECTIVE: Lizzie Viera is a 73 year old female was seen in the Audiology Clinic at  [facility] on 8/21/24 to discuss concerns with hearing and functional communication difficulties. Ms. Viera was evaluated and determined to be cleared for amplification by Bubba Carranza MD, at ENT consult dated 7-1-24. Ms. Viera notes difficulty with communication in a variety of listening situations, particularly with hearing her grandchildren's voices.    OBJECTIVE:  Abuse Screening:  Do you feel unsafe at home or work/school? No  Do you feel threatened by someone? No  Does anyone try to keep you from having contact with others, or doing things outside of your home? No  Physical signs of abuse present? No    Patient is a hearing aid candidate. Patient would like to move forward with a hearing aid evaluation today. Therefore, the patient was presented with different options for amplification to help aid in communication. Discussed styles, levels of technology and monaural vs. binaural fitting.     The hearing aid(s) mutually chosen were:  Binaural: Phonak Slim L50-R  COLOR: copper  BATTERY SIZE: rechargeable  EARMOLD/TIPS: generic dome size to be determined at fitting appointment  CANAL/ LENGTH: 1 S, bilaterally      ASSESSMENT:     ICD-10-CM    1. Sensorineural hearing loss (SNHL) of both ears  H90.3           Reviewed purchase information and warranty information with patient. The 45 day trial period was explained to patient. The patient was given a copy of the [place] Department of Health consumer brochure on purchasing hearing instruments. Patient risk factors have been provided to the patient in writing prior to the sale of the hearing aid per FDA regulation. The risk factors are also available in the User Instructional Booklet to be presented on the day of the hearing aid fitting. Hearing aid(s) ordered. Hearing aid evaluation completed.    PLAN: Ms.  Aylin is scheduled to return 9-18-24 for a hearing aid fitting and programming appointment. Purchase agreement will be completed on that date. Ms. Viera expressed verbal understanding of this information and plan. Please contact this clinic with any questions or concerns.      Ana Laura Viera, [facility]  [place] Licensed Audiologist [id number]

## 2024-08-26 NOTE — PATIENT INSTRUCTIONS
It was good to see you today, Valeria.    Here are the things we talked about:  I re-certified you with the state for the medical cannabis program.  Keep up everything you're doing--it is working.    Stop at the desk  to schedule a follow up appointment in 2-3 months.     How to get a hold of us:  For non-urgent matters, MyChart works best.    For more urgent matters, or if you prefer not to use MyChart, call our clinic nurse coordinator Carine Ferreira RN at [phone]    We have an on-call number for evenings and weekends. Please call this only if you are having uncontrolled symptoms or serious side effects from your medicines: [phone].     For refills, please give us a week (5 working days) notice. We don't always have providers available everyday to do refills. If you call the day you run out of your medicine, we may not be able to refill it in time, so call 5 days in advance!    Yuri Salazar MD MS FAAFP CAQHPM  [facility]  Office [phone]  Fax [phone]

## 2024-08-26 NOTE — PROGRESS NOTES
"Oncology Rooming Note    August 26, 2024 2:11 PM   Lizzie Viera is a 73 year old female who presents for:    Chief Complaint   Patient presents with    Oncology Clinic Visit    palliative care     Follow up     Initial Vitals: BP (!) 149/67 (BP Location: Left arm, Patient Position: Sitting)   Pulse 56   Temp 98.2  F (36.8  C)   Resp 20   Wt 55.8 kg (123 lb)   LMP  (LMP Unknown)   SpO2 94%   BMI 23.24 kg/m   Estimated body mass index is 23.24 kg/m  as calculated from the following:    Height as of 7/15/24: 1.549 m (5' 1\").    Weight as of this encounter: 55.8 kg (123 lb). Body surface area is 1.55 meters squared.  Extreme Pain (8) Comment: Data Unavailable   No LMP recorded (lmp unknown). Patient is postmenopausal.  Allergies reviewed: Yes  Medications reviewed: Yes    Medications: Medication refills not needed today.  Pharmacy name entered into [facility]:    [id number] [place], [place] - [address]  [facility] [place], [place] - [address] [place] [address] PHARMACY - [place], [place] - [address]    Frailty Screening:   Is the patient here for a new oncology consult visit in cancer care? 2. No      Clinical concerns: Follow up for palliative care, low back pain worse than usual today  Dr. Salazar was notified.      Malu Duckworth RN              "

## 2024-08-26 NOTE — LETTER
2024      Lizzie Viera  [address]  [place] [address]      Dear Colleague,    Thank you for referring your patient, Lizzie Viera, to the [facility]IATION [facility]COL[facility] [place]. Please see a copy of my visit note below.    Palliative Care Outpatient Clinic Progress Note    Patient Name: Lizzie Viera  Primary Provider: Sona Sandoval    Impression & Recommendations & Counseling:  Multiple myeloma  Compression Fracture mid-T spine worsening spasms and tension headaches that start in right shoulder into her neck and head and no specific triggers  Cancer associated pain--well controlled  Insomnia less ell controlled than previously   Mid back MSK pain    ECO  Decisional Capacity: very present     SYMPTOM ASSESSMENT:  1.  Cancer related pain thoracic spine pain that radiates to bilateral lower extremities  Comment: Controlled.  Currently rates pain as low on days when doesn't overdo it (3-4) but someday's at the end of the day if she's been on her feet a lot and done a lot of housework or gardening and it is 7-8/10.  Resting and going to bed helps after awhile.  She uses the medical marijuana asneeded at night and it is pretty effective   She uses her dilaudid very infrequently--mainly when she goes to [place] to see her grandkiPhoodeez where she can't legally use her medical cannabis.     - Continue Gabapentin 600 mg by mouth 3 times daily.  - S/P Single fraction radiation to T6 through T .  - S/P T1 radiation 10/6/2021-10/12/2021.    - Continue resumed PT for back and for left knee  - Dilaudid to 2-4 mg by mouth every 4 hours as needed.  Takes infrequently.  - Continue medical cannabis per department of Health - pills and oil. Feels she is having good relief with this.  Does not take cannabis when knows she has to drive the next day.  Certfication was renewed today.  - Continue Robaxin 500 mg po q6h prn spasms - new refill sent 2024 taking 3 times a day.  - Tried  "baclofen at bedtime without benefit. It did not assist with sleep or relief of spasms.  -Discussed possibility of initiation of Cymbalta as this could help with neuropathic component to pain as well as assist with depression.  Patient would like to hold off on this at this time.  She feels the gabapentin is 'doing the job' for her.  Does not want to feel any masking of highs or lows per her report.     - Patient is taking naltrexone 3 mg by mouth at hs.  Had discussion with patient that this could reverse/minimize effect of opiate and she may feel more pain.  Taking this as a \"anti-inflammatory\" component of her natural/complementary treatments and doesn't feel it interferes when she uses her infrequent dilaudid. Her last hsCRP was elevated.  She is also using Tumeric to help reduce inflammation.  She has naturopath who she visits in [place].     2. Cancer related fatigue - fluctuates.  Feels fatigued at days end occasionally but she is now exercising on her treadmill (she had been doing a lot of walking outdoors and moved in due to colder weather)    - Activity as tolerated.  - no recent falls and currently not using a cane     3. Anxiety and depression related to health/diagnosis and current social stressors and stresses around paying for her medications.  -Continue to see Jack in health psychology  - Patient sees an energy healer.     4.  Mid-thoracic MSK pain--due to over doing it.  - massage with her regular practitioner scheduled in two days  -use THC and stretches   - let us know if the above don't work      ADVANCED CARE PLANNING/GOALS OF CARE DISCUSSION:  - Code status: DNR/DNI  - Honoring Choices and POLST in chart.  - Follow-up in palliative care clinic in 3 months for ongoing pain management and decisional support.   -Call [phone] with questions or refill needs.     Counseling: All of the above was explained to the patient in lay language. The patient has verbalized a clear understanding of the " discussion, asked appropriate questions, which have been answered to patient's apparent satisfaction. The patient is in agreement with the above plan.        Chief Complaint/Patient ID: Lizzie Viera 73 year old female with PMHx of multiple myeloma       Last Palliative care appointment: 2024 with me     Reviewed:  Yes:   reviewed - controlled substances reflected in medication list.    Interim History:  Lizzie Viera is a 73 year old female who is seen today for follow up with Palliative Care Clinic.     Pain:  Overall good control though over did somethings and now her mid spine is sore.  She has a good self care plan including some home stretches and a massage scheduled in two days.    Appetite/Nausea: good unless pain level is high     Bowels: no constipation     Sleep: has been overall well; uses nocturnal supplemental oxygen;      Mood: overall OK--works with energy healer weekly--sounds like there is a CBT component to this care     Coping:  overall well    Family History- Reviewed in Epic.    Allergies   Allergen Reactions     Cefdinir Shortness Of Breath     Latex Shortness Of Breath     Atorvastatin Muscle Pain (Myalgia)     Short Ragweed Pollen Ext Cough     Adhesive Tape Rash     Levaquin [Levofloxacin] Muscle Pain (Myalgia)     Achilles tendonitis after Levaquin (3/2024)       Social History:  Pertinent changes to social history/social situation since last visit: none  Key support resources: daughter in [place] and her family; care team;   Advance Directive Status:  ACP documents in Epic    Social History     Tobacco Use     Smoking status: Former     Current packs/day: 0.00     Average packs/day: 0.5 packs/day for 45.0 years (22.5 ttl pk-yrs)     Types: Cigarettes     Start date: 1978     Quit date: 2023     Years since quittin.5     Passive exposure: Past (15 year of spouse smoking around pt, as well as childhood exposure in the home from father)     Smokeless tobacco:  Never   Vaping Use     Vaping status: Never Used   Substance Use Topics     Alcohol use: Yes     Comment: Alcoholic Drinks/day: 0-1 drinks per week     Drug use: Not Currently         Allergies   Allergen Reactions     Cefdinir Shortness Of Breath     Latex Shortness Of Breath     Atorvastatin Muscle Pain (Myalgia)     Short Ragweed Pollen Ext Cough     Adhesive Tape Rash     Levaquin [Levofloxacin] Muscle Pain (Myalgia)     Achilles tendonitis after Levaquin (3/2024)     Current Outpatient Medications   Medication Sig Dispense Refill     albuterol (PROAIR HFA/PROVENTIL HFA/VENTOLIN HFA) 108 (90 Base) MCG/ACT inhaler Inhale 2 puffs into the lungs every 6 hours as needed for wheezing or shortness of breath / dyspnea 18 g 1     albuterol (PROVENTIL) (2.5 MG/3ML) 0.083% neb solution Take 1 vial (2.5 mg) by nebulization every 4 hours as needed for wheezing or shortness of breath / dyspnea 90 mL 0     aspirin (ASA) 81 MG chewable tablet Take 81 mg by mouth daily       cefdinir (OMNICEF) 250 MG/5ML suspension To be used in allergist's office for oral challenge 60 mL 0     fish oil-omega-3 fatty acids 1000 MG capsule Take 2 g by mouth daily       fluticasone (FLONASE) 50 MCG/ACT nasal spray Spray 2 sprays into both nostrils daily 15.8 mL 4     gabapentin (NEURONTIN) 300 MG capsule Take 2 capsules (600 mg) by mouth 3 times daily 180 capsule 0     hydrochlorothiazide (HYDRODIURIL) 12.5 MG tablet Take 1 tablet (12.5 mg) by mouth daily 90 tablet 4     HYDROmorphone (DILAUDID) 4 MG tablet Take 0.5-1 tablets (2-4 mg) by mouth every 4 hours as needed for moderate to severe pain 60 tablet 0     LENalidomide (REVLIMID) 10 MG CAPS capsule Take 1 capsule (10 mg) by mouth daily for 28 days Take at the same time each day. Do not open, break or chew the capsule. Swallow whole, with water. 28 capsule 0     medical cannabis (Patient's own supply) See Admin Instructions (The purpose of this order is to document that the patient reports  taking medical cannabis.  This is not a prescription, and is not used to certify that the patient has a qualifying medical condition.)     Oil formulation       Melatonin 10 MG TABS tablet Take 1 tablet (10 mg) by mouth at bedtime       Menaquinone-7 (K2 PO) Take 1 capsule by mouth daily       methocarbamol (ROBAXIN) 500 MG tablet Take 1 tablet (500 mg) by mouth 4 times daily as needed for muscle spasms 120 tablet 11     Milk Thistle-Dand-Fennel-Licor (MILK THISTLE XTRA) CAPS capsule Take 1 capsule by mouth daily       NALTREXONE HCL PO Take 3 mg by mouth daily       nicotine (NICOTROL) 10 MG/ML SOLN inhalation solution Spray 1 spray in nostril every hour as needed for nicotine withdrawal symptoms       TURMERIC PO Take 1 capsule by mouth daily       UNABLE TO FIND 1 capsule daily MEDICATION NAME: Serrapeptase       UNABLE TO FIND 1 capsule daily MEDICATION NAME: Edita Mariano Mushroom       UNABLE TO FIND 1 capsule daily MEDICATION NAME: DIM (diinolylmethane)       UNABLE TO FIND MEDICATION NAME: Panacur C - 1 capsule daily       Vitamin D, Cholecalciferol, 25 MCG (1000 UT) CAPS Take 1,000 Units by mouth daily Liquid, not capsule       Past Medical History:   Diagnosis Date     Cervical dysplasia      Chronic RUQ pain      Depressive disorder      Multiple myeloma (H)      Osteoporosis      Past Surgical History:   Procedure Laterality Date     CONIZATION CERVIX,KNIFE/LASER      Description: Cervical Conization By Laser;  Recorded: 09/20/2007;     HC DILATION/CURETTAGE DIAG/THER NON OB      Description: Dilation And Curettage;  Recorded: 09/20/2007;      REMOVE TONSILS/ADENOIDS,<11 Y/O      Description: Tonsillectomy With Adenoidectomy;  Recorded: 09/20/2007;     [facility] LAP,CHOLECYSTECTOMY/EXPLORE  12/27/2004     [facility] LIGATE FALLOPIAN TUBE      Description: Tubal Ligation;  Recorded: 09/20/2007;       Physical Exam:   GENERAL APPEARANCE: healthy looking, alert and no distress; neatly groomed  EYES: Eyes grossly normal  "to inspection, PERRLA, conjunctivae and sclerae without injection or discharge, EOM intact   RESP:  no increased work of breathing; speaks in complete sentences;   MS: No musculoskeletal defects are noted  SKIN: No suspicious lesions or rashes, hydration status appears adequate with normal skin turgor   PSYCH: Alert and oriented x3; speech- coherent , normal rate and volume; able to articulate logical thoughts, able to abstract reason, no tangential thoughts, no hallucinations or delusions, mentation appears normal, Mood is euthymic. Affect is appropriate for this mood state and bright. Thought content is free of suicidal ideation, hallucinations, and delusions.  Eye contact is good during conversation.       Key Data Reviewed:  LABS: 07/01/2024- Cr 0.91, Albumin 3.4,  Hgb 14.7,      IMAGING: NM ventilation lung scan report reviewed    32 minutes spent on the date of the encounter doing chart review, history and exam, patient education & counseling, documentation and other activities as noted above.    Yuri Salazar MD MS FAAFP CAQHPM  [facility]  Office [phone]  Fax [phone]     Oncology Rooming Note    August 26, 2024 2:11 PM   Lizzie Viera is a 73 year old female who presents for:    Chief Complaint   Patient presents with     Oncology Clinic Visit     palliative care     Follow up     Initial Vitals: BP (!) 149/67 (BP Location: Left arm, Patient Position: Sitting)   Pulse 56   Temp 98.2  F (36.8  C)   Resp 20   Wt 55.8 kg (123 lb)   LMP  (LMP Unknown)   SpO2 94%   BMI 23.24 kg/m   Estimated body mass index is 23.24 kg/m  as calculated from the following:    Height as of 7/15/24: 1.549 m (5' 1\").    Weight as of this encounter: 55.8 kg (123 lb). Body surface area is 1.55 meters squared.  Extreme Pain (8) Comment: Data Unavailable   No LMP recorded (lmp unknown). Patient is postmenopausal.  Allergies reviewed: Yes  Medications reviewed: Yes    Medications: " Medication refills not needed today.  Pharmacy name entered into [facility]:    [facility][id number] - [place], [place] - [address]  [facility] - [place], [place] - [address] [facility] - [place], [place] - [address]    Frailty Screening:   Is the patient here for a new oncology consult visit in cancer care? 2. No      Clinical concerns: Follow up for palliative care, low back pain worse than usual today  Dr. Salazar was notified.      Malu Duckworth RN                Again, thank you for allowing me to participate in the care of your patient.        Sincerely,        Yuri Salazar MD

## 2024-08-26 NOTE — PROGRESS NOTES
Palliative Care Outpatient Clinic Progress Note    Patient Name: Lizzie Viera  Primary Provider: Sona Sandoval    Impression & Recommendations & Counseling:  Multiple myeloma  Compression Fracture mid-T spine worsening spasms and tension headaches that start in right shoulder into her neck and head and no specific triggers  Cancer associated pain--well controlled  Insomnia less ell controlled than previously   Mid back MSK pain    ECO  Decisional Capacity: very present     SYMPTOM ASSESSMENT:  1.  Cancer related pain thoracic spine pain that radiates to bilateral lower extremities  Comment: Controlled.  Currently rates pain as low on days when doesn't overdo it (3-4) but someday's at the end of the day if she's been on her feet a lot and done a lot of housework or gardening and it is 7-8/10.  Resting and going to bed helps after awhile.  She uses the medical marijuana asneeded at night and it is pretty effective   She uses her dilaudid very infrequently--mainly when she goes to [place] to see her grandkids where she can't legally use her medical cannabis.     - Continue Gabapentin 600 mg by mouth 3 times daily.  - S/P Single fraction radiation to T6 through T .  - S/P T1 radiation 10/6/2021-10/12/2021.    - Continue resumed PT for back and for left knee  - Dilaudid to 2-4 mg by mouth every 4 hours as needed.  Takes infrequently.  - Continue medical cannabis per department of Health - pills and oil. Feels she is having good relief with this.  Does not take cannabis when knows she has to drive the next day.  Certfication was renewed today.  - Continue Robaxin 500 mg po q6h prn spasms - new refill sent 2024 taking 3 times a day.  - Tried baclofen at bedtime without benefit. It did not assist with sleep or relief of spasms.  -Discussed possibility of initiation of Cymbalta as this could help with neuropathic component to pain as well as assist with depression.  Patient would like to hold off on this at  "this time.  She feels the gabapentin is 'doing the job' for her.  Does not want to feel any masking of highs or lows per her report.     - Patient is taking naltrexone 3 mg by mouth at hs.  Had discussion with patient that this could reverse/minimize effect of opiate and she may feel more pain.  Taking this as a \"anti-inflammatory\" component of her natural/complementary treatments and doesn't feel it interferes when she uses her infrequent dilaudid. Her last hsCRP was elevated.  She is also using Tumeric to help reduce inflammation.  She has naturopath who she visits in [place].     2. Cancer related fatigue - fluctuates.  Feels fatigued at days end occasionally but she is now exercising on her treadmill (she had been doing a lot of walking outdoors and moved in due to colder weather)    - Activity as tolerated.  - no recent falls and currently not using a cane     3. Anxiety and depression related to health/diagnosis and current social stressors and stresses around paying for her medications.  -Continue to see Jack in health psychology  - Patient sees an energy healer.     4.  Mid-thoracic MSK pain--due to over doing it.  - massage with her regular practitioner scheduled in two days  -use THC and stretches   - let us know if the above don't work      ADVANCED CARE PLANNING/GOALS OF CARE DISCUSSION:  - Code status: DNR/DNI  - Honoring Choices and POLST in chart.  - Follow-up in palliative care clinic in 3 months for ongoing pain management and decisional support.   -Call [phone] with questions or refill needs.     Counseling: All of the above was explained to the patient in lay language. The patient has verbalized a clear understanding of the discussion, asked appropriate questions, which have been answered to patient's apparent satisfaction. The patient is in agreement with the above plan.        Chief Complaint/Patient ID: Lizzie Viera 73 year old female with PMHx of multiple myeloma       Last " Palliative care appointment: 2024 with me     Reviewed:  Yes:   reviewed - controlled substances reflected in medication list.    Interim History:  Lizzie Viera is a 73 year old female who is seen today for follow up with Palliative Care Clinic.     Pain:  Overall good control though over did somethings and now her mid spine is sore.  She has a good self care plan including some home stretches and a massage scheduled in two days.    Appetite/Nausea: good unless pain level is high     Bowels: no constipation     Sleep: has been overall well; uses nocturnal supplemental oxygen;      Mood: overall OK--works with energy healer weekly--sounds like there is a CBT component to this care     Coping:  overall well    Family History- Reviewed in Epic.    Allergies   Allergen Reactions    Cefdinir Shortness Of Breath    Latex Shortness Of Breath    Atorvastatin Muscle Pain (Myalgia)    Short Ragweed Pollen Ext Cough    Adhesive Tape Rash    Levaquin [Levofloxacin] Muscle Pain (Myalgia)     Achilles tendonitis after Levaquin (3/2024)       Social History:  Pertinent changes to social history/social situation since last visit: none  Key support resources: daughter in [place] and her family; care team;   Advance Directive Status:  ACP documents in Epic    Social History     Tobacco Use    Smoking status: Former     Current packs/day: 0.00     Average packs/day: 0.5 packs/day for 45.0 years (22.5 ttl pk-yrs)     Types: Cigarettes     Start date: 1978     Quit date: 2023     Years since quittin.5     Passive exposure: Past (15 year of spouse smoking around pt, as well as childhood exposure in the home from father)    Smokeless tobacco: Never   Vaping Use    Vaping status: Never Used   Substance Use Topics    Alcohol use: Yes     Comment: Alcoholic Drinks/day: 0-1 drinks per week    Drug use: Not Currently         Allergies   Allergen Reactions    Cefdinir Shortness Of Breath    Latex Shortness Of Breath     Atorvastatin Muscle Pain (Myalgia)    Short Ragweed Pollen Ext Cough    Adhesive Tape Rash    Levaquin [Levofloxacin] Muscle Pain (Myalgia)     Achilles tendonitis after Levaquin (3/2024)     Current Outpatient Medications   Medication Sig Dispense Refill    albuterol (PROAIR HFA/PROVENTIL HFA/VENTOLIN HFA) 108 (90 Base) MCG/ACT inhaler Inhale 2 puffs into the lungs every 6 hours as needed for wheezing or shortness of breath / dyspnea 18 g 1    albuterol (PROVENTIL) (2.5 MG/3ML) 0.083% neb solution Take 1 vial (2.5 mg) by nebulization every 4 hours as needed for wheezing or shortness of breath / dyspnea 90 mL 0    aspirin (ASA) 81 MG chewable tablet Take 81 mg by mouth daily      cefdinir (OMNICEF) 250 MG/5ML suspension To be used in allergist's office for oral challenge 60 mL 0    fish oil-omega-3 fatty acids 1000 MG capsule Take 2 g by mouth daily      fluticasone (FLONASE) 50 MCG/ACT nasal spray Spray 2 sprays into both nostrils daily 15.8 mL 4    gabapentin (NEURONTIN) 300 MG capsule Take 2 capsules (600 mg) by mouth 3 times daily 180 capsule 0    hydrochlorothiazide (HYDRODIURIL) 12.5 MG tablet Take 1 tablet (12.5 mg) by mouth daily 90 tablet 4    HYDROmorphone (DILAUDID) 4 MG tablet Take 0.5-1 tablets (2-4 mg) by mouth every 4 hours as needed for moderate to severe pain 60 tablet 0    LENalidomide (REVLIMID) 10 MG CAPS capsule Take 1 capsule (10 mg) by mouth daily for 28 days Take at the same time each day. Do not open, break or chew the capsule. Swallow whole, with water. 28 capsule 0    medical cannabis (Patient's own supply) See Admin Instructions (The purpose of this order is to document that the patient reports taking medical cannabis.  This is not a prescription, and is not used to certify that the patient has a qualifying medical condition.)     Oil formulation      Melatonin 10 MG TABS tablet Take 1 tablet (10 mg) by mouth at bedtime      Menaquinone-7 (K2 PO) Take 1 capsule by mouth daily       methocarbamol (ROBAXIN) 500 MG tablet Take 1 tablet (500 mg) by mouth 4 times daily as needed for muscle spasms 120 tablet 11    Milk Thistle-Dand-Fennel-Licor (MILK THISTLE XTRA) CAPS capsule Take 1 capsule by mouth daily      NALTREXONE HCL PO Take 3 mg by mouth daily      nicotine (NICOTROL) 10 MG/ML SOLN inhalation solution Spray 1 spray in nostril every hour as needed for nicotine withdrawal symptoms      TURMERIC PO Take 1 capsule by mouth daily      UNABLE TO FIND 1 capsule daily MEDICATION NAME: Serrapeptase      UNABLE TO FIND 1 capsule daily MEDICATION NAME: Edita Mariano Mushroom      UNABLE TO FIND 1 capsule daily MEDICATION NAME: DIM (diinolylmethane)      UNABLE TO FIND MEDICATION NAME: Panacur C - 1 capsule daily      Vitamin D, Cholecalciferol, 25 MCG (1000 UT) CAPS Take 1,000 Units by mouth daily Liquid, not capsule       Past Medical History:   Diagnosis Date    Cervical dysplasia     Chronic RUQ pain     Depressive disorder     Multiple myeloma (H)     Osteoporosis      Past Surgical History:   Procedure Laterality Date    CONIZATION CERVIX,KNIFE/LASER      Description: Cervical Conization By Laser;  Recorded: 09/20/2007;    [facility] DILATION/CURETTAGE DIAG/THER NON OB      Description: Dilation And Curettage;  Recorded: 09/20/2007;    [facility] REMOVE TONSILS/ADENOIDS,<11 Y/O      Description: Tonsillectomy With Adenoidectomy;  Recorded: 09/20/2007;    [facility] LAP,CHOLECYSTECTOMY/EXPLORE  12/27/2004    [facility] LIGATE FALLOPIAN TUBE      Description: Tubal Ligation;  Recorded: 09/20/2007;       Physical Exam:   GENERAL APPEARANCE: healthy looking, alert and no distress; neatly groomed  EYES: Eyes grossly normal to inspection, PERRLA, conjunctivae and sclerae without injection or discharge, EOM intact   RESP:  no increased work of breathing; speaks in complete sentences;   MS: No musculoskeletal defects are noted  SKIN: No suspicious lesions or rashes, hydration status appears adequate with normal skin turgor   PSYCH:  Alert and oriented x3; speech- coherent , normal rate and volume; able to articulate logical thoughts, able to abstract reason, no tangential thoughts, no hallucinations or delusions, mentation appears normal, Mood is euthymic. Affect is appropriate for this mood state and bright. Thought content is free of suicidal ideation, hallucinations, and delusions.  Eye contact is good during conversation.       Key Data Reviewed:  LABS: 07/01/2024- Cr 0.91, Albumin 3.4,  Hgb 14.7,      IMAGING: NM ventilation lung scan report reviewed    32 minutes spent on the date of the encounter doing chart review, history and exam, patient education & counseling, documentation and other activities as noted above.    Yuri Salazar MD MS FAAFP CAQHPM  [facility]  Office [phone]  Fax [phone]

## 2024-08-28 NOTE — LETTER
8/28/2024      Lizzie Viera  [address] [place] [address]      Dear Colleague,    Thank you for referring your patient, Lizzie Viera, to the [facility]. Please see a copy of my visit note below.    Confidential      Again, thank you for allowing me to participate in the care of your patient.        Sincerely,        Mario Long LP   Statement Selected
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
## 2024-08-30 NOTE — LETTER
8/30/2024      Lizzie Viera  [address] [place] [address]      Dear Colleague,    Thank you for referring your patient, Lizzie Viera, to the [facility]. Please see a copy of my visit note below.          Subjective  Valeria is a 73 year old, presenting for the following health issues:  Drug Challenge (Cefdinir challenge)    HPI     Chief complaint: Challenge to cefdinir    History of present illness: This is a pleasant 73-year-old woman here today to undergo challenge to cefdinir.  Reports she is feeling well.  No cough, wheezing, shortness of breath nasal congestion or skin rash.          Objective   Pulse 56   Resp 18   LMP  (LMP Unknown)   SpO2 97%   There is no height or weight on file to calculate BMI.  Physical Exam   Gen: Pleasant female not in acute distress  HEENT: Eyes no erythema of the bulbar or palpebral conjunctiva, no edema.. Nose: No congestion,  Mouth: Throat clear, no lip or tongue edema.   Respiratory: Clear to auscultation bilaterally, no adventitious breath sounds  Skin: Some bruising on the forearms  Psych: Alert and oriented times 3    Start time: 1240  End time: 1416    Oral Challenge     Antibiotic/Concentration Cefdinir 250 mg/5ml   1/100 Dose --   1/10 Dose 0.6 ml at 1240   Full Dose 6 ml at 1312   Observation Discharged at 1416       Impression report and plan:     Cefdinir allergy    Patient has a 2-6% chance of having an IgE mediated reaction to cefdinir   This does not predict non-IgE mediated reactions.      Signed Electronically by: Nandini RITCHIE MD        Again, thank you for allowing me to participate in the care of your patient.        Sincerely,        Nandini RITCHIE MD

## 2024-08-30 NOTE — PROGRESS NOTES
Subjective   Valeria is a 73 year old, presenting for the following health issues:  Drug Challenge (Cefdinir challenge)    HPI     Chief complaint: Challenge to cefdinir    History of present illness: This is a pleasant 73-year-old woman here today to undergo challenge to cefdinir.  Reports she is feeling well.  No cough, wheezing, shortness of breath nasal congestion or skin rash.          Objective    Pulse 56   Resp 18   LMP  (LMP Unknown)   SpO2 97%   There is no height or weight on file to calculate BMI.  Physical Exam   Gen: Pleasant female not in acute distress  HEENT: Eyes no erythema of the bulbar or palpebral conjunctiva, no edema.. Nose: No congestion,  Mouth: Throat clear, no lip or tongue edema.   Respiratory: Clear to auscultation bilaterally, no adventitious breath sounds  Skin: Some bruising on the forearms  Psych: Alert and oriented times 3    Start time: 1240  End time: 1416    Oral Challenge     Antibiotic/Concentration Cefdinir 250 mg/5ml   1/100 Dose --   1/10 Dose 0.6 ml at 1240   Full Dose 6 ml at 1312   Observation Discharged at 1416       Impression report and plan:     Cefdinir allergy    Patient has a 2-6% chance of having an IgE mediated reaction to cefdinir   This does not predict non-IgE mediated reactions.      Signed Electronically by: Nandini RITCHIE MD
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
## 2024-09-11 NOTE — LETTER
9/11/2024      Lizzie Viera  [address] [place] [address]      Dear Colleague,    Thank you for referring your patient, Lizzie Viera, to the [facility]. Please see a copy of my visit note below.    Confidential      Again, thank you for allowing me to participate in the care of your patient.        Sincerely,        Mario Long LP

## 2024-09-16 NOTE — TELEPHONE ENCOUNTER
Prior Authorization Approval    Medication: REVLIMID 10 MG PO CAPS  Authorization Effective Date: 9/12/2024  Authorization Expiration Date: 9/12/2025  Approved Dose/Quantity: 28/28days  Reference #: [id number]   Insurance Company: [employer] - Phone [phone] Fax [phone]  Expected CoPay: $ 0  Which Pharmacy is filling the prescription: [facility] - [place], [place] - [address]

## 2024-09-18 NOTE — PROGRESS NOTES
AUDIOLOGY REPORT    SUBJECTIVE: Lizzie Viera, a 73 year old female, was seen in the Audiology Clinic at [facility] today for a Binaural hearing aid fitting. Previous results have revealed borderline normal, sloping to severe sensorineural hearing loss, bilaterally. The patient was given medical clearance to pursue amplification by Bubba Carranza MD, at ENT consult dated 7-1-24.      OBJECTIVE:  Prior to fitting, a hearing aid check was performed to ensure device functionality. The hearing aid conformity evaluation was completed.The hearing aids were placed and they provided a good fit. Real-ear-probe-microphone measurements were completed on the Recommendi system and were a good match to NAL-NL2 target with soft sounds audible, moderate sounds comfortable, and loud sounds below discomfort. UCLs are verified through maximum power output measures and demonstrate appropriate limiting of loud inputs. Ms. Viera was oriented to proper hearing aid use, care, cleaning (no water, dry brush), use of , aid insertion/removal, user booklet, warranty information, storage cases, and other hearing aid details. The patient confirmed understanding of hearing aid use and care, and showed proper insertion of hearing aid and  use while in the office today. Ms. Viera reported good volume and sound quality today. No Bluetooth pairing with other audio devices was completed today.    EAR(S) FIT: Binaural  MA HEARING AID MAKE: Right: Phonak; Left: Phonak    MA HEARING AID MODEL #: Right: Slim L50-R; Left: Slim L50-R  HEARING AID STYLE: Right: COLLETTE; Left: COLLETTE  DOME SIZE: Right:  small open; Left::  small open   LENGTH: Right:  1S; Left:  1S  SERIAL NUMBERS: Right: [id number]; Left: [id number]  WARRANTY END DATE: Right: 10/2/2027; Left:: 10/2/2027      The patient signed a Notice of Non-Covered Service form at the hearing aid consult appointment on 8-21-24. This is available for review under  the Media tab in the medical record.       ASSESSMENT: Binaural hearing aid fitting completed today. Verification measures were performed. The 45 day trial period was explained to patient, and they expressed understanding. Ms. Viera signed the Hearing Aid Purchase Agreement and was given a copy, as well as details on her hearing aids. Patient was counseled that exact out of pocket amounts cannot be determined for hearing aid claims being sent to insurance. Any insurance coverage information presented to the patient is an estimate only, and is not a guarantee of payment. Patient has been advised to check with their own insurance.    PLAN: Ms. Viera will return for follow-up 10-8-24 for a hearing aid review appointment. Ms. Viera expressed verbal understanding of this information and plan. Please call this clinic with questions regarding today s appointment.    Ana Laura Viera, [facility]  [place] Licensed Audiologist [phone]

## 2024-09-18 NOTE — PATIENT INSTRUCTIONS
Today you were fit with new hearing aids(s).Your hearing aids were adjusted according to your hearing loss, and the adjustments verified in your ear. The following are important points to remember:  Red - Right, Blue - Left  Open the battery door to turn off the hearing aids whenever you are not using them (i.e., when you go to sleep, bathe, swim, etc.).  Otherwise, the battery will drain and you will go through batteries faster.  Remember to clean your hearing aids every day after use.  Wax accumulation can decrease hearing aid performance, lead to frequent repairs, and reduce hearing aid durability.  Wear your hearing instruments as much as you can so that your brain learns to process the sound you will hear through them. This process is called acclimatization.    What to expect when using your hearing aids  In the early days or weeks, some sounds may seem too loud, too soft or not natural. This is normal. Notice when sounds seem wrong. Write down any issues or concerns. This will help your audiologist (hearing specialist)  tune  your hearing aid at your next visit.   Other things to note as you adjust to your new hearing aids:  Hearing aids will not restore your hearing to  normal  levels.  Hearing aids are designed to improve the ability to understand speech.  Hearing aids will not block background noise, but they may reduce its impact on speech.  Noisy situations that are hard for others may also be hard for you.  Sounds that are so loud they cause discomfort for others may do the same for you.  Normal loud sounds should not be uncomfortably loud.    Battery use and warnings  Before you replace an old battery, peel off the sticker of the new battery and set it out for 2 to 5 minutes.  Place the battery with the negative side (bump) into the cup and the positive side (flat) up.  Open the battery door when not using your hearing aid to save on battery life.  Warning:  Batteries are dangerous if swallowed; Never  put batteries in your mouth.    Keep out of reach of children and pets.  Throw away batteries in the trash or through a recycling program.  Never let children handle batteries.  Do not keep batteries near medicine.  If someone swallows a battery, call the [employer] Hotline at 477-483-7276.    Return in 2-3 weeks for a hearing aid check appointment  Today you were fit with new hearing aids(s).Your hearing aids were adjusted according to your hearing loss, and the adjustments verified in your ear. The following are important points to remember:  Red - Right, Blue - Left  Open the battery door to turn off the hearing aids whenever you are not using them (i.e., when you go to sleep, bathe, swim, etc.).  Otherwise, the battery will drain and you will go through batteries faster.  Remember to clean your hearing aids every day after use.  Wax accumulation can decrease hearing aid performance, lead to frequent repairs, and reduce hearing aid durability.  Wear your hearing instruments as much as you can so that your brain learns to process the sound you will hear through them. This process is called acclimatization.    What to expect when using your hearing aids  In the early days or weeks, some sounds may seem too loud, too soft or not natural. This is normal. Notice when sounds seem wrong. Write down any issues or concerns. This will help your audiologist (hearing specialist)  tune  your hearing aid at your next visit.   Other things to note as you adjust to your new hearing aids:  Hearing aids will not restore your hearing to  normal  levels.  Hearing aids are designed to improve the ability to understand speech.  Hearing aids will not block background noise, but they may reduce its impact on speech.  Noisy situations that are hard for others may also be hard for you.  Sounds that are so loud they cause discomfort for others may do the same for you.  Normal loud sounds should not be uncomfortably loud.    Battery  use and warnings  Before you replace an old battery, peel off the sticker of the new battery and set it out for 2 to 5 minutes.  Place the battery with the negative side (bump) into the cup and the positive side (flat) up.  Open the battery door when not using your hearing aid to save on battery life.  Warning:  Batteries are dangerous if swallowed; Never put batteries in your mouth.    Keep out of reach of children and pets.  Throw away batteries in the trash or through a recycling program.  Never let children handle batteries.  Do not keep batteries near medicine.  If someone swallows a battery, call the "[employer] Hotline at 844-234-9037.    Return in 2-3 weeks for a hearing aid check appointment

## 2024-09-18 NOTE — PROGRESS NOTES
AUDIOLOGY REPORT    SUBJECTIVE: Lizzie Viera, a 73 year old female, was seen in the Audiology Clinic at [facility] today for a   hearing aid fitting. Previous results have revealed a {UNILATERAL:227315} *** {HEARING LOSS TYPE:512156}. The patient was given medical clearance to pursue amplification by  ***, MD..{The patient signed a waiver for medical examination prior to a hearing aid fitting.}      OBJECTIVE:  Prior to fitting, a hearing aid check was performed to ensure device functionality. The hearing aid conformity evaluation was completed.The hearing aids were placed and they provided a good fit. Real-ear-probe-microphone measurements were completed on the [employer] system and were a good match to NAL-NL2 target with soft sounds audible, moderate sounds comfortable, and loud sounds below discomfort. UCLs are verified through maximum power output measures and demonstrate appropriate limiting of loud inputs. Ms. Viera was oriented to proper hearing aid use, care, cleaning (no water, dry brush), batteries (size ***, insertion/removal, toxicity, low-battery signal), aid insertion/removal, user booklet, warranty information, storage cases, and other hearing aid details. The patient confirmed understanding of hearing aid use and care, and showed proper insertion of hearing aid and batteries while in the office today. Ms. Viera reported good volume and sound quality today.    EAR(S) FIT:    MA HEARING AID MAKE: Right:  ; Left:      MA HEARING AID MODEL #: Right:  ; Left:    HEARING AID STYLE: Right:  ; Left:    DOME SIZE: Right:   ; Left::      LENGTH: Right:   ; Left:     EARMOLDS: Right:  ; Left:     SERIAL NUMBERS: Right:  ; Left:    WARRANTY END DATE: Right:  ; Left::        (The patient signed a waiver per their insurance contract.)        ASSESSMENT: *** hearing aid fitting completed today. Verification measures were performed. The 45 day trial period was explained to  patient, and they expressed understanding. Ms. Viera signed the Hearing Aid Purchase Agreement and was given a copy, as well as details on her hearing aids. Patient was counseled that exact out of pocket amounts cannot be determined for hearing aid claims being sent to insurance. Any insurance coverage information presented to the patient is an estimate only, and is not a guarantee of payment. Patient has been advised to check with their own insurance.    PLAN: Ms. Viera will return for follow-up in 2-3 weeks for a hearing aid review appointment. Please call this clinic with questions regarding today s appointment.    ***
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
## 2024-09-25 NOTE — LETTER
9/25/2024      Lizzie Viera  [address] [place] [address]      Dear Colleague,    Thank you for referring your patient, Lizzie Viera, to the [facility]. Please see a copy of my visit note below.    Confidential      Again, thank you for allowing me to participate in the care of your patient.        Sincerely,        Mario Long LP

## 2024-10-04 NOTE — TELEPHONE ENCOUNTER
Oral Chemotherapy Monitoring Program     Medication: Revlimid  Rx: 10 mg PO daily on days 1 through 28 of 28 day cycle   Adult [id number]  Routine survey questions reviewed  Rx to be Escribed to [facility]        Thank you,  Gayla Smith Oncology/Transplant Liaison  Phone: [phone]  Fax: [phone]

## 2024-10-08 NOTE — PROGRESS NOTES
AUDIOLOGY REPORT    SUBJECTIVE: Lizzie Viera is a 74 year old female who was seen in the Audiology Clinic at the [facility] on 10/8/2024  for a follow-up check regarding the fitting of new hearing aids. Previous results have revealed borderline normal to mild sensorineural hearing loss, sloping to severe sensorineural hearing loss for both ears. The patient has been seen previously in this clinic and was fit binaurally with Phonak Slim L50-R devices on 9-18-24. Ms. Viera reported good sound quality from the devices. She reported recently attending a conference held in a large auditorium, with an exhibit canela and vendors. She was able to hear clearly in both environments, which was a pleasant surprise for her.    OBJECTIVE:   The International Outcome Inventory-Hearing Aids (IOI-HA) was administered today.The patient s responses to the 7 questions can be compared to normative data relative to how others are performing with their hearing aids, as well as focusing audiologic care and counseling.This patient s Quality of Life score (Question 7) was 4, which is above normative average.     Datalogging indicated average daily wear of eight hours, over the past twenty days. Targets were at 100% in both devices. No additional programming changes were requested or made.    Reviewed 45 day trial period, care, cleaning (no water, dry brush), use of , aid insertion/removal, volume adjustment, user booklet, warranty information, storage cases, and other hearing aid details.     The process of removing the dome and exchanging the wax protection was demonstrated on one device; Ms. Viera was able to perform this task independently on the second device. Two additional small open domes were dispensed. The drop off procedure for items requiring repair and methods for requesting hearing aid supplies were discussed.    No charge visit today (in warranty hearing aid check).    ASSESSMENT: A  follow-up appointment for hearing aid fitting was completed today. IOI-HA administered today. Changes to hearing aid was completed as outlined above.   PLAN: Ms. Viera will return for follow-up as needed; she is aware that her trial period ends 11-3-24 and any returns or exchanges needs to be made in writing prior to that date. Ms. Viera expressed verbal understanding of this information and plan. Please call this clinic with any questions regarding today s appointment.    Ana Laura Viera, [facility]  [place] Licensed Audiologist [id number]
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
## 2024-10-09 NOTE — LETTER
10/9/2024      Lizzie Viera  [address] [place] [address]      Dear Colleague,    Thank you for referring your patient, Lizzie Viera, to the [facility]. Please see a copy of my visit note below.    Confidential      Again, thank you for allowing me to participate in the care of your patient.        Sincerely,        Mario Long LP

## 2024-10-10 NOTE — PROGRESS NOTES
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  A very pleasant 74 year old woman with IgG kappa multiple myeloma with hyperdiploid cytogenetics.  However clinically was behaving somewhat more aggressively.  Started on VRD treatment.  Responded quite well. After 17 cycles the treatment was switched to Revlimid 10 mg daily in September 2022.  Valeria has been tolerating it quite well.  The lab work-up in the end of November 2022 showed maintenance of response.  The labs since February 2023 have been pretty stable.  Kappa lambda light chains have been normal for most part.  In September 2023 kappa light chains were slightly above normal but is lambda light chains were also above their previous numbers.  The ratio really did not change a whole lot.  Immunoglobulins number were stable.  Albumin continues to be normal.  Hemoglobin is normal as well.  Current labs indicate pretty normal kappa lambda ratio.  Immunoglobin levels are slightly suppressed which is expected.  Immunofixation negative.  2.  Had significant bone disease with osteoporosis and compression fractures.  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  Improved significantly on the CT scan in March 2022.  MRI also shows no new lesions.  December 2022 bone density showed osteoporosis.  Has not been able to restart Zometa due to dental implant needs.  Had the last the dental implant process involving any drilling into the bone on March 26, 2024.  We can probably resume Zometa few months after that.  3.  Normal  hemoglobin, calcium, kidney function along with beta-2 microglobulin and albumin at the time of diagnosis.    4.  Positive Covid antibodies from infection. Unvaccinated.  In the past has declined Evusheld.  5.  Pain from compression fracture status post radiation therapy.  Significantly improved except had some pain this morning.  6.  Slight hypoxia during REM sleep.  Elevated by pulmonary.  Had VQ scan done which was negative.    Plan and medical decision making    Presenting with continuation of treatment for multiple myeloma.Reviewed notes from each unique source.Reviewed each unique test.  Ordered tests.  Independently interpreted lab tests and radiological exams performed by other physicians.  Independent historian account obtained as noted.       Revlimid 10 mg p.o. daily.  Will follow-up in 3 months with the labs.  Will also repeat the immunofixation.  Follow up with pulmonology.  I have recommended that she resume bisphosphonate therapy.  She has multiple myeloma and osteoporosis.  Very high risk for pathological fractures. She wants to do a bone density before resuming it.  Diet rich in calcium and vitamin D.  Continue to use oxygen at nighttime or as recommended by the sleep people or pulmonary people.  The longitudinal plan of care for the diagnosis(es)/condition(s) as documented were addressed during this visit. Due to the added complexity in care, I will continue to support Valeria in the subsequent management and with ongoing continuity of care.      Cancer Staging   No matching staging information was found for the patient.      ECOG Performance    2 - Ambulatory and independent in all ADLs; cannot work; up > 50% of the time      History of Present Illness    Ms. Lizzie Viera is a very pleasant 74 year old woman who has been diagnosed with multiple myeloma IgG kappa type in May 2021.  She had initially presented with compression fracture of T7 vertebral body in September 2020.  She had a back  pain which led to that evaluation.  Bone density confirmed that she had osteoporosis.  MRI showed diffuse marrow edema in October 2020.  In April 2021 the MRI of the thoracic spine showed worsening T7 vertebral body height loss because of likely pathological compression fracture with extraosseous extension.  She then had IR guided bone biopsy on 7 May 2021 and pathology confirmed the presence of kappa light chain restricted plasma cells.  Her cytogenetics confirmed the presence of hyperdiploid E with gains of chromosome 5, 9 and 15.    She was then seen by Dr. Baig and had a bone marrow biopsy which also confirmed 60% involvement of the marrow with plasma cells.  The bone marrow was done on 3 Jocelin 2021.  The bone marrow also confirmed the presence of hyperdiploidy.  She had a gain of chromosome 3, 5, 7, 9, 11, 15 as well as 19.  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  Her beta-2 microglobulin was  normal at the time of her diagnosis as was her albumin at 4.0.  Monoclonal protein 3.1 g/dL.  Kappa free light chain levels of 13 mg/dL IgG level of 4280 with depressed levels of IgM and IgA.  Urine was also positive for kappa light chains as well as immunofixation of the urine was positive for IgG kappa.  Normal kidney function.  Normal hemoglobin.    As mentioned above she had bone lesions in the T7 vertebral body, T1, skull area.  Her main pain is coming from her T7 vertebral body compression fracture.    Due to the pain she has been seen by radiation oncology and has received radiation therapy.  She also got a dose of steroids for pain control.    She was initially thinking of doing some natural therapies but once her symptoms got worse, she came back in was counseled about treatment.      She started on Velcade Revlimid and dexamethasone in September 2021.  Responded nicely.  Immunoglobulins  normalized completely. Her immunoglobin levels have almost normalized in fact the IgG levels have gone below normal.  Immunofixation still  shows a very faint kappa monoclonal gammopathy.    She was  hospitalized in April 2022 with COPD exacerbation/pneumonia.  Was given some steroids and antibiotic.  She was slightly hypoxic initially but then got better after that.    She was referred to [facility] for transplant evaluation.  She was somewhat reluctant to go forward with that.  Otherwise she seems to be doing quite well.  Her immunoglobulin levels have normalized or actually are below normal.  Immunofixation faintly positive.    She has cracked a molar on the upper left jaw sometime in July 2022.  There was some tooth decay.  She had them extracted.    In September 2022 we discontinued the VRD treatment and now Valeria is on Revlimid maintenance 10 mg p.o. daily.  She seems to be tolerating it well.  She is physically more active so she is noticing some soreness in her back.    Had dental extraction done in October 2022 from the right lower jaw.  That seems to be healing well.  Underwent bone densitometry recently.  That confirms presence of osteoporosis which is not a new finding for her.    In February 2023 her labs were pretty stable.  She went to [place] so we gave her 2 to 3 weeks of break from Revlimid.  She restarted the treatment.  Had been pretty stable.    In February 2024 had an episode of pneumonia.  It looks like a viral pneumonia since she had neutropenia as well.  Was treated with IV antibiotics and later on transition to oral antibiotics.  It took her a while to recover from that.  Did develop possible tendinitis from Levaquin.    She also had a sleep study done which showed slight hypoxia during the REM phase of her sleep.  So she is on oxygen at nighttime.  She was seen by Dr. Alia Castro from pulmonology.  Had a VQ scan done in June 2024 which was negative.    Comes in today for scheduled follow-up.  No new medical issues.  Seems to be handling the treatment okay. Her dental work is done since summer of 2024. Currently  "worried about her  who is not doing well.      Review of system      Details noted in the history of present illness.  A detailed review of systems is otherwise negative.      Physical exam        /63 (BP Location: Right arm, Patient Position: Sitting, Cuff Size: Adult Regular)   Pulse 60   Temp 98.5  F (36.9  C) (Oral)   Resp 16   Ht 1.549 m (5' 1\")   Wt 57.2 kg (126 lb 1.6 oz)   LMP  (LMP Unknown)   SpO2 94%   BMI 23.83 kg/m      GENERAL: No acute distress. Cooperative in conversation.   HEENT:  Pupils are equal, round and reactive. Oral mucosa is clean and intact. No ulcerations or mucositis noted. No bleeding noted.  RESP:Chest symmetric lungs are clear bilaterally per auscultation. Regular respiratory rate. No wheezes or rhonchi.  CV: Normal S1 S2 Regular, rate and rhythm.     ABD: Nondistended, soft, minimally tender but without any guarding or rigidity. Positive bowel sounds. No organomegaly.   EXTREMITIES: No lower extremity edema.   NEURO: Non- focal. Alert and oriented x3.  Cranial nerves appear intact.  PSYCH: Within normal limits. No depression or anxiety.  SKIN: Warm dry intact.      Lab results Reviewed      Recent Results (from the past 240 hour(s))   Kappa and lambda light chain    Collection Time: 10/03/24 10:03 AM   Result Value Ref Range    Kappa Free Light Chains 2.02 (H) 0.33 - 1.94 mg/dL    Lambda Free Light Chains 2.71 (H) 0.57 - 2.63 mg/dL    Kappa /Lambda Ratio 0.75 0.26 - 1.65   Immunoglobulins A G and M    Collection Time: 10/03/24 10:03 AM   Result Value Ref Range    Immunoglobulin G 371 (L) 610 - 1,616 mg/dL    Immunoglobulin A 138 84 - 499 mg/dL    Immunoglobulin M 19 (L) 35 - 242 mg/dL   Comprehensive metabolic panel    Collection Time: 10/03/24 10:03 AM   Result Value Ref Range    Sodium 139 135 - 145 mmol/L    Potassium 3.6 3.4 - 5.3 mmol/L    Carbon Dioxide (CO2) 30 (H) 22 - 29 mmol/L    Anion Gap 9 7 - 15 mmol/L    Urea Nitrogen 22.7 8.0 - 23.0 mg/dL    " Creatinine 0.97 (H) 0.51 - 0.95 mg/dL    GFR Estimate 62 >60 mL/min/1.73m2    Calcium 8.7 (L) 8.8 - 10.4 mg/dL    Chloride 100 98 - 107 mmol/L    Glucose 109 (H) 70 - 99 mg/dL    Alkaline Phosphatase 45 40 - 150 U/L    AST 29 0 - 45 U/L    ALT 17 0 - 50 U/L    Protein Total 6.3 (L) 6.4 - 8.3 g/dL    Albumin 3.8 3.5 - 5.2 g/dL    Bilirubin Total 0.3 <=1.2 mg/dL   Total Protein, Serum for ELP    Collection Time: 10/03/24 10:03 AM   Result Value Ref Range    Total Protein Serum for ELP 5.8 (L) 6.4 - 8.3 g/dL   Protein Electrophoresis, Serum    Collection Time: 10/03/24 10:03 AM   Result Value Ref Range    Albumin 3.6 (L) 3.7 - 5.1 g/dL    Alpha 1 0.4 0.2 - 0.4 g/dL    Alpha 2 0.8 0.5 - 0.9 g/dL    Beta Globulin 0.6 0.6 - 1.0 g/dL    Gamma Globulin 0.4 (L) 0.7 - 1.6 g/dL    Monoclonal Peak 0.1 (H) <=0.0 g/dL    ELP Interpretation       Small monoclonal protein (0.1 g/dL) seen in the gamma fraction. Previously characterized in our laboratory on 11/30/2022 as a monoclonal IgG  immunoglobulin of kappa light chain type.Hypogammaglobulinemia. Hypoalbuminemia. Pathologic significance requires clinical correlation. Malachi Gomes MD   CBC with platelets and differential    Collection Time: 10/03/24 10:03 AM   Result Value Ref Range    WBC Count 3.0 (L) 4.0 - 11.0 10e3/uL    RBC Count 3.68 (L) 3.80 - 5.20 10e6/uL    Hemoglobin 13.3 11.7 - 15.7 g/dL    Hematocrit 39.3 35.0 - 47.0 %     (H) 78 - 100 fL    MCH 36.1 (H) 26.5 - 33.0 pg    MCHC 33.8 31.5 - 36.5 g/dL    RDW 14.2 10.0 - 15.0 %    Platelet Count 123 (L) 150 - 450 10e3/uL    % Neutrophils 40 %    % Lymphocytes 39 %    % Monocytes 12 %    % Eosinophils 7 %    % Basophils 1 %    % Immature Granulocytes 0 %    NRBCs per 100 WBC 0 <1 /100    Absolute Neutrophils 1.2 (L) 1.6 - 8.3 10e3/uL    Absolute Lymphocytes 1.2 0.8 - 5.3 10e3/uL    Absolute Monocytes 0.4 0.0 - 1.3 10e3/uL    Absolute Eosinophils 0.2 0.0 - 0.7 10e3/uL    Absolute Basophils 0.0 0.0 - 0.2 10e3/uL     Absolute Immature Granulocytes 0.0 <=0.4 10e3/uL    Absolute NRBCs 0.0 10e3/uL         Imaging results Reviewed        No results found.      Signed by: Loco Blanco MD      This note has been dictated using voice recognition software. Any grammatical or context distortions are unintentional and inherent to the software

## 2024-10-10 NOTE — LETTER
10/10/2024      Lizzie Viera  [address]  [place] [address]      Dear Colleague,    Thank you for referring your patient, Lizzie Viera, to the [facility] [place]. Please see a copy of my visit note below.    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  A very pleasant 74 year old woman with IgG kappa multiple myeloma with hyperdiploid cytogenetics.  However clinically was behaving somewhat more aggressively.  Started on VRD treatment.  Responded quite well. After 17 cycles the treatment was switched to Revlimid 10 mg daily in September 2022.  Valeria has been tolerating it quite well.  The lab work-up in the end of November 2022 showed maintenance of response.  The labs since February 2023 have been pretty stable.  Kappa lambda light chains have been normal for most part.  In September 2023 kappa light chains were slightly above normal but is lambda light chains were also above their previous numbers.  The ratio really did not change a whole lot.  Immunoglobulins number were stable.  Albumin continues to be normal.  Hemoglobin is normal as well.  Current labs indicate pretty normal kappa lambda ratio.  Immunoglobin levels are slightly suppressed which is expected.  Immunofixation negative.  2.  Had significant bone disease with osteoporosis and compression fractures.  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  Improved significantly on the CT scan in March 2022.  MRI also shows no new lesions.  December 2022 bone density  showed osteoporosis.  Has not been able to restart Zometa due to dental implant needs.  Had the last the dental implant process involving any drilling into the bone on March 26, 2024.  We can probably resume Zometa few months after that.  3.  Normal hemoglobin, calcium, kidney function along with beta-2 microglobulin and albumin at the time of diagnosis.    4.  Positive Covid antibodies from infection. Unvaccinated.  In the past has declined Evusheld.  5.  Pain from compression fracture status post radiation therapy.  Significantly improved except had some pain this morning.  6.  Slight hypoxia during REM sleep.  Elevated by pulmonary.  Had VQ scan done which was negative.    Plan and medical decision making    Presenting with continuation of treatment for multiple myeloma.Reviewed notes from each unique source.Reviewed each unique test.  Ordered tests.  Independently interpreted lab tests and radiological exams performed by other physicians.  Independent historian account obtained as noted.       Revlimid 10 mg p.o. daily.  Will follow-up in 3 months with the labs.  Will also repeat the immunofixation.  Follow up with pulmonology.  I have recommended that she resume bisphosphonate therapy.  She has multiple myeloma and osteoporosis.  Very high risk for pathological fractures. She wants to do a bone density before resuming it.  Diet rich in calcium and vitamin D.  Continue to use oxygen at nighttime or as recommended by the sleep people or pulmonary people.  The longitudinal plan of care for the diagnosis(es)/condition(s) as documented were addressed during this visit. Due to the added complexity in care, I will continue to support Valeria in the subsequent management and with ongoing continuity of care.      Cancer Staging   No matching staging information was found for the patient.      ECOG Performance    2 - Ambulatory and independent in all ADLs; cannot work; up > 50% of the time      History of Present  Illness    Ms. Lizzie Viera is a very pleasant 74 year old woman who has been diagnosed with multiple myeloma IgG kappa type in May 2021.  She had initially presented with compression fracture of T7 vertebral body in September 2020.  She had a back pain which led to that evaluation.  Bone density confirmed that she had osteoporosis.  MRI showed diffuse marrow edema in October 2020.  In April 2021 the MRI of the thoracic spine showed worsening T7 vertebral body height loss because of likely pathological compression fracture with extraosseous extension.  She then had IR guided bone biopsy on 7 May 2021 and pathology confirmed the presence of kappa light chain restricted plasma cells.  Her cytogenetics confirmed the presence of hyperdiploid E with gains of chromosome 5, 9 and 15.    She was then seen by Dr. Baig and had a bone marrow biopsy which also confirmed 60% involvement of the marrow with plasma cells.  The bone marrow was done on 3 Jocelin 2021.  The bone marrow also confirmed the presence of hyperdiploidy.  She had a gain of chromosome 3, 5, 7, 9, 11, 15 as well as 19.  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  Her beta-2 microglobulin was  normal at the time of her diagnosis as was her albumin at 4.0.  Monoclonal protein 3.1 g/dL.  Kappa free light chain levels of 13 mg/dL IgG level of 4280 with depressed levels of IgM and IgA.  Urine was also positive for kappa light chains as well as immunofixation of the urine was positive for IgG kappa.  Normal kidney function.  Normal hemoglobin.    As mentioned above she had bone lesions in the T7 vertebral body, T1, skull area.  Her main pain is coming from her T7 vertebral body compression fracture.    Due to the pain she has been seen by radiation oncology and has received radiation therapy.  She also got a dose of steroids for pain control.    She was initially thinking of doing some natural therapies but once her symptoms got worse, she came back in was counseled about  treatment.      She started on Velcade Revlimid and dexamethasone in September 2021.  Responded nicely.  Immunoglobulins  normalized completely. Her immunoglobin levels have almost normalized in fact the IgG levels have gone below normal.  Immunofixation still shows a very faint kappa monoclonal gammopathy.    She was  hospitalized in April 2022 with COPD exacerbation/pneumonia.  Was given some steroids and antibiotic.  She was slightly hypoxic initially but then got better after that.    She was referred to [facility] for transplant evaluation.  She was somewhat reluctant to go forward with that.  Otherwise she seems to be doing quite well.  Her immunoglobulin levels have normalized or actually are below normal.  Immunofixation faintly positive.    She has cracked a molar on the upper left jaw sometime in July 2022.  There was some tooth decay.  She had them extracted.    In September 2022 we discontinued the VRD treatment and now Valeria is on Revlimid maintenance 10 mg p.o. daily.  She seems to be tolerating it well.  She is physically more active so she is noticing some soreness in her back.    Had dental extraction done in October 2022 from the right lower jaw.  That seems to be healing well.  Underwent bone densitometry recently.  That confirms presence of osteoporosis which is not a new finding for her.    In February 2023 her labs were pretty stable.  She went to [place] so we gave her 2 to 3 weeks of break from Revlimid.  She restarted the treatment.  Had been pretty stable.    In February 2024 had an episode of pneumonia.  It looks like a viral pneumonia since she had neutropenia as well.  Was treated with IV antibiotics and later on transition to oral antibiotics.  It took her a while to recover from that.  Did develop possible tendinitis from Levaquin.    She also had a sleep study done which showed slight hypoxia during the REM phase of her sleep.  So she is on oxygen at nighttime.  She was seen  "by Dr. Alia Castro from pulmonology.  Had a VQ scan done in June 2024 which was negative.    Comes in today for scheduled follow-up.  No new medical issues.  Seems to be handling the treatment okay. Her dental work is done since summer of 2024. Currently worried about her  who is not doing well.      Review of system      Details noted in the history of present illness.  A detailed review of systems is otherwise negative.      Physical exam        /63 (BP Location: Right arm, Patient Position: Sitting, Cuff Size: Adult Regular)   Pulse 60   Temp 98.5  F (36.9  C) (Oral)   Resp 16   Ht 1.549 m (5' 1\")   Wt 57.2 kg (126 lb 1.6 oz)   LMP  (LMP Unknown)   SpO2 94%   BMI 23.83 kg/m      GENERAL: No acute distress. Cooperative in conversation.   HEENT:  Pupils are equal, round and reactive. Oral mucosa is clean and intact. No ulcerations or mucositis noted. No bleeding noted.  RESP:Chest symmetric lungs are clear bilaterally per auscultation. Regular respiratory rate. No wheezes or rhonchi.  CV: Normal S1 S2 Regular, rate and rhythm.     ABD: Nondistended, soft, minimally tender but without any guarding or rigidity. Positive bowel sounds. No organomegaly.   EXTREMITIES: No lower extremity edema.   NEURO: Non- focal. Alert and oriented x3.  Cranial nerves appear intact.  PSYCH: Within normal limits. No depression or anxiety.  SKIN: Warm dry intact.      Lab results Reviewed      Recent Results (from the past 240 hour(s))   Kappa and lambda light chain    Collection Time: 10/03/24 10:03 AM   Result Value Ref Range    Kappa Free Light Chains 2.02 (H) 0.33 - 1.94 mg/dL    Lambda Free Light Chains 2.71 (H) 0.57 - 2.63 mg/dL    Kappa /Lambda Ratio 0.75 0.26 - 1.65   Immunoglobulins A G and M    Collection Time: 10/03/24 10:03 AM   Result Value Ref Range    Immunoglobulin G 371 (L) 610 - 1,616 mg/dL    Immunoglobulin A 138 84 - 499 mg/dL    Immunoglobulin M 19 (L) 35 - 242 mg/dL   Comprehensive metabolic " panel    Collection Time: 10/03/24 10:03 AM   Result Value Ref Range    Sodium 139 135 - 145 mmol/L    Potassium 3.6 3.4 - 5.3 mmol/L    Carbon Dioxide (CO2) 30 (H) 22 - 29 mmol/L    Anion Gap 9 7 - 15 mmol/L    Urea Nitrogen 22.7 8.0 - 23.0 mg/dL    Creatinine 0.97 (H) 0.51 - 0.95 mg/dL    GFR Estimate 62 >60 mL/min/1.73m2    Calcium 8.7 (L) 8.8 - 10.4 mg/dL    Chloride 100 98 - 107 mmol/L    Glucose 109 (H) 70 - 99 mg/dL    Alkaline Phosphatase 45 40 - 150 U/L    AST 29 0 - 45 U/L    ALT 17 0 - 50 U/L    Protein Total 6.3 (L) 6.4 - 8.3 g/dL    Albumin 3.8 3.5 - 5.2 g/dL    Bilirubin Total 0.3 <=1.2 mg/dL   Total Protein, Serum for ELP    Collection Time: 10/03/24 10:03 AM   Result Value Ref Range    Total Protein Serum for ELP 5.8 (L) 6.4 - 8.3 g/dL   Protein Electrophoresis, Serum    Collection Time: 10/03/24 10:03 AM   Result Value Ref Range    Albumin 3.6 (L) 3.7 - 5.1 g/dL    Alpha 1 0.4 0.2 - 0.4 g/dL    Alpha 2 0.8 0.5 - 0.9 g/dL    Beta Globulin 0.6 0.6 - 1.0 g/dL    Gamma Globulin 0.4 (L) 0.7 - 1.6 g/dL    Monoclonal Peak 0.1 (H) <=0.0 g/dL    ELP Interpretation       Small monoclonal protein (0.1 g/dL) seen in the gamma fraction. Previously characterized in our laboratory on 11/30/2022 as a monoclonal IgG  immunoglobulin of kappa light chain type.Hypogammaglobulinemia. Hypoalbuminemia. Pathologic significance requires clinical correlation. Malachi Gomes MD   CBC with platelets and differential    Collection Time: 10/03/24 10:03 AM   Result Value Ref Range    WBC Count 3.0 (L) 4.0 - 11.0 10e3/uL    RBC Count 3.68 (L) 3.80 - 5.20 10e6/uL    Hemoglobin 13.3 11.7 - 15.7 g/dL    Hematocrit 39.3 35.0 - 47.0 %     (H) 78 - 100 fL    MCH 36.1 (H) 26.5 - 33.0 pg    MCHC 33.8 31.5 - 36.5 g/dL    RDW 14.2 10.0 - 15.0 %    Platelet Count 123 (L) 150 - 450 10e3/uL    % Neutrophils 40 %    % Lymphocytes 39 %    % Monocytes 12 %    % Eosinophils 7 %    % Basophils 1 %    % Immature Granulocytes 0 %    NRBCs  "per 100 WBC 0 <1 /100    Absolute Neutrophils 1.2 (L) 1.6 - 8.3 10e3/uL    Absolute Lymphocytes 1.2 0.8 - 5.3 10e3/uL    Absolute Monocytes 0.4 0.0 - 1.3 10e3/uL    Absolute Eosinophils 0.2 0.0 - 0.7 10e3/uL    Absolute Basophils 0.0 0.0 - 0.2 10e3/uL    Absolute Immature Granulocytes 0.0 <=0.4 10e3/uL    Absolute NRBCs 0.0 10e3/uL         Imaging results Reviewed        No results found.      Signed by: Loco Blanco MD      This note has been dictated using voice recognition software. Any grammatical or context distortions are unintentional and inherent to the software       Oncology Rooming Note    October 10, 2024 2:27 PM   Lzizie Viera is a 74 year old female who presents for:    Chief Complaint   Patient presents with     Oncology Clinic Visit     3 month return visit, review labs of 10/3, related to Multiple myeloma with failed remission.     Initial Vitals: /63 (BP Location: Right arm, Patient Position: Sitting, Cuff Size: Adult Regular)   Pulse 60   Temp 98.5  F (36.9  C) (Oral)   Resp 16   Ht 1.549 m (5' 1\")   Wt 57.2 kg (126 lb 1.6 oz)   LMP  (LMP Unknown)   SpO2 94%   BMI 23.83 kg/m   Estimated body mass index is 23.83 kg/m  as calculated from the following:    Height as of this encounter: 1.549 m (5' 1\").    Weight as of this encounter: 57.2 kg (126 lb 1.6 oz). Body surface area is 1.57 meters squared.  Severe Pain (6) Comment: Data Unavailable   No LMP recorded (lmp unknown). Patient is postmenopausal.  Allergies reviewed: Yes  Medications reviewed: Yes    Medications: Medication refills not needed today.  Pharmacy name entered into [facility]:    [id number] - [place], [place] - [address]  [facility] [place], [place] - [address] [facility] - [place], [place] - [address]    Frailty Screening:   Is the patient here for a new oncology consult visit in cancer care? 2. No      Clinical concerns: No concerns.  "       Maureen Saunders CMA                Again, thank you for allowing me to participate in the care of your patient.        Sincerely,        Loco Blanco MD

## 2024-10-10 NOTE — PROGRESS NOTES
"Oncology Rooming Note    October 10, 2024 2:27 PM   Lizzie Viera is a 74 year old female who presents for:    Chief Complaint   Patient presents with    Oncology Clinic Visit     3 month return visit, review labs of 10/3, related to Multiple myeloma with failed remission.     Initial Vitals: /63 (BP Location: Right arm, Patient Position: Sitting, Cuff Size: Adult Regular)   Pulse 60   Temp 98.5  F (36.9  C) (Oral)   Resp 16   Ht 1.549 m (5' 1\")   Wt 57.2 kg (126 lb 1.6 oz)   LMP  (LMP Unknown)   SpO2 94%   BMI 23.83 kg/m   Estimated body mass index is 23.83 kg/m  as calculated from the following:    Height as of this encounter: 1.549 m (5' 1\").    Weight as of this encounter: 57.2 kg (126 lb 1.6 oz). Body surface area is 1.57 meters squared.  Severe Pain (6) Comment: Data Unavailable   No LMP recorded (lmp unknown). Patient is postmenopausal.  Allergies reviewed: Yes  Medications reviewed: Yes    Medications: Medication refills not needed today.  Pharmacy name entered into [employer]:    [id number] - [place], [place] - [address]  [facility] [place], [place] - [address] [place] [address] [facility]ARMACY - [place], [place] - [address]    Frailty Screening:   Is the patient here for a new oncology consult visit in cancer care? 2. No      Clinical concerns: No concerns.        Maureen Saunders CMA              "

## 2024-10-15 PROBLEM — J18.9 PNEUMONIA OF BOTH LOWER LOBES DUE TO INFECTIOUS ORGANISM: Status: ACTIVE | Noted: 2024-01-01

## 2024-10-15 PROBLEM — A41.9 SEPTIC SHOCK (H): Status: ACTIVE | Noted: 2024-01-01

## 2024-10-15 PROBLEM — N17.9 AKI (ACUTE KIDNEY INJURY) (H): Status: ACTIVE | Noted: 2024-01-01

## 2024-10-15 PROBLEM — I95.1 ORTHOSTATIC HYPOTENSION: Status: ACTIVE | Noted: 2024-01-01

## 2024-10-15 PROBLEM — R65.21 SEPTIC SHOCK (H): Status: ACTIVE | Noted: 2024-01-01

## 2024-10-15 NOTE — ED NOTES
Patient returned from CT, temp at 103.1. MD updated, tylenol given. Blood pressure remains low at 70/ IV fluids continue. Antibiotics infusing. Sats went down to 80 on return from CT, patient was on oxymask while in CT. Back to 93%. Harsh cough non productive

## 2024-10-15 NOTE — H&P
Critical Care Admission Note      10/15/2024    Name: Lizzie Viera MRN#: [id number]   Age: 74 year old YOB: 1950                    Problem List:   Active Problems:    Neutropenic fever (H)    Orthostatic hypotension    Pneumonia of both lower lobes due to infectious organism    ALEXANDER (acute kidney injury) (H)    Septic shock (H)    Clinically Significant Risk Factors Present on Admission         # Hyponatremia: Lowest Na = 133 mmol/L in last 2 days, will monitor as appropriate  # Hypochloremia: Lowest Cl = 90 mmol/L in last 2 days, will monitor as appropriate      # Hypoalbuminemia: Lowest albumin = 3.2 g/dL at 10/15/2024 11:29 AM, will monitor as appropriate   # Drug Induced Platelet Defect: home medication list includes an antiplatelet medication  # Acute Kidney Injury, unspecified: based on a >150% or 0.3 mg/dL increase in last creatinine compared to past 90 day average, will monitor renal function  # Hypertension: Noted on problem list   # Circulatory Shock: required vasopressors within past 24 hours                   Code Status: No CPR- Do NOT Intubate             HPI:     74-year-old female with a history of multiple myeloma, IgG kappa, osteoporosis, compression fractures, depression who has been feeling unwell for the past couple days.  She developed cough and progressive weakness.    States that the she was feeling feverish and generally weak to the point that she had multiple falls.  She developed rib pain on the right side for which she was brought to the emergency room where she was found to be hypotensive and on the x-ray showed bilateral pneumonia.  Fluid resuscitated and started on vasopressors.  Received antibiotics and is being admitted to the ICU.      Assessment and plan :       I have personally reviewed the labs, imaging studies, cultures and discussed the case with referring physician and consulting physicians.     My assessment and plan by system for this patient is as  follows:    Neurology/Psychiatry:   She is awake and answers questions.    At home on gabapentin, hydromorphone  We will start her on as needed IV hydromorphone 0.2 mg and adjust as tolerated    Hold off on starting gabapentin.  Will need to start at a lower dose since she has acute kidney injury      Cardiovascular:   Septic shock  Started Levophed after she was fluid resuscitated  Initially 0.25 mcg/kg/min, down to 0.12  Continue normal saline      Pulmonary/Ventilator Management:   Acute hypoxic respiratory failure secondary to bilateral pneumonia  Continue high flow nasal cannula  Continue vancomycin and Zosyn and add azithromycin  Check urine for strep and Legionella antigens   Start Neupogen      GI and Nutrition :   I would avoid feeding at this point.  She is respiratory distress  Elevated LFTs    Renal/Fluids/Electrolytes:   Acute kidney injury secondary to sepsis/septic shock  UA has been ordered in the ED so just that order  Normal saline at 50 mL/h    - monitor function and electrolytes as needed with replacement per ICU protocols. - generally avoid nephrotoxic agents such as NSAID, IV contrast unless specifically required  - adjust medications as needed for renal clearance  - follow I/O's as appropriate.    Infectious Disease:   Septic shock in a patient with multiple myeloma and leukopenia  Continue vancomycin and Zosyn  Add azithromycin  Blood cultures done and pending  Check urine strep and Legionella antigen  Check UA    Endocrine:     Plan  - ICU insulin protocol, goal sugar <180      Hematology/Oncology:   IgG kappa multiple myeloma  On Revlimid  Has failed to achieve remission    Started Neupogen and consult oncology    CODE STATUS discussed with the patient.  She wants to be DNR/DNI as she has been in the past when she was hospitalized here.      ICU Prophylaxis:   1. DVT: Hep Subq  3. Stress Ulcer: PPI             Medical History:     Past Medical History:   Diagnosis Date    Cervical dysplasia      Chronic RUQ pain     Depressive disorder     Multiple myeloma (H)     Osteoporosis      Past Surgical History:   Procedure Laterality Date    CONIZATION CERVIX,KNIFE/LASER      Description: Cervical Conization By Laser;  Recorded: 2007;    HC DILATION/CURETTAGE DIAG/THER NON OB      Description: Dilation And Curettage;  Recorded: 2007;    HC REMOVE TONSILS/ADENOIDS,<11 Y/O      Description: Tonsillectomy With Adenoidectomy;  Recorded: 2007;    ZZC LAP,CHOLECYSTECTOMY/EXPLORE  2004    ZZC LIGATE FALLOPIAN TUBE      Description: Tubal Ligation;  Recorded: 2007;     Social History     Socioeconomic History    Marital status:      Spouse name: Not on file    Number of children: 3    Years of education: Not on file    Highest education level: Not on file   Occupational History    Not on file   Tobacco Use    Smoking status: Former     Current packs/day: 0.00     Average packs/day: 0.5 packs/day for 45.0 years (22.5 ttl pk-yrs)     Types: Cigarettes     Start date: 1978     Quit date: 2023     Years since quittin.7     Passive exposure: Past (15 year of spouse smoking around pt, as well as childhood exposure in the home from father)    Smokeless tobacco: Never   Vaping Use    Vaping status: Never Used   Substance and Sexual Activity    Alcohol use: Yes     Comment: Alcoholic Drinks/day: 0-1 drinks per week    Drug use: Not Currently    Sexual activity: Not Currently     Partners: Male     Birth control/protection: Surgical   Other Topics Concern    Parent/sibling w/ CABG, MI or angioplasty before 65F 55M? Not Asked   Social History Narrative    Not on file     Social Determinants of Health     Financial Resource Strain: Low Risk  (2024)    Financial Resource Strain     Within the past 12 months, have you or your family members you live with been unable to get utilities (heat, electricity) when it was really needed?: No   Food Insecurity: Low Risk  (2024)     Food Insecurity     Within the past 12 months, did you worry that your food would run out before you got money to buy more?: No     Within the past 12 months, did the food you bought just not last and you didn t have money to get more?: No   Transportation Needs: Low Risk  (1/18/2024)    Transportation Needs     Within the past 12 months, has lack of transportation kept you from medical appointments, getting your medicines, non-medical meetings or appointments, work, or from getting things that you need?: No   Physical Activity: Not on file   Stress: Not on file   Social Connections: Not on file   Interpersonal Safety: Low Risk  (1/18/2024)    Interpersonal Safety     Do you feel physically and emotionally safe where you currently live?: Yes     Within the past 12 months, have you been hit, slapped, kicked or otherwise physically hurt by someone?: No     Within the past 12 months, have you been humiliated or emotionally abused in other ways by your partner or ex-partner?: No   Housing Stability: Low Risk  (1/18/2024)    Housing Stability     Do you have housing? : Yes     Are you worried about losing your housing?: No        Allergies   Allergen Reactions    Latex Shortness Of Breath    Atorvastatin Muscle Pain (Myalgia)    Short Ragweed Pollen Ext Cough    Adhesive Tape Rash    Levaquin [Levofloxacin] Muscle Pain (Myalgia)     Achilles tendonitis after Levaquin (3/2024)              Key Medications:     Current Facility-Administered Medications   Medication Dose Route Frequency Provider Last Rate Last Admin    azithromycin (ZITHROMAX) 500 mg in sodium chloride 0.9 % 250 mL intermittent infusion  500 mg Intravenous Q24H Jeremie Christine MD        filgrastim-aafi (NIVESTYM) injection 264 mcg  5 mcg/kg Subcutaneous Daily at 8 pm Jeremie Christine MD        heparin ANTICOAGULANT injection 5,000 Units  5,000 Units Subcutaneous Q8H Jeremie Christine MD        pantoprazole (PROTONIX) IV push injection 40 mg   40 mg Intravenous Q24H Jeremie Christine MD        piperacillin-tazobactam (ZOSYN) 3.375 g vial to attach to  mL bag  3.375 g Intravenous Q8H Jeremie Christine MD        sodium chloride (PF) 0.9% PF flush 10-40 mL  10-40 mL Intracatheter Q7 Days Anette Porter, RN         Current Facility-Administered Medications   Medication Dose Route Frequency Provider Last Rate Last Admin    norepinephrine (LEVOPHED) 4 mg in  mL infusion PREMIX  0.01-0.6 mcg/kg/min Intravenous Continuous Lenka Cates PA-C 30.6 mL/hr at 10/15/24 1609 0.15 mcg/kg/min at 10/15/24 1609    sodium chloride 0.9 % infusion   Intravenous Continuous Jeremie Christine MD            Home Meds  Current Facility-Administered Medications   Medication Dose Route Frequency Provider Last Rate Last Admin    albuterol (PROVENTIL) neb solution 2.5 mg  2.5 mg Nebulization Q6H PRN Martin Wadsworth MD   2.5 mg at 10/15/24 1729    azithromycin (ZITHROMAX) 500 mg in sodium chloride 0.9 % 250 mL intermittent infusion  500 mg Intravenous Q24H Jeremie Christine MD        glucose gel 15-30 g  15-30 g Oral Q15 Min PRN Jeremie Christine MD        Or    dextrose 50 % injection 25-50 mL  25-50 mL Intravenous Q15 Min PRN Jeremie Christine MD        Or    glucagon injection 1 mg  1 mg Subcutaneous Q15 Min PRN Jeremie Christine MD        filgrastim-aafi (NIVESTYM) injection 264 mcg  5 mcg/kg Subcutaneous Daily at 8 pm Jeremie Christine MD        heparin ANTICOAGULANT injection 5,000 Units  5,000 Units Subcutaneous Q8H Jeremie Christine MD        lidocaine (LMX4) cream   Topical Q1H PRN Rohit Whitfield MD        lidocaine 1 % 0.1-5 mL  0.1-5 mL Other Q1H PRN Rohit Whitfield MD   1 mL at 10/15/24 1630    norepinephrine (LEVOPHED) 4 mg in  mL infusion PREMIX  0.01-0.6 mcg/kg/min Intravenous Continuous Lenka Cates PA-C 30.6 mL/hr at 10/15/24 1609 0.15 mcg/kg/min at 10/15/24 1609    pantoprazole (PROTONIX) IV push  injection 40 mg  40 mg Intravenous Q24H Jeremie Christine MD        piperacillin-tazobactam (ZOSYN) 3.375 g vial to attach to  mL bag  3.375 g Intravenous Q8H Jeremie Christine MD        sodium chloride (PF) 0.9% PF flush 10-20 mL  10-20 mL Intracatheter q1 min prn Anette Porter RN        sodium chloride (PF) 0.9% PF flush 10-40 mL  10-40 mL Intracatheter Q7 Days Anette Porter RN        sodium chloride (PF) 0.9% PF flush 10-40 mL  10-40 mL Intracatheter Q1H PRN Anette Porter RN        sodium chloride 0.9 % infusion   Intravenous Continuous Jeremie Christine MD                  Physical Examination:   Temp:  [99.4  F (37.4  C)-103.1  F (39.5  C)] 100.9  F (38.3  C)  Pulse:  [] 86  Resp:  [18-55] 32  BP: ()/(44-62) 125/58  FiO2 (%):  [60 %-70 %] 70 %  SpO2:  [78 %-98 %] 92 %  No intake or output data in the 24 hours ending 10/15/24 1747  Wt Readings from Last 4 Encounters:   10/15/24 54.4 kg (120 lb)   10/10/24 57.2 kg (126 lb 1.6 oz)   08/26/24 55.8 kg (123 lb)   07/15/24 57.6 kg (127 lb)     BP - Mean:  [55-87] 85  FiO2 (%): 70 %, Resp: (!) 32  Recent Labs   Lab 10/15/24  1126   O2PER 21       GEN: Mild respiratory distress  HEENT: head ncat, sclera anicteric, OP patent, trachea midline.  Perioral cyanosis  PULM: Bilateral crackles and rhonchi  CV/COR: RRR S1S2 no gallop,  No rub, no murmur  ABD: soft nontender, hypoactive bowel sounds, no mass  EXT: No significant edema  NEURO: grossly intact  SKIN: Skin is frail  LINES: clean, dry intact         Data:   All data and imaging reviewed     ROUTINE ICU LABS (Last four results)  CMP  Recent Labs   Lab 10/15/24  1129   *   POTASSIUM 3.5   CHLORIDE 90*   CO2 29   ANIONGAP 14   *   BUN 38.5*   CR 2.10*   GFRESTIMATED 24*   SERAFIN 8.9   PROTTOTAL 6.6   ALBUMIN 3.2*   BILITOTAL 0.9   ALKPHOS 45   *   ALT 78*     CBC  Recent Labs   Lab 10/15/24  1129   WBC 0.5*   RBC 3.79*   HGB 13.3   HCT 38.6   *   MCH 35.1*  "  MCHC 34.5   RDW 13.6   *     INRNo lab results found in last 7 days.  Arterial Blood Gas  Recent Labs   Lab 10/15/24  1126   O2PER 21       All cultures:  No results for input(s): \"CULT\" in the last 168 hours.  Recent Results (from the past 24 hour(s))   XR Chest Port 1 View    Narrative    EXAM: XR CHEST PORT 1 VIEW  LOCATION: [facility]  DATE: 10/15/2024    INDICATION: feveers cough  COMPARISON: 6/18/2024      Impression    IMPRESSION: Right and left upper chest and left inferior chest consolidative opacities. These could represent multifocal pneumonia, neoplasm, or infarcts.    Prominent right hilum could represent reactive adenopathy or neoplasm.    No effusion. No pulmonary vascular congestion.    CT PE run with contrast recommended.   CT Head w/o Contrast    Narrative    EXAM: CT HEAD W/O CONTRAST  LOCATION: [facility]  DATE: 10/15/2024    INDICATION: balance off x 2 days  COMPARISON: CT head 2/29/2021 and head MRI 5/22/2017.  TECHNIQUE: Routine CT Head without IV contrast. Multiplanar reformats. Dose reduction techniques were used.    FINDINGS:  INTRACRANIAL CONTENTS: No intracranial hemorrhage, extraaxial collection, or mass effect.  No CT evidence of acute infarct. Minor periventricular low-attenuation. Mild generalized volume loss. No hydrocephalus.     VISUALIZED ORBITS/SINUSES/MASTOIDS: Prior bilateral cataract surgery. Visualized portions of the orbits are otherwise unremarkable. No paranasal sinus mucosal disease. No middle ear or mastoid effusion.    BONES/SOFT TISSUES: Heterogeneous marrow attenuation including a rounded 5 mm focus of mixed lucency involving the left parietal bone without samara osseous destruction, presumably sequela of the patient's known multiple myeloma.      Impression    IMPRESSION:  1.  No CT evidence for acute intracranial process.  2.  Mild presumed senescent changes similar to the previous exam.  3.  Heterogeneous " lucent lesion involving the left parietal calvarium without samara osseous destruction is presumably related to the patient's history of multiple myeloma.   XR Chest Port 1 View    Narrative    EXAM: XR CHEST PORT 1 VIEW  LOCATION: [facility]  DATE: 10/15/2024    INDICATION: RN placed PICC   verify tip placement  COMPARISON: X-ray 10/15/2024 at 11:14 AM      Impression    IMPRESSION: Left PICC line with the tip located in the right mid to upper atrium 3.4 cm from the cavoatrial junction. Stable to slightly increased bilateral upper lobar consolidative opacities. Stable mild bibasilar pulmonary opacities. No definite   pleural effusion. Stable heart size. Stable right hilar enlargement.         Billing: This patient is critically ill: Yes. Total critical care time today 40 min exclusive of procedures or teaching.

## 2024-10-15 NOTE — PROCEDURES
"PICC Line Insertion Procedure Note     Pt. Name:   Lizzie Viera     MRN:          [id number]     Procedure: Insertion of a  Triple Lumen  5 fr  Bard SOLO (valved) Power PICC, Lot number [id number]     Indications: vasopressors and ICU admission     Contraindications : none     Procedure Details:     Patient identified with 2 identifiers and \"Time Out\" conducted.     Central line insertion bundle followed: hand hygiene performed prior to procedure, site cleansed with Cholraprep (CHG), hat, mask, sterile gloves, sterile gown worn, patient draped with maximum barrier head to toe drape, sterile field maintained.     The vein was assessed and found to be compressible and of adequate size.      Lidocaine 1% one ml administered SQ to the insertion site.      Modified Seldinger Technique (MST) used for insertion, one attempt(s) required to access vein.      A 5 Fr PICC was inserted into the brachial vein of the left upper arm.        Catheter threaded without difficulty. Good blood return noted.     Catheter was flushed with 10 cc normal saline.      Catheter secured with SecurAcath, Biopatch, and Tegaderm dressing applied.     The sharps that are included in the PICC insertion kit were accounted for and disposed of in the sharps container prior to breakdown of the sterile field.     CLABSI prevention brochure left at bedside.     Patient tolerated procedure well.      Patient's primary RN notified PICC is ready for use.       Findings:     Total catheter length  46 cm, with 5 cm exposed. Mid upper arm circumference is 28 cm.      Tip placement verified in the SVC/RA junction by PCXR. The image was verified as in the junction per Dr Christine (ICU physician).    Comments:  The PICC is properly positioned and ready for use.         Anette Porter, RN BSN  [facility] - [facility]   "

## 2024-10-15 NOTE — ED NOTES
EMERGENCY DEPARTMENT SIGN OUT NOTE        ED COURSE AND MEDICAL DECISION MAKING  Patient was signed out to me by Dr Migue Mendez at 3:17 PM  3:57 PM I spoke on the phone with the hospitalist about the patient.  5:16 PM The patient was admitted.      In brief, Lizzie Viera is a 74 year old female who initially presented for shortness of breath. She has had several family members with flu symptoms over the past couple weeks. She has general malaise, decreased appetite, abdominal pain, fatigue, low grade fevers, cough, and shortness of breath. Also over the past few days she has had some balance issues. At night she uses oxygen. Yesterday she used her albuterol inhaler a few times which somewhat helped her symptoms. She is currently on chemotherapy for multiple myeloma.     At the time of signout patient was still persistently hypotensive despite 2 L of crystalloid.  Lactate normalized however given persistent hypotension will start on peripheral vasopressors with norepinephrine.  Stress dose steroids also administered.  Suspect the etiology of her sepsis this point in time is pneumonia in the context of significant neutropenia.  She is on 35 L via high flow nasal cannula but states her shortness of breath and work of breathing have improved since presentation here.  Does note she subjectively feels better after fluid resuscitation and she is mentating well.  Order placed for PICC line placement in case patient needs ongoing vasopressor support for her hypotension.    At this point time patient remains critically ill and require close hemodynamic monitoring in the ICU.  Discussed with the intensivist and admit the ICU for ongoing management.  FINAL IMPRESSION    1. Pneumonia of both lower lobes due to infectious organism    2. Neutropenic fever (H)    3. Orthostatic hypotension        ED MEDS  Medications   albuterol (PROVENTIL) neb solution 2.5 mg (2.5 mg Nebulization $Given 10/15/24 1140)   sodium chloride 0.9  % infusion ( Intravenous Not Given 10/15/24 1446)   vancomycin (VANCOCIN) 1,000 mg in 200 mL dextrose intermittent infusion (1,000 mg Intravenous $New Bag 10/15/24 1438)   sodium chloride 0.9% BOLUS 1,000 mL (1,000 mLs Intravenous $New Bag 10/15/24 1436)   norepinephrine (LEVOPHED) 4 mg in  mL infusion PREMIX (has no administration in time range)   ipratropium - albuterol 0.5 mg/2.5 mg/3 mL (DUONEB) neb solution 3 mL (3 mLs Nebulization $Given 10/15/24 1140)   methylPREDNISolone Na Suc (solu-MEDROL) injection 125 mg (125 mg Intravenous $Given 10/15/24 1128)   sodium chloride 0.9% BOLUS 1,000 mL (0 mLs Intravenous Stopped 10/15/24 1423)   piperacillin-tazobactam (ZOSYN) 3.375 g vial to attach to  mL bag (0 g Intravenous Stopped 10/15/24 1400)   acetaminophen (TYLENOL) tablet 650 mg (650 mg Oral $Given 10/15/24 1444)       LAB  Labs Ordered and Resulted from Time of ED Arrival to Time of ED Departure   TROPONIN T, HIGH SENSITIVITY - Abnormal       Result Value    Troponin T, High Sensitivity 76 (*)    BLOOD GAS VENOUS - Abnormal    pH Venous 7.45 (*)     pCO2 Venous 47      pO2 Venous 15 (*)     Bicarbonate Venous 33 (*)     Base Excess/Deficit Venous 7.3 (*)     FIO2 21      Oxyhemoglobin Venous 18 (*)     O2 Sat, Venous 18.9 (*)    PROCALCITONIN - Abnormal    Procalcitonin 41.10 (*)    NT PROBNP INPATIENT - Abnormal    N terminal Pro BNP Inpatient 2,505 (*)    LACTIC ACID WHOLE BLOOD WITH 1X REPEAT IN 2 HR WHEN >2 - Abnormal    Lactic Acid, Initial 2.5 (*)    COMPREHENSIVE METABOLIC PANEL - Abnormal    Sodium 133 (*)     Potassium 3.5      Carbon Dioxide (CO2) 29      Anion Gap 14      Urea Nitrogen 38.5 (*)     Creatinine 2.10 (*)     GFR Estimate 24 (*)     Calcium 8.9      Chloride 90 (*)     Glucose 100 (*)     Alkaline Phosphatase 45       (*)     ALT 78 (*)     Protein Total 6.6      Albumin 3.2 (*)     Bilirubin Total 0.9     CBC WITH PLATELETS AND DIFFERENTIAL - Abnormal    WBC Count 0.5  (*)     RBC Count 3.79 (*)     Hemoglobin 13.3      Hematocrit 38.6       (*)     MCH 35.1 (*)     MCHC 34.5      RDW 13.6      Platelet Count 119 (*)     % Neutrophils 44      % Lymphocytes 31      % Monocytes 8      % Eosinophils 15      % Basophils 2      % Immature Granulocytes 0      NRBCs per 100 WBC 0      Absolute Neutrophils 0.2 (*)     Absolute Lymphocytes 0.2 (*)     Absolute Monocytes 0.0      Absolute Eosinophils 0.1      Absolute Basophils 0.0      Absolute Immature Granulocytes 0.0      Absolute NRBCs 0.0     TROPONIN T, HIGH SENSITIVITY - Abnormal    Troponin T, High Sensitivity 82 (*)    INFLUENZA A/B, RSV, & SARS-COV2 PCR - Normal    Influenza A PCR Negative      Influenza B PCR Negative      RSV PCR Negative      SARS CoV2 PCR Negative     LACTIC ACID WHOLE BLOOD - Normal    Lactic Acid 1.5     RBC AND PLATELET MORPHOLOGY    RBC Morphology Confirmed RBC Indices      Platelet Assessment        Value: Automated Count Confirmed. Platelet morphology is normal.   ROUTINE UA WITH MICROSCOPIC REFLEX TO CULTURE   BLOOD CULTURE       EKG      RADIOLOGY        DISCHARGE MEDS    I, Eben Delgado, am serving as a scribe to document services personally performed by Rohit Whitfield MD, based on my observations and the provider's statements to me.  I, Rohit Whitfield MD, attest that Eben Delgado is acting in a scribe capacity, has observed my performance of the services and has documented them in accordance with my direction.     Rohit Whitfield MD  [facility] EMERGENCY DEPARTMENT  [address] [address]  [phone]         Rohit Whitfield MD  10/15/24 0211

## 2024-10-15 NOTE — ED NOTES
Blood culture sent, IV fluids infusing, antibiotics started. She has had approx 500 ml fluid, blood pressure at 78/

## 2024-10-15 NOTE — PROGRESS NOTES
Assessment/Plan:      Valeria was seen today for back pain.    Diagnoses and all orders for this visit:    Cervical spine pain    Myofascial pain    Thoracic spinal stenosis    Neutropenic fever (H)         Assessment: Pleasant 74 year old female with a history of hyperlipidemia, anxiety, depression, irritable bowel, neuropathy, osteoporosis and multiple myeloma with a T7 fracture and lesion  resulting in spinal stenosis:     Flare of cervical and thoracic thoracic spine pain in the cervical spine parascapular region upper thoracic spine.  Consistent with myofascial pain in the setting of chronic fractures.  Chronic cervical and horacic pain around the mid to upper thoracic spine radiating up to the cervical spine and suboccipital region with headaches.   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  No signs of thoracic myelopathy neurologically stable. Fracture has been stable on MRI from 2022 T1 and T7.  Continues on gabapentin 600 mg 3 times daily tolerating well.  Sleeping well.  Continues with physical therapy.    More manageable with gabapentin.  Osteopathic manipulative medicine he helps manage symptoms.  She has an acute illness over the past few days however with decreased appetite dizziness/poor balance low white count WBC count with borderline calcium and potassium levels.  She reports a fever of 100.2 at home.            Discussion:    1.   I discussed with the patient and her family that she is not feeling well and has a potential neutropenic fever with borderline calcium and potassium levels.  She is not feeling like eating and has some abdominal pain.   they were asking my input regarding her condition.  I do not recommend Osteopathic manipulative medicine  today.  Given her labs and pain, and evaluation at urgent care the emergency room will be most appropriate.  They have been to [facility] and given her multiple myeloma and neutropenic fever potential I  would recommend ER and they are going to present over there.  We contacted the emergency department and inform them that she would be coming.  I spoke directly with the physician at the emergency department.    2.  Follow-up with me when she recovers from her acute illness       It was our pleasure caring for your patient today, if there any questions or concerns please do not hesitate to contact us.      Subjective:   Patient ID: Lizzie Viera is a 74 year old female.    History of Present Illness: Patient presents today with family members for evaluation of increased thoracic spine pain thoracolumbar pain abdominal pain low-grade fever evaluation as well.  Her neck and back pain has increased but also over the past few days she has had family member sick and she is progressively becoming ill with low energy dizziness balance issues.  Low appetite.  She is having abdominal pain.  Pain is a 9/10 at worst 6/10 today 2/10 at best.  She feels weak and tired as well.  She is here today for a manipulation potentially for manage her thoracic and cervical spine pain however her family is here and worried about her medical condition.  She has neutropenia worried about low-grade fever which she states is running 100.2 at home.    Labs:CBC October 3 WBC count 3.0 platelets 123.  Sodium 139 potassium 3.6 creatinine 0.97 calcium 8.7.    Review of Systems: Poor appetite, paresthesias weakness abdominal pain, headaches, poor balance, swallowing issues, fever unintentional weight loss.  Denies bowel incontinence.    Current Outpatient Medications   Medication Sig Dispense Refill    albuterol (PROAIR HFA/PROVENTIL HFA/VENTOLIN HFA) 108 (90 Base) MCG/ACT inhaler Inhale 2 puffs into the lungs every 6 hours as needed for wheezing or shortness of breath / dyspnea 18 g 1    albuterol (PROVENTIL) (2.5 MG/3ML) 0.083% neb solution Take 1 vial (2.5 mg) by nebulization every 4 hours as needed for wheezing or shortness of breath /  dyspnea 90 mL 0    aspirin (ASA) 81 MG chewable tablet Take 81 mg by mouth daily      cefdinir (OMNICEF) 250 MG/5ML suspension To be used in allergist's office for oral challenge 60 mL 0    fish oil-omega-3 fatty acids 1000 MG capsule Take 2 g by mouth daily      fluticasone (FLONASE) 50 MCG/ACT nasal spray Spray 2 sprays into both nostrils daily 15.8 mL 4    gabapentin (NEURONTIN) 300 MG capsule Take 2 capsules (600 mg) by mouth 3 times daily 180 capsule 0    hydrochlorothiazide (HYDRODIURIL) 12.5 MG tablet Take 1 tablet (12.5 mg) by mouth daily 90 tablet 4    HYDROmorphone (DILAUDID) 4 MG tablet Take 0.5-1 tablets (2-4 mg) by mouth every 4 hours as needed for moderate to severe pain 60 tablet 0    LENalidomide (REVLIMID) 10 MG CAPS capsule Take 1 capsule (10 mg) by mouth daily for 28 days Take at the same time each day. Do not open, break or chew the capsule. Swallow whole, with water. 28 capsule 0    medical cannabis (Patient's own supply) See Admin Instructions (The purpose of this order is to document that the patient reports taking medical cannabis.  This is not a prescription, and is not used to certify that the patient has a qualifying medical condition.)     Oil formulation      Melatonin 10 MG TABS tablet Take 1 tablet (10 mg) by mouth at bedtime      Menaquinone-7 (K2 PO) Take 1 capsule by mouth daily      methocarbamol (ROBAXIN) 500 MG tablet Take 1 tablet (500 mg) by mouth 4 times daily as needed for muscle spasms 120 tablet 11    Milk Thistle-Dand-Fennel-Licor (MILK THISTLE XTRA) CAPS capsule Take 1 capsule by mouth daily      NALTREXONE HCL PO Take 3 mg by mouth daily      nicotine (NICOTROL) 10 MG/ML SOLN inhalation solution Spray 1 spray in nostril every hour as needed for nicotine withdrawal symptoms      TURMERIC PO Take 1 capsule by mouth daily      UNABLE TO FIND 1 capsule daily MEDICATION NAME: Serrapeptase      UNABLE TO FIND 1 capsule daily MEDICATION NAME: Maria Teresareina García Mushroom      UNABLE TO  FIND 1 capsule daily MEDICATION NAME: DIM (diinolylmethane)      UNABLE TO FIND MEDICATION NAME: Panacur C - 1 capsule daily      Vitamin D, Cholecalciferol, 25 MCG (1000 UT) CAPS Take 1,000 Units by mouth daily Liquid, not capsule       No current facility-administered medications for this visit.       Past Medical History:   Diagnosis Date    Cervical dysplasia     Chronic RUQ pain     Depressive disorder     Multiple myeloma (H)     Osteoporosis        The following portions of the patient's history were reviewed and updated as appropriate: allergies, current medications, past family history, past medical history, past social history, past surgical history and problem list.           Objective:   Physical Exam:    /58   Pulse 102   LMP  (LMP Unknown)   There is no height or weight on file to calculate BMI.      General: Alert and oriented with normal affect. Attention, knowledge, memory, and language are intact.  Generalized pallor sitting in the wheelchair head down.  Slow to answer questions.  Eyes: Sclerae are clear.  Respirations: Unlabored. CV: No lower extremity edema.     Gait: In wheelchair today not ambulating.  Generalized tenderness throughout the abdomen soft negative rebound.  Sensation is intact to light touch throughout the upper and lower extremities.  Reflexes are negative Hoffmans.      Manual muscle testing reveals:  Right /Left out of 5     5/5 elbow flexors  5/5 elbow extensors  5/5 wrist extensors  5/5 interosseus  5/5 finger flexors     5/5 ankle plantar flexors  5/5 ankle dorsiflexors  5/5  ankle evertors

## 2024-10-15 NOTE — MEDICATION SCRIBE - ADMISSION MEDICATION HISTORY
Medication Scribe Admission Medication History    Admission medication history is complete. The information provided in this note is only as accurate as the sources available at the time of the update.    Information Source(s): Patient and CareEverywhere/SureScripts via in-person    Pertinent Information: pt hasn't had most daily meds past week  Pt is aware we don't carry revlimid, family is willing to bring it in later today     Changes made to PTA medication list:  Added: None  Deleted: None  Changed: None    Allergies reviewed with patient and updates made in EHR: yes    Medication History Completed By: AJIT TRAVIS 10/15/2024 4:27 PM    PTA Med List   Medication Sig Last Dose    albuterol (PROAIR HFA/PROVENTIL HFA/VENTOLIN HFA) 108 (90 Base) MCG/ACT inhaler Inhale 2 puffs into the lungs every 6 hours as needed for wheezing or shortness of breath / dyspnea Unknown at prn has supply    albuterol (PROVENTIL) (2.5 MG/3ML) 0.083% neb solution Take 1 vial (2.5 mg) by nebulization every 4 hours as needed for wheezing or shortness of breath / dyspnea 10/15/2024 at am    aspirin (ASA) 81 MG chewable tablet Take 81 mg by mouth daily Past Week at 2 days ago    cefdinir (OMNICEF) 250 MG/5ML suspension To be used in allergist's office for oral challenge Unknown at prn    fish oil-omega-3 fatty acids 1000 MG capsule Take 2 g by mouth daily Past Week at 2 days ago    fluticasone (FLONASE) 50 MCG/ACT nasal spray Spray 2 sprays into both nostrils daily Past Week at 2 days ago    gabapentin (NEURONTIN) 300 MG capsule Take 2 capsules (600 mg) by mouth 3 times daily 10/15/2024 at am    hydrochlorothiazide (HYDRODIURIL) 12.5 MG tablet Take 1 tablet (12.5 mg) by mouth daily 10/15/2024 at am    HYDROmorphone (DILAUDID) 4 MG tablet Take 0.5-1 tablets (2-4 mg) by mouth every 4 hours as needed for moderate to severe pain Past Month at prn has supply    LENalidomide (REVLIMID) 10 MG CAPS capsule Take 1 capsule (10 mg) by mouth daily  for 28 days Take at the same time each day. Do not open, break or chew the capsule. Swallow whole, with water. 10/15/2024 at am    Melatonin 10 MG TABS tablet Take 20 mg by mouth at bedtime. 10/14/2024 at pm    Menaquinone-7 (K2 PO) Take 1 capsule by mouth daily Past Week at 2 days ago    methocarbamol (ROBAXIN) 500 MG tablet Take 1 tablet (500 mg) by mouth 4 times daily as needed for muscle spasms 10/15/2024 at am    Milk Thistle-Dand-Fennel-Licor (MILK THISTLE XTRA) CAPS capsule Take 1 capsule by mouth daily Past Week    NALTREXONE HCL PO Take 3 mg by mouth daily 10/14/2024 at pm    nicotine (NICOTROL) 10 MG/ML SOLN inhalation solution Spray 1 spray in nostril every hour as needed for nicotine withdrawal symptoms Past Week at prn    TURMERIC PO Take 1 capsule by mouth 2 times daily. 10/15/2024 at am    Vitamin D, Cholecalciferol, 25 MCG (1000 UT) CAPS Take 1,000 Units by mouth daily Liquid, not capsule Past Week at 2 days ago

## 2024-10-15 NOTE — ED TRIAGE NOTES
SOB, on oxygen at night. Came from clinic. Has been losing balance for 2 days. Has had cough, fever, sob and h/a. Can't eat

## 2024-10-15 NOTE — PROGRESS NOTES
Pt transported from ED to ICU on NRB maintaining sats > 92%.  Placed back on [facility] in ICU 70%/35L.    Shilpi Dash, RT on 10/15/2024 at 5:25 PM

## 2024-10-15 NOTE — ED PROVIDER NOTES
EMERGENCY DEPARTMENT ENCOUNTER      NAME: Lizzie Viera  AGE: 74 year old female  YOB: 1950  MRN: [id number]  EVALUATION DATE & TIME: 10/15/2024 11:14 AM    PCP: Sona Sandoval    ED PROVIDER: Migue Mendez MD      Chief Complaint   Patient presents with    losing balance for 2 days         FINAL IMPRESSION:  Bilateral pneumonia  Neutropenic fever  Hypotension    ED COURSE & MEDICAL DECISION MAKING:    Pertinent Labs & Imaging studies reviewed. (See chart for details)  74 year old female presents to the Emergency Department for evaluation of dizziness, lightheadedness.  Symptoms ongoing for last few days.  Patient with cough and shortness of breath.  Multiple cysts family members with similar symptoms.  Patient arrives with family members who report they have been tested for COVID and was negative.  Patient with underlying multiple medical for which she is receiving chemotherapy.  Initial oxygenation quite low in the lower 80% range.  Patient typically on supplemental oxygen only at night.  Blood pressure also reduced at 80 systolic.  Patient reports drinking and eating very little over the last few days due to feeling so poorly.  Patient placed on supplemental high flow oxygen with oxygenation increasing to 93%.  Blood work being initiated to assess for signs for sepsis/infection.  Will also initiate fluid bolus.  COVID swab being obtained along with chest x-ray.  Patient will require hospitalization.      11:23 AM I introduced myself to the patient, obtained patient history, performed a physical exam, and discussed plan for ED workup including potential diagnostic laboratory/imaging studies and interventions.  12:32 PM.  Lactate minimally elevated 2.5.  Fluid being initiated.  Procalcitonin markedly elevated at 41.10.  BNP elevated at 2500.  Could relate to renal insufficiency as patient appears clinically dry.  Will also obtain blood cultures and initiate Zosyn/vancomycin for presumptive  neutropenic fevers.  Troponin elevated at 76.  Delta troponin be obtained.  VBG without evidence of acidosis.  pH of 7.45.  pCO2 47.  pO2 15 revealing a marked relative hypoxia.  May need V/Q/CTA of the chest for possible pulmonary embolism given her hypoxia and underlying cancer.    1:57 PM.  Patient with moderate renal insufficiency creatinine 2.10 and BUN of 38.5.  This is new compared to laboratory evaluation 10 days earlier.  Additional fluid bolus will be given.  Lab orders BNP likely elevated secondary to renal insufficiency.  Chest x-ray with bilateral infiltrative disease.  Could be infarcts versus infiltrates.  Patient proceeding to CT of the head.  Call close admitting physician.   2:50 PM.  Patient discussed with the hospitalist Dr. Wadsworth.  He is agreeable plans to hospitalize on telemetry if patient responds to fluid boluses.  If not patient will require placement in the ICU.  Patient discussed with my associate Dr. Whitfield at end of shift.  At the conclusion of the encounter I discussed the results of all of the tests and the disposition. The questions were answered. The patient or family acknowledged understanding and was agreeable with the care plan.     Medical Decision Making  Obtained supplemental history:Supplemental history obtained?: Family Member/Significant Other  Reviewed external records: External records reviewed?: Outpatient Record: Oncology Visit 10/10/24  Care impacted by chronic illness:Cancer/Chemotherapy, Hyperlipidemia, Hypertension, and Smoking / Nicotine Use  Care significantly affected by social determinants of health:N/A  Did you consider but not order tests?: Work up considered but not performed and documented in chart, if applicable  Did you interpret images independently?: Independent interpretation of ECG and images noted in documentation, when applicable.  Consultation discussion with other provider:Did you involve another provider (consultant, , pharmacy, etc.)?: I  discussed the care with another health care provider, see documentation for details.  Admit.      45 minutes of critical care time     MEDICATIONS GIVEN IN THE EMERGENCY:  Medications   ipratropium - albuterol 0.5 mg/2.5 mg/3 mL (DUONEB) neb solution 3 mL (has no administration in time range)   methylPREDNISolone Na Suc (solu-MEDROL) injection 125 mg (125 mg Intravenous $Given 10/15/24 1129)   albuterol (PROVENTIL) neb solution 2.5 mg (has no administration in time range)       NEW PRESCRIPTIONS STARTED AT TODAY'S ER VISIT  New Prescriptions    No medications on file          =================================================================    HPI    Patient information was obtained from: Patient    Use of : N/A       Lizzie Viera is a 74 year old female with a pertinent history of multiple myeloma currently undergoing chemotherapy, neutropenic fever, HTN, HLD, and tobacco use who presents to this ED by private car with family for evaluation of shortness of breath.    Per chart review, the patient was at the spine clinic today for ongoing thoracic spine pain. Patient reported low grade fevers, general malaise, and decreased appetite. Patient referred to the ED due to risk of neutropenic fever.    The patient reports she and several family members have had flu symptoms over the past couple weeks. The patient reports general malaise, decreased appetite, abdominal pain, fatigue, low grade fevers, cough, and shortness of breath. Over the past couple days she has been having some balance issues. She is a smoker and normally uses oxygen at night, but she does not use oxygen during the day. She used her albuterol inhaler a couple times yesterday with some improvement. She is currently undergoing chemotherapy for multiple myeloma. She denies any leg swelling, vomiting, or diarrhea.      PAST MEDICAL HISTORY:  Past Medical History:   Diagnosis Date    Cervical dysplasia     Chronic RUQ pain     Depressive  disorder     Multiple myeloma (H)     Osteoporosis        PAST SURGICAL HISTORY:  Past Surgical History:   Procedure Laterality Date    CONIZATION CERVIX,KNIFE/LASER      Description: Cervical Conization By Laser;  Recorded: 09/20/2007;    HC DILATION/CURETTAGE DIAG/THER NON OB      Description: Dilation And Curettage;  Recorded: 09/20/2007;    HC REMOVE TONSILS/ADENOIDS,<11 Y/O      Description: Tonsillectomy With Adenoidectomy;  Recorded: 09/20/2007;    [facility] LAP,CHOLECYSTECTOMY/EXPLORE  12/27/2004    [facility] LIGATE FALLOPIAN TUBE      Description: Tubal Ligation;  Recorded: 09/20/2007;           CURRENT MEDICATIONS:    albuterol (PROAIR HFA/PROVENTIL HFA/VENTOLIN HFA) 108 (90 Base) MCG/ACT inhaler  albuterol (PROVENTIL) (2.5 MG/3ML) 0.083% neb solution  aspirin (ASA) 81 MG chewable tablet  cefdinir (OMNICEF) 250 MG/5ML suspension  fish oil-omega-3 fatty acids 1000 MG capsule  fluticasone (FLONASE) 50 MCG/ACT nasal spray  gabapentin (NEURONTIN) 300 MG capsule  hydrochlorothiazide (HYDRODIURIL) 12.5 MG tablet  HYDROmorphone (DILAUDID) 4 MG tablet  LENalidomide (REVLIMID) 10 MG CAPS capsule  medical cannabis (Patient's own supply)  Melatonin 10 MG TABS tablet  Menaquinone-7 (K2 PO)  methocarbamol (ROBAXIN) 500 MG tablet  Milk Thistle-Dand-Fennel-Licor (MILK THISTLE XTRA) CAPS capsule  NALTREXONE HCL PO  nicotine (NICOTROL) 10 MG/ML SOLN inhalation solution  TURMERIC PO  UNABLE TO FIND  UNABLE TO FIND  UNABLE TO FIND  UNABLE TO FIND  Vitamin D, Cholecalciferol, 25 MCG (1000 UT) CAPS        ALLERGIES:  Allergies   Allergen Reactions    Latex Shortness Of Breath    Atorvastatin Muscle Pain (Myalgia)    Short Ragweed Pollen Ext Cough    Adhesive Tape Rash    Levaquin [Levofloxacin] Muscle Pain (Myalgia)     Achilles tendonitis after Levaquin (3/2024)       FAMILY HISTORY:  Family History   Problem Relation Age of Onset    Diabetes Mother     Arthritis Mother     LUNG DISEASE Father     Diabetes Maternal Uncle     Breast  Cancer Paternal Aunt     Heart Failure Maternal Grandmother     Heart Disease Maternal Grandmother     Heart Failure Maternal Grandfather     Heart Disease Maternal Grandfather     Heart Failure Paternal Grandmother     Heart Disease Paternal Grandmother        SOCIAL HISTORY:   Social History     Socioeconomic History    Marital status:     Number of children: 3   Tobacco Use    Smoking status: Former     Current packs/day: 0.00     Average packs/day: 0.5 packs/day for 45.0 years (22.5 ttl pk-yrs)     Types: Cigarettes     Start date: 1978     Quit date: 2023     Years since quittin.7     Passive exposure: Past (15 year of spouse smoking around pt, as well as childhood exposure in the home from father)    Smokeless tobacco: Never   Vaping Use    Vaping status: Never Used   Substance and Sexual Activity    Alcohol use: Yes     Comment: Alcoholic Drinks/day: 0-1 drinks per week    Drug use: Not Currently    Sexual activity: Not Currently     Partners: Male     Birth control/protection: Surgical     Social Determinants of Health     Financial Resource Strain: Low Risk  (2024)    Financial Resource Strain     Within the past 12 months, have you or your family members you live with been unable to get utilities (heat, electricity) when it was really needed?: No   Food Insecurity: Low Risk  (2024)    Food Insecurity     Within the past 12 months, did you worry that your food would run out before you got money to buy more?: No     Within the past 12 months, did the food you bought just not last and you didn t have money to get more?: No   Transportation Needs: Low Risk  (2024)    Transportation Needs     Within the past 12 months, has lack of transportation kept you from medical appointments, getting your medicines, non-medical meetings or appointments, work, or from getting things that you need?: No   Interpersonal Safety: Low Risk  (2024)    Interpersonal Safety     Do you feel  "physically and emotionally safe where you currently live?: Yes     Within the past 12 months, have you been hit, slapped, kicked or otherwise physically hurt by someone?: No     Within the past 12 months, have you been humiliated or emotionally abused in other ways by your partner or ex-partner?: No   Housing Stability: Low Risk  (1/18/2024)    Housing Stability     Do you have housing? : Yes     Are you worried about losing your housing?: No       VITALS:  BP (!) 81/50   Temp 99.4  F (37.4  C)   Resp 18   Ht 1.549 m (5' 1\")   Wt 54.4 kg (120 lb)   LMP  (LMP Unknown)   SpO2 (!) 78%   BMI 22.67 kg/m      PHYSICAL EXAM    VITAL SIGNS: BP (!) 81/50   Temp 99.4  F (37.4  C)   Resp 18   Ht 1.549 m (5' 1\")   Wt 54.4 kg (120 lb)   LMP  (LMP Unknown)   SpO2 (!) 78%   BMI 22.67 kg/m      Constitutional:  Awake, alert, in moderate distress  HENT:  Normocephalic, Atraumatic. Bilateral external ears normal. Oropharynx moist. Nose normal. Neck- Normal range of motion with no guarding, No midline cervical tenderness, Supple, No stridor.   Eyes:  PERRL, EOMI with no signs of entrapment, Conjunctiva normal, No discharge.   Respiratory:  Diminished but clear breath sounds, No wheezing. Bronchitic cough.  Cardiovascular:  Normal heart rate, Normal rhythm, No appreciable rubs or gallops.   GI:  Soft, No tenderness, No distension, No palpable masses  Musculoskeletal:  Intact distal pulses, No edema. Good range of motion in all major joints. No tenderness to palpation or major deformities noted.  Integument:  Warm, Dry, No erythema, No rash.   Neurologic:  Alert & oriented, Normal motor function, Normal sensory function, No focal deficits noted.   Psychiatric:  Affect normal, Judgment normal, Mood normal.      LAB:  All pertinent labs reviewed and interpreted.  Results for orders placed or performed during the hospital encounter of 10/15/24   XR Chest Port 1 View     Status: None    Narrative    EXAM: XR CHEST PORT 1 " VIEW  LOCATION: [facility]  DATE: 10/15/2024    INDICATION: feveers cough  COMPARISON: 6/18/2024      Impression    IMPRESSION: Right and left upper chest and left inferior chest consolidative opacities. These could represent multifocal pneumonia, neoplasm, or infarcts.    Prominent right hilum could represent reactive adenopathy or neoplasm.    No effusion. No pulmonary vascular congestion.    CT PE run with contrast recommended.   CT Head w/o Contrast     Status: None    Narrative    EXAM: CT HEAD W/O CONTRAST  LOCATION: [facility]  DATE: 10/15/2024    INDICATION: balance off x 2 days  COMPARISON: CT head 2/29/2021 and head MRI 5/22/2017.  TECHNIQUE: Routine CT Head without IV contrast. Multiplanar reformats. Dose reduction techniques were used.    FINDINGS:  INTRACRANIAL CONTENTS: No intracranial hemorrhage, extraaxial collection, or mass effect.  No CT evidence of acute infarct. Minor periventricular low-attenuation. Mild generalized volume loss. No hydrocephalus.     VISUALIZED ORBITS/SINUSES/MASTOIDS: Prior bilateral cataract surgery. Visualized portions of the orbits are otherwise unremarkable. No paranasal sinus mucosal disease. No middle ear or mastoid effusion.    BONES/SOFT TISSUES: Heterogeneous marrow attenuation including a rounded 5 mm focus of mixed lucency involving the left parietal bone without samara osseous destruction, presumably sequela of the patient's known multiple myeloma.      Impression    IMPRESSION:  1.  No CT evidence for acute intracranial process.  2.  Mild presumed senescent changes similar to the previous exam.  3.  Heterogeneous lucent lesion involving the left parietal calvarium without samara osseous destruction is presumably related to the patient's history of multiple myeloma.   XR Chest Port 1 View     Status: None    Narrative    EXAM: XR CHEST PORT 1 VIEW  LOCATION: [facility]  DATE:  10/15/2024    INDICATION: RN placed PICC   verify tip placement  COMPARISON: X-ray 10/15/2024 at 11:14 AM      Impression    IMPRESSION: Left PICC line with the tip located in the right mid to upper atrium 3.4 cm from the cavoatrial junction. Stable to slightly increased bilateral upper lobar consolidative opacities. Stable mild bibasilar pulmonary opacities. No definite   pleural effusion. Stable heart size. Stable right hilar enlargement.   XR Chest Port 1 View     Status: None    Narrative    EXAM: XR CHEST PORT 1 VIEW  LOCATION: [facility]  DATE: 10/15/2024 5:19 PM    INDICATION: RN placed PICC   verify tip placement  COMPARISON: 10/15/2024 4:20 PM      Impression    IMPRESSION:     Interval retraction of the left PICC with tip now at the cavoatrial junction.    Otherwise, no significant interval change.    Please note, the lung apices are not included in the field-of-view, which limits evaluation for pneumothorax.   XR Chest Port 1 View     Status: None    Narrative    EXAM: XR CHEST PORT 1 VIEW  LOCATION: [facility]  DATE: 10/16/2024    INDICATION: worsening oxygenation  COMPARISON: October 15, 2024 and June 18, 2024 x-rays      Impression    IMPRESSION: Left PICC line has its tip at the cavoatrial junction. Bilateral dense parenchymal opacities, most pronounced in the upper lung zones appear unchanged. No pneumothorax or significant volume pleural effusion. No new abnormality.   [place] Draw     Status: None    Narrative    The following orders were created for panel order [place] Draw.  Procedure                               Abnormality         Status                     ---------                               -----------         ------                     Extra Blue Top Tube[[id number]]                              Final result               Extra Red Top Tube[[id number]]                               Final result               Extra Green Top  (Lithium...[[id number]]                      Final result               Extra Purple Top Tube[[id number]]                                                       Extra Blood Bank Purple ...[[id number]]                                                 Extra Heparinized Syringe[[id number]]                        Final result               Extra Green Top (Lithium...[[id number]]                                                   Please view results for these tests on the individual orders.   Extra Blue Top Tube     Status: None   Result Value Ref Range    Hold Specimen JIC    Extra Red Top Tube     Status: None   Result Value Ref Range    Hold Specimen JIC    Extra Green Top (Lithium Heparin) Tube     Status: None   Result Value Ref Range    Hold Specimen JIC    Extra Heparinized Syringe     Status: None   Result Value Ref Range    Hold Specimen JIC    Troponin T, High Sensitivity     Status: Abnormal   Result Value Ref Range    Troponin T, High Sensitivity 76 (H) <=14 ng/L   Symptomatic Influenza A/B, RSV, & SARS-CoV2 PCR (COVID-19) Nasopharyngeal     Status: Normal    Specimen: Nasopharyngeal; Swab   Result Value Ref Range    Influenza A PCR Negative Negative    Influenza B PCR Negative Negative    RSV PCR Negative Negative    SARS CoV2 PCR Negative Negative    Narrative    Testing was performed using the Xpert Xpress CoV2/Flu/RSV Assay on the Cepheid GeneXpert Instrument. This test should be ordered for the detection of SARS-CoV2, influenza, and RSV viruses in individuals with signs and symptoms of respiratory tract infection. This test is for in vitro diagnostic use under the [place] FDA for laboratories certified under CLIA to perform high or moderate complexity testing. This test has been [place] FDA cleared. A negative result does not rule out the presence of PCR inhibitors in the specimen or target RNA in concentration below the limit of detection for the assay. If only one viral target is positive but coinfection with multiple  targets is suspected, the sample should be re-tested with another FDA cleared, approved, or authorized test, if coninfection would change clinical management. This test was validated by the [facility]. These laboratories are certified under the Clinical Laboratory Improvement Amendments of 1988 (CLIA-88) as qualified to perfom high complexity laboratory testing.   Blood gas venous     Status: Abnormal   Result Value Ref Range    pH Venous 7.45 (H) 7.32 - 7.43    pCO2 Venous 47 40 - 50 mm Hg    pO2 Venous 15 (L) 25 - 47 mm Hg    Bicarbonate Venous 33 (H) 21 - 28 mmol/L    Base Excess/Deficit Venous 7.3 (H) -3.0 - 3.0 mmol/L    FIO2 21     Oxyhemoglobin Venous 18 (L) 70 - 75 %    O2 Sat, Venous 18.9 (L) 70.0 - 75.0 %    Narrative    In healthy individuals, oxyhemoglobin (O2Hb) and oxygen saturation (SO2) are approximately equal. In the presence of dyshemoglobins, oxyhemoglobin can be considerably lower than oxygen saturation.   Procalcitonin     Status: Abnormal   Result Value Ref Range    Procalcitonin 41.10 (HH) <0.50 ng/mL   Nt probnp inpatient (BNP)     Status: Abnormal   Result Value Ref Range    N terminal Pro BNP Inpatient 2,505 (H) 0 - 900 pg/mL   Lactic acid whole blood with 1x repeat in 2 hr when >2     Status: Abnormal   Result Value Ref Range    Lactic Acid, Initial 2.5 (H) 0.7 - 2.0 mmol/L   Comprehensive metabolic panel     Status: Abnormal   Result Value Ref Range    Sodium 133 (L) 135 - 145 mmol/L    Potassium 3.5 3.4 - 5.3 mmol/L    Carbon Dioxide (CO2) 29 22 - 29 mmol/L    Anion Gap 14 7 - 15 mmol/L    Urea Nitrogen 38.5 (H) 8.0 - 23.0 mg/dL    Creatinine 2.10 (H) 0.51 - 0.95 mg/dL    GFR Estimate 24 (L) >60 mL/min/1.73m2    Calcium 8.9 8.8 - 10.4 mg/dL    Chloride 90 (L) 98 - 107 mmol/L    Glucose 100 (H) 70 - 99 mg/dL    Alkaline Phosphatase 45 40 - 150 U/L     (H) 0 - 45 U/L    ALT 78 (H) 0 - 50 U/L    Protein Total 6.6 6.4 - 8.3 g/dL    Albumin 3.2 (L) 3.5 - 5.2 g/dL     Bilirubin Total 0.9 <=1.2 mg/dL   CBC with platelets and differential     Status: Abnormal   Result Value Ref Range    WBC Count 0.5 (LL) 4.0 - 11.0 10e3/uL    RBC Count 3.79 (L) 3.80 - 5.20 10e6/uL    Hemoglobin 13.3 11.7 - 15.7 g/dL    Hematocrit 38.6 35.0 - 47.0 %     (H) 78 - 100 fL    MCH 35.1 (H) 26.5 - 33.0 pg    MCHC 34.5 31.5 - 36.5 g/dL    RDW 13.6 10.0 - 15.0 %    Platelet Count 119 (L) 150 - 450 10e3/uL    % Neutrophils 44 %    % Lymphocytes 31 %    % Monocytes 8 %    % Eosinophils 15 %    % Basophils 2 %    % Immature Granulocytes 0 %    NRBCs per 100 WBC 0 <1 /100    Absolute Neutrophils 0.2 (LL) 1.6 - 8.3 10e3/uL    Absolute Lymphocytes 0.2 (L) 0.8 - 5.3 10e3/uL    Absolute Monocytes 0.0 0.0 - 1.3 10e3/uL    Absolute Eosinophils 0.1 0.0 - 0.7 10e3/uL    Absolute Basophils 0.0 0.0 - 0.2 10e3/uL    Absolute Immature Granulocytes 0.0 <=0.4 10e3/uL    Absolute NRBCs 0.0 10e3/uL   RBC and Platelet Morphology     Status: None   Result Value Ref Range    RBC Morphology Confirmed RBC Indices     Platelet Assessment  Automated Count Confirmed. Platelet morphology is normal.     Automated Count Confirmed. Platelet morphology is normal.   Lactic acid whole blood     Status: Normal   Result Value Ref Range    Lactic Acid 1.5 0.7 - 2.0 mmol/L   Troponin T, High Sensitivity     Status: Abnormal   Result Value Ref Range    Troponin T, High Sensitivity 82 (H) <=14 ng/L   UA with Microscopic reflex to Culture     Status: Abnormal    Specimen: Urine, Stover Catheter   Result Value Ref Range    Color Urine Yellow Colorless, Straw, Light Yellow, Yellow    Appearance Urine Cloudy (A) Clear    Glucose Urine Negative Negative mg/dL    Bilirubin Urine Negative Negative    Ketones Urine Negative Negative mg/dL    Specific Gravity Urine 1.019 1.001 - 1.030    Blood Urine 1.0 mg/dL (A) Negative    pH Urine 5.5 5.0 - 7.0    Protein Albumin Urine 100 (A) Negative mg/dL    Urobilinogen Urine <2.0 <2.0 mg/dL    Nitrite Urine  Negative Negative    Leukocyte Esterase Urine Negative Negative    Mucus Urine Present (A) None Seen /LPF    Amorphous Crystals Urine Few (A) None Seen /HPF    RBC Urine 0 <=2 /HPF    WBC Urine 5 <=5 /HPF    Squamous Epithelials Urine 1 <=1 /HPF    Narrative    Urine Culture not indicated   Glucose by meter     Status: Abnormal   Result Value Ref Range    GLUCOSE BY METER POCT 114 (H) 70 - 99 mg/dL   Glucose by meter     Status: Abnormal   Result Value Ref Range    GLUCOSE BY METER POCT 107 (H) 70 - 99 mg/dL   UA with Microscopic reflex to Culture     Status: Abnormal    Specimen: Urine, Stover Catheter   Result Value Ref Range    Color Urine Yellow Colorless, Straw, Light Yellow, Yellow    Appearance Urine Turbid (A) Clear    Glucose Urine Negative Negative mg/dL    Bilirubin Urine Negative Negative    Ketones Urine Negative Negative mg/dL    Specific Gravity Urine 1.019 1.001 - 1.030    Blood Urine 0.5 mg/dL (A) Negative    pH Urine 5.5 5.0 - 7.0    Protein Albumin Urine 70 (A) Negative mg/dL    Urobilinogen Urine <2.0 <2.0 mg/dL    Nitrite Urine Negative Negative    Leukocyte Esterase Urine Negative Negative    Bacteria Urine Moderate (A) None Seen /HPF    Amorphous Crystals Urine Few (A) None Seen /HPF    RBC Urine 0 <=2 /HPF    WBC Urine 2 <=5 /HPF    Squamous Epithelials Urine 2 (H) <=1 /HPF    Granular Casts Urine 123 (H) None Seen /LPF    Narrative    Urine Culture not indicated   Basic metabolic panel     Status: Abnormal   Result Value Ref Range    Sodium 137 135 - 145 mmol/L    Potassium 3.0 (L) 3.4 - 5.3 mmol/L    Chloride 103 98 - 107 mmol/L    Carbon Dioxide (CO2) 22 22 - 29 mmol/L    Anion Gap 12 7 - 15 mmol/L    Urea Nitrogen 41.7 (H) 8.0 - 23.0 mg/dL    Creatinine 2.30 (H) 0.51 - 0.95 mg/dL    GFR Estimate 22 (L) >60 mL/min/1.73m2    Calcium 6.6 (L) 8.8 - 10.4 mg/dL    Glucose 80 70 - 99 mg/dL   CBC with platelets     Status: Abnormal   Result Value Ref Range    WBC Count 0.2 (LL) 4.0 - 11.0 10e3/uL     RBC Count 3.48 (L) 3.80 - 5.20 10e6/uL    Hemoglobin 12.1 11.7 - 15.7 g/dL    Hematocrit 35.9 35.0 - 47.0 %     (H) 78 - 100 fL    MCH 34.8 (H) 26.5 - 33.0 pg    MCHC 33.7 31.5 - 36.5 g/dL    RDW 14.1 10.0 - 15.0 %    Platelet Count 59 (L) 150 - 450 10e3/uL   Glucose by meter     Status: Normal   Result Value Ref Range    GLUCOSE BY METER POCT 98 70 - 99 mg/dL   Glucose by meter     Status: Normal   Result Value Ref Range    GLUCOSE BY METER POCT 89 70 - 99 mg/dL   RBC and Platelet Morphology     Status: Abnormal   Result Value Ref Range    RBC Morphology Confirmed RBC Indices     Platelet Assessment  Automated Count Confirmed. Platelet morphology is normal.     Automated Count Confirmed. Platelet morphology is normal.    Monse Cells Moderate (A) None Seen   Glucose by meter     Status: Normal   Result Value Ref Range    GLUCOSE BY METER POCT 81 70 - 99 mg/dL   Magnesium     Status: Abnormal   Result Value Ref Range    Magnesium 1.5 (L) 1.7 - 2.3 mg/dL   Potassium     Status: Normal   Result Value Ref Range    Potassium 3.8 3.4 - 5.3 mmol/L   Magnesium     Status: Abnormal   Result Value Ref Range    Magnesium 2.5 (H) 1.7 - 2.3 mg/dL   Vancomycin level     Status: Normal   Result Value Ref Range    Vancomycin 6.8   ug/mL   Glucose by meter     Status: Normal   Result Value Ref Range    GLUCOSE BY METER POCT 71 70 - 99 mg/dL   Glucose by meter     Status: Abnormal   Result Value Ref Range    GLUCOSE BY METER POCT 60 (L) 70 - 99 mg/dL   Glucose by meter     Status: Abnormal   Result Value Ref Range    GLUCOSE BY METER POCT 101 (H) 70 - 99 mg/dL   Glucose by meter     Status: Abnormal   Result Value Ref Range    GLUCOSE BY METER POCT 52 (L) 70 - 99 mg/dL   Glucose by meter     Status: Abnormal   Result Value Ref Range    GLUCOSE BY METER POCT 43 (LL) 70 - 99 mg/dL   Blood Culture Line, venous     Status: Normal (Preliminary result)    Specimen: Line, venous; Blood   Result Value Ref Range    Culture No growth  after 2 days    Legionella Urinary Antigen and Streptococcus pneumoniae antigen     Status: Abnormal    Specimen: Urine, Stover Catheter   Result Value Ref Range    Legionella pneumophila serogroup 1 urinary antigen Positive (A) Negative    Streptococcus pneumoniae antigen Negative Negative    Legionella pneumophila Urinary/Strep pneumoniae Antigen Specimen Type Urine     Narrative    The result of this test as well as culture, serology, or other antigen detection methods should be used in conjunction with clinical findings to make an accurate diagnosis.   Respiratory Aerobic Bacterial Culture with Gram Stain     Status: None (Preliminary result)    Specimen: Expectorate; Sputum   Result Value Ref Range    Culture Culture in progress     Gram Stain Result <10 Squamous epithelial cells/low power field     Gram Stain Result <25 PMNs/low power field     Gram Stain Result 2+ Mixed hieu    Respiratory Panel PCR     Status: Normal    Specimen: Nasopharyngeal; Swab   Result Value Ref Range    Adenovirus Not Detected Not Detected    Coronavirus Not Detected Not Detected    Human Metapneumovirus Not Detected Not Detected    Human Rhin/Enterovirus Not Detected Not Detected    Influenza A Not Detected Not Detected    Influenza A, H1 Not Detected Not Detected    Influenza A 2009 H1N1 Not Detected Not Detected    Influenza A, H3 Not Detected Not Detected    Influenza B Not Detected Not Detected    Parainfluenza Virus 1 Not Detected Not Detected    Parainfluenza Virus 2 Not Detected Not Detected    Parainfluenza Virus 3 Not Detected Not Detected    Parainfluenza Virus 4 Not Detected Not Detected    Respiratory Syncytial Virus A Not Detected Not Detected    Respiratory Syncytial Virus B Not Detected Not Detected    Chlamydia Pneumoniae Not Detected Not Detected    Mycoplasma Pneumoniae Not Detected Not Detected    Narrative    The [facility] Respiratory Panel is a qualitative nucleic acid, multiplex, in vitro diagnostic test for the  simultaneous detection and identification of multiple respiratory viral and bacterial nucleic acids in nasopharyngeal swabs collected in viral transport media from individual exhibiting signs and symptoms of respiratory infection. The assay has received FDA approval for the testing of nasopharyngeal (NP) swabs only. This test is used for clinical purposes and should not be regarded as investigational or for research. This laboratory is certified under the Clinical Laboratory Improvement Amendments of 1988 (CLIA-88) as qualified to perform high complexity clinical laboratory testing.   CBC with platelets differential     Status: Abnormal    Narrative    The following orders were created for panel order CBC with platelets differential.  Procedure                               Abnormality         Status                     ---------                               -----------         ------                     CBC with platelets and d...[[id number]]  Abnormal            Final result               RBC and Platelet Morphology[[id number]]                      Final result                 Please view results for these tests on the individual orders.        RADIOLOGY:  Reviewed all pertinent imaging. Please see official radiology report.  XR Chest Port 1 View    (Results Pending)   CT Head Perfusion w Contrast    (Results Pending)         I, Teo Jin, am serving as a scribe to document services personally performed by Migue Mendez MD based on my observation and the provider's statements to me. I, Migue Mendez MD, attest that Teo Jin is acting in a scribe capacity, has observed my performance of the services and has documented them in accordance with my direction.    Migue Mendez MD  [facility] EMERGENCY DEPARTMENT  [address] [place] [id number]  [phone]     Migue Mendez MD  10/18/24 0703

## 2024-10-15 NOTE — PROGRESS NOTES
Paged to assess patient due to decreased oxygen saturation. Upon arrival to room; patient was on oxymask 15 LPM. Breath sounds coarse. Sats 94% on 15 LPM, lips cyanotic. Placed patient on HFNC 35 LPM 60%. Patient denies history of COPD but admits to current everyday smoking. Patient says she wears 2LPM O2 for night time/bedtime; no cpap or bipap. Family bedside. Did a flutter valve instruct  for airway clearance and patient gave good effort. Duoneb and albuterol neb administred and Labs drawn and xray pending. RT will continue to follow.

## 2024-10-15 NOTE — ED NOTES
Patient to room 20 via wheelchair with daughter, son. 1-2 week hx of cough, SOB. Did have appointment for spine clinic today and patient also has had unsteady gait. Sats on room air at 70%. Family states oxygen PRN at night, not during day. Place on oxy mask at 10 liters, RT here switched to high flow oxygen. IV lab sent. Sats up to 94% on high flow. Congested cough, non productive. Smoker. Denies pulmonary history. Dr Mendez in room

## 2024-10-15 NOTE — PHARMACY-VANCOMYCIN DOSING SERVICE
Pharmacy Vancomycin Initial Note  Date of Service October 15, 2024  Patient's  1950  74 year old, female    Indication: Sepsis    Current estimated CrCl = Estimated Creatinine Clearance: 20.8 mL/min (A) (based on SCr of 2.1 mg/dL (H)).    Creatinine for last 3 days  10/15/2024: 11:29 AM Creatinine 2.10 mg/dL    Recent Vancomycin Level(s) for last 3 days  No results found for requested labs within last 3 days.      Vancomycin IV Administrations (past 72 hours)                     vancomycin (VANCOCIN) 1,000 mg in 200 mL dextrose intermittent infusion (mg) 1,000 mg New Bag 10/15/24 1438                    Nephrotoxins and other renal medications (From now, onward)      Start     Dose/Rate Route Frequency Ordered Stop    10/15/24 1900  piperacillin-tazobactam (ZOSYN) 3.375 g vial to attach to  mL bag         3.375 g  over 240 Minutes Intravenous EVERY 8 HOURS 10/15/24 1746      10/15/24 1530  norepinephrine (LEVOPHED) 4 mg in  mL infusion PREMIX         0.01-0.6 mcg/kg/min × 54.4 kg  2-122.4 mL/hr  Intravenous CONTINUOUS 10/15/24 1515              Contrast Orders - past 72 hours (72h ago, onward)      None                Plan:  Start intermittent vancomycin. With ALEXANDER & moderate pressor use, unable to predict creatinine trend. Will reassess for a more permanent plan in the morning.  Vancomycin monitoring method: AUC  Vancomycin therapeutic monitoring goal: 400-600 mg*h/L  Pharmacy will check vancomycin levels as appropriate in 1-3 Days.    Serum creatinine levels will be ordered daily for the first week of therapy and at least twice weekly for subsequent weeks.      Shilpi Potts [facility]

## 2024-10-15 NOTE — PHARMACY-VANCOMYCIN DOSING SERVICE
Pharmacy Vancomycin Initial Note  Date of Service October 15, 2024  Patient's  1950  74 year old, female    Indication: Bacteremia    Current estimated CrCl = Estimated Creatinine Clearance: 20.8 mL/min (A) (based on SCr of 2.1 mg/dL (H)).    Creatinine for last 3 days  10/15/2024: 11:29 AM Creatinine 2.10 mg/dL    Recent Vancomycin Level(s) for last 3 days  No results found for requested labs within last 3 days.      Vancomycin IV Administrations (past 72 hours)        No vancomycin orders with administrations in past 72 hours.                    Nephrotoxins and other renal medications (From now, onward)      Start     Dose/Rate Route Frequency Ordered Stop    10/15/24 1300  piperacillin-tazobactam (ZOSYN) 3.375 g vial to attach to  mL bag         3.375 g  over 30 Minutes Intravenous ONCE 10/15/24 1236      10/15/24 1300  vancomycin (VANCOCIN) 1,000 mg in 200 mL dextrose intermittent infusion         1,000 mg  200 mL/hr over 1 Hours Intravenous ONCE 10/15/24 1257              Contrast Orders - past 72 hours (72h ago, onward)      None                Plan:  Start vancomycin  1000 mg IV once.   Please consult pharmacy again if you would like to continue inpatient     AVNI HUSSEIN RPH

## 2024-10-15 NOTE — LETTER
10/15/2024      Lizzie Viera  [address]  [place] [address]      Dear Colleague,    Thank you for referring your patient, Lizzie Viera, to the [facility]. Please see a copy of my visit note below.    Assessment/Plan:      Valeria was seen today for back pain.    Diagnoses and all orders for this visit:    Cervical spine pain    Myofascial pain    Thoracic spinal stenosis    Neutropenic fever (H)         Assessment: Hossein 74 year old female with a history of hyperlipidemia, anxiety, depression, irritable bowel, neuropathy, osteoporosis and multiple myeloma with a T7 fracture and lesion  resulting in spinal stenosis:     Flare of cervical and thoracic thoracic spine pain in the cervical spine parascapular region upper thoracic spine.  Consistent with myofascial pain in the setting of chronic fractures.  Chronic cervical and horacic pain around the mid to upper thoracic spine radiating up to the cervical spine and suboccipital region with headaches.   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  No signs of thoracic myelopathy neurologically stable. Fracture has been stable on MRI from 2022 T1 and T7.  Continues on gabapentin 600 mg 3 times daily tolerating well.  Sleeping well.  Continues with physical therapy.    More manageable with gabapentin.  Osteopathic manipulative medicine he helps manage symptoms.  She has an acute illness over the past few days however with decreased appetite dizziness/poor balance low white count WBC count with borderline calcium and potassium levels.  She reports a fever of 100.2 at home.            Discussion:    1.   I discussed with the patient and her family that she is not feeling well and has a potential neutropenic fever with borderline calcium and potassium levels.  She is not feeling like eating and has some abdominal pain.   they were asking my input regarding her condition.   I do not recommend Osteopathic manipulative medicine  today.  Given her labs and pain, and evaluation at urgent care the emergency room will be most appropriate.  They have been to [facility] and given her multiple myeloma and neutropenic fever potential I would recommend ER and they are going to present over there.  We contacted the emergency department and inform them that she would be coming.  I spoke directly with the physician at the emergency department.    2.  Follow-up with me when she recovers from her acute illness       It was our pleasure caring for your patient today, if there any questions or concerns please do not hesitate to contact us.      Subjective:   Patient ID: Lizzie Viera is a 74 year old female.    History of Present Illness: Patient presents today with family members for evaluation of increased thoracic spine pain thoracolumbar pain abdominal pain low-grade fever evaluation as well.  Her neck and back pain has increased but also over the past few days she has had family member sick and she is progressively becoming ill with low energy dizziness balance issues.  Low appetite.  She is having abdominal pain.  Pain is a 9/10 at worst 6/10 today 2/10 at best.  She feels weak and tired as well.  She is here today for a manipulation potentially for manage her thoracic and cervical spine pain however her family is here and worried about her medical condition.  She has neutropenia worried about low-grade fever which she states is running 100.2 at home.    Labs:CBC October 3 WBC count 3.0 platelets 123.  Sodium 139 potassium 3.6 creatinine 0.97 calcium 8.7.    Review of Systems: Poor appetite, paresthesias weakness abdominal pain, headaches, poor balance, swallowing issues, fever unintentional weight loss.  Denies bowel incontinence.    Current Outpatient Medications   Medication Sig Dispense Refill     albuterol (PROAIR HFA/PROVENTIL HFA/VENTOLIN HFA) 108 (90 Base) MCG/ACT inhaler Inhale 2  puffs into the lungs every 6 hours as needed for wheezing or shortness of breath / dyspnea 18 g 1     albuterol (PROVENTIL) (2.5 MG/3ML) 0.083% neb solution Take 1 vial (2.5 mg) by nebulization every 4 hours as needed for wheezing or shortness of breath / dyspnea 90 mL 0     aspirin (ASA) 81 MG chewable tablet Take 81 mg by mouth daily       cefdinir (OMNICEF) 250 MG/5ML suspension To be used in allergist's office for oral challenge 60 mL 0     fish oil-omega-3 fatty acids 1000 MG capsule Take 2 g by mouth daily       fluticasone (FLONASE) 50 MCG/ACT nasal spray Spray 2 sprays into both nostrils daily 15.8 mL 4     gabapentin (NEURONTIN) 300 MG capsule Take 2 capsules (600 mg) by mouth 3 times daily 180 capsule 0     hydrochlorothiazide (HYDRODIURIL) 12.5 MG tablet Take 1 tablet (12.5 mg) by mouth daily 90 tablet 4     HYDROmorphone (DILAUDID) 4 MG tablet Take 0.5-1 tablets (2-4 mg) by mouth every 4 hours as needed for moderate to severe pain 60 tablet 0     LENalidomide (REVLIMID) 10 MG CAPS capsule Take 1 capsule (10 mg) by mouth daily for 28 days Take at the same time each day. Do not open, break or chew the capsule. Swallow whole, with water. 28 capsule 0     medical cannabis (Patient's own supply) See Admin Instructions (The purpose of this order is to document that the patient reports taking medical cannabis.  This is not a prescription, and is not used to certify that the patient has a qualifying medical condition.)     Oil formulation       Melatonin 10 MG TABS tablet Take 1 tablet (10 mg) by mouth at bedtime       Menaquinone-7 (K2 PO) Take 1 capsule by mouth daily       methocarbamol (ROBAXIN) 500 MG tablet Take 1 tablet (500 mg) by mouth 4 times daily as needed for muscle spasms 120 tablet 11     Milk Thistle-Dand-Fennel-Licor (MILK THISTLE XTRA) CAPS capsule Take 1 capsule by mouth daily       NALTREXONE HCL PO Take 3 mg by mouth daily       nicotine (NICOTROL) 10 MG/ML SOLN inhalation solution Spray 1  spray in nostril every hour as needed for nicotine withdrawal symptoms       TURMERIC PO Take 1 capsule by mouth daily       UNABLE TO FIND 1 capsule daily MEDICATION NAME: Serrapeptase       UNABLE TO FIND 1 capsule daily MEDICATION NAME: Lions García Mushroom       UNABLE TO FIND 1 capsule daily MEDICATION NAME: DIM (diinolylmethane)       UNABLE TO FIND MEDICATION NAME: Panacur C - 1 capsule daily       Vitamin D, Cholecalciferol, 25 MCG (1000 UT) CAPS Take 1,000 Units by mouth daily Liquid, not capsule       No current facility-administered medications for this visit.       Past Medical History:   Diagnosis Date     Cervical dysplasia      Chronic RUQ pain      Depressive disorder      Multiple myeloma (H)      Osteoporosis        The following portions of the patient's history were reviewed and updated as appropriate: allergies, current medications, past family history, past medical history, past social history, past surgical history and problem list.           Objective:   Physical Exam:    /58   Pulse 102   LMP  (LMP Unknown)   There is no height or weight on file to calculate BMI.      General: Alert and oriented with normal affect. Attention, knowledge, memory, and language are intact.  Generalized pallor sitting in the wheelchair head down.  Slow to answer questions.  Eyes: Sclerae are clear.  Respirations: Unlabored. CV: No lower extremity edema.     Gait: In wheelchair today not ambulating.  Generalized tenderness throughout the abdomen soft negative rebound.  Sensation is intact to light touch throughout the upper and lower extremities.  Reflexes are negative Hoffmans.      Manual muscle testing reveals:  Right /Left out of 5     5/5 elbow flexors  5/5 elbow extensors  5/5 wrist extensors  5/5 interosseus  5/5 finger flexors     5/5 ankle plantar flexors  5/5 ankle dorsiflexors  5/5  ankle evertors             Again, thank you for allowing me to participate in the care of your patient.         Sincerely,        Wyatt Pascual, DO

## 2024-10-16 NOTE — PROGRESS NOTES
[facility] - ICU    RN Progress Note:            Pertinent Assessments:      Please refer to flowsheet rows for full assessment     Patient remained on Bipap 100% all shift, desat immediately when adjusted. O2 sats has declined to 76-80%  Temp 100.4 ax, tylenol supp given and ice pk in place.  Pt had brief period of Sinus tach/Afib            Key Events - This Shift:       Updated daughter of status.   Encourage family to visit even if it after hours.                Barriers to Discharge / Downgrade:     TBD,   Bipap 100%

## 2024-10-16 NOTE — PROGRESS NOTES
Critical Care progress note      10/16/2024    Name: Lizzie Viera MRN#: [id number]   Age: 74 year old YOB: 1950                    Problem List:   Active Problems:    Neutropenic fever (H)    Orthostatic hypotension    Pneumonia of both lower lobes due to infectious organism    ALEXANDER (acute kidney injury) (H)    Septic shock (H)    Clinically Significant Risk Factors Present on Admission        # Hypokalemia: Lowest K = 3 mmol/L in last 2 days, will replace as needed  # Hyponatremia: Lowest Na = 133 mmol/L in last 2 days, will monitor as appropriate  # Hypochloremia: Lowest Cl = 90 mmol/L in last 2 days, will monitor as appropriate  # Hypocalcemia: Lowest Ca = 6.6 mg/dL in last 2 days, will monitor and replace as appropriate   # Hypomagnesemia: Lowest Mg = 1.5 mg/dL in last 2 days, will replace as needed   # Hypoalbuminemia: Lowest albumin = 3.2 g/dL at 10/15/2024 11:29 AM, will monitor as appropriate   # Drug Induced Platelet Defect: home medication list includes an antiplatelet medication    # Acute Kidney Injury, unspecified: based on a >150% or 0.3 mg/dL increase in last creatinine compared to past 90 day average, will monitor renal function  # Hypertension: Noted on problem list   # Circulatory Shock: required vasopressors within past 24 hours    # Acute Hypoxic Respiratory Failure: Documented O2 saturation < 90%. Continue supplemental oxygen as needed                 Code Status: No CPR- Do NOT Intubate             HPI:     74-year-old female with a history of multiple myeloma, IgG kappa, osteoporosis, compression fractures, depression who has been feeling unwell for the past couple days.  She developed cough and progressive weakness.    States that the she was feeling feverish and generally weak to the point that she had multiple falls.  She developed rib pain on the right side for which she was brought to the emergency room where she was found to be hypotensive and on the x-ray showed  bilateral pneumonia.  Fluid resuscitated and started on vasopressors.  Received antibiotics and is being admitted to the ICU.      Assessment and plan :       I have personally reviewed the labs, imaging studies, cultures and discussed the case with referring physician and consulting physicians.     My assessment and plan by system for this patient is as follows:    Neurology/Psychiatry:   She is awake and answers questions.    At home on gabapentin, hydromorphone  We will start her on as needed IV hydromorphone 0.2 mg and adjust as tolerated    Hold off on starting gabapentin.  Will need to start at a lower dose since she has acute kidney injury      Cardiovascular:   Septic shock, resolved.  Off norepinephrine  Continue normal saline      Pulmonary/Ventilator Management:   Acute hypoxic respiratory failure secondary to Legionella pneumonia  Continue broad-spectrum antibiotics for now pending sputum culture to make sure she does not have superimposed bacterial pneumonia on top of Legionella  On BiPAP, 100% FiO2      GI and Nutrition :   I would avoid feeding at this point.  She is respiratory distress  Elevated LFTs due to Legionella    Renal/Fluids/Electrolytes:   Acute kidney injury secondary to sepsis/septic shock  Normal saline at 50 mL/h    - monitor function and electrolytes as needed with replacement per ICU protocols. - generally avoid nephrotoxic agents such as NSAID, IV contrast unless specifically required  - adjust medications as needed for renal clearance  - follow I/O's as appropriate.    Infectious Disease:   Legionella pneumonia  Continue azithromycin    Vanc/zosyn while sputum culture is pending    Endocrine:     Plan  - ICU insulin protocol, goal sugar <180      Hematology/Oncology:   IgG kappa multiple myeloma  On Revlimid  Has failed to achieve remission    Started Neupogen    Worsening thrombocytopenia, hold heparin    CODE STATUS discussed with the patient.  She wants to be DNR/DNI as she has  been in the past when she was hospitalized here.      ICU Prophylaxis:   1. DVT: SCD  3. Stress Ulcer: PPI             Medical History:     Past Medical History:   Diagnosis Date    Cervical dysplasia     Chronic RUQ pain     Depressive disorder     Multiple myeloma (H)     Osteoporosis      Past Surgical History:   Procedure Laterality Date    CONIZATION CERVIX,KNIFE/LASER      Description: Cervical Conization By Laser;  Recorded: 2007;    HC DILATION/CURETTAGE DIAG/THER NON OB      Description: Dilation And Curettage;  Recorded: 2007;    HC REMOVE TONSILS/ADENOIDS,<11 Y/O      Description: Tonsillectomy With Adenoidectomy;  Recorded: 2007;    PICC TRIPLE LUMEN PLACEMENT  10/15/2024    ZZC LAP,CHOLECYSTECTOMY/EXPLORE  2004    ZZC LIGATE FALLOPIAN TUBE      Description: Tubal Ligation;  Recorded: 2007;     Social History     Socioeconomic History    Marital status:      Spouse name: Not on file    Number of children: 3    Years of education: Not on file    Highest education level: Not on file   Occupational History    Not on file   Tobacco Use    Smoking status: Former     Current packs/day: 0.00     Average packs/day: 0.5 packs/day for 45.0 years (22.5 ttl pk-yrs)     Types: Cigarettes     Start date: 1978     Quit date: 2023     Years since quittin.7     Passive exposure: Past (15 year of spouse smoking around pt, as well as childhood exposure in the home from father)    Smokeless tobacco: Never   Vaping Use    Vaping status: Never Used   Substance and Sexual Activity    Alcohol use: Yes     Comment: Alcoholic Drinks/day: 0-1 drinks per week    Drug use: Not Currently    Sexual activity: Not Currently     Partners: Male     Birth control/protection: Surgical   Other Topics Concern    Parent/sibling w/ CABG, MI or angioplasty before 65F 55M? Not Asked   Social History Narrative    Not on file     Social Determinants of Health     Financial Resource Strain: Low Risk   (10/16/2024)    Financial Resource Strain     Within the past 12 months, have you or your family members you live with been unable to get utilities (heat, electricity) when it was really needed?: No   Food Insecurity: Low Risk  (10/16/2024)    Food Insecurity     Within the past 12 months, did you worry that your food would run out before you got money to buy more?: No     Within the past 12 months, did the food you bought just not last and you didn t have money to get more?: No   Transportation Needs: Low Risk  (10/16/2024)    Transportation Needs     Within the past 12 months, has lack of transportation kept you from medical appointments, getting your medicines, non-medical meetings or appointments, work, or from getting things that you need?: No   Physical Activity: Not on file   Stress: Not on file   Social Connections: Not on file   Interpersonal Safety: Low Risk  (1/18/2024)    Interpersonal Safety     Do you feel physically and emotionally safe where you currently live?: Yes     Within the past 12 months, have you been hit, slapped, kicked or otherwise physically hurt by someone?: No     Within the past 12 months, have you been humiliated or emotionally abused in other ways by your partner or ex-partner?: No   Housing Stability: Low Risk  (10/16/2024)    Housing Stability     Do you have housing? : Yes     Are you worried about losing your housing?: No        Allergies   Allergen Reactions    Latex Shortness Of Breath    Atorvastatin Muscle Pain (Myalgia)    Short Ragweed Pollen Ext Cough    Adhesive Tape Rash    Levaquin [Levofloxacin] Muscle Pain (Myalgia)     Achilles tendonitis after Levaquin (3/2024)              Key Medications:     Current Facility-Administered Medications   Medication Dose Route Frequency Provider Last Rate Last Admin    albuterol (PROVENTIL) neb solution 2.5 mg  2.5 mg Nebulization Q6H Yaquelin Mendez MD   2.5 mg at 10/16/24 0734    azithromycin (ZITHROMAX) 500 mg in sodium  chloride 0.9 % 250 mL intermittent infusion  500 mg Intravenous Q24H Jeremie Christine MD   500 mg at 10/15/24 2019    filgrastim-aafi (NIVESTYM) injection 300 mcg  300 mcg Subcutaneous Daily at 8 pm Jeremie Christine MD   300 mcg at 10/15/24 2027    [Held by provider] heparin ANTICOAGULANT injection 5,000 Units  5,000 Units Subcutaneous Q8H Jeremie Christine MD   5,000 Units at 10/15/24 2235    hydrocortisone sodium succinate PF (solu-CORTEF) injection 50 mg  50 mg Intravenous Q6H Jeremie Christine MD   50 mg at 10/16/24 1121    pantoprazole (PROTONIX) IV push injection 40 mg  40 mg Intravenous Q24H Jeremie Christine MD   40 mg at 10/15/24 2016    piperacillin-tazobactam (ZOSYN) 3.375 g vial to attach to  mL bag  3.375 g Intravenous Q12H Jeremie Christine MD        sodium chloride (NEBUSAL) 3 % neb solution 3 mL  3 mL Nebulization 2 times daily Yaquelin Mendez MD   3 mL at 10/16/24 0735    sodium chloride (PF) 0.9% PF flush 10-40 mL  10-40 mL Intracatheter Q7 Days Anette Porter, RN        vancomycin place orellana - receiving intermittent dosing  1 each Intravenous See Admin Instructions Jeremie Christine MD         Current Facility-Administered Medications   Medication Dose Route Frequency Provider Last Rate Last Admin    No lozenges or gum should be given while patient on BIPAP/AVAPS/AVAPS AE   Does not apply Continuous PRN Yaquelin Mendez MD        norepinephrine (LEVOPHED) 4 mg in  mL infusion PREMIX  0.01-0.6 mcg/kg/min Intravenous Continuous Lenka Cates PA-C   Stopped at 10/16/24 0430    Patient may continue current oral medications   Does not apply Continuous PRN Yaquelin Mendez MD        sodium chloride 0.9 % infusion   Intravenous Continuous Jeremie Christine MD 50 mL/hr at 10/15/24 1821 New Bag at 10/15/24 1821        Home Meds  Current Facility-Administered Medications   Medication Dose Route Frequency Provider Last Rate Last Admin     acetaminophen (TYLENOL) tablet 650 mg  650 mg Oral Q4H PRN Yaquelin Mendez MD   650 mg at 10/16/24 0607    albuterol (PROVENTIL) neb solution 2.5 mg  2.5 mg Nebulization Q6H Yaquelin Mendez MD   2.5 mg at 10/16/24 0734    albuterol (PROVENTIL) neb solution 2.5 mg  2.5 mg Nebulization Q6H PRN Martin Wadsworth MD   2.5 mg at 10/15/24 1729    azithromycin (ZITHROMAX) 500 mg in sodium chloride 0.9 % 250 mL intermittent infusion  500 mg Intravenous Q24H Jeremie Christine MD   500 mg at 10/15/24 2019    carboxymethylcellulose PF (REFRESH PLUS) 0.5 % ophthalmic solution 1 drop  1 drop Both Eyes Q1H PRN Yaquelin Mendez MD        glucose gel 15-30 g  15-30 g Oral Q15 Min PRN Jeremie Christine MD        Or    dextrose 50 % injection 25-50 mL  25-50 mL Intravenous Q15 Min PRN Jeremie Christine MD        Or    glucagon injection 1 mg  1 mg Subcutaneous Q15 Min PRN Jeremie Christine MD        filgrastim-aafi (NIVESTYM) injection 300 mcg  300 mcg Subcutaneous Daily at 8 pm Jeremie Christine MD   300 mcg at 10/15/24 2027    [Held by provider] heparin ANTICOAGULANT injection 5,000 Units  5,000 Units Subcutaneous Q8H Jeremie Christine MD   5,000 Units at 10/15/24 2235    hydrocortisone sodium succinate PF (solu-CORTEF) injection 50 mg  50 mg Intravenous Q6H Jeremie Christine MD   50 mg at 10/16/24 1121    HYDROmorphone (DILAUDID) injection 0.2 mg  0.2 mg Intravenous Q3H PRN Jeremie Christine MD        ipratropium (ATROVENT) 0.06 % spray 2 spray  2 spray Both Nostrils TID PRN Yaquelin Mendez MD   2 spray at 10/16/24 0442    lidocaine (LMX4) cream   Topical Q1H PRN Rohit Whitfield MD        lidocaine 1 % 0.1-5 mL  0.1-5 mL Other Q1H PRN Rohit Whitfield MD   1 mL at 10/15/24 1630    naloxone (NARCAN) injection 0.2 mg  0.2 mg Intravenous Q2 Min PRN Jeremie Christine MD        Or    naloxone (NARCAN) injection 0.4 mg  0.4 mg Intravenous Q2 Min PRN Jeremie Christine MD         Or    naloxone (NARCAN) injection 0.2 mg  0.2 mg Intramuscular Q2 Min PRN Jeremie Christine MD        Or    naloxone (NARCAN) injection 0.4 mg  0.4 mg Intramuscular Q2 Min PRN Jeremie Christine MD        No lozenges or gum should be given while patient on BIPAP/AVAPS/AVAPS AE   Does not apply Continuous PRN Yaquelin Mendez MD        norepinephrine (LEVOPHED) 4 mg in  mL infusion PREMIX  0.01-0.6 mcg/kg/min Intravenous Continuous Lenka Cates PA-C   Stopped at 10/16/24 0430    pantoprazole (PROTONIX) IV push injection 40 mg  40 mg Intravenous Q24H Jeremie Christine MD   40 mg at 10/15/24 2016    Patient may continue current oral medications   Does not apply Continuous PRN Yaquelin Mendez MD        piperacillin-tazobactam (ZOSYN) 3.375 g vial to attach to  mL bag  3.375 g Intravenous Q12H Jeremie Christine MD        sodium chloride (NEBUSAL) 3 % neb solution 3 mL  3 mL Nebulization 2 times daily Yaquelin Mendez MD   3 mL at 10/16/24 0735    sodium chloride (PF) 0.9% PF flush 10-20 mL  10-20 mL Intracatheter q1 min prn Anette Porter RN        sodium chloride (PF) 0.9% PF flush 10-40 mL  10-40 mL Intracatheter Q7 Days Anette Porter RN        sodium chloride (PF) 0.9% PF flush 10-40 mL  10-40 mL Intracatheter Q1H PRN Anette Porter RN        sodium chloride 0.9 % infusion   Intravenous Continuous Jeremie Christine MD 50 mL/hr at 10/15/24 1821 New Bag at 10/15/24 1821    vancomycin place orellana - receiving intermittent dosing  1 each Intravenous See Admin Instructions Jeremie Christine MD                  Physical Examination:   Temp:  [99.4  F (37.4  C)-103.1  F (39.5  C)] 99.4  F (37.4  C)  Pulse:  [] 107  Resp:  [17-55] 31  BP: ()/(44-87) 93/50  FiO2 (%):  [60 %-100 %] 100 %  SpO2:  [87 %-97 %] 88 %  No intake or output data in the 24 hours ending 10/15/24 1747  Wt Readings from Last 4 Encounters:   10/15/24 54.4 kg (120 lb)   10/10/24  "57.2 kg (126 lb 1.6 oz)   08/26/24 55.8 kg (123 lb)   07/15/24 57.6 kg (127 lb)     BP - Mean:  [55-98] 64  FiO2 (%): 100 %, Resp: (!) 31  Recent Labs   Lab 10/15/24  1126   O2PER 21       GEN: Mild respiratory distress  HEENT: head ncat, sclera anicteric, OP patent, trachea midline.  Perioral cyanosis  PULM: Bilateral crackles and rhonchi  CV/COR: RRR S1S2 no gallop,  No rub, no murmur  ABD: soft nontender, hypoactive bowel sounds, no mass  EXT: No significant edema  NEURO: grossly intact  SKIN: Skin is frail  LINES: clean, dry intact         Data:   All data and imaging reviewed     ROUTINE ICU LABS (Last four results)  CMP  Recent Labs   Lab 10/16/24  1122 10/16/24  0815 10/16/24  0432 10/16/24  0431 10/15/24  1814 10/15/24  1129   NA  --   --  137  --   --  133*   POTASSIUM  --   --  3.0*  --   --  3.5   CHLORIDE  --   --  103  --   --  90*   CO2  --   --  22  --   --  29   ANIONGAP  --   --  12  --   --  14   GLC 71 81 80 89   < > 100*   BUN  --   --  41.7*  --   --  38.5*   CR  --   --  2.30*  --   --  2.10*   GFRESTIMATED  --   --  22*  --   --  24*   SERAFIN  --   --  6.6*  --   --  8.9   MAG  --   --  1.5*  --   --   --    PROTTOTAL  --   --   --   --   --  6.6   ALBUMIN  --   --   --   --   --  3.2*   BILITOTAL  --   --   --   --   --  0.9   ALKPHOS  --   --   --   --   --  45   AST  --   --   --   --   --  143*   ALT  --   --   --   --   --  78*    < > = values in this interval not displayed.     CBC  Recent Labs   Lab 10/16/24  0433 10/15/24  1129   WBC 0.2* 0.5*   RBC 3.48* 3.79*   HGB 12.1 13.3   HCT 35.9 38.6   * 102*   MCH 34.8* 35.1*   MCHC 33.7 34.5   RDW 14.1 13.6   PLT 59* 119*     INRNo lab results found in last 7 days.  Arterial Blood Gas  Recent Labs   Lab 10/15/24  1126   O2PER 21       All cultures:  No results for input(s): \"CULT\" in the last 168 hours.  Recent Results (from the past 24 hour(s))   XR Chest Port 1 View    Narrative    EXAM: XR CHEST PORT 1 VIEW  LOCATION: [facility]  DATE: 10/15/2024    INDICATION: feveers cough  COMPARISON: 6/18/2024      Impression    IMPRESSION: Right and left upper chest and left inferior chest consolidative opacities. These could represent multifocal pneumonia, neoplasm, or infarcts.    Prominent right hilum could represent reactive adenopathy or neoplasm.    No effusion. No pulmonary vascular congestion.    CT PE run with contrast recommended.   CT Head w/o Contrast    Narrative    EXAM: CT HEAD W/O CONTRAST  LOCATION: [facility]  DATE: 10/15/2024    INDICATION: balance off x 2 days  COMPARISON: CT head 2/29/2021 and head MRI 5/22/2017.  TECHNIQUE: Routine CT Head without IV contrast. Multiplanar reformats. Dose reduction techniques were used.    FINDINGS:  INTRACRANIAL CONTENTS: No intracranial hemorrhage, extraaxial collection, or mass effect.  No CT evidence of acute infarct. Minor periventricular low-attenuation. Mild generalized volume loss. No hydrocephalus.     VISUALIZED ORBITS/SINUSES/MASTOIDS: Prior bilateral cataract surgery. Visualized portions of the orbits are otherwise unremarkable. No paranasal sinus mucosal disease. No middle ear or mastoid effusion.    BONES/SOFT TISSUES: Heterogeneous marrow attenuation including a rounded 5 mm focus of mixed lucency involving the left parietal bone without samara osseous destruction, presumably sequela of the patient's known multiple myeloma.      Impression    IMPRESSION:  1.  No CT evidence for acute intracranial process.  2.  Mild presumed senescent changes similar to the previous exam.  3.  Heterogeneous lucent lesion involving the left parietal calvarium without samara osseous destruction is presumably related to the patient's history of multiple myeloma.   XR Chest Port 1 View    Narrative    EXAM: XR CHEST PORT 1 VIEW  LOCATION: [facility]  DATE: 10/15/2024    INDICATION: RN placed PICC   verify tip placement  COMPARISON:  X-ray 10/15/2024 at 11:14 AM      Impression    IMPRESSION: Left PICC line with the tip located in the right mid to upper atrium 3.4 cm from the cavoatrial junction. Stable to slightly increased bilateral upper lobar consolidative opacities. Stable mild bibasilar pulmonary opacities. No definite   pleural effusion. Stable heart size. Stable right hilar enlargement.         Billing: This patient is critically ill: Yes. Total critical care time today 34 min exclusive of procedures or teaching.

## 2024-10-16 NOTE — PROGRESS NOTES
Patient has remained on BIPAP 100% throughout day, SPO2 85-90%. Unable to come off BIPAP, sat's immediately drop. Receiving scheduled Albuterol and Nebusal nebulizes. BS coarse with congested, non-productive cough.

## 2024-10-16 NOTE — CONSULTS
"[employer] Hematology and Oncology Inpatient Consult Note    Patient: Lizzie Viera  MRN: [id number]  Date of Service: 10/16/2024      Reason for Visit    I was consulted by hospitalist for myeloma, neutropenia, pneumonia    Assessment/Plan    Multiple myeloma: This has been under very good control.  She recently saw Dr. Blanco and no concerns for progressive disease.  She has been on maintenance Revlimid 10 mg daily.  At this point she will hold the Revlimid.  Significant neutropenia: Likely from Revlimid as well as her infection.  She has been started on Neupogen.  Continue daily.  Thrombocytopenia: Likely from infection, possibly Revlimid but generally does not cause severe thrombocytopenia.  Hold heparin for now  Legionella pneumonia: Patient quite sick.  She is on BiPAP, 100% FiO2.  Patient is DNR/DNI.  At this point she will continue what she is currently doing.  She is on broad-spectrum antibiotics.    ECOG Performance Status: 3-4          ______________________________________________________________________________      Staging History    Cancer Staging   No matching staging information was found for the patient.        History  Ms. Lizzie Viera is a 74 year old female with history of multiple myeloma, osteoporosis.  She developed a cough with progressive weakness over the last couple of days.  She was feeling feverish and was quite weak to the point where she had had some falls.  She had some rib pain and came to the ER.  She was hypotensive and had signs of pneumonia.  Patient was started on vasopressors and antibiotics and is admitted to the ICU.  She is found to have Legionella.  She is currently struggling to breathe.  She is on BiPAP at 100% FiO2.  Overall patient states that she does feel maybe slightly better.  Denies any new symptoms that she is worried about.  Understands how sick she is.    ROS: As above in the history, otherwise the remainder of the 10point ROS is negative  and not contributory to current reason for visit.          Past History  Past Medical History:   Diagnosis Date    Cervical dysplasia     Chronic RUQ pain     Depressive disorder     Multiple myeloma (H)     Osteoporosis      Past Surgical History:   Procedure Laterality Date    CONIZATION CERVIX,KNIFE/LASER      Description: Cervical Conization By Laser;  Recorded: 2007;    HC DILATION/CURETTAGE DIAG/THER NON OB      Description: Dilation And Curettage;  Recorded: 2007;    HC REMOVE TONSILS/ADENOIDS,<11 Y/O      Description: Tonsillectomy With Adenoidectomy;  Recorded: 2007;    PICC TRIPLE LUMEN PLACEMENT  10/15/2024    ZZC LAP,CHOLECYSTECTOMY/EXPLORE  2004    ZZC LIGATE FALLOPIAN TUBE      Description: Tubal Ligation;  Recorded: 2007;     Family History   Problem Relation Age of Onset    Diabetes Mother     Arthritis Mother     LUNG DISEASE Father     Diabetes Maternal Uncle     Breast Cancer Paternal Aunt     Heart Failure Maternal Grandmother     Heart Disease Maternal Grandmother     Heart Failure Maternal Grandfather     Heart Disease Maternal Grandfather     Heart Failure Paternal Grandmother     Heart Disease Paternal Grandmother      Social History     Socioeconomic History    Marital status:     Number of children: 3   Tobacco Use    Smoking status: Former     Current packs/day: 0.00     Average packs/day: 0.5 packs/day for 45.0 years (22.5 ttl pk-yrs)     Types: Cigarettes     Start date: 1978     Quit date: 2023     Years since quittin.7     Passive exposure: Past (15 year of spouse smoking around pt, as well as childhood exposure in the home from father)    Smokeless tobacco: Never   Vaping Use    Vaping status: Never Used   Substance and Sexual Activity    Alcohol use: Yes     Comment: Alcoholic Drinks/day: 0-1 drinks per week    Drug use: Not Currently    Sexual activity: Not Currently     Partners: Male     Birth control/protection: Surgical     Social  "Determinants of Health     Financial Resource Strain: Low Risk  (10/16/2024)    Financial Resource Strain     Within the past 12 months, have you or your family members you live with been unable to get utilities (heat, electricity) when it was really needed?: No   Food Insecurity: Low Risk  (10/16/2024)    Food Insecurity     Within the past 12 months, did you worry that your food would run out before you got money to buy more?: No     Within the past 12 months, did the food you bought just not last and you didn t have money to get more?: No   Transportation Needs: Low Risk  (10/16/2024)    Transportation Needs     Within the past 12 months, has lack of transportation kept you from medical appointments, getting your medicines, non-medical meetings or appointments, work, or from getting things that you need?: No   Interpersonal Safety: Low Risk  (1/18/2024)    Interpersonal Safety     Do you feel physically and emotionally safe where you currently live?: Yes     Within the past 12 months, have you been hit, slapped, kicked or otherwise physically hurt by someone?: No     Within the past 12 months, have you been humiliated or emotionally abused in other ways by your partner or ex-partner?: No   Housing Stability: Low Risk  (10/16/2024)    Housing Stability     Do you have housing? : Yes     Are you worried about losing your housing?: No       Allergies    Allergies   Allergen Reactions    Latex Shortness Of Breath    Atorvastatin Muscle Pain (Myalgia)    Short Ragweed Pollen Ext Cough    Adhesive Tape Rash    Levaquin [Levofloxacin] Muscle Pain (Myalgia)     Achilles tendonitis after Levaquin (3/2024)       PHYSICAL EXAM:  /58   Pulse 102   Temp 99.4  F (37.4  C) (Axillary)   Resp 29   Ht 1.549 m (5' 1\")   Wt 54.4 kg (120 lb)   LMP  (LMP Unknown)   SpO2 (!) 84%   BMI 22.67 kg/m    GENERAL: no acute distress. Cooperative in conversation. Resting in bed. Alone in room  HEENT: pupils are equal, round and " reactive. Oromucosa is clean and intact. No ulcerations or mucositis noted. No bleeding noted.  RESP: lungs have rhonchi throughout. Regular respiratory rate. No wheezes or rhonchi.  CV: Regular, rate and rhythm. No murmurs.  ABD: soft, nontender.   MUSCULOSKELETAL: No significant lower extremity swelling.   NEURO: non focal. Alert and oriented x3.   PSYCH: within normal limits. No depression or anxiety.  SKIN: warm dry intact         Lab Results  Recent Results (from the past 24 hour(s))   UA with Microscopic reflex to Culture    Collection Time: 10/15/24  6:01 PM    Specimen: Urine, Stover Catheter   Result Value Ref Range    Color Urine Yellow Colorless, Straw, Light Yellow, Yellow    Appearance Urine Cloudy (A) Clear    Glucose Urine Negative Negative mg/dL    Bilirubin Urine Negative Negative    Ketones Urine Negative Negative mg/dL    Specific Gravity Urine 1.019 1.001 - 1.030    Blood Urine 1.0 mg/dL (A) Negative    pH Urine 5.5 5.0 - 7.0    Protein Albumin Urine 100 (A) Negative mg/dL    Urobilinogen Urine <2.0 <2.0 mg/dL    Nitrite Urine Negative Negative    Leukocyte Esterase Urine Negative Negative    Mucus Urine Present (A) None Seen /LPF    Amorphous Crystals Urine Few (A) None Seen /HPF    RBC Urine 0 <=2 /HPF    WBC Urine 5 <=5 /HPF    Squamous Epithelials Urine 1 <=1 /HPF   Legionella Urinary Antigen and Streptococcus pneumoniae antigen    Collection Time: 10/15/24  6:01 PM    Specimen: Urine, Stover Catheter   Result Value Ref Range    Legionella pneumophila serogroup 1 urinary antigen Positive (A) Negative    Streptococcus pneumoniae antigen Negative Negative    Legionella pneumophila Urinary/Strep pneumoniae Antigen Specimen Type Urine    Glucose by meter    Collection Time: 10/15/24  6:14 PM   Result Value Ref Range    GLUCOSE BY METER POCT 114 (H) 70 - 99 mg/dL   Glucose by meter    Collection Time: 10/15/24  8:00 PM   Result Value Ref Range    GLUCOSE BY METER POCT 107 (H) 70 - 99 mg/dL   Glucose  by meter    Collection Time: 10/16/24 12:09 AM   Result Value Ref Range    GLUCOSE BY METER POCT 98 70 - 99 mg/dL   UA with Microscopic reflex to Culture    Collection Time: 10/16/24 12:13 AM    Specimen: Urine, Stover Catheter   Result Value Ref Range    Color Urine Yellow Colorless, Straw, Light Yellow, Yellow    Appearance Urine Turbid (A) Clear    Glucose Urine Negative Negative mg/dL    Bilirubin Urine Negative Negative    Ketones Urine Negative Negative mg/dL    Specific Gravity Urine 1.019 1.001 - 1.030    Blood Urine 0.5 mg/dL (A) Negative    pH Urine 5.5 5.0 - 7.0    Protein Albumin Urine 70 (A) Negative mg/dL    Urobilinogen Urine <2.0 <2.0 mg/dL    Nitrite Urine Negative Negative    Leukocyte Esterase Urine Negative Negative    Bacteria Urine Moderate (A) None Seen /HPF    Amorphous Crystals Urine Few (A) None Seen /HPF    RBC Urine 0 <=2 /HPF    WBC Urine 2 <=5 /HPF    Squamous Epithelials Urine 2 (H) <=1 /HPF    Granular Casts Urine 123 (H) None Seen /LPF   Respiratory Aerobic Bacterial Culture with Gram Stain    Collection Time: 10/16/24  1:58 AM    Specimen: Expectorate; Sputum   Result Value Ref Range    Gram Stain Result <10 Squamous epithelial cells/low power field     Gram Stain Result <25 PMNs/low power field     Gram Stain Result 2+ Mixed hieu    Glucose by meter    Collection Time: 10/16/24  4:31 AM   Result Value Ref Range    GLUCOSE BY METER POCT 89 70 - 99 mg/dL   Basic metabolic panel    Collection Time: 10/16/24  4:32 AM   Result Value Ref Range    Sodium 137 135 - 145 mmol/L    Potassium 3.0 (L) 3.4 - 5.3 mmol/L    Chloride 103 98 - 107 mmol/L    Carbon Dioxide (CO2) 22 22 - 29 mmol/L    Anion Gap 12 7 - 15 mmol/L    Urea Nitrogen 41.7 (H) 8.0 - 23.0 mg/dL    Creatinine 2.30 (H) 0.51 - 0.95 mg/dL    GFR Estimate 22 (L) >60 mL/min/1.73m2    Calcium 6.6 (L) 8.8 - 10.4 mg/dL    Glucose 80 70 - 99 mg/dL   Magnesium    Collection Time: 10/16/24  4:32 AM   Result Value Ref Range    Magnesium  1.5 (L) 1.7 - 2.3 mg/dL   CBC with platelets    Collection Time: 10/16/24  4:33 AM   Result Value Ref Range    WBC Count 0.2 (LL) 4.0 - 11.0 10e3/uL    RBC Count 3.48 (L) 3.80 - 5.20 10e6/uL    Hemoglobin 12.1 11.7 - 15.7 g/dL    Hematocrit 35.9 35.0 - 47.0 %     (H) 78 - 100 fL    MCH 34.8 (H) 26.5 - 33.0 pg    MCHC 33.7 31.5 - 36.5 g/dL    RDW 14.1 10.0 - 15.0 %    Platelet Count 59 (L) 150 - 450 10e3/uL   RBC and Platelet Morphology    Collection Time: 10/16/24  4:33 AM   Result Value Ref Range    RBC Morphology Confirmed RBC Indices     Platelet Assessment  Automated Count Confirmed. Platelet morphology is normal.     Automated Count Confirmed. Platelet morphology is normal.    Monse Cells Moderate (A) None Seen   Respiratory Panel PCR    Collection Time: 10/16/24  6:03 AM    Specimen: Nasopharyngeal; Swab   Result Value Ref Range    Adenovirus Not Detected Not Detected    Coronavirus Not Detected Not Detected    Human Metapneumovirus Not Detected Not Detected    Human Rhin/Enterovirus Not Detected Not Detected    Influenza A Not Detected Not Detected    Influenza A, H1 Not Detected Not Detected    Influenza A 2009 H1N1 Not Detected Not Detected    Influenza A, H3 Not Detected Not Detected    Influenza B Not Detected Not Detected    Parainfluenza Virus 1 Not Detected Not Detected    Parainfluenza Virus 2 Not Detected Not Detected    Parainfluenza Virus 3 Not Detected Not Detected    Parainfluenza Virus 4 Not Detected Not Detected    Respiratory Syncytial Virus A Not Detected Not Detected    Respiratory Syncytial Virus B Not Detected Not Detected    Chlamydia Pneumoniae Not Detected Not Detected    Mycoplasma Pneumoniae Not Detected Not Detected   Glucose by meter    Collection Time: 10/16/24  8:15 AM   Result Value Ref Range    GLUCOSE BY METER POCT 81 70 - 99 mg/dL   Glucose by meter    Collection Time: 10/16/24 11:22 AM   Result Value Ref Range    GLUCOSE BY METER POCT 71 70 - 99 mg/dL   Potassium     Collection Time: 10/16/24  3:09 PM   Result Value Ref Range    Potassium 3.8 3.4 - 5.3 mmol/L   Magnesium    Collection Time: 10/16/24  3:09 PM   Result Value Ref Range    Magnesium 2.5 (H) 1.7 - 2.3 mg/dL        Imaging Results    XR Chest Port 1 View    Result Date: 10/16/2024  EXAM: XR CHEST PORT 1 VIEW LOCATION: [facility] [place] DATE: 10/16/2024 INDICATION: worsening oxygenation COMPARISON: October 15, 2024 and June 18, 2024 x-rays     IMPRESSION: Left PICC line has its tip at the cavoatrial junction. Bilateral dense parenchymal opacities, most pronounced in the upper lung zones appear unchanged. No pneumothorax or significant volume pleural effusion. No new abnormality.    XR Chest Port 1 View    Result Date: 10/15/2024  EXAM: XR CHEST PORT 1 VIEW LOCATION: [facility] [place] DATE: 10/15/2024 5:19 PM INDICATION: RN placed PICC   verify tip placement COMPARISON: 10/15/2024 4:20 PM     IMPRESSION: Interval retraction of the left PICC with tip now at the cavoatrial junction. Otherwise, no significant interval change. Please note, the lung apices are not included in the field-of-view, which limits evaluation for pneumothorax.    XR Chest Port 1 View    Result Date: 10/15/2024  EXAM: XR CHEST PORT 1 VIEW LOCATION: [facility] [place] DATE: 10/15/2024 INDICATION: RN placed PICC   verify tip placement COMPARISON: X-ray 10/15/2024 at 11:14 AM     IMPRESSION: Left PICC line with the tip located in the right mid to upper atrium 3.4 cm from the cavoatrial junction. Stable to slightly increased bilateral upper lobar consolidative opacities. Stable mild bibasilar pulmonary opacities. No definite pleural effusion. Stable heart size. Stable right hilar enlargement.    CT Head w/o Contrast    Result Date: 10/15/2024  EXAM: CT HEAD W/O CONTRAST LOCATION: [facility] [place] DATE: 10/15/2024 INDICATION: balance off x 2 days COMPARISON: CT head 2/29/2021 and head  MRI 5/22/2017. TECHNIQUE: Routine CT Head without IV contrast. Multiplanar reformats. Dose reduction techniques were used. FINDINGS: INTRACRANIAL CONTENTS: No intracranial hemorrhage, extraaxial collection, or mass effect.  No CT evidence of acute infarct. Minor periventricular low-attenuation. Mild generalized volume loss. No hydrocephalus. VISUALIZED ORBITS/SINUSES/MASTOIDS: Prior bilateral cataract surgery. Visualized portions of the orbits are otherwise unremarkable. No paranasal sinus mucosal disease. No middle ear or mastoid effusion. BONES/SOFT TISSUES: Heterogeneous marrow attenuation including a rounded 5 mm focus of mixed lucency involving the left parietal bone without samara osseous destruction, presumably sequela of the patient's known multiple myeloma.     IMPRESSION: 1.  No CT evidence for acute intracranial process. 2.  Mild presumed senescent changes similar to the previous exam. 3.  Heterogeneous lucent lesion involving the left parietal calvarium without samara osseous destruction is presumably related to the patient's history of multiple myeloma.    XR Chest Port 1 View    Result Date: 10/15/2024  EXAM: XR CHEST PORT 1 VIEW LOCATION: [facility] DATE: 10/15/2024 INDICATION: feveers cough COMPARISON: 6/18/2024     IMPRESSION: Right and left upper chest and left inferior chest consolidative opacities. These could represent multifocal pneumonia, neoplasm, or infarcts. Prominent right hilum could represent reactive adenopathy or neoplasm. No effusion. No pulmonary vascular congestion. CT PE run with contrast recommended.       Signed by: SINDHU Lundberg CNP

## 2024-10-16 NOTE — PHARMACY-VANCOMYCIN DOSING SERVICE
Pharmacy Vancomycin Note  Date of Service 2024  Patient's  1950   74 year old, female    Indication: Sepsis  Day of Therapy: 2  Current vancomycin regimen: intermittent  Current vancomycin monitoring method: AUC  Current vancomycin therapeutic monitoring goal: 400-600 mg*h/L    Current estimated CrCl = Estimated Creatinine Clearance: 19 mL/min (A) (based on SCr of 2.3 mg/dL (H)).    Creatinine for last 3 days  10/15/2024: 11:29 AM Creatinine 2.10 mg/dL  10/16/2024:  4:32 AM Creatinine 2.30 mg/dL    Recent Vancomycin Levels (past 3 days)  10/16/2024:  3:09 PM Vancomycin 6.8 ug/mL    Vancomycin IV Administrations (past 72 hours)                     vancomycin (VANCOCIN) 1,000 mg in 200 mL dextrose intermittent infusion (mg) 1,000 mg New Bag 10/15/24 1438                    Nephrotoxins and other renal medications (From now, onward)      Start     Dose/Rate Route Frequency Ordered Stop    10/16/24 1600  piperacillin-tazobactam (ZOSYN) 3.375 g vial to attach to  mL bag         3.375 g  over 240 Minutes Intravenous EVERY 12 HOURS 10/16/24 0736      10/15/24 1826  vancomycin place orellana - receiving intermittent dosing         1 each Intravenous SEE ADMIN INSTRUCTIONS 10/15/24 1826      10/15/24 1530  norepinephrine (LEVOPHED) 4 mg in  mL infusion PREMIX         0.01-0.6 mcg/kg/min × 54.4 kg  2-122.4 mL/hr  Intravenous CONTINUOUS 10/15/24 1515                 Contrast Orders - past 72 hours (72h ago, onward)      None          Plan:  With worsening renal function and random vancomycin level, no ongoing regimen adequately meets patient's needs. Therefore, will repeat a 1000 mg dose this evening and continue to monitor renal function daily.    Shilpi Potts [facility]

## 2024-10-16 NOTE — PLAN OF CARE
Goal Outcome Evaluation:      Plan of Care Reviewed With: patient    Overall Patient Progress: decliningOverall Patient Progress: declining    Outcome Evaluation: Increasing O2 needs throughout night. Ended up needing BiPAP at 100% FiO2    [facility] - ICU    RN Progress Note:            Pertinent Assessments:      Please refer to flowsheet rows for full assessment     - Febrile overnight. Tmax = 103.  - Increasing O2 Needs  - Productive cough           Key Events - This Shift:       - Fever not responsive to tylenol/ice packs. Eventually placed cooling blanked. Fever subsided, however pt started shivering. After pt started shivering SpO2 dropped to ~80%. Required placing BiPAP at 100% FiO2                Barriers to Discharge / Downgrade:     High oxygen needs

## 2024-10-17 ASSESSMENT — ACTIVITIES OF DAILY LIVING (ADL): ADLS_ACUITY_SCORE: 26

## 2024-10-17 NOTE — PROGRESS NOTES
Called to the bedside by nursing staff to pronounce the patient's death.    Examination:  Pupils fixed and dilated, no corneal reflex, no gag reflex, no spontaneous respirations on auscultation, no audible heart sounds, no peripheral pulses, no response to painful stimuli.    Time of death: 2202 hrs  Date of death: 10/16/2024     Yaquelin Mendez MD  Pulmonary and Critical Care

## 2024-10-17 NOTE — DISCHARGE SUMMARY
Death / Discharge Summary    Admit date: 10/15/2024  Patient YOB: 1950     Date and time of death: 10/16/2024 at 22:02 hrs    Reason for Admission: septic shock with bilateral pneumonia.     Hospital Summary:   Lizzie Viera was admitted to [facility] on 10/15/2024 with complaints of acute onset malaise, cough, & progressive weakness. She was found to have bilateral pneumonia complicated by ALEXANDER & septic shock. In the ICU she was diagnosed w/ Legionella pneumonia requiring high flow nasal canula & BIPAP for respiratory support.     Events Leading to Death: despite antibiotics, stress dose steroids, & NIPPV, patient continued to deteriorate. She passed away on the evening of 10/16/2024 surrounded by family.    Consults: pulmonary/intensive care and hematology/oncology    Significant Diagnostic Studies: Legionella urine antigen positive    Treatments:   IV hydration, antibiotics: vancomycin, Zosyn, and azithromycin, steroids: solu-cortef, respiratory therapy: O2, chest PT, and HFNC + BIPAP, and norepinephrine drip.    Surgical Procedures Performed: none    Autopsy:  Not performed     Principal Diagnosis:  <principal problem not specified>     Other Diagnoses: Active Problems:    Neutropenic fever (H)    Orthostatic hypotension    Pneumonia of both lower lobes due to infectious organism    ALEXANDER (acute kidney injury) (H)    Septic shock (H)      Admitting Physician: Jeremie Christine MD     Primary Care Physician: Sona Sandoval    Discharge Physician: Yaquelin Mendez MD

## 2024-10-17 NOTE — PLAN OF CARE
Pt was pronounced at 2203 by Dr. Mendez. Pt was surrounded by family at time passing. Belonging sets with family. [employer] notified and not a candidate. [facility] picked up pt around 0015.

## 2024-10-18 ENCOUNTER — PATIENT OUTREACH (OUTPATIENT)
Dept: ONCOLOGY | Facility: HOSPITAL | Age: 74
End: 2024-10-18
Payer: COMMERCIAL

## 2024-10-18 LAB
BACTERIA SPT CULT: NORMAL
GRAM STAIN RESULT: NORMAL

## 2024-10-18 NOTE — PROGRESS NOTES
[facility]: Cancer Care                                                                                            Situation: Patient chart reviewed by care coordinator.    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     Assessment: Patient was admitted to the hospital on 10/15/2024 with complaints of acute onset malaise, cough and progressive weakness.  She was found to have bilateral pneumonia complicated by ALEXANDER and septic shock.  In the ICU she was diagnosed with Legionella pneumonia requiring high flow nasal cannula and BiPAP for respiratory support.    Plan/Recommendations: Despite antibiotics, stress dose steroids and oxygen support, Valeria continued to deteriorate.  She passed away on the evening of 10/16/2024 surrounded by her family.    I have completed her chart for cancer care.    Signature:  Aliza Sheehan RN

## 2024-10-20 LAB — BACTERIA BLD CULT: NO GROWTH

## 2024-10-22 ENCOUNTER — TELEPHONE (OUTPATIENT)
Dept: AUDIOLOGY | Facility: CLINIC | Age: 74
End: 2024-10-22
Payer: COMMERCIAL

## 2024-10-22 NOTE — TELEPHONE ENCOUNTER
Spoke with Collette Viera, patient's daughter and CBO authorized . Her mother's hearing aids are returnable for credit; all that needs to be done is to drop off both of the hearing aids, their  and its cord at the [facility] . I will complete the return for credit paperwork and return them to the  once received. Deepest condolences were offered on the loss of her mother. She indicated either she or other family members will return the devices as soon as possible.    Ana Laura Viera, [facility]  [place] Licensed Audiologist [phone]

## 2024-10-31 DIAGNOSIS — C90.00 MULTIPLE MYELOMA NOT HAVING ACHIEVED REMISSION (H): ICD-10-CM

## 2024-10-31 DIAGNOSIS — C90.00 MULTIPLE MYELOMA WITH FAILED REMISSION (H): ICD-10-CM

## 2024-10-31 LAB
ATRIAL RATE - MUSE: 95 BPM
DIASTOLIC BLOOD PRESSURE - MUSE: 53 MMHG
INTERPRETATION ECG - MUSE: NORMAL
P AXIS - MUSE: 33 DEGREES
PR INTERVAL - MUSE: 162 MS
QRS DURATION - MUSE: 94 MS
QT - MUSE: 350 MS
QTC - MUSE: 439 MS
R AXIS - MUSE: 79 DEGREES
SYSTOLIC BLOOD PRESSURE - MUSE: 87 MMHG
T AXIS - MUSE: 53 DEGREES
VENTRICULAR RATE- MUSE: 95 BPM

## 2024-10-31 RX ORDER — LENALIDOMIDE 10 MG/1
CAPSULE ORAL
Refills: 0 | OUTPATIENT
Start: 2024-10-31

## 2025-02-28 NOTE — PATIENT INSTRUCTIONS
It was good to see you today, Valeria.  You look really good.    Here are the things we talked about:  Let us know when you need the dilaudid refilled.  No other changes to your medications.    Someone from the team will reach out to schedule a follow up appointment in 3 months.     How to get a hold of us:  For non-urgent matters, MyChart works best.    For more urgent matters, or if you prefer not to use MyChart, call our clinic nurse coordinator Carine Ferreira RN at [phone]    We have an on-call number for evenings and weekends. Please call this only if you are having uncontrolled symptoms or serious side effects from your medicines: [phone].     For refills, please give us a week (5 working days) notice. We don't always have providers available everyday to do refills. If you call the day you run out of your medicine, we may not be able to refill it in time, so call 5 days in advance!    Yuri Salazar MD MS FAAFP CAQHPM  [facility]  Office [phone]  Fax [phone]   
done

## (undated) RX ORDER — PROPOFOL 10 MG/ML
INJECTION, EMULSION INTRAVENOUS
Status: DISPENSED
Start: 2022-02-09

## (undated) RX ORDER — DEXAMETHASONE SODIUM PHOSPHATE 4 MG/ML
INJECTION, SOLUTION INTRA-ARTICULAR; INTRALESIONAL; INTRAMUSCULAR; INTRAVENOUS; SOFT TISSUE
Status: DISPENSED
Start: 2022-02-09

## (undated) RX ORDER — ONDANSETRON 2 MG/ML
INJECTION INTRAMUSCULAR; INTRAVENOUS
Status: DISPENSED
Start: 2022-02-09

## (undated) RX ORDER — LIDOCAINE HYDROCHLORIDE 10 MG/ML
INJECTION, SOLUTION EPIDURAL; INFILTRATION; INTRACAUDAL; PERINEURAL
Status: DISPENSED
Start: 2022-02-09

## (undated) RX ORDER — GLYCOPYRROLATE 0.2 MG/ML
INJECTION INTRAMUSCULAR; INTRAVENOUS
Status: DISPENSED
Start: 2022-02-09